# Patient Record
Sex: MALE | Race: WHITE | NOT HISPANIC OR LATINO
[De-identification: names, ages, dates, MRNs, and addresses within clinical notes are randomized per-mention and may not be internally consistent; named-entity substitution may affect disease eponyms.]

---

## 2020-11-06 ENCOUNTER — APPOINTMENT (OUTPATIENT)
Dept: PODIATRY | Facility: HOSPITAL | Age: 60
End: 2020-11-06

## 2020-12-11 PROBLEM — Z00.00 ENCOUNTER FOR PREVENTIVE HEALTH EXAMINATION: Status: ACTIVE | Noted: 2020-12-11

## 2020-12-14 ENCOUNTER — APPOINTMENT (OUTPATIENT)
Dept: WOUND CARE | Facility: HOSPITAL | Age: 60
End: 2020-12-14
Payer: COMMERCIAL

## 2020-12-14 ENCOUNTER — OUTPATIENT (OUTPATIENT)
Dept: OUTPATIENT SERVICES | Facility: HOSPITAL | Age: 60
LOS: 1 days | Discharge: HOME | End: 2020-12-14
Payer: COMMERCIAL

## 2020-12-14 VITALS
OXYGEN SATURATION: 99 % | HEART RATE: 106 BPM | WEIGHT: 185 LBS | BODY MASS INDEX: 28.04 KG/M2 | HEIGHT: 68 IN | SYSTOLIC BLOOD PRESSURE: 175 MMHG | DIASTOLIC BLOOD PRESSURE: 97 MMHG | RESPIRATION RATE: 20 BRPM | TEMPERATURE: 98 F

## 2020-12-14 VITALS — HEART RATE: 100 BPM | SYSTOLIC BLOOD PRESSURE: 155 MMHG | DIASTOLIC BLOOD PRESSURE: 85 MMHG

## 2020-12-14 VITALS
RESPIRATION RATE: 20 BRPM | OXYGEN SATURATION: 99 % | DIASTOLIC BLOOD PRESSURE: 97 MMHG | SYSTOLIC BLOOD PRESSURE: 175 MMHG | TEMPERATURE: 98 F | HEART RATE: 100 BPM

## 2020-12-14 DIAGNOSIS — Z80.3 FAMILY HISTORY OF MALIGNANT NEOPLASM OF BREAST: ICD-10-CM

## 2020-12-14 DIAGNOSIS — K22.4 DYSKINESIA OF ESOPHAGUS: ICD-10-CM

## 2020-12-14 DIAGNOSIS — H26.9 UNSPECIFIED CATARACT: ICD-10-CM

## 2020-12-14 DIAGNOSIS — Z86.79 PERSONAL HISTORY OF OTHER DISEASES OF THE CIRCULATORY SYSTEM: ICD-10-CM

## 2020-12-14 DIAGNOSIS — Z86.39 PERSONAL HISTORY OF OTHER ENDOCRINE, NUTRITIONAL AND METABOLIC DISEASE: ICD-10-CM

## 2020-12-14 DIAGNOSIS — L97.501 NON-PRESSURE CHRONIC ULCER OF OTHER PART OF UNSPECIFIED FOOT LIMITED TO BREAKDOWN OF SKIN: ICD-10-CM

## 2020-12-14 DIAGNOSIS — Z83.3 FAMILY HISTORY OF DIABETES MELLITUS: ICD-10-CM

## 2020-12-14 DIAGNOSIS — Z86.69 PERSONAL HISTORY OF OTHER DISEASES OF THE NERVOUS SYSTEM AND SENSE ORGANS: ICD-10-CM

## 2020-12-14 DIAGNOSIS — Z78.9 OTHER SPECIFIED HEALTH STATUS: ICD-10-CM

## 2020-12-14 PROCEDURE — 99203 OFFICE O/P NEW LOW 30 MIN: CPT

## 2020-12-14 PROCEDURE — G0463: CPT

## 2020-12-14 RX ORDER — METFORMIN HYDROCHLORIDE 1000 MG/1
1000 TABLET, COATED ORAL TWICE DAILY
Refills: 0 | Status: ACTIVE | COMMUNITY

## 2020-12-14 RX ORDER — ATORVASTATIN CALCIUM 40 MG/1
40 TABLET, FILM COATED ORAL DAILY
Refills: 0 | Status: ACTIVE | COMMUNITY

## 2020-12-14 RX ORDER — CLOPIDOGREL BISULFATE 75 MG/1
75 TABLET, FILM COATED ORAL DAILY
Refills: 0 | Status: ACTIVE | COMMUNITY

## 2020-12-14 RX ORDER — ACETAMINOPHEN 500 MG
500 TABLET ORAL 4 TIMES DAILY
Refills: 0 | Status: ACTIVE | COMMUNITY

## 2020-12-14 NOTE — REVIEW OF SYSTEMS
[Skin Wound] : skin wound [Fever] : no fever [Eye Pain] : no eye pain [Earache] : no earache [Shortness Of Breath] : no shortness of breath [Cough] : no cough [Abdominal Pain] : no abdominal pain [FreeTextEntry5] : htn, hld, pvd [de-identified] : right hallux diabetic ulcer down to skin, subcutaneous tissue, fat, and bone (necrotic) [de-identified] : diabetic neuropathy [de-identified] : type 2 diabetes

## 2020-12-14 NOTE — HISTORY OF PRESENT ILLNESS
[FreeTextEntry1] : The wound is located on patient's right great toe.  Patient reports that he had an infected ingrown toe nail and he had 3/4 removed by Dr. Silvino DPM in 10/2020.  Patient reports that he also had an angioplasty on 11/21/20 by Dr. Vance.  Patient is scheduled  to have an MRI on Weds to r/o osteomyelitis as ordered by Dr. Dubois.  Patient completed Augmentin and is currently taking Doxycycline.  The wound is being treated with Iodosorb to the wound.  Dr. Dubois referred patient to [facility].

## 2020-12-14 NOTE — ASSESSMENT
[FreeTextEntry2] : Promote optimal skin integrity, offloading, infection control, pain management [FreeTextEntry4] : Patient presented with asymptomatic hypertension.  Recommended that patient call his PCP.  Patient verbalized understanding.Circulation:\par \par Dorsalis Pedis:  left palpable right unable to palpate\par Posterior Tibialis:  bilateral unable to palpate\par Doppler Pulses:  bilateral  present \par Extremity Color:  bilateral pink\par Extremity Temperature:  bilateral warm\par Capillary Refill:  bilateral <3 sec  >3 sec\par FRANCISCA:   Patient is under the care of Dr. Vance who did angioplasty on 11/21/20\par \par Dr. Stark recommended that patient go to ER today and recommended amputation of right great toe.  Patient declined going to ER today reporting that he wants to discuss his options with his wife and Dr. Dubois, DPM.  Dr. Stark educated patient on the risks of not getting immediate tx as recommended.  Patient verbalized understanding and said he will decide by Thursday.  Educated patients on s/s of infection and to go to ER immediately if condition worsens.\par \par Patient is scheduled to have an MRI of right foot to r/o osteomyelitis as ordered by his DPM, Dr. Dubois.  Instructed patient to have MRI report faxed to [facility].  Provided patient with [facility] fax #.\par \par F/U 1 week\par

## 2020-12-14 NOTE — VITALS
[FreeTextEntry3] : Right great toe (5/10 - 6/10) [FreeTextEntry1] : Tylenol and Motrin [FreeTextEntry2] : sitting [FreeTextEntry4] : sitting

## 2020-12-14 NOTE — PHYSICAL EXAM
[0] : left 0 [Ankle Swelling (On Exam)] : not present [Varicose Veins Of Lower Extremities] : not present [] : not present [Skin Ulcer] : ulcer [Alert] : alert [Oriented to Person] : oriented to person [Oriented to Place] : oriented to place [Oriented to Time] : oriented to time [de-identified] : A&Ox3, NAD [de-identified] : 5/5 strength in all quadrants bilaterally [de-identified] : right hallux diabetic ulcer down to skin, subcutaneous tissue, fat, and bone (necrotic) [de-identified] : diminished light touch sensation bilaterally [FreeTextEntry1] : Right great toe [FreeTextEntry2] : 6.0 [FreeTextEntry3] : 5.0 [FreeTextEntry4] : 0.2 [de-identified] : small serosanguineous [de-identified] : erythema [de-identified] : mild [de-identified] : >75% [de-identified] : <5% [de-identified] : <25% [de-identified] : Iodosorb [de-identified] : NSC [de-identified] : Daily

## 2020-12-14 NOTE — PLAN
[FreeTextEntry1] : Patient examined and evaluated at this time.\par Continue local wound care and offloading.\par Discussed need for surgical amputation to avoid sepsis, loss of limb, and loss of life. Discussed the urgency and recommended patient go to the emergency room. Pt refusing at this time relating that he needs to discuss the plan with family.\par Patient is a high risk for proximal amputation.\par Pt advised to report to the nearest emergency room if any worsening or new symptoms are noted.\par Discussed the urgency of patient's condition with patient's family via phone.

## 2020-12-15 DIAGNOSIS — Z80.3 FAMILY HISTORY OF MALIGNANT NEOPLASM OF BREAST: ICD-10-CM

## 2020-12-15 DIAGNOSIS — Z79.84 LONG TERM (CURRENT) USE OF ORAL HYPOGLYCEMIC DRUGS: ICD-10-CM

## 2020-12-15 DIAGNOSIS — E11.51 TYPE 2 DIABETES MELLITUS WITH DIABETIC PERIPHERAL ANGIOPATHY WITHOUT GANGRENE: ICD-10-CM

## 2020-12-15 DIAGNOSIS — Z83.3 FAMILY HISTORY OF DIABETES MELLITUS: ICD-10-CM

## 2020-12-15 DIAGNOSIS — L97.514 NON-PRESSURE CHRONIC ULCER OF OTHER PART OF RIGHT FOOT WITH NECROSIS OF BONE: ICD-10-CM

## 2020-12-15 DIAGNOSIS — Z79.899 OTHER LONG TERM (CURRENT) DRUG THERAPY: ICD-10-CM

## 2020-12-15 DIAGNOSIS — Z79.82 LONG TERM (CURRENT) USE OF ASPIRIN: ICD-10-CM

## 2020-12-15 DIAGNOSIS — E11.40 TYPE 2 DIABETES MELLITUS WITH DIABETIC NEUROPATHY, UNSPECIFIED: ICD-10-CM

## 2020-12-15 DIAGNOSIS — E11.621 TYPE 2 DIABETES MELLITUS WITH FOOT ULCER: ICD-10-CM

## 2020-12-15 DIAGNOSIS — E78.00 PURE HYPERCHOLESTEROLEMIA, UNSPECIFIED: ICD-10-CM

## 2020-12-16 ENCOUNTER — INPATIENT (INPATIENT)
Facility: HOSPITAL | Age: 60
LOS: 4 days | Discharge: HOME | DRG: 617 | End: 2020-12-21
Attending: INTERNAL MEDICINE | Admitting: INTERNAL MEDICINE
Payer: COMMERCIAL

## 2020-12-16 VITALS
DIASTOLIC BLOOD PRESSURE: 85 MMHG | HEART RATE: 114 BPM | WEIGHT: 184.97 LBS | SYSTOLIC BLOOD PRESSURE: 158 MMHG | HEIGHT: 68 IN | TEMPERATURE: 98 F | OXYGEN SATURATION: 98 % | RESPIRATION RATE: 18 BRPM

## 2020-12-16 DIAGNOSIS — I96 GANGRENE, NOT ELSEWHERE CLASSIFIED: ICD-10-CM

## 2020-12-16 DIAGNOSIS — E11.9 TYPE 2 DIABETES MELLITUS WITHOUT COMPLICATIONS: ICD-10-CM

## 2020-12-16 DIAGNOSIS — M86.9 OSTEOMYELITIS, UNSPECIFIED: ICD-10-CM

## 2020-12-16 DIAGNOSIS — Z29.9 ENCOUNTER FOR PROPHYLACTIC MEASURES, UNSPECIFIED: ICD-10-CM

## 2020-12-16 DIAGNOSIS — I73.9 PERIPHERAL VASCULAR DISEASE, UNSPECIFIED: ICD-10-CM

## 2020-12-16 DIAGNOSIS — I10 ESSENTIAL (PRIMARY) HYPERTENSION: ICD-10-CM

## 2020-12-16 DIAGNOSIS — Z98.62 PERIPHERAL VASCULAR ANGIOPLASTY STATUS: Chronic | ICD-10-CM

## 2020-12-16 DIAGNOSIS — E78.5 HYPERLIPIDEMIA, UNSPECIFIED: ICD-10-CM

## 2020-12-16 LAB
A1C WITH ESTIMATED AVERAGE GLUCOSE RESULT: 8.7 % — HIGH (ref 4–5.6)
ALBUMIN SERPL ELPH-MCNC: 3.9 G/DL — SIGNIFICANT CHANGE UP (ref 3.3–5)
ALP SERPL-CCNC: 75 U/L — SIGNIFICANT CHANGE UP (ref 40–120)
ALT FLD-CCNC: 30 U/L — SIGNIFICANT CHANGE UP (ref 12–78)
ANION GAP SERPL CALC-SCNC: 7 MMOL/L — SIGNIFICANT CHANGE UP (ref 5–17)
AST SERPL-CCNC: 18 U/L — SIGNIFICANT CHANGE UP (ref 15–37)
BASOPHILS # BLD AUTO: 0.1 K/UL — SIGNIFICANT CHANGE UP (ref 0–0.2)
BASOPHILS NFR BLD AUTO: 1 % — SIGNIFICANT CHANGE UP (ref 0–2)
BILIRUB SERPL-MCNC: 0.5 MG/DL — SIGNIFICANT CHANGE UP (ref 0.2–1.2)
BUN SERPL-MCNC: 18 MG/DL — SIGNIFICANT CHANGE UP (ref 7–23)
CALCIUM SERPL-MCNC: 9.1 MG/DL — SIGNIFICANT CHANGE UP (ref 8.5–10.1)
CHLORIDE SERPL-SCNC: 105 MMOL/L — SIGNIFICANT CHANGE UP (ref 96–108)
CO2 SERPL-SCNC: 26 MMOL/L — SIGNIFICANT CHANGE UP (ref 22–31)
CREAT SERPL-MCNC: 0.91 MG/DL — SIGNIFICANT CHANGE UP (ref 0.5–1.3)
CRP SERPL-MCNC: 2.12 MG/DL — HIGH (ref 0–0.4)
EOSINOPHIL # BLD AUTO: 0.2 K/UL — SIGNIFICANT CHANGE UP (ref 0–0.5)
EOSINOPHIL NFR BLD AUTO: 1.9 % — SIGNIFICANT CHANGE UP (ref 0–6)
ERYTHROCYTE [SEDIMENTATION RATE] IN BLOOD: 16 MM/HR — SIGNIFICANT CHANGE UP (ref 0–20)
ESTIMATED AVERAGE GLUCOSE: 203 MG/DL — HIGH (ref 68–114)
GLUCOSE SERPL-MCNC: 154 MG/DL — HIGH (ref 70–99)
HCT VFR BLD CALC: 41.8 % — SIGNIFICANT CHANGE UP (ref 39–50)
HGB BLD-MCNC: 13.9 G/DL — SIGNIFICANT CHANGE UP (ref 13–17)
IMM GRANULOCYTES NFR BLD AUTO: 0.5 % — SIGNIFICANT CHANGE UP (ref 0–1.5)
LYMPHOCYTES # BLD AUTO: 1.58 K/UL — SIGNIFICANT CHANGE UP (ref 1–3.3)
LYMPHOCYTES # BLD AUTO: 15.2 % — SIGNIFICANT CHANGE UP (ref 13–44)
MCHC RBC-ENTMCNC: 29.9 PG — SIGNIFICANT CHANGE UP (ref 27–34)
MCHC RBC-ENTMCNC: 33.3 GM/DL — SIGNIFICANT CHANGE UP (ref 32–36)
MCV RBC AUTO: 89.9 FL — SIGNIFICANT CHANGE UP (ref 80–100)
MONOCYTES # BLD AUTO: 0.84 K/UL — SIGNIFICANT CHANGE UP (ref 0–0.9)
MONOCYTES NFR BLD AUTO: 8.1 % — SIGNIFICANT CHANGE UP (ref 2–14)
NEUTROPHILS # BLD AUTO: 7.64 K/UL — HIGH (ref 1.8–7.4)
NEUTROPHILS NFR BLD AUTO: 73.3 % — SIGNIFICANT CHANGE UP (ref 43–77)
NRBC # BLD: 0 /100 WBCS — SIGNIFICANT CHANGE UP (ref 0–0)
PLATELET # BLD AUTO: 334 K/UL — SIGNIFICANT CHANGE UP (ref 150–400)
POTASSIUM SERPL-MCNC: 4.9 MMOL/L — SIGNIFICANT CHANGE UP (ref 3.5–5.3)
POTASSIUM SERPL-SCNC: 4.9 MMOL/L — SIGNIFICANT CHANGE UP (ref 3.5–5.3)
PREALB SERPL-MCNC: 26 MG/DL — SIGNIFICANT CHANGE UP (ref 20–40)
PROT SERPL-MCNC: 8 G/DL — SIGNIFICANT CHANGE UP (ref 6–8.3)
RBC # BLD: 4.65 M/UL — SIGNIFICANT CHANGE UP (ref 4.2–5.8)
RBC # FLD: 12.9 % — SIGNIFICANT CHANGE UP (ref 10.3–14.5)
SARS-COV-2 RNA SPEC QL NAA+PROBE: SIGNIFICANT CHANGE UP
SODIUM SERPL-SCNC: 138 MMOL/L — SIGNIFICANT CHANGE UP (ref 135–145)
WBC # BLD: 10.41 K/UL — SIGNIFICANT CHANGE UP (ref 3.8–10.5)
WBC # FLD AUTO: 10.41 K/UL — SIGNIFICANT CHANGE UP (ref 3.8–10.5)

## 2020-12-16 PROCEDURE — 71046 X-RAY EXAM CHEST 2 VIEWS: CPT | Mod: 26

## 2020-12-16 PROCEDURE — 93925 LOWER EXTREMITY STUDY: CPT | Mod: 26

## 2020-12-16 PROCEDURE — 73718 MRI LOWER EXTREMITY W/O DYE: CPT | Mod: 26,RT

## 2020-12-16 PROCEDURE — 93010 ELECTROCARDIOGRAM REPORT: CPT

## 2020-12-16 PROCEDURE — 99222 1ST HOSP IP/OBS MODERATE 55: CPT

## 2020-12-16 PROCEDURE — 73630 X-RAY EXAM OF FOOT: CPT | Mod: 26,RT

## 2020-12-16 PROCEDURE — 93923 UPR/LXTR ART STDY 3+ LVLS: CPT | Mod: 26

## 2020-12-16 PROCEDURE — 99285 EMERGENCY DEPT VISIT HI MDM: CPT

## 2020-12-16 RX ORDER — METOPROLOL TARTRATE 50 MG
25 TABLET ORAL DAILY
Refills: 0 | Status: DISCONTINUED | OUTPATIENT
Start: 2020-12-16 | End: 2020-12-18

## 2020-12-16 RX ORDER — DEXTROSE 50 % IN WATER 50 %
25 SYRINGE (ML) INTRAVENOUS ONCE
Refills: 0 | Status: DISCONTINUED | OUTPATIENT
Start: 2020-12-16 | End: 2020-12-18

## 2020-12-16 RX ORDER — LURASIDONE HYDROCHLORIDE 40 MG/1
1 TABLET ORAL
Qty: 0 | Refills: 0 | DISCHARGE

## 2020-12-16 RX ORDER — ALBUTEROL 90 UG/1
2 AEROSOL, METERED ORAL
Qty: 0 | Refills: 0 | DISCHARGE

## 2020-12-16 RX ORDER — LISINOPRIL 2.5 MG/1
40 TABLET ORAL DAILY
Refills: 0 | Status: DISCONTINUED | OUTPATIENT
Start: 2020-12-16 | End: 2020-12-18

## 2020-12-16 RX ORDER — ASPIRIN/CALCIUM CARB/MAGNESIUM 324 MG
1 TABLET ORAL
Qty: 0 | Refills: 0 | DISCHARGE

## 2020-12-16 RX ORDER — SODIUM CHLORIDE 9 MG/ML
1000 INJECTION, SOLUTION INTRAVENOUS
Refills: 0 | Status: DISCONTINUED | OUTPATIENT
Start: 2020-12-16 | End: 2020-12-18

## 2020-12-16 RX ORDER — LITHIUM CARBONATE 300 MG/1
300 TABLET, EXTENDED RELEASE ORAL
Qty: 0 | Refills: 0 | DISCHARGE

## 2020-12-16 RX ORDER — GLUCAGON INJECTION, SOLUTION 0.5 MG/.1ML
1 INJECTION, SOLUTION SUBCUTANEOUS ONCE
Refills: 0 | Status: DISCONTINUED | OUTPATIENT
Start: 2020-12-16 | End: 2020-12-18

## 2020-12-16 RX ORDER — INSULIN LISPRO 100/ML
VIAL (ML) SUBCUTANEOUS AT BEDTIME
Refills: 0 | Status: DISCONTINUED | OUTPATIENT
Start: 2020-12-16 | End: 2020-12-17

## 2020-12-16 RX ORDER — INSULIN LISPRO 100/ML
VIAL (ML) SUBCUTANEOUS
Refills: 0 | Status: DISCONTINUED | OUTPATIENT
Start: 2020-12-16 | End: 2020-12-17

## 2020-12-16 RX ORDER — VENLAFAXINE HCL 75 MG
1 CAPSULE, EXT RELEASE 24 HR ORAL
Qty: 0 | Refills: 0 | DISCHARGE

## 2020-12-16 RX ORDER — DEXTROSE 50 % IN WATER 50 %
15 SYRINGE (ML) INTRAVENOUS ONCE
Refills: 0 | Status: DISCONTINUED | OUTPATIENT
Start: 2020-12-16 | End: 2020-12-18

## 2020-12-16 RX ORDER — CILOSTAZOL 100 MG/1
100 TABLET ORAL
Refills: 0 | Status: DISCONTINUED | OUTPATIENT
Start: 2020-12-16 | End: 2020-12-18

## 2020-12-16 RX ORDER — CLOPIDOGREL BISULFATE 75 MG/1
75 TABLET, FILM COATED ORAL DAILY
Refills: 0 | Status: DISCONTINUED | OUTPATIENT
Start: 2020-12-16 | End: 2020-12-18

## 2020-12-16 RX ORDER — DEXTROSE 50 % IN WATER 50 %
12.5 SYRINGE (ML) INTRAVENOUS ONCE
Refills: 0 | Status: DISCONTINUED | OUTPATIENT
Start: 2020-12-16 | End: 2020-12-18

## 2020-12-16 RX ORDER — HEPARIN SODIUM 5000 [USP'U]/ML
5000 INJECTION INTRAVENOUS; SUBCUTANEOUS EVERY 12 HOURS
Refills: 0 | Status: COMPLETED | OUTPATIENT
Start: 2020-12-16 | End: 2020-12-16

## 2020-12-16 RX ORDER — PIPERACILLIN AND TAZOBACTAM 4; .5 G/20ML; G/20ML
3.38 INJECTION, POWDER, LYOPHILIZED, FOR SOLUTION INTRAVENOUS ONCE
Refills: 0 | Status: COMPLETED | OUTPATIENT
Start: 2020-12-16 | End: 2020-12-16

## 2020-12-16 RX ORDER — ATORVASTATIN CALCIUM 80 MG/1
40 TABLET, FILM COATED ORAL AT BEDTIME
Refills: 0 | Status: DISCONTINUED | OUTPATIENT
Start: 2020-12-16 | End: 2020-12-18

## 2020-12-16 RX ORDER — CANAGLIFLOZIN 100 MG/1
1 TABLET, FILM COATED ORAL
Qty: 0 | Refills: 0 | DISCHARGE

## 2020-12-16 RX ADMIN — ATORVASTATIN CALCIUM 40 MILLIGRAM(S): 80 TABLET, FILM COATED ORAL at 21:02

## 2020-12-16 RX ADMIN — CLOPIDOGREL BISULFATE 75 MILLIGRAM(S): 75 TABLET, FILM COATED ORAL at 21:02

## 2020-12-16 RX ADMIN — Medication 0: at 21:02

## 2020-12-16 RX ADMIN — CILOSTAZOL 100 MILLIGRAM(S): 100 TABLET ORAL at 19:33

## 2020-12-16 RX ADMIN — PIPERACILLIN AND TAZOBACTAM 200 GRAM(S): 4; .5 INJECTION, POWDER, LYOPHILIZED, FOR SOLUTION INTRAVENOUS at 10:06

## 2020-12-16 NOTE — CONSULT NOTE ADULT - SUBJECTIVE AND OBJECTIVE BOX
Vascular Surgery PA Consult Note    HPI:  60 year old male with PMH of HTN, HLD, DM2, PVD s/p RLE angiogram/angioplasty (Nov 2020) sent in by podiatrist for admission for osteomyelitis of right hallux. Patient developed infection of right hallux after partial nail removal by podiatrist one month ago    PAST MEDICAL & SURGICAL HISTORY:  Peripheral vascular disease  Hyperlipidemia  Diabetes  HTN (hypertension)  H/O RLE angioplasty    MEDICATIONS  (STANDING):  atorvastatin 40 milliGRAM(s) Oral at bedtime  cilostazol 100 milliGRAM(s) Oral two times a day  clopidogrel Tablet 75 milliGRAM(s) Oral daily  dextrose 40% Gel 15 Gram(s) Oral once  dextrose 5%. 1000 milliLiter(s) (50 mL/Hr) IV Continuous <Continuous>  dextrose 5%. 1000 milliLiter(s) (100 mL/Hr) IV Continuous <Continuous>  dextrose 50% Injectable 25 Gram(s) IV Push once  dextrose 50% Injectable 12.5 Gram(s) IV Push once  dextrose 50% Injectable 25 Gram(s) IV Push once  glucagon  Injectable 1 milliGRAM(s) IntraMuscular once  heparin   Injectable 5000 Unit(s) SubCutaneous every 12 hours  insulin lispro (ADMELOG) corrective regimen sliding scale   SubCutaneous three times a day before meals  insulin lispro (ADMELOG) corrective regimen sliding scale   SubCutaneous at bedtime  lisinopril 40 milliGRAM(s) Oral daily  metoprolol succinate ER 25 milliGRAM(s) Oral daily    FAMILY HISTORY:  FH: type 2 diabetes  Grandfather    Allergies  No Known Allergies    SOCIAL HISTORY:   ETOH:  Tobacco:  Drugs:    REVIEW OF SYSTEMS: Pertinent positives and negatives as stated in HPI, otherwise negative    VITAL SIGNS  T(C): 36.9 (12-16-20 @ 08:48), Max: 36.9 (12-16-20 @ 08:48)  HR: 114 (12-16-20 @ 08:48)  BP: 158/85 (12-16-20 @ 08:48)  SpO2: 98% (12-16-20 @ 08:48)  Wt(kg): --    I/O's:     PHYSICAL EXAM  Gen: NAD  Pulm:   CV:   Abd:   :   Ext:     LABS:  12-16 @ 10:11    WBC 10.41 / Hct 41.8  / SCr 0.91     12-16    138  |  105  |  18  ----------------------------<  154<H>  4.9   |  26  |  0.91    Ca    9.1      16 Dec 2020 10:11    TPro  8.0  /  Alb  3.9  /  TBili  0.5  /  DBili  x   /  AST  18  /  ALT  30  /  AlkPhos  75  12-16    RADIOLOGY:  Venous arterial duplex and FRANCISCA pending   Vascular Surgery PA Consult Note    HPI:  60 year old male with PMH of HTN, HLD, DM2, PVD s/p RLE angiogram/angioplasty (Nov 2020) sent in by podiatrist for admission for osteomyelitis of right hallux. Patient developed infection of right hallux after partial nail removal for ingrown toenail by podiatrist one month ago    PAST MEDICAL & SURGICAL HISTORY:  Peripheral vascular disease  Hyperlipidemia  Diabetes  HTN (hypertension)  H/O RLE angioplasty    MEDICATIONS  (STANDING):  atorvastatin 40 milliGRAM(s) Oral at bedtime  cilostazol 100 milliGRAM(s) Oral two times a day  clopidogrel Tablet 75 milliGRAM(s) Oral daily  dextrose 40% Gel 15 Gram(s) Oral once  dextrose 5%. 1000 milliLiter(s) (50 mL/Hr) IV Continuous <Continuous>  dextrose 5%. 1000 milliLiter(s) (100 mL/Hr) IV Continuous <Continuous>  dextrose 50% Injectable 25 Gram(s) IV Push once  dextrose 50% Injectable 12.5 Gram(s) IV Push once  dextrose 50% Injectable 25 Gram(s) IV Push once  glucagon  Injectable 1 milliGRAM(s) IntraMuscular once  heparin   Injectable 5000 Unit(s) SubCutaneous every 12 hours  insulin lispro (ADMELOG) corrective regimen sliding scale   SubCutaneous three times a day before meals  insulin lispro (ADMELOG) corrective regimen sliding scale   SubCutaneous at bedtime  lisinopril 40 milliGRAM(s) Oral daily  metoprolol succinate ER 25 milliGRAM(s) Oral daily    FAMILY HISTORY:  FH: type 2 diabetes  Grandfather    Allergies  No Known Allergies    SOCIAL HISTORY:   ETOH:  Tobacco:  Drugs:    REVIEW OF SYSTEMS: Pertinent positives and negatives as stated in HPI, otherwise negative    VITAL SIGNS  T(C): 36.9 (12-16-20 @ 08:48), Max: 36.9 (12-16-20 @ 08:48)  HR: 114 (12-16-20 @ 08:48)  BP: 158/85 (12-16-20 @ 08:48)  SpO2: 98% (12-16-20 @ 08:48)  Wt(kg): --    PHYSICAL EXAM  Gen: NAD  Pulm:   CV:   Abd:   :   Ext:     LABS:  12-16 @ 10:11    WBC 10.41 / Hct 41.8  / SCr 0.91     12-16    138  |  105  |  18  ----------------------------<  154<H>  4.9   |  26  |  0.91    Ca    9.1      16 Dec 2020 10:11    TPro  8.0  /  Alb  3.9  /  TBili  0.5  /  DBili  x   /  AST  18  /  ALT  30  /  AlkPhos  75  12-16    RADIOLOGY:  arterial duplex and FRANCISCA pending   Vascular Surgery PA Consult Note    HPI:  60 year old male with PMH of HTN, HLD, DM2, PVD s/p RLE angiogram/angioplasty (Nov 2020) sent in by podiatrist for admission for osteomyelitis of right hallux. Patient developed infection of right hallux after partial nail removal for ingrown toenail by podiatrist one month ago.    PAST MEDICAL & SURGICAL HISTORY:  Peripheral vascular disease  Hyperlipidemia  Diabetes  HTN (hypertension)  H/O RLE angioplasty    MEDICATIONS  (STANDING):  atorvastatin 40 milliGRAM(s) Oral at bedtime  cilostazol 100 milliGRAM(s) Oral two times a day  clopidogrel Tablet 75 milliGRAM(s) Oral daily  dextrose 40% Gel 15 Gram(s) Oral once  dextrose 5%. 1000 milliLiter(s) (50 mL/Hr) IV Continuous <Continuous>  dextrose 5%. 1000 milliLiter(s) (100 mL/Hr) IV Continuous <Continuous>  dextrose 50% Injectable 25 Gram(s) IV Push once  dextrose 50% Injectable 12.5 Gram(s) IV Push once  dextrose 50% Injectable 25 Gram(s) IV Push once  glucagon  Injectable 1 milliGRAM(s) IntraMuscular once  heparin   Injectable 5000 Unit(s) SubCutaneous every 12 hours  insulin lispro (ADMELOG) corrective regimen sliding scale   SubCutaneous three times a day before meals  insulin lispro (ADMELOG) corrective regimen sliding scale   SubCutaneous at bedtime  lisinopril 40 milliGRAM(s) Oral daily  metoprolol succinate ER 25 milliGRAM(s) Oral daily    FAMILY HISTORY:  FH: type 2 diabetes  Grandfather    Allergies  No Known Allergies    SOCIAL HISTORY:   ETOH:  Tobacco:  Drugs:    REVIEW OF SYSTEMS: Pertinent positives and negatives as stated in HPI, otherwise negative    VITAL SIGNS  T(C): 36.9 (12-16-20 @ 08:48), Max: 36.9 (12-16-20 @ 08:48)  HR: 114 (12-16-20 @ 08:48)  BP: 158/85 (12-16-20 @ 08:48)  SpO2: 98% (12-16-20 @ 08:48)  Wt(kg): --    PHYSICAL EXAM  GENERAL: No acute distress, well-developed  HEAD:  Atraumatic, Normocephalic  LOWER EXTREM: PT palpable bilaterally, Capillary refill >2sec b/l.  No pedal hair b/l. Dopplers reveal DP and PT pulses b/l. hypertrophic nails on left foot  R foot: unable to visualize due to podiatry wrap. Pt refused dressing removal.  MSK:  5/5 strength at all muscle groups crossing ankle joint b/l.  NEURO: Grossly intact    LABS:  12-16 @ 10:11    WBC 10.41 / Hct 41.8  / SCr 0.91     12-16    138  |  105  |  18  ----------------------------<  154<H>  4.9   |  26  |  0.91    Ca    9.1      16 Dec 2020 10:11    TPro  8.0  /  Alb  3.9  /  TBili  0.5  /  DBili  x   /  AST  18  /  ALT  30  /  AlkPhos  75  12-16    RADIOLOGY:  ABIs >1    60 year old male with PMH of HTN, HLD, DM2, PVD s/p RLE angiogram/angioplasty (Nov 2020) requesting vascular clearance prior to R 1st toe amp.    Pt cleared for OR from a vascular perspective  No vascular intervention indicated  reconsult as needed  signing off

## 2020-12-16 NOTE — ED PROVIDER NOTE - ATTENDING CONTRIBUTION TO CARE
61 y/o M with hx of DM sent in by Dr. Stark for admission for right 1st toe necrosis/osteomyelitis.     PE: well appearing, no distress  heart, lungs, abd wnl  rigth toe eschar/necrotic    labs, xr, admit

## 2020-12-16 NOTE — ED PROVIDER NOTE - MUSCULOSKELETAL, MLM
Spine appears normal, range of motion is not limited, no muscle or joint tenderness. dp pulses diminished bilaterally.

## 2020-12-16 NOTE — CONSULT NOTE ADULT - ASSESSMENT
A/P: 60 year old male with PMH of HTN, HLD, DM2, PVD s/p RLE angiography (Nov 2020) a/w R hallux gangrene/osteomyelitis    - f/u venous arterial duplex & FRANCISCA A/P: 60 year old male with PMH of HTN, HLD, DM2, PVD s/p RLE angiography (Nov 2020) a/w R hallux gangrene/osteomyelitis    - f/u arterial duplex & FRANCISCA

## 2020-12-16 NOTE — ED PROVIDER NOTE - SKIN WOUND TYPE
+ necrotic appearing wound to right great toe with discharge and surrounding erythema extending to MCP. cap refill decreased.

## 2020-12-16 NOTE — CONSULT NOTE ADULT - SUBJECTIVE AND OBJECTIVE BOX
[facility]         Sami Golden, Ngoc, Vlad, Ruthy, Otoniel, Osvaldo        [phone] (office)    Reason for Consult: Pre surgical clearence    Interval HPI: Patient seen and examined at bedside. No acute events overnight.     HPI: The patient is a 59yo F with PMH of HTN, HLD, DM2 (not on insulin), CAD??? sent by Podiatry, Dr. Gomez for R hallux osteomyelitis. Patient states initial wound was in Oct 2020 after he had an ingrown toenail. Patient had vascular angiogram in November to improve blood flow however no improvement. Denies fever, chills, chest pain, sob, abd pain, N/V, UE/LE weakness or paresthesias.     In the ED:  VS- 98.4F, 114HR, 158/85, 18RR, O2 sat 98% on RA  Labs significant for glucose 154. CBC and CMP WNL.  ECG showed sinus tachycardia (107), no signs of acute ischemia  s/p Zosyn in the ED (16 Dec 2020 10:52)      PAST MEDICAL & SURGICAL HISTORY:  Hyperlipidemia    Diabetes    HTN (hypertension)        SOCIAL HISTORY: No active tobacco, alcohol or illicit drug use    FAMILY HISTORY:      Home Medications:  atorvastatin 40 mg oral tablet: 1 tab(s) orally once a day (16 Dec 2020 12:30)  canagliflozin 300 mg oral tablet: 1 tab(s) orally once a day (16 Dec 2020 12:30)  cilostazol 100 mg oral tablet: 1 tab(s) orally 2 times a day (16 Dec 2020 12:30)  clopidogrel 75 mg oral tablet: 1 tab(s) orally once a day (16 Dec 2020 12:30)  doxycycline hyclate 100 mg oral capsule: 1 cap(s) orally 2 times a day (16 Dec 2020 12:30)  Januvia 100 mg oral tablet: 1 tab(s) orally once a day (16 Dec 2020 12:30)  metFORMIN 1000 mg oral tablet: 1 tab(s) orally 2 times a day (16 Dec 2020 12:30)  Metoprolol Succinate ER 25 mg oral tablet, extended release: 1 tab(s) orally once a day (16 Dec 2020 12:30)  quinapril 40 mg oral tablet: 1 tab(s) orally once a day (16 Dec 2020 12:30)      MEDICATIONS  (STANDING):  dextrose 40% Gel 15 Gram(s) Oral once  dextrose 5%. 1000 milliLiter(s) (50 mL/Hr) IV Continuous <Continuous>  dextrose 5%. 1000 milliLiter(s) (100 mL/Hr) IV Continuous <Continuous>  dextrose 50% Injectable 25 Gram(s) IV Push once  dextrose 50% Injectable 12.5 Gram(s) IV Push once  dextrose 50% Injectable 25 Gram(s) IV Push once  glucagon  Injectable 1 milliGRAM(s) IntraMuscular once  heparin   Injectable 5000 Unit(s) SubCutaneous every 12 hours  insulin lispro (ADMELOG) corrective regimen sliding scale   SubCutaneous three times a day before meals  insulin lispro (ADMELOG) corrective regimen sliding scale   SubCutaneous at bedtime    MEDICATIONS  (PRN):      Allergies    No Known Allergies    Intolerances        REVIEW OF SYSTEMS: Negative except as per HPI.    VITAL SIGNS:   Vital Signs Last 24 Hrs  T(C): 36.9 (16 Dec 2020 08:48), Max: 36.9 (16 Dec 2020 08:48)  T(F): 98.4 (16 Dec 2020 08:48), Max: 98.4 (16 Dec 2020 08:48)  HR: 114 (16 Dec 2020 08:48) (114 - 114)  BP: 158/85 (16 Dec 2020 08:48) (158/85 - 158/85)  BP(mean): --  RR: 18 (16 Dec 2020 08:48) (18 - 18)  SpO2: 98% (16 Dec 2020 08:48) (98% - 98%)    I&O's Summary      PHYSICAL EXAM:  Constitutional: NAD, well-developed  HEENT NC/AT, moist mucous membranes  Pulmonary: Non-labored, breath sounds are clear bilaterally, no wheezing, rales or rhonchi  Cardiovascular: +S1, S2, RRR, no murmur  Gastrointestinal: Soft, nontender, nondistended, normoactive bowel sounds  Extremities: No peripheral edema   Neurological: Alert, strength and sensitivity are grossly intact  Skin: No obvious lesions/rashes  Psych: Mood & affect appropriate    LABS: All Labs Reviewed:                        13.9   10.41 )-----------( 334      ( 16 Dec 2020 10:11 )             41.8     16 Dec 2020 10:11    138    |  105    |  18     ----------------------------<  154    4.9     |  26     |  0.91     Ca    9.1        16 Dec 2020 10:11    TPro  8.0    /  Alb  3.9    /  TBili  0.5    /  DBili  x      /  AST  18     /  ALT  30     /  AlkPhos  75     16 Dec 2020 10:11          Blood Culture:         EKG:    RADIOLOGY:    CXR:   [facility]         Sami Golden, Ngoc, Vlad, Ruthy, Otoniel, Osvaldo        [phone] (office)    Reason for Consult: Pre surgical clearence    Interval HPI: Patient seen and examined at bedside. No acute events overnight. The patient is being admitted for a scheduled right first toe amputation tomorrow 12/17/2020. PMH of HTN, HLD, DM2, Peripheral vascular disease,  is also S/P Angioplasty 11/2020. Patient states that he is doing well.  Denies any chest pain, SOB or palpitations. No N/V/D/C.      HPI: 61yo F with PMH of HTN, HLD, DM2 (not on insulin), peripheral vascular insufficiency s/p angioplasty of RLE (Nov 2020) sent by Podiatry, Dr. Stark for R hallux osteomyelitis. Patient states initial wound was in Oct 2020 after he had an ingrown toenail. Patient had vascular angiogram in November to improve blood flow however no improvement. Denies fever, chills, chest pain, sob, abd pain, N/V, UE/LE weakness or paresthesias.       In the ED:  VS- 98.4F, 114HR, 158/85, 18RR, O2 sat 98% on RA  Labs significant for glucose 154. CBC and CMP WNL.  ECG showed sinus tachycardia (107), no signs of acute ischemia  s/p Zosyn in the ED (16 Dec 2020 10:52)      PAST MEDICAL & SURGICAL HISTORY:  Hyperlipidemia    Diabetes    HTN (hypertension)        SOCIAL HISTORY: No active tobacco, alcohol or illicit drug use    FAMILY HISTORY:      Home Medications:  atorvastatin 40 mg oral tablet: 1 tab(s) orally once a day (16 Dec 2020 12:30)  canagliflozin 300 mg oral tablet: 1 tab(s) orally once a day (16 Dec 2020 12:30)  cilostazol 100 mg oral tablet: 1 tab(s) orally 2 times a day (16 Dec 2020 12:30)  clopidogrel 75 mg oral tablet: 1 tab(s) orally once a day (16 Dec 2020 12:30)  doxycycline hyclate 100 mg oral capsule: 1 cap(s) orally 2 times a day (16 Dec 2020 12:30)  Januvia 100 mg oral tablet: 1 tab(s) orally once a day (16 Dec 2020 12:30)  metFORMIN 1000 mg oral tablet: 1 tab(s) orally 2 times a day (16 Dec 2020 12:30)  Metoprolol Succinate ER 25 mg oral tablet, extended release: 1 tab(s) orally once a day (16 Dec 2020 12:30)  quinapril 40 mg oral tablet: 1 tab(s) orally once a day (16 Dec 2020 12:30)      MEDICATIONS  (STANDING):  dextrose 40% Gel 15 Gram(s) Oral once  dextrose 5%. 1000 milliLiter(s) (50 mL/Hr) IV Continuous <Continuous>  dextrose 5%. 1000 milliLiter(s) (100 mL/Hr) IV Continuous <Continuous>  dextrose 50% Injectable 25 Gram(s) IV Push once  dextrose 50% Injectable 12.5 Gram(s) IV Push once  dextrose 50% Injectable 25 Gram(s) IV Push once  glucagon  Injectable 1 milliGRAM(s) IntraMuscular once  heparin   Injectable 5000 Unit(s) SubCutaneous every 12 hours  insulin lispro (ADMELOG) corrective regimen sliding scale   SubCutaneous three times a day before meals  insulin lispro (ADMELOG) corrective regimen sliding scale   SubCutaneous at bedtime    MEDICATIONS  (PRN):      Allergies    No Known Allergies    Intolerances        REVIEW OF SYSTEMS: Negative except as per HPI.    VITAL SIGNS:   Vital Signs Last 24 Hrs  T(C): 36.9 (16 Dec 2020 08:48), Max: 36.9 (16 Dec 2020 08:48)  T(F): 98.4 (16 Dec 2020 08:48), Max: 98.4 (16 Dec 2020 08:48)  HR: 114 (16 Dec 2020 08:48) (114 - 114)  BP: 158/85 (16 Dec 2020 08:48) (158/85 - 158/85)  BP(mean): --  RR: 18 (16 Dec 2020 08:48) (18 - 18)  SpO2: 98% (16 Dec 2020 08:48) (98% - 98%)    I&O's Summary      PHYSICAL EXAM:  Constitutional: NAD, well-developed  HEENT NC/AT, moist mucous membranes  Pulmonary: Non-labored, breath sounds are clear bilaterally, no wheezing, rales or rhonchi  Cardiovascular: +S1, S2, RRR, no murmur  Gastrointestinal: Soft, nontender, nondistended, normoactive bowel sounds  Extremities: No peripheral edema or cynosis. Unable to examine right foot because of the dressing.   Neurological: AAO X3, strength and sensitivity are grossly intact      LABS: All Labs Reviewed:                        13.9   10.41 )-----------( 334      ( 16 Dec 2020 10:11 )             41.8     16 Dec 2020 10:11    138    |  105    |  18     ----------------------------<  154    4.9     |  26     |  0.91     Ca    9.1        16 Dec 2020 10:11    TPro  8.0    /  Alb  3.9    /  TBili  0.5    /  DBili  x      /  AST  18     /  ALT  30     /  AlkPhos  75     16 Dec 2020 10:11          Blood Culture:         EKG:    RADIOLOGY:    EXAM:  MR FOOT RT                            PROCEDURE DATE:  12/16/2020          INTERPRETATION:  Clinical indication: Gangrene of the right hallux. Evaluate extent.    Multiplanar multisequence noncontrast MRI of the right foot was performed the level of the toes to the level of the navicular cuneiform articulation.    Correlation is made with prior radiographs from the same day.    Findings:    There is prominent soft tissue edema about the hallux, most prominent along the distal phalanx. Punctate foci of hypointense signal are seen within the plantar subcutaneous fat at the level of the distal phalanx of the first toe corresponding to subcutaneous air on prior radiographs. No additional tracking foci of air are seen distal to this region, however MR is somewhat insensitive for this finding. Findings are consistent with stated history of gangrene.    There is confluent osseous edema and hypointense T1 signal within the first distal phalanx consistent with acute osteomyelitis. Marrow signal within the remainder of the foot is otherwise preserved.    There is subarticular edema within the navicular and the middle cuneiform likely related to underlying arthrosis and/or osseous stress reaction. The remaining joint spaces are otherwise preserved.    Visualized tendinous structures are intact. There is edema and mild fatty infiltration within the plantar musculature likely related to subacute denervation related changes. Lisfranc ligament is intact.    Impression:    Acute osteomyelitis of the right first distal phalanx. Prominent soft tissue edema about the first toe with numerous punctate foci of hypointense signal corresponding to air on the prior radiographs. These foci of air appear confined to the plantar soft tissues of the first distal phalanx without definite extension proximally, however MRI is insensitive for this finding.      EXAM:  FOOT RIGHT (MINIMUM 3 VIEWS)                            PROCEDURE DATE:  12/16/2020          INTERPRETATION:  Right foot. 3 views. Clinically patient has osteomyelitis.    COMPARISON: None available.    Arterial calcifications are noted.    There is destruction of the bone of the distal phalanx of the great toe. There is air in the associated soft tissues.    IMPRESSION: Osteomyelitis of the great toe. Arterial calcification.          CXR:

## 2020-12-16 NOTE — ED PROVIDER NOTE - OBJECTIVE STATEMENT
59 yo male with h/o DM, HLD, HTN sent to ED by podiatrist Dr. Stark for admission for osteomyelitis right great toe. Patient states initial wound was in Oct 2020 after he had an ingrown toenail. Patient had vascular angiogram in November to improve blood flow however no improvement. Denies fever, chills, chest pain, sob, abd pain, N/V, UE/LE weakness or paresthesias.     pmd: Dr. Yadi Frederick

## 2020-12-16 NOTE — CONSULT NOTE ADULT - ASSESSMENT
The patient is a 61yo F with PMH of HTN, HLD, DM2 (not on insulin), CAD??? sent by Podiatry, Dr. Gomez for R hallux osteomyelitis.    # Right Hallex OM:  -           Doubt ACS, pain likely pleuritic vs. musculoskeletal.    - No clear evidence of acute ischemia, trops negative x2. Will f/u third set, patient asymptomatic.  - CKs are flat, which suggests against acute atherosclerotic plaque rupture.  - No evidence of volume overload.  - No acute changes on EKG compared to previous.  - Previous echo in 02/17 shows normal LV function with EF: __%, no significant valvular disease noted.  - BP well controlled, continue home BP meds, monitor routine hemodynamics.  - Monitor and replete lytes, keep K>4, Mg>2.  - Patient has no active ischemia, decompensated HF, unstable arrythmia, or severe stenotic valvular disease, and in the setting of low risk --- procedure, patient is optimized from cardiovascular standpoint to proceed with planned procedure with routine hemodynamic monitoring.   - Patient has no modifiable active cardiac risk factors (elevated trops x3, moderate-severe ASD), and in the setting of low risk Bronchoscopy, patient is optimized as best as possible from cardiovascular standpoint to proceed with planned procedure with routine hemodynamic monitoring.   - Other cardiovascular workup will depend on clinical course. All other workup per primary team.  - Will follow. The patient is a 61yo F with PMH of HTN, HLD, DM2 (not on insulin), CAD??? sent by Podiatry, Dr. Gomez for R hallux osteomyelitis.    # Right Hallex OM:  - Pt is scheduled for a right Hallex Amputation tomorrow. 12/17/2020  - No history of cardiac disease.  - Denies any hx of SOB while walking   - EKG shows no acute changes.  - No evidence of volume overload.   - Patient has no active ischemia, decompensated HF, unstable arrythmia, or severe stenotic valvular disease, and in the setting of low risk right hallex amputation procedure, patient is optimized from cardiovascular standpoint to proceed with planned procedure with routine hemodynamic monitoring.    # HTN:  - BP is 154/85  - Patient is on Lisinopril 40mg 1 po qd and Metoprolol succinate XL 25mg 1po qd  - continue BP meds  - continue statin  - Continue routine hemodynamic monitoring.  - will follow.      The patient is a 61yo F with PMH of HTN, HLD, DM2 (not on insulin), CAD??? sent by Podiatry, Dr. Gomez for R hallux osteomyelitis.    # Right Hallex OM:  - Pt is scheduled for a right Hallex Amputation tomorrow. 12/17/2020  - No history of cardiac disease.  - Denies any hx of SOB while walking   - EKG shows no acute changes.  - No evidence of volume overload.  - Patient has no active ischemia, decompensated HF, unstable arrythmia, or severe stenotic valvular disease, and in the setting of low risk right hallex amputation procedure, patient is optimized from cardiovascular standpoint to proceed with planned procedure with routine hemodynamic monitoring.    # HTN:  - BP is 154/85  - Patient is on Lisinopril 40mg 1 po qd and Metoprolol succinate XL 25mg 1po qd  - continue BP meds  - continue statin  - Continue routine hemodynamic monitoring.    - will follow with you     The patient is a 59yo F with PMH of HTN, HLD, DM2 (not on insulin), CAD??? sent by Podiatry, Dr. Gomez for R hallux osteomyelitis.    # Right Hallex OM:  - Pt is scheduled for a right Hallex Amputation tomorrow. 12/17/2020  - No history of cardiac disease.  - Denies any hx of SOB while walking   - EKG shows no acute changes.  - No evidence of volume overload.  - Patient has no active ischemia, decompensated HF, unstable arrythmia, or severe stenotic valvular disease, and in the setting of low risk right hallex amputation procedure, patient is optimized from cardiovascular standpoint to proceed with planned procedure with routine hemodynamic monitoring.    # HTN:  - BP is 154/85  - Patient is on Lisinopril 40mg 1 po qd and Metoprolol succinate XL 25mg 1po qd  - continue BP meds  - continue statin  - Continue routine hemodynamic monitoring.    PAD  - cont with plavix and lipitor  - cont pletal    - will follow with you

## 2020-12-16 NOTE — H&P ADULT - SKIN COMMENTS
R great toe erythematous, necrotic appearing wound to bone. Aquacel dressing in place R great toe erythematous &  necrotic wound to bone. Aquacel dressing in place,

## 2020-12-16 NOTE — CONSULT NOTE ADULT - ASSESSMENT
Patient is a 60 year old male with PMH of DM on oral meds, peripheral neuropathy, HTN, HLD who was sent by Podiatry, Dr. Gomez for right hallux gangrene and osteomyelitis, planning for amputation.

## 2020-12-16 NOTE — H&P ADULT - ATTENDING COMMENTS
pt seen, examined, case & care plan d/w pt, residents at detail.  AM labs   po diet,   As per Dr Stark - Surgery on Friday.   As  D/W Dr LORE Frdeerick -ID -HOLD off IV Abx for better OR cultures.

## 2020-12-16 NOTE — H&P ADULT - HISTORY OF PRESENT ILLNESS
61yo F with PMH of HTN, HLD, DM2 (not on insulin), CAD??? sent by Podiatry, Dr. Gomez for R hallux osteomyelitis. Patient states initial wound was in Oct 2020 after he had an ingrown toenail. Patient had vascular angiogram in November to improve blood flow however no improvement. Denies fever, chills, chest pain, sob, abd pain, N/V, UE/LE weakness or paresthesias.     In the ED:  VS- 98.4F, 114HR, 158/85, 18RR, O2 sat 98% on RA  Labs significant for  ECG showed sinus tachycardia (107), no signs of acute ischemia  XR showed _____  s/p Zosyn in the ED 61yo F with PMH of HTN, HLD, DM2 (not on insulin), CAD??? sent by Podiatry, Dr. Gomez for R hallux osteomyelitis. Patient states initial wound was in Oct 2020 after he had an ingrown toenail. Patient had vascular angiogram in November to improve blood flow however no improvement. Denies fever, chills, chest pain, sob, abd pain, N/V, UE/LE weakness or paresthesias.     In the ED:  VS- 98.4F, 114HR, 158/85, 18RR, O2 sat 98% on RA  Labs significant for glucose 154. CBC and CMP WNL.  ECG showed sinus tachycardia (107), no signs of acute ischemia  XR showed _____  MR foot showed ____  s/p Zosyn in the ED 61yo F with PMH of HTN, HLD, DM2 (not on insulin), CAD??? sent by Podiatry, Dr. Gomez for R hallux osteomyelitis. Patient states initial wound was in Oct 2020 after he had an ingrown toenail. Patient had vascular angiogram in November to improve blood flow however no improvement. Denies fever, chills, chest pain, sob, abd pain, N/V, UE/LE weakness or paresthesias.     In the ED:  VS- 98.4F, 114HR, 158/85, 18RR, O2 sat 98% on RA  Labs significant for glucose 154. CBC and CMP WNL.  ECG showed sinus tachycardia (107), no signs of acute ischemia  s/p Zosyn in the ED 61yo F with PMH of HTN, HLD, DM2 (not on insulin), peripheral vascular insufficiency s/p angioplasty of RLE (Nov 2020) sent by Podiatry, Dr. Stark for R hallux osteomyelitis. Patient states initial wound was in Oct 2020 after he had an ingrown toenail. Patient had vascular angiogram in November to improve blood flow however no improvement. Denies fever, chills, chest pain, sob, abd pain, N/V, UE/LE weakness or paresthesias.     In the ED:  VS- 98.4F, 114HR, 158/85, 18RR, O2 sat 98% on RA  Labs significant for glucose 154. CBC and CMP WNL.  ECG showed sinus tachycardia (107), no signs of acute ischemia  MRI showed Acute osteomyelitis of the right first distal phalanx. Prominent soft tissue edema about the first toe with numerous punctate foci of hypointense signal corresponding to air on the prior radiographs. These foci of air appear confined to the plantar soft tissues of the first distal phalanx without definite extension proximally, however MRI is insensitive for this finding.  s/p Zosyn in the ED 61yo F with PMH of HTN, HLD, DM2 (not on insulin), peripheral vascular insufficiency s/p angioplasty of RLE (Nov 2020) sent by Podiatry, Dr. Stark for R hallux osteomyelitis. Patient states initial wound was in Oct 2020 after he had an ingrown toenail. Patient had vascular angiogram in November to improve blood flow however no improvement. Denies fever, chills, chest pain, sob, abd pain, N/V, UE/LE weakness or paresthesias.     In the ED:  VS- 98.4F, 114HR, 158/85, 18RR, O2 sat 98% on RA  Labs significant for glucose 154. CBC and CMP WNL.  ECG showed sinus tachycardia (107), no signs of acute ischemia  MRI showed Acute osteomyelitis of the right first distal phalanx. Prominent soft tissue edema about the first toe with numerous punctate foci of hypointense signal corresponding to air on the prior radiographs. These foci of air appear confined to the plantar soft tissues of the first distal phalanx without definite extension proximally, however MRI is insensitive for this finding.  s/p Zosyn in the ED, pt was seen by podiatry & ID in ER.

## 2020-12-16 NOTE — H&P ADULT - PROBLEM SELECTOR PLAN 1
Sent by podiatry, Dr. Gomez for osteomyelitis of R hallux  -Sepsis not POA  -Plan for OR tomorrow for amputation  -f/u X ray and MRI foot  -f/u CRP, ESR  -Continue zosyn  -NPO after midnight  -ID consulted, f/u recs  -Podiatry, Dr. Gomez following  -Vascular, Dr. Boston consulted  -Wound care consulted  -[facility] group consulted for cardiac clearance    #Problem: Medical Optimization Sent by podiatry, Dr. Gomez for osteomyelitis of R hallux  -Sepsis not POA  -Plan for OR tomorrow for amputation  -Wound dressed with Aquacel and DSD  -f/u X ray and MRI foot  -f/u CRP, ESR, wound cx  -Continue zosyn  -NPO after midnight  -ID consulted, f/u recs  -Podiatry, Dr. Gomez following  -Vascular, Dr. Boston consulted  -Wound care consulted  -[facility] group consulted for cardiac clearance    #Problem: Medical Optimization Sent by podiatry, Dr. Gomez for osteomyelitis of R hallux  -Sepsis not POA  -MRI foot shows Acute osteomyelitis of the right first distal phalanx. Prominent soft tissue edema about the first toe with numerous punctate foci of hypointense signal corresponding to air on the prior radiographs. These foci of air appear confined to the plantar soft tissues of the first distal phalanx without definite extension proximally  -Plan for OR tomorrow for amputation  -Wound dressed with Aquacel and DSD  -f/u CRP, ESR, wound cx  -Continue zosyn  -NPO after midnight  -ID consulted, f/u recs  -Podiatry, Dr. Gomez following  -Vascular, Dr. Boston consulted  -Wound care consulted  -[facility] group consulted for cardiac clearance    #Problem: Medical Optimization  -RCI class I, METS >4  -Patient is low risk for a low/intermediate risk procedure and is medically optimized for R hallux amputation Sent by podiatry, Dr. Stark for osteomyelitis of R hallux  -Sepsis not POA  -MRI foot shows Acute osteomyelitis of the right first distal phalanx. Prominent soft tissue edema about the first toe with numerous punctate foci of hypointense signal corresponding to air on the prior radiographs. These foci of air appear confined to the plantar soft tissues of the first distal phalanx without definite extension proximally  -Plan for OR tomorrow for amputation  -Wound dressed with Aquacel and DSD  -f/u CRP, ESR, wound cx  -Continue zosyn  -NPO after midnight  -ID consulted, f/u recs  -Podiatry, Dr. Gomez following  -Vascular, Dr. Boston consulted  -Wound care consulted  -Cardio, Debra group consulted for cardiac clearance    #Problem: Medical Optimization  -RCI class I, METS >4  -Patient is low risk for a low/intermediate risk procedure and is medically optimized for R hallux amputation Sent by podiatry, Dr. Stark for osteomyelitis of R hallux  -Sepsis not POA  -MRI foot shows Acute osteomyelitis of the right first distal phalanx. Prominent soft tissue edema about the first toe with numerous punctate foci of hypointense signal corresponding to air on the prior radiographs. These foci of air appear confined to the plantar soft tissues of the first distal phalanx without definite extension proximally  -Plan for OR Friday for amputation  -Wound dressed with Aquacel and DSD  -f/u CRP, ESR, wound cx  -Continue zosyn  -NPO after midnight  -ID consulted, f/u recs  -Podiatry, Dr. Gomez following  -Vascular, Dr. Boston consulted  -Wound care consulted  -Cardio, Debra group consulted for cardiac clearance    #Problem: Medical Optimization  -RCI class I, METS >4  -Patient is low risk for a low/intermediate risk procedure and is medically optimized for R hallux amputation Sent by podiatry, Dr. Stark for osteomyelitis of R hallux  -MRI foot shows Acute osteomyelitis of the right first distal phalanx. Prominent soft tissue edema about the first toe with numerous punctate foci of hypointense signal corresponding to air on the prior radiographs. These foci of air appear confined to the plantar soft tissues of the first distal phalanx without definite extension proximally  -Plan for OR Friday for amputation  -Wound dressed with Aquacel and DSD  -f/u CRP, ESR, wound cx, S/P Zosyn in ER  -HOLD zosyn as per ID  -NPO after midnight  -ID consulted, f/u recs- Dr LORE becerril.  -Podiatry, Dr. Gomez following  -Vascular, Dr. Boston consulted  -Wound care consulted  -[facility] group consulted for cardiac clearance  #Problem: Medical Optimization  -RCI class I, METS >4  -Patient is low risk for a low/intermediate risk procedure and is medically optimized for R hallux amputation

## 2020-12-16 NOTE — ED ADULT NURSE NOTE - OBJECTIVE STATEMENT
Received pt in bed alert and oriented x4.  C/O wound to big toe right foot.  pt sent over from wound care to r/o osteo.  Pt reports no pain at this time.  HX of dm.  Ongoing nursing care and safety maintained.

## 2020-12-16 NOTE — H&P ADULT - PROBLEM SELECTOR PLAN 4
Resume home atorvastatin Chronic, not on insulin  -hold home januvia, canagliflozin, and metformin in hospital setting  -Low dose ISS, accuchecks qac/hs  -f/u A1C Chronic, not on insulin  -hold home Januvia, canagliflozin, and metformin in hospital setting  -Low dose ISS, Accu-Cheks q ac/hs  -f/u A1C, Nutrition consult

## 2020-12-16 NOTE — PROGRESS NOTE ADULT - PROBLEM SELECTOR PLAN 1
Pt evaluated and chart reviewed  Wound cleansed with NS  WCX obtained  Wound dressed with Aquacel and DSD  Xray R foot:  OM appreciated distal and proximal phalanx, right hallux  MRI R foot ordered  Request medical clearance    Planned Procedure:  amputation right hallux w/ Dr. Stark - case to be booked    Podiatry will follow pt while in house Given the patient's severity of symptoms, will plan for surgical intervention at this time. Discussed risks, benefits, and potential complications with patient and family members regarding surgical intervention including sepsis, loss of limb, and loss of life. All questions answered to satisfaction at this time.  Requesting cardiology risk stratification and optimization.  Requesting medical risk stratification and optimization.  Will plan for right hallux and metatarsal head amputation after vascular evaluation. Will tentatively plan for surgical amputation on 12/18/20. Given the patient's severity of symptoms, will plan for surgical intervention at this time. Discussed risks, benefits, and potential complications with patient and family members regarding surgical intervention including sepsis, loss of limb, and loss of life. All questions answered to satisfaction at this time.  Requesting cardiology risk stratification and optimization.  Requesting medical risk stratification and optimization.  Will plan for right hallux and metatarsal head amputation after vascular evaluation with Dr. Guadalupe. Will tentatively plan for surgical amputation on 12/18/20.

## 2020-12-16 NOTE — ED ADULT NURSE REASSESSMENT NOTE - NS ED NURSE REASSESS COMMENT FT1
Report received from REBECA Otero in [facility].  Patient is admitted and pending room assignment in hospital.

## 2020-12-16 NOTE — CONSULT NOTE ADULT - SUBJECTIVE AND OBJECTIVE BOX
[facility], Division of Infectious Diseases  OFELIA Harvey S. Shah, Y. Patel  [phone]  ([phone] - weekdays after 5pm and weekends)    LOIDA QUEZADA  60y, Male  [id number]    HPI:  Patient is a 60 year old male with PMH of DM on oral meds, peripheral neuropathy, HTN, HLD who was sent by Podiatry, Dr. Gomez for right hallux gangrene and osteomyelitis. Patient states in October 2020, he had an ingrown toenail and saw his podiatrist who did a partial nail removal. He states it was not healing well and he noticed a "little black dot." He saw his podiatrist due to concern for infection and states he was started on augmentin. He states he took 3 courses of augmentin without significant improvement. He was referred for an angiogram, states he had a RLE angioplasty on 11/21 and was told her had good blood flow to the hallux after the procedure. He states his toe continued to turn black and noted purulence and malodor as well. States he has been on doxycyline now as augmentin didn't help, did not take todays dose. He reports he started to go to [facility] about 5 days ago for the first time and an amputation was discussed at that time. Patient called podiatrist yesterday and stated he would like to go forward with the amputation and presented to the ER today. He states he does have neuropathy, has diabetes and takes metformin, Invokana and Januvia not on insulin. He states he otherwise has been feeling well. He did have some back pain after lifting a large flower pot a few days ago, now improved. He denies fever, chills, headache, chest pain, dyspnea, cough, abdominal pain, nausea, vomiting, diarrhea, dysuria. He denies any history of prior skin or wound infections.   In the ER, patient afebrile, no leukocytosis, Cr 0.91. MRI of right foot ordered. Wound culture from right foot was sent by podiatry, on exam has right hallux wound with wet gangrene, eschar, purulence with some malodor. He was given a dose of pip-tazo. ID consulted for further antibiotic management.   ROS: 14 point review of systems completed, pertinent positives and negatives as per HPI.    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 No murmur, rub or gallop.  Abdomen: Soft. Nondistended. Nontender. BS present.  Neuro: AAOx3, no obvious focal deficits   Back: No spinal tenderness. No costovertebral angle tenderness.  Extremities: R foot in clean gauze dressing. No edema.   Skin: Warm. Dry. Good turgor. No rash.   Psychiatric: Appropriate affect and mood for situation.   Lines: PIV    Laboratory & Imaging Data--  CBC:                       13.9   10.41 )-----------( 334      ( 16 Dec 2020 10:11 )             41.8     CMP: 12-16    138  |  105  |  18  ----------------------------<  154<H>  4.9   |  26  |  0.91    Ca    9.1      16 Dec 2020 10:11    TPro  8.0  /  Alb  3.9  /  TBili  0.5  /  DBili  x   /  AST  18  /  ALT  30  /  AlkPhos  75  12-16    LIVER FUNCTIONS - ( 16 Dec 2020 10:11 )  Alb: 3.9 g/dL / Pro: 8.0 g/dL / ALK PHOS: 75 U/L / ALT: 30 U/L / AST: 18 U/L / GGT: x           Microbiology--  12/16 - R hallux wound culture - in process    Radiology--  MR Foot No Cont, Right (12.16.20 @ 11:58) >Impression: Acute osteomyelitis of the right first distal phalanx. Prominent soft tissue edema about the first toe with numerous punctate foci of hypointense signal corresponding to air on the prior radiographs. These foci of air appear confined to the plantar soft tissues of the first distal phalanx without definite extension proximally, however MRI is insensitive for this finding.  Xray Foot AP + Lateral + Oblique, Right (12.16.20 @ 10:06) >IMPRESSION: Osteomyelitis of the great toe. Arterial calcification.  Xray Chest 2 Views PA/Lat (12.16.20 @ 10:05) >IMPRESSION: Negative chest.    Active Medications--  dextrose 40% Gel 15 Gram(s) Oral once  dextrose 5%. 1000 milliLiter(s) IV Continuous <Continuous>  dextrose 5%. 1000 milliLiter(s) IV Continuous <Continuous>  dextrose 50% Injectable 25 Gram(s) IV Push once  dextrose 50% Injectable 12.5 Gram(s) IV Push once  dextrose 50% Injectable 25 Gram(s) IV Push once  glucagon  Injectable 1 milliGRAM(s) IntraMuscular once  heparin   Injectable 5000 Unit(s) SubCutaneous every 12 hours  insulin lispro (ADMELOG) corrective regimen sliding scale   SubCutaneous three times a day before meals  insulin lispro (ADMELOG) corrective regimen sliding scale   SubCutaneous at bedtime    Antimicrobials:   s/p pip-tazo x1 in ER

## 2020-12-16 NOTE — H&P ADULT - PROBLEM SELECTOR PLAN 3
Chronic, not on insulin  -hold home januvia, canagliflozin, and metformin in hospital setting  -Low dose ISS, accuchecks qac/hs  -f/u A1C Resume home clopidogrel and cilostazol

## 2020-12-16 NOTE — H&P ADULT - NSICDXPASTMEDICALHX_GEN_ALL_CORE_FT
PAST MEDICAL HISTORY:  Diabetes     HTN (hypertension)      PAST MEDICAL HISTORY:  Diabetes     HTN (hypertension)     Hyperlipidemia      PAST MEDICAL HISTORY:  Diabetes     HTN (hypertension)     Hyperlipidemia     Peripheral vascular disease      PAST MEDICAL HISTORY:  Diabetes     HTN (hypertension)     Hyperlipidemia     Peripheral vascular disease S/p Angioplasty rt lower EXT Nov 2020

## 2020-12-16 NOTE — ED ADULT NURSE REASSESSMENT NOTE - NS ED NURSE REASSESS COMMENT FT1
Patient is eating grilled chicken sandwich that wife brought him.  Cup of ice given at his request.  Patient is pending night time meds and fingerstick, he is aware of this.

## 2020-12-16 NOTE — ED PROVIDER NOTE - CLINICAL SUMMARY MEDICAL DECISION MAKING FREE TEXT BOX
59 yo male with h/o DM, HLD, HTN sent to ED by podiatrist Dr. Stark for admission for osteomyelitis right great toe. Patient states initial wound was in Oct 2020 after he had an ingrown toenail. Patient had vascular angiogram in November to improve blood flow however no improvement. Denies fever, chills, chest pain, sob, abd pain, N/V, UE/LE weakness or paresthesias. PE: as above. A/P: labs, IV abx, admit to medicine for osteo of right great toe.

## 2020-12-16 NOTE — PROGRESS NOTE ADULT - SUBJECTIVE AND OBJECTIVE BOX
60y year old Male seen at [facility] ED for amputation of right hallux, gangrene and osteomyelitis.  Pt is an established pt at [facility].  Pt relates about one month ago he had a partial nail removal performed by a podiatrist at his private office.  Pt said a few days later he developed an infection in his right hallux where there was purulent discharge.  Pt said he was prescribed an unknown Abx on the initial avulsion and he saw the podiatrist again due to the infection not resolving.  Pt relates he was given Doxycycline and his right hallux started to turn black.  Pt said on approximately 11/21/20 he had an angiogram and angioplasty RLE and he was told he had good blood flow to the hallux after the procedure.  Pt said the vascular procedure was not performed at [facility].  Pt said his hallux continued to turn black with purulence and malodor.  Pt said about 5 days ago he went to [facility] for the first time and an amputation of hallux was discussed.  Pt said he came to the ED today because he wants to have an amputation of his right hallux.  Denies any fever, chills, nausea, vomiting, chest pain, shortness of breath, or calf pain at this time.    Allergies    No Known Allergies    Intolerances        MEDICATIONS  (STANDING):    MEDICATIONS  (PRN):      Vital Signs Last 24 Hrs  T(C): 36.9 (16 Dec 2020 08:48), Max: 36.9 (16 Dec 2020 08:48)  T(F): 98.4 (16 Dec 2020 08:48), Max: 98.4 (16 Dec 2020 08:48)  HR: 114 (16 Dec 2020 08:48) (114 - 114)  BP: 158/85 (16 Dec 2020 08:48) (158/85 - 158/85)  BP(mean): --  RR: 18 (16 Dec 2020 08:48) (18 - 18)  SpO2: 98% (16 Dec 2020 08:48) (98% - 98%)    PHYSICAL EXAM:  Vascular: DP and PT palpable.  CRT <3s excluding right hallux.  Erythema across dorsal right foot.  No ecchymosis b/l  Neurological: Light touch intact to foot b/l  Musculoskeletal: Mild POP right hallux  Dermatological: Wound  Right hallux G4 wound, wet gangrene.  Distal, medial, plantar hallux eschar.  Periwound intact.  1cc purulence.  Tunneling and undermining medial wound dorsal to plantar.  Moderate malodor.        CBC Full  -  ( 16 Dec 2020 10:11 )  WBC Count : 10.41 K/uL  RBC Count : 4.65 M/uL  Hemoglobin : 13.9 g/dL  Hematocrit : 41.8 %  Platelet Count - Automated : 334 K/uL  Mean Cell Volume : 89.9 fl  Mean Cell Hemoglobin : 29.9 pg  Mean Cell Hemoglobin Concentration : 33.3 gm/dL  Auto Neutrophil # : 7.64 K/uL  Auto Lymphocyte # : 1.58 K/uL  Auto Monocyte # : 0.84 K/uL  Auto Eosinophil # : 0.20 K/uL  Auto Basophil # : 0.10 K/uL  Auto Neutrophil % : 73.3 %  Auto Lymphocyte % : 15.2 %  Auto Monocyte % : 8.1 %  Auto Eosinophil % : 1.9 %  Auto Basophil % : 1.0 %      ----------CHEM PANEL----------            Imaging: ----------   60y year old Male seen at [facility] ED for amputation of right hallux, gangrene and osteomyelitis.  Pt is an established pt at [facility].  Pt relates about one month ago he had a partial nail removal performed by a podiatrist at his private office.  Pt said a few days later he developed an infection in his right hallux where there was purulent discharge.  Pt said he was prescribed an unknown Abx on the initial avulsion and he saw the podiatrist again due to the infection not resolving.  Pt relates he was given Doxycycline and his right hallux started to turn black.  Pt said on approximately 11/21/20 he had an angiogram and angioplasty RLE and he was told he had good blood flow to the hallux after the procedure.  Pt said the vascular procedure was not performed at [facility].  Pt said his hallux continued to turn black with purulence and malodor.  Pt said about 5 days ago he went to [facility] for the first time and an amputation of hallux was discussed.  Pt said he came to the ED today because he wants to have an amputation of his right hallux.  Denies any fever, chills, nausea, vomiting, chest pain, shortness of breath, or calf pain at this time.    Allergies    No Known Allergies    Intolerances        MEDICATIONS  (STANDING):    MEDICATIONS  (PRN):      Vital Signs Last 24 Hrs  T(C): 36.9 (16 Dec 2020 08:48), Max: 36.9 (16 Dec 2020 08:48)  T(F): 98.4 (16 Dec 2020 08:48), Max: 98.4 (16 Dec 2020 08:48)  HR: 114 (16 Dec 2020 08:48) (114 - 114)  BP: 158/85 (16 Dec 2020 08:48) (158/85 - 158/85)  BP(mean): --  RR: 18 (16 Dec 2020 08:48) (18 - 18)  SpO2: 98% (16 Dec 2020 08:48) (98% - 98%)    PHYSICAL EXAM:  Vascular: DP and PT palpable.  CRT <3s excluding right hallux.  Erythema across dorsal right foot.  No ecchymosis b/l  Neurological: Light touch intact to foot b/l  Musculoskeletal: Mild POP right hallux  Dermatological: Wound  Right hallux G4 wound, gangrene.  Distal, medial, plantar hallux eschar.  Periwound intact.  1cc purulence.  Tunneling and undermining medial wound dorsal to plantar.  Moderate malodor.                              13.9   10.41 )-----------( 334      ( 16 Dec 2020 10:11 )             41.8     138  |  105  |  18  ----------------------------<  154<H>  4.9   |  26  |  0.91    Ca    9.1      16 Dec 2020 10:11    TPro  8.0  /  Alb  3.9  /  TBili  0.5  /  DBili  x   /  AST  18  /  ALT  30  /  AlkPhos  75  12-16              Imaging: marrow edema of the right hallux on mri

## 2020-12-16 NOTE — ED ADULT NURSE REASSESSMENT NOTE - NS ED NURSE REASSESS COMMENT FT1
Repeat fingerstick performed at patient's request, patient is aware of repeat glucose result.  Still does not fit sliding scale criteria for insulin.  Patient is still pending bed assignment in hospital.

## 2020-12-16 NOTE — H&P ADULT - ASSESSMENT
61yo F with PMH of HTN, HLD, DM2 (not on insulin), CAD??? sent by Podiatry  admitted for R hallux osteomyelitis for IV abx and amputation

## 2020-12-16 NOTE — ED ADULT NURSE NOTE - PMH
Diabetes    HTN (hypertension)     Diabetes    HTN (hypertension)    Hyperlipidemia    Peripheral vascular disease

## 2020-12-16 NOTE — H&P ADULT - NSHPSOCIALHISTORY_GEN_ALL_CORE
Denies any tobacco or illicit drug use. 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

## 2020-12-16 NOTE — H&P ADULT - PROBLEM SELECTOR PLAN 2
-Chronic, /85 on admission  -resume home quinapril Chronic, /85 on admission  -home quinapril therapeutic interchange with lisinopril  -continue to monitor routine hemodynamics

## 2020-12-16 NOTE — CONSULT NOTE ADULT - ATTENDING COMMENTS
Seen/examined. agree with above.  No cardiac contraindication to proceeding to or
Efren Frederick M.D.  [facility], Division of Infectious Diseases  [phone]  After 5pm on weekdays and all day on weekends - please call [phone]

## 2020-12-16 NOTE — H&P ADULT - GASTROINTESTINAL DETAILS
normal/soft/nontender/no distention/bowel sounds normal normal/soft/nontender/no distention/bowel sounds normal/no bruit/no organomegaly

## 2020-12-17 ENCOUNTER — TRANSCRIPTION ENCOUNTER (OUTPATIENT)
Age: 60
End: 2020-12-17

## 2020-12-17 LAB
A1C WITH ESTIMATED AVERAGE GLUCOSE RESULT: 8.4 % — HIGH (ref 4–5.6)
ALBUMIN SERPL ELPH-MCNC: 3.6 G/DL — SIGNIFICANT CHANGE UP (ref 3.3–5)
ALP SERPL-CCNC: 71 U/L — SIGNIFICANT CHANGE UP (ref 40–120)
ALT FLD-CCNC: 26 U/L — SIGNIFICANT CHANGE UP (ref 12–78)
ANION GAP SERPL CALC-SCNC: 6 MMOL/L — SIGNIFICANT CHANGE UP (ref 5–17)
AST SERPL-CCNC: 12 U/L — LOW (ref 15–37)
BASOPHILS # BLD AUTO: 0.07 K/UL — SIGNIFICANT CHANGE UP (ref 0–0.2)
BASOPHILS NFR BLD AUTO: 0.8 % — SIGNIFICANT CHANGE UP (ref 0–2)
BILIRUB SERPL-MCNC: 0.4 MG/DL — SIGNIFICANT CHANGE UP (ref 0.2–1.2)
BUN SERPL-MCNC: 22 MG/DL — SIGNIFICANT CHANGE UP (ref 7–23)
CALCIUM SERPL-MCNC: 9.1 MG/DL — SIGNIFICANT CHANGE UP (ref 8.5–10.1)
CHLORIDE SERPL-SCNC: 105 MMOL/L — SIGNIFICANT CHANGE UP (ref 96–108)
CO2 SERPL-SCNC: 28 MMOL/L — SIGNIFICANT CHANGE UP (ref 22–31)
CREAT SERPL-MCNC: 0.94 MG/DL — SIGNIFICANT CHANGE UP (ref 0.5–1.3)
EOSINOPHIL # BLD AUTO: 0.31 K/UL — SIGNIFICANT CHANGE UP (ref 0–0.5)
EOSINOPHIL NFR BLD AUTO: 3.6 % — SIGNIFICANT CHANGE UP (ref 0–6)
ESTIMATED AVERAGE GLUCOSE: 194 MG/DL — HIGH (ref 68–114)
GLUCOSE SERPL-MCNC: 188 MG/DL — HIGH (ref 70–99)
HCT VFR BLD CALC: 40.8 % — SIGNIFICANT CHANGE UP (ref 39–50)
HCV AB S/CO SERPL IA: 0.05 S/CO — SIGNIFICANT CHANGE UP (ref 0–0.99)
HCV AB SERPL-IMP: SIGNIFICANT CHANGE UP
HGB BLD-MCNC: 13.5 G/DL — SIGNIFICANT CHANGE UP (ref 13–17)
IMM GRANULOCYTES NFR BLD AUTO: 0.5 % — SIGNIFICANT CHANGE UP (ref 0–1.5)
LYMPHOCYTES # BLD AUTO: 1.6 K/UL — SIGNIFICANT CHANGE UP (ref 1–3.3)
LYMPHOCYTES # BLD AUTO: 18.5 % — SIGNIFICANT CHANGE UP (ref 13–44)
MCHC RBC-ENTMCNC: 29.7 PG — SIGNIFICANT CHANGE UP (ref 27–34)
MCHC RBC-ENTMCNC: 33.1 GM/DL — SIGNIFICANT CHANGE UP (ref 32–36)
MCV RBC AUTO: 89.9 FL — SIGNIFICANT CHANGE UP (ref 80–100)
MONOCYTES # BLD AUTO: 0.77 K/UL — SIGNIFICANT CHANGE UP (ref 0–0.9)
MONOCYTES NFR BLD AUTO: 8.9 % — SIGNIFICANT CHANGE UP (ref 2–14)
NEUTROPHILS # BLD AUTO: 5.87 K/UL — SIGNIFICANT CHANGE UP (ref 1.8–7.4)
NEUTROPHILS NFR BLD AUTO: 67.7 % — SIGNIFICANT CHANGE UP (ref 43–77)
NRBC # BLD: 0 /100 WBCS — SIGNIFICANT CHANGE UP (ref 0–0)
PLATELET # BLD AUTO: 319 K/UL — SIGNIFICANT CHANGE UP (ref 150–400)
POTASSIUM SERPL-MCNC: 4.4 MMOL/L — SIGNIFICANT CHANGE UP (ref 3.5–5.3)
POTASSIUM SERPL-SCNC: 4.4 MMOL/L — SIGNIFICANT CHANGE UP (ref 3.5–5.3)
PROT SERPL-MCNC: 7.3 G/DL — SIGNIFICANT CHANGE UP (ref 6–8.3)
RBC # BLD: 4.54 M/UL — SIGNIFICANT CHANGE UP (ref 4.2–5.8)
RBC # FLD: 12.9 % — SIGNIFICANT CHANGE UP (ref 10.3–14.5)
SARS-COV-2 IGG SERPL QL IA: NEGATIVE — SIGNIFICANT CHANGE UP
SARS-COV-2 IGM SERPL IA-ACNC: 0.06 INDEX — SIGNIFICANT CHANGE UP
SODIUM SERPL-SCNC: 139 MMOL/L — SIGNIFICANT CHANGE UP (ref 135–145)
WBC # BLD: 8.66 K/UL — SIGNIFICANT CHANGE UP (ref 3.8–10.5)
WBC # FLD AUTO: 8.66 K/UL — SIGNIFICANT CHANGE UP (ref 3.8–10.5)

## 2020-12-17 PROCEDURE — 99232 SBSQ HOSP IP/OBS MODERATE 35: CPT

## 2020-12-17 RX ORDER — HEPARIN SODIUM 5000 [USP'U]/ML
5000 INJECTION INTRAVENOUS; SUBCUTANEOUS EVERY 12 HOURS
Refills: 0 | Status: COMPLETED | OUTPATIENT
Start: 2020-12-17 | End: 2020-12-17

## 2020-12-17 RX ORDER — INSULIN LISPRO 100/ML
VIAL (ML) SUBCUTANEOUS
Refills: 0 | Status: DISCONTINUED | OUTPATIENT
Start: 2020-12-17 | End: 2020-12-18

## 2020-12-17 RX ORDER — ACETAMINOPHEN 500 MG
650 TABLET ORAL ONCE
Refills: 0 | Status: COMPLETED | OUTPATIENT
Start: 2020-12-17 | End: 2020-12-17

## 2020-12-17 RX ORDER — INSULIN GLARGINE 100 [IU]/ML
10 INJECTION, SOLUTION SUBCUTANEOUS AT BEDTIME
Refills: 0 | Status: DISCONTINUED | OUTPATIENT
Start: 2020-12-17 | End: 2020-12-18

## 2020-12-17 RX ORDER — INSULIN LISPRO 100/ML
VIAL (ML) SUBCUTANEOUS AT BEDTIME
Refills: 0 | Status: DISCONTINUED | OUTPATIENT
Start: 2020-12-17 | End: 2020-12-18

## 2020-12-17 RX ADMIN — Medication 650 MILLIGRAM(S): at 22:13

## 2020-12-17 RX ADMIN — HEPARIN SODIUM 5000 UNIT(S): 5000 INJECTION INTRAVENOUS; SUBCUTANEOUS at 17:55

## 2020-12-17 RX ADMIN — Medication 1: at 07:42

## 2020-12-17 RX ADMIN — LISINOPRIL 40 MILLIGRAM(S): 2.5 TABLET ORAL at 06:00

## 2020-12-17 RX ADMIN — CILOSTAZOL 100 MILLIGRAM(S): 100 TABLET ORAL at 06:00

## 2020-12-17 RX ADMIN — Medication 2: at 17:55

## 2020-12-17 RX ADMIN — Medication 650 MILLIGRAM(S): at 03:58

## 2020-12-17 RX ADMIN — CILOSTAZOL 100 MILLIGRAM(S): 100 TABLET ORAL at 17:55

## 2020-12-17 RX ADMIN — Medication 3: at 12:17

## 2020-12-17 RX ADMIN — ATORVASTATIN CALCIUM 40 MILLIGRAM(S): 80 TABLET, FILM COATED ORAL at 21:18

## 2020-12-17 RX ADMIN — Medication 650 MILLIGRAM(S): at 02:58

## 2020-12-17 RX ADMIN — INSULIN GLARGINE 10 UNIT(S): 100 INJECTION, SOLUTION SUBCUTANEOUS at 22:11

## 2020-12-17 RX ADMIN — CLOPIDOGREL BISULFATE 75 MILLIGRAM(S): 75 TABLET, FILM COATED ORAL at 12:18

## 2020-12-17 RX ADMIN — Medication 650 MILLIGRAM(S): at 21:18

## 2020-12-17 RX ADMIN — Medication 25 MILLIGRAM(S): at 06:00

## 2020-12-17 NOTE — PROGRESS NOTE ADULT - PROBLEM SELECTOR PLAN 2
Chronic  -home quinapril therapeutic interchange with lisinopril  -continue to monitor routine hemodynamics

## 2020-12-17 NOTE — PROGRESS NOTE ADULT - ASSESSMENT
61yo F with PMH of HTN, HLD, DM2 (not on insulin), peripheral vascular insufficiency s/p angioplasty of RLE (Nov 2020) sent by Podiatry, Dr. Stark for R hallux osteomyelitis  admitted for R hallux osteomyelitis for IV abx and amputation

## 2020-12-17 NOTE — PROGRESS NOTE ADULT - PROBLEM SELECTOR PLAN 4
Chronic, not on insulin  -hold home Januvia, canagliflozin, and metformin in hospital setting  -Low dose ISS, Accu-Cheks q ac/hs  -f/u A1C, Nutrition consult

## 2020-12-17 NOTE — PROGRESS NOTE ADULT - ATTENDING COMMENTS
Pt seen, examined, case & care plan d/w pt, residents at detail.  NPO after Midnight  Pt is medically optimized for schedule Podiatry surgery tomorrow  -Pre Op IV Abx as per Podiatry.

## 2020-12-17 NOTE — PROGRESS NOTE ADULT - SUBJECTIVE AND OBJECTIVE BOX
[facility] - Sami Golden, Ngoc, Vlad, Ruthy, Osvaldo Frederick  Office Number:  [phone]    Patient resting comfortably in bed in NAD.  Laying flat with no respiratory distress.  No complaints of chest pain, dyspnea, palpitations, PND, or orthopnea. Upset that he is still in the er    ROS: negative unless otherwise mentioned.    Telemetry:  off    MEDICATIONS  (STANDING):  atorvastatin 40 milliGRAM(s) Oral at bedtime  cilostazol 100 milliGRAM(s) Oral two times a day  clopidogrel Tablet 75 milliGRAM(s) Oral daily  dextrose 40% Gel 15 Gram(s) Oral once  dextrose 5%. 1000 milliLiter(s) (50 mL/Hr) IV Continuous <Continuous>  dextrose 5%. 1000 milliLiter(s) (100 mL/Hr) IV Continuous <Continuous>  dextrose 50% Injectable 25 Gram(s) IV Push once  dextrose 50% Injectable 12.5 Gram(s) IV Push once  dextrose 50% Injectable 25 Gram(s) IV Push once  glucagon  Injectable 1 milliGRAM(s) IntraMuscular once  insulin lispro (ADMELOG) corrective regimen sliding scale   SubCutaneous three times a day before meals  insulin lispro (ADMELOG) corrective regimen sliding scale   SubCutaneous at bedtime  lisinopril 40 milliGRAM(s) Oral daily  metoprolol succinate ER 25 milliGRAM(s) Oral daily    MEDICATIONS  (PRN):      Allergies    No Known Allergies    Intolerances        Vital Signs Last 24 Hrs  T(C): 36.9 (17 Dec 2020 10:21), Max: 37.2 (16 Dec 2020 18:02)  T(F): 98.4 (17 Dec 2020 10:21), Max: 98.9 (16 Dec 2020 18:02)  HR: 100 (17 Dec 2020 10:21) (74 - 106)  BP: 111/64 (17 Dec 2020 10:21) (111/64 - 153/91)  BP(mean): --  RR: 18 (17 Dec 2020 10:21) (16 - 18)  SpO2: 96% (17 Dec 2020 10:21) (96% - 99%)    I&O's Summary      ON EXAM:    General: NAD, awake and alert, oriented x 3  HEENT: Mucous membranes are moist, anicteric  Lungs: Non-labored, breath sounds are clear bilaterally, No wheezing, rales or rhonchi  Cardiovascular: Regular, S1 and S2, no murmurs, rubs, or gallops  Gastrointestinal: Bowel Sounds present, soft, nontender.   Lymph: trace peripheral edema at ankles. No lymphadenopathy.  Skin: No rashes or ulcers  Psych:  Mood & affect appropriate    LABS: All Labs Reviewed:                        13.5   8.66  )-----------( 319      ( 17 Dec 2020 07:17 )             40.8                         13.9   10.41 )-----------( 334      ( 16 Dec 2020 10:11 )             41.8     17 Dec 2020 07:17    139    |  105    |  22     ----------------------------<  188    4.4     |  28     |  0.94   16 Dec 2020 10:11    138    |  105    |  18     ----------------------------<  154    4.9     |  26     |  0.91     Ca    9.1        17 Dec 2020 07:17  Ca    9.1        16 Dec 2020 10:11    TPro  7.3    /  Alb  3.6    /  TBili  0.4    /  DBili  x      /  AST  12     /  ALT  26     /  AlkPhos  71     17 Dec 2020 07:17  TPro  8.0    /  Alb  3.9    /  TBili  0.5    /  DBili  x      /  AST  18     /  ALT  30     /  AlkPhos  75     16 Dec 2020 10:11          Blood Culture:

## 2020-12-17 NOTE — PROGRESS NOTE ADULT - SUBJECTIVE AND OBJECTIVE BOX
[facility], Division of Infectious Diseases  OFELIA Harvey Y. Patel, S. Shah  [phone]  ([phone] - weekdays after 5pm and weekends)    Name: LOIDA QUEZADA  Age/Gender: 60y Male  MRN: [id number]    Interval History:  Patient seen this afternoon, reports no new complaints. Denies fever, chills or any pain.  Denies headache, chest pain, dyspnea, cough, abd pain, n/v/d.  ROS reviewed, pertinent positives and negatives as above.   Notes reviewed. Afebrile    Allergies: No Known Allergies      Objective:  Vitals:   T(F): 98.3 (12-17-20 @ 16:07), Max: 98.9 (12-16-20 @ 18:02)  HR: 100 (12-17-20 @ 16:07) (74 - 106)  BP: 119/63 (12-17-20 @ 16:07) (111/64 - 153/91)  RR: 18 (12-17-20 @ 16:07) (16 - 18)  SpO2: 97% (12-17-20 @ 16:07) (96% - 99%)    Physical Examination:  General: no acute distress, nontoxic appearing  HEENT: NC/AT, EOMI, anicteric, neck supple  Cardio: S1, S2 heard, RRR, no murmurs  Resp: CTA bilaterally, no rales/wheezes/rhonchi  Abd: soft, NT, ND, + BS  Neuro: AAOx3, no obvious focal deficits  Ext: RLE in dressing, no edema, moving extremities  Skin: warm, dry, no visible rash  Psych: appropriate affect and mood for situation  Lines: PIV    Laboratory Studies:  CBC:                       13.5   8.66  )-----------( 319      ( 17 Dec 2020 07:17 )             40.8     CMP: 12-17    139  |  105  |  22  ----------------------------<  188<H>  4.4   |  28  |  0.94    Ca    9.1      17 Dec 2020 07:17    TPro  7.3  /  Alb  3.6  /  TBili  0.4  /  DBili  x   /  AST  12<L>  /  ALT  26  /  AlkPhos  71  12-17    LIVER FUNCTIONS - ( 17 Dec 2020 07:17 )  Alb: 3.6 g/dL / Pro: 7.3 g/dL / ALK PHOS: 71 U/L / ALT: 26 U/L / AST: 12 U/L / GGT: x           Microbiology:  12/16 - R hallux wound culture - in process  12/16 - COVID-19 ab - negative  12/16 - COVID-19 PCR - negative    Radiology:  MR Foot No Cont, Right (12.16.20 @ 11:58) >Impression: Acute osteomyelitis of the right first distal phalanx. Prominent soft tissue edema about the first toe with numerous punctate foci of hypointense signal corresponding to air on the prior radiographs. These foci of air appear confined to the plantar soft tissues of the first distal phalanx without definite extension proximally, however MRI is insensitive for this finding.  Xray Foot AP + Lateral + Oblique, Right (12.16.20 @ 10:06) >IMPRESSION: Osteomyelitis of the great toe. Arterial calcification.  Xray Chest 2 Views PA/Lat (12.16.20 @ 10:05) >IMPRESSION: Negative chest.    Medications:  atorvastatin 40 milliGRAM(s) Oral at bedtime  cilostazol 100 milliGRAM(s) Oral two times a day  clopidogrel Tablet 75 milliGRAM(s) Oral daily  dextrose 40% Gel 15 Gram(s) Oral once  dextrose 5%. 1000 milliLiter(s) IV Continuous <Continuous>  dextrose 5%. 1000 milliLiter(s) IV Continuous <Continuous>  dextrose 50% Injectable 25 Gram(s) IV Push once  dextrose 50% Injectable 12.5 Gram(s) IV Push once  dextrose 50% Injectable 25 Gram(s) IV Push once  glucagon  Injectable 1 milliGRAM(s) IntraMuscular once  heparin   Injectable 5000 Unit(s) SubCutaneous every 12 hours  insulin glargine Injectable (LANTUS) 10 Unit(s) SubCutaneous at bedtime  insulin lispro (ADMELOG) corrective regimen sliding scale   SubCutaneous three times a day before meals  insulin lispro (ADMELOG) corrective regimen sliding scale   SubCutaneous at bedtime  lisinopril 40 milliGRAM(s) Oral daily  metoprolol succinate ER 25 milliGRAM(s) Oral daily    Antimicrobials:  s/p pip-tazo x1 in ER

## 2020-12-17 NOTE — PROGRESS NOTE ADULT - PROBLEM SELECTOR PLAN 1
Sent by podiatry, Dr. Stark for osteomyelitis of R hallux  -MRI foot shows Acute osteomyelitis of the right first distal phalanx. Prominent soft tissue edema about the first toe with numerous punctate foci of hypointense signal corresponding to air on the prior radiographs. These foci of air appear confined to the plantar soft tissues of the first distal phalanx without definite extension proximally  -Plan for OR tomorrow (12/18) for amputation  -Wound dressed with Aquacel and DSD  -f/u CRP, ESR, wound cx, S/P Zosyn in ER  -HOLD zosyn as per ID  -NPO after midnight  -ID consulted, f/u recs- Dr LORE becerril.  -Podiatry, Dr. Gomez following  -Vascular, Dr. Boston consulted  -Wound care consulted  -[facility] consulted for cardiac clearance  #Problem: Medical Optimization  -RCI class I, METS >4  -Patient is low risk for a low/intermediate risk procedure and is medically optimized for R hallux amputation Sent by podiatry, Dr. Stark for osteomyelitis of R hallux  -MRI foot shows Acute osteomyelitis of the right first distal phalanx. Prominent soft tissue edema about the first toe with numerous punctate foci of hypointense signal corresponding to air on the prior radiographs. These foci of air appear confined to the plantar soft tissues of the first distal phalanx without definite extension proximally  -Plan for OR tomorrow (12/18) for amputation  -Wound dressed with Aquacel and DSD  -f/u CRP, ESR, wound cx, S/P Zosyn in ER  -HOLD zosyn as per ID  -NPO after midnight  -ID, Dr. LORE Frederick following--patient clinically stable, continue to monitor off abx to allow for better OR culture yield  -Podiatry, Dr. Gomez following  -Vascular, Dr. Boston consulted--no vascular intervention needed  -Wound care consulted  -[facility] group consulted for cardiac clearance    #Problem: Medical Optimization  -RCI class I, METS >4  -Patient is low risk for a low/intermediate risk procedure and is medically optimized for R hallux amputation

## 2020-12-17 NOTE — PATIENT PROFILE ADULT - VISION (WITH CORRECTIVE LENSES IF THE PATIENT USUALLY WEARS THEM):
Reading/Partially impaired: cannot see medication labels or newsprint, but can see obstacles in path, and the surrounding layout; can count fingers at arm's length

## 2020-12-17 NOTE — CONSULT NOTE ADULT - PROBLEM SELECTOR RECOMMENDATION 9
Acute osteomyelitis of the R first distal phalanx  R hallux wound since 10/2020 with infection, s/p augmentin, had worsening of infection with gangrene, purulence and malodor. Has been on doxycycline at home. Seen in wound care clinic and recommended for amputation. MRI confirmed OM.  S/p pip-tazo once in the ER  Afebrile, nontoxic, no leukocytosis    Follow R hallux wound culture  Podiatry following, planning for OR for amputation  Vascular surgery and wound care consulted   Hold off on further antibiotic dosing at this time, patient stable, try to increase OR culture yield
oral anti-diabetes agents on hold during hospitalization  invokana d/gema  change mod dose admelog scale coverage qac/qhs  add lantus 10 units qhs  cont cons cho diet  goal bg 100-180 in hosp setting

## 2020-12-17 NOTE — PROGRESS NOTE ADULT - ASSESSMENT
The patient is a 61yo F with PMH of HTN, HLD, DM2 (not on insulin), sent by Podiatry, Dr. Gomez for R hallux osteomyelitis.    Right Hallux OM:  - Pt is scheduled for a right Hallex Amputation  - No history of cardiac disease.  - Denies any hx of SOB while walking and symptoms to suggest angina  - EKG shows no acute or worrisome changes.  - No evidence of volume overload.  - Patient has no active ischemia, decompensated HF, unstable arrythmia, or severe stenotic valvular disease, and in the setting of low risk right hallex amputation procedure, patient is optimized from cardiovascular standpoint to proceed with planned procedure with routine hemodynamic monitoring.    HTN  - BP better controlled  - Patient is on Lisinopril 40mg 1 po qd and Metoprolol succinate XL 25mg 1po qd  - continue BP meds  - continue statin  - Continue routine hemodynamic monitoring.    PAD, with ag atherosclerosis on dopplers  - cont with plavix and lipitor  - cont pletal    - needs better control of dm2  - will follow with you

## 2020-12-17 NOTE — PROGRESS NOTE ADULT - SUBJECTIVE AND OBJECTIVE BOX
Patient is a 60y old  Male who presents with a chief complaint of Osteomyelitis (17 Dec 2020 11:01)    HPI:  59yo F with PMH of HTN, HLD, DM2 (not on insulin), peripheral vascular insufficiency s/p angioplasty of RLE (Nov 2020) sent by Podiatry, Dr. Stark for R hallux osteomyelitis. Patient states initial wound was in Oct 2020 after he had an ingrown toenail. Patient had vascular angiogram in November to improve blood flow however no improvement. Denies fever, chills, chest pain, sob, abd pain, N/V, UE/LE weakness or paresthesias.     In the ED:  VS- 98.4F, 114HR, 158/85, 18RR, O2 sat 98% on RA  Labs significant for glucose 154. CBC and CMP WNL.  ECG showed sinus tachycardia (107), no signs of acute ischemia  MRI showed Acute osteomyelitis of the right first distal phalanx. Prominent soft tissue edema about the first toe with numerous punctate foci of hypointense signal corresponding to air on the prior radiographs. These foci of air appear confined to the plantar soft tissues of the first distal phalanx without definite extension proximally, however MRI is insensitive for this finding.  s/p Zosyn in the ED, pt was seen by podiatry & ID in ER. (16 Dec 2020 10:52)    INTERVAL HPI: 12/17: Patient seen and examined at bedside. No events overnight. Scheduled for R hallux amputation tomorrow. Patient feels well and denies any complaints this AM.      OVERNIGHT EVENTS:    Home Medications:  atorvastatin 40 mg oral tablet: 1 tab(s) orally once a day (16 Dec 2020 16:36)  cilostazol 100 mg oral tablet: 1 tab(s) orally 2 times a day (16 Dec 2020 16:36)  clopidogrel 75 mg oral tablet: 1 tab(s) orally once a day (16 Dec 2020 16:36)  doxycycline hyclate 100 mg oral capsule: 1 cap(s) orally every 12 hours (16 Dec 2020 16:36)  Invokana 300 mg oral tablet: 1 tab(s) orally once a day (16 Dec 2020 16:36)  Januvia 100 mg oral tablet: 1 tab(s) orally once a day (16 Dec 2020 16:36)  K-Phos No. 2 oral tablet: orally 2 times a day (16 Dec 2020 16:36)  melatonin 3 mg oral tablet: 1 tab(s) orally once a day (at bedtime) (16 Dec 2020 16:36)  metFORMIN 1000 mg oral tablet: 1 tab(s) orally 2 times a day (16 Dec 2020 16:36)  Metoprolol Succinate ER 25 mg oral tablet, extended release: 1 tab(s) orally once a day (16 Dec 2020 16:36)  MiraLax oral powder for reconstitution: 7 gram(s) orally once a day (16 Dec 2020 16:36)  Mycostatin 100,000 units/mL oral suspension: 4 milliliter(s) orally 2 times a day (16 Dec 2020 16:36)  Norvasc 10 mg oral tablet: 1 tab(s) orally once a day (16 Dec 2020 16:36)  quinapril 40 mg oral tablet: 1 tab(s) orally once a day (16 Dec 2020 16:36)      MEDICATIONS  (STANDING):  atorvastatin 40 milliGRAM(s) Oral at bedtime  cilostazol 100 milliGRAM(s) Oral two times a day  clopidogrel Tablet 75 milliGRAM(s) Oral daily  dextrose 40% Gel 15 Gram(s) Oral once  dextrose 5%. 1000 milliLiter(s) (50 mL/Hr) IV Continuous <Continuous>  dextrose 5%. 1000 milliLiter(s) (100 mL/Hr) IV Continuous <Continuous>  dextrose 50% Injectable 25 Gram(s) IV Push once  dextrose 50% Injectable 12.5 Gram(s) IV Push once  dextrose 50% Injectable 25 Gram(s) IV Push once  glucagon  Injectable 1 milliGRAM(s) IntraMuscular once  insulin lispro (ADMELOG) corrective regimen sliding scale   SubCutaneous three times a day before meals  insulin lispro (ADMELOG) corrective regimen sliding scale   SubCutaneous at bedtime  lisinopril 40 milliGRAM(s) Oral daily  metoprolol succinate ER 25 milliGRAM(s) Oral daily    MEDICATIONS  (PRN):      Allergies    No Known Allergies    Intolerances        Social History:  Denies any tobacco or illicit drug use. 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 No nausea, No vomiting; No diarrhea, No constipation. [  ] BM  GENITOURINARY: No dysuria, No frequency, No urgency, No hematuria, NO incontinence  NEUROLOGICAL: No headaches, No dizziness, No numbness, No tingling, No tremors, No weakness  EXT: No Swelling, No Pain, No Edema  SKIN:  [ x ] No itching, burning, rashes, or lesions   MUSCULOSKELETAL: No joint pain ,No Jt swelling; No muscle pain, No back pain, No extremity pain  PSYCHIATRIC: No depression,  No anxiety,  No mood swings ,No difficulty sleeping at night  PAIN SCALE: [ x ] None  [  ] Other-  REST OF REVIEW Of SYSTEM - [ x ] Normal     Vital Signs Last 24 Hrs  T(C): 36.9 (17 Dec 2020 10:21), Max: 37.2 (16 Dec 2020 18:02)  T(F): 98.4 (17 Dec 2020 10:21), Max: 98.9 (16 Dec 2020 18:02)  HR: 100 (17 Dec 2020 10:21) (74 - 106)  BP: 111/64 (17 Dec 2020 10:21) (111/64 - 153/91)  BP(mean): --  RR: 18 (17 Dec 2020 10:21) (16 - 18)  SpO2: 96% (17 Dec 2020 10:21) (96% - 99%)  Finger Stick          PHYSICAL EXAM:  GENERAL:  [ x ] NAD , [x  ] well appearing, [  ] Agitated, [  ] Drowsy,  [  ] Lethargy, [  ] confused   HEAD:  [ x ] Normal, [  ] Other  EYES:  [ x ] EOMI, [ x ] PERRLA, [ x ] conjunctiva and sclera clear normal, [  ] Other,  [  ] Pallor,[  ] Discharge  ENMT:  [x  ] Normal, [ x ] Moist mucous membranes, [  ] Good dentition, [  ] No Thrush  NECK:  [ x ] Supple, [  ] No JVD, [  ] Normal thyroid, [  ] Lymphadenopathy [  ] Other  CHEST/LUNG:  [ x ] Clear to auscultation bilaterally, [ x ] Breath Sounds equal B/L / Decrease, [  ] poor effort  [x  ] No rales, [x  ] No rhonchi  [ x ]  No wheezing,   HEART:  [ x ] Regular rate and rhythm, [  ] tachycardia, [  ] Bradycardia,  [  ] irregular  [ x ] No murmurs, No rubs, No gallops, [  ] PPM in place (Mfr:  )  ABDOMEN:  [x  ] Soft, [x  ] Nontender, [ x ] Nondistended, [  ] No mass, [ x ] Bowel sounds present, [  ] obese  NERVOUS SYSTEM:  [x  ] Alert & Oriented X3, [ x ] Nonfocal  [  ] Confusion  [  ] Encephalopathic [  ] Sedated [  ] Unable to assess, [  ] Dementia [  ] Other-  EXTREMITIES: [x  ] 2+ Peripheral Pulses, No clubbing, No cyanosis,  [  ] edema B/L lower EXT. [  ] PVD stasis skin changes B/L Lower EXT, [ x ] RLE in ACE dressing  LYMPH: No lymphadenopathy noted  SKIN:  [  ] No rashes or lesions, [  ] Pressure Ulcers, [  ] ecchymosis, [  ] Skin Tears, [ x ] Positive necrotic R hallux ulcer    DIET: Diet, Consistent Carbohydrate w/Evening Snack:   DASH/TLC Sodium & Cholesterol Restricted (12-16-20 @ 11:24)      LABS:                        13.5   8.66  )-----------( 319      ( 17 Dec 2020 07:17 )             40.8     17 Dec 2020 07:17    139    |  105    |  22     ----------------------------<  188    4.4     |  28     |  0.94     Ca    9.1        17 Dec 2020 07:17    TPro  7.3    /  Alb  3.6    /  TBili  0.4    /  DBili  x      /  AST  12     /  ALT  26     /  AlkPhos  71     17 Dec 2020 07:17                             Anemia Panel:      Thyroid Panel:                RADIOLOGY & ADDITIONAL TESTS:      HEALTH ISSUES - PROBLEM Dx:  Peripheral vascular disease  Peripheral vascular disease    Need for prophylactic measure  Need for prophylactic measure    Hyperlipidemia  Hyperlipidemia    Diabetes  Diabetes    HTN (hypertension)  HTN (hypertension)    Osteomyelitis of right foot, unspecified type  Osteomyelitis of right foot, unspecified type    Gangrene of toe of right foot  Gangrene of toe of right foot            Consultant(s) Notes Reviewed:  [ x ] YES     Care Discussed with [X] Consultants  [x  ] Patient  [  ] Family [  ] HCP [  ]   [  ] Social Service  [  ] RN, [  ] Physical Therapy,[  ] Palliative care team  DVT PPX: [  ] Lovenox, [x  ] S C Heparin, [  ] Coumadin, [  ] Xarelto, [  ] Eliquis, [  ] Pradaxa, [  ] IV Heparin drip, [  ] SCD [  ] Contraindication 2 to GI Bleed,[  ] Ambulation [  ] Contraindicated 2 to  bleed [  ] Contraindicated 2 to Brain Bleed  Advanced directive: [x  ] None, [  ] DNR/DNI Patient is a 60y old  Male who presents with a chief complaint of Osteomyelitis (17 Dec 2020 11:01)    This progress note was completed in part by resident acting as telephonic scribe during COVID-19 crisis. Physical exam was completed by attending physician, and findings below are those of the attending physician, and have been reviewed in detail.      HPI:  59yo F with PMH of HTN, HLD, DM2 (not on insulin), peripheral vascular insufficiency s/p angioplasty of RLE (Nov 2020) sent by Podiatry, Dr. Stark for R hallux osteomyelitis. Patient states initial wound was in Oct 2020 after he had an ingrown toenail. Patient had vascular angiogram in November to improve blood flow however no improvement. Denies fever, chills, chest pain, sob, abd pain, N/V, UE/LE weakness or paresthesias.     In the ED:  VS- 98.4F, 114HR, 158/85, 18RR, O2 sat 98% on RA  Labs significant for glucose 154. CBC and CMP WNL.  ECG showed sinus tachycardia (107), no signs of acute ischemia  MRI showed Acute osteomyelitis of the right first distal phalanx. Prominent soft tissue edema about the first toe with numerous punctate foci of hypointense signal corresponding to air on the prior radiographs. These foci of air appear confined to the plantar soft tissues of the first distal phalanx without definite extension proximally, however MRI is insensitive for this finding.  s/p Zosyn in the ED, pt was seen by podiatry & ID in ER. (16 Dec 2020 10:52)    INTERVAL HPI: 12/17: Patient seen and examined at bedside. No events overnight. Scheduled for R hallux amputation tomorrow. Patient feels well and denies any complaints this AM.      OVERNIGHT EVENTS:    Home Medications:  atorvastatin 40 mg oral tablet: 1 tab(s) orally once a day (16 Dec 2020 16:36)  cilostazol 100 mg oral tablet: 1 tab(s) orally 2 times a day (16 Dec 2020 16:36)  clopidogrel 75 mg oral tablet: 1 tab(s) orally once a day (16 Dec 2020 16:36)  doxycycline hyclate 100 mg oral capsule: 1 cap(s) orally every 12 hours (16 Dec 2020 16:36)  Invokana 300 mg oral tablet: 1 tab(s) orally once a day (16 Dec 2020 16:36)  Januvia 100 mg oral tablet: 1 tab(s) orally once a day (16 Dec 2020 16:36)  K-Phos No. 2 oral tablet: orally 2 times a day (16 Dec 2020 16:36)  melatonin 3 mg oral tablet: 1 tab(s) orally once a day (at bedtime) (16 Dec 2020 16:36)  metFORMIN 1000 mg oral tablet: 1 tab(s) orally 2 times a day (16 Dec 2020 16:36)  Metoprolol Succinate ER 25 mg oral tablet, extended release: 1 tab(s) orally once a day (16 Dec 2020 16:36)  MiraLax oral powder for reconstitution: 7 gram(s) orally once a day (16 Dec 2020 16:36)  Mycostatin 100,000 units/mL oral suspension: 4 milliliter(s) orally 2 times a day (16 Dec 2020 16:36)  Norvasc 10 mg oral tablet: 1 tab(s) orally once a day (16 Dec 2020 16:36)  quinapril 40 mg oral tablet: 1 tab(s) orally once a day (16 Dec 2020 16:36)      MEDICATIONS  (STANDING):  atorvastatin 40 milliGRAM(s) Oral at bedtime  cilostazol 100 milliGRAM(s) Oral two times a day  clopidogrel Tablet 75 milliGRAM(s) Oral daily  dextrose 40% Gel 15 Gram(s) Oral once  dextrose 5%. 1000 milliLiter(s) (50 mL/Hr) IV Continuous <Continuous>  dextrose 5%. 1000 milliLiter(s) (100 mL/Hr) IV Continuous <Continuous>  dextrose 50% Injectable 25 Gram(s) IV Push once  dextrose 50% Injectable 12.5 Gram(s) IV Push once  dextrose 50% Injectable 25 Gram(s) IV Push once  glucagon  Injectable 1 milliGRAM(s) IntraMuscular once  insulin lispro (ADMELOG) corrective regimen sliding scale   SubCutaneous three times a day before meals  insulin lispro (ADMELOG) corrective regimen sliding scale   SubCutaneous at bedtime  lisinopril 40 milliGRAM(s) Oral daily  metoprolol succinate ER 25 milliGRAM(s) Oral daily    MEDICATIONS  (PRN):      Allergies    No Known Allergies    Intolerances        Social History:  Denies any tobacco or illicit drug use. 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 No nausea, No vomiting; No diarrhea, No constipation. [  ] BM  GENITOURINARY: No dysuria, No frequency, No urgency, No hematuria, NO incontinence  NEUROLOGICAL: No headaches, No dizziness, No numbness, No tingling, No tremors, No weakness  EXT: No Swelling, No Pain, No Edema  SKIN:  [ x ] No itching, burning, rashes, or lesions   MUSCULOSKELETAL: No joint pain ,No Jt swelling; No muscle pain, No back pain, No extremity pain  PSYCHIATRIC: No depression,  No anxiety,  No mood swings ,No difficulty sleeping at night  PAIN SCALE: [ x ] None  [  ] Other-  REST OF REVIEW Of SYSTEM - [ x ] Normal     Vital Signs Last 24 Hrs  T(C): 36.9 (17 Dec 2020 10:21), Max: 37.2 (16 Dec 2020 18:02)  T(F): 98.4 (17 Dec 2020 10:21), Max: 98.9 (16 Dec 2020 18:02)  HR: 100 (17 Dec 2020 10:21) (74 - 106)  BP: 111/64 (17 Dec 2020 10:21) (111/64 - 153/91)  BP(mean): --  RR: 18 (17 Dec 2020 10:21) (16 - 18)  SpO2: 96% (17 Dec 2020 10:21) (96% - 99%)  Finger Stick          PHYSICAL EXAM:  GENERAL:  [ x ] NAD , [x  ] well appearing, [  ] Agitated, [  ] Drowsy,  [  ] Lethargy, [  ] confused   HEAD:  [ x ] Normal, [  ] Other  EYES:  [ x ] EOMI, [ x ] PERRLA, [ x ] conjunctiva and sclera clear normal, [  ] Other,  [  ] Pallor,[  ] Discharge  ENMT:  [x  ] Normal, [ x ] Moist mucous membranes, [  ] Good dentition, [  ] No Thrush  NECK:  [ x ] Supple, [  ] No JVD, [  ] Normal thyroid, [  ] Lymphadenopathy [  ] Other  CHEST/LUNG:  [ x ] Clear to auscultation bilaterally, [ x ] Breath Sounds equal B/L / Decrease, [  ] poor effort  [x  ] No rales, [x  ] No rhonchi  [ x ]  No wheezing,   HEART:  [ x ] Regular rate and rhythm, [  ] tachycardia, [  ] Bradycardia,  [  ] irregular  [ x ] No murmurs, No rubs, No gallops, [  ] PPM in place (Mfr:  )  ABDOMEN:  [x  ] Soft, [x  ] Nontender, [ x ] Nondistended, [  ] No mass, [ x ] Bowel sounds present, [  ] obese  NERVOUS SYSTEM:  [x  ] Alert & Oriented X3, [ x ] Nonfocal  [  ] Confusion  [  ] Encephalopathic [  ] Sedated [  ] Unable to assess, [  ] Dementia [  ] Other-  EXTREMITIES: [x  ] 2+ Peripheral Pulses, No clubbing, No cyanosis,  [  ] edema B/L lower EXT. [  ] PVD stasis skin changes B/L Lower EXT, [ x ] RLE in ACE dressing  LYMPH: No lymphadenopathy noted  SKIN:  [  ] No rashes or lesions, [  ] Pressure Ulcers, [  ] ecchymosis, [  ] Skin Tears, [ x ] Positive necrotic R hallux ulcer    DIET: Diet, Consistent Carbohydrate w/Evening Snack:   DASH/TLC Sodium & Cholesterol Restricted (12-16-20 @ 11:24)      LABS:                        13.5   8.66  )-----------( 319      ( 17 Dec 2020 07:17 )             40.8     17 Dec 2020 07:17    139    |  105    |  22     ----------------------------<  188    4.4     |  28     |  0.94     Ca    9.1        17 Dec 2020 07:17    TPro  7.3    /  Alb  3.6    /  TBili  0.4    /  DBili  x      /  AST  12     /  ALT  26     /  AlkPhos  71     17 Dec 2020 07:17                             Anemia Panel:      Thyroid Panel:                RADIOLOGY & ADDITIONAL TESTS:      HEALTH ISSUES - PROBLEM Dx:  Peripheral vascular disease  Peripheral vascular disease    Need for prophylactic measure  Need for prophylactic measure    Hyperlipidemia  Hyperlipidemia    Diabetes  Diabetes    HTN (hypertension)  HTN (hypertension)    Osteomyelitis of right foot, unspecified type  Osteomyelitis of right foot, unspecified type    Gangrene of toe of right foot  Gangrene of toe of right foot            Consultant(s) Notes Reviewed:  [ x ] YES     Care Discussed with [X] Consultants  [x  ] Patient  [  ] Family [  ] HCP [  ]   [  ] Social Service  [  ] RN, [  ] Physical Therapy,[  ] Palliative care team  DVT PPX: [  ] Lovenox, [x  ] S C Heparin, [  ] Coumadin, [  ] Xarelto, [  ] Eliquis, [  ] Pradaxa, [  ] IV Heparin drip, [  ] SCD [  ] Contraindication 2 to GI Bleed,[  ] Ambulation [  ] Contraindicated 2 to  bleed [  ] Contraindicated 2 to Brain Bleed  Advanced directive: [x  ] None, [  ] DNR/DNI Patient is a 60y old  Male who presents with a chief complaint of Osteomyelitis (17 Dec 2020 11:01)    This progress note was completed in part by resident acting as telephonic scribe during COVID-19 crisis. Physical exam was completed by attending physician, and findings below are those of the attending physician, and have been reviewed in detail.      HPI:  59yo F with PMH of HTN, HLD, DM2 (not on insulin), peripheral vascular insufficiency s/p angioplasty of RLE (Nov 2020) sent by Podiatry, Dr. Stark for R hallux osteomyelitis. Patient states initial wound was in Oct 2020 after he had an ingrown toenail. Patient had vascular angiogram in November to improve blood flow however no improvement. Denies fever, chills, chest pain, sob, abd pain, N/V, UE/LE weakness or paresthesias.     In the ED:  VS- 98.4F, 114HR, 158/85, 18RR, O2 sat 98% on RA  Labs significant for glucose 154. CBC and CMP WNL.  ECG showed sinus tachycardia (107), no signs of acute ischemia  MRI showed Acute osteomyelitis of the right first distal phalanx. Prominent soft tissue edema about the first toe with numerous punctate foci of hypointense signal corresponding to air on the prior radiographs. These foci of air appear confined to the plantar soft tissues of the first distal phalanx without definite extension proximally, however MRI is insensitive for this finding.  s/p Zosyn in the ED, pt was seen by podiatry & ID in ER. (16 Dec 2020 10:52)    INTERVAL HPI: 12/17: Patient seen and examined at bedside. No events overnight. Scheduled for R hallux amputation tomorrow. Patient feels well and denies any complaints this AM. NO Abx as per ID.      OVERNIGHT EVENTS: NONE    Home Medications:  atorvastatin 40 mg oral tablet: 1 tab(s) orally once a day (16 Dec 2020 16:36)  cilostazol 100 mg oral tablet: 1 tab(s) orally 2 times a day (16 Dec 2020 16:36)  clopidogrel 75 mg oral tablet: 1 tab(s) orally once a day (16 Dec 2020 16:36)  doxycycline hyclate 100 mg oral capsule: 1 cap(s) orally every 12 hours (16 Dec 2020 16:36)  Invokana 300 mg oral tablet: 1 tab(s) orally once a day (16 Dec 2020 16:36)  Januvia 100 mg oral tablet: 1 tab(s) orally once a day (16 Dec 2020 16:36)  K-Phos No. 2 oral tablet: orally 2 times a day (16 Dec 2020 16:36)  melatonin 3 mg oral tablet: 1 tab(s) orally once a day (at bedtime) (16 Dec 2020 16:36)  metFORMIN 1000 mg oral tablet: 1 tab(s) orally 2 times a day (16 Dec 2020 16:36)  Metoprolol Succinate ER 25 mg oral tablet, extended release: 1 tab(s) orally once a day (16 Dec 2020 16:36)  MiraLax oral powder for reconstitution: 7 gram(s) orally once a day (16 Dec 2020 16:36)  Mycostatin 100,000 units/mL oral suspension: 4 milliliter(s) orally 2 times a day (16 Dec 2020 16:36)  Norvasc 10 mg oral tablet: 1 tab(s) orally once a day (16 Dec 2020 16:36)  quinapril 40 mg oral tablet: 1 tab(s) orally once a day (16 Dec 2020 16:36)      MEDICATIONS  (STANDING):  atorvastatin 40 milliGRAM(s) Oral at bedtime  cilostazol 100 milliGRAM(s) Oral two times a day  clopidogrel Tablet 75 milliGRAM(s) Oral daily  dextrose 40% Gel 15 Gram(s) Oral once  dextrose 5%. 1000 milliLiter(s) (50 mL/Hr) IV Continuous <Continuous>  dextrose 5%. 1000 milliLiter(s) (100 mL/Hr) IV Continuous <Continuous>  dextrose 50% Injectable 25 Gram(s) IV Push once  dextrose 50% Injectable 12.5 Gram(s) IV Push once  dextrose 50% Injectable 25 Gram(s) IV Push once  glucagon  Injectable 1 milliGRAM(s) IntraMuscular once  insulin lispro (ADMELOG) corrective regimen sliding scale   SubCutaneous three times a day before meals  insulin lispro (ADMELOG) corrective regimen sliding scale   SubCutaneous at bedtime  lisinopril 40 milliGRAM(s) Oral daily  metoprolol succinate ER 25 milliGRAM(s) Oral daily    MEDICATIONS  (PRN):      Allergies    No Known Allergies    Intolerances        Social History:  Denies any tobacco or illicit drug use. 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 No nausea, No vomiting; No diarrhea, No constipation. [  ] BM  GENITOURINARY: No dysuria, No frequency, No urgency, No hematuria, NO incontinence  NEUROLOGICAL: No headaches, No dizziness, No numbness, No tingling, No tremors, No weakness  EXT: No Swelling, No Pain, No Edema  SKIN:  [ x ] No itching, burning, rashes, or lesions   MUSCULOSKELETAL: No joint pain ,No Jt swelling; No muscle pain, No back pain, No extremity pain  PSYCHIATRIC: No depression,  No anxiety,  No mood swings ,No difficulty sleeping at night  PAIN SCALE: [ x ] None  [  ] Other-  REST OF REVIEW Of SYSTEM - [ x ] Normal     Vital Signs Last 24 Hrs  T(C): 36.9 (17 Dec 2020 10:21), Max: 37.2 (16 Dec 2020 18:02)  T(F): 98.4 (17 Dec 2020 10:21), Max: 98.9 (16 Dec 2020 18:02)  HR: 100 (17 Dec 2020 10:21) (74 - 106)  BP: 111/64 (17 Dec 2020 10:21) (111/64 - 153/91)  BP(mean): --  RR: 18 (17 Dec 2020 10:21) (16 - 18)  SpO2: 96% (17 Dec 2020 10:21) (96% - 99%)  Finger Stick          PHYSICAL EXAM:  GENERAL:  [ x ] NAD , [x  ] well appearing, [  ] Agitated, [  ] Drowsy,  [  ] Lethargy, [  ] confused   HEAD:  [ x ] Normal, [  ] Other  EYES:  [ x ] EOMI, [ x ] PERRLA, [ x ] conjunctiva and sclera clear normal, [  ] Other,  [  ] Pallor,[  ] Discharge  ENMT:  [x  ] Normal, [ x ] Moist mucous membranes, [ x ] Good dentition, [ x ] No Thrush  NECK:  [ x ] Supple, [ x ] No JVD, [ x ] Normal thyroid, [  ] Lymphadenopathy [  ] Other  CHEST/LUNG:  [ x ] Clear to auscultation bilaterally, [ x ] Breath Sounds equal B/L / Decrease, [  ] poor effort  [x  ] No rales, [x  ] No rhonchi  [ x ]  No wheezing,   HEART:  [ x ] Regular rate and rhythm, [  ] tachycardia, [  ] Bradycardia,  [  ] irregular  [ x ] No murmurs, No rubs, No gallops, [  ] PPM in place (Mfr:  )  ABDOMEN:  [x  ] Soft, [x  ] Nontender, [ x ] Nondistended, [x  ] No mass, [ x ] Bowel sounds present, [  ] obese  NERVOUS SYSTEM:  [x  ] Alert & Oriented X3, [ x ] Nonfocal  [  ] Confusion  [  ] Encephalopathic [  ] Sedated [  ] Unable to assess, [  ] Dementia [  ] Other-  EXTREMITIES: [x  ] 2+ Peripheral Pulses, No clubbing, No cyanosis,  [  ] edema B/L lower EXT. [  ] PVD stasis skin changes B/L Lower EXT, [ x ] RLE in ACE dressing -Big toe necrotic   LYMPH: No lymphadenopathy noted  SKIN:  [ x ] No rashes or lesions, [  ] Pressure Ulcers, [  ] ecchymosis, [  ] Skin Tears, [ x ] Positive necrotic R hallux ulcer    DIET: Diet, Consistent Carbohydrate w/Evening Snack:   DASH/TLC Sodium & Cholesterol Restricted (12-16-20 @ 11:24)      LABS:                        13.5   8.66  )-----------( 319      ( 17 Dec 2020 07:17 )             40.8     17 Dec 2020 07:17    139    |  105    |  22     ----------------------------<  188    4.4     |  28     |  0.94     Ca    9.1        17 Dec 2020 07:17    TPro  7.3    /  Alb  3.6    /  TBili  0.4    /  DBili  x      /  AST  12     /  ALT  26     /  AlkPhos  71     17 Dec 2020 07:17    RADIOLOGY & ADDITIONAL TESTS: NONE      HEALTH ISSUES - PROBLEM Dx:  Peripheral vascular disease  Peripheral vascular disease    Need for prophylactic measure  Need for prophylactic measure    Hyperlipidemia  Hyperlipidemia    Diabetes  Diabetes    HTN (hypertension)  HTN (hypertension)    Osteomyelitis of right foot, unspecified type  Osteomyelitis of right foot, unspecified type    Gangrene of toe of right foot  Gangrene of toe of right foot        Consultant(s) Notes Reviewed:  [ x ] YES     Care Discussed with [X] Consultants  [x  ] Patient  [ x ] Family [  ] HCP [  ]   [  ] Social Service  [ x ] RN, [  ] Physical Therapy,[  ] Palliative care team  DVT PPX: [  ] Lovenox, [x  ] S C Heparin, [  ] Coumadin, [  ] Xarelto, [  ] Eliquis, [  ] Pradaxa, [  ] IV Heparin drip, [  ] SCD [  ] Contraindication 2 to GI Bleed,[  ] Ambulation [  ] Contraindicated 2 to  bleed [  ] Contraindicated 2 to Brain Bleed  Advanced directive: [x  ] None, [  ] DNR/DNI

## 2020-12-17 NOTE — CONSULT NOTE ADULT - SUBJECTIVE AND OBJECTIVE BOX
Patient is a 60y old  Male who presents with a chief complaint of Osteomyelitis (17 Dec 2020 11:08)      Reason For Consult: dm2 uncontrolled    HPI:  59yo F with PMH of HTN, HLD, DM2 (not on insulin), peripheral vascular insufficiency s/p angioplasty of RLE (Nov 2020) sent by Podiatry, Dr. Stark for R hallux osteomyelitis. Patient states initial wound was in Oct 2020 after he had an ingrown toenail. Patient had vascular angiogram in November to improve blood flow however no improvement. Denies fever, chills, chest pain, sob, abd pain, N/V, UE/LE weakness or paresthesias.     In the ED:  VS- 98.4F, 114HR, 158/85, 18RR, O2 sat 98% on RA  Labs significant for glucose 154. CBC and CMP WNL.  ECG showed sinus tachycardia (107), no signs of acute ischemia  MRI showed Acute osteomyelitis of the right first distal phalanx. Prominent soft tissue edema about the first toe with numerous punctate foci of hypointense signal corresponding to air on the prior radiographs. These foci of air appear confined to the plantar soft tissues of the first distal phalanx without definite extension proximally, however MRI is insensitive for this finding.  s/p Zosyn in the ED, pt was seen by podiatry & ID in ER. (16 Dec 2020 10:52)      PAST MEDICAL & SURGICAL HISTORY:  Peripheral vascular disease  S/p Angioplasty rt lower EXT Nov 2020    Hyperlipidemia    Diabetes    HTN (hypertension)    H/O angioplasty  RLE        FAMILY HISTORY:  FH: type 2 diabetes  Grandfather          Social History:    MEDICATIONS  (STANDING):  atorvastatin 40 milliGRAM(s) Oral at bedtime  cilostazol 100 milliGRAM(s) Oral two times a day  clopidogrel Tablet 75 milliGRAM(s) Oral daily  dextrose 40% Gel 15 Gram(s) Oral once  dextrose 5%. 1000 milliLiter(s) (50 mL/Hr) IV Continuous <Continuous>  dextrose 5%. 1000 milliLiter(s) (100 mL/Hr) IV Continuous <Continuous>  dextrose 50% Injectable 25 Gram(s) IV Push once  dextrose 50% Injectable 12.5 Gram(s) IV Push once  dextrose 50% Injectable 25 Gram(s) IV Push once  glucagon  Injectable 1 milliGRAM(s) IntraMuscular once  heparin   Injectable 5000 Unit(s) SubCutaneous every 12 hours  insulin lispro (ADMELOG) corrective regimen sliding scale   SubCutaneous three times a day before meals  insulin lispro (ADMELOG) corrective regimen sliding scale   SubCutaneous at bedtime  lisinopril 40 milliGRAM(s) Oral daily  metoprolol succinate ER 25 milliGRAM(s) Oral daily    MEDICATIONS  (PRN):        T(C): 36.9 (12-17-20 @ 10:21), Max: 37.2 (12-16-20 @ 18:02)  HR: 100 (12-17-20 @ 10:21) (74 - 106)  BP: 111/64 (12-17-20 @ 10:21) (111/64 - 153/91)  RR: 18 (12-17-20 @ 10:21) (16 - 18)  SpO2: 96% (12-17-20 @ 10:21) (96% - 99%)  Wt(kg): --    PHYSICAL EXAM:  NECK: Supple, No JVD, Normal thyroid  CHEST/LUNG: Clear to percussion bilaterally; No rales, rhonchi, wheezing, or rubs  HEART: Regular rate and rhythm; No murmurs, rubs, or gallops  ABDOMEN: Soft, Nontender, Nondistended; Bowel sounds present  EXTREMITIES:  le foot dsg intact    CAPILLARY BLOOD GLUCOSE      POCT Blood Glucose.: 283 mg/dL (17 Dec 2020 11:25)  POCT Blood Glucose.: 192 mg/dL (17 Dec 2020 07:23)  POCT Blood Glucose.: 170 mg/dL (16 Dec 2020 21:58)  POCT Blood Glucose.: 174 mg/dL (16 Dec 2020 20:59)  POCT Blood Glucose.: 115 mg/dL (16 Dec 2020 17:36)  POCT Blood Glucose.: 148 mg/dL (16 Dec 2020 13:54)                            13.5   8.66  )-----------( 319      ( 17 Dec 2020 07:17 )             40.8       CMP:  12-17 @ 07:17  SGPT 26  Albumin 3.6   Alk Phos 71   Anion Gap 6   SGOT 12   Total Bili 0.4   BUN 22   Calcium Total 9.1   CO2 28   Chloride 105   Creatinine 0.94   eGFR if    eGFR if non AA 87   Glucose 188   Potassium 4.4   Protein 7.3   Sodium 139      Thyroid Function Tests:      Diabetes Tests:       Radiology:

## 2020-12-17 NOTE — PROGRESS NOTE ADULT - ATTENDING COMMENTS
Efren Frederick M.D.  [facility], Division of Infectious Diseases  [phone]  After 5pm on weekdays and all day on weekends - please call [phone]

## 2020-12-18 ENCOUNTER — TRANSCRIPTION ENCOUNTER (OUTPATIENT)
Age: 60
End: 2020-12-18

## 2020-12-18 ENCOUNTER — RESULT REVIEW (OUTPATIENT)
Age: 60
End: 2020-12-18

## 2020-12-18 DIAGNOSIS — E11.65 TYPE 2 DIABETES MELLITUS WITH HYPERGLYCEMIA: ICD-10-CM

## 2020-12-18 PROBLEM — E11.9 TYPE 2 DIABETES MELLITUS WITHOUT COMPLICATIONS: Chronic | Status: ACTIVE | Noted: 2020-12-16

## 2020-12-18 PROBLEM — I10 ESSENTIAL (PRIMARY) HYPERTENSION: Chronic | Status: ACTIVE | Noted: 2020-12-16

## 2020-12-18 PROBLEM — E78.5 HYPERLIPIDEMIA, UNSPECIFIED: Chronic | Status: ACTIVE | Noted: 2020-12-16

## 2020-12-18 LAB
-  AMIKACIN: SIGNIFICANT CHANGE UP
-  AMIKACIN: SIGNIFICANT CHANGE UP
-  AMOXICILLIN/CLAVULANIC ACID: SIGNIFICANT CHANGE UP
-  AMOXICILLIN/CLAVULANIC ACID: SIGNIFICANT CHANGE UP
-  AMPICILLIN/SULBACTAM: SIGNIFICANT CHANGE UP
-  AMPICILLIN/SULBACTAM: SIGNIFICANT CHANGE UP
-  AMPICILLIN: SIGNIFICANT CHANGE UP
-  AMPICILLIN: SIGNIFICANT CHANGE UP
-  AZTREONAM: SIGNIFICANT CHANGE UP
-  AZTREONAM: SIGNIFICANT CHANGE UP
-  CEFAZOLIN: SIGNIFICANT CHANGE UP
-  CEFAZOLIN: SIGNIFICANT CHANGE UP
-  CEFEPIME: SIGNIFICANT CHANGE UP
-  CEFEPIME: SIGNIFICANT CHANGE UP
-  CEFOXITIN: SIGNIFICANT CHANGE UP
-  CEFOXITIN: SIGNIFICANT CHANGE UP
-  CEFTRIAXONE: SIGNIFICANT CHANGE UP
-  CEFTRIAXONE: SIGNIFICANT CHANGE UP
-  CIPROFLOXACIN: SIGNIFICANT CHANGE UP
-  CIPROFLOXACIN: SIGNIFICANT CHANGE UP
-  ERTAPENEM: SIGNIFICANT CHANGE UP
-  ERTAPENEM: SIGNIFICANT CHANGE UP
-  GENTAMICIN: SIGNIFICANT CHANGE UP
-  GENTAMICIN: SIGNIFICANT CHANGE UP
-  LEVOFLOXACIN: SIGNIFICANT CHANGE UP
-  LEVOFLOXACIN: SIGNIFICANT CHANGE UP
-  MEROPENEM: SIGNIFICANT CHANGE UP
-  MEROPENEM: SIGNIFICANT CHANGE UP
-  PIPERACILLIN/TAZOBACTAM: SIGNIFICANT CHANGE UP
-  PIPERACILLIN/TAZOBACTAM: SIGNIFICANT CHANGE UP
-  TOBRAMYCIN: SIGNIFICANT CHANGE UP
-  TOBRAMYCIN: SIGNIFICANT CHANGE UP
-  TRIMETHOPRIM/SULFAMETHOXAZOLE: SIGNIFICANT CHANGE UP
-  TRIMETHOPRIM/SULFAMETHOXAZOLE: SIGNIFICANT CHANGE UP
ALBUMIN SERPL ELPH-MCNC: 3.5 G/DL — SIGNIFICANT CHANGE UP (ref 3.3–5)
ALP SERPL-CCNC: 61 U/L — SIGNIFICANT CHANGE UP (ref 40–120)
ALT FLD-CCNC: 24 U/L — SIGNIFICANT CHANGE UP (ref 12–78)
ANION GAP SERPL CALC-SCNC: 5 MMOL/L — SIGNIFICANT CHANGE UP (ref 5–17)
ANION GAP SERPL CALC-SCNC: 6 MMOL/L — SIGNIFICANT CHANGE UP (ref 5–17)
APTT BLD: 30.3 SEC — SIGNIFICANT CHANGE UP (ref 27.5–35.5)
AST SERPL-CCNC: 10 U/L — LOW (ref 15–37)
BILIRUB SERPL-MCNC: 0.3 MG/DL — SIGNIFICANT CHANGE UP (ref 0.2–1.2)
BUN SERPL-MCNC: 19 MG/DL — SIGNIFICANT CHANGE UP (ref 7–23)
BUN SERPL-MCNC: 24 MG/DL — HIGH (ref 7–23)
CALCIUM SERPL-MCNC: 8.6 MG/DL — SIGNIFICANT CHANGE UP (ref 8.5–10.1)
CALCIUM SERPL-MCNC: 8.7 MG/DL — SIGNIFICANT CHANGE UP (ref 8.5–10.1)
CHLORIDE SERPL-SCNC: 105 MMOL/L — SIGNIFICANT CHANGE UP (ref 96–108)
CHLORIDE SERPL-SCNC: 107 MMOL/L — SIGNIFICANT CHANGE UP (ref 96–108)
CO2 SERPL-SCNC: 28 MMOL/L — SIGNIFICANT CHANGE UP (ref 22–31)
CO2 SERPL-SCNC: 30 MMOL/L — SIGNIFICANT CHANGE UP (ref 22–31)
CREAT SERPL-MCNC: 0.78 MG/DL — SIGNIFICANT CHANGE UP (ref 0.5–1.3)
CREAT SERPL-MCNC: 1.1 MG/DL — SIGNIFICANT CHANGE UP (ref 0.5–1.3)
CULTURE RESULTS: SIGNIFICANT CHANGE UP
GLUCOSE SERPL-MCNC: 165 MG/DL — HIGH (ref 70–99)
GLUCOSE SERPL-MCNC: 167 MG/DL — HIGH (ref 70–99)
GRAM STN FLD: SIGNIFICANT CHANGE UP
GRAM STN FLD: SIGNIFICANT CHANGE UP
HCT VFR BLD CALC: 38.7 % — LOW (ref 39–50)
HCT VFR BLD CALC: 40.6 % — SIGNIFICANT CHANGE UP (ref 39–50)
HGB BLD-MCNC: 12.7 G/DL — LOW (ref 13–17)
HGB BLD-MCNC: 13.5 G/DL — SIGNIFICANT CHANGE UP (ref 13–17)
INR BLD: 1.01 RATIO — SIGNIFICANT CHANGE UP (ref 0.88–1.16)
MCHC RBC-ENTMCNC: 29.5 PG — SIGNIFICANT CHANGE UP (ref 27–34)
MCHC RBC-ENTMCNC: 29.7 PG — SIGNIFICANT CHANGE UP (ref 27–34)
MCHC RBC-ENTMCNC: 32.8 GM/DL — SIGNIFICANT CHANGE UP (ref 32–36)
MCHC RBC-ENTMCNC: 33.3 GM/DL — SIGNIFICANT CHANGE UP (ref 32–36)
MCV RBC AUTO: 89.4 FL — SIGNIFICANT CHANGE UP (ref 80–100)
MCV RBC AUTO: 89.8 FL — SIGNIFICANT CHANGE UP (ref 80–100)
METHOD TYPE: SIGNIFICANT CHANGE UP
METHOD TYPE: SIGNIFICANT CHANGE UP
NRBC # BLD: 0 /100 WBCS — SIGNIFICANT CHANGE UP (ref 0–0)
NRBC # BLD: 0 /100 WBCS — SIGNIFICANT CHANGE UP (ref 0–0)
ORGANISM # SPEC MICROSCOPIC CNT: SIGNIFICANT CHANGE UP
PLATELET # BLD AUTO: 297 K/UL — SIGNIFICANT CHANGE UP (ref 150–400)
PLATELET # BLD AUTO: 324 K/UL — SIGNIFICANT CHANGE UP (ref 150–400)
POTASSIUM SERPL-MCNC: 3.8 MMOL/L — SIGNIFICANT CHANGE UP (ref 3.5–5.3)
POTASSIUM SERPL-MCNC: 4.1 MMOL/L — SIGNIFICANT CHANGE UP (ref 3.5–5.3)
POTASSIUM SERPL-SCNC: 3.8 MMOL/L — SIGNIFICANT CHANGE UP (ref 3.5–5.3)
POTASSIUM SERPL-SCNC: 4.1 MMOL/L — SIGNIFICANT CHANGE UP (ref 3.5–5.3)
PROT SERPL-MCNC: 6.8 G/DL — SIGNIFICANT CHANGE UP (ref 6–8.3)
PROTHROM AB SERPL-ACNC: 11.8 SEC — SIGNIFICANT CHANGE UP (ref 10.6–13.6)
RBC # BLD: 4.31 M/UL — SIGNIFICANT CHANGE UP (ref 4.2–5.8)
RBC # BLD: 4.54 M/UL — SIGNIFICANT CHANGE UP (ref 4.2–5.8)
RBC # FLD: 12.7 % — SIGNIFICANT CHANGE UP (ref 10.3–14.5)
RBC # FLD: 12.8 % — SIGNIFICANT CHANGE UP (ref 10.3–14.5)
SODIUM SERPL-SCNC: 140 MMOL/L — SIGNIFICANT CHANGE UP (ref 135–145)
SODIUM SERPL-SCNC: 141 MMOL/L — SIGNIFICANT CHANGE UP (ref 135–145)
SPECIMEN SOURCE: SIGNIFICANT CHANGE UP
WBC # BLD: 10 K/UL — SIGNIFICANT CHANGE UP (ref 3.8–10.5)
WBC # BLD: 8.82 K/UL — SIGNIFICANT CHANGE UP (ref 3.8–10.5)
WBC # FLD AUTO: 10 K/UL — SIGNIFICANT CHANGE UP (ref 3.8–10.5)
WBC # FLD AUTO: 8.82 K/UL — SIGNIFICANT CHANGE UP (ref 3.8–10.5)

## 2020-12-18 PROCEDURE — 88311 DECALCIFY TISSUE: CPT | Mod: 26

## 2020-12-18 PROCEDURE — 88305 TISSUE EXAM BY PATHOLOGIST: CPT | Mod: 26

## 2020-12-18 PROCEDURE — 99232 SBSQ HOSP IP/OBS MODERATE 35: CPT

## 2020-12-18 PROCEDURE — 73630 X-RAY EXAM OF FOOT: CPT | Mod: 26,RT

## 2020-12-18 PROCEDURE — 28810 AMPUTATION TOE & METATARSAL: CPT | Mod: T5

## 2020-12-18 PROCEDURE — 88304 TISSUE EXAM BY PATHOLOGIST: CPT | Mod: 26

## 2020-12-18 RX ORDER — ONDANSETRON 8 MG/1
4 TABLET, FILM COATED ORAL ONCE
Refills: 0 | Status: DISCONTINUED | OUTPATIENT
Start: 2020-12-18 | End: 2020-12-18

## 2020-12-18 RX ORDER — SODIUM CHLORIDE 9 MG/ML
1000 INJECTION, SOLUTION INTRAVENOUS
Refills: 0 | Status: DISCONTINUED | OUTPATIENT
Start: 2020-12-18 | End: 2020-12-18

## 2020-12-18 RX ORDER — INSULIN GLARGINE 100 [IU]/ML
10 INJECTION, SOLUTION SUBCUTANEOUS AT BEDTIME
Refills: 0 | Status: DISCONTINUED | OUTPATIENT
Start: 2020-12-18 | End: 2020-12-21

## 2020-12-18 RX ORDER — LISINOPRIL 2.5 MG/1
40 TABLET ORAL DAILY
Refills: 0 | Status: DISCONTINUED | OUTPATIENT
Start: 2020-12-18 | End: 2020-12-21

## 2020-12-18 RX ORDER — DEXTROSE 50 % IN WATER 50 %
12.5 SYRINGE (ML) INTRAVENOUS ONCE
Refills: 0 | Status: DISCONTINUED | OUTPATIENT
Start: 2020-12-18 | End: 2020-12-21

## 2020-12-18 RX ORDER — PIPERACILLIN AND TAZOBACTAM 4; .5 G/20ML; G/20ML
3.38 INJECTION, POWDER, LYOPHILIZED, FOR SOLUTION INTRAVENOUS EVERY 8 HOURS
Refills: 0 | Status: DISCONTINUED | OUTPATIENT
Start: 2020-12-18 | End: 2020-12-20

## 2020-12-18 RX ORDER — SODIUM CHLORIDE 9 MG/ML
1000 INJECTION, SOLUTION INTRAVENOUS
Refills: 0 | Status: DISCONTINUED | OUTPATIENT
Start: 2020-12-18 | End: 2020-12-21

## 2020-12-18 RX ORDER — DEXTROSE 50 % IN WATER 50 %
15 SYRINGE (ML) INTRAVENOUS ONCE
Refills: 0 | Status: DISCONTINUED | OUTPATIENT
Start: 2020-12-18 | End: 2020-12-18

## 2020-12-18 RX ORDER — DEXTROSE 50 % IN WATER 50 %
15 SYRINGE (ML) INTRAVENOUS ONCE
Refills: 0 | Status: DISCONTINUED | OUTPATIENT
Start: 2020-12-18 | End: 2020-12-21

## 2020-12-18 RX ORDER — OXYCODONE HYDROCHLORIDE 5 MG/1
5 TABLET ORAL ONCE
Refills: 0 | Status: DISCONTINUED | OUTPATIENT
Start: 2020-12-18 | End: 2020-12-18

## 2020-12-18 RX ORDER — GLUCAGON INJECTION, SOLUTION 0.5 MG/.1ML
1 INJECTION, SOLUTION SUBCUTANEOUS ONCE
Refills: 0 | Status: DISCONTINUED | OUTPATIENT
Start: 2020-12-18 | End: 2020-12-21

## 2020-12-18 RX ORDER — TRAMADOL HYDROCHLORIDE 50 MG/1
50 TABLET ORAL EVERY 6 HOURS
Refills: 0 | Status: DISCONTINUED | OUTPATIENT
Start: 2020-12-18 | End: 2020-12-19

## 2020-12-18 RX ORDER — METOPROLOL TARTRATE 50 MG
25 TABLET ORAL DAILY
Refills: 0 | Status: DISCONTINUED | OUTPATIENT
Start: 2020-12-18 | End: 2020-12-21

## 2020-12-18 RX ORDER — INSULIN GLARGINE 100 [IU]/ML
10 INJECTION, SOLUTION SUBCUTANEOUS AT BEDTIME
Refills: 0 | Status: DISCONTINUED | OUTPATIENT
Start: 2020-12-18 | End: 2020-12-18

## 2020-12-18 RX ORDER — DEXTROSE 50 % IN WATER 50 %
25 SYRINGE (ML) INTRAVENOUS ONCE
Refills: 0 | Status: DISCONTINUED | OUTPATIENT
Start: 2020-12-18 | End: 2020-12-21

## 2020-12-18 RX ORDER — DEXTROSE 50 % IN WATER 50 %
25 SYRINGE (ML) INTRAVENOUS ONCE
Refills: 0 | Status: DISCONTINUED | OUTPATIENT
Start: 2020-12-18 | End: 2020-12-18

## 2020-12-18 RX ORDER — ACETAMINOPHEN 500 MG
650 TABLET ORAL EVERY 4 HOURS
Refills: 0 | Status: DISCONTINUED | OUTPATIENT
Start: 2020-12-18 | End: 2020-12-19

## 2020-12-18 RX ORDER — INSULIN LISPRO 100/ML
VIAL (ML) SUBCUTANEOUS AT BEDTIME
Refills: 0 | Status: DISCONTINUED | OUTPATIENT
Start: 2020-12-18 | End: 2020-12-21

## 2020-12-18 RX ORDER — INSULIN LISPRO 100/ML
VIAL (ML) SUBCUTANEOUS EVERY 6 HOURS
Refills: 0 | Status: DISCONTINUED | OUTPATIENT
Start: 2020-12-18 | End: 2020-12-18

## 2020-12-18 RX ORDER — INSULIN LISPRO 100/ML
VIAL (ML) SUBCUTANEOUS
Refills: 0 | Status: DISCONTINUED | OUTPATIENT
Start: 2020-12-18 | End: 2020-12-21

## 2020-12-18 RX ORDER — HYDROMORPHONE HYDROCHLORIDE 2 MG/ML
0.5 INJECTION INTRAMUSCULAR; INTRAVENOUS; SUBCUTANEOUS
Refills: 0 | Status: DISCONTINUED | OUTPATIENT
Start: 2020-12-18 | End: 2020-12-18

## 2020-12-18 RX ORDER — ACETAMINOPHEN 500 MG
1000 TABLET ORAL EVERY 6 HOURS
Refills: 0 | Status: DISCONTINUED | OUTPATIENT
Start: 2020-12-18 | End: 2020-12-19

## 2020-12-18 RX ORDER — ATORVASTATIN CALCIUM 80 MG/1
40 TABLET, FILM COATED ORAL AT BEDTIME
Refills: 0 | Status: DISCONTINUED | OUTPATIENT
Start: 2020-12-18 | End: 2020-12-21

## 2020-12-18 RX ADMIN — Medication 1000 MILLIGRAM(S): at 20:55

## 2020-12-18 RX ADMIN — INSULIN GLARGINE 10 UNIT(S): 100 INJECTION, SOLUTION SUBCUTANEOUS at 22:58

## 2020-12-18 RX ADMIN — LISINOPRIL 40 MILLIGRAM(S): 2.5 TABLET ORAL at 05:37

## 2020-12-18 RX ADMIN — Medication 1000 MILLIGRAM(S): at 21:53

## 2020-12-18 RX ADMIN — ATORVASTATIN CALCIUM 40 MILLIGRAM(S): 80 TABLET, FILM COATED ORAL at 22:58

## 2020-12-18 RX ADMIN — PIPERACILLIN AND TAZOBACTAM 25 GRAM(S): 4; .5 INJECTION, POWDER, LYOPHILIZED, FOR SOLUTION INTRAVENOUS at 17:14

## 2020-12-18 RX ADMIN — Medication 25 MILLIGRAM(S): at 05:38

## 2020-12-18 RX ADMIN — SODIUM CHLORIDE 75 MILLILITER(S): 9 INJECTION, SOLUTION INTRAVENOUS at 11:25

## 2020-12-18 RX ADMIN — Medication 2: at 17:14

## 2020-12-18 NOTE — PROGRESS NOTE ADULT - ASSESSMENT
61yo F with PMH of HTN, HLD, DM2 (not on insulin), sent by Podiatry, Dr. Gomez for R hallux osteomyelitis, scheduled for a right Hallex Amputation    Right Hallux OM  - Pt is scheduled for a right Hallex Amputation  - No history of cardiac disease.  - Denies any hx of SOB while walking and symptoms to suggest angina  - EKG shows no acute or worrisome changes.  - No evidence of volume overload.  - Patient has no active ischemia, decompensated HF, unstable arrythmia, or severe stenotic valvular disease, and in the setting of low risk right hallex amputation procedure, patient is optimized from cardiovascular standpoint to proceed with planned procedure with routine hemodynamic monitoring.    HTN  - BP better controlled BP: 110/70 (12-18-20 @ 04:25) (110/70 - 133/77)  - Continue Lisinopril 40mg 1 po qd and Metoprolol succinate XL 25mg 1po qd  - Continue statin  - Continue routine hemodynamic monitoring.    PAD, with ag atherosclerosis on dopplers  - Continue Plavix and Lipitor  - Continue Pletal  - on hold now for OR     - Monitor and replete lytes, keep K>4, Mg>2.  - All other medical needs as per primary team.  - Other cardiovascular workup will depend on clinical course.  - Will continue to follow.    Oksana Humphrey, MS FNP, [facility]  Nurse Practitioner- Cardiology   Spectra #[phone]

## 2020-12-18 NOTE — PROGRESS NOTE ADULT - PROBLEM SELECTOR PLAN 1
Sent by podiatry, Dr. Stark for osteomyelitis of R hallux  -MRI foot shows Acute osteomyelitis of the right first distal phalanx. Prominent soft tissue edema about the first toe with numerous punctate foci of hypointense signal corresponding to air on the prior radiographs. These foci of air appear confined to the plantar soft tissues of the first distal phalanx without definite extension proximally  -OR today for R hallux amputation  -Wound dressed with Aquacel and DSD  -ESR WNL, CRP with mild elevation on admission  -Wound culture prelim w/ moderate Serratia marcescens  -NPO  -ID, Dr. LORE Frederick following--patient clinically stable, continue to monitor off abx to allow for better OR culture yield  -Podiatry, Dr. Gomez following  -Vascular, Dr. Boston consulted--no vascular intervention needed  -Wound care consulted  -[facility] group consulted for cardiac clearance    #Problem: Medical Optimization  -RCI class I, METS >4  -Patient is low risk for a low/intermediate risk procedure and is medically optimized for R hallux amputation Sent by podiatry, Dr. Stark for osteomyelitis of R hallux  -MRI foot shows Acute osteomyelitis of the right first distal phalanx. Prominent soft tissue edema about the first toe with numerous punctate foci of hypointense signal corresponding to air on the prior radiographs. These foci of air appear confined to the plantar soft tissues of the first distal phalanx without definite extension proximally  -OR today for R hallux amputation  -Wound dressed with Aquacel and DSD  -ESR WNL, CRP with mild elevation on admission  -Wound culture prelim w/ moderate Serratia marcescens  -NPO for OR  -ID, Dr. LORE Frederick following--patient clinically stable, continue to monitor off abx to allow for better OR culture yield  -Podiatry, Dr. Gomez following  -Vascular, Dr. Boston consulted--no vascular intervention needed  -Wound care consulted  -[facility] group consulted for cardiac clearance    #Problem: Medical Optimization  -RCI class I, METS >4  -Patient is low risk for a low/intermediate risk procedure and is medically optimized for R hallux amputation  -Post OP start IV Zosyn q 8 hrs as per ID

## 2020-12-18 NOTE — DISCHARGE NOTE PROVIDER - NSDCACTIVITY_GEN_ALL_CORE
No heavy lifting/straining Bathing allowed/Do not drive or operate machinery/No heavy lifting/straining

## 2020-12-18 NOTE — DISCHARGE NOTE PROVIDER - HOSPITAL COURSE
HPI:  61yo F with PMH of HTN, HLD, DM2 (not on insulin), peripheral vascular insufficiency s/p angioplasty of RLE (Nov 2020) sent by Podiatry, Dr. Stark for R hallux osteomyelitis. Patient states initial wound was in Oct 2020 after he had an ingrown toenail. Patient had vascular angiogram in November to improve blood flow however no improvement. Denies fever, chills, chest pain, sob, abd pain, N/V, UE/LE weakness or paresthesias.     In the ED:  VS- 98.4F, 114HR, 158/85, 18RR, O2 sat 98% on RA  Labs significant for glucose 154. CBC and CMP WNL.  ECG showed sinus tachycardia (107), no signs of acute ischemia  MRI showed Acute osteomyelitis of the right first distal phalanx. Prominent soft tissue edema about the first toe with numerous punctate foci of hypointense signal corresponding to air on the prior radiographs. These foci of air appear confined to the plantar soft tissues of the first distal phalanx without definite extension proximally, however MRI is insensitive for this finding.  s/p Zosyn in the ED, pt was seen by podiatry & ID in ER. (16 Dec 2020 10:52)      ---  HOSPITAL COURSE: Patient was admitted to [facility] for R hallux osteomyelitis. ID, Dr. Frederick was consulted--patient clinically stable, continue to monitor off abx to allow for better OR culture yield. Vascular surgery, Dr. Boston was consulted--no need for vascular intervention at this time. Cardio, Dr. Riley was consulted for cardiac clearance. Endocrine, Dr. C Perlman was consulted for diabetes. Podiatry, Dr. Stark was consulted. Patient underwent a R hallux amputation on 12/18. Patient seen and examined on day of discharge and is medically stable for discharge to _____ with outpatient follow up with podiatry and PCP.      ---  CONSULTANTS:   ID: Dr. LROE Frederick  Vascular: Dr. Boston  Cardio: Dr. Riley  Podiatry: Dr. Stark  Endo: Dr. Perlman    ---  TIME SPENT:  I, the attending physician, was physically present for the key portions of the evaluation and management (E/M) service provided. The total amount of time spent reviewing the hospital notes, laboratory values, imaging findings, assessing/counseling the patient, discussing with consultant physicians, social work, nursing staff was -- minutes    ---  Primary care provider was made aware of plan for discharge:      [  ] NO     [  ] YES   HPI:  59yo F with PMH of HTN, HLD, DM2 (not on insulin), peripheral vascular insufficiency s/p angioplasty of RLE (Nov 2020) sent by Podiatry, Dr. Stark for R hallux osteomyelitis. Patient states initial wound was in Oct 2020 after he had an ingrown toenail. Patient had vascular angiogram in November to improve blood flow however no improvement. Denies fever, chills, chest pain, sob, abd pain, N/V, UE/LE weakness or paresthesias.     In the ED:  VS- 98.4F, 114HR, 158/85, 18RR, O2 sat 98% on RA  Labs significant for glucose 154. CBC and CMP WNL.  ECG showed sinus tachycardia (107), no signs of acute ischemia  MRI showed Acute osteomyelitis of the right first distal phalanx. Prominent soft tissue edema about the first toe with numerous punctate foci of hypointense signal corresponding to air on the prior radiographs. These foci of air appear confined to the plantar soft tissues of the first distal phalanx without definite extension proximally, however MRI is insensitive for this finding.  s/p Zosyn in the ED, pt was seen by podiatry & ID in ER. (16 Dec 2020 10:52)      ---  HOSPITAL COURSE: Patient was admitted to [facility] for R hallux osteomyelitis. ID, Dr. Frederick was consulted--patient clinically stable, continue to monitor off abx to allow for better OR culture yield. Vascular surgery, Dr. Boston was consulted--no need for vascular intervention at this time. Cardio, Dr. Riley was consulted for cardiac clearance. Endocrine, Dr. C Perlman was consulted for diabetes. Podiatry, Dr. Stark was consulted. Patient underwent a R hallux amputation on 12/18. Zosyn was changed to IV Ivanz 1gm daily. Culture results showed ______. Pt will be discharged with [no antibiotics vs midline]. Patient seen and examined on day of discharge and is medically stable for discharge to _____ with outpatient follow up with podiatry and PCP.      T(C): 36.4 (12-21-20 @ 05:15), Max: 37.1 (12-21-20 @ 04:52)  HR: 64 (12-21-20 @ 05:15) (64 - 82)  BP: 102/58 (12-21-20 @ 05:15) (102/58 - 143/82)  RR: 20 (12-21-20 @ 05:15) (18 - 20)  SpO2: 91% (12-21-20 @ 05:15) (91% - 94%)      PHYSICAL EXAM:  GENERAL: NAD  HEENT:  anicteric, moist mucous membranes  CHEST/LUNG:  CTA b/l, no rales, wheezes, or rhonchi  HEART:  RRR, S1, S2, no murmur appreciated  ABDOMEN:  BS+, soft, nontender, nondistended  EXTREMITIES: no edema, cyanosis, or calf tenderness. Right foot wrapped and in boot, dressing c/d/i.  NERVOUS SYSTEM: answers questions and follows commands appropriately      ---  CONSULTANTS:   ID: Dr. LORE Frederick  Vascular: Dr. Boston  Cardio: Dr. Riley  Podiatry: Dr. Lincoln Person: Dr. Perlman    ---  TIME SPENT:  I, the attending physician, was physically present for the key portions of the evaluation and management (E/M) service provided. The total amount of time spent reviewing the hospital notes, laboratory values, imaging findings, assessing/counseling the patient, discussing with consultant physicians, social work, nursing staff was -- minutes    ---  Primary care provider was made aware of plan for discharge:      [  ] NO     [  ] YES   HPI:  61yo F with PMH of HTN, HLD, DM2 (not on insulin), peripheral vascular insufficiency s/p angioplasty of RLE (Nov 2020) sent by Podiatry, Dr. Stark for R hallux osteomyelitis. Patient states initial wound was in Oct 2020 after he had an ingrown toenail. Patient had vascular angiogram in November to improve blood flow however no improvement. Denies fever, chills, chest pain, sob, abd pain, N/V, UE/LE weakness or paresthesias.     In the ED:  VS- 98.4F, 114HR, 158/85, 18RR, O2 sat 98% on RA  Labs significant for glucose 154. CBC and CMP WNL.  ECG showed sinus tachycardia (107), no signs of acute ischemia  MRI showed Acute osteomyelitis of the right first distal phalanx. Prominent soft tissue edema about the first toe with numerous punctate foci of hypointense signal corresponding to air on the prior radiographs. These foci of air appear confined to the plantar soft tissues of the first distal phalanx without definite extension proximally, however MRI is insensitive for this finding.  s/p Zosyn in the ED, pt was seen by podiatry & ID in ER. (16 Dec 2020 10:52)      ---  HOSPITAL COURSE: Patient was admitted to [facility] for R hallux osteomyelitis. ID, Dr. Frederick was consulted--patient clinically stable, continue to monitor off abx to allow for better OR culture yield. Vascular surgery, Dr. Boston was consulted--no need for vascular intervention at this time. Cardio, Dr. Riley was consulted for cardiac clearance. Endocrine, Dr. C Perlman was consulted for diabetes. Podiatry, Dr. Stark was consulted. Patient underwent a R hallux amputation on 12/18. Zosyn was changed to IV Ivanz 1gm daily. Culture results showed serratia marcescens and alpha hemolytic strep, OR path report with residual OM. Pt will be discharged with PICC line for 6 weeks of IV Ivanz (12/18/2020 - 1/28/2021), will need weekly CBC/CMP and follow up with Dr. Frederick in 4-5 weeks. Patient seen and examined on day of discharge and is medically stable for discharge to _____ with outpatient follow up with podiatry and PCP.      T(C): 36.4 (12-21-20 @ 05:15), Max: 37.1 (12-21-20 @ 04:52)  HR: 64 (12-21-20 @ 05:15) (64 - 82)  BP: 102/58 (12-21-20 @ 05:15) (102/58 - 143/82)  RR: 20 (12-21-20 @ 05:15) (18 - 20)  SpO2: 91% (12-21-20 @ 05:15) (91% - 94%)      PHYSICAL EXAM:  GENERAL: NAD  HEENT:  anicteric, moist mucous membranes  CHEST/LUNG:  CTA b/l, no rales, wheezes, or rhonchi  HEART:  RRR, S1, S2, no murmur appreciated  ABDOMEN:  BS+, soft, nontender, nondistended  EXTREMITIES: no edema, cyanosis, or calf tenderness. Right foot wrapped and in boot, dressing c/d/i.  NERVOUS SYSTEM: answers questions and follows commands appropriately      ---  CONSULTANTS:   ID: Dr. LORE Frederick  Vascular: Dr. Boston  Cardio: Dr. Riley  Podiatry: Dr. Stark  Endo: Dr. Perlman    ---  TIME SPENT:  I, the attending physician, was physically present for the key portions of the evaluation and management (E/M) service provided. The total amount of time spent reviewing the hospital notes, laboratory values, imaging findings, assessing/counseling the patient, discussing with consultant physicians, social work, nursing staff was -- minutes    ---  Primary care provider was made aware of plan for discharge:      [  ] NO     [  ] YES   HPI:  59yo F with PMH of HTN, HLD, DM2 (not on insulin), peripheral vascular insufficiency s/p angioplasty of RLE (Nov 2020) sent by Podiatry, Dr. Stark for R hallux osteomyelitis. Patient states initial wound was in Oct 2020 after he had an ingrown toenail. Patient had vascular angiogram in November to improve blood flow however no improvement. Denies fever, chills, chest pain, sob, abd pain, N/V, UE/LE weakness or paresthesias.     In the ED:  VS- 98.4F, 114HR, 158/85, 18RR, O2 sat 98% on RA  Labs significant for glucose 154. CBC and CMP WNL.  ECG showed sinus tachycardia (107), no signs of acute ischemia  MRI showed Acute osteomyelitis of the right first distal phalanx. Prominent soft tissue edema about the first toe with numerous punctate foci of hypointense signal corresponding to air on the prior radiographs. These foci of air appear confined to the plantar soft tissues of the first distal phalanx without definite extension proximally, however MRI is insensitive for this finding.  s/p Zosyn in the ED, pt was seen by podiatry & ID in ER. (16 Dec 2020 10:52)      ---  HOSPITAL COURSE: Patient was admitted to [facility] for R hallux osteomyelitis. ID, Dr. Frederick was consulted--patient clinically stable, continue to monitor off abx to allow for better OR culture yield. Vascular surgery, Dr. Boston was consulted--no need for vascular intervention at this time. Cardio, Dr. Riley was consulted for cardiac clearance. Endocrine, Dr. C Perlman was consulted for diabetes. Podiatry, Dr. Stark was consulted. Patient underwent a R hallux amputation on 12/18. Zosyn was changed to IV Ivanz 1gm daily. Culture results showed serratia marcescens and alpha hemolytic strep, OR path report with residual OM. Pt will be discharged with PICC line for 6 weeks of IV Ivanz (12/18/2020 - 1/28/2021), will need weekly CBC/CMP and follow up with Dr. Frederick in 4-5 weeks. Patient seen and examined on day of discharge and is medically stable for discharge to home with outpatient follow up with podiatry and PCP.      T(C): 36.4 (12-21-20 @ 05:15), Max: 37.1 (12-21-20 @ 04:52)  HR: 64 (12-21-20 @ 05:15) (64 - 82)  BP: 102/58 (12-21-20 @ 05:15) (102/58 - 143/82)  RR: 20 (12-21-20 @ 05:15) (18 - 20)  SpO2: 91% (12-21-20 @ 05:15) (91% - 94%)      PHYSICAL EXAM:  GENERAL: NAD  HEENT:  anicteric, moist mucous membranes  CHEST/LUNG:  CTA b/l, no rales, wheezes, or rhonchi  HEART:  RRR, S1, S2, no murmur appreciated  ABDOMEN:  BS+, soft, nontender, nondistended  EXTREMITIES: no edema, cyanosis, or calf tenderness. Right foot wrapped and in boot, dressing c/d/i.  NERVOUS SYSTEM: answers questions and follows commands appropriately      ---  CONSULTANTS:   ID: Dr. LORE Frederick  Vascular: Dr. Boston  Cardio: Dr. Riley  Podiatry: Dr. Stark  Endo: Dr. Perlman    ---  TIME SPENT:  I, the attending physician, was physically present for the key portions of the evaluation and management (E/M) service provided. The total amount of time spent reviewing the hospital notes, laboratory values, imaging findings, assessing/counseling the patient, discussing with consultant physicians, social work, nursing staff was -- minutes    ---  Primary care provider was made aware of plan for discharge:      [  ] NO     [  ] YES   HPI:  59yo F with PMH of HTN, HLD, DM2 (not on insulin), peripheral vascular insufficiency s/p angioplasty of RLE (Nov 2020) sent by Podiatry, Dr. Stark for R hallux osteomyelitis. Patient states initial wound was in Oct 2020 after he had an ingrown toenail. Patient had vascular angiogram in November to improve blood flow however no improvement. Denies fever, chills, chest pain, sob, abd pain, N/V, UE/LE weakness or paresthesias.     In the ED:  VS- 98.4F, 114HR, 158/85, 18RR, O2 sat 98% on RA  Labs significant for glucose 154. CBC and CMP WNL.  ECG showed sinus tachycardia (107), no signs of acute ischemia  MRI showed Acute osteomyelitis of the right first distal phalanx. Prominent soft tissue edema about the first toe with numerous punctate foci of hypointense signal corresponding to air on the prior radiographs. These foci of air appear confined to the plantar soft tissues of the first distal phalanx without definite extension proximally, however MRI is insensitive for this finding.  s/p Zosyn in the ED, pt was seen by podiatry & ID in ER. (16 Dec 2020 10:52)      ---  HOSPITAL COURSE: Patient was admitted to [facility] for R hallux osteomyelitis. ID, Dr. Frederick was consulted--patient clinically stable, continue to monitor off abx to allow for better OR culture yield. Vascular surgery, Dr. Boston was consulted--no need for vascular intervention at this time. Cardio, Dr. Riley was consulted for cardiac clearance. Endocrine, Dr. C Perlman was consulted for diabetes. Podiatry, Dr. Stark was consulted. Patient underwent a R hallux amputation on 12/18. Zosyn was changed to IV Ivanz 1gm daily. Culture results showed serratia marcescens and alpha hemolytic strep, OR path report with residual OM. Pt will be discharged with PICC line for 6 weeks of IV Ivanz (12/18/2020 - 1/28/2021), will need weekly CBC/CMP and follow up with Dr. Frederick in 4-5 weeks. Patient seen and examined on day of discharge and is medically stable for discharge to home with outpatient follow up with podiatry and PCP. Home meds resumed, pt MUST follow up with Podiatry for wound Care, dressing care &  Dr C perlman as out pt for Management of DM & may need to start Lantus. PT eval  done,Pt to use post op surgical shoe.    CONSULTANTS:   ID: Dr. LORE Frederick  Vascular: Dr. Boston  Cardio: Dr. Riley  Podiatry: Dr. Stark  Endo: Dr. Perlman    ---  TIME SPENT:  I, the attending physician, was physically present for the key portions of the evaluation and management (E/M) service provided. The total amount of time spent reviewing the hospital notes, laboratory values, imaging findings, assessing/counseling the patient, discussing with consultant physicians, social work, nursing staff was 45 minutes    ---  Primary care provider was made aware of plan for discharge:      [  ] NO     [  x] YES

## 2020-12-18 NOTE — PROGRESS NOTE ADULT - PROBLEM SELECTOR PLAN 1
cont lantus 10 units qhs  cont admelog mod dose scale coverage q6hrs while npo  goal bg 100-180 in hosp setting

## 2020-12-18 NOTE — BRIEF OPERATIVE NOTE - NSICDXBRIEFPOSTOP_GEN_ALL_CORE_FT
POST-OP DIAGNOSIS:  Toe osteomyelitis 18-Dec-2020 11:24:41  Be Colvin  Gangrene of right foot 18-Dec-2020 11:24:04  Be Colvin

## 2020-12-18 NOTE — PROGRESS NOTE ADULT - SUBJECTIVE AND OBJECTIVE BOX
CAPILLARY BLOOD GLUCOSE      POCT Blood Glucose.: 150 mg/dL (18 Dec 2020 06:16)  POCT Blood Glucose.: 188 mg/dL (17 Dec 2020 21:23)  POCT Blood Glucose.: 163 mg/dL (17 Dec 2020 17:52)  POCT Blood Glucose.: 283 mg/dL (17 Dec 2020 11:25)      Vital Signs Last 24 Hrs  T(C): 36.8 (18 Dec 2020 04:25), Max: 36.9 (17 Dec 2020 10:21)  T(F): 98.2 (18 Dec 2020 04:25), Max: 98.4 (17 Dec 2020 10:21)  HR: 76 (18 Dec 2020 04:25) (76 - 104)  BP: 110/70 (18 Dec 2020 04:25) (110/70 - 133/77)  BP(mean): --  RR: 18 (18 Dec 2020 04:25) (18 - 19)  SpO2: 95% (18 Dec 2020 04:25) (95% - 97%)    General: WN/WD NAD  Respiratory: CTA B/L  CV: RRR, S1S2, no murmurs, rubs or gallops  Abdominal: Soft, NT, ND +BS, Last BM  Extremities: le foot dsg intact     12-18    140  |  105  |  24<H>  ----------------------------<  165<H>  3.8   |  30  |  1.10    Ca    8.7      18 Dec 2020 00:42    TPro  6.8  /  Alb  3.5  /  TBili  0.3  /  DBili  x   /  AST  10<L>  /  ALT  24  /  AlkPhos  61  12-18      atorvastatin 40 milliGRAM(s) Oral at bedtime  dextrose 40% Gel 15 Gram(s) Oral once  dextrose 50% Injectable 25 Gram(s) IV Push once  dextrose 50% Injectable 12.5 Gram(s) IV Push once  dextrose 50% Injectable 25 Gram(s) IV Push once  glucagon  Injectable 1 milliGRAM(s) IntraMuscular once  insulin glargine Injectable (LANTUS) 10 Unit(s) SubCutaneous at bedtime  insulin lispro (ADMELOG) corrective regimen sliding scale   SubCutaneous every 6 hours

## 2020-12-18 NOTE — PROGRESS NOTE ADULT - ATTENDING COMMENTS
Pt seen, examined, Case & care plan d/w pt, residents at detail.  Am Labs   D/W PMD - DR Dannie Person-Dr Perlman

## 2020-12-18 NOTE — DISCHARGE NOTE PROVIDER - NSDCMRMEDTOKEN_GEN_ALL_CORE_FT
atorvastatin 40 mg oral tablet: 1 tab(s) orally once a day  cilostazol 100 mg oral tablet: 1 tab(s) orally 2 times a day  clopidogrel 75 mg oral tablet: 1 tab(s) orally once a day  doxycycline hyclate 100 mg oral capsule: 1 cap(s) orally every 12 hours  Invokana 300 mg oral tablet: 1 tab(s) orally once a day  Januvia 100 mg oral tablet: 1 tab(s) orally once a day  K-Phos No. 2 oral tablet: orally 2 times a day  melatonin 3 mg oral tablet: 1 tab(s) orally once a day (at bedtime)  metFORMIN 1000 mg oral tablet: 1 tab(s) orally 2 times a day  Metoprolol Succinate ER 25 mg oral tablet, extended release: 1 tab(s) orally once a day  MiraLax oral powder for reconstitution: 7 gram(s) orally once a day  Mycostatin 100,000 units/mL oral suspension: 4 milliliter(s) orally 2 times a day  Norvasc 10 mg oral tablet: 1 tab(s) orally once a day  quinapril 40 mg oral tablet: 1 tab(s) orally once a day   atorvastatin 40 mg oral tablet: 1 tab(s) orally once a day  cilostazol 100 mg oral tablet: 1 tab(s) orally 2 times a day  clopidogrel 75 mg oral tablet: 1 tab(s) orally once a day  doxycycline hyclate 100 mg oral capsule: 1 cap(s) orally every 12 hours  ertapenem 1 g injection: 1 gram(s) intravenously every 24 hours     Ertapenem 1g IV Q24 - 6 week course from OR date 12/18 - end 1/28/2021  Weekly labs CBC/CMP while on IV antibiotics - fax  results to [phone]  Pull PICC line after last dose on 1/28/2021  Invokana 300 mg oral tablet: 1 tab(s) orally once a day  Januvia 100 mg oral tablet: 1 tab(s) orally once a day  K-Phos No. 2 oral tablet: orally 2 times a day  melatonin 3 mg oral tablet: 1 tab(s) orally once a day (at bedtime)  metFORMIN 1000 mg oral tablet: 1 tab(s) orally 2 times a day  Metoprolol Succinate ER 25 mg oral tablet, extended release: 1 tab(s) orally once a day  MiraLax oral powder for reconstitution: 7 gram(s) orally once a day  Mycostatin 100,000 units/mL oral suspension: 4 milliliter(s) orally 2 times a day  Norvasc 10 mg oral tablet: 1 tab(s) orally once a day  quinapril 40 mg oral tablet: 1 tab(s) orally once a day   atorvastatin 40 mg oral tablet: 1 tab(s) orally once a day  cilostazol 100 mg oral tablet: 1 tab(s) orally 2 times a day  clopidogrel 75 mg oral tablet: 1 tab(s) orally once a day  ertapenem 1 g injection: 1 gram(s) intravenously every 24 hours     Ertapenem 1g IV Q24 - 6 week course from OR date 12/18 - end 1/28/2021  Weekly labs CBC/CMP while on IV antibiotics - fax  results to [phone]  [facility] PICC line after last dose on 1/28/2021  Invokana 300 mg oral tablet: 1 tab(s) orally once a day  Januvia 100 mg oral tablet: 1 tab(s) orally once a day  K-Phos No. 2 oral tablet: orally 2 times a day  melatonin 3 mg oral tablet: 1 tab(s) orally once a day (at bedtime)  metFORMIN 1000 mg oral tablet: 1 tab(s) orally 2 times a day  Metoprolol Succinate ER 25 mg oral tablet, extended release: 1 tab(s) orally once a day  Norvasc 10 mg oral tablet: 1 tab(s) orally once a day  quinapril 40 mg oral tablet: 1 tab(s) orally once a day  traMADol 50 mg oral tablet: 1 tab(s) orally every 6 hours   atorvastatin 40 mg oral tablet: 1 tab(s) orally once a day  cilostazol 100 mg oral tablet: 1 tab(s) orally 2 times a day  clopidogrel 75 mg oral tablet: 1 tab(s) orally once a day  ertapenem 1 g injection: 1 gram(s) intravenously every 24 hours     Ertapenem 1g IV Q24 - 6 week course from OR date 12/18 - end 1/28/2021  Weekly labs CBC/CMP while on IV antibiotics - fax  results to [phone]  [facility] PICC line after last dose on 1/28/2021  Invokana 300 mg oral tablet: 1 tab(s) orally once a day  Januvia 100 mg oral tablet: 1 tab(s) orally once a day  melatonin 3 mg oral tablet: 1 tab(s) orally once a day (at bedtime)  metFORMIN 1000 mg oral tablet: 1 tab(s) orally 2 times a day  Metoprolol Succinate ER 25 mg oral tablet, extended release: 1 tab(s) orally once a day  Norvasc 10 mg oral tablet: 1 tab(s) orally once a day  quinapril 40 mg oral tablet: 1 tab(s) orally once a day  traMADol 50 mg oral tablet: 1 tab(s) orally every 6 hours, As Needed MDD:4    atorvastatin 40 mg oral tablet: 1 tab(s) orally once a day  cilostazol 100 mg oral tablet: 1 tab(s) orally 2 times a day  clopidogrel 75 mg oral tablet: 1 tab(s) orally once a day  ertapenem 1 g injection: 1 gram(s) intravenously every 24 hours     Ertapenem 1g IV Q24 - 6 week course from OR date 12/18 - end 1/28/2021  Weekly labs CBC/CMP while on IV antibiotics - fax  results to [phone]  [facility] PICC line after last dose on 1/28/2021  Invokana 300 mg oral tablet: 1 tab(s) orally once a day  Januvia 100 mg oral tablet: 1 tab(s) orally once a day  melatonin 3 mg oral tablet: 1 tab(s) orally once a day (at bedtime)  metFORMIN 1000 mg oral tablet: 1 tab(s) orally 2 times a day  Metoprolol Succinate ER 25 mg oral tablet, extended release: 1 tab(s) orally once a day  quinapril 40 mg oral tablet: 1 tab(s) orally once a day  traMADol 50 mg oral tablet: 1 tab(s) orally every 6 hours, As Needed MDD:4

## 2020-12-18 NOTE — DISCHARGE NOTE PROVIDER - NSDCFUADDAPPT_GEN_ALL_CORE_FT
Weekly labwork: CBC and CMP. Fax results to [phone] Please get weekly labwork at your primary care doctor's office: CBC and CMP. Fax results to [phone] Please get weekly lab work at your primary care doctor's office: CBC and CMP. Fax results to [phone]

## 2020-12-18 NOTE — PROGRESS NOTE ADULT - PROBLEM SELECTOR PLAN 1
Pt evaluated and chart reviewed  Pt seen s/p right partial 1st ray amputation  Dressing clean, dry and intact.  Dressing left in place    Podiatry will continue to follow while in house

## 2020-12-18 NOTE — PROGRESS NOTE ADULT - ATTENDING COMMENTS
Efren Frederick M.D.  [facility], Division of Infectious Diseases  [phone]  After 5pm on weekdays and all day on weekends - please call [phone]

## 2020-12-18 NOTE — DISCHARGE NOTE PROVIDER - CARE PROVIDERS DIRECT ADDRESSES
,DirectAddress_Unknown,[email or web address] ,DirectAddress_Unknown,[email or web address],DirectAddress_Unknown ,DirectAddress_Unknown,[email or web address],DirectAddress_Unknown,DirectAddress_Unknown ,DirectAddress_Unknown,DirectAddress_Unknown,DirectAddress_Unknown ,DirectAddress_Unknown,DirectAddress_Unknown,DirectAddress_Unknown,DirectAddress_Unknown

## 2020-12-18 NOTE — PROGRESS NOTE ADULT - SUBJECTIVE AND OBJECTIVE BOX
[facility] -- Sami Golden, Ngoc, Vlad, Otoniel Bliss, Irma Riley: Office # [phone]    Follow Up:   cardiac optimization pre/post procedure     Subjective/Observations: Patient seen and examined. Patient awake and alert, resting comfortably in bed. No complaints of chest pain, SOB, LE edema, cough. No signs of orthopnea or PND. Right foot DSD intact.     REVIEW OF SYSTEMS: All review of systems is negative for eye, ENT, GI, , allergic, dermatologic, musculoskeletal and neurologic except as described above    PAST MEDICAL & SURGICAL HISTORY:  Peripheral vascular disease  S/p Angioplasty rt lower EXT Nov 2020  Hyperlipidemia  Diabetes  HTN (hypertension)  H/O angioplasty RLE    MEDICATIONS  (STANDING):  atorvastatin 40 milliGRAM(s) Oral at bedtime  dextrose 40% Gel 15 Gram(s) Oral once  dextrose 5%. 1000 milliLiter(s) (50 mL/Hr) IV Continuous <Continuous>  dextrose 5%. 1000 milliLiter(s) (100 mL/Hr) IV Continuous <Continuous>  dextrose 50% Injectable 25 Gram(s) IV Push once  dextrose 50% Injectable 12.5 Gram(s) IV Push once  dextrose 50% Injectable 25 Gram(s) IV Push once  glucagon  Injectable 1 milliGRAM(s) IntraMuscular once  insulin glargine Injectable (LANTUS) 10 Unit(s) SubCutaneous at bedtime  insulin lispro (ADMELOG) corrective regimen sliding scale   SubCutaneous every 6 hours  lisinopril 40 milliGRAM(s) Oral daily  metoprolol succinate ER 25 milliGRAM(s) Oral daily    MEDICATIONS  (PRN):    Allergies  No Known Allergies    Vital Signs Last 24 Hrs  T(C): 36.8 (18 Dec 2020 04:25), Max: 36.9 (17 Dec 2020 10:21)  T(F): 98.2 (18 Dec 2020 04:25), Max: 98.4 (17 Dec 2020 10:21)  HR: 76 (18 Dec 2020 04:25) (76 - 104)  BP: 110/70 (18 Dec 2020 04:25) (110/70 - 133/77)  BP(mean): --  RR: 18 (18 Dec 2020 04:25) (18 - 19)  SpO2: 95% (18 Dec 2020 04:25) (95% - 97%)  I&O's Summary      TELE: Not on telemetry   PHYSICAL EXAM:  Appearance: NAD, no distress, alert, Well developed   HEENT: Moist Mucous Membranes, Anicteric  Cardiovascular: Regular rate and rhythm, Normal S1 S2, No JVD, No murmurs, No rubs, gallops or clicks  Respiratory: Non-labored, Clear to auscultation, No rales, No rhonchi, No wheezing.   Gastrointestinal:  Soft, Non-tender, + BS  Neurologic: Non-focal  Skin: Warm and dry, Right foot DSD intact. No ecchymosis, No cyanosis  Musculoskeletal: No clubbing, No cyanosis, No joint swelling/tenderness  Psychiatry: Mood & affect appropriate  Lymph: No peripheral edema.     LABS: All Labs Reviewed:                        13.5   10.00 )-----------( 324      ( 18 Dec 2020 07:54 )             40.6                         12.7   8.82  )-----------( 297      ( 18 Dec 2020 00:42 )             38.7                         13.5   8.66  )-----------( 319      ( 17 Dec 2020 07:17 )             40.8     18 Dec 2020 07:54    141    |  107    |  19     ----------------------------<  167    4.1     |  28     |  0.78   18 Dec 2020 00:42    140    |  105    |  24     ----------------------------<  165    3.8     |  30     |  1.10   17 Dec 2020 07:17    139    |  105    |  22     ----------------------------<  188    4.4     |  28     |  0.94     Ca    8.6        18 Dec 2020 07:54  Ca    8.7        18 Dec 2020 00:42  Ca    9.1        17 Dec 2020 07:17    TPro  6.8    /  Alb  3.5    /  TBili  0.3    /  DBili  x      /  AST  10     /  ALT  24     /  AlkPhos  61     18 Dec 2020 00:42  TPro  7.3    /  Alb  3.6    /  TBili  0.4    /  DBili  x      /  AST  12     /  ALT  26     /  AlkPhos  71     17 Dec 2020 07:17  TPro  8.0    /  Alb  3.9    /  TBili  0.5    /  DBili  x      /  AST  18     /  ALT  30     /  AlkPhos  75     16 Dec 2020 10:11  PT/INR - ( 18 Dec 2020 07:54 )   PT: 11.8 sec;   INR: 1.01 ratio      12 Lead ECG:   Ventricular Rate 107 BPM  Atrial Rate 107 BPM  P-R Interval 130 ms  QRS Duration 82 ms  Q-T Interval 348 ms  QTC Calculation(Bazett) 464 ms  P Axis -2 degrees  R Axis -19 degrees  T Axis 19 degrees  Diagnosis Line Sinus tachycardia  Confirmed by CATHERINE BLISS ([id number]) on 12/16/2020 12:46:27 PM (12-16-20 @ 09:37)

## 2020-12-18 NOTE — DISCHARGE NOTE PROVIDER - NSDCFUADDINST_GEN_ALL_CORE_FT
Follow up with your primary care doctor within 1 week. *Call Dr. Perlman tomorrow 12/22 or Wednesday 12/23 to discuss insulin  *Podiatry follow up should be within 5 days (not 1 week).  *Follow up with Dr. Frederick (ID) in 4-5 weeks    Follow up with your primary care doctor within 1 week. *Call Dr. Perlman tomorrow 12/22 or Wednesday 12/23 to discuss insulin  *Podiatry Dr Joe Stark  follow up should be within 2-3 days (not 1 week).  *Follow up with Dr. Dannie Frederick (ID) in 1-2weeks    Follow up with your primary care doctor within 1 week.

## 2020-12-18 NOTE — DISCHARGE NOTE PROVIDER - CARE PROVIDER_API CALL
Joe Stark (DPM)  [facility]  [address] [place] [address]  Phone: [phone]  Fax: [phone]  Follow Up Time: 1 week    Hawk Gomez  PODIATRIC MEDICINE AND SURGERY  [address] [place] [address]  Phone: [phone]  Fax: [phone]  Follow Up Time: 1 week   Joe Stark (DPM)  [facility]  [address] [place] [address]  Phone: [phone]  Fax: [phone]  Follow Up Time: 1 week    Hawk Gomez  PODIATRIC MEDICINE AND SURGERY  [address] [place] [address]  Phone: [phone]  Fax: [phone]  Follow Up Time: 1 week    Efren Frederick)  Internal Medicine  [address] [place] [address], [place] [address]  Phone: [phone]  Fax: [phone]  Follow Up Time: 1 month   Joe Stark (DPM)  [facility]  [address] [place] [address]  Phone: [phone]  Fax: [phone]  Follow Up Time:     Hawk Gomez  PODIATRIC MEDICINE AND SURGERY  [address] [place] [address]  Phone: [phone]  Fax: [phone]  Follow Up Time:     Efren Frederick)  Internal Medicine  [address] [place] [address], [address]  [place] [address]  Phone: [phone]  Fax: [phone]  Follow Up Time: 1 month    Perlman, Craig D  [address]  [place] [address]  Phone: [phone]  Fax: [phone]  Follow Up Time: 1-3 days   Joe Stark (DPM)  [facility]  [address] [place] [address]  Phone: [phone]  Fax: [phone]  Follow Up Time:     Efren Frederick)  Internal Medicine  [address] [place] [address], [address]  [place] [address]  Phone: [phone]  Fax: [phone]  Follow Up Time: 1 month    Perlman, Craig D  [address], [address]  [place] [address]  Phone: [phone]  Fax: [phone]  Follow Up Time: 1-3 days   Joe Stark (DPM)  [facility]  [address] [place] [address]  Phone: [phone]  Fax: [phone]  Follow Up Time:     Efren Frederick)  Internal Medicine  [address] [place] [address], [address]  [place] [address]  Phone: [phone]  Fax: [phone]  Follow Up Time: 1 month    Perlman, Craig D  [address], [address]  [place] [address]  Phone: [phone]  Fax: [phone]  Follow Up Time: 1-3 days    Dannie Frederick  [address] [address]  Phone: [phone]  Fax: [phone]  Follow Up Time:

## 2020-12-18 NOTE — PROGRESS NOTE ADULT - SUBJECTIVE AND OBJECTIVE BOX
[facility], Division of Infectious Diseases  OFELIA Harvey Y. Patel, S. Shah  [phone]  ([phone] - weekdays after 5pm and weekends)    Name: LOIDA QUEZADA  Age/Gender: 60y Male  MRN: [id number]    Interval History:  Patient went to the OR for amputation today, no new complaints.   Denies fever, chills, chest pain, dyspnea, cough, abd pain, n/v/d.  ROS reviewed, pertinent positives and negatives as above.   Notes reviewed. Afebrile    Objective:  Vitals:   T(F): 98.1 (12-18-20 @ 13:03), Max: 98.8 (12-18-20 @ 09:12)  HR: 88 (12-18-20 @ 13:03) (76 - 111)  BP: 103/68 (12-18-20 @ 13:03) (102/60 - 133/77)  RR: 14 (12-18-20 @ 13:03) (14 - 19)  SpO2: 97% (12-18-20 @ 13:03) (94% - 99%)    Physical Examination:  General: no acute distress  HEENT: NC/AT, EOMI, anicteric, neck supple  Cardio: S1, S2 heard, RRR, no murmurs  Resp: CTA bilaterally, no rales/wheezes/rhonchi  Abd: soft, NT, ND, + BS  Neuro: AAOx3, no obvious focal deficits  Ext: RLE in dressing, no edema, moving extremities  Skin: warm, dry, no visible rash  Psych: appropriate affect and mood for situation  Lines: PIV    Laboratory Studies:  CBC:                       13.5   10.00 )-----------( 324      ( 18 Dec 2020 07:54 )             40.6     CMP: 12-18    141  |  107  |  19  ----------------------------<  167<H>  4.1   |  28  |  0.78    Ca    8.6      18 Dec 2020 07:54    TPro  6.8  /  Alb  3.5  /  TBili  0.3  /  DBili  x   /  AST  10<L>  /  ALT  24  /  AlkPhos  61  12-18    LIVER FUNCTIONS - ( 18 Dec 2020 00:42 )  Alb: 3.5 g/dL / Pro: 6.8 g/dL / ALK PHOS: 61 U/L / ALT: 24 U/L / AST: 10 U/L / GGT: x               Microbiology:  12/16 - R hallux wound culture - moderate Serratia marcescens, normal skin qi  12/16 - COVID-19 ab - negative  12/16 - COVID-19 PCR - negative    Radiology:  Xray Foot AP + Lateral + Oblique, Right (12.18.20 @ 12:18) > Impression: Satisfactory postoperative appearance right foot  MR Foot No Cont, Right (12.16.20 @ 11:58) >Impression: Acute osteomyelitis of the right first distal phalanx. Prominent soft tissue edema about the first toe with numerous punctate foci of hypointense signal corresponding to air on the prior radiographs. These foci of air appear confined to the plantar soft tissues of the first distal phalanx without definite extension proximally, however MRI is insensitive for this finding.  Xray Foot AP + Lateral + Oblique, Right (12.16.20 @ 10:06) >IMPRESSION: Osteomyelitis of the great toe. Arterial calcification.  Xray Chest 2 Views PA/Lat (12.16.20 @ 10:05) >IMPRESSION: Negative chest.    Medications:  MEDICATIONS  (STANDING):  atorvastatin 40 milliGRAM(s) Oral at bedtime  dextrose 40% Gel 15 Gram(s) Oral once  dextrose 50% Injectable 25 Gram(s) IV Push once  insulin glargine Injectable (LANTUS) 10 Unit(s) SubCutaneous at bedtime  insulin lispro (ADMELOG) corrective regimen sliding scale   SubCutaneous every 6 hours  lisinopril 40 milliGRAM(s) Oral daily  metoprolol succinate ER 25 milliGRAM(s) Oral daily    Antimicrobials:  s/p pip-tazo x1 in ER

## 2020-12-18 NOTE — DISCHARGE NOTE PROVIDER - PROVIDER TOKENS
PROVIDER:[TOKEN:[[id number]],FOLLOWUP:[1 week]],PROVIDER:[TOKEN:[[id number]],FOLLOWUP:[1 week]] PROVIDER:[TOKEN:[[id number]],FOLLOWUP:[1 week]],PROVIDER:[TOKEN:[[id number]],FOLLOWUP:[1 week]],PROVIDER:[TOKEN:[[id number]],FOLLOWUP:[1 month]] PROVIDER:[TOKEN:[[id number]]],PROVIDER:[TOKEN:[[id number]]],PROVIDER:[TOKEN:[[id number]],FOLLOWUP:[1 month]],PROVIDER:[TOKEN:[[id number]],FOLLOWUP:[1-3 days]] PROVIDER:[TOKEN:[[id number]]],PROVIDER:[TOKEN:[[id number]],FOLLOWUP:[1 month]],PROVIDER:[TOKEN:[[id number]],FOLLOWUP:[1-3 days]] PROVIDER:[TOKEN:[[id number]]],PROVIDER:[TOKEN:[[id number]],FOLLOWUP:[1 month]],PROVIDER:[TOKEN:[[id number]],FOLLOWUP:[1-3 days]],PROVIDER:[TOKEN:[[id number]]]

## 2020-12-18 NOTE — PHYSICAL THERAPY INITIAL EVALUATION ADULT - RANGE OF MOTION EXAMINATION, REHAB EVAL
R foot wrapped with ace bandage and gauze; dry and intact/no ROM deficits were identified/deficits as listed below

## 2020-12-18 NOTE — PROGRESS NOTE ADULT - PROBLEM SELECTOR PLAN 4
Chronic, not on insulin  -A1C 8.4  -hold home Januvia, canagliflozin, and metformin in hospital setting  -Low dose ISS, Accu-Cheks q ac/hs  -Nutrition consult Chronic, not on insulin  -A1C 8.4  -hold home Januvia, canagliflozin, and metformin in hospital setting  -Low dose ISS, Accu-Cheks q ac/hs  -Nutrition consult  -DR C perlman -Lantus 10 u Q HS

## 2020-12-18 NOTE — PROGRESS NOTE ADULT - PROBLEM SELECTOR PLAN 2
Chronic  -home quinapril therapeutic interchange with lisinopril  -continue to monitor routine hemodynamics Chronic  -home quinapril therapeutic interchange with lisinopril, Toprol XL daily  -continue to monitor routine hemodynamics

## 2020-12-18 NOTE — PROGRESS NOTE ADULT - SUBJECTIVE AND OBJECTIVE BOX
Patient is a 60y old  Male who presents with a chief complaint of Osteomyelitis (18 Dec 2020 07:51)    HPI:  59yo F with PMH of HTN, HLD, DM2 (not on insulin), peripheral vascular insufficiency s/p angioplasty of RLE (Nov 2020) sent by Podiatry, Dr. Stark for R hallux osteomyelitis. Patient states initial wound was in Oct 2020 after he had an ingrown toenail. Patient had vascular angiogram in November to improve blood flow however no improvement. Denies fever, chills, chest pain, sob, abd pain, N/V, UE/LE weakness or paresthesias.     In the ED:  VS- 98.4F, 114HR, 158/85, 18RR, O2 sat 98% on RA  Labs significant for glucose 154. CBC and CMP WNL.  ECG showed sinus tachycardia (107), no signs of acute ischemia  MRI showed Acute osteomyelitis of the right first distal phalanx. Prominent soft tissue edema about the first toe with numerous punctate foci of hypointense signal corresponding to air on the prior radiographs. These foci of air appear confined to the plantar soft tissues of the first distal phalanx without definite extension proximally, however MRI is insensitive for this finding.  s/p Zosyn in the ED, pt was seen by podiatry & ID in ER. (16 Dec 2020 10:52)    INTERVAL HPI: 12/17: Patient seen and examined at bedside. No events overnight. Scheduled for R hallux amputation tomorrow. Patient feels well and denies any complaints this AM. NO Abx as per ID.    12/18: Patient seen and examined at bedside. Scheduled for R hallux amputation today. Patient notes feeling mildly anxious before surgery. Otherwise feels well and denies any complaints. No HA, dizziness, CP, SOB, n/v, or abdominal pain.       OVERNIGHT EVENTS: NONE    Home Medications:  atorvastatin 40 mg oral tablet: 1 tab(s) orally once a day (16 Dec 2020 16:36)  cilostazol 100 mg oral tablet: 1 tab(s) orally 2 times a day (16 Dec 2020 16:36)  clopidogrel 75 mg oral tablet: 1 tab(s) orally once a day (16 Dec 2020 16:36)  doxycycline hyclate 100 mg oral capsule: 1 cap(s) orally every 12 hours (16 Dec 2020 16:36)  Invokana 300 mg oral tablet: 1 tab(s) orally once a day (16 Dec 2020 16:36)  Januvia 100 mg oral tablet: 1 tab(s) orally once a day (16 Dec 2020 16:36)  K-Phos No. 2 oral tablet: orally 2 times a day (16 Dec 2020 16:36)  melatonin 3 mg oral tablet: 1 tab(s) orally once a day (at bedtime) (16 Dec 2020 16:36)  metFORMIN 1000 mg oral tablet: 1 tab(s) orally 2 times a day (16 Dec 2020 16:36)  Metoprolol Succinate ER 25 mg oral tablet, extended release: 1 tab(s) orally once a day (16 Dec 2020 16:36)  MiraLax oral powder for reconstitution: 7 gram(s) orally once a day (16 Dec 2020 16:36)  Mycostatin 100,000 units/mL oral suspension: 4 milliliter(s) orally 2 times a day (16 Dec 2020 16:36)  Norvasc 10 mg oral tablet: 1 tab(s) orally once a day (16 Dec 2020 16:36)  quinapril 40 mg oral tablet: 1 tab(s) orally once a day (16 Dec 2020 16:36)      MEDICATIONS  (STANDING):  atorvastatin 40 milliGRAM(s) Oral at bedtime  dextrose 40% Gel 15 Gram(s) Oral once  dextrose 5%. 1000 milliLiter(s) (50 mL/Hr) IV Continuous <Continuous>  dextrose 5%. 1000 milliLiter(s) (100 mL/Hr) IV Continuous <Continuous>  dextrose 50% Injectable 25 Gram(s) IV Push once  dextrose 50% Injectable 12.5 Gram(s) IV Push once  dextrose 50% Injectable 25 Gram(s) IV Push once  glucagon  Injectable 1 milliGRAM(s) IntraMuscular once  insulin glargine Injectable (LANTUS) 10 Unit(s) SubCutaneous at bedtime  insulin lispro (ADMELOG) corrective regimen sliding scale   SubCutaneous every 6 hours  lisinopril 40 milliGRAM(s) Oral daily  metoprolol succinate ER 25 milliGRAM(s) Oral daily    MEDICATIONS  (PRN):      Allergies    No Known Allergies    Intolerances        Social History:  Denies any tobacco or illicit drug use. 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 No nausea, No vomiting; No diarrhea, No constipation. [  ] BM  GENITOURINARY: No dysuria, No frequency, No urgency, No hematuria, NO incontinence  NEUROLOGICAL: No headaches, No dizziness, No numbness, No tingling, No tremors, No weakness  EXT: No Swelling, No Pain, No Edema  SKIN:  [ x ] No itching, burning, rashes, or lesions   MUSCULOSKELETAL: No joint pain ,No Jt swelling; No muscle pain, No back pain, No extremity pain  PSYCHIATRIC: No depression,  No anxiety,  No mood swings ,No difficulty sleeping at night  PAIN SCALE: [ x ] None  [  ] Other-  REST OF REVIEW Of SYSTEM - [ x ] Normal     Vital Signs Last 24 Hrs  T(C): 36.8 (18 Dec 2020 04:25), Max: 36.9 (17 Dec 2020 10:21)  T(F): 98.2 (18 Dec 2020 04:25), Max: 98.4 (17 Dec 2020 10:21)  HR: 76 (18 Dec 2020 04:25) (76 - 104)  BP: 110/70 (18 Dec 2020 04:25) (110/70 - 133/77)  BP(mean): --  RR: 18 (18 Dec 2020 04:25) (18 - 19)  SpO2: 95% (18 Dec 2020 04:25) (95% - 97%)  Finger Stick          PHYSICAL EXAM:  GENERAL:  [ x ] NAD , [x  ] well appearing, [  ] Agitated, [  ] Drowsy,  [  ] Lethargy, [  ] confused   HEAD:  [ x ] Normal, [  ] Other  EYES:  [ x ] EOMI, [ x ] PERRLA, [ x ] conjunctiva and sclera clear normal, [  ] Other,  [  ] Pallor,[  ] Discharge  ENMT:  [x  ] Normal, [ x ] Moist mucous membranes, [ x ] Good dentition, [ x ] No Thrush  NECK:  [ x ] Supple, [ x ] No JVD, [ x ] Normal thyroid, [  ] Lymphadenopathy [  ] Other  CHEST/LUNG:  [ x ] Clear to auscultation bilaterally, [ x ] Breath Sounds equal B/L / Decrease, [  ] poor effort  [x  ] No rales, [x  ] No rhonchi  [ x ]  No wheezing,   HEART:  [ x ] Regular rate and rhythm, [  ] tachycardia, [  ] Bradycardia,  [  ] irregular  [ x ] No murmurs, No rubs, No gallops, [  ] PPM in place (Mfr:  )  ABDOMEN:  [x  ] Soft, [x  ] Nontender, [ x ] Nondistended, [x  ] No mass, [ x ] Bowel sounds present, [  ] obese  NERVOUS SYSTEM:  [x  ] Alert & Oriented X3, [ x ] Nonfocal  [  ] Confusion  [  ] Encephalopathic [  ] Sedated [  ] Unable to assess, [  ] Dementia [  ] Other-  EXTREMITIES: [x  ] 2+ Peripheral Pulses, No clubbing, No cyanosis,  [  ] edema B/L lower EXT. [  ] PVD stasis skin changes B/L Lower EXT, [ x ] RLE in ACE dressing -Big toe necrotic   LYMPH: No lymphadenopathy noted  SKIN:  [ x ] No rashes or lesions, [  ] Pressure Ulcers, [  ] ecchymosis, [  ] Skin Tears, [ x ] Positive necrotic R hallux ulcer      DIET: Diet, NPO after Midnight:      NPO Start Date: 17-Dec-2020,   NPO Start Time: 23:59  Except Medications (12-17-20 @ 16:23)      LABS:                        13.5   10.00 )-----------( 324      ( 18 Dec 2020 07:54 )             40.6     18 Dec 2020 07:54    141    |  107    |  19     ----------------------------<  167    4.1     |  28     |  0.78     Ca    8.6        18 Dec 2020 07:54    TPro  6.8    /  Alb  3.5    /  TBili  0.3    /  DBili  x      /  AST  10     /  ALT  24     /  AlkPhos  61     18 Dec 2020 00:42          Culture Results:   Moderate Serratia marcescens  Normal skin qi isolated (12-16 @ 15:58)      culture blood  -- .Other BERYL fontana 12-16 @ 15:58    culture urine  --  12-16 @ 15:58              Culture - Other (collected 16 Dec 2020 15:58)  Source: .Emmett fontana  Preliminary Report (17 Dec 2020 18:01):    Moderate Serratia marcescens    Normal skin qi isolated         Anemia Panel:      Thyroid Panel:                RADIOLOGY & ADDITIONAL TESTS:      HEALTH ISSUES - PROBLEM Dx:  Diabetes mellitus type 2, uncontrolled  Diabetes mellitus type 2, uncontrolled    Peripheral vascular disease  Peripheral vascular disease    Need for prophylactic measure  Need for prophylactic measure    Hyperlipidemia  Hyperlipidemia    Diabetes  Diabetes    HTN (hypertension)  HTN (hypertension)    Osteomyelitis of right foot, unspecified type  Osteomyelitis of right foot, unspecified type    Gangrene of toe of right foot  Gangrene of toe of right foot            Consultant(s) Notes Reviewed:  [ x ] YES     Care Discussed with [X] Consultants  [x  ] Patient  [ x ] Family [  ] HCP [  ]   [  ] Social Service  [ x ] RN, [  ] Physical Therapy,[  ] Palliative care team  DVT PPX: [  ] Lovenox, [  ] S C Heparin, [  ] Coumadin, [  ] Xarelto, [  ] Eliquis, [  ] Pradaxa, [  ] IV Heparin drip, [ x ] SCD [  ] Contraindication 2 to GI Bleed,[  ] Ambulation [  ] Contraindicated 2 to  bleed [  ] Contraindicated 2 to Brain Bleed  Advanced directive: [x  ] None, [  ] DNR/DNI Patient is a 60y old  Male who presents with a chief complaint of Osteomyelitis (18 Dec 2020 07:51)    HPI:  61yo F with PMH of HTN, HLD, DM2 (not on insulin), peripheral vascular insufficiency s/p angioplasty of RLE (Nov 2020) sent by Podiatry, Dr. Stark for R hallux osteomyelitis. Patient states initial wound was in Oct 2020 after he had an ingrown toenail. Patient had vascular angiogram in November to improve blood flow however no improvement. Denies fever, chills, chest pain, sob, abd pain, N/V, UE/LE weakness or paresthesias.     In the ED:  VS- 98.4F, 114HR, 158/85, 18RR, O2 sat 98% on RA  Labs significant for glucose 154. CBC and CMP WNL.  ECG showed sinus tachycardia (107), no signs of acute ischemia  MRI showed Acute osteomyelitis of the right first distal phalanx. Prominent soft tissue edema about the first toe with numerous punctate foci of hypointense signal corresponding to air on the prior radiographs. These foci of air appear confined to the plantar soft tissues of the first distal phalanx without definite extension proximally, however MRI is insensitive for this finding.  s/p Zosyn in the ED, pt was seen by podiatry & ID in ER. (16 Dec 2020 10:52)    INTERVAL HPI: 12/17: Patient seen and examined at bedside. No events overnight. Scheduled for R hallux amputation tomorrow. Patient feels well and denies any complaints this AM. NO Abx as per ID.    12/18: Patient seen and examined at bedside. Scheduled for R hallux amputation today. Patient notes feeling mildly anxious before surgery. Otherwise feels well and denies any complaints. No HA, dizziness, CP, SOB, n/v, or abdominal pain.       OVERNIGHT EVENTS: NONE    Home Medications:  atorvastatin 40 mg oral tablet: 1 tab(s) orally once a day (16 Dec 2020 16:36)  cilostazol 100 mg oral tablet: 1 tab(s) orally 2 times a day (16 Dec 2020 16:36)  clopidogrel 75 mg oral tablet: 1 tab(s) orally once a day (16 Dec 2020 16:36)  doxycycline hyclate 100 mg oral capsule: 1 cap(s) orally every 12 hours (16 Dec 2020 16:36)  Invokana 300 mg oral tablet: 1 tab(s) orally once a day (16 Dec 2020 16:36)  Januvia 100 mg oral tablet: 1 tab(s) orally once a day (16 Dec 2020 16:36)  K-Phos No. 2 oral tablet: orally 2 times a day (16 Dec 2020 16:36)  melatonin 3 mg oral tablet: 1 tab(s) orally once a day (at bedtime) (16 Dec 2020 16:36)  metFORMIN 1000 mg oral tablet: 1 tab(s) orally 2 times a day (16 Dec 2020 16:36)  Metoprolol Succinate ER 25 mg oral tablet, extended release: 1 tab(s) orally once a day (16 Dec 2020 16:36)  MiraLax oral powder for reconstitution: 7 gram(s) orally once a day (16 Dec 2020 16:36)  Mycostatin 100,000 units/mL oral suspension: 4 milliliter(s) orally 2 times a day (16 Dec 2020 16:36)  Norvasc 10 mg oral tablet: 1 tab(s) orally once a day (16 Dec 2020 16:36)  quinapril 40 mg oral tablet: 1 tab(s) orally once a day (16 Dec 2020 16:36)      MEDICATIONS  (STANDING):  atorvastatin 40 milliGRAM(s) Oral at bedtime  dextrose 40% Gel 15 Gram(s) Oral once  dextrose 5%. 1000 milliLiter(s) (50 mL/Hr) IV Continuous <Continuous>  dextrose 5%. 1000 milliLiter(s) (100 mL/Hr) IV Continuous <Continuous>  dextrose 50% Injectable 25 Gram(s) IV Push once  dextrose 50% Injectable 12.5 Gram(s) IV Push once  dextrose 50% Injectable 25 Gram(s) IV Push once  glucagon  Injectable 1 milliGRAM(s) IntraMuscular once  insulin glargine Injectable (LANTUS) 10 Unit(s) SubCutaneous at bedtime  insulin lispro (ADMELOG) corrective regimen sliding scale   SubCutaneous every 6 hours  lisinopril 40 milliGRAM(s) Oral daily  metoprolol succinate ER 25 milliGRAM(s) Oral daily    MEDICATIONS  (PRN):      Allergies    No Known Allergies    Intolerances        Social History:  Denies any tobacco or illicit drug use. 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 No nausea, No vomiting; No diarrhea, No constipation. [  ] BM  GENITOURINARY: No dysuria, No frequency, No urgency, No hematuria, NO incontinence  NEUROLOGICAL: No headaches, No dizziness, No numbness, No tingling, No tremors, No weakness  EXT: No Swelling, No Pain, No Edema  SKIN:  [ x ] No itching, burning, rashes, or lesions   MUSCULOSKELETAL: No joint pain ,No Jt swelling; No muscle pain, No back pain, No extremity pain  PSYCHIATRIC: No depression,  No anxiety,  No mood swings ,No difficulty sleeping at night  PAIN SCALE: [ x ] None  [  ] Other-  REST OF REVIEW Of SYSTEM - [ x ] Normal     Vital Signs Last 24 Hrs  T(C): 36.8 (18 Dec 2020 04:25), Max: 36.9 (17 Dec 2020 10:21)  T(F): 98.2 (18 Dec 2020 04:25), Max: 98.4 (17 Dec 2020 10:21)  HR: 76 (18 Dec 2020 04:25) (76 - 104)  BP: 110/70 (18 Dec 2020 04:25) (110/70 - 133/77)  BP(mean): --  RR: 18 (18 Dec 2020 04:25) (18 - 19)  SpO2: 95% (18 Dec 2020 04:25) (95% - 97%)  Finger Stick          PHYSICAL EXAM:  GENERAL:  [ x ] NAD , [x  ] well appearing, [  ] Agitated, [  ] Drowsy,  [  ] Lethargy, [  ] confused   HEAD:  [ x ] Normal, [  ] Other  EYES:  [ x ] EOMI, [ x ] PERRLA, [ x ] conjunctiva and sclera clear normal, [  ] Other,  [  ] Pallor,[  ] Discharge  ENMT:  [x  ] Normal, [ x ] Moist mucous membranes, [ x ] Good dentition, [ x ] No Thrush  NECK:  [ x ] Supple, [ x ] No JVD, [ x ] Normal thyroid, [  ] Lymphadenopathy [  ] Other  CHEST/LUNG:  [ x ] Clear to auscultation bilaterally, [ x ] Breath Sounds equal B/L / Decrease, [  ] poor effort  [x  ] No rales, [x  ] No rhonchi  [ x ]  No wheezing,   HEART:  [ x ] Regular rate and rhythm, [  ] tachycardia, [  ] Bradycardia,  [  ] irregular  [ x ] No murmurs, No rubs, No gallops, [  ] PPM in place (Mfr:  )  ABDOMEN:  [x  ] Soft, [x  ] Nontender, [ x ] Nondistended, [x  ] No mass, [ x ] Bowel sounds present, [  ] obese  NERVOUS SYSTEM:  [x  ] Alert & Oriented X3, [ x ] Nonfocal  [  ] Confusion  [  ] Encephalopathic [  ] Sedated [  ] Unable to assess, [  ] Dementia [  ] Other-  EXTREMITIES: [x  ] 2+ Peripheral Pulses, No clubbing, No cyanosis,  [  ] edema B/L lower EXT. [  ] PVD stasis skin changes B/L Lower EXT, [ x ] RLE in ACE dressing -Big toe necrotic   LYMPH: No lymphadenopathy noted  SKIN:  [ x ] No rashes or lesions, [  ] Pressure Ulcers, [  ] ecchymosis, [  ] Skin Tears, [ x ] Positive necrotic R hallux ulcer      DIET: Diet, NPO after Midnight:      NPO Start Date: 17-Dec-2020,   NPO Start Time: 23:59  Except Medications (12-17-20 @ 16:23)      LABS:                        13.5   10.00 )-----------( 324      ( 18 Dec 2020 07:54 )             40.6     18 Dec 2020 07:54    141    |  107    |  19     ----------------------------<  167    4.1     |  28     |  0.78     Ca    8.6        18 Dec 2020 07:54    TPro  6.8    /  Alb  3.5    /  TBili  0.3    /  DBili  x      /  AST  10     /  ALT  24     /  AlkPhos  61     18 Dec 2020 00:42          Culture Results:   Moderate Serratia marcescens  Normal skin qi isolated (12-16 @ 15:58)      culture blood  -- .Other BERYL fontana 12-16 @ 15:58    culture urine  --  12-16 @ 15:58    Culture - Other (collected 16 Dec 2020 15:58)  Source: .Emmett fontana  Preliminary Report (17 Dec 2020 18:01):    Moderate Serratia marcescens    Normal skin qi isolated      RADIOLOGY & ADDITIONAL TESTS: NONE      HEALTH ISSUES - PROBLEM Dx:  Diabetes mellitus type 2, uncontrolled  Diabetes mellitus type 2, uncontrolled    Peripheral vascular disease  Peripheral vascular disease    Need for prophylactic measure  Need for prophylactic measure    Hyperlipidemia  Hyperlipidemia    Diabetes  Diabetes    HTN (hypertension)  HTN (hypertension)    Osteomyelitis of right foot, unspecified type  Osteomyelitis of right foot, unspecified type    Gangrene of toe of right foot  Gangrene of toe of right foot        Consultant(s) Notes Reviewed:  [ x ] YES     Care Discussed with [X] Consultants  [x  ] Patient  [ x ] Family [  ] HCP [  ]   [  ] Social Service  [ x ] RN, [  ] Physical Therapy,[  ] Palliative care team  DVT PPX: [  ] Lovenox, [  ] S C Heparin, [  ] Coumadin, [  ] Xarelto, [  ] Eliquis, [  ] Pradaxa, [  ] IV Heparin drip, [ x ] SCD [  ] Contraindication 2 to GI Bleed,[  ] Ambulation [  ] Contraindicated 2 to  bleed [  ] Contraindicated 2 to Brain Bleed  Advanced directive: [x  ] None, [  ] DNR/DNI

## 2020-12-18 NOTE — PROGRESS NOTE ADULT - PROBLEM SELECTOR PLAN 1
Acute osteomyelitis of the R first distal phalanx  R hallux wound since 10/2020 with infection, s/p augmentin, had worsening of infection with gangrene, purulence and malodor. Had been on doxycycline at home. Seen in wound care clinic and recommended for amputation.   MRI confirmed OM of the R first distal phalanx.  S/p pip-tazo once in the ER, held antibiotic until OR  R hallux wound culture with Serratia   S/p OR for R hallux amputation 12/18, noted with purulence  Afebrile, nontoxic, no leukocytosis    Follow R hallux wound culture for Serratia sensitivities  Follow for OR bone cultures and surg path  Start on Pip-tazo 3.375mg IV Q6h pending cultures

## 2020-12-18 NOTE — DISCHARGE NOTE PROVIDER - NSDCCPCAREPLAN_GEN_ALL_CORE_FT
PRINCIPAL DISCHARGE DIAGNOSIS  Diagnosis: Osteomyelitis of right foot, unspecified type  Assessment and Plan of Treatment: You were found to have osteomyelitis of your right food, and you had partial toe amputation on 12/18. You were treated with an antibiotic called Zosyn, which was switched to Ivanz. Cultures showed ______.  -Please continue _______  -Please follow up with Dr. Stark or [facility] within [facility] days  -Follow up with your primary care doctor within 1 week      SECONDARY DISCHARGE DIAGNOSES  Diagnosis: Diabetes mellitus type 2, uncontrolled  Assessment and Plan of Treatment: Your home medications were held while in the hospital and you were given insulin before meals and at night. Your A1c is 8.4.  -Continue Januvia, Canagliflozin, and Metformin as prescribed  -Follow up with your endocrinologist    Diagnosis: Peripheral vascular disease  Assessment and Plan of Treatment: Your home Plavix and Cilostazol were held for surgery. They were both restarted before you left the hospital.  -Continue Plavix and Cilostazol as prescribed  -Follow up with your primary care doctor    Diagnosis: HTN (hypertension)  Assessment and Plan of Treatment: Your home quinapril was switched to lisonopril, as we do not have quinapril here. Your home Toprol XL was continued.  -Continue quinapril and Toprol XL as prescribed.    Diagnosis: Hyperlipidemia  Assessment and Plan of Treatment: Continue atorvastatin as prescribed.     PRINCIPAL DISCHARGE DIAGNOSIS  Diagnosis: Osteomyelitis of right foot, unspecified type  Assessment and Plan of Treatment: You were found to have osteomyelitis of your right food, and you had partial toe amputation on 12/18. You were treated with an antibiotic called Zosyn, which was switched to Ivanz. Cultures showed bacteria called serratia marcescens and alpha hemolytic strep.   -You will need 6 weeks of IV antibiotics through your PICC line (end date 1/28/2021)  -You will also need weekly labwork (CBC and CMP)--please have these results faxed to [phone]  -Follow up with Dr. Frederick (Infectious Disease doctor) in 4-5 weeks  -Please follow up with Dr. Stark or Dr. [facility] within 5 days  -Follow up with your primary care doctor within 1 week      SECONDARY DISCHARGE DIAGNOSES  Diagnosis: Diabetes mellitus type 2, uncontrolled  Assessment and Plan of Treatment: Your home medications were held while in the hospital and you were given insulin before meals and at night. Your A1c is 8.4.  -Continue Januvia, Canagliflozin, and Metformin as prescribed  -Follow up with your endocrinologist    Diagnosis: Peripheral vascular disease  Assessment and Plan of Treatment: Your home Plavix and Cilostazol were held for surgery. They were both restarted before you left the hospital.  -Continue Plavix and Cilostazol as prescribed  -Follow up with your primary care doctor    Diagnosis: HTN (hypertension)  Assessment and Plan of Treatment: Your home quinapril was switched to lisonopril, as we do not have quinapril here. Your home Toprol XL was continued.  -Continue quinapril and Toprol XL as prescribed.    Diagnosis: Hyperlipidemia  Assessment and Plan of Treatment: Continue atorvastatin as prescribed.     PRINCIPAL DISCHARGE DIAGNOSIS  Diagnosis: Osteomyelitis of right foot, unspecified type  Assessment and Plan of Treatment: You were found to have osteomyelitis of your right foot, and you had partial toe amputation on 12/18. You were treated with an antibiotic called Zosyn, which was switched to Invanz. Cultures showed bacteria called serratia marcescens and alpha hemolytic strep.   -You will need 6 weeks of IV antibiotics once per day through your PICC line (end date 1/28/2021)  -You will also need weekly labwork (CBC and CMP) at your primary care doctor's office--please have these results faxed to [phone]  -Follow up with Dr. Frederick (Infectious Disease doctor) in 4-5 weeks  -Please follow up with Dr. Stark or Dr. Gomez (podiatry) [facility] within 5 days  -Follow up with your primary care doctor within 1 week      SECONDARY DISCHARGE DIAGNOSES  Diagnosis: Diabetes mellitus type 2, uncontrolled  Assessment and Plan of Treatment: Your home medications were held while in the hospital and you were given insulin before meals and at night. Your A1c is 8.4.  -Continue Januvia, Canagliflozin, and Metformin as prescribed when you leave the hospital  -Call Dr. Craig Perlman tomorrow (Tuesday 12/22) or Wednesday (12/23) to go to his office in order to start insulin    Diagnosis: Peripheral vascular disease  Assessment and Plan of Treatment: Your home Plavix and Cilostazol were held for surgery. They were both restarted before you left the hospital.  -Continue Plavix and Cilostazol as prescribed  -Follow up with your primary care doctor within 1 week    Diagnosis: HTN (hypertension)  Assessment and Plan of Treatment: Your home quinapril was switched to lisonopril, as we do not have quinapril here. Your home Toprol XL was continued.  -Continue quinapril and Toprol XL as prescribed.    Diagnosis: Hyperlipidemia  Assessment and Plan of Treatment: Continue atorvastatin as prescribed.     PRINCIPAL DISCHARGE DIAGNOSIS  Diagnosis: Osteomyelitis of right foot, unspecified type  Assessment and Plan of Treatment: You were found to have osteomyelitis of your right foot, and you had partial toe amputation on 12/18/2020.   -Pain meds Ultram 50 mg 1 tab q 6 hrs As needed for pain   -DO NOT Wet the dressing of Rt Foot   -Ambulate with Post op surgical shoe as recommended by Podiatry  -You were treated with an  IV antibiotic  Invanz. Cultures showed bacteria called serratia marcescens and alpha hemolytic strep.   -You will need 6 weeks of IV antibiotics once per day through your PICC line (end date 1/28/2021) Placed today   -You will also need weekly labwork (CBC and CMP) at your primary care doctor's office--please have these results faxed to [phone]  -Follow up with Dr. Frederick (Infectious Disease doctor) in 4-5 weeks  -Please follow up with Dr. Stark or Dr. Gomez (podiatry) [facility] within 5 days  -Follow up with your primary care doctor within 1 week      SECONDARY DISCHARGE DIAGNOSES  Diagnosis: Diabetes mellitus type 2, uncontrolled  Assessment and Plan of Treatment: Your home medications were held while in the hospital and you were given insulin before meals and at night. Your A1c is 8.4.  -Continue Januvia, Canagliflozin, and Metformin as prescribed when you leave the hospital  -Call Dr. Craig Perlman tomorrow (Tuesday 12/22) or Wednesday (12/23) to go to his office in order to start insulin    Diagnosis: Peripheral vascular disease  Assessment and Plan of Treatment: -Continue Plavix and Cilostazol as prescribed  -Follow up with your Vascular surgeon  doctor within 1 week    Diagnosis: HTN (hypertension)  Assessment and Plan of Treatment: Your home quinapril .& Toprol XL was continued.  -Continue quinapril , Norvasc and Toprol XL as prescribed.    Diagnosis: Hyperlipidemia  Assessment and Plan of Treatment: Continue atorvastatin as prescribed.     PRINCIPAL DISCHARGE DIAGNOSIS  Diagnosis: Osteomyelitis of right foot, unspecified type  Assessment and Plan of Treatment: You were found to have osteomyelitis of your right foot, and you had partial toe amputation on 12/18/2020.   -Pain meds Ultram 50 mg 1 tab q 6 hrs As needed for pain   -DO NOT Wet the dressing of Rt Foot, Keep your dressing Dry, Clean & in place,   -Ambulate with Post op surgical shoe on heel as recommended by Podiatry  -You were treated with an  IV antibiotic  Invanz. Cultures showed bacteria called serratia marcescens and alpha hemolytic strep.   -You will need 6 weeks of IV antibiotics once per day through your PICC line (end date 1/28/2021) Placed today   -You will also need weekly labwork (CBC and CMP) at your primary care doctor's office--please have these results faxed to [phone]  -Follow up with Dr. Frederick (Infectious Disease doctor) in 4-5 weeks  -Please follow up with Dr. Stark or Dr. Gomez (podiatry) [facility] within 5 days  -Follow up with your primary care doctor within 1 week      SECONDARY DISCHARGE DIAGNOSES  Diagnosis: Diabetes mellitus type 2, uncontrolled  Assessment and Plan of Treatment: Your home medications were held while in the hospital and you were given insulin before meals and at night. Your A1c is 8.4.  -Continue Januvia, Canagliflozin, and Metformin as prescribed when you leave the hospital  -Call Dr. Craig Perlman tomorrow (Tuesday 12/22) or Wednesday (12/23) to go to his office in order to start insulin    Diagnosis: Peripheral vascular disease  Assessment and Plan of Treatment: -Continue Plavix and Cilostazol as prescribed  -Follow up with your Vascular surgeon  doctor within 1 week    Diagnosis: HTN (hypertension)  Assessment and Plan of Treatment: Your home quinapril .& Toprol XL was continued.  -Continue quinapril , Norvasc and Toprol XL as prescribed.    Diagnosis: Hyperlipidemia  Assessment and Plan of Treatment: Continue atorvastatin as prescribed.     PRINCIPAL DISCHARGE DIAGNOSIS  Diagnosis: Osteomyelitis of right foot, unspecified type  Assessment and Plan of Treatment: You were found to have osteomyelitis of your right foot, and you had partial toe amputation on 12/18/2020.   -Pain meds Ultram 50 mg 1 tab q 6 hrs As needed for pain   -DO NOT Wet the dressing of Rt Foot, Keep your dressing Dry, Clean & in place,   -Ambulate with Post op surgical shoe on heel as recommended by Podiatry  -You were treated with an  IV antibiotic  Invanz. Cultures showed bacteria called serratia marcescens and alpha hemolytic strep.   -You will need 6 weeks of IV antibiotics once per day through your PICC line (end date 1/28/2021) Placed today   -You will also need weekly labwork (CBC and CMP) at your primary care doctor's office--please have these results faxed to [phone]  -Follow up with Dr. Frederick (Infectious Disease doctor) in 4-5 weeks  -Please follow up with Dr. Stark or Dr. Gomez (podiatry) [facility] within 5 days  -Follow up with your primary care doctor within 1 week      SECONDARY DISCHARGE DIAGNOSES  Diagnosis: Diabetes mellitus type 2, uncontrolled  Assessment and Plan of Treatment: Your home medications were held while in the hospital and you were given insulin before meals and at night. Your A1c is 8.4.  -Continue Januvia, Canagliflozin, and Metformin as prescribed when you leave the hospital  -Call Dr. Craig Perlman tomorrow (Tuesday 12/22) or Wednesday (12/23) to go to his office in order to start insulin    Diagnosis: Peripheral vascular disease  Assessment and Plan of Treatment: -Continue Plavix and Cilostazol as prescribed  -Follow up with your Vascular surgeon  doctor within 1 week    Diagnosis: HTN (hypertension)  Assessment and Plan of Treatment: Your home quinapril .& Toprol XL was continued.  -Continue quinapril and Toprol XL as prescribed.    Diagnosis: Hyperlipidemia  Assessment and Plan of Treatment: Continue atorvastatin as prescribed.

## 2020-12-18 NOTE — BRIEF OPERATIVE NOTE - NSICDXBRIEFPREOP_GEN_ALL_CORE_FT
PRE-OP DIAGNOSIS:  Toe osteomyelitis 18-Dec-2020 11:23:44  Be Colvin  Gangrene of right foot 18-Dec-2020 11:23:25  Be Colvin

## 2020-12-18 NOTE — DISCHARGE NOTE PROVIDER - NSDCFUSCHEDAPPT_GEN_ALL_CORE_FT
LOIDA QUEZADA ; 12/21/2020 ; [facility]  [place]  LOIDA QUEZADA ; 12/21/2020 ; [facility] LOIDA QUEZADA ; 12/21/2020 ; [facility] LOIDA QUEZADA ; 12/28/2020 ; [facility]  [place]

## 2020-12-18 NOTE — PHYSICAL THERAPY INITIAL EVALUATION ADULT - PERTINENT HX OF CURRENT PROBLEM, REHAB EVAL
59yo F with PMH of HTN, HLD, DM2 (not on insulin), peripheral vascular insufficiency s/p angioplasty of RLE (Nov 2020) sent by Podiatry, Dr. Stark for R hallux osteomyelitis. Patient states initial wound was in Oct 2020 after he had an ingrown toenail. Patient had vascular angiogram in November to improve blood flow however no improvement.

## 2020-12-18 NOTE — PROGRESS NOTE ADULT - SUBJECTIVE AND OBJECTIVE BOX
60y year old Male seen at \[facility]\"" 1EAS 104 W1 for gangrene right hallux.  S/P right partial 1st ray resection 12/18/20.  Dressing clean, dry and intact. Pt said he has mild pain right foot.  Denies any fever, chills, nausea, vomiting, chest pain, shortness of breath, or calf pain at this time.    HPI:   60y year old Male seen at \[facility]\"" ED for amputation of right hallux, gangrene and osteomyelitis.  Pt is an established pt at [facility].  Pt relates about one month ago he had a partial nail removal performed by a podiatrist at his private office.  Pt said a few days later he developed an infection in his right hallux where there was purulent discharge.  Pt said he was prescribed an unknown Abx on the initial avulsion and he saw the podiatrist again due to the infection not resolving.  Pt relates he was given Doxycycline and his right hallux started to turn black.  Pt said on approximately 11/21/20 he had an angiogram and angioplasty RLE and he was told he had good blood flow to the hallux after the procedure.  Pt said the vascular procedure was not performed at [facility].  Pt said his hallux continued to turn black with purulence and malodor.  Pt said about 5 days ago he went to [facility] for the first time and an amputation of hallux was discussed.  Pt said he came to the ED today because he wants to have an amputation of his right hallux.  Denies any fever, chills, nausea, vomiting, chest pain, shortness of breath, or calf pain at this time.      Allergies    No Known Allergies    Intolerances        MEDICATIONS  (STANDING):  atorvastatin 40 milliGRAM(s) Oral at bedtime  dextrose 40% Gel 15 Gram(s) Oral once  dextrose 5%. 1000 milliLiter(s) (50 mL/Hr) IV Continuous <Continuous>  dextrose 5%. 1000 milliLiter(s) (100 mL/Hr) IV Continuous <Continuous>  dextrose 50% Injectable 25 Gram(s) IV Push once  dextrose 50% Injectable 12.5 Gram(s) IV Push once  dextrose 50% Injectable 25 Gram(s) IV Push once  glucagon  Injectable 1 milliGRAM(s) IntraMuscular once  insulin glargine Injectable (LANTUS) 10 Unit(s) SubCutaneous at bedtime  insulin lispro (ADMELOG) corrective regimen sliding scale   SubCutaneous three times a day before meals  insulin lispro (ADMELOG) corrective regimen sliding scale   SubCutaneous at bedtime  lisinopril 40 milliGRAM(s) Oral daily  metoprolol succinate ER 25 milliGRAM(s) Oral daily  piperacillin/tazobactam IVPB.. 3.375 Gram(s) IV Intermittent every 8 hours    MEDICATIONS  (PRN):  acetaminophen   Tablet .. 650 milliGRAM(s) Oral every 4 hours PRN Mild Pain (1 - 3)  acetaminophen   Tablet .. 1000 milliGRAM(s) Oral every 6 hours PRN Moderate Pain (4 - 6)  traMADol 50 milliGRAM(s) Oral every 6 hours PRN Severe Pain (7 - 10)      Vital Signs Last 24 Hrs  T(C): 36.7 (18 Dec 2020 13:03), Max: 37.1 (18 Dec 2020 09:12)  T(F): 98.1 (18 Dec 2020 13:03), Max: 98.8 (18 Dec 2020 09:12)  HR: 88 (18 Dec 2020 13:03) (76 - 111)  BP: 103/68 (18 Dec 2020 13:03) (102/60 - 133/77)  BP(mean): --  RR: 14 (18 Dec 2020 13:03) (14 - 19)  SpO2: 97% (18 Dec 2020 13:03) (94% - 99%)    PHYSICAL EXAM:  Vascular: DP and PT palpable.  CRT <3s excluding right hallux.  Erythema across dorsal right foot.  No ecchymosis b/l  Neurological: Light touch intact to foot b/l  Musculoskeletal: Mild POP right hallux  Dermatological: Wound  Right hallux G4 wound, gangrene.  Distal, medial, plantar hallux eschar.  Periwound intact.  1cc purulence.  Tunneling and undermining medial wound dorsal to plantar.  Moderate malodor.        CBC Full  -  ( 18 Dec 2020 07:54 )  WBC Count : 10.00 K/uL  RBC Count : 4.54 M/uL  Hemoglobin : 13.5 g/dL  Hematocrit : 40.6 %  Platelet Count - Automated : 324 K/uL  Mean Cell Volume : 89.4 fl  Mean Cell Hemoglobin : 29.7 pg  Mean Cell Hemoglobin Concentration : 33.3 gm/dL  Auto Neutrophil # : x  Auto Lymphocyte # : x  Auto Monocyte # : x  Auto Eosinophil # : x  Auto Basophil # : x  Auto Neutrophil % : x  Auto Lymphocyte % : x  Auto Monocyte % : x  Auto Eosinophil % : x  Auto Basophil % : x      ----------CHEM PANEL----------    PT/INR - ( 18 Dec 2020 07:54 )   PT: 11.8 sec;   INR: 1.01 ratio         PTT - ( 18 Dec 2020 07:54 )  PTT:30.3 sec      Culture - Other (collected 16 Dec 2020 15:58)  Source: .Emmett fontana  Preliminary Report (17 Dec 2020 18:01):    Moderate Serratia marcescens    Normal skin qi isolated        Imaging: ----------

## 2020-12-18 NOTE — DISCHARGE NOTE PROVIDER - INSTRUCTIONS
Continue with a diet low in salt and cholesterol. Monitor your blood sugar levels. Continue with a diet low in salt and cholesterol. Diabetic diet Monitor your blood sugar levels.

## 2020-12-18 NOTE — PROGRESS NOTE ADULT - ASSESSMENT
59yo F with PMH of HTN, HLD, DM2 (not on insulin), peripheral vascular insufficiency s/p angioplasty of RLE (Nov 2020) sent by Podiatry, Dr. Stark for R hallux osteomyelitis  admitted for R hallux osteomyelitis for IV abx and amputation

## 2020-12-19 LAB
ANION GAP SERPL CALC-SCNC: 4 MMOL/L — LOW (ref 5–17)
BUN SERPL-MCNC: 23 MG/DL — SIGNIFICANT CHANGE UP (ref 7–23)
CALCIUM SERPL-MCNC: 8.4 MG/DL — LOW (ref 8.5–10.1)
CHLORIDE SERPL-SCNC: 107 MMOL/L — SIGNIFICANT CHANGE UP (ref 96–108)
CO2 SERPL-SCNC: 29 MMOL/L — SIGNIFICANT CHANGE UP (ref 22–31)
CREAT SERPL-MCNC: 0.91 MG/DL — SIGNIFICANT CHANGE UP (ref 0.5–1.3)
GLUCOSE SERPL-MCNC: 159 MG/DL — HIGH (ref 70–99)
HCT VFR BLD CALC: 38.3 % — LOW (ref 39–50)
HGB BLD-MCNC: 12.4 G/DL — LOW (ref 13–17)
MCHC RBC-ENTMCNC: 29.5 PG — SIGNIFICANT CHANGE UP (ref 27–34)
MCHC RBC-ENTMCNC: 32.4 GM/DL — SIGNIFICANT CHANGE UP (ref 32–36)
MCV RBC AUTO: 91.2 FL — SIGNIFICANT CHANGE UP (ref 80–100)
NRBC # BLD: 0 /100 WBCS — SIGNIFICANT CHANGE UP (ref 0–0)
PLATELET # BLD AUTO: 262 K/UL — SIGNIFICANT CHANGE UP (ref 150–400)
POTASSIUM SERPL-MCNC: 5 MMOL/L — SIGNIFICANT CHANGE UP (ref 3.5–5.3)
POTASSIUM SERPL-SCNC: 5 MMOL/L — SIGNIFICANT CHANGE UP (ref 3.5–5.3)
RBC # BLD: 4.2 M/UL — SIGNIFICANT CHANGE UP (ref 4.2–5.8)
RBC # FLD: 12.9 % — SIGNIFICANT CHANGE UP (ref 10.3–14.5)
SODIUM SERPL-SCNC: 140 MMOL/L — SIGNIFICANT CHANGE UP (ref 135–145)
WBC # BLD: 10.71 K/UL — HIGH (ref 3.8–10.5)
WBC # FLD AUTO: 10.71 K/UL — HIGH (ref 3.8–10.5)

## 2020-12-19 PROCEDURE — 99024 POSTOP FOLLOW-UP VISIT: CPT

## 2020-12-19 PROCEDURE — 99232 SBSQ HOSP IP/OBS MODERATE 35: CPT

## 2020-12-19 RX ORDER — TRAMADOL HYDROCHLORIDE 50 MG/1
50 TABLET ORAL EVERY 6 HOURS
Refills: 0 | Status: DISCONTINUED | OUTPATIENT
Start: 2020-12-19 | End: 2020-12-21

## 2020-12-19 RX ORDER — MORPHINE SULFATE 50 MG/1
2 CAPSULE, EXTENDED RELEASE ORAL EVERY 6 HOURS
Refills: 0 | Status: DISCONTINUED | OUTPATIENT
Start: 2020-12-19 | End: 2020-12-21

## 2020-12-19 RX ORDER — SENNA PLUS 8.6 MG/1
2 TABLET ORAL AT BEDTIME
Refills: 0 | Status: DISCONTINUED | OUTPATIENT
Start: 2020-12-19 | End: 2020-12-21

## 2020-12-19 RX ORDER — HYDROMORPHONE HYDROCHLORIDE 2 MG/ML
1 INJECTION INTRAMUSCULAR; INTRAVENOUS; SUBCUTANEOUS EVERY 4 HOURS
Refills: 0 | Status: DISCONTINUED | OUTPATIENT
Start: 2020-12-19 | End: 2020-12-21

## 2020-12-19 RX ORDER — POLYETHYLENE GLYCOL 3350 17 G/17G
17 POWDER, FOR SOLUTION ORAL DAILY
Refills: 0 | Status: DISCONTINUED | OUTPATIENT
Start: 2020-12-19 | End: 2020-12-21

## 2020-12-19 RX ORDER — INSULIN LISPRO 100/ML
3 VIAL (ML) SUBCUTANEOUS
Refills: 0 | Status: DISCONTINUED | OUTPATIENT
Start: 2020-12-19 | End: 2020-12-20

## 2020-12-19 RX ORDER — SENNA PLUS 8.6 MG/1
2 TABLET ORAL AT BEDTIME
Refills: 0 | Status: DISCONTINUED | OUTPATIENT
Start: 2020-12-19 | End: 2020-12-19

## 2020-12-19 RX ORDER — HYDROMORPHONE HYDROCHLORIDE 2 MG/ML
0.5 INJECTION INTRAMUSCULAR; INTRAVENOUS; SUBCUTANEOUS ONCE
Refills: 0 | Status: DISCONTINUED | OUTPATIENT
Start: 2020-12-19 | End: 2020-12-19

## 2020-12-19 RX ORDER — ENOXAPARIN SODIUM 100 MG/ML
40 INJECTION SUBCUTANEOUS DAILY
Refills: 0 | Status: DISCONTINUED | OUTPATIENT
Start: 2020-12-19 | End: 2020-12-21

## 2020-12-19 RX ADMIN — Medication 4: at 11:49

## 2020-12-19 RX ADMIN — TRAMADOL HYDROCHLORIDE 50 MILLIGRAM(S): 50 TABLET ORAL at 04:55

## 2020-12-19 RX ADMIN — ATORVASTATIN CALCIUM 40 MILLIGRAM(S): 80 TABLET, FILM COATED ORAL at 21:55

## 2020-12-19 RX ADMIN — HYDROMORPHONE HYDROCHLORIDE 1 MILLIGRAM(S): 2 INJECTION INTRAMUSCULAR; INTRAVENOUS; SUBCUTANEOUS at 13:34

## 2020-12-19 RX ADMIN — Medication 2: at 07:51

## 2020-12-19 RX ADMIN — Medication 25 MILLIGRAM(S): at 05:38

## 2020-12-19 RX ADMIN — ENOXAPARIN SODIUM 40 MILLIGRAM(S): 100 INJECTION SUBCUTANEOUS at 17:25

## 2020-12-19 RX ADMIN — PIPERACILLIN AND TAZOBACTAM 25 GRAM(S): 4; .5 INJECTION, POWDER, LYOPHILIZED, FOR SOLUTION INTRAVENOUS at 11:07

## 2020-12-19 RX ADMIN — INSULIN GLARGINE 10 UNIT(S): 100 INJECTION, SOLUTION SUBCUTANEOUS at 21:54

## 2020-12-19 RX ADMIN — PIPERACILLIN AND TAZOBACTAM 25 GRAM(S): 4; .5 INJECTION, POWDER, LYOPHILIZED, FOR SOLUTION INTRAVENOUS at 00:24

## 2020-12-19 RX ADMIN — TRAMADOL HYDROCHLORIDE 50 MILLIGRAM(S): 50 TABLET ORAL at 05:55

## 2020-12-19 RX ADMIN — HYDROMORPHONE HYDROCHLORIDE 1 MILLIGRAM(S): 2 INJECTION INTRAMUSCULAR; INTRAVENOUS; SUBCUTANEOUS at 13:49

## 2020-12-19 RX ADMIN — HYDROMORPHONE HYDROCHLORIDE 1 MILLIGRAM(S): 2 INJECTION INTRAMUSCULAR; INTRAVENOUS; SUBCUTANEOUS at 20:10

## 2020-12-19 RX ADMIN — HYDROMORPHONE HYDROCHLORIDE 0.5 MILLIGRAM(S): 2 INJECTION INTRAMUSCULAR; INTRAVENOUS; SUBCUTANEOUS at 09:34

## 2020-12-19 RX ADMIN — Medication 3 UNIT(S): at 11:49

## 2020-12-19 RX ADMIN — Medication 3 UNIT(S): at 16:39

## 2020-12-19 RX ADMIN — LISINOPRIL 40 MILLIGRAM(S): 2.5 TABLET ORAL at 05:38

## 2020-12-19 RX ADMIN — HYDROMORPHONE HYDROCHLORIDE 0.5 MILLIGRAM(S): 2 INJECTION INTRAMUSCULAR; INTRAVENOUS; SUBCUTANEOUS at 09:19

## 2020-12-19 RX ADMIN — SENNA PLUS 2 TABLET(S): 8.6 TABLET ORAL at 21:55

## 2020-12-19 RX ADMIN — HYDROMORPHONE HYDROCHLORIDE 1 MILLIGRAM(S): 2 INJECTION INTRAMUSCULAR; INTRAVENOUS; SUBCUTANEOUS at 19:19

## 2020-12-19 RX ADMIN — PIPERACILLIN AND TAZOBACTAM 25 GRAM(S): 4; .5 INJECTION, POWDER, LYOPHILIZED, FOR SOLUTION INTRAVENOUS at 19:58

## 2020-12-19 NOTE — PROGRESS NOTE ADULT - PROBLEM SELECTOR PLAN 2
Chronic  -home quinapril therapeutic interchange with lisinopril, Toprol XL daily  -continue to monitor routine hemodynamics

## 2020-12-19 NOTE — PROGRESS NOTE ADULT - ASSESSMENT
59yo F with PMH of HTN, HLD, DM2 (not on insulin), sent by Podiatry, Dr. Gomez for R hallux osteomyelitis, scheduled for a right Hallex Amputation    Right Hallux OM  - S/p partial ray amputation of foot 12/18/20  - Tolerated procedure well, cardiac status optimal post operatively  - BP and HR within normal limits  - Euvolemic on exam.  - Pain control    HTN  - BP better controlled BP: 124/53 (12-19-20 @ 05:31) (102/60 - 152/98)  - Continue Lisinopril 40mg 1 po qd and Metoprolol succinate XL 25mg 1po qd  - Continue statin  - Continue routine hemodynamic monitoring.    PAD, with ag atherosclerosis on dopplers  - Continue Lipitor  - Resume Pletal and Plavix per surgery     - Monitor and replete lytes, keep K>4, Mg>2.  - All other medical needs as per primary team.  - Other cardiovascular workup will depend on clinical course.  - Will continue to follow.    Oksana Humphrey, MS FNP, [facility]  Nurse Practitioner- Cardiology   Spectra #[phone]

## 2020-12-19 NOTE — PROGRESS NOTE ADULT - ASSESSMENT
Patient is a 60 year old male with PMH of DM on oral meds, peripheral neuropathy, HTN, HLD who was sent by Podiatry, Dr. Gomez for right hallux gangrene and osteomyelitis, s/p R partial ray amputation 12/18.

## 2020-12-19 NOTE — PROGRESS NOTE ADULT - SUBJECTIVE AND OBJECTIVE BOX
CAPILLARY BLOOD GLUCOSE      POCT Blood Glucose.: 162 mg/dL (19 Dec 2020 07:43)  POCT Blood Glucose.: 193 mg/dL (18 Dec 2020 22:39)  POCT Blood Glucose.: 196 mg/dL (18 Dec 2020 16:50)  POCT Blood Glucose.: 175 mg/dL (18 Dec 2020 12:49)  POCT Blood Glucose.: 180 mg/dL (18 Dec 2020 11:15)      Vital Signs Last 24 Hrs  T(C): 37.2 (19 Dec 2020 05:31), Max: 37.2 (18 Dec 2020 20:46)  T(F): 99 (19 Dec 2020 05:31), Max: 99 (19 Dec 2020 05:31)  HR: 81 (19 Dec 2020 05:31) (81 - 98)  BP: 124/53 (19 Dec 2020 05:31) (102/60 - 152/98)  BP(mean): --  RR: 18 (19 Dec 2020 05:31) (14 - 18)  SpO2: 97% (19 Dec 2020 05:31) (96% - 99%)    General: WN/WD NAD  Respiratory: CTA B/L  CV: RRR, S1S2, no murmurs, rubs or gallops  Abdominal: Soft, NT, ND +BS, Last BM  Extremities: le foot dsg intact     12-19    140  |  107  |  23  ----------------------------<  159<H>  5.0   |  29  |  0.91    Ca    8.4<L>      19 Dec 2020 08:19    TPro  6.8  /  Alb  3.5  /  TBili  0.3  /  DBili  x   /  AST  10<L>  /  ALT  24  /  AlkPhos  61  12-18      atorvastatin 40 milliGRAM(s) Oral at bedtime  dextrose 40% Gel 15 Gram(s) Oral once  dextrose 50% Injectable 25 Gram(s) IV Push once  dextrose 50% Injectable 12.5 Gram(s) IV Push once  dextrose 50% Injectable 25 Gram(s) IV Push once  glucagon  Injectable 1 milliGRAM(s) IntraMuscular once  insulin glargine Injectable (LANTUS) 10 Unit(s) SubCutaneous at bedtime  insulin lispro (ADMELOG) corrective regimen sliding scale   SubCutaneous three times a day before meals  insulin lispro (ADMELOG) corrective regimen sliding scale   SubCutaneous at bedtime

## 2020-12-19 NOTE — PROGRESS NOTE ADULT - SUBJECTIVE AND OBJECTIVE BOX
60y year old Male seen at [facility] 1EAS 104 W1 for  amputation of right hallux, gangrene and osteomyelitis.  Denies any fever, chills, nausea, vomiting, chest pain, shortness of breath, or calf pain at this time.    HPI:   60y year old Male seen at [facility] ED for amputation of right hallux, gangrene and osteomyelitis.  Pt is an established pt at [facility].  Pt relates about one month ago he had a partial nail removal performed by a podiatrist at his private office.  Pt said a few days later he developed an infection in his right hallux where there was purulent discharge.  Pt said he was prescribed an unknown Abx on the initial avulsion and he saw the podiatrist again due to the infection not resolving.  Pt relates he was given Doxycycline and his right hallux started to turn black.  Pt said on approximately 11/21/20 he had an angiogram and angioplasty RLE and he was told he had good blood flow to the hallux after the procedure.  Pt said the vascular procedure was not performed at [facility].  Pt said his hallux continued to turn black with purulence and malodor.  Pt said about 5 days ago he went to [facility] for the first time and an amputation of hallux was discussed.  Pt said he came to the ED today because he wants to have an amputation of his right hallux.  Denies any fever, chills, nausea, vomiting, chest pain, shortness of breath, or calf pain at this time.  Allergies    No Known Allergies    Intolerances        MEDICATIONS  (STANDING):  atorvastatin 40 milliGRAM(s) Oral at bedtime  dextrose 40% Gel 15 Gram(s) Oral once  dextrose 5%. 1000 milliLiter(s) (50 mL/Hr) IV Continuous <Continuous>  dextrose 5%. 1000 milliLiter(s) (100 mL/Hr) IV Continuous <Continuous>  dextrose 50% Injectable 25 Gram(s) IV Push once  dextrose 50% Injectable 12.5 Gram(s) IV Push once  dextrose 50% Injectable 25 Gram(s) IV Push once  glucagon  Injectable 1 milliGRAM(s) IntraMuscular once  insulin glargine Injectable (LANTUS) 10 Unit(s) SubCutaneous at bedtime  insulin lispro (ADMELOG) corrective regimen sliding scale   SubCutaneous three times a day before meals  insulin lispro (ADMELOG) corrective regimen sliding scale   SubCutaneous at bedtime  insulin lispro Injectable (ADMELOG) 3 Unit(s) SubCutaneous three times a day before meals  lisinopril 40 milliGRAM(s) Oral daily  metoprolol succinate ER 25 milliGRAM(s) Oral daily  piperacillin/tazobactam IVPB.. 3.375 Gram(s) IV Intermittent every 8 hours    MEDICATIONS  (PRN):  acetaminophen   Tablet .. 650 milliGRAM(s) Oral every 4 hours PRN Mild Pain (1 - 3)  acetaminophen   Tablet .. 1000 milliGRAM(s) Oral every 6 hours PRN Moderate Pain (4 - 6)  traMADol 50 milliGRAM(s) Oral every 6 hours PRN Severe Pain (7 - 10)      Vital Signs Last 24 Hrs  T(C): 37.2 (19 Dec 2020 05:31), Max: 37.2 (18 Dec 2020 20:46)  T(F): 99 (19 Dec 2020 05:31), Max: 99 (19 Dec 2020 05:31)  HR: 81 (19 Dec 2020 05:31) (81 - 98)  BP: 124/53 (19 Dec 2020 05:31) (102/60 - 152/98)  BP(mean): --  RR: 18 (19 Dec 2020 05:31) (14 - 18)  SpO2: 97% (19 Dec 2020 05:31) (96% - 99%)    PHYSICAL EXAM:  Vascular: DP and PT palpable.  CRT <3s excluding right hallux.  Erythema across dorsal right foot.  No ecchymosis b/l  Neurological: Light touch intact to foot b/l  Musculoskeletal: Mild POP right hallux  Dermatological: Wound  Right hallux G4 wound, gangrene.  Distal, medial, plantar hallux eschar.  Periwound intact.  1cc purulence.  Tunneling and undermining medial wound dorsal to plantar.  Moderate malodor.        CBC Full  -  ( 19 Dec 2020 08:19 )  WBC Count : 10.71 K/uL  RBC Count : 4.20 M/uL  Hemoglobin : 12.4 g/dL  Hematocrit : 38.3 %  Platelet Count - Automated : 262 K/uL  Mean Cell Volume : 91.2 fl  Mean Cell Hemoglobin : 29.5 pg  Mean Cell Hemoglobin Concentration : 32.4 gm/dL  Auto Neutrophil # : x  Auto Lymphocyte # : x  Auto Monocyte # : x  Auto Eosinophil # : x  Auto Basophil # : x  Auto Neutrophil % : x  Auto Lymphocyte % : x  Auto Monocyte % : x  Auto Eosinophil % : x  Auto Basophil % : x      ----------CHEM PANEL----------    PT/INR - ( 18 Dec 2020 07:54 )   PT: 11.8 sec;   INR: 1.01 ratio         PTT - ( 18 Dec 2020 07:54 )  PTT:30.3 sec      Culture - Tissue with Gram Stain (collected 18 Dec 2020 16:42)  Source: .Tissue right hallux bone culture  Gram Stain (18 Dec 2020 22:20):    No polymorphonuclear leukocytes per low power field    Few Gram positive cocci in pairs per oil power field    Culture - Tissue with Gram Stain (collected 18 Dec 2020 16:42)  Source: .Tissue right metatarsal head bone cul  Gram Stain (18 Dec 2020 22:31):    No polymorphonuclear leukocytes per low power field    No organisms seen per oil power field    Culture - Other (collected 16 Dec 2020 15:58)  Source: .Other R [place]und[place]  Final Report (18 Dec 2020 19:40):    Moderate Serratia marcescens Multiple Morphological Strains    Normal skin qi isolated  Organism: Serratia marcescens  Serratia marcescens (18 Dec 2020 19:40)  Organism: Serratia marcescens (18 Dec 2020 19:40)  Organism: Serratia marcescens (18 Dec 2020 19:40)        Imaging: ----------

## 2020-12-19 NOTE — PROGRESS NOTE ADULT - PROBLEM SELECTOR PLAN 4
Chronic, not on insulin  -A1C 8.4  -hold home Januvia, canagliflozin, and metformin in hospital setting  -Low dose ISS, Accu-Cheks q ac/hs  -Nutrition consult  -DR C perlman -Lantus 10 u Q HS

## 2020-12-19 NOTE — PROGRESS NOTE ADULT - PROBLEM SELECTOR PLAN 1
cont lantus 10 units qhs  cont mod dose admelog scale coverage  cont cons cho diet  add admelog 3 units 3x/day before meals  to resume metformin/januvia/jardiance (instead of invokana) upon d/c but may also require once daily long-acting lantus upon d/c as well

## 2020-12-19 NOTE — PROGRESS NOTE ADULT - SUBJECTIVE AND OBJECTIVE BOX
[facility] -- Sami Golden, Vlad Grossman, Otoniel Rebolledo, Irma Riley: Office # [phone]    Follow Up:  cardiac optimization pre/post procedure     Subjective/Observations: Patient seen and examined. Patient awake and alert, resting comfortably in bed. No complaints of chest pain, SOB, LE edema, cough. No signs of orthopnea or PND. S/p OR yesterday. Tolerated well. Right foot DSD intact.     REVIEW OF SYSTEMS: All review of systems is negative for eye, ENT, GI, , allergic, dermatologic, musculoskeletal and neurologic except as described above    PAST MEDICAL & SURGICAL HISTORY:  Peripheral vascular disease  S/p Angioplasty rt lower EXT Nov 2020  Hyperlipidemia  Diabetes  HTN (hypertension)  H/O angioplasty RLE    MEDICATIONS  (STANDING):  atorvastatin 40 milliGRAM(s) Oral at bedtime  dextrose 40% Gel 15 Gram(s) Oral once  dextrose 5%. 1000 milliLiter(s) (50 mL/Hr) IV Continuous <Continuous>  dextrose 5%. 1000 milliLiter(s) (100 mL/Hr) IV Continuous <Continuous>  dextrose 50% Injectable 25 Gram(s) IV Push once  dextrose 50% Injectable 12.5 Gram(s) IV Push once  dextrose 50% Injectable 25 Gram(s) IV Push once  glucagon  Injectable 1 milliGRAM(s) IntraMuscular once  insulin glargine Injectable (LANTUS) 10 Unit(s) SubCutaneous at bedtime  insulin lispro (ADMELOG) corrective regimen sliding scale   SubCutaneous three times a day before meals  insulin lispro (ADMELOG) corrective regimen sliding scale   SubCutaneous at bedtime  lisinopril 40 milliGRAM(s) Oral daily  metoprolol succinate ER 25 milliGRAM(s) Oral daily  piperacillin/tazobactam IVPB.. 3.375 Gram(s) IV Intermittent every 8 hours    MEDICATIONS  (PRN):  acetaminophen   Tablet .. 650 milliGRAM(s) Oral every 4 hours PRN Mild Pain (1 - 3)  acetaminophen   Tablet .. 1000 milliGRAM(s) Oral every 6 hours PRN Moderate Pain (4 - 6)  traMADol 50 milliGRAM(s) Oral every 6 hours PRN Severe Pain (7 - 10)    Allergies  No Known Allergies    Vital Signs Last 24 Hrs  T(C): 37.2 (19 Dec 2020 05:31), Max: 37.2 (18 Dec 2020 20:46)  T(F): 99 (19 Dec 2020 05:31), Max: 99 (19 Dec 2020 05:31)  HR: 81 (19 Dec 2020 05:31) (81 - 102)  BP: 124/53 (19 Dec 2020 05:31) (102/60 - 152/98)  BP(mean): --  RR: 18 (19 Dec 2020 05:31) (14 - 18)  SpO2: 97% (19 Dec 2020 05:31) (96% - 99%)  I&O's Summary    Weight (kg): 83.9 (12-18 @ 09:43)    TELE: Not on telemetry   PHYSICAL EXAM:  Appearance: NAD, no distress, alert, Well developed   HEENT: Moist Mucous Membranes, Anicteric  Cardiovascular: Regular rate and rhythm, Normal S1 S2, No JVD, No murmurs, No rubs, gallops or clicks  Respiratory: Non-labored, Clear to auscultation, No rales, No rhonchi, No wheezing.   Gastrointestinal:  Soft, Non-tender, + BS  Neurologic: Non-focal  Skin: Warm and dry, Right foot DSD intact.  No ecchymosis, No cyanosis  Musculoskeletal: No clubbing, No cyanosis, No joint swelling/tenderness  Psychiatry: Mood & affect appropriate  Lymph: No peripheral edema.     LABS: All Labs Reviewed:                        12.4   10.71 )-----------( 262      ( 19 Dec 2020 08:19 )             38.3                         13.5   10.00 )-----------( 324      ( 18 Dec 2020 07:54 )             40.6                         12.7   8.82  )-----------( 297      ( 18 Dec 2020 00:42 )             38.7     19 Dec 2020 08:19    140    |  107    |  23     ----------------------------<  159    5.0     |  29     |  0.91   18 Dec 2020 07:54    141    |  107    |  19     ----------------------------<  167    4.1     |  28     |  0.78   18 Dec 2020 00:42    140    |  105    |  24     ----------------------------<  165    3.8     |  30     |  1.10     Ca    8.4        19 Dec 2020 08:19  Ca    8.6        18 Dec 2020 07:54  Ca    8.7        18 Dec 2020 00:42    TPro  6.8    /  Alb  3.5    /  TBili  0.3    /  DBili  x      /  AST  10     /  ALT  24     /  AlkPhos  61     18 Dec 2020 00:42  TPro  7.3    /  Alb  3.6    /  TBili  0.4    /  DBili  x      /  AST  12     /  ALT  26     /  AlkPhos  71     17 Dec 2020 07:17  TPro  8.0    /  Alb  3.9    /  TBili  0.5    /  DBili  x      /  AST  18     /  ALT  30     /  AlkPhos  75     16 Dec 2020 10:11    PT/INR - ( 18 Dec 2020 07:54 )   PT: 11.8 sec;   INR: 1.01 ratio    PTT - ( 18 Dec 2020 07:54 )  PTT:30.3 sec    12 Lead ECG:   Ventricular Rate 107 BPM  Atrial Rate 107 BPM  P-R Interval 130 ms  QRS Duration 82 ms  Q-T Interval 348 ms  QTC Calculation(Bazett) 464 ms  P Axis -2 degrees  R Axis -19 degrees  T Axis 19 degrees  Diagnosis Line Sinus tachycardia  Confirmed by CATHERINE REBOLLEDO ([id number]) on 12/16/2020 12:46:27 PM (12-16-20 @ 09:37)

## 2020-12-19 NOTE — PROGRESS NOTE ADULT - SUBJECTIVE AND OBJECTIVE BOX
[facility], Division of Infectious Diseases  OFELIA Harvey Y. Patel, S. Shah  [phone]  ([phone] - weekdays after 5pm and weekends)    Name: LOIDA QUEZADA  Age/Gender: 60y Male  MRN: [id number]    Interval History:  Patient feels well this morning, had RLE pain now controlled with pain medication.   Denies fever, chills, chest pain, dyspnea, cough, abd pain, n/v/d.  ROS reviewed, pertinent positives and negatives as above.   Notes reviewed. Afebrile    Objective:  Vitals:   T(F): 98.7 (12-19-20 @ 13:05), Max: 99 (12-19-20 @ 05:31)  HR: 77 (12-19-20 @ 13:05) (77 - 84)  BP: 121/73 (12-19-20 @ 13:05) (121/73 - 152/98)  RR: 18 (12-19-20 @ 13:05) (16 - 18)  SpO2: 97% (12-19-20 @ 13:05) (96% - 97%)    Physical Examination:  General: no acute distress, nontoxic  HEENT: NC/AT, EOMI, anicteric, neck supple  Cardio: S1, S2 heard, RRR, no murmurs  Resp: CTA bilaterally, no rales/wheezes/rhonchi  Abd: soft, NT, ND, + BS  Neuro: AAOx3, no obvious focal deficits  Ext: RLE in dressing, no edema, moving extremities  Skin: warm, dry, no visible rash  Psych: appropriate affect and mood for situation  Lines: PIV    Laboratory Studies:  CBC:                       12.4   10.71 )-----------( 262      ( 19 Dec 2020 08:19 )             38.3     CMP: 12-19    140  |  107  |  23  ----------------------------<  159<H>  5.0   |  29  |  0.91    Ca    8.4<L>      19 Dec 2020 08:19    TPro  6.8  /  Alb  3.5  /  TBili  0.3  /  DBili  x   /  AST  10<L>  /  ALT  24  /  AlkPhos  61  12-18    LIVER FUNCTIONS - ( 18 Dec 2020 00:42 )  Alb: 3.5 g/dL / Pro: 6.8 g/dL / ALK PHOS: 61 U/L / ALT: 24 U/L / AST: 10 U/L / GGT: x           Microbiology:  12/18 - R metatarsal head bone culture - gram stain - no PMN, no organisms  12/18 - R hallux bone culture - gram stain - no PMN, few GPC in pairs  12/16 - R hallux wound culture - moderate Serratia marcescens - sensitive to all except augmentin. amp, A/S, cefazolin, cefoxitin, normal skin qi  12/16 - COVID-19 ab - negative  12/16 - COVID-19 PCR - negative    Radiology:  Xray Foot AP + Lateral + Oblique, Right (12.18.20 @ 12:18) > Impression: Satisfactory postoperative appearance right foot  MR Foot No Cont, Right (12.16.20 @ 11:58) >Impression: Acute osteomyelitis of the right first distal phalanx. Prominent soft tissue edema about the first toe with numerous punctate foci of hypointense signal corresponding to air on the prior radiographs. These foci of air appear confined to the plantar soft tissues of the first distal phalanx without definite extension proximally, however MRI is insensitive for this finding.  Xray Foot AP + Lateral + Oblique, Right (12.16.20 @ 10:06) >IMPRESSION: Osteomyelitis of the great toe. Arterial calcification.  Xray Chest 2 Views PA/Lat (12.16.20 @ 10:05) >IMPRESSION: Negative chest.    Medications:  MEDICATIONS  (STANDING):  atorvastatin 40 milliGRAM(s) Oral at bedtime  dextrose 40% Gel 15 Gram(s) Oral once  dextrose 5%. 1000 milliLiter(s) (50 mL/Hr) IV Continuous <Continuous>  dextrose 5%. 1000 milliLiter(s) (100 mL/Hr) IV Continuous <Continuous>  dextrose 50% Injectable 25 Gram(s) IV Push once  dextrose 50% Injectable 12.5 Gram(s) IV Push once  dextrose 50% Injectable 25 Gram(s) IV Push once  glucagon  Injectable 1 milliGRAM(s) IntraMuscular once  insulin glargine Injectable (LANTUS) 10 Unit(s) SubCutaneous at bedtime  insulin lispro (ADMELOG) corrective regimen sliding scale   SubCutaneous three times a day before meals  insulin lispro (ADMELOG) corrective regimen sliding scale   SubCutaneous at bedtime  insulin lispro Injectable (ADMELOG) 3 Unit(s) SubCutaneous three times a day before meals  lisinopril 40 milliGRAM(s) Oral daily  metoprolol succinate ER 25 milliGRAM(s) Oral daily  piperacillin/tazobactam IVPB.. 3.375 Gram(s) IV Intermittent every 8 hours      Antimicrobials:  piperacillin/tazobactam IVPB.. 3.375 Gram(s) IV Intermittent every 8 hours - started 12/18  s/p pip-tazo x1 in ER

## 2020-12-19 NOTE — PROGRESS NOTE ADULT - PROBLEM SELECTOR PLAN 1
Pt evaluated and chart reviewed  Dressing clean, dry and intact  Sx site cleansed with NS and dressed with adaptic, DSD    Podiatry will continue to follow pt while in house    Wound Care instructions  1.  Keep dressing clean, dry and intact  2.  Make appointment to be seen within 5/d of d/c at [facility] with Dr. Stark or Dr. Gomez  3.  If n/v/f/c/sob/cp present go to emergency department Pt evaluated and chart reviewed  Dressing clean, dry and intact  Sx site cleansed with NS and dressed with adaptic, DSD    Podiatry Stable  Podiatry will continue to follow pt while in house    Wound Care instructions  1.  Keep dressing clean, dry and intact  2.  Make appointment to be seen within 5/d of d/c at [facility] with Dr. Stark or Dr. Gomez  3.  If n/v/f/c/sob/cp present go to emergency department

## 2020-12-19 NOTE — PROGRESS NOTE ADULT - PROBLEM SELECTOR PLAN 1
Sent by podiatry, Dr. Stark for osteomyelitis of R hallux  -MRI foot shows Acute osteomyelitis of the right first distal phalanx. Prominent soft tissue edema about the first toe with numerous punctate foci of hypointense signal corresponding to air on the prior radiographs. These foci of air appear confined to the plantar soft tissues of the first distal phalanx without definite extension proximally  -POD 1 s/p R partial ray amputation   -Wound dressed with Aquacel and DSD  -Wound culture prelim w/ moderate Serratia marcescens  -ID, Dr. LORE Frederick following--continue zosyn   -fu or path and cultures   -Podiatry, Dr. Gomez following  -Vascular, Dr. Boston consulted--no vascular intervention needed  -Wound care consulted  -Cardio, [facility] consulted Sent by podiatry, Dr. Stark for osteomyelitis of R hallux  -MRI foot shows Acute osteomyelitis of the right first distal phalanx. Prominent soft tissue edema about the first toe with numerous punctate foci of hypointense signal corresponding to air on the prior radiographs. These foci of air appear confined to the plantar soft tissues of the first distal phalanx without definite extension proximally  -POD 1 s/p R partial ray amputation   -Wound dressed with Aquacel and DSD  -Wound culture prelim w/ moderate Serratia marcescens  -ID, Dr. LORE Frederick following--continue zosyn Q 8 hrs  -fu OR  path and cultures   -Podiatry, Dr. Gomez following- wound care & dressing.  -Vascular, Dr. Boston consulted--no vascular intervention needed

## 2020-12-19 NOTE — PROGRESS NOTE ADULT - SUBJECTIVE AND OBJECTIVE BOX
60y Male s/p right first metatarsal ray resection     T(C): 37.2 (12-19-20 @ 05:31), Max: 37.2 (12-18-20 @ 20:46)  HR: 81 (12-19-20 @ 05:31) (81 - 92)  BP: 124/53 (12-19-20 @ 05:31) (103/68 - 152/98)  RR: 18 (12-19-20 @ 05:31) (14 - 18)  SpO2: 97% (12-19-20 @ 05:31) (96% - 98%)  Wt(kg): --    Pt chart reviewed not seen in person d/t covid risk precautions; as per EMR doing well, no anesthesia complications or complaints noted or reported  No additional recommendations.

## 2020-12-19 NOTE — PROGRESS NOTE ADULT - ATTENDING COMMENTS
Radha Oswaldo will like a letter changing date to go back to work on September 5, 2019 instead of August 31, 2019. Please advise.   pt seen, examined, case & care plan d/w, pt, residents at detail.  Awaiting Pathology results.  Podiatry follow up.

## 2020-12-19 NOTE — PROGRESS NOTE ADULT - SUBJECTIVE AND OBJECTIVE BOX
Patient is a 60y old  Male who presents with a chief complaint of Osteomyelitis (18 Dec 2020 15:49)    HPI:  61yo F with PMH of HTN, HLD, DM2 (not on insulin), peripheral vascular insufficiency s/p angioplasty of RLE (Nov 2020) sent by Podiatry, Dr. Stark for R hallux osteomyelitis. Patient states initial wound was in Oct 2020 after he had an ingrown toenail. Patient had vascular angiogram in November to improve blood flow however no improvement. Denies fever, chills, chest pain, sob, abd pain, N/V, UE/LE weakness or paresthesias.     In the ED:  VS- 98.4F, 114HR, 158/85, 18RR, O2 sat 98% on RA  Labs significant for glucose 154. CBC and CMP WNL.  ECG showed sinus tachycardia (107), no signs of acute ischemia  MRI showed Acute osteomyelitis of the right first distal phalanx. Prominent soft tissue edema about the first toe with numerous punctate foci of hypointense signal corresponding to air on the prior radiographs. These foci of air appear confined to the plantar soft tissues of the first distal phalanx without definite extension proximally, however MRI is insensitive for this finding.  s/p Zosyn in the ED, pt was seen by podiatry & ID in ER. (16 Dec 2020 10:52)    INTERVAL HPI:  12/17: Patient seen and examined at bedside. No events overnight. Scheduled for R hallux amputation tomorrow. Patient feels well and denies any complaints this AM. NO Abx as per ID.    12/18: Patient seen and examined at bedside. Scheduled for R hallux amputation today. Patient notes feeling mildly anxious before surgery. Otherwise feels well and denies any complaints. No HA, dizziness, CP, SOB, n/v, or abdominal pain.    12/19: Patient seen and examined at bedside. POD 1 s/p partial ray amputation of R hallux. Patient with 8/10 pain in r foot despite pain medications. Otherwise feels well and denies any complaints. No HA, dizziness, CP, SOB, n/v, or abdominal pain.    OVERNIGHT EVENTS: none     Home Medications:  atorvastatin 40 mg oral tablet: 1 tab(s) orally once a day (16 Dec 2020 16:36)  cilostazol 100 mg oral tablet: 1 tab(s) orally 2 times a day (16 Dec 2020 16:36)  clopidogrel 75 mg oral tablet: 1 tab(s) orally once a day (16 Dec 2020 16:36)  doxycycline hyclate 100 mg oral capsule: 1 cap(s) orally every 12 hours (16 Dec 2020 16:36)  Invokana 300 mg oral tablet: 1 tab(s) orally once a day (16 Dec 2020 16:36)  Januvia 100 mg oral tablet: 1 tab(s) orally once a day (16 Dec 2020 16:36)  K-Phos No. 2 oral tablet: orally 2 times a day (16 Dec 2020 16:36)  melatonin 3 mg oral tablet: 1 tab(s) orally once a day (at bedtime) (16 Dec 2020 16:36)  metFORMIN 1000 mg oral tablet: 1 tab(s) orally 2 times a day (16 Dec 2020 16:36)  Metoprolol Succinate ER 25 mg oral tablet, extended release: 1 tab(s) orally once a day (16 Dec 2020 16:36)  MiraLax oral powder for reconstitution: 7 gram(s) orally once a day (16 Dec 2020 16:36)  Mycostatin 100,000 units/mL oral suspension: 4 milliliter(s) orally 2 times a day (16 Dec 2020 16:36)  Norvasc 10 mg oral tablet: 1 tab(s) orally once a day (16 Dec 2020 16:36)  quinapril 40 mg oral tablet: 1 tab(s) orally once a day (16 Dec 2020 16:36)      MEDICATIONS  (STANDING):  atorvastatin 40 milliGRAM(s) Oral at bedtime  dextrose 40% Gel 15 Gram(s) Oral once  dextrose 5%. 1000 milliLiter(s) (50 mL/Hr) IV Continuous <Continuous>  dextrose 5%. 1000 milliLiter(s) (100 mL/Hr) IV Continuous <Continuous>  dextrose 50% Injectable 25 Gram(s) IV Push once  dextrose 50% Injectable 12.5 Gram(s) IV Push once  dextrose 50% Injectable 25 Gram(s) IV Push once  glucagon  Injectable 1 milliGRAM(s) IntraMuscular once  insulin glargine Injectable (LANTUS) 10 Unit(s) SubCutaneous at bedtime  insulin lispro (ADMELOG) corrective regimen sliding scale   SubCutaneous three times a day before meals  insulin lispro (ADMELOG) corrective regimen sliding scale   SubCutaneous at bedtime  lisinopril 40 milliGRAM(s) Oral daily  metoprolol succinate ER 25 milliGRAM(s) Oral daily  piperacillin/tazobactam IVPB.. 3.375 Gram(s) IV Intermittent every 8 hours    MEDICATIONS  (PRN):  acetaminophen   Tablet .. 650 milliGRAM(s) Oral every 4 hours PRN Mild Pain (1 - 3)  acetaminophen   Tablet .. 1000 milliGRAM(s) Oral every 6 hours PRN Moderate Pain (4 - 6)  traMADol 50 milliGRAM(s) Oral every 6 hours PRN Severe Pain (7 - 10)      Allergies    No Known Allergies    Intolerances        Social History:  Denies any tobacco or illicit drug use. 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 No nausea, No vomiting; No diarrhea, No constipation. [  ] BM  GENITOURINARY: No dysuria, No frequency, No urgency, No hematuria, NO incontinence  NEUROLOGICAL: No headaches, No dizziness, No numbness, No tingling, No tremors, No weakness  EXT: r foot pain at surgical site. Dressing c/d/i   SKIN:  [  ] No itching, burning, rashes, or lesions   MUSCULOSKELETAL: No joint pain ,No Jt swelling; No muscle pain, No back pain, No extremity pain  PSYCHIATRIC: No depression,  No anxiety,  No mood swings ,No difficulty sleeping at night  PAIN SCALE: [  ] None  [x  ] Other-8/10  ROS Unable to obtain due to - [  ] Dementia  [  ] Lethargy [  ] Drowsy [  ] Sedated [  ] non verbal  REST OF REVIEW Of SYSTEM - [ x ] Normal     Vital Signs Last 24 Hrs  T(C): 37.2 (19 Dec 2020 05:31), Max: 37.2 (18 Dec 2020 20:46)  T(F): 99 (19 Dec 2020 05:31), Max: 99 (19 Dec 2020 05:31)  HR: 81 (19 Dec 2020 05:31) (81 - 102)  BP: 124/53 (19 Dec 2020 05:31) (102/60 - 152/98)  BP(mean): --  RR: 18 (19 Dec 2020 05:31) (14 - 18)  SpO2: 97% (19 Dec 2020 05:31) (96% - 99%)  Finger Stick          PHYSICAL EXAM:  GENERAL:  [x  ] NAD , [ x ] well appearing, [  ] Agitated, [  ] Drowsy,  [  ] Lethargy, [  ] confused   HEAD:  [  x] Normal, [  ] Other  EYES:  [x  ] EOMI, [  x] PERRLA, [x  ] conjunctiva and sclera clear normal, [  ] Other,  [  ] Pallor,[  ] Discharge  ENMT:  [  x] Normal, [  ] Moist mucous membranes, [  ] Good dentition, [  ] No Thrush  NECK:  [ x ] Supple, [  ] No JVD, [  ] Normal thyroid, [  ] Lymphadenopathy [  ] Other  CHEST/LUNG:  [x  ] Clear to auscultation bilaterally, [ x ] Breath Sounds equal B/L / Decrease, [  ] poor effort  [  ] No rales, [  ] No rhonchi  [  ]  No wheezing,   HEART:  [ x ] Regular rate and rhythm, [  ] tachycardia, [  ] Bradycardia,  [  ] irregular  [ x ] No murmurs, No rubs, No gallops, [  ] PPM in place (Mfr:  )  ABDOMEN:  [x  ] Soft, [ x ] Nontender, [  x] Nondistended, [  ] No mass, [  ] Bowel sounds present, [  ] obese  NERVOUS SYSTEM:  [x  ] Alert & Oriented X3, [  ] Nonfocal  [  ] Confusion  [  ] Encephalopathic [  ] Sedated [  ] Unable to assess, [  ] Dementia [  ] Other-  EXTREMITIES: [  ] 2+ Peripheral Pulses, No clubbing, No cyanosis,  [  ] edema B/L lower EXT. [  ] PVD stasis skin changes B/L Lower EXT, [  ] wound  LYMPH: No lymphadenopathy noted  SKIN:  [  ] No rashes or lesions, [  ] Pressure Ulcers, [  ] ecchymosis, [  ] Skin Tears, [ x ] Other R foot dressing c/d/i    DIET: Diet, Consistent Carbohydrate w/Evening Snack:   DASH/TLC Sodium & Cholesterol Restricted (12-18-20 @ 11:21)      LABS:                        12.4   10.71 )-----------( 262      ( 19 Dec 2020 08:19 )             38.3     19 Dec 2020 08:19    140    |  107    |  23     ----------------------------<  159    5.0     |  29     |  0.91     Ca    8.4        19 Dec 2020 08:19      PT/INR - ( 18 Dec 2020 07:54 )   PT: 11.8 sec;   INR: 1.01 ratio         PTT - ( 18 Dec 2020 07:54 )  PTT:30.3 sec      Culture Results:   Moderate Serratia marcescens Multiple Morphological Strains  Normal skin qi isolated (12-16 @ 15:58)      culture blood  -- .Tissue right metatarsal head bone cul 12-18 @ 16:42    culture urine  --  12-18 @ 16:42              Culture - Tissue with Gram Stain (collected 18 Dec 2020 16:42)  Source: .Tissue right hallux bone culture  Gram Stain (18 Dec 2020 22:20):    No polymorphonuclear leukocytes per low power field    Few Gram positive cocci in pairs per oil power field    Culture - Tissue with Gram Stain (collected 18 Dec 2020 16:42)  Source: .Tissue right metatarsal head bone cul  Gram Stain (18 Dec 2020 22:31):    No polymorphonuclear leukocytes per low power field    No organisms seen per oil power field    Culture - Other (collected 16 Dec 2020 15:58)  Source: .Other R hallux  Final Report (18 Dec 2020 19:40):    Moderate Serratia marcescens Multiple Morphological Strains    Normal skin qi isolated  Organism: Serratia marcescens  Serratia marcescens (18 Dec 2020 19:40)  Organism: Serratia marcescens (18 Dec 2020 19:40)  Organism: Serratia marcescens (18 Dec 2020 19:40)         Anemia Panel:      Thyroid Panel:                RADIOLOGY & ADDITIONAL TESTS:      HEALTH ISSUES - PROBLEM Dx:  Diabetes mellitus type 2, uncontrolled  Diabetes mellitus type 2, uncontrolled    Peripheral vascular disease  Peripheral vascular disease    Need for prophylactic measure  Need for prophylactic measure    Hyperlipidemia  Hyperlipidemia    Diabetes  Diabetes    HTN (hypertension)  HTN (hypertension)    Osteomyelitis of right foot, unspecified type  Osteomyelitis of right foot, unspecified type    Gangrene of toe of right foot  Gangrene of toe of right foot            Consultant(s) Notes Reviewed:  [  ] YES     Care Discussed with [X] Consultants  [  ] Patient  [  ] Family [  ] HCP [  ]   [  ] Social Service  [  ] RN, [  ] Physical Therapy,[  ] Palliative care team  DVT PPX: [  ] Lovenox, [  ] S C Heparin, [  ] Coumadin, [  ] Xarelto, [  ] Eliquis, [  ] Pradaxa, [  ] IV Heparin drip, [  ] SCD [  ] Contraindication 2 to GI Bleed,[  ] Ambulation [  ] Contraindicated 2 to  bleed [  ] Contraindicated 2 to Brain Bleed  Advanced directive: [  x] None, [  ] DNR/DNI Patient is a 60y old  Male who presents with a chief complaint of Osteomyelitis (18 Dec 2020 15:49)    HPI:  61yo F with PMH of HTN, HLD, DM2 (not on insulin), peripheral vascular insufficiency s/p angioplasty of RLE (Nov 2020) sent by Podiatry, Dr. Stark for R hallux osteomyelitis. Patient states initial wound was in Oct 2020 after he had an ingrown toenail. Patient had vascular angiogram in November to improve blood flow however no improvement. Denies fever, chills, chest pain, sob, abd pain, N/V, UE/LE weakness or paresthesias.     In the ED:  VS- 98.4F, 114HR, 158/85, 18RR, O2 sat 98% on RA  Labs significant for glucose 154. CBC and CMP WNL.  ECG showed sinus tachycardia (107), no signs of acute ischemia  MRI showed Acute osteomyelitis of the right first distal phalanx. Prominent soft tissue edema about the first toe with numerous punctate foci of hypointense signal corresponding to air on the prior radiographs. These foci of air appear confined to the plantar soft tissues of the first distal phalanx without definite extension proximally, however MRI is insensitive for this finding.  s/p Zosyn in the ED, pt was seen by podiatry & ID in ER. (16 Dec 2020 10:52)    INTERVAL HPI:  12/17: Patient seen and examined at bedside. No events overnight. Scheduled for R hallux amputation tomorrow. Patient feels well and denies any complaints this AM. NO Abx as per ID.    12/18: Patient seen and examined at bedside. Scheduled for R hallux amputation today. Patient notes feeling mildly anxious before surgery. Otherwise feels well and denies any complaints. No HA, dizziness, CP, SOB, n/v, or abdominal pain.    12/19: Patient seen and examined at bedside. POD 1 s/p partial ray amputation of R hallux. Patient with 8/10 pain in r foot despite pain medications. Otherwise feels well and denies any complaints. No HA, dizziness, CP, SOB, n/v, or abdominal pain.    OVERNIGHT EVENTS: none     Home Medications:  atorvastatin 40 mg oral tablet: 1 tab(s) orally once a day (16 Dec 2020 16:36)  cilostazol 100 mg oral tablet: 1 tab(s) orally 2 times a day (16 Dec 2020 16:36)  clopidogrel 75 mg oral tablet: 1 tab(s) orally once a day (16 Dec 2020 16:36)  doxycycline hyclate 100 mg oral capsule: 1 cap(s) orally every 12 hours (16 Dec 2020 16:36)  Invokana 300 mg oral tablet: 1 tab(s) orally once a day (16 Dec 2020 16:36)  Januvia 100 mg oral tablet: 1 tab(s) orally once a day (16 Dec 2020 16:36)  K-Phos No. 2 oral tablet: orally 2 times a day (16 Dec 2020 16:36)  melatonin 3 mg oral tablet: 1 tab(s) orally once a day (at bedtime) (16 Dec 2020 16:36)  metFORMIN 1000 mg oral tablet: 1 tab(s) orally 2 times a day (16 Dec 2020 16:36)  Metoprolol Succinate ER 25 mg oral tablet, extended release: 1 tab(s) orally once a day (16 Dec 2020 16:36)  MiraLax oral powder for reconstitution: 7 gram(s) orally once a day (16 Dec 2020 16:36)  Mycostatin 100,000 units/mL oral suspension: 4 milliliter(s) orally 2 times a day (16 Dec 2020 16:36)  Norvasc 10 mg oral tablet: 1 tab(s) orally once a day (16 Dec 2020 16:36)  quinapril 40 mg oral tablet: 1 tab(s) orally once a day (16 Dec 2020 16:36)      MEDICATIONS  (STANDING):  atorvastatin 40 milliGRAM(s) Oral at bedtime  dextrose 40% Gel 15 Gram(s) Oral once  dextrose 5%. 1000 milliLiter(s) (50 mL/Hr) IV Continuous <Continuous>  dextrose 5%. 1000 milliLiter(s) (100 mL/Hr) IV Continuous <Continuous>  dextrose 50% Injectable 25 Gram(s) IV Push once  dextrose 50% Injectable 12.5 Gram(s) IV Push once  dextrose 50% Injectable 25 Gram(s) IV Push once  glucagon  Injectable 1 milliGRAM(s) IntraMuscular once  insulin glargine Injectable (LANTUS) 10 Unit(s) SubCutaneous at bedtime  insulin lispro (ADMELOG) corrective regimen sliding scale   SubCutaneous three times a day before meals  insulin lispro (ADMELOG) corrective regimen sliding scale   SubCutaneous at bedtime  lisinopril 40 milliGRAM(s) Oral daily  metoprolol succinate ER 25 milliGRAM(s) Oral daily  piperacillin/tazobactam IVPB.. 3.375 Gram(s) IV Intermittent every 8 hours    MEDICATIONS  (PRN):  acetaminophen   Tablet .. 650 milliGRAM(s) Oral every 4 hours PRN Mild Pain (1 - 3)  acetaminophen   Tablet .. 1000 milliGRAM(s) Oral every 6 hours PRN Moderate Pain (4 - 6)  traMADol 50 milliGRAM(s) Oral every 6 hours PRN Severe Pain (7 - 10)      Allergies    No Known Allergies    Intolerances        Social History:  Denies any tobacco or illicit drug use. 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 No nausea, No vomiting; No diarrhea, No constipation. [  ] BM  GENITOURINARY: No dysuria, No frequency, No urgency, No hematuria, NO incontinence  NEUROLOGICAL: No headaches, No dizziness, No numbness, No tingling, No tremors, No weakness  EXT: r foot pain at surgical site. Dressing c/d/i   SKIN:  [ x ] No itching, burning, rashes, or lesions   MUSCULOSKELETAL: No joint pain ,No Jt swelling; No muscle pain, No back pain, No extremity pain  PSYCHIATRIC: No depression,  No anxiety,  No mood swings ,No difficulty sleeping at night  PAIN SCALE: [  ] None  [x  ] Other-8/10 at surgical site  ROS Unable to obtain due to - [  ] Dementia  [  ] Lethargy [  ] Drowsy [  ] Sedated [  ] non verbal  REST OF REVIEW Of SYSTEM - [ x ] Normal     Vital Signs Last 24 Hrs  T(C): 37.2 (19 Dec 2020 05:31), Max: 37.2 (18 Dec 2020 20:46)  T(F): 99 (19 Dec 2020 05:31), Max: 99 (19 Dec 2020 05:31)  HR: 81 (19 Dec 2020 05:31) (81 - 102)  BP: 124/53 (19 Dec 2020 05:31) (102/60 - 152/98)  BP(mean): --  RR: 18 (19 Dec 2020 05:31) (14 - 18)  SpO2: 97% (19 Dec 2020 05:31) (96% - 99%)  Finger Stick          PHYSICAL EXAM:  GENERAL:  [x  ] NAD , [ x ] well appearing, [  ] Agitated, [  ] Drowsy,  [  ] Lethargy, [  ] confused   HEAD:  [  x] Normal, [  ] Other  EYES:  [x  ] EOMI, [  x] PERRLA, [x  ] conjunctiva and sclera clear normal, [  ] Other,  [  ] Pallor,[  ] Discharge  ENMT:  [  x] Normal, [x  ] Moist mucous membranes, [x] Good dentition, [ x ] No Thrush  NECK:  [ x ] Supple, [x  ] No JVD, [ x ] Normal thyroid, [  ] Lymphadenopathy [  ] Other  CHEST/LUNG:  [x  ] Clear to auscultation bilaterally, [ x ] Breath Sounds equal B/L  [  ] poor effort  [ x ] No rales, [ x ] No rhonchi  [ x ]  No wheezing,   HEART:  [ x ] Regular rate and rhythm, [  ] tachycardia, [  ] Bradycardia,  [  ] irregular  [ x ] No murmurs, No rubs, No gallops, [  ] PPM in place (Mfr:  )  ABDOMEN:  [x  ] Soft, [ x ] Nontender, [  x] Nondistended, [x  ] No mass, [ x ] Bowel sounds present, [ x ] obese  NERVOUS SYSTEM:  [x  ] Alert & Oriented X3, [x  ] Nonfocal  [  ] Confusion  [  ] Encephalopathic [  ] Sedated [  ] Unable to assess, [  ] Dementia [  ] Other-  EXTREMITIES: [ x ] 2+ Peripheral Pulses, No clubbing, No cyanosis,  [  ] edema B/L lower EXT. [  ] PVD stasis skin changes B/L Lower EXT, [  ] wound  LYMPH: No lymphadenopathy noted  SKIN:  [ x ] No rashes or lesions, [  ] Pressure Ulcers, [  ] ecchymosis, [  ] Skin Tears, [ x ] Other R foot dressing c/d/i    DIET: Diet, Consistent Carbohydrate w/Evening Snack:   DASH/TLC Sodium & Cholesterol Restricted (12-18-20 @ 11:21)      LABS:                        12.4   10.71 )-----------( 262      ( 19 Dec 2020 08:19 )             38.3     19 Dec 2020 08:19    140    |  107    |  23     ----------------------------<  159    5.0     |  29     |  0.91     Ca    8.4        19 Dec 2020 08:19      PT/INR - ( 18 Dec 2020 07:54 )   PT: 11.8 sec;   INR: 1.01 ratio         PTT - ( 18 Dec 2020 07:54 )  PTT:30.3 sec      Culture Results:   Moderate Serratia marcescens Multiple Morphological Strains  Normal skin qi isolated (12-16 @ 15:58)      culture blood  -- .Tissue right metatarsal head bone cul 12-18 @ 16:42    culture urine  --  12-18 @ 16:42      Culture - Tissue with Gram Stain (collected 18 Dec 2020 16:42)  Source: .Tissue right hallux bone culture  Gram Stain (18 Dec 2020 22:20):    No polymorphonuclear leukocytes per low power field    Few Gram positive cocci in pairs per oil power field    Culture - Tissue with Gram Stain (collected 18 Dec 2020 16:42)  Source: .Tissue right metatarsal head bone cul  Gram Stain (18 Dec 2020 22:31):    No polymorphonuclear leukocytes per low power field    No organisms seen per oil power field    Culture - Other (collected 16 Dec 2020 15:58)  Source: .Other R hallux  Final Report (18 Dec 2020 19:40):    Moderate Serratia marcescens Multiple Morphological Strains    Normal skin qi isolated  Organism: Serratia marcescens  Serratia marcescens (18 Dec 2020 19:40)  Organism: Serratia marcescens (18 Dec 2020 19:40)  Organism: Serratia marcescens (18 Dec 2020 19:40)    RADIOLOGY & ADDITIONAL TESTS: NONE      HEALTH ISSUES - PROBLEM Dx:  Diabetes mellitus type 2, uncontrolled  Diabetes mellitus type 2, uncontrolled    Peripheral vascular disease  Peripheral vascular disease    Need for prophylactic measure  Need for prophylactic measure    Hyperlipidemia  Hyperlipidemia    Diabetes  Diabetes    HTN (hypertension)  HTN (hypertension)    Osteomyelitis of right foot, unspecified type  Osteomyelitis of right foot, unspecified type    Gangrene of toe of right foot  Gangrene of toe of right foot      Consultant(s) Notes Reviewed:  [ x ] YES     Care Discussed with [X] Consultants  [ x ] Patient  [  ] Family [  ] HCP [  ]   [  ] Social Service  [ x ] RN, [  ] Physical Therapy,[  ] Palliative care team  DVT PPX: [ x ] Lovenox, [  ] S C Heparin, [  ] Coumadin, [  ] Xarelto, [  ] Eliquis, [  ] Pradaxa, [  ] IV Heparin drip, [  ] SCD [  ] Contraindication 2 to GI Bleed,[  ] Ambulation [  ] Contraindicated 2 to  bleed [  ] Contraindicated 2 to Brain Bleed  Advanced directive: [  x] None, [  ] DNR/DNI

## 2020-12-19 NOTE — PROGRESS NOTE ADULT - ASSESSMENT
59yo F with PMH of HTN, HLD, DM2 (not on insulin), peripheral vascular insufficiency s/p angioplasty of RLE (Nov 2020) sent by Podiatry, Dr. Stark for R hallux osteomyelitis  admitted for R hallux osteomyelitis for IV abx and amputation. POD 1 s/p r partial ray amputation.

## 2020-12-19 NOTE — PROGRESS NOTE ADULT - PROBLEM SELECTOR PLAN 1
Acute osteomyelitis of the R first distal phalanx  R hallux wound since 10/2020 with infection, s/p augmentin, had worsening of infection with gangrene, purulence and malodor. Had been on doxycycline at home. Seen in wound care clinic and recommended for amputation.   MRI confirmed OM of the R first distal phalanx.  S/p pip-tazo once in the ER, held antibiotic until OR  R hallux wound culture with Serratia - sensitivities as above   S/p OR for R partial ray amputation 12/18, noted with purulence - OR culture gram stains with GPC in pairs  Afebrile, nontoxic, no leukocytosis    Follow for OR bone cultures and surg path  Continues on Pip-tazo 3.375mg IV Q6h pending cultures

## 2020-12-19 NOTE — PROGRESS NOTE ADULT - ATTENDING COMMENTS
Efren Frederick M.D.  [facility], Division of Infectious Diseases  [phone]  After 5pm on weekdays and all day on weekends - please call [phone]

## 2020-12-20 LAB
-  AMIKACIN: SIGNIFICANT CHANGE UP
-  AMOXICILLIN/CLAVULANIC ACID: SIGNIFICANT CHANGE UP
-  AMPICILLIN/SULBACTAM: SIGNIFICANT CHANGE UP
-  AMPICILLIN: SIGNIFICANT CHANGE UP
-  AZTREONAM: SIGNIFICANT CHANGE UP
-  CEFAZOLIN: SIGNIFICANT CHANGE UP
-  CEFEPIME: SIGNIFICANT CHANGE UP
-  CEFOXITIN: SIGNIFICANT CHANGE UP
-  CEFTRIAXONE: SIGNIFICANT CHANGE UP
-  CIPROFLOXACIN: SIGNIFICANT CHANGE UP
-  ERTAPENEM: SIGNIFICANT CHANGE UP
-  GENTAMICIN: SIGNIFICANT CHANGE UP
-  LEVOFLOXACIN: SIGNIFICANT CHANGE UP
-  MEROPENEM: SIGNIFICANT CHANGE UP
-  PIPERACILLIN/TAZOBACTAM: SIGNIFICANT CHANGE UP
-  TOBRAMYCIN: SIGNIFICANT CHANGE UP
-  TRIMETHOPRIM/SULFAMETHOXAZOLE: SIGNIFICANT CHANGE UP
ANION GAP SERPL CALC-SCNC: 3 MMOL/L — LOW (ref 5–17)
BASOPHILS # BLD AUTO: 0.05 K/UL — SIGNIFICANT CHANGE UP (ref 0–0.2)
BASOPHILS NFR BLD AUTO: 0.5 % — SIGNIFICANT CHANGE UP (ref 0–2)
BUN SERPL-MCNC: 19 MG/DL — SIGNIFICANT CHANGE UP (ref 7–23)
CALCIUM SERPL-MCNC: 8.4 MG/DL — LOW (ref 8.5–10.1)
CHLORIDE SERPL-SCNC: 104 MMOL/L — SIGNIFICANT CHANGE UP (ref 96–108)
CO2 SERPL-SCNC: 31 MMOL/L — SIGNIFICANT CHANGE UP (ref 22–31)
CREAT SERPL-MCNC: 0.87 MG/DL — SIGNIFICANT CHANGE UP (ref 0.5–1.3)
EOSINOPHIL # BLD AUTO: 0.26 K/UL — SIGNIFICANT CHANGE UP (ref 0–0.5)
EOSINOPHIL NFR BLD AUTO: 2.5 % — SIGNIFICANT CHANGE UP (ref 0–6)
GLUCOSE SERPL-MCNC: 163 MG/DL — HIGH (ref 70–99)
HCT VFR BLD CALC: 36.7 % — LOW (ref 39–50)
HGB BLD-MCNC: 11.9 G/DL — LOW (ref 13–17)
IMM GRANULOCYTES NFR BLD AUTO: 0.3 % — SIGNIFICANT CHANGE UP (ref 0–1.5)
LYMPHOCYTES # BLD AUTO: 1.95 K/UL — SIGNIFICANT CHANGE UP (ref 1–3.3)
LYMPHOCYTES # BLD AUTO: 18.9 % — SIGNIFICANT CHANGE UP (ref 13–44)
MCHC RBC-ENTMCNC: 29.5 PG — SIGNIFICANT CHANGE UP (ref 27–34)
MCHC RBC-ENTMCNC: 32.4 GM/DL — SIGNIFICANT CHANGE UP (ref 32–36)
MCV RBC AUTO: 91.1 FL — SIGNIFICANT CHANGE UP (ref 80–100)
METHOD TYPE: SIGNIFICANT CHANGE UP
MONOCYTES # BLD AUTO: 1.06 K/UL — HIGH (ref 0–0.9)
MONOCYTES NFR BLD AUTO: 10.3 % — SIGNIFICANT CHANGE UP (ref 2–14)
NEUTROPHILS # BLD AUTO: 6.96 K/UL — SIGNIFICANT CHANGE UP (ref 1.8–7.4)
NEUTROPHILS NFR BLD AUTO: 67.5 % — SIGNIFICANT CHANGE UP (ref 43–77)
NRBC # BLD: 0 /100 WBCS — SIGNIFICANT CHANGE UP (ref 0–0)
PLATELET # BLD AUTO: 233 K/UL — SIGNIFICANT CHANGE UP (ref 150–400)
POTASSIUM SERPL-MCNC: 4.8 MMOL/L — SIGNIFICANT CHANGE UP (ref 3.5–5.3)
POTASSIUM SERPL-SCNC: 4.8 MMOL/L — SIGNIFICANT CHANGE UP (ref 3.5–5.3)
RBC # BLD: 4.03 M/UL — LOW (ref 4.2–5.8)
RBC # FLD: 12.8 % — SIGNIFICANT CHANGE UP (ref 10.3–14.5)
SARS-COV-2 RNA SPEC QL NAA+PROBE: SIGNIFICANT CHANGE UP
SODIUM SERPL-SCNC: 138 MMOL/L — SIGNIFICANT CHANGE UP (ref 135–145)
WBC # BLD: 10.31 K/UL — SIGNIFICANT CHANGE UP (ref 3.8–10.5)
WBC # FLD AUTO: 10.31 K/UL — SIGNIFICANT CHANGE UP (ref 3.8–10.5)

## 2020-12-20 PROCEDURE — 99232 SBSQ HOSP IP/OBS MODERATE 35: CPT

## 2020-12-20 RX ORDER — INSULIN LISPRO 100/ML
5 VIAL (ML) SUBCUTANEOUS
Refills: 0 | Status: DISCONTINUED | OUTPATIENT
Start: 2020-12-20 | End: 2020-12-21

## 2020-12-20 RX ORDER — ERTAPENEM SODIUM 1 G/1
1000 INJECTION, POWDER, LYOPHILIZED, FOR SOLUTION INTRAMUSCULAR; INTRAVENOUS EVERY 24 HOURS
Refills: 0 | Status: DISCONTINUED | OUTPATIENT
Start: 2020-12-20 | End: 2020-12-21

## 2020-12-20 RX ADMIN — Medication 3 UNIT(S): at 12:19

## 2020-12-20 RX ADMIN — LISINOPRIL 40 MILLIGRAM(S): 2.5 TABLET ORAL at 06:27

## 2020-12-20 RX ADMIN — INSULIN GLARGINE 10 UNIT(S): 100 INJECTION, SOLUTION SUBCUTANEOUS at 21:39

## 2020-12-20 RX ADMIN — PIPERACILLIN AND TAZOBACTAM 25 GRAM(S): 4; .5 INJECTION, POWDER, LYOPHILIZED, FOR SOLUTION INTRAVENOUS at 08:10

## 2020-12-20 RX ADMIN — HYDROMORPHONE HYDROCHLORIDE 1 MILLIGRAM(S): 2 INJECTION INTRAMUSCULAR; INTRAVENOUS; SUBCUTANEOUS at 20:00

## 2020-12-20 RX ADMIN — Medication 4: at 12:18

## 2020-12-20 RX ADMIN — PIPERACILLIN AND TAZOBACTAM 25 GRAM(S): 4; .5 INJECTION, POWDER, LYOPHILIZED, FOR SOLUTION INTRAVENOUS at 01:09

## 2020-12-20 RX ADMIN — Medication 2: at 08:11

## 2020-12-20 RX ADMIN — Medication 3 UNIT(S): at 08:10

## 2020-12-20 RX ADMIN — ATORVASTATIN CALCIUM 40 MILLIGRAM(S): 80 TABLET, FILM COATED ORAL at 21:38

## 2020-12-20 RX ADMIN — SENNA PLUS 2 TABLET(S): 8.6 TABLET ORAL at 21:38

## 2020-12-20 RX ADMIN — ERTAPENEM SODIUM 120 MILLIGRAM(S): 1 INJECTION, POWDER, LYOPHILIZED, FOR SOLUTION INTRAMUSCULAR; INTRAVENOUS at 13:19

## 2020-12-20 RX ADMIN — Medication 25 MILLIGRAM(S): at 06:27

## 2020-12-20 RX ADMIN — Medication 5 UNIT(S): at 17:28

## 2020-12-20 RX ADMIN — HYDROMORPHONE HYDROCHLORIDE 1 MILLIGRAM(S): 2 INJECTION INTRAMUSCULAR; INTRAVENOUS; SUBCUTANEOUS at 19:02

## 2020-12-20 RX ADMIN — HYDROMORPHONE HYDROCHLORIDE 1 MILLIGRAM(S): 2 INJECTION INTRAMUSCULAR; INTRAVENOUS; SUBCUTANEOUS at 08:10

## 2020-12-20 RX ADMIN — ENOXAPARIN SODIUM 40 MILLIGRAM(S): 100 INJECTION SUBCUTANEOUS at 12:18

## 2020-12-20 NOTE — PROGRESS NOTE ADULT - ASSESSMENT
59yo F with PMH of HTN, HLD, DM2 (not on insulin), peripheral vascular insufficiency s/p angioplasty of RLE (Nov 2020) sent by Podiatry, Dr. Stark for R hallux osteomyelitis  admitted for R hallux osteomyelitis for IV abx and amputation. POD 2 s/p r partial ray amputation.

## 2020-12-20 NOTE — PROGRESS NOTE ADULT - ATTENDING COMMENTS
Efren Frederick M.D.  [facility], Division of Infectious Diseases  [phone]  After 5pm on weekdays and all day on weekends - please call [phone]

## 2020-12-20 NOTE — PROGRESS NOTE ADULT - PROBLEM SELECTOR PLAN 1
increase admelog 5 units 3x/day before meals  cont lantus 10 units qhs  cont mod dose admelog scale coverage qac/qhs  cont cons cho diet  goal bg 100-180 in hosp setting

## 2020-12-20 NOTE — PROGRESS NOTE ADULT - ATTENDING COMMENTS
pt seen, examined, case & care plan d/w pt, residents at detail.  AM labs   D/C plan. pt seen, examined, case & care plan d/w pt, residents at detail.  AM labs   D/C plan. Repeat COVID repeat Test today.

## 2020-12-20 NOTE — PROGRESS NOTE ADULT - PROBLEM SELECTOR PLAN 4
Chronic, not on insulin  -A1C 8.4  -hold home Januvia, canagliflozin, and metformin in hospital setting  -Low dose ISS, Accu-Cheks q ac/hs  -Nutrition consult  -DR C perlman -Lantus 10 u Q HS Chronic, not on insulin  -A1C 8.4  --DR C perlman -Lantus 10 u Q HS, Pre meals Humalog 5 u 3x day  -hold home Januvia, canagliflozin, and metformin in hospital setting  -Low dose ISS, Accu-Cheks q ac/hs  -Nutrition consult

## 2020-12-20 NOTE — PROGRESS NOTE ADULT - ASSESSMENT
59yo F with PMH of HTN, HLD, DM2 (not on insulin), sent by Podiatry, Dr. Gomez for R hallux osteomyelitis, scheduled for a right Hallex Amputation    Right Hallux OM  - S/p partial ray amputation of foot 12/18/20  - Euvolemic on exam.  - Pain control  -fu pathology     HTN  - 132/74  - Continue Lisinopril 40mg 1 po qd and Metoprolol succinate XL 25mg 1po qd      PAD, with ag atherosclerosis on dopplers  - Continue Lipitor  - Resume  Plavix per surgery     - Monitor and replete lytes, keep K>4, Mg>2.  - All other medical needs as per primary team.  - Other cardiovascular workup will depend on clinical course.  - Will continue to follow.    Gisela FELTONP-C  Cardiology NP  [id number] [phone]   59yo F with PMH of HTN, HLD, DM2 (not on insulin), sent by Podiatry, Dr. Gomez for R hallux osteomyelitis, scheduled for a right Hallex Amputation    Right Hallux OM  - S/p partial ray amputation of foot 12/18/20  - Euvolemic on exam.  - Pain control  - fu pathology     HTN  - 132/74  - Continue Lisinopril 40mg 1 po qd and Metoprolol succinate XL 25mg 1po qd    PAD, with ag atherosclerosis on dopplers  - Continue Lipitor  - Resume  Plavix per surgery     - Monitor and replete lytes, keep K>4, Mg>2.  - All other medical needs as per primary team.  - Other cardiovascular workup will depend on clinical course.  - Will continue to follow.    Gisela FELTONP-C  Cardiology NP  [id number] [phone]

## 2020-12-20 NOTE — PROGRESS NOTE ADULT - SUBJECTIVE AND OBJECTIVE BOX
CAPILLARY BLOOD GLUCOSE      POCT Blood Glucose.: 226 mg/dL (20 Dec 2020 11:49)  POCT Blood Glucose.: 160 mg/dL (20 Dec 2020 08:04)  POCT Blood Glucose.: 203 mg/dL (19 Dec 2020 21:53)  POCT Blood Glucose.: 136 mg/dL (19 Dec 2020 16:27)      Vital Signs Last 24 Hrs  T(C): 36.8 (20 Dec 2020 04:54), Max: 37.1 (19 Dec 2020 13:05)  T(F): 98.3 (20 Dec 2020 04:54), Max: 98.7 (19 Dec 2020 13:05)  HR: 77 (20 Dec 2020 04:54) (72 - 77)  BP: 132/74 (20 Dec 2020 04:54) (114/70 - 132/74)  BP(mean): 7 (19 Dec 2020 21:30) (7 - 7)  RR: 18 (20 Dec 2020 04:54) (18 - 18)  SpO2: 95% (20 Dec 2020 04:54) (95% - 98%)    General: WN/WD NAD  Respiratory: CTA B/L  CV: RRR, S1S2, no murmurs, rubs or gallops  Abdominal: Soft, NT, ND +BS, Last BM  Extremities: le foot dsg intact     12-20    138  |  104  |  19  ----------------------------<  163<H>  4.8   |  31  |  0.87    Ca    8.4<L>      20 Dec 2020 06:28        atorvastatin 40 milliGRAM(s) Oral at bedtime  dextrose 40% Gel 15 Gram(s) Oral once  dextrose 50% Injectable 25 Gram(s) IV Push once  dextrose 50% Injectable 25 Gram(s) IV Push once  dextrose 50% Injectable 12.5 Gram(s) IV Push once  glucagon  Injectable 1 milliGRAM(s) IntraMuscular once  insulin glargine Injectable (LANTUS) 10 Unit(s) SubCutaneous at bedtime  insulin lispro (ADMELOG) corrective regimen sliding scale   SubCutaneous at bedtime  insulin lispro (ADMELOG) corrective regimen sliding scale   SubCutaneous three times a day before meals  insulin lispro Injectable (ADMELOG) 3 Unit(s) SubCutaneous three times a day before meals

## 2020-12-20 NOTE — PROGRESS NOTE ADULT - SUBJECTIVE AND OBJECTIVE BOX
[facility], Division of Infectious Diseases  OFELIA Harvey Y. Patel, S. Shah  [phone]  ([phone] - weekdays after 5pm and weekends)    Name: LOIDA QUEZADA  Age/Gender: 60y Male  MRN: [id number]    Interval History:  Patient with no new complaints, foot pain controlled with pain med.  Denies fever, chills, chest pain, dyspnea, cough, abd pain, n/v/d.  ROS reviewed, pertinent positives and negatives as above.   Notes reviewed. Afebrile    Objective:  Vitals:   T(F): 98.3 (12-20-20 @ 04:54), Max: 98.7 (12-19-20 @ 13:05)  HR: 77 (12-20-20 @ 04:54) (72 - 77)  BP: 132/74 (12-20-20 @ 04:54) (114/70 - 132/74)  RR: 18 (12-20-20 @ 04:54) (18 - 18)  SpO2: 95% (12-20-20 @ 04:54) (95% - 98%)    Physical Examination:  General: no acute distress, nontoxic  HEENT: NC/AT, EOMI, anicteric, neck supple  Cardio: S1, S2 heard, RRR, no murmurs  Resp: CTA bilaterally, no rales/wheezes/rhonchi  Abd: soft, NT, ND, + BS  Neuro: AAOx3, no obvious focal deficits  Ext: RLE in dressing, no edema, moving extremities  Skin: warm, dry, no visible rash  Psych: appropriate affect and mood for situation  Lines: PIV    Laboratory Studies:  CBC:                       11.9   10.31 )-----------( 233      ( 20 Dec 2020 06:28 )             36.7     CMP: 12-20    138  |  104  |  19  ----------------------------<  163<H>  4.8   |  31  |  0.87    Ca    8.4<L>      20 Dec 2020 06:28    Microbiology:  12/18 - R metatarsal head bone culture - rare alpha hemolytic strep  12/18 - R hallux bone culture - moderate alpha hemolytic strep, few Serratia marcescens   12/16 - R hallux wound culture - moderate Serratia marcescens - sensitive to all except augmentin, amp, A/S, cefazolin, cefoxitin, normal skin qi  12/16 - COVID-19 ab - negative  12/16 - COVID-19 PCR - negative    Radiology:  Xray Foot AP + Lateral + Oblique, Right (12.18.20 @ 12:18) > Impression: Satisfactory postoperative appearance right foot  MR Foot No Cont, Right (12.16.20 @ 11:58) >Impression: Acute osteomyelitis of the right first distal phalanx. Prominent soft tissue edema about the first toe with numerous punctate foci of hypointense signal corresponding to air on the prior radiographs. These foci of air appear confined to the plantar soft tissues of the first distal phalanx without definite extension proximally, however MRI is insensitive for this finding.  Xray Foot AP + Lateral + Oblique, Right (12.16.20 @ 10:06) >IMPRESSION: Osteomyelitis of the great toe. Arterial calcification.  Xray Chest 2 Views PA/Lat (12.16.20 @ 10:05) >IMPRESSION: Negative chest.    Medications:  MEDICATIONS  (STANDING):  atorvastatin 40 milliGRAM(s) Oral at bedtime  dextrose 40% Gel 15 Gram(s) Oral once  dextrose 5%. 1000 milliLiter(s) (50 mL/Hr) IV Continuous <Continuous>  dextrose 5%. 1000 milliLiter(s) (100 mL/Hr) IV Continuous <Continuous>  dextrose 50% Injectable 25 Gram(s) IV Push once  dextrose 50% Injectable 25 Gram(s) IV Push once  dextrose 50% Injectable 12.5 Gram(s) IV Push once  enoxaparin Injectable 40 milliGRAM(s) SubCutaneous daily  glucagon  Injectable 1 milliGRAM(s) IntraMuscular once  insulin glargine Injectable (LANTUS) 10 Unit(s) SubCutaneous at bedtime  insulin lispro (ADMELOG) corrective regimen sliding scale   SubCutaneous at bedtime  insulin lispro (ADMELOG) corrective regimen sliding scale   SubCutaneous three times a day before meals  insulin lispro Injectable (ADMELOG) 3 Unit(s) SubCutaneous three times a day before meals  lisinopril 40 milliGRAM(s) Oral daily  metoprolol succinate ER 25 milliGRAM(s) Oral daily  piperacillin/tazobactam IVPB.. 3.375 Gram(s) IV Intermittent every 8 hours  senna 2 Tablet(s) Oral at bedtime    Antimicrobials:  piperacillin/tazobactam IVPB.. 3.375 Gram(s) IV Intermittent every 8 hours - started 12/18  s/p pip-tazo x1 in ER

## 2020-12-20 NOTE — PROGRESS NOTE ADULT - SUBJECTIVE AND OBJECTIVE BOX
Patient is a 60y old  Male who presents with a chief complaint of Osteomyelitis (19 Dec 2020 11:38)    HPI:  59yo F with PMH of HTN, HLD, DM2 (not on insulin), peripheral vascular insufficiency s/p angioplasty of RLE (Nov 2020) sent by Podiatry, Dr. Stark for R hallux osteomyelitis. Patient states initial wound was in Oct 2020 after he had an ingrown toenail. Patient had vascular angiogram in November to improve blood flow however no improvement. Denies fever, chills, chest pain, sob, abd pain, N/V, UE/LE weakness or paresthesias.     In the ED:  VS- 98.4F, 114HR, 158/85, 18RR, O2 sat 98% on RA  Labs significant for glucose 154. CBC and CMP WNL.  ECG showed sinus tachycardia (107), no signs of acute ischemia  MRI showed Acute osteomyelitis of the right first distal phalanx. Prominent soft tissue edema about the first toe with numerous punctate foci of hypointense signal corresponding to air on the prior radiographs. These foci of air appear confined to the plantar soft tissues of the first distal phalanx without definite extension proximally, however MRI is insensitive for this finding.  s/p Zosyn in the ED, pt was seen by podiatry & ID in ER. (16 Dec 2020 10:52)    INTERVAL HPI:   12/17: Patient seen and examined at bedside. No events overnight. Scheduled for R hallux amputation tomorrow. Patient feels well and denies any complaints this AM. NO Abx as per ID.    12/18: Patient seen and examined at bedside. Scheduled for R hallux amputation today. Patient notes feeling mildly anxious before surgery. Otherwise feels well and denies any complaints. No HA, dizziness, CP, SOB, n/v, or abdominal pain.    12/19: Patient seen and examined at bedside. POD 1 s/p partial ray amputation of R hallux. Patient with 8/10 pain in r foot despite pain medications. Otherwise feels well and denies any complaints. No HA, dizziness, CP, SOB, n/v, or abdominal pain.    12/20: Patient seen and examined at bedside. POD 2 s/p partial ray amputation. Patient states pain is currently well controlled. Feels well. No HA, dizziness, CP, SOB, n/v, or abdominal pain.      OVERNIGHT EVENTS: none     Home Medications:  atorvastatin 40 mg oral tablet: 1 tab(s) orally once a day (16 Dec 2020 16:36)  cilostazol 100 mg oral tablet: 1 tab(s) orally 2 times a day (16 Dec 2020 16:36)  clopidogrel 75 mg oral tablet: 1 tab(s) orally once a day (16 Dec 2020 16:36)  doxycycline hyclate 100 mg oral capsule: 1 cap(s) orally every 12 hours (16 Dec 2020 16:36)  Invokana 300 mg oral tablet: 1 tab(s) orally once a day (16 Dec 2020 16:36)  Januvia 100 mg oral tablet: 1 tab(s) orally once a day (16 Dec 2020 16:36)  K-Phos No. 2 oral tablet: orally 2 times a day (16 Dec 2020 16:36)  melatonin 3 mg oral tablet: 1 tab(s) orally once a day (at bedtime) (16 Dec 2020 16:36)  metFORMIN 1000 mg oral tablet: 1 tab(s) orally 2 times a day (16 Dec 2020 16:36)  Metoprolol Succinate ER 25 mg oral tablet, extended release: 1 tab(s) orally once a day (16 Dec 2020 16:36)  MiraLax oral powder for reconstitution: 7 gram(s) orally once a day (16 Dec 2020 16:36)  Mycostatin 100,000 units/mL oral suspension: 4 milliliter(s) orally 2 times a day (16 Dec 2020 16:36)  Norvasc 10 mg oral tablet: 1 tab(s) orally once a day (16 Dec 2020 16:36)  quinapril 40 mg oral tablet: 1 tab(s) orally once a day (16 Dec 2020 16:36)      MEDICATIONS  (STANDING):  atorvastatin 40 milliGRAM(s) Oral at bedtime  dextrose 40% Gel 15 Gram(s) Oral once  dextrose 5%. 1000 milliLiter(s) (50 mL/Hr) IV Continuous <Continuous>  dextrose 5%. 1000 milliLiter(s) (100 mL/Hr) IV Continuous <Continuous>  dextrose 50% Injectable 25 Gram(s) IV Push once  dextrose 50% Injectable 25 Gram(s) IV Push once  dextrose 50% Injectable 12.5 Gram(s) IV Push once  enoxaparin Injectable 40 milliGRAM(s) SubCutaneous daily  glucagon  Injectable 1 milliGRAM(s) IntraMuscular once  insulin glargine Injectable (LANTUS) 10 Unit(s) SubCutaneous at bedtime  insulin lispro (ADMELOG) corrective regimen sliding scale   SubCutaneous at bedtime  insulin lispro (ADMELOG) corrective regimen sliding scale   SubCutaneous three times a day before meals  insulin lispro Injectable (ADMELOG) 3 Unit(s) SubCutaneous three times a day before meals  lisinopril 40 milliGRAM(s) Oral daily  metoprolol succinate ER 25 milliGRAM(s) Oral daily  piperacillin/tazobactam IVPB.. 3.375 Gram(s) IV Intermittent every 8 hours  senna 2 Tablet(s) Oral at bedtime    MEDICATIONS  (PRN):  HYDROmorphone  Injectable 1 milliGRAM(s) IV Push every 4 hours PRN Severe Pain (7 - 10)  morphine  - Injectable 2 milliGRAM(s) IV Push every 6 hours PRN Moderate Pain (4 - 6)  polyethylene glycol 3350 17 Gram(s) Oral daily PRN Constipation  traMADol 50 milliGRAM(s) Oral every 6 hours PRN Mild Pain (1 - 3)      Allergies    No Known Allergies    Intolerances        Social History:  Denies any tobacco or illicit drug use. 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 No nausea, No vomiting; No diarrhea, No constipation. [  ] BM  GENITOURINARY: No dysuria, No frequency, No urgency, No hematuria, NO incontinence  NEUROLOGICAL: No headaches, No dizziness, No numbness, No tingling, No tremors, No weakness  EXT: r foot pain at surgical site. Dressing c/d/i   SKIN:  [ x ] No itching, burning, rashes, or lesions   MUSCULOSKELETAL: No joint pain ,No Jt swelling; No muscle pain, No back pain, No extremity pain  PSYCHIATRIC: No depression,  No anxiety,  No mood swings ,No difficulty sleeping at night  PAIN SCALE: [ x ] None    ROS Unable to obtain due to - [  ] Dementia  [  ] Lethargy [  ] Drowsy [  ] Sedated [  ] non verbal  REST OF REVIEW Of SYSTEM - [ x ] Normal     Vital Signs Last 24 Hrs  T(C): 36.8 (20 Dec 2020 04:54), Max: 37.1 (19 Dec 2020 13:05)  T(F): 98.3 (20 Dec 2020 04:54), Max: 98.7 (19 Dec 2020 13:05)  HR: 77 (20 Dec 2020 04:54) (72 - 77)  BP: 132/74 (20 Dec 2020 04:54) (114/70 - 132/74)  BP(mean): 7 (19 Dec 2020 21:30) (7 - 7)  RR: 18 (20 Dec 2020 04:54) (18 - 18)  SpO2: 95% (20 Dec 2020 04:54) (95% - 98%)  Finger Stick        12-19 @ 07:01  -  12-20 @ 07:00  --------------------------------------------------------  IN: 100 mL / OUT: 0 mL / NET: 100 mL        PHYSICAL EXAM:  GENERAL:  [x  ] NAD , [ x ] well appearing, [  ] Agitated, [  ] Drowsy,  [  ] Lethargy, [  ] confused   HEAD:  [  x] Normal, [  ] Other  EYES:  [x  ] EOMI, [  x] PERRLA, [x  ] conjunctiva and sclera clear normal, [  ] Other,  [  ] Pallor,[  ] Discharge  ENMT:  [  x] Normal, [x  ] Moist mucous membranes, [x] Good dentition, [ x ] No Thrush  NECK:  [ x ] Supple, [x  ] No JVD, [ x ] Normal thyroid, [  ] Lymphadenopathy [  ] Other  CHEST/LUNG:  [x  ] Clear to auscultation bilaterally, [ x ] Breath Sounds equal B/L  [  ] poor effort  [ x ] No rales, [ x ] No rhonchi  [ x ]  No wheezing,   HEART:  [ x ] Regular rate and rhythm, [  ] tachycardia, [  ] Bradycardia,  [  ] irregular  [ x ] No murmurs, No rubs, No gallops, [  ] PPM in place (Mfr:  )  ABDOMEN:  [x  ] Soft, [ x ] Nontender, [  x] Nondistended, [x  ] No mass, [ x ] Bowel sounds present, [ x ] obese  NERVOUS SYSTEM:  [x  ] Alert & Oriented X3, [x  ] Nonfocal  [  ] Confusion  [  ] Encephalopathic [  ] Sedated [  ] Unable to assess, [  ] Dementia [  ] Other-  EXTREMITIES: [ x ] 2+ Peripheral Pulses, No clubbing, No cyanosis,  [  ] edema B/L lower EXT. [  ] PVD stasis skin changes B/L Lower EXT, [  ] wound  LYMPH: No lymphadenopathy noted  SKIN:  [ x ] No rashes or lesions, [  ] Pressure Ulcers, [  ] ecchymosis, [  ] Skin Tears, [ x ] Other R foot dressing c/d/i    DIET: Diet, Consistent Carbohydrate w/Evening Snack:   DASH/TLC Sodium & Cholesterol Restricted (12-18-20 @ 11:21)      LABS:                        11.9   10.31 )-----------( 233      ( 20 Dec 2020 06:28 )             36.7     20 Dec 2020 06:28    138    |  104    |  19     ----------------------------<  163    4.8     |  31     |  0.87     Ca    8.4        20 Dec 2020 06:28            Culture Results:   Rare Alpha hemolytic strep "Susceptibilities not performed" (12-18 @ 16:42)  Culture Results:   Few Serratia marcescens  Moderate Alpha hemolytic strep "Susceptibilities not performed" (12-18 @ 16:42)  Culture Results:   Moderate Serratia marcescens Multiple Morphological Strains  Normal skin qi isolated (12-16 @ 15:58)      culture blood  -- .Tissue right metatarsal head bone cul 12-18 @ 16:42    culture urine  --  12-18 @ 16:42              Culture - Tissue with Gram Stain (collected 18 Dec 2020 16:42)  Source: .Tissue right hallux bone culture  Gram Stain (18 Dec 2020 22:20):    No polymorphonuclear leukocytes per low power field    Few Gram positive cocci in pairs per oil power field  Preliminary Report (19 Dec 2020 17:31):    Few Serratia marcescens    Moderate Alpha hemolytic strep "Susceptibilities not performed"    Culture - Tissue with Gram Stain (collected 18 Dec 2020 16:42)  Source: .Tissue right metatarsal head bone cul  Gram Stain (18 Dec 2020 22:31):    No polymorphonuclear leukocytes per low power field    No organisms seen per oil power field  Preliminary Report (19 Dec 2020 17:33):    Rare Alpha hemolytic strep "Susceptibilities not performed"    Culture - Other (collected 16 Dec 2020 15:58)  Source: .Other R marizol  Final Report (18 Dec 2020 19:40):    Moderate Serratia marcescens Multiple Morphological Strains    Normal skin qi isolated  Organism: Serratia marcescens  Serratia marcescens (18 Dec 2020 19:40)  Organism: Serratia marcescens (18 Dec 2020 19:40)  Organism: Serratia marcescens (18 Dec 2020 19:40)         Anemia Panel:      Thyroid Panel:                RADIOLOGY & ADDITIONAL TESTS:      HEALTH ISSUES - PROBLEM Dx:  Diabetes mellitus type 2, uncontrolled  Diabetes mellitus type 2, uncontrolled    Peripheral vascular disease  Peripheral vascular disease    Need for prophylactic measure  Need for prophylactic measure    Hyperlipidemia  Hyperlipidemia    Diabetes  Diabetes    HTN (hypertension)  HTN (hypertension)    Osteomyelitis of right foot, unspecified type  Osteomyelitis of right foot, unspecified type    Gangrene of toe of right foot  Gangrene of toe of right foot            Consultant(s) Notes Reviewed:  [  ] YES     Care Discussed with [X] Consultants  [  ] Patient  [  ] Family [  ] HCP [  ]   [  ] Social Service  [  ] RN, [  ] Physical Therapy,[  ] Palliative care team  DVT PPX: [ x ] Lovenox, [  ] S C Heparin, [  ] Coumadin, [  ] Xarelto, [  ] Eliquis, [  ] Pradaxa, [  ] IV Heparin drip, [  ] SCD [  ] Contraindication 2 to GI Bleed,[  ] Ambulation [  ] Contraindicated 2 to  bleed [  ] Contraindicated 2 to Brain Bleed  Advanced directive: [  ] None, [  ] DNR/DNI Patient is a 60y old  Male who presents with a chief complaint of Osteomyelitis (19 Dec 2020 11:38)    HPI:  59yo F with PMH of HTN, HLD, DM2 (not on insulin), peripheral vascular insufficiency s/p angioplasty of RLE (Nov 2020) sent by Podiatry, Dr. Stark for R hallux osteomyelitis. Patient states initial wound was in Oct 2020 after he had an ingrown toenail. Patient had vascular angiogram in November to improve blood flow however no improvement. Denies fever, chills, chest pain, sob, abd pain, N/V, UE/LE weakness or paresthesias.     In the ED:  VS- 98.4F, 114HR, 158/85, 18RR, O2 sat 98% on RA  Labs significant for glucose 154. CBC and CMP WNL.  ECG showed sinus tachycardia (107), no signs of acute ischemia  MRI showed Acute osteomyelitis of the right first distal phalanx. Prominent soft tissue edema about the first toe with numerous punctate foci of hypointense signal corresponding to air on the prior radiographs. These foci of air appear confined to the plantar soft tissues of the first distal phalanx without definite extension proximally, however MRI is insensitive for this finding.  s/p Zosyn in the ED, pt was seen by podiatry & ID in ER. (16 Dec 2020 10:52)    INTERVAL HPI:   12/17: Patient seen and examined at bedside. No events overnight. Scheduled for R hallux amputation tomorrow. Patient feels well and denies any complaints this AM. NO Abx as per ID.    12/18: Patient seen and examined at bedside. Scheduled for R hallux amputation today. Patient notes feeling mildly anxious before surgery. Otherwise feels well and denies any complaints. No HA, dizziness, CP, SOB, n/v, or abdominal pain.    12/19: Patient seen and examined at bedside. POD 1 s/p partial ray amputation of R hallux. Patient with 8/10 pain in r foot despite pain medications. Otherwise feels well and denies any complaints. No HA, dizziness, CP, SOB, n/v, or abdominal pain.    12/20: Patient seen and examined at bedside. POD 2 s/p partial ray amputation. Patient states pain is currently well controlled. Feels well. No HA, dizziness, CP, SOB, n/v, or abdominal pain. on IV Abx       OVERNIGHT EVENTS: none     Home Medications:  atorvastatin 40 mg oral tablet: 1 tab(s) orally once a day (16 Dec 2020 16:36)  cilostazol 100 mg oral tablet: 1 tab(s) orally 2 times a day (16 Dec 2020 16:36)  clopidogrel 75 mg oral tablet: 1 tab(s) orally once a day (16 Dec 2020 16:36)  doxycycline hyclate 100 mg oral capsule: 1 cap(s) orally every 12 hours (16 Dec 2020 16:36)  Invokana 300 mg oral tablet: 1 tab(s) orally once a day (16 Dec 2020 16:36)  Januvia 100 mg oral tablet: 1 tab(s) orally once a day (16 Dec 2020 16:36)  K-Phos No. 2 oral tablet: orally 2 times a day (16 Dec 2020 16:36)  melatonin 3 mg oral tablet: 1 tab(s) orally once a day (at bedtime) (16 Dec 2020 16:36)  metFORMIN 1000 mg oral tablet: 1 tab(s) orally 2 times a day (16 Dec 2020 16:36)  Metoprolol Succinate ER 25 mg oral tablet, extended release: 1 tab(s) orally once a day (16 Dec 2020 16:36)  MiraLax oral powder for reconstitution: 7 gram(s) orally once a day (16 Dec 2020 16:36)  Mycostatin 100,000 units/mL oral suspension: 4 milliliter(s) orally 2 times a day (16 Dec 2020 16:36)  Norvasc 10 mg oral tablet: 1 tab(s) orally once a day (16 Dec 2020 16:36)  quinapril 40 mg oral tablet: 1 tab(s) orally once a day (16 Dec 2020 16:36)      MEDICATIONS  (STANDING):  atorvastatin 40 milliGRAM(s) Oral at bedtime  dextrose 40% Gel 15 Gram(s) Oral once  dextrose 5%. 1000 milliLiter(s) (50 mL/Hr) IV Continuous <Continuous>  dextrose 5%. 1000 milliLiter(s) (100 mL/Hr) IV Continuous <Continuous>  dextrose 50% Injectable 25 Gram(s) IV Push once  dextrose 50% Injectable 25 Gram(s) IV Push once  dextrose 50% Injectable 12.5 Gram(s) IV Push once  enoxaparin Injectable 40 milliGRAM(s) SubCutaneous daily  glucagon  Injectable 1 milliGRAM(s) IntraMuscular once  insulin glargine Injectable (LANTUS) 10 Unit(s) SubCutaneous at bedtime  insulin lispro (ADMELOG) corrective regimen sliding scale   SubCutaneous at bedtime  insulin lispro (ADMELOG) corrective regimen sliding scale   SubCutaneous three times a day before meals  insulin lispro Injectable (ADMELOG) 3 Unit(s) SubCutaneous three times a day before meals  lisinopril 40 milliGRAM(s) Oral daily  metoprolol succinate ER 25 milliGRAM(s) Oral daily  piperacillin/tazobactam IVPB.. 3.375 Gram(s) IV Intermittent every 8 hours  senna 2 Tablet(s) Oral at bedtime    MEDICATIONS  (PRN):  HYDROmorphone  Injectable 1 milliGRAM(s) IV Push every 4 hours PRN Severe Pain (7 - 10)  morphine  - Injectable 2 milliGRAM(s) IV Push every 6 hours PRN Moderate Pain (4 - 6)  polyethylene glycol 3350 17 Gram(s) Oral daily PRN Constipation  traMADol 50 milliGRAM(s) Oral every 6 hours PRN Mild Pain (1 - 3)      Allergies    No Known Allergies    Intolerances        Social History:  Denies any tobacco or illicit drug use. 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 No nausea, No vomiting; No diarrhea, No constipation. [ x ] BM  GENITOURINARY: No dysuria, No frequency, No urgency, No hematuria, NO incontinence  NEUROLOGICAL: No headaches, No dizziness, No numbness, No tingling, No tremors, No weakness  EXT: r foot pain at surgical site. Dressing c/d/i   SKIN:  [ x ] No itching, burning, rashes, or lesions   MUSCULOSKELETAL: No joint pain ,No Jt swelling; No muscle pain, No back pain, No extremity pain  PSYCHIATRIC: No depression,  No anxiety,  No mood swings ,No difficulty sleeping at night  PAIN SCALE: [ x ] None    ROS Unable to obtain due to - [  ] Dementia  [  ] Lethargy [  ] Drowsy [  ] Sedated [  ] non verbal  REST OF REVIEW Of SYSTEM - [ x ] Normal     Vital Signs Last 24 Hrs  T(C): 36.8 (20 Dec 2020 04:54), Max: 37.1 (19 Dec 2020 13:05)  T(F): 98.3 (20 Dec 2020 04:54), Max: 98.7 (19 Dec 2020 13:05)  HR: 77 (20 Dec 2020 04:54) (72 - 77)  BP: 132/74 (20 Dec 2020 04:54) (114/70 - 132/74)  BP(mean): 7 (19 Dec 2020 21:30) (7 - 7)  RR: 18 (20 Dec 2020 04:54) (18 - 18)  SpO2: 95% (20 Dec 2020 04:54) (95% - 98%)  Finger Stick        12-19 @ 07:01  -  12-20 @ 07:00  --------------------------------------------------------  IN: 100 mL / OUT: 0 mL / NET: 100 mL        PHYSICAL EXAM:  GENERAL:  [x  ] NAD , [ x ] well appearing, [  ] Agitated, [  ] Drowsy,  [  ] Lethargy, [  ] confused   HEAD:  [  x] Normal, [  ] Other  EYES:  [x  ] EOMI, [  x] PERRLA, [x  ] conjunctiva and sclera clear normal, [  ] Other,  [  ] Pallor,[  ] Discharge  ENMT:  [  x] Normal, [x  ] Moist mucous membranes, [x] Good dentition, [ x ] No Thrush  NECK:  [ x ] Supple, [x  ] No JVD, [ x ] Normal thyroid, [  ] Lymphadenopathy [  ] Other  CHEST/LUNG:  [x  ] Clear to auscultation bilaterally, [ x ] Breath Sounds equal B/L  [  ] poor effort  [ x ] No rales, [ x ] No rhonchi  [ x ]  No wheezing,   HEART:  [ x ] Regular rate and rhythm, [  ] tachycardia, [  ] Bradycardia,  [  ] irregular  [ x ] No murmurs, No rubs, No gallops, [  ] PPM in place (Mfr:  )  ABDOMEN:  [x  ] Soft, [ x ] Nontender, [  x] Nondistended, [x  ] No mass, [ x ] Bowel sounds present, [ x ] obese  NERVOUS SYSTEM:  [x  ] Alert & Oriented X3, [x  ] Nonfocal  [  ] Confusion  [  ] Encephalopathic [  ] Sedated [  ] Unable to assess, [  ] Dementia [  ] Other-  EXTREMITIES: [ x ] 2+ Peripheral Pulses, No clubbing, No cyanosis,  [  ] edema B/L lower EXT. [  ] PVD stasis skin changes B/L Lower EXT, [  ] wound  LYMPH: No lymphadenopathy noted  SKIN:  [ x ] No rashes or lesions, [  ] Pressure Ulcers, [  ] ecchymosis, [  ] Skin Tears, [ x ] Other R foot dressing c/d/i    DIET: Diet, Consistent Carbohydrate w/Evening Snack:   DASH/TLC Sodium & Cholesterol Restricted (12-18-20 @ 11:21)      LABS:                        11.9   10.31 )-----------( 233      ( 20 Dec 2020 06:28 )             36.7     20 Dec 2020 06:28    138    |  104    |  19     ----------------------------<  163    4.8     |  31     |  0.87     Ca    8.4        20 Dec 2020 06:28            Culture Results:   Rare Alpha hemolytic strep "Susceptibilities not performed" (12-18 @ 16:42)  Culture Results:   Few Serratia marcescens  Moderate Alpha hemolytic strep "Susceptibilities not performed" (12-18 @ 16:42)  Culture Results:   Moderate Serratia marcescens Multiple Morphological Strains  Normal skin qi isolated (12-16 @ 15:58)      culture blood  -- .Tissue right metatarsal head bone cul 12-18 @ 16:42    culture urine  --  12-18 @ 16:42      Culture - Tissue with Gram Stain (collected 18 Dec 2020 16:42)  Source: .Tissue right hallux bone culture  Gram Stain (18 Dec 2020 22:20):    No polymorphonuclear leukocytes per low power field    Few Gram positive cocci in pairs per oil power field  Preliminary Report (19 Dec 2020 17:31):    Few Serratia marcescens    Moderate Alpha hemolytic strep "Susceptibilities not performed"    Culture - Tissue with Gram Stain (collected 18 Dec 2020 16:42)  Source: .Tissue right metatarsal head bone cul  Gram Stain (18 Dec 2020 22:31):    No polymorphonuclear leukocytes per low power field    No organisms seen per oil power field  Preliminary Report (19 Dec 2020 17:33):    Rare Alpha hemolytic strep "Susceptibilities not performed"    Culture - Other (collected 16 Dec 2020 15:58)  Source: .Other R marizol  Final Report (18 Dec 2020 19:40):    Moderate Serratia marcescens Multiple Morphological Strains    Normal skin qi isolated  Organism: Serratia marcescens  Serratia marcescens (18 Dec 2020 19:40)  Organism: Serratia marcescens (18 Dec 2020 19:40)  Organism: Serratia marcescens (18 Dec 2020 19:40)      RADIOLOGY & ADDITIONAL TESTS: none      HEALTH ISSUES - PROBLEM Dx:  Diabetes mellitus type 2, uncontrolled  Diabetes mellitus type 2, uncontrolled    Peripheral vascular disease  Peripheral vascular disease    Need for prophylactic measure  Need for prophylactic measure    Hyperlipidemia  Hyperlipidemia    Diabetes  Diabetes    HTN (hypertension)  HTN (hypertension)    Osteomyelitis of right foot, unspecified type  Osteomyelitis of right foot, unspecified type    Gangrene of toe of right foot  Gangrene of toe of right foot        Consultant(s) Notes Reviewed:  [x  ] YES     Care Discussed with [X] Consultants  [ x ] Patient  [  ] Family [  ] HCP [  ]   [  ] Social Service  [ x ] RN, [  ] Physical Therapy,[  ] Palliative care team  DVT PPX: [ x ] Lovenox, [  ] S C Heparin, [  ] Coumadin, [  ] Xarelto, [  ] Eliquis, [  ] Pradaxa, [  ] IV Heparin drip, [  ] SCD [  ] Contraindication 2 to GI Bleed,[  ] Ambulation [  ] Contraindicated 2 to  bleed [  ] Contraindicated 2 to Brain Bleed  Advanced directive: [ x ] None, [  ] DNR/DNI Patient is a 60y old  Male who presents with a chief complaint of Osteomyelitis (19 Dec 2020 11:38)    HPI:  59yo F with PMH of HTN, HLD, DM2 (not on insulin), peripheral vascular insufficiency s/p angioplasty of RLE (Nov 2020) sent by Podiatry, Dr. Stark for R hallux osteomyelitis. Patient states initial wound was in Oct 2020 after he had an ingrown toenail. Patient had vascular angiogram in November to improve blood flow however no improvement. Denies fever, chills, chest pain, sob, abd pain, N/V, UE/LE weakness or paresthesias.     In the ED:  VS- 98.4F, 114HR, 158/85, 18RR, O2 sat 98% on RA  Labs significant for glucose 154. CBC and CMP WNL.  ECG showed sinus tachycardia (107), no signs of acute ischemia  MRI showed Acute osteomyelitis of the right first distal phalanx. Prominent soft tissue edema about the first toe with numerous punctate foci of hypointense signal corresponding to air on the prior radiographs. These foci of air appear confined to the plantar soft tissues of the first distal phalanx without definite extension proximally, however MRI is insensitive for this finding.  s/p Zosyn in the ED, pt was seen by podiatry & ID in ER. (16 Dec 2020 10:52)    INTERVAL HPI:   12/17: Patient seen and examined at bedside. No events overnight. Scheduled for R hallux amputation tomorrow. Patient feels well and denies any complaints this AM. NO Abx as per ID.    12/18: Patient seen and examined at bedside. Scheduled for R hallux amputation today. Patient notes feeling mildly anxious before surgery. Otherwise feels well and denies any complaints. No HA, dizziness, CP, SOB, n/v, or abdominal pain.    12/19: Patient seen and examined at bedside. POD 1 s/p partial ray amputation of R hallux. Patient with 8/10 pain in r foot despite pain medications. Otherwise feels well and denies any complaints. No HA, dizziness, CP, SOB, n/v, or abdominal pain.    12/20: Patient seen and examined at bedside. POD 2 s/p partial ray amputation. Patient states pain is currently well controlled. Feels well. No HA, dizziness, CP, SOB, n/v, or abdominal pain. on IV Abx       OVERNIGHT EVENTS: none     Home Medications:  atorvastatin 40 mg oral tablet: 1 tab(s) orally once a day (16 Dec 2020 16:36)  cilostazol 100 mg oral tablet: 1 tab(s) orally 2 times a day (16 Dec 2020 16:36)  clopidogrel 75 mg oral tablet: 1 tab(s) orally once a day (16 Dec 2020 16:36)  doxycycline hyclate 100 mg oral capsule: 1 cap(s) orally every 12 hours (16 Dec 2020 16:36)  Invokana 300 mg oral tablet: 1 tab(s) orally once a day (16 Dec 2020 16:36)  Januvia 100 mg oral tablet: 1 tab(s) orally once a day (16 Dec 2020 16:36)  K-Phos No. 2 oral tablet: orally 2 times a day (16 Dec 2020 16:36)  melatonin 3 mg oral tablet: 1 tab(s) orally once a day (at bedtime) (16 Dec 2020 16:36)  metFORMIN 1000 mg oral tablet: 1 tab(s) orally 2 times a day (16 Dec 2020 16:36)  Metoprolol Succinate ER 25 mg oral tablet, extended release: 1 tab(s) orally once a day (16 Dec 2020 16:36)  MiraLax oral powder for reconstitution: 7 gram(s) orally once a day (16 Dec 2020 16:36)  Mycostatin 100,000 units/mL oral suspension: 4 milliliter(s) orally 2 times a day (16 Dec 2020 16:36)  Norvasc 10 mg oral tablet: 1 tab(s) orally once a day (16 Dec 2020 16:36)  quinapril 40 mg oral tablet: 1 tab(s) orally once a day (16 Dec 2020 16:36)      MEDICATIONS  (STANDING):  atorvastatin 40 milliGRAM(s) Oral at bedtime  dextrose 40% Gel 15 Gram(s) Oral once  dextrose 5%. 1000 milliLiter(s) (50 mL/Hr) IV Continuous <Continuous>  dextrose 5%. 1000 milliLiter(s) (100 mL/Hr) IV Continuous <Continuous>  dextrose 50% Injectable 25 Gram(s) IV Push once  dextrose 50% Injectable 25 Gram(s) IV Push once  dextrose 50% Injectable 12.5 Gram(s) IV Push once  enoxaparin Injectable 40 milliGRAM(s) SubCutaneous daily  glucagon  Injectable 1 milliGRAM(s) IntraMuscular once  insulin glargine Injectable (LANTUS) 10 Unit(s) SubCutaneous at bedtime  insulin lispro (ADMELOG) corrective regimen sliding scale   SubCutaneous at bedtime  insulin lispro (ADMELOG) corrective regimen sliding scale   SubCutaneous three times a day before meals  insulin lispro Injectable (ADMELOG) 3 Unit(s) SubCutaneous three times a day before meals  lisinopril 40 milliGRAM(s) Oral daily  metoprolol succinate ER 25 milliGRAM(s) Oral daily  piperacillin/tazobactam IVPB.. 3.375 Gram(s) IV Intermittent every 8 hours  senna 2 Tablet(s) Oral at bedtime    MEDICATIONS  (PRN):  HYDROmorphone  Injectable 1 milliGRAM(s) IV Push every 4 hours PRN Severe Pain (7 - 10)  morphine  - Injectable 2 milliGRAM(s) IV Push every 6 hours PRN Moderate Pain (4 - 6)  polyethylene glycol 3350 17 Gram(s) Oral daily PRN Constipation  traMADol 50 milliGRAM(s) Oral every 6 hours PRN Mild Pain (1 - 3)      Allergies    No Known Allergies    Intolerances        Social History:  Denies any tobacco or illicit drug use. 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 No nausea, No vomiting; No diarrhea, No constipation. [ x ] BM  GENITOURINARY: No dysuria, No frequency, No urgency, No hematuria, NO incontinence  NEUROLOGICAL: No headaches, No dizziness, No numbness, No tingling, No tremors, No weakness  EXT: r foot pain at surgical site. Dressing c/d/i   SKIN:  [ x ] No itching, burning, rashes, or lesions   MUSCULOSKELETAL: No joint pain ,No Jt swelling; No muscle pain, No back pain, No extremity pain  PSYCHIATRIC: No depression,  No anxiety,  No mood swings ,No difficulty sleeping at night  PAIN SCALE: [ x ] None    ROS Unable to obtain due to - [  ] Dementia  [  ] Lethargy [  ] Drowsy [  ] Sedated [  ] non verbal  REST OF REVIEW Of SYSTEM - [ x ] Normal     Vital Signs Last 24 Hrs  T(C): 36.8 (20 Dec 2020 04:54), Max: 37.1 (19 Dec 2020 13:05)  T(F): 98.3 (20 Dec 2020 04:54), Max: 98.7 (19 Dec 2020 13:05)  HR: 77 (20 Dec 2020 04:54) (72 - 77)  BP: 132/74 (20 Dec 2020 04:54) (114/70 - 132/74)  BP(mean): 7 (19 Dec 2020 21:30) (7 - 7)  RR: 18 (20 Dec 2020 04:54) (18 - 18)  SpO2: 95% (20 Dec 2020 04:54) (95% - 98%)  Finger Stick        12-19 @ 07:01  -  12-20 @ 07:00  --------------------------------------------------------  IN: 100 mL / OUT: 0 mL / NET: 100 mL    PHYSICAL EXAM:  GENERAL:  [x  ] NAD , [ x ] well appearing, [  ] Agitated, [  ] Drowsy,  [  ] Lethargy, [  ] confused   HEAD:  [  x] Normal, [  ] Other  EYES:  [x  ] EOMI, [  x] PERRLA, [x  ] conjunctiva and sclera clear normal, [  ] Other,  [  ] Pallor,[  ] Discharge  ENMT:  [  x] Normal, [x  ] Moist mucous membranes, [x] Good dentition, [ x ] No Thrush  NECK:  [ x ] Supple, [x  ] No JVD, [ x ] Normal thyroid, [  ] Lymphadenopathy [  ] Other  CHEST/LUNG:  [x  ] Clear to auscultation bilaterally, [ x ] Breath Sounds equal B/L  [  ] poor effort  [ x ] No rales, [ x ] No rhonchi  [ x ]  No wheezing,   HEART:  [ x ] Regular rate and rhythm, [  ] tachycardia, [  ] Bradycardia,  [  ] irregular  [ x ] No murmurs, No rubs, No gallops, [  ] PPM in place (Mfr:  )  ABDOMEN:  [x  ] Soft, [ x ] Nontender, [  x] Nondistended, [x  ] No mass, [ x ] Bowel sounds present, [ x ] obese  NERVOUS SYSTEM:  [x  ] Alert & Oriented X3, [x  ] Nonfocal  [  ] Confusion  [  ] Encephalopathic [  ] Sedated [  ] Unable to assess, [  ] Dementia [  ] Other-  EXTREMITIES: [ x ] 2+ Peripheral Pulses, No clubbing, No cyanosis,  [  ] edema B/L lower EXT. [  ] PVD stasis skin changes B/L Lower EXT, [  ] wound  LYMPH: No lymphadenopathy noted  SKIN:  [ x ] No rashes or lesions, [  ] Pressure Ulcers, [  ] ecchymosis, [  ] Skin Tears, [ x ] Other R foot dressing c/d/i    DIET: Diet, Consistent Carbohydrate w/Evening Snack:   DASH/TLC Sodium & Cholesterol Restricted (12-18-20 @ 11:21)      LABS:                        11.9   10.31 )-----------( 233      ( 20 Dec 2020 06:28 )             36.7     20 Dec 2020 06:28    138    |  104    |  19     ----------------------------<  163    4.8     |  31     |  0.87     Ca    8.4        20 Dec 2020 06:28            Culture Results:   Rare Alpha hemolytic strep "Susceptibilities not performed" (12-18 @ 16:42)  Culture Results:   Few Serratia marcescens  Moderate Alpha hemolytic strep "Susceptibilities not performed" (12-18 @ 16:42)  Culture Results:   Moderate Serratia marcescens Multiple Morphological Strains  Normal skin qi isolated (12-16 @ 15:58)      culture blood  -- .Tissue right metatarsal head bone cul 12-18 @ 16:42    culture urine  --  12-18 @ 16:42      Culture - Tissue with Gram Stain (collected 18 Dec 2020 16:42)  Source: .Tissue right hallux bone culture  Gram Stain (18 Dec 2020 22:20):    No polymorphonuclear leukocytes per low power field    Few Gram positive cocci in pairs per oil power field  Preliminary Report (19 Dec 2020 17:31):    Few Serratia marcescens    Moderate Alpha hemolytic strep "Susceptibilities not performed"    Culture - Tissue with Gram Stain (collected 18 Dec 2020 16:42)  Source: .Tissue right metatarsal head bone cul  Gram Stain (18 Dec 2020 22:31):    No polymorphonuclear leukocytes per low power field    No organisms seen per oil power field  Preliminary Report (19 Dec 2020 17:33):    Rare Alpha hemolytic strep "Susceptibilities not performed"    Culture - Other (collected 16 Dec 2020 15:58)  Source: .Other R marizol  Final Report (18 Dec 2020 19:40):    Moderate Serratia marcescens Multiple Morphological Strains    Normal skin qi isolated  Organism: Serratia marcescens  Serratia marcescens (18 Dec 2020 19:40)  Organism: Serratia marcescens (18 Dec 2020 19:40)  Organism: Serratia marcescens (18 Dec 2020 19:40)      RADIOLOGY & ADDITIONAL TESTS: none      HEALTH ISSUES - PROBLEM Dx:  Diabetes mellitus type 2, uncontrolled  Diabetes mellitus type 2, uncontrolled    Peripheral vascular disease  Peripheral vascular disease    Need for prophylactic measure  Need for prophylactic measure    Hyperlipidemia  Hyperlipidemia    Diabetes  Diabetes    HTN (hypertension)  HTN (hypertension)    Osteomyelitis of right foot, unspecified type  Osteomyelitis of right foot, unspecified type    Gangrene of toe of right foot  Gangrene of toe of right foot        Consultant(s) Notes Reviewed:  [x  ] YES     Care Discussed with [X] Consultants  [ x ] Patient  [  ] Family [  ] HCP [  ]   [  ] Social Service  [ x ] RN, [  ] Physical Therapy,[  ] Palliative care team  DVT PPX: [ x ] Lovenox, [  ] S C Heparin, [  ] Coumadin, [  ] Xarelto, [  ] Eliquis, [  ] Pradaxa, [  ] IV Heparin drip, [  ] SCD [  ] Contraindication 2 to GI Bleed,[  ] Ambulation [  ] Contraindicated 2 to  bleed [  ] Contraindicated 2 to Brain Bleed  Advanced directive: [ x ] None, [  ] DNR/DNI Patient is a 60y old  Male who presents with a chief complaint of Osteomyelitis (19 Dec 2020 11:38)    HPI:  61yo F with PMH of HTN, HLD, DM2 (not on insulin), peripheral vascular insufficiency s/p angioplasty of RLE (Nov 2020) sent by Podiatry, Dr. Stark for R hallux osteomyelitis. Patient states initial wound was in Oct 2020 after he had an ingrown toenail. Patient had vascular angiogram in November to improve blood flow however no improvement. Denies fever, chills, chest pain, sob, abd pain, N/V, UE/LE weakness or paresthesias.     In the ED:  VS- 98.4F, 114HR, 158/85, 18RR, O2 sat 98% on RA  Labs significant for glucose 154. CBC and CMP WNL.  ECG showed sinus tachycardia (107), no signs of acute ischemia  MRI showed Acute osteomyelitis of the right first distal phalanx. Prominent soft tissue edema about the first toe with numerous punctate foci of hypointense signal corresponding to air on the prior radiographs. These foci of air appear confined to the plantar soft tissues of the first distal phalanx without definite extension proximally, however MRI is insensitive for this finding.  s/p Zosyn in the ED, pt was seen by podiatry & ID in ER. (16 Dec 2020 10:52)    INTERVAL HPI:   12/17: Patient seen and examined at bedside. No events overnight. Scheduled for R hallux amputation tomorrow. Patient feels well and denies any complaints this AM. NO Abx as per ID.    12/18: Patient seen and examined at bedside. Scheduled for R hallux amputation today. Patient notes feeling mildly anxious before surgery. Otherwise feels well and denies any complaints. No HA, dizziness, CP, SOB, n/v, or abdominal pain.    12/19: Patient seen and examined at bedside. POD 1 s/p partial ray amputation of R hallux. Patient with 8/10 pain in r foot despite pain medications. Otherwise feels well and denies any complaints. No HA, dizziness, CP, SOB, n/v, or abdominal pain.    12/20: Patient seen and examined at bedside. POD 2 s/p partial ray amputation. Patient states pain is currently well controlled. Feels well. No HA, dizziness, CP, SOB, n/v, or abdominal pain. on IV Abx Invanz 1 gm Daily       OVERNIGHT EVENTS: none     Home Medications:  atorvastatin 40 mg oral tablet: 1 tab(s) orally once a day (16 Dec 2020 16:36)  cilostazol 100 mg oral tablet: 1 tab(s) orally 2 times a day (16 Dec 2020 16:36)  clopidogrel 75 mg oral tablet: 1 tab(s) orally once a day (16 Dec 2020 16:36)  doxycycline hyclate 100 mg oral capsule: 1 cap(s) orally every 12 hours (16 Dec 2020 16:36)  Invokana 300 mg oral tablet: 1 tab(s) orally once a day (16 Dec 2020 16:36)  Januvia 100 mg oral tablet: 1 tab(s) orally once a day (16 Dec 2020 16:36)  K-Phos No. 2 oral tablet: orally 2 times a day (16 Dec 2020 16:36)  melatonin 3 mg oral tablet: 1 tab(s) orally once a day (at bedtime) (16 Dec 2020 16:36)  metFORMIN 1000 mg oral tablet: 1 tab(s) orally 2 times a day (16 Dec 2020 16:36)  Metoprolol Succinate ER 25 mg oral tablet, extended release: 1 tab(s) orally once a day (16 Dec 2020 16:36)  MiraLax oral powder for reconstitution: 7 gram(s) orally once a day (16 Dec 2020 16:36)  Mycostatin 100,000 units/mL oral suspension: 4 milliliter(s) orally 2 times a day (16 Dec 2020 16:36)  Norvasc 10 mg oral tablet: 1 tab(s) orally once a day (16 Dec 2020 16:36)  quinapril 40 mg oral tablet: 1 tab(s) orally once a day (16 Dec 2020 16:36)      MEDICATIONS  (STANDING):  atorvastatin 40 milliGRAM(s) Oral at bedtime  dextrose 40% Gel 15 Gram(s) Oral once  dextrose 5%. 1000 milliLiter(s) (50 mL/Hr) IV Continuous <Continuous>  dextrose 5%. 1000 milliLiter(s) (100 mL/Hr) IV Continuous <Continuous>  dextrose 50% Injectable 25 Gram(s) IV Push once  dextrose 50% Injectable 25 Gram(s) IV Push once  dextrose 50% Injectable 12.5 Gram(s) IV Push once  enoxaparin Injectable 40 milliGRAM(s) SubCutaneous daily  glucagon  Injectable 1 milliGRAM(s) IntraMuscular once  insulin glargine Injectable (LANTUS) 10 Unit(s) SubCutaneous at bedtime  insulin lispro (ADMELOG) corrective regimen sliding scale   SubCutaneous at bedtime  insulin lispro (ADMELOG) corrective regimen sliding scale   SubCutaneous three times a day before meals  insulin lispro Injectable (ADMELOG) 3 Unit(s) SubCutaneous three times a day before meals  lisinopril 40 milliGRAM(s) Oral daily  metoprolol succinate ER 25 milliGRAM(s) Oral daily  piperacillin/tazobactam IVPB.. 3.375 Gram(s) IV Intermittent every 8 hours  senna 2 Tablet(s) Oral at bedtime    MEDICATIONS  (PRN):  HYDROmorphone  Injectable 1 milliGRAM(s) IV Push every 4 hours PRN Severe Pain (7 - 10)  morphine  - Injectable 2 milliGRAM(s) IV Push every 6 hours PRN Moderate Pain (4 - 6)  polyethylene glycol 3350 17 Gram(s) Oral daily PRN Constipation  traMADol 50 milliGRAM(s) Oral every 6 hours PRN Mild Pain (1 - 3)      Allergies    No Known Allergies    Intolerances        Social History:  Denies any tobacco or illicit drug use. 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 No nausea, No vomiting; No diarrhea, No constipation. [ x ] BM  GENITOURINARY: No dysuria, No frequency, No urgency, No hematuria, NO incontinence  NEUROLOGICAL: No headaches, No dizziness, No numbness, No tingling, No tremors, No weakness  EXT: r foot pain at surgical site. Dressing c/d/i   SKIN:  [ x ] No itching, burning, rashes, or lesions   MUSCULOSKELETAL: No joint pain ,No Jt swelling; No muscle pain, No back pain, No extremity pain  PSYCHIATRIC: No depression,  No anxiety,  No mood swings ,No difficulty sleeping at night  PAIN SCALE: [ x ] None    ROS Unable to obtain due to - [  ] Dementia  [  ] Lethargy [  ] Drowsy [  ] Sedated [  ] non verbal  REST OF REVIEW Of SYSTEM - [ x ] Normal     Vital Signs Last 24 Hrs  T(C): 36.8 (20 Dec 2020 04:54), Max: 37.1 (19 Dec 2020 13:05)  T(F): 98.3 (20 Dec 2020 04:54), Max: 98.7 (19 Dec 2020 13:05)  HR: 77 (20 Dec 2020 04:54) (72 - 77)  BP: 132/74 (20 Dec 2020 04:54) (114/70 - 132/74)  BP(mean): 7 (19 Dec 2020 21:30) (7 - 7)  RR: 18 (20 Dec 2020 04:54) (18 - 18)  SpO2: 95% (20 Dec 2020 04:54) (95% - 98%)  Finger Stick        12-19 @ 07:01  -  12-20 @ 07:00  --------------------------------------------------------  IN: 100 mL / OUT: 0 mL / NET: 100 mL    PHYSICAL EXAM:  GENERAL:  [x  ] NAD , [ x ] well appearing, [  ] Agitated, [  ] Drowsy,  [  ] Lethargy, [  ] confused   HEAD:  [  x] Normal, [  ] Other  EYES:  [x  ] EOMI, [  x] PERRLA, [x  ] conjunctiva and sclera clear normal, [  ] Other,  [  ] Pallor,[  ] Discharge  ENMT:  [  x] Normal, [x  ] Moist mucous membranes, [x] Good dentition, [ x ] No Thrush  NECK:  [ x ] Supple, [x  ] No JVD, [ x ] Normal thyroid, [  ] Lymphadenopathy [  ] Other  CHEST/LUNG:  [x  ] Clear to auscultation bilaterally, [ x ] Breath Sounds equal B/L  [  ] poor effort  [ x ] No rales, [ x ] No rhonchi  [ x ]  No wheezing,   HEART:  [ x ] Regular rate and rhythm, [  ] tachycardia, [  ] Bradycardia,  [  ] irregular  [ x ] No murmurs, No rubs, No gallops, [  ] PPM in place (Mfr:  )  ABDOMEN:  [x  ] Soft, [ x ] Nontender, [  x] Nondistended, [x  ] No mass, [ x ] Bowel sounds present, [ x ] obese  NERVOUS SYSTEM:  [x  ] Alert & Oriented X3, [x  ] Nonfocal  [  ] Confusion  [  ] Encephalopathic [  ] Sedated [  ] Unable to assess, [  ] Dementia [  ] Other-  EXTREMITIES: [ x ] 2+ Peripheral Pulses, No clubbing, No cyanosis,  [  ] edema B/L lower EXT. [  ] PVD stasis skin changes B/L Lower EXT, [  ] wound  LYMPH: No lymphadenopathy noted  SKIN:  [ x ] No rashes or lesions, [  ] Pressure Ulcers, [  ] ecchymosis, [  ] Skin Tears, [ x ] Other R foot dressing c/d/i    DIET: Diet, Consistent Carbohydrate w/Evening Snack:   DASH/TLC Sodium & Cholesterol Restricted (12-18-20 @ 11:21)      LABS:                        11.9   10.31 )-----------( 233      ( 20 Dec 2020 06:28 )             36.7     20 Dec 2020 06:28    138    |  104    |  19     ----------------------------<  163    4.8     |  31     |  0.87     Ca    8.4        20 Dec 2020 06:28            Culture Results:   Rare Alpha hemolytic strep "Susceptibilities not performed" (12-18 @ 16:42)  Culture Results:   Few Serratia marcescens  Moderate Alpha hemolytic strep "Susceptibilities not performed" (12-18 @ 16:42)  Culture Results:   Moderate Serratia marcescens Multiple Morphological Strains  Normal skin qi isolated (12-16 @ 15:58)      culture blood  -- .Tissue right metatarsal head bone cul 12-18 @ 16:42    culture urine  --  12-18 @ 16:42      Culture - Tissue with Gram Stain (collected 18 Dec 2020 16:42)  Source: .Tissue right hallux bone culture  Gram Stain (18 Dec 2020 22:20):    No polymorphonuclear leukocytes per low power field    Few Gram positive cocci in pairs per oil power field  Preliminary Report (19 Dec 2020 17:31):    Few Serratia marcescens    Moderate Alpha hemolytic strep "Susceptibilities not performed"    Culture - Tissue with Gram Stain (collected 18 Dec 2020 16:42)  Source: .Tissue right metatarsal head bone cul  Gram Stain (18 Dec 2020 22:31):    No polymorphonuclear leukocytes per low power field    No organisms seen per oil power field  Preliminary Report (19 Dec 2020 17:33):    Rare Alpha hemolytic strep "Susceptibilities not performed"    Culture - Other (collected 16 Dec 2020 15:58)  Source: .Other R [facility]  Final Report (18 Dec 2020 19:40):    Moderate Serratia marcescens Multiple Morphological Strains    Normal skin qi isolated  Organism: Serratia marcescens  Serratia marcescens (18 Dec 2020 19:40)  Organism: Serratia marcescens (18 Dec 2020 19:40)  Organism: Serratia marcescens (18 Dec 2020 19:40)      RADIOLOGY & ADDITIONAL TESTS: none      HEALTH ISSUES - PROBLEM Dx:  Diabetes mellitus type 2, uncontrolled  Diabetes mellitus type 2, uncontrolled    Peripheral vascular disease  Peripheral vascular disease    Need for prophylactic measure  Need for prophylactic measure    Hyperlipidemia  Hyperlipidemia    Diabetes  Diabetes    HTN (hypertension)  HTN (hypertension)    Osteomyelitis of right foot, unspecified type  Osteomyelitis of right foot, unspecified type    Gangrene of toe of right foot  Gangrene of toe of right foot    Consultant(s) Notes Reviewed:  [x  ] YES     Care Discussed with [X] Consultants  [ x ] Patient  [  ] Family [  ] HCP [  ]   [  ] Social Service  [ x ] RN, [  ] Physical Therapy,[  ] Palliative care team  DVT PPX: [ x ] Lovenox, [  ] S C Heparin, [  ] Coumadin, [  ] Xarelto, [  ] Eliquis, [  ] Pradaxa, [  ] IV Heparin drip, [  ] SCD [  ] Contraindication 2 to GI Bleed,[  ] Ambulation [  ] Contraindicated 2 to  bleed [  ] Contraindicated 2 to Brain Bleed  Advanced directive: [ x ] None, [  ] DNR/DNI

## 2020-12-20 NOTE — PROGRESS NOTE ADULT - PROBLEM SELECTOR PLAN 1
Acute osteomyelitis of the R first distal phalanx  R hallux wound since 10/2020 with infection, s/p augmentin, had worsening of infection with gangrene, purulence and malodor. Had been on doxycycline at home. Seen in wound care clinic and recommended for amputation.   MRI confirmed OM of the R first distal phalanx.  S/p pip-tazo once in the ER, held antibiotic until OR  R hallux wound culture with Serratia - sensitivities as above   S/p OR for R partial ray amputation 12/18, noted with purulence - OR cultures with Serratia and alpha hemolytic strep  Afebrile, nontoxic, no leukocytosis    Follow for surg path - if no residual OM then can stop antibiotics, if there is residual OM then patient will need 6 weeks of abx therapy  Discontinue pip-tazo  Start on Ertapenem 1g IV Q24 (covers above organisms and for ease of dosing)

## 2020-12-20 NOTE — PROGRESS NOTE ADULT - PROBLEM SELECTOR PLAN 1
Sent by podiatry, Dr. Stark for osteomyelitis of R hallux  -MRI foot shows Acute osteomyelitis of the right first distal phalanx. Prominent soft tissue edema about the first toe with numerous punctate foci of hypointense signal corresponding to air on the prior radiographs. These foci of air appear confined to the plantar soft tissues of the first distal phalanx without definite extension proximally  -POD 2 s/p R partial ray amputation   -Wound dressed with Aquacel and DSD  -Wound culture prelim w/ moderate Serratia marcescens  -ID, Dr. LORE Frederick following--continue zosyn Q 8 hrs  -fu OR  path and cultures   -Podiatry, Dr. Gomez following- wound care & dressing.  -Vascular, Dr. Boston consulted--no vascular intervention needed Sent by podiatry, Dr. Stark for osteomyelitis of R hallux  -MRI foot shows Acute osteomyelitis of the right first distal phalanx. Prominent soft tissue edema about the first toe with numerous punctate foci of hypointense signal corresponding to air on the prior radiographs. These foci of air appear confined to the plantar soft tissues of the first distal phalanx without definite extension proximally  -POD 2 s/p R partial ray amputation   -Wound dressed with Aquacel and DSD  -Wound culture prelim w/ moderate Serratia marcescens  -ID, Dr. LORE Frederick following--continue zosyn Q 8 hr, Change to IV Invanz 1 gm daily  -fu OR  path and cultures   -Podiatry, Dr. Gomez following- wound care & dressing.  -Vascular, Dr. Boston consulted--no vascular intervention needed

## 2020-12-21 ENCOUNTER — APPOINTMENT (OUTPATIENT)
Dept: WOUND CARE | Facility: HOSPITAL | Age: 60
End: 2020-12-21

## 2020-12-21 ENCOUNTER — TRANSCRIPTION ENCOUNTER (OUTPATIENT)
Age: 60
End: 2020-12-21

## 2020-12-21 VITALS
HEART RATE: 72 BPM | RESPIRATION RATE: 18 BRPM | OXYGEN SATURATION: 94 % | DIASTOLIC BLOOD PRESSURE: 74 MMHG | SYSTOLIC BLOOD PRESSURE: 123 MMHG | TEMPERATURE: 98 F

## 2020-12-21 PROCEDURE — 77001 FLUOROGUIDE FOR VEIN DEVICE: CPT

## 2020-12-21 PROCEDURE — 88311 DECALCIFY TISSUE: CPT

## 2020-12-21 PROCEDURE — C1751: CPT

## 2020-12-21 PROCEDURE — 83036 HEMOGLOBIN GLYCOSYLATED A1C: CPT

## 2020-12-21 PROCEDURE — 73630 X-RAY EXAM OF FOOT: CPT

## 2020-12-21 PROCEDURE — 36573 INSJ PICC RS&I 5 YR+: CPT

## 2020-12-21 PROCEDURE — 82962 GLUCOSE BLOOD TEST: CPT

## 2020-12-21 PROCEDURE — 87075 CULTR BACTERIA EXCEPT BLOOD: CPT

## 2020-12-21 PROCEDURE — 85025 COMPLETE CBC W/AUTO DIFF WBC: CPT

## 2020-12-21 PROCEDURE — 97163 PT EVAL HIGH COMPLEX 45 MIN: CPT

## 2020-12-21 PROCEDURE — 86803 HEPATITIS C AB TEST: CPT

## 2020-12-21 PROCEDURE — 80053 COMPREHEN METABOLIC PANEL: CPT

## 2020-12-21 PROCEDURE — 84134 ASSAY OF PREALBUMIN: CPT

## 2020-12-21 PROCEDURE — 76937 US GUIDE VASCULAR ACCESS: CPT

## 2020-12-21 PROCEDURE — 99024 POSTOP FOLLOW-UP VISIT: CPT

## 2020-12-21 PROCEDURE — 87186 SC STD MICRODIL/AGAR DIL: CPT

## 2020-12-21 PROCEDURE — 86900 BLOOD TYPING SEROLOGIC ABO: CPT

## 2020-12-21 PROCEDURE — 87070 CULTURE OTHR SPECIMN AEROBIC: CPT

## 2020-12-21 PROCEDURE — 93923 UPR/LXTR ART STDY 3+ LVLS: CPT

## 2020-12-21 PROCEDURE — 96374 THER/PROPH/DIAG INJ IV PUSH: CPT

## 2020-12-21 PROCEDURE — 73718 MRI LOWER EXTREMITY W/O DYE: CPT

## 2020-12-21 PROCEDURE — 86850 RBC ANTIBODY SCREEN: CPT

## 2020-12-21 PROCEDURE — 99285 EMERGENCY DEPT VISIT HI MDM: CPT

## 2020-12-21 PROCEDURE — 99232 SBSQ HOSP IP/OBS MODERATE 35: CPT

## 2020-12-21 PROCEDURE — 85730 THROMBOPLASTIN TIME PARTIAL: CPT

## 2020-12-21 PROCEDURE — 85652 RBC SED RATE AUTOMATED: CPT

## 2020-12-21 PROCEDURE — 86140 C-REACTIVE PROTEIN: CPT

## 2020-12-21 PROCEDURE — 93005 ELECTROCARDIOGRAM TRACING: CPT

## 2020-12-21 PROCEDURE — 85610 PROTHROMBIN TIME: CPT

## 2020-12-21 PROCEDURE — U0003: CPT

## 2020-12-21 PROCEDURE — 88305 TISSUE EXAM BY PATHOLOGIST: CPT

## 2020-12-21 PROCEDURE — 80048 BASIC METABOLIC PNL TOTAL CA: CPT

## 2020-12-21 PROCEDURE — 86901 BLOOD TYPING SEROLOGIC RH(D): CPT

## 2020-12-21 PROCEDURE — 97116 GAIT TRAINING THERAPY: CPT

## 2020-12-21 PROCEDURE — 93925 LOWER EXTREMITY STUDY: CPT

## 2020-12-21 PROCEDURE — 88304 TISSUE EXAM BY PATHOLOGIST: CPT

## 2020-12-21 PROCEDURE — 97530 THERAPEUTIC ACTIVITIES: CPT

## 2020-12-21 PROCEDURE — 71046 X-RAY EXAM CHEST 2 VIEWS: CPT

## 2020-12-21 PROCEDURE — 36415 COLL VENOUS BLD VENIPUNCTURE: CPT

## 2020-12-21 PROCEDURE — 86922 COMPATIBILITY TEST ANTIGLOB: CPT

## 2020-12-21 PROCEDURE — 86769 SARS-COV-2 COVID-19 ANTIBODY: CPT

## 2020-12-21 PROCEDURE — 85027 COMPLETE CBC AUTOMATED: CPT

## 2020-12-21 RX ORDER — SODIUM,POTASSIUM PHOSPHATES 278-250MG
1 POWDER IN PACKET (EA) ORAL
Refills: 0 | Status: DISCONTINUED | OUTPATIENT
Start: 2020-12-21 | End: 2020-12-21

## 2020-12-21 RX ORDER — TRAMADOL HYDROCHLORIDE 50 MG/1
1 TABLET ORAL
Qty: 28 | Refills: 0
Start: 2020-12-21 | End: 2020-12-27

## 2020-12-21 RX ORDER — CILOSTAZOL 100 MG/1
100 TABLET ORAL
Refills: 0 | Status: DISCONTINUED | OUTPATIENT
Start: 2020-12-21 | End: 2020-12-21

## 2020-12-21 RX ORDER — LANOLIN ALCOHOL/MO/W.PET/CERES
3 CREAM (GRAM) TOPICAL AT BEDTIME
Refills: 0 | Status: DISCONTINUED | OUTPATIENT
Start: 2020-12-21 | End: 2020-12-21

## 2020-12-21 RX ORDER — ERTAPENEM SODIUM 1 G/1
1 INJECTION, POWDER, LYOPHILIZED, FOR SOLUTION INTRAMUSCULAR; INTRAVENOUS
Qty: 40 | Refills: 0
Start: 2020-12-21 | End: 2021-01-29

## 2020-12-21 RX ORDER — TRAMADOL HYDROCHLORIDE 50 MG/1
1 TABLET ORAL
Qty: 0 | Refills: 0 | DISCHARGE
Start: 2020-12-21

## 2020-12-21 RX ORDER — POLYETHYLENE GLYCOL 3350 17 G/17G
7 POWDER, FOR SOLUTION ORAL
Qty: 0 | Refills: 0 | DISCHARGE

## 2020-12-21 RX ORDER — SODIUM CHLORIDE 9 MG/ML
10 INJECTION INTRAMUSCULAR; INTRAVENOUS; SUBCUTANEOUS
Refills: 0 | Status: DISCONTINUED | OUTPATIENT
Start: 2020-12-21 | End: 2020-12-21

## 2020-12-21 RX ORDER — SODIUM,POTASSIUM PHOSPHATES 278-250MG
0 POWDER IN PACKET (EA) ORAL
Qty: 0 | Refills: 0 | DISCHARGE

## 2020-12-21 RX ORDER — AMLODIPINE BESYLATE 2.5 MG/1
1 TABLET ORAL
Qty: 0 | Refills: 0 | DISCHARGE

## 2020-12-21 RX ORDER — TRAMADOL HYDROCHLORIDE 50 MG/1
50 TABLET ORAL EVERY 6 HOURS
Refills: 0 | Status: DISCONTINUED | OUTPATIENT
Start: 2020-12-21 | End: 2020-12-21

## 2020-12-21 RX ORDER — CLOPIDOGREL BISULFATE 75 MG/1
75 TABLET, FILM COATED ORAL DAILY
Refills: 0 | Status: DISCONTINUED | OUTPATIENT
Start: 2020-12-21 | End: 2020-12-21

## 2020-12-21 RX ORDER — POLYETHYLENE GLYCOL 3350 17 G/17G
17 POWDER, FOR SOLUTION ORAL DAILY
Refills: 0 | Status: DISCONTINUED | OUTPATIENT
Start: 2020-12-21 | End: 2020-12-21

## 2020-12-21 RX ORDER — NYSTATIN 500MM UNIT
4 POWDER (EA) MISCELLANEOUS
Qty: 0 | Refills: 0 | DISCHARGE

## 2020-12-21 RX ADMIN — CILOSTAZOL 100 MILLIGRAM(S): 100 TABLET ORAL at 17:43

## 2020-12-21 RX ADMIN — LISINOPRIL 40 MILLIGRAM(S): 2.5 TABLET ORAL at 05:00

## 2020-12-21 RX ADMIN — Medication 2: at 12:13

## 2020-12-21 RX ADMIN — Medication 2: at 17:32

## 2020-12-21 RX ADMIN — ENOXAPARIN SODIUM 40 MILLIGRAM(S): 100 INJECTION SUBCUTANEOUS at 11:49

## 2020-12-21 RX ADMIN — ERTAPENEM SODIUM 120 MILLIGRAM(S): 1 INJECTION, POWDER, LYOPHILIZED, FOR SOLUTION INTRAMUSCULAR; INTRAVENOUS at 14:59

## 2020-12-21 RX ADMIN — HYDROMORPHONE HYDROCHLORIDE 1 MILLIGRAM(S): 2 INJECTION INTRAMUSCULAR; INTRAVENOUS; SUBCUTANEOUS at 05:00

## 2020-12-21 RX ADMIN — Medication 5 UNIT(S): at 12:13

## 2020-12-21 RX ADMIN — CLOPIDOGREL BISULFATE 75 MILLIGRAM(S): 75 TABLET, FILM COATED ORAL at 11:49

## 2020-12-21 RX ADMIN — Medication 25 MILLIGRAM(S): at 05:00

## 2020-12-21 RX ADMIN — Medication 5 UNIT(S): at 17:32

## 2020-12-21 RX ADMIN — Medication 5 UNIT(S): at 07:57

## 2020-12-21 RX ADMIN — HYDROMORPHONE HYDROCHLORIDE 1 MILLIGRAM(S): 2 INJECTION INTRAMUSCULAR; INTRAVENOUS; SUBCUTANEOUS at 05:15

## 2020-12-21 RX ADMIN — TRAMADOL HYDROCHLORIDE 50 MILLIGRAM(S): 50 TABLET ORAL at 16:32

## 2020-12-21 NOTE — PROGRESS NOTE ADULT - PROBLEM SELECTOR PROBLEM 3
Diabetes
Peripheral vascular disease
Diabetes
Peripheral vascular disease
Diabetes
Peripheral vascular disease

## 2020-12-21 NOTE — PROGRESS NOTE ADULT - PROBLEM SELECTOR PLAN 2
Chronic  -home quinapril therapeutic interchange with lisinopril, Toprol XL daily  -continue to monitor routine hemodynamics Chronic  -home quinapril therapeutic interchange with lisinopril 40mg daily, Toprol XL daily  -continue to monitor routine hemodynamics

## 2020-12-21 NOTE — PROGRESS NOTE ADULT - ASSESSMENT
59yo M with PMH of HTN, HLD, DM2 (not on insulin), peripheral vascular insufficiency s/p angioplasty of RLE (Nov 2020) sent by Podiatry, Dr. Stark for R hallux osteomyelitis  admitted for R hallux osteomyelitis for IV abx and amputation. POD 3 s/p r partial ray amputation.

## 2020-12-21 NOTE — PROGRESS NOTE ADULT - SUBJECTIVE AND OBJECTIVE BOX
[facility], Division of Infectious Diseases  OFELIA Harvey Y. Patel, S. Shah  [phone]  ([phone] - weekdays after 5pm and weekends)    Name: LOIDA QUEZADA  Age/Gender: 60y Male  MRN: [id number]    Interval History:  Patient has intermittent right foot pain, taking pain meds less freqently.   Denies fever, chills, chest pain, dyspnea, cough, abd pain, n/v/d.  ROS reviewed, pertinent positives and negatives as above.   Notes reviewed. Afebrile    Objective:  Vitals:   T(F): 97.5 (12-21-20 @ 05:15), Max: 98.8 (12-21-20 @ 04:52)  HR: 64 (12-21-20 @ 05:15) (64 - 82)  BP: 102/58 (12-21-20 @ 05:15) (102/58 - 143/82)  RR: 20 (12-21-20 @ 05:15) (18 - 20)  SpO2: 91% (12-21-20 @ 05:15) (91% - 94%)    Physical Examination:  General: no acute distress, nontoxic  HEENT: NC/AT, EOMI, anicteric, neck supple  Cardio: S1, S2 heard, RRR, no murmurs  Resp: CTA bilaterally, no rales/wheezes/rhonchi  Abd: soft, NT, ND, + BS  Neuro: AAOx3, no obvious focal deficits  Ext: RLE in dressing, no edema, moving extremities  Skin: warm, dry, no visible rash  Psych: appropriate affect and mood for situation  Lines: PIV    Laboratory Studies:  CBC:                       11.9   10.31 )-----------( 233      ( 20 Dec 2020 06:28 )             36.7     CMP: 12-20    138  |  104  |  19  ----------------------------<  163<H>  4.8   |  31  |  0.87    Ca    8.4<L>      20 Dec 2020 06:28    Microbiology:  12/20 - COVID-19 PCR - negative  12/18 - R metatarsal head bone culture - rare alpha hemolytic strep  12/18 - R hallux bone culture - moderate alpha hemolytic strep, few Serratia marcescens  - sensitive to all except augmentin, amp, A/S, cefazolin, cefoxitin  12/16 - R hallux wound culture - moderate Serratia marcescens - sensitive to all except augmentin, amp, A/S, cefazolin, cefoxitin, normal skin qi  12/16 - COVID-19 ab - negative  12/16 - COVID-19 PCR - negative    Radiology:  Xray Foot AP + Lateral + Oblique, Right (12.18.20 @ 12:18) > Impression: Satisfactory postoperative appearance right foot  MR Foot No Cont, Right (12.16.20 @ 11:58) >Impression: Acute osteomyelitis of the right first distal phalanx. Prominent soft tissue edema about the first toe with numerous punctate foci of hypointense signal corresponding to air on the prior radiographs. These foci of air appear confined to the plantar soft tissues of the first distal phalanx without definite extension proximally, however MRI is insensitive for this finding.  Xray Foot AP + Lateral + Oblique, Right (12.16.20 @ 10:06) >IMPRESSION: Osteomyelitis of the great toe. Arterial calcification.  Xray Chest 2 Views PA/Lat (12.16.20 @ 10:05) >IMPRESSION: Negative chest.    Medications:  MEDICATIONS  (STANDING):  atorvastatin 40 milliGRAM(s) Oral at bedtime  dextrose 40% Gel 15 Gram(s) Oral once  dextrose 5%. 1000 milliLiter(s) (50 mL/Hr) IV Continuous <Continuous>  dextrose 5%. 1000 milliLiter(s) (100 mL/Hr) IV Continuous <Continuous>  dextrose 50% Injectable 25 Gram(s) IV Push once  dextrose 50% Injectable 12.5 Gram(s) IV Push once  dextrose 50% Injectable 25 Gram(s) IV Push once  enoxaparin Injectable 40 milliGRAM(s) SubCutaneous daily  ertapenem  IVPB 1000 milliGRAM(s) IV Intermittent every 24 hours  glucagon  Injectable 1 milliGRAM(s) IntraMuscular once  insulin glargine Injectable (LANTUS) 10 Unit(s) SubCutaneous at bedtime  insulin lispro (ADMELOG) corrective regimen sliding scale   SubCutaneous three times a day before meals  insulin lispro (ADMELOG) corrective regimen sliding scale   SubCutaneous at bedtime  insulin lispro Injectable (ADMELOG) 5 Unit(s) SubCutaneous three times a day before meals  lisinopril 40 milliGRAM(s) Oral daily  metoprolol succinate ER 25 milliGRAM(s) Oral daily  senna 2 Tablet(s) Oral at bedtime    Antimicrobials:  ertapenem  IVPB 1000 milliGRAM(s) IV Intermittent every 24 hours - started 12/20  piperacillin/tazobactam 12/18-12/20  s/p pip-tazo x1 in ER [facility], Division of Infectious Diseases  OFELIA Harvey Y. Patel, S. Shah  [phone]  ([phone] - weekdays after 5pm and weekends)    Name: LOIDA QUEZADA  Age/Gender: 60y Male  MRN: [id number]    Interval History:  Patient has intermittent right foot pain, taking pain meds less freqently.   Denies fever, chills, chest pain, dyspnea, cough, abd pain, n/v/d.  ROS reviewed, pertinent positives and negatives as above.   Notes reviewed. Afebrile    Objective:  Vitals:   T(F): 97.5 (12-21-20 @ 05:15), Max: 98.8 (12-21-20 @ 04:52)  HR: 64 (12-21-20 @ 05:15) (64 - 82)  BP: 102/58 (12-21-20 @ 05:15) (102/58 - 143/82)  RR: 20 (12-21-20 @ 05:15) (18 - 20)  SpO2: 91% (12-21-20 @ 05:15) (91% - 94%)    Physical Examination:  General: no acute distress, nontoxic  HEENT: NC/AT, EOMI, anicteric, neck supple  Cardio: S1, S2 heard, RRR, no murmurs  Resp: CTA bilaterally, no rales/wheezes/rhonchi  Abd: soft, NT, ND, + BS  Neuro: AAOx3, no obvious focal deficits  Ext: RLE in dressing, no edema, moving extremities  Skin: warm, dry, no visible rash  Psych: appropriate affect and mood for situation  Lines: PIV    Laboratory Studies:  CBC:                       11.9   10.31 )-----------( 233      ( 20 Dec 2020 06:28 )             36.7     CMP: 12-20    138  |  104  |  19  ----------------------------<  163<H>  4.8   |  31  |  0.87    Ca    8.4<L>      20 Dec 2020 06:28    Microbiology:  12/20 - COVID-19 PCR - negative  12/18 - R metatarsal head bone culture - rare alpha hemolytic strep  12/18 - R hallux bone culture - moderate alpha hemolytic strep, few Serratia marcescens  - sensitive to all except augmentin, amp, A/S, cefazolin, cefoxitin  12/16 - R hallux wound culture - moderate Serratia marcescens - sensitive to all except augmentin, amp, A/S, cefazolin, cefoxitin, normal skin qi  12/16 - COVID-19 ab - negative  12/16 - COVID-19 PCR - negative    Surgical Path Report Final Diagnosis 1. Right hallux, amputation: -Ulceration with acute and chronic inflammation, abscess and granulation tissue  formation and gangrenous and coagulative necrosis of skin  and subcutaneous  tissue.  -Intact skin showing secondary epidermal changes, including acanthosis,  hyperkeratosis and parakeratosis.  -Underlying bone with acute osteomyelitis.  -Viable skin and soft tissue resection margin, negative for significant inflammatory activity or reactive changes. -Specimen bone margin showing mild acute osteomyelitis.  2. Right first metatarsal head, excision: -Bone and adjacent soft tissue showing mild acute osteomyelitis, and reactive and degenerative changes.  3. Right first metatarsal, clean margin: -Bone and adjacent soft tissue showing mild acute osteomyelitis, and reactive and degenerative changes.     Radiology:  Xray Foot AP + Lateral + Oblique, Right (12.18.20 @ 12:18) > Impression: Satisfactory postoperative appearance right foot  MR Foot No Cont, Right (12.16.20 @ 11:58) >Impression: Acute osteomyelitis of the right first distal phalanx. Prominent soft tissue edema about the first toe with numerous punctate foci of hypointense signal corresponding to air on the prior radiographs. These foci of air appear confined to the plantar soft tissues of the first distal phalanx without definite extension proximally, however MRI is insensitive for this finding.  Xray Foot AP + Lateral + Oblique, Right (12.16.20 @ 10:06) >IMPRESSION: Osteomyelitis of the great toe. Arterial calcification.  Xray Chest 2 Views PA/Lat (12.16.20 @ 10:05) >IMPRESSION: Negative chest.    Medications:  MEDICATIONS  (STANDING):  atorvastatin 40 milliGRAM(s) Oral at bedtime  dextrose 40% Gel 15 Gram(s) Oral once  dextrose 5%. 1000 milliLiter(s) (50 mL/Hr) IV Continuous <Continuous>  dextrose 5%. 1000 milliLiter(s) (100 mL/Hr) IV Continuous <Continuous>  dextrose 50% Injectable 25 Gram(s) IV Push once  dextrose 50% Injectable 12.5 Gram(s) IV Push once  dextrose 50% Injectable 25 Gram(s) IV Push once  enoxaparin Injectable 40 milliGRAM(s) SubCutaneous daily  ertapenem  IVPB 1000 milliGRAM(s) IV Intermittent every 24 hours  glucagon  Injectable 1 milliGRAM(s) IntraMuscular once  insulin glargine Injectable (LANTUS) 10 Unit(s) SubCutaneous at bedtime  insulin lispro (ADMELOG) corrective regimen sliding scale   SubCutaneous three times a day before meals  insulin lispro (ADMELOG) corrective regimen sliding scale   SubCutaneous at bedtime  insulin lispro Injectable (ADMELOG) 5 Unit(s) SubCutaneous three times a day before meals  lisinopril 40 milliGRAM(s) Oral daily  metoprolol succinate ER 25 milliGRAM(s) Oral daily  senna 2 Tablet(s) Oral at bedtime    Antimicrobials:  ertapenem  IVPB 1000 milliGRAM(s) IV Intermittent every 24 hours - started 12/20  piperacillin/tazobactam 12/18-12/20  s/p pip-tazo x1 in ER

## 2020-12-21 NOTE — PROGRESS NOTE ADULT - PROBLEM SELECTOR PLAN 1
cont lantus 10 units qhs  cont admelog 5 units 3x/day before meals  cont cons cho diet  goal bg 100-180 in hosp setting

## 2020-12-21 NOTE — PROGRESS NOTE ADULT - SUBJECTIVE AND OBJECTIVE BOX
60y year old Male seen at [facility] [address] for  amputation of right hallux, gangrene and osteomyelitis.  Denies any fever, chills, nausea, vomiting, chest pain, shortness of breath, or calf pain at this time.      Allergies    No Known Allergies    Intolerances        MEDICATIONS  (STANDING):  atorvastatin 40 milliGRAM(s) Oral at bedtime  cilostazol 100 milliGRAM(s) Oral two times a day  clopidogrel Tablet 75 milliGRAM(s) Oral daily  dextrose 40% Gel 15 Gram(s) Oral once  dextrose 5%. 1000 milliLiter(s) (50 mL/Hr) IV Continuous <Continuous>  dextrose 5%. 1000 milliLiter(s) (100 mL/Hr) IV Continuous <Continuous>  dextrose 50% Injectable 25 Gram(s) IV Push once  dextrose 50% Injectable 12.5 Gram(s) IV Push once  dextrose 50% Injectable 25 Gram(s) IV Push once  enoxaparin Injectable 40 milliGRAM(s) SubCutaneous daily  ertapenem  IVPB 1000 milliGRAM(s) IV Intermittent every 24 hours  glucagon  Injectable 1 milliGRAM(s) IntraMuscular once  insulin glargine Injectable (LANTUS) 10 Unit(s) SubCutaneous at bedtime  insulin lispro (ADMELOG) corrective regimen sliding scale   SubCutaneous three times a day before meals  insulin lispro (ADMELOG) corrective regimen sliding scale   SubCutaneous at bedtime  insulin lispro Injectable (ADMELOG) 5 Unit(s) SubCutaneous three times a day before meals  lisinopril 40 milliGRAM(s) Oral daily  metoprolol succinate ER 25 milliGRAM(s) Oral daily    MEDICATIONS  (PRN):  HYDROmorphone  Injectable 1 milliGRAM(s) IV Push every 4 hours PRN Severe Pain (7 - 10)  morphine  - Injectable 2 milliGRAM(s) IV Push every 6 hours PRN Moderate Pain (4 - 6)  sodium chloride 0.9% lock flush 10 milliLiter(s) IV Push every 1 hour PRN Pre/post blood products, medications, blood draw, and to maintain line patency  traMADol 50 milliGRAM(s) Oral every 6 hours PRN Mild Pain (1 - 3)      Vital Signs Last 24 Hrs  T(C): 36.7 (21 Dec 2020 13:05), Max: 37.1 (21 Dec 2020 04:52)  T(F): 98.1 (21 Dec 2020 13:05), Max: 98.8 (21 Dec 2020 04:52)  HR: 72 (21 Dec 2020 13:05) (64 - 82)  BP: 123/74 (21 Dec 2020 13:05) (102/58 - 143/82)  BP(mean): --  RR: 18 (21 Dec 2020 13:05) (18 - 20)  SpO2: 94% (21 Dec 2020 13:05) (91% - 94%)    PHYSICAL EXAM:  Vascular: DP and PT palpable.  CRT <3s excluding right hallux.  Erythema across dorsal right foot.  No ecchymosis b/l  Neurological: Light touch intact to foot b/l  Musculoskeletal: Mild POP right hallux    CBC Full  -  ( 20 Dec 2020 06:28 )  WBC Count : 10.31 K/uL  RBC Count : 4.03 M/uL  Hemoglobin : 11.9 g/dL  Hematocrit : 36.7 %  Platelet Count - Automated : 233 K/uL  Mean Cell Volume : 91.1 fl  Mean Cell Hemoglobin : 29.5 pg  Mean Cell Hemoglobin Concentration : 32.4 gm/dL  Auto Neutrophil # : 6.96 K/uL  Auto Lymphocyte # : 1.95 K/uL  Auto Monocyte # : 1.06 K/uL  Auto Eosinophil # : 0.26 K/uL  Auto Basophil # : 0.05 K/uL  Auto Neutrophil % : 67.5 %  Auto Lymphocyte % : 18.9 %  Auto Monocyte % : 10.3 %  Auto Eosinophil % : 2.5 %  Auto Basophil % : 0.5 %      ----------CHEM PANEL----------          Culture - Tissue with Gram Stain (collected 18 Dec 2020 16:42)  Source: .Tissue right hallux bone culture  Gram Stain (18 Dec 2020 22:20):    No polymorphonuclear leukocytes per low power field    Few Gram positive cocci in pairs per oil power field  Preliminary Report (19 Dec 2020 17:31):    Few Serratia marcescens    Moderate Alpha hemolytic strep "Susceptibilities not performed"  Organism: Serratia marcescens (20 Dec 2020 16:30)  Organism: Serratia marcescens (20 Dec 2020 16:30)    Culture - Tissue with Gram Stain (collected 18 Dec 2020 16:42)  Source: .Tissue right metatarsal head bone cul  Gram Stain (18 Dec 2020 22:31):    No polymorphonuclear leukocytes per low power field    No organisms seen per oil power field  Preliminary Report (19 Dec 2020 17:33):    Rare Alpha hemolytic strep "Susceptibilities not performed"        Imaging: ----------

## 2020-12-21 NOTE — PROGRESS NOTE ADULT - SUBJECTIVE AND OBJECTIVE BOX
Patient is a 60y old  Male who presents with a chief complaint of Osteomyelitis (21 Dec 2020 07:45)    HPI:  59yo M with PMH of HTN, HLD, DM2 (not on insulin), peripheral vascular insufficiency s/p angioplasty of RLE (Nov 2020) sent by Podiatry, Dr. Stark for R hallux osteomyelitis. Patient states initial wound was in Oct 2020 after he had an ingrown toenail. Patient had vascular angiogram in November to improve blood flow however no improvement. Denies fever, chills, chest pain, sob, abd pain, N/V, UE/LE weakness or paresthesias.     In the ED:  VS- 98.4F, 114HR, 158/85, 18RR, O2 sat 98% on RA  Labs significant for glucose 154. CBC and CMP WNL.  ECG showed sinus tachycardia (107), no signs of acute ischemia  MRI showed Acute osteomyelitis of the right first distal phalanx. Prominent soft tissue edema about the first toe with numerous punctate foci of hypointense signal corresponding to air on the prior radiographs. These foci of air appear confined to the plantar soft tissues of the first distal phalanx without definite extension proximally, however MRI is insensitive for this finding.  s/p Zosyn in the ED, pt was seen by podiatry & ID in ER. (16 Dec 2020 10:52)    INTERVAL HPI:   12/17: Patient seen and examined at bedside. No events overnight. Scheduled for R hallux amputation tomorrow. Patient feels well and denies any complaints this AM. NO Abx as per ID.    12/18: Patient seen and examined at bedside. Scheduled for R hallux amputation today. Patient notes feeling mildly anxious before surgery. Otherwise feels well and denies any complaints. No HA, dizziness, CP, SOB, n/v, or abdominal pain.    12/19: Patient seen and examined at bedside. POD 1 s/p partial ray amputation of R hallux. Patient with 8/10 pain in r foot despite pain medications. Otherwise feels well and denies any complaints. No HA, dizziness, CP, SOB, n/v, or abdominal pain.    12/20: Patient seen and examined at bedside. POD 2 s/p partial ray amputation. Patient states pain is currently well controlled. Feels well. No HA, dizziness, CP, SOB, n/v, or abdominal pain. on IV Abx Invanz 1 gm Daily     12/21: Patient seen and examined at bedside. POD 3 s/p partial ray amputation. Patient endorses control of pain with dilaudid, feels got for about 10-11 hours before pain starts again. Feels well, denies CP, SOB, abdominal pain. Regular BM. Repeat PCR COVID negative.      OVERNIGHT EVENTS: none    Home Medications:  atorvastatin 40 mg oral tablet: 1 tab(s) orally once a day (16 Dec 2020 16:36)  cilostazol 100 mg oral tablet: 1 tab(s) orally 2 times a day (16 Dec 2020 16:36)  clopidogrel 75 mg oral tablet: 1 tab(s) orally once a day (16 Dec 2020 16:36)  doxycycline hyclate 100 mg oral capsule: 1 cap(s) orally every 12 hours (16 Dec 2020 16:36)  Invokana 300 mg oral tablet: 1 tab(s) orally once a day (16 Dec 2020 16:36)  Januvia 100 mg oral tablet: 1 tab(s) orally once a day (16 Dec 2020 16:36)  K-Phos No. 2 oral tablet: orally 2 times a day (16 Dec 2020 16:36)  melatonin 3 mg oral tablet: 1 tab(s) orally once a day (at bedtime) (16 Dec 2020 16:36)  metFORMIN 1000 mg oral tablet: 1 tab(s) orally 2 times a day (16 Dec 2020 16:36)  Metoprolol Succinate ER 25 mg oral tablet, extended release: 1 tab(s) orally once a day (16 Dec 2020 16:36)  MiraLax oral powder for reconstitution: 7 gram(s) orally once a day (16 Dec 2020 16:36)  Mycostatin 100,000 units/mL oral suspension: 4 milliliter(s) orally 2 times a day (16 Dec 2020 16:36)  Norvasc 10 mg oral tablet: 1 tab(s) orally once a day (16 Dec 2020 16:36)  quinapril 40 mg oral tablet: 1 tab(s) orally once a day (16 Dec 2020 16:36)      MEDICATIONS  (STANDING):  atorvastatin 40 milliGRAM(s) Oral at bedtime  dextrose 40% Gel 15 Gram(s) Oral once  dextrose 5%. 1000 milliLiter(s) (50 mL/Hr) IV Continuous <Continuous>  dextrose 5%. 1000 milliLiter(s) (100 mL/Hr) IV Continuous <Continuous>  dextrose 50% Injectable 25 Gram(s) IV Push once  dextrose 50% Injectable 12.5 Gram(s) IV Push once  dextrose 50% Injectable 25 Gram(s) IV Push once  enoxaparin Injectable 40 milliGRAM(s) SubCutaneous daily  ertapenem  IVPB 1000 milliGRAM(s) IV Intermittent every 24 hours  glucagon  Injectable 1 milliGRAM(s) IntraMuscular once  insulin glargine Injectable (LANTUS) 10 Unit(s) SubCutaneous at bedtime  insulin lispro (ADMELOG) corrective regimen sliding scale   SubCutaneous three times a day before meals  insulin lispro (ADMELOG) corrective regimen sliding scale   SubCutaneous at bedtime  insulin lispro Injectable (ADMELOG) 5 Unit(s) SubCutaneous three times a day before meals  lisinopril 40 milliGRAM(s) Oral daily  metoprolol succinate ER 25 milliGRAM(s) Oral daily  senna 2 Tablet(s) Oral at bedtime    MEDICATIONS  (PRN):  HYDROmorphone  Injectable 1 milliGRAM(s) IV Push every 4 hours PRN Severe Pain (7 - 10)  morphine  - Injectable 2 milliGRAM(s) IV Push every 6 hours PRN Moderate Pain (4 - 6)  polyethylene glycol 3350 17 Gram(s) Oral daily PRN Constipation  traMADol 50 milliGRAM(s) Oral every 6 hours PRN Mild Pain (1 - 3)      Allergies    No Known Allergies    Intolerances        REVIEW OF SYSTEMS:  CONSTITUTIONAL: No fever, No chills, No fatigue, No myalgia, No Body ache, No Weakness  EYES: No eye pain,  No visual disturbances, No discharge, NO Redness  ENMT:  No ear pain, No nose bleed, No vertigo; No sinus or throat pain, No Congestion  NECK: No pain, No stiffness  RESPIRATORY: No cough, wheezing, No  hemoptysis, No shortness of breath  CARDIOVASCULAR: No chest pain, palpitations  GASTROINTESTINAL: No abdominal or epigastric pain. No nausea, No vomiting; No diarrhea or constipation. [ x ] BM  GENITOURINARY: No dysuria, No frequency, No urgency, No hematuria, or incontinence  NEUROLOGICAL: No headaches, No dizziness, No numbness, No tingling, No tremors, No weakness  EXT: R foot pain at site of amputation; dressing c/d/i; No Swelling, No Edema  SKIN:  [ x ] No itching, burning, rashes, or lesions   MUSCULOSKELETAL: Back pain attributed to hospital bed, relieved by placing rolled towels behind back; No joint pain or swelling; No muscle pain  PSYCHIATRIC: No depression, anxiety, mood swings or difficulty sleeping at night  PAIN SCALE: [ x ] None   REST OF REVIEW Of SYSTEM - [ x ] Normal     Vital Signs Last 24 Hrs  T(C): 36.4 (21 Dec 2020 05:15), Max: 37.1 (21 Dec 2020 04:52)  T(F): 97.5 (21 Dec 2020 05:15), Max: 98.8 (21 Dec 2020 04:52)  HR: 64 (21 Dec 2020 05:15) (64 - 82)  BP: 102/58 (21 Dec 2020 05:15) (102/58 - 143/82)  BP(mean): --  RR: 20 (21 Dec 2020 05:15) (18 - 20)  SpO2: 91% (21 Dec 2020 05:15) (91% - 94%)  Finger Stick        12-21 @ 07:01  -  12-21 @ 09:27  --------------------------------------------------------  IN: 480 mL / OUT: 0 mL / NET: 480 mL        PHYSICAL EXAM:  GENERAL:  [ x ] NAD , [ x ] well appearing, [  ] Agitated, [  ] Drowsy,  [  ] Lethargy, [  ] confused   HEAD:  [ x ] Normal, [  ] Other  EYES:  [ x ] EOMI, [ x ] PERRLA, [ x ] conjunctiva and sclera clear normal, [  ] Other,  [  ] Pallor,[  ] Discharge  ENMT:  [ x ] Normal, [ x ] Moist mucous membranes, [ x ] Good dentition, [ x ] No Thrush  NECK:  [ x ] Supple, [ x ] No JVD, [ x ] Normal thyroid, [  ] Lymphadenopathy [  ] Other  CHEST/LUNG:  [x  ] Clear to auscultation bilaterally, [ x ] Breath Sounds equal,  [ x ] No rales, [ x ] No rhonchi  [ x]  No wheezing  HEART:  [ x ] Regular rate and rhythm, [  ] tachycardia, [  ] Bradycardia,  [  ] irregular  [ x ] No murmurs, No rubs, No gallops, [  ] PPM in place (Mfr:  )  ABDOMEN:  [ x ] Soft, [x  ] Nontender, [x  ] Nondistended, [x  ] No mass, [x  ] Bowel sounds present, [  ] obese  NERVOUS SYSTEM:  [ x ] Alert & Oriented X3, [  ] Nonfocal  [  ] Confusion  [  ] Encephalopathic [  ] Sedated [  ] Unable to assess, [  ] Other-  EXTREMITIES: [ x ] 2+ Peripheral Pulses, No clubbing, No cyanosis,  [  ] edema B/L lower EXT. [  ] PVD stasis skin changes B/L Lower EXT  LYMPH: No lymphadenopathy noted  SKIN:  [ x ] No rashes or lesions, [  ] Pressure Ulcers, [  ] ecchymosis, [  ] Skin Tears, [ x ] Other - R foot wrapped in dressing, c/d/i, in boot    DIET: Diet, Consistent Carbohydrate w/Evening Snack:   DASH/TLC Sodium & Cholesterol Restricted (12-18-20 @ 11:21)    LABS:            Culture Results:   Rare Alpha hemolytic strep "Susceptibilities not performed" (12-18 @ 16:42)  Culture Results:   Few Serratia marcescens  Moderate Alpha hemolytic strep "Susceptibilities not performed" (12-18 @ 16:42)  Culture Results:   Moderate Serratia marcescens Multiple Morphological Strains  Normal skin qi isolated (12-16 @ 15:58)      Culture - Tissue with Gram Stain (collected 18 Dec 2020 16:42)  Source: .Tissue right hallux bone culture  Gram Stain (18 Dec 2020 22:20):    No polymorphonuclear leukocytes per low power field    Few Gram positive cocci in pairs per oil power field  Preliminary Report (19 Dec 2020 17:31):    Few Serratia marcescens    Moderate Alpha hemolytic strep "Susceptibilities not performed"  Organism: Serratia marcescens (20 Dec 2020 16:30)  Organism: Serratia marcescens (20 Dec 2020 16:30)    Culture - Tissue with Gram Stain (collected 18 Dec 2020 16:42)  Source: .Tissue right metatarsal head bone cul  Gram Stain (18 Dec 2020 22:31):    No polymorphonuclear leukocytes per low power field    No organisms seen per oil power field  Preliminary Report (19 Dec 2020 17:33):    Rare Alpha hemolytic strep "Susceptibilities not performed"    Culture - Other (collected 16 Dec 2020 15:58)  Source: .Other R hallux  Final Report (18 Dec 2020 19:40):    Moderate Serratia marcescens Multiple Morphological Strains    Normal skin qi isolated  Organism: Serratia marcescens  Serratia marcescens (18 Dec 2020 19:40)  Organism: Serratia marcescens (18 Dec 2020 19:40)  Organism: Serratia marcescens (18 Dec 2020 19:40)         Anemia Panel:      Thyroid Panel:                RADIOLOGY & ADDITIONAL TESTS:      HEALTH ISSUES - PROBLEM Dx:  Diabetes mellitus type 2, uncontrolled  Diabetes mellitus type 2, uncontrolled    Peripheral vascular disease  Peripheral vascular disease    Need for prophylactic measure  Need for prophylactic measure    Hyperlipidemia  Hyperlipidemia    Diabetes  Diabetes    HTN (hypertension)  HTN (hypertension)    Osteomyelitis of right foot, unspecified type  Osteomyelitis of right foot, unspecified type    Gangrene of toe of right foot  Gangrene of toe of right foot            Consultant(s) Notes Reviewed:  [ x ] YES     Care Discussed with [X] Consultants  [ x ] Patient  [  ] Family  [  ]   [  ] Social Service  [  ] RN, [  ] Physical Therapy  DVT PPX: [ x ] Lovenox, [  ] S C Heparin, [  ] Coumadin, [  ] Xarelto, [  ] Eliquis, [  ] Pradaxa, [  ] IV Heparin drip, [  ] SCD [  ] Contraindication 2 to GI Bleed,[  ] Ambulation  Advanced directive: [ x ] None, [  ] DNR/DNI Patient is a 60y old  Male who presents with a chief complaint of Osteomyelitis (21 Dec 2020 07:45)    HPI:  59yo M with PMH of HTN, HLD, DM2 (not on insulin), peripheral vascular insufficiency s/p angioplasty of RLE (Nov 2020) sent by Podiatry, Dr. Stark for R hallux osteomyelitis. Patient states initial wound was in Oct 2020 after he had an ingrown toenail. Patient had vascular angiogram in November to improve blood flow however no improvement. Denies fever, chills, chest pain, sob, abd pain, N/V, UE/LE weakness or paresthesias.     In the ED:  VS- 98.4F, 114HR, 158/85, 18RR, O2 sat 98% on RA  Labs significant for glucose 154. CBC and CMP WNL.  ECG showed sinus tachycardia (107), no signs of acute ischemia  MRI showed Acute osteomyelitis of the right first distal phalanx. Prominent soft tissue edema about the first toe with numerous punctate foci of hypointense signal corresponding to air on the prior radiographs. These foci of air appear confined to the plantar soft tissues of the first distal phalanx without definite extension proximally, however MRI is insensitive for this finding.  s/p Zosyn in the ED, pt was seen by podiatry & ID in ER. (16 Dec 2020 10:52)    INTERVAL HPI:   12/17: Patient seen and examined at bedside. No events overnight. Scheduled for R hallux amputation tomorrow. Patient feels well and denies any complaints this AM. NO Abx as per ID.    12/18: Patient seen and examined at bedside. Scheduled for R hallux amputation today. Patient notes feeling mildly anxious before surgery. Otherwise feels well and denies any complaints. No HA, dizziness, CP, SOB, n/v, or abdominal pain.    12/19: Patient seen and examined at bedside. POD 1 s/p partial ray amputation of R hallux. Patient with 8/10 pain in r foot despite pain medications. Otherwise feels well and denies any complaints. No HA, dizziness, CP, SOB, n/v, or abdominal pain.    12/20: Patient seen and examined at bedside. POD 2 s/p partial ray amputation. Patient states pain is currently well controlled. Feels well. No HA, dizziness, CP, SOB, n/v, or abdominal pain. on IV Abx Invanz 1 gm Daily     12/21: Patient seen and examined at bedside. POD 3 s/p partial ray amputation. Patient endorses control of pain with dilaudid, feels got for about 10-11 hours before pain starts again. Feels well, denies CP, SOB, abdominal pain. Regular BM. Repeat PCR COVID negative. Continue Ivanz IV 1gm daily. Restart home plavix and cilostazol.      OVERNIGHT EVENTS: none    Home Medications:  atorvastatin 40 mg oral tablet: 1 tab(s) orally once a day (16 Dec 2020 16:36)  cilostazol 100 mg oral tablet: 1 tab(s) orally 2 times a day (16 Dec 2020 16:36)  clopidogrel 75 mg oral tablet: 1 tab(s) orally once a day (16 Dec 2020 16:36)  doxycycline hyclate 100 mg oral capsule: 1 cap(s) orally every 12 hours (16 Dec 2020 16:36)  Invokana 300 mg oral tablet: 1 tab(s) orally once a day (16 Dec 2020 16:36)  Januvia 100 mg oral tablet: 1 tab(s) orally once a day (16 Dec 2020 16:36)  K-Phos No. 2 oral tablet: orally 2 times a day (16 Dec 2020 16:36)  melatonin 3 mg oral tablet: 1 tab(s) orally once a day (at bedtime) (16 Dec 2020 16:36)  metFORMIN 1000 mg oral tablet: 1 tab(s) orally 2 times a day (16 Dec 2020 16:36)  Metoprolol Succinate ER 25 mg oral tablet, extended release: 1 tab(s) orally once a day (16 Dec 2020 16:36)  MiraLax oral powder for reconstitution: 7 gram(s) orally once a day (16 Dec 2020 16:36)  Mycostatin 100,000 units/mL oral suspension: 4 milliliter(s) orally 2 times a day (16 Dec 2020 16:36)  Norvasc 10 mg oral tablet: 1 tab(s) orally once a day (16 Dec 2020 16:36)  quinapril 40 mg oral tablet: 1 tab(s) orally once a day (16 Dec 2020 16:36)      MEDICATIONS  (STANDING):  atorvastatin 40 milliGRAM(s) Oral at bedtime  dextrose 40% Gel 15 Gram(s) Oral once  dextrose 5%. 1000 milliLiter(s) (50 mL/Hr) IV Continuous <Continuous>  dextrose 5%. 1000 milliLiter(s) (100 mL/Hr) IV Continuous <Continuous>  dextrose 50% Injectable 25 Gram(s) IV Push once  dextrose 50% Injectable 12.5 Gram(s) IV Push once  dextrose 50% Injectable 25 Gram(s) IV Push once  enoxaparin Injectable 40 milliGRAM(s) SubCutaneous daily  ertapenem  IVPB 1000 milliGRAM(s) IV Intermittent every 24 hours  glucagon  Injectable 1 milliGRAM(s) IntraMuscular once  insulin glargine Injectable (LANTUS) 10 Unit(s) SubCutaneous at bedtime  insulin lispro (ADMELOG) corrective regimen sliding scale   SubCutaneous three times a day before meals  insulin lispro (ADMELOG) corrective regimen sliding scale   SubCutaneous at bedtime  insulin lispro Injectable (ADMELOG) 5 Unit(s) SubCutaneous three times a day before meals  lisinopril 40 milliGRAM(s) Oral daily  metoprolol succinate ER 25 milliGRAM(s) Oral daily  senna 2 Tablet(s) Oral at bedtime    MEDICATIONS  (PRN):  HYDROmorphone  Injectable 1 milliGRAM(s) IV Push every 4 hours PRN Severe Pain (7 - 10)  morphine  - Injectable 2 milliGRAM(s) IV Push every 6 hours PRN Moderate Pain (4 - 6)  polyethylene glycol 3350 17 Gram(s) Oral daily PRN Constipation  traMADol 50 milliGRAM(s) Oral every 6 hours PRN Mild Pain (1 - 3)      Allergies    No Known Allergies    Intolerances        REVIEW OF SYSTEMS:  CONSTITUTIONAL: No fever, No chills, No fatigue, No myalgia, No Body ache, No Weakness  EYES: No eye pain,  No visual disturbances, No discharge, NO Redness  ENMT:  No ear pain, No nose bleed, No vertigo; No sinus or throat pain, No Congestion  NECK: No pain, No stiffness  RESPIRATORY: No cough, wheezing, No  hemoptysis, No shortness of breath  CARDIOVASCULAR: No chest pain, palpitations  GASTROINTESTINAL: No abdominal or epigastric pain. No nausea, No vomiting; No diarrhea or constipation. [ x ] BM  GENITOURINARY: No dysuria, No frequency, No urgency, No hematuria, or incontinence  NEUROLOGICAL: No headaches, No dizziness, No numbness, No tingling, No tremors, No weakness  EXT: R foot pain at site of amputation; dressing c/d/i; No Swelling, No Edema  SKIN:  [ x ] No itching, burning, rashes, or lesions   MUSCULOSKELETAL: Back pain attributed to hospital bed, relieved by placing rolled towels behind back; No joint pain or swelling; No muscle pain  PSYCHIATRIC: No depression, anxiety, mood swings or difficulty sleeping at night  PAIN SCALE: [ x ] None   REST OF REVIEW Of SYSTEM - [ x ] Normal     Vital Signs Last 24 Hrs  T(C): 36.4 (21 Dec 2020 05:15), Max: 37.1 (21 Dec 2020 04:52)  T(F): 97.5 (21 Dec 2020 05:15), Max: 98.8 (21 Dec 2020 04:52)  HR: 64 (21 Dec 2020 05:15) (64 - 82)  BP: 102/58 (21 Dec 2020 05:15) (102/58 - 143/82)  BP(mean): --  RR: 20 (21 Dec 2020 05:15) (18 - 20)  SpO2: 91% (21 Dec 2020 05:15) (91% - 94%)  Finger Stick        12-21 @ 07:01  -  12-21 @ 09:27  --------------------------------------------------------  IN: 480 mL / OUT: 0 mL / NET: 480 mL        PHYSICAL EXAM:  GENERAL:  [ x ] NAD , [ x ] well appearing, [  ] Agitated, [  ] Drowsy,  [  ] Lethargy, [  ] confused   HEAD:  [ x ] Normal, [  ] Other  EYES:  [ x ] EOMI, [ x ] PERRLA, [ x ] conjunctiva and sclera clear normal, [  ] Other,  [  ] Pallor,[  ] Discharge  ENMT:  [ x ] Normal, [ x ] Moist mucous membranes, [ x ] Good dentition, [ x ] No Thrush  NECK:  [ x ] Supple, [ x ] No JVD, [ x ] Normal thyroid, [  ] Lymphadenopathy [  ] Other  CHEST/LUNG:  [x  ] Clear to auscultation bilaterally, [ x ] Breath Sounds equal,  [ x ] No rales, [ x ] No rhonchi  [ x]  No wheezing  HEART:  [ x ] Regular rate and rhythm, [  ] tachycardia, [  ] Bradycardia,  [  ] irregular  [ x ] No murmurs, No rubs, No gallops, [  ] PPM in place (Mfr:  )  ABDOMEN:  [ x ] Soft, [x  ] Nontender, [x  ] Nondistended, [x  ] No mass, [x  ] Bowel sounds present, [  ] obese  NERVOUS SYSTEM:  [ x ] Alert & Oriented X3, [  ] Nonfocal  [  ] Confusion  [  ] Encephalopathic [  ] Sedated [  ] Unable to assess, [  ] Other-  EXTREMITIES: [ x ] 2+ Peripheral Pulses, No clubbing, No cyanosis,  [  ] edema B/L lower EXT. [  ] PVD stasis skin changes B/L Lower EXT  LYMPH: No lymphadenopathy noted  SKIN:  [ x ] No rashes or lesions, [  ] Pressure Ulcers, [  ] ecchymosis, [  ] Skin Tears, [ x ] Other - R foot wrapped in dressing, c/d/i, in boot    DIET: Diet, Consistent Carbohydrate w/Evening Snack:   DASH/TLC Sodium & Cholesterol Restricted (12-18-20 @ 11:21)    LABS:            Culture Results:   Rare Alpha hemolytic strep "Susceptibilities not performed" (12-18 @ 16:42)  Culture Results:   Few Serratia marcescens  Moderate Alpha hemolytic strep "Susceptibilities not performed" (12-18 @ 16:42)  Culture Results:   Moderate Serratia marcescens Multiple Morphological Strains  Normal skin qi isolated (12-16 @ 15:58)      Culture - Tissue with Gram Stain (collected 18 Dec 2020 16:42)  Source: .Tissue right hallux bone culture  Gram Stain (18 Dec 2020 22:20):    No polymorphonuclear leukocytes per low power field    Few Gram positive cocci in pairs per oil power field  Preliminary Report (19 Dec 2020 17:31):    Few Serratia marcescens    Moderate Alpha hemolytic strep "Susceptibilities not performed"  Organism: Serratia marcescens (20 Dec 2020 16:30)  Organism: Serratia marcescens (20 Dec 2020 16:30)    Culture - Tissue with Gram Stain (collected 18 Dec 2020 16:42)  Source: .Tissue right metatarsal head bone cul  Gram Stain (18 Dec 2020 22:31):    No polymorphonuclear leukocytes per low power field    No organisms seen per oil power field  Preliminary Report (19 Dec 2020 17:33):    Rare Alpha hemolytic strep "Susceptibilities not performed"    Culture - Other (collected 16 Dec 2020 15:58)  Source: .Other BERYL fontana  Final Report (18 Dec 2020 19:40):    Moderate Serratia marcescens Multiple Morphological Strains    Normal skin qi isolated  Organism: Serratia marcescens  Serratia marcescens (18 Dec 2020 19:40)  Organism: Serratia marcescens (18 Dec 2020 19:40)  Organism: Serratia marcescens (18 Dec 2020 19:40)         Anemia Panel:      Thyroid Panel:                RADIOLOGY & ADDITIONAL TESTS:      HEALTH ISSUES - PROBLEM Dx:  Diabetes mellitus type 2, uncontrolled  Diabetes mellitus type 2, uncontrolled    Peripheral vascular disease  Peripheral vascular disease    Need for prophylactic measure  Need for prophylactic measure    Hyperlipidemia  Hyperlipidemia    Diabetes  Diabetes    HTN (hypertension)  HTN (hypertension)    Osteomyelitis of right foot, unspecified type  Osteomyelitis of right foot, unspecified type    Gangrene of toe of right foot  Gangrene of toe of right foot            Consultant(s) Notes Reviewed:  [ x ] YES     Care Discussed with [X] Consultants  [ x ] Patient  [  ] Family  [  ]   [  ] Social Service  [  ] RN, [  ] Physical Therapy  DVT PPX: [ x ] Lovenox, [  ] S C Heparin, [  ] Coumadin, [  ] Xarelto, [  ] Eliquis, [  ] Pradaxa, [  ] IV Heparin drip, [  ] SCD [  ] Contraindication 2 to GI Bleed,[  ] Ambulation  Advanced directive: [ x ] None, [  ] DNR/DNI Patient is a 60y old  Male who presents with a chief complaint of Osteomyelitis (21 Dec 2020 07:45)    HPI:  61yo M with PMH of HTN, HLD, DM2 (not on insulin), peripheral vascular insufficiency s/p angioplasty of RLE (Nov 2020) sent by Podiatry, Dr. Stark for R hallux osteomyelitis. Patient states initial wound was in Oct 2020 after he had an ingrown toenail. Patient had vascular angiogram in November to improve blood flow however no improvement. Denies fever, chills, chest pain, sob, abd pain, N/V, UE/LE weakness or paresthesias.     In the ED:  VS- 98.4F, 114HR, 158/85, 18RR, O2 sat 98% on RA  Labs significant for glucose 154. CBC and CMP WNL.  ECG showed sinus tachycardia (107), no signs of acute ischemia  MRI showed Acute osteomyelitis of the right first distal phalanx. Prominent soft tissue edema about the first toe with numerous punctate foci of hypointense signal corresponding to air on the prior radiographs. These foci of air appear confined to the plantar soft tissues of the first distal phalanx without definite extension proximally, however MRI is insensitive for this finding.  s/p Zosyn in the ED, pt was seen by podiatry & ID in ER. (16 Dec 2020 10:52)    INTERVAL HPI:   12/17: Patient seen and examined at bedside. No events overnight. Scheduled for R hallux amputation tomorrow. Patient feels well and denies any complaints this AM. NO Abx as per ID.    12/18: Patient seen and examined at bedside. Scheduled for R hallux amputation today. Patient notes feeling mildly anxious before surgery. Otherwise feels well and denies any complaints. No HA, dizziness, CP, SOB, n/v, or abdominal pain.    12/19: Patient seen and examined at bedside. POD 1 s/p partial ray amputation of R hallux. Patient with 8/10 pain in r foot despite pain medications. Otherwise feels well and denies any complaints. No HA, dizziness, CP, SOB, n/v, or abdominal pain.    12/20: Patient seen and examined at bedside. POD 2 s/p partial ray amputation. Patient states pain is currently well controlled. Feels well. No HA, dizziness, CP, SOB, n/v, or abdominal pain. on IV Abx Invanz 1 gm Daily     12/21: Patient seen and examined at bedside. POD 3 s/p partial ray amputation. Patient endorses control of pain with dilaudid, feels got for about 10-11 hours before pain starts again. Feels well, denies CP, SOB, abdominal pain. Regular BM. Repeat PCR COVID negative. Continue Ivanz IV 1gm daily. Restart home plavix and cilostazol. PICC line to be placed today with Positive surgical Pathology results AC OM on clean margin section. Stable D/C home.      OVERNIGHT EVENTS: none    Home Medications:  atorvastatin 40 mg oral tablet: 1 tab(s) orally once a day (16 Dec 2020 16:36)  cilostazol 100 mg oral tablet: 1 tab(s) orally 2 times a day (16 Dec 2020 16:36)  clopidogrel 75 mg oral tablet: 1 tab(s) orally once a day (16 Dec 2020 16:36)  doxycycline hyclate 100 mg oral capsule: 1 cap(s) orally every 12 hours (16 Dec 2020 16:36)  Invokana 300 mg oral tablet: 1 tab(s) orally once a day (16 Dec 2020 16:36)  Januvia 100 mg oral tablet: 1 tab(s) orally once a day (16 Dec 2020 16:36)  K-Phos No. 2 oral tablet: orally 2 times a day (16 Dec 2020 16:36)  melatonin 3 mg oral tablet: 1 tab(s) orally once a day (at bedtime) (16 Dec 2020 16:36)  metFORMIN 1000 mg oral tablet: 1 tab(s) orally 2 times a day (16 Dec 2020 16:36)  Metoprolol Succinate ER 25 mg oral tablet, extended release: 1 tab(s) orally once a day (16 Dec 2020 16:36)  MiraLax oral powder for reconstitution: 7 gram(s) orally once a day (16 Dec 2020 16:36)  Mycostatin 100,000 units/mL oral suspension: 4 milliliter(s) orally 2 times a day (16 Dec 2020 16:36)  Norvasc 10 mg oral tablet: 1 tab(s) orally once a day (16 Dec 2020 16:36)  quinapril 40 mg oral tablet: 1 tab(s) orally once a day (16 Dec 2020 16:36)      MEDICATIONS  (STANDING):  atorvastatin 40 milliGRAM(s) Oral at bedtime  dextrose 40% Gel 15 Gram(s) Oral once  dextrose 5%. 1000 milliLiter(s) (50 mL/Hr) IV Continuous <Continuous>  dextrose 5%. 1000 milliLiter(s) (100 mL/Hr) IV Continuous <Continuous>  dextrose 50% Injectable 25 Gram(s) IV Push once  dextrose 50% Injectable 12.5 Gram(s) IV Push once  dextrose 50% Injectable 25 Gram(s) IV Push once  enoxaparin Injectable 40 milliGRAM(s) SubCutaneous daily  ertapenem  IVPB 1000 milliGRAM(s) IV Intermittent every 24 hours  glucagon  Injectable 1 milliGRAM(s) IntraMuscular once  insulin glargine Injectable (LANTUS) 10 Unit(s) SubCutaneous at bedtime  insulin lispro (ADMELOG) corrective regimen sliding scale   SubCutaneous three times a day before meals  insulin lispro (ADMELOG) corrective regimen sliding scale   SubCutaneous at bedtime  insulin lispro Injectable (ADMELOG) 5 Unit(s) SubCutaneous three times a day before meals  lisinopril 40 milliGRAM(s) Oral daily  metoprolol succinate ER 25 milliGRAM(s) Oral daily  senna 2 Tablet(s) Oral at bedtime    MEDICATIONS  (PRN):  HYDROmorphone  Injectable 1 milliGRAM(s) IV Push every 4 hours PRN Severe Pain (7 - 10)  morphine  - Injectable 2 milliGRAM(s) IV Push every 6 hours PRN Moderate Pain (4 - 6)  polyethylene glycol 3350 17 Gram(s) Oral daily PRN Constipation  traMADol 50 milliGRAM(s) Oral every 6 hours PRN Mild Pain (1 - 3)      Allergies    No Known Allergies    Intolerances      REVIEW OF SYSTEMS: C/O Soft stools  CONSTITUTIONAL: No fever, No chills, No fatigue, No myalgia, No Body ache, No Weakness  EYES: No eye pain,  No visual disturbances, No discharge, NO Redness  ENMT:  No ear pain, No nose bleed, No vertigo; No sinus or throat pain, No Congestion  NECK: No pain, No stiffness  RESPIRATORY: No cough, wheezing, No  hemoptysis, No shortness of breath  CARDIOVASCULAR: No chest pain, palpitations  GASTROINTESTINAL: No abdominal or epigastric pain. No nausea, No vomiting; No diarrhea or constipation. [ x ] BM  GENITOURINARY: No dysuria, No frequency, No urgency, No hematuria, or incontinence  NEUROLOGICAL: No headaches, No dizziness, No numbness, No tingling, No tremors, No weakness  EXT: R foot pain at site of amputation; dressing c/d/i; No Swelling, No Edema  SKIN:  [ x ] No itching, burning, rashes, or lesions   MUSCULOSKELETAL: Back pain attributed to hospital bed, relieved by placing rolled towels behind back; No joint pain or swelling; No muscle pain  PSYCHIATRIC: No depression, anxiety, mood swings or difficulty sleeping at night  PAIN SCALE: [ x ] None   REST OF REVIEW Of SYSTEM - [ x ] Normal     Vital Signs Last 24 Hrs  T(C): 36.4 (21 Dec 2020 05:15), Max: 37.1 (21 Dec 2020 04:52)  T(F): 97.5 (21 Dec 2020 05:15), Max: 98.8 (21 Dec 2020 04:52)  HR: 64 (21 Dec 2020 05:15) (64 - 82)  BP: 102/58 (21 Dec 2020 05:15) (102/58 - 143/82)  BP(mean): --  RR: 20 (21 Dec 2020 05:15) (18 - 20)  SpO2: 91% (21 Dec 2020 05:15) (91% - 94%)  Finger Stick        12-21 @ 07:01  -  12-21 @ 09:27  --------------------------------------------------------  IN: 480 mL / OUT: 0 mL / NET: 480 mL        PHYSICAL EXAM: PICC Line  GENERAL:  [ x ] NAD , [ x ] well appearing, [  ] Agitated, [  ] Drowsy,  [  ] Lethargy, [  ] confused   HEAD:  [ x ] Normal, [  ] Other  EYES:  [ x ] EOMI, [ x ] PERRLA, [ x ] conjunctiva and sclera clear normal, [  ] Other,  [  ] Pallor,[  ] Discharge  ENMT:  [ x ] Normal, [ x ] Moist mucous membranes, [ x ] Good dentition, [ x ] No Thrush  NECK:  [ x ] Supple, [ x ] No JVD, [ x ] Normal thyroid, [  ] Lymphadenopathy [  ] Other  CHEST/LUNG:  [x  ] Clear to auscultation bilaterally, [ x ] Breath Sounds equal,  [ x ] No rales, [ x ] No rhonchi  [ x]  No wheezing  HEART:  [ x ] Regular rate and rhythm, [  ] tachycardia, [  ] Bradycardia,  [  ] irregular  [ x ] No murmurs, No rubs, No gallops, [  ] PPM in place (Mfr:  )  ABDOMEN:  [ x ] Soft, [x  ] Nontender, [x  ] Nondistended, [x  ] No mass, [x  ] Bowel sounds present, [  ] obese  NERVOUS SYSTEM:  [ x ] Alert & Oriented X3, [ x ] Nonfocal  [  ] Confusion  [  ] Encephalopathic [  ] Sedated [  ] Unable to assess, [  ] Other-  EXTREMITIES: [ x ] 2+ Peripheral Pulses, No clubbing, No cyanosis,  [  ] edema B/L lower EXT. [  ] PVD stasis skin changes B/L Lower EXT  LYMPH: No lymphadenopathy noted  SKIN:  [ x ] No rashes or lesions, [  ] Pressure Ulcers, [  ] ecchymosis, [  ] Skin Tears, [ x ] Other - R foot wrapped in dressing, c/d/i, in boot    DIET: Diet, Consistent Carbohydrate w/Evening Snack:   DASH/TLC Sodium & Cholesterol Restricted (12-18-20 @ 11:21)    LABS:    Culture Results:   Rare Alpha hemolytic strep "Susceptibilities not performed" (12-18 @ 16:42)  Culture Results:   Few Serratia marcescens  Moderate Alpha hemolytic strep "Susceptibilities not performed" (12-18 @ 16:42)  Culture Results:   Moderate Serratia marcescens Multiple Morphological Strains  Normal skin qi isolated (12-16 @ 15:58)      Culture - Tissue with Gram Stain (collected 18 Dec 2020 16:42)  Source: .Tissue right hallux bone culture  Gram Stain (18 Dec 2020 22:20):    No polymorphonuclear leukocytes per low power field    Few Gram positive cocci in pairs per oil power field  Preliminary Report (19 Dec 2020 17:31):    Few Serratia marcescens    Moderate Alpha hemolytic strep "Susceptibilities not performed"  Organism: Serratia marcescens (20 Dec 2020 16:30)  Organism: Serratia marcescens (20 Dec 2020 16:30)    Culture - Tissue with Gram Stain (collected 18 Dec 2020 16:42)  Source: .Tissue right metatarsal head bone cul  Gram Stain (18 Dec 2020 22:31):    No polymorphonuclear leukocytes per low power field    No organisms seen per oil power field  Preliminary Report (19 Dec 2020 17:33):    Rare Alpha hemolytic strep "Susceptibilities not performed"    Culture - Other (collected 16 Dec 2020 15:58)  Source: .Other R hallux  Final Report (18 Dec 2020 19:40):    Moderate Serratia marcescens Multiple Morphological Strains    Normal skin qi isolated  Organism: Serratia marcescens  Serratia marcescens (18 Dec 2020 19:40)  Organism: Serratia marcescens (18 Dec 2020 19:40)  Organism: Serratia marcescens (18 Dec 2020 19:40)     thoFinal Diagnosis   1. Right hallux, amputation:   -Ulceration with acute and chronic inflammation, abscess   and granulation tissue   formation and gangrenous and coagulative necrosis of skin   and subcutaneous   tissue.   -Intact skin showing secondary epidermal changes, including   acanthosis,   hyperkeratosis and parakeratosis.   -Underlying bone with acute osteomyelitis.   -Viable skin and soft tissue resection margin, negative for   significant   inflammatory activity or reactive changes.   -Specimen bone margin showing mild acute osteomyelitis.   2. Right first metatarsal head, excision:   -Bone and adjacent soft tissue showing mild acute   osteomyelitis, and reactive and   degenerative changes.   3. Right first metatarsal, clean margin:   -Bone and adjacent soft tissue showing mild acute   osteomyelitis, and reactive         RADIOLOGY & ADDITIONAL TESTS: NONE      HEALTH ISSUES - PROBLEM Dx:  Diabetes mellitus type 2, uncontrolled  Diabetes mellitus type 2, uncontrolled    Peripheral vascular disease  Peripheral vascular disease    Need for prophylactic measure  Need for prophylactic measure    Hyperlipidemia  Hyperlipidemia    Diabetes  Diabetes    HTN (hypertension)  HTN (hypertension)    Osteomyelitis of right foot, unspecified type  Osteomyelitis of right foot, unspecified type    Gangrene of toe of right foot  Gangrene of toe of right foot      Consultant(s) Notes Reviewed:  [ x ] YES     Care Discussed with [X] Consultants  [ x ] Patient  [  ] Family  [ x ]   [  ] Social Service  [x  ] RN, [  ] Physical Therapy  DVT PPX: [ x ] Lovenox, [  ] S C Heparin, [  ] Coumadin, [  ] Xarelto, [  ] Eliquis, [  ] Pradaxa, [  ] IV Heparin drip, [  ] SCD [  ] Contraindication 2 to GI Bleed,[  ] Ambulation  Advanced directive: [ x ] None, [  ] DNR/DNI

## 2020-12-21 NOTE — DISCHARGE NOTE NURSING/CASE MANAGEMENT/SOCIAL WORK - NSDCFUADDAPPT_GEN_ALL_CORE_FT
Please get weekly lab work at your primary care doctor's office: CBC and CMP. Fax results to [phone]

## 2020-12-21 NOTE — PROGRESS NOTE ADULT - PROBLEM SELECTOR PROBLEM 2
HTN (hypertension)
Gangrene of toe of right foot
Gangrene of toe of right foot
HTN (hypertension)
Gangrene of toe of right foot
HTN (hypertension)

## 2020-12-21 NOTE — PROGRESS NOTE ADULT - REASON FOR ADMISSION
Osteomyelitis
Amputation Right hallux, gangrene and osteomyelitis
Osteomyelitis

## 2020-12-21 NOTE — ADVANCED PRACTICE NURSE CONSULT - REASON FOR CONSULT
Patient is a 60y old  Male who presents with a chief complaint of Osteomyelitis (21 Dec 2020 16:11)      Type:2  DX >5 year a1c 8.4% Home regimen: metformin, invokana, januvia. known complications: DFU Endocrine: plans to FU with Perlman    HPI:  61yo F with PMH of HTN, HLD, DM2 (not on insulin), peripheral vascular insufficiency s/p angioplasty of RLE (Nov 2020) sent by Podiatry, Dr. Stark for R hallux osteomyelitis. Patient states initial wound was in Oct 2020 after he had an ingrown toenail. Patient had vascular angiogram in November to improve blood flow however no improvement. Denies fever, chills, chest pain, sob, abd pain, N/V, UE/LE weakness or paresthesias.     In the ED:  VS- 98.4F, 114HR, 158/85, 18RR, O2 sat 98% on RA  Labs significant for glucose 154. CBC and CMP WNL.  ECG showed sinus tachycardia (107), no signs of acute ischemia  MRI showed Acute osteomyelitis of the right first distal phalanx. Prominent soft tissue edema about the first toe with numerous punctate foci of hypointense signal corresponding to air on the prior radiographs. These foci of air appear confined to the plantar soft tissues of the first distal phalanx without definite extension proximally, however MRI is insensitive for this finding.  s/p Zosyn in the ED, pt was seen by podiatry & ID in ER. (16 Dec 2020 10:52)      PAST MEDICAL & SURGICAL HISTORY:  Peripheral vascular disease  S/p Angioplasty rt lower EXT Nov 2020    Hyperlipidemia    Diabetes    HTN (hypertension)    H/O angioplasty  RLE        MEDICATIONS  (STANDING):  atorvastatin 40 milliGRAM(s) Oral at bedtime  cilostazol 100 milliGRAM(s) Oral two times a day  clopidogrel Tablet 75 milliGRAM(s) Oral daily  dextrose 40% Gel 15 Gram(s) Oral once  dextrose 5%. 1000 milliLiter(s) (50 mL/Hr) IV Continuous <Continuous>  dextrose 5%. 1000 milliLiter(s) (100 mL/Hr) IV Continuous <Continuous>  dextrose 50% Injectable 25 Gram(s) IV Push once  dextrose 50% Injectable 12.5 Gram(s) IV Push once  dextrose 50% Injectable 25 Gram(s) IV Push once  enoxaparin Injectable 40 milliGRAM(s) SubCutaneous daily  ertapenem  IVPB 1000 milliGRAM(s) IV Intermittent every 24 hours  glucagon  Injectable 1 milliGRAM(s) IntraMuscular once  insulin glargine Injectable (LANTUS) 10 Unit(s) SubCutaneous at bedtime  insulin lispro (ADMELOG) corrective regimen sliding scale   SubCutaneous three times a day before meals  insulin lispro (ADMELOG) corrective regimen sliding scale   SubCutaneous at bedtime  insulin lispro Injectable (ADMELOG) 5 Unit(s) SubCutaneous three times a day before meals  lisinopril 40 milliGRAM(s) Oral daily  metoprolol succinate ER 25 milliGRAM(s) Oral daily  traMADol 50 milliGRAM(s) Oral every 6 hours

## 2020-12-21 NOTE — PROGRESS NOTE ADULT - PROBLEM SELECTOR PLAN 6
VTE ppx: SCDs
VTE ppx: SCDs
VTE ppx: Heparin 5000 BID. Discontinue at midnight
VTE ppx: SCDs
VTE ppx: Lovenox 40mg subq daily

## 2020-12-21 NOTE — PROGRESS NOTE ADULT - PROVIDER SPECIALTY LIST ADULT
Cardiology
Podiatry
Anesthesia
Cardiology
Endocrinology
Cardiology
Endocrinology
Infectious Disease
Podiatry
Endocrinology
Endocrinology
Podiatry
Infectious Disease
Infectious Disease
Internal Medicine
Podiatry
Infectious Disease
Infectious Disease
Internal Medicine

## 2020-12-21 NOTE — PROGRESS NOTE ADULT - ASSESSMENT
59yo F with PMH of HTN, HLD, DM2 (not on insulin), sent by Podiatry, Dr. Gomez for R hallux osteomyelitis, scheduled for a right Hallex Amputation    Right Hallux OM  - S/p partial ray amputation of foot 12/18/20  - Euvolemic on exam.  - Pain control  - fu pathology     HTN  - Continue Lisinopril 40mg 1 po qd for now ,watch bp trends, may need to decreased to 20mg po daily    Metoprolol succinate XL 25mg 1po qd    PAD, with ag atherosclerosis on dopplers  - Continue Lipitor  - Resume  Plavix per surgery     - Monitor and replete lytes, keep K>4, Mg>2.  - All other medical needs as per primary team.  - Other cardiovascular workup will depend on clinical course.  - Will continue to follow.    Gisela FELTONP-C  Cardiology NP  [facility] [phone]   59yo F with PMH of HTN, HLD, DM2 (not on insulin), sent by Podiatry, Dr. Gomez for R hallux osteomyelitis, scheduled for a right Hallex Amputation    Right Hallux OM  - S/p partial ray amputation of foot 12/18/20  - Euvolemic on exam.  - Pain control  - fu pathology     HTN  -102/58  Continue Lisinopril 40mg 1 po qd for now ,watch bp trends, may need to decrease to 20mg po daily    Metoprolol succinate XL 25mg 1po qd    PAD, with ag atherosclerosis on dopplers  - Continue Lipitor  - Resume  Plavix per surgery     - Monitor and replete lytes, keep K>4, Mg>2.  - All other medical needs as per primary team.  - Other cardiovascular workup will depend on clinical course.  - Will continue to follow.    Gisela FELTONP-C  Cardiology NP  [facility] [phone]   59yo F with PMH of HTN, HLD, DM2 (not on insulin), sent by Podiatry, Dr. Gomez for R hallux osteomyelitis, scheduled for a right Hallex Amputation    Right Hallux OM  - S/p partial ray amputation of foot 12/18/20  - Euvolemic on exam.  - Pain control  - fu pathology     HTN  -102/58  Continue Lisinopril 40mg 1 po qd for now ,watch bp trend  - Metoprolol succinate XL 25mg 1po qd    PAD, with ag atherosclerosis on dopplers  - Continue Lipitor  - Resume  Plavix per surgery     - Monitor and replete lytes, keep K>4, Mg>2.  - All other medical needs as per primary team.  - Other cardiovascular workup will depend on clinical course.  - Will continue to follow.    Gisela Norman FNP-C  Cardiology NP  [facility] [phone]

## 2020-12-21 NOTE — PROGRESS NOTE ADULT - PROBLEM SELECTOR PLAN 5
continue home atorvastatin
Resume home atorvastatin
Resume home atorvastatin
continue home atorvastatin
continue home atorvastatin

## 2020-12-21 NOTE — PROGRESS NOTE ADULT - PROBLEM SELECTOR PLAN 4
Chronic, not on insulin  -A1C 8.4  -Endo, Dr. C Perlman--Lantus 10 u Q HS, Pre meals Humalog 5 u 3x day  -hold home Januvia, canagliflozin, and metformin in hospital setting  -Low dose ISS, Accu-Cheks q ac/hs  -Nutrition consult

## 2020-12-21 NOTE — ADVANCED PRACTICE NURSE CONSULT - ASSESSMENT
Vital Signs Last 24 Hrs  T(C): 36.7 (21 Dec 2020 13:05), Max: 37.1 (21 Dec 2020 04:52)  T(F): 98.1 (21 Dec 2020 13:05), Max: 98.8 (21 Dec 2020 04:52)  HR: 72 (21 Dec 2020 13:05) (64 - 82)  BP: 123/74 (21 Dec 2020 13:05) (102/58 - 143/82)  BP(mean): --  RR: 18 (21 Dec 2020 13:05) (18 - 20)  SpO2: 94% (21 Dec 2020 13:05) (91% - 94%)     eGFR if Non African American: 92 mL/min/1.73M2 (12-20-20 @ 06:28)  eGFR if : 107 mL/min/1.73M2 (12-20-20 @ 06:28)  eGFR if Non African American: 90 mL/min/1.73M2 (12-19-20 @ 08:19)  eGFR if : 104 mL/min/1.73M2 (12-19-20 @ 08:19)      CAPILLARY BLOOD GLUCOSE      POCT Blood Glucose.: 180 mg/dL (21 Dec 2020 17:08)  POCT Blood Glucose.: 190 mg/dL (21 Dec 2020 11:48)  POCT Blood Glucose.: 144 mg/dL (21 Dec 2020 07:27)  POCT Blood Glucose.: 195 mg/dL (20 Dec 2020 21:00)      DIET: CC  %

## 2020-12-21 NOTE — PROGRESS NOTE ADULT - ATTENDING COMMENTS
Pt seen, examined, case & care plan d/w pt, residents at detail.  D/W DR C Perlman -Restart Home DM meds on d/c & out pt follow up about starting Lantus as out pt.  D/W ID DR LORE Frederick- IV Abx 6 weeks total Invanz 1 gm daily .  D/W DR Lincoln TOMLIN- Podiatry at detail. Out pt follow up with wound care nwxt week. Pt seen, examined, case & care plan d/w pt, residents at detail.  D/W DR C Perlman -Restart Home DM meds on d/c & out pt follow up about starting Lantus as out pt.  D/W ID DR LORE Frederick- IV Abx 6 weeks total Invanz 1 gm daily .  D/W DR Lincoln TOMLIN- Podiatry at detail. Out pt follow up with wound care nwxt week.  D/C Home today with PICC line, Home IV Abx   Total D/C care time is 50 minutes.

## 2020-12-21 NOTE — PROGRESS NOTE ADULT - PROBLEM SELECTOR PROBLEM 1
Gangrene of toe of right foot
Diabetes mellitus type 2, uncontrolled
Gangrene of toe of right foot
Osteomyelitis of right foot, unspecified type
Diabetes mellitus type 2, uncontrolled
Gangrene of toe of right foot
Osteomyelitis of right foot, unspecified type
Diabetes mellitus type 2, uncontrolled
Diabetes mellitus type 2, uncontrolled
Osteomyelitis of right foot, unspecified type
Gangrene of toe of right foot
Osteomyelitis of right foot, unspecified type

## 2020-12-21 NOTE — PROGRESS NOTE ADULT - ATTENDING COMMENTS
Efren Fredercik M.D.  [facility], Division of Infectious Diseases  [phone]  After 5pm on weekdays and all day on weekends - please call [phone]

## 2020-12-21 NOTE — PROGRESS NOTE ADULT - PROBLEM SELECTOR PLAN 1
Pt evaluated and chart reviewed  s/p PICC  Dressing clean, dry and intact  Sx site cleansed with NS and dressed with Aquacel, DSD  f/u WCx      Podiatry Stable  Podiatry will continue to follow pt while in house    Wound Care instructions  1.  Keep dressing clean, dry and intact  2.  Make appointment to be seen within 5/d of d/c at [facility] with Dr. Stark or Dr. Gomez  3.  If n/v/f/c/sob/cp present go to emergency department

## 2020-12-21 NOTE — PROGRESS NOTE ADULT - SUBJECTIVE AND OBJECTIVE BOX
CAPILLARY BLOOD GLUCOSE      POCT Blood Glucose.: 144 mg/dL (21 Dec 2020 07:27)  POCT Blood Glucose.: 195 mg/dL (20 Dec 2020 21:00)  POCT Blood Glucose.: 148 mg/dL (20 Dec 2020 16:55)  POCT Blood Glucose.: 226 mg/dL (20 Dec 2020 11:49)  POCT Blood Glucose.: 160 mg/dL (20 Dec 2020 08:04)      Vital Signs Last 24 Hrs  T(C): 36.4 (21 Dec 2020 05:15), Max: 37.1 (21 Dec 2020 04:52)  T(F): 97.5 (21 Dec 2020 05:15), Max: 98.8 (21 Dec 2020 04:52)  HR: 64 (21 Dec 2020 05:15) (64 - 82)  BP: 102/58 (21 Dec 2020 05:15) (102/58 - 143/82)  BP(mean): --  RR: 20 (21 Dec 2020 05:15) (18 - 20)  SpO2: 91% (21 Dec 2020 05:15) (91% - 94%)      Respiratory: CTA B/L  CV: RRR, S1S2, no murmurs, rubs or gallops  Abdominal: Soft, NT, ND +BS, Last BM  Extremities: le foot dsg intact     12-20    138  |  104  |  19  ----------------------------<  163<H>  4.8   |  31  |  0.87    Ca    8.4<L>      20 Dec 2020 06:28        atorvastatin 40 milliGRAM(s) Oral at bedtime  dextrose 40% Gel 15 Gram(s) Oral once  dextrose 50% Injectable 25 Gram(s) IV Push once  dextrose 50% Injectable 12.5 Gram(s) IV Push once  dextrose 50% Injectable 25 Gram(s) IV Push once  glucagon  Injectable 1 milliGRAM(s) IntraMuscular once  insulin glargine Injectable (LANTUS) 10 Unit(s) SubCutaneous at bedtime  insulin lispro (ADMELOG) corrective regimen sliding scale   SubCutaneous three times a day before meals  insulin lispro (ADMELOG) corrective regimen sliding scale   SubCutaneous at bedtime  insulin lispro Injectable (ADMELOG) 5 Unit(s) SubCutaneous three times a day before meals

## 2020-12-21 NOTE — DISCHARGE NOTE NURSING/CASE MANAGEMENT/SOCIAL WORK - PATIENT PORTAL LINK FT
You can access the FollowMyHealth Patient Portal offered by [facility] by registering at the following website: [email or web address]. By joining [facility]’s FollowMyHealth portal, you will also be able to view your health information using other applications (apps) compatible with our system.

## 2020-12-21 NOTE — ADVANCED PRACTICE NURSE CONSULT - RECOMMEDATIONS
Type 2 A1c 8.4% s/p amputation  Recommend endocrine-Perlman  FU appt: patient wants to make own appt with Dr, Perlman  Diabetes support group info given  Cell # given as resource for diabetes information  Goal 100-180 mg/dL

## 2020-12-21 NOTE — PROGRESS NOTE ADULT - SUBJECTIVE AND OBJECTIVE BOX
[facility] -- Sami Golden, Vlad Grossman, Otoniel Bliss Savella  Office # [phone]      Follow Up:      Subjective/Observations:       REVIEW OF SYSTEMS: All other review of systems is negative unless indicated above    PAST MEDICAL & SURGICAL HISTORY:  Peripheral vascular disease  S/p Angioplasty rt lower EXT Nov 2020    Hyperlipidemia    Diabetes    HTN (hypertension)    H/O angioplasty  RLE        MEDICATIONS  (STANDING):  atorvastatin 40 milliGRAM(s) Oral at bedtime  cilostazol 100 milliGRAM(s) Oral two times a day  clopidogrel Tablet 75 milliGRAM(s) Oral daily  dextrose 40% Gel 15 Gram(s) Oral once  dextrose 5%. 1000 milliLiter(s) (50 mL/Hr) IV Continuous <Continuous>  dextrose 5%. 1000 milliLiter(s) (100 mL/Hr) IV Continuous <Continuous>  dextrose 50% Injectable 25 Gram(s) IV Push once  dextrose 50% Injectable 12.5 Gram(s) IV Push once  dextrose 50% Injectable 25 Gram(s) IV Push once  enoxaparin Injectable 40 milliGRAM(s) SubCutaneous daily  ertapenem  IVPB 1000 milliGRAM(s) IV Intermittent every 24 hours  glucagon  Injectable 1 milliGRAM(s) IntraMuscular once  insulin glargine Injectable (LANTUS) 10 Unit(s) SubCutaneous at bedtime  insulin lispro (ADMELOG) corrective regimen sliding scale   SubCutaneous three times a day before meals  insulin lispro (ADMELOG) corrective regimen sliding scale   SubCutaneous at bedtime  insulin lispro Injectable (ADMELOG) 5 Unit(s) SubCutaneous three times a day before meals  lisinopril 40 milliGRAM(s) Oral daily  metoprolol succinate ER 25 milliGRAM(s) Oral daily  senna 2 Tablet(s) Oral at bedtime    MEDICATIONS  (PRN):  HYDROmorphone  Injectable 1 milliGRAM(s) IV Push every 4 hours PRN Severe Pain (7 - 10)  morphine  - Injectable 2 milliGRAM(s) IV Push every 6 hours PRN Moderate Pain (4 - 6)  polyethylene glycol 3350 17 Gram(s) Oral daily PRN Constipation  traMADol 50 milliGRAM(s) Oral every 6 hours PRN Mild Pain (1 - 3)      Allergies    No Known Allergies    Intolerances        Vital Signs Last 24 Hrs  T(C): 36.4 (21 Dec 2020 05:15), Max: 37.1 (21 Dec 2020 04:52)  T(F): 97.5 (21 Dec 2020 05:15), Max: 98.8 (21 Dec 2020 04:52)  HR: 64 (21 Dec 2020 05:15) (64 - 82)  BP: 102/58 (21 Dec 2020 05:15) (102/58 - 143/82)  BP(mean): --  RR: 20 (21 Dec 2020 05:15) (18 - 20)  SpO2: 91% (21 Dec 2020 05:15) (91% - 94%)    I&O's Summary    21 Dec 2020 07:01  -  21 Dec 2020 10:50  --------------------------------------------------------  IN: 480 mL / OUT: 0 mL / NET: 480 mL          PHYSICAL EXAM:  TELE:   Constitutional: NAD, awake and alert, well-developed  HEENT: Moist Mucous Membranes, Anicteric  Pulmonary: Non-labored, breath sounds are clear bilaterally, No wheezing, crackles or rhonchi  Cardiovascular: Regular, S1 and S2 nl, No murmurs, rubs, gallops or clicks  Gastrointestinal: Bowel Sounds present, soft, nontender.   Lymph: No lymphadenopathy. No peripheral edema.  Skin: No visible rashes or ulcers.  Psych:  Mood & affect appropriate    LABS: All Labs Reviewed:                        11.9   10.31 )-----------( 233      ( 20 Dec 2020 06:28 )             36.7                         12.4   10.71 )-----------( 262      ( 19 Dec 2020 08:19 )             38.3     20 Dec 2020 06:28    138    |  104    |  19     ----------------------------<  163    4.8     |  31     |  0.87   19 Dec 2020 08:19    140    |  107    |  23     ----------------------------<  159    5.0     |  29     |  0.91     Ca    8.4        20 Dec 2020 06:28  Ca    8.4        19 Dec 2020 08:19               ECG:  < from: 12 Lead ECG (12.16.20 @ 09:37) >    Ventricular Rate 107 BPM    Atrial Rate 107 BPM    P-R Interval 130 ms    QRS Duration 82 ms    Q-T Interval 348 ms    QTC Calculation(Bazett) 464 ms    P Axis -2 degrees    R Axis -19 degrees    T Axis 19 degrees    Diagnosis Line Sinus tachycardia           [facility] -- Sami Golden, Ngoc, Vlad, Otoniel Bliss Savella  Office # [phone]      Follow Up:    htn hld  Subjective/Observations:     No events overnight resting comfortably in bed.  No complaints of chest pain, dyspnea, or palpitations reported. No signs of orthopnea or PND.    REVIEW OF SYSTEMS: All other review of systems is negative unless indicated above    PAST MEDICAL & SURGICAL HISTORY:  Peripheral vascular disease  S/p Angioplasty rt lower EXT Nov 2020    Hyperlipidemia    Diabetes    HTN (hypertension)    H/O angioplasty  RLE        MEDICATIONS  (STANDING):  atorvastatin 40 milliGRAM(s) Oral at bedtime  cilostazol 100 milliGRAM(s) Oral two times a day  clopidogrel Tablet 75 milliGRAM(s) Oral daily  dextrose 40% Gel 15 Gram(s) Oral once  dextrose 5%. 1000 milliLiter(s) (50 mL/Hr) IV Continuous <Continuous>  dextrose 5%. 1000 milliLiter(s) (100 mL/Hr) IV Continuous <Continuous>  dextrose 50% Injectable 25 Gram(s) IV Push once  dextrose 50% Injectable 12.5 Gram(s) IV Push once  dextrose 50% Injectable 25 Gram(s) IV Push once  enoxaparin Injectable 40 milliGRAM(s) SubCutaneous daily  ertapenem  IVPB 1000 milliGRAM(s) IV Intermittent every 24 hours  glucagon  Injectable 1 milliGRAM(s) IntraMuscular once  insulin glargine Injectable (LANTUS) 10 Unit(s) SubCutaneous at bedtime  insulin lispro (ADMELOG) corrective regimen sliding scale   SubCutaneous three times a day before meals  insulin lispro (ADMELOG) corrective regimen sliding scale   SubCutaneous at bedtime  insulin lispro Injectable (ADMELOG) 5 Unit(s) SubCutaneous three times a day before meals  lisinopril 40 milliGRAM(s) Oral daily  metoprolol succinate ER 25 milliGRAM(s) Oral daily  senna 2 Tablet(s) Oral at bedtime    MEDICATIONS  (PRN):  HYDROmorphone  Injectable 1 milliGRAM(s) IV Push every 4 hours PRN Severe Pain (7 - 10)  morphine  - Injectable 2 milliGRAM(s) IV Push every 6 hours PRN Moderate Pain (4 - 6)  polyethylene glycol 3350 17 Gram(s) Oral daily PRN Constipation  traMADol 50 milliGRAM(s) Oral every 6 hours PRN Mild Pain (1 - 3)      Allergies    No Known Allergies    Intolerances        Vital Signs Last 24 Hrs  T(C): 36.4 (21 Dec 2020 05:15), Max: 37.1 (21 Dec 2020 04:52)  T(F): 97.5 (21 Dec 2020 05:15), Max: 98.8 (21 Dec 2020 04:52)  HR: 64 (21 Dec 2020 05:15) (64 - 82)  BP: 102/58 (21 Dec 2020 05:15) (102/58 - 143/82)  BP(mean): --  RR: 20 (21 Dec 2020 05:15) (18 - 20)  SpO2: 91% (21 Dec 2020 05:15) (91% - 94%)    I&O's Summary    21 Dec 2020 07:01  -  21 Dec 2020 10:50  --------------------------------------------------------  IN: 480 mL / OUT: 0 mL / NET: 480 mL          PHYSICAL EXAM:  TELE: not on tele   Constitutional: NAD, awake and alert, well-developed  HEENT: Moist Mucous Membranes, Anicteric  Pulmonary: Non-labored, breath sounds are clear bilaterally, No wheezing, crackles or rhonchi  Cardiovascular: Regular, S1 and S2 nl, No murmurs, rubs, gallops or clicks  Gastrointestinal: Bowel Sounds present, soft, nontender.   Lymph: No lymphadenopathy. No peripheral edema.  Skin: right foot dressing   Psych:  Mood & affect appropriate    LABS: All Labs Reviewed:                        11.9   10.31 )-----------( 233      ( 20 Dec 2020 06:28 )             36.7                         12.4   10.71 )-----------( 262      ( 19 Dec 2020 08:19 )             38.3     20 Dec 2020 06:28    138    |  104    |  19     ----------------------------<  163    4.8     |  31     |  0.87   19 Dec 2020 08:19    140    |  107    |  23     ----------------------------<  159    5.0     |  29     |  0.91     Ca    8.4        20 Dec 2020 06:28  Ca    8.4        19 Dec 2020 08:19               ECG:  < from: 12 Lead ECG (12.16.20 @ 09:37) >    Ventricular Rate 107 BPM    Atrial Rate 107 BPM    P-R Interval 130 ms    QRS Duration 82 ms    Q-T Interval 348 ms    QTC Calculation(Bazett) 464 ms    P Axis -2 degrees    R Axis -19 degrees    T Axis 19 degrees    Diagnosis Line Sinus tachycardia

## 2020-12-21 NOTE — PROGRESS NOTE ADULT - PROBLEM SELECTOR PLAN 1
Acute osteomyelitis of the R first distal phalanx  R hallux wound since 10/2020 with infection, s/p augmentin, had worsening of infection with gangrene, purulence and malodor. Had been on doxycycline at home. Seen in wound care clinic and recommended for amputation.   MRI confirmed OM of the R first distal phalanx.  S/p pip-tazo once in the ER, held antibiotic until OR  R hallux wound culture with Serratia - sensitivities as above   S/p OR for R partial ray amputation 12/18, noted with purulence - OR cultures with Serratia and alpha hemolytic strep  Afebrile, nontoxic.   S/p pip-tazo    Follow for surg path - if no residual OM then can stop antibiotics, if there is residual OM then patient will need 6 weeks of abx therapy  Continue Ertapenem 1g IV Q24 (covers above organisms and for ease of dosing) Acute osteomyelitis of the R first distal phalanx  R hallux wound since 10/2020 with infection, s/p augmentin, had worsening of infection with gangrene, purulence and malodor. Had been on doxycycline at home. Seen in wound care clinic and recommended for amputation.   MRI confirmed OM of the R first distal phalanx.  S/p pip-tazo once in the ER, held antibiotic until OR  R hallux wound culture with Serratia - sensitivities as above   S/p OR for R partial ray amputation 12/18, noted with purulence - OR cultures with Serratia and alpha hemolytic strep  Afebrile, nontoxic.   S/p pip-tazo  Surg path shows residual OM - mild acute OM at margins - will need to treat with 6 weeks of IV abx.     Place PICC line  Continue Ertapenem 1g IV Q24 - plan for 6 week course from OR date 12/18 - end 1/28/2021  Will need weekly labs CBC/CMP while on IV antibiotics - fax  results to [phone]. Patient will follow up with me in ID office in 4-5 weeks

## 2020-12-21 NOTE — PROGRESS NOTE ADULT - PROBLEM SELECTOR PLAN 1
Sent by podiatry, Dr. Stark for osteomyelitis of R hallux  -MRI foot shows Acute osteomyelitis of the right first distal phalanx. Prominent soft tissue edema about the first toe with numerous punctate foci of hypointense signal corresponding to air on the prior radiographs. These foci of air appear confined to the plantar soft tissues of the first distal phalanx without definite extension proximally  -POD 3 s/p R partial ray amputation   -Wound dressed with Aquacel and DSD  -Wound culture prelim w/ moderate Serratia marcescens  -fu OR path and cultures - prelim with serratia marcescens and few/moderate alpha hemolytic strep  -ID, Dr. LORE Frederick following--Continue IV Invanz 1 gm daily  -Podiatry, Dr. Gomez following--wound care & dressing  -Vascular, Dr. Boston consulted--no vascular intervention needed Sent by podiatry, Dr. Stark for osteomyelitis of R hallux  -MRI foot shows Acute osteomyelitis of the right first distal phalanx. Prominent soft tissue edema about the first toe with numerous punctate foci of hypointense signal corresponding to air on the prior radiographs. These foci of air appear confined to the plantar soft tissues of the first distal phalanx without definite extension proximally  -POD 3 s/p R partial ray amputation   -Wound dressed with Aquacel and DSD  -Wound culture prelim w/ moderate Serratia marcescens  -fu OR path and cultures - prelim with serratia marcescens and few/moderate alpha hemolytic strep  -ID, Dr. LORE Frederick following--Continue IV Invanz 1 gm daily, if no residual OM on surg path can stop abx. Otherwise will need 6 weeks abx  -Podiatry, Dr. Gomez following--wound care & dressing  -Vascular, Dr. Boston consulted--no vascular intervention needed Sent by podiatry, Dr. Stark for osteomyelitis of R hallux  -MRI foot shows Acute osteomyelitis of the right first distal phalanx. Prominent soft tissue edema about the first toe with numerous punctate foci of hypointense signal corresponding to air on the prior radiographs. These foci of air appear confined to the plantar soft tissues of the first distal phalanx without definite extension proximally  -POD 3 s/p R partial ray amputation   -Wound dressed with Aquacel and DSD  -Wound culture prelim w/ moderate Serratia marcescens  -OR cultures - prelim with serratia marcescens and few/moderate alpha hemolytic strep  -OR path - residual OM, mild acute OM at margins -- will place PICC line to treat with 6 weeks IV Ivanz per ID recs (12/18 - 1/28)  -will need weekly CBC/CMP while on IV abx (fax results to [phone]) and f/u with Dr. Frederick in 4-5 weeks  -ID, Dr. LORE Frederick following--Continue IV Invanz 1 gm daily, if no residual OM on surg path can stop abx. Otherwise will need 6 weeks abx  -Podiatry, Dr. Gomez following--wound care & dressing  -Vascular, Dr. Boston consulted--no vascular intervention needed Sent by podiatry, Dr. Stark for osteomyelitis of R hallux  -MRI foot shows Acute osteomyelitis of the right first distal phalanx. Prominent soft tissue edema about the first toe with numerous punctate foci of hypointense signal corresponding to air on the prior radiographs. These foci of air appear confined to the plantar soft tissues of the first distal phalanx without definite extension proximally  -POD 3 s/p R partial ray amputation   -Wound dressed with Aquacel and DSD  -Wound culture prelim w/ moderate Serratia marcescens  -OR cultures - prelim with serratia marcescens and few/moderate alpha hemolytic strep  -OR path - residual OM, mild acute OM at margins -- will place PICC line to treat with 6 weeks IV Ivanz per ID recs (12/18 - 1/28)  -will need weekly CBC/CMP while on IV abx (fax results to [phone]) and f/u with Dr. LORE Frederick in 4-5 weeks  -ID, Dr. LORE Frederick following--Continue IV Invanz 1 gm daily, if no residual OM on surg path can stop abx. Otherwise will need 6 weeks abx  -Podiatry, Dr. Gomez following--wound care & dressing  -Vascular, Dr. Boston consulted--no vascular intervention needed

## 2020-12-23 LAB
CULTURE RESULTS: SIGNIFICANT CHANGE UP
CULTURE RESULTS: SIGNIFICANT CHANGE UP
ORGANISM # SPEC MICROSCOPIC CNT: SIGNIFICANT CHANGE UP
ORGANISM # SPEC MICROSCOPIC CNT: SIGNIFICANT CHANGE UP
SPECIMEN SOURCE: SIGNIFICANT CHANGE UP
SPECIMEN SOURCE: SIGNIFICANT CHANGE UP

## 2020-12-24 ENCOUNTER — OUTPATIENT (OUTPATIENT)
Dept: OUTPATIENT SERVICES | Facility: HOSPITAL | Age: 60
LOS: 1 days | Discharge: HOME | End: 2020-12-24
Payer: COMMERCIAL

## 2020-12-24 ENCOUNTER — APPOINTMENT (OUTPATIENT)
Dept: PODIATRY | Facility: HOSPITAL | Age: 60
End: 2020-12-24
Payer: COMMERCIAL

## 2020-12-24 VITALS
BODY MASS INDEX: 28.04 KG/M2 | DIASTOLIC BLOOD PRESSURE: 83 MMHG | SYSTOLIC BLOOD PRESSURE: 123 MMHG | WEIGHT: 185 LBS | HEIGHT: 68 IN | TEMPERATURE: 97.8 F | HEART RATE: 102 BPM | RESPIRATION RATE: 20 BRPM | OXYGEN SATURATION: 98 %

## 2020-12-24 DIAGNOSIS — Z98.62 PERIPHERAL VASCULAR ANGIOPLASTY STATUS: Chronic | ICD-10-CM

## 2020-12-24 DIAGNOSIS — L97.501 NON-PRESSURE CHRONIC ULCER OF OTHER PART OF UNSPECIFIED FOOT LIMITED TO BREAKDOWN OF SKIN: ICD-10-CM

## 2020-12-24 PROCEDURE — G0463: CPT

## 2020-12-24 PROCEDURE — 99024 POSTOP FOLLOW-UP VISIT: CPT

## 2020-12-24 RX ORDER — ERTAPENEM SODIUM 1 G/1
1 INJECTION, POWDER, LYOPHILIZED, FOR SOLUTION INTRAMUSCULAR; INTRAVENOUS
Refills: 0 | Status: ACTIVE | COMMUNITY

## 2020-12-24 RX ORDER — DOXYCYCLINE HYCLATE 100 MG/1
100 CAPSULE ORAL
Refills: 0 | Status: DISCONTINUED | COMMUNITY
End: 2020-12-24

## 2020-12-28 ENCOUNTER — APPOINTMENT (OUTPATIENT)
Dept: WOUND CARE | Facility: HOSPITAL | Age: 60
End: 2020-12-28
Payer: COMMERCIAL

## 2020-12-28 ENCOUNTER — OUTPATIENT (OUTPATIENT)
Dept: OUTPATIENT SERVICES | Facility: HOSPITAL | Age: 60
LOS: 1 days | Discharge: HOME | End: 2020-12-28
Payer: COMMERCIAL

## 2020-12-28 VITALS
BODY MASS INDEX: 28.04 KG/M2 | HEIGHT: 68 IN | OXYGEN SATURATION: 99 % | DIASTOLIC BLOOD PRESSURE: 65 MMHG | HEART RATE: 95 BPM | SYSTOLIC BLOOD PRESSURE: 99 MMHG | TEMPERATURE: 98.4 F | RESPIRATION RATE: 18 BRPM | WEIGHT: 185 LBS

## 2020-12-28 DIAGNOSIS — Z79.84 LONG TERM (CURRENT) USE OF ORAL HYPOGLYCEMIC DRUGS: ICD-10-CM

## 2020-12-28 DIAGNOSIS — Z80.3 FAMILY HISTORY OF MALIGNANT NEOPLASM OF BREAST: ICD-10-CM

## 2020-12-28 DIAGNOSIS — L97.501 NON-PRESSURE CHRONIC ULCER OF OTHER PART OF UNSPECIFIED FOOT LIMITED TO BREAKDOWN OF SKIN: ICD-10-CM

## 2020-12-28 DIAGNOSIS — E11.621 TYPE 2 DIABETES MELLITUS WITH FOOT ULCER: ICD-10-CM

## 2020-12-28 DIAGNOSIS — Z79.82 LONG TERM (CURRENT) USE OF ASPIRIN: ICD-10-CM

## 2020-12-28 DIAGNOSIS — Z79.899 OTHER LONG TERM (CURRENT) DRUG THERAPY: ICD-10-CM

## 2020-12-28 DIAGNOSIS — E11.51 TYPE 2 DIABETES MELLITUS WITH DIABETIC PERIPHERAL ANGIOPATHY WITHOUT GANGRENE: ICD-10-CM

## 2020-12-28 DIAGNOSIS — Z83.3 FAMILY HISTORY OF DIABETES MELLITUS: ICD-10-CM

## 2020-12-28 DIAGNOSIS — E78.00 PURE HYPERCHOLESTEROLEMIA, UNSPECIFIED: ICD-10-CM

## 2020-12-28 DIAGNOSIS — Z98.62 PERIPHERAL VASCULAR ANGIOPLASTY STATUS: Chronic | ICD-10-CM

## 2020-12-28 DIAGNOSIS — L97.514 NON-PRESSURE CHRONIC ULCER OF OTHER PART OF RIGHT FOOT WITH NECROSIS OF BONE: ICD-10-CM

## 2020-12-28 DIAGNOSIS — Z89.411 ACQUIRED ABSENCE OF RIGHT GREAT TOE: ICD-10-CM

## 2020-12-28 DIAGNOSIS — E11.40 TYPE 2 DIABETES MELLITUS WITH DIABETIC NEUROPATHY, UNSPECIFIED: ICD-10-CM

## 2020-12-28 PROCEDURE — G0463: CPT

## 2020-12-28 PROCEDURE — 99024 POSTOP FOLLOW-UP VISIT: CPT

## 2020-12-28 NOTE — REVIEW OF SYSTEMS
[Fever] : no fever [Eye Pain] : no eye pain [Earache] : no earache [Shortness Of Breath] : no shortness of breath [Cough] : no cough [Abdominal Pain] : no abdominal pain [Skin Wound] : skin wound [FreeTextEntry5] : htn, hld, pvd [FreeTextEntry9] : Right hallux and metatarsal head amputation for diabetic ulcer down to skin, subcutaneous tissue, fat, and bone (necrotic) [de-identified] : right foot incision site with sutures intact, no purulence, no malodor, no proximal streaking. [de-identified] : diabetic neuropathy [de-identified] : type 2 diabetes

## 2020-12-28 NOTE — PHYSICAL EXAM
[4 x 4] : 4 x 4  [Abdominal Pad] : Abdominal Pad [0] : left 0 [Ankle Swelling (On Exam)] : not present [Varicose Veins Of Lower Extremities] : not present [] : not present [Skin Ulcer] : ulcer [Alert] : alert [Oriented to Person] : oriented to person [Oriented to Place] : oriented to place [Oriented to Time] : oriented to time [de-identified] : A&Ox3, NAD [de-identified] : 5/5 strength in all quadrants bilaterally, s/p right hallux and metatarsal head amputation [de-identified] : right foot incision site with sutures intact, no purulence, no malodor, no proximal streaking. [de-identified] : diminished light touch sensation bilaterally [de-identified] : Circ neurovascular function WNL post ace wrap, pt expressed comfort.\par  [FreeTextEntry1] : Right Hallux Amp Site- Sutures CDI [FreeTextEntry2] : 5.5 [FreeTextEntry3] : 0.1 [FreeTextEntry4] : 0.1 [de-identified] : sanguinous drainage [de-identified] : intact [de-identified] : Silver Alginate [de-identified] : Cleansed with NS\par  [TWNoteComboBox4] : Moderate [TWNoteComboBox5] : No [TWNoteComboBox6] : Surgical [de-identified] : No [de-identified] : Normal [de-identified] : None [de-identified] : None [de-identified] : 100% [de-identified] : No [de-identified] : Ace wraps [de-identified] : 3x Weekly [de-identified] : Primary Dressing

## 2020-12-28 NOTE — PLAN
[FreeTextEntry1] : Patient examined and evaluated at this time.\par Continue local wound care and offloading.\par Patient is a high risk for proximal amputation.\par Patient is a candidate for HBO and will benefit. Advised patient to start as soon as possible.\par Patient relates he has to work and will consider after new years day.\par Pt to follow up in 1 week.

## 2020-12-28 NOTE — ASSESSMENT
[Verbal] : Verbal [Written] : Written [Demo] : Demo [Patient] : Patient [Good - alert, interested, motivated] : Good - alert, interested, motivated [Verbalizes knowledge/Understanding] : Verbalizes knowledge/understanding [Dressing changes] : dressing changes [Foot Care] : foot care [Skin Care] : skin care [Pressure relief] : pressure relief [Signs and symptoms of infection] : sign and symptoms of infection [Nutrition] : nutrition [How and When to Call] : how and when to call [Pain Management] : pain management [Hyperbaric Therapy] : hyperbaric therapy [Off-loading] : off-loading [Patient responsibility to plan of care] : patient responsibility to plan of care [Glycemic Control] : glycemic control [Stable] : stable [Home] : Home [Ambulatory] : Ambulatory [Not Applicable - Long Term Care/Home Health Agency] : Long Term Care/Home Health Agency: Not Applicable [Compression Therapy] : compression therapy [] : No [FreeTextEntry2] : Infection Prevention\par Promote Skin Integrity\par Offloading\par Elevation\par Maintain acceptable levels of pain\par Demonstrates use of both pharmacological and nonpharmacological pain management interventions\par Encourage Glycemic Control\par Compression Compliance\par Pressure Relief\par Low Na+ Diet [FreeTextEntry4] : F/U 12/31/20 for dressing change and 1 week for assessment\par Pt to schedule appointments for HBOT\par

## 2020-12-28 NOTE — HISTORY OF PRESENT ILLNESS
[FreeTextEntry1] : Patient seen s/p right hallux and metatarsal head amputation for osteomyelitis. Pt currently on PICC line for abx. Pt relates he has been walking on his right heel for work.

## 2020-12-29 DIAGNOSIS — E78.00 PURE HYPERCHOLESTEROLEMIA, UNSPECIFIED: ICD-10-CM

## 2020-12-29 DIAGNOSIS — Z83.3 FAMILY HISTORY OF DIABETES MELLITUS: ICD-10-CM

## 2020-12-29 DIAGNOSIS — Z79.84 LONG TERM (CURRENT) USE OF ORAL HYPOGLYCEMIC DRUGS: ICD-10-CM

## 2020-12-29 DIAGNOSIS — Z79.899 OTHER LONG TERM (CURRENT) DRUG THERAPY: ICD-10-CM

## 2020-12-29 DIAGNOSIS — E11.621 TYPE 2 DIABETES MELLITUS WITH FOOT ULCER: ICD-10-CM

## 2020-12-29 DIAGNOSIS — L97.514 NON-PRESSURE CHRONIC ULCER OF OTHER PART OF RIGHT FOOT WITH NECROSIS OF BONE: ICD-10-CM

## 2020-12-29 DIAGNOSIS — E11.40 TYPE 2 DIABETES MELLITUS WITH DIABETIC NEUROPATHY, UNSPECIFIED: ICD-10-CM

## 2020-12-29 DIAGNOSIS — Z79.82 LONG TERM (CURRENT) USE OF ASPIRIN: ICD-10-CM

## 2020-12-29 DIAGNOSIS — E11.51 TYPE 2 DIABETES MELLITUS WITH DIABETIC PERIPHERAL ANGIOPATHY WITHOUT GANGRENE: ICD-10-CM

## 2020-12-29 DIAGNOSIS — Z89.411 ACQUIRED ABSENCE OF RIGHT GREAT TOE: ICD-10-CM

## 2020-12-29 DIAGNOSIS — Z80.3 FAMILY HISTORY OF MALIGNANT NEOPLASM OF BREAST: ICD-10-CM

## 2020-12-29 NOTE — REVIEW OF SYSTEMS
[Fever] : no fever [Eye Pain] : no eye pain [Earache] : no earache [Shortness Of Breath] : no shortness of breath [Cough] : no cough [Abdominal Pain] : no abdominal pain [Joint Stiffness] : joint stiffness [Skin Wound] : skin wound [FreeTextEntry5] : htn, hld, pvd [de-identified] : right hallux s/p amp , healing well sutures intact  [de-identified] : diabetic neuropathy [de-identified] : type 2 diabetes

## 2020-12-29 NOTE — PHYSICAL EXAM
[4 x 4] : 4 x 4  [Abdominal Pad] : Abdominal Pad [0] : left 0 [Ankle Swelling (On Exam)] : not present [Varicose Veins Of Lower Extremities] : not present [] : not present [Skin Ulcer] : ulcer [Alert] : alert [Oriented to Person] : oriented to person [Oriented to Place] : oriented to place [Oriented to Time] : oriented to time [de-identified] : A&Ox3, NAD [de-identified] : 5/5 strength in all quadrants bilaterally [de-identified] : s/p right hallux amputation , healing well  [de-identified] : diminished light touch sensation bilaterally [FreeTextEntry1] : Right hallux (s/p amputation on 12/18/20) sutures in place [de-identified] : small serosanguineous [de-identified] : erythema [de-identified] : none [de-identified] : alginate Ag [de-identified] : NSC [de-identified] : 3x Weekly

## 2020-12-29 NOTE — VITALS
[] : No [de-identified] : Pain scale:  0/10 - Patient reports no c/o pains or discomforts at present. [FreeTextEntry5] : IV Invanz

## 2020-12-29 NOTE — ASSESSMENT
[Verbal] : Verbal [Patient] : Patient [Good - alert, interested, motivated] : Good - alert, interested, motivated [Verbalizes knowledge/Understanding] : Verbalizes knowledge/understanding [Dressing changes] : dressing changes [Foot Care] : foot care [Skin Care] : skin care [Signs and symptoms of infection] : sign and symptoms of infection [How and When to Call] : how and when to call [Labs and Tests] : labs and tests [Hyperbaric Therapy] : hyperbaric therapy [Off-loading] : off-loading [Patient responsibility to plan of care] : patient responsibility to plan of care [Glycemic Control] : glycemic control [] : Yes [Stable] : stable [Home] : Home [Ambulatory] : Ambulatory [FreeTextEntry2] : Promote optimal skin integrity, offloading, infection prevention, edema control, HBO tx\par  [FreeTextEntry4] : S/P hospitalization, 12/16/20 - 12/21/20 for right hallux osteomyelitis and amputation of right hallux.  Patient was discharged with PICC Line and IV antibiotic (invanz).\par \par HBO orientation today.  Patient viewed video but asked if he could watch it on Monday.  Patient signed HBO consent and HBO Pre-Procedure educational check list.\par \par Blood work and xray done last week at [facility].\par \par Submitted for HBO authorization for diabetic foot ulcer\par \par Patient will need TM assessment and COVID19 test prior to starting HBO.

## 2020-12-31 ENCOUNTER — APPOINTMENT (OUTPATIENT)
Dept: SURGERY | Facility: HOSPITAL | Age: 60
End: 2020-12-31
Payer: COMMERCIAL

## 2020-12-31 ENCOUNTER — OUTPATIENT (OUTPATIENT)
Dept: OUTPATIENT SERVICES | Facility: HOSPITAL | Age: 60
LOS: 1 days | Discharge: HOME | End: 2020-12-31
Payer: COMMERCIAL

## 2020-12-31 VITALS
HEIGHT: 68 IN | TEMPERATURE: 97.8 F | OXYGEN SATURATION: 98 % | HEART RATE: 98 BPM | SYSTOLIC BLOOD PRESSURE: 115 MMHG | BODY MASS INDEX: 28.04 KG/M2 | WEIGHT: 185 LBS | RESPIRATION RATE: 20 BRPM | DIASTOLIC BLOOD PRESSURE: 73 MMHG

## 2020-12-31 DIAGNOSIS — E11.621 TYPE 2 DIABETES MELLITUS WITH FOOT ULCER: ICD-10-CM

## 2020-12-31 DIAGNOSIS — Z89.411 ACQUIRED ABSENCE OF RIGHT GREAT TOE: ICD-10-CM

## 2020-12-31 DIAGNOSIS — L97.514 NON-PRESSURE CHRONIC ULCER OF OTHER PART OF RIGHT FOOT WITH NECROSIS OF BONE: ICD-10-CM

## 2020-12-31 DIAGNOSIS — L97.501 NON-PRESSURE CHRONIC ULCER OF OTHER PART OF UNSPECIFIED FOOT LIMITED TO BREAKDOWN OF SKIN: ICD-10-CM

## 2020-12-31 DIAGNOSIS — Z98.62 PERIPHERAL VASCULAR ANGIOPLASTY STATUS: Chronic | ICD-10-CM

## 2020-12-31 PROCEDURE — ZZZZZ: CPT

## 2020-12-31 PROCEDURE — G0463: CPT

## 2021-01-04 ENCOUNTER — APPOINTMENT (OUTPATIENT)
Dept: WOUND CARE | Facility: HOSPITAL | Age: 61
End: 2021-01-04
Payer: COMMERCIAL

## 2021-01-04 ENCOUNTER — OUTPATIENT (OUTPATIENT)
Dept: OUTPATIENT SERVICES | Facility: HOSPITAL | Age: 61
LOS: 1 days | Discharge: HOME | End: 2021-01-04
Payer: COMMERCIAL

## 2021-01-04 VITALS
DIASTOLIC BLOOD PRESSURE: 79 MMHG | BODY MASS INDEX: 28.04 KG/M2 | HEIGHT: 68 IN | RESPIRATION RATE: 20 BRPM | SYSTOLIC BLOOD PRESSURE: 128 MMHG | TEMPERATURE: 97.1 F | HEART RATE: 103 BPM | WEIGHT: 185 LBS | OXYGEN SATURATION: 98 %

## 2021-01-04 DIAGNOSIS — Z89.411 ACQUIRED ABSENCE OF RIGHT GREAT TOE: ICD-10-CM

## 2021-01-04 DIAGNOSIS — Z80.3 FAMILY HISTORY OF MALIGNANT NEOPLASM OF BREAST: ICD-10-CM

## 2021-01-04 DIAGNOSIS — E78.00 PURE HYPERCHOLESTEROLEMIA, UNSPECIFIED: ICD-10-CM

## 2021-01-04 DIAGNOSIS — Z48.02 ENCOUNTER FOR REMOVAL OF SUTURES: ICD-10-CM

## 2021-01-04 DIAGNOSIS — Z79.899 OTHER LONG TERM (CURRENT) DRUG THERAPY: ICD-10-CM

## 2021-01-04 DIAGNOSIS — Z83.3 FAMILY HISTORY OF DIABETES MELLITUS: ICD-10-CM

## 2021-01-04 DIAGNOSIS — L97.501 NON-PRESSURE CHRONIC ULCER OF OTHER PART OF UNSPECIFIED FOOT LIMITED TO BREAKDOWN OF SKIN: ICD-10-CM

## 2021-01-04 DIAGNOSIS — Z98.62 PERIPHERAL VASCULAR ANGIOPLASTY STATUS: Chronic | ICD-10-CM

## 2021-01-04 DIAGNOSIS — E11.621 TYPE 2 DIABETES MELLITUS WITH FOOT ULCER: ICD-10-CM

## 2021-01-04 DIAGNOSIS — E11.51 TYPE 2 DIABETES MELLITUS WITH DIABETIC PERIPHERAL ANGIOPATHY WITHOUT GANGRENE: ICD-10-CM

## 2021-01-04 DIAGNOSIS — L97.514 NON-PRESSURE CHRONIC ULCER OF OTHER PART OF RIGHT FOOT WITH NECROSIS OF BONE: ICD-10-CM

## 2021-01-04 DIAGNOSIS — Z79.84 LONG TERM (CURRENT) USE OF ORAL HYPOGLYCEMIC DRUGS: ICD-10-CM

## 2021-01-04 DIAGNOSIS — E11.40 TYPE 2 DIABETES MELLITUS WITH DIABETIC NEUROPATHY, UNSPECIFIED: ICD-10-CM

## 2021-01-04 DIAGNOSIS — Z79.82 LONG TERM (CURRENT) USE OF ASPIRIN: ICD-10-CM

## 2021-01-04 PROCEDURE — G0463: CPT

## 2021-01-04 PROCEDURE — 99024 POSTOP FOLLOW-UP VISIT: CPT

## 2021-01-04 NOTE — REVIEW OF SYSTEMS
[Fever] : no fever [Eye Pain] : no eye pain [Earache] : no earache [Shortness Of Breath] : no shortness of breath [Cough] : no cough [Abdominal Pain] : no abdominal pain [Skin Wound] : skin wound [FreeTextEntry5] : htn, hld, pvd [FreeTextEntry9] : Right hallux and metatarsal head amputation for diabetic ulcer down to skin, subcutaneous tissue, fat, and bone (necrotic) [de-identified] : right foot incision site with sutures intact, no purulence, no malodor, no proximal streaking. [de-identified] : diabetic neuropathy [de-identified] : type 2 diabetes

## 2021-01-04 NOTE — ASSESSMENT
[Verbal] : Verbal [Written] : Written [Demo] : Demo [Patient] : Patient [Good - alert, interested, motivated] : Good - alert, interested, motivated [Verbalizes knowledge/Understanding] : Verbalizes knowledge/understanding [Dressing changes] : dressing changes [Foot Care] : foot care [Skin Care] : skin care [Pressure relief] : pressure relief [Signs and symptoms of infection] : sign and symptoms of infection [Nutrition] : nutrition [How and When to Call] : how and when to call [Pain Management] : pain management [Hyperbaric Therapy] : hyperbaric therapy [Off-loading] : off-loading [Compression Therapy] : compression therapy [Patient responsibility to plan of care] : patient responsibility to plan of care [Glycemic Control] : glycemic control [Stable] : stable [Home] : Home [Ambulatory] : Ambulatory [Not Applicable - Long Term Care/Home Health Agency] : Long Term Care/Home Health Agency: Not Applicable [] : No [FreeTextEntry2] : Infection Prevention\par Promote Skin Integrity\par Offloading\par Elevation\par Maintain acceptable levels of pain\par Demonstrates use of both pharmacological and nonpharmacological pain management interventions\par Encourage Glycemic Control\par Compression Compliance\par Pressure Relief\par Low Na+ Diet [FreeTextEntry4] : Pt to F/U to [facility] in 1 Week & 2 x for Dressing Change \par Pt is currently trying to figure a time he would be able to start HBOT. Pt to let us know.\par

## 2021-01-04 NOTE — PLAN
[FreeTextEntry1] : Patient examined and evaluated at this time.\par Continue local wound care and offloading.\par Patient is a high risk for proximal amputation.\par Patient is a candidate for HBO and will benefit. Advised patient to start as soon as possible.\par Patient relates he will consider at this time.\par Pt to follow up in 1 week.\par Spent 20 minutes for patient care and medical decision making.\par

## 2021-01-04 NOTE — PHYSICAL EXAM
[4 x 4] : 4 x 4  [Abdominal Pad] : Abdominal Pad [0] : left 0 [Ankle Swelling (On Exam)] : not present [Varicose Veins Of Lower Extremities] : not present [] : not present [Skin Ulcer] : ulcer [Alert] : alert [Oriented to Person] : oriented to person [Oriented to Place] : oriented to place [Oriented to Time] : oriented to time [de-identified] : A&Ox3, NAD [de-identified] : 5/5 strength in all quadrants bilaterally, s/p right hallux and metatarsal head amputation [de-identified] : right foot incision site with sutures intact, no purulence, no malodor, no proximal streaking. [de-identified] : diminished light touch sensation bilaterally [de-identified] : \par  [FreeTextEntry1] : Right Hallux Amp Site- Sutures CDI [FreeTextEntry2] : 5.3 [FreeTextEntry3] : 0.1 [FreeTextEntry4] : 0.1 [de-identified] : sanguinous drainage [de-identified] : intact [de-identified] : Silver Alginate [de-identified] : Cleansed with Normal Saline. \par \par a few sutures were removed [TWNoteComboBox4] : Small [TWNoteComboBox5] : No [TWNoteComboBox6] : Surgical [de-identified] : No [de-identified] : Normal [de-identified] : None [de-identified] : None [de-identified] : 100% [de-identified] : No [de-identified] : Ace wraps [de-identified] : 3x Weekly [de-identified] : Primary Dressing

## 2021-01-05 ENCOUNTER — NON-APPOINTMENT (OUTPATIENT)
Age: 61
End: 2021-01-05

## 2021-01-07 ENCOUNTER — OUTPATIENT (OUTPATIENT)
Dept: OUTPATIENT SERVICES | Facility: HOSPITAL | Age: 61
LOS: 1 days | Discharge: HOME | End: 2021-01-07
Payer: COMMERCIAL

## 2021-01-07 ENCOUNTER — APPOINTMENT (OUTPATIENT)
Dept: SURGERY | Facility: HOSPITAL | Age: 61
End: 2021-01-07
Payer: COMMERCIAL

## 2021-01-07 DIAGNOSIS — Z98.62 PERIPHERAL VASCULAR ANGIOPLASTY STATUS: Chronic | ICD-10-CM

## 2021-01-07 DIAGNOSIS — Z89.411 ACQUIRED ABSENCE OF RIGHT GREAT TOE: ICD-10-CM

## 2021-01-07 DIAGNOSIS — E11.621 TYPE 2 DIABETES MELLITUS WITH FOOT ULCER: ICD-10-CM

## 2021-01-07 DIAGNOSIS — L97.514 NON-PRESSURE CHRONIC ULCER OF OTHER PART OF RIGHT FOOT WITH NECROSIS OF BONE: ICD-10-CM

## 2021-01-07 LAB — SARS-COV-2 RNA SPEC QL NAA+PROBE: SIGNIFICANT CHANGE UP

## 2021-01-07 PROCEDURE — U0005: CPT

## 2021-01-07 PROCEDURE — G0463: CPT

## 2021-01-07 PROCEDURE — ZZZZZ: CPT

## 2021-01-07 PROCEDURE — U0003: CPT

## 2021-01-09 ENCOUNTER — OUTPATIENT (OUTPATIENT)
Dept: OUTPATIENT SERVICES | Facility: HOSPITAL | Age: 61
LOS: 1 days | Discharge: HOME | End: 2021-01-09
Payer: COMMERCIAL

## 2021-01-09 ENCOUNTER — APPOINTMENT (OUTPATIENT)
Dept: HYPERBARIC MEDICINE | Facility: HOSPITAL | Age: 61
End: 2021-01-09
Payer: COMMERCIAL

## 2021-01-09 VITALS
HEART RATE: 88 BPM | SYSTOLIC BLOOD PRESSURE: 122 MMHG | OXYGEN SATURATION: 100 % | DIASTOLIC BLOOD PRESSURE: 72 MMHG | TEMPERATURE: 98 F | RESPIRATION RATE: 20 BRPM

## 2021-01-09 VITALS
DIASTOLIC BLOOD PRESSURE: 72 MMHG | HEART RATE: 95 BPM | SYSTOLIC BLOOD PRESSURE: 120 MMHG | RESPIRATION RATE: 20 BRPM | TEMPERATURE: 97.1 F | OXYGEN SATURATION: 100 %

## 2021-01-09 DIAGNOSIS — E11.621 TYPE 2 DIABETES MELLITUS WITH FOOT ULCER: ICD-10-CM

## 2021-01-09 DIAGNOSIS — E11.51 TYPE 2 DIABETES MELLITUS WITH DIABETIC PERIPHERAL ANGIOPATHY WITHOUT GANGRENE: ICD-10-CM

## 2021-01-09 DIAGNOSIS — T86.828 OTHER COMPLICATIONS OF SKIN GRAFT (ALLOGRAFT) (AUTOGRAFT): ICD-10-CM

## 2021-01-09 DIAGNOSIS — Z98.62 PERIPHERAL VASCULAR ANGIOPLASTY STATUS: Chronic | ICD-10-CM
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
## 2021-01-09 PROCEDURE — 99183 HYPERBARIC OXYGEN THERAPY: CPT

## 2021-01-09 PROCEDURE — G0277: CPT

## 2021-01-09 PROCEDURE — 82962 GLUCOSE BLOOD TEST: CPT

## 2021-01-11 ENCOUNTER — OUTPATIENT (OUTPATIENT)
Dept: OUTPATIENT SERVICES | Facility: HOSPITAL | Age: 61
LOS: 1 days | Discharge: HOME | End: 2021-01-11
Payer: COMMERCIAL

## 2021-01-11 ENCOUNTER — APPOINTMENT (OUTPATIENT)
Dept: HYPERBARIC MEDICINE | Facility: HOSPITAL | Age: 61
End: 2021-01-11
Payer: COMMERCIAL

## 2021-01-11 VITALS
OXYGEN SATURATION: 100 % | SYSTOLIC BLOOD PRESSURE: 134 MMHG | HEART RATE: 100 BPM | RESPIRATION RATE: 18 BRPM | DIASTOLIC BLOOD PRESSURE: 79 MMHG | TEMPERATURE: 98.6 F

## 2021-01-11 VITALS
TEMPERATURE: 97 F | SYSTOLIC BLOOD PRESSURE: 126 MMHG | OXYGEN SATURATION: 99 % | DIASTOLIC BLOOD PRESSURE: 74 MMHG | HEART RATE: 89 BPM | RESPIRATION RATE: 18 BRPM

## 2021-01-11 DIAGNOSIS — T86.828 OTHER COMPLICATIONS OF SKIN GRAFT (ALLOGRAFT) (AUTOGRAFT): ICD-10-CM

## 2021-01-11 DIAGNOSIS — E11.621 TYPE 2 DIABETES MELLITUS WITH FOOT ULCER: ICD-10-CM
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
## 2021-01-11 DIAGNOSIS — E11.51 TYPE 2 DIABETES MELLITUS WITH DIABETIC PERIPHERAL ANGIOPATHY WITHOUT GANGRENE: ICD-10-CM

## 2021-01-11 DIAGNOSIS — Z98.62 PERIPHERAL VASCULAR ANGIOPLASTY STATUS: Chronic | ICD-10-CM

## 2021-01-11 PROCEDURE — G0277: CPT

## 2021-01-11 PROCEDURE — 99183 HYPERBARIC OXYGEN THERAPY: CPT

## 2021-01-11 PROCEDURE — 82962 GLUCOSE BLOOD TEST: CPT

## 2021-01-11 NOTE — PROCEDURE
[Outpatient] : Outpatient [Ambulatory] : Patient is ambulatory. [THIS CHAMBER HAS BEEN CLEANED / DISINFECTED] : This chamber has been cleaned / disinfected according to local and hospital policy and procedure prior to this treatment. [Patient demonstrated and verbalized proper technique for using air break mask] : Patient demonstrated and verbalized proper technique for using air break mask [Patient educated on the risks of SMOKING prior to HBOT with understanding] : Patient educated on the risks of SMOKING prior to HBOT with understanding [Patient educated on the risks of CONSUMING ALCOHOL prior to HBOT with understanding] : Patient educated on the risks of CONSUMING ALCOHOL prior to HBOT with understanding [Empty all pockets] : empty all pockets [100% Cotton] : 100% cotton [No hair oils, wigs, hairpieces, pins] : no hair oils, wigs, hairpieces, pins  [Pre tx medications] : pre tx medications  [No make-up, creams] : no make-up, creams  [No jewelry] : no jewelry  [No matches, cigarettes, lighters] : no matches, cigarettes, lighters  [Hearing aid removed] : hearing aid removed [Dentures removed] : dentures removed [Ground bracelet on pt's wrist] : ground bracelet on pt's wrist  [Contacts removed] : contacts removed  [Remove nail polish] : remove nail polish  [No reading material] : no reading material  [Bra, undergarments removed] : bra, undergarments removed  [No contraindicated dressings] : no contraindicated dressings [Ground Wire - VISUAL Verification - Intact/Free of Obstruction] : Ground Wire - VISUAL Verification - Intact/Free of Obstruction  [Ground Continuity - Verified < 1 ohm w/ Wrist Strap Gloria] : Ground Continuity - Verified < 1 ohm w/ Wrist Strap Gloria [Clear all fields] : clear all fields [Number: ___] : Number: [unfilled] [Diagnosis: ___] : Diagnosis: [unfilled] [____] : Post-Dive: Time - [unfilled] [___] : Post-Dive: Value - [unfilled] mg/dL [] : No [FreeTextEntry4] : 100 [FreeTextEntry6] : 1100 [FreeTextEntry8] : 1118 [de-identified] : 4803 [de-identified] : 1193 [de-identified] : 8173 [de-identified] : 4553 [de-identified] : 5967 [de-identified] : 1127 [de-identified] : 120mins [de-identified] : RUSSELL

## 2021-01-11 NOTE — ADDENDUM
[FreeTextEntry1] : Pt descended to 2.4 DIMAS @ 2.2 PSI/MIN without incident in chamber #4.\par Pt resting comfortably @ depth, equal chest rise observed throughout tx.\par Pt tolerated both air breaks well.\par Pt ascended from 2.4 DIMAS @ 2.2 PSI/MIN without incident in chamber #4.\par Pt tolerated treatment well.\par \par

## 2021-01-11 NOTE — ASSESSMENT
[No change from previous assessment] : No change from previous assessment [Patient prepared for dive] : Patient prepared for dive [Patient undergoing HBO treatment for __________] : Patient undergoing HBO treatment for [unfilled] [Patient descended without problem for 9 minutes] : Patient descended without problem for 9 minutes [No dizziness or thirst] :  No dizziness or thirst [No ear problems] : No ear problems [Vital signs stable] : Vital signs stable [Tolerating dive well] : Tolerating dive well [No Chest Pain, shortness of breath] : No Chest Pain, shortness of breath [Respiratory Rate Stable] : Respiratory Rate Stable [No chest pain, shortness of breath, or ear pain] :  No chest pain, shortness of breath, or ear pain  [Tolerated Ascent well] : Tolerated Ascent well [Vital Signs stable] : Vital Signs stable [A physician was present throughout the entire HBOT] : A physician was present throughout the entire HBOT [Yes] : Yes [Clinically Stable] : Clinically stable [Continue Treatment Plan] : Continue treatment plan [FreeTextEntry1] : left clear, right obstructed by cerumin [FreeTextEntry2] : none

## 2021-01-12 ENCOUNTER — OUTPATIENT (OUTPATIENT)
Dept: OUTPATIENT SERVICES | Facility: HOSPITAL | Age: 61
LOS: 1 days | Discharge: HOME | End: 2021-01-12
Payer: COMMERCIAL

## 2021-01-12 ENCOUNTER — APPOINTMENT (OUTPATIENT)
Dept: HYPERBARIC MEDICINE | Facility: HOSPITAL | Age: 61
End: 2021-01-12
Payer: COMMERCIAL

## 2021-01-12 VITALS
DIASTOLIC BLOOD PRESSURE: 75 MMHG | OXYGEN SATURATION: 100 % | TEMPERATURE: 97.8 F | RESPIRATION RATE: 20 BRPM | HEART RATE: 89 BPM | SYSTOLIC BLOOD PRESSURE: 125 MMHG

## 2021-01-12 VITALS
SYSTOLIC BLOOD PRESSURE: 142 MMHG | RESPIRATION RATE: 20 BRPM | HEART RATE: 97 BPM | OXYGEN SATURATION: 100 % | DIASTOLIC BLOOD PRESSURE: 86 MMHG | TEMPERATURE: 97.9 F

## 2021-01-12 DIAGNOSIS — T86.828 OTHER COMPLICATIONS OF SKIN GRAFT (ALLOGRAFT) (AUTOGRAFT): ICD-10-CM
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
## 2021-01-12 DIAGNOSIS — E11.621 TYPE 2 DIABETES MELLITUS WITH FOOT ULCER: ICD-10-CM

## 2021-01-12 DIAGNOSIS — E11.51 TYPE 2 DIABETES MELLITUS WITH DIABETIC PERIPHERAL ANGIOPATHY WITHOUT GANGRENE: ICD-10-CM

## 2021-01-12 DIAGNOSIS — Z98.62 PERIPHERAL VASCULAR ANGIOPLASTY STATUS: Chronic | ICD-10-CM

## 2021-01-12 PROCEDURE — 82962 GLUCOSE BLOOD TEST: CPT

## 2021-01-12 PROCEDURE — G0277: CPT

## 2021-01-12 PROCEDURE — 99183 HYPERBARIC OXYGEN THERAPY: CPT

## 2021-01-12 NOTE — ADDENDUM
[FreeTextEntry1] : Pt descended to 2.4 DIMAS @ 2.2 PSI/MIN without incident in chamber #3.\par Pt resting comfortably @ depth, equal chest rise observed throughout tx.\par Pt tolerated both air breaks well.\par Pt ascended from 2.4 DIMAS @ 2.2 PSI/MIN without incident in chamber #3.\par Pt tolerated treatment well.\par

## 2021-01-12 NOTE — PROCEDURE
[Outpatient] : Outpatient [Ambulatory] : Patient is ambulatory. [THIS CHAMBER HAS BEEN CLEANED / DISINFECTED] : This chamber has been cleaned / disinfected according to local and hospital policy and procedure prior to this treatment. [Patient educated on the risks of SMOKING prior to HBOT with understanding] : Patient educated on the risks of SMOKING prior to HBOT with understanding [Patient demonstrated and verbalized proper technique for using air break mask] : Patient demonstrated and verbalized proper technique for using air break mask [Patient educated on the risks of CONSUMING ALCOHOL prior to HBOT with understanding] : Patient educated on the risks of CONSUMING ALCOHOL prior to HBOT with understanding [100% Cotton] : 100% cotton [Empty all pockets] : empty all pockets [Pre tx medications] : pre tx medications  [No hair oils, wigs, hairpieces, pins] : no hair oils, wigs, hairpieces, pins  [No make-up, creams] : no make-up, creams  [No jewelry] : no jewelry  [No matches, cigarettes, lighters] : no matches, cigarettes, lighters  [Hearing aid removed] : hearing aid removed [Dentures removed] : dentures removed [Ground bracelet on pt's wrist] : ground bracelet on pt's wrist  [Remove nail polish] : remove nail polish  [Contacts removed] : contacts removed  [No reading material] : no reading material  [Bra, undergarments removed] : bra, undergarments removed  [No contraindicated dressings] : no contraindicated dressings [Ground Wire - VISUAL Verification - Intact/Free of Obstruction] : Ground Wire - VISUAL Verification - Intact/Free of Obstruction  [Ground Continuity - Verified < 1 ohm w/ Wrist Strap Gloria] : Ground Continuity - Verified < 1 ohm w/ Wrist Strap Gloria [Clear all fields] : clear all fields [Number: ___] : Number: [unfilled] [Diagnosis: ___] : Diagnosis: [unfilled] [____] : Post-Dive: Time - [unfilled] [___] : Post-Dive: Value - [unfilled] mg/dL [] : No [FreeTextEntry4] : 100 [FreeTextEntry6] : 0122 [FreeTextEntry8] : 4696 [de-identified] : 4963 [de-identified] : 2119 [de-identified] : 0987 [de-identified] : 9082 [de-identified] : 1000 [de-identified] : 1013 [de-identified] : 120mins

## 2021-01-13 ENCOUNTER — OUTPATIENT (OUTPATIENT)
Dept: OUTPATIENT SERVICES | Facility: HOSPITAL | Age: 61
LOS: 1 days | Discharge: HOME | End: 2021-01-13
Payer: COMMERCIAL

## 2021-01-13 ENCOUNTER — APPOINTMENT (OUTPATIENT)
Dept: HYPERBARIC MEDICINE | Facility: HOSPITAL | Age: 61
End: 2021-01-13
Payer: COMMERCIAL

## 2021-01-13 VITALS
OXYGEN SATURATION: 100 % | HEART RATE: 100 BPM | SYSTOLIC BLOOD PRESSURE: 146 MMHG | TEMPERATURE: 98.2 F | RESPIRATION RATE: 18 BRPM | DIASTOLIC BLOOD PRESSURE: 76 MMHG

## 2021-01-13 VITALS
TEMPERATURE: 98.4 F | SYSTOLIC BLOOD PRESSURE: 131 MMHG | HEART RATE: 90 BPM | DIASTOLIC BLOOD PRESSURE: 80 MMHG | OXYGEN SATURATION: 99 % | RESPIRATION RATE: 18 BRPM

## 2021-01-13 DIAGNOSIS — Z98.62 PERIPHERAL VASCULAR ANGIOPLASTY STATUS: Chronic | ICD-10-CM

## 2021-01-13 DIAGNOSIS — E11.621 TYPE 2 DIABETES MELLITUS WITH FOOT ULCER: ICD-10-CM

## 2021-01-13 PROCEDURE — G0277: CPT

## 2021-01-13 PROCEDURE — 99183 HYPERBARIC OXYGEN THERAPY: CPT

## 2021-01-13 PROCEDURE — 82962 GLUCOSE BLOOD TEST: CPT

## 2021-01-13 NOTE — ADDENDUM
[FreeTextEntry1] : The pt presented ambulatory and A&Ox3 for scheduled HBOT.\par The pt's pre-dive screening was found to be within acceptable limits to begin HBOT.\par At pt's request, the pt was provided off-loading measure to sacral area prior to HBOT to comfort.\par The pt descended @ 2.2 PSI/min to Rx'd tx depth of 2.4 DIMAS in chamber # 1 without incident.\par The pt was observed with visible chest motion and without incident for the duration of HBOT.\par The pt was administered and received intermittent med. air over course of HBOT without incident.\par PT ASCENDED FROM 2.4 DIMAS @ 2.2 PSI/MIN WITHOUT INCIDENT. PT TOLERATED TX WELL.

## 2021-01-13 NOTE — PROCEDURE
[Outpatient] : Outpatient [Ambulatory] : Patient is ambulatory. [THIS CHAMBER HAS BEEN CLEANED / DISINFECTED] : This chamber has been cleaned / disinfected according to local and hospital policy and procedure prior to this treatment. [Patient demonstrated and verbalized proper technique for using air break mask] : Patient demonstrated and verbalized proper technique for using air break mask [Patient educated on the risks of SMOKING prior to HBOT with understanding] : Patient educated on the risks of SMOKING prior to HBOT with understanding [Patient educated on the risks of CONSUMING ALCOHOL prior to HBOT with understanding] : Patient educated on the risks of CONSUMING ALCOHOL prior to HBOT with understanding [100% Cotton] : 100% cotton [Empty all pockets] : empty all pockets [No hair oils, wigs, hairpieces, pins] : no hair oils, wigs, hairpieces, pins  [Pre tx medications] : pre tx medications  [No make-up, creams] : no make-up, creams  [No jewelry] : no jewelry  [No matches, cigarettes, lighters] : no matches, cigarettes, lighters  [Hearing aid removed] : hearing aid removed [Dentures removed] : dentures removed [Ground bracelet on pt's wrist] : ground bracelet on pt's wrist  [Contacts removed] : contacts removed  [Remove nail polish] : remove nail polish  [No reading material] : no reading material  [Bra, undergarments removed] : bra, undergarments removed  [No contraindicated dressings] : no contraindicated dressings [Ground Wire - VISUAL Verification - Intact/Free of Obstruction] : Ground Wire - VISUAL Verification - Intact/Free of Obstruction  [Ground Continuity - Verified < 1 ohm w/ Wrist Strap Gloria] : Ground Continuity - Verified < 1 ohm w/ Wrist Strap Gloria [Number: ___] : Number: [unfilled] [Diagnosis: ___] : Diagnosis: [unfilled] [____] : Post-Dive: Time - [unfilled] [___] : Post-Dive: Value - [unfilled] mg/dL [Clear all fields] : clear all fields [] : No [FreeTextEntry4] : 100 [FreeTextEntry6] : 08 : 36 [FreeTextEntry8] : 08 : 46 [de-identified] : 09 : 16 [de-identified] : 09 : 21 [de-identified] : 09 : 51 [de-identified] : 09 : 56 [de-identified] : 10 : 26 [de-identified] : 10 : 36 [de-identified] : 120 MIN

## 2021-01-14 ENCOUNTER — APPOINTMENT (OUTPATIENT)
Dept: HYPERBARIC MEDICINE | Facility: HOSPITAL | Age: 61
End: 2021-01-14
Payer: COMMERCIAL

## 2021-01-14 ENCOUNTER — OUTPATIENT (OUTPATIENT)
Dept: OUTPATIENT SERVICES | Facility: HOSPITAL | Age: 61
LOS: 1 days | Discharge: HOME | End: 2021-01-14
Payer: COMMERCIAL

## 2021-01-14 VITALS
TEMPERATURE: 98.7 F | HEART RATE: 102 BPM | SYSTOLIC BLOOD PRESSURE: 123 MMHG | OXYGEN SATURATION: 99 % | DIASTOLIC BLOOD PRESSURE: 75 MMHG | RESPIRATION RATE: 20 BRPM

## 2021-01-14 VITALS
RESPIRATION RATE: 20 BRPM | SYSTOLIC BLOOD PRESSURE: 143 MMHG | OXYGEN SATURATION: 100 % | HEART RATE: 88 BPM | DIASTOLIC BLOOD PRESSURE: 87 MMHG | TEMPERATURE: 97.5 F

## 2021-01-14 DIAGNOSIS — E11.51 TYPE 2 DIABETES MELLITUS WITH DIABETIC PERIPHERAL ANGIOPATHY WITHOUT GANGRENE: ICD-10-CM

## 2021-01-14 DIAGNOSIS — T86.828 OTHER COMPLICATIONS OF SKIN GRAFT (ALLOGRAFT) (AUTOGRAFT): ICD-10-CM

## 2021-01-14 DIAGNOSIS — Z98.62 PERIPHERAL VASCULAR ANGIOPLASTY STATUS: Chronic | ICD-10-CM

## 2021-01-14 DIAGNOSIS — E11.621 TYPE 2 DIABETES MELLITUS WITH FOOT ULCER: ICD-10-CM
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
## 2021-01-14 LAB — SARS-COV-2 RNA SPEC QL NAA+PROBE: SIGNIFICANT CHANGE UP

## 2021-01-14 PROCEDURE — U0005: CPT

## 2021-01-14 PROCEDURE — 82962 GLUCOSE BLOOD TEST: CPT

## 2021-01-14 PROCEDURE — G0277: CPT

## 2021-01-14 PROCEDURE — 99183 HYPERBARIC OXYGEN THERAPY: CPT

## 2021-01-14 PROCEDURE — U0003: CPT

## 2021-01-14 NOTE — DISCHARGE NOTE PROVIDER - NSDCQMPCI_CARD_ALL_CORE
MENTAL HEALTH PSYCHIATRIC MEDICATION MANAGEMENT     Medical records were reviewed for 5 minutes to aid in diagnostic and treatment plan.    Patient has a history of mental health treatment for:   MDD (major depressive disorder), recurrent episode, moderate (CMS/HCC)  (primary encounter diagnosis)  JOSSY (generalized anxiety disorder)  Panic disorder  Insomnia due to mental disorder    Additonally patient has the following medical problems:  Past Medical History:   Diagnosis Date   • Anxiety      Past Surgical History:   Procedure Laterality Date   • Vasectomy  01/05/2018       current medications:  Current Outpatient Medications   Medication Sig Dispense Refill   • Na Sulfate-K Sulfate-Mg Sulf (Suprep Bowel Prep Kit) 17.5-3.13-1.6 GM/177ML Solution Take as directed by colonoscopy prep instructions 12 oz 0   • sertraline (Zoloft) 100 MG tablet Take 2 tablets by mouth daily. 180 tablet 1   • busPIRone (BUSPAR) 30 MG tablet Take 1 tablet by mouth 2 times daily. 180 tablet 1   • zolpidem (AMBIEN) 10 MG tablet Take 1 tablet by mouth nightly. 30 tablet 5   • sildenafil (REVATIO) 20 MG tablet Take 1 tablet one hour prior to activity as directed. 30 tablet 5     No current facility-administered medications for this visit.        primary care doctor:  Verify Pcp    Allergies:  ALLERGIES:  No Known Allergies    48 year old male with a history of   MDD (major depressive disorder), recurrent episode, moderate (CMS/HCC)  (primary encounter diagnosis)  JOSSY (generalized anxiety disorder)  Panic disorder  Insomnia due to mental disorder   who presents for follow up appointment    Today patient reports over the last month since last appointment:  Stressors:    Energy: Improving  Focus: Improving  Appetite: good  Suicidal/Homicidal Ideation: denies  Auditory/Visual Hallucinations: denies  Med Compliant: yes  Pain: denies  Medication Side Effects: none  Mood: \" Improving \"  Anxiety: Stable  Sleep/nightmares: Improving    MENTAL STATUS  EXAM:  Appearance: appropriate dress, appears stated age  Speech: normal rate and tone, able to repeat phrases  Mood: \"Improving\"  Affect: mood congruent  Thought Process: Linear, logical, no flight of ideas, spontaneous thoughts  Associations: intact; no loose /tangential/ circumstantial associations  Thought content: no suicidal thoughts, homicidal thoughts or psychotic symptoms such as auditory/visual hallucinations, paranoia, delusions  Insight and judgement: good  Orientation: alert and oriented to person, place, time, and situation.  Behavior: calm, cooperative, Good eye contact  Motor: No psychomotor agitation or retardation.   Memory: fair, good fund of knowledge  Concentration: fair  Abstraction: able to abstract?  Neuro: no tremor or abnormal movements noted, no facial asymmetry, normal speech, gross motor intact without atrophy,    AIMS score = 0   Pt feels that current treatment is: \"okay\"    This visit was performed via live interactive two-way video.    Clinician Location: licensed in [place]  Patient Location:  [place]     Pt identity has been established and understands that we are limiting office visits due to the coronavirus pandemic and consents to a virtual visit with charges submitted to patient's insurance.     The video visit was being conducted for the purpose of providing treatment advice during a public health emergency. The treatment advice that was being provided was based on what was reported by the patient. Without the patient being evaluated in person, there would be some risk that the information and/or assessment provided by the [facility] provider might be incomplete or inaccurate.        Pt denies:  fever  weakness  m. stiffness or pain or spasms   tremors  fatigue  high Blood pressure  loss of consciousness  seizure disorder  memory loss  chest pain  tachycardia  shortness of breath  syncope  blurry vision  weight change  headache or migraines  nausea or vomiting  gastrointestinal  problems or discomfort  genitourinary problems or discomfort  pain    LABS:  No results found for: HGBA1C  No components found for: GLU0T  CHOLESTEROL (mg/dL)   Date Value   07/13/2017 196     HDL (mg/dL)   Date Value   07/13/2017 44     CHOL/HDL (no units)   Date Value   07/13/2017 4.5 (H)     TRIGLYCERIDE (mg/dL)   Date Value   07/13/2017 117     CALCULATED LDL (mg/dL)   Date Value   07/13/2017 129     No results found for: TSH  Results for orders placed or performed in visit on 12/29/16   ELECTROCARDIOGRAM 12-LEAD   Result Value Ref Range    Ventricular Rate EKG/Min (BPM) 62     Atrial Rate (BPM) 62     NE-Interval (MSEC) 192     QRS-Interval (MSEC) 90     QT-Interval (MSEC) 392     QTc 397     P Axis (Degrees) 60     R Axis (Degrees) 81     T Axis (Degrees) 55     REPORT TEXT       Normal sinus rhythm  Normal ECG  No previous ECGs available  Confirmed by GE DONOVAN MD ([id number]) on 1/3/2017 7:12:08 PM       No results found for: WBC  No results found for: RBC  No results found for: HCT  No results found for: HGB  No results found for: PLT  No results found for: SODIUM  No results found for: POTASSIUM  No results found for: CHLORIDE  Glucose (mg/dL)   Date Value   07/13/2017 92     No results found for: CALCIUM  No results found for: CO2  No results found for: BUN  No results found for: CREATININE  No results found for: AST  No results found for: GPT  No results found for: GGTP  No results found for: ALKPT  No results found for: BILIRUBIN  No results found for: ALBUMIN  No results found for: TP  No results found for: GFRNA  No results found for: GFRA     Comprehensive Past/Family/Social history which was performed during initial evaluation was reexamined and reviewed with patient. For further details, please refer to my initial evaluation note in this chart as well as below for updates.    VS:   There were no vitals filed for this visit.      There were no vitals filed for this visit.    Living with: 2  kids  Family Support: Girlfriend and siblings  Hobbies: Spending time with family and working on cars and watching movies    FAMILY HISTORY   drugs and alcohol:  dad alcohol       mental illness: Dad depression and anxiety     Suicide: Denies    Sudden cardiac death: denies    PSYCHIATRIC HISTORY  Inpatient: denies  Outpatient: Dr. Yoli Gomez  Previous diagnoses:  depression and anxiety  History of suicide attempts: denies  Weapons: denies  Past treatment for alcohol/drugs: Denies  Trauma/Abuse: Denies  Legal History: Denies    IMPRESSION:   48 year old, male with   MDD (major depressive disorder), recurrent episode, moderate (CMS/HCC)  (primary encounter diagnosis)  JOSSY (generalized anxiety disorder)  Panic disorder  Insomnia due to mental disorder   who presents for follow up.           Patient with improving depression and symptoms of Fatigue, Distractibility, Anhedonia, Guilt, and insomnia. Pt denies suicidal thoughts.    Patient with improving generalized anxiety disorder and symptoms of worries, muscle tension, irritability, fatigue, and insomnia.     Patient with improving panic disorder and symptoms of shortness of breath, fear of dying, tremors, palpitations, and sweats  .     This patient is not suicidal, nor is pt homicidal. Pt is not gravely disabled. Inpatient admission is not appropriate at this time.    Past psych meds:   Remeron  Celexa  Lexapro  Temazepam    Venlafaxine    Xanax    RECOMMENDATIONS     -Meds:   Zoloft 200 mg p.o. q.d.    Buspar 30 mg po bid     Ambien 10 mg p.o. q.h.s.    - Individual Therapy: Connor  Patient is   currently engaged in individual psychotherapy       Substance use:  Tobacco-  denies  Alcohol- social (denies history of withdrawal symptoms, blackouts, seizure)  Drugs- denies    Laboratory testing:   Reviewed   EKG, lipid  Next pending 4/20    Master treatment plan due 1/22    Patient was informed about possible symptoms of bol[place]ar such as distractibility, indiscretion,  fast speech, decreased sleep for days, increased energy, and flight of ideas. Patient was informed that this could be a possible side effect of antidepressant and agrees to go to the emergency room/call 911/call crisis hotline/call a family member if he has any of these symptoms. Patient was also informed about increased risk of suicidality, depression, severe skin reactions, seizures, low sodium, SIADH, hepatotoxicity, priapism, extrapyramidal symptoms, and withdrawal symptoms with abrupt discontinuation of antidepressants.    Discussed side effects of Ambien with patient such as dizziness, drowsiness, memory issues, palpitations, depression, suicidal thoughts, aggressive behaviors and patient agrees to call or go to ER with any questions or concerns.    p    Orders Placed This Encounter   • sertraline (Zoloft) 100 MG tablet   • busPIRone (BUSPAR) 30 MG tablet   • zolpidem (AMBIEN) 10 MG tablet       -[place] Reviewed 1/14/2021      Follow up: 24 weeks    advised to call this MD during clinic hours with any concerns prior to next appt.  Emergency Room/Urgent Care with Suicidal/Homicidal Ideation or worsening signs/symptoms.  Suicide hotline number provided.  Patient is in agreement with treatment plan.    Time spent with patient: 21 minutes   Additional time spent: Prepared to see patient by reviewing last progress note and medication changes, Reviewing for any patient calls in between appointments, reviewing Appropriate laboratory testing, and evaluating for any major events in between appointments. Total time includes obtaining and reviewing history, Performing exam and or evaluation, Counseling and educating patient and our family, ordering laboratory test if necessary, Communicating with other healthcare providers if necessary, electronically dictating and documenting Clinical information, and Interpreting and communicating test results if necessary. When necessary, treatment plan/  conset for treatment/ consent for meds is also updated.    Suicide risk remains low; Violence risk remains low; No changes from last risk assessment. Please refer to initial evaluation/progress notes.     Safety measures and plan ( warning signs, coping strategies, earlier doctor appointment, go to ER or local urgent care, call crisis hotline, reach out to support system for help, etc ) were discussed.    For any symptoms such as heart palpitations, headaches, increase in blood pressure, patient should contact primary care physician for evaluation and treatment and personally check blood pressure individually with home blood pressure machine or store blood pressure (example [facility]) as well since blood pressures are not checked at every visit during psychiatry appointments unless patient complains of specific symptoms such as headaches, palpitations, etc. Patient understands this and agrees.    Pt understands not to drive with any medications which have a sedative side effect as discussed in appointment.    Pt understands if 90 day supply of stimulant is given that if prescription is lost or stolen there would be no rx until next original start date     Patient has also been informed that if they are ever prescribed pain medications from opiate class of medications for any short-term pain management while being on benzodiazepines, it is very important to take the pain medications up to 2 hours apart from any benzodiazepines and to make new prescriber aware that they are on benzodiazepines. Patient in agreement.    Patient understands that if patient is on a medication that requires tapering off as there can be severe side effects due to withdrawals, and if patient misses scheduled appointment according to treatment plan, patient needs to self taper off of medications as we don't prescribe medications if patient misses appointments as there needs to be proper assessment appointment by appointment before further  prescriptions. If patient is suddenly out of medications they need to go to the emergency room to be appropriately tapered off especially regarding controlled substances such as benzodiazepines, etc.     Pt educated to call office or schedule an earlier appointment if any issues arise and pt in agreement    Patient understand if controlled substances (example benzodiazepines, etc) are not given after evaluation and for any concerns regarding withdrawal symptoms such tremors, sweats, headaches, seizures patient will have to go to the emergency room or contact primary care for appropriate acute treatment for detoxification. Patient's blood pressure and vitals will be monitored to properly assess if there is a need for detoxification at these facilities and patient understands to appropriately taper off of substances by cutting dose in half until these upcoming appointments.    If female: The pt  is not currently pregnant or planning a family. We discussed the risks to a fetus should pt become pregnant on current medications, as well and risks of mental illness on a fetus. Discussed employing safe sex practices. Discussed need to discuss medications prior to family planning as patient agrees to contact physician as well if pt is to become pregnant.  Pt expressed understanding.     Writer discussed with patient at length about patient's diagnosis, indications of treatment, risks and benefits of the treatment, medication side effects (low, moderate, and high risk). Patient aware of alternative treatments available,  higher levels of care, and options of treatment vs non treatment as discussed in initial evaluation. Patient also educated about the potential benefits of the medications--bearing the risk and benefit in mind patient has agreed to take the aforementioned medications. Patient educated about the risks of death and severe adverse side effects in case of use of any psychotropic medications along with any  potential amount of alcohol and patient verbalizes understanding of this risk. Patient counseled on need for medication compliance. Patient counseled on the signs and symptoms of serotonin syndrome such as hyperthermia, autonomic instability, rigidity, myoclonus, encephalopathy, and diaphoresis and to dial 911/ER if occur. Patient also verbalizes understanding of probable consequences of not receiving or not being compliant with the proposed treatment/medications (including but not limited to leading to a hospital visit, ER (Emergency Room) visit or death). Patient educated that the full benefit of the medication may not be seen if the patient is not taking it as prescribed. Patient informed that the duration of treatment is dependent on treatment response and that the desired outcome is complete remission from symptoms--patient also educated that in certain cases wherein symptom episodes recur for a given diagnosis and then remit, that life-long medication treatment is still recommended in this maintenance phase to prevent symptoms from recurring.   Pt understand Pt has the right to withdraw consent to take the prescribed medication at any time. Writer reinforced with the patient the need for compliance with care by reinforcing the importance of keeping regular appointments in order to accomplish therapy goals/safely monitor medications. Writer also emphasized the need for giving at least 24-hour cancellation notice explaining that another patient mat be able to benefit from the vacant appointment time slot. Writer also assisted the patient in verbalizing a plan to maximize committment to treatment/ scheduled appointments by assessing the best time/day of week for appointments, mode of transportation, arranging for  if applicable, and assessing motivation for treatment. Pt was also given an opportunity to ask questions. Risk of dementia with antidepressants and antipsychotics, psychotropics  dicussed.    Patient understands that if being prescribed control substances, random urine drug screens can take place and if the drug screen is positive for any substances other than those prescribed (even if it is an old prescription) AND/OR if patient is non-compliant with prescribed substances THEN controlled substances will be ceased. Also, if patient refuses random drug screen, provider may refuse to prescribe controlled substances if there is suspicion of use of other substances due to the risk of major side effects of mixing multiple substances and provider also may consider discharging patient for noncompliance with treatment plan.  If decision is made by provider to get back on controlled substances, this will not occur until negative urine drug screen is on file.. Patient also understands that if there is a history of substance dependence, and if a relapse occurs, patient is to complete an IOP/PHP dual diagnosis program for substance dependence/mental illness prior to returning back for a follow-up appointment and controlled substances would be ceased or tapered off in this scenario as well until IOP/PHP program is completed. This also holds true for any newly diagnosed substance use disorder.     Patient has capacity to make decisions and voiced understanding and consents to treatment. Medication reconciliation was completed within the realm of my speciality and pertaining to this encounter. I have obtained and reviewed medications and allergy information with the patient. Patient was educated on the importance of maintaining and sharing this information with all healthcare providers.     Orestes Yanes MD           No

## 2021-01-14 NOTE — PROCEDURE
[Outpatient] : Outpatient [Ambulatory] : Patient is ambulatory. [THIS CHAMBER HAS BEEN CLEANED / DISINFECTED] : This chamber has been cleaned / disinfected according to local and hospital policy and procedure prior to this treatment. [Patient demonstrated and verbalized proper technique for using air break mask] : Patient demonstrated and verbalized proper technique for using air break mask [Patient educated on the risks of SMOKING prior to HBOT with understanding] : Patient educated on the risks of SMOKING prior to HBOT with understanding [Patient educated on the risks of CONSUMING ALCOHOL prior to HBOT with understanding] : Patient educated on the risks of CONSUMING ALCOHOL prior to HBOT with understanding [100% Cotton] : 100% cotton [Empty all pockets] : empty all pockets [No hair oils, wigs, hairpieces, pins] : no hair oils, wigs, hairpieces, pins  [Pre tx medications] : pre tx medications  [No make-up, creams] : no make-up, creams  [No jewelry] : no jewelry  [No matches, cigarettes, lighters] : no matches, cigarettes, lighters  [Hearing aid removed] : hearing aid removed [Dentures removed] : dentures removed [Ground bracelet on pt's wrist] : ground bracelet on pt's wrist  [Contacts removed] : contacts removed  [Remove nail polish] : remove nail polish  [No reading material] : no reading material  [Bra, undergarments removed] : bra, undergarments removed  [No contraindicated dressings] : no contraindicated dressings [Ground Wire - VISUAL Verification - Intact/Free of Obstruction] : Ground Wire - VISUAL Verification - Intact/Free of Obstruction  [Ground Continuity - Verified < 1 ohm w/ Wrist Strap Gloria] : Ground Continuity - Verified < 1 ohm w/ Wrist Strap Gloria [Clear all fields] : clear all fields [Number: ___] : Number: [unfilled] [Diagnosis: ___] : Diagnosis: [unfilled] [____] : Post-Dive: Time - [unfilled] [___] : Post-Dive: Value - [unfilled] mg/dL [] : No [FreeTextEntry4] : 100 [FreeTextEntry6] : 9160 [FreeTextEntry8] : 6696 [de-identified] : 1671 [de-identified] : 3457 [de-identified] : 4907 [de-identified] : 5783 [de-identified] : 2482 [de-identified] : 100 [de-identified] : 120mins

## 2021-01-15 ENCOUNTER — APPOINTMENT (OUTPATIENT)
Dept: HYPERBARIC MEDICINE | Facility: HOSPITAL | Age: 61
End: 2021-01-15

## 2021-01-15 DIAGNOSIS — T86.828 OTHER COMPLICATIONS OF SKIN GRAFT (ALLOGRAFT) (AUTOGRAFT): ICD-10-CM
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
## 2021-01-15 DIAGNOSIS — E11.51 TYPE 2 DIABETES MELLITUS WITH DIABETIC PERIPHERAL ANGIOPATHY WITHOUT GANGRENE: ICD-10-CM

## 2021-01-15 DIAGNOSIS — Z20.822 CONTACT WITH AND (SUSPECTED) EXPOSURE TO COVID-19: ICD-10-CM

## 2021-01-15 NOTE — ADDENDUM
[FreeTextEntry1] : PT'S PICC LINE MEASURED PRIOR TO DESCENT. \par PT DESCENDED TO 2.4 DIMAS @ 2.2 PSI/MIN WITHOUT INCIDENT IN CHAMBER # 2. PT'S RESTING AT TX DEPTH WITH CHEST RISE AND FALL OBSERVED CHAMBERSIDE.\par PT TOLERATED AIR BREAKS WELL.\par PT ASCENDED FROM 2.4 DIMAS @ 2.2 PSI/MIN WITHOUT INCIDENT. PT TOLERATED TX WELL.\par PT'S PICC LINE MEASURED POST HBOT BY RN. PT SENT HOME.

## 2021-01-15 NOTE — ASSESSMENT
[Patient undergoing HBO treatment for __________] : Patient undergoing HBO treatment for [unfilled] [Patient descended without problem for 9 minutes] : Patient descended without problem for 9 minutes [No dizziness or thirst] :  No dizziness or thirst [No ear problems] : No ear problems [Vital signs stable] : Vital signs stable [Tolerating dive well] : Tolerating dive well [No Chest Pain, shortness of breath] : No Chest Pain, shortness of breath [Respiratory Rate Stable] : Respiratory Rate Stable [No chest pain, shortness of breath, or ear pain] :  No chest pain, shortness of breath, or ear pain  [Tolerated Ascent well] : Tolerated Ascent well [Vital Signs stable] : Vital Signs stable [A physician was present throughout the entire HBOT] : A physician was present throughout the entire HBOT [No] : No [Continue Treatment Plan] : Continue treatment plan [Clinically Stable] : Clinically stable [0] : 0 out of 10

## 2021-01-15 NOTE — PROCEDURE
[Outpatient] : Outpatient [Ambulatory] : Patient is ambulatory. [THIS CHAMBER HAS BEEN CLEANED / DISINFECTED] : This chamber has been cleaned / disinfected according to local and hospital policy and procedure prior to this treatment. [Patient demonstrated and verbalized proper technique for using air break mask] : Patient demonstrated and verbalized proper technique for using air break mask [Patient educated on the risks of SMOKING prior to HBOT with understanding] : Patient educated on the risks of SMOKING prior to HBOT with understanding [Patient educated on the risks of CONSUMING ALCOHOL prior to HBOT with understanding] : Patient educated on the risks of CONSUMING ALCOHOL prior to HBOT with understanding [100% Cotton] : 100% cotton [Empty all pockets] : empty all pockets [No hair oils, wigs, hairpieces, pins] : no hair oils, wigs, hairpieces, pins  [Pre tx medications] : pre tx medications  [No make-up, creams] : no make-up, creams  [No jewelry] : no jewelry  [No matches, cigarettes, lighters] : no matches, cigarettes, lighters  [Hearing aid removed] : hearing aid removed [Dentures removed] : dentures removed [Ground bracelet on pt's wrist] : ground bracelet on pt's wrist  [Contacts removed] : contacts removed  [Remove nail polish] : remove nail polish  [No reading material] : no reading material  [Bra, undergarments removed] : bra, undergarments removed  [No contraindicated dressings] : no contraindicated dressings [Ground Wire - VISUAL Verification - Intact/Free of Obstruction] : Ground Wire - VISUAL Verification - Intact/Free of Obstruction  [Ground Continuity - Verified < 1 ohm w/ Wrist Strap Gloria] : Ground Continuity - Verified < 1 ohm w/ Wrist Strap Gloria [Number: ___] : Number: [unfilled] [Diagnosis: ___] : Diagnosis: [unfilled] [____] : Post-Dive: Time - [unfilled] [___] : Post-Dive: Value - [unfilled] mg/dL [Clear all fields] : clear all fields [FreeTextEntry4] : 100 [] : No [FreeTextEntry6] : 1674 [FreeTextEntry8] : 9477 [de-identified] : 4949 [de-identified] : 9522 [de-identified] : 6875 [de-identified] : 6190 [de-identified] : 1003 [de-identified] : 3706 [de-identified] : 120 MIN

## 2021-01-16 ENCOUNTER — APPOINTMENT (OUTPATIENT)
Dept: HYPERBARIC MEDICINE | Facility: HOSPITAL | Age: 61
End: 2021-01-16
Payer: COMMERCIAL

## 2021-01-16 ENCOUNTER — OUTPATIENT (OUTPATIENT)
Dept: OUTPATIENT SERVICES | Facility: HOSPITAL | Age: 61
LOS: 1 days | Discharge: HOME | End: 2021-01-16
Payer: COMMERCIAL

## 2021-01-16 VITALS
RESPIRATION RATE: 18 BRPM | OXYGEN SATURATION: 99 % | HEART RATE: 70 BPM | DIASTOLIC BLOOD PRESSURE: 70 MMHG | SYSTOLIC BLOOD PRESSURE: 140 MMHG | TEMPERATURE: 97.9 F

## 2021-01-16 VITALS
HEART RATE: 90 BPM | DIASTOLIC BLOOD PRESSURE: 80 MMHG | SYSTOLIC BLOOD PRESSURE: 135 MMHG | TEMPERATURE: 98.8 F | RESPIRATION RATE: 18 BRPM | OXYGEN SATURATION: 99 %

## 2021-01-16 DIAGNOSIS — E11.621 TYPE 2 DIABETES MELLITUS WITH FOOT ULCER: ICD-10-CM

## 2021-01-16 DIAGNOSIS — T86.828 OTHER COMPLICATIONS OF SKIN GRAFT (ALLOGRAFT) (AUTOGRAFT): ICD-10-CM

## 2021-01-16 DIAGNOSIS — Z98.62 PERIPHERAL VASCULAR ANGIOPLASTY STATUS: Chronic | ICD-10-CM

## 2021-01-16 DIAGNOSIS — E11.51 TYPE 2 DIABETES MELLITUS WITH DIABETIC PERIPHERAL ANGIOPATHY WITHOUT GANGRENE: ICD-10-CM
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
## 2021-01-16 PROCEDURE — 82962 GLUCOSE BLOOD TEST: CPT

## 2021-01-16 PROCEDURE — G0277: CPT

## 2021-01-16 PROCEDURE — 99183 HYPERBARIC OXYGEN THERAPY: CPT

## 2021-01-16 NOTE — ASSESSMENT
[No change from previous assessment] : No change from previous assessment [Patient prepared for dive] : Patient prepared for dive [Patient undergoing HBO treatment for __________] : Patient undergoing HBO treatment for [unfilled] [No dizziness or thirst] :  No dizziness or thirst [Patient descended without problem for 9 minutes] : Patient descended without problem for 9 minutes [No ear problems] : No ear problems [Vital signs stable] : Vital signs stable [Tolerating dive well] : Tolerating dive well [No Chest Pain, shortness of breath] : No Chest Pain, shortness of breath [Respiratory Rate Stable] : Respiratory Rate Stable [Tolerated Ascent well] : Tolerated Ascent well [No chest pain, shortness of breath, or ear pain] :  No chest pain, shortness of breath, or ear pain  [Vital Signs stable] : Vital Signs stable [A physician was present throughout the entire HBOT] : A physician was present throughout the entire HBOT [No] : No [Clinically Stable] : Clinically stable [Continue Treatment Plan] : Continue treatment plan [0] : 0 out of 10 [FreeTextEntry2] : none

## 2021-01-16 NOTE — PROCEDURE
[Outpatient] : Outpatient [Ambulatory] : Patient is ambulatory. [THIS CHAMBER HAS BEEN CLEANED / DISINFECTED] : This chamber has been cleaned / disinfected according to local and hospital policy and procedure prior to this treatment. [Patient demonstrated and verbalized proper technique for using air break mask] : Patient demonstrated and verbalized proper technique for using air break mask [Patient educated on the risks of SMOKING prior to HBOT with understanding] : Patient educated on the risks of SMOKING prior to HBOT with understanding [Patient educated on the risks of CONSUMING ALCOHOL prior to HBOT with understanding] : Patient educated on the risks of CONSUMING ALCOHOL prior to HBOT with understanding [100% Cotton] : 100% cotton [Empty all pockets] : empty all pockets [No hair oils, wigs, hairpieces, pins] : no hair oils, wigs, hairpieces, pins  [Pre tx medications] : pre tx medications  [No make-up, creams] : no make-up, creams  [No jewelry] : no jewelry  [No matches, cigarettes, lighters] : no matches, cigarettes, lighters  [Hearing aid removed] : hearing aid removed [Dentures removed] : dentures removed [Ground bracelet on pt's wrist] : ground bracelet on pt's wrist  [Contacts removed] : contacts removed  [Remove nail polish] : remove nail polish  [Bra, undergarments removed] : bra, undergarments removed  [No reading material] : no reading material  [No contraindicated dressings] : no contraindicated dressings [Ground Wire - VISUAL Verification - Intact/Free of Obstruction] : Ground Wire - VISUAL Verification - Intact/Free of Obstruction  [Ground Continuity - Verified < 1 ohm w/ Wrist Strap Gloria] : Ground Continuity - Verified < 1 ohm w/ Wrist Strap Gloria [Diagnosis: ___] : Diagnosis: [unfilled] [____] : Post-Dive: Time - [unfilled] [___] : Post-Dive: Value - [unfilled] mg/dL [Number: ___] : Number: [unfilled] [Clear all fields] : clear all fields [] : No [FreeTextEntry4] : 100 [FreeTextEntry6] : 9897 [FreeTextEntry8] : 9121 [de-identified] : 8394 [de-identified] : 1775 [de-identified] : 6161 [de-identified] : 1120 [de-identified] : 3316 [de-identified] : 0332 [de-identified] : 120mins

## 2021-01-16 NOTE — ADDENDUM
[FreeTextEntry1] : Pt PICC was measured and wrap by LPN Prior to  tx. \par Pt descended to 2.4 DIMAS @ 2.2 PSI/MIN without incident in chamber #3.\par Pt resting comfortably @ depth, equal chest rise observed throughout tx.\par Pt tolerated both air breaks well.\par Pt ascended from 2.4 DIMAS @ 2.2 PSI/MIN without incident in chamber. \par Pt tolerated treatment well. pt received WC by LPN post tx. Pt received PICC by LPN post tx. \par

## 2021-01-18 ENCOUNTER — OUTPATIENT (OUTPATIENT)
Dept: OUTPATIENT SERVICES | Facility: HOSPITAL | Age: 61
LOS: 1 days | Discharge: HOME | End: 2021-01-18
Payer: COMMERCIAL

## 2021-01-18 ENCOUNTER — APPOINTMENT (OUTPATIENT)
Dept: HYPERBARIC MEDICINE | Facility: HOSPITAL | Age: 61
End: 2021-01-18
Payer: COMMERCIAL

## 2021-01-18 VITALS
OXYGEN SATURATION: 97 % | HEART RATE: 96 BPM | TEMPERATURE: 98.1 F | DIASTOLIC BLOOD PRESSURE: 88 MMHG | SYSTOLIC BLOOD PRESSURE: 154 MMHG | RESPIRATION RATE: 18 BRPM

## 2021-01-18 VITALS
RESPIRATION RATE: 18 BRPM | DIASTOLIC BLOOD PRESSURE: 90 MMHG | TEMPERATURE: 98.3 F | OXYGEN SATURATION: 98 % | HEART RATE: 91 BPM | SYSTOLIC BLOOD PRESSURE: 145 MMHG

## 2021-01-18 DIAGNOSIS — Z98.62 PERIPHERAL VASCULAR ANGIOPLASTY STATUS: Chronic | ICD-10-CM

## 2021-01-18 DIAGNOSIS — E11.621 TYPE 2 DIABETES MELLITUS WITH FOOT ULCER: ICD-10-CM

## 2021-01-18 PROCEDURE — 82962 GLUCOSE BLOOD TEST: CPT

## 2021-01-18 PROCEDURE — G0277: CPT

## 2021-01-18 PROCEDURE — 99183 HYPERBARIC OXYGEN THERAPY: CPT

## 2021-01-19 ENCOUNTER — APPOINTMENT (OUTPATIENT)
Dept: HYPERBARIC MEDICINE | Facility: HOSPITAL | Age: 61
End: 2021-01-19
Payer: COMMERCIAL

## 2021-01-19 ENCOUNTER — OUTPATIENT (OUTPATIENT)
Dept: OUTPATIENT SERVICES | Facility: HOSPITAL | Age: 61
LOS: 1 days | Discharge: HOME | End: 2021-01-19
Payer: COMMERCIAL

## 2021-01-19 VITALS
RESPIRATION RATE: 20 BRPM | OXYGEN SATURATION: 97 % | DIASTOLIC BLOOD PRESSURE: 81 MMHG | TEMPERATURE: 98.1 F | SYSTOLIC BLOOD PRESSURE: 131 MMHG | HEART RATE: 79 BPM

## 2021-01-19 VITALS
SYSTOLIC BLOOD PRESSURE: 126 MMHG | RESPIRATION RATE: 18 BRPM | TEMPERATURE: 98 F | DIASTOLIC BLOOD PRESSURE: 77 MMHG | OXYGEN SATURATION: 98 % | HEART RATE: 97 BPM

## 2021-01-19 DIAGNOSIS — T86.828 OTHER COMPLICATIONS OF SKIN GRAFT (ALLOGRAFT) (AUTOGRAFT): ICD-10-CM
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
## 2021-01-19 DIAGNOSIS — Z98.62 PERIPHERAL VASCULAR ANGIOPLASTY STATUS: Chronic | ICD-10-CM

## 2021-01-19 DIAGNOSIS — T86.818 OTHER COMPLICATIONS OF LUNG TRANSPLANT: ICD-10-CM

## 2021-01-19 DIAGNOSIS — E11.51 TYPE 2 DIABETES MELLITUS WITH DIABETIC PERIPHERAL ANGIOPATHY WITHOUT GANGRENE: ICD-10-CM

## 2021-01-19 DIAGNOSIS — E11.621 TYPE 2 DIABETES MELLITUS WITH FOOT ULCER: ICD-10-CM

## 2021-01-19 PROCEDURE — 82962 GLUCOSE BLOOD TEST: CPT

## 2021-01-19 PROCEDURE — 99183 HYPERBARIC OXYGEN THERAPY: CPT

## 2021-01-19 PROCEDURE — G0277: CPT

## 2021-01-19 NOTE — ASSESSMENT
[Verbal] : Verbal [Written] : Written [Demo] : Demo [Patient] : Patient [Good - alert, interested, motivated] : Good - alert, interested, motivated [Verbalizes knowledge/Understanding] : Verbalizes knowledge/understanding [Dressing changes] : dressing changes [Foot Care] : foot care [Skin Care] : skin care [Pressure relief] : pressure relief [Signs and symptoms of infection] : sign and symptoms of infection [Nutrition] : nutrition [Pain Management] : pain management [How and When to Call] : how and when to call [Hyperbaric Therapy] : hyperbaric therapy [Off-loading] : off-loading [Compression Therapy] : compression therapy [Patient responsibility to plan of care] : patient responsibility to plan of care [Glycemic Control] : glycemic control [] : Yes [Stable] : stable [Home] : Home [Ambulatory] : Ambulatory [Not Applicable - Long Term Care/Home Health Agency] : Long Term Care/Home Health Agency: Not Applicable [No change from previous assessment] : No change from previous assessment [Time MD/Provider assessed Patient:_______] : Time MD/Provider assessed Patient: [unfilled] [Patient prepared for dive] : Patient prepared for dive [Patient undergoing HBO treatment for __________] : Patient undergoing HBO treatment for [unfilled] [Patient descended without problem for 9 minutes] : Patient descended without problem for 9 minutes [No dizziness or thirst] :  No dizziness or thirst [No ear problems] : No ear problems [Vital signs stable] : Vital signs stable [Tolerating dive well] : Tolerating dive well [No Chest Pain, shortness of breath] : No Chest Pain, shortness of breath [Respiratory Rate Stable] : Respiratory Rate Stable [No chest pain, shortness of breath, or ear pain] :  No chest pain, shortness of breath, or ear pain  [Tolerated Ascent well] : Tolerated Ascent well [Vital Signs stable] : Vital Signs stable [A physician was present throughout the entire HBOT] : A physician was present throughout the entire HBOT [No] : No [Clinically Stable] : Clinically stable [Continue Treatment Plan] : Continue treatment plan [FreeTextEntry2] : Infection Prevention\par Restore Optimal Skin Integrity\par Offloading/Pressure Relief \par hbot \par Elevation\par Maintain acceptable levels of pain\par Demonstrates use of both pharmacological and nonpharmacological pain management interventions\par Encourage Glycemic Control\par Compression Compliance\par Pressure Relief\par Low Na+ Diet [FreeTextEntry4] : Pt completed 7/30 HBOT.\par Picc line in-tact @ 11.5cm pre & post HBO dive. Pt has no complaints. \par Pt to F/U to [facility] in 2 Weeks for Assessment & daily for HBOT.

## 2021-01-19 NOTE — PHYSICAL EXAM
[4 x 4] : 4 x 4  [Abdominal Pad] : Abdominal Pad [de-identified] : \par  [FreeTextEntry1] : Right Hallux Amp Site- Sutures CDI [FreeTextEntry2] : 5.0 [FreeTextEntry3] : 0.1 [FreeTextEntry4] : 0.1 [de-identified] : sanguinous drainage [de-identified] : intact [de-identified] : Silver Alginate [de-identified] : Cleansed with Normal Saline. \par \par Sutures were removed [TWNoteComboBox4] : Small [TWNoteComboBox5] : No [TWNoteComboBox6] : Surgical [de-identified] : No [de-identified] : Normal [de-identified] : None [de-identified] : None [de-identified] : 100% [de-identified] : No [de-identified] : Ace wraps [de-identified] : 3x Weekly [de-identified] : Primary Dressing

## 2021-01-19 NOTE — PHYSICAL EXAM
[4 x 4] : 4 x 4  [Abdominal Pad] : Abdominal Pad [de-identified] : \par  [FreeTextEntry1] : Right Hallux Amp Site- Sutures CDI [FreeTextEntry2] : 5.0 [FreeTextEntry3] : 0.1 [FreeTextEntry4] : 0.1 [de-identified] : sanguinous drainage [de-identified] : intact [de-identified] : Silver Alginate [de-identified] : Cleansed with Normal Saline. \par \par Sutures were removed [TWNoteComboBox4] : Small [TWNoteComboBox5] : No [TWNoteComboBox6] : Surgical [de-identified] : No [de-identified] : Normal [de-identified] : None [de-identified] : None [de-identified] : 100% [de-identified] : No [de-identified] : Ace wraps [de-identified] : 3x Weekly [de-identified] : Primary Dressing

## 2021-01-19 NOTE — ADDENDUM
[FreeTextEntry1] : Pt received PICC line measurement prior to start of scheduled HBOT.\par Pt descended to 2.4 DIMAS @ 2.2 PSI/MIN without incident in chamber #2.\par Pt resting comfortably @ depth, equal chest rise observed throughout tx.\par Pt tolerated both air breaks well.\par Pt ascended from 2.4 DIMAS @ 2.2 PSI/MIN without incident in chamber #2.\par Pt tolerated treatment well.\par

## 2021-01-19 NOTE — PROCEDURE
[Outpatient] : Outpatient [Ambulatory] : Patient is ambulatory. [THIS CHAMBER HAS BEEN CLEANED / DISINFECTED] : This chamber has been cleaned / disinfected according to local and hospital policy and procedure prior to this treatment. [Patient demonstrated and verbalized proper technique for using air break mask] : Patient demonstrated and verbalized proper technique for using air break mask [Patient educated on the risks of SMOKING prior to HBOT with understanding] : Patient educated on the risks of SMOKING prior to HBOT with understanding [Patient educated on the risks of CONSUMING ALCOHOL prior to HBOT with understanding] : Patient educated on the risks of CONSUMING ALCOHOL prior to HBOT with understanding [100% Cotton] : 100% cotton [Empty all pockets] : empty all pockets [No hair oils, wigs, hairpieces, pins] : no hair oils, wigs, hairpieces, pins  [Pre tx medications] : pre tx medications  [No make-up, creams] : no make-up, creams  [No jewelry] : no jewelry  [No matches, cigarettes, lighters] : no matches, cigarettes, lighters  [Hearing aid removed] : hearing aid removed [Dentures removed] : dentures removed [Ground bracelet on pt's wrist] : ground bracelet on pt's wrist  [Contacts removed] : contacts removed  [Remove nail polish] : remove nail polish  [No reading material] : no reading material  [Bra, undergarments removed] : bra, undergarments removed  [No contraindicated dressings] : no contraindicated dressings [Ground Wire - VISUAL Verification - Intact/Free of Obstruction] : Ground Wire - VISUAL Verification - Intact/Free of Obstruction  [Ground Continuity - Verified < 1 ohm w/ Wrist Strap Gloria] : Ground Continuity - Verified < 1 ohm w/ Wrist Strap Gloria [Number: ___] : Number: [unfilled] [Diagnosis: ___] : Diagnosis: [unfilled] [Clear all fields] : clear all fields [____] : Post-Dive: Time - [unfilled] [___] : Post-Dive: Value - [unfilled] mg/dL [] : No [FreeTextEntry4] : 100 mins [FreeTextEntry6] : 7709 [FreeTextEntry8] : 5460 [de-identified] : 9199 [de-identified] : 0940 [de-identified] : 0938 [de-identified] : 0952 [de-identified] : 1007 [de-identified] : 1012 [de-identified] : 120 mins

## 2021-01-19 NOTE — ASSESSMENT
[Verbal] : Verbal [Written] : Written [Demo] : Demo [Patient] : Patient [Good - alert, interested, motivated] : Good - alert, interested, motivated [Verbalizes knowledge/Understanding] : Verbalizes knowledge/understanding [Dressing changes] : dressing changes [Foot Care] : foot care [Skin Care] : skin care [Pressure relief] : pressure relief [Signs and symptoms of infection] : sign and symptoms of infection [Nutrition] : nutrition [Pain Management] : pain management [How and When to Call] : how and when to call [Hyperbaric Therapy] : hyperbaric therapy [Off-loading] : off-loading [Compression Therapy] : compression therapy [Patient responsibility to plan of care] : patient responsibility to plan of care [Glycemic Control] : glycemic control [] : Yes [Stable] : stable [Home] : Home [Ambulatory] : Ambulatory [Not Applicable - Long Term Care/Home Health Agency] : Long Term Care/Home Health Agency: Not Applicable [No change from previous assessment] : No change from previous assessment [Patient prepared for dive] : Patient prepared for dive [Time MD/Provider assessed Patient:_______] : Time MD/Provider assessed Patient: [unfilled] [Patient undergoing HBO treatment for __________] : Patient undergoing HBO treatment for [unfilled] [Patient descended without problem for 9 minutes] : Patient descended without problem for 9 minutes [No dizziness or thirst] :  No dizziness or thirst [No ear problems] : No ear problems [Vital signs stable] : Vital signs stable [Tolerating dive well] : Tolerating dive well [No Chest Pain, shortness of breath] : No Chest Pain, shortness of breath [Respiratory Rate Stable] : Respiratory Rate Stable [No chest pain, shortness of breath, or ear pain] :  No chest pain, shortness of breath, or ear pain  [Tolerated Ascent well] : Tolerated Ascent well [Vital Signs stable] : Vital Signs stable [A physician was present throughout the entire HBOT] : A physician was present throughout the entire HBOT [No] : No [Clinically Stable] : Clinically stable [Continue Treatment Plan] : Continue treatment plan [FreeTextEntry2] : Infection Prevention\par Restore Optimal Skin Integrity\par Offloading/Pressure Relief \par hbot \par Elevation\par Maintain acceptable levels of pain\par Demonstrates use of both pharmacological and nonpharmacological pain management interventions\par Encourage Glycemic Control\par Compression Compliance\par Pressure Relief\par Low Na+ Diet [FreeTextEntry4] : Pt completed 7/30 HBOT.\par Picc line in-tact @ 11.5cm pre & post HBO dive. Pt has no complaints. \par Pt to F/U to [facility] in 2 Weeks for Assessment & daily for HBOT.

## 2021-01-20 ENCOUNTER — OUTPATIENT (OUTPATIENT)
Dept: OUTPATIENT SERVICES | Facility: HOSPITAL | Age: 61
LOS: 1 days | Discharge: HOME | End: 2021-01-20
Payer: COMMERCIAL

## 2021-01-20 ENCOUNTER — APPOINTMENT (OUTPATIENT)
Dept: HYPERBARIC MEDICINE | Facility: HOSPITAL | Age: 61
End: 2021-01-20
Payer: COMMERCIAL

## 2021-01-20 VITALS
RESPIRATION RATE: 20 BRPM | HEART RATE: 76 BPM | DIASTOLIC BLOOD PRESSURE: 76 MMHG | SYSTOLIC BLOOD PRESSURE: 134 MMHG | OXYGEN SATURATION: 99 % | TEMPERATURE: 97.5 F

## 2021-01-20 VITALS
OXYGEN SATURATION: 100 % | DIASTOLIC BLOOD PRESSURE: 85 MMHG | HEART RATE: 90 BPM | TEMPERATURE: 96.9 F | RESPIRATION RATE: 18 BRPM | SYSTOLIC BLOOD PRESSURE: 140 MMHG

## 2021-01-20 DIAGNOSIS — T86.828 OTHER COMPLICATIONS OF SKIN GRAFT (ALLOGRAFT) (AUTOGRAFT): ICD-10-CM

## 2021-01-20 DIAGNOSIS — E11.51 TYPE 2 DIABETES MELLITUS WITH DIABETIC PERIPHERAL ANGIOPATHY WITHOUT GANGRENE: ICD-10-CM
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
## 2021-01-20 DIAGNOSIS — E11.621 TYPE 2 DIABETES MELLITUS WITH FOOT ULCER: ICD-10-CM

## 2021-01-20 DIAGNOSIS — Z98.62 PERIPHERAL VASCULAR ANGIOPLASTY STATUS: Chronic | ICD-10-CM

## 2021-01-20 PROCEDURE — 99183 HYPERBARIC OXYGEN THERAPY: CPT

## 2021-01-20 PROCEDURE — G0277: CPT

## 2021-01-20 PROCEDURE — 82962 GLUCOSE BLOOD TEST: CPT

## 2021-01-20 NOTE — PROCEDURE
[Outpatient] : Outpatient [Ambulatory] : Patient is ambulatory. [THIS CHAMBER HAS BEEN CLEANED / DISINFECTED] : This chamber has been cleaned / disinfected according to local and hospital policy and procedure prior to this treatment. [Patient demonstrated and verbalized proper technique for using air break mask] : Patient demonstrated and verbalized proper technique for using air break mask [Patient educated on the risks of SMOKING prior to HBOT with understanding] : Patient educated on the risks of SMOKING prior to HBOT with understanding [Patient educated on the risks of CONSUMING ALCOHOL prior to HBOT with understanding] : Patient educated on the risks of CONSUMING ALCOHOL prior to HBOT with understanding [100% Cotton] : 100% cotton [Empty all pockets] : empty all pockets [No hair oils, wigs, hairpieces, pins] : no hair oils, wigs, hairpieces, pins  [Pre tx medications] : pre tx medications  [No make-up, creams] : no make-up, creams  [No jewelry] : no jewelry  [No matches, cigarettes, lighters] : no matches, cigarettes, lighters  [Hearing aid removed] : hearing aid removed [Dentures removed] : dentures removed [Ground bracelet on pt's wrist] : ground bracelet on pt's wrist  [Contacts removed] : contacts removed  [Remove nail polish] : remove nail polish  [No reading material] : no reading material  [Bra, undergarments removed] : bra, undergarments removed  [No contraindicated dressings] : no contraindicated dressings [Ground Wire - VISUAL Verification - Intact/Free of Obstruction] : Ground Wire - VISUAL Verification - Intact/Free of Obstruction  [Ground Continuity - Verified < 1 ohm w/ Wrist Strap Gloria] : Ground Continuity - Verified < 1 ohm w/ Wrist Strap Gloria [Number: ___] : Number: [unfilled] [Diagnosis: ___] : Diagnosis: [unfilled] [____] : Post-Dive: Time - [unfilled] [___] : Post-Dive: Value - [unfilled] mg/dL [Clear all fields] : clear all fields [] : No [FreeTextEntry4] : 60 [FreeTextEntry6] : 8:37 [FreeTextEntry8] : 8:47 [de-identified] : 9:17 [de-identified] : 9:22 [de-identified] : --- [de-identified] : ----- [de-identified] : 9:52 [de-identified] : 10:02 [de-identified] : 85

## 2021-01-20 NOTE — ADDENDUM
[FreeTextEntry1] : PT'S PICC LINE MEASURED PRIOR TO DESCENT.\par PT DESCENDED TO 2.4 DIMAS @ 2.2 PSI/MIN WITHOUT INCIDENT IN CHAMBER # 3. PT'S RESTING AT TX DEPTH WITH CHEST RISE AND FALL OBSERVED CHAMBER SIDE.\par PT TOLERATED AIR BREAKS WELL.\par PT ASCENT WAS WITHOUT INCIDENT. 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 Dressings may stay on for the duration and advised that it will need to remain clean, dry, and intact until he returns to hyperbarics- Dr Stark

## 2021-01-20 NOTE — PROCEDURE
[Outpatient] : Outpatient [Ambulatory] : Patient is ambulatory. [THIS CHAMBER HAS BEEN CLEANED / DISINFECTED] : This chamber has been cleaned / disinfected according to local and hospital policy and procedure prior to this treatment. [Patient demonstrated and verbalized proper technique for using air break mask] : Patient demonstrated and verbalized proper technique for using air break mask [Patient educated on the risks of SMOKING prior to HBOT with understanding] : Patient educated on the risks of SMOKING prior to HBOT with understanding [Patient educated on the risks of CONSUMING ALCOHOL prior to HBOT with understanding] : Patient educated on the risks of CONSUMING ALCOHOL prior to HBOT with understanding [100% Cotton] : 100% cotton [Empty all pockets] : empty all pockets [No hair oils, wigs, hairpieces, pins] : no hair oils, wigs, hairpieces, pins  [Pre tx medications] : pre tx medications  [No make-up, creams] : no make-up, creams  [No jewelry] : no jewelry  [No matches, cigarettes, lighters] : no matches, cigarettes, lighters  [Hearing aid removed] : hearing aid removed [Dentures removed] : dentures removed [Ground bracelet on pt's wrist] : ground bracelet on pt's wrist  [Contacts removed] : contacts removed  [Remove nail polish] : remove nail polish  [No reading material] : no reading material  [Bra, undergarments removed] : bra, undergarments removed  [No contraindicated dressings] : no contraindicated dressings [Ground Wire - VISUAL Verification - Intact/Free of Obstruction] : Ground Wire - VISUAL Verification - Intact/Free of Obstruction  [Ground Continuity - Verified < 1 ohm w/ Wrist Strap Gloria] : Ground Continuity - Verified < 1 ohm w/ Wrist Strap Gloria [Number: ___] : Number: [unfilled] [Diagnosis: ___] : Diagnosis: [unfilled] [____] : Post-Dive: Time - [unfilled] [___] : Post-Dive: Value - [unfilled] mg/dL [Clear all fields] : clear all fields [] : No [FreeTextEntry4] : 60 [FreeTextEntry6] : 8:37 [FreeTextEntry8] : 8:47 [de-identified] : 9:17 [de-identified] : 9:22 [de-identified] : --- [de-identified] : ----- [de-identified] : 9:52 [de-identified] : 10:02 [de-identified] : 85

## 2021-01-20 NOTE — PROCEDURE
[Outpatient] : Outpatient [Ambulatory] : Patient is ambulatory. [THIS CHAMBER HAS BEEN CLEANED / DISINFECTED] : This chamber has been cleaned / disinfected according to local and hospital policy and procedure prior to this treatment. [Patient demonstrated and verbalized proper technique for using air break mask] : Patient demonstrated and verbalized proper technique for using air break mask [Patient educated on the risks of SMOKING prior to HBOT with understanding] : Patient educated on the risks of SMOKING prior to HBOT with understanding [Patient educated on the risks of CONSUMING ALCOHOL prior to HBOT with understanding] : Patient educated on the risks of CONSUMING ALCOHOL prior to HBOT with understanding [Empty all pockets] : empty all pockets [100% Cotton] : 100% cotton [No hair oils, wigs, hairpieces, pins] : no hair oils, wigs, hairpieces, pins  [Pre tx medications] : pre tx medications  [No make-up, creams] : no make-up, creams  [No jewelry] : no jewelry  [No matches, cigarettes, lighters] : no matches, cigarettes, lighters  [Hearing aid removed] : hearing aid removed [Dentures removed] : dentures removed [Ground bracelet on pt's wrist] : ground bracelet on pt's wrist  [Contacts removed] : contacts removed  [Remove nail polish] : remove nail polish  [No reading material] : no reading material  [No contraindicated dressings] : no contraindicated dressings [Bra, undergarments removed] : bra, undergarments removed  [Ground Wire - VISUAL Verification - Intact/Free of Obstruction] : Ground Wire - VISUAL Verification - Intact/Free of Obstruction  [Ground Continuity - Verified < 1 ohm w/ Wrist Strap Gloria] : Ground Continuity - Verified < 1 ohm w/ Wrist Strap Gloria [Number: ___] : Number: [unfilled] [Diagnosis: ___] : Diagnosis: [unfilled] [____] : Post-Dive: Time - [unfilled] [___] : Post-Dive: Value - [unfilled] mg/dL [Clear all fields] : clear all fields [] : No [FreeTextEntry4] : 100 [FreeTextEntry6] : 8841 [FreeTextEntry8] : 7346 [de-identified] : 5197 [de-identified] : 4937 [de-identified] : 5642 [de-identified] : 1959 [de-identified] : 8660 [de-identified] : 1002 [de-identified] : 120

## 2021-01-20 NOTE — ADDENDUM
[FreeTextEntry1] : Pt arrived ambulatory from residence a&ox3\par All vitals within parameters for hbot.\par Pt reports being advised that he could receive hbot and assessment same date.  consulted and pt was advised that he has been cleared to receive 1 hr tx today only and assessment to follow hbot 1x only. Md cleared pt to receive 1 hr tx 01/18/21 only per nurse.\par Pt verbalized understanding that assessments will not be received on hbo tx days.\par Picc line measured and wrapped prior to hbot.\par Assessment to follow hbot.\par Pt ascent from tx depth was without incident. Pt tolerated hbot well\par Picc line measured post tx.\par \par Cht JESSICA Cannon 01/18/21

## 2021-01-21 ENCOUNTER — OUTPATIENT (OUTPATIENT)
Dept: OUTPATIENT SERVICES | Facility: HOSPITAL | Age: 61
LOS: 1 days | Discharge: HOME | End: 2021-01-21
Payer: COMMERCIAL

## 2021-01-21 ENCOUNTER — APPOINTMENT (OUTPATIENT)
Dept: HYPERBARIC MEDICINE | Facility: HOSPITAL | Age: 61
End: 2021-01-21
Payer: COMMERCIAL

## 2021-01-21 VITALS
TEMPERATURE: 97.5 F | HEART RATE: 112 BPM | RESPIRATION RATE: 18 BRPM | DIASTOLIC BLOOD PRESSURE: 74 MMHG | OXYGEN SATURATION: 100 % | SYSTOLIC BLOOD PRESSURE: 112 MMHG

## 2021-01-21 VITALS
RESPIRATION RATE: 18 BRPM | DIASTOLIC BLOOD PRESSURE: 78 MMHG | HEART RATE: 94 BPM | TEMPERATURE: 97.7 F | OXYGEN SATURATION: 100 % | SYSTOLIC BLOOD PRESSURE: 127 MMHG

## 2021-01-21 DIAGNOSIS — E11.621 TYPE 2 DIABETES MELLITUS WITH FOOT ULCER: ICD-10-CM
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
## 2021-01-21 DIAGNOSIS — Z98.62 PERIPHERAL VASCULAR ANGIOPLASTY STATUS: Chronic | ICD-10-CM

## 2021-01-21 DIAGNOSIS — T86.828 OTHER COMPLICATIONS OF SKIN GRAFT (ALLOGRAFT) (AUTOGRAFT): ICD-10-CM

## 2021-01-21 DIAGNOSIS — E11.51 TYPE 2 DIABETES MELLITUS WITH DIABETIC PERIPHERAL ANGIOPATHY WITHOUT GANGRENE: ICD-10-CM

## 2021-01-21 PROCEDURE — 82962 GLUCOSE BLOOD TEST: CPT

## 2021-01-21 PROCEDURE — G0277: CPT

## 2021-01-21 PROCEDURE — 99183 HYPERBARIC OXYGEN THERAPY: CPT

## 2021-01-21 NOTE — PROCEDURE
[Outpatient] : Outpatient [Ambulatory] : Patient is ambulatory. [THIS CHAMBER HAS BEEN CLEANED / DISINFECTED] : This chamber has been cleaned / disinfected according to local and hospital policy and procedure prior to this treatment. [Patient demonstrated and verbalized proper technique for using air break mask] : Patient demonstrated and verbalized proper technique for using air break mask [Patient educated on the risks of SMOKING prior to HBOT with understanding] : Patient educated on the risks of SMOKING prior to HBOT with understanding [Patient educated on the risks of CONSUMING ALCOHOL prior to HBOT with understanding] : Patient educated on the risks of CONSUMING ALCOHOL prior to HBOT with understanding [100% Cotton] : 100% cotton [Empty all pockets] : empty all pockets [No hair oils, wigs, hairpieces, pins] : no hair oils, wigs, hairpieces, pins  [Pre tx medications] : pre tx medications  [No make-up, creams] : no make-up, creams  [No jewelry] : no jewelry  [No matches, cigarettes, lighters] : no matches, cigarettes, lighters  [Dentures removed] : dentures removed [Hearing aid removed] : hearing aid removed [Ground bracelet on pt's wrist] : ground bracelet on pt's wrist  [Contacts removed] : contacts removed  [Remove nail polish] : remove nail polish  [No reading material] : no reading material  [Bra, undergarments removed] : bra, undergarments removed  [No contraindicated dressings] : no contraindicated dressings [Ground Wire - VISUAL Verification - Intact/Free of Obstruction] : Ground Wire - VISUAL Verification - Intact/Free of Obstruction  [Ground Continuity - Verified < 1 ohm w/ Wrist Strap Gloria] : Ground Continuity - Verified < 1 ohm w/ Wrist Strap Gloria [Number: ___] : Number: [unfilled] [Diagnosis: ___] : Diagnosis: [unfilled] [____] : Post-Dive: Time - [unfilled] [___] : Post-Dive: Value - [unfilled] mg/dL [Clear all fields] : clear all fields [] : No [FreeTextEntry4] : 65 [FreeTextEntry6] : 7597 [FreeTextEntry8] : 1904 [de-identified] : 1105 [de-identified] : 6970 [de-identified] : -- [de-identified] : -- [de-identified] : 8630 [de-identified] : 7129 [de-identified] : 85 MIN

## 2021-01-21 NOTE — ADDENDUM
[FreeTextEntry1] : PRIOR TO DIVE, PT'S HEART RATE ABOVE DESIRED RANGE FOR HBOT. IMMEDIATE RE-TEST PERFORMED. PT'S HEART RATE REMAINED HIGH. PT TESTED AND RE-TESTED. AFTER 5 MINUTES, PT'S HEART RATE AT DESIRED RANGE FOR HBOT. MD NOTIFIED. PT PUT IN CHAMBER.\par PT REQUESTED (1) HOUR AT TX DEPTH DUE TO TIME CONSTRAINT. 2.4 DIMAS @ 1 HOUR AT DEPTH ORDER SIGNED BY MD AND PLACED IN BLACK BOX PRIOR TO DESCENT.\par PT'S PICC LINE MEASURED PRIOR TO DIVE BY RN.\par PT DESCENDED TO 2.4 DIMAS @ 2.2 PSI/MIN WITHOUT INCIDENT IN CHAMBER # 2. PT'S RESTING AT TX DEPTH WITH CHEST RISE AND FALL OBSERVED CHAMBER SIDE.\par PT TOLERATED AIR BREAK WELL.\par PT ASCENDED FROM 2.4 DIMAS @ 2.2 PSI/MIN WITHOUT INCIDENT. PT TOLERATED TX WELL.\par PT'S PICC LINE MEASURED POST HBOT.

## 2021-01-22 ENCOUNTER — OUTPATIENT (OUTPATIENT)
Dept: OUTPATIENT SERVICES | Facility: HOSPITAL | Age: 61
LOS: 1 days | Discharge: HOME | End: 2021-01-22
Payer: COMMERCIAL

## 2021-01-22 ENCOUNTER — APPOINTMENT (OUTPATIENT)
Dept: HYPERBARIC MEDICINE | Facility: HOSPITAL | Age: 61
End: 2021-01-22
Payer: COMMERCIAL

## 2021-01-22 VITALS
DIASTOLIC BLOOD PRESSURE: 85 MMHG | RESPIRATION RATE: 20 BRPM | TEMPERATURE: 98 F | HEART RATE: 93 BPM | SYSTOLIC BLOOD PRESSURE: 140 MMHG | OXYGEN SATURATION: 100 %

## 2021-01-22 VITALS
DIASTOLIC BLOOD PRESSURE: 92 MMHG | RESPIRATION RATE: 20 BRPM | HEART RATE: 92 BPM | TEMPERATURE: 97.5 F | SYSTOLIC BLOOD PRESSURE: 147 MMHG | OXYGEN SATURATION: 100 %

## 2021-01-22 DIAGNOSIS — T86.828 OTHER COMPLICATIONS OF SKIN GRAFT (ALLOGRAFT) (AUTOGRAFT): ICD-10-CM

## 2021-01-22 DIAGNOSIS — Z98.62 PERIPHERAL VASCULAR ANGIOPLASTY STATUS: Chronic | ICD-10-CM

## 2021-01-22 DIAGNOSIS — E11.51 TYPE 2 DIABETES MELLITUS WITH DIABETIC PERIPHERAL ANGIOPATHY WITHOUT GANGRENE: ICD-10-CM
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
## 2021-01-22 DIAGNOSIS — E11.621 TYPE 2 DIABETES MELLITUS WITH FOOT ULCER: ICD-10-CM

## 2021-01-22 PROCEDURE — 82962 GLUCOSE BLOOD TEST: CPT

## 2021-01-22 PROCEDURE — 99183 HYPERBARIC OXYGEN THERAPY: CPT

## 2021-01-22 PROCEDURE — G0277: CPT

## 2021-01-22 NOTE — PROCEDURE
[Outpatient] : Outpatient [Ambulatory] : Patient is ambulatory. [THIS CHAMBER HAS BEEN CLEANED / DISINFECTED] : This chamber has been cleaned / disinfected according to local and hospital policy and procedure prior to this treatment. [Patient demonstrated and verbalized proper technique for using air break mask] : Patient demonstrated and verbalized proper technique for using air break mask [Patient educated on the risks of SMOKING prior to HBOT with understanding] : Patient educated on the risks of SMOKING prior to HBOT with understanding [Patient educated on the risks of CONSUMING ALCOHOL prior to HBOT with understanding] : Patient educated on the risks of CONSUMING ALCOHOL prior to HBOT with understanding [100% Cotton] : 100% cotton [Empty all pockets] : empty all pockets [No hair oils, wigs, hairpieces, pins] : no hair oils, wigs, hairpieces, pins  [Pre tx medications] : pre tx medications  [No make-up, creams] : no make-up, creams  [No jewelry] : no jewelry  [No matches, cigarettes, lighters] : no matches, cigarettes, lighters  [Hearing aid removed] : hearing aid removed [Dentures removed] : dentures removed [Ground bracelet on pt's wrist] : ground bracelet on pt's wrist  [Contacts removed] : contacts removed  [Remove nail polish] : remove nail polish  [No reading material] : no reading material  [Bra, undergarments removed] : bra, undergarments removed  [No contraindicated dressings] : no contraindicated dressings [Ground Wire - VISUAL Verification - Intact/Free of Obstruction] : Ground Wire - VISUAL Verification - Intact/Free of Obstruction  [Ground Continuity - Verified < 1 ohm w/ Wrist Strap Gloria] : Ground Continuity - Verified < 1 ohm w/ Wrist Strap Gloria [Number: ___] : Number: [unfilled] [Diagnosis: ___] : Diagnosis: [unfilled] [____] : Post-Dive: Time - [unfilled] [___] : Post-Dive: Value - [unfilled] mg/dL [Clear all fields] : clear all fields [] : No [FreeTextEntry4] : 100 [FreeTextEntry6] : 8:06 [FreeTextEntry8] : 8:16 [de-identified] : 8:46 [de-identified] : 8:51 [de-identified] : 9:21 [de-identified] : 9:26 [de-identified] : 9:56 [de-identified] : 10:05 [de-identified] : 120

## 2021-01-22 NOTE — ADDENDUM
[FreeTextEntry1] : PT ARRIVED AMBULATORY FROM [facility].\par ALL VITALS WITHIN PARAMETERS FOR HBOT. PICC LINE MEASURED AND WRAPPED PRIOR TO DESCENT.\par NO DRAINAGE NOTED ON DRESSING PRIOR TO DESCENT.\par PT DESCENT TO RX TX DEPTH IN CHAMBER 2 WAS WITHOUT INCIDENT. PT RESTING AT DEPTH. CHEST RISE AND FALL OBSERVED.\par PT ASCENT FROM TX DEPTH WAS WITHOUT INCIDENT. PT TOLERATED HBOT WELL. PT CONFIRMED HBOT ON 01/23/21. COVID SWAB TO BE OBTAINED. PT WAS ADVISED THAT AFTER 01/23/21, COVID SWABS WILL BE OBTAINED ON MONDAY STARTING 02/01/21. PT VERBALIZED UNDERSTANDING.\par \par TASHA EDMONDS  01/22/21

## 2021-01-22 NOTE — ASSESSMENT
[No change from previous assessment] : No change from previous assessment [Patient undergoing HBO treatment for __________] : Patient undergoing HBO treatment for [unfilled] [Patient prepared for dive] : Patient prepared for dive [Patient descended without problem for 9 minutes] : Patient descended without problem for 9 minutes [No dizziness or thirst] :  No dizziness or thirst [No ear problems] : No ear problems [Vital signs stable] : Vital signs stable [Tolerating dive well] : Tolerating dive well [No Chest Pain, shortness of breath] : No Chest Pain, shortness of breath [Respiratory Rate Stable] : Respiratory Rate Stable [No chest pain, shortness of breath, or ear pain] :  No chest pain, shortness of breath, or ear pain  [Tolerated Ascent well] : Tolerated Ascent well [Vital Signs stable] : Vital Signs stable [A physician was present throughout the entire HBOT] : A physician was present throughout the entire HBOT [No] : No [Clinically Stable] : Clinically stable [Continue Treatment Plan] : Continue treatment plan [FreeTextEntry2] : none

## 2021-01-23 ENCOUNTER — APPOINTMENT (OUTPATIENT)
Dept: HYPERBARIC MEDICINE | Facility: HOSPITAL | Age: 61
End: 2021-01-23
Payer: COMMERCIAL

## 2021-01-23 ENCOUNTER — OUTPATIENT (OUTPATIENT)
Dept: OUTPATIENT SERVICES | Facility: HOSPITAL | Age: 61
LOS: 1 days | Discharge: HOME | End: 2021-01-23
Payer: COMMERCIAL

## 2021-01-23 VITALS
OXYGEN SATURATION: 100 % | SYSTOLIC BLOOD PRESSURE: 132 MMHG | RESPIRATION RATE: 20 BRPM | HEART RATE: 97 BPM | DIASTOLIC BLOOD PRESSURE: 79 MMHG | TEMPERATURE: 97.3 F

## 2021-01-23 VITALS
HEART RATE: 103 BPM | RESPIRATION RATE: 20 BRPM | DIASTOLIC BLOOD PRESSURE: 77 MMHG | OXYGEN SATURATION: 100 % | TEMPERATURE: 98.3 F | SYSTOLIC BLOOD PRESSURE: 128 MMHG

## 2021-01-23 DIAGNOSIS — T86.828 OTHER COMPLICATIONS OF SKIN GRAFT (ALLOGRAFT) (AUTOGRAFT): ICD-10-CM

## 2021-01-23 DIAGNOSIS — Z98.62 PERIPHERAL VASCULAR ANGIOPLASTY STATUS: Chronic | ICD-10-CM

## 2021-01-23 DIAGNOSIS — E11.621 TYPE 2 DIABETES MELLITUS WITH FOOT ULCER: ICD-10-CM
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
## 2021-01-23 DIAGNOSIS — E11.51 TYPE 2 DIABETES MELLITUS WITH DIABETIC PERIPHERAL ANGIOPATHY WITHOUT GANGRENE: ICD-10-CM

## 2021-01-23 LAB — SARS-COV-2 RNA SPEC QL NAA+PROBE: SIGNIFICANT CHANGE UP

## 2021-01-23 PROCEDURE — 99183 HYPERBARIC OXYGEN THERAPY: CPT

## 2021-01-23 PROCEDURE — 82962 GLUCOSE BLOOD TEST: CPT

## 2021-01-23 PROCEDURE — U0005: CPT

## 2021-01-23 PROCEDURE — G0277: CPT

## 2021-01-23 PROCEDURE — U0003: CPT

## 2021-01-23 NOTE — ASSESSMENT
[No change from previous assessment] : No change from previous assessment [Patient prepared for dive] : Patient prepared for dive [Patient undergoing HBO treatment for __________] : Patient undergoing HBO treatment for [unfilled] [Patient descended without problem for 9 minutes] : Patient descended without problem for 9 minutes [No dizziness or thirst] :  No dizziness or thirst [Vital signs stable] : Vital signs stable [No ear problems] : No ear problems [Tolerating dive well] : Tolerating dive well [No Chest Pain, shortness of breath] : No Chest Pain, shortness of breath [Respiratory Rate Stable] : Respiratory Rate Stable [No chest pain, shortness of breath, or ear pain] :  No chest pain, shortness of breath, or ear pain  [Tolerated Ascent well] : Tolerated Ascent well [Vital Signs stable] : Vital Signs stable [A physician was present throughout the entire HBOT] : A physician was present throughout the entire HBOT [No] : No [Clinically Stable] : Clinically stable [Continue Treatment Plan] : Continue treatment plan

## 2021-01-23 NOTE — ADDENDUM
[FreeTextEntry1] : Pt descended to 2.4 DIMAS @ 2.2 PSI/MIN without incident in chamber #4.\par Pt resting comfortably @ depth, equal chest rise observed throughout tx.\par Pt tolerated both air breaks well.\par Pt ascended from 2.4 DIMAS @ 2.2 PSI/MIN without incident in chamber #4.\par Pt tolerated treatment well.\par Pt dressing changed prior to tx.\par Pt Recieved covid swab post tx. \par \par

## 2021-01-23 NOTE — PROCEDURE
[Outpatient] : Outpatient [Ambulatory] : Patient is ambulatory. [THIS CHAMBER HAS BEEN CLEANED / DISINFECTED] : This chamber has been cleaned / disinfected according to local and hospital policy and procedure prior to this treatment. [____] : Post-Dive: Time - [unfilled] [___] : Post-Dive: Value - [unfilled] mg/dL [Patient demonstrated and verbalized proper technique for using air break mask] : Patient demonstrated and verbalized proper technique for using air break mask [Patient educated on the risks of SMOKING prior to HBOT with understanding] : Patient educated on the risks of SMOKING prior to HBOT with understanding [Patient educated on the risks of CONSUMING ALCOHOL prior to HBOT with understanding] : Patient educated on the risks of CONSUMING ALCOHOL prior to HBOT with understanding [100% Cotton] : 100% cotton [Empty all pockets] : empty all pockets [No hair oils, wigs, hairpieces, pins] : no hair oils, wigs, hairpieces, pins  [Pre tx medications] : pre tx medications  [No make-up, creams] : no make-up, creams  [No jewelry] : no jewelry  [No matches, cigarettes, lighters] : no matches, cigarettes, lighters  [Hearing aid removed] : hearing aid removed [Dentures removed] : dentures removed [Ground bracelet on pt's wrist] : ground bracelet on pt's wrist  [Remove nail polish] : remove nail polish  [Contacts removed] : contacts removed  [No reading material] : no reading material  [Bra, undergarments removed] : bra, undergarments removed  [No contraindicated dressings] : no contraindicated dressings [Ground Wire - VISUAL Verification - Intact/Free of Obstruction] : Ground Wire - VISUAL Verification - Intact/Free of Obstruction  [Ground Continuity - Verified < 1 ohm w/ Wrist Strap Gloria] : Ground Continuity - Verified < 1 ohm w/ Wrist Strap Gloria [Clear all fields] : clear all fields [Number: ___] : Number: [unfilled] [Diagnosis: ___] : Diagnosis: [unfilled] [FreeTextEntry4] : 100 [] : No [FreeTextEntry6] : 8874 [FreeTextEntry8] : 1026 [de-identified] : 6021 [de-identified] : 1489 [de-identified] : 0958 [de-identified] : 1011 [de-identified] : 9001 [de-identified] : 1027 [de-identified] : 120mins

## 2021-01-25 ENCOUNTER — APPOINTMENT (OUTPATIENT)
Dept: HYPERBARIC MEDICINE | Facility: HOSPITAL | Age: 61
End: 2021-01-25

## 2021-01-26 ENCOUNTER — APPOINTMENT (OUTPATIENT)
Dept: HYPERBARIC MEDICINE | Facility: HOSPITAL | Age: 61
End: 2021-01-26

## 2021-01-27 ENCOUNTER — APPOINTMENT (OUTPATIENT)
Dept: HYPERBARIC MEDICINE | Facility: HOSPITAL | Age: 61
End: 2021-01-27

## 2021-01-28 ENCOUNTER — APPOINTMENT (OUTPATIENT)
Dept: HYPERBARIC MEDICINE | Facility: HOSPITAL | Age: 61
End: 2021-01-28

## 2021-01-29 ENCOUNTER — OUTPATIENT (OUTPATIENT)
Dept: OUTPATIENT SERVICES | Facility: HOSPITAL | Age: 61
LOS: 1 days | End: 2021-01-29
Payer: COMMERCIAL

## 2021-01-29 ENCOUNTER — APPOINTMENT (OUTPATIENT)
Dept: HYPERBARIC MEDICINE | Facility: HOSPITAL | Age: 61
End: 2021-01-29
Payer: COMMERCIAL

## 2021-01-29 VITALS
TEMPERATURE: 98.5 F | DIASTOLIC BLOOD PRESSURE: 90 MMHG | OXYGEN SATURATION: 100 % | SYSTOLIC BLOOD PRESSURE: 136 MMHG | RESPIRATION RATE: 18 BRPM | HEART RATE: 105 BPM

## 2021-01-29 VITALS
DIASTOLIC BLOOD PRESSURE: 87 MMHG | HEART RATE: 95 BPM | SYSTOLIC BLOOD PRESSURE: 144 MMHG | TEMPERATURE: 97.3 F | OXYGEN SATURATION: 99 % | RESPIRATION RATE: 20 BRPM

## 2021-01-29 DIAGNOSIS — Z98.62 PERIPHERAL VASCULAR ANGIOPLASTY STATUS: Chronic | ICD-10-CM

## 2021-01-29 DIAGNOSIS — E11.621 TYPE 2 DIABETES MELLITUS WITH FOOT ULCER: ICD-10-CM

## 2021-01-29 LAB — SARS-COV-2 RNA SPEC QL NAA+PROBE: SIGNIFICANT CHANGE UP

## 2021-01-29 PROCEDURE — U0003: CPT

## 2021-01-29 PROCEDURE — U0005: CPT

## 2021-01-29 PROCEDURE — 82962 GLUCOSE BLOOD TEST: CPT

## 2021-01-29 PROCEDURE — 99183 HYPERBARIC OXYGEN THERAPY: CPT

## 2021-01-29 NOTE — PROCEDURE
[Outpatient] : Outpatient [Ambulatory] : Patient is ambulatory. [THIS CHAMBER HAS BEEN CLEANED / DISINFECTED] : This chamber has been cleaned / disinfected according to local and hospital policy and procedure prior to this treatment. [Patient demonstrated and verbalized proper technique for using air break mask] : Patient demonstrated and verbalized proper technique for using air break mask [Patient educated on the risks of SMOKING prior to HBOT with understanding] : Patient educated on the risks of SMOKING prior to HBOT with understanding [Patient educated on the risks of CONSUMING ALCOHOL prior to HBOT with understanding] : Patient educated on the risks of CONSUMING ALCOHOL prior to HBOT with understanding [100% Cotton] : 100% cotton [Empty all pockets] : empty all pockets [No hair oils, wigs, hairpieces, pins] : no hair oils, wigs, hairpieces, pins  [Pre tx medications] : pre tx medications  [No make-up, creams] : no make-up, creams  [No jewelry] : no jewelry  [No matches, cigarettes, lighters] : no matches, cigarettes, lighters  [Hearing aid removed] : hearing aid removed [Dentures removed] : dentures removed [Ground bracelet on pt's wrist] : ground bracelet on pt's wrist  [Contacts removed] : contacts removed  [Remove nail polish] : remove nail polish  [No reading material] : no reading material  [Bra, undergarments removed] : bra, undergarments removed  [Ground Wire - VISUAL Verification - Intact/Free of Obstruction] : Ground Wire - VISUAL Verification - Intact/Free of Obstruction  [No contraindicated dressings] : no contraindicated dressings [Ground Continuity - Verified < 1 ohm w/ Wrist Strap Gloria] : Ground Continuity - Verified < 1 ohm w/ Wrist Strap Gloria [Number: ___] : Number: [unfilled] [Diagnosis: ___] : Diagnosis: [unfilled] [____] : Post-Dive: Time - [unfilled] [___] : Post-Dive: Value - [unfilled] mg/dL [Clear all fields] : clear all fields [] : No [FreeTextEntry4] : 100 [FreeTextEntry6] : 2666 [FreeTextEntry8] : 8427 [de-identified] : 1554 [de-identified] : 8806 [de-identified] : 5184 [de-identified] : 9510 [de-identified] : 1001 [de-identified] : 120 [de-identified] : 1011

## 2021-01-29 NOTE — ADDENDUM
[FreeTextEntry1] : COVID-19 PCR SWAB PERFORMED PRIOR TO DESCENT. \par PT'S PICC LINE MEASURED PRIOR TO DESCENT.\par PT DESCENDED TO 2.4 DIMAS @ 2.2 PSI/MIN WITHOUT INCIDENT IN CHAMBER #2. PT'S RESTING AT TX DEPTH WITH CHEST RISE AND FALL OBSERVED CHAMBER SIDE.\par TRANSFER OF CARE DURING ASCENT.\par PT ASCENT WAS WITHOUT INCIDENT. PT TOLERATED HBOT WELL. PICC LINE MEASURED BY NURSE AND WOUND CARE PROVIDED.\par \par CHT JESSICA VÁZQUEZ 01/29/21

## 2021-01-30 ENCOUNTER — OUTPATIENT (OUTPATIENT)
Dept: OUTPATIENT SERVICES | Facility: HOSPITAL | Age: 61
LOS: 1 days | Discharge: HOME | End: 2021-01-30
Payer: COMMERCIAL

## 2021-01-30 ENCOUNTER — APPOINTMENT (OUTPATIENT)
Dept: HYPERBARIC MEDICINE | Facility: HOSPITAL | Age: 61
End: 2021-01-30
Payer: COMMERCIAL

## 2021-01-30 VITALS
SYSTOLIC BLOOD PRESSURE: 126 MMHG | HEART RATE: 97 BPM | RESPIRATION RATE: 18 BRPM | TEMPERATURE: 97.1 F | OXYGEN SATURATION: 100 % | DIASTOLIC BLOOD PRESSURE: 80 MMHG

## 2021-01-30 VITALS
OXYGEN SATURATION: 100 % | TEMPERATURE: 97.1 F | RESPIRATION RATE: 20 BRPM | SYSTOLIC BLOOD PRESSURE: 142 MMHG | HEART RATE: 82 BPM | DIASTOLIC BLOOD PRESSURE: 87 MMHG

## 2021-01-30 DIAGNOSIS — E11.621 TYPE 2 DIABETES MELLITUS WITH FOOT ULCER: ICD-10-CM

## 2021-01-30 DIAGNOSIS — Z98.62 PERIPHERAL VASCULAR ANGIOPLASTY STATUS: Chronic | ICD-10-CM

## 2021-01-30 PROCEDURE — 82962 GLUCOSE BLOOD TEST: CPT

## 2021-01-30 PROCEDURE — G0277: CPT

## 2021-01-30 PROCEDURE — 99183 HYPERBARIC OXYGEN THERAPY: CPT

## 2021-02-01 ENCOUNTER — APPOINTMENT (OUTPATIENT)
Dept: HYPERBARIC MEDICINE | Facility: HOSPITAL | Age: 61
End: 2021-02-01
Payer: COMMERCIAL

## 2021-02-01 ENCOUNTER — OUTPATIENT (OUTPATIENT)
Dept: OUTPATIENT SERVICES | Facility: HOSPITAL | Age: 61
LOS: 1 days | Discharge: HOME | End: 2021-02-01
Payer: COMMERCIAL

## 2021-02-01 VITALS
HEART RATE: 120 BPM | DIASTOLIC BLOOD PRESSURE: 76 MMHG | TEMPERATURE: 97.3 F | RESPIRATION RATE: 18 BRPM | SYSTOLIC BLOOD PRESSURE: 119 MMHG | OXYGEN SATURATION: 100 %

## 2021-02-01 VITALS
DIASTOLIC BLOOD PRESSURE: 82 MMHG | OXYGEN SATURATION: 100 % | SYSTOLIC BLOOD PRESSURE: 138 MMHG | HEART RATE: 110 BPM | TEMPERATURE: 97.7 F | RESPIRATION RATE: 18 BRPM

## 2021-02-01 DIAGNOSIS — T86.828 OTHER COMPLICATIONS OF SKIN GRAFT (ALLOGRAFT) (AUTOGRAFT): ICD-10-CM

## 2021-02-01 DIAGNOSIS — Z98.62 PERIPHERAL VASCULAR ANGIOPLASTY STATUS: Chronic | ICD-10-CM
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
## 2021-02-01 DIAGNOSIS — E11.69 TYPE 2 DIABETES MELLITUS WITH OTHER SPECIFIED COMPLICATION: ICD-10-CM

## 2021-02-01 DIAGNOSIS — E11.621 TYPE 2 DIABETES MELLITUS WITH FOOT ULCER: ICD-10-CM

## 2021-02-01 DIAGNOSIS — M86.671 OTHER CHRONIC OSTEOMYELITIS, RIGHT ANKLE AND FOOT: ICD-10-CM

## 2021-02-01 PROCEDURE — 82962 GLUCOSE BLOOD TEST: CPT

## 2021-02-01 PROCEDURE — 99183 HYPERBARIC OXYGEN THERAPY: CPT

## 2021-02-01 PROCEDURE — G0277: CPT

## 2021-02-01 NOTE — ADDENDUM
[FreeTextEntry1] : PT DESCENDED TO 2.4 DIMAS @ 2.2 PSI/MIN WITHOUT INCIDENT IN CHAMBER #3\par PT OBSERVED CHAMBERSIDE RESTING AT TX DEPTH WITH VISIBLE CHEST RISE AND FALL \par PT ASCENDED FROM TX DEPTH WITHOUT INCIDENT\par PT RECEIVED DRESSING CHANGE POST-HBOT BY RN \par PT TOLERATED TX WELL

## 2021-02-01 NOTE — PROCEDURE
[Ambulatory] : Patient is ambulatory. [Outpatient] : Outpatient [Patient demonstrated and verbalized proper technique for using air break mask] : Patient demonstrated and verbalized proper technique for using air break mask [Patient educated on the risks of SMOKING prior to HBOT with understanding] : Patient educated on the risks of SMOKING prior to HBOT with understanding [Patient educated on the risks of CONSUMING ALCOHOL prior to HBOT with understanding] : Patient educated on the risks of CONSUMING ALCOHOL prior to HBOT with understanding [100% Cotton] : 100% cotton [Empty all pockets] : empty all pockets [No hair oils, wigs, hairpieces, pins] : no hair oils, wigs, hairpieces, pins  [Pre tx medications] : pre tx medications  [No make-up, creams] : no make-up, creams  [No jewelry] : no jewelry  [No matches, cigarettes, lighters] : no matches, cigarettes, lighters  [Hearing aid removed] : hearing aid removed [Dentures removed] : dentures removed [Ground bracelet on pt's wrist] : ground bracelet on pt's wrist  [Contacts removed] : contacts removed  [Remove nail polish] : remove nail polish  [No reading material] : no reading material  [Bra, undergarments removed] : bra, undergarments removed  [No contraindicated dressings] : no contraindicated dressings [Ground Wire - VISUAL Verification - Intact/Free of Obstruction] : Ground Wire - VISUAL Verification - Intact/Free of Obstruction  [Ground Continuity - Verified < 1 ohm w/ Wrist Strap Gloria] : Ground Continuity - Verified < 1 ohm w/ Wrist Strap Gloria [Number: ___] : Number: [unfilled] [Diagnosis: ___] : Diagnosis: [unfilled] [____] : Post-Dive: Time - [unfilled] [___] : Post-Dive: Value - [unfilled] mg/dL [Clear all fields] : clear all fields [] : No [FreeTextEntry4] : 100 [FreeTextEntry6] : 042 [FreeTextEntry8] : 840 [de-identified] : 115 [de-identified] : 583 [de-identified] : 165 [de-identified] : 979 [de-identified] : 534 [de-identified] : 1005 [de-identified] : 120 minutes

## 2021-02-01 NOTE — ADDENDUM
[FreeTextEntry1] : PT'S HEART RATE ABOVE ACCEPTABLE PARAMETERS PRIOR TO DESCENT. IMMEDIATE RE-TEST PERFORMED. PT'S HR STILL HIGH. PT RESTED FOR 5 MIN AND RE-TESTED. PT'S HR STILL HIGH. PT RESTED FOR ADDITIONAL 5 MINUTES, PT'S HR NOW WITHIN ACCEPTABLE PARAMETERS FOR HBOT. PT PUT IN CHAMBER.\par PT DESCENDED TO 2.4 DIMAS @ 2.2 PSI/MIN WITHOUT INCIDENT IN CHAMBER # 1. PT'S RESTING AT TX DEPTH WITH CHEST RISE AND FALL OBSERVED CHAMBER SIDE.\par PT TOLERATED AIR BREAKS WELL.\par PT ASCENDED FROM 2.4 DIMAS @ 2.2 PSI/MIN WITHOUT INCIDENT. PT TOLERATED TX WELL.

## 2021-02-01 NOTE — PROCEDURE
[Outpatient] : Outpatient [Ambulatory] : Patient is ambulatory. [THIS CHAMBER HAS BEEN CLEANED / DISINFECTED] : This chamber has been cleaned / disinfected according to local and hospital policy and procedure prior to this treatment. [Patient demonstrated and verbalized proper technique for using air break mask] : Patient demonstrated and verbalized proper technique for using air break mask [Patient educated on the risks of SMOKING prior to HBOT with understanding] : Patient educated on the risks of SMOKING prior to HBOT with understanding [Patient educated on the risks of CONSUMING ALCOHOL prior to HBOT with understanding] : Patient educated on the risks of CONSUMING ALCOHOL prior to HBOT with understanding [100% Cotton] : 100% cotton [Empty all pockets] : empty all pockets [No hair oils, wigs, hairpieces, pins] : no hair oils, wigs, hairpieces, pins  [Pre tx medications] : pre tx medications  [No make-up, creams] : no make-up, creams  [No jewelry] : no jewelry  [No matches, cigarettes, lighters] : no matches, cigarettes, lighters  [Hearing aid removed] : hearing aid removed [Dentures removed] : dentures removed [Ground bracelet on pt's wrist] : ground bracelet on pt's wrist  [Contacts removed] : contacts removed  [Remove nail polish] : remove nail polish  [No reading material] : no reading material  [Bra, undergarments removed] : bra, undergarments removed  [No contraindicated dressings] : no contraindicated dressings [Ground Wire - VISUAL Verification - Intact/Free of Obstruction] : Ground Wire - VISUAL Verification - Intact/Free of Obstruction  [Ground Continuity - Verified < 1 ohm w/ Wrist Strap Gloria] : Ground Continuity - Verified < 1 ohm w/ Wrist Strap Gloria [Number: ___] : Number: [unfilled] [Diagnosis: ___] : Diagnosis: [unfilled] [____] : Post-Dive: Time - [unfilled] [___] : Post-Dive: Value - [unfilled] mg/dL [Clear all fields] : clear all fields [] : No [FreeTextEntry4] : 100 [FreeTextEntry6] : 3588 [FreeTextEntry8] : 1972 [de-identified] : 5114 [de-identified] : 6152 [de-identified] : 6168 [de-identified] : 2492 [de-identified] : 1000 [de-identified] : 1016 [de-identified] : 120 MIN

## 2021-02-02 ENCOUNTER — APPOINTMENT (OUTPATIENT)
Dept: HYPERBARIC MEDICINE | Facility: HOSPITAL | Age: 61
End: 2021-02-02

## 2021-02-02 ENCOUNTER — OUTPATIENT (OUTPATIENT)
Dept: OUTPATIENT SERVICES | Facility: HOSPITAL | Age: 61
LOS: 1 days | Discharge: HOME | End: 2021-02-02
Payer: COMMERCIAL

## 2021-02-02 ENCOUNTER — APPOINTMENT (OUTPATIENT)
Dept: HYPERBARIC MEDICINE | Facility: HOSPITAL | Age: 61
End: 2021-02-02
Payer: COMMERCIAL

## 2021-02-02 ENCOUNTER — NON-APPOINTMENT (OUTPATIENT)
Age: 61
End: 2021-02-02

## 2021-02-02 VITALS
HEIGHT: 68 IN | RESPIRATION RATE: 20 BRPM | TEMPERATURE: 97.3 F | OXYGEN SATURATION: 98 % | WEIGHT: 185 LBS | BODY MASS INDEX: 28.04 KG/M2 | SYSTOLIC BLOOD PRESSURE: 98 MMHG | HEART RATE: 127 BPM | DIASTOLIC BLOOD PRESSURE: 61 MMHG

## 2021-02-02 DIAGNOSIS — E11.621 TYPE 2 DIABETES MELLITUS WITH FOOT ULCER: ICD-10-CM

## 2021-02-02 DIAGNOSIS — Z98.62 PERIPHERAL VASCULAR ANGIOPLASTY STATUS: Chronic | ICD-10-CM

## 2021-02-02 PROCEDURE — G0463: CPT

## 2021-02-02 PROCEDURE — 99024 POSTOP FOLLOW-UP VISIT: CPT

## 2021-02-02 RX ORDER — CANAGLIFLOZIN 300 MG/1
300 TABLET, FILM COATED ORAL DAILY
Refills: 0 | Status: DISCONTINUED | COMMUNITY
End: 2021-02-02

## 2021-02-03 ENCOUNTER — APPOINTMENT (OUTPATIENT)
Dept: HYPERBARIC MEDICINE | Facility: HOSPITAL | Age: 61
End: 2021-02-03
Payer: COMMERCIAL

## 2021-02-03 ENCOUNTER — OUTPATIENT (OUTPATIENT)
Dept: OUTPATIENT SERVICES | Facility: HOSPITAL | Age: 61
LOS: 1 days | Discharge: HOME | End: 2021-02-03
Payer: COMMERCIAL

## 2021-02-03 VITALS
DIASTOLIC BLOOD PRESSURE: 77 MMHG | SYSTOLIC BLOOD PRESSURE: 113 MMHG | RESPIRATION RATE: 18 BRPM | OXYGEN SATURATION: 100 % | TEMPERATURE: 96.9 F | HEART RATE: 99 BPM

## 2021-02-03 VITALS
TEMPERATURE: 98.4 F | HEART RATE: 100 BPM | DIASTOLIC BLOOD PRESSURE: 79 MMHG | RESPIRATION RATE: 20 BRPM | SYSTOLIC BLOOD PRESSURE: 138 MMHG | OXYGEN SATURATION: 100 %

## 2021-02-03 DIAGNOSIS — T86.828 OTHER COMPLICATIONS OF SKIN GRAFT (ALLOGRAFT) (AUTOGRAFT): ICD-10-CM

## 2021-02-03 DIAGNOSIS — Z80.3 FAMILY HISTORY OF MALIGNANT NEOPLASM OF BREAST: ICD-10-CM

## 2021-02-03 DIAGNOSIS — E11.621 TYPE 2 DIABETES MELLITUS WITH FOOT ULCER: ICD-10-CM

## 2021-02-03 DIAGNOSIS — Z89.411 ACQUIRED ABSENCE OF RIGHT GREAT TOE: ICD-10-CM

## 2021-02-03 DIAGNOSIS — E78.00 PURE HYPERCHOLESTEROLEMIA, UNSPECIFIED: ICD-10-CM

## 2021-02-03 DIAGNOSIS — E11.51 TYPE 2 DIABETES MELLITUS WITH DIABETIC PERIPHERAL ANGIOPATHY WITHOUT GANGRENE: ICD-10-CM

## 2021-02-03 DIAGNOSIS — Z79.899 OTHER LONG TERM (CURRENT) DRUG THERAPY: ICD-10-CM

## 2021-02-03 DIAGNOSIS — Z79.84 LONG TERM (CURRENT) USE OF ORAL HYPOGLYCEMIC DRUGS: ICD-10-CM

## 2021-02-03 DIAGNOSIS — E11.69 TYPE 2 DIABETES MELLITUS WITH OTHER SPECIFIED COMPLICATION: ICD-10-CM

## 2021-02-03 DIAGNOSIS — Z79.82 LONG TERM (CURRENT) USE OF ASPIRIN: ICD-10-CM

## 2021-02-03 DIAGNOSIS — Z83.3 FAMILY HISTORY OF DIABETES MELLITUS: ICD-10-CM

## 2021-02-03 DIAGNOSIS — E11.40 TYPE 2 DIABETES MELLITUS WITH DIABETIC NEUROPATHY, UNSPECIFIED: ICD-10-CM

## 2021-02-03 DIAGNOSIS — M86.671 OTHER CHRONIC OSTEOMYELITIS, RIGHT ANKLE AND FOOT: ICD-10-CM
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
## 2021-02-03 DIAGNOSIS — Z98.62 PERIPHERAL VASCULAR ANGIOPLASTY STATUS: Chronic | ICD-10-CM

## 2021-02-03 PROCEDURE — 99183 HYPERBARIC OXYGEN THERAPY: CPT

## 2021-02-03 PROCEDURE — 82962 GLUCOSE BLOOD TEST: CPT

## 2021-02-03 PROCEDURE — G0277: CPT

## 2021-02-03 NOTE — ADDENDUM
[FreeTextEntry1] : PT ARRIVED AMBULATORY FROM [facility]\par ALL VITALS WITHIN PARAMETERS FOR HBOT. NO DRAINAGE NOTED ON DRESSING PRIOR TO TX\par PT DESCENT TO RX TX DEPTH IN CHAMBER 3 WAS WITHOUT INCIDENT. PT RESTING AT DEPTH. CHEST RISE AND FALL OBSERVED.\par PT TOLERATED AIR BREAKS WELL.\par PT ASCENDED FROM TX DEPTH WITHOUT INCIDENT. PT TOLERATED TX WELL.

## 2021-02-03 NOTE — REVIEW OF SYSTEMS
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 [de-identified] : diabetic neuropathy [de-identified] : type 2 diabetes

## 2021-02-03 NOTE — VITALS
[] : No [de-identified] : Pt rates pain 0/10.  Patient denies any pain or discomfort at the present  [FreeTextEntry5] : Pt began taking the Jardiance 25mg

## 2021-02-03 NOTE — PHYSICAL EXAM
[0] : left 0 [Skin Ulcer] : ulcer [Alert] : alert [Oriented to Person] : oriented to person [Oriented to Place] : oriented to place [Oriented to Time] : oriented to time [Ankle Swelling (On Exam)] : not present [Varicose Veins Of Lower Extremities] : not present [] : not present [de-identified] : A&Ox3, NAD [de-identified] : 5/5 strength in all quadrants bilaterally, s/p right hallux and metatarsal head amputation [de-identified] : right foot incision site intact, no purulence, no malodor, no proximal streaking. [de-identified] : diminished light touch sensation bilaterally [de-identified] : DPM removed Steri - Strips \par  [FreeTextEntry1] : Right Hallux Amp Site- Steri - Strips Intact - Three areas of fragile epithelium within measurement [FreeTextEntry2] : 4.5 [FreeTextEntry3] : 0.1 [FreeTextEntry4] : 0.1 [de-identified] : Serosanguineous  [de-identified] : Intact [de-identified] : Silver Alginate [de-identified] : Cleansed with Normal Saline. \par Kerlix  [TWNoteComboBox4] : Small [TWNoteComboBox5] : No [de-identified] : No [TWNoteComboBox6] : Surgical [de-identified] : Normal [de-identified] : None [de-identified] : None [de-identified] : 100% [de-identified] : No [de-identified] : Ace wraps [de-identified] : 3x Weekly [de-identified] : Primary Dressing

## 2021-02-03 NOTE — ASSESSMENT
[FreeTextEntry2] : Infection Prevention\par Localized Wound Care\par Offloading / Pressure Relief\par Promote Optimal Skin Integrity\par Maintain acceptable pain level with use of pharmacological and nonpharmacological interventions\par  [FreeTextEntry4] : F/U to [facility] in Two Weeks for Assessment\par HBOT 15/30 Completed - No additional HBOT ordered at this time \par Orthotic consult request sent to [facility] on 02/03/21.\par \par

## 2021-02-03 NOTE — PROCEDURE
[Outpatient] : Outpatient [Ambulatory] : Patient is ambulatory. [THIS CHAMBER HAS BEEN CLEANED / DISINFECTED] : This chamber has been cleaned / disinfected according to local and hospital policy and procedure prior to this treatment. [Patient demonstrated and verbalized proper technique for using air break mask] : Patient demonstrated and verbalized proper technique for using air break mask [Patient educated on the risks of SMOKING prior to HBOT with understanding] : Patient educated on the risks of SMOKING prior to HBOT with understanding [Patient educated on the risks of CONSUMING ALCOHOL prior to HBOT with understanding] : Patient educated on the risks of CONSUMING ALCOHOL prior to HBOT with understanding [100% Cotton] : 100% cotton [Empty all pockets] : empty all pockets [No hair oils, wigs, hairpieces, pins] : no hair oils, wigs, hairpieces, pins  [Pre tx medications] : pre tx medications  [No make-up, creams] : no make-up, creams  [No jewelry] : no jewelry  [No matches, cigarettes, lighters] : no matches, cigarettes, lighters  [Hearing aid removed] : hearing aid removed [Dentures removed] : dentures removed [Ground bracelet on pt's wrist] : ground bracelet on pt's wrist  [Contacts removed] : contacts removed  [Remove nail polish] : remove nail polish  [No reading material] : no reading material  [Bra, undergarments removed] : bra, undergarments removed  [No contraindicated dressings] : no contraindicated dressings [Ground Wire - VISUAL Verification - Intact/Free of Obstruction] : Ground Wire - VISUAL Verification - Intact/Free of Obstruction  [Ground Continuity - Verified < 1 ohm w/ Wrist Strap Gloria] : Ground Continuity - Verified < 1 ohm w/ Wrist Strap Gloria [Number: ___] : Number: [unfilled] [Diagnosis: ___] : Diagnosis: [unfilled] [____] : Post-Dive: Time - [unfilled] [___] : Post-Dive: Value - [unfilled] mg/dL [Clear all fields] : clear all fields [] : No [FreeTextEntry4] : 100 [FreeTextEntry6] : 3433 [FreeTextEntry8] : 1918 [de-identified] : 6494 [de-identified] : 6407 [de-identified] : 9517 [de-identified] : 4036 [de-identified] : 2642 [de-identified] : 1006 [de-identified] : 120 MIN

## 2021-02-03 NOTE — PLAN
[FreeTextEntry1] : Patient examined and evaluated at this time.\par Continue local wound care and offloading.\par Will continue HBOT at this time.\par Spent 20 minutes for patient care and medical decision making.\par

## 2021-02-04 ENCOUNTER — OUTPATIENT (OUTPATIENT)
Dept: OUTPATIENT SERVICES | Facility: HOSPITAL | Age: 61
LOS: 1 days | Discharge: HOME | End: 2021-02-04
Payer: COMMERCIAL

## 2021-02-04 ENCOUNTER — APPOINTMENT (OUTPATIENT)
Dept: HYPERBARIC MEDICINE | Facility: HOSPITAL | Age: 61
End: 2021-02-04
Payer: COMMERCIAL

## 2021-02-04 VITALS
TEMPERATURE: 97.7 F | DIASTOLIC BLOOD PRESSURE: 86 MMHG | OXYGEN SATURATION: 100 % | RESPIRATION RATE: 18 BRPM | SYSTOLIC BLOOD PRESSURE: 134 MMHG | HEART RATE: 99 BPM

## 2021-02-04 VITALS
DIASTOLIC BLOOD PRESSURE: 80 MMHG | RESPIRATION RATE: 18 BRPM | TEMPERATURE: 97.6 F | SYSTOLIC BLOOD PRESSURE: 120 MMHG | HEART RATE: 88 BPM | OXYGEN SATURATION: 100 %

## 2021-02-04 DIAGNOSIS — Z98.62 PERIPHERAL VASCULAR ANGIOPLASTY STATUS: Chronic | ICD-10-CM

## 2021-02-04 DIAGNOSIS — M86.671 OTHER CHRONIC OSTEOMYELITIS, RIGHT ANKLE AND FOOT: ICD-10-CM

## 2021-02-04 DIAGNOSIS — E11.621 TYPE 2 DIABETES MELLITUS WITH FOOT ULCER: ICD-10-CM
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
## 2021-02-04 DIAGNOSIS — T86.828 OTHER COMPLICATIONS OF SKIN GRAFT (ALLOGRAFT) (AUTOGRAFT): ICD-10-CM

## 2021-02-04 DIAGNOSIS — E11.69 TYPE 2 DIABETES MELLITUS WITH OTHER SPECIFIED COMPLICATION: ICD-10-CM

## 2021-02-04 PROCEDURE — 99183 HYPERBARIC OXYGEN THERAPY: CPT

## 2021-02-04 PROCEDURE — G0277: CPT

## 2021-02-04 PROCEDURE — 82962 GLUCOSE BLOOD TEST: CPT

## 2021-02-04 NOTE — PROCEDURE
[Outpatient] : Outpatient [Ambulatory] : Patient is ambulatory. [THIS CHAMBER HAS BEEN CLEANED / DISINFECTED] : This chamber has been cleaned / disinfected according to local and hospital policy and procedure prior to this treatment. [Patient demonstrated and verbalized proper technique for using air break mask] : Patient demonstrated and verbalized proper technique for using air break mask [Patient educated on the risks of SMOKING prior to HBOT with understanding] : Patient educated on the risks of SMOKING prior to HBOT with understanding [Patient educated on the risks of CONSUMING ALCOHOL prior to HBOT with understanding] : Patient educated on the risks of CONSUMING ALCOHOL prior to HBOT with understanding [100% Cotton] : 100% cotton [Empty all pockets] : empty all pockets [No hair oils, wigs, hairpieces, pins] : no hair oils, wigs, hairpieces, pins  [Pre tx medications] : pre tx medications  [No make-up, creams] : no make-up, creams  [No jewelry] : no jewelry  [No matches, cigarettes, lighters] : no matches, cigarettes, lighters  [Hearing aid removed] : hearing aid removed [Dentures removed] : dentures removed [Ground bracelet on pt's wrist] : ground bracelet on pt's wrist  [Contacts removed] : contacts removed  [Remove nail polish] : remove nail polish  [No reading material] : no reading material  [Bra, undergarments removed] : bra, undergarments removed  [No contraindicated dressings] : no contraindicated dressings [Ground Wire - VISUAL Verification - Intact/Free of Obstruction] : Ground Wire - VISUAL Verification - Intact/Free of Obstruction  [Ground Continuity - Verified < 1 ohm w/ Wrist Strap Gloria] : Ground Continuity - Verified < 1 ohm w/ Wrist Strap Gloria [Number: ___] : Number: [unfilled] [Diagnosis: ___] : Diagnosis: [unfilled] [____] : Post-Dive: Time - [unfilled] [___] : Post-Dive: Value - [unfilled] mg/dL [Clear all fields] : clear all fields [] : No [FreeTextEntry4] : 100 [FreeTextEntry6] : 5055 [FreeTextEntry8] : 7472 [de-identified] : 4077 [de-identified] : 8828 [de-identified] : 1191 [de-identified] : 7764 [de-identified] : 6082 [de-identified] : 1002 [de-identified] : 120 MIN

## 2021-02-04 NOTE — ADDENDUM
[FreeTextEntry1] : PT HAD IMS CONSULT PRIOR TO DIVE.\par PT DESCENDED TO 2.4 DIMAS @ 2.2 PSI/MIN WITHOUT INCIDENT IN CHAMBER # 2. PT'S RESTING AT TX DEPTH WITH CHEST RISE AND FALL OBSERVED CHAMBER SIDE. \par PT TOLERATED AIR BREAKS WELL.\par PT ASCENDED FROM TX DEPTH WITHOUT INCIDENT. PT TOLERATED TX WELL.

## 2021-02-05 ENCOUNTER — APPOINTMENT (OUTPATIENT)
Dept: HYPERBARIC MEDICINE | Facility: HOSPITAL | Age: 61
End: 2021-02-05
Payer: COMMERCIAL

## 2021-02-05 ENCOUNTER — OUTPATIENT (OUTPATIENT)
Dept: OUTPATIENT SERVICES | Facility: HOSPITAL | Age: 61
LOS: 1 days | Discharge: HOME | End: 2021-02-05
Payer: COMMERCIAL

## 2021-02-05 VITALS
TEMPERATURE: 97.9 F | HEART RATE: 100 BPM | OXYGEN SATURATION: 100 % | DIASTOLIC BLOOD PRESSURE: 81 MMHG | SYSTOLIC BLOOD PRESSURE: 122 MMHG | RESPIRATION RATE: 18 BRPM

## 2021-02-05 VITALS
RESPIRATION RATE: 20 BRPM | HEART RATE: 88 BPM | SYSTOLIC BLOOD PRESSURE: 119 MMHG | OXYGEN SATURATION: 100 % | DIASTOLIC BLOOD PRESSURE: 79 MMHG | TEMPERATURE: 97.2 F
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
## 2021-02-05 DIAGNOSIS — E11.69 TYPE 2 DIABETES MELLITUS WITH OTHER SPECIFIED COMPLICATION: ICD-10-CM

## 2021-02-05 DIAGNOSIS — T86.828 OTHER COMPLICATIONS OF SKIN GRAFT (ALLOGRAFT) (AUTOGRAFT): ICD-10-CM

## 2021-02-05 DIAGNOSIS — Z98.62 PERIPHERAL VASCULAR ANGIOPLASTY STATUS: Chronic | ICD-10-CM

## 2021-02-05 DIAGNOSIS — M86.671 OTHER CHRONIC OSTEOMYELITIS, RIGHT ANKLE AND FOOT: ICD-10-CM

## 2021-02-05 DIAGNOSIS — E11.621 TYPE 2 DIABETES MELLITUS WITH FOOT ULCER: ICD-10-CM

## 2021-02-05 PROCEDURE — G0277: CPT

## 2021-02-05 PROCEDURE — 99183 HYPERBARIC OXYGEN THERAPY: CPT

## 2021-02-05 PROCEDURE — 82962 GLUCOSE BLOOD TEST: CPT

## 2021-02-05 NOTE — ADDENDUM
[FreeTextEntry1] : PT DESCENDED TO 2.4 DIMAS @ 2.2 PSI/MIN WITHOUT INCIDENT IN CHAMBER # 2. PT'S RESTING AT TX DEPTH WITH CHEST RISE AND FALL OBSERVED CHAMBER SIDE.\par PT TOLERATED AIR BREAKS WELL.\par PT TO RECEIVE WOUND CARE POST HBOT.\par \par TRANSFER OF CARE TO [facility] DURING ASCENT\par PT ASCENT WAS WITHOUT INCIDENT. PT TOLERATED HBOT WELL.\par WOUND CARE PROVIDED AND COVID SWAB OBTAINED\par \par CHT JESSICA VÁZQUEZ 02/05/21

## 2021-02-06 ENCOUNTER — APPOINTMENT (OUTPATIENT)
Dept: HYPERBARIC MEDICINE | Facility: HOSPITAL | Age: 61
End: 2021-02-06
Payer: COMMERCIAL

## 2021-02-06 ENCOUNTER — OUTPATIENT (OUTPATIENT)
Dept: OUTPATIENT SERVICES | Facility: HOSPITAL | Age: 61
LOS: 1 days | Discharge: HOME | End: 2021-02-06
Payer: COMMERCIAL

## 2021-02-06 VITALS
HEART RATE: 101 BPM | DIASTOLIC BLOOD PRESSURE: 92 MMHG | SYSTOLIC BLOOD PRESSURE: 142 MMHG | RESPIRATION RATE: 18 BRPM | OXYGEN SATURATION: 100 % | TEMPERATURE: 96.9 F

## 2021-02-06 VITALS
DIASTOLIC BLOOD PRESSURE: 90 MMHG | RESPIRATION RATE: 18 BRPM | HEART RATE: 98 BPM | OXYGEN SATURATION: 100 % | SYSTOLIC BLOOD PRESSURE: 142 MMHG | TEMPERATURE: 97.9 F

## 2021-02-06 DIAGNOSIS — E11.621 TYPE 2 DIABETES MELLITUS WITH FOOT ULCER: ICD-10-CM

## 2021-02-06 DIAGNOSIS — Z98.62 PERIPHERAL VASCULAR ANGIOPLASTY STATUS: Chronic | ICD-10-CM

## 2021-02-06 LAB — SARS-COV-2 RNA SPEC QL NAA+PROBE: SIGNIFICANT CHANGE UP

## 2021-02-06 PROCEDURE — 82962 GLUCOSE BLOOD TEST: CPT

## 2021-02-06 PROCEDURE — 99183 HYPERBARIC OXYGEN THERAPY: CPT

## 2021-02-06 PROCEDURE — U0005: CPT

## 2021-02-06 PROCEDURE — U0003: CPT

## 2021-02-06 PROCEDURE — G0277: CPT

## 2021-02-06 NOTE — ADDENDUM
[FreeTextEntry1] : PT DESCENDED TO 2.4 DIMAS @ 2.2 PSI/MIN WITHOUT INCIDENT IN CHAMBER # 1. PT'S RESTING AT TX DEPTH WITH CHEST RISE AND FALL OBSERVED CHAMBER SIDE.\par PT TOLERATED AIR BREAKS WELL.\par PT TO RECEIVE COVID-19 PCR SWAB POST HBOT.\par PT ASCENDED FROM TX DEPTH WITHOUT INCIDENT. PT TOLERATED TX WELL.

## 2021-02-06 NOTE — PROCEDURE
[Outpatient] : Outpatient [Ambulatory] : Patient is ambulatory. [THIS CHAMBER HAS BEEN CLEANED / DISINFECTED] : This chamber has been cleaned / disinfected according to local and hospital policy and procedure prior to this treatment. [Patient demonstrated and verbalized proper technique for using air break mask] : Patient demonstrated and verbalized proper technique for using air break mask [Patient educated on the risks of SMOKING prior to HBOT with understanding] : Patient educated on the risks of SMOKING prior to HBOT with understanding [Patient educated on the risks of CONSUMING ALCOHOL prior to HBOT with understanding] : Patient educated on the risks of CONSUMING ALCOHOL prior to HBOT with understanding [100% Cotton] : 100% cotton [Empty all pockets] : empty all pockets [No hair oils, wigs, hairpieces, pins] : no hair oils, wigs, hairpieces, pins  [Pre tx medications] : pre tx medications  [No make-up, creams] : no make-up, creams  [No jewelry] : no jewelry  [No matches, cigarettes, lighters] : no matches, cigarettes, lighters  [Hearing aid removed] : hearing aid removed [Dentures removed] : dentures removed [Ground bracelet on pt's wrist] : ground bracelet on pt's wrist  [Contacts removed] : contacts removed  [Remove nail polish] : remove nail polish  [No reading material] : no reading material  [Bra, undergarments removed] : bra, undergarments removed  [No contraindicated dressings] : no contraindicated dressings [Ground Wire - VISUAL Verification - Intact/Free of Obstruction] : Ground Wire - VISUAL Verification - Intact/Free of Obstruction  [Ground Continuity - Verified < 1 ohm w/ Wrist Strap Gloria] : Ground Continuity - Verified < 1 ohm w/ Wrist Strap Gloria [Number: ___] : Number: [unfilled] [Diagnosis: ___] : Diagnosis: [unfilled] [____] : Post-Dive: Time - [unfilled] [___] : Post-Dive: Value - [unfilled] mg/dL [Clear all fields] : clear all fields [] : No [FreeTextEntry4] : 100 [FreeTextEntry6] : 1261 [FreeTextEntry8] : 4263 [de-identified] : 8615 [de-identified] : 2492 [de-identified] : 0956 [de-identified] : 9388 [de-identified] : 4696 [de-identified] : 0942 [de-identified] : 120 MIN
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
## 2021-02-07 DIAGNOSIS — M86.671 OTHER CHRONIC OSTEOMYELITIS, RIGHT ANKLE AND FOOT: ICD-10-CM

## 2021-02-07 DIAGNOSIS — E11.69 TYPE 2 DIABETES MELLITUS WITH OTHER SPECIFIED COMPLICATION: ICD-10-CM

## 2021-02-07 DIAGNOSIS — T86.828 OTHER COMPLICATIONS OF SKIN GRAFT (ALLOGRAFT) (AUTOGRAFT): ICD-10-CM

## 2021-02-08 ENCOUNTER — APPOINTMENT (OUTPATIENT)
Dept: HYPERBARIC MEDICINE | Facility: HOSPITAL | Age: 61
End: 2021-02-08

## 2021-02-08 ENCOUNTER — APPOINTMENT (OUTPATIENT)
Dept: HYPERBARIC MEDICINE | Facility: HOSPITAL | Age: 61
End: 2021-02-08
Payer: COMMERCIAL

## 2021-02-08 ENCOUNTER — OUTPATIENT (OUTPATIENT)
Dept: OUTPATIENT SERVICES | Facility: HOSPITAL | Age: 61
LOS: 1 days | Discharge: HOME | End: 2021-02-08
Payer: COMMERCIAL

## 2021-02-08 VITALS
TEMPERATURE: 97.7 F | SYSTOLIC BLOOD PRESSURE: 140 MMHG | HEART RATE: 100 BPM | OXYGEN SATURATION: 100 % | RESPIRATION RATE: 20 BRPM | DIASTOLIC BLOOD PRESSURE: 80 MMHG

## 2021-02-08 VITALS
OXYGEN SATURATION: 100 % | RESPIRATION RATE: 20 BRPM | TEMPERATURE: 98.1 F | DIASTOLIC BLOOD PRESSURE: 85 MMHG | HEART RATE: 99 BPM | SYSTOLIC BLOOD PRESSURE: 135 MMHG

## 2021-02-08 DIAGNOSIS — E11.621 TYPE 2 DIABETES MELLITUS WITH FOOT ULCER: ICD-10-CM

## 2021-02-08 DIAGNOSIS — T86.828 OTHER COMPLICATIONS OF SKIN GRAFT (ALLOGRAFT) (AUTOGRAFT): ICD-10-CM

## 2021-02-08 DIAGNOSIS — E11.69 TYPE 2 DIABETES MELLITUS WITH OTHER SPECIFIED COMPLICATION: ICD-10-CM
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
## 2021-02-08 DIAGNOSIS — M86.671 OTHER CHRONIC OSTEOMYELITIS, RIGHT ANKLE AND FOOT: ICD-10-CM

## 2021-02-08 DIAGNOSIS — Z98.62 PERIPHERAL VASCULAR ANGIOPLASTY STATUS: Chronic | ICD-10-CM

## 2021-02-08 PROCEDURE — G0277: CPT

## 2021-02-08 PROCEDURE — 82962 GLUCOSE BLOOD TEST: CPT

## 2021-02-08 PROCEDURE — 99183 HYPERBARIC OXYGEN THERAPY: CPT

## 2021-02-08 NOTE — PROCEDURE
[Outpatient] : Outpatient [Ambulatory] : Patient is ambulatory. [Patient demonstrated and verbalized proper technique for using air break mask] : Patient demonstrated and verbalized proper technique for using air break mask [Patient educated on the risks of SMOKING prior to HBOT with understanding] : Patient educated on the risks of SMOKING prior to HBOT with understanding [Patient educated on the risks of CONSUMING ALCOHOL prior to HBOT with understanding] : Patient educated on the risks of CONSUMING ALCOHOL prior to HBOT with understanding [100% Cotton] : 100% cotton [Empty all pockets] : empty all pockets [No hair oils, wigs, hairpieces, pins] : no hair oils, wigs, hairpieces, pins  [Pre tx medications] : pre tx medications  [No make-up, creams] : no make-up, creams  [No jewelry] : no jewelry  [No matches, cigarettes, lighters] : no matches, cigarettes, lighters  [Hearing aid removed] : hearing aid removed [Dentures removed] : dentures removed [Ground bracelet on pt's wrist] : ground bracelet on pt's wrist  [Remove nail polish] : remove nail polish  [Contacts removed] : contacts removed  [No reading material] : no reading material  [Bra, undergarments removed] : bra, undergarments removed  [No contraindicated dressings] : no contraindicated dressings [Ground Wire - VISUAL Verification - Intact/Free of Obstruction] : Ground Wire - VISUAL Verification - Intact/Free of Obstruction  [Ground Continuity - Verified < 1 ohm w/ Wrist Strap Gloria] : Ground Continuity - Verified < 1 ohm w/ Wrist Strap Gloria [Number: ___] : Number: [unfilled] [Diagnosis: ___] : Diagnosis: [unfilled] [____] : Post-Dive: Time - [unfilled] [___] : Post-Dive: Value - [unfilled] mg/dL [Clear all fields] : clear all fields [FreeTextEntry4] : 100 [FreeTextEntry6] : 8:09 [FreeTextEntry8] : 8:19 [de-identified] : 8:49 [de-identified] : 8:54 [de-identified] : 9:24 [de-identified] : 9:29 [de-identified] : 9:59 [de-identified] : 10:09 [de-identified] : 120

## 2021-02-08 NOTE — ADDENDUM
[FreeTextEntry1] : PT ARRIVED AMBULATORY A&OX3. \par ALL VITALS WITHIN PARAMETERS FOR HBOT NO DRAINAGE NOTED ON DRESSING PRIOR TO DESCENT.\par PT DESCENT TO RX TX DEPTH IN CHAMBER 2 WAS WITHOUT INCIDENT. PT RESTING AT DEPTH CHEST RISE AND FALL OBSERVED.\par PT TOLERATED AIR BREAKS WELL.\par PT ASCENT WAS WITHOUT INCIDENT. PT TOLERATED HBOT WELL\par PT RECEIVED WOUND CARE POST HBOT AND SPOKE WITH THE NURSE AND MD.\par \par TASHA VÁZQUEZ 02/08/21 11:22\par

## 2021-02-09 ENCOUNTER — OUTPATIENT (OUTPATIENT)
Dept: OUTPATIENT SERVICES | Facility: HOSPITAL | Age: 61
LOS: 1 days | Discharge: HOME | End: 2021-02-09
Payer: COMMERCIAL

## 2021-02-09 ENCOUNTER — APPOINTMENT (OUTPATIENT)
Dept: HYPERBARIC MEDICINE | Facility: HOSPITAL | Age: 61
End: 2021-02-09
Payer: COMMERCIAL

## 2021-02-09 ENCOUNTER — APPOINTMENT (OUTPATIENT)
Dept: HYPERBARIC MEDICINE | Facility: HOSPITAL | Age: 61
End: 2021-02-09

## 2021-02-09 VITALS
OXYGEN SATURATION: 100 % | SYSTOLIC BLOOD PRESSURE: 167 MMHG | RESPIRATION RATE: 20 BRPM | DIASTOLIC BLOOD PRESSURE: 94 MMHG | TEMPERATURE: 97.2 F | HEART RATE: 97 BPM

## 2021-02-09 VITALS
OXYGEN SATURATION: 99 % | TEMPERATURE: 97.2 F | RESPIRATION RATE: 18 BRPM | DIASTOLIC BLOOD PRESSURE: 88 MMHG | SYSTOLIC BLOOD PRESSURE: 143 MMHG | HEART RATE: 99 BPM
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
## 2021-02-09 DIAGNOSIS — T86.828 OTHER COMPLICATIONS OF SKIN GRAFT (ALLOGRAFT) (AUTOGRAFT): ICD-10-CM

## 2021-02-09 DIAGNOSIS — E11.621 TYPE 2 DIABETES MELLITUS WITH FOOT ULCER: ICD-10-CM

## 2021-02-09 DIAGNOSIS — M86.671 OTHER CHRONIC OSTEOMYELITIS, RIGHT ANKLE AND FOOT: ICD-10-CM

## 2021-02-09 DIAGNOSIS — E11.69 TYPE 2 DIABETES MELLITUS WITH OTHER SPECIFIED COMPLICATION: ICD-10-CM

## 2021-02-09 DIAGNOSIS — Z98.62 PERIPHERAL VASCULAR ANGIOPLASTY STATUS: Chronic | ICD-10-CM

## 2021-02-09 PROCEDURE — 82962 GLUCOSE BLOOD TEST: CPT

## 2021-02-09 PROCEDURE — G0277: CPT

## 2021-02-09 PROCEDURE — 99183 HYPERBARIC OXYGEN THERAPY: CPT

## 2021-02-09 NOTE — PROCEDURE
[Outpatient] : Outpatient [Ambulatory] : Patient is ambulatory. [THIS CHAMBER HAS BEEN CLEANED / DISINFECTED] : This chamber has been cleaned / disinfected according to local and hospital policy and procedure prior to this treatment. [Patient demonstrated and verbalized proper technique for using air break mask] : Patient demonstrated and verbalized proper technique for using air break mask [Patient educated on the risks of SMOKING prior to HBOT with understanding] : Patient educated on the risks of SMOKING prior to HBOT with understanding [Patient educated on the risks of CONSUMING ALCOHOL prior to HBOT with understanding] : Patient educated on the risks of CONSUMING ALCOHOL prior to HBOT with understanding [100% Cotton] : 100% cotton [Empty all pockets] : empty all pockets [No hair oils, wigs, hairpieces, pins] : no hair oils, wigs, hairpieces, pins  [Pre tx medications] : pre tx medications  [No make-up, creams] : no make-up, creams  [No jewelry] : no jewelry  [Hearing aid removed] : hearing aid removed [No matches, cigarettes, lighters] : no matches, cigarettes, lighters  [Dentures removed] : dentures removed [Ground bracelet on pt's wrist] : ground bracelet on pt's wrist  [Contacts removed] : contacts removed  [Remove nail polish] : remove nail polish  [No reading material] : no reading material  [No contraindicated dressings] : no contraindicated dressings [Bra, undergarments removed] : bra, undergarments removed  [Ground Wire - VISUAL Verification - Intact/Free of Obstruction] : Ground Wire - VISUAL Verification - Intact/Free of Obstruction  [Ground Continuity - Verified < 1 ohm w/ Wrist Strap Gloria] : Ground Continuity - Verified < 1 ohm w/ Wrist Strap Gloria [Number: ___] : Number: [unfilled] [Diagnosis: ___] : Diagnosis: [unfilled] [____] : Post-Dive: Time - [unfilled] [___] : Post-Dive: Value - [unfilled] mg/dL [Clear all fields] : clear all fields [] : No [FreeTextEntry4] : 100  [FreeTextEntry6] : 3616 [FreeTextEntry8] : 8031 [de-identified] : 4241 [de-identified] : 7737 [de-identified] : 0219 [de-identified] : 3466 [de-identified] : 1000 [de-identified] : 1909 [de-identified] : 120 mins

## 2021-02-10 ENCOUNTER — OUTPATIENT (OUTPATIENT)
Dept: OUTPATIENT SERVICES | Facility: HOSPITAL | Age: 61
LOS: 1 days | Discharge: HOME | End: 2021-02-10
Payer: COMMERCIAL

## 2021-02-10 ENCOUNTER — APPOINTMENT (OUTPATIENT)
Dept: HYPERBARIC MEDICINE | Facility: HOSPITAL | Age: 61
End: 2021-02-10
Payer: COMMERCIAL

## 2021-02-10 ENCOUNTER — APPOINTMENT (OUTPATIENT)
Dept: HYPERBARIC MEDICINE | Facility: HOSPITAL | Age: 61
End: 2021-02-10

## 2021-02-10 VITALS
TEMPERATURE: 98.1 F | SYSTOLIC BLOOD PRESSURE: 134 MMHG | DIASTOLIC BLOOD PRESSURE: 80 MMHG | HEART RATE: 94 BPM | RESPIRATION RATE: 20 BRPM | OXYGEN SATURATION: 100 %

## 2021-02-10 VITALS
HEART RATE: 94 BPM | OXYGEN SATURATION: 100 % | RESPIRATION RATE: 20 BRPM | SYSTOLIC BLOOD PRESSURE: 148 MMHG | TEMPERATURE: 98.5 F | DIASTOLIC BLOOD PRESSURE: 88 MMHG

## 2021-02-10 DIAGNOSIS — Z98.62 PERIPHERAL VASCULAR ANGIOPLASTY STATUS: Chronic | ICD-10-CM
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
## 2021-02-10 DIAGNOSIS — T86.828 OTHER COMPLICATIONS OF SKIN GRAFT (ALLOGRAFT) (AUTOGRAFT): ICD-10-CM

## 2021-02-10 DIAGNOSIS — M86.671 OTHER CHRONIC OSTEOMYELITIS, RIGHT ANKLE AND FOOT: ICD-10-CM

## 2021-02-10 DIAGNOSIS — E11.69 TYPE 2 DIABETES MELLITUS WITH OTHER SPECIFIED COMPLICATION: ICD-10-CM

## 2021-02-10 DIAGNOSIS — E11.621 TYPE 2 DIABETES MELLITUS WITH FOOT ULCER: ICD-10-CM

## 2021-02-10 PROCEDURE — 99183 HYPERBARIC OXYGEN THERAPY: CPT

## 2021-02-10 PROCEDURE — 82962 GLUCOSE BLOOD TEST: CPT

## 2021-02-10 PROCEDURE — G0277: CPT

## 2021-02-10 NOTE — ADDENDUM
[FreeTextEntry1] : PT ARRIVED AMBULATORY A&OX3\par ALL VITALS WITHIN PARAMETERS FOR HBOT.\par TRANSFER OF CARE TO  DURING DESCENT.\par \par Pt descended to 2.4 DIMAS @ 2.2 PSI/MIN without incident in chamber #1.\par Pt resting comfortably @ depth, equal chest rise observed throughout tx.\par Pt tolerated both air breaks well.\par Pt ascended from 2.4 DIMAS @ 2.2 PSI/MIN without incident in chamber #1.\par Pt tolerated treatment well.\par

## 2021-02-10 NOTE — PROCEDURE
[Outpatient] : Outpatient [Ambulatory] : Patient is ambulatory. [THIS CHAMBER HAS BEEN CLEANED / DISINFECTED] : This chamber has been cleaned / disinfected according to local and hospital policy and procedure prior to this treatment. [Patient demonstrated and verbalized proper technique for using air break mask] : Patient demonstrated and verbalized proper technique for using air break mask [Patient educated on the risks of SMOKING prior to HBOT with understanding] : Patient educated on the risks of SMOKING prior to HBOT with understanding [Patient educated on the risks of CONSUMING ALCOHOL prior to HBOT with understanding] : Patient educated on the risks of CONSUMING ALCOHOL prior to HBOT with understanding [100% Cotton] : 100% cotton [Empty all pockets] : empty all pockets [No hair oils, wigs, hairpieces, pins] : no hair oils, wigs, hairpieces, pins  [Pre tx medications] : pre tx medications  [No make-up, creams] : no make-up, creams  [No jewelry] : no jewelry  [No matches, cigarettes, lighters] : no matches, cigarettes, lighters  [Hearing aid removed] : hearing aid removed [Dentures removed] : dentures removed [Ground bracelet on pt's wrist] : ground bracelet on pt's wrist  [Contacts removed] : contacts removed  [Remove nail polish] : remove nail polish  [No reading material] : no reading material  [Bra, undergarments removed] : bra, undergarments removed  [No contraindicated dressings] : no contraindicated dressings [Ground Wire - VISUAL Verification - Intact/Free of Obstruction] : Ground Wire - VISUAL Verification - Intact/Free of Obstruction  [Ground Continuity - Verified < 1 ohm w/ Wrist Strap Golria] : Ground Continuity - Verified < 1 ohm w/ Wrist Strap Gloria [Number: ___] : Number: [unfilled] [Diagnosis: ___] : Diagnosis: [unfilled] [____] : Post-Dive: Time - [unfilled] [___] : Post-Dive: Value - [unfilled] mg/dL [Clear all fields] : clear all fields [] : No [FreeTextEntry4] : 100 [FreeTextEntry6] : 8:07 [FreeTextEntry8] : 8:17 [de-identified] : 8:47 [de-identified] : 8:52 [de-identified] : 9:22 [de-identified] : 9:27 [de-identified] : 10:57 [de-identified] : 10:07 [de-identified] : 120mins

## 2021-02-11 ENCOUNTER — OUTPATIENT (OUTPATIENT)
Dept: OUTPATIENT SERVICES | Facility: HOSPITAL | Age: 61
LOS: 1 days | Discharge: HOME | End: 2021-02-11
Payer: COMMERCIAL

## 2021-02-11 ENCOUNTER — APPOINTMENT (OUTPATIENT)
Dept: HYPERBARIC MEDICINE | Facility: HOSPITAL | Age: 61
End: 2021-02-11

## 2021-02-11 ENCOUNTER — APPOINTMENT (OUTPATIENT)
Dept: HYPERBARIC MEDICINE | Facility: HOSPITAL | Age: 61
End: 2021-02-11
Payer: COMMERCIAL

## 2021-02-11 VITALS
HEART RATE: 57 BPM | DIASTOLIC BLOOD PRESSURE: 84 MMHG | TEMPERATURE: 97.6 F | SYSTOLIC BLOOD PRESSURE: 132 MMHG | OXYGEN SATURATION: 99 % | RESPIRATION RATE: 20 BRPM

## 2021-02-11 VITALS
OXYGEN SATURATION: 100 % | DIASTOLIC BLOOD PRESSURE: 85 MMHG | TEMPERATURE: 97.6 F | SYSTOLIC BLOOD PRESSURE: 130 MMHG | HEART RATE: 99 BPM | RESPIRATION RATE: 20 BRPM

## 2021-02-11 DIAGNOSIS — M86.671 OTHER CHRONIC OSTEOMYELITIS, RIGHT ANKLE AND FOOT: ICD-10-CM

## 2021-02-11 DIAGNOSIS — T86.828 OTHER COMPLICATIONS OF SKIN GRAFT (ALLOGRAFT) (AUTOGRAFT): ICD-10-CM
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
## 2021-02-11 DIAGNOSIS — E11.621 TYPE 2 DIABETES MELLITUS WITH FOOT ULCER: ICD-10-CM

## 2021-02-11 DIAGNOSIS — Z98.62 PERIPHERAL VASCULAR ANGIOPLASTY STATUS: Chronic | ICD-10-CM

## 2021-02-11 DIAGNOSIS — E11.69 TYPE 2 DIABETES MELLITUS WITH OTHER SPECIFIED COMPLICATION: ICD-10-CM

## 2021-02-11 PROCEDURE — G0277: CPT

## 2021-02-11 PROCEDURE — 99183 HYPERBARIC OXYGEN THERAPY: CPT

## 2021-02-11 PROCEDURE — 82962 GLUCOSE BLOOD TEST: CPT

## 2021-02-11 NOTE — PROCEDURE
[Outpatient] : Outpatient [Ambulatory] : Patient is ambulatory. [THIS CHAMBER HAS BEEN CLEANED / DISINFECTED] : This chamber has been cleaned / disinfected according to local and hospital policy and procedure prior to this treatment. [Patient demonstrated and verbalized proper technique for using air break mask] : Patient demonstrated and verbalized proper technique for using air break mask [Patient educated on the risks of SMOKING prior to HBOT with understanding] : Patient educated on the risks of SMOKING prior to HBOT with understanding [Patient educated on the risks of CONSUMING ALCOHOL prior to HBOT with understanding] : Patient educated on the risks of CONSUMING ALCOHOL prior to HBOT with understanding [100% Cotton] : 100% cotton [Empty all pockets] : empty all pockets [No hair oils, wigs, hairpieces, pins] : no hair oils, wigs, hairpieces, pins  [Pre tx medications] : pre tx medications  [No make-up, creams] : no make-up, creams  [No jewelry] : no jewelry  [No matches, cigarettes, lighters] : no matches, cigarettes, lighters  [Hearing aid removed] : hearing aid removed [Dentures removed] : dentures removed [Ground bracelet on pt's wrist] : ground bracelet on pt's wrist  [Contacts removed] : contacts removed  [Remove nail polish] : remove nail polish  [No reading material] : no reading material  [Bra, undergarments removed] : bra, undergarments removed  [Ground Wire - VISUAL Verification - Intact/Free of Obstruction] : Ground Wire - VISUAL Verification - Intact/Free of Obstruction  [No contraindicated dressings] : no contraindicated dressings [Ground Continuity - Verified < 1 ohm w/ Wrist Strap Gloria] : Ground Continuity - Verified < 1 ohm w/ Wrist Strap Gloria [Clear all fields] : clear all fields [Number: ___] : Number: [unfilled] [Diagnosis: ___] : Diagnosis: [unfilled] [____] : Post-Dive: Time - [unfilled] [___] : Post-Dive: Value - [unfilled] mg/dL [] : No [FreeTextEntry4] : 100 [FreeTextEntry6] : 6662 [FreeTextEntry8] : 0008 [de-identified] : 0616 [de-identified] : 0478 [de-identified] : 0986 [de-identified] : 0933 [de-identified] : 0926 [de-identified] : 0980 [de-identified] : 120mins

## 2021-02-12 ENCOUNTER — APPOINTMENT (OUTPATIENT)
Dept: HYPERBARIC MEDICINE | Facility: HOSPITAL | Age: 61
End: 2021-02-12

## 2021-02-12 ENCOUNTER — OUTPATIENT (OUTPATIENT)
Dept: OUTPATIENT SERVICES | Facility: HOSPITAL | Age: 61
LOS: 1 days | Discharge: HOME | End: 2021-02-12
Payer: COMMERCIAL

## 2021-02-12 ENCOUNTER — APPOINTMENT (OUTPATIENT)
Dept: HYPERBARIC MEDICINE | Facility: HOSPITAL | Age: 61
End: 2021-02-12
Payer: COMMERCIAL

## 2021-02-12 VITALS
RESPIRATION RATE: 20 BRPM | SYSTOLIC BLOOD PRESSURE: 121 MMHG | TEMPERATURE: 96.9 F | DIASTOLIC BLOOD PRESSURE: 75 MMHG | OXYGEN SATURATION: 100 % | HEART RATE: 106 BPM

## 2021-02-12 VITALS
DIASTOLIC BLOOD PRESSURE: 91 MMHG | TEMPERATURE: 98.6 F | RESPIRATION RATE: 20 BRPM | SYSTOLIC BLOOD PRESSURE: 148 MMHG | HEART RATE: 104 BPM | OXYGEN SATURATION: 100 %

## 2021-02-12 DIAGNOSIS — T86.828 OTHER COMPLICATIONS OF SKIN GRAFT (ALLOGRAFT) (AUTOGRAFT): ICD-10-CM
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
## 2021-02-12 DIAGNOSIS — M86.671 OTHER CHRONIC OSTEOMYELITIS, RIGHT ANKLE AND FOOT: ICD-10-CM

## 2021-02-12 DIAGNOSIS — E11.621 TYPE 2 DIABETES MELLITUS WITH FOOT ULCER: ICD-10-CM

## 2021-02-12 DIAGNOSIS — E11.69 TYPE 2 DIABETES MELLITUS WITH OTHER SPECIFIED COMPLICATION: ICD-10-CM

## 2021-02-12 DIAGNOSIS — Z98.62 PERIPHERAL VASCULAR ANGIOPLASTY STATUS: Chronic | ICD-10-CM

## 2021-02-12 PROCEDURE — 82962 GLUCOSE BLOOD TEST: CPT

## 2021-02-12 PROCEDURE — G0277: CPT

## 2021-02-12 PROCEDURE — 99183 HYPERBARIC OXYGEN THERAPY: CPT

## 2021-02-12 NOTE — ADDENDUM
[FreeTextEntry1] : PT ARRIVED AMBULATORY FROM [facility] A&OX[facility]\par ALL VITALS WITHIN PARAMETERS FOR HBOT.\par NO DRAINAGE NOTED ON DRESSING PRIOR TO DESCENT. WOUND CARE TO FOLLOW HBOT\par PT DESCENT TO RX TX DEPTH IN CHAMBER 2 WAS WITHOUT INCIDENT. \par PT RESTING AT TX DEPTH. \par TRANSFER OF CARE TO [facility]\par TRANSFER RETURNED DURING ASCENT\par PT ASCENT WAS WITHOUT INCIDENT. PT TOLERATED HBOT WELL.\par PT DECLINED COVID SWAB STATING HE WOULD PREFER TO REMAIN ON A SATURDAY COVID SCHEDULE.\par \par [facility] JESSICA VÁZQUEZ 02/12/21 10:22

## 2021-02-12 NOTE — PROCEDURE
[Outpatient] : Outpatient [Ambulatory] : Patient is ambulatory. [THIS CHAMBER HAS BEEN CLEANED / DISINFECTED] : This chamber has been cleaned / disinfected according to local and hospital policy and procedure prior to this treatment. [Patient demonstrated and verbalized proper technique for using air break mask] : Patient demonstrated and verbalized proper technique for using air break mask [Patient educated on the risks of SMOKING prior to HBOT with understanding] : Patient educated on the risks of SMOKING prior to HBOT with understanding [Patient educated on the risks of CONSUMING ALCOHOL prior to HBOT with understanding] : Patient educated on the risks of CONSUMING ALCOHOL prior to HBOT with understanding [100% Cotton] : 100% cotton [Empty all pockets] : empty all pockets [No hair oils, wigs, hairpieces, pins] : no hair oils, wigs, hairpieces, pins  [Pre tx medications] : pre tx medications  [No make-up, creams] : no make-up, creams  [No jewelry] : no jewelry  [No matches, cigarettes, lighters] : no matches, cigarettes, lighters  [Hearing aid removed] : hearing aid removed [Dentures removed] : dentures removed [Ground bracelet on pt's wrist] : ground bracelet on pt's wrist  [Contacts removed] : contacts removed  [Remove nail polish] : remove nail polish  [No reading material] : no reading material  [Bra, undergarments removed] : bra, undergarments removed  [No contraindicated dressings] : no contraindicated dressings [Ground Wire - VISUAL Verification - Intact/Free of Obstruction] : Ground Wire - VISUAL Verification - Intact/Free of Obstruction  [Ground Continuity - Verified < 1 ohm w/ Wrist Strap Gloria] : Ground Continuity - Verified < 1 ohm w/ Wrist Strap Gloria [Number: ___] : Number: [unfilled] [Diagnosis: ___] : Diagnosis: [unfilled] [____] : Post-Dive: Time - [unfilled] [___] : Post-Dive: Value - [unfilled] mg/dL [Clear all fields] : clear all fields [] : No [FreeTextEntry4] : 100 [FreeTextEntry8] : 8:14 [FreeTextEntry6] : 8:04 [de-identified] : 8:44 [de-identified] : 8:49 [de-identified] : 9:19 [de-identified] : 9:24 [de-identified] : 9:54 [de-identified] : 10:04 [de-identified] : 120

## 2021-02-13 ENCOUNTER — APPOINTMENT (OUTPATIENT)
Dept: HYPERBARIC MEDICINE | Facility: HOSPITAL | Age: 61
End: 2021-02-13
Payer: COMMERCIAL

## 2021-02-13 ENCOUNTER — OUTPATIENT (OUTPATIENT)
Dept: OUTPATIENT SERVICES | Facility: HOSPITAL | Age: 61
LOS: 1 days | Discharge: HOME | End: 2021-02-13
Payer: COMMERCIAL

## 2021-02-13 VITALS
HEART RATE: 105 BPM | DIASTOLIC BLOOD PRESSURE: 81 MMHG | TEMPERATURE: 98.3 F | OXYGEN SATURATION: 99 % | SYSTOLIC BLOOD PRESSURE: 133 MMHG | RESPIRATION RATE: 18 BRPM

## 2021-02-13 VITALS
RESPIRATION RATE: 18 BRPM | TEMPERATURE: 97.21 F | DIASTOLIC BLOOD PRESSURE: 85 MMHG | SYSTOLIC BLOOD PRESSURE: 123 MMHG | OXYGEN SATURATION: 99 % | HEART RATE: 83 BPM

## 2021-02-13 DIAGNOSIS — E11.621 TYPE 2 DIABETES MELLITUS WITH FOOT ULCER: ICD-10-CM

## 2021-02-13 DIAGNOSIS — Z98.62 PERIPHERAL VASCULAR ANGIOPLASTY STATUS: Chronic | ICD-10-CM

## 2021-02-13 LAB — SARS-COV-2 RNA SPEC QL NAA+PROBE: SIGNIFICANT CHANGE UP

## 2021-02-13 PROCEDURE — U0005: CPT

## 2021-02-13 PROCEDURE — 99183 HYPERBARIC OXYGEN THERAPY: CPT

## 2021-02-13 PROCEDURE — 82962 GLUCOSE BLOOD TEST: CPT

## 2021-02-13 PROCEDURE — G0277: CPT

## 2021-02-13 PROCEDURE — U0003: CPT

## 2021-02-13 NOTE — PROCEDURE
[Outpatient] : Outpatient [Ambulatory] : Patient is ambulatory. [THIS CHAMBER HAS BEEN CLEANED / DISINFECTED] : This chamber has been cleaned / disinfected according to local and hospital policy and procedure prior to this treatment. [Patient demonstrated and verbalized proper technique for using air break mask] : Patient demonstrated and verbalized proper technique for using air break mask [Patient educated on the risks of SMOKING prior to HBOT with understanding] : Patient educated on the risks of SMOKING prior to HBOT with understanding [Patient educated on the risks of CONSUMING ALCOHOL prior to HBOT with understanding] : Patient educated on the risks of CONSUMING ALCOHOL prior to HBOT with understanding [100% Cotton] : 100% cotton [Empty all pockets] : empty all pockets [Pre tx medications] : pre tx medications  [No hair oils, wigs, hairpieces, pins] : no hair oils, wigs, hairpieces, pins  [No make-up, creams] : no make-up, creams  [No jewelry] : no jewelry  [No matches, cigarettes, lighters] : no matches, cigarettes, lighters  [Hearing aid removed] : hearing aid removed [Dentures removed] : dentures removed [Ground bracelet on pt's wrist] : ground bracelet on pt's wrist  [Contacts removed] : contacts removed  [Remove nail polish] : remove nail polish  [No reading material] : no reading material  [Bra, undergarments removed] : bra, undergarments removed  [No contraindicated dressings] : no contraindicated dressings [Ground Wire - VISUAL Verification - Intact/Free of Obstruction] : Ground Wire - VISUAL Verification - Intact/Free of Obstruction  [Ground Continuity - Verified < 1 ohm w/ Wrist Strap Gloria] : Ground Continuity - Verified < 1 ohm w/ Wrist Strap Gloria [Number: ___] : Number: [unfilled] [Diagnosis: ___] : Diagnosis: [unfilled] [____] : Post-Dive: Time - [unfilled] [___] : Post-Dive: Value - [unfilled] mg/dL [Clear all fields] : clear all fields [] : No [FreeTextEntry4] : 100 [FreeTextEntry6] : 9720 [FreeTextEntry8] : 1578 [de-identified] : 4363 [de-identified] : 0986 [de-identified] : 7574 [de-identified] : 8968 [de-identified] : 8763 [de-identified] : 0981 [de-identified] : 120 MIN

## 2021-02-13 NOTE — ADDENDUM
[FreeTextEntry1] : PT DESCENDED TO 2.4 DIMAS @ 2.2 PSI/MIN WITHOUT  INCIDENT IN CHAMBER # 3. PT'S RESTING AT TX DEPTH WITH CHEST RISE AND FALL OBSERVED CHAMBER SIDE .\par PT TOLERATED AIR BREAKS WELL.\par COVID-19 PCR SWAB TO BE PERFORMED POST HBOT.\par PT ASCENDED FROM TX DEPTH WITHOUT INCIDENT. PT TOLERATED TX WELL.

## 2021-02-13 NOTE — ASSESSMENT
[No change from previous assessment] : No change from previous assessment [Patient prepared for dive] : Patient prepared for dive [Patient descended without problem for 9 minutes] : Patient descended without problem for 9 minutes [Patient undergoing HBO treatment for __________] : Patient undergoing HBO treatment for [unfilled] [No dizziness or thirst] :  No dizziness or thirst [No ear problems] : No ear problems [Vital signs stable] : Vital signs stable [Tolerating dive well] : Tolerating dive well [No Chest Pain, shortness of breath] : No Chest Pain, shortness of breath [Respiratory Rate Stable] : Respiratory Rate Stable [No chest pain, shortness of breath, or ear pain] :  No chest pain, shortness of breath, or ear pain  [Tolerated Ascent well] : Tolerated Ascent well [Vital Signs stable] : Vital Signs stable [A physician was present throughout the entire HBOT] : A physician was present throughout the entire HBOT [No] : No [Clinically Stable] : Clinically stable [Continue Treatment Plan] : Continue treatment plan [FreeTextEntry2] : none

## 2021-02-15 ENCOUNTER — APPOINTMENT (OUTPATIENT)
Dept: HYPERBARIC MEDICINE | Facility: HOSPITAL | Age: 61
End: 2021-02-15
Payer: COMMERCIAL

## 2021-02-15 ENCOUNTER — OUTPATIENT (OUTPATIENT)
Dept: OUTPATIENT SERVICES | Facility: HOSPITAL | Age: 61
LOS: 1 days | Discharge: HOME | End: 2021-02-15
Payer: COMMERCIAL

## 2021-02-15 VITALS
HEART RATE: 98 BPM | RESPIRATION RATE: 20 BRPM | OXYGEN SATURATION: 100 % | TEMPERATURE: 98.4 F | DIASTOLIC BLOOD PRESSURE: 94 MMHG | SYSTOLIC BLOOD PRESSURE: 164 MMHG

## 2021-02-15 VITALS
TEMPERATURE: 97.7 F | RESPIRATION RATE: 20 BRPM | SYSTOLIC BLOOD PRESSURE: 136 MMHG | DIASTOLIC BLOOD PRESSURE: 86 MMHG | OXYGEN SATURATION: 100 % | HEART RATE: 99 BPM

## 2021-02-15 DIAGNOSIS — M86.671 OTHER CHRONIC OSTEOMYELITIS, RIGHT ANKLE AND FOOT: ICD-10-CM
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
## 2021-02-15 DIAGNOSIS — T86.828 OTHER COMPLICATIONS OF SKIN GRAFT (ALLOGRAFT) (AUTOGRAFT): ICD-10-CM

## 2021-02-15 DIAGNOSIS — Z98.62 PERIPHERAL VASCULAR ANGIOPLASTY STATUS: Chronic | ICD-10-CM

## 2021-02-15 DIAGNOSIS — E11.621 TYPE 2 DIABETES MELLITUS WITH FOOT ULCER: ICD-10-CM

## 2021-02-15 DIAGNOSIS — Z20.822 CONTACT WITH AND (SUSPECTED) EXPOSURE TO COVID-19: ICD-10-CM

## 2021-02-15 DIAGNOSIS — E11.69 TYPE 2 DIABETES MELLITUS WITH OTHER SPECIFIED COMPLICATION: ICD-10-CM

## 2021-02-15 PROCEDURE — 99183 HYPERBARIC OXYGEN THERAPY: CPT

## 2021-02-15 PROCEDURE — 82962 GLUCOSE BLOOD TEST: CPT

## 2021-02-15 PROCEDURE — G0277: CPT

## 2021-02-15 NOTE — ADDENDUM
[FreeTextEntry1] : Pt descended to 2.4 DIMAS @ 2.2 PSI/MIN without incident in chamber #2.\par Pt resting comfortably @ depth, equal chest rise observed throughout tx.\par Pt tolerated both air breaks well.\par Pt ascended from 2.4 DIMAS @ 2.2 PSI/MIN without incident in chamber #2.\par Pt tolerated treatment well.\par

## 2021-02-15 NOTE — PROCEDURE
[Outpatient] : Outpatient [Ambulatory] : Patient is ambulatory. [THIS CHAMBER HAS BEEN CLEANED / DISINFECTED] : This chamber has been cleaned / disinfected according to local and hospital policy and procedure prior to this treatment. [Patient demonstrated and verbalized proper technique for using air break mask] : Patient demonstrated and verbalized proper technique for using air break mask [Patient educated on the risks of SMOKING prior to HBOT with understanding] : Patient educated on the risks of SMOKING prior to HBOT with understanding [Patient educated on the risks of CONSUMING ALCOHOL prior to HBOT with understanding] : Patient educated on the risks of CONSUMING ALCOHOL prior to HBOT with understanding [100% Cotton] : 100% cotton [Empty all pockets] : empty all pockets [No hair oils, wigs, hairpieces, pins] : no hair oils, wigs, hairpieces, pins  [Pre tx medications] : pre tx medications  [No make-up, creams] : no make-up, creams  [No jewelry] : no jewelry  [No matches, cigarettes, lighters] : no matches, cigarettes, lighters  [Hearing aid removed] : hearing aid removed [Dentures removed] : dentures removed [Ground bracelet on pt's wrist] : ground bracelet on pt's wrist  [Contacts removed] : contacts removed  [Remove nail polish] : remove nail polish  [No reading material] : no reading material  [Bra, undergarments removed] : bra, undergarments removed  [No contraindicated dressings] : no contraindicated dressings [Ground Wire - VISUAL Verification - Intact/Free of Obstruction] : Ground Wire - VISUAL Verification - Intact/Free of Obstruction  [Ground Continuity - Verified < 1 ohm w/ Wrist Strap Gloria] : Ground Continuity - Verified < 1 ohm w/ Wrist Strap Gloria [Clear all fields] : clear all fields [Number: ___] : Number: [unfilled] [Diagnosis: ___] : Diagnosis: [unfilled] [____] : Post-Dive: Time - [unfilled] [___] : Post-Dive: Value - [unfilled] mg/dL [FreeTextEntry4] : 100 [] : No [FreeTextEntry6] : 7603 [de-identified] : 9150 [FreeTextEntry8] : 7448 [de-identified] : 3768 [de-identified] : 0987 [de-identified] : 6489 [de-identified] : 9707 [de-identified] : 1009 [de-identified] : 120mins

## 2021-02-15 NOTE — ASSESSMENT
[Patient undergoing HBO treatment for __________] : Patient undergoing HBO treatment for [unfilled] [Patient descended without problem for 9 minutes] : Patient descended without problem for 9 minutes [No dizziness or thirst] :  No dizziness or thirst [No ear problems] : No ear problems [Vital signs stable] : Vital signs stable [No Chest Pain, shortness of breath] : No Chest Pain, shortness of breath [Tolerating dive well] : Tolerating dive well [Respiratory Rate Stable] : Respiratory Rate Stable [No chest pain, shortness of breath, or ear pain] :  No chest pain, shortness of breath, or ear pain  [Tolerated Ascent well] : Tolerated Ascent well [Vital Signs stable] : Vital Signs stable [A physician was present throughout the entire HBOT] : A physician was present throughout the entire HBOT [No] : No [Clinically Stable] : Clinically stable [Continue Treatment Plan] : Continue treatment plan [0] : 0 out of 10

## 2021-02-16 ENCOUNTER — OUTPATIENT (OUTPATIENT)
Dept: OUTPATIENT SERVICES | Facility: HOSPITAL | Age: 61
LOS: 1 days | Discharge: HOME | End: 2021-02-16
Payer: COMMERCIAL

## 2021-02-16 ENCOUNTER — APPOINTMENT (OUTPATIENT)
Dept: HYPERBARIC MEDICINE | Facility: HOSPITAL | Age: 61
End: 2021-02-16

## 2021-02-16 ENCOUNTER — APPOINTMENT (OUTPATIENT)
Dept: HYPERBARIC MEDICINE | Facility: HOSPITAL | Age: 61
End: 2021-02-16
Payer: COMMERCIAL

## 2021-02-16 VITALS
RESPIRATION RATE: 20 BRPM | TEMPERATURE: 98.5 F | SYSTOLIC BLOOD PRESSURE: 151 MMHG | DIASTOLIC BLOOD PRESSURE: 83 MMHG | HEART RATE: 98 BPM | OXYGEN SATURATION: 100 %

## 2021-02-16 VITALS
SYSTOLIC BLOOD PRESSURE: 153 MMHG | HEART RATE: 92 BPM | DIASTOLIC BLOOD PRESSURE: 90 MMHG | TEMPERATURE: 97.1 F | RESPIRATION RATE: 20 BRPM | OXYGEN SATURATION: 99 %

## 2021-02-16 DIAGNOSIS — E11.621 TYPE 2 DIABETES MELLITUS WITH FOOT ULCER: ICD-10-CM

## 2021-02-16 DIAGNOSIS — Z98.62 PERIPHERAL VASCULAR ANGIOPLASTY STATUS: Chronic | ICD-10-CM

## 2021-02-16 PROCEDURE — 99024 POSTOP FOLLOW-UP VISIT: CPT

## 2021-02-16 PROCEDURE — 82962 GLUCOSE BLOOD TEST: CPT

## 2021-02-16 PROCEDURE — G0277: CPT

## 2021-02-17 ENCOUNTER — APPOINTMENT (OUTPATIENT)
Dept: HYPERBARIC MEDICINE | Facility: HOSPITAL | Age: 61
End: 2021-02-17
Payer: COMMERCIAL

## 2021-02-17 ENCOUNTER — OUTPATIENT (OUTPATIENT)
Dept: OUTPATIENT SERVICES | Facility: HOSPITAL | Age: 61
LOS: 1 days | Discharge: HOME | End: 2021-02-17
Payer: COMMERCIAL

## 2021-02-17 VITALS
HEART RATE: 103 BPM | SYSTOLIC BLOOD PRESSURE: 133 MMHG | DIASTOLIC BLOOD PRESSURE: 87 MMHG | OXYGEN SATURATION: 98 % | RESPIRATION RATE: 20 BRPM | TEMPERATURE: 98 F

## 2021-02-17 VITALS
DIASTOLIC BLOOD PRESSURE: 85 MMHG | RESPIRATION RATE: 20 BRPM | TEMPERATURE: 97.3 F | HEART RATE: 96 BPM | OXYGEN SATURATION: 100 % | SYSTOLIC BLOOD PRESSURE: 129 MMHG

## 2021-02-17 DIAGNOSIS — T86.828 OTHER COMPLICATIONS OF SKIN GRAFT (ALLOGRAFT) (AUTOGRAFT): ICD-10-CM

## 2021-02-17 DIAGNOSIS — Z98.62 PERIPHERAL VASCULAR ANGIOPLASTY STATUS: Chronic | ICD-10-CM

## 2021-02-17 DIAGNOSIS — E11.69 TYPE 2 DIABETES MELLITUS WITH OTHER SPECIFIED COMPLICATION: ICD-10-CM

## 2021-02-17 DIAGNOSIS — E11.621 TYPE 2 DIABETES MELLITUS WITH FOOT ULCER: ICD-10-CM
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
## 2021-02-17 DIAGNOSIS — M86.671 OTHER CHRONIC OSTEOMYELITIS, RIGHT ANKLE AND FOOT: ICD-10-CM

## 2021-02-17 PROCEDURE — G0277: CPT

## 2021-02-17 PROCEDURE — 99183 HYPERBARIC OXYGEN THERAPY: CPT

## 2021-02-17 PROCEDURE — 82962 GLUCOSE BLOOD TEST: CPT

## 2021-02-17 NOTE — ADDENDUM
[FreeTextEntry1] : PT ARRIVED AMBULATORY A&OX3.\par ALL VITALS WITHIN PARAMETERS FOR HBOT. \par NO DRAINAGE NOTED ON DRESSING PRIOR TO DESCENT. WOUND CARE PROVIDED ON 02/15/21 AND 02/16/21. DRESSING CHANGE FREQUENCY 3X WEEKLY\par PT DESCENT TO RX TX DEPTH IN CHAMBER 2 WAS WITHOUT INCIDENT. PT RESTING AT DEPTH. CHEST RISE AND FALL OBSERVED.\par PT TOLERATED AIR BREAKS WELL\par PT ASCENT WAS WITHOUT INCIDENT. PT TOLERATED HBOT WELL\par \par TASHA EDMONDS 02/17/21 10:55

## 2021-02-17 NOTE — ASSESSMENT
[No change from previous assessment] : No change from previous assessment [Time MD/Provider assessed Patient:_______] : Time MD/Provider assessed Patient: [unfilled] [Patient prepared for dive] : Patient prepared for dive [Patient undergoing HBO treatment for __________] : Patient undergoing HBO treatment for [unfilled] [Patient descended without problem for 9 minutes] : Patient descended without problem for 9 minutes [No dizziness or thirst] :  No dizziness or thirst [No ear problems] : No ear problems [Vital signs stable] : Vital signs stable [Tolerating dive well] : Tolerating dive well [No Chest Pain, shortness of breath] : No Chest Pain, shortness of breath [Respiratory Rate Stable] : Respiratory Rate Stable [No chest pain, shortness of breath, or ear pain] :  No chest pain, shortness of breath, or ear pain  [Tolerated Ascent well] : Tolerated Ascent well [Vital Signs stable] : Vital Signs stable [No] : No [Clinically Stable] : Clinically stable [Continue Treatment Plan] : Continue treatment plan

## 2021-02-17 NOTE — PROCEDURE
[Outpatient] : Outpatient [Ambulatory] : Patient is ambulatory. [THIS CHAMBER HAS BEEN CLEANED / DISINFECTED] : This chamber has been cleaned / disinfected according to local and hospital policy and procedure prior to this treatment. [Number: ___] : Number: [unfilled] [Diagnosis: ___] : Diagnosis: [unfilled] [Patient demonstrated and verbalized proper technique for using air break mask] : Patient demonstrated and verbalized proper technique for using air break mask [Patient educated on the risks of SMOKING prior to HBOT with understanding] : Patient educated on the risks of SMOKING prior to HBOT with understanding [Patient educated on the risks of CONSUMING ALCOHOL prior to HBOT with understanding] : Patient educated on the risks of CONSUMING ALCOHOL prior to HBOT with understanding [100% Cotton] : 100% cotton [Empty all pockets] : empty all pockets [No hair oils, wigs, hairpieces, pins] : no hair oils, wigs, hairpieces, pins  [Pre tx medications] : pre tx medications  [No make-up, creams] : no make-up, creams  [No jewelry] : no jewelry  [No matches, cigarettes, lighters] : no matches, cigarettes, lighters  [Hearing aid removed] : hearing aid removed [Dentures removed] : dentures removed [Ground bracelet on pt's wrist] : ground bracelet on pt's wrist  [Contacts removed] : contacts removed  [Remove nail polish] : remove nail polish  [No reading material] : no reading material  [Bra, undergarments removed] : bra, undergarments removed  [No contraindicated dressings] : no contraindicated dressings [Ground Wire - VISUAL Verification - Intact/Free of Obstruction] : Ground Wire - VISUAL Verification - Intact/Free of Obstruction  [Ground Continuity - Verified < 1 ohm w/ Wrist Strap Gloria] : Ground Continuity - Verified < 1 ohm w/ Wrist Strap Gloria [____] : Post-Dive: Time - [unfilled] [___] : Post-Dive: Value - [unfilled] mg/dL [Clear all fields] : clear all fields [] : No [FreeTextEntry4] : 100 [FreeTextEntry6] : 8:04 [FreeTextEntry8] : 8:14 [de-identified] : 8:44 [de-identified] : 8:49 [de-identified] : 9:19 [de-identified] : 9:24 [de-identified] : 9:54 [de-identified] : 10:04 [de-identified] : 120

## 2021-02-18 ENCOUNTER — OUTPATIENT (OUTPATIENT)
Dept: OUTPATIENT SERVICES | Facility: HOSPITAL | Age: 61
LOS: 1 days | Discharge: HOME | End: 2021-02-18
Payer: COMMERCIAL

## 2021-02-18 ENCOUNTER — APPOINTMENT (OUTPATIENT)
Dept: HYPERBARIC MEDICINE | Facility: HOSPITAL | Age: 61
End: 2021-02-18
Payer: COMMERCIAL

## 2021-02-18 VITALS
SYSTOLIC BLOOD PRESSURE: 120 MMHG | DIASTOLIC BLOOD PRESSURE: 86 MMHG | OXYGEN SATURATION: 100 % | RESPIRATION RATE: 18 BRPM | HEART RATE: 90 BPM | TEMPERATURE: 97.1 F

## 2021-02-18 VITALS
HEART RATE: 93 BPM | OXYGEN SATURATION: 100 % | RESPIRATION RATE: 18 BRPM | SYSTOLIC BLOOD PRESSURE: 152 MMHG | DIASTOLIC BLOOD PRESSURE: 86 MMHG | TEMPERATURE: 97.6 F

## 2021-02-18 DIAGNOSIS — T86.828 OTHER COMPLICATIONS OF SKIN GRAFT (ALLOGRAFT) (AUTOGRAFT): ICD-10-CM

## 2021-02-18 DIAGNOSIS — M86.671 OTHER CHRONIC OSTEOMYELITIS, RIGHT ANKLE AND FOOT: ICD-10-CM

## 2021-02-18 DIAGNOSIS — E11.69 TYPE 2 DIABETES MELLITUS WITH OTHER SPECIFIED COMPLICATION: ICD-10-CM

## 2021-02-18 DIAGNOSIS — Z98.62 PERIPHERAL VASCULAR ANGIOPLASTY STATUS: Chronic | ICD-10-CM

## 2021-02-18 DIAGNOSIS — E11.621 TYPE 2 DIABETES MELLITUS WITH FOOT ULCER: ICD-10-CM
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
## 2021-02-18 PROCEDURE — G0277: CPT

## 2021-02-18 PROCEDURE — 82962 GLUCOSE BLOOD TEST: CPT

## 2021-02-18 PROCEDURE — 99183 HYPERBARIC OXYGEN THERAPY: CPT

## 2021-02-18 NOTE — ADDENDUM
[FreeTextEntry1] : PT DESCENDED TO 2.4 DIMAS @ 2.2 PSI/MIN WITHOUT INCIDENT IN CHAMBER # 2. PT'S RESTING AT TX DEPTH WITH CHEST RISE AND FALL OBSERVED CHAMBER SIDE.\par PT TOLERATED AIR BREAKS WELL.\par PT ASCENDED FROM TX DEPTH WITHOUT INCIDENT. PT TOLERATED TX WELL.\par DRESSING CHANGED POST HBOT. PT SENT HOME WITH CHAMBER SAFE SUPPLIES. \par PT TO RESUME WED (2/24/21) FOR FINAL TX.

## 2021-02-18 NOTE — PROCEDURE
[Outpatient] : Outpatient [Ambulatory] : Patient is ambulatory. [THIS CHAMBER HAS BEEN CLEANED / DISINFECTED] : This chamber has been cleaned / disinfected according to local and hospital policy and procedure prior to this treatment. [Patient demonstrated and verbalized proper technique for using air break mask] : Patient demonstrated and verbalized proper technique for using air break mask [Patient educated on the risks of SMOKING prior to HBOT with understanding] : Patient educated on the risks of SMOKING prior to HBOT with understanding [Patient educated on the risks of CONSUMING ALCOHOL prior to HBOT with understanding] : Patient educated on the risks of CONSUMING ALCOHOL prior to HBOT with understanding [100% Cotton] : 100% cotton [Empty all pockets] : empty all pockets [No hair oils, wigs, hairpieces, pins] : no hair oils, wigs, hairpieces, pins  [Pre tx medications] : pre tx medications  [No make-up, creams] : no make-up, creams  [No jewelry] : no jewelry  [No matches, cigarettes, lighters] : no matches, cigarettes, lighters  [Hearing aid removed] : hearing aid removed [Dentures removed] : dentures removed [Ground bracelet on pt's wrist] : ground bracelet on pt's wrist  [Contacts removed] : contacts removed  [Remove nail polish] : remove nail polish  [No reading material] : no reading material  [Bra, undergarments removed] : bra, undergarments removed  [No contraindicated dressings] : no contraindicated dressings [Ground Wire - VISUAL Verification - Intact/Free of Obstruction] : Ground Wire - VISUAL Verification - Intact/Free of Obstruction  [Ground Continuity - Verified < 1 ohm w/ Wrist Strap Gloria] : Ground Continuity - Verified < 1 ohm w/ Wrist Strap Gloria [Number: ___] : Number: [unfilled] [Diagnosis: ___] : Diagnosis: [unfilled] [____] : Post-Dive: Time - [unfilled] [___] : Post-Dive: Value - [unfilled] mg/dL [Clear all fields] : clear all fields [] : No [FreeTextEntry4] : 100 [FreeTextEntry6] : 0877 [FreeTextEntry8] : 9843 [de-identified] : 7064 [de-identified] : 5471 [de-identified] : 7349 [de-identified] : 6755 [de-identified] : 3843 [de-identified] : 1002 [de-identified] : 120 MIN

## 2021-02-19 ENCOUNTER — APPOINTMENT (OUTPATIENT)
Dept: HYPERBARIC MEDICINE | Facility: HOSPITAL | Age: 61
End: 2021-02-19

## 2021-02-20 NOTE — VITALS
[de-identified] : Pt rates pain 0/10.  Patient denies any pain or discomfort at the present  [] : No

## 2021-02-20 NOTE — HISTORY OF PRESENT ILLNESS
[FreeTextEntry1] : Patient seen s/p right hallux and metatarsal head amputation for osteomyelitis. Pt relates he has been walking on his right heel for work.

## 2021-02-20 NOTE — REVIEW OF SYSTEMS
[Eye Pain] : no eye pain [Fever] : no fever [Earache] : no earache [Shortness Of Breath] : no shortness of breath [Cough] : no cough [Abdominal Pain] : no abdominal pain [Skin Wound] : skin wound [FreeTextEntry5] : htn, hld, pvd [FreeTextEntry9] : Right hallux and metatarsal head amputation for diabetic ulcer down to skin, subcutaneous tissue, fat, and bone (necrotic) [de-identified] : right foot incision site intact, no purulence, no malodor, no proximal streaking. [de-identified] : diabetic neuropathy [de-identified] : type 2 diabetes

## 2021-02-20 NOTE — ASSESSMENT
[FreeTextEntry4] : Pt has completed 27/30 HBOT \par No additional treatments ordered at this time\par F/u to [facility] for assessment in Two Weeks  [FreeTextEntry2] : Infection Prevention\par Localized Wound Care\par Offloading / Pressure Relief\par Promote Optimal Skin Integrity\par Maintain acceptable pain level with use of pharmacological and nonpharmacological interventions

## 2021-02-20 NOTE — PHYSICAL EXAM
[0] : left 0 [Ankle Swelling (On Exam)] : not present [] : not present [Varicose Veins Of Lower Extremities] : not present [Skin Ulcer] : ulcer [Alert] : alert [Oriented to Person] : oriented to person [Oriented to Place] : oriented to place [Oriented to Time] : oriented to time [de-identified] : A&Ox3, NAD [de-identified] : 5/5 strength in all quadrants bilaterally, s/p right hallux and metatarsal head amputation [de-identified] : right foot incision site intact, no purulence, no malodor, no proximal streaking. [de-identified] : diminished light touch sensation bilaterally [de-identified] : Bandage applied by physician \par  [FreeTextEntry1] : Right Hallux Amp Site - Two areas within measurement   [FreeTextEntry2] : 2.6 [FreeTextEntry4] : 0.1 [FreeTextEntry3] : 0.3 [de-identified] : Serosanguineous  [de-identified] : Intact [de-identified] : Silver Alginate [de-identified] : Cleansed with Normal Saline. \par Kerlix  [TWNoteComboBox4] : Small [TWNoteComboBox5] : No [TWNoteComboBox6] : Surgical [de-identified] : No [de-identified] : Normal [de-identified] : None [de-identified] : None [de-identified] : 100% [de-identified] : No [de-identified] : 3x Weekly [de-identified] : Primary Dressing

## 2021-02-22 ENCOUNTER — NON-APPOINTMENT (OUTPATIENT)
Age: 61
End: 2021-02-22

## 2021-02-22 ENCOUNTER — APPOINTMENT (OUTPATIENT)
Dept: HYPERBARIC MEDICINE | Facility: HOSPITAL | Age: 61
End: 2021-02-22

## 2021-02-23 ENCOUNTER — APPOINTMENT (OUTPATIENT)
Dept: HYPERBARIC MEDICINE | Facility: HOSPITAL | Age: 61
End: 2021-02-23

## 2021-02-24 ENCOUNTER — APPOINTMENT (OUTPATIENT)
Dept: HYPERBARIC MEDICINE | Facility: HOSPITAL | Age: 61
End: 2021-02-24
Payer: COMMERCIAL

## 2021-02-24 ENCOUNTER — OUTPATIENT (OUTPATIENT)
Dept: OUTPATIENT SERVICES | Facility: HOSPITAL | Age: 61
LOS: 1 days | Discharge: HOME | End: 2021-02-24
Payer: COMMERCIAL

## 2021-02-24 VITALS
OXYGEN SATURATION: 99 % | DIASTOLIC BLOOD PRESSURE: 87 MMHG | TEMPERATURE: 97.8 F | RESPIRATION RATE: 20 BRPM | SYSTOLIC BLOOD PRESSURE: 146 MMHG | HEART RATE: 100 BPM

## 2021-02-24 VITALS
RESPIRATION RATE: 20 BRPM | DIASTOLIC BLOOD PRESSURE: 85 MMHG | SYSTOLIC BLOOD PRESSURE: 141 MMHG | HEART RATE: 95 BPM | TEMPERATURE: 97.8 F | OXYGEN SATURATION: 100 %

## 2021-02-24 DIAGNOSIS — M86.671 OTHER CHRONIC OSTEOMYELITIS, RIGHT ANKLE AND FOOT: ICD-10-CM

## 2021-02-24 DIAGNOSIS — E11.621 TYPE 2 DIABETES MELLITUS WITH FOOT ULCER: ICD-10-CM

## 2021-02-24 DIAGNOSIS — E11.69 TYPE 2 DIABETES MELLITUS WITH OTHER SPECIFIED COMPLICATION: ICD-10-CM

## 2021-02-24 DIAGNOSIS — T86.828 OTHER COMPLICATIONS OF SKIN GRAFT (ALLOGRAFT) (AUTOGRAFT): ICD-10-CM

## 2021-02-24 DIAGNOSIS — Z98.62 PERIPHERAL VASCULAR ANGIOPLASTY STATUS: Chronic | ICD-10-CM
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
## 2021-02-24 PROCEDURE — 99183 HYPERBARIC OXYGEN THERAPY: CPT

## 2021-02-24 PROCEDURE — G0277: CPT

## 2021-02-24 PROCEDURE — 82962 GLUCOSE BLOOD TEST: CPT

## 2021-02-25 ENCOUNTER — APPOINTMENT (OUTPATIENT)
Dept: HYPERBARIC MEDICINE | Facility: HOSPITAL | Age: 61
End: 2021-02-25

## 2021-02-26 ENCOUNTER — APPOINTMENT (OUTPATIENT)
Dept: HYPERBARIC MEDICINE | Facility: HOSPITAL | Age: 61
End: 2021-02-26

## 2021-02-26 NOTE — PROCEDURE
[Outpatient] : Outpatient [Ambulatory] : Patient is ambulatory. [THIS CHAMBER HAS BEEN CLEANED / DISINFECTED] : This chamber has been cleaned / disinfected according to local and hospital policy and procedure prior to this treatment. [Patient demonstrated and verbalized proper technique for using air break mask] : Patient demonstrated and verbalized proper technique for using air break mask [Patient educated on the risks of SMOKING prior to HBOT with understanding] : Patient educated on the risks of SMOKING prior to HBOT with understanding [Patient educated on the risks of CONSUMING ALCOHOL prior to HBOT with understanding] : Patient educated on the risks of CONSUMING ALCOHOL prior to HBOT with understanding [100% Cotton] : 100% cotton [Empty all pockets] : empty all pockets [No hair oils, wigs, hairpieces, pins] : no hair oils, wigs, hairpieces, pins  [Pre tx medications] : pre tx medications  [No make-up, creams] : no make-up, creams  [No jewelry] : no jewelry  [No matches, cigarettes, lighters] : no matches, cigarettes, lighters  [Hearing aid removed] : hearing aid removed [Dentures removed] : dentures removed [Ground bracelet on pt's wrist] : ground bracelet on pt's wrist  [Contacts removed] : contacts removed  [Remove nail polish] : remove nail polish  [No reading material] : no reading material  [Bra, undergarments removed] : bra, undergarments removed  [No contraindicated dressings] : no contraindicated dressings [Ground Wire - VISUAL Verification - Intact/Free of Obstruction] : Ground Wire - VISUAL Verification - Intact/Free of Obstruction  [Ground Continuity - Verified < 1 ohm w/ Wrist Strap Gloria] : Ground Continuity - Verified < 1 ohm w/ Wrist Strap Gloria [Number: ___] : Number: [unfilled] [Diagnosis: ___] : Diagnosis: [unfilled] [Clear all fields] : clear all fields [____] : Post-Dive: Time - [unfilled] [___] : Post-Dive: Value - [unfilled] mg/dL [] : No [FreeTextEntry4] : 100 [FreeTextEntry6] : 8:09 [FreeTextEntry8] : 8:19 [de-identified] : 8:49 [de-identified] : 8:54 [de-identified] : 9:24 [de-identified] : 9:29 [de-identified] : 9:59 [de-identified] : 10:09 [de-identified] : 120

## 2021-02-26 NOTE — ADDENDUM
[FreeTextEntry1] : PT ARRIVED AMBULATORY FROM [facility].\par ALL VITALS WITHIN PARAMETERS FOR HBOT. NO DRAINAGE NOTED ON DRESSING PRIOR TO DESCENT. WOUND CARE TO FOLLOW HBOT.\par PT DESCENT TO RX TX DEPTH IN CHAMBER 2 WAS WITHOUT INCIDENT. PT RESTING AT TX DEPTH CHEST RISE AND FALL OBSERVED.\par PT TOLERATED AIR BREAKS WELL.\par PT ASCENT WAS WITHOUT INCIDENT. PT TOLERATED HBOT WELL\par \par CHT JESSICA VÁZQUEZ 02/24/21

## 2021-03-01 NOTE — PROCEDURE
[Outpatient] : Outpatient [Ambulatory] : Patient is ambulatory. [THIS CHAMBER HAS BEEN CLEANED / DISINFECTED] : This chamber has been cleaned / disinfected according to local and hospital policy and procedure prior to this treatment. [Patient demonstrated and verbalized proper technique for using air break mask] : Patient demonstrated and verbalized proper technique for using air break mask [Patient educated on the risks of SMOKING prior to HBOT with understanding] : Patient educated on the risks of SMOKING prior to HBOT with understanding [Patient educated on the risks of CONSUMING ALCOHOL prior to HBOT with understanding] : Patient educated on the risks of CONSUMING ALCOHOL prior to HBOT with understanding [100% Cotton] : 100% cotton [Empty all pockets] : empty all pockets [No hair oils, wigs, hairpieces, pins] : no hair oils, wigs, hairpieces, pins  [Pre tx medications] : pre tx medications  [No make-up, creams] : no make-up, creams  [No jewelry] : no jewelry  [No matches, cigarettes, lighters] : no matches, cigarettes, lighters  [Hearing aid removed] : hearing aid removed [Dentures removed] : dentures removed [Ground bracelet on pt's wrist] : ground bracelet on pt's wrist  [Contacts removed] : contacts removed  [Remove nail polish] : remove nail polish  [No reading material] : no reading material  [Bra, undergarments removed] : bra, undergarments removed  [Ground Wire - VISUAL Verification - Intact/Free of Obstruction] : Ground Wire - VISUAL Verification - Intact/Free of Obstruction  [No contraindicated dressings] : no contraindicated dressings [Ground Continuity - Verified < 1 ohm w/ Wrist Strap Gloria] : Ground Continuity - Verified < 1 ohm w/ Wrist Strap Gloria [Clear all fields] : clear all fields [Number: ___] : Number: [unfilled] [Diagnosis: ___] : Diagnosis: [unfilled] [____] : Post-Dive: Time - [unfilled] [___] : Post-Dive: Value - [unfilled] mg/dL [] : No [FreeTextEntry4] : 100 [FreeTextEntry6] : 7721 [FreeTextEntry8] : 3456 [de-identified] : 1279 [de-identified] : 1604 [de-identified] : 1104 [de-identified] : 0555 [de-identified] : 9226 [de-identified] : 1007 [de-identified] : 120mins

## 2021-03-01 NOTE — PROCEDURE
[Outpatient] : Outpatient [Ambulatory] : Patient is ambulatory. [THIS CHAMBER HAS BEEN CLEANED / DISINFECTED] : This chamber has been cleaned / disinfected according to local and hospital policy and procedure prior to this treatment. [Patient demonstrated and verbalized proper technique for using air break mask] : Patient demonstrated and verbalized proper technique for using air break mask [Patient educated on the risks of SMOKING prior to HBOT with understanding] : Patient educated on the risks of SMOKING prior to HBOT with understanding [Patient educated on the risks of CONSUMING ALCOHOL prior to HBOT with understanding] : Patient educated on the risks of CONSUMING ALCOHOL prior to HBOT with understanding [100% Cotton] : 100% cotton [Empty all pockets] : empty all pockets [No hair oils, wigs, hairpieces, pins] : no hair oils, wigs, hairpieces, pins  [Pre tx medications] : pre tx medications  [No make-up, creams] : no make-up, creams  [No jewelry] : no jewelry  [No matches, cigarettes, lighters] : no matches, cigarettes, lighters  [Hearing aid removed] : hearing aid removed [Dentures removed] : dentures removed [Ground bracelet on pt's wrist] : ground bracelet on pt's wrist  [Contacts removed] : contacts removed  [Remove nail polish] : remove nail polish  [No reading material] : no reading material  [Bra, undergarments removed] : bra, undergarments removed  [Ground Wire - VISUAL Verification - Intact/Free of Obstruction] : Ground Wire - VISUAL Verification - Intact/Free of Obstruction  [No contraindicated dressings] : no contraindicated dressings [Ground Continuity - Verified < 1 ohm w/ Wrist Strap Gloria] : Ground Continuity - Verified < 1 ohm w/ Wrist Strap Gloria [Clear all fields] : clear all fields [Number: ___] : Number: [unfilled] [Diagnosis: ___] : Diagnosis: [unfilled] [____] : Post-Dive: Time - [unfilled] [___] : Post-Dive: Value - [unfilled] mg/dL [] : No [FreeTextEntry4] : 100 [FreeTextEntry6] : 0723 [FreeTextEntry8] : 2852 [de-identified] : 4233 [de-identified] : 2548 [de-identified] : 7279 [de-identified] : 7108 [de-identified] : 2305 [de-identified] : 1009 [de-identified] : 120mins

## 2021-03-01 NOTE — ASSESSMENT
[Verbal] : Verbal [Demo] : Demo [Good - alert, interested, motivated] : Good - alert, interested, motivated [Patient] : Patient [Verbalizes knowledge/Understanding] : Verbalizes knowledge/understanding [Dressing changes] : dressing changes [Foot Care] : foot care [Skin Care] : skin care [Pressure relief] : pressure relief [Signs and symptoms of infection] : sign and symptoms of infection [Nutrition] : nutrition [How and When to Call] : how and when to call [Hyperbaric Therapy] : hyperbaric therapy [Off-loading] : off-loading [Patient responsibility to plan of care] : patient responsibility to plan of care [] : Yes [Stable] : stable [Home] : Home [Ambulatory] : Ambulatory [Not Applicable - Long Term Care/Home Health Agency] : Long Term Care/Home Health Agency: Not Applicable [Patient undergoing HBO treatment for __________] : Patient undergoing HBO treatment for [unfilled] [Patient descended without problem for 9 minutes] : Patient descended without problem for 9 minutes [No dizziness or thirst] :  No dizziness or thirst [No ear problems] : No ear problems [Vital signs stable] : Vital signs stable [Tolerating dive well] : Tolerating dive well [No Chest Pain, shortness of breath] : No Chest Pain, shortness of breath [Respiratory Rate Stable] : Respiratory Rate Stable [No chest pain, shortness of breath, or ear pain] :  No chest pain, shortness of breath, or ear pain  [Tolerated Ascent well] : Tolerated Ascent well [Vital Signs stable] : Vital Signs stable [A physician was present throughout the entire HBOT] : A physician was present throughout the entire HBOT [No] : No [Clinically Stable] : Clinically stable [Continue Treatment Plan] : Continue treatment plan [0] : 0 out of 10

## 2021-03-03 ENCOUNTER — OUTPATIENT (OUTPATIENT)
Dept: OUTPATIENT SERVICES | Facility: HOSPITAL | Age: 61
LOS: 1 days | Discharge: HOME | End: 2021-03-03
Payer: COMMERCIAL

## 2021-03-03 ENCOUNTER — APPOINTMENT (OUTPATIENT)
Dept: WOUND CARE | Facility: HOSPITAL | Age: 61
End: 2021-03-03
Payer: COMMERCIAL

## 2021-03-03 VITALS
OXYGEN SATURATION: 100 % | BODY MASS INDEX: 28.04 KG/M2 | WEIGHT: 185 LBS | HEART RATE: 110 BPM | RESPIRATION RATE: 20 BRPM | DIASTOLIC BLOOD PRESSURE: 84 MMHG | SYSTOLIC BLOOD PRESSURE: 136 MMHG | TEMPERATURE: 98.3 F | HEIGHT: 68 IN

## 2021-03-03 DIAGNOSIS — T86.828 OTHER COMPLICATIONS OF SKIN GRAFT (ALLOGRAFT) (AUTOGRAFT): ICD-10-CM

## 2021-03-03 DIAGNOSIS — Z98.62 PERIPHERAL VASCULAR ANGIOPLASTY STATUS: Chronic | ICD-10-CM

## 2021-03-03 DIAGNOSIS — E11.621 TYPE 2 DIABETES MELLITUS WITH FOOT ULCER: ICD-10-CM

## 2021-03-03 DIAGNOSIS — E11.51 TYPE 2 DIABETES MELLITUS WITH DIABETIC PERIPHERAL ANGIOPATHY WITHOUT GANGRENE: ICD-10-CM

## 2021-03-03 DIAGNOSIS — Z79.82 LONG TERM (CURRENT) USE OF ASPIRIN: ICD-10-CM

## 2021-03-03 DIAGNOSIS — Z79.899 OTHER LONG TERM (CURRENT) DRUG THERAPY: ICD-10-CM

## 2021-03-03 DIAGNOSIS — E11.40 TYPE 2 DIABETES MELLITUS WITH DIABETIC NEUROPATHY, UNSPECIFIED: ICD-10-CM

## 2021-03-03 DIAGNOSIS — Z83.3 FAMILY HISTORY OF DIABETES MELLITUS: ICD-10-CM

## 2021-03-03 DIAGNOSIS — E11.69 TYPE 2 DIABETES MELLITUS WITH OTHER SPECIFIED COMPLICATION: ICD-10-CM

## 2021-03-03 DIAGNOSIS — Z80.3 FAMILY HISTORY OF MALIGNANT NEOPLASM OF BREAST: ICD-10-CM

## 2021-03-03 DIAGNOSIS — Z79.84 LONG TERM (CURRENT) USE OF ORAL HYPOGLYCEMIC DRUGS: ICD-10-CM
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
## 2021-03-03 DIAGNOSIS — Z89.411 ACQUIRED ABSENCE OF RIGHT GREAT TOE: ICD-10-CM

## 2021-03-03 DIAGNOSIS — M86.671 OTHER CHRONIC OSTEOMYELITIS, RIGHT ANKLE AND FOOT: ICD-10-CM

## 2021-03-03 DIAGNOSIS — E78.00 PURE HYPERCHOLESTEROLEMIA, UNSPECIFIED: ICD-10-CM

## 2021-03-03 PROCEDURE — 99024 POSTOP FOLLOW-UP VISIT: CPT

## 2021-03-03 PROCEDURE — G0463: CPT

## 2021-03-03 NOTE — REVIEW OF SYSTEMS
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 [de-identified] : diabetic neuropathy [de-identified] : type 2 diabetes

## 2021-03-03 NOTE — HISTORY OF PRESENT ILLNESS
[FreeTextEntry1] : Patient seen s/p right hallux and metatarsal head amputation for osteomyelitis, healed. denies any other complaints at this time.

## 2021-03-03 NOTE — PLAN
[FreeTextEntry1] : Patient examined and evaluated at this time.\par Pt happy with outcome of treatment.\par Continue local wound care and offloading.\par Spent 20 minutes for patient care and medical decision making.\par Pt to follow up in 4 weeks.\par

## 2021-03-03 NOTE — ASSESSMENT
[Verbal] : Verbal [Demo] : Demo [Patient] : Patient [Good - alert, interested, motivated] : Good - alert, interested, motivated [Verbalizes knowledge/Understanding] : Verbalizes knowledge/understanding [Dressing changes] : dressing changes [Foot Care] : foot care [Skin Care] : skin care [Pressure relief] : pressure relief [Signs and symptoms of infection] : sign and symptoms of infection [Nutrition] : nutrition [How and When to Call] : how and when to call [Hyperbaric Therapy] : hyperbaric therapy [Off-loading] : off-loading [Patient responsibility to plan of care] : patient responsibility to plan of care [] : Yes [Stable] : stable [Home] : Home [Ambulatory] : Ambulatory [Not Applicable - Long Term Care/Home Health Agency] : Long Term Care/Home Health Agency: Not Applicable [FreeTextEntry2] : Infection Prevention\par Localized Wound Care\par Offloading / Pressure Relief\par Promote Optimal Skin Integrity\par Maintain acceptable pain level with use of pharmacological and nonpharmacological interventions \par F/U 1 month [FreeTextEntry4] : Pt has completed 30/30 HBOT \par F/U 1 month

## 2021-03-03 NOTE — PHYSICAL EXAM
[0] : left 0 [Skin Ulcer] : ulcer [Alert] : alert [Oriented to Person] : oriented to person [Oriented to Place] : oriented to place [Oriented to Time] : oriented to time [Ankle Swelling (On Exam)] : not present [Varicose Veins Of Lower Extremities] : not present [] : not present [de-identified] : A&Ox3, NAD [de-identified] : 5/5 strength in all quadrants bilaterally, s/p right hallux and metatarsal head amputation [de-identified] : right foot incision site intact, no purulence, no malodor, no proximal streaking. [de-identified] : diminished light touch sensation bilaterally [de-identified] : Bandage applied by physician \par  [FreeTextEntry1] :  Hallux Amp Site CLOSED [de-identified] : Intact [de-identified] : No treatment required  [de-identified] : Cleansed with Normal Saline. \par  [TWNoteComboBox1] : Right [TWNoteComboBox4] : None [TWNoteComboBox5] : No [TWNoteComboBox6] : Surgical [de-identified] : No [de-identified] : Normal [de-identified] : None [de-identified] : None [de-identified] : 100% [de-identified] : No [de-identified] : 3x Weekly [de-identified] : Primary Dressing

## 2021-03-03 NOTE — VITALS
[Pain related to present condition?] : The patient's  pain is not related to present condition. [] : No [de-identified] : 0/10

## 2021-03-09 NOTE — PROGRESS NOTE ADULT - PROBLEM SELECTOR PLAN 2
Brother     Hearing Loss Mother     High Blood Pressure Mother        SOCIAL HISTORY    Social History     Tobacco Use    Smoking status: Never Smoker    Smokeless tobacco: Never Used    Tobacco comment: 3/2/21   Substance Use Topics    Alcohol use: No     Alcohol/week: 0.0 standard drinks    Drug use: No       ALLERGIES    Allergies   Allergen Reactions    Alendronate Sodium Anaphylaxis    Influenza Vaccines     Pneumococcal Vaccines Other (See Comments)    Rofecoxib Swelling    Vioxx     Sulfa Antibiotics Rash       MEDICATIONS    Current Outpatient Medications on File Prior to Encounter   Medication Sig Dispense Refill    magnesium oxide (MAG-OX) 400 MG tablet Take 400 mg by mouth daily      [START ON 3/26/2021] clindamycin (CLEOCIN) 300 MG capsule Take 300 mg by mouth 3 times daily      ferrous sulfate (IRON 325) 325 (65 Fe) MG tablet Take 325 mg by mouth daily (with breakfast)      rosuvastatin (CRESTOR) 40 MG tablet Take 40 mg by mouth every evening      oxyCODONE-acetaminophen (PERCOCET) 7.5-325 MG per tablet Take 1 tablet by mouth every 12 hours as needed for Pain.  pregabalin (LYRICA) 100 MG capsule Take 100 mg by mouth 2 times daily.  baclofen (LIORESAL) 10 MG tablet Take 10 mg by mouth 2 times daily as needed      gentamicin (GARAMYCIN) 0.1 % cream Apply topically daily wtih dressing changes.  1 Tube 3    clopidogrel (PLAVIX) 75 MG tablet TAKE 1 TABLET EVERY DAY 90 tablet 0    metoprolol succinate (TOPROL XL) 25 MG extended release tablet Take 1 tablet by mouth daily (Patient taking differently: Take 50 mg by mouth 2 times daily ) 90 tablet 1    furosemide (LASIX) 40 MG tablet Take 1 tablet by mouth daily 90 tablet 2    metFORMIN (GLUCOPHAGE) 1000 MG tablet Take 1 tablet by mouth 2 times daily (with meals) 180 tablet 1    traZODone (DESYREL) 50 MG tablet 1 tab at bedtime  For sleep 90 tablet 1    aspirin 81 MG tablet Take 1 tablet by mouth daily 30 tablet 3    Lancets Bilateral carpal tunnel syndrome    Lumbar radiculopathy    Diabetic peripheral neuropathy (HCC)    CAD (coronary artery disease)    Pain medication agreement signed    OA (osteoarthritis) of knee    Chondrocalcinosis    Other specified diabetes mellitus with diabetic neuropathy    Chest pain on breathing    Abnormal ECG    HTN (hypertension)    Chest pain    Abnormal stress test    Single vessel coronary artery disease    S/P primary angioplasty with coronary stent    Palpitations    Mixed hyperlipidemia    Hip pain, bilateral    Hyperkalemia    Anemia       Procedure Note    Performed by: Sara Mcdermott DPM    Consent obtained: Yes    Time out taken:  Yes    Pain Control: Anesthetic  Anesthetic: 4% Lidocaine Cream     Debridement:Excisional Debridement    Using curette, scissors and forceps the wound was sharply debrided    down through and including the removal of epidermis, dermis and subcutaneous tissue.         Devitalized Tissue Debrided:  fibrin, biofilm, slough, necrotic/eschar, exudate and callus    Pre Debridement Measurements:  Are located in the Wound Documentation Flow Sheet    Wound #: 1 and 2     Wound Care Documentation:  Wound 03/02/21 #1, Left Medial Heel, Diabetic Foot Ulcer, Carbajal 1, onset 2/16/21 (Active)   Wound Image   03/02/21 0958   Wound Etiology Diabetic Carbajal 1 03/09/21 0821   Dressing Status New dressing applied 03/02/21 1010   Wound Cleansed Soap and water;Irrigated with saline 03/09/21 0821   Dressing/Treatment Other (comment) 03/02/21 1010   Offloading for Diabetic Foot Ulcers Other (comment) 03/02/21 1010   Wound Length (cm) 0.2 cm 03/09/21 0821   Wound Width (cm) 0.2 cm 03/09/21 0821   Wound Depth (cm) 0.1 cm 03/09/21 0821   Wound Surface Area (cm^2) 0.04 cm^2 03/09/21 0821   Change in Wound Size % (l*w) 99.75 03/09/21 0821   Wound Volume (cm^3) 0 cm^3 03/09/21 0821   Wound Healing % 100 03/09/21 0821   Post-Procedure Length (cm) 0 cm 03/09/21 0846 Right great toe infection with wet gangrene and OM s/p amputation - as above. the right heel    Percentage of wound debrided 100%    Bleeding:  Minimal    Hemostasis Achieved:  by pressure    Procedural Pain:  0  / 10     Post Procedural Pain:  0 / 10     Response to treatment:  Well tolerated by patient. Plan:   Patient examined and evaluated  Wound right without incident  All questions answered to patient satisfaction  Wound encourage the patient and her friend to eliminate pressure on the area. There was some more pressure damage to the right heel the left heel has healed. Most likely this is due to pressure or a burn  Daily dressing changes with gentamicin cream  Glucose checked in the wound care center and it was 511, discussed that this is unacceptable and we reinforced a diabetic diet and taking medications appropriately she is to see her primary care later this week. Keep foot offloaded  Follow-up in 1 week  The nature of the patient's condition was explained in depth.  The patient was informed that their compliance to the treatment plan is paramount to successful healing and prevention of further ulceration and/or infection     Discharge Treatment  Daily dressing changes with compression keep legs elevated at all times and not active    Written Patient Discharge Instructions Given            Electronically signed by Jhoana Frankel DPM on 3/9/2021 at 8:41 AM

## 2021-04-12 ENCOUNTER — OUTPATIENT (OUTPATIENT)
Dept: OUTPATIENT SERVICES | Facility: HOSPITAL | Age: 61
LOS: 1 days | Discharge: HOME | End: 2021-04-12
Payer: COMMERCIAL

## 2021-04-12 ENCOUNTER — APPOINTMENT (OUTPATIENT)
Dept: WOUND CARE | Facility: HOSPITAL | Age: 61
End: 2021-04-12
Payer: COMMERCIAL

## 2021-04-12 VITALS
WEIGHT: 185 LBS | BODY MASS INDEX: 28.04 KG/M2 | HEIGHT: 68 IN | RESPIRATION RATE: 20 BRPM | OXYGEN SATURATION: 100 % | HEART RATE: 81 BPM | DIASTOLIC BLOOD PRESSURE: 88 MMHG | SYSTOLIC BLOOD PRESSURE: 160 MMHG | TEMPERATURE: 97.3 F

## 2021-04-12 DIAGNOSIS — T86.828 OTHER COMPLICATIONS OF SKIN GRAFT (ALLOGRAFT) (AUTOGRAFT): ICD-10-CM

## 2021-04-12 DIAGNOSIS — E11.51 TYPE 2 DIABETES MELLITUS WITH DIABETIC PERIPHERAL ANGIOPATHY W/OUT GANGRENE: ICD-10-CM

## 2021-04-12 DIAGNOSIS — I96 OTHER COMPLICATIONS OF SKIN GRAFT (ALLOGRAFT) (AUTOGRAFT): ICD-10-CM

## 2021-04-12 DIAGNOSIS — Z98.62 PERIPHERAL VASCULAR ANGIOPLASTY STATUS: Chronic | ICD-10-CM

## 2021-04-12 PROCEDURE — G0463: CPT

## 2021-04-12 PROCEDURE — 99213 OFFICE O/P EST LOW 20 MIN: CPT

## 2021-04-12 NOTE — PLAN
[FreeTextEntry1] : Patient happy with outcome of treatment.\par Patient to be discharged at this time.\par All questions answered to satisfaction and patient verbalized understanding.\par Patient to return to outside podiatrist at this time.\par Spent 20 minutes for patient care and medical decision making.\par \par

## 2021-04-12 NOTE — ASSESSMENT
[Verbal] : Verbal [Demo] : Demo [Patient] : Patient [Good - alert, interested, motivated] : Good - alert, interested, motivated [Verbalizes knowledge/Understanding] : Verbalizes knowledge/understanding [Dressing changes] : dressing changes [Foot Care] : foot care [Skin Care] : skin care [Pressure relief] : pressure relief [Signs and symptoms of infection] : sign and symptoms of infection [Nutrition] : nutrition [How and When to Call] : how and when to call [Hyperbaric Therapy] : hyperbaric therapy [Off-loading] : off-loading [Patient responsibility to plan of care] : patient responsibility to plan of care [] : Yes [Stable] : stable [Home] : Home [Ambulatory] : Ambulatory [Not Applicable - Long Term Care/Home Health Agency] : Long Term Care/Home Health Agency: Not Applicable [FreeTextEntry2] : Infection Prevention\par Localized Wound Care\par Offloading / Pressure Relief\par Promote Optimal Skin Integrity\par Maintain acceptable pain level with use of pharmacological and nonpharmacological interventions \par Discharge [FreeTextEntry4] : Discharge

## 2021-04-12 NOTE — REVIEW OF SYSTEMS
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 [de-identified] : diabetic neuropathy [de-identified] : type 2 diabetes

## 2021-04-12 NOTE — VITALS
[Pain related to present condition?] : The patient's  pain is not related to present condition. [] : No [de-identified] : 0

## 2021-04-12 NOTE — PHYSICAL EXAM
[0] : left 0 [Ankle Swelling (On Exam)] : not present [] : not present [Varicose Veins Of Lower Extremities] : not present [Skin Ulcer] : ulcer [Alert] : alert [Oriented to Person] : oriented to person [Oriented to Place] : oriented to place [Oriented to Time] : oriented to time [de-identified] : A&Ox3, NAD [de-identified] : 5/5 strength in all quadrants bilaterally, s/p right hallux and metatarsal head amputation [de-identified] : right foot incision site intact, no purulence, no malodor, no proximal streaking. [de-identified] : diminished light touch sensation bilaterally [de-identified] : Bandage applied by physician \par  [FreeTextEntry1] :  Hallux Amp Site CLOSED [de-identified] : No treatment required  [de-identified] : Intact [de-identified] : Cleansed with Normal Saline. \par  [TWNoteComboBox1] : Right [TWNoteComboBox5] : No [TWNoteComboBox4] : None [TWNoteComboBox6] : Surgical [de-identified] : No [de-identified] : Normal [de-identified] : None [de-identified] : None [de-identified] : No [de-identified] : 100%

## 2021-04-13 DIAGNOSIS — E11.51 TYPE 2 DIABETES MELLITUS WITH DIABETIC PERIPHERAL ANGIOPATHY WITHOUT GANGRENE: ICD-10-CM
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
## 2021-04-13 DIAGNOSIS — Z83.3 FAMILY HISTORY OF DIABETES MELLITUS: ICD-10-CM

## 2021-04-13 DIAGNOSIS — Z79.899 OTHER LONG TERM (CURRENT) DRUG THERAPY: ICD-10-CM

## 2021-04-13 DIAGNOSIS — Z79.82 LONG TERM (CURRENT) USE OF ASPIRIN: ICD-10-CM

## 2021-04-13 DIAGNOSIS — E11.69 TYPE 2 DIABETES MELLITUS WITH OTHER SPECIFIED COMPLICATION: ICD-10-CM

## 2021-04-13 DIAGNOSIS — E11.40 TYPE 2 DIABETES MELLITUS WITH DIABETIC NEUROPATHY, UNSPECIFIED: ICD-10-CM

## 2021-04-13 DIAGNOSIS — Z80.3 FAMILY HISTORY OF MALIGNANT NEOPLASM OF BREAST: ICD-10-CM

## 2021-04-13 DIAGNOSIS — Z79.84 LONG TERM (CURRENT) USE OF ORAL HYPOGLYCEMIC DRUGS: ICD-10-CM

## 2021-04-13 DIAGNOSIS — E78.00 PURE HYPERCHOLESTEROLEMIA, UNSPECIFIED: ICD-10-CM

## 2021-04-13 DIAGNOSIS — T86.828 OTHER COMPLICATIONS OF SKIN GRAFT (ALLOGRAFT) (AUTOGRAFT): ICD-10-CM

## 2021-04-13 DIAGNOSIS — M86.671 OTHER CHRONIC OSTEOMYELITIS, RIGHT ANKLE AND FOOT: ICD-10-CM

## 2021-04-13 DIAGNOSIS — Z89.411 ACQUIRED ABSENCE OF RIGHT GREAT TOE: ICD-10-CM

## 2022-11-23 NOTE — ED ADULT NURSE NOTE - NSFALLRSKASSESSTYPE_ED_ALL_ED
· [ILIANA VILLALOBOS] ......... Discharge Date: [11/22/2022] ......... [facility] Facility: [[facility]] ......... Disposition: [Home-Health Care Services] ......... LACE Score: [13] ......... Primary Diagnosis: [] ......... Admission Diagnosis: [Acute on chronic combined systolic and diastolic CHF (congestive heart failure) (CMS/HCC)]    TCM Appt scheduled for 11/28          Initial (On Arrival)

## 2023-05-13 ENCOUNTER — EMERGENCY (EMERGENCY)
Facility: HOSPITAL | Age: 63
LOS: 1 days | Discharge: HOME | End: 2023-05-13
Attending: EMERGENCY MEDICINE | Admitting: EMERGENCY MEDICINE
Payer: COMMERCIAL

## 2023-05-13 VITALS
DIASTOLIC BLOOD PRESSURE: 112 MMHG | HEIGHT: 69 IN | TEMPERATURE: 98 F | RESPIRATION RATE: 20 BRPM | OXYGEN SATURATION: 100 % | HEART RATE: 83 BPM | SYSTOLIC BLOOD PRESSURE: 175 MMHG | WEIGHT: 184.97 LBS

## 2023-05-13 VITALS
RESPIRATION RATE: 18 BRPM | TEMPERATURE: 98 F | OXYGEN SATURATION: 99 % | HEART RATE: 78 BPM | DIASTOLIC BLOOD PRESSURE: 98 MMHG | SYSTOLIC BLOOD PRESSURE: 164 MMHG

## 2023-05-13 DIAGNOSIS — Z98.62 PERIPHERAL VASCULAR ANGIOPLASTY STATUS: Chronic | ICD-10-CM

## 2023-05-13 LAB
ALBUMIN SERPL ELPH-MCNC: 4.5 G/DL — SIGNIFICANT CHANGE UP (ref 3.3–5)
ALP SERPL-CCNC: 70 U/L — SIGNIFICANT CHANGE UP (ref 40–120)
ALT FLD-CCNC: 29 U/L — SIGNIFICANT CHANGE UP (ref 12–78)
ANION GAP SERPL CALC-SCNC: 1 MMOL/L — LOW (ref 5–17)
AST SERPL-CCNC: 17 U/L — SIGNIFICANT CHANGE UP (ref 15–37)
BASOPHILS # BLD AUTO: 0.08 K/UL — SIGNIFICANT CHANGE UP (ref 0–0.2)
BASOPHILS NFR BLD AUTO: 0.9 % — SIGNIFICANT CHANGE UP (ref 0–2)
BILIRUB SERPL-MCNC: 0.5 MG/DL — SIGNIFICANT CHANGE UP (ref 0.2–1.2)
BUN SERPL-MCNC: 16 MG/DL — SIGNIFICANT CHANGE UP (ref 7–23)
CALCIUM SERPL-MCNC: 9.6 MG/DL — SIGNIFICANT CHANGE UP (ref 8.5–10.1)
CHLORIDE SERPL-SCNC: 109 MMOL/L — HIGH (ref 96–108)
CO2 SERPL-SCNC: 28 MMOL/L — SIGNIFICANT CHANGE UP (ref 22–31)
CREAT SERPL-MCNC: 1 MG/DL — SIGNIFICANT CHANGE UP (ref 0.5–1.3)
CRP SERPL-MCNC: 4 MG/L — SIGNIFICANT CHANGE UP
EGFR: 84 ML/MIN/1.73M2 — SIGNIFICANT CHANGE UP
EOSINOPHIL # BLD AUTO: 0.31 K/UL — SIGNIFICANT CHANGE UP (ref 0–0.5)
EOSINOPHIL NFR BLD AUTO: 3.7 % — SIGNIFICANT CHANGE UP (ref 0–6)
ERYTHROCYTE [SEDIMENTATION RATE] IN BLOOD: 6 MM/HR — SIGNIFICANT CHANGE UP (ref 0–20)
GLUCOSE SERPL-MCNC: 184 MG/DL — HIGH (ref 70–99)
HCT VFR BLD CALC: 45.2 % — SIGNIFICANT CHANGE UP (ref 39–50)
HGB BLD-MCNC: 14.6 G/DL — SIGNIFICANT CHANGE UP (ref 13–17)
IMM GRANULOCYTES NFR BLD AUTO: 0.5 % — SIGNIFICANT CHANGE UP (ref 0–0.9)
LACTATE SERPL-SCNC: 1.6 MMOL/L — SIGNIFICANT CHANGE UP (ref 0.7–2)
LYMPHOCYTES # BLD AUTO: 2.04 K/UL — SIGNIFICANT CHANGE UP (ref 1–3.3)
LYMPHOCYTES # BLD AUTO: 24.2 % — SIGNIFICANT CHANGE UP (ref 13–44)
MCHC RBC-ENTMCNC: 29.6 PG — SIGNIFICANT CHANGE UP (ref 27–34)
MCHC RBC-ENTMCNC: 32.3 GM/DL — SIGNIFICANT CHANGE UP (ref 32–36)
MCV RBC AUTO: 91.7 FL — SIGNIFICANT CHANGE UP (ref 80–100)
MONOCYTES # BLD AUTO: 0.67 K/UL — SIGNIFICANT CHANGE UP (ref 0–0.9)
MONOCYTES NFR BLD AUTO: 7.9 % — SIGNIFICANT CHANGE UP (ref 2–14)
NEUTROPHILS # BLD AUTO: 5.3 K/UL — SIGNIFICANT CHANGE UP (ref 1.8–7.4)
NEUTROPHILS NFR BLD AUTO: 62.8 % — SIGNIFICANT CHANGE UP (ref 43–77)
NRBC # BLD: 0 /100 WBCS — SIGNIFICANT CHANGE UP (ref 0–0)
PLATELET # BLD AUTO: 284 K/UL — SIGNIFICANT CHANGE UP (ref 150–400)
POTASSIUM SERPL-MCNC: 5 MMOL/L — SIGNIFICANT CHANGE UP (ref 3.5–5.3)
POTASSIUM SERPL-SCNC: 5 MMOL/L — SIGNIFICANT CHANGE UP (ref 3.5–5.3)
PROT SERPL-MCNC: 8.2 G/DL — SIGNIFICANT CHANGE UP (ref 6–8.3)
RBC # BLD: 4.93 M/UL — SIGNIFICANT CHANGE UP (ref 4.2–5.8)
RBC # FLD: 13.3 % — SIGNIFICANT CHANGE UP (ref 10.3–14.5)
SODIUM SERPL-SCNC: 138 MMOL/L — SIGNIFICANT CHANGE UP (ref 135–145)
URATE SERPL-MCNC: 3.8 MG/DL — SIGNIFICANT CHANGE UP (ref 3.4–8.8)
WBC # BLD: 8.44 K/UL — SIGNIFICANT CHANGE UP (ref 3.8–10.5)
WBC # FLD AUTO: 8.44 K/UL — SIGNIFICANT CHANGE UP (ref 3.8–10.5)

## 2023-05-13 PROCEDURE — 85652 RBC SED RATE AUTOMATED: CPT

## 2023-05-13 PROCEDURE — 99284 EMERGENCY DEPT VISIT MOD MDM: CPT | Mod: 25

## 2023-05-13 PROCEDURE — 73630 X-RAY EXAM OF FOOT: CPT | Mod: 26,RT

## 2023-05-13 PROCEDURE — 96365 THER/PROPH/DIAG IV INF INIT: CPT

## 2023-05-13 PROCEDURE — 85025 COMPLETE CBC W/AUTO DIFF WBC: CPT

## 2023-05-13 PROCEDURE — 86140 C-REACTIVE PROTEIN: CPT

## 2023-05-13 PROCEDURE — 87040 BLOOD CULTURE FOR BACTERIA: CPT

## 2023-05-13 PROCEDURE — 84550 ASSAY OF BLOOD/URIC ACID: CPT

## 2023-05-13 PROCEDURE — 73630 X-RAY EXAM OF FOOT: CPT

## 2023-05-13 PROCEDURE — 99285 EMERGENCY DEPT VISIT HI MDM: CPT

## 2023-05-13 PROCEDURE — 83605 ASSAY OF LACTIC ACID: CPT

## 2023-05-13 PROCEDURE — 80053 COMPREHEN METABOLIC PANEL: CPT

## 2023-05-13 PROCEDURE — 36415 COLL VENOUS BLD VENIPUNCTURE: CPT

## 2023-05-13 RX ORDER — PIPERACILLIN AND TAZOBACTAM 4; .5 G/20ML; G/20ML
3.38 INJECTION, POWDER, LYOPHILIZED, FOR SOLUTION INTRAVENOUS ONCE
Refills: 0 | Status: COMPLETED | OUTPATIENT
Start: 2023-05-13 | End: 2023-05-13

## 2023-05-13 RX ADMIN — PIPERACILLIN AND TAZOBACTAM 200 GRAM(S): 4; .5 INJECTION, POWDER, LYOPHILIZED, FOR SOLUTION INTRAVENOUS at 10:50

## 2023-05-13 RX ADMIN — PIPERACILLIN AND TAZOBACTAM 3.38 GRAM(S): 4; .5 INJECTION, POWDER, LYOPHILIZED, FOR SOLUTION INTRAVENOUS at 11:22

## 2023-05-13 NOTE — ED PROVIDER NOTE - OBJECTIVE STATEMENT
Patient is a 62-year-old male who presents to the emergency room with a chief complaint of cellulitis to the right foot.  Past medical history of diabetes history of neuropathy hypertension hyperlipidemia, history of peripheral vascular insufficiency status post angioplasty of the right lower extremity in November 2020.  Patient was last admitted to the hospital from December 16 of December 21, 2020 with right hallux osteomyelitis.  MRI head revealed acute osteomyelitis of the right first distal phalanx.  Patient underwent a right hallux amputation on 1218.  Antibiotics was switched from Zosyn to Invanz.  Cultures revealed serratia marcescens and alpha hemolytic strep.  However path report did show residual OM.  Patient was discharged with a PICC line for 6 weeks of IV Invanz.  Patient reports that he has had increased walking for his job over the last several weeks.  Approximately 2 weeks ago he noted a blister on his right foot fourth toe lateral aspect.  He followed up with podiatry at that time where he had a callus on the lateral aspect of his right foot shaved down.  He was started on antibiotics Keflex 500 mg twice daily at that time.  Followed up approximately 1 week ago and things were healing well when he was seen today there is concern for increased warmth and possible outpatient failure.  Patient was sent to the emergency room for further work-up and management.

## 2023-05-13 NOTE — ED ADULT NURSE NOTE - NSFALLUNIVINTERV_ED_ALL_ED
Bed/Stretcher in lowest position, wheels locked, appropriate side rails in place/Call bell, personal items and telephone in reach/Instruct patient to call for assistance before getting out of bed/chair/stretcher/Non-slip footwear applied when patient is off stretcher/[place] to call system/Physically safe environment - no spills, clutter or unnecessary equipment/Purposeful proactive rounding/Room/bathroom lighting operational, light cord in reach

## 2023-05-13 NOTE — ED ADULT NURSE REASSESSMENT NOTE - NS ED NURSE REASSESS COMMENT FT1
pt aox4, evaluated by wound care, disch to see PMD feeling better, neuro vascular assessment rt foot area wnl, ambulatory

## 2023-05-13 NOTE — ED ADULT NURSE NOTE - CAS EDN DISCHARGE ASSESSMENT
No Alert and oriented to person, place and time/Symptoms improved/No adverse reaction to first time med in ED

## 2023-05-13 NOTE — ED ADULT TRIAGE NOTE - CHIEF COMPLAINT QUOTE
discomfort right foot for approximately 1 week (big toe amputated 2020/ december).  I went to my podiatrist who diagnosed me with cellulitis and told me I need iv abx and hospitalization.  I do have dm and I have neuropathy.  ( pressure very high in triage, states he did take his medications this am, but only about 20 minutes ago)

## 2023-05-13 NOTE — ED PROVIDER NOTE - NSICDXPASTMEDICALHX_GEN_ALL_CORE_FT
PAST MEDICAL HISTORY:  Diabetes     HTN (hypertension)     Hyperlipidemia     Peripheral vascular disease S/p Angioplasty rt lower EXT Nov 2020

## 2023-05-13 NOTE — ED PROVIDER NOTE - PHYSICAL EXAMINATION
Right foot: s/p amputation of distal 1st hallux, healed blister noted to the 4th toe lateral aspect no discharge or drainage, shaved callus noted to the lateral aspect of the 5th metatarsal. There is erythema noted to the distal MTPs grossly with mild increased warmth, no streaking lymphangitis. +pedal pulse cap refill less then 2 seconds.

## 2023-05-13 NOTE — ED ADULT NURSE NOTE - OBJECTIVE STATEMENT
pt aox4, " sent by PMD, rt foot area redness, swollen , discomfort x 1 week" FROM, good cap refill pt aox4, " Hx Rt great toe amputation,  sent by PMD, rt foot area redness, swollen , discomfort x 1 week" FROM, good cap refill

## 2023-05-13 NOTE — ED PROVIDER NOTE - NSFOLLOWUPINSTRUCTIONS_ED_ALL_ED_FT
Return to the ED for any new or worsening symptoms  Take your medication as prescribed  Continue the current antibiotic prescribed  Bactrim 1 tab 2 times a day  Follow up with podiatry in 1-2 days for a recheck   Advance activity as tolerated      Cellulitis, Adult  A person's legs and feet. One leg is normal and the other leg is affected by cellulitis.  Cellulitis is a skin infection. The infected area is usually warm, red, swollen, and tender. This condition occurs most often in the arms and lower legs. The infection can travel to the muscles, blood, and underlying tissue and become serious. It is very important to get treated for this condition.    What are the causes?  Cellulitis is caused by bacteria. The bacteria enter through a break in the skin, such as a cut, burn, insect bite, open sore, or crack.    What increases the risk?  This condition is more likely to occur in people who:  Have a weak body defense system (immune system).  Have open wounds on the skin, such as cuts, burns, bites, and scrapes. Bacteria can enter the body through these open wounds.  Are older than 60 years of age.  Have diabetes.  Have a type of long-lasting (chronic) liver disease (cirrhosis) or kidney disease.  Are obese.  Have a skin condition such as:  Itchy rash (eczema).  Slow movement of blood in the veins (venous stasis).  Fluid buildup below the skin (edema).  Have had radiation therapy.  Use IV drugs.  What are the signs or symptoms?  Symptoms of this condition include:  Redness, streaking, or spotting on the skin.  Swollen area of the skin.  Tenderness or pain when an area of the skin is touched.  Warm skin.  A fever.  Chills.  Blisters.  How is this diagnosed?  This condition is diagnosed based on a medical history and physical exam. You may also have tests, including:  Blood tests.  Imaging tests.  How is this treated?  Treatment for this condition may include:  Medicines, such as antibiotic medicines or medicines to treat allergies (antihistamines).  Supportive care, such as rest and application of cold or warm cloths (compresses) to the skin.  Hospital care, if the condition is severe.  The infection usually starts to get better within 1–2 days of treatment.    Follow these instructions at home:  A comparison of three sample cups showing dark yellow, yellow, and pale yellow urine.  Medicines    Take over-the-counter and prescription medicines only as told by your health care provider.  If you were prescribed an antibiotic medicine, take it as told by your health care provider. Do not stop taking the antibiotic even if you start to feel better.  General instructions    Drink enough fluid to keep your urine pale yellow.  Do not touch or rub the infected area.  Raise (elevate) the infected area above the level of your heart while you are sitting or lying down.  Apply warm or cold compresses to the affected area as told by your health care provider.  Keep all follow-up visits as told by your health care provider. This is important. These visits let your health care provider make sure a more serious infection is not developing.  Contact a health care provider if:  You have a fever.  Your symptoms do not begin to improve within 1–2 days of starting treatment.  Your bone or joint underneath the infected area becomes painful after the skin has healed.  Your infection returns in the same area or another area.  You notice a swollen bump in the infected area.  You develop new symptoms.  You have a general ill feeling (malaise) with muscle aches and pains.  Get help right away if:  Your symptoms get worse.  You feel very sleepy.  You develop vomiting or diarrhea that persists.  You notice red streaks coming from the infected area.  Your red area gets larger or turns dark in color.  These symptoms may represent a serious problem that is an emergency. Do not wait to see if the symptoms will go away. Get medical help right away. Call your local emergency services (911 in the [place] Do not drive yourself to the hospital.    Summary  Cellulitis is a skin infection. This condition occurs most often in the arms and lower legs.  Treatment for this condition may include medicines, such as antibiotic medicines or antihistamines.  Take over-the-counter and prescription medicines only as told by your health care provider. If you were prescribed an antibiotic medicine, do not stop taking the antibiotic even if you start to feel better.  Contact a health care provider if your symptoms do not begin to improve within 1–2 days of starting treatment or your symptoms get worse.  Keep all follow-up visits as told by your health care provider. This is important. These visits let your health care provider make sure that a more serious infection is not developing.  This information is not intended to replace advice given to you by your health care provider. Make sure you discuss any questions you have with your health care provider.

## 2023-05-13 NOTE — ED PROVIDER NOTE - DIFFERENTIAL DIAGNOSIS
Patient presenting to the emergency room sent in by podiatrist for possible outpatient failure of antibiotics for cellulitis.  Will obtain screening labs check ESR CRP uric acid.  Will obtain x-ray of the foot begin IV antibiotics and monitor.  We will reach out to podiatry Differential Diagnosis

## 2023-05-13 NOTE — ED PROVIDER NOTE - PATIENT PORTAL LINK FT
You can access the FollowMyHealth Patient Portal offered by [facility] by registering at the following website: [email or web address]. By joining [facility]’s FollowMyHealth portal, you will also be able to view your health information using other applications (apps) compatible with our system.

## 2023-05-13 NOTE — ED PROVIDER NOTE - CLINICAL SUMMARY MEDICAL DECISION MAKING FREE TEXT BOX
Patient is a 62-year-old male who presents to the emergency room with a chief complaint of cellulitis to the right foot.  Past medical history of diabetes history of neuropathy hypertension hyperlipidemia, history of peripheral vascular insufficiency status post angioplasty of the right lower extremity in November 2020.  Patient was last admitted to the hospital from December 16 of December 21, 2020 with right hallux osteomyelitis.  MRI head revealed acute osteomyelitis of the right first distal phalanx.  Patient underwent a right hallux amputation on 1218.  Antibiotics was switched from Zosyn to Invanz.  Cultures revealed serratia marcescens and alpha hemolytic strep.  However path report did show residual OM.  Patient was discharged with a PICC line for 6 weeks of IV Invanz.  Patient reports that he has had increased walking for his job over the last several weeks.  Approximately 2 weeks ago he noted a blister on his right foot fourth toe lateral aspect.  He followed up with podiatry at that time where he had a callus on the lateral aspect of his right foot shaved down.  He was started on antibiotics Keflex 500 mg twice daily at that time.  Followed up approximately 1 week ago and things were healing well when he was seen today there is concern for increased warmth and possible outpatient failure.  Patient was sent to the emergency room for further work-up and management. Patient presenting to the emergency room sent in by podiatrist for possible outpatient failure of antibiotics for cellulitis.  Will obtain screening labs check ESR CRP uric acid.  Will obtain x-ray of the foot begin IV antibiotics and monitor.  We will reach out to podiatry Patient is a 62-year-old male who presents to the emergency room with a chief complaint of cellulitis to the right foot.  Past medical history of diabetes history of neuropathy hypertension hyperlipidemia, history of peripheral vascular insufficiency status post angioplasty of the right lower extremity in November 2020.  Patient was last admitted to the hospital from December 16 of December 21, 2020 with right hallux osteomyelitis.  MRI head revealed acute osteomyelitis of the right first distal phalanx.  Patient underwent a right hallux amputation on 1218.  Antibiotics was switched from Zosyn to Invanz.  Cultures revealed serratia marcescens and alpha hemolytic strep.  However path report did show residual OM.  Patient was discharged with a PICC line for 6 weeks of IV Invanz.  Patient reports that he has had increased walking for his job over the last several weeks.  Approximately 2 weeks ago he noted a blister on his right foot fourth toe lateral aspect.  He followed up with podiatry at that time where he had a callus on the lateral aspect of his right foot shaved down.  He was started on antibiotics Keflex 500 mg twice daily at that time.  Followed up approximately 1 week ago and things were healing well when he was seen today there is concern for increased warmth and possible outpatient failure.  Patient was sent to the emergency room for further work-up and management. Patient presenting to the emergency room sent in by podiatrist for possible outpatient failure of antibiotics for cellulitis.  Will obtain screening labs check ESR CRP uric acid.  Will obtain x-ray of the foot begin IV antibiotics and monitor.  We will reach out to podiatry. Results of labs reviewed patient with a normal white count no significant electrolyte abnormality x-ray noted there is evidence of osteomyelitis.  Patient seen by podiatry (Dr. Stark) in the emergency room stable for discharge home we will add Bactrim to cover for MRSA.  Attempts to reach patient's own podiatrist were unsuccessful message was left. (Dr. Tubbs )

## 2023-05-18 LAB
CULTURE RESULTS: SIGNIFICANT CHANGE UP
CULTURE RESULTS: SIGNIFICANT CHANGE UP
SPECIMEN SOURCE: SIGNIFICANT CHANGE UP
SPECIMEN SOURCE: SIGNIFICANT CHANGE UP

## 2023-09-20 ENCOUNTER — OUTPATIENT (OUTPATIENT)
Dept: OUTPATIENT SERVICES | Facility: HOSPITAL | Age: 63
LOS: 1 days | Discharge: HOME | End: 2023-09-20
Payer: COMMERCIAL

## 2023-09-20 ENCOUNTER — APPOINTMENT (OUTPATIENT)
Dept: WOUND CARE | Facility: HOSPITAL | Age: 63
End: 2023-09-20
Payer: COMMERCIAL

## 2023-09-20 VITALS
HEART RATE: 99 BPM | OXYGEN SATURATION: 98 % | SYSTOLIC BLOOD PRESSURE: 171 MMHG | DIASTOLIC BLOOD PRESSURE: 92 MMHG | RESPIRATION RATE: 18 BRPM | TEMPERATURE: 98.3 F

## 2023-09-20 DIAGNOSIS — E11.40 TYPE 2 DIABETES MELLITUS WITH DIABETIC NEUROPATHY, UNSPECIFIED: ICD-10-CM

## 2023-09-20 DIAGNOSIS — Z98.62 PERIPHERAL VASCULAR ANGIOPLASTY STATUS: Chronic | ICD-10-CM

## 2023-09-20 DIAGNOSIS — S91.309A UNSPECIFIED OPEN WOUND, UNSPECIFIED FOOT, INITIAL ENCOUNTER: ICD-10-CM

## 2023-09-20 PROCEDURE — 99214 OFFICE O/P EST MOD 30 MIN: CPT

## 2023-09-20 PROCEDURE — G0463: CPT

## 2023-09-21 DIAGNOSIS — Z79.899 OTHER LONG TERM (CURRENT) DRUG THERAPY: ICD-10-CM

## 2023-09-21 DIAGNOSIS — E11.621 TYPE 2 DIABETES MELLITUS WITH FOOT ULCER: ICD-10-CM

## 2023-09-21 DIAGNOSIS — Z80.3 FAMILY HISTORY OF MALIGNANT NEOPLASM OF BREAST: ICD-10-CM

## 2023-09-21 DIAGNOSIS — E78.00 PURE HYPERCHOLESTEROLEMIA, UNSPECIFIED: ICD-10-CM

## 2023-09-21 DIAGNOSIS — L97.412 NON-PRESSURE CHRONIC ULCER OF RIGHT HEEL AND MIDFOOT WITH FAT LAYER EXPOSED: ICD-10-CM

## 2023-09-21 DIAGNOSIS — Z79.82 LONG TERM (CURRENT) USE OF ASPIRIN: ICD-10-CM

## 2023-09-21 DIAGNOSIS — Z86.39 PERSONAL HISTORY OF OTHER ENDOCRINE, NUTRITIONAL AND METABOLIC DISEASE: ICD-10-CM

## 2023-09-21 DIAGNOSIS — Z79.84 LONG TERM (CURRENT) USE OF ORAL HYPOGLYCEMIC DRUGS: ICD-10-CM

## 2023-09-21 DIAGNOSIS — E11.51 TYPE 2 DIABETES MELLITUS WITH DIABETIC PERIPHERAL ANGIOPATHY WITHOUT GANGRENE: ICD-10-CM

## 2023-09-21 DIAGNOSIS — I10 ESSENTIAL (PRIMARY) HYPERTENSION: ICD-10-CM

## 2023-09-21 DIAGNOSIS — Z83.3 FAMILY HISTORY OF DIABETES MELLITUS: ICD-10-CM

## 2023-09-21 DIAGNOSIS — Z89.411 ACQUIRED ABSENCE OF RIGHT GREAT TOE: ICD-10-CM

## 2023-09-21 DIAGNOSIS — E11.40 TYPE 2 DIABETES MELLITUS WITH DIABETIC NEUROPATHY, UNSPECIFIED: ICD-10-CM

## 2023-09-23 ENCOUNTER — OUTPATIENT (OUTPATIENT)
Dept: OUTPATIENT SERVICES | Facility: HOSPITAL | Age: 63
LOS: 1 days | End: 2023-09-23
Payer: COMMERCIAL

## 2023-09-23 ENCOUNTER — RESULT REVIEW (OUTPATIENT)
Age: 63
End: 2023-09-23

## 2023-09-23 DIAGNOSIS — Z98.62 PERIPHERAL VASCULAR ANGIOPLASTY STATUS: Chronic | ICD-10-CM

## 2023-09-23 DIAGNOSIS — E11.621 TYPE 2 DIABETES MELLITUS WITH FOOT ULCER: ICD-10-CM

## 2023-09-23 PROCEDURE — 73630 X-RAY EXAM OF FOOT: CPT | Mod: 26,RT

## 2023-09-23 PROCEDURE — 73630 X-RAY EXAM OF FOOT: CPT

## 2023-09-29 ENCOUNTER — OUTPATIENT (OUTPATIENT)
Dept: OUTPATIENT SERVICES | Facility: HOSPITAL | Age: 63
LOS: 1 days | Discharge: HOME | End: 2023-09-29
Payer: COMMERCIAL

## 2023-09-29 ENCOUNTER — APPOINTMENT (OUTPATIENT)
Dept: WOUND CARE | Facility: HOSPITAL | Age: 63
End: 2023-09-29
Payer: COMMERCIAL

## 2023-09-29 VITALS
HEART RATE: 88 BPM | BODY MASS INDEX: 28.04 KG/M2 | TEMPERATURE: 98.3 F | RESPIRATION RATE: 18 BRPM | HEIGHT: 68 IN | WEIGHT: 185 LBS | OXYGEN SATURATION: 99 % | DIASTOLIC BLOOD PRESSURE: 80 MMHG | SYSTOLIC BLOOD PRESSURE: 156 MMHG

## 2023-09-29 DIAGNOSIS — Z79.899 OTHER LONG TERM (CURRENT) DRUG THERAPY: ICD-10-CM

## 2023-09-29 DIAGNOSIS — E11.621 TYPE 2 DIABETES MELLITUS WITH FOOT ULCER: ICD-10-CM

## 2023-09-29 DIAGNOSIS — E11.40 TYPE 2 DIABETES MELLITUS WITH DIABETIC NEUROPATHY, UNSPECIFIED: ICD-10-CM

## 2023-09-29 DIAGNOSIS — Z80.3 FAMILY HISTORY OF MALIGNANT NEOPLASM OF BREAST: ICD-10-CM

## 2023-09-29 DIAGNOSIS — E78.00 PURE HYPERCHOLESTEROLEMIA, UNSPECIFIED: ICD-10-CM

## 2023-09-29 DIAGNOSIS — Z98.890 OTHER SPECIFIED POSTPROCEDURAL STATES: ICD-10-CM

## 2023-09-29 DIAGNOSIS — Z89.411 ACQUIRED ABSENCE OF RIGHT GREAT TOE: ICD-10-CM

## 2023-09-29 DIAGNOSIS — Z83.3 FAMILY HISTORY OF DIABETES MELLITUS: ICD-10-CM

## 2023-09-29 DIAGNOSIS — L97.412 NON-PRESSURE CHRONIC ULCER OF RIGHT HEEL AND MIDFOOT WITH FAT LAYER EXPOSED: ICD-10-CM

## 2023-09-29 DIAGNOSIS — S91.309D UNSPECIFIED OPEN WOUND, UNSPECIFIED FOOT, SUBSEQUENT ENCOUNTER: ICD-10-CM

## 2023-09-29 DIAGNOSIS — Z98.62 PERIPHERAL VASCULAR ANGIOPLASTY STATUS: Chronic | ICD-10-CM

## 2023-09-29 DIAGNOSIS — Z79.84 LONG TERM (CURRENT) USE OF ORAL HYPOGLYCEMIC DRUGS: ICD-10-CM

## 2023-09-29 DIAGNOSIS — Z86.31 PERSONAL HISTORY OF DIABETIC FOOT ULCER: ICD-10-CM

## 2023-09-29 DIAGNOSIS — I10 ESSENTIAL (PRIMARY) HYPERTENSION: ICD-10-CM

## 2023-09-29 DIAGNOSIS — E11.51 TYPE 2 DIABETES MELLITUS WITH DIABETIC PERIPHERAL ANGIOPATHY WITHOUT GANGRENE: ICD-10-CM

## 2023-09-29 DIAGNOSIS — Z79.82 LONG TERM (CURRENT) USE OF ASPIRIN: ICD-10-CM

## 2023-09-29 PROCEDURE — G0463: CPT

## 2023-09-29 PROCEDURE — 99213 OFFICE O/P EST LOW 20 MIN: CPT

## 2023-09-29 RX ORDER — CILOSTAZOL 100 MG/1
100 TABLET ORAL TWICE DAILY
Refills: 0 | Status: DISCONTINUED | COMMUNITY
End: 2023-09-29

## 2023-10-27 ENCOUNTER — APPOINTMENT (OUTPATIENT)
Dept: WOUND CARE | Facility: HOSPITAL | Age: 63
End: 2023-10-27
Payer: COMMERCIAL

## 2023-10-27 ENCOUNTER — OUTPATIENT (OUTPATIENT)
Dept: OUTPATIENT SERVICES | Facility: HOSPITAL | Age: 63
LOS: 1 days | Discharge: HOME | End: 2023-10-27
Payer: COMMERCIAL

## 2023-10-27 VITALS
RESPIRATION RATE: 18 BRPM | WEIGHT: 185 LBS | DIASTOLIC BLOOD PRESSURE: 79 MMHG | OXYGEN SATURATION: 99 % | TEMPERATURE: 98.2 F | BODY MASS INDEX: 28.04 KG/M2 | SYSTOLIC BLOOD PRESSURE: 145 MMHG | HEART RATE: 83 BPM | HEIGHT: 68 IN

## 2023-10-27 DIAGNOSIS — L97.412 NON-PRESSURE CHRONIC ULCER OF RIGHT HEEL AND MIDFOOT WITH FAT LAYER EXPOSED: ICD-10-CM

## 2023-10-27 DIAGNOSIS — Z98.62 PERIPHERAL VASCULAR ANGIOPLASTY STATUS: Chronic | ICD-10-CM

## 2023-10-27 DIAGNOSIS — S91.309A UNSPECIFIED OPEN WOUND, UNSPECIFIED FOOT, INITIAL ENCOUNTER: ICD-10-CM

## 2023-10-27 PROCEDURE — G0463: CPT

## 2023-10-27 PROCEDURE — 99213 OFFICE O/P EST LOW 20 MIN: CPT

## 2023-10-29 DIAGNOSIS — Z80.3 FAMILY HISTORY OF MALIGNANT NEOPLASM OF BREAST: ICD-10-CM

## 2023-10-29 DIAGNOSIS — E11.621 TYPE 2 DIABETES MELLITUS WITH FOOT ULCER: ICD-10-CM

## 2023-10-29 DIAGNOSIS — Z79.82 LONG TERM (CURRENT) USE OF ASPIRIN: ICD-10-CM

## 2023-10-29 DIAGNOSIS — E11.51 TYPE 2 DIABETES MELLITUS WITH DIABETIC PERIPHERAL ANGIOPATHY WITHOUT GANGRENE: ICD-10-CM

## 2023-10-29 DIAGNOSIS — E78.00 PURE HYPERCHOLESTEROLEMIA, UNSPECIFIED: ICD-10-CM

## 2023-10-29 DIAGNOSIS — Z83.3 FAMILY HISTORY OF DIABETES MELLITUS: ICD-10-CM

## 2023-10-29 DIAGNOSIS — Z79.84 LONG TERM (CURRENT) USE OF ORAL HYPOGLYCEMIC DRUGS: ICD-10-CM

## 2023-10-29 DIAGNOSIS — Z89.411 ACQUIRED ABSENCE OF RIGHT GREAT TOE: ICD-10-CM

## 2023-10-29 DIAGNOSIS — E11.40 TYPE 2 DIABETES MELLITUS WITH DIABETIC NEUROPATHY, UNSPECIFIED: ICD-10-CM

## 2023-10-29 DIAGNOSIS — I10 ESSENTIAL (PRIMARY) HYPERTENSION: ICD-10-CM

## 2023-10-29 DIAGNOSIS — L97.412 NON-PRESSURE CHRONIC ULCER OF RIGHT HEEL AND MIDFOOT WITH FAT LAYER EXPOSED: ICD-10-CM

## 2023-10-29 DIAGNOSIS — Z86.39 PERSONAL HISTORY OF OTHER ENDOCRINE, NUTRITIONAL AND METABOLIC DISEASE: ICD-10-CM

## 2023-10-29 DIAGNOSIS — Z98.890 OTHER SPECIFIED POSTPROCEDURAL STATES: ICD-10-CM

## 2023-10-29 DIAGNOSIS — Z79.899 OTHER LONG TERM (CURRENT) DRUG THERAPY: ICD-10-CM

## 2023-10-30 ENCOUNTER — NON-APPOINTMENT (OUTPATIENT)
Age: 63
End: 2023-10-30

## 2023-11-12 ENCOUNTER — NON-APPOINTMENT (OUTPATIENT)
Age: 63
End: 2023-11-12

## 2023-11-24 ENCOUNTER — INPATIENT (INPATIENT)
Facility: HOSPITAL | Age: 63
LOS: 7 days | Discharge: HOME | DRG: 617 | End: 2023-12-02
Attending: INTERNAL MEDICINE | Admitting: INTERNAL MEDICINE
Payer: COMMERCIAL

## 2023-11-24 ENCOUNTER — OUTPATIENT (OUTPATIENT)
Dept: OUTPATIENT SERVICES | Facility: HOSPITAL | Age: 63
LOS: 1 days | Discharge: ANOTHER HOSPITAL | End: 2023-11-24

## 2023-11-24 ENCOUNTER — APPOINTMENT (OUTPATIENT)
Dept: WOUND CARE | Facility: HOSPITAL | Age: 63
End: 2023-11-24

## 2023-11-24 ENCOUNTER — TRANSCRIPTION ENCOUNTER (OUTPATIENT)
Age: 63
End: 2023-11-24

## 2023-11-24 VITALS
BODY MASS INDEX: 28.04 KG/M2 | RESPIRATION RATE: 18 BRPM | HEART RATE: 92 BPM | HEIGHT: 68 IN | TEMPERATURE: 100.4 F | OXYGEN SATURATION: 97 % | DIASTOLIC BLOOD PRESSURE: 70 MMHG | SYSTOLIC BLOOD PRESSURE: 125 MMHG | WEIGHT: 185 LBS

## 2023-11-24 VITALS — TEMPERATURE: 100.2 F

## 2023-11-24 VITALS
SYSTOLIC BLOOD PRESSURE: 127 MMHG | OXYGEN SATURATION: 96 % | HEART RATE: 93 BPM | WEIGHT: 184.97 LBS | RESPIRATION RATE: 19 BRPM | HEIGHT: 78 IN | TEMPERATURE: 99 F | DIASTOLIC BLOOD PRESSURE: 65 MMHG

## 2023-11-24 DIAGNOSIS — I25.10 ATHEROSCLEROTIC HEART DISEASE OF NATIVE CORONARY ARTERY WITHOUT ANGINA PECTORIS: ICD-10-CM

## 2023-11-24 DIAGNOSIS — L97.414 NON-PRESSURE CHRONIC ULCER OF RIGHT HEEL AND MIDFOOT WITH NECROSIS OF BONE: ICD-10-CM

## 2023-11-24 DIAGNOSIS — E78.5 HYPERLIPIDEMIA, UNSPECIFIED: ICD-10-CM

## 2023-11-24 DIAGNOSIS — L03.115 CELLULITIS OF RIGHT LOWER LIMB: ICD-10-CM

## 2023-11-24 DIAGNOSIS — L97.509 TYPE 2 DIABETES MELLITUS WITH FOOT ULCER: ICD-10-CM

## 2023-11-24 DIAGNOSIS — E11.621 TYPE 2 DIABETES MELLITUS WITH FOOT ULCER: ICD-10-CM

## 2023-11-24 DIAGNOSIS — Z29.9 ENCOUNTER FOR PROPHYLACTIC MEASURES, UNSPECIFIED: ICD-10-CM

## 2023-11-24 DIAGNOSIS — Z98.62 PERIPHERAL VASCULAR ANGIOPLASTY STATUS: Chronic | ICD-10-CM

## 2023-11-24 DIAGNOSIS — I10 ESSENTIAL (PRIMARY) HYPERTENSION: ICD-10-CM

## 2023-11-24 DIAGNOSIS — L08.9 LOCAL INFECTION OF THE SKIN AND SUBCUTANEOUS TISSUE, UNSPECIFIED: ICD-10-CM

## 2023-11-24 DIAGNOSIS — I73.9 PERIPHERAL VASCULAR DISEASE, UNSPECIFIED: ICD-10-CM

## 2023-11-24 DIAGNOSIS — S91.309A UNSPECIFIED OPEN WOUND, UNSPECIFIED FOOT, INITIAL ENCOUNTER: ICD-10-CM

## 2023-11-24 LAB
A1C WITH ESTIMATED AVERAGE GLUCOSE RESULT: 8.3 % — HIGH (ref 4–5.6)
A1C WITH ESTIMATED AVERAGE GLUCOSE RESULT: 8.3 % — HIGH (ref 4–5.6)
ALBUMIN SERPL ELPH-MCNC: 3.2 G/DL — LOW (ref 3.3–5)
ALBUMIN SERPL ELPH-MCNC: 3.2 G/DL — LOW (ref 3.3–5)
ALP SERPL-CCNC: 73 U/L — SIGNIFICANT CHANGE UP (ref 40–120)
ALP SERPL-CCNC: 73 U/L — SIGNIFICANT CHANGE UP (ref 40–120)
ALT FLD-CCNC: 19 U/L — SIGNIFICANT CHANGE UP (ref 12–78)
ALT FLD-CCNC: 19 U/L — SIGNIFICANT CHANGE UP (ref 12–78)
ANION GAP SERPL CALC-SCNC: 10 MMOL/L — SIGNIFICANT CHANGE UP (ref 5–17)
ANION GAP SERPL CALC-SCNC: 10 MMOL/L — SIGNIFICANT CHANGE UP (ref 5–17)
APPEARANCE UR: CLEAR — SIGNIFICANT CHANGE UP
APPEARANCE UR: CLEAR — SIGNIFICANT CHANGE UP
APTT BLD: 30.3 SEC — SIGNIFICANT CHANGE UP (ref 24.5–35.6)
APTT BLD: 30.3 SEC — SIGNIFICANT CHANGE UP (ref 24.5–35.6)
AST SERPL-CCNC: 20 U/L — SIGNIFICANT CHANGE UP (ref 15–37)
AST SERPL-CCNC: 20 U/L — SIGNIFICANT CHANGE UP (ref 15–37)
BASOPHILS # BLD AUTO: 0.07 K/UL — SIGNIFICANT CHANGE UP (ref 0–0.2)
BASOPHILS # BLD AUTO: 0.07 K/UL — SIGNIFICANT CHANGE UP (ref 0–0.2)
BASOPHILS NFR BLD AUTO: 0.4 % — SIGNIFICANT CHANGE UP (ref 0–2)
BASOPHILS NFR BLD AUTO: 0.4 % — SIGNIFICANT CHANGE UP (ref 0–2)
BILIRUB SERPL-MCNC: 0.7 MG/DL — SIGNIFICANT CHANGE UP (ref 0.2–1.2)
BILIRUB SERPL-MCNC: 0.7 MG/DL — SIGNIFICANT CHANGE UP (ref 0.2–1.2)
BILIRUB UR-MCNC: NEGATIVE — SIGNIFICANT CHANGE UP
BILIRUB UR-MCNC: NEGATIVE — SIGNIFICANT CHANGE UP
BUN SERPL-MCNC: 27 MG/DL — HIGH (ref 7–23)
BUN SERPL-MCNC: 27 MG/DL — HIGH (ref 7–23)
CALCIUM SERPL-MCNC: 9.1 MG/DL — SIGNIFICANT CHANGE UP (ref 8.5–10.1)
CALCIUM SERPL-MCNC: 9.1 MG/DL — SIGNIFICANT CHANGE UP (ref 8.5–10.1)
CHLORIDE SERPL-SCNC: 101 MMOL/L — SIGNIFICANT CHANGE UP (ref 96–108)
CHLORIDE SERPL-SCNC: 101 MMOL/L — SIGNIFICANT CHANGE UP (ref 96–108)
CO2 SERPL-SCNC: 26 MMOL/L — SIGNIFICANT CHANGE UP (ref 22–31)
CO2 SERPL-SCNC: 26 MMOL/L — SIGNIFICANT CHANGE UP (ref 22–31)
COLOR SPEC: YELLOW — SIGNIFICANT CHANGE UP
COLOR SPEC: YELLOW — SIGNIFICANT CHANGE UP
CREAT SERPL-MCNC: 1.2 MG/DL — SIGNIFICANT CHANGE UP (ref 0.5–1.3)
CREAT SERPL-MCNC: 1.2 MG/DL — SIGNIFICANT CHANGE UP (ref 0.5–1.3)
CRP SERPL-MCNC: 238 MG/L — HIGH
CRP SERPL-MCNC: 238 MG/L — HIGH
DIFF PNL FLD: NEGATIVE — SIGNIFICANT CHANGE UP
DIFF PNL FLD: NEGATIVE — SIGNIFICANT CHANGE UP
EGFR: 68 ML/MIN/1.73M2 — SIGNIFICANT CHANGE UP
EGFR: 68 ML/MIN/1.73M2 — SIGNIFICANT CHANGE UP
EOSINOPHIL # BLD AUTO: 0.09 K/UL — SIGNIFICANT CHANGE UP (ref 0–0.5)
EOSINOPHIL # BLD AUTO: 0.09 K/UL — SIGNIFICANT CHANGE UP (ref 0–0.5)
EOSINOPHIL NFR BLD AUTO: 0.5 % — SIGNIFICANT CHANGE UP (ref 0–6)
EOSINOPHIL NFR BLD AUTO: 0.5 % — SIGNIFICANT CHANGE UP (ref 0–6)
ERYTHROCYTE [SEDIMENTATION RATE] IN BLOOD: 77 MM/HR — HIGH (ref 0–20)
ERYTHROCYTE [SEDIMENTATION RATE] IN BLOOD: 77 MM/HR — HIGH (ref 0–20)
ESTIMATED AVERAGE GLUCOSE: 192 MG/DL — HIGH (ref 68–114)
ESTIMATED AVERAGE GLUCOSE: 192 MG/DL — HIGH (ref 68–114)
GLUCOSE BLDC GLUCOMTR-MCNC: 150 MG/DL — HIGH (ref 70–99)
GLUCOSE BLDC GLUCOMTR-MCNC: 150 MG/DL — HIGH (ref 70–99)
GLUCOSE SERPL-MCNC: 149 MG/DL — HIGH (ref 70–99)
GLUCOSE SERPL-MCNC: 149 MG/DL — HIGH (ref 70–99)
GLUCOSE UR QL: >=1000 MG/DL
GLUCOSE UR QL: >=1000 MG/DL
HCT VFR BLD CALC: 38.4 % — LOW (ref 39–50)
HCT VFR BLD CALC: 38.4 % — LOW (ref 39–50)
HGB BLD-MCNC: 12.6 G/DL — LOW (ref 13–17)
HGB BLD-MCNC: 12.6 G/DL — LOW (ref 13–17)
IMM GRANULOCYTES NFR BLD AUTO: 0.6 % — SIGNIFICANT CHANGE UP (ref 0–0.9)
IMM GRANULOCYTES NFR BLD AUTO: 0.6 % — SIGNIFICANT CHANGE UP (ref 0–0.9)
INR BLD: 1.06 RATIO — SIGNIFICANT CHANGE UP (ref 0.85–1.18)
INR BLD: 1.06 RATIO — SIGNIFICANT CHANGE UP (ref 0.85–1.18)
KETONES UR-MCNC: 15 MG/DL
KETONES UR-MCNC: 15 MG/DL
LACTATE SERPL-SCNC: 1.4 MMOL/L — SIGNIFICANT CHANGE UP (ref 0.7–2)
LACTATE SERPL-SCNC: 1.4 MMOL/L — SIGNIFICANT CHANGE UP (ref 0.7–2)
LEUKOCYTE ESTERASE UR-ACNC: NEGATIVE — SIGNIFICANT CHANGE UP
LEUKOCYTE ESTERASE UR-ACNC: NEGATIVE — SIGNIFICANT CHANGE UP
LYMPHOCYTES # BLD AUTO: 1.15 K/UL — SIGNIFICANT CHANGE UP (ref 1–3.3)
LYMPHOCYTES # BLD AUTO: 1.15 K/UL — SIGNIFICANT CHANGE UP (ref 1–3.3)
LYMPHOCYTES # BLD AUTO: 6.6 % — LOW (ref 13–44)
LYMPHOCYTES # BLD AUTO: 6.6 % — LOW (ref 13–44)
MCHC RBC-ENTMCNC: 30 PG — SIGNIFICANT CHANGE UP (ref 27–34)
MCHC RBC-ENTMCNC: 30 PG — SIGNIFICANT CHANGE UP (ref 27–34)
MCHC RBC-ENTMCNC: 32.8 GM/DL — SIGNIFICANT CHANGE UP (ref 32–36)
MCHC RBC-ENTMCNC: 32.8 GM/DL — SIGNIFICANT CHANGE UP (ref 32–36)
MCV RBC AUTO: 91.4 FL — SIGNIFICANT CHANGE UP (ref 80–100)
MCV RBC AUTO: 91.4 FL — SIGNIFICANT CHANGE UP (ref 80–100)
MONOCYTES # BLD AUTO: 1.06 K/UL — HIGH (ref 0–0.9)
MONOCYTES # BLD AUTO: 1.06 K/UL — HIGH (ref 0–0.9)
MONOCYTES NFR BLD AUTO: 6.1 % — SIGNIFICANT CHANGE UP (ref 2–14)
MONOCYTES NFR BLD AUTO: 6.1 % — SIGNIFICANT CHANGE UP (ref 2–14)
NEUTROPHILS # BLD AUTO: 15.04 K/UL — HIGH (ref 1.8–7.4)
NEUTROPHILS # BLD AUTO: 15.04 K/UL — HIGH (ref 1.8–7.4)
NEUTROPHILS NFR BLD AUTO: 85.8 % — HIGH (ref 43–77)
NEUTROPHILS NFR BLD AUTO: 85.8 % — HIGH (ref 43–77)
NITRITE UR-MCNC: NEGATIVE — SIGNIFICANT CHANGE UP
NITRITE UR-MCNC: NEGATIVE — SIGNIFICANT CHANGE UP
NRBC # BLD: 0 /100 WBCS — SIGNIFICANT CHANGE UP (ref 0–0)
NRBC # BLD: 0 /100 WBCS — SIGNIFICANT CHANGE UP (ref 0–0)
PH UR: 5 — SIGNIFICANT CHANGE UP (ref 5–8)
PH UR: 5 — SIGNIFICANT CHANGE UP (ref 5–8)
PLATELET # BLD AUTO: 307 K/UL — SIGNIFICANT CHANGE UP (ref 150–400)
PLATELET # BLD AUTO: 307 K/UL — SIGNIFICANT CHANGE UP (ref 150–400)
POTASSIUM SERPL-MCNC: 4.8 MMOL/L — SIGNIFICANT CHANGE UP (ref 3.5–5.3)
POTASSIUM SERPL-MCNC: 4.8 MMOL/L — SIGNIFICANT CHANGE UP (ref 3.5–5.3)
POTASSIUM SERPL-SCNC: 4.8 MMOL/L — SIGNIFICANT CHANGE UP (ref 3.5–5.3)
POTASSIUM SERPL-SCNC: 4.8 MMOL/L — SIGNIFICANT CHANGE UP (ref 3.5–5.3)
PREALB SERPL-MCNC: 12 MG/DL — LOW (ref 20–40)
PREALB SERPL-MCNC: 12 MG/DL — LOW (ref 20–40)
PROT SERPL-MCNC: 7.4 G/DL — SIGNIFICANT CHANGE UP (ref 6–8.3)
PROT SERPL-MCNC: 7.4 G/DL — SIGNIFICANT CHANGE UP (ref 6–8.3)
PROT UR-MCNC: NEGATIVE MG/DL — SIGNIFICANT CHANGE UP
PROT UR-MCNC: NEGATIVE MG/DL — SIGNIFICANT CHANGE UP
PROTHROM AB SERPL-ACNC: 12.4 SEC — SIGNIFICANT CHANGE UP (ref 9.5–13)
PROTHROM AB SERPL-ACNC: 12.4 SEC — SIGNIFICANT CHANGE UP (ref 9.5–13)
RBC # BLD: 4.2 M/UL — SIGNIFICANT CHANGE UP (ref 4.2–5.8)
RBC # BLD: 4.2 M/UL — SIGNIFICANT CHANGE UP (ref 4.2–5.8)
RBC # FLD: 12.6 % — SIGNIFICANT CHANGE UP (ref 10.3–14.5)
RBC # FLD: 12.6 % — SIGNIFICANT CHANGE UP (ref 10.3–14.5)
SODIUM SERPL-SCNC: 137 MMOL/L — SIGNIFICANT CHANGE UP (ref 135–145)
SODIUM SERPL-SCNC: 137 MMOL/L — SIGNIFICANT CHANGE UP (ref 135–145)
SP GR SPEC: 1.03 — SIGNIFICANT CHANGE UP (ref 1–1.03)
SP GR SPEC: 1.03 — SIGNIFICANT CHANGE UP (ref 1–1.03)
UROBILINOGEN FLD QL: 1 MG/DL — SIGNIFICANT CHANGE UP (ref 0.2–1)
UROBILINOGEN FLD QL: 1 MG/DL — SIGNIFICANT CHANGE UP (ref 0.2–1)
WBC # BLD: 17.51 K/UL — HIGH (ref 3.8–10.5)
WBC # BLD: 17.51 K/UL — HIGH (ref 3.8–10.5)
WBC # FLD AUTO: 17.51 K/UL — HIGH (ref 3.8–10.5)
WBC # FLD AUTO: 17.51 K/UL — HIGH (ref 3.8–10.5)

## 2023-11-24 PROCEDURE — 71045 X-RAY EXAM CHEST 1 VIEW: CPT | Mod: 26

## 2023-11-24 PROCEDURE — 93010 ELECTROCARDIOGRAM REPORT: CPT | Mod: 77

## 2023-11-24 PROCEDURE — 99285 EMERGENCY DEPT VISIT HI MDM: CPT

## 2023-11-24 PROCEDURE — 93010 ELECTROCARDIOGRAM REPORT: CPT

## 2023-11-24 PROCEDURE — 73630 X-RAY EXAM OF FOOT: CPT | Mod: 26,RT

## 2023-11-24 RX ORDER — METOPROLOL TARTRATE 50 MG
1 TABLET ORAL
Qty: 0 | Refills: 0 | DISCHARGE

## 2023-11-24 RX ORDER — CLOPIDOGREL BISULFATE 75 MG/1
1 TABLET, FILM COATED ORAL
Qty: 0 | Refills: 0 | DISCHARGE

## 2023-11-24 RX ORDER — LANOLIN ALCOHOL/MO/W.PET/CERES
1 CREAM (GRAM) TOPICAL
Qty: 0 | Refills: 0 | DISCHARGE

## 2023-11-24 RX ORDER — ACETAMINOPHEN 500 MG
650 TABLET ORAL EVERY 6 HOURS
Refills: 0 | Status: DISCONTINUED | OUTPATIENT
Start: 2023-11-24 | End: 2023-11-27

## 2023-11-24 RX ORDER — PIPERACILLIN AND TAZOBACTAM 4; .5 G/20ML; G/20ML
3.38 INJECTION, POWDER, LYOPHILIZED, FOR SOLUTION INTRAVENOUS ONCE
Refills: 0 | Status: COMPLETED | OUTPATIENT
Start: 2023-11-24 | End: 2023-11-24

## 2023-11-24 RX ORDER — SODIUM CHLORIDE 9 MG/ML
1000 INJECTION INTRAMUSCULAR; INTRAVENOUS; SUBCUTANEOUS ONCE
Refills: 0 | Status: COMPLETED | OUTPATIENT
Start: 2023-11-24 | End: 2023-11-24

## 2023-11-24 RX ORDER — LANOLIN ALCOHOL/MO/W.PET/CERES
3 CREAM (GRAM) TOPICAL AT BEDTIME
Refills: 0 | Status: DISCONTINUED | OUTPATIENT
Start: 2023-11-24 | End: 2023-11-26

## 2023-11-24 RX ORDER — METFORMIN HYDROCHLORIDE 850 MG/1
1 TABLET ORAL
Qty: 0 | Refills: 0 | DISCHARGE

## 2023-11-24 RX ORDER — DEXTROSE 50 % IN WATER 50 %
12.5 SYRINGE (ML) INTRAVENOUS ONCE
Refills: 0 | Status: DISCONTINUED | OUTPATIENT
Start: 2023-11-24 | End: 2023-11-27

## 2023-11-24 RX ORDER — LISINOPRIL 2.5 MG/1
40 TABLET ORAL DAILY
Refills: 0 | Status: DISCONTINUED | OUTPATIENT
Start: 2023-11-24 | End: 2023-11-27

## 2023-11-24 RX ORDER — CANAGLIFLOZIN 100 MG/1
1 TABLET, FILM COATED ORAL
Qty: 0 | Refills: 0 | DISCHARGE

## 2023-11-24 RX ORDER — CLOPIDOGREL BISULFATE 75 MG/1
75 TABLET, FILM COATED ORAL DAILY
Refills: 0 | Status: DISCONTINUED | OUTPATIENT
Start: 2023-11-24 | End: 2023-11-27

## 2023-11-24 RX ORDER — PIPERACILLIN AND TAZOBACTAM 4; .5 G/20ML; G/20ML
3.38 INJECTION, POWDER, LYOPHILIZED, FOR SOLUTION INTRAVENOUS EVERY 8 HOURS
Refills: 0 | Status: DISCONTINUED | OUTPATIENT
Start: 2023-11-24 | End: 2023-11-27

## 2023-11-24 RX ORDER — EMPAGLIFLOZIN 10 MG/1
1 TABLET, FILM COATED ORAL
Refills: 0 | DISCHARGE

## 2023-11-24 RX ORDER — DULAGLUTIDE 4.5 MG/.5ML
1.5 INJECTION, SOLUTION SUBCUTANEOUS
Refills: 0 | DISCHARGE

## 2023-11-24 RX ORDER — SODIUM CHLORIDE 9 MG/ML
1000 INJECTION, SOLUTION INTRAVENOUS
Refills: 0 | Status: DISCONTINUED | OUTPATIENT
Start: 2023-11-24 | End: 2023-11-27

## 2023-11-24 RX ORDER — ENOXAPARIN SODIUM 100 MG/ML
40 INJECTION SUBCUTANEOUS EVERY 24 HOURS
Refills: 0 | Status: DISCONTINUED | OUTPATIENT
Start: 2023-11-24 | End: 2023-11-27

## 2023-11-24 RX ORDER — VANCOMYCIN HCL 1 G
1000 VIAL (EA) INTRAVENOUS ONCE
Refills: 0 | Status: COMPLETED | OUTPATIENT
Start: 2023-11-24 | End: 2023-11-24

## 2023-11-24 RX ORDER — GLUCAGON INJECTION, SOLUTION 0.5 MG/.1ML
1 INJECTION, SOLUTION SUBCUTANEOUS ONCE
Refills: 0 | Status: DISCONTINUED | OUTPATIENT
Start: 2023-11-24 | End: 2023-11-27

## 2023-11-24 RX ORDER — INSULIN LISPRO 100/ML
VIAL (ML) SUBCUTANEOUS AT BEDTIME
Refills: 0 | Status: DISCONTINUED | OUTPATIENT
Start: 2023-11-24 | End: 2023-11-27

## 2023-11-24 RX ORDER — LISINOPRIL 2.5 MG/1
1 TABLET ORAL
Refills: 0 | DISCHARGE

## 2023-11-24 RX ORDER — METOPROLOL TARTRATE 50 MG
50 TABLET ORAL DAILY
Refills: 0 | Status: DISCONTINUED | OUTPATIENT
Start: 2023-11-24 | End: 2023-11-27

## 2023-11-24 RX ORDER — CILOSTAZOL 100 MG/1
1 TABLET ORAL
Qty: 0 | Refills: 0 | DISCHARGE

## 2023-11-24 RX ORDER — SITAGLIPTIN 50 MG/1
1 TABLET, FILM COATED ORAL
Qty: 0 | Refills: 0 | DISCHARGE

## 2023-11-24 RX ORDER — DEXTROSE 50 % IN WATER 50 %
25 SYRINGE (ML) INTRAVENOUS ONCE
Refills: 0 | Status: DISCONTINUED | OUTPATIENT
Start: 2023-11-24 | End: 2023-11-27

## 2023-11-24 RX ORDER — GABAPENTIN 400 MG/1
300 CAPSULE ORAL
Refills: 0 | Status: DISCONTINUED | OUTPATIENT
Start: 2023-11-24 | End: 2023-11-27

## 2023-11-24 RX ORDER — INSULIN LISPRO 100/ML
VIAL (ML) SUBCUTANEOUS
Refills: 0 | Status: DISCONTINUED | OUTPATIENT
Start: 2023-11-24 | End: 2023-11-27

## 2023-11-24 RX ORDER — ATORVASTATIN CALCIUM 80 MG/1
1 TABLET, FILM COATED ORAL
Qty: 0 | Refills: 0 | DISCHARGE

## 2023-11-24 RX ORDER — QUINAPRIL HYDROCHLORIDE 40 MG/1
1 TABLET, FILM COATED ORAL
Qty: 0 | Refills: 0 | DISCHARGE

## 2023-11-24 RX ORDER — DEXTROSE 50 % IN WATER 50 %
15 SYRINGE (ML) INTRAVENOUS ONCE
Refills: 0 | Status: DISCONTINUED | OUTPATIENT
Start: 2023-11-24 | End: 2023-11-27

## 2023-11-24 RX ORDER — ATORVASTATIN CALCIUM 80 MG/1
40 TABLET, FILM COATED ORAL AT BEDTIME
Refills: 0 | Status: DISCONTINUED | OUTPATIENT
Start: 2023-11-24 | End: 2023-11-27

## 2023-11-24 RX ADMIN — GABAPENTIN 300 MILLIGRAM(S): 400 CAPSULE ORAL at 19:00

## 2023-11-24 RX ADMIN — Medication 250 MILLIGRAM(S): at 10:16

## 2023-11-24 RX ADMIN — ATORVASTATIN CALCIUM 40 MILLIGRAM(S): 80 TABLET, FILM COATED ORAL at 22:00

## 2023-11-24 RX ADMIN — ENOXAPARIN SODIUM 40 MILLIGRAM(S): 100 INJECTION SUBCUTANEOUS at 19:00

## 2023-11-24 RX ADMIN — PIPERACILLIN AND TAZOBACTAM 25 GRAM(S): 4; .5 INJECTION, POWDER, LYOPHILIZED, FOR SOLUTION INTRAVENOUS at 22:01

## 2023-11-24 RX ADMIN — Medication 3 MILLIGRAM(S): at 22:01

## 2023-11-24 RX ADMIN — PIPERACILLIN AND TAZOBACTAM 25 GRAM(S): 4; .5 INJECTION, POWDER, LYOPHILIZED, FOR SOLUTION INTRAVENOUS at 18:25

## 2023-11-24 RX ADMIN — SODIUM CHLORIDE 1000 MILLILITER(S): 9 INJECTION INTRAMUSCULAR; INTRAVENOUS; SUBCUTANEOUS at 10:15

## 2023-11-24 RX ADMIN — PIPERACILLIN AND TAZOBACTAM 200 GRAM(S): 4; .5 INJECTION, POWDER, LYOPHILIZED, FOR SOLUTION INTRAVENOUS at 10:15

## 2023-11-24 NOTE — H&P ADULT - HISTORY OF PRESENT ILLNESS
61 yo M with PMH HTN, HLD, Type II DM, s/p R hallux toe amputation 3 weeks ago presents to the ED with R foot wound. Patient went to a private podiatrist 2.5 months ago to scrape off callus. The callus removal site did not heal well and started to develop into a wound. Podiatrist recommended patient see Dr. Stark for wound management. Per patient, would has been intermittently draining clear liquid and having foul odor. Last sunday, patient had episode of chills that resolved when he went to sleep. Last night, he also had chills and had difficulty balancing     Denies fever, chills, chest pain, palpitations, SOB, cough, abdominal pain, nausea, vomiting, diarrhea, constipation, urinary frequency, urgency, or dysuria, headaches, changes in vision, dizziness, numbness, tingling.  Denies recent travel, recent antibiotic use, or sick contacts.    ED Course:   Vitals: BP: , HR: , Temp: , RR: , SpO2: % on   Labs:    ABG: pH: , PO2: , PCO2: , HCO3: , SpO2: %  UA:   CXR: as per personal read, official read pending   CT:  EKG:   Received in the ED    63 yo M with PMH HTN, HLD, Type II DM, s/p R hallux toe amputation 3 weeks ago presents to the ED with R foot wound. Patient went to a private podiatrist 2.5 months ago to scrape off callus. The callus removal site did not heal well and started to develop into a wound. Podiatrist recommended patient see Dr. Stark for wound management. Per patient, would has been intermittently draining clear liquid and having foul odor. Last sunday, patient had episode of chills that resolved when he went to sleep. Last night, he also had chills and had difficulty balancing     Denies fever, chills, chest pain, palpitations, SOB, cough, abdominal pain, nausea, vomiting, diarrhea, constipation, urinary frequency, urgency, or dysuria, headaches, changes in vision, dizziness, numbness, tingling.  Denies recent travel, recent antibiotic use, or sick contacts.    ED Course:   Vitals: BP: , HR: , Temp: , RR: , SpO2: % on   Labs:    ABG: pH: , PO2: , PCO2: , HCO3: , SpO2: %  UA:   CXR: as per personal read, official read pending   CT:  EKG:   Received in the ED   ldss cardio consult tomorrow   63 yo M with PMH HTN, HLD, Type II DM, s/p R hallux toe amputation 3 weeks ago presents to the ED with R foot wound. Patient went to a private podiatrist 2.5 months ago to scrape off callus. The callus removal site did not heal well and started to develop into a wound. Podiatrist recommended patient see Dr. Stark for wound management. Per patient, would has been intermittently draining clear liquid and having foul odor. Last sunday, patient had episode of chills that resolved when he went to sleep. Last night, he also had chills and had difficulty balancing. Today he went to his regular scheduled appt with Dr. Stark who recommended he be admitted for IV ABx and surgery on Tuesday,  Denies fever chest pain, palpitations, SOB, cough, abdominal pain, nausea, vomiting, diarrhea, constipation, urinary frequency, urgency, or dysuria, headaches, changes in vision, dizziness, numbness, tingling.  Denies recent travel, recent antibiotic use, or sick contacts.    ED Course:   Vitals: BP: 127/65, HR: 93, Temp: 99.2 , RR: 19, SpO2: 96 % on RA   Labs:  ESR: 77, WBC: 17.51, H/H: 12.6/38.4  CXR: No evidence of acute infiltrate  XRAY foot: IMPRESSION: Suspect pathologic fractures of the third and fourth metatarsal heads. 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 If osteomyelitis is clinically considered  despite conservative therapy,   and soft tissue / bone infection requires further assessment, follow-up   MRI recommended.  EKG:  NSR, 81 BPM  Received in the ED      61 yo M with PMH HTN, HLD, Type II DM, s/p R hallux toe amputation 3 weeks ago presents to the ED with R foot wound. Patient went to a private podiatrist 2.5 months ago to scrape off callus. The callus removal site did not heal well and started to develop into a wound. Podiatrist recommended patient see Dr. Stark for wound management. Per patient, would has been intermittently draining clear liquid and having foul odor. Last sunday, patient had episode of chills that resolved when he went to sleep. Last night, he also had chills and had difficulty balancing. Today he went to his regular scheduled appt with Dr. Stark who recommended he be admitted for IV ABx and surgery on Tuesday,  Denies fever chest pain, palpitations, SOB, cough, abdominal pain, nausea, vomiting, diarrhea, constipation, urinary frequency, urgency, or dysuria, headaches, changes in vision, dizziness, numbness, tingling. Denies recent travel, recent antibiotic use, or sick contacts.    ED Course:   Vitals: BP: 127/65, HR: 93, Temp: 99.2 , RR: 19, SpO2: 96 % on RA   Labs:  ESR: 77, WBC: 17.51, H/H: 12.6/38.4  CXR: No evidence of acute infiltrate  XRAY foot: IMPRESSION: Suspect pathologic fractures of the third and fourth metatarsal heads. 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 If osteomyelitis is clinically considered  despite conservative therapy,   and soft tissue / bone infection requires further assessment, follow-up   MRI recommended.  EKG:  NSR, 81 BPM  Received in the ED  Vanc x  1, Zosyn x  1, NS bolus  x 1     63 yo M with PMH HTN, HLD, Type II DM, s/p R hallux toe amputation 3 yrs  ago presents to the ED with R foot wound. Patient went to a private podiatrist 2.5 months ago to scrape off callus. The callus removal site did not heal well and started to develop into a wound. Podiatrist recommended patient see Dr. Stark for wound management. Per patient, would has been intermittently draining clear liquid and having foul odor. Last sunday, patient had episode of chills that resolved when he went to sleep. Last night, he also had chills and had difficulty balancing. Today he went to his regular scheduled appt with Dr. Stark who recommended he be admitted for IV ABx and surgery on Tuesday,  Denies fever chest pain, palpitations, SOB, cough, abdominal pain, nausea, vomiting, diarrhea, constipation, urinary frequency, urgency, or dysuria, headaches, changes in vision, dizziness, numbness, tingling. Denies recent travel, recent antibiotic use, or sick contacts.    ED Course:   Vitals: BP: 127/65, HR: 93, Temp: 99.2 , RR: 19, SpO2: 96 % on RA   Labs:  ESR: 77, WBC: 17.51, H/H: 12.6/38.4  CXR: No evidence of acute infiltrate  XRAY foot: IMPRESSION: Suspect pathologic fractures of the third and fourth metatarsal heads. 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 If osteomyelitis is clinically considered  despite conservative therapy,   and soft tissue / bone infection requires further assessment, follow-up   MRI recommended.  EKG:  NSR, 81 BPM  Received in the ED  Vanc x  1, Zosyn x  1, NS bolus  x 1

## 2023-11-24 NOTE — PATIENT PROFILE ADULT - HAS THE PATIENT RECEIVED THE INFLUENZA VACCINE THIS SEASON?
[Time Spent: ___ minutes] : I have spent [unfilled] minutes of time on the encounter. unable to assess immunization status...

## 2023-11-24 NOTE — CONSULT NOTE ADULT - ASSESSMENT
62 year old male with PMH HTN, HLD, Type II DM, s/p Right 1st toe and 1st metatarsal head resection 3 weeks ago, presents to the ED with R 2nd metatarsal wound/infection.    - Follow up repeat ABIs  - Local wound care per podiatry  - Continue antibiotics  - To be discussed with Dr. Lewis 62 year old male with PMH HTN, HLD, Type II DM, s/p Right 1st toe and 1st metatarsal head resection 3 weeks ago, presents to the ED with R 2nd metatarsal wound/infection. Awaiting xray/US/MRI.    - Follow up repeat ABIs  - Local wound care per podiatry  - Continue antibiotics  - To be discussed with Dr. Lewis

## 2023-11-24 NOTE — CONSULT NOTE ADULT - SUBJECTIVE AND OBJECTIVE BOX
HPI:  61 yo M with PMH HTN, HLD, Type II DM, s/p R hallux toe amputation 3 weeks ago presents to the ED with R foot wound. Patient went to a private podiatrist 2.5 months ago to scrape off callus. The callus removal site did not heal well and started to develop into a wound. Podiatrist recommended patient see Dr. Stark for wound management. Per patient, would has been intermittently draining clear liquid and having foul odor. Last sunday, patient had episode of chills that resolved when he went to sleep. Last night, he also had chills and had difficulty balancing. Today he went to his regular scheduled appt with Dr. Stark who recommended he be admitted for IV ABx and surgery on Tuesday,  Denies fever chest pain, palpitations, SOB, cough, abdominal pain, nausea, vomiting, diarrhea, constipation, urinary frequency, urgency, or dysuria, headaches, changes in vision, dizziness, numbness, tingling. Denies recent travel, recent antibiotic use, or sick contacts.    ED Course:   Vitals: BP: 127/65, HR: 93, Temp: 99.2 , RR: 19, SpO2: 96 % on RA   Labs:  ESR: 77, WBC: 17.51, H/H: 12.6/38.4  CXR: No evidence of acute infiltrate  XRAY foot: IMPRESSION: Suspect pathologic fractures of the third and fourth metatarsal heads. 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 If osteomyelitis is clinically considered  despite conservative therapy,   and soft tissue / bone infection requires further assessment, follow-up   MRI recommended.  EKG:  NSR, 81 BPM  Received in the ED  Vanc x  1, Zosyn x  1, NS bolus  x 1    (24 Nov 2023 15:27)    INTERVAL HPI:  Vascular surgery consulted for clearance for at least a Right 2nd metatarsal head resection scheduled early next week. Awaiting xray, US, and MRI. Patient normally sees Dr. Vance at , underwent RLE angioplasty 3 year ago and was scheduled for a "procedure for plaque removal in Left shoulder" 12/14. Patient had ABIs and arterial dopplers done in 12/2020 which were normal.    PAST MEDICAL & SURGICAL HISTORY:  HTN (hypertension)  Diabetes  Hyperlipidemia  H/O angioplasty, RLE    REVIEW OF SYSTEMS:  CONSTITUTIONAL: No weakness, fevers or chills  EYES/ENT: No visual changes;  No vertigo or throat pain   NECK: No pain or stiffness  RESPIRATORY: No cough, wheezing, hemoptysis; No shortness of breath  CARDIOVASCULAR: No chest pain or palpitations  GASTROINTESTINAL: No abdominal or epigastric pain. No nausea, vomiting, or hematemesis; No diarrhea or constipation. No melena or hematochezia.  GENITOURINARY: No dysuria, frequency or hematuria  NEUROLOGICAL: +chronic peripheral neuropathy  SKIN: RLE wound  All other review of systems is negative unless indicated above.    MEDICATIONS:  MEDICATIONS  (STANDING):  atorvastatin 40 milliGRAM(s) Oral at bedtime  clopidogrel Tablet 75 milliGRAM(s) Oral daily  dextrose 5%. 1000 milliLiter(s) (50 mL/Hr) IV Continuous <Continuous>  dextrose 5%. 1000 milliLiter(s) (100 mL/Hr) IV Continuous <Continuous>  dextrose 50% Injectable 12.5 Gram(s) IV Push once  dextrose 50% Injectable 25 Gram(s) IV Push once  dextrose 50% Injectable 25 Gram(s) IV Push once  enoxaparin Injectable 40 milliGRAM(s) SubCutaneous every 24 hours  gabapentin 300 milliGRAM(s) Oral two times a day  glucagon  Injectable 1 milliGRAM(s) IntraMuscular once  insulin lispro (ADMELOG) corrective regimen sliding scale   SubCutaneous three times a day before meals  insulin lispro (ADMELOG) corrective regimen sliding scale   SubCutaneous at bedtime  lisinopril 40 milliGRAM(s) Oral daily  metoprolol succinate ER 50 milliGRAM(s) Oral daily  piperacillin/tazobactam IVPB.. 3.375 Gram(s) IV Intermittent every 8 hours  piperacillin/tazobactam IVPB.. 3.375 Gram(s) IV Intermittent once    MEDICATIONS  (PRN):  acetaminophen     Tablet .. 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  HEENT:  NC/AT. Sclera white. Mucous membranes moist.  CARDIO:  Regular rate and rhythm.  RESPIRATORY:  Nonlabored breathing, no accessory muscle use.   EXTREMITIES: Bilateral lower extremities warm, strength/sensation intact, decreased sensation. Right foot dressed in kerlix, clean/dry. Right DP pulse palpable. Right PT and Left PT/DP pulses identifiable via doppler.  SKIN:  No jaundice, pallor, or cyanosis  NEURO:  A&O x 3    LABS:                        12.6   17.51 )-----------( 307      ( 24 Nov 2023 09:50 )             38.4     11-24    137  |  101  |  27<H>  ----------------------------<  149<H>  4.8   |  26  |  1.20    Ca    9.1      24 Nov 2023 09:50    TPro  7.4  /  Alb  3.2<L>  /  TBili  0.7  /  DBili  x   /  AST  20  /  ALT  19  /  AlkPhos  73  11-24    LIVER FUNCTIONS - ( 24 Nov 2023 09:50 )  Alb: 3.2 g/dL / Pro: 7.4 g/dL / ALK PHOS: 73 U/L / ALT: 19 U/L / AST: 20 U/L / GGT: x           PT/INR - ( 24 Nov 2023 09:50 )   PT: 12.4 sec;   INR: 1.06 ratio  PTT - ( 24 Nov 2023 09:50 )  PTT:30.3 sec

## 2023-11-24 NOTE — CONSULT NOTE ADULT - SUBJECTIVE AND OBJECTIVE BOX
Brief ID Consult Note  Chart reviewed  Sent from [facility] for admission, IV antibiotics and possible surgical intervention

## 2023-11-24 NOTE — DISCHARGE NOTE NURSING/CASE MANAGEMENT/SOCIAL WORK - NSDCPEFALRISK_GEN_ALL_CORE
For information on Fall & Injury Prevention, visit: [email or web address] OR  [email or web address] OR  https://www.cdc.gov/steadi/patient.html

## 2023-11-24 NOTE — H&P ADULT - CARDIOVASCULAR
normal/regular rate and rhythm/S1 S2 present/no gallops/no rub/no murmur negative normal/regular rate and rhythm/S1 S2 present/no gallops/no rub/no murmur/no JVD/no pedal edema/vascular details…

## 2023-11-24 NOTE — CONSULT NOTE ADULT - SUBJECTIVE AND OBJECTIVE BOX
HPI:  62y year old Male seen at [facility] ED for right foot infection. Of note is that patient is well known to the [facility]. Patient has had the right foot submet 2 ulcer and was in the process of surgical planning but had been delaying scheduling due working and to having to take care of other aspects in his life. Patient is also ss/p right 1st toe and 1st metatarsal head resection which has healed. Patient and wife relate that starting on Sunday, he felt chills and pain to the right foot but did not seek attention due to having an appointment today. Patient relates that yesterday, he was unable to come home from work via walking to [facility] and taking the train so he had to take a cab home. Patient seen in wound care today and sent to the hospital for admission.       PAST MEDICAL & SURGICAL HISTORY:  HTN (hypertension)      Diabetes      Hyperlipidemia      Peripheral vascular disease  S/p Angioplasty rt lower EXT Nov 2020      H/O angioplasty  RLE          Allergies    No Known Allergies    Intolerances        MEDICATIONS  (STANDING):    MEDICATIONS  (PRN):      Social History:      FAMILY HISTORY:  FH: type 2 diabetes  Grandfather        Vital Signs Last 24 Hrs  T(C): 37.3 (24 Nov 2023 09:00), Max: 37.3 (24 Nov 2023 09:00)  T(F): 99.2 (24 Nov 2023 09:00), Max: 99.2 (24 Nov 2023 09:00)  HR: 93 (24 Nov 2023 09:00) (93 - 93)  BP: 127/65 (24 Nov 2023 09:00) (127/65 - 127/65)  BP(mean): --  RR: 19 (24 Nov 2023 09:00) (19 - 19)  SpO2: 96% (24 Nov 2023 09:00) (96% - 96%)    Parameters below as of 24 Nov 2023 09:00  Patient On (Oxygen Delivery Method): room air        PHYSICAL EXAM:  Vascular: DP & PT palpable bilaterally, Capillary refill 3 seconds  Neurological: Light touch sensation not intact bilaterally  Musculoskeletal: 5/5 strength in all quadrants bilaterally, AJ & STJ ROM intact, s/p right hallux and 1st metatarsal head amputation site healed  Dermatological: Right submet 2 wound down to skin, subcuatneous tissue, fat, and bone with cellulitic changes to the right forefoot, foul odor noted, but no proximal streaking                          12.6   17.51 )-----------( 307      ( 24 Nov 2023 09:50 )             38.4       11-24    137  |  101  |  27<H>  ----------------------------<  149<H>  4.8   |  26  |  1.20    Ca    9.1      24 Nov 2023 09:50    TPro  7.4  /  Alb  3.2<L>  /  TBili  0.7  /  DBili  x   /  AST  20  /  ALT  19  /  AlkPhos  73  11-24      PT/INR - ( 24 Nov 2023 09:50 )   PT: 12.4 sec;   INR: 1.06 ratio         PTT - ( 24 Nov 2023 09:50 )  PTT:30.3 sec        Imaging: pending       HPI:  62y year old Male seen at [facility] ED for right foot infection. Of note is that patient is well known to the [facility]. Patient has had the right foot submet 2 ulcer and was in the process of surgical planning but had been delaying scheduling due working and to having to take care of other aspects in his life. Patient is also ss/p right 1st toe and 1st metatarsal head resection which has healed. Patient and wife relate that starting on Sunday, he felt chills and pain to the right foot but did not seek attention due to having an appointment today. Patient relates that yesterday, he was unable to come home from work via walking to [facility] and taking the train so he had to take a cab home. Patient seen in wound care today and sent to the hospital for admission.       PAST MEDICAL & SURGICAL HISTORY:  HTN (hypertension)      Diabetes      Hyperlipidemia      Peripheral vascular disease  S/p Angioplasty rt lower EXT Nov 2020      H/O angioplasty  RLE          Allergies    No Known Allergies    Intolerances        MEDICATIONS  (STANDING):    MEDICATIONS  (PRN):      Social History:      FAMILY HISTORY:  FH: type 2 diabetes  Grandfather        Vital Signs Last 24 Hrs  T(C): 37.3 (24 Nov 2023 09:00), Max: 37.3 (24 Nov 2023 09:00)  T(F): 99.2 (24 Nov 2023 09:00), Max: 99.2 (24 Nov 2023 09:00)  HR: 93 (24 Nov 2023 09:00) (93 - 93)  BP: 127/65 (24 Nov 2023 09:00) (127/65 - 127/65)  BP(mean): --  RR: 19 (24 Nov 2023 09:00) (19 - 19)  SpO2: 96% (24 Nov 2023 09:00) (96% - 96%)    Parameters below as of 24 Nov 2023 09:00  Patient On (Oxygen Delivery Method): room air        PHYSICAL EXAM:  Vascular: DP & PT palpable bilaterally, Capillary refill 3 seconds  Neurological: Light touch sensation not intact bilaterally  Musculoskeletal: 5/5 strength in all quadrants bilaterally, AJ & STJ ROM intact, s/p right hallux and 1st metatarsal head amputation site healed  Dermatological: Right submet 2 wound down to skin, subcuatneous tissue, fat, and bone with cellulitic changes to the right forefoot, foul odor noted, but no proximal streaking                          12.6   17.51 )-----------( 307      ( 24 Nov 2023 09:50 )             38.4       11-24    137  |  101  |  27<H>  ----------------------------<  149<H>  4.8   |  26  |  1.20    Ca    9.1      24 Nov 2023 09:50    TPro  7.4  /  Alb  3.2<L>  /  TBili  0.7  /  DBili  x   /  AST  20  /  ALT  19  /  AlkPhos  73  11-24      PT/INR - ( 24 Nov 2023 09:50 )   PT: 12.4 sec;   INR: 1.06 ratio         PTT - ( 24 Nov 2023 09:50 )  PTT:30.3 sec        Imaging: radiographs shows right displaced 2nd metatarsal neck fracture, MRI pending       HPI:  62y year old Male seen at [facility] ED for right foot infection. Of note is that patient is well known to the [facility]. Patient has had the right foot submet 2 ulcer and was in the process of surgical planning but had been delaying scheduling due working and to having to take care of other aspects in his life. Patient is also ss/p right 1st toe and 1st metatarsal head resection which has healed. Patient and wife relate that starting on Sunday, he felt chills and pain to the right foot but did not seek attention due to having an appointment today. Patient relates that yesterday, he was unable to come home from work via walking to [facility] and taking the train so he had to take a cab home. Patient seen in wound care today and sent to the hospital for admission.       PAST MEDICAL & SURGICAL HISTORY:  HTN (hypertension)      Diabetes      Hyperlipidemia      Peripheral vascular disease  S/p Angioplasty rt lower EXT Nov 2020      H/O angioplasty  RLE          Allergies    No Known Allergies    Intolerances        MEDICATIONS  (STANDING):    MEDICATIONS  (PRN):      Social History:      FAMILY HISTORY:  FH: type 2 diabetes  Grandfather        Vital Signs Last 24 Hrs  T(C): 37.3 (24 Nov 2023 09:00), Max: 37.3 (24 Nov 2023 09:00)  T(F): 99.2 (24 Nov 2023 09:00), Max: 99.2 (24 Nov 2023 09:00)  HR: 93 (24 Nov 2023 09:00) (93 - 93)  BP: 127/65 (24 Nov 2023 09:00) (127/65 - 127/65)  BP(mean): --  RR: 19 (24 Nov 2023 09:00) (19 - 19)  SpO2: 96% (24 Nov 2023 09:00) (96% - 96%)    Parameters below as of 24 Nov 2023 09:00  Patient On (Oxygen Delivery Method): room air        PHYSICAL EXAM:  Vascular: DP & PT palpable bilaterally, Capillary refill 3 seconds  Neurological: Light touch sensation not intact bilaterally  Musculoskeletal: 5/5 strength in all quadrants bilaterally, AJ & STJ ROM intact, s/p right hallux and 1st metatarsal head amputation site healed  Dermatological: Right submet 2 wound down to skin, subcuatneous tissue, fat, and bone with cellulitic changes to the right forefoot, foul odor noted, but no proximal streaking                          12.6   17.51 )-----------( 307      ( 24 Nov 2023 09:50 )             38.4       11-24    137  |  101  |  27<H>  ----------------------------<  149<H>  4.8   |  26  |  1.20    Ca    9.1      24 Nov 2023 09:50    TPro  7.4  /  Alb  3.2<L>  /  TBili  0.7  /  DBili  x   /  AST  20  /  ALT  19  /  AlkPhos  73  11-24      PT/INR - ( 24 Nov 2023 09:50 )   PT: 12.4 sec;   INR: 1.06 ratio         PTT - ( 24 Nov 2023 09:50 )  PTT:30.3 sec        Imaging: radiographs shows right displaced 2nd metatarsal neck fracture, 3rd and 4th metatarsal head fractures, MRI pending

## 2023-11-24 NOTE — H&P ADULT - GASTROINTESTINAL
negative normal/soft/nontender/nondistended/normal active bowel sounds details… normal/soft/nontender/nondistended/normal active bowel sounds/no guarding/no rigidity/no organomegaly/no palpable saira/no masses palpable

## 2023-11-24 NOTE — DISCHARGE NOTE NURSING/CASE MANAGEMENT/SOCIAL WORK - PATIENT PORTAL LINK FT
You can access the FollowMyHealth Patient Portal offered by [facility] by registering at the following website: [email or web address]. By joining [employer]’s FollowMyHealth portal, you will also be able to view your health information using other applications (apps) compatible with our system.

## 2023-11-24 NOTE — H&P ADULT - NSHPSOCIALHISTORY_GEN_ALL_CORE
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

## 2023-11-24 NOTE — H&P ADULT - NSICDXPASTMEDICALHX_GEN_ALL_CORE_FT
PAST MEDICAL HISTORY:  Diabetes     HTN (hypertension)     Hyperlipidemia     Peripheral vascular disease S/p Angioplasty rt lower EXT Nov 2020     PAST MEDICAL HISTORY:  Diabetes     HTN (hypertension)     Hyperlipidemia     PVD (peripheral vascular disease)

## 2023-11-24 NOTE — H&P ADULT - GENITOURINARY MALE
no palpable testicular mass/no scrotal mass normal external genitalia/no palpable testicular mass/no scrotal mass

## 2023-11-24 NOTE — CONSULT NOTE ADULT - ASSESSMENT
Pt is a 62M w/ PMhx of HTN, HLD, DM2 admitted from wound center for R foot infection.   Hx of R foot ulcer; pt also s/p R 1st toe and 1st metatarsal head resection.   Felt chills and pain to R foot this past Sunday.     R Foot Wound Infection  - s/p vanc/zosyn in the ED  - afebrile, leukocytosis to 17  - prior WCx from 2020 showing Serratia Marascens, GAS; susceptibilities noted  Recommendations:   C/w zosyn  F/u pending WCx  F/u pending BCx  MRI  Appreciate podiatry recs re: intervention    D/w Dr. Calhoun  Full consult to follow    Over the weekend Dr. Aguilar will be covering for our group.   If you have any questions, concerns or new micro data, please reach out to them at [phone].  Melany Calhoun M.D.  [facility] Division of Infectious Diseases [phone]  For after 5 P.M. and weekends, please call [phone]

## 2023-11-24 NOTE — H&P ADULT - NSICDXPASTSURGICALHX_GEN_ALL_CORE_FT
PAST SURGICAL HISTORY:  H/O angioplasty RLE     PAST SURGICAL HISTORY:  H/O angioplasty RLE    Status post amputation of toe

## 2023-11-24 NOTE — H&P ADULT - PROBLEM SELECTOR PLAN 4
- chronic, c/w home meds  - lipid panel ordered for AM, follow results - unclear history (pt denies CAD) however had positive stress test recently and Cardio outpatient is scheduled for PCI (pt says no stent needed) on December 14  - currently on aspirin and plavix  - DAY TEAM TOMORROW TO ORDER CARDIO CONSULT for presurgical clearance

## 2023-11-24 NOTE — H&P ADULT - ATTENDING COMMENTS
61 yo M with PMH HTN, HLD, Type II DM, s/p R hallux toe amputation 3 weeks ago presents to the ED with R foot wound here for IV Abx and R foot wound R/O OM with Elevated WBC ,    pt seen, Examined, case & care plan d/w pt, residents at [facility].  AM labs   D/W Podiatry-DR Stark  ID Dr XENIA Calhoun  Dietary eval  DVT PPx  PO diet

## 2023-11-24 NOTE — H&P ADULT - MUSCULOSKELETAL
normal/ROM intact/normal gait/strength 5/5 bilateral upper extremities/strength 5/5 bilateral lower extremities details… No R foot with dressing per podiatry. PEr pt, foot with serous drainage and foul odor/normal/ROM intact/normal gait/strength 5/5 bilateral upper extremities/strength 5/5 bilateral lower extremities

## 2023-11-24 NOTE — H&P ADULT - PROBLEM SELECTOR PLAN 1
- Pt with elevated WBC, ESR, CRP, normal lactate,  - VSS, does not meet sepsis criteria  -  XRAY foot: Suspect pathologic fractures of the third and fourth metatarsal heads. 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 If osteomyelitis is clinically considered  despite conservative therapy,   and soft tissue / bone infection requires further assessment, follow-up   MRI recommended.  - MRI ordered  - Dr. Stark consulted  - Vascular consulted - Pt with elevated WBC, ESR, CRP, normal lactate,  - s/p Vanc x  1, Zosyn x  1, NS bolus  x 1  - VSS, does not meet sepsis criteria  -  XRAY foot: Suspect pathologic fractures of the third and fourth metatarsal heads. 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 If osteomyelitis is clinically considered  despite conservative therapy,   and soft tissue / bone infection requires further assessment, follow-up   MRI recommended.  - continue zosyn  - Gabapentin for neuropathy, pain meds as needed (IV tylenol)  - MRI ordered, f/u results  - blood cx 2 ordered, f/u results  - R LE u/s ordered. f/u results  - activity: partial WB on R foot, walk with walker  - Dr. Stark consulted, f/u reccs  - Vascular consulted, f/u reccs  - ID Dr. Lyn consulted, f/u reccs - Pt with elevated WBC, ESR, CRP, normal lactate,  - s/p Vanc x  1, Zosyn x  1, NS bolus  x 1  - VSS, does not meet sepsis criteria  -  XRAY foot: Suspect pathologic fractures of the third and fourth metatarsal heads. 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 If osteomyelitis is clinically considered  despite conservative therapy,   and soft tissue / bone infection requires further assessment, follow-up   MRI rt foot as per Dr rosa  - continue Zosyn IVPB q 8 hrs as per ID Dr Unique MICHAELS  Local wound care   - Gabapentin for neuropathy, pain meds as needed (IV tylenol)  - MRI ordered, f/u results  - blood cx 2 ordered, f/u results  - R LE u/s ordered. f/u results  - activity: partial WB on R foot, walk with walker  - Dr. Rosa consulted, f/u reccs  - Vascular consulted, f/u reccs  - ID Dr. Lyn consulted, f/u reccs

## 2023-11-24 NOTE — CONSULT NOTE ADULT - PROBLEM SELECTOR RECOMMENDATION 9
Patient examined and evaluated at this time. Patient will need antibiotics as per infectious disease recommendations. Patient will likely require surgical intervention, at least a right 2nd metatarsal head resection with possible further resection pending MRI findings. Discussed the risks, benefits, and potential complications with patient to his satisfaction and patient and wife verbalized understanding. Will await imaging studies and tentatively plan for surgical intervention on 11/28/23 pending clearance. Recommend vascular evaluation. Patient examined and evaluated at this time. Patient will need antibiotics as per infectious disease recommendations. Patient will likely require surgical intervention, at least a right 2nd metatarsal head resection with possible further resection pending MRI findings. Discussed the risks, benefits, and potential complications with patient to his satisfaction and patient and wife verbalized understanding. Will await imaging studies and tentatively plan for surgical intervention on Monday 11/27/23 if MRI is done prior, pending clearance. Recommend vascular evaluation.

## 2023-11-24 NOTE — H&P ADULT - PROBLEM SELECTOR PLAN 2
- unclear history (pt denies CAD) however had positive stress test recently and Cardio outpatient is scheduled for PCI (pt says no stent needed) on December 14  - currently on aspirin and plavix  - DAY TEAM TOMORROW TO ORDER CARDIO CONSULT for presurgical clearance FS Q AC HS with HISS low dose   HOLD all oral meds  Hypoglycemia matt lucero  Nutritional eval

## 2023-11-24 NOTE — ED PROVIDER NOTE - WR ORDER ID 1
How Severe Is This Condition?: moderate Additional History: Rash has gotten worse over time, he wears a watch with rubber and metal and then the rash started to progress. [id number]

## 2023-11-24 NOTE — H&P ADULT - ASSESSMENT
61 yo M with PMH HTN, HLD, Type II DM, s/p R hallux toe amputation 3 weeks ago presents to the ED with R foot wound here for IV Abx and R foot wound,      . Patient went to a private podiatrist 2.5 months ago to scrape off callus. The callus removal site did not heal well and started to develop into a wound. Podiatrist recommended patient see Dr. Stark for wound management. Per patient, would has been intermittently draining clear liquid and having foul odor. Last sunday, patient had episode of chills that resolved when he went to sleep. Last night, he also had chills and had difficulty balancing. Today he went to his regular scheduled appt with Dr. Stark who recommended he be admitted for IV ABx and surgery on Tuesday,  Denies fever chest pain, palpitations, SOB, cough, abdominal pain, nausea, vomiting, diarrhea, constipation, urinary frequency, urgency, or dysuria, headaches, changes in vision, dizziness, numbness, tingling. Denies recent travel, recent antibiotic use, or sick contacts.      ED Course:   Vitals: BP: 127/65, HR: 93, Temp: 99.2 , RR: 19, SpO2: 96 % on RA   Labs:  ESR: 77, WBC: 17.51, H/H: 12.6/38.4  CXR: No evidence of acute infiltrate  XRAY foot: IMPRESSION: Suspect pathologic fractures of the third and fourth metatarsal heads. 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 If osteomyelitis is clinically considered  despite conservative therapy,   and soft tissue / bone infection requires further assessment, follow-up   MRI recommended.  EKG:  NSR, 81 BPM  Received in the ED  Vanc x  1, Zosyn x  1, NS bolus  x 1     63 yo M with PMH HTN, HLD, Type II DM, s/p R hallux toe amputation 3 weeks ago presents to the ED with R foot wound here for IV Abx and R foot wound,         63 yo M with PMH HTN, HLD, Type II DM, s/p R hallux toe amputation 3 weeks ago presents to the ED with R foot wound here for IV Abx and R foot wound R/O OM with Elevated WBC ,

## 2023-11-24 NOTE — H&P ADULT - NEUROLOGICAL
neuropathy b/l legs/normal/cranial nerves II-XII intact/sensation intact details… neuropathy b/l legs/normal/cranial nerves II-XII intact/sensation intact/cranial nerves intact/no spontaneous movement

## 2023-11-24 NOTE — H&P ADULT - NSHPLABSRESULTS_GEN_ALL_CORE
.  LABS:                         12.6   17.51 )-----------( 307      ( 24 Nov 2023 09:50 )             38.4     11-24    137  |  101  |  27<H>  ----------------------------<  149<H>  4.8   |  26  |  1.20    Ca    9.1      24 Nov 2023 09:50    TPro  7.4  /  Alb  3.2<L>  /  TBili  0.7  /  DBili  x   /  AST  20  /  ALT  19  /  AlkPhos  73  11-24    PT/INR - ( 24 Nov 2023 09:50 )   PT: 12.4 sec;   INR: 1.06 ratio         PTT - ( 24 Nov 2023 09:50 )  PTT:30.3 sec  Urinalysis Basic - ( 24 Nov 2023 09:50 )    Color: x / Appearance: x / SG: x / pH: x  Gluc: 149 mg/dL / Ketone: x  / Bili: x / Urobili: x   Blood: x / Protein: x / Nitrite: x   Leuk Esterase: x / RBC: x / WBC x   Sq Epi: x / Non Sq Epi: x / Bacteria: x            Lactate, Blood: 1.4 mmol/L (11-24 @ 09:50)      RADIOLOGY, EKG & ADDITIONAL TESTS: Reviewed.

## 2023-11-24 NOTE — H&P ADULT - RESPIRATORY
normal/clear to auscultation bilaterally/no wheezes/no rales/no rhonchi normal/clear to auscultation bilaterally/no wheezes/no rales/no rhonchi/no respiratory distress/airway patent/breath sounds equal/good air movement/respirations non-labored

## 2023-11-24 NOTE — PATIENT PROFILE ADULT - FALL HARM RISK - HARM RISK INTERVENTIONS
Assistance with ambulation/Assistance OOB with selected safe patient handling equipment/Communicate Risk of Fall with Harm to all staff/Discuss with provider need for PT consult/Monitor gait and stability/Reinforce activity limits and safety measures with patient and family/Tailored Fall Risk Interventions/Use of alarms - bed, chair and/or voice tab/Visual Cue: Yellow wristband and red socks/Bed in lowest position, wheels locked, appropriate side rails in place/Call bell, personal items and telephone in reach/Instruct patient to call for assistance before getting out of bed or chair/Non-slip footwear when patient is out of bed/[place] to call system/Physically safe environment - no spills, clutter or unnecessary equipment/Purposeful Proactive Rounding/Room/bathroom lighting operational, light cord in reach

## 2023-11-24 NOTE — PATIENT PROFILE ADULT - FUNCTIONAL ASSESSMENT - BASIC MOBILITY 6.
3-calculated by average/Not able to assess (calculate score using [facility] averaging method)

## 2023-11-24 NOTE — ED PROVIDER NOTE - OBJECTIVE STATEMENT
62-year-old male with a history of hypertension, high cholesterol, type 2 diabetes with prior right great toe amputation presents with increased redness and swelling over past week to the right distal foot.  No fever.  Some on and off chills.  Patient was seen by wound care today, and was sent to the ER for IV antibiotics and admission.  No chest pain or shortness of breath.  No weakness or dizziness.  No neck or back pain.  No cough/URI.  No aggravating or alleviating factors otherwise noted.  No other acute complaints.  Patient previously vaccinated for COVID.

## 2023-11-25 DIAGNOSIS — E11.9 TYPE 2 DIABETES MELLITUS WITHOUT COMPLICATIONS: ICD-10-CM

## 2023-11-25 DIAGNOSIS — Z89.429 ACQUIRED ABSENCE OF OTHER TOE(S), UNSPECIFIED SIDE: Chronic | ICD-10-CM

## 2023-11-25 LAB
A1C WITH ESTIMATED AVERAGE GLUCOSE RESULT: 8.2 % — HIGH (ref 4–5.6)
A1C WITH ESTIMATED AVERAGE GLUCOSE RESULT: 8.2 % — HIGH (ref 4–5.6)
ALBUMIN SERPL ELPH-MCNC: 2.8 G/DL — LOW (ref 3.3–5)
ALBUMIN SERPL ELPH-MCNC: 2.8 G/DL — LOW (ref 3.3–5)
ALP SERPL-CCNC: 67 U/L — SIGNIFICANT CHANGE UP (ref 40–120)
ALP SERPL-CCNC: 67 U/L — SIGNIFICANT CHANGE UP (ref 40–120)
ALT FLD-CCNC: 19 U/L — SIGNIFICANT CHANGE UP (ref 12–78)
ALT FLD-CCNC: 19 U/L — SIGNIFICANT CHANGE UP (ref 12–78)
ANION GAP SERPL CALC-SCNC: 8 MMOL/L — SIGNIFICANT CHANGE UP (ref 5–17)
ANION GAP SERPL CALC-SCNC: 8 MMOL/L — SIGNIFICANT CHANGE UP (ref 5–17)
AST SERPL-CCNC: 15 U/L — SIGNIFICANT CHANGE UP (ref 15–37)
AST SERPL-CCNC: 15 U/L — SIGNIFICANT CHANGE UP (ref 15–37)
BASOPHILS # BLD AUTO: 0.05 K/UL — SIGNIFICANT CHANGE UP (ref 0–0.2)
BASOPHILS # BLD AUTO: 0.05 K/UL — SIGNIFICANT CHANGE UP (ref 0–0.2)
BASOPHILS NFR BLD AUTO: 0.5 % — SIGNIFICANT CHANGE UP (ref 0–2)
BASOPHILS NFR BLD AUTO: 0.5 % — SIGNIFICANT CHANGE UP (ref 0–2)
BILIRUB SERPL-MCNC: 0.5 MG/DL — SIGNIFICANT CHANGE UP (ref 0.2–1.2)
BILIRUB SERPL-MCNC: 0.5 MG/DL — SIGNIFICANT CHANGE UP (ref 0.2–1.2)
BUN SERPL-MCNC: 22 MG/DL — SIGNIFICANT CHANGE UP (ref 7–23)
BUN SERPL-MCNC: 22 MG/DL — SIGNIFICANT CHANGE UP (ref 7–23)
CALCIUM SERPL-MCNC: 8.5 MG/DL — SIGNIFICANT CHANGE UP (ref 8.5–10.1)
CALCIUM SERPL-MCNC: 8.5 MG/DL — SIGNIFICANT CHANGE UP (ref 8.5–10.1)
CHLORIDE SERPL-SCNC: 104 MMOL/L — SIGNIFICANT CHANGE UP (ref 96–108)
CHLORIDE SERPL-SCNC: 104 MMOL/L — SIGNIFICANT CHANGE UP (ref 96–108)
CHOLEST SERPL-MCNC: 125 MG/DL — SIGNIFICANT CHANGE UP
CHOLEST SERPL-MCNC: 125 MG/DL — SIGNIFICANT CHANGE UP
CO2 SERPL-SCNC: 27 MMOL/L — SIGNIFICANT CHANGE UP (ref 22–31)
CO2 SERPL-SCNC: 27 MMOL/L — SIGNIFICANT CHANGE UP (ref 22–31)
CREAT SERPL-MCNC: 0.94 MG/DL — SIGNIFICANT CHANGE UP (ref 0.5–1.3)
CREAT SERPL-MCNC: 0.94 MG/DL — SIGNIFICANT CHANGE UP (ref 0.5–1.3)
EGFR: 91 ML/MIN/1.73M2 — SIGNIFICANT CHANGE UP
EGFR: 91 ML/MIN/1.73M2 — SIGNIFICANT CHANGE UP
EOSINOPHIL # BLD AUTO: 0.26 K/UL — SIGNIFICANT CHANGE UP (ref 0–0.5)
EOSINOPHIL # BLD AUTO: 0.26 K/UL — SIGNIFICANT CHANGE UP (ref 0–0.5)
EOSINOPHIL NFR BLD AUTO: 2.5 % — SIGNIFICANT CHANGE UP (ref 0–6)
EOSINOPHIL NFR BLD AUTO: 2.5 % — SIGNIFICANT CHANGE UP (ref 0–6)
ESTIMATED AVERAGE GLUCOSE: 189 MG/DL — HIGH (ref 68–114)
ESTIMATED AVERAGE GLUCOSE: 189 MG/DL — HIGH (ref 68–114)
GLUCOSE BLDC GLUCOMTR-MCNC: 127 MG/DL — HIGH (ref 70–99)
GLUCOSE BLDC GLUCOMTR-MCNC: 127 MG/DL — HIGH (ref 70–99)
GLUCOSE BLDC GLUCOMTR-MCNC: 169 MG/DL — HIGH (ref 70–99)
GLUCOSE BLDC GLUCOMTR-MCNC: 169 MG/DL — HIGH (ref 70–99)
GLUCOSE BLDC GLUCOMTR-MCNC: 170 MG/DL — HIGH (ref 70–99)
GLUCOSE BLDC GLUCOMTR-MCNC: 170 MG/DL — HIGH (ref 70–99)
GLUCOSE BLDC GLUCOMTR-MCNC: 228 MG/DL — HIGH (ref 70–99)
GLUCOSE BLDC GLUCOMTR-MCNC: 228 MG/DL — HIGH (ref 70–99)
GLUCOSE SERPL-MCNC: 142 MG/DL — HIGH (ref 70–99)
GLUCOSE SERPL-MCNC: 142 MG/DL — HIGH (ref 70–99)
HCT VFR BLD CALC: 35.9 % — LOW (ref 39–50)
HCT VFR BLD CALC: 35.9 % — LOW (ref 39–50)
HDLC SERPL-MCNC: 30 MG/DL — LOW
HDLC SERPL-MCNC: 30 MG/DL — LOW
HGB BLD-MCNC: 11.6 G/DL — LOW (ref 13–17)
HGB BLD-MCNC: 11.6 G/DL — LOW (ref 13–17)
IMM GRANULOCYTES NFR BLD AUTO: 0.3 % — SIGNIFICANT CHANGE UP (ref 0–0.9)
IMM GRANULOCYTES NFR BLD AUTO: 0.3 % — SIGNIFICANT CHANGE UP (ref 0–0.9)
LIPID PNL WITH DIRECT LDL SERPL: 69 MG/DL — SIGNIFICANT CHANGE UP
LIPID PNL WITH DIRECT LDL SERPL: 69 MG/DL — SIGNIFICANT CHANGE UP
LYMPHOCYTES # BLD AUTO: 1.23 K/UL — SIGNIFICANT CHANGE UP (ref 1–3.3)
LYMPHOCYTES # BLD AUTO: 1.23 K/UL — SIGNIFICANT CHANGE UP (ref 1–3.3)
LYMPHOCYTES # BLD AUTO: 11.9 % — LOW (ref 13–44)
LYMPHOCYTES # BLD AUTO: 11.9 % — LOW (ref 13–44)
MCHC RBC-ENTMCNC: 29.5 PG — SIGNIFICANT CHANGE UP (ref 27–34)
MCHC RBC-ENTMCNC: 29.5 PG — SIGNIFICANT CHANGE UP (ref 27–34)
MCHC RBC-ENTMCNC: 32.3 GM/DL — SIGNIFICANT CHANGE UP (ref 32–36)
MCHC RBC-ENTMCNC: 32.3 GM/DL — SIGNIFICANT CHANGE UP (ref 32–36)
MCV RBC AUTO: 91.3 FL — SIGNIFICANT CHANGE UP (ref 80–100)
MCV RBC AUTO: 91.3 FL — SIGNIFICANT CHANGE UP (ref 80–100)
MONOCYTES # BLD AUTO: 1.06 K/UL — HIGH (ref 0–0.9)
MONOCYTES # BLD AUTO: 1.06 K/UL — HIGH (ref 0–0.9)
MONOCYTES NFR BLD AUTO: 10.2 % — SIGNIFICANT CHANGE UP (ref 2–14)
MONOCYTES NFR BLD AUTO: 10.2 % — SIGNIFICANT CHANGE UP (ref 2–14)
NEUTROPHILS # BLD AUTO: 7.72 K/UL — HIGH (ref 1.8–7.4)
NEUTROPHILS # BLD AUTO: 7.72 K/UL — HIGH (ref 1.8–7.4)
NEUTROPHILS NFR BLD AUTO: 74.6 % — SIGNIFICANT CHANGE UP (ref 43–77)
NEUTROPHILS NFR BLD AUTO: 74.6 % — SIGNIFICANT CHANGE UP (ref 43–77)
NON HDL CHOLESTEROL: 95 MG/DL — SIGNIFICANT CHANGE UP
NON HDL CHOLESTEROL: 95 MG/DL — SIGNIFICANT CHANGE UP
NRBC # BLD: 0 /100 WBCS — SIGNIFICANT CHANGE UP (ref 0–0)
NRBC # BLD: 0 /100 WBCS — SIGNIFICANT CHANGE UP (ref 0–0)
PLATELET # BLD AUTO: 299 K/UL — SIGNIFICANT CHANGE UP (ref 150–400)
PLATELET # BLD AUTO: 299 K/UL — SIGNIFICANT CHANGE UP (ref 150–400)
POTASSIUM SERPL-MCNC: 4.7 MMOL/L — SIGNIFICANT CHANGE UP (ref 3.5–5.3)
POTASSIUM SERPL-MCNC: 4.7 MMOL/L — SIGNIFICANT CHANGE UP (ref 3.5–5.3)
POTASSIUM SERPL-SCNC: 4.7 MMOL/L — SIGNIFICANT CHANGE UP (ref 3.5–5.3)
POTASSIUM SERPL-SCNC: 4.7 MMOL/L — SIGNIFICANT CHANGE UP (ref 3.5–5.3)
PROT SERPL-MCNC: 6.8 G/DL — SIGNIFICANT CHANGE UP (ref 6–8.3)
PROT SERPL-MCNC: 6.8 G/DL — SIGNIFICANT CHANGE UP (ref 6–8.3)
RBC # BLD: 3.93 M/UL — LOW (ref 4.2–5.8)
RBC # BLD: 3.93 M/UL — LOW (ref 4.2–5.8)
RBC # FLD: 12.4 % — SIGNIFICANT CHANGE UP (ref 10.3–14.5)
RBC # FLD: 12.4 % — SIGNIFICANT CHANGE UP (ref 10.3–14.5)
SODIUM SERPL-SCNC: 139 MMOL/L — SIGNIFICANT CHANGE UP (ref 135–145)
SODIUM SERPL-SCNC: 139 MMOL/L — SIGNIFICANT CHANGE UP (ref 135–145)
TRIGL SERPL-MCNC: 151 MG/DL — HIGH
TRIGL SERPL-MCNC: 151 MG/DL — HIGH
TSH SERPL-MCNC: 1.2 UIU/ML — SIGNIFICANT CHANGE UP (ref 0.36–3.74)
TSH SERPL-MCNC: 1.2 UIU/ML — SIGNIFICANT CHANGE UP (ref 0.36–3.74)
WBC # BLD: 10.35 K/UL — SIGNIFICANT CHANGE UP (ref 3.8–10.5)
WBC # BLD: 10.35 K/UL — SIGNIFICANT CHANGE UP (ref 3.8–10.5)
WBC # FLD AUTO: 10.35 K/UL — SIGNIFICANT CHANGE UP (ref 3.8–10.5)
WBC # FLD AUTO: 10.35 K/UL — SIGNIFICANT CHANGE UP (ref 3.8–10.5)

## 2023-11-25 PROCEDURE — 99222 1ST HOSP IP/OBS MODERATE 55: CPT

## 2023-11-25 PROCEDURE — 93971 EXTREMITY STUDY: CPT | Mod: 26,RT

## 2023-11-25 RX ADMIN — ATORVASTATIN CALCIUM 40 MILLIGRAM(S): 80 TABLET, FILM COATED ORAL at 22:05

## 2023-11-25 RX ADMIN — PIPERACILLIN AND TAZOBACTAM 25 GRAM(S): 4; .5 INJECTION, POWDER, LYOPHILIZED, FOR SOLUTION INTRAVENOUS at 22:06

## 2023-11-25 RX ADMIN — Medication 1: at 12:44

## 2023-11-25 RX ADMIN — CLOPIDOGREL BISULFATE 75 MILLIGRAM(S): 75 TABLET, FILM COATED ORAL at 12:42

## 2023-11-25 RX ADMIN — Medication 3 MILLIGRAM(S): at 22:05

## 2023-11-25 RX ADMIN — Medication 1: at 17:50

## 2023-11-25 RX ADMIN — PIPERACILLIN AND TAZOBACTAM 25 GRAM(S): 4; .5 INJECTION, POWDER, LYOPHILIZED, FOR SOLUTION INTRAVENOUS at 14:11

## 2023-11-25 RX ADMIN — ENOXAPARIN SODIUM 40 MILLIGRAM(S): 100 INJECTION SUBCUTANEOUS at 18:14

## 2023-11-25 RX ADMIN — GABAPENTIN 300 MILLIGRAM(S): 400 CAPSULE ORAL at 05:22

## 2023-11-25 RX ADMIN — PIPERACILLIN AND TAZOBACTAM 25 GRAM(S): 4; .5 INJECTION, POWDER, LYOPHILIZED, FOR SOLUTION INTRAVENOUS at 05:20

## 2023-11-25 RX ADMIN — GABAPENTIN 300 MILLIGRAM(S): 400 CAPSULE ORAL at 17:51

## 2023-11-25 RX ADMIN — LISINOPRIL 40 MILLIGRAM(S): 2.5 TABLET ORAL at 05:23

## 2023-11-25 RX ADMIN — Medication 50 MILLIGRAM(S): at 05:22

## 2023-11-25 NOTE — CONSULT NOTE ADULT - SUBJECTIVE AND OBJECTIVE BOX
DOCUMENTATION IN PROGRESS    CHIEF COMPLAINT: Patient is a 62y old  Male who presents with a chief complaint of R foot wound (25 Nov 2023 12:03)      HPI:  63 yo M with PMH HTN, HLD, Type II DM, s/p R hallux toe amputation 3 yrs  ago presents to the ED with R foot wound. Patient went to a private podiatrist 2.5 months ago to scrape off callus. The callus removal site did not heal well and started to develop into a wound. Podiatrist recommended patient see Dr. Stark for wound management. Per patient, would has been intermittently draining clear liquid and having foul odor. Last sunday, patient had episode of chills that resolved when he went to sleep. Last night, he also had chills and had difficulty balancing. Today he went to his regular scheduled appt with Dr. Stark who recommended he be admitted for IV ABx and surgery on Tuesday,  Denies fever chest pain, palpitations, SOB, cough, abdominal pain, nausea, vomiting, diarrhea, constipation, urinary frequency, urgency, or dysuria, headaches, changes in vision, dizziness, numbness, tingling. Denies recent travel, recent antibiotic use, or sick contacts.    ED Course:   Vitals: BP: 127/65, HR: 93, Temp: 99.2 , RR: 19, SpO2: 96 % on RA   Labs:  ESR: 77, WBC: 17.51, H/H: 12.6/38.4  CXR: No evidence of acute infiltrate  XRAY foot: IMPRESSION: Suspect pathologic fractures of the third and fourth metatarsal heads. 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 If osteomyelitis is clinically considered  despite conservative therapy,   and soft tissue / bone infection requires further assessment, follow-up   MRI recommended.  EKG:  NSR, 81 BPM  Received in the ED  Vanc x  1, Zosyn x  1, NS bolus  x 1     (24 Nov 2023 15:27)      EKG: SR     REVIEW OF SYSTEMS:   All other review of systems are negative unless indicated above    PAST MEDICAL & SURGICAL HISTORY:  HTN (hypertension)      Diabetes      Hyperlipidemia      PVD (peripheral vascular disease)      H/O angioplasty  RLE      Status post amputation of toe          SOCIAL HISTORY:  No tobacco, ethanol, or drug abuse.    FAMILY HISTORY:  FH: type 2 diabetes  Grandfather      No family history of acute MI or sudden cardiac death.    MEDICATIONS  (STANDING):  atorvastatin 40 milliGRAM(s) Oral at bedtime  clopidogrel Tablet 75 milliGRAM(s) Oral daily  dextrose 5%. 1000 milliLiter(s) (50 mL/Hr) IV Continuous <Continuous>  dextrose 5%. 1000 milliLiter(s) (100 mL/Hr) IV Continuous <Continuous>  dextrose 50% Injectable 12.5 Gram(s) IV Push once  dextrose 50% Injectable 25 Gram(s) IV Push once  dextrose 50% Injectable 25 Gram(s) IV Push once  enoxaparin Injectable 40 milliGRAM(s) SubCutaneous every 24 hours  gabapentin 300 milliGRAM(s) Oral two times a day  glucagon  Injectable 1 milliGRAM(s) IntraMuscular once  insulin lispro (ADMELOG) corrective regimen sliding scale   SubCutaneous three times a day before meals  insulin lispro (ADMELOG) corrective regimen sliding scale   SubCutaneous at bedtime  lisinopril 40 milliGRAM(s) Oral daily  metoprolol succinate ER 50 milliGRAM(s) Oral daily  piperacillin/tazobactam IVPB.. 3.375 Gram(s) IV Intermittent every 8 hours    MEDICATIONS  (PRN):  acetaminophen     Tablet .. 650 milliGRAM(s) Oral every 6 hours PRN Temp greater or equal to 38C (100.4F), Mild Pain (1 - 3)  dextrose Oral Gel 15 Gram(s) Oral once PRN Blood Glucose LESS THAN 70 milliGRAM(s)/deciliter  melatonin 3 milliGRAM(s) Oral at bedtime PRN Insomnia      Allergies    No Known Allergies    Intolerances        Home meds:  Home Medications:  atorvastatin 40 mg oral tablet: 1 tab(s) orally once a day (24 Nov 2023 15:22)  clopidogrel 75 mg oral tablet: 1 tab(s) orally once a day (24 Nov 2023 15:22)  gabapentin 300 mg oral capsule: 1 cap(s) orally 2 times a day (24 Nov 2023 15:20)  Jardiance 25 mg oral tablet: 1 tab(s) orally once a day (24 Nov 2023 15:20)  lisinopril 40 mg oral tablet: 1 tab(s) orally once a day (24 Nov 2023 15:19)  metFORMIN 1000 mg oral tablet: 1 tab(s) orally 2 times a day (24 Nov 2023 15:22)  Metoprolol Succinate ER 50 mg oral tablet, extended release: 1 tab(s) orally once a day (24 Nov 2023 15:18)  Trulicity Pen 1.5 mg/0.5 mL subcutaneous solution: 1.5 milligram(s) subcutaneously once a week (24 Nov 2023 15:23)        VITAL SIGNS:   Vital Signs Last 24 Hrs  T(C): 36.8 (25 Nov 2023 12:39), Max: 37.2 (25 Nov 2023 04:37)  T(F): 98.3 (25 Nov 2023 12:39), Max: 99 (25 Nov 2023 04:37)  HR: 81 (25 Nov 2023 12:39) (81 - 100)  BP: 108/69 (25 Nov 2023 12:39) (108/69 - 129/78)  BP(mean): --  RR: 17 (25 Nov 2023 12:39) (16 - 17)  SpO2: 98% (25 Nov 2023 12:39) (94% - 98%)    Parameters below as of 25 Nov 2023 12:39  Patient On (Oxygen Delivery Method): room air        I&O's Summary      On Exam:  TELE: Not on telemetry    Constitutional: NAD, awake and alert  HEENT: Moist Mucous Membranes, Anicteric  Pulmonary: Non-labored, breath sounds are clear bilaterally, No wheezing, rales or rhonchi  Cardiovascular: Regular, S1 and S2, No murmurs, rubs, gallops or clicks  Gastrointestinal: Bowel Sounds present, soft, nontender.   Lymph: No peripheral edema. No lymphadenopathy.  Skin: No visible rashes or ulcers.  Psych:  Mood & affect appropriate for situation    LABS: All Labs Reviewed:                        11.6   10.35 )-----------( 299      ( 25 Nov 2023 08:10 )             35.9                         12.6   17.51 )-----------( 307      ( 24 Nov 2023 09:50 )             38.4     25 Nov 2023 08:10    139    |  104    |  22     ----------------------------<  142    4.7     |  27     |  0.94   24 Nov 2023 09:50    137    |  101    |  27     ----------------------------<  149    4.8     |  26     |  1.20     Ca    8.5        25 Nov 2023 08:10  Ca    9.1        24 Nov 2023 09:50    TPro  6.8    /  Alb  2.8    /  TBili  0.5    /  DBili  x      /  AST  15     /  ALT  19     /  AlkPhos  67     25 Nov 2023 08:10  TPro  7.4    /  Alb  3.2    /  TBili  0.7    /  DBili  x      /  AST  20     /  ALT  19     /  AlkPhos  73     24 Nov 2023 09:50    PT/INR - ( 24 Nov 2023 09:50 )   PT: 12.4 sec;   INR: 1.06 ratio         PTT - ( 24 Nov 2023 09:50 )  PTT:30.3 sec      Blood Culture:     11-25 @ 08:10  TSH: 1.20      RADIOLOGY:              CHIEF COMPLAINT: Patient is a 62y old  Male who presents with a chief complaint of R foot wound (25 Nov 2023 12:03)      HPI:  61 yo M with PMH HTN, HLD, Type II DM, s/p R hallux toe amputation 3 yrs  ago presents to the ED with R foot wound. Patient went to a private podiatrist 2.5 months ago to scrape off callus. The callus removal site did not heal well and started to develop into a wound. Podiatrist recommended patient see Dr. Stark for wound management. Per patient, would has been intermittently draining clear liquid and having foul odor. Last sunday, patient had episode of chills that resolved when he went to sleep. Last night, he also had chills and had difficulty balancing. Today he went to his regular scheduled appt with Dr. Stark who recommended he be admitted for IV ABx and surgery on Tuesday,  Denies fever chest pain, palpitations, SOB, cough, abdominal pain, nausea, vomiting, diarrhea, constipation, urinary frequency, urgency, or dysuria, headaches, changes in vision, dizziness, numbness, tingling. Denies recent travel, recent antibiotic use, or sick contacts.    ED Course:   Vitals: BP: 127/65, HR: 93, Temp: 99.2 , RR: 19, SpO2: 96 % on RA   Labs:  ESR: 77, WBC: 17.51, H/H: 12.6/38.4  CXR: No evidence of acute infiltrate  XRAY foot: IMPRESSION: Suspect pathologic fractures of the third and fourth metatarsal heads. 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 If osteomyelitis is clinically considered  despite conservative therapy,   and soft tissue / bone infection requires further assessment, follow-up   MRI recommended.  EKG:  NSR, 81 BPM  Received in the ED  Vanc x  1, Zosyn x  1, NS bolus  x 1     (24 Nov 2023 15:27)      EKG: SR     REVIEW OF SYSTEMS:   All other review of systems are negative unless indicated above    PAST MEDICAL & SURGICAL HISTORY:  HTN (hypertension)      Diabetes      Hyperlipidemia      PVD (peripheral vascular disease)      H/O angioplasty  RLE      Status post amputation of toe          SOCIAL HISTORY:  No tobacco, ethanol, or drug abuse.    FAMILY HISTORY:  FH: type 2 diabetes  Grandfather      No family history of acute MI or sudden cardiac death.    MEDICATIONS  (STANDING):  atorvastatin 40 milliGRAM(s) Oral at bedtime  clopidogrel Tablet 75 milliGRAM(s) Oral daily  dextrose 5%. 1000 milliLiter(s) (50 mL/Hr) IV Continuous <Continuous>  dextrose 5%. 1000 milliLiter(s) (100 mL/Hr) IV Continuous <Continuous>  dextrose 50% Injectable 12.5 Gram(s) IV Push once  dextrose 50% Injectable 25 Gram(s) IV Push once  dextrose 50% Injectable 25 Gram(s) IV Push once  enoxaparin Injectable 40 milliGRAM(s) SubCutaneous every 24 hours  gabapentin 300 milliGRAM(s) Oral two times a day  glucagon  Injectable 1 milliGRAM(s) IntraMuscular once  insulin lispro (ADMELOG) corrective regimen sliding scale   SubCutaneous three times a day before meals  insulin lispro (ADMELOG) corrective regimen sliding scale   SubCutaneous at bedtime  lisinopril 40 milliGRAM(s) Oral daily  metoprolol succinate ER 50 milliGRAM(s) Oral daily  piperacillin/tazobactam IVPB.. 3.375 Gram(s) IV Intermittent every 8 hours    MEDICATIONS  (PRN):  acetaminophen     Tablet .. 650 milliGRAM(s) Oral every 6 hours PRN Temp greater or equal to 38C (100.4F), Mild Pain (1 - 3)  dextrose Oral Gel 15 Gram(s) Oral once PRN Blood Glucose LESS THAN 70 milliGRAM(s)/deciliter  melatonin 3 milliGRAM(s) Oral at bedtime PRN Insomnia      Allergies    No Known Allergies    Intolerances        Home meds:  Home Medications:  atorvastatin 40 mg oral tablet: 1 tab(s) orally once a day (24 Nov 2023 15:22)  clopidogrel 75 mg oral tablet: 1 tab(s) orally once a day (24 Nov 2023 15:22)  gabapentin 300 mg oral capsule: 1 cap(s) orally 2 times a day (24 Nov 2023 15:20)  Jardiance 25 mg oral tablet: 1 tab(s) orally once a day (24 Nov 2023 15:20)  lisinopril 40 mg oral tablet: 1 tab(s) orally once a day (24 Nov 2023 15:19)  metFORMIN 1000 mg oral tablet: 1 tab(s) orally 2 times a day (24 Nov 2023 15:22)  Metoprolol Succinate ER 50 mg oral tablet, extended release: 1 tab(s) orally once a day (24 Nov 2023 15:18)  Trulicity Pen 1.5 mg/0.5 mL subcutaneous solution: 1.5 milligram(s) subcutaneously once a week (24 Nov 2023 15:23)        VITAL SIGNS:   Vital Signs Last 24 Hrs  T(C): 36.8 (25 Nov 2023 12:39), Max: 37.2 (25 Nov 2023 04:37)  T(F): 98.3 (25 Nov 2023 12:39), Max: 99 (25 Nov 2023 04:37)  HR: 81 (25 Nov 2023 12:39) (81 - 100)  BP: 108/69 (25 Nov 2023 12:39) (108/69 - 129/78)  BP(mean): --  RR: 17 (25 Nov 2023 12:39) (16 - 17)  SpO2: 98% (25 Nov 2023 12:39) (94% - 98%)    Parameters below as of 25 Nov 2023 12:39  Patient On (Oxygen Delivery Method): room air        I&O's Summary      On Exam:  TELE: Not on telemetry    Constitutional: NAD, awake and alert  HEENT: Moist Mucous Membranes, Anicteric  Pulmonary: Non-labored, breath sounds are clear bilaterally, No wheezing, rales or rhonchi  Cardiovascular: Regular, S1 and S2, No murmurs, rubs, gallops or clicks  Gastrointestinal: Bowel Sounds present, soft, nontender.   Lymph: No peripheral edema. No lymphadenopathy.  Skin: No visible rashes or ulcers.  Psych:  Mood & affect appropriate for situation    LABS: All Labs Reviewed:                        11.6   10.35 )-----------( 299      ( 25 Nov 2023 08:10 )             35.9                         12.6   17.51 )-----------( 307      ( 24 Nov 2023 09:50 )             38.4     25 Nov 2023 08:10    139    |  104    |  22     ----------------------------<  142    4.7     |  27     |  0.94   24 Nov 2023 09:50    137    |  101    |  27     ----------------------------<  149    4.8     |  26     |  1.20     Ca    8.5        25 Nov 2023 08:10  Ca    9.1        24 Nov 2023 09:50    TPro  6.8    /  Alb  2.8    /  TBili  0.5    /  DBili  x      /  AST  15     /  ALT  19     /  AlkPhos  67     25 Nov 2023 08:10  TPro  7.4    /  Alb  3.2    /  TBili  0.7    /  DBili  x      /  AST  20     /  ALT  19     /  AlkPhos  73     24 Nov 2023 09:50    PT/INR - ( 24 Nov 2023 09:50 )   PT: 12.4 sec;   INR: 1.06 ratio         PTT - ( 24 Nov 2023 09:50 )  PTT:30.3 sec      Blood Culture:     11-25 @ 08:10  TSH: 1.20      RADIOLOGY:

## 2023-11-25 NOTE — CONSULT NOTE ADULT - SUBJECTIVE AND OBJECTIVE BOX
[facility], Division of Infectious Diseases  OFELIA Harvey S. Shah, Y. Patel, [facility]. [facility]  [phone]  ([phone] - weekdays after 5pm and weekends)    LOIDA QUEZADA  62y, Male  [phone]    HPI--  HPI:  61 yo M with PMH HTN, HLD, Type II DM, s/p R hallux toe amputation 3 yrs ago presents to the ED with R foot wound. Patient went to a private podiatrist 2.5 months ago to scrape off callus. The callus removal site did not heal well and started to develop into a wound. Podiatrist recommended patient see Dr. Stark for wound management. Per patient, would has been intermittently draining clear liquid and having foul odor. Last sunday, patient had episode of chills that resolved when he went to sleep. Last night, he also had chills and had difficulty balancing. Today he went to his regular scheduled appt with Dr. Stark who recommended he be admitted for IV ABx and surgery on Tuesday,  Denies fever chest pain, palpitations, SOB, cough, abdominal pain, nausea, vomiting, diarrhea, constipation, urinary frequency, urgency, or dysuria, headaches, changes in vision, dizziness, numbness, tingling. Denies recent travel, recent antibiotic use, or sick contacts.   (24 Nov 2023 15:27)    ROS: 10 point review of systems completed, pertinent positives and negatives as per HPI.    Allergies: No Known Allergies    PMH -- HTN (hypertension)    Diabetes    Hyperlipidemia    Peripheral vascular disease    PVD (peripheral vascular disease)      PSH -- H/O angioplasty    Status post amputation of toe      FH -- FH: type 2 diabetes      Social History -- denies tobacco, alcohol or illicit drug use    Physical Exam--  Vital Signs Last 24 Hrs  T(F): 98.3 (25 Nov 2023 12:39), Max: 99 (25 Nov 2023 04:37)  HR: 81 (25 Nov 2023 12:39) (81 - 100)  BP: 108/69 (25 Nov 2023 12:39) (108/69 - 129/78)  RR: 17 (25 Nov 2023 12:39) (16 - 17)  SpO2: 98% (25 Nov 2023 12:39) (94% - 98%)  General: nontoxic-appearing, no acute distress  HEENT:  anicteric  Lungs: Clear bilaterally without rales  Heart: S1, S2, normal rate.   Abdomen: Soft. Nondistended. Nontender.   Neuro: AAOx3, no obvious focal deficits   Extremities: R foot wrapped w/ dressing  Skin: Warm. Dry.   Psychiatric: Appropriate affect and mood for situation.     Laboratory & Imaging Data--  CBC:                       11.6   10.35 )-----------( 299      ( 25 Nov 2023 08:10 )             35.9     WBC Count: 10.35 K/uL (11-25-23 @ 08:10)  WBC Count: 17.51 K/uL (11-24-23 @ 09:50)    CMP: 11-25    139  |  104  |  22  ----------------------------<  142<H>  4.7   |  27  |  0.94    Ca    8.5      25 Nov 2023 08:10    TPro  6.8  /  Alb  2.8<L>  /  TBili  0.5  /  DBili  x   /  AST  15  /  ALT  19  /  AlkPhos  67  11-25    LIVER FUNCTIONS - ( 25 Nov 2023 08:10 )  Alb: 2.8 g/dL / Pro: 6.8 g/dL / ALK PHOS: 67 U/L / ALT: 19 U/L / AST: 15 U/L / GGT: x           Urinalysis Basic - ( 25 Nov 2023 08:10 )    Color: x / Appearance: x / SG: x / pH: x  Gluc: 142 mg/dL / Ketone: x  / Bili: x / Urobili: x   Blood: x / Protein: x / Nitrite: x   Leuk Esterase: x / RBC: x / WBC x   Sq Epi: x / Non Sq Epi: x / Bacteria: x      Microbiology:     Culture - Blood (collected 11-24-23 @ 09:50)  Source: .Blood Blood-Peripheral  Preliminary Report (11-25-23 @ 17:02):    No growth at 24 hours    Culture - Blood (collected 11-24-23 @ 09:35)  Source: .Blood Blood-Peripheral  Preliminary Report (11-25-23 @ 17:02):    No growth at 24 hours    Culture - Other (collected 11-24-23 @ 08:35)  Source: .Other Right foot  Preliminary Report (11-25-23 @ 15:26):    Moderate Enterococcus faecalis        Radiology--  ***  Active Medications--  acetaminophen     Tablet .. 650 milliGRAM(s) Oral every 6 hours PRN  atorvastatin 40 milliGRAM(s) Oral at bedtime  clopidogrel Tablet 75 milliGRAM(s) Oral daily  dextrose 5%. 1000 milliLiter(s) IV Continuous <Continuous>  dextrose 5%. 1000 milliLiter(s) IV Continuous <Continuous>  dextrose 50% Injectable 12.5 Gram(s) IV Push once  dextrose 50% Injectable 25 Gram(s) IV Push once  dextrose 50% Injectable 25 Gram(s) IV Push once  dextrose Oral Gel 15 Gram(s) Oral once PRN  enoxaparin Injectable 40 milliGRAM(s) SubCutaneous every 24 hours  gabapentin 300 milliGRAM(s) Oral two times a day  glucagon  Injectable 1 milliGRAM(s) IntraMuscular once  insulin lispro (ADMELOG) corrective regimen sliding scale   SubCutaneous three times a day before meals  insulin lispro (ADMELOG) corrective regimen sliding scale   SubCutaneous at bedtime  lisinopril 40 milliGRAM(s) Oral daily  melatonin 3 milliGRAM(s) Oral at bedtime PRN  metoprolol succinate ER 50 milliGRAM(s) Oral daily  piperacillin/tazobactam IVPB.. 3.375 Gram(s) IV Intermittent every 8 hours    Antimicrobials:   piperacillin/tazobactam IVPB.. 3.375 Gram(s) IV Intermittent every 8 hours    Immunologic:

## 2023-11-25 NOTE — PROGRESS NOTE ADULT - PROBLEM SELECTOR PLAN 4
- unclear history (pt denies CAD) however had positive stress test recently and Cardio outpatient is scheduled for PCI (pt says no stent needed) on December 14  - currently on aspirin and plavix  - Cardio consult for medical clearance needed? - unclear history (pt denies CAD) however had positive stress test recently and Cardio outpatient is scheduled for PCI (pt says no stent needed) on December 14  - currently on aspirin and plavix  - Cardio consulted for cardiac clearance

## 2023-11-25 NOTE — PROGRESS NOTE ADULT - PROBLEM SELECTOR PLAN 1
- Pt with elevated WBC, ESR, CRP, normal lactate,  - s/p Vanc x  1, Zosyn x  1, NS bolus  x 1  - VSS, does not meet sepsis criteria  -  XRAY foot: Suspect pathologic fractures of the third and fourth metatarsal heads. 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 If osteomyelitis is clinically considered  despite conservative therapy,   and soft tissue / bone infection requires further assessment, follow-up   MRI rt foot as per Dr rosa  - continue Zosyn IVPB q 8 hrs as per ID Dr Unique MICAHELS  Local wound care   - Gabapentin for neuropathy, pain meds as needed (IV tylenol)  - MRI ordered, f/u results  - blood cx 2 ordered, f/u results  - R LE u/s negative   - activity: partial WB on R foot, walk with walker  - Dr. Rosa Podiatry following   - Vascular following   - ID following - Pt with elevated WBC, ESR, CRP, normal lactate,  - s/p Vanc x  1, Zosyn x  1, NS bolus  x 1  - VSS, does not meet sepsis criteria  -  XRAY foot: Suspect pathologic fractures of the third and fourth metatarsal heads. 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 If osteomyelitis is clinically considered  despite conservative therapy,   and soft tissue / bone infection requires further assessment, follow-up   MRI rt foot as per Dr rosa  - continue Zosyn IVPB q 8 hrs as per ID Dr Calhoun S group follow up  Local wound care   - Gabapentin for neuropathy, pain meds as needed (IV tylenol)  - MRI  rt foot  - pending   - blood cx 2 f/u results  - R LE u/s negative   - activity: partial WB on R foot, walk with walker  - Dr. Rosa Podiatry following   - Vascular following - Arterial Duplex follow up  - ID following

## 2023-11-25 NOTE — PROGRESS NOTE ADULT - PROBLEM SELECTOR PLAN 3
- chronic, c/w home medications - chronic, c/w home medications  Toprol XL 50 mg daily  Lisinopril 40 mg daily

## 2023-11-25 NOTE — PROGRESS NOTE ADULT - SUBJECTIVE AND OBJECTIVE BOX
Patient is a 62y old  Male who presents with a chief complaint of R foot wound (24 Nov 2023 18:03)    HPI:  61 yo M with PMH HTN, HLD, Type II DM, s/p R hallux toe amputation 3 yrs  ago presents to the ED with R foot wound. Patient went to a private podiatrist 2.5 months ago to scrape off callus. The callus removal site did not heal well and started to develop into a wound. Podiatrist recommended patient see Dr. Stark for wound management. Per patient, would has been intermittently draining clear liquid and having foul odor. Last sunday, patient had episode of chills that resolved when he went to sleep. Last night, he also had chills and had difficulty balancing. Today he went to his regular scheduled appt with Dr. Stark who recommended he be admitted for IV ABx and surgery on Tuesday,  Denies fever chest pain, palpitations, SOB, cough, abdominal pain, nausea, vomiting, diarrhea, constipation, urinary frequency, urgency, or dysuria, headaches, changes in vision, dizziness, numbness, tingling. Denies recent travel, recent antibiotic use, or sick contacts.    ED Course:   Vitals: BP: 127/65, HR: 93, Temp: 99.2 , RR: 19, SpO2: 96 % on RA   Labs:  ESR: 77, WBC: 17.51, H/H: 12.6/38.4  CXR: No evidence of acute infiltrate  XRAY foot: IMPRESSION: Suspect pathologic fractures of the third and fourth metatarsal heads. 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 If osteomyelitis is clinically considered  despite conservative therapy,   and soft tissue / bone infection requires further assessment, follow-up   MRI recommended.  EKG:  NSR, 81 BPM  Received in the ED  Vanc x  1, Zosyn x  1, NS bolus  x 1     (24 Nov 2023 15:27)    INTERVAL HPI:  11/25/23: Patient seen and examined at bedside. Patient laying in bed comfortably and offers no complaints. Dressing on right foot appears clean, dry and intact. No significant overnight events.     Home Medications:  atorvastatin 40 mg oral tablet: 1 tab(s) orally once a day (24 Nov 2023 15:22)  clopidogrel 75 mg oral tablet: 1 tab(s) orally once a day (24 Nov 2023 15:22)  gabapentin 300 mg oral capsule: 1 cap(s) orally 2 times a day (24 Nov 2023 15:20)  Jardiance 25 mg oral tablet: 1 tab(s) orally once a day (24 Nov 2023 15:20)  lisinopril 40 mg oral tablet: 1 tab(s) orally once a day (24 Nov 2023 15:19)  metFORMIN 1000 mg oral tablet: 1 tab(s) orally 2 times a day (24 Nov 2023 15:22)  Metoprolol Succinate ER 50 mg oral tablet, extended release: 1 tab(s) orally once a day (24 Nov 2023 15:18)  Trulicity Pen 1.5 mg/0.5 mL subcutaneous solution: 1.5 milligram(s) subcutaneously once a week (24 Nov 2023 15:23)      MEDICATIONS  (STANDING):  atorvastatin 40 milliGRAM(s) Oral at bedtime  clopidogrel Tablet 75 milliGRAM(s) Oral daily  dextrose 5%. 1000 milliLiter(s) (50 mL/Hr) IV Continuous <Continuous>  dextrose 5%. 1000 milliLiter(s) (100 mL/Hr) IV Continuous <Continuous>  dextrose 50% Injectable 25 Gram(s) IV Push once  dextrose 50% Injectable 12.5 Gram(s) IV Push once  dextrose 50% Injectable 25 Gram(s) IV Push once  enoxaparin Injectable 40 milliGRAM(s) SubCutaneous every 24 hours  gabapentin 300 milliGRAM(s) Oral two times a day  glucagon  Injectable 1 milliGRAM(s) IntraMuscular once  insulin lispro (ADMELOG) corrective regimen sliding scale   SubCutaneous three times a day before meals  insulin lispro (ADMELOG) corrective regimen sliding scale   SubCutaneous at bedtime  lisinopril 40 milliGRAM(s) Oral daily  metoprolol succinate ER 50 milliGRAM(s) Oral daily  piperacillin/tazobactam IVPB.. 3.375 Gram(s) IV Intermittent every 8 hours    MEDICATIONS  (PRN):  acetaminophen     Tablet .. 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 No nausea, No vomiting; No diarrhea, No constipation. [  ] BM  GENITOURINARY: No dysuria, No frequency, No urgency, No hematuria, NO incontinence  NEUROLOGICAL: No headaches, No dizziness, No numbness, No tingling, No tremors, No weakness  EXT: No Swelling, No Pain, No Edema  SKIN:  [ x ] No itching, burning, rashes, or lesions + wound on right toe   MUSCULOSKELETAL: No joint pain ,No Jt swelling; No muscle pain, No back pain, No extremity pain  PSYCHIATRIC: No depression,  No anxiety,  No mood swings ,No difficulty sleeping at night  PAIN SCALE: [ x ] None  [  ] Other-  ROS Unable to obtain due to - [  ] Dementia  [  ] Lethargy [  ] Drowsy [  ] Sedated [  ] non verbal  REST OF REVIEW Of SYSTEM - [ x ] Normal     Vital Signs Last 24 Hrs  T(C): 37.2 (25 Nov 2023 04:37), Max: 37.2 (25 Nov 2023 04:37)  T(F): 99 (25 Nov 2023 04:37), Max: 99 (25 Nov 2023 04:37)  HR: 92 (25 Nov 2023 04:37) (84 - 100)  BP: 111/72 (25 Nov 2023 04:37) (111/72 - 129/78)  BP(mean): --  RR: 16 (25 Nov 2023 04:37) (16 - 18)  SpO2: 94% (25 Nov 2023 04:37) (94% - 98%)    Parameters below as of 25 Nov 2023 04:37  Patient On (Oxygen Delivery Method): room air      Finger Stick          PHYSICAL EXAM:  GENERAL:  [ x ] NAD , [ x ] well appearing, [  ] Agitated, [  ] Drowsy,  [  ] Lethargy, [  ] confused   HEAD:  [ x ] Normal, [  ] Other  EYES:  [ x ] EOMI, [x  ] PERRLA, [ x ] conjunctiva and sclera clear normal, [  ] Other,  [  ] Pallor,[  ] Discharge  ENMT:  [ x ] Normal, [ x ] Moist mucous membranes, [  ] Good dentition, [  ] No Thrush  NECK:  [x  ] Supple, [ x ] No JVD, [  ] Normal thyroid, [  ] Lymphadenopathy [  ] Other  CHEST/LUNG:  [ x ] Clear to auscultation bilaterally, [  x] Breath Sounds equal B/L / Decrease, [  ] poor effort  [  ] No rales, [  ] No rhonchi  [  ]  No wheezing,   HEART:  [x  ] Regular rate and rhythm, [  ] tachycardia, [  ] Bradycardia,  [  ] irregular  [x  ] No murmurs, No rubs, No gallops, [  ] PPM in place (Mfr:  )  ABDOMEN:  [ x ] Soft, [ x ] Nontender, [ x ] Nondistended, [  ] No mass, [  ] Bowel sounds present, [  ] obese  NERVOUS SYSTEM:  [ x ] Alert & Oriented X3, [  ] Nonfocal  [  ] Confusion  [  ] Encephalopathic [  ] Sedated [  ] Unable to assess, [  ] Dementia [  ] Other-  EXTREMITIES: [x  ] 2+ Peripheral Pulses, No clubbing, No cyanosis,  [  ] edema B/L lower EXT. [  ] PVD stasis skin changes B/L Lower EXT, [ x ] wound  LYMPH: No lymphadenopathy noted  SKIN:  [  x] No rashes or lesions, [  ] Pressure Ulcers, [  ] ecchymosis, [  ] Skin Tears, [  ] Other    DIET:     LABS:                        11.6   10.35 )-----------( 299      ( 25 Nov 2023 08:10 )             35.9     25 Nov 2023 08:10    139    |  104    |  22     ----------------------------<  142    4.7     |  27     |  0.94     Ca    8.5        25 Nov 2023 08:10    TPro  6.8    /  Alb  2.8    /  TBili  0.5    /  DBili  x      /  AST  15     /  ALT  19     /  AlkPhos  67     25 Nov 2023 08:10    PT/INR - ( 24 Nov 2023 09:50 )   PT: 12.4 sec;   INR: 1.06 ratio         PTT - ( 24 Nov 2023 09:50 )  PTT:30.3 sec  Urinalysis Basic - ( 25 Nov 2023 08:10 )    Color: x / Appearance: x / SG: x / pH: x  Gluc: 142 mg/dL / Ketone: x  / Bili: x / Urobili: x   Blood: x / Protein: x / Nitrite: x   Leuk Esterase: x / RBC: x / WBC x   Sq Epi: x / Non Sq Epi: x / Bacteria: x                           Anemia Panel:      Thyroid Panel:  Thyroid Stimulating Hormone, Serum: 1.20 uIU/mL (11-25-23 @ 08:10)                RADIOLOGY & ADDITIONAL TESTS:  < from: US Duplex Venous Lower Ext Ltd, Right (11.25.23 @ 11:01) >  ACC: [id number] EXAM:  US DPLX LWR EXT VEINS LTD RT   ORDERED BY: PAOLO VANCE     PROCEDURE DATE:  11/25/2023          INTERPRETATION:  CLINICAL INFORMATION: Right foot infection.    COMPARISON: None available.    TECHNIQUE: Duplex sonography ofthe RIGHT LOWER extremity veins with   color and spectral Doppler, with and without compression.    FINDINGS:    There is normal compressibility of the right common femoral, femoral and   popliteal veins.  The contralateral common femoral vein is patent.  Doppler examination shows normal spontaneous and phasic flow.    No calf vein thrombosis is detected.    IMPRESSION:  No evidence of right lower extremity deep venous thrombosis.            --- End of Report ---            PIOTR CASTILLO MD; Attending Radiologist  This document has been electronically signed. Nov 25 2023 12:02PM    < end of copied text >  < from: Xray Foot AP + Lateral + Oblique, Right (11.24.23 @ 11:05) >  ACC: [id number] EXAM:  XR FOOT COMP MIN 3 VIEWS RT   ORDERED BY: DARRELL KEATING     PROCEDURE DATE:  11/24/2023          INTERPRETATION:  History:   Right foot infection    Technique: Right x-rays 3 films    Comparison: 9/23/2023    Findings/  Impression:    The patient has had transmetatarsal amputation of the first digit.   Deformity is seen in the third and fourth metatarsal heads, with evidence   of fracture healing.. There is dislocation of the second   metatarsophalangeal joint, with overlapping of the bones. There is ostial   lysis of the second distal metatarsal bone with gas, compatible with   infection in this region. There are vascular calcifications as well.   There is calcaneal enthesopathy with plantar spurring.    --- End of Report ---            DYLON FRAGOSO MD; Attending Interventional Radiologist  This document has been electronically signed. Nov 25 2023  7:12AM    < end of copied text >  < from: Xray Chest 1 View- PORTABLE-Urgent (11.24.23 @ 11:05) >    ACC: [id number] EXAM:  XR CHEST PORTABLE URGENT 1V   ORDERED BY: DARRELL KEATING     PROCEDURE DATE:  11/24/2023          INTERPRETATION:  CLINICAL HISTORY: Foot infection..    CHEST    Single frontal radiograph of the chest demonstrate the lungs manuel   well-aerated and free of infiltrates. The costophrenic angles are clear.   The heart appears normal in size and configuration. No hilar or   mediastinal mass lesion can be appreciated. There is no evidence of   destructive bony lesion. As compared to a previous study dated   12/16/2020, there has been no significant change in the lung fields. The    IMPRESSION:    No evidence of acute infiltrate.    --- End of Report ---            MANNY CODY MD; Attending Radiologist  This document has been electronically signed. Nov 24 2023  2:47PM    < end of copied text >        HEALTH ISSUES - PROBLEM Dx:  Diabetic ulcer of right foot    HTN (hypertension)    Hyperlipidemia    CAD (coronary artery disease)    Need for prophylactic measure    Diabetes            Consultant(s) Notes Reviewed:  [  ] YES     Care Discussed with [X] Consultants  [  ] Patient  [  ] Family [  ] HCP [  ]   [  ] Social Service  [  ] RN, [  ] Physical Therapy,[  ] Palliative care team  DVT PPX: [  ] Lovenox, [  ] S C Heparin, [  ] Coumadin, [  ] Xarelto, [  ] Eliquis, [  ] Pradaxa, [  ] IV Heparin drip, [  ] SCD [  ] Contraindication 2 to GI Bleed,[  ] Ambulation [  ] Contraindicated 2 to  bleed [  ] Contraindicated 2 to Brain Bleed  Advanced directive: [  ] None, [  ] DNR/DNI Patient is a 62y old  Male who presents with a chief complaint of R foot wound (24 Nov 2023 18:03)    HPI:  61 yo M with PMH HTN, HLD, Type II DM, s/p R hallux toe amputation 3 yrs  ago presents to the ED with R foot wound. Patient went to a private podiatrist 2.5 months ago to scrape off callus. The callus removal site did not heal well and started to develop into a wound. Podiatrist recommended patient see Dr. Stark for wound management. Per patient, would has been intermittently draining clear liquid and having foul odor. Last sunday, patient had episode of chills that resolved when he went to sleep. Last night, he also had chills and had difficulty balancing. Today he went to his regular scheduled appt with Dr. Stark who recommended he be admitted for IV ABx and surgery on Tuesday,  Denies fever chest pain, palpitations, SOB, cough, abdominal pain, nausea, vomiting, diarrhea, constipation, urinary frequency, urgency, or dysuria, headaches, changes in vision, dizziness, numbness, tingling. Denies recent travel, recent antibiotic use, or sick contacts.    ED Course:   Vitals: BP: 127/65, HR: 93, Temp: 99.2 , RR: 19, SpO2: 96 % on RA   Labs:  ESR: 77, WBC: 17.51, H/H: 12.6/38.4  CXR: No evidence of acute infiltrate  XRAY foot: IMPRESSION: Suspect pathologic fractures of the third and fourth metatarsal heads. 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 If osteomyelitis is clinically considered  despite conservative therapy,   and soft tissue / bone infection requires further assessment, follow-up   MRI recommended.  EKG:  NSR, 81 BPM  Received in the ED  Vanc x  1, Zosyn x  1, NS bolus  x 1     (24 Nov 2023 15:27)    INTERVAL HPI:  11/25/23: Patient seen and examined at bedside. Patient laying in bed comfortably and offers no complaints. Dressing on right foot appears clean, dry and intact. No significant overnight events. IV Zosyn, WBC Resolved     Home Medications:  atorvastatin 40 mg oral tablet: 1 tab(s) orally once a day (24 Nov 2023 15:22)  clopidogrel 75 mg oral tablet: 1 tab(s) orally once a day (24 Nov 2023 15:22)  gabapentin 300 mg oral capsule: 1 cap(s) orally 2 times a day (24 Nov 2023 15:20)  Jardiance 25 mg oral tablet: 1 tab(s) orally once a day (24 Nov 2023 15:20)  lisinopril 40 mg oral tablet: 1 tab(s) orally once a day (24 Nov 2023 15:19)  metFORMIN 1000 mg oral tablet: 1 tab(s) orally 2 times a day (24 Nov 2023 15:22)  Metoprolol Succinate ER 50 mg oral tablet, extended release: 1 tab(s) orally once a day (24 Nov 2023 15:18)  Trulicity Pen 1.5 mg/0.5 mL subcutaneous solution: 1.5 milligram(s) subcutaneously once a week (24 Nov 2023 15:23)      MEDICATIONS  (STANDING):  atorvastatin 40 milliGRAM(s) Oral at bedtime  clopidogrel Tablet 75 milliGRAM(s) Oral daily  dextrose 5%. 1000 milliLiter(s) (50 mL/Hr) IV Continuous <Continuous>  dextrose 5%. 1000 milliLiter(s) (100 mL/Hr) IV Continuous <Continuous>  dextrose 50% Injectable 25 Gram(s) IV Push once  dextrose 50% Injectable 12.5 Gram(s) IV Push once  dextrose 50% Injectable 25 Gram(s) IV Push once  enoxaparin Injectable 40 milliGRAM(s) SubCutaneous every 24 hours  gabapentin 300 milliGRAM(s) Oral two times a day  glucagon  Injectable 1 milliGRAM(s) IntraMuscular once  insulin lispro (ADMELOG) corrective regimen sliding scale   SubCutaneous three times a day before meals  insulin lispro (ADMELOG) corrective regimen sliding scale   SubCutaneous at bedtime  lisinopril 40 milliGRAM(s) Oral daily  metoprolol succinate ER 50 milliGRAM(s) Oral daily  piperacillin/tazobactam IVPB.. 3.375 Gram(s) IV Intermittent every 8 hours    MEDICATIONS  (PRN):  acetaminophen     Tablet .. 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 No nausea, No vomiting; No diarrhea, No constipation. [  ] BM  GENITOURINARY: No dysuria, No frequency, No urgency, No hematuria, NO incontinence  NEUROLOGICAL: No headaches, No dizziness, No numbness, No tingling, No tremors, No weakness  EXT: No Swelling, No Pain, No Edema  SKIN:  [ x ] No itching, burning, rashes, or lesions + wound on right toe   MUSCULOSKELETAL: No joint pain ,No Jt swelling; No muscle pain, No back pain, No extremity pain  PSYCHIATRIC: No depression,  No anxiety,  No mood swings ,No difficulty sleeping at night  PAIN SCALE: [ x ] None  [  ] Other-  ROS Unable to obtain due to - [  ] Dementia  [  ] Lethargy [  ] Drowsy [  ] Sedated [  ] non verbal  REST OF REVIEW Of SYSTEM - [ x ] Normal     Vital Signs Last 24 Hrs  T(C): 37.2 (25 Nov 2023 04:37), Max: 37.2 (25 Nov 2023 04:37)  T(F): 99 (25 Nov 2023 04:37), Max: 99 (25 Nov 2023 04:37)  HR: 92 (25 Nov 2023 04:37) (84 - 100)  BP: 111/72 (25 Nov 2023 04:37) (111/72 - 129/78)  BP(mean): --  RR: 16 (25 Nov 2023 04:37) (16 - 18)  SpO2: 94% (25 Nov 2023 04:37) (94% - 98%)    Parameters below as of 25 Nov 2023 04:37  Patient On (Oxygen Delivery Method): room air      Finger Stick          PHYSICAL EXAM:  GENERAL:  [ x ] NAD , [ x ] well appearing, [  ] Agitated, [  ] Drowsy,  [  ] Lethargy, [  ] confused   HEAD:  [ x ] Normal, [  ] Other  EYES:  [ x ] EOMI, [x  ] PERRLA, [ x ] conjunctiva and sclera clear normal, [  ] Other,  [  ] Pallor,[  ] Discharge  ENMT:  [ x ] Normal, [ x ] Moist mucous membranes, [ x ] Good dentition, [ x ] No Thrush  NECK:  [x  ] Supple, [ x ] No JVD, [  ] Normal thyroid, [  ] Lymphadenopathy [  ] Other  CHEST/LUNG:  [ x ] Clear to auscultation bilaterally, [  x] Breath Sounds equal B/L / Decrease, [ x ] poor effort  [x ] No rales, [ x ] No rhonchi  [x  ]  No wheezing,   HEART:  [x  ] Regular rate and rhythm, [  ] tachycardia, [  ] Bradycardia,  [  ] irregular  [x  ] No murmurs, No rubs, No gallops, [  ] PPM in place (Mfr:  )  ABDOMEN:  [ x ] Soft, [ x ] Nontender, [ x ] Nondistended, [ x ] No mass, [  ] Bowel sounds present, [  ] obese  NERVOUS SYSTEM:  [ x ] Alert & Oriented X3, [ x ] Nonfocal  [  ] Confusion  [  ] Encephalopathic [  ] Sedated [  ] Unable to assess, [  ] Dementia [  ] Other-  EXTREMITIES: [x  ] 2+ Peripheral Pulses, No clubbing, No cyanosis,  [  ] edema B/L lower EXT. [  ] PVD stasis skin changes B/L Lower EXT, [ x ] wound Rt foot dressing   LYMPH: No lymphadenopathy noted  SKIN:  [  x] No rashes or lesions, [  ] Pressure Ulcers, [  ] ecchymosis, [  ] Skin Tears, [  ] Other    DIET:  Diabetic     LABS:                        11.6   10.35 )-----------( 299      ( 25 Nov 2023 08:10 )             35.9     25 Nov 2023 08:10    139    |  104    |  22     ----------------------------<  142    4.7     |  27     |  0.94     Ca    8.5        25 Nov 2023 08:10    TPro  6.8    /  Alb  2.8    /  TBili  0.5    /  DBili  x      /  AST  15     /  ALT  19     /  AlkPhos  67     25 Nov 2023 08:10    PT/INR - ( 24 Nov 2023 09:50 )   PT: 12.4 sec;   INR: 1.06 ratio         PTT - ( 24 Nov 2023 09:50 )  PTT:30.3 sec  Urinalysis Basic - ( 25 Nov 2023 08:10 )    Color: x / Appearance: x / SG: x / pH: x  Gluc: 142 mg/dL / Ketone: x  / Bili: x / Urobili: x   Blood: x / Protein: x / Nitrite: x   Leuk Esterase: x / RBC: x / WBC x   Sq Epi: x / Non Sq Epi: x / Bacteria: x        Thyroid Panel:  Thyroid Stimulating Hormone, Serum: 1.20 uIU/mL (11-25-23 @ 08:10)      RADIOLOGY & ADDITIONAL TESTS:  < from: US Duplex Venous Lower Ext Ltd, Right (11.25.23 @ 11:01) >  ACC: [id number] EXAM:  US DPLX LWR EXT VEINS LTD RT   ORDERED BY: PAOLO VANCE     PROCEDURE DATE:  11/25/2023          INTERPRETATION:  CLINICAL INFORMATION: Right foot infection.    COMPARISON: None available.    TECHNIQUE: Duplex sonography ofthe RIGHT LOWER extremity veins with   color and spectral Doppler, with and without compression.    FINDINGS:    There is normal compressibility of the right common femoral, femoral and   popliteal veins.  The contralateral common femoral vein is patent.  Doppler examination shows normal spontaneous and phasic flow.    No calf vein thrombosis is detected.    IMPRESSION:  No evidence of right lower extremity deep venous thrombosis.            --- End of Report ---            PIOTR CASTILLO MD; Attending Radiologist  This document has been electronically signed. Nov 25 2023 12:02PM    < end of copied text >  < from: Xray Foot AP + Lateral + Oblique, Right (11.24.23 @ 11:05) >  ACC: [id number] EXAM:  XR FOOT COMP MIN 3 VIEWS RT   ORDERED BY: DARRELL KEATING     PROCEDURE DATE:  11/24/2023          INTERPRETATION:  History:   Right foot infection    Technique: Right x-rays 3 films    Comparison: 9/23/2023    Findings/  Impression:    The patient has had transmetatarsal amputation of the first digit.   Deformity is seen in the third and fourth metatarsal heads, with evidence   of fracture healing.. There is dislocation of the second   metatarsophalangeal joint, with overlapping of the bones. There is ostial   lysis of the second distal metatarsal bone with gas, compatible with   infection in this region. There are vascular calcifications as well.   There is calcaneal enthesopathy with plantar spurring.    --- End of Report ---            DYLON FRAGOSO MD; Attending Interventional Radiologist  This document has been electronically signed. Nov 25 2023  7:12AM    < end of copied text >  < from: Xray Chest 1 View- PORTABLE-Urgent (11.24.23 @ 11:05) >    ACC: [id number] EXAM:  XR CHEST PORTABLE URGENT 1V   ORDERED BY: DARRELL KEATING     PROCEDURE DATE:  11/24/2023          INTERPRETATION:  CLINICAL HISTORY: Foot infection..    CHEST    Single frontal radiograph of the chest demonstrate the lungs manuel   well-aerated and free of infiltrates. The costophrenic angles are clear.   The heart appears normal in size and configuration. No hilar or   mediastinal mass lesion can be appreciated. There is no evidence of   destructive bony lesion. As compared to a previous study dated   12/16/2020, there has been no significant change in the lung fields. The    IMPRESSION:    No evidence of acute infiltrate.    --- End of Report ---            MANNY CODY MD; Attending Radiologist  This document has been electronically signed. Nov 24 2023  2:47PM    < end of copied text >        HEALTH ISSUES - PROBLEM Dx:  Diabetic ulcer of right foot    HTN (hypertension)    Hyperlipidemia    CAD (coronary artery disease)    Need for prophylactic measure    Diabetes      Consultant(s) Notes Reviewed:  [x ] YES     Care Discussed with [X] Consultants  [ x ] Patient  [  ] Family [  ] HCP [  ]   [  ] Social Service  [ x ] RN, [  ] Physical Therapy,[  ] Palliative care team  DVT PPX: [ x ] Lovenox, [  ] S C Heparin, [  ] Coumadin, [  ] Xarelto, [  ] Eliquis, [  ] Pradaxa, [  ] IV Heparin drip, [  ] SCD [  ] Contraindication 2 to GI Bleed,[  ] Ambulation [  ] Contraindicated 2 to  bleed [  ] Contraindicated 2 to Brain Bleed  Advanced directive: [ x ] None, [  ] DNR/DNI

## 2023-11-25 NOTE — CONSULT NOTE ADULT - NS ATTEND AMEND GEN_ALL_CORE FT
63 yo M with PMH HTN, HLD, Type II DM, s/p R toe amp (3 yrs ago), s/p callus scrape off 2.5 weeks ago, now presents with R foot wound here for IV Abx and R foot wound R/O OM with Elevated WBC    concern for osteo, planning for procedure next week  had a recent exercise stress test where he walked ~10 min, but had some abnormal result for which some procedure is planned  no s/s acute ischemia, arrhythmia or vol ol  prior to full assessment of cv risk of surgery would need to have records of what is apparently an abnormal stress result, to be obtained monday  will follow No

## 2023-11-25 NOTE — CONSULT NOTE ADULT - ASSESSMENT
62M w/ PMhx of HTN, HLD, DM2 admitted from wound center for R foot infection.   Hx of R foot ulcer; pt also s/p R 1st toe and 1st metatarsal head resection.   Felt chills and pain to R foot this past Sunday.     R Foot Wound Infection  - s/p vanc/zosyn in the ED  - afebrile, leukocytosis to 17 now improving  foot x-ray reviewed  s/p podiatry eval- tentative plan for surgical intervention this upcoming week  wound culture currently growing E faecalis    Recommendations  C/w zosyn for now  f/u wound culture  F/u blood cultures  MRI pending    Darryl Aguilar M.D.  [facility], Division of Infectious Diseases  [phone]  After 5pm on weekdays and all day on weekends - please call [phone]

## 2023-11-25 NOTE — PROGRESS NOTE ADULT - PROBLEM SELECTOR PLAN 2
FS Q AC HS with HISS low dose   HOLD all oral meds  Hypoglycemia matt lucero  Nutritional eval FS Q AC HS with HISS low dose   HOLD all oral meds  Hypoglycemia protocol  Nutritional eval

## 2023-11-25 NOTE — PROGRESS NOTE ADULT - ASSESSMENT
61 yo M with PMH HTN, HLD, Type II DM, s/p R hallux toe amputation 3 weeks ago presents to the ED with R foot wound here for IV Abx and R foot wound R/O OM with Elevated WBC ,

## 2023-11-25 NOTE — CONSULT NOTE ADULT - ASSESSMENT
61 yo M with PMH HTN, HLD, Type II DM, s/p R hallux toe amputation 3 weeks ago presents to the ED with R foot wound here for IV Abx and R foot wound R/O OM with Elevated WBC    Pre optimization, HTN  - s/p R hallux toe amp now p/w  R foot wound concern for possible OM requiring surgical intervention, podiatry following     - EKG demonstrates NSR. No acute ischemic changes noted.   - Chest X-ray negative for acute process    - Patient euvolemic on examination with no overt signs of heart failure or cardiac ischemia.   - No severe valvular abnormalities noted on examination    - BP, HR stable and well controlled   - Continue Lisinopril 40mg 1 po qd and Metoprolol succinate XL 25mg 1po qd  - Continue statin  - Continue routine hemodynamic monitoring.  - Continue Lipitor (hx of PAD, with ag atherosclerosis on dopplers)    - Pt has no active ischemia, decompensated heart failure, unstable arrythmia, or severe stenotic valvular disease. Patient is optimized from cardiovascular standpoint to proceed with planned procedure with routine hemodynamic monitoring.     - Monitor and replete Lytes. Keep K > 4 and Mg > 2  - Will continue to follow.    HILARIO Holder  Nurse Practitioner - Cardiology   call TEAMS           61 yo M with PMH HTN, HLD, Type II DM, s/p R toe amp (3 yrs ago), s/p callus scrape off 2.5 weeks ago, now presents with R foot wound here for IV Abx and R foot wound R/O OM with Elevated WBC    Pre optimization, HTN  - s/p callus scrape off 2.5 weeks ago, now presents with R foot wound  concern for possible OM requiring surgical intervention, podiatry following     - follows with Dr. Andre (outpatient cardiologist)   - had a treadmill exercise stress test (11/5/2023) he was told he has "plaque" and was referred for angioplasty sched on 12/14/2023   - will obtain office records ([phone]) prior to clearance   - EKG demonstrates NSR. No acute ischemic changes noted.   - continue home ASA, plavix (unsure why pt is on it)   - continue statin   - Chest X-ray negative for acute process    - Patient euvolemic on examination with no overt signs of heart failure or cardiac ischemia.   - No severe valvular abnormalities noted on examination    - BP, HR stable and well controlled   - Continue home meds on Lisinopril, metoprolol   - continue routine hemodynamics   - Monitor and replete Lytes. Keep K > 4 and Mg > 2    - Will continue to follow.    HILARIO Holder  Nurse Practitioner - Cardiology   call TEAMS

## 2023-11-26 ENCOUNTER — TRANSCRIPTION ENCOUNTER (OUTPATIENT)
Age: 63
End: 2023-11-26

## 2023-11-26 LAB
-  AMPICILLIN: SIGNIFICANT CHANGE UP
-  AMPICILLIN: SIGNIFICANT CHANGE UP
-  TETRACYCLINE: SIGNIFICANT CHANGE UP
-  TETRACYCLINE: SIGNIFICANT CHANGE UP
-  VANCOMYCIN: SIGNIFICANT CHANGE UP
-  VANCOMYCIN: SIGNIFICANT CHANGE UP
ALBUMIN SERPL ELPH-MCNC: 2.9 G/DL — LOW (ref 3.3–5)
ALBUMIN SERPL ELPH-MCNC: 2.9 G/DL — LOW (ref 3.3–5)
ALP SERPL-CCNC: 66 U/L — SIGNIFICANT CHANGE UP (ref 40–120)
ALP SERPL-CCNC: 66 U/L — SIGNIFICANT CHANGE UP (ref 40–120)
ALT FLD-CCNC: 22 U/L — SIGNIFICANT CHANGE UP (ref 12–78)
ALT FLD-CCNC: 22 U/L — SIGNIFICANT CHANGE UP (ref 12–78)
ANION GAP SERPL CALC-SCNC: 6 MMOL/L — SIGNIFICANT CHANGE UP (ref 5–17)
ANION GAP SERPL CALC-SCNC: 6 MMOL/L — SIGNIFICANT CHANGE UP (ref 5–17)
AST SERPL-CCNC: 13 U/L — LOW (ref 15–37)
AST SERPL-CCNC: 13 U/L — LOW (ref 15–37)
BASOPHILS # BLD AUTO: 0.07 K/UL — SIGNIFICANT CHANGE UP (ref 0–0.2)
BASOPHILS # BLD AUTO: 0.07 K/UL — SIGNIFICANT CHANGE UP (ref 0–0.2)
BASOPHILS NFR BLD AUTO: 0.7 % — SIGNIFICANT CHANGE UP (ref 0–2)
BASOPHILS NFR BLD AUTO: 0.7 % — SIGNIFICANT CHANGE UP (ref 0–2)
BILIRUB SERPL-MCNC: 0.2 MG/DL — SIGNIFICANT CHANGE UP (ref 0.2–1.2)
BILIRUB SERPL-MCNC: 0.2 MG/DL — SIGNIFICANT CHANGE UP (ref 0.2–1.2)
BUN SERPL-MCNC: 18 MG/DL — SIGNIFICANT CHANGE UP (ref 7–23)
BUN SERPL-MCNC: 18 MG/DL — SIGNIFICANT CHANGE UP (ref 7–23)
CALCIUM SERPL-MCNC: 8.8 MG/DL — SIGNIFICANT CHANGE UP (ref 8.5–10.1)
CALCIUM SERPL-MCNC: 8.8 MG/DL — SIGNIFICANT CHANGE UP (ref 8.5–10.1)
CHLORIDE SERPL-SCNC: 105 MMOL/L — SIGNIFICANT CHANGE UP (ref 96–108)
CHLORIDE SERPL-SCNC: 105 MMOL/L — SIGNIFICANT CHANGE UP (ref 96–108)
CO2 SERPL-SCNC: 28 MMOL/L — SIGNIFICANT CHANGE UP (ref 22–31)
CO2 SERPL-SCNC: 28 MMOL/L — SIGNIFICANT CHANGE UP (ref 22–31)
CREAT SERPL-MCNC: 0.92 MG/DL — SIGNIFICANT CHANGE UP (ref 0.5–1.3)
CREAT SERPL-MCNC: 0.92 MG/DL — SIGNIFICANT CHANGE UP (ref 0.5–1.3)
CULTURE RESULTS: ABNORMAL
CULTURE RESULTS: ABNORMAL
EGFR: 93 ML/MIN/1.73M2 — SIGNIFICANT CHANGE UP
EGFR: 93 ML/MIN/1.73M2 — SIGNIFICANT CHANGE UP
EOSINOPHIL # BLD AUTO: 0.24 K/UL — SIGNIFICANT CHANGE UP (ref 0–0.5)
EOSINOPHIL # BLD AUTO: 0.24 K/UL — SIGNIFICANT CHANGE UP (ref 0–0.5)
EOSINOPHIL NFR BLD AUTO: 2.4 % — SIGNIFICANT CHANGE UP (ref 0–6)
EOSINOPHIL NFR BLD AUTO: 2.4 % — SIGNIFICANT CHANGE UP (ref 0–6)
GLUCOSE BLDC GLUCOMTR-MCNC: 169 MG/DL — HIGH (ref 70–99)
GLUCOSE BLDC GLUCOMTR-MCNC: 169 MG/DL — HIGH (ref 70–99)
GLUCOSE BLDC GLUCOMTR-MCNC: 172 MG/DL — HIGH (ref 70–99)
GLUCOSE BLDC GLUCOMTR-MCNC: 172 MG/DL — HIGH (ref 70–99)
GLUCOSE BLDC GLUCOMTR-MCNC: 186 MG/DL — HIGH (ref 70–99)
GLUCOSE BLDC GLUCOMTR-MCNC: 186 MG/DL — HIGH (ref 70–99)
GLUCOSE BLDC GLUCOMTR-MCNC: 248 MG/DL — HIGH (ref 70–99)
GLUCOSE BLDC GLUCOMTR-MCNC: 248 MG/DL — HIGH (ref 70–99)
GLUCOSE BLDC GLUCOMTR-MCNC: 273 MG/DL — HIGH (ref 70–99)
GLUCOSE BLDC GLUCOMTR-MCNC: 273 MG/DL — HIGH (ref 70–99)
GLUCOSE SERPL-MCNC: 182 MG/DL — HIGH (ref 70–99)
GLUCOSE SERPL-MCNC: 182 MG/DL — HIGH (ref 70–99)
HCT VFR BLD CALC: 37.6 % — LOW (ref 39–50)
HCT VFR BLD CALC: 37.6 % — LOW (ref 39–50)
HGB BLD-MCNC: 12.4 G/DL — LOW (ref 13–17)
HGB BLD-MCNC: 12.4 G/DL — LOW (ref 13–17)
IMM GRANULOCYTES NFR BLD AUTO: 0.5 % — SIGNIFICANT CHANGE UP (ref 0–0.9)
IMM GRANULOCYTES NFR BLD AUTO: 0.5 % — SIGNIFICANT CHANGE UP (ref 0–0.9)
LYMPHOCYTES # BLD AUTO: 1.25 K/UL — SIGNIFICANT CHANGE UP (ref 1–3.3)
LYMPHOCYTES # BLD AUTO: 1.25 K/UL — SIGNIFICANT CHANGE UP (ref 1–3.3)
LYMPHOCYTES # BLD AUTO: 12.7 % — LOW (ref 13–44)
LYMPHOCYTES # BLD AUTO: 12.7 % — LOW (ref 13–44)
MAGNESIUM SERPL-MCNC: 2.3 MG/DL — SIGNIFICANT CHANGE UP (ref 1.6–2.6)
MAGNESIUM SERPL-MCNC: 2.3 MG/DL — SIGNIFICANT CHANGE UP (ref 1.6–2.6)
MCHC RBC-ENTMCNC: 29.8 PG — SIGNIFICANT CHANGE UP (ref 27–34)
MCHC RBC-ENTMCNC: 29.8 PG — SIGNIFICANT CHANGE UP (ref 27–34)
MCHC RBC-ENTMCNC: 33 GM/DL — SIGNIFICANT CHANGE UP (ref 32–36)
MCHC RBC-ENTMCNC: 33 GM/DL — SIGNIFICANT CHANGE UP (ref 32–36)
MCV RBC AUTO: 90.4 FL — SIGNIFICANT CHANGE UP (ref 80–100)
MCV RBC AUTO: 90.4 FL — SIGNIFICANT CHANGE UP (ref 80–100)
METHOD TYPE: SIGNIFICANT CHANGE UP
METHOD TYPE: SIGNIFICANT CHANGE UP
MONOCYTES # BLD AUTO: 0.85 K/UL — SIGNIFICANT CHANGE UP (ref 0–0.9)
MONOCYTES # BLD AUTO: 0.85 K/UL — SIGNIFICANT CHANGE UP (ref 0–0.9)
MONOCYTES NFR BLD AUTO: 8.6 % — SIGNIFICANT CHANGE UP (ref 2–14)
MONOCYTES NFR BLD AUTO: 8.6 % — SIGNIFICANT CHANGE UP (ref 2–14)
NEUTROPHILS # BLD AUTO: 7.39 K/UL — SIGNIFICANT CHANGE UP (ref 1.8–7.4)
NEUTROPHILS # BLD AUTO: 7.39 K/UL — SIGNIFICANT CHANGE UP (ref 1.8–7.4)
NEUTROPHILS NFR BLD AUTO: 75.1 % — SIGNIFICANT CHANGE UP (ref 43–77)
NEUTROPHILS NFR BLD AUTO: 75.1 % — SIGNIFICANT CHANGE UP (ref 43–77)
NRBC # BLD: 0 /100 WBCS — SIGNIFICANT CHANGE UP (ref 0–0)
NRBC # BLD: 0 /100 WBCS — SIGNIFICANT CHANGE UP (ref 0–0)
ORGANISM # SPEC MICROSCOPIC CNT: ABNORMAL
ORGANISM # SPEC MICROSCOPIC CNT: ABNORMAL
ORGANISM # SPEC MICROSCOPIC CNT: SIGNIFICANT CHANGE UP
ORGANISM # SPEC MICROSCOPIC CNT: SIGNIFICANT CHANGE UP
PHOSPHATE SERPL-MCNC: 3.4 MG/DL — SIGNIFICANT CHANGE UP (ref 2.5–4.5)
PHOSPHATE SERPL-MCNC: 3.4 MG/DL — SIGNIFICANT CHANGE UP (ref 2.5–4.5)
PLATELET # BLD AUTO: 308 K/UL — SIGNIFICANT CHANGE UP (ref 150–400)
PLATELET # BLD AUTO: 308 K/UL — SIGNIFICANT CHANGE UP (ref 150–400)
POTASSIUM SERPL-MCNC: 4.8 MMOL/L — SIGNIFICANT CHANGE UP (ref 3.5–5.3)
POTASSIUM SERPL-MCNC: 4.8 MMOL/L — SIGNIFICANT CHANGE UP (ref 3.5–5.3)
POTASSIUM SERPL-SCNC: 4.8 MMOL/L — SIGNIFICANT CHANGE UP (ref 3.5–5.3)
POTASSIUM SERPL-SCNC: 4.8 MMOL/L — SIGNIFICANT CHANGE UP (ref 3.5–5.3)
PROT SERPL-MCNC: 7.2 G/DL — SIGNIFICANT CHANGE UP (ref 6–8.3)
PROT SERPL-MCNC: 7.2 G/DL — SIGNIFICANT CHANGE UP (ref 6–8.3)
RBC # BLD: 4.16 M/UL — LOW (ref 4.2–5.8)
RBC # BLD: 4.16 M/UL — LOW (ref 4.2–5.8)
RBC # FLD: 12.4 % — SIGNIFICANT CHANGE UP (ref 10.3–14.5)
RBC # FLD: 12.4 % — SIGNIFICANT CHANGE UP (ref 10.3–14.5)
SODIUM SERPL-SCNC: 139 MMOL/L — SIGNIFICANT CHANGE UP (ref 135–145)
SODIUM SERPL-SCNC: 139 MMOL/L — SIGNIFICANT CHANGE UP (ref 135–145)
SPECIMEN SOURCE: SIGNIFICANT CHANGE UP
SPECIMEN SOURCE: SIGNIFICANT CHANGE UP
WBC # BLD: 9.85 K/UL — SIGNIFICANT CHANGE UP (ref 3.8–10.5)
WBC # BLD: 9.85 K/UL — SIGNIFICANT CHANGE UP (ref 3.8–10.5)
WBC # FLD AUTO: 9.85 K/UL — SIGNIFICANT CHANGE UP (ref 3.8–10.5)
WBC # FLD AUTO: 9.85 K/UL — SIGNIFICANT CHANGE UP (ref 3.8–10.5)

## 2023-11-26 PROCEDURE — 93923 UPR/LXTR ART STDY 3+ LVLS: CPT | Mod: 26

## 2023-11-26 PROCEDURE — 99232 SBSQ HOSP IP/OBS MODERATE 35: CPT

## 2023-11-26 RX ORDER — LACTOBACILLUS ACIDOPHILUS 100MM CELL
1 CAPSULE ORAL
Refills: 0 | Status: DISCONTINUED | OUTPATIENT
Start: 2023-11-26 | End: 2023-11-27

## 2023-11-26 RX ORDER — LANOLIN ALCOHOL/MO/W.PET/CERES
3 CREAM (GRAM) TOPICAL AT BEDTIME
Refills: 0 | Status: DISCONTINUED | OUTPATIENT
Start: 2023-11-26 | End: 2023-11-27

## 2023-11-26 RX ORDER — ASPIRIN/CALCIUM CARB/MAGNESIUM 324 MG
81 TABLET ORAL DAILY
Refills: 0 | Status: DISCONTINUED | OUTPATIENT
Start: 2023-11-26 | End: 2023-11-27

## 2023-11-26 RX ADMIN — LISINOPRIL 40 MILLIGRAM(S): 2.5 TABLET ORAL at 06:22

## 2023-11-26 RX ADMIN — Medication 1 TABLET(S): at 17:45

## 2023-11-26 RX ADMIN — PIPERACILLIN AND TAZOBACTAM 25 GRAM(S): 4; .5 INJECTION, POWDER, LYOPHILIZED, FOR SOLUTION INTRAVENOUS at 21:26

## 2023-11-26 RX ADMIN — Medication 1: at 12:29

## 2023-11-26 RX ADMIN — PIPERACILLIN AND TAZOBACTAM 25 GRAM(S): 4; .5 INJECTION, POWDER, LYOPHILIZED, FOR SOLUTION INTRAVENOUS at 06:21

## 2023-11-26 RX ADMIN — Medication 1 TABLET(S): at 11:49

## 2023-11-26 RX ADMIN — GABAPENTIN 300 MILLIGRAM(S): 400 CAPSULE ORAL at 06:21

## 2023-11-26 RX ADMIN — CLOPIDOGREL BISULFATE 75 MILLIGRAM(S): 75 TABLET, FILM COATED ORAL at 11:49

## 2023-11-26 RX ADMIN — Medication 1: at 08:46

## 2023-11-26 RX ADMIN — PIPERACILLIN AND TAZOBACTAM 25 GRAM(S): 4; .5 INJECTION, POWDER, LYOPHILIZED, FOR SOLUTION INTRAVENOUS at 13:22

## 2023-11-26 RX ADMIN — Medication 1 TABLET(S): at 08:46

## 2023-11-26 RX ADMIN — GABAPENTIN 300 MILLIGRAM(S): 400 CAPSULE ORAL at 17:45

## 2023-11-26 RX ADMIN — ATORVASTATIN CALCIUM 40 MILLIGRAM(S): 80 TABLET, FILM COATED ORAL at 21:26

## 2023-11-26 RX ADMIN — Medication 50 MILLIGRAM(S): at 06:22

## 2023-11-26 RX ADMIN — Medication 1: at 17:44

## 2023-11-26 RX ADMIN — Medication 3 MILLIGRAM(S): at 21:26

## 2023-11-26 RX ADMIN — Medication 1: at 21:52

## 2023-11-26 RX ADMIN — ENOXAPARIN SODIUM 40 MILLIGRAM(S): 100 INJECTION SUBCUTANEOUS at 17:46

## 2023-11-26 NOTE — DIETITIAN INITIAL EVALUATION ADULT - NSICDXPASTMEDICALHX_GEN_ALL_CORE_FT
PAST MEDICAL HISTORY:  Diabetes     HTN (hypertension)     Hyperlipidemia     PVD (peripheral vascular disease)

## 2023-11-26 NOTE — PROGRESS NOTE ADULT - PROBLEM SELECTOR PLAN 4
- unclear history (pt denies CAD) however had positive stress test recently and Cardio outpatient is scheduled for PCI (pt says no stent needed) on December 14  - currently on aspirin and plavix  - Aspirin held prior to surgery?   - Cardio consulted for cardiac clearance- awaiting outpatient records from patients facilities - unclear history (pt denies CAD) however had positive stress test recently and Cardio outpatient is scheduled for PCI (pt says no stent needed) on December 14  - currently on aspirin and Plavix  - Aspirin held prior to surgery?   - Cardio consulted for cardiac clearance- awaiting outpatient records from patients facilities

## 2023-11-26 NOTE — DIETITIAN INITIAL EVALUATION ADULT - PERSON TAUGHT/METHOD
Pt declined written and verbal consistent carbohydrate, heart healthy diet education stating he is aware.

## 2023-11-26 NOTE — DIETITIAN INITIAL EVALUATION ADULT - NUTRITIONGOAL OUTCOME1
Pt to comply with rx therapeutic diet for optimal glycemia control & A1c reduction; BS goal 100-180.

## 2023-11-26 NOTE — PROGRESS NOTE ADULT - PROBLEM SELECTOR PLAN 2
- HgbA1c 8.2  - Hold home medications  - Low dose insulin corrective scale  - Hypoglycemia protocol, Accuchecks AC&HS  - Diet regular food with DASH/TLC  - Nutritional eval - HgbA1c 8.2  - Hold home medications  - Low dose insulin corrective scale  - Hypoglycemia protocol, Accu-Chek   AC&HS  - Diet regular food with DASH/TLC  - Nutritional eval  Diabetic NP coryal

## 2023-11-26 NOTE — PROGRESS NOTE ADULT - SUBJECTIVE AND OBJECTIVE BOX
Patient is a 62y old  Male who presents with a chief complaint of R foot wound (25 Nov 2023 17:59)    HPI:  61 yo M with PMH HTN, HLD, Type II DM, s/p R hallux toe amputation 3 yrs  ago presents to the ED with R foot wound. Patient went to a private podiatrist 2.5 months ago to scrape off callus. The callus removal site did not heal well and started to develop into a wound. Podiatrist recommended patient see Dr. Stark for wound management. Per patient, would has been intermittently draining clear liquid and having foul odor. Last sunday, patient had episode of chills that resolved when he went to sleep. Last night, he also had chills and had difficulty balancing. Today he went to his regular scheduled appt with Dr. Stark who recommended he be admitted for IV ABx and surgery on Tuesday,  Denies fever chest pain, palpitations, SOB, cough, abdominal pain, nausea, vomiting, diarrhea, constipation, urinary frequency, urgency, or dysuria, headaches, changes in vision, dizziness, numbness, tingling. Denies recent travel, recent antibiotic use, or sick contacts.    ED Course:   Vitals: BP: 127/65, HR: 93, Temp: 99.2 , RR: 19, SpO2: 96 % on RA   Labs:  ESR: 77, WBC: 17.51, H/H: 12.6/38.4  CXR: No evidence of acute infiltrate  XRAY foot: IMPRESSION: Suspect pathologic fractures of the third and fourth metatarsal heads. 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 If osteomyelitis is clinically considered  despite conservative therapy,   and soft tissue / bone infection requires further assessment, follow-up   MRI recommended.  EKG:  NSR, 81 BPM  Received in the ED  Vanc x  1, Zosyn x  1, NS bolus  x 1     (24 Nov 2023 15:27)    INTERVAL HPI:    11/25/23: Patient seen and examined at bedside. Patient laying in bed comfortably and offers no complaints. Dressing on right foot appears clean, dry and intact. No significant overnight events. IV Zosyn, WBC Resolved   11/26/23: Pt seen and examined at bedside. Pt sitting up in bed comfortably. Has no complaints. Otherwise is anxious to have surgery as soon as possible. Eating, ambulating well. No infectious sx. On IV zosyn,     OVERNIGHT EVENTS: None    Home Medications:  atorvastatin 40 mg oral tablet: 1 tab(s) orally once a day (24 Nov 2023 15:22)  clopidogrel 75 mg oral tablet: 1 tab(s) orally once a day (24 Nov 2023 15:22)  gabapentin 300 mg oral capsule: 1 cap(s) orally 2 times a day (24 Nov 2023 15:20)  Jardiance 25 mg oral tablet: 1 tab(s) orally once a day (24 Nov 2023 15:20)  lisinopril 40 mg oral tablet: 1 tab(s) orally once a day (24 Nov 2023 15:19)  metFORMIN 1000 mg oral tablet: 1 tab(s) orally 2 times a day (24 Nov 2023 15:22)  Metoprolol Succinate ER 50 mg oral tablet, extended release: 1 tab(s) orally once a day (24 Nov 2023 15:18)  Trulicity Pen 1.5 mg/0.5 mL subcutaneous solution: 1.5 milligram(s) subcutaneously once a week (24 Nov 2023 15:23)      MEDICATIONS  (STANDING):  atorvastatin 40 milliGRAM(s) Oral at bedtime  clopidogrel Tablet 75 milliGRAM(s) Oral daily  dextrose 5%. 1000 milliLiter(s) (50 mL/Hr) IV Continuous <Continuous>  dextrose 5%. 1000 milliLiter(s) (100 mL/Hr) IV Continuous <Continuous>  dextrose 50% Injectable 12.5 Gram(s) IV Push once  dextrose 50% Injectable 25 Gram(s) IV Push once  dextrose 50% Injectable 25 Gram(s) IV Push once  enoxaparin Injectable 40 milliGRAM(s) SubCutaneous every 24 hours  gabapentin 300 milliGRAM(s) Oral two times a day  glucagon  Injectable 1 milliGRAM(s) IntraMuscular once  insulin lispro (ADMELOG) corrective regimen sliding scale   SubCutaneous three times a day before meals  insulin lispro (ADMELOG) corrective regimen sliding scale   SubCutaneous at bedtime  lactobacillus acidophilus 1 Tablet(s) Oral three times a day with meals  lisinopril 40 milliGRAM(s) Oral daily  metoprolol succinate ER 50 milliGRAM(s) Oral daily  piperacillin/tazobactam IVPB.. 3.375 Gram(s) IV Intermittent every 8 hours    MEDICATIONS  (PRN):  acetaminophen     Tablet .. 650 milliGRAM(s) Oral every 6 hours PRN Temp greater or equal to 38C (100.4F), Mild Pain (1 - 3)  dextrose Oral Gel 15 Gram(s) Oral once PRN Blood Glucose LESS THAN 70 milliGRAM(s)/deciliter  melatonin 3 milliGRAM(s) Oral at bedtime PRN Insomnia      Allergies    No Known Allergies    Intolerances        Social History:  Lives: at home with wife and son  ADLs: independent  Diet: diabetic diet  Vaccination: covid vaccinated  Occupation: [employer]   Alcohol Use: social  Tobacco Use: none  Recreational Drug Use: none (24 Nov 2023 15:27)      REVIEW OF SYSTEMS:   CONSTITUTIONAL: No fever, No chills, No fatigue, No myalgia, No Body ache, No Weakness  EYES: No eye pain,  No visual disturbances, No discharge, NO Redness  ENMT:  No ear pain, No nose bleed, No vertigo; No sinus pain, NO throat pain, No Congestion  NECK: No pain, No stiffness  RESPIRATORY: No cough, NO wheezing, No  hemoptysis, NO  shortness of breath  CARDIOVASCULAR: No chest pain, palpitations  GASTROINTESTINAL: No abdominal pain, NO epigastric pain. No nausea, No vomiting; No diarrhea, No constipation. [ x ] BM  GENITOURINARY: No dysuria, No frequency, No urgency, No hematuria, NO incontinence  NEUROLOGICAL: No headaches, No dizziness, No numbness, No tingling, No tremors, No weakness  EXT: No Swelling, No Pain, No Edema  SKIN:  [ x ] No itching, burning, rashes, or lesions + wound on right toe, + neuropathy b/l LE  MUSCULOSKELETAL: No joint pain ,No Jt swelling; No muscle pain, No back pain, No extremity pain  PSYCHIATRIC: No depression,  No anxiety,  No mood swings ,No difficulty sleeping at night  PAIN SCALE: [ x ] None  [  ] Other-  ROS Unable to obtain due to - [  ] Dementia  [  ] Lethargy [  ] Drowsy [  ] Sedated [  ] non verbal  REST OF REVIEW Of SYSTEM - [ x ] Normal      Vital Signs Last 24 Hrs  T(C): 37.1 (26 Nov 2023 05:24), Max: 37.1 (26 Nov 2023 05:24)  T(F): 98.7 (26 Nov 2023 05:24), Max: 98.7 (26 Nov 2023 05:24)  HR: 80 (26 Nov 2023 05:24) (75 - 81)  BP: 110/80 (26 Nov 2023 05:24) (102/59 - 110/80)  BP(mean): --  RR: 17 (26 Nov 2023 05:24) (17 - 17)  SpO2: 98% (26 Nov 2023 05:24) (95% - 98%)    Parameters below as of 26 Nov 2023 05:24  Patient On (Oxygen Delivery Method): room air      Finger Stick        11-25 @ 07:01  -  11-26 @ 07:00  --------------------------------------------------------  IN: 100 mL / OUT: 0 mL / NET: 100 mL        PHYSICAL EXAM:  GENERAL:  [ x ] NAD , [ x ] well appearing, [  ] Agitated, [  ] Drowsy,  [  ] Lethargy, [  ] confused   HEAD:  [ x ] Normal, [  ] Other  EYES:  [ x ] EOMI, [x  ] PERRLA, [ x ] conjunctiva and sclera clear normal, [  ] Other,  [  ] Pallor,[  ] Discharge  ENMT:  [ x ] Normal, [ x ] Moist mucous membranes, [ x ] Good dentition, [ x ] No Thrush  NECK:  [x  ] Supple, [ x ] No JVD, [  ] Normal thyroid, [  ] Lymphadenopathy [  ] Other  CHEST/LUNG:  [ x ] Clear to auscultation bilaterally, [  x] Breath Sounds equal B/L / Decrease, [ x ] poor effort  [x ] No rales, [ x ] No rhonchi  [x  ]  No wheezing,   HEART:  [x  ] Regular rate and rhythm, [  ] tachycardia, [  ] Bradycardia,  [  ] irregular  [x  ] No murmurs, No rubs, No gallops, [  ] PPM in place (Mfr:  )  ABDOMEN:  [ x ] Soft, [ x ] Nontender, [ x ] Nondistended, [ x ] No mass, [  ] Bowel sounds present, [  ] obese  NERVOUS SYSTEM:  [ x ] Alert & Oriented X3, [ x ] Nonfocal  [  ] Confusion  [  ] Encephalopathic [  ] Sedated [  ] Unable to assess, [  ] Dementia [  ] Other-  EXTREMITIES: [x  ] 2+ Peripheral Pulses, No clubbing, No cyanosis,  [  ] edema B/L lower EXT. [  ] PVD stasis skin changes B/L Lower EXT, [ x ] wound Rt foot dressing   LYMPH: No lymphadenopathy noted  SKIN:  [  x] No rashes or lesions, [  ] Pressure Ulcers, [  ] ecchymosis, [  ] Skin Tears, [  ] Other  DIET:     LABS:                        12.4   9.85  )-----------( 308      ( 26 Nov 2023 08:50 )             37.6     26 Nov 2023 08:50    139    |  105    |  18     ----------------------------<  182    4.8     |  28     |  0.92     Ca    8.8        26 Nov 2023 08:50  Phos  3.4       26 Nov 2023 08:50  Mg     2.3       26 Nov 2023 08:50    TPro  7.2    /  Alb  2.9    /  TBili  0.2    /  DBili  x      /  AST  13     /  ALT  22     /  AlkPhos  66     26 Nov 2023 08:50      Urinalysis Basic - ( 26 Nov 2023 08:50 )    Color: x / Appearance: x / SG: x / pH: x  Gluc: 182 mg/dL / Ketone: x  / Bili: x / Urobili: x   Blood: x / Protein: x / Nitrite: x   Leuk Esterase: x / RBC: x / WBC x   Sq Epi: x / Non Sq Epi: x / Bacteria: x        Culture Results:   No growth at 24 hours (11-24 @ 09:50)  Culture Results:   No growth at 24 hours (11-24 @ 09:35)  Culture Results:   Moderate Enterococcus faecalis (11-24 @ 08:35)                  Culture - Blood (collected 24 Nov 2023 09:50)  Source: .Blood Blood-Peripheral  Preliminary Report (25 Nov 2023 17:02):    No growth at 24 hours    Culture - Blood (collected 24 Nov 2023 09:35)  Source: .Blood Blood-Peripheral  Preliminary Report (25 Nov 2023 17:02):    No growth at 24 hours    Culture - Other (collected 24 Nov 2023 08:35)  Source: .Other Right foot  Preliminary Report (25 Nov 2023 15:26):    Moderate Enterococcus faecalis         Anemia Panel:      Thyroid Panel:  Thyroid Stimulating Hormone, Serum: 1.20 uIU/mL (11-25-23 @ 08:10)                RADIOLOGY & ADDITIONAL TESTS:      HEALTH ISSUES - PROBLEM Dx:  Diabetic ulcer of right foot    CAD (coronary artery disease)    HTN (hypertension)    Hyperlipidemia    Need for prophylactic measure    Diabetes            Consultant(s) Notes Reviewed:  [  ] YES     Care Discussed with [X] Consultants  [  ] Patient  [  ] Family [  ] HCP [  ]   [  ] Social Service  [  ] RN, [  ] Physical Therapy,[  ] Palliative care team  DVT PPX: [  ] Lovenox, [  ] S C Heparin, [  ] Coumadin, [  ] Xarelto, [  ] Eliquis, [  ] Pradaxa, [  ] IV Heparin drip, [  ] SCD [  ] Contraindication 2 to GI Bleed,[  ] Ambulation [  ] Contraindicated 2 to  bleed [  ] Contraindicated 2 to Brain Bleed  Advanced directive: [  ] None, [  ] DNR/DNI Patient is a 62y old  Male who presents with a chief complaint of R foot wound (25 Nov 2023 17:59)    HPI:  63 yo M with PMH HTN, HLD, Type II DM, s/p R hallux toe amputation 3 yrs  ago presents to the ED with R foot wound. Patient went to a private podiatrist 2.5 months ago to scrape off callus. The callus removal site did not heal well and started to develop into a wound. Podiatrist recommended patient see Dr. Stark for wound management. Per patient, would has been intermittently draining clear liquid and having foul odor. Last sunday, patient had episode of chills that resolved when he went to sleep. Last night, he also had chills and had difficulty balancing. Today he went to his regular scheduled appt with Dr. Stark who recommended he be admitted for IV ABx and surgery on Tuesday,  Denies fever chest pain, palpitations, SOB, cough, abdominal pain, nausea, vomiting, diarrhea, constipation, urinary frequency, urgency, or dysuria, headaches, changes in vision, dizziness, numbness, tingling. Denies recent travel, recent antibiotic use, or sick contacts.    ED Course:   Vitals: BP: 127/65, HR: 93, Temp: 99.2 , RR: 19, SpO2: 96 % on RA   Labs:  ESR: 77, WBC: 17.51, H/H: 12.6/38.4  CXR: No evidence of acute infiltrate  XRAY foot: IMPRESSION: Suspect pathologic fractures of the third and fourth metatarsal heads. 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 If osteomyelitis is clinically considered  despite conservative therapy,   and soft tissue / bone infection requires further assessment, follow-up   MRI recommended.  EKG:  NSR, 81 BPM  Received in the ED  Vanc x  1, Zosyn x  1, NS bolus  x 1     (24 Nov 2023 15:27)    INTERVAL HPI:    11/25/23: Patient seen and examined at bedside. Patient laying in bed comfortably and offers no complaints. Dressing on right foot appears clean, dry and intact. No significant overnight events. IV Zosyn, WBC Resolved   11/26/23: Pt seen and examined at bedside. Pt sitting up in bed comfortably. Has no complaints. Otherwise is anxious to have surgery as soon as possible. Eating, ambulating well. No infectious sx. On IV zosyn,     OVERNIGHT EVENTS: None    Home Medications:  atorvastatin 40 mg oral tablet: 1 tab(s) orally once a day (24 Nov 2023 15:22)  clopidogrel 75 mg oral tablet: 1 tab(s) orally once a day (24 Nov 2023 15:22)  gabapentin 300 mg oral capsule: 1 cap(s) orally 2 times a day (24 Nov 2023 15:20)  Jardiance 25 mg oral tablet: 1 tab(s) orally once a day (24 Nov 2023 15:20)  lisinopril 40 mg oral tablet: 1 tab(s) orally once a day (24 Nov 2023 15:19)  metFORMIN 1000 mg oral tablet: 1 tab(s) orally 2 times a day (24 Nov 2023 15:22)  Metoprolol Succinate ER 50 mg oral tablet, extended release: 1 tab(s) orally once a day (24 Nov 2023 15:18)  Trulicity Pen 1.5 mg/0.5 mL subcutaneous solution: 1.5 milligram(s) subcutaneously once a week (24 Nov 2023 15:23)      MEDICATIONS  (STANDING):  atorvastatin 40 milliGRAM(s) Oral at bedtime  clopidogrel Tablet 75 milliGRAM(s) Oral daily  dextrose 5%. 1000 milliLiter(s) (50 mL/Hr) IV Continuous <Continuous>  dextrose 5%. 1000 milliLiter(s) (100 mL/Hr) IV Continuous <Continuous>  dextrose 50% Injectable 12.5 Gram(s) IV Push once  dextrose 50% Injectable 25 Gram(s) IV Push once  dextrose 50% Injectable 25 Gram(s) IV Push once  enoxaparin Injectable 40 milliGRAM(s) SubCutaneous every 24 hours  gabapentin 300 milliGRAM(s) Oral two times a day  glucagon  Injectable 1 milliGRAM(s) IntraMuscular once  insulin lispro (ADMELOG) corrective regimen sliding scale   SubCutaneous three times a day before meals  insulin lispro (ADMELOG) corrective regimen sliding scale   SubCutaneous at bedtime  lactobacillus acidophilus 1 Tablet(s) Oral three times a day with meals  lisinopril 40 milliGRAM(s) Oral daily  metoprolol succinate ER 50 milliGRAM(s) Oral daily  piperacillin/tazobactam IVPB.. 3.375 Gram(s) IV Intermittent every 8 hours    MEDICATIONS  (PRN):  acetaminophen     Tablet .. 650 milliGRAM(s) Oral every 6 hours PRN Temp greater or equal to 38C (100.4F), Mild Pain (1 - 3)  dextrose Oral Gel 15 Gram(s) Oral once PRN Blood Glucose LESS THAN 70 milliGRAM(s)/deciliter  melatonin 3 milliGRAM(s) Oral at bedtime PRN Insomnia      Allergies    No Known Allergies    Intolerances        Social History:  Lives: at home with wife and son  ADLs: independent  Diet: diabetic diet  Vaccination: covid vaccinated  Occupation: [employer]   Alcohol Use: social  Tobacco Use: none  Recreational Drug Use: none (24 Nov 2023 15:27)      REVIEW OF SYSTEMS: i am OK  CONSTITUTIONAL: No fever, No chills, No fatigue, No myalgia, No Body ache, No Weakness  EYES: No eye pain,  No visual disturbances, No discharge, NO Redness  ENMT:  No ear pain, No nose bleed, No vertigo; No sinus pain, NO throat pain, No Congestion  NECK: No pain, No stiffness  RESPIRATORY: No cough, NO wheezing, No  hemoptysis, NO  shortness of breath  CARDIOVASCULAR: No chest pain, palpitations  GASTROINTESTINAL: No abdominal pain, NO epigastric pain. No nausea, No vomiting; No diarrhea, No constipation. [ x ] BM  GENITOURINARY: No dysuria, No frequency, No urgency, No hematuria, NO incontinence  NEUROLOGICAL: No headaches, No dizziness, No numbness, No tingling, No tremors, No weakness  EXT: No Swelling, No Pain, No Edema  SKIN:  [ x ] No itching, burning, rashes, or lesions + wound on right toe, + neuropathy b/l LE  MUSCULOSKELETAL: No joint pain ,No Jt swelling; No muscle pain, No back pain, No extremity pain  PSYCHIATRIC: No depression,  No anxiety,  No mood swings ,No difficulty sleeping at night  PAIN SCALE: [ x ] None  [  ] Other-  ROS Unable to obtain due to - [  ] Dementia  [  ] Lethargy [  ] Drowsy [  ] Sedated [  ] non verbal  REST OF REVIEW Of SYSTEM - [ x ] Normal      Vital Signs Last 24 Hrs  T(C): 37.1 (26 Nov 2023 05:24), Max: 37.1 (26 Nov 2023 05:24)  T(F): 98.7 (26 Nov 2023 05:24), Max: 98.7 (26 Nov 2023 05:24)  HR: 80 (26 Nov 2023 05:24) (75 - 81)  BP: 110/80 (26 Nov 2023 05:24) (102/59 - 110/80)  BP(mean): --  RR: 17 (26 Nov 2023 05:24) (17 - 17)  SpO2: 98% (26 Nov 2023 05:24) (95% - 98%)    Parameters below as of 26 Nov 2023 05:24  Patient On (Oxygen Delivery Method): room air      Finger Stick        11-25 @ 07:01  -  11-26 @ 07:00  --------------------------------------------------------  IN: 100 mL / OUT: 0 mL / NET: 100 mL        PHYSICAL EXAM:  GENERAL:  [ x ] NAD , [ x ] well appearing, [  ] Agitated, [  ] Drowsy,  [  ] Lethargy, [  ] confused   HEAD:  [ x ] Normal, [  ] Other  EYES:  [ x ] EOMI, [x  ] PERRLA, [ x ] conjunctiva and sclera clear normal, [  ] Other,  [  ] Pallor,[  ] Discharge  ENMT:  [ x ] Normal, [ x ] Moist mucous membranes, [ x ] Good dentition, [ x ] No Thrush  NECK:  [x  ] Supple, [ x ] No JVD, [  ] Normal thyroid, [  ] Lymphadenopathy [  ] Other  CHEST/LUNG:  [ x ] Clear to auscultation bilaterally, [  x] Breath Sounds equal B/L / Decrease, [ x ] poor effort  [x ] No rales, [ x ] No rhonchi  [x  ]  No wheezing,   HEART:  [x  ] Regular rate and rhythm, [  ] tachycardia, [  ] Bradycardia,  [  ] irregular  [x  ] No murmurs, No rubs, No gallops, [  ] PPM in place (Mfr:  )  ABDOMEN:  [ x ] Soft, [ x ] Nontender, [ x ] Nondistended, [ x ] No mass, [  ] Bowel sounds present, [  ] obese  NERVOUS SYSTEM:  [ x ] Alert & Oriented X3, [ x ] Nonfocal  [  ] Confusion  [  ] Encephalopathic [  ] Sedated [  ] Unable to assess, [  ] Dementia [  ] Other-  EXTREMITIES: [x  ] 2+ Peripheral Pulses, No clubbing, No cyanosis,  [  ] edema B/L lower EXT. [  ] PVD stasis skin changes B/L Lower EXT, [ x ] wound Rt foot dressing   LYMPH: No lymphadenopathy noted  SKIN:  [  x] No rashes or lesions, [  ] Pressure Ulcers, [  ] ecchymosis, [  ] Skin Tears, [  ] Other  DIET:     LABS:                        12.4   9.85  )-----------( 308      ( 26 Nov 2023 08:50 )             37.6     26 Nov 2023 08:50    139    |  105    |  18     ----------------------------<  182    4.8     |  28     |  0.92     Ca    8.8        26 Nov 2023 08:50  Phos  3.4       26 Nov 2023 08:50  Mg     2.3       26 Nov 2023 08:50    TPro  7.2    /  Alb  2.9    /  TBili  0.2    /  DBili  x      /  AST  13     /  ALT  22     /  AlkPhos  66     26 Nov 2023 08:50      Urinalysis Basic - ( 26 Nov 2023 08:50 )    Color: x / Appearance: x / SG: x / pH: x  Gluc: 182 mg/dL / Ketone: x  / Bili: x / Urobili: x   Blood: x / Protein: x / Nitrite: x   Leuk Esterase: x / RBC: x / WBC x   Sq Epi: x / Non Sq Epi: x / Bacteria: x        Culture Results:   No growth at 24 hours (11-24 @ 09:50)  Culture Results:   No growth at 24 hours (11-24 @ 09:35)  Culture Results:   Moderate Enterococcus faecalis (11-24 @ 08:35)      Culture - Blood (collected 24 Nov 2023 09:50)  Source: .Blood Blood-Peripheral  Preliminary Report (25 Nov 2023 17:02):    No growth at 24 hours    Culture - Blood (collected 24 Nov 2023 09:35)  Source: .Blood Blood-Peripheral  Preliminary Report (25 Nov 2023 17:02):    No growth at 24 hours    Culture - Other (collected 24 Nov 2023 08:35)  Source: .Other Right foot  Preliminary Report (25 Nov 2023 15:26):    Moderate Enterococcus faecalis       Thyroid Panel:  Thyroid Stimulating Hormone, Serum: 1.20 uIU/mL (11-25-23 @ 08:10)    RADIOLOGY & ADDITIONAL TESTS: NONE    HEALTH ISSUES - PROBLEM Dx:  Diabetic ulcer of right foot    CAD (coronary artery disease)    HTN (hypertension)    Hyperlipidemia    Need for prophylactic measure    Diabetes            Consultant(s) Notes Reviewed:  [ x ] YES     Care Discussed with [X] Consultants  [ x ] Patient  [  x] Family [  ] HCP [  ]   [  ] Social Service  [ x ] RN, [  ] Physical Therapy,[  ] Palliative care team  DVT PPX: [ x ] Lovenox, [  ] S C Heparin, [  ] Coumadin, [  ] Xarelto, [  ] Eliquis, [  ] Pradaxa, [  ] IV Heparin drip, [  ] SCD [  ] Contraindication 2 to GI Bleed,[  ] Ambulation [  ] Contraindicated 2 to  bleed [  ] Contraindicated 2 to Brain Bleed  Advanced directive: [ x ] None, [  ] DNR/DNI

## 2023-11-26 NOTE — DIETITIAN INITIAL EVALUATION ADULT - ADD RECOMMEND
Encourage compliance to rx therapeutic diet for optimal glycemic control. Will provide double protein/vegetable. Recommend MVI with minerals daily.

## 2023-11-26 NOTE — DIETITIAN INITIAL EVALUATION ADULT - OTHER INFO
61 yo M with PMH HTN, HLD, Type II DM, s/p R hallux toe amputation 3 yrs ago presents to the ED with R foot wound.    Pt A+Ox4 during visit. Tolerating consistent carbohydrate diet well with good appetite/po intake. No report N/V. Loose BM 11/26. Lactobacillus rx. States height 5'9", weight 185lbs. Stable. Hx uncontrolled DM, Hgba1c 8.2%. Jardiance, Trulicity & metformin pta. Accuchecks(11/25)127, 169, 170,228 (11/26)169. Currently on sliding scale admelog covg. Recommend basal insulin per MD order.  Per pt states trys to watch added sugar in diet however admits to noncompliance at times. Uses splena and sugar alternatives but enjoys sweets. Declined written/verbal diet education stating he knows we he has to do he just has to do it.

## 2023-11-26 NOTE — PROGRESS NOTE ADULT - PROBLEM SELECTOR PLAN 1
- Pt with elevated WBC, ESR, CRP, normal lactate,  - s/p Vanc x  1, Zosyn x  1, NS bolus  x 1, VSS  -  XRAY foot: Suspect pathologic fractures of the third and fourth metatarsal heads. 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 If osteomyelitis is clinically considered  despite conservative therapy,   and soft tissue / bone infection requires further assessment, follow-up   - awaiting MRI rt foot as per Dr rosa  - continue Zosyn IVPB q 8 hrs as per ID Dr Calhoun S group follow up  - daily dressing changes  - Gabapentin for neuropathy, pain meds as needed (IV tylenol)  - blood cx 2- negative  - R foot wound culture- prelim showing moderate e. faecalis, f/u final results  - R LE u/s negative   - activity: partial WB on R foot, walk with walker  - Awaiting FRANCISCA follow up per vascular (called PA today to ensure FRANCISCA is ordered correctly)  - Dr. Rosa Podiatry following   - Vascular following  - ID following - Pt with elevated WBC, ESR, CRP, normal lactate,  - s/p Vanc x  1, Zosyn x  1, NS bolus  x 1, VSS  -  XRAY foot: Suspect pathologic fractures of the third and fourth metatarsal heads. 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 If osteomyelitis is clinically considered  despite conservative therapy,   and soft tissue / bone infection requires further assessment, follow-up   - awaiting MRI rt foot as per Dr rosa  - continue Zosyn IVPB q 8 hrs as per ID Dr Calhoun S group follow up  - daily dressing changes  - Gabapentin for neuropathy, pain meds as needed (IV Tylenol)  - blood cx 2- negative  - R foot wound culture- prelim showing moderate e. faecalis, f/u final results  - R LE u/s negative for DVT   - activity: partial WB on R foot, walk with walker  - Awaiting FRANCISCA follow up per vascular (called PA today to ensure FRANCISCA is ordered correctly)-Done   - Dr. Rosa Podiatry following   - Vascular following  - ID following- Dr Lyn group

## 2023-11-26 NOTE — PROGRESS NOTE ADULT - SUBJECTIVE AND OBJECTIVE BOX
[facility] -- Vlad Gallardo Pannella, Patel, Savella, Goodger, Cohen  Office # [phone]    Follow Up:  pre op    Subjective/Observations: Patient seen and examined, awake, alert, resting comfortably in bed, denies chest pain, dyspnea, palpitations or dizziness, orthopnea and PND. Tolerating room air. no events overnight, c/o unable to sleep d/t uncomfortable hospital bed     REVIEW OF SYSTEMS: All other review of systems is negative unless indicated above  PAST MEDICAL & SURGICAL HISTORY:  HTN (hypertension)      Diabetes      Hyperlipidemia      PVD (peripheral vascular disease)      H/O angioplasty  RLE      Status post amputation of toe        MEDICATIONS  (STANDING):  atorvastatin 40 milliGRAM(s) Oral at bedtime  clopidogrel Tablet 75 milliGRAM(s) Oral daily  dextrose 5%. 1000 milliLiter(s) (50 mL/Hr) IV Continuous <Continuous>  dextrose 5%. 1000 milliLiter(s) (100 mL/Hr) IV Continuous <Continuous>  dextrose 50% Injectable 12.5 Gram(s) IV Push once  dextrose 50% Injectable 25 Gram(s) IV Push once  dextrose 50% Injectable 25 Gram(s) IV Push once  enoxaparin Injectable 40 milliGRAM(s) SubCutaneous every 24 hours  gabapentin 300 milliGRAM(s) Oral two times a day  glucagon  Injectable 1 milliGRAM(s) IntraMuscular once  insulin lispro (ADMELOG) corrective regimen sliding scale   SubCutaneous three times a day before meals  insulin lispro (ADMELOG) corrective regimen sliding scale   SubCutaneous at bedtime  lactobacillus acidophilus 1 Tablet(s) Oral three times a day with meals  lisinopril 40 milliGRAM(s) Oral daily  metoprolol succinate ER 50 milliGRAM(s) Oral daily  piperacillin/tazobactam IVPB.. 3.375 Gram(s) IV Intermittent every 8 hours    MEDICATIONS  (PRN):  acetaminophen     Tablet .. 650 milliGRAM(s) Oral every 6 hours PRN Temp greater or equal to 38C (100.4F), Mild Pain (1 - 3)  dextrose Oral Gel 15 Gram(s) Oral once PRN Blood Glucose LESS THAN 70 milliGRAM(s)/deciliter  melatonin 3 milliGRAM(s) Oral at bedtime PRN Insomnia    Allergies    No Known Allergies    Intolerances      Vital Signs Last 24 Hrs  T(C): 36.9 (26 Nov 2023 11:58), Max: 37.1 (26 Nov 2023 05:24)  T(F): 98.4 (26 Nov 2023 11:58), Max: 98.7 (26 Nov 2023 05:24)  HR: 86 (26 Nov 2023 11:58) (75 - 86)  BP: 153/58 (26 Nov 2023 11:58) (102/59 - 153/58)  BP(mean): --  RR: 17 (26 Nov 2023 11:58) (17 - 17)  SpO2: 98% (26 Nov 2023 11:58) (95% - 98%)    Parameters below as of 26 Nov 2023 11:58  Patient On (Oxygen Delivery Method): room air      I&O's Summary    25 Nov 2023 07:01  -  26 Nov 2023 07:00  --------------------------------------------------------  IN: 100 mL / OUT: 0 mL / NET: 100 mL          TELE: Not on telemetry   PHYSICAL EXAM:  Constitutional: NAD, awake and alert  HEENT: Moist Mucous Membranes, Anicteric  Pulmonary: Non-labored, breath sounds are clear bilaterally, No wheezing, rales or rhonchi  Cardiovascular: IRRRegular, S1 and S2, No murmurs, rubs, gallops or clicks  Gastrointestinal: Bowel Sounds present, soft, nontender.   Lymph: No peripheral edema. No lymphadenopathy.  Skin: No visible rashes or ulcers. right foot wound with dressing CDI   Psych:  Mood & affect appropriate  LABS: All Labs Reviewed:                        12.4   9.85  )-----------( 308      ( 26 Nov 2023 08:50 )             37.6                         11.6   10.35 )-----------( 299      ( 25 Nov 2023 08:10 )             35.9                         12.6   17.51 )-----------( 307      ( 24 Nov 2023 09:50 )             38.4     26 Nov 2023 08:50    139    |  105    |  18     ----------------------------<  182    4.8     |  28     |  0.92   25 Nov 2023 08:10    139    |  104    |  22     ----------------------------<  142    4.7     |  27     |  0.94   24 Nov 2023 09:50    137    |  101    |  27     ----------------------------<  149    4.8     |  26     |  1.20     Ca    8.8        26 Nov 2023 08:50  Ca    8.5        25 Nov 2023 08:10  Ca    9.1        24 Nov 2023 09:50  Phos  3.4       26 Nov 2023 08:50  Mg     2.3       26 Nov 2023 08:50    TPro  7.2    /  Alb  2.9    /  TBili  0.2    /  DBili  x      /  AST  13     /  ALT  22     /  AlkPhos  66     26 Nov 2023 08:50  TPro  6.8    /  Alb  2.8    /  TBili  0.5    /  DBili  x      /  AST  15     /  ALT  19     /  AlkPhos  67     25 Nov 2023 08:10  TPro  7.4    /  Alb  3.2    /  TBili  0.7    /  DBili  x      /  AST  20     /  ALT  19     /  AlkPhos  73     24 Nov 2023 09:50          12 Lead ECG:   Ventricular Rate 85 BPM    Atrial Rate 85 BPM    P-R Interval 142 ms    QRS Duration 88 ms    Q-T Interval 380 ms    QTC Calculation(Bazett) 452 ms    P Axis 67 degrees    R Axis 54 degrees    T Axis 38 degrees    Diagnosis Line Normal sinus rhythm  Normal ECG  When compared with ECG of 16-DEC-2020 09:37,  No significant change was found  Confirmed by RAMÓN SHAFFER ([id number]) on 11/24/2023 9:34:36 PM (11-24-23 @ 10:03)

## 2023-11-26 NOTE — DIETITIAN INITIAL EVALUATION ADULT - PERTINENT LABORATORY DATA
11-26    139  |  105  |  18  ----------------------------<  182<H>  4.8   |  28  |  0.92    Ca    8.8      26 Nov 2023 08:50  Phos  3.4     11-26  Mg     2.3     11-26    TPro  7.2  /  Alb  2.9<L>  /  TBili  0.2  /  DBili  x   /  AST  13<L>  /  ALT  22  /  AlkPhos  66  11-26  POCT Blood Glucose.: 186 mg/dL (11-26-23 @ 12:02)  A1C with Estimated Average Glucose Result: 8.2 % (11-25-23 @ 08:10)  A1C with Estimated Average Glucose Result: 8.3 % (11-24-23 @ 09:50)

## 2023-11-26 NOTE — DIETITIAN INITIAL EVALUATION ADULT - PERTINENT MEDS FT
MEDICATIONS  (STANDING):  atorvastatin 40 milliGRAM(s) Oral at bedtime  clopidogrel Tablet 75 milliGRAM(s) Oral daily  dextrose 5%. 1000 milliLiter(s) (50 mL/Hr) IV Continuous <Continuous>  dextrose 5%. 1000 milliLiter(s) (100 mL/Hr) IV Continuous <Continuous>  dextrose 50% Injectable 25 Gram(s) IV Push once  dextrose 50% Injectable 25 Gram(s) IV Push once  dextrose 50% Injectable 12.5 Gram(s) IV Push once  enoxaparin Injectable 40 milliGRAM(s) SubCutaneous every 24 hours  gabapentin 300 milliGRAM(s) Oral two times a day  glucagon  Injectable 1 milliGRAM(s) IntraMuscular once  insulin lispro (ADMELOG) corrective regimen sliding scale   SubCutaneous three times a day before meals  insulin lispro (ADMELOG) corrective regimen sliding scale   SubCutaneous at bedtime  lactobacillus acidophilus 1 Tablet(s) Oral three times a day with meals  lisinopril 40 milliGRAM(s) Oral daily  metoprolol succinate ER 50 milliGRAM(s) Oral daily  piperacillin/tazobactam IVPB.. 3.375 Gram(s) IV Intermittent every 8 hours    MEDICATIONS  (PRN):  acetaminophen     Tablet .. 650 milliGRAM(s) Oral every 6 hours PRN Temp greater or equal to 38C (100.4F), Mild Pain (1 - 3)  dextrose Oral Gel 15 Gram(s) Oral once PRN Blood Glucose LESS THAN 70 milliGRAM(s)/deciliter  melatonin 3 milliGRAM(s) Oral at bedtime PRN Insomnia

## 2023-11-26 NOTE — PROGRESS NOTE ADULT - ASSESSMENT
63 yo M with PMH HTN, HLD, Type II DM, s/p R hallux toe amputation 3 weeks ago presents to the ED with R foot wound here for IV Abx and R foot wound R/O OM with Elevated WBC ,

## 2023-11-26 NOTE — DIETITIAN INITIAL EVALUATION ADULT - PROBLEM SELECTOR PLAN 1
- Pt with elevated WBC, ESR, CRP, normal lactate,  - s/p Vanc x  1, Zosyn x  1, NS bolus  x 1  - VSS, does not meet sepsis criteria  -  XRAY foot: Suspect pathologic fractures of the third and fourth metatarsal heads. 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 If osteomyelitis is clinically considered  despite conservative therapy,   and soft tissue / bone infection requires further assessment, follow-up   MRI rt foot as per Dr rosa  - continue Zosyn IVPB q 8 hrs as per ID Dr Unique MICHAELS  Local wound care   - Gabapentin for neuropathy, pain meds as needed (IV tylenol)  - MRI ordered, f/u results  - blood cx 2 ordered, f/u results  - R LE u/s ordered. f/u results  - activity: partial WB on R foot, walk with walker  - Dr. Rosa consulted, f/u reccs  - Vascular consulted, f/u reccs  - ID Dr. Lyn consulted, f/u reccs

## 2023-11-26 NOTE — DIETITIAN INITIAL EVALUATION ADULT - PROBLEM SELECTOR PLAN 4
- unclear history (pt denies CAD) however had positive stress test recently and Cardio outpatient is scheduled for PCI (pt says no stent needed) on December 14  - currently on aspirin and plavix  - DAY TEAM TOMORROW TO ORDER CARDIO CONSULT for presurgical clearance

## 2023-11-26 NOTE — PROGRESS NOTE ADULT - ASSESSMENT
63 yo M with PMH HTN, HLD, Type II DM, s/p R toe amp (3 yrs ago), s/p callus scrape off 2.5 weeks ago, now presents with R foot wound here for IV Abx and R foot wound R/O OM with Elevated WBC    Pre optimization, HTN  - s/p callus scrape off 2.5 weeks ago, now presents with R foot wound  concern for possible OM requiring surgical intervention, podiatry following     - follows with Dr. Andre (outpatient cardiologist)   - had a treadmill exercise stress test (11/5/2023) he was told he has "plaque" and was referred for angioplasty sched on 12/14/2023   - will obtain office records ([phone]) prior to clearance, will call office in AM     - EKG demonstrates NSR. No acute ischemic changes noted.   - continue home ASA, Plavix (unsure why pt is on it)   - continue statin   - Chest X-ray negative for acute process    - Patient euvolemic on examination with no overt signs of heart failure or cardiac ischemia.   - No severe valvular abnormalities noted on examination    - BP, HR stable and well controlled   - Continue home meds on Lisinopril, metoprolol   - continue routine hemodynamics   - Monitor and replete Lytes. Keep K > 4 and Mg > 2    - Will continue to follow.    HILARIO Holder  Nurse Practitioner - Cardiology   call TEAMS

## 2023-11-27 ENCOUNTER — TRANSCRIPTION ENCOUNTER (OUTPATIENT)
Age: 63
End: 2023-11-27

## 2023-11-27 DIAGNOSIS — E11.9 TYPE 2 DIABETES MELLITUS WITHOUT COMPLICATIONS: ICD-10-CM

## 2023-11-27 LAB
ANION GAP SERPL CALC-SCNC: 7 MMOL/L — SIGNIFICANT CHANGE UP (ref 5–17)
ANION GAP SERPL CALC-SCNC: 7 MMOL/L — SIGNIFICANT CHANGE UP (ref 5–17)
APTT BLD: 30.3 SEC — SIGNIFICANT CHANGE UP (ref 24.5–35.6)
APTT BLD: 30.3 SEC — SIGNIFICANT CHANGE UP (ref 24.5–35.6)
BASOPHILS # BLD AUTO: 0.06 K/UL — SIGNIFICANT CHANGE UP (ref 0–0.2)
BASOPHILS # BLD AUTO: 0.06 K/UL — SIGNIFICANT CHANGE UP (ref 0–0.2)
BASOPHILS NFR BLD AUTO: 0.5 % — SIGNIFICANT CHANGE UP (ref 0–2)
BASOPHILS NFR BLD AUTO: 0.5 % — SIGNIFICANT CHANGE UP (ref 0–2)
BUN SERPL-MCNC: 14 MG/DL — SIGNIFICANT CHANGE UP (ref 7–23)
BUN SERPL-MCNC: 14 MG/DL — SIGNIFICANT CHANGE UP (ref 7–23)
CALCIUM SERPL-MCNC: 8.9 MG/DL — SIGNIFICANT CHANGE UP (ref 8.5–10.1)
CALCIUM SERPL-MCNC: 8.9 MG/DL — SIGNIFICANT CHANGE UP (ref 8.5–10.1)
CHLORIDE SERPL-SCNC: 104 MMOL/L — SIGNIFICANT CHANGE UP (ref 96–108)
CHLORIDE SERPL-SCNC: 104 MMOL/L — SIGNIFICANT CHANGE UP (ref 96–108)
CO2 SERPL-SCNC: 28 MMOL/L — SIGNIFICANT CHANGE UP (ref 22–31)
CO2 SERPL-SCNC: 28 MMOL/L — SIGNIFICANT CHANGE UP (ref 22–31)
CREAT SERPL-MCNC: 0.85 MG/DL — SIGNIFICANT CHANGE UP (ref 0.5–1.3)
CREAT SERPL-MCNC: 0.85 MG/DL — SIGNIFICANT CHANGE UP (ref 0.5–1.3)
EGFR: 97 ML/MIN/1.73M2 — SIGNIFICANT CHANGE UP
EGFR: 97 ML/MIN/1.73M2 — SIGNIFICANT CHANGE UP
EOSINOPHIL # BLD AUTO: 0.39 K/UL — SIGNIFICANT CHANGE UP (ref 0–0.5)
EOSINOPHIL # BLD AUTO: 0.39 K/UL — SIGNIFICANT CHANGE UP (ref 0–0.5)
EOSINOPHIL NFR BLD AUTO: 3.4 % — SIGNIFICANT CHANGE UP (ref 0–6)
EOSINOPHIL NFR BLD AUTO: 3.4 % — SIGNIFICANT CHANGE UP (ref 0–6)
GLUCOSE BLDC GLUCOMTR-MCNC: 131 MG/DL — HIGH (ref 70–99)
GLUCOSE BLDC GLUCOMTR-MCNC: 131 MG/DL — HIGH (ref 70–99)
GLUCOSE BLDC GLUCOMTR-MCNC: 136 MG/DL — HIGH (ref 70–99)
GLUCOSE BLDC GLUCOMTR-MCNC: 136 MG/DL — HIGH (ref 70–99)
GLUCOSE BLDC GLUCOMTR-MCNC: 177 MG/DL — HIGH (ref 70–99)
GLUCOSE BLDC GLUCOMTR-MCNC: 177 MG/DL — HIGH (ref 70–99)
GLUCOSE BLDC GLUCOMTR-MCNC: 198 MG/DL — HIGH (ref 70–99)
GLUCOSE BLDC GLUCOMTR-MCNC: 198 MG/DL — HIGH (ref 70–99)
GLUCOSE SERPL-MCNC: 184 MG/DL — HIGH (ref 70–99)
GLUCOSE SERPL-MCNC: 184 MG/DL — HIGH (ref 70–99)
HCT VFR BLD CALC: 37.8 % — LOW (ref 39–50)
HCT VFR BLD CALC: 37.8 % — LOW (ref 39–50)
HGB BLD-MCNC: 12.2 G/DL — LOW (ref 13–17)
HGB BLD-MCNC: 12.2 G/DL — LOW (ref 13–17)
IMM GRANULOCYTES NFR BLD AUTO: 0.7 % — SIGNIFICANT CHANGE UP (ref 0–0.9)
IMM GRANULOCYTES NFR BLD AUTO: 0.7 % — SIGNIFICANT CHANGE UP (ref 0–0.9)
INR BLD: 0.96 RATIO — SIGNIFICANT CHANGE UP (ref 0.85–1.18)
INR BLD: 0.96 RATIO — SIGNIFICANT CHANGE UP (ref 0.85–1.18)
LYMPHOCYTES # BLD AUTO: 1.78 K/UL — SIGNIFICANT CHANGE UP (ref 1–3.3)
LYMPHOCYTES # BLD AUTO: 1.78 K/UL — SIGNIFICANT CHANGE UP (ref 1–3.3)
LYMPHOCYTES # BLD AUTO: 15.4 % — SIGNIFICANT CHANGE UP (ref 13–44)
LYMPHOCYTES # BLD AUTO: 15.4 % — SIGNIFICANT CHANGE UP (ref 13–44)
MAGNESIUM SERPL-MCNC: 2.3 MG/DL — SIGNIFICANT CHANGE UP (ref 1.6–2.6)
MAGNESIUM SERPL-MCNC: 2.3 MG/DL — SIGNIFICANT CHANGE UP (ref 1.6–2.6)
MCHC RBC-ENTMCNC: 29.6 PG — SIGNIFICANT CHANGE UP (ref 27–34)
MCHC RBC-ENTMCNC: 29.6 PG — SIGNIFICANT CHANGE UP (ref 27–34)
MCHC RBC-ENTMCNC: 32.3 GM/DL — SIGNIFICANT CHANGE UP (ref 32–36)
MCHC RBC-ENTMCNC: 32.3 GM/DL — SIGNIFICANT CHANGE UP (ref 32–36)
MCV RBC AUTO: 91.7 FL — SIGNIFICANT CHANGE UP (ref 80–100)
MCV RBC AUTO: 91.7 FL — SIGNIFICANT CHANGE UP (ref 80–100)
MONOCYTES # BLD AUTO: 1.02 K/UL — HIGH (ref 0–0.9)
MONOCYTES # BLD AUTO: 1.02 K/UL — HIGH (ref 0–0.9)
MONOCYTES NFR BLD AUTO: 8.8 % — SIGNIFICANT CHANGE UP (ref 2–14)
MONOCYTES NFR BLD AUTO: 8.8 % — SIGNIFICANT CHANGE UP (ref 2–14)
NEUTROPHILS # BLD AUTO: 8.21 K/UL — HIGH (ref 1.8–7.4)
NEUTROPHILS # BLD AUTO: 8.21 K/UL — HIGH (ref 1.8–7.4)
NEUTROPHILS NFR BLD AUTO: 71.2 % — SIGNIFICANT CHANGE UP (ref 43–77)
NEUTROPHILS NFR BLD AUTO: 71.2 % — SIGNIFICANT CHANGE UP (ref 43–77)
NRBC # BLD: 0 /100 WBCS — SIGNIFICANT CHANGE UP (ref 0–0)
NRBC # BLD: 0 /100 WBCS — SIGNIFICANT CHANGE UP (ref 0–0)
PHOSPHATE SERPL-MCNC: 3.2 MG/DL — SIGNIFICANT CHANGE UP (ref 2.5–4.5)
PHOSPHATE SERPL-MCNC: 3.2 MG/DL — SIGNIFICANT CHANGE UP (ref 2.5–4.5)
PLATELET # BLD AUTO: 358 K/UL — SIGNIFICANT CHANGE UP (ref 150–400)
PLATELET # BLD AUTO: 358 K/UL — SIGNIFICANT CHANGE UP (ref 150–400)
POTASSIUM SERPL-MCNC: 4.2 MMOL/L — SIGNIFICANT CHANGE UP (ref 3.5–5.3)
POTASSIUM SERPL-MCNC: 4.2 MMOL/L — SIGNIFICANT CHANGE UP (ref 3.5–5.3)
POTASSIUM SERPL-SCNC: 4.2 MMOL/L — SIGNIFICANT CHANGE UP (ref 3.5–5.3)
POTASSIUM SERPL-SCNC: 4.2 MMOL/L — SIGNIFICANT CHANGE UP (ref 3.5–5.3)
PROTHROM AB SERPL-ACNC: 11.3 SEC — SIGNIFICANT CHANGE UP (ref 9.5–13)
PROTHROM AB SERPL-ACNC: 11.3 SEC — SIGNIFICANT CHANGE UP (ref 9.5–13)
RBC # BLD: 4.12 M/UL — LOW (ref 4.2–5.8)
RBC # BLD: 4.12 M/UL — LOW (ref 4.2–5.8)
RBC # FLD: 12.5 % — SIGNIFICANT CHANGE UP (ref 10.3–14.5)
RBC # FLD: 12.5 % — SIGNIFICANT CHANGE UP (ref 10.3–14.5)
SODIUM SERPL-SCNC: 139 MMOL/L — SIGNIFICANT CHANGE UP (ref 135–145)
SODIUM SERPL-SCNC: 139 MMOL/L — SIGNIFICANT CHANGE UP (ref 135–145)
WBC # BLD: 11.54 K/UL — HIGH (ref 3.8–10.5)
WBC # BLD: 11.54 K/UL — HIGH (ref 3.8–10.5)
WBC # FLD AUTO: 11.54 K/UL — HIGH (ref 3.8–10.5)
WBC # FLD AUTO: 11.54 K/UL — HIGH (ref 3.8–10.5)

## 2023-11-27 PROCEDURE — 73718 MRI LOWER EXTREMITY W/O DYE: CPT | Mod: 26,RT

## 2023-11-27 PROCEDURE — 88305 TISSUE EXAM BY PATHOLOGIST: CPT | Mod: 26

## 2023-11-27 PROCEDURE — 99221 1ST HOSP IP/OBS SF/LOW 40: CPT

## 2023-11-27 PROCEDURE — 73630 X-RAY EXAM OF FOOT: CPT | Mod: 26,RT

## 2023-11-27 PROCEDURE — 27685 REVISION OF LOWER LEG TENDON: CPT | Mod: RT

## 2023-11-27 PROCEDURE — 88304 TISSUE EXAM BY PATHOLOGIST: CPT | Mod: 26

## 2023-11-27 PROCEDURE — 99233 SBSQ HOSP IP/OBS HIGH 50: CPT

## 2023-11-27 PROCEDURE — 88311 DECALCIFY TISSUE: CPT | Mod: 26

## 2023-11-27 PROCEDURE — 28805 AMPUTATION THRU METATARSAL: CPT

## 2023-11-27 RX ORDER — GLUCAGON INJECTION, SOLUTION 0.5 MG/.1ML
1 INJECTION, SOLUTION SUBCUTANEOUS ONCE
Refills: 0 | Status: DISCONTINUED | OUTPATIENT
Start: 2023-11-27 | End: 2023-12-02

## 2023-11-27 RX ORDER — GLUCAGON INJECTION, SOLUTION 0.5 MG/.1ML
1 INJECTION, SOLUTION SUBCUTANEOUS ONCE
Refills: 0 | Status: DISCONTINUED | OUTPATIENT
Start: 2023-11-27 | End: 2023-11-27

## 2023-11-27 RX ORDER — DEXTROSE 50 % IN WATER 50 %
25 SYRINGE (ML) INTRAVENOUS ONCE
Refills: 0 | Status: DISCONTINUED | OUTPATIENT
Start: 2023-11-27 | End: 2023-11-28

## 2023-11-27 RX ORDER — HYDROMORPHONE HYDROCHLORIDE 2 MG/ML
0.5 INJECTION INTRAMUSCULAR; INTRAVENOUS; SUBCUTANEOUS
Refills: 0 | Status: DISCONTINUED | OUTPATIENT
Start: 2023-11-27 | End: 2023-11-27

## 2023-11-27 RX ORDER — INSULIN LISPRO 100/ML
VIAL (ML) SUBCUTANEOUS
Refills: 0 | Status: DISCONTINUED | OUTPATIENT
Start: 2023-11-27 | End: 2023-11-27

## 2023-11-27 RX ORDER — DEXTROSE 50 % IN WATER 50 %
12.5 SYRINGE (ML) INTRAVENOUS ONCE
Refills: 0 | Status: DISCONTINUED | OUTPATIENT
Start: 2023-11-27 | End: 2023-11-28

## 2023-11-27 RX ORDER — SODIUM CHLORIDE 9 MG/ML
1000 INJECTION, SOLUTION INTRAVENOUS
Refills: 0 | Status: DISCONTINUED | OUTPATIENT
Start: 2023-11-27 | End: 2023-12-02

## 2023-11-27 RX ORDER — CLOPIDOGREL BISULFATE 75 MG/1
75 TABLET, FILM COATED ORAL DAILY
Refills: 0 | Status: DISCONTINUED | OUTPATIENT
Start: 2023-11-28 | End: 2023-12-02

## 2023-11-27 RX ORDER — LACTOBACILLUS ACIDOPHILUS 100MM CELL
1 CAPSULE ORAL
Refills: 0 | Status: DISCONTINUED | OUTPATIENT
Start: 2023-11-27 | End: 2023-12-02

## 2023-11-27 RX ORDER — GABAPENTIN 400 MG/1
300 CAPSULE ORAL
Refills: 0 | Status: DISCONTINUED | OUTPATIENT
Start: 2023-11-27 | End: 2023-12-01

## 2023-11-27 RX ORDER — DEXTROSE 50 % IN WATER 50 %
25 SYRINGE (ML) INTRAVENOUS ONCE
Refills: 0 | Status: DISCONTINUED | OUTPATIENT
Start: 2023-11-27 | End: 2023-11-27

## 2023-11-27 RX ORDER — ATORVASTATIN CALCIUM 80 MG/1
40 TABLET, FILM COATED ORAL AT BEDTIME
Refills: 0 | Status: DISCONTINUED | OUTPATIENT
Start: 2023-11-27 | End: 2023-12-02

## 2023-11-27 RX ORDER — INSULIN LISPRO 100/ML
VIAL (ML) SUBCUTANEOUS EVERY 6 HOURS
Refills: 0 | Status: DISCONTINUED | OUTPATIENT
Start: 2023-11-27 | End: 2023-11-27

## 2023-11-27 RX ORDER — METOPROLOL TARTRATE 50 MG
50 TABLET ORAL DAILY
Refills: 0 | Status: DISCONTINUED | OUTPATIENT
Start: 2023-11-27 | End: 2023-12-02

## 2023-11-27 RX ORDER — SODIUM CHLORIDE 9 MG/ML
1000 INJECTION, SOLUTION INTRAVENOUS
Refills: 0 | Status: DISCONTINUED | OUTPATIENT
Start: 2023-11-27 | End: 2023-11-27

## 2023-11-27 RX ORDER — ONDANSETRON 8 MG/1
4 TABLET, FILM COATED ORAL ONCE
Refills: 0 | Status: DISCONTINUED | OUTPATIENT
Start: 2023-11-27 | End: 2023-11-27

## 2023-11-27 RX ORDER — ACETAMINOPHEN 500 MG
650 TABLET ORAL EVERY 6 HOURS
Refills: 0 | Status: DISCONTINUED | OUTPATIENT
Start: 2023-11-27 | End: 2023-12-02

## 2023-11-27 RX ORDER — ENOXAPARIN SODIUM 100 MG/ML
40 INJECTION SUBCUTANEOUS ONCE
Refills: 0 | Status: COMPLETED | OUTPATIENT
Start: 2023-11-28 | End: 2023-11-28

## 2023-11-27 RX ORDER — HYDROMORPHONE HYDROCHLORIDE 2 MG/ML
1 INJECTION INTRAMUSCULAR; INTRAVENOUS; SUBCUTANEOUS
Refills: 0 | Status: DISCONTINUED | OUTPATIENT
Start: 2023-11-27 | End: 2023-11-27

## 2023-11-27 RX ORDER — DEXTROSE 50 % IN WATER 50 %
15 SYRINGE (ML) INTRAVENOUS ONCE
Refills: 0 | Status: DISCONTINUED | OUTPATIENT
Start: 2023-11-27 | End: 2023-11-28

## 2023-11-27 RX ORDER — ENOXAPARIN SODIUM 100 MG/ML
40 INJECTION SUBCUTANEOUS ONCE
Refills: 0 | Status: DISCONTINUED | OUTPATIENT
Start: 2023-11-27 | End: 2023-11-27

## 2023-11-27 RX ORDER — PIPERACILLIN AND TAZOBACTAM 4; .5 G/20ML; G/20ML
3.38 INJECTION, POWDER, LYOPHILIZED, FOR SOLUTION INTRAVENOUS EVERY 8 HOURS
Refills: 0 | Status: DISCONTINUED | OUTPATIENT
Start: 2023-11-27 | End: 2023-12-01

## 2023-11-27 RX ORDER — DEXTROSE 50 % IN WATER 50 %
15 SYRINGE (ML) INTRAVENOUS ONCE
Refills: 0 | Status: DISCONTINUED | OUTPATIENT
Start: 2023-11-27 | End: 2023-11-27

## 2023-11-27 RX ORDER — INSULIN LISPRO 100/ML
VIAL (ML) SUBCUTANEOUS EVERY 6 HOURS
Refills: 0 | Status: DISCONTINUED | OUTPATIENT
Start: 2023-11-27 | End: 2023-11-28

## 2023-11-27 RX ORDER — LISINOPRIL 2.5 MG/1
40 TABLET ORAL DAILY
Refills: 0 | Status: DISCONTINUED | OUTPATIENT
Start: 2023-11-27 | End: 2023-12-02

## 2023-11-27 RX ORDER — ASPIRIN/CALCIUM CARB/MAGNESIUM 324 MG
81 TABLET ORAL DAILY
Refills: 0 | Status: DISCONTINUED | OUTPATIENT
Start: 2023-11-28 | End: 2023-12-01

## 2023-11-27 RX ORDER — LANOLIN ALCOHOL/MO/W.PET/CERES
3 CREAM (GRAM) TOPICAL AT BEDTIME
Refills: 0 | Status: DISCONTINUED | OUTPATIENT
Start: 2023-11-27 | End: 2023-12-02

## 2023-11-27 RX ORDER — DEXTROSE 50 % IN WATER 50 %
12.5 SYRINGE (ML) INTRAVENOUS ONCE
Refills: 0 | Status: DISCONTINUED | OUTPATIENT
Start: 2023-11-27 | End: 2023-11-27

## 2023-11-27 RX ADMIN — Medication 650 MILLIGRAM(S): at 23:26

## 2023-11-27 RX ADMIN — PIPERACILLIN AND TAZOBACTAM 25 GRAM(S): 4; .5 INJECTION, POWDER, LYOPHILIZED, FOR SOLUTION INTRAVENOUS at 06:48

## 2023-11-27 RX ADMIN — ATORVASTATIN CALCIUM 40 MILLIGRAM(S): 80 TABLET, FILM COATED ORAL at 21:16

## 2023-11-27 RX ADMIN — Medication 2: at 09:01

## 2023-11-27 RX ADMIN — PIPERACILLIN AND TAZOBACTAM 25 GRAM(S): 4; .5 INJECTION, POWDER, LYOPHILIZED, FOR SOLUTION INTRAVENOUS at 21:23

## 2023-11-27 RX ADMIN — Medication 50 MILLIGRAM(S): at 06:48

## 2023-11-27 RX ADMIN — Medication 1 TABLET(S): at 09:25

## 2023-11-27 RX ADMIN — Medication 3 MILLIGRAM(S): at 21:23

## 2023-11-27 RX ADMIN — LISINOPRIL 40 MILLIGRAM(S): 2.5 TABLET ORAL at 06:48

## 2023-11-27 RX ADMIN — GABAPENTIN 300 MILLIGRAM(S): 400 CAPSULE ORAL at 06:48

## 2023-11-27 RX ADMIN — GABAPENTIN 300 MILLIGRAM(S): 400 CAPSULE ORAL at 20:51

## 2023-11-27 RX ADMIN — PIPERACILLIN AND TAZOBACTAM 25 GRAM(S): 4; .5 INJECTION, POWDER, LYOPHILIZED, FOR SOLUTION INTRAVENOUS at 15:27

## 2023-11-27 NOTE — DISCHARGE NOTE PROVIDER - HOSPITAL COURSE
FROM ADMISSION H+P:   HPI:  63 yo M with PMH HTN, HLD, Type II DM, s/p R hallux toe amputation 3 yrs  ago presents to the ED with R foot wound. Patient went to a private podiatrist 2.5 months ago to scrape off callus. The callus removal site did not heal well and started to develop into a wound. Podiatrist recommended patient see Dr. Stark for wound management. Per patient, would has been intermittently draining clear liquid and having foul odor. Last sunday, patient had episode of chills that resolved when he went to sleep. Last night, he also had chills and had difficulty balancing. Today he went to his regular scheduled appt with Dr. Stark who recommended he be admitted for IV ABx and surgery on Tuesday,  Denies fever chest pain, palpitations, SOB, cough, abdominal pain, nausea, vomiting, diarrhea, constipation, urinary frequency, urgency, or dysuria, headaches, changes in vision, dizziness, numbness, tingling. Denies recent travel, recent antibiotic use, or sick contacts.    ED Course:   Vitals: BP: 127/65, HR: 93, Temp: 99.2 , RR: 19, SpO2: 96 % on RA   Labs:  ESR: 77, WBC: 17.51, H/H: 12.6/38.4  CXR: No evidence of acute infiltrate  XRAY foot: IMPRESSION: Suspect pathologic fractures of the third and fourth metatarsal heads. 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 If osteomyelitis is clinically considered  despite conservative therapy,   and soft tissue / bone infection requires further assessment, follow-up   MRI recommended.  EKG:  NSR, 81 BPM  Received in the ED  Vanc x  1, Zosyn x  1, NS bolus  x 1     (24 Nov 2023 15:27)      ---  HOSPITAL COURSE/PERTINENT LABS/PROCEDURES PERFORMED/PENDING TESTS:    ---  PATIENT CONDITION:  - stable    ---  PHYSICAL EXAM ON DAY OF DISCHARGE:  See progress note for day of discharge   ---  CONSULTANTS:   Vascular Dr. Bosotn  Podiatry Dr. Stark  ID Dr. Aguilar      ---  ADVANCED CARE PLANNING:  - Code status:      - MOLST completed:      [  ] NO     [  ] YES    ---  TIME SPENT:  I, the attending physician, was physically present for the key portions of the evaluation and management (E/M) service provided. The total amount of time spent reviewing the hospital notes, laboratory values, imaging findings, assessing/counseling the patient, discussing with consultant physicians, social work, nursing staff was -- minutes FROM ADMISSION H+P:   HPI:  61 yo M with PMH HTN, HLD, Type II DM, s/p R hallux toe amputation 3 yrs  ago presents to the ED with R foot wound. Patient went to a private podiatrist 2.5 months ago to scrape off callus. The callus removal site did not heal well and started to develop into a wound. Podiatrist recommended patient see Dr. Stark for wound management. Per patient, would has been intermittently draining clear liquid and having foul odor. Last sunday, patient had episode of chills that resolved when he went to sleep. Last night, he also had chills and had difficulty balancing. Today he went to his regular scheduled appt with Dr. Stark who recommended he be admitted for IV ABx and surgery on Tuesday,  Denies fever chest pain, palpitations, SOB, cough, abdominal pain, nausea, vomiting, diarrhea, constipation, urinary frequency, urgency, or dysuria, headaches, changes in vision, dizziness, numbness, tingling. Denies recent travel, recent antibiotic use, or sick contacts.    ED Course:   Vitals: BP: 127/65, HR: 93, Temp: 99.2 , RR: 19, SpO2: 96 % on RA   Labs:  ESR: 77, WBC: 17.51, H/H: 12.6/38.4  CXR: No evidence of acute infiltrate  XRAY foot: IMPRESSION: Suspect pathologic fractures of the third and fourth metatarsal heads. 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 If osteomyelitis is clinically considered  despite conservative therapy,   and soft tissue / bone infection requires further assessment, follow-up   MRI recommended.  EKG:  NSR, 81 BPM  Received in the ED  Vanc x  1, Zosyn x  1, NS bolus  x 1     (24 Nov 2023 15:27)      ---  HOSPITAL COURSE/PERTINENT LABS/PROCEDURES PERFORMED/PENDING TESTS:  Patient arrived hemodynamically stable to the floor. He was continued on IV Zosyn (per ID Dr. Aguilar, wound culture showed E. Faecalis sensitive to Zosyn) for possible osteomyelitis. MRI results of R foot showed XXX. Patient was cleared by Cardiology for procedure. He was also seen by Vascular Dr. Boston who reviewed FRANCISCA results and cleared patient for podiatry surgery.  Pt underwent R metatarsal amputation on X, course was uncomplicated. Patient recovered well. He was also seen by nutritionist while inpatient to discuss diabetic dietary modifications. Patient was d/cd on X.      ---  PATIENT CONDITION:  - stable    ---  PHYSICAL EXAM ON DAY OF DISCHARGE:  See progress note for day of discharge   ---  CONSULTANTS:   Vascular Dr. Boston  Podiatry Dr. Lincoln Aguilar      ---  ADVANCED CARE PLANNING:  - Code status:      - MOLST completed:      [  ] NO     [  ] YES    ---  TIME SPENT:  I, the attending physician, was physically present for the key portions of the evaluation and management (E/M) service provided. The total amount of time spent reviewing the hospital notes, laboratory values, imaging findings, assessing/counseling the patient, discussing with consultant physicians, social work, nursing staff was -- minutes FROM ADMISSION H+P:   HPI:  63 yo M with PMH HTN, HLD, Type II DM, s/p R hallux toe amputation 3 yrs  ago presents to the ED with R foot wound. Patient went to a private podiatrist 2.5 months ago to scrape off callus. The callus removal site did not heal well and started to develop into a wound. Podiatrist recommended patient see Dr. Stark for wound management. Per patient, would has been intermittently draining clear liquid and having foul odor. Last sunday, patient had episode of chills that resolved when he went to sleep. Last night, he also had chills and had difficulty balancing. Today he went to his regular scheduled appt with Dr. Stark who recommended he be admitted for IV ABx and surgery on Tuesday,  Denies fever chest pain, palpitations, SOB, cough, abdominal pain, nausea, vomiting, diarrhea, constipation, urinary frequency, urgency, or dysuria, headaches, changes in vision, dizziness, numbness, tingling. Denies recent travel, recent antibiotic use, or sick contacts.    ED Course:   Vitals: BP: 127/65, HR: 93, Temp: 99.2 , RR: 19, SpO2: 96 % on RA   Labs:  ESR: 77, WBC: 17.51, H/H: 12.6/38.4  CXR: No evidence of acute infiltrate  XRAY foot: IMPRESSION: Suspect pathologic fractures of the third and fourth metatarsal heads. 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 If osteomyelitis is clinically considered  despite conservative therapy,   and soft tissue / bone infection requires further assessment, follow-up   MRI recommended.  EKG:  NSR, 81 BPM  Received in the ED  Vanc x  1, Zosyn x  1, NS bolus  x 1     (24 Nov 2023 15:27)      ---  HOSPITAL COURSE/PERTINENT LABS/PROCEDURES PERFORMED/PENDING TESTS:  Patient arrived hemodynamically stable to the floor. He was continued on IV Zosyn (per ID Dr. Aguilar, wound culture showed E. Faecalis sensitive to Zosyn) for possible osteomyelitis. MRI results of R foot showed XXX. Patient was cleared by Cardiology for procedure. He was also seen by Vascular Dr. Boston who reviewed FRANCISCA results and cleared patient for podiatry surgery.  Pt underwent R metatarsal amputation on 11/27, course was uncomplicated. Patient recovered well. R foot was noted to be slightly edematous 11/30. R u/s on 12/1 showed New nonocclusive DVT of the right soleal vein, below the knee. Chronic appearing superficial thrombosis of right small saphenous vein,   associated with calcifications. Per vascular Dr. Boston, below the knee DVT does not require full AC and pt was continued on DVT ppx with Lovenox. Requires f/u u/s in 3-5 day. He was also seen by nutritionist while inpatient to discuss diabetic dietary modifications. Patient was d/cd on X.      ---  PATIENT CONDITION:  - stable    ---  PHYSICAL EXAM ON DAY OF DISCHARGE:  See progress note for day of discharge   ---  CONSULTANTS:   Vascular Dr. Boston  Podiatry Dr. Lincoln Aguilar      ---  ADVANCED CARE PLANNING:  - Code status:      - MOLST completed:      [  ] NO     [  ] YES    ---  TIME SPENT:  I, the attending physician, was physically present for the key portions of the evaluation and management (E/M) service provided. The total amount of time spent reviewing the hospital notes, laboratory values, imaging findings, assessing/counseling the patient, discussing with consultant physicians, social work, nursing staff was -- minutes FROM ADMISSION H+P:   HPI:  61 yo M with PMH HTN, HLD, Type II DM, s/p R hallux toe amputation 3 yrs  ago presents to the ED with R foot wound. Patient went to a private podiatrist 2.5 months ago to scrape off callus. The callus removal site did not heal well and started to develop into a wound. Podiatrist recommended patient see Dr. Stark for wound management. Per patient, would has been intermittently draining clear liquid and having foul odor. Last sunday, patient had episode of chills that resolved when he went to sleep. Last night, he also had chills and had difficulty balancing. Today he went to his regular scheduled appt with Dr. Stark who recommended he be admitted for IV ABx and surgery on Tuesday,  Denies fever chest pain, palpitations, SOB, cough, abdominal pain, nausea, vomiting, diarrhea, constipation, urinary frequency, urgency, or dysuria, headaches, changes in vision, dizziness, numbness, tingling. Denies recent travel, recent antibiotic use, or sick contacts.    ED Course:   Vitals: BP: 127/65, HR: 93, Temp: 99.2 , RR: 19, SpO2: 96 % on RA   Labs:  ESR: 77, WBC: 17.51, H/H: 12.6/38.4  CXR: No evidence of acute infiltrate  XRAY foot: IMPRESSION: Suspect pathologic fractures of the third and fourth metatarsal heads. 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 If osteomyelitis is clinically considered  despite conservative therapy,   and soft tissue / bone infection requires further assessment, follow-up   MRI recommended.  EKG:  NSR, 81 BPM  Received in the ED  Vanc x  1, Zosyn x  1, NS bolus  x 1     (24 Nov 2023 15:27)      ---  HOSPITAL COURSE/PERTINENT LABS/PROCEDURES PERFORMED/PENDING TESTS:  Patient arrived hemodynamically stable to the floor. He was continued on IV Zosyn (per ID Dr. Aguilar, wound culture showed E. Faecalis sensitive to Zosyn) for possible osteomyelitis. MRI results of R foot showed osteomyelitis involving the second metatarsal and the second toe proximal phalanx with an accompanying abscess which extends along the second metatarsal and proximal phalanx as well as along the plantar surface to the area of a soft tissue wound. Osteomyelitis involving the third and fourth metatarsal heads. Bone marrow edema with subtle low T1 signal within the partially imaged navicular as well as within the intermediate and lateral cuneiforms may reflect osseous stress reaction changes or periostitis versus less likely findings of early osteomyelitis.. Patient was cleared by Cardiology for procedure. He was also seen by Vascular Dr. Boston who reviewed FRANCISCA results and cleared patient for podiatry surgery.  Pt underwent R metatarsal amputation on 11/27, course was uncomplicated. Patient recovered well. R foot was noted to be slightly edematous 11/30. R u/s on 12/1 showed new nonocclusive DVT of the right soleal vein, below the knee. Chronic appearing superficial thrombosis of right small saphenous vein, associated with calcifications. Per hem/onc Dr. Austin, pt should be treated on Eliquis or Xarelto for 3 months and must follow up with Dr. Austin for repeat u/s. On 12/1, patient was d/cd off Zosyn and started on IV Ampicillin. PICC line was placed on 12/1 with no complications. He was also seen by nutritionist while inpatient to discuss diabetic dietary modifications. Patient was d/cd on X.      ---  PATIENT CONDITION:  - stable    ---  PHYSICAL EXAM ON DAY OF DISCHARGE:  See progress note for day of discharge   ---  CONSULTANTS:   Vascular Dr. Boston  Podiatry Dr. Lincoln Aguilar  Hem/onc Dr. Austin    ---  ADVANCED CARE PLANNING:  - Code status:      - [facility]:      [  ] NO     [  ] YES    ---  TIME SPENT:  I, the attending physician, was physically present for the key portions of the evaluation and management (E/M) service provided. The total amount of time spent reviewing the hospital notes, laboratory values, imaging findings, assessing/counseling the patient, discussing with consultant physicians, social work, nursing staff was -- minutes FROM ADMISSION H+P:   HPI:  61 yo M with PMH HTN, HLD, Type II DM, s/p R hallux toe amputation 3 yrs  ago presents to the ED with R foot wound. Patient went to a private podiatrist 2.5 months ago to scrape off callus. The callus removal site did not heal well and started to develop into a wound. Podiatrist recommended patient see Dr. Stark for wound management. Per patient, would has been intermittently draining clear liquid and having foul odor. Last sunday, patient had episode of chills that resolved when he went to sleep. Last night, he also had chills and had difficulty balancing. Today he went to his regular scheduled appt with Dr. Stark who recommended he be admitted for IV ABx and surgery on Tuesday,  Denies fever chest pain, palpitations, SOB, cough, abdominal pain, nausea, vomiting, diarrhea, constipation, urinary frequency, urgency, or dysuria, headaches, changes in vision, dizziness, numbness, tingling. Denies recent travel, recent antibiotic use, or sick contacts.    ED Course:   Vitals: BP: 127/65, HR: 93, Temp: 99.2 , RR: 19, SpO2: 96 % on RA   Labs:  ESR: 77, WBC: 17.51, H/H: 12.6/38.4  CXR: No evidence of acute infiltrate  XRAY foot: IMPRESSION: Suspect pathologic fractures of the third and fourth metatarsal heads. 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 If osteomyelitis is clinically considered  despite conservative therapy,   and soft tissue / bone infection requires further assessment, follow-up   MRI recommended.  EKG:  NSR, 81 BPM  Received in the ED  Vanc x  1, Zosyn x  1, NS bolus  x 1     (24 Nov 2023 15:27)      ---  HOSPITAL COURSE/PERTINENT LABS/PROCEDURES PERFORMED/PENDING TESTS:  Patient arrived hemodynamically stable to the floor. He was continued on IV Zosyn (per ID Dr. Aguilar, wound culture showed E. Faecalis sensitive to Zosyn) for possible osteomyelitis. MRI results of R foot showed osteomyelitis involving the second metatarsal and the second toe proximal phalanx with an accompanying abscess which extends along the second metatarsal and proximal phalanx as well as along the plantar surface to the area of a soft tissue wound. Osteomyelitis involving the third and fourth metatarsal heads. Bone marrow edema with subtle low T1 signal within the partially imaged navicular as well as within the intermediate and lateral cuneiforms may reflect osseous stress reaction changes or periostitis versus less likely findings of early osteomyelitis.. Patient was  seen & cleared by Cardiology for procedure. as pt has Positive Stress test  & is schedule for Out pt Cardiac Cath Dec 14 th , He was also seen by Vascular Dr. Boston who reviewed FRANCISCA results and cleared patient for podiatry surgery.  Pt underwent Rt foor TMA-  trans metatarsal amputation on 11/27, course was uncomplicated. Patient recovered well. R foot was noted to be slightly edematous 11/30. R u/s on 12/1 showed new nonocclusive DVT of the right soleal vein, below the knee. Chronic appearing superficial thrombosis of right small saphenous vein, associated with calcifications. Per hem/onc Dr. Austin, pt should be treated on Eliquis or Xarelto for 3 months and must follow up with Dr. Austin for repeat u/s. On 12/1, patient was d/cd off Zosyn and started on IV Ampicillin. PICC line was placed on 12/1 with no complications. He was also seen by nutritionist while inpatient to discuss diabetic dietary modifications. Patient was d/cd on Eliquis full dose for 3  Months for DVT treatment. Pt has PICC line placed for Home IV Abx for Positive surgical pathology for AC on chronic OM -with IV Ampicillin 2 gm q 4 hrs - TOTAl Rx of IV Abx 6 weeks .pt MUST follwo up with Podiatry, Cardiology, Hematology & PMD as out pt & pt will renew Eliquis script as out pt -Total 3 months treatments for DVT .Home care arranged for Home IV Abx Via PICC line. Pt got Meds to beds [facility] for 1 month supply for VIVO.    Doppler Rt Lower EXT  IMPRESSION:  New nonocclusive DVT of the right soleal vein, below the knee.  Chronic appearing superficial thrombosis of right small saphenous vein,   associated with calcifications.    Pathology -    Surgical Pathology Report - Auth (Verified)    Specimen(s) Submitted  1  Right forefoot  2  Right forefoot, proximal margin    Final Diagnosis  1. Right forefoot  Acute and chronic osteomyelitis.  Skin and soft tissue with acute and chronic inflammation.      2. Right forefoot, proximal margin  Mild acute and chronic osteomyelitis.    ---  PATIENT CONDITION:  - stable    ---  PHYSICAL EXAM ON DAY OF DISCHARGE:  See progress note for day of discharge   ---  CONSULTANTS:   Vascular Dr. Boston  Podiatry Dr. Lincoln Aguilar  Hem/onc Dr. Austin    ---  ADVANCED CARE PLANNING:  - Code status:      - MOLST completed:      [  x] NO     [  ] YES    ---  TIME SPENT:  I, the attending physician, was physically present for the key portions of the evaluation and management (E/M) service provided. The total amount of time spent reviewing the hospital notes, laboratory values, imaging findings, assessing/counseling the patient, discussing with consultant physicians, social work, nursing staff was 60 minutes

## 2023-11-27 NOTE — DISCHARGE NOTE PROVIDER - PROVIDER TOKENS
PROVIDER:[TOKEN:[[id number]],FOLLOWUP:[1 week],ESTABLISHEDPATIENT:[T]] PROVIDER:[TOKEN:[[id number]],FOLLOWUP:[1 week],ESTABLISHEDPATIENT:[T]],PROVIDER:[TOKEN:[[id number]]],PROVIDER:[TOKEN:[[id number]],FOLLOWUP:[2 weeks]],PROVIDER:[TOKEN:[[id number]],FOLLOWUP:[2 weeks]],PROVIDER:[TOKEN:[[id number]]]

## 2023-11-27 NOTE — PROGRESS NOTE ADULT - NSPROGADDITIONALINFOA_GEN_ALL_CORE
- follows with Dr. Andre (outpatient cardiologist)   - had a treadmill exercise stress test (11/5/2023) he was told he has "plaque" and was referred for angioplasty sched on 12/14/2023 - patient states he is refusing to proceed with angiogram   -prior to full assessment of cv risk of surgery would need to have records of what is apparently an abnormal stress result,  - obtain office records ([phone])     Cardiology Note -Stress test reviewed.  Patient with 10 mets exercise capacity.  Had anterior and anterolateral akinesis and infoerlateral hypokinesis with exercise suggestive of obstructive CAD.  Angiography was suggested and scheduled, but patient has not yet done this.

## 2023-11-27 NOTE — CONSULT NOTE ADULT - SUBJECTIVE AND OBJECTIVE BOX
Patient is a 62y old  Male who presents with a chief complaint of R foot wound (27 Nov 2023 08:18)    Type 2 DM DX 23 years, known complications neuropathy and R DFU. managed by PCP Dr Dannie Frederick, seen 2 months ago. current A1c 8.2%. Rx home metformin 1000mg BID, Jardiance 25mg daily, Trulicity 1.5mg weekly injection sunday. pt states he does f/s daily, reports readings ~120-170, occasionally higher, denies episodes of hypoglycemia. discussed affect of hyperglycemia on would healing, need to improve for glycemic control increase GLP1-RA vs adding basal insulin. discussed f/u w/ endo, provided list of [facility] end[facility]. reviewed CC diet, carb portion control, balanced plate method w/ prioritizing protein and nonstarchy vegetables. pt reports no needs, verbal education and handouts provided/  diabetes education provided- A1c measure and BG targets  fasting, <180 2 hours postmeal. medication MOA and considerations/side effects, inhospital BGM frequency and insulin administration, s/s of hyperglycemia/hypoglycemia and management, glycemic control and preventing complications, consistent carb diet, balanced plate method, consistent meal planning. sick day management, provider f/u  Hx HLD, R foot wound    HPI:  63 yo M with PMH HTN, HLD, Type II DM, s/p R hallux toe amputation 3 yrs  ago presents to the ED with R foot wound. Patient went to a private podiatrist 2.5 months ago to scrape off callus. The callus removal site did not heal well and started to develop into a wound. Podiatrist recommended patient see Dr. Stark for wound management. Per patient, would has been intermittently draining clear liquid and having foul odor. Last sunday, patient had episode of chills that resolved when he went to sleep. Last night, he also had chills and had difficulty balancing. Today he went to his regular scheduled appt with Dr. Stark who recommended he be admitted for IV ABx and surgery on Tuesday,  Denies fever chest pain, palpitations, SOB, cough, abdominal pain, nausea, vomiting, diarrhea, constipation, urinary frequency, urgency, or dysuria, headaches, changes in vision, dizziness, numbness, tingling. Denies recent travel, recent antibiotic use, or sick contacts.    ED Course:   Vitals: BP: 127/65, HR: 93, Temp: 99.2 , RR: 19, SpO2: 96 % on RA   Labs:  ESR: 77, WBC: 17.51, H/H: 12.6/38.4  CXR: No evidence of acute infiltrate  XRAY foot: IMPRESSION: Suspect pathologic fractures of the third and fourth metatarsal heads. 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 If osteomyelitis is clinically considered  despite conservative therapy,   and soft tissue / bone infection requires further assessment, follow-up   MRI recommended.  EKG:  NSR, 81 BPM  Received in the ED  Vanc x  1, Zosyn x  1, NS bolus  x 1     (24 Nov 2023 15:27)      PAST MEDICAL & SURGICAL HISTORY:  HTN (hypertension)      Diabetes      Hyperlipidemia      PVD (peripheral vascular disease)      H/O angioplasty  RLE      Status post amputation of toe    Allergies    No Known Allergies    Intolerances        MEDICATIONS  (STANDING):  aspirin enteric coated 81 milliGRAM(s) Oral daily  atorvastatin 40 milliGRAM(s) Oral at bedtime  clopidogrel Tablet 75 milliGRAM(s) Oral daily  dextrose 5%. 1000 milliLiter(s) (50 mL/Hr) IV Continuous <Continuous>  dextrose 5%. 1000 milliLiter(s) (100 mL/Hr) IV Continuous <Continuous>  dextrose 50% Injectable 25 Gram(s) IV Push once  dextrose 50% Injectable 12.5 Gram(s) IV Push once  dextrose 50% Injectable 25 Gram(s) IV Push once  gabapentin 300 milliGRAM(s) Oral two times a day  glucagon  Injectable 1 milliGRAM(s) IntraMuscular once  insulin lispro (ADMELOG) corrective regimen sliding scale   SubCutaneous three times a day before meals  insulin lispro (ADMELOG) corrective regimen sliding scale   SubCutaneous at bedtime  lactobacillus acidophilus 1 Tablet(s) Oral three times a day with meals  lisinopril 40 milliGRAM(s) Oral daily  melatonin 3 milliGRAM(s) Oral at bedtime  metoprolol succinate ER 50 milliGRAM(s) Oral daily  piperacillin/tazobactam IVPB.. 3.375 Gram(s) IV Intermittent every 8 hours

## 2023-11-27 NOTE — PROGRESS NOTE ADULT - SUBJECTIVE AND OBJECTIVE BOX
Patient is a 62y old  Male who presents with a chief complaint of Local infection of skin and subcutaneous tissue     (26 Nov 2023 13:58)    HPI:  63 yo M with PMH HTN, HLD, Type II DM, s/p R hallux toe amputation 3 yrs  ago presents to the ED with R foot wound. Patient went to a private podiatrist 2.5 months ago to scrape off callus. The callus removal site did not heal well and started to develop into a wound. Podiatrist recommended patient see Dr. Stark for wound management. Per patient, would has been intermittently draining clear liquid and having foul odor. Last sunday, patient had episode of chills that resolved when he went to sleep. Last night, he also had chills and had difficulty balancing. Today he went to his regular scheduled appt with Dr. Stark who recommended he be admitted for IV ABx and surgery on Tuesday,  Denies fever chest pain, palpitations, SOB, cough, abdominal pain, nausea, vomiting, diarrhea, constipation, urinary frequency, urgency, or dysuria, headaches, changes in vision, dizziness, numbness, tingling. Denies recent travel, recent antibiotic use, or sick contacts.    ED Course:   Vitals: BP: 127/65, HR: 93, Temp: 99.2 , RR: 19, SpO2: 96 % on RA   Labs:  ESR: 77, WBC: 17.51, H/H: 12.6/38.4  CXR: No evidence of acute infiltrate  XRAY foot: IMPRESSION: Suspect pathologic fractures of the third and fourth metatarsal heads. 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 If osteomyelitis is clinically considered  despite conservative therapy,   and soft tissue / bone infection requires further assessment, follow-up   MRI recommended.  EKG:  NSR, 81 BPM  Received in the ED  Vanc x  1, Zosyn x  1, NS bolus  x 1     (24 Nov 2023 15:27)    INTERVAL HPI:  11/25/23: Patient seen and examined at bedside. Patient laying in bed comfortably and offers no complaints. Dressing on right foot appears clean, dry and intact. No significant overnight events. IV Zosyn, WBC Resolved   11/26/23: Pt seen and examined at bedside. Pt sitting up in bed comfortably. Has no complaints. Otherwise is anxious to have surgery as soon as possible. Eating, ambulating well. No infectious sx. On IV zosyn,   11/27/23: Pt seen and examined at bedside. Pt sitting up in bed comfortably. Has no complaints. Otherwise is anxious to have surgery as soon as possible. Awaiting MRI. FRANCISCA done. Saw cardio NP this AM and emphasized that she should call his cardiologist as early as possible. Eating, ambulating well. No infectious sx. On IV zosyn,     OVERNIGHT EVENTS: None      Home Medications:  atorvastatin 40 mg oral tablet: 1 tab(s) orally once a day (24 Nov 2023 15:22)  clopidogrel 75 mg oral tablet: 1 tab(s) orally once a day (24 Nov 2023 15:22)  gabapentin 300 mg oral capsule: 1 cap(s) orally 2 times a day (24 Nov 2023 15:20)  Jardiance 25 mg oral tablet: 1 tab(s) orally once a day (24 Nov 2023 15:20)  lisinopril 40 mg oral tablet: 1 tab(s) orally once a day (24 Nov 2023 15:19)  metFORMIN 1000 mg oral tablet: 1 tab(s) orally 2 times a day (24 Nov 2023 15:22)  Metoprolol Succinate ER 50 mg oral tablet, extended release: 1 tab(s) orally once a day (24 Nov 2023 15:18)  Trulicity Pen 1.5 mg/0.5 mL subcutaneous solution: 1.5 milligram(s) subcutaneously once a week (24 Nov 2023 15:23)      MEDICATIONS  (STANDING):  aspirin enteric coated 81 milliGRAM(s) Oral daily  atorvastatin 40 milliGRAM(s) Oral at bedtime  clopidogrel Tablet 75 milliGRAM(s) Oral daily  dextrose 5%. 1000 milliLiter(s) (50 mL/Hr) IV Continuous <Continuous>  dextrose 5%. 1000 milliLiter(s) (100 mL/Hr) IV Continuous <Continuous>  dextrose 50% Injectable 12.5 Gram(s) IV Push once  dextrose 50% Injectable 25 Gram(s) IV Push once  dextrose 50% Injectable 25 Gram(s) IV Push once  enoxaparin Injectable 40 milliGRAM(s) SubCutaneous every 24 hours  gabapentin 300 milliGRAM(s) Oral two times a day  glucagon  Injectable 1 milliGRAM(s) IntraMuscular once  insulin lispro (ADMELOG) corrective regimen sliding scale   SubCutaneous at bedtime  insulin lispro (ADMELOG) corrective regimen sliding scale   SubCutaneous three times a day before meals  lactobacillus acidophilus 1 Tablet(s) Oral three times a day with meals  lisinopril 40 milliGRAM(s) Oral daily  melatonin 3 milliGRAM(s) Oral at bedtime  metoprolol succinate ER 50 milliGRAM(s) Oral daily  piperacillin/tazobactam IVPB.. 3.375 Gram(s) IV Intermittent every 8 hours    MEDICATIONS  (PRN):  acetaminophen     Tablet .. 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 No nausea, No vomiting; No diarrhea, No constipation. [ x ] BM  GENITOURINARY: No dysuria, No frequency, No urgency, No hematuria, NO incontinence  NEUROLOGICAL: No headaches, No dizziness, No numbness, No tingling, No tremors, No weakness  EXT: No Swelling, No Pain, No Edema  SKIN:  [ x ] No itching, burning, rashes, or lesions + wound on right toe, + neuropathy b/l LE  MUSCULOSKELETAL: No joint pain ,No Jt swelling; No muscle pain, No back pain, No extremity pain  PSYCHIATRIC: No depression,  No anxiety,  No mood swings ,No difficulty sleeping at night  PAIN SCALE: [ x ] None  [  ] Other-  ROS Unable to obtain due to - [  ] Dementia  [  ] Lethargy [  ] Drowsy [  ] Sedated [  ] non verbal  REST OF REVIEW Of SYSTEM - [ x ] Normal      Vital Signs Last 24 Hrs  T(C): 37 (27 Nov 2023 05:03), Max: 37 (27 Nov 2023 05:03)  T(F): 98.6 (27 Nov 2023 05:03), Max: 98.6 (27 Nov 2023 05:03)  HR: 76 (27 Nov 2023 05:03) (76 - 86)  BP: 128/78 (27 Nov 2023 05:03) (128/78 - 153/58)  BP(mean): --  RR: 18 (27 Nov 2023 05:03) (17 - 18)  SpO2: 98% (27 Nov 2023 05:03) (98% - 98%)    Parameters below as of 27 Nov 2023 05:03  Patient On (Oxygen Delivery Method): room air      Finger Stick        11-26 @ 07:01  -  11-27 @ 07:00  --------------------------------------------------------  IN: 200 mL / OUT: 0 mL / NET: 200 mL    PHYSICAL EXAM:  GENERAL:  [ x ] NAD , [ x ] well appearing, [  ] Agitated, [  ] Drowsy,  [  ] Lethargy, [  ] confused   HEAD:  [ x ] Normal, [  ] Other  EYES:  [ x ] EOMI, [x  ] PERRLA, [ x ] conjunctiva and sclera clear normal, [  ] Other,  [  ] Pallor,[  ] Discharge  ENMT:  [ x ] Normal, [ x ] Moist mucous membranes, [ x ] Good dentition, [ x ] No Thrush  NECK:  [x  ] Supple, [ x ] No JVD, [  ] Normal thyroid, [  ] Lymphadenopathy [  ] Other  CHEST/LUNG:  [ x ] Clear to auscultation bilaterally, [  x] Breath Sounds equal B/L / Decrease, [ x ] poor effort  [x ] No rales, [ x ] No rhonchi  [x  ]  No wheezing,   HEART:  [x  ] Regular rate and rhythm, [  ] tachycardia, [  ] Bradycardia,  [  ] irregular  [x  ] No murmurs, No rubs, No gallops, [  ] PPM in place (Mfr:  )  ABDOMEN:  [ x ] Soft, [ x ] Nontender, [ x ] Nondistended, [ x ] No mass, [  ] Bowel sounds present, [  ] obese  NERVOUS SYSTEM:  [ x ] Alert & Oriented X3, [ x ] Nonfocal  [  ] Confusion  [  ] Encephalopathic [  ] Sedated [  ] Unable to assess, [  ] Dementia [  ] Other-  EXTREMITIES: [x  ] 2+ Peripheral Pulses, No clubbing, No cyanosis,  [  ] edema B/L lower EXT. [  ] PVD stasis skin changes B/L Lower EXT, [ x ] wound Rt foot dressing   LYMPH: No lymphadenopathy noted  SKIN:  [  x] No rashes or lesions, [  ] Pressure Ulcers, [  ] ecchymosis, [  ] Skin Tears, [  ] Other    DIET:     LABS:                        12.4   9.85  )-----------( 308      ( 26 Nov 2023 08:50 )             37.6     26 Nov 2023 08:50    139    |  105    |  18     ----------------------------<  182    4.8     |  28     |  0.92     Ca    8.8        26 Nov 2023 08:50  Phos  3.4       26 Nov 2023 08:50  Mg     2.3       26 Nov 2023 08:50    TPro  7.2    /  Alb  2.9    /  TBili  0.2    /  DBili  x      /  AST  13     /  ALT  22     /  AlkPhos  66     26 Nov 2023 08:50      Urinalysis Basic - ( 26 Nov 2023 08:50 )    Color: x / Appearance: x / SG: x / pH: x  Gluc: 182 mg/dL / Ketone: x  / Bili: x / Urobili: x   Blood: x / Protein: x / Nitrite: x   Leuk Esterase: x / RBC: x / WBC x   Sq Epi: x / Non Sq Epi: x / Bacteria: x        Culture Results:   No growth at 48 Hours (11-24 @ 09:50)  Culture Results:   No growth at 48 Hours (11-24 @ 09:35)  Culture Results:   Moderate Enterococcus faecalis  Normal qi isolated (11-24 @ 08:35)                  Culture - Blood (collected 24 Nov 2023 09:50)  Source: .Blood Blood-Peripheral  Preliminary Report (26 Nov 2023 17:01):    No growth at 48 Hours    Culture - Blood (collected 24 Nov 2023 09:35)  Source: .Blood Blood-Peripheral  Preliminary Report (26 Nov 2023 17:01):    No growth at 48 Hours    Culture - Other (collected 24 Nov 2023 08:35)  Source: .Other Right foot  Final Report (26 Nov 2023 15:34):    Moderate Enterococcus faecalis    Normal qi isolated  Organism: Enterococcus faecalis (26 Nov 2023 15:34)  Organism: Enterococcus faecalis (26 Nov 2023 15:34)         Anemia Panel:      Thyroid Panel:  Thyroid Stimulating Hormone, Serum: 1.20 uIU/mL (11-25-23 @ 08:10)                RADIOLOGY & ADDITIONAL TESTS:      HEALTH ISSUES - PROBLEM Dx:  Diabetic ulcer of right foot    CAD (coronary artery disease)    HTN (hypertension)    Hyperlipidemia    Need for prophylactic measure    Diabetes            Consultant(s) Notes Reviewed:  [  ] YES     Care Discussed with [X] Consultants  [  ] Patient  [  ] Family [  ] HCP [  ]   [  ] Social Service  [  ] RN, [  ] Physical Therapy,[  ] Palliative care team  DVT PPX: [  ] Lovenox, [  ] S C Heparin, [  ] Coumadin, [  ] Xarelto, [  ] Eliquis, [  ] Pradaxa, [  ] IV Heparin drip, [  ] SCD [  ] Contraindication 2 to GI Bleed,[  ] Ambulation [  ] Contraindicated 2 to  bleed [  ] Contraindicated 2 to Brain Bleed  Advanced directive: [  ] None, [  ] DNR/DNI Patient is a 62y old  Male who presents with a chief complaint of Local infection of skin and subcutaneous tissue     (26 Nov 2023 13:58)    HPI:  61 yo M with PMH HTN, HLD, Type II DM, s/p R hallux toe amputation 3 yrs  ago presents to the ED with R foot wound. Patient went to a private podiatrist 2.5 months ago to scrape off callus. The callus removal site did not heal well and started to develop into a wound. Podiatrist recommended patient see Dr. Stark for wound management. Per patient, would has been intermittently draining clear liquid and having foul odor. Last sunday, patient had episode of chills that resolved when he went to sleep. Last night, he also had chills and had difficulty balancing. Today he went to his regular scheduled appt with Dr. Stark who recommended he be admitted for IV ABx and surgery on Tuesday,  Denies fever chest pain, palpitations, SOB, cough, abdominal pain, nausea, vomiting, diarrhea, constipation, urinary frequency, urgency, or dysuria, headaches, changes in vision, dizziness, numbness, tingling. Denies recent travel, recent antibiotic use, or sick contacts.    ED Course:   Vitals: BP: 127/65, HR: 93, Temp: 99.2 , RR: 19, SpO2: 96 % on RA   Labs:  ESR: 77, WBC: 17.51, H/H: 12.6/38.4  CXR: No evidence of acute infiltrate  XRAY foot: IMPRESSION: Suspect pathologic fractures of the third and fourth metatarsal heads. 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 If osteomyelitis is clinically considered  despite conservative therapy,   and soft tissue / bone infection requires further assessment, follow-up   MRI recommended.  EKG:  NSR, 81 BPM  Received in the ED  Vanc x  1, Zosyn x  1, NS bolus  x 1     (24 Nov 2023 15:27)    INTERVAL HPI:  11/25/23: Patient seen and examined at bedside. Patient laying in bed comfortably and offers no complaints. Dressing on right foot appears clean, dry and intact. No significant overnight events. IV Zosyn, WBC Resolved   11/26/23: Pt seen and examined at bedside. Pt sitting up in bed comfortably. Has no complaints. Otherwise is anxious to have surgery as soon as possible. Eating, ambulating well. No infectious sx. On IV zosyn,   11/27/23: Pt seen and examined at bedside. Pt sitting up in bed comfortably. Has no complaints. Otherwise is anxious to have surgery as soon as possible. Awaiting MRI. FRANCISCA done. Saw cardio NP this AM and emphasized that she should call his cardiologist as early as possible. Eating, ambulating well. No infectious sx. On IV zosyn,  NPO for possible OR -Podiatry ,On IV Zosyn    OVERNIGHT EVENTS: None      Home Medications:  atorvastatin 40 mg oral tablet: 1 tab(s) orally once a day (24 Nov 2023 15:22)  clopidogrel 75 mg oral tablet: 1 tab(s) orally once a day (24 Nov 2023 15:22)  gabapentin 300 mg oral capsule: 1 cap(s) orally 2 times a day (24 Nov 2023 15:20)  Jardiance 25 mg oral tablet: 1 tab(s) orally once a day (24 Nov 2023 15:20)  lisinopril 40 mg oral tablet: 1 tab(s) orally once a day (24 Nov 2023 15:19)  metFORMIN 1000 mg oral tablet: 1 tab(s) orally 2 times a day (24 Nov 2023 15:22)  Metoprolol Succinate ER 50 mg oral tablet, extended release: 1 tab(s) orally once a day (24 Nov 2023 15:18)  Trulicity Pen 1.5 mg/0.5 mL subcutaneous solution: 1.5 milligram(s) subcutaneously once a week (24 Nov 2023 15:23)      MEDICATIONS  (STANDING):  aspirin enteric coated 81 milliGRAM(s) Oral daily  atorvastatin 40 milliGRAM(s) Oral at bedtime  clopidogrel Tablet 75 milliGRAM(s) Oral daily  dextrose 5%. 1000 milliLiter(s) (50 mL/Hr) IV Continuous <Continuous>  dextrose 5%. 1000 milliLiter(s) (100 mL/Hr) IV Continuous <Continuous>  dextrose 50% Injectable 12.5 Gram(s) IV Push once  dextrose 50% Injectable 25 Gram(s) IV Push once  dextrose 50% Injectable 25 Gram(s) IV Push once  enoxaparin Injectable 40 milliGRAM(s) SubCutaneous every 24 hours  gabapentin 300 milliGRAM(s) Oral two times a day  glucagon  Injectable 1 milliGRAM(s) IntraMuscular once  insulin lispro (ADMELOG) corrective regimen sliding scale   SubCutaneous at bedtime  insulin lispro (ADMELOG) corrective regimen sliding scale   SubCutaneous three times a day before meals  lactobacillus acidophilus 1 Tablet(s) Oral three times a day with meals  lisinopril 40 milliGRAM(s) Oral daily  melatonin 3 milliGRAM(s) Oral at bedtime  metoprolol succinate ER 50 milliGRAM(s) Oral daily  piperacillin/tazobactam IVPB.. 3.375 Gram(s) IV Intermittent every 8 hours    MEDICATIONS  (PRN):  acetaminophen     Tablet .. 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   Alcohol Use: social  Tobacco Use: none  Recreational Drug Use: none (24 Nov 2023 15:27)      REVIEW OF SYSTEMS: i am ready to go for surgery  CONSTITUTIONAL: No fever, No chills, No fatigue, No myalgia, No Body ache, No Weakness  EYES: No eye pain,  No visual disturbances, No discharge, NO Redness  ENMT:  No ear pain, No nose bleed, No vertigo; No sinus pain, NO throat pain, No Congestion  NECK: No pain, No stiffness  RESPIRATORY: No cough, NO wheezing, No  hemoptysis, NO  shortness of breath  CARDIOVASCULAR: No chest pain, palpitations  GASTROINTESTINAL: No abdominal pain, NO epigastric pain. No nausea, No vomiting; No diarrhea, No constipation. [ x ] BM  GENITOURINARY: No dysuria, No frequency, No urgency, No hematuria, NO incontinence  NEUROLOGICAL: No headaches, No dizziness, No numbness, No tingling, No tremors, No weakness  EXT: No Swelling, No Pain, No Edema  SKIN:  [ x ] No itching, burning, rashes, or lesions + wound on right toe, + neuropathy b/l LE  MUSCULOSKELETAL: No joint pain ,No Jt swelling; No muscle pain, No back pain, No extremity pain  PSYCHIATRIC: No depression,  No anxiety,  No mood swings ,No difficulty sleeping at night  PAIN SCALE: [ x ] None  [  ] Other-  ROS Unable to obtain due to - [  ] Dementia  [  ] Lethargy [  ] Drowsy [  ] Sedated [  ] non verbal  REST OF REVIEW Of SYSTEM - [ x ] Normal      Vital Signs Last 24 Hrs  T(C): 37 (27 Nov 2023 05:03), Max: 37 (27 Nov 2023 05:03)  T(F): 98.6 (27 Nov 2023 05:03), Max: 98.6 (27 Nov 2023 05:03)  HR: 76 (27 Nov 2023 05:03) (76 - 86)  BP: 128/78 (27 Nov 2023 05:03) (128/78 - 153/58)  BP(mean): --  RR: 18 (27 Nov 2023 05:03) (17 - 18)  SpO2: 98% (27 Nov 2023 05:03) (98% - 98%)    Parameters below as of 27 Nov 2023 05:03  Patient On (Oxygen Delivery Method): room air      Finger Stick        11-26 @ 07:01  -  11-27 @ 07:00  --------------------------------------------------------  IN: 200 mL / OUT: 0 mL / NET: 200 mL    PHYSICAL EXAM:  GENERAL:  [ x ] NAD , [ x ] well appearing, [  ] Agitated, [  ] Drowsy,  [  ] Lethargy, [  ] confused   HEAD:  [ x ] Normal, [  ] Other  EYES:  [ x ] EOMI, [x  ] PERRLA, [ x ] conjunctiva and sclera clear normal, [  ] Other,  [  ] Pallor,[  ] Discharge  ENMT:  [ x ] Normal, [ x ] Moist mucous membranes, [ x ] Good dentition, [ x ] No Thrush  NECK:  [x  ] Supple, [ x ] No JVD, [  ] Normal thyroid, [  ] Lymphadenopathy [  ] Other  CHEST/LUNG:  [ x ] Clear to auscultation bilaterally, [  x] Breath Sounds equal B/L / Decrease, [ x ] poor effort  [x ] No rales, [ x ] No rhonchi  [x  ]  No wheezing,   HEART:  [x  ] Regular rate and rhythm, [  ] tachycardia, [  ] Bradycardia,  [  ] irregular  [x  ] No murmurs, No rubs, No gallops, [  ] PPM in place (Mfr:  )  ABDOMEN:  [ x ] Soft, [ x ] Nontender, [ x ] Nondistended, [ x ] No mass, [  ] Bowel sounds present, [  ] obese  NERVOUS SYSTEM:  [ x ] Alert & Oriented X3, [ x ] Nonfocal  [  ] Confusion  [  ] Encephalopathic [  ] Sedated [  ] Unable to assess, [  ] Dementia [  ] Other-  EXTREMITIES: [x  ] 2+ Peripheral Pulses, No clubbing, No cyanosis,  [  ] edema B/L lower EXT. [  ] PVD stasis skin changes B/L Lower EXT, [ x ] wound Rt foot dressing   LYMPH: No lymphadenopathy noted  SKIN:  [  x] No rashes or lesions, [  ] Pressure Ulcers, [  ] ecchymosis, [  ] Skin Tears, [  ] Other    DIET: NPO    LABS:                        12.4   9.85  )-----------( 308      ( 26 Nov 2023 08:50 )             37.6     26 Nov 2023 08:50    139    |  105    |  18     ----------------------------<  182    4.8     |  28     |  0.92     Ca    8.8        26 Nov 2023 08:50  Phos  3.4       26 Nov 2023 08:50  Mg     2.3       26 Nov 2023 08:50    TPro  7.2    /  Alb  2.9    /  TBili  0.2    /  DBili  x      /  AST  13     /  ALT  22     /  AlkPhos  66     26 Nov 2023 08:50      Urinalysis Basic - ( 26 Nov 2023 08:50 )    Color: x / Appearance: x / SG: x / pH: x  Gluc: 182 mg/dL / Ketone: x  / Bili: x / Urobili: x   Blood: x / Protein: x / Nitrite: x   Leuk Esterase: x / RBC: x / WBC x   Sq Epi: x / Non Sq Epi: x / Bacteria: x        Culture Results:   No growth at 48 Hours (11-24 @ 09:50)  Culture Results:   No growth at 48 Hours (11-24 @ 09:35)  Culture Results:   Moderate Enterococcus faecalis  Normal qi isolated (11-24 @ 08:35)                  Culture - Blood (collected 24 Nov 2023 09:50)  Source: .Blood Blood-Peripheral  Preliminary Report (26 Nov 2023 17:01):    No growth at 48 Hours    Culture - Blood (collected 24 Nov 2023 09:35)  Source: .Blood Blood-Peripheral  Preliminary Report (26 Nov 2023 17:01):    No growth at 48 Hours    Culture - Other (collected 24 Nov 2023 08:35)  Source: .Other Right foot  Final Report (26 Nov 2023 15:34):    Moderate Enterococcus faecalis    Normal qi isolated  Organism: Enterococcus faecalis (26 Nov 2023 15:34)  Organism: Enterococcus faecalis (26 Nov 2023 15:34)         Anemia Panel:      Thyroid Panel:  Thyroid Stimulating Hormone, Serum: 1.20 uIU/mL (11-25-23 @ 08:10)        RADIOLOGY & ADDITIONAL TESTS:  < from: MR Foot No Cont, Right (11.27.23 @ 09:57) >    IMPRESSION:  1.  Osteomyelitis involving the second metatarsal and the second toe   proximal phalanx with an accompanying abscess which extends along the   second metatarsal and proximal phalanx as well as along the plantar   surface to the area of a soft tissue wound.  2.  Osteomyelitis involving the third and fourth metatarsal heads.  3.  Bone marrow edema with subtle lowT1 signal within the partially   imaged navicular as well as within the intermediate and lateral   cuneiforms may reflect osseous stress reaction changes or periostitis   versus less likely findings of early osteomyelitis.      < end of copied text >  < from: [facility] Physiol Extremity Lower 3+ Level, BI (11.26.23 @ 11:35) >    Findings/  Impression:  Right lower extremity: The ankle brachial index is 1.33, previously 1.14.   The pulse waveforms are normal to near normal.. No significant pressure   gradient.    Left lower extremity: The ankle brachial index is 1.05, previously 1.13.   TBI is 0.40 with digital pressures of 44 mmHg. The pulse waveforms are   normal to near normal.. Stable pressure gradient across the calf   suggestive of infrapopliteal stenosis.    --- End of Report ---    < end of copied text >      HEALTH ISSUES - PROBLEM Dx:  Diabetic ulcer of right foot    CAD (coronary artery disease)    HTN (hypertension)    Hyperlipidemia    Need for prophylactic measure    Diabetes            Consultant(s) Notes Reviewed:  [ x ] YES     Care Discussed with [X] Consultants  [ x ] Patient  [  ] Family [  ] HCP [  ]   [  ] Social Service  [ x ] RN, [  ] Physical Therapy,[  ] Palliative care team  DVT PPX: [x  ] Lovenox, [  ] S C Heparin, [  ] Coumadin, [  ] Xarelto, [  ] Eliquis, [  ] Pradaxa, [  ] IV Heparin drip, [  ] SCD [  ] Contraindication 2 to GI Bleed,[  ] Ambulation [  ] Contraindicated 2 to  bleed [  ] Contraindicated 2 to Brain Bleed  Advanced directive: [ x ] None, [  ] DNR/DNI

## 2023-11-27 NOTE — PROGRESS NOTE ADULT - ASSESSMENT
62M w/ PMhx of HTN, HLD, DM2 admitted from wound center for R foot infection.   Hx of R foot ulcer; pt also s/p R 1st toe and 1st metatarsal head resection.   Felt chills and pain to R foot this past Sunday.     R Foot Wound Infection  MRI noted--  1.  Osteomyelitis involving the second metatarsal and the second toe proximal phalanx with an accompanying abscess which extends along the   second metatarsal and proximal phalanx as well as along the plantar surface to the area of a soft tissue wound.  2.  Osteomyelitis involving the third and fourth metatarsal heads.  3.  Bone marrow edema with subtle lowT1 signal within the partially imaged navicular as well as within the intermediate and lateral   cuneiforms may reflect osseous stress reaction changes or periostitis versus less likely findings of early osteomyelitis.    WCx E. faecalis  BCx NGTD    Recommendations  C/w zosyn for now  Appreciate podiatry recs--OR this week  Trend temps/WBC  Additional care per primary team    Infectious Diseases will continue to follow. Please call with any questions.   Melany aClhoun M.D.  [facility] Division of Infectious Diseases [phone]  For after 5 P.M. and weekends, please call [phone]

## 2023-11-27 NOTE — CARE COORDINATION ASSESSMENT. - NSDCPLANSERVICES_GEN_ALL_CORE
This CM met at the bedside with the pt. Introduced myself as the CM explained my role and left contact information. The pt verbalized understanding. The pt lives in a private home with his wife and is independent with ADL's and ambulates without any devices. The pt works [employer] and has no HHA or services in the home. The PCP is Dr. Dannie Frederick and the cardio is Dr. Polk and the pharmacy is [facility] in [place]. Dr. Stark following the pt in the hospital for the right foot wound and also follows the pt in the community. The pt states that upon discharge, the wife will transport him home. Pt states he needs to get back to work as soon as possible and is declining the need for assistance in the home. CM team to remain available for post acute needs./Anticipated Needs Unclear at Present

## 2023-11-27 NOTE — PROGRESS NOTE ADULT - PROBLEM SELECTOR PLAN 2
- HgbA1c 8.2  - Hold home medications  - Low dose insulin corrective scale  - Hypoglycemia protocol, Accu-Chek   AC&HS  - Diet regular food with DASH/TLC  - Nutritional eval- done yesterday  Diabetic NP eval

## 2023-11-27 NOTE — DISCHARGE NOTE PROVIDER - CARE PROVIDER_API CALL
Joe Stark-Titus  Foot Surgery  [address] [place] [address]  Phone: [phone]  Fax: [phone]  Established Patient  Follow Up Time: 1 week   Joe Stark-Titus  Foot Surgery  [address] [place] [address]  Phone: [phone]  Fax: [phone]  Established Patient  Follow Up Time: 1 week    Dannie Frederick  Internal Medicine  [address] [place] [address]  Phone: [phone]  Fax: [phone]  Follow Up Time:     Laz Chu  Cardiovascular Disease  [address] [place] [address]  Phone: [phone]  Fax: [phone]  Follow Up Time: 2 weeks    Hon Kush Austin  Medical Oncology  [address] [facility], [address] [address]  Phone: [phone]  Fax: [phone]  Follow Up Time: 2 weeks    Pili Dc  Vascular Surgery  [address] [place] [address]  Phone: [phone]  Fax: [phone]  Follow Up Time:

## 2023-11-27 NOTE — CARE COORDINATION ASSESSMENT. - NSPASTMEDSURGHISTORY_GEN_ALL_CORE_FT
PAST MEDICAL & SURGICAL HISTORY:  Hyperlipidemia      Diabetes      HTN (hypertension)      H/O angioplasty  RLE      PVD (peripheral vascular disease)      Status post amputation of toe

## 2023-11-27 NOTE — PROGRESS NOTE ADULT - ASSESSMENT
61 yo M with PMH HTN, HLD, Type II DM, s/p R toe amp (3 yrs ago), s/p callus scrape off 2.5 weeks ago, now presents with R foot wound here for IV Abx and R foot wound R/O OM with Elevated WBC    Pre optimization, HTN  - s/p callus scrape off 2.5 weeks ago, now presents with R foot wound  concern for possible OM requiring surgical intervention, podiatry following     - follows with Dr. Andre (outpatient cardiologist)   - had a treadmill exercise stress test (11/5/2023) he was told he has "plaque" and was referred for angioplasty sched on 12/14/2023 - patient states he is refusing to proceed with angiogram   -prior to full assessment of cv risk of surgery would need to have records of what is apparently an abnormal stress result,  - obtain office records ([phone])     - EKG demonstrates NSR. No acute ischemic changes noted.   - continue home ASA, Plavix (unsure why pt is on it)   - continue statin   - Chest X-ray negative for acute process    - Patient euvolemic on examination with no overt signs of heart failure or cardiac ischemia.   - No severe valvular abnormalities noted on examination    - BP, HR stable and well controlled   - Continue home meds on Lisinopril, metoprolol   - continue routine hemodynamics   - Monitor and replete Lytes. Keep K > 4 and Mg > 2

## 2023-11-27 NOTE — DISCHARGE NOTE PROVIDER - NSDCFUADDINST_GEN_ALL_CORE_FT
Follow up with DR Frederick in 1 week  Follow up with DR Stark nwxt week-Keep your Dressing Dry/Clean & Intact   Follow up with DR Chu in 3-4weeks  Follow up with DR Austin in 3-4 weeks

## 2023-11-27 NOTE — PROGRESS NOTE ADULT - PROBLEM SELECTOR PLAN 1
- Pt with elevated WBC, ESR, CRP, normal lactate,  - s/p Vanc x  1, Zosyn x  1, NS bolus  x 1, VSS  -  XRAY foot: Suspect pathologic fractures of the third and fourth metatarsal heads. 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 If osteomyelitis is clinically considered  despite conservative therapy,   and soft tissue / bone infection requires further assessment, follow-up   - awaiting MRI rt foot as per Dr rosa- hopefully to be done today  - continue Zosyn IVPB q 8 hrs as per ID Dr Unique MICHAELS group follow up  - daily dressing changes  - Gabapentin for neuropathy, pain meds as needed (IV Tylenol)  - blood cx 2- negative  - R foot wound culture-final results: e. faecalis, sensitive to zosyn  - R LE u/s negative for DVT   - FRANCISCA:  RLE: FRANCISCA 1.33, previously 1.14. The pulse waveforms are normal to near normal.. No significant pressure gradient.   LL: FRANCISCA 1.05, previously 1.13. TBI is 0.40 with digital pressures of 44 mmHg. The pulse waveforms are normal to near normal.. Stable pressure gradient across the calf suggestive of infrapopliteal stenosis.  - activity: partial WB on R foot, walk with walker  - Dr. Rosa Podiatry following   - Vascular following  - ID following- Dr Lyn group - Pt with elevated WBC, ESR, CRP, normal lactate,  - s/p Vanc x  1, Zosyn x  1, NS bolus  x 1, VSS  -  XRAY foot: Suspect pathologic fractures of the third and fourth metatarsal heads. 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 If osteomyelitis is clinically considered  despite conservative therapy,   and soft tissue / bone infection requires further assessment, follow-up   - awaiting MRI rt foot as per Dr rosa- hopefully to be done today  - continue Zosyn IVPB q 8 hrs as per ID Dr Unique MICHAELS group follow up  - daily dressing changes  - Gabapentin for neuropathy, pain meds as needed (IV Tylenol)  - blood cx 2- negative  - R foot wound culture-final results: e. faecalis, sensitive to zosyn  - R LE u/s negative for DVT   - FRANCISCA:  RLE: FRANCISCA 1.33, previously 1.14. The pulse waveforms are normal to near normal.. No significant pressure gradient.   LL: FRANCISCA 1.05, previously 1.13. TBI is 0.40 with digital pressures of 44 mmHg. The pulse waveforms are normal to near normal.. Stable pressure gradient across the calf suggestive of infrapopliteal stenosis.  - activity: partial WB on R foot, walk with walker  - Dr. Rosa Podiatry following   - Vascular following- signed off per FRANCISCA results   - ID following- Dr Lyn group - Pt with elevated WBC, ESR, CRP, normal lactate,  - s/p Vanc x  1, Zosyn   -  XRAY foot: Suspect pathologic fractures of the third and fourth metatarsal heads. 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 If osteomyelitis is clinically considered  despite conservative therapy,   and soft tissue / bone infection requires further assessment, follow-up   - awaiting MRI rt foot as per Dr rosa- hopefully to be done today  - continue Zosyn IVPB q 8 hrs as per ID Dr Unique MICHAELS group follow up  - daily dressing changes  - Gabapentin for neuropathy, pain meds as needed (IV Tylenol)  - blood cx 2- negative  - R foot wound culture-final results: e. faecalis, sensitive to zosyn  - R LE u/s negative for DVT   - FRANCISCA:  RLE: FRANCISCA 1.33, previously 1.14. The pulse waveforms are normal to near normal.. No significant pressure gradient.   LL: FRANCISCA 1.05, previously 1.13. TBI is 0.40 with digital pressures of 44 mmHg. The pulse waveforms are normal to near normal.. Stable pressure gradient across the calf suggestive of infrapopliteal stenosis.  - activity: partial WB on R foot, walk with walker  - Dr. Rosa Podiatry following  d/w at detail about MRI Rt Foot results & procedure plan -will d/w Anesthesia about Cardio Recs   - Vascular following- signed off per FRANCISCA results   - ID following- Dr Lyn group-IV Zosyn

## 2023-11-27 NOTE — CONSULT NOTE ADULT - PROBLEM SELECTOR RECOMMENDATION 9
Type 2 A1c 8.2% adm foot wound  increase to mod ISS  CC diet and accucheck ACHS  Recommend endocrine-Perlman onconsult  FU appt: TBA  DSC recommendations: return to home regimen and glucose monitoring, lifestyle and diet modifications  diabetes education provided as documented above  Diabetes support info and cell # [phone] given   Goal 100-180 mg/dL; 140-180 mg/dL in critical care areas

## 2023-11-27 NOTE — PROGRESS NOTE ADULT - SUBJECTIVE AND OBJECTIVE BOX
Opt, Division of Infectious Diseases  OFELIA Harvey Y. Patel, S. Shah, [facility]  [phone]    Name: LOIDA QUEZADA  Age: 62y  Gender: Male  MRN: [id number]    Interval History:  Patient seen and examined at bedside this morning  No acute overnight events.   Notes reviewed    Antibiotics:  piperacillin/tazobactam IVPB.. 3.375 Gram(s) IV Intermittent every 8 hours      Medications:  acetaminophen     Tablet .. 650 milliGRAM(s) Oral every 6 hours PRN  aspirin enteric coated 81 milliGRAM(s) Oral daily  atorvastatin 40 milliGRAM(s) Oral at bedtime  clopidogrel Tablet 75 milliGRAM(s) Oral daily  dextrose 5%. 1000 milliLiter(s) IV Continuous <Continuous>  dextrose 5%. 1000 milliLiter(s) IV Continuous <Continuous>  dextrose 5%. 1000 milliLiter(s) IV Continuous <Continuous>  dextrose 5%. 1000 milliLiter(s) IV Continuous <Continuous>  dextrose 50% Injectable 12.5 Gram(s) IV Push once  dextrose 50% Injectable 25 Gram(s) IV Push once  dextrose 50% Injectable 12.5 Gram(s) IV Push once  dextrose 50% Injectable 25 Gram(s) IV Push once  dextrose Oral Gel 15 Gram(s) Oral once PRN  enoxaparin Injectable 40 milliGRAM(s) SubCutaneous once  gabapentin 300 milliGRAM(s) Oral two times a day  glucagon  Injectable 1 milliGRAM(s) IntraMuscular once  glucagon  Injectable 1 milliGRAM(s) IntraMuscular once  insulin lispro (ADMELOG) corrective regimen sliding scale   SubCutaneous every 6 hours  lactobacillus acidophilus 1 Tablet(s) Oral three times a day with meals  lisinopril 40 milliGRAM(s) Oral daily  melatonin 3 milliGRAM(s) Oral at bedtime  metoprolol succinate ER 50 milliGRAM(s) Oral daily  piperacillin/tazobactam IVPB.. 3.375 Gram(s) IV Intermittent every 8 hours      Review of Systems:  A 10-point review of systems was obtained.     Pertinent positives and negatives--  Constitutional: No fevers. No Chills. No Rigors.   Cardiovascular: No chest pain. No palpitations.  Respiratory: No shortness of breath. No cough.  Gastrointestinal: No nausea or vomiting. No diarrhea or constipation.   Psychiatric: Pleasant. Appropriate affect.    Review of systems otherwise negative except as previously noted.    Allergies: No Known Allergies    For details regarding the patient's past medical history, social history, family history, and other miscellaneous elements, please refer the initial infectious diseases consultation and/or the admitting history and physical examination for this admission.    Objective:  Vitals:   T(C): 36.7 (11-27-23 @ 11:38), Max: 37 (11-27-23 @ 05:03)  HR: 77 (11-27-23 @ 11:38) (76 - 79)  BP: 125/77 (11-27-23 @ 11:38) (125/77 - 144/72)  RR: 16 (11-27-23 @ 11:38) (16 - 18)  SpO2: 98% (11-27-23 @ 11:38) (98% - 98%)    Physical Examination:  General: no acute distress  HEENT: NC/AT, EOMI,   Cardio: S1, S2 heard, RRR, no murmurs  Resp: symmetric chest rise  Abd: soft, NT, ND  Ext: foot in dressing  Skin: warm, dry, no visible rash      Laboratory Studies:  CBC:                       12.2   11.54 )-----------( 358      ( 27 Nov 2023 07:45 )             37.8     CMP: 11-27    139  |  104  |  14  ----------------------------<  184<H>  4.2   |  28  |  0.85    Ca    8.9      27 Nov 2023 07:45  Phos  3.2     11-27  Mg     2.3     11-27    TPro  7.2  /  Alb  2.9<L>  /  TBili  0.2  /  DBili  x   /  AST  13<L>  /  ALT  22  /  AlkPhos  66  11-26    LIVER FUNCTIONS - ( 26 Nov 2023 08:50 )  Alb: 2.9 g/dL / Pro: 7.2 g/dL / ALK PHOS: 66 U/L / ALT: 22 U/L / AST: 13 U/L / GGT: x           Urinalysis Basic - ( 27 Nov 2023 07:45 )    Color: x / Appearance: x / SG: x / pH: x  Gluc: 184 mg/dL / Ketone: x  / Bili: x / Urobili: x   Blood: x / Protein: x / Nitrite: x   Leuk Esterase: x / RBC: x / WBC x   Sq Epi: x / Non Sq Epi: x / Bacteria: x        Microbiology: reviewed    Culture - Blood (collected 11-24-23 @ 09:50)  Source: .Blood Blood-Peripheral  Preliminary Report (11-26-23 @ 17:01):    No growth at 48 Hours    Culture - Blood (collected 11-24-23 @ 09:35)  Source: .Blood Blood-Peripheral  Preliminary Report (11-26-23 @ 17:01):    No growth at 48 Hours    Culture - Other (collected 11-24-23 @ 08:35)  Source: .Other Right foot  Final Report (11-26-23 @ 15:34):    Moderate Enterococcus faecalis    Normal qi isolated  Organism: Enterococcus faecalis (11-26-23 @ 15:34)  Organism: Enterococcus faecalis (11-26-23 @ 15:34)      Method Type: GOLDEN      -  Ampicillin: S <=2 Predicts results to ampicillin/sulbactam, amoxacillin-clavulanate and  piperacillin-tazobactam.      -  Tetracycline: R >8      -  Vancomycin: S 2          Radiology: reviewed    < from: MR Foot No Cont, Right (11.27.23 @ 09:57) >    ACC: [id number] EXAM:  MR FOOT RT   ORDERED BY:  MARIA D SUN     PROCEDURE DATE:  11/27/2023          INTERPRETATION:  Exam Type: MR FOOT RIGHT  Exam Date and Time: 11/27/2023 9:57 AM  Indication: Right foot pain. Osteomyelitis.  Comparison: Right foot x-rays 11/24/2023.    TECHNIQUE:  MRI of the right foot was performed on a 1.5 Kamila system in three planes   (axial, sagittal, and coronal) using a standard non-contrast protocol.    FINDINGS:  There is extensive osseous destruction involving the second metatarsal   head with surrounding phlegmon change and abscess formation. Abnormal   signal is present throughout the second metatarsal with extension into   the proximal phalanx of the second toe. The abscess extensive level of   the second metatarsal shaft and extends more distally to involve the soft   tissues about the second toe. There is also extension of the abscess   along the plantar surface to the level of a soft tissue defect. Extensive   subcutaneous foci of air is present in the location of the abscess about   the second digit. Abnormal marrow signal is also identified at the third   and fourth metatarsal heads. These are reflective of additional sites of   osteomyelitis. Bone marrow edema with subtle low T1 signal withinthe   partially imaged navicular as well as within the intermediate and lateral   cuneiforms may reflect osseous stress reaction changes or periostitis   versus less likely findings of early osteomyelitis. Patient is status   post amputation of the first metatarsal to the level of the midshaft. The   surgical margin is normal in appearance. No fracture or dislocation.      IMPRESSION:  1.  Osteomyelitis involving the second metatarsal and the second toe   proximal phalanx with an accompanying abscess which extends along the   second metatarsal and proximal phalanx as well as along the plantar   surface to the area of a soft tissue wound.  2.  Osteomyelitis involving the third and fourth metatarsal heads.  3.  Bone marrow edema with subtle lowT1 signal within the partially   imaged navicular as well as within the intermediate and lateral   cuneiforms may reflect osseous stress reaction changes or periostitis   versus less likely findings of early osteomyelitis.    --- End of Report ---            GRADY SHEEHAN MD; Attending Radiologist  This document has been electronically signed. Nov 27 2023 11:19AM    < end of copied text >

## 2023-11-27 NOTE — DISCHARGE NOTE PROVIDER - CARE PROVIDERS DIRECT ADDRESSES
,DirectAddress_Unknown ,DirectAddress_Unknown,DirectAddress_Unknown,[email or web address],DirectAddress_Unknown,DirectAddress_Unknown

## 2023-11-27 NOTE — DISCHARGE NOTE PROVIDER - NSDCMRMEDTOKEN_GEN_ALL_CORE_FT
atorvastatin 40 mg oral tablet: 1 tab(s) orally once a day  clopidogrel 75 mg oral tablet: 1 tab(s) orally once a day  gabapentin 300 mg oral capsule: 1 cap(s) orally 2 times a day  Jardiance 25 mg oral tablet: 1 tab(s) orally once a day  lisinopril 40 mg oral tablet: 1 tab(s) orally once a day  metFORMIN 1000 mg oral tablet: 1 tab(s) orally 2 times a day  Metoprolol Succinate ER 50 mg oral tablet, extended release: 1 tab(s) orally once a day  Trulicity Pen 1.5 mg/0.5 mL subcutaneous solution: 1.5 milligram(s) subcutaneously once a week   atorvastatin 40 mg oral tablet: 1 tab(s) orally once a day  clopidogrel 75 mg oral tablet: 1 tab(s) orally once a day  Eliquis Starter Pack for Treatment of DVT and PE 5 mg oral tablet: 1 tab(s) orally 2 times a day TAKE AS DIRECTED  gabapentin 300 mg oral capsule: 1 cap(s) orally 2 times a day  Jardiance 25 mg oral tablet: 1 tab(s) orally once a day  lisinopril 40 mg oral tablet: 1 tab(s) orally once a day  metFORMIN 1000 mg oral tablet: 1 tab(s) orally 2 times a day  Metoprolol Succinate ER 50 mg oral tablet, extended release: 1 tab(s) orally once a day  Trulicity Pen 1.5 mg/0.5 mL subcutaneous solution: 1.5 milligram(s) subcutaneously once a week  Xarelto Starter Pack 15 mg-20 mg oral kit: 1 applicator orally 2 times a day TAKE AS DIRECTED   acetaminophen 325 mg oral tablet: 2 tab(s) orally every 6 hours as needed for , Mild Pain (1 - 3)  ampicillin: intravenous every 4 hours Via PICC  apixaban 5 mg oral tablet: 2 tab(s) orally every 12 hours 10 mg 1 tab 2x day for TOTAL 7 days followed by   5mg 1 tab 2x day -Continue   Total 3 months Treatments  atorvastatin 40 mg oral tablet: 1 tab(s) orally once a day  clopidogrel 75 mg oral tablet: 1 tab(s) orally once a day  gabapentin 300 mg oral capsule: 1 cap(s) orally 3 times a day  Jardiance 25 mg oral tablet: 1 tab(s) orally once a day  lactobacillus acidophilus oral capsule: 1 tablet with sensor orally 2 times a day  lisinopril 40 mg oral tablet: 1 tab(s) orally once a day  metFORMIN 1000 mg oral tablet: 1 tab(s) orally 2 times a day  metoprolol succinate 50 mg oral tablet, extended release: 1 tab(s) orally once a day  polyethylene glycol 3350 oral powder for reconstitution: 17 gram(s) orally 2 times a day  traMADol 50 mg oral tablet: 1 tab(s) orally every 6 hours as needed for  severe pain MDD: 4 tab/24 hrs  Trulicity Pen 1.5 mg/0.5 mL subcutaneous solution: 1.5 milligram(s) subcutaneously once a week   acetaminophen 325 mg oral tablet: 2 tab(s) orally every 6 hours as needed for , Mild Pain (1 - 3)  ampicillin: intravenous every 4 hours Via PICC  apixaban 5 mg oral tablet: 2 tab(s) orally every 12 hours 10 mg 1 tab 2x day for TOTAL 7 days followed by   5mg 1 tab 2x day -Continue   Total 3 months Treatments  atorvastatin 40 mg oral tablet: 1 tab(s) orally once a day  clopidogrel 75 mg oral tablet: 1 tab(s) orally once a day  gabapentin 300 mg oral capsule: 1 cap(s) orally 3 times a day  Jardiance 25 mg oral tablet: 1 tab(s) orally once a day  lactobacillus acidophilus oral capsule: 1 tablet with sensor orally 2 times a day  lisinopril 40 mg oral tablet: 1 tab(s) orally once a day  metFORMIN 1000 mg oral tablet: 1 tab(s) orally 2 times a day  metoprolol succinate 50 mg oral tablet, extended release: 1 tab(s) orally once a day  polyethylene glycol 3350 oral powder for reconstitution: 17 gram(s) orally 2 times a day  Trulicity Pen 1.5 mg/0.5 mL subcutaneous solution: 1.5 milligram(s) subcutaneously once a week

## 2023-11-27 NOTE — DISCHARGE NOTE PROVIDER - NSDCACTIVITY_GEN_ALL_CORE
Follow Instructions Provided by your Surgical Team Bathing allowed/No heavy lifting/straining/Follow Instructions Provided by your Surgical Team

## 2023-11-27 NOTE — CONSULT NOTE ADULT - ASSESSMENT
Physical Exam:   Vital Signs Last 24 Hrs  T(C): 37 (27 Nov 2023 05:03), Max: 37 (27 Nov 2023 05:03)  T(F): 98.6 (27 Nov 2023 05:03), Max: 98.6 (27 Nov 2023 05:03)  HR: 76 (27 Nov 2023 05:03) (76 - 86)  BP: 128/78 (27 Nov 2023 05:03) (128/78 - 153/58)  BP(mean): --  RR: 18 (27 Nov 2023 05:03) (17 - 18)  SpO2: 98% (27 Nov 2023 05:03) (98% - 98%)    Parameters below as of 27 Nov 2023 05:03  Patient On (Oxygen Delivery Method): room air     CAPILLARY BLOOD GLUCOSE      POCT Blood Glucose.: 273 mg/dL (26 Nov 2023 21:50)  POCT Blood Glucose.: 248 mg/dL (26 Nov 2023 21:11)  POCT Blood Glucose.: 172 mg/dL (26 Nov 2023 17:28)  POCT Blood Glucose.: 186 mg/dL (26 Nov 2023 12:02)      Cholesterol, Serum: 113 mg/dL (05.19.21 @ 08:36)     HDL Cholesterol, Serum: 22 mg/dL (05.19.21 @ 08:36)     LDL Cholesterol Calculated: 66 mg/dL (05.19.21 @ 08:36)     DIET: CC  >50%

## 2023-11-27 NOTE — PROGRESS NOTE ADULT - PROBLEM SELECTOR PLAN 4
- unclear history (pt denies CAD) however had positive stress test recently and Cardio outpatient is scheduled for PCI (pt says no stent needed) on December 14  - currently on aspirin and Plavix  - Cardio consulted for cardiac clearance- awaiting outpatient records from patients facilities- CARDIO TO CALL THIS AM Cyclophosphamide Counseling:  I discussed with the patient the risks of cyclophosphamide including but not limited to hair loss, hormonal abnormalities, decreased fertility, abdominal pain, diarrhea, nausea and vomiting, bone marrow suppression and infection. The patient understands that monitoring is required while taking this medication.

## 2023-11-27 NOTE — BRIEF OPERATIVE NOTE - NSICDXBRIEFPROCEDURE_GEN_ALL_CORE_FT
PROCEDURES:  Transmetatarsal amputation 27-Nov-2023 18:38:56  Joe Stark  Lengthening, Achilles tendon 27-Nov-2023 18:39:03  Joe Stark

## 2023-11-27 NOTE — CARE COORDINATION ASSESSMENT. - NSCAREPROVIDERS_GEN_ALL_CORE_FT
CARE PROVIDERS:  Accepting Physician: Hira Calhoun  Administration: Gamal Page  Admitting: Hira Calhoun  Attending: Hira Calhoun  Case Management: Rosemary Charles  Case Management: Huan Matamoros  Consultant: Rosemary Diane  Consultant: Peter Myers  Consultant: Laz Chu  Consultant: Dede Syed  Consultant: Aron Bliss  Consultant: Jennifer Sheldon  Consultant: Gisela Norman  Consultant: Joe Stark  Consultant: Darryl Aguilar  Consultant: Melany Calhoun  Consultant: Zayda Zavala  Consultant: Jennifer Campbell  Consultant: Efe Lewis  Consultant: James Paredes  ED Attending: Korey Saldana  ED Nurse: Bang Kaplan  Nurse: Lea Cho  Nurse: Vane Noriega  Ordered: ADM, User  Ordered: Physician, Ordering  Override: Bebo Case  Override: Cristal Sánchez  PCA/Nursing Assistant: Melyssa Ignacio  PCA/Nursing Assistant: Shadia Martinez  Physical Therapy: Katlyn Altman  Primary Team: Marielle Freeman  Primary Team: Louann Palafox  Registered Dietitian: Rama Gong  Research: Jaime Arora  : Analilia Prescott  Student: Katty Walters

## 2023-11-27 NOTE — PROGRESS NOTE ADULT - SUBJECTIVE AND OBJECTIVE BOX
[facility] -- Vlad Gallardo Pannella, Patel, Savella Goodger, Cohen  Office # [phone]      Follow Up:  cardiac optimization     Subjective/Observations:   No events overnight resting comfortably in bed.  No complaints of chest pain, dyspnea, or palpitations reported. No signs of orthopnea or PND.  Remains on room air     REVIEW OF SYSTEMS: All other review of systems is negative unless indicated above    PAST MEDICAL & SURGICAL HISTORY:  HTN (hypertension)      Diabetes      Hyperlipidemia      PVD (peripheral vascular disease)      H/O angioplasty  RLE      Status post amputation of toe          MEDICATIONS  (STANDING):  aspirin enteric coated 81 milliGRAM(s) Oral daily  atorvastatin 40 milliGRAM(s) Oral at bedtime  clopidogrel Tablet 75 milliGRAM(s) Oral daily  dextrose 5%. 1000 milliLiter(s) (50 mL/Hr) IV Continuous <Continuous>  dextrose 5%. 1000 milliLiter(s) (100 mL/Hr) IV Continuous <Continuous>  dextrose 50% Injectable 25 Gram(s) IV Push once  dextrose 50% Injectable 25 Gram(s) IV Push once  dextrose 50% Injectable 12.5 Gram(s) IV Push once  enoxaparin Injectable 40 milliGRAM(s) SubCutaneous once  gabapentin 300 milliGRAM(s) Oral two times a day  glucagon  Injectable 1 milliGRAM(s) IntraMuscular once  insulin lispro (ADMELOG) corrective regimen sliding scale   SubCutaneous three times a day before meals  insulin lispro (ADMELOG) corrective regimen sliding scale   SubCutaneous at bedtime  lactobacillus acidophilus 1 Tablet(s) Oral three times a day with meals  lisinopril 40 milliGRAM(s) Oral daily  melatonin 3 milliGRAM(s) Oral at bedtime  metoprolol succinate ER 50 milliGRAM(s) Oral daily  piperacillin/tazobactam IVPB.. 3.375 Gram(s) IV Intermittent every 8 hours    MEDICATIONS  (PRN):  acetaminophen     Tablet .. 650 milliGRAM(s) Oral every 6 hours PRN Temp greater or equal to 38C (100.4F), Mild Pain (1 - 3)  dextrose Oral Gel 15 Gram(s) Oral once PRN Blood Glucose LESS THAN 70 milliGRAM(s)/deciliter      Allergies    No Known Allergies    Intolerances        Vital Signs Last 24 Hrs  T(C): 37 (2023 05:03), Max: 37 (2023 05:03)  T(F): 98.6 (2023 05:03), Max: 98.6 (2023 05:03)  HR: 76 (2023 05:03) (76 - 86)  BP: 128/78 (2023 05:03) (128/78 - 153/58)  BP(mean): --  RR: 18 (:03) (17 - 18)  SpO2: 98% (:03) (98% - 98%)    Parameters below as of 2023 05:03  Patient On (Oxygen Delivery Method): room air        I&O's Summary    2023 07:01  -  2023 07:00  --------------------------------------------------------  IN: 200 mL / OUT: 0 mL / NET: 200 mL      Weight (kg): 84.1 (11-26 @ 14:23)    PHYSICAL EXAM:  TELE: not on tele   Constitutional: NAD, awake and alert, well-developed  HEENT: Moist Mucous Membranes, Anicteric  Pulmonary: Non-labored, breath sounds are clear bilaterally, No wheezing, crackles or rhonchi  Cardiovascular: Regular, S1 and S2 nl, No murmurs, rubs, gallops or clicks  Gastrointestinal: Bowel Sounds present, soft, nontender.   Lymph: No lymphadenopathy. No peripheral edema.  Skin: No visible rashes or ulcers. foot dressing   Psych:  Mood & affect appropriate    LABS: All Labs Reviewed:                        12.4   9.85  )-----------( 308      ( 2023 08:50 )             37.6                         11.6   10.35 )-----------( 299      ( 2023 08:10 )             35.9                         12.6   17.51 )-----------( 307      ( 2023 09:50 )             38.4     2023 08:50    139    |  105    |  18     ----------------------------<  182    4.8     |  28     |  0.92   2023 08:10    139    |  104    |  22     ----------------------------<  142    4.7     |  27     |  0.94   2023 09:50    137    |  101    |  27     ----------------------------<  149    4.8     |  26     |  1.20     Ca    8.8        2023 08:50  Ca    8.5        2023 08:10  Ca    9.1        2023 09:50  Phos  3.4       2023 08:50  Mg     2.3       2023 08:50    TPro  7.2    /  Alb  2.9    /  TBili  0.2    /  DBili  x      /  AST  13     /  ALT  22     /  AlkPhos  66     2023 08:50  TPro  6.8    /  Alb  2.8    /  TBili  0.5    /  DBili  x      /  AST  15     /  ALT  19     /  AlkPhos  67     2023 08:10  TPro  7.4    /  Alb  3.2    /  TBili  0.7    /  DBili  x      /  AST  20     /  ALT  19     /  AlkPhos  73     2023 09:50             EC Lead ECG:   Ventricular Rate 85 BPM    Atrial Rate 85 BPM    P-R Interval 142 ms    QRS Duration 88 ms    Q-T Interval 380 ms    QTC Calculation(Bazett) 452 ms    P Axis 67 degrees    R Axis 54 degrees    T Axis 38 degrees    Diagnosis Line Normal sinus rhythm  Normal ECG  When compared with ECG of 16-DEC-2020 09:37,  No significant change was found  Confirmed by RAMÓN SHAFFER ([id number]) on 2023 9:34:36 PM (23 @ 10:03)        Radiology:

## 2023-11-27 NOTE — DISCHARGE NOTE PROVIDER - NSDCCPCAREPLAN_GEN_ALL_CORE_FT
PRINCIPAL DISCHARGE DIAGNOSIS  Diagnosis: Diabetic ulcer of right foot  Assessment and Plan of Treatment: You came in for an ulcer on your right foot which was suspected to have infection up to the level of the bone. We did an MRI of the right foot and we saw X. You underwent surgery of R metatarsal bone to stop the infection. Please follow up with Dr. Stark 1 week after surgery.      SECONDARY DISCHARGE DIAGNOSES  Diagnosis: Diabetes  Assessment and Plan of Treatment: This is a chronic condition that contributed to the development of your your R foot ulcer. Your A1c is 8.2 which is elevated. We stopped your oral medications while inpatient and treated you with a low dose sliding scale of insulin as needed. A nutritionist came to discuss the importance of a diabetic diet. Please continue to take your jardiance, metformin and truclity pen as directed and follow up with your primary doctor.    Diagnosis: CAD (coronary artery disease)  Assessment and Plan of Treatment: This is a chronic condition, please continue to take plavix and aspirin as directed.    Diagnosis: Hyperlipidemia  Assessment and Plan of Treatment: This is a chronic condition, please continue to take atorvastatin as directed.    Diagnosis: HTN (hypertension)  Assessment and Plan of Treatment: This is a chronic condition, please continue to take lisinopril and metoprolol as directed.     PRINCIPAL DISCHARGE DIAGNOSIS  Diagnosis: Diabetic ulcer of right foot  Assessment and Plan of Treatment: You came in for an ulcer on your right foot which was suspected to have infection up to the level of the bone. We did an MRI of the right foot and we saw ostemyelitis in multiple metatarsals. You underwent surgery of R metatarsal bone to stop the infection. We gave you IV abx in the hospital. Please follow up with Dr. Stark 1 week after surgery.      SECONDARY DISCHARGE DIAGNOSES  Diagnosis: Deep venous thrombosis  Assessment and Plan of Treatment: You were found to have a deep venous thrombosis, or blood clot, that was nonocclusive in your R lower leg. Vascular Dr. Watkins recommended continuing Lovenox and following up with repeat ultrasound between Dec 4-6 to confirm that the clot has not gotten bigger or migrated. PLEASE FOLLOW UP WITH DR. WATKINS FOR REPEAT U/S.    Diagnosis: CAD (coronary artery disease)  Assessment and Plan of Treatment: This is a chronic condition, please continue to take plavix and aspirin as directed.    Diagnosis: HTN (hypertension)  Assessment and Plan of Treatment: This is a chronic condition, please continue to take lisinopril and metoprolol as directed.    Diagnosis: Hyperlipidemia  Assessment and Plan of Treatment: This is a chronic condition, please continue to take atorvastatin as directed.    Diagnosis: Diabetes  Assessment and Plan of Treatment: This is a chronic condition that contributed to the development of your your R foot ulcer. Your A1c is 8.2 which is elevated. We stopped your oral medications while inpatient and treated you with a low dose sliding scale of insulin as needed. A nutritionist came to discuss the importance of a diabetic diet. Please continue to take your jardiance, metformin and truclity pen as directed and follow up with your primary doctor.     PRINCIPAL DISCHARGE DIAGNOSIS  Diagnosis: Diabetic ulcer of right foot  Assessment and Plan of Treatment: You came in for an ulcer on your right foot which was suspected to have infection up to the level of the bone. We did an MRI of the right foot and we saw ostemyelitis in multiple metatarsals. You underwent surgery of R metatarsal bone to stop the infection. We gave you IV abx in the hospital. You had PICC line placement on 12/1. Please continue to take AMPICILLIN THROUGH PICC LINE FOR NEXT X. Please follow up with Dr. Stark 1 week after surgery.      SECONDARY DISCHARGE DIAGNOSES  Diagnosis: Deep venous thrombosis  Assessment and Plan of Treatment: You were found to have a deep venous thrombosis, or blood clot, that was nonocclusive in your R lower leg. Hematologist Dr. Austin reocmmended XARELTO OR ELIQUIS? for 3 months and FOLLOW UP WITH HIM FOR MANGEMENT OF DVT.    Diagnosis: CAD (coronary artery disease)  Assessment and Plan of Treatment: This is a chronic condition, please continue to take plavix and aspirin as directed.    Diagnosis: HTN (hypertension)  Assessment and Plan of Treatment: This is a chronic condition, please continue to take lisinopril and metoprolol as directed.    Diagnosis: Hyperlipidemia  Assessment and Plan of Treatment: This is a chronic condition, please continue to take atorvastatin as directed.    Diagnosis: Diabetes  Assessment and Plan of Treatment: This is a chronic condition that contributed to the development of your your R foot ulcer. Your A1c is 8.2 which is elevated. We stopped your oral medications while inpatient and treated you with a low dose sliding scale of insulin as needed. A nutritionist came to discuss the importance of a diabetic diet. Please continue to take your jardiance, metformin and truclity pen as directed and follow up with your primary doctor.     PRINCIPAL DISCHARGE DIAGNOSIS  Diagnosis: OM (osteomyelitis)  Assessment and Plan of Treatment: You came in for an ulcer on your right foot  with infection up to the level of the bone- OM   . We did an MRI of the right foot and we saw ostemyelitis in multiple metatarsals bone.. You underwent surgery of Rt foot TMA- Trans metatarsal amputation    - We gave you IV abx in the hospital.  - You had PICC line placement on 12/1.  - Please continue to take AMPICILLIN THROUGH PICC LINE   -2 gm IVPB q 4 hrs   -Ampicillin 12gm/24hrs continuous infusion x6 wk course until 1/8/24 w/ weekly CMP, CBC, ESR, CRP  -orders called in to [facility]  Please follow up with Dr. Stark next  week after surgery.  Keep dressing Rt foor Clean/Dry/Intact   -Ambulate rt foot with surgical shoe-Partial weight bearing on Rt Heel , with walker help      SECONDARY DISCHARGE DIAGNOSES  Diagnosis: Deep venous thrombosis  Assessment and Plan of Treatment: You were found to have a deep venous thrombosis, or blood clot, that was nonocclusive in your R lower leg.   Hematologist Dr. Chavarria reocmmended   - ELIQUIS 10 mg 1 tab 2x day for 7 days  followed by Eliquis 5 mg 1 tab 2x day  for  TOTAL 3 months  Treatment for DVT  -Renew your Eliquis Meds with PMD/DR Chavarria on Time   - FOLLOW UP WITH DR Chavarria  FOR MANGEMENT OF DVT.    Diagnosis: Diabetes  Assessment and Plan of Treatment: This is a chronic condition that contributed to the development of your your R foot ulcer. Your A1c is 8.2 which is elevated.   -. A nutritionist came to discuss the importance of a diabetic diet. Please continue to take your jardiance, metformin and truclity pen as directed and follow up with your primary doctor.  Neuropathy-Gabapentin 300 mg 3x day   Healthy diet habits   Check your FSBS 4x day    Diagnosis: CAD (coronary artery disease)  Assessment and Plan of Treatment: This is a chronic conditio Positive out pt stress test   -, please continue to take plavix   --ontinue , Metoprolol, statin  as  you are on Eliquis   -STOP ASA   You MUST follwo up with Cardiologist for further care    Diagnosis: HTN (hypertension)  Assessment and Plan of Treatment: This is a chronic condition, please continue to take lisinopril and metoprolol as directed.    Diagnosis: Hyperlipidemia  Assessment and Plan of Treatment: This is a chronic condition, please continue to take atorvastatin as directed.

## 2023-11-27 NOTE — CHART NOTE - NSCHARTNOTEFT_GEN_A_CORE
FRANCISCA/PVR's reviewed with Dr Boston  No evidence of hemodynamically significant PAD on RLE    < from: [facility] Physiol Extremity Lower 3+ Level, BI (11.26.23 @ 11:35) >    ACC: [id number] EXAM:  PHYSIOL EXTREM LOW 3+ LEV BI   ORDERED BY: JAZMIN BLOUNT     PROCEDURE DATE:  11/26/2023          INTERPRETATION:  Clinical Information: Peripheral vascular disease. Right   leg wound    Technique: Bilateral lower extremity ABIs/PVR    Comparison: FRANCISCA/PVR 12/16/2020    Findings/  Impression:  Right lower extremity: The ankle brachial index is 1.33, previously 1.14.   The pulse waveforms are normal to near normal.. No significant pressure   gradient.    Left lower extremity: The ankle brachial index is 1.05, previously 1.13.   TBI is 0.40 with digital pressures of 44 mmHg. The pulse waveforms are   normal to near normal.. Stable pressure gradient across the calf   suggestive of infrapopliteal stenosis.    < end of copied text >      A/P:   No vascular surgical intervention indicated at this time  Podiatry may proceed with intervention as indicated with close monitoring for post op healing  Will sign off

## 2023-11-28 LAB
ANION GAP SERPL CALC-SCNC: 7 MMOL/L — SIGNIFICANT CHANGE UP (ref 5–17)
ANION GAP SERPL CALC-SCNC: 7 MMOL/L — SIGNIFICANT CHANGE UP (ref 5–17)
BASOPHILS # BLD AUTO: 0.07 K/UL — SIGNIFICANT CHANGE UP (ref 0–0.2)
BASOPHILS # BLD AUTO: 0.07 K/UL — SIGNIFICANT CHANGE UP (ref 0–0.2)
BASOPHILS NFR BLD AUTO: 0.5 % — SIGNIFICANT CHANGE UP (ref 0–2)
BASOPHILS NFR BLD AUTO: 0.5 % — SIGNIFICANT CHANGE UP (ref 0–2)
BUN SERPL-MCNC: 17 MG/DL — SIGNIFICANT CHANGE UP (ref 7–23)
BUN SERPL-MCNC: 17 MG/DL — SIGNIFICANT CHANGE UP (ref 7–23)
CALCIUM SERPL-MCNC: 8.2 MG/DL — LOW (ref 8.5–10.1)
CALCIUM SERPL-MCNC: 8.2 MG/DL — LOW (ref 8.5–10.1)
CHLORIDE SERPL-SCNC: 102 MMOL/L — SIGNIFICANT CHANGE UP (ref 96–108)
CHLORIDE SERPL-SCNC: 102 MMOL/L — SIGNIFICANT CHANGE UP (ref 96–108)
CO2 SERPL-SCNC: 28 MMOL/L — SIGNIFICANT CHANGE UP (ref 22–31)
CO2 SERPL-SCNC: 28 MMOL/L — SIGNIFICANT CHANGE UP (ref 22–31)
CREAT SERPL-MCNC: 0.92 MG/DL — SIGNIFICANT CHANGE UP (ref 0.5–1.3)
CREAT SERPL-MCNC: 0.92 MG/DL — SIGNIFICANT CHANGE UP (ref 0.5–1.3)
EGFR: 93 ML/MIN/1.73M2 — SIGNIFICANT CHANGE UP
EGFR: 93 ML/MIN/1.73M2 — SIGNIFICANT CHANGE UP
EOSINOPHIL # BLD AUTO: 0.29 K/UL — SIGNIFICANT CHANGE UP (ref 0–0.5)
EOSINOPHIL # BLD AUTO: 0.29 K/UL — SIGNIFICANT CHANGE UP (ref 0–0.5)
EOSINOPHIL NFR BLD AUTO: 2.2 % — SIGNIFICANT CHANGE UP (ref 0–6)
EOSINOPHIL NFR BLD AUTO: 2.2 % — SIGNIFICANT CHANGE UP (ref 0–6)
GLUCOSE BLDC GLUCOMTR-MCNC: 191 MG/DL — HIGH (ref 70–99)
GLUCOSE BLDC GLUCOMTR-MCNC: 191 MG/DL — HIGH (ref 70–99)
GLUCOSE BLDC GLUCOMTR-MCNC: 203 MG/DL — HIGH (ref 70–99)
GLUCOSE BLDC GLUCOMTR-MCNC: 203 MG/DL — HIGH (ref 70–99)
GLUCOSE BLDC GLUCOMTR-MCNC: 205 MG/DL — HIGH (ref 70–99)
GLUCOSE BLDC GLUCOMTR-MCNC: 205 MG/DL — HIGH (ref 70–99)
GLUCOSE BLDC GLUCOMTR-MCNC: 209 MG/DL — HIGH (ref 70–99)
GLUCOSE BLDC GLUCOMTR-MCNC: 209 MG/DL — HIGH (ref 70–99)
GLUCOSE BLDC GLUCOMTR-MCNC: 214 MG/DL — HIGH (ref 70–99)
GLUCOSE BLDC GLUCOMTR-MCNC: 214 MG/DL — HIGH (ref 70–99)
GLUCOSE BLDC GLUCOMTR-MCNC: 217 MG/DL — HIGH (ref 70–99)
GLUCOSE BLDC GLUCOMTR-MCNC: 217 MG/DL — HIGH (ref 70–99)
GLUCOSE SERPL-MCNC: 176 MG/DL — HIGH (ref 70–99)
GLUCOSE SERPL-MCNC: 176 MG/DL — HIGH (ref 70–99)
GRAM STN FLD: ABNORMAL
GRAM STN FLD: ABNORMAL
HCT VFR BLD CALC: 36.4 % — LOW (ref 39–50)
HCT VFR BLD CALC: 36.4 % — LOW (ref 39–50)
HGB BLD-MCNC: 11.7 G/DL — LOW (ref 13–17)
HGB BLD-MCNC: 11.7 G/DL — LOW (ref 13–17)
IMM GRANULOCYTES NFR BLD AUTO: 0.9 % — SIGNIFICANT CHANGE UP (ref 0–0.9)
IMM GRANULOCYTES NFR BLD AUTO: 0.9 % — SIGNIFICANT CHANGE UP (ref 0–0.9)
LYMPHOCYTES # BLD AUTO: 1.58 K/UL — SIGNIFICANT CHANGE UP (ref 1–3.3)
LYMPHOCYTES # BLD AUTO: 1.58 K/UL — SIGNIFICANT CHANGE UP (ref 1–3.3)
LYMPHOCYTES # BLD AUTO: 11.8 % — LOW (ref 13–44)
LYMPHOCYTES # BLD AUTO: 11.8 % — LOW (ref 13–44)
MAGNESIUM SERPL-MCNC: 2.1 MG/DL — SIGNIFICANT CHANGE UP (ref 1.6–2.6)
MAGNESIUM SERPL-MCNC: 2.1 MG/DL — SIGNIFICANT CHANGE UP (ref 1.6–2.6)
MCHC RBC-ENTMCNC: 29.5 PG — SIGNIFICANT CHANGE UP (ref 27–34)
MCHC RBC-ENTMCNC: 29.5 PG — SIGNIFICANT CHANGE UP (ref 27–34)
MCHC RBC-ENTMCNC: 32.1 GM/DL — SIGNIFICANT CHANGE UP (ref 32–36)
MCHC RBC-ENTMCNC: 32.1 GM/DL — SIGNIFICANT CHANGE UP (ref 32–36)
MCV RBC AUTO: 91.9 FL — SIGNIFICANT CHANGE UP (ref 80–100)
MCV RBC AUTO: 91.9 FL — SIGNIFICANT CHANGE UP (ref 80–100)
MONOCYTES # BLD AUTO: 1.38 K/UL — HIGH (ref 0–0.9)
MONOCYTES # BLD AUTO: 1.38 K/UL — HIGH (ref 0–0.9)
MONOCYTES NFR BLD AUTO: 10.3 % — SIGNIFICANT CHANGE UP (ref 2–14)
MONOCYTES NFR BLD AUTO: 10.3 % — SIGNIFICANT CHANGE UP (ref 2–14)
NEUTROPHILS # BLD AUTO: 9.91 K/UL — HIGH (ref 1.8–7.4)
NEUTROPHILS # BLD AUTO: 9.91 K/UL — HIGH (ref 1.8–7.4)
NEUTROPHILS NFR BLD AUTO: 74.3 % — SIGNIFICANT CHANGE UP (ref 43–77)
NEUTROPHILS NFR BLD AUTO: 74.3 % — SIGNIFICANT CHANGE UP (ref 43–77)
NRBC # BLD: 0 /100 WBCS — SIGNIFICANT CHANGE UP (ref 0–0)
NRBC # BLD: 0 /100 WBCS — SIGNIFICANT CHANGE UP (ref 0–0)
PHOSPHATE SERPL-MCNC: 2.4 MG/DL — LOW (ref 2.5–4.5)
PHOSPHATE SERPL-MCNC: 2.4 MG/DL — LOW (ref 2.5–4.5)
PLATELET # BLD AUTO: 367 K/UL — SIGNIFICANT CHANGE UP (ref 150–400)
PLATELET # BLD AUTO: 367 K/UL — SIGNIFICANT CHANGE UP (ref 150–400)
POTASSIUM SERPL-MCNC: 4.3 MMOL/L — SIGNIFICANT CHANGE UP (ref 3.5–5.3)
POTASSIUM SERPL-MCNC: 4.3 MMOL/L — SIGNIFICANT CHANGE UP (ref 3.5–5.3)
POTASSIUM SERPL-SCNC: 4.3 MMOL/L — SIGNIFICANT CHANGE UP (ref 3.5–5.3)
POTASSIUM SERPL-SCNC: 4.3 MMOL/L — SIGNIFICANT CHANGE UP (ref 3.5–5.3)
RBC # BLD: 3.96 M/UL — LOW (ref 4.2–5.8)
RBC # BLD: 3.96 M/UL — LOW (ref 4.2–5.8)
RBC # FLD: 12.4 % — SIGNIFICANT CHANGE UP (ref 10.3–14.5)
RBC # FLD: 12.4 % — SIGNIFICANT CHANGE UP (ref 10.3–14.5)
SODIUM SERPL-SCNC: 137 MMOL/L — SIGNIFICANT CHANGE UP (ref 135–145)
SODIUM SERPL-SCNC: 137 MMOL/L — SIGNIFICANT CHANGE UP (ref 135–145)
SPECIMEN SOURCE: SIGNIFICANT CHANGE UP
SPECIMEN SOURCE: SIGNIFICANT CHANGE UP
WBC # BLD: 13.35 K/UL — HIGH (ref 3.8–10.5)
WBC # BLD: 13.35 K/UL — HIGH (ref 3.8–10.5)
WBC # FLD AUTO: 13.35 K/UL — HIGH (ref 3.8–10.5)
WBC # FLD AUTO: 13.35 K/UL — HIGH (ref 3.8–10.5)

## 2023-11-28 PROCEDURE — 99232 SBSQ HOSP IP/OBS MODERATE 35: CPT

## 2023-11-28 RX ORDER — HYDROMORPHONE HYDROCHLORIDE 2 MG/ML
0.5 INJECTION INTRAMUSCULAR; INTRAVENOUS; SUBCUTANEOUS ONCE
Refills: 0 | Status: DISCONTINUED | OUTPATIENT
Start: 2023-11-28 | End: 2023-11-28

## 2023-11-28 RX ORDER — TRAMADOL HYDROCHLORIDE 50 MG/1
25 TABLET ORAL EVERY 6 HOURS
Refills: 0 | Status: DISCONTINUED | OUTPATIENT
Start: 2023-11-28 | End: 2023-12-02

## 2023-11-28 RX ORDER — DEXTROSE 50 % IN WATER 50 %
25 SYRINGE (ML) INTRAVENOUS ONCE
Refills: 0 | Status: DISCONTINUED | OUTPATIENT
Start: 2023-11-28 | End: 2023-12-02

## 2023-11-28 RX ORDER — OXYCODONE HYDROCHLORIDE 5 MG/1
5 TABLET ORAL ONCE
Refills: 0 | Status: DISCONTINUED | OUTPATIENT
Start: 2023-11-28 | End: 2023-11-28

## 2023-11-28 RX ORDER — INSULIN LISPRO 100/ML
VIAL (ML) SUBCUTANEOUS
Refills: 0 | Status: DISCONTINUED | OUTPATIENT
Start: 2023-11-28 | End: 2023-12-02

## 2023-11-28 RX ORDER — DEXTROSE 50 % IN WATER 50 %
12.5 SYRINGE (ML) INTRAVENOUS ONCE
Refills: 0 | Status: DISCONTINUED | OUTPATIENT
Start: 2023-11-28 | End: 2023-12-02

## 2023-11-28 RX ORDER — TRAMADOL HYDROCHLORIDE 50 MG/1
50 TABLET ORAL ONCE
Refills: 0 | Status: DISCONTINUED | OUTPATIENT
Start: 2023-11-28 | End: 2023-11-28

## 2023-11-28 RX ORDER — TRAMADOL HYDROCHLORIDE 50 MG/1
50 TABLET ORAL EVERY 6 HOURS
Refills: 0 | Status: DISCONTINUED | OUTPATIENT
Start: 2023-11-28 | End: 2023-12-02

## 2023-11-28 RX ORDER — DEXTROSE 50 % IN WATER 50 %
15 SYRINGE (ML) INTRAVENOUS ONCE
Refills: 0 | Status: DISCONTINUED | OUTPATIENT
Start: 2023-11-28 | End: 2023-12-02

## 2023-11-28 RX ADMIN — Medication 4: at 12:33

## 2023-11-28 RX ADMIN — Medication 4: at 17:24

## 2023-11-28 RX ADMIN — ENOXAPARIN SODIUM 40 MILLIGRAM(S): 100 INJECTION SUBCUTANEOUS at 08:52

## 2023-11-28 RX ADMIN — TRAMADOL HYDROCHLORIDE 50 MILLIGRAM(S): 50 TABLET ORAL at 02:48

## 2023-11-28 RX ADMIN — Medication 650 MILLIGRAM(S): at 07:00

## 2023-11-28 RX ADMIN — HYDROMORPHONE HYDROCHLORIDE 0.5 MILLIGRAM(S): 2 INJECTION INTRAMUSCULAR; INTRAVENOUS; SUBCUTANEOUS at 08:52

## 2023-11-28 RX ADMIN — GABAPENTIN 300 MILLIGRAM(S): 400 CAPSULE ORAL at 06:11

## 2023-11-28 RX ADMIN — PIPERACILLIN AND TAZOBACTAM 25 GRAM(S): 4; .5 INJECTION, POWDER, LYOPHILIZED, FOR SOLUTION INTRAVENOUS at 20:50

## 2023-11-28 RX ADMIN — ATORVASTATIN CALCIUM 40 MILLIGRAM(S): 80 TABLET, FILM COATED ORAL at 20:48

## 2023-11-28 RX ADMIN — Medication 650 MILLIGRAM(S): at 06:10

## 2023-11-28 RX ADMIN — Medication 650 MILLIGRAM(S): at 00:47

## 2023-11-28 RX ADMIN — TRAMADOL HYDROCHLORIDE 50 MILLIGRAM(S): 50 TABLET ORAL at 20:48

## 2023-11-28 RX ADMIN — TRAMADOL HYDROCHLORIDE 50 MILLIGRAM(S): 50 TABLET ORAL at 01:27

## 2023-11-28 RX ADMIN — PIPERACILLIN AND TAZOBACTAM 25 GRAM(S): 4; .5 INJECTION, POWDER, LYOPHILIZED, FOR SOLUTION INTRAVENOUS at 06:13

## 2023-11-28 RX ADMIN — Medication 1: at 08:53

## 2023-11-28 RX ADMIN — PIPERACILLIN AND TAZOBACTAM 25 GRAM(S): 4; .5 INJECTION, POWDER, LYOPHILIZED, FOR SOLUTION INTRAVENOUS at 13:41

## 2023-11-28 RX ADMIN — Medication 81 MILLIGRAM(S): at 12:34

## 2023-11-28 RX ADMIN — Medication 1 TABLET(S): at 18:05

## 2023-11-28 RX ADMIN — Medication 1 TABLET(S): at 12:34

## 2023-11-28 RX ADMIN — Medication 50 MILLIGRAM(S): at 06:10

## 2023-11-28 RX ADMIN — TRAMADOL HYDROCHLORIDE 50 MILLIGRAM(S): 50 TABLET ORAL at 21:48

## 2023-11-28 RX ADMIN — GABAPENTIN 300 MILLIGRAM(S): 400 CAPSULE ORAL at 18:05

## 2023-11-28 RX ADMIN — Medication 3 MILLIGRAM(S): at 20:49

## 2023-11-28 RX ADMIN — Medication 1 TABLET(S): at 08:52

## 2023-11-28 RX ADMIN — CLOPIDOGREL BISULFATE 75 MILLIGRAM(S): 75 TABLET, FILM COATED ORAL at 12:34

## 2023-11-28 RX ADMIN — LISINOPRIL 40 MILLIGRAM(S): 2.5 TABLET ORAL at 06:11

## 2023-11-28 NOTE — PROGRESS NOTE ADULT - ASSESSMENT
61 yo M with PMH HTN, HLD, Type II DM, s/p R toe amp (3 yrs ago), s/p callus scrape off 2.5 weeks ago, now presents with R foot wound here for IV Abx and R foot wound  found to have OM s/p R  Transmetatarsal amputation 11/27     Pre optimization, Hypertension, now  R  Transmetatarsal amputation 11/27   - s/p callus scrape off 2.5 weeks ago,        - had a treadmill exercise stress test (11/5/2023) he was told he has "plaque" and was referred for angioplasty sched on 12/14/2023 - patient states he is refusing to proceed with angiogram   --follows with Dr. Andre (outpatient cardiologist)       - EKG demonstrates NSR. No acute ischemic changes noted.   - continue home ASA, Plavix (unsure why pt is on it)   - continue statin   - Chest X-ray negative for acute process    - Patient euvolemic on examination with no overt signs of heart failure or cardiac ischemia.   - No severe valvular abnormalities noted on examination    - BP, HR stable and well controlled   - Continue home meds on Lisinopril, metoprolol     - Monitor and replete lytes, keep K>4, Mg>2.  - Will continue to follow.    Steve Gaines, MS ANP, [facility]  Nurse Practitioner- Cardiology   Spectra #[id number] /[phone]        - Monitor and replete Lytes. Keep K > 4 and Mg > 2

## 2023-11-28 NOTE — CHART NOTE - NSCHARTNOTEFT_GEN_A_CORE
Called by RN for pt c/o severe pain in RLE s/p R transmetatarsal resection this evening.  On chart review, only Tylenol PO PRN is ordered  Oxycodone 5mg IR PO x1 ordered  Day team to assess continued pain regimen/pain management needs Called by RN for pt c/o severe pain in RLE s/p R transmetatarsal resection this evening.  On chart review, only Tylenol PO PRN is ordered  Oxycodone 5mg IR PO x1 ordered  Day team to assess continued pain regimen/pain management needs    ADDENDUM  - Pt refused Oxycodone, saying it makes him sick. Requesting Tramadol or Dilaudid instead per RN.  - Tramadol 50 mg PO x1 ordered

## 2023-11-28 NOTE — PROGRESS NOTE ADULT - SUBJECTIVE AND OBJECTIVE BOX
Patient is a 62y old  Male who presents with a chief complaint of R foot wound (27 Nov 2023 13:26)    HPI:  61 yo M with PMH HTN, HLD, Type II DM, s/p R hallux toe amputation 3 yrs  ago presents to the ED with R foot wound. Patient went to a private podiatrist 2.5 months ago to scrape off callus. The callus removal site did not heal well and started to develop into a wound. Podiatrist recommended patient see Dr. Stark for wound management. Per patient, would has been intermittently draining clear liquid and having foul odor. Last sunday, patient had episode of chills that resolved when he went to sleep. Last night, he also had chills and had difficulty balancing. Today he went to his regular scheduled appt with Dr. Stark who recommended he be admitted for IV ABx and surgery on Tuesday,  Denies fever chest pain, palpitations, SOB, cough, abdominal pain, nausea, vomiting, diarrhea, constipation, urinary frequency, urgency, or dysuria, headaches, changes in vision, dizziness, numbness, tingling. Denies recent travel, recent antibiotic use, or sick contacts.    ED Course:   Vitals: BP: 127/65, HR: 93, Temp: 99.2 , RR: 19, SpO2: 96 % on RA   Labs:  ESR: 77, WBC: 17.51, H/H: 12.6/38.4  CXR: No evidence of acute infiltrate  XRAY foot: IMPRESSION: Suspect pathologic fractures of the third and fourth metatarsal heads. 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 If osteomyelitis is clinically considered  despite conservative therapy,   and soft tissue / bone infection requires further assessment, follow-up   MRI recommended.  EKG:  NSR, 81 BPM  Received in the ED  Vanc x  1, Zosyn x  1, NS bolus  x 1     (24 Nov 2023 15:27)      INTERVAL HPI:  11/25/23: Patient seen and examined at bedside. Patient laying in bed comfortably and offers no complaints. Dressing on right foot appears clean, dry and intact. No significant overnight events. IV Zosyn, WBC Resolved   11/26/23: Pt seen and examined at bedside. Pt sitting up in bed comfortably. Has no complaints. Otherwise is anxious to have surgery as soon as possible. Eating, ambulating well. No infectious sx. On IV zosyn,   11/27/23: Pt seen and examined at bedside. Pt sitting up in bed comfortably. Has no complaints. Otherwise is anxious to have surgery as soon as possible. Awaiting MRI. FRANCISCA done. Saw cardio NP this AM and emphasized that she should call his cardiologist as early as possible. Eating, ambulating well. No infectious sx. On IV zosyn,  NPO for possible OR -Podiatry ,On IV Zosyn  11/28/23: Pt seen and examined at bedside. Pt sitting up comfortably in bed. s/p R transmetatarsal amputation with no complications. Pain 7/10 currently, was 11 last night. Pain regimen ordered. Walking to and from bathroom on heel. On IV zosyn. Eating diabetic diet without issues.     OVERNIGHT EVENTS: Was in severe foot pain, received tramadol 25mg x 1.     Home Medications:  atorvastatin 40 mg oral tablet: 1 tab(s) orally once a day (24 Nov 2023 15:22)  clopidogrel 75 mg oral tablet: 1 tab(s) orally once a day (24 Nov 2023 15:22)  gabapentin 300 mg oral capsule: 1 cap(s) orally 2 times a day (24 Nov 2023 15:20)  Jardiance 25 mg oral tablet: 1 tab(s) orally once a day (24 Nov 2023 15:20)  lisinopril 40 mg oral tablet: 1 tab(s) orally once a day (24 Nov 2023 15:19)  metFORMIN 1000 mg oral tablet: 1 tab(s) orally 2 times a day (24 Nov 2023 15:22)  Metoprolol Succinate ER 50 mg oral tablet, extended release: 1 tab(s) orally once a day (24 Nov 2023 15:18)  Trulicity Pen 1.5 mg/0.5 mL subcutaneous solution: 1.5 milligram(s) subcutaneously once a week (24 Nov 2023 15:23)      MEDICATIONS  (STANDING):  aspirin enteric coated 81 milliGRAM(s) Oral daily  atorvastatin 40 milliGRAM(s) Oral at bedtime  clopidogrel Tablet 75 milliGRAM(s) Oral daily  dextrose 5%. 1000 milliLiter(s) (100 mL/Hr) IV Continuous <Continuous>  dextrose 5%. 1000 milliLiter(s) (50 mL/Hr) IV Continuous <Continuous>  dextrose 5%. 1000 milliLiter(s) (50 mL/Hr) IV Continuous <Continuous>  dextrose 5%. 1000 milliLiter(s) (100 mL/Hr) IV Continuous <Continuous>  dextrose 50% Injectable 12.5 Gram(s) IV Push once  dextrose 50% Injectable 25 Gram(s) IV Push once  dextrose 50% Injectable 12.5 Gram(s) IV Push once  dextrose 50% Injectable 25 Gram(s) IV Push once  enoxaparin Injectable 40 milliGRAM(s) SubCutaneous once  gabapentin 300 milliGRAM(s) Oral two times a day  glucagon  Injectable 1 milliGRAM(s) IntraMuscular once  glucagon  Injectable 1 milliGRAM(s) IntraMuscular once  HYDROmorphone  Injectable 0.5 milliGRAM(s) IV Push once  insulin lispro (ADMELOG) corrective regimen sliding scale   SubCutaneous every 6 hours  lactobacillus acidophilus 1 Tablet(s) Oral three times a day with meals  lisinopril 40 milliGRAM(s) Oral daily  melatonin 3 milliGRAM(s) Oral at bedtime  metoprolol succinate ER 50 milliGRAM(s) Oral daily  piperacillin/tazobactam IVPB.. 3.375 Gram(s) IV Intermittent every 8 hours    MEDICATIONS  (PRN):  acetaminophen     Tablet .. 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   Alcohol Use: social  Tobacco Use: none  Recreational Drug Use: none (24 Nov 2023 15:27)      REVIEW OF SYSTEMS: i am ready to go for surgery  CONSTITUTIONAL: No fever, No chills, No fatigue, No myalgia, No Body ache, No Weakness  EYES: No eye pain,  No visual disturbances, No discharge, NO Redness  ENMT:  No ear pain, No nose bleed, No vertigo; No sinus pain, NO throat pain, No Congestion  NECK: No pain, No stiffness  RESPIRATORY: No cough, NO wheezing, No  hemoptysis, NO  shortness of breath  CARDIOVASCULAR: No chest pain, palpitations  GASTROINTESTINAL: No abdominal pain, NO epigastric pain. No nausea, No vomiting; No diarrhea, No constipation. [ x ] BM  GENITOURINARY: No dysuria, No frequency, No urgency, No hematuria, NO incontinence  NEUROLOGICAL: No headaches, No dizziness, No numbness, No tingling, No tremors, No weakness  EXT: No Swelling, No Pain, No Edema  SKIN:  [ x ] No itching, burning, rashes, or lesions + wound on right toe, + neuropathy b/l LE  MUSCULOSKELETAL: No joint pain ,No Jt swelling; No muscle pain, No back pain, No extremity pain  PSYCHIATRIC: No depression,  No anxiety,  No mood swings ,No difficulty sleeping at night  PAIN SCALE: [ x ] None  [  ] Other-  ROS Unable to obtain due to - [  ] Dementia  [  ] Lethargy [  ] Drowsy [  ] Sedated [  ] non verbal  REST OF REVIEW Of SYSTEM - [ x ] Normal      Vital Signs Last 24 Hrs  T(C): 37.1 (28 Nov 2023 05:19), Max: 37.1 (28 Nov 2023 05:19)  T(F): 98.7 (28 Nov 2023 05:19), Max: 98.7 (28 Nov 2023 05:19)  HR: 87 (28 Nov 2023 05:19) (67 - 87)  BP: 115/60 (28 Nov 2023 05:19) (99/59 - 149/73)  BP(mean): --  RR: 18 (28 Nov 2023 05:19) (12 - 18)  SpO2: 96% (28 Nov 2023 05:19) (93% - 100%)    Parameters below as of 28 Nov 2023 05:19  Patient On (Oxygen Delivery Method): room air      Finger Stick          PHYSICAL EXAM:  GENERAL:  [  ] NAD , [  ] well appearing, [  ] Agitated, [  ] Drowsy,  [  ] Lethargy, [  ] confused   HEAD:  [  ] Normal, [  ] Other  EYES:  [  ] EOMI, [  ] PERRLA, [  ] conjunctiva and sclera clear normal, [  ] Other,  [  ] Pallor,[  ] Discharge  ENMT:  [  ] Normal, [  ] Moist mucous membranes, [  ] Good dentition, [  ] No Thrush  NECK:  [  ] Supple, [  ] No JVD, [  ] Normal thyroid, [  ] Lymphadenopathy [  ] Other  CHEST/LUNG:  [  ] Clear to auscultation bilaterally, [  ] Breath Sounds equal B/L / Decrease, [  ] poor effort  [  ] No rales, [  ] No rhonchi  [  ]  No wheezing,   HEART:  [  ] Regular rate and rhythm, [  ] tachycardia, [  ] Bradycardia,  [  ] irregular  [  ] No murmurs, No rubs, No gallops, [  ] PPM in place (Mfr:  )  ABDOMEN:  [  ] Soft, [  ] Nontender, [  ] Nondistended, [  ] No mass, [  ] Bowel sounds present, [  ] obese  NERVOUS SYSTEM:  [  ] Alert & Oriented X3, [  ] Nonfocal  [  ] Confusion  [  ] Encephalopathic [  ] Sedated [  ] Unable to assess, [  ] Dementia [  ] Other-  EXTREMITIES: [  ] 2+ Peripheral Pulses, No clubbing, No cyanosis,  [  ] edema B/L lower EXT. [  ] PVD stasis skin changes B/L Lower EXT, [  ] wound  LYMPH: No lymphadenopathy noted  SKIN:  [  ] No rashes or lesions, [  ] Pressure Ulcers, [  ] ecchymosis, [  ] Skin Tears, [  ] Other    DIET: Diet, Consistent Carbohydrate w/Evening Snack:   DASH/TLC Sodium & Cholesterol Restricted (11-27-23 @ 18:49)      LABS:      Ca    8.9        27 Nov 2023 07:45      PT/INR - ( 27 Nov 2023 08:30 )   PT: 11.3 sec;   INR: 0.96 ratio         PTT - ( 27 Nov 2023 08:30 )  PTT:30.3 sec  Urinalysis Basic - ( 27 Nov 2023 07:45 )    Color: x / Appearance: x / SG: x / pH: x  Gluc: 184 mg/dL / Ketone: x  / Bili: x / Urobili: x   Blood: x / Protein: x / Nitrite: x   Leuk Esterase: x / RBC: x / WBC x   Sq Epi: x / Non Sq Epi: x / Bacteria: x        Culture Results:   No growth at 72 Hours (11-24 @ 09:50)  Culture Results:   No growth at 72 Hours (11-24 @ 09:35)  Culture Results:   Moderate Enterococcus faecalis  Normal qi isolated (11-24 @ 08:35)      culture blood  -- .Tissue Other, RIGHT FOREFOOT BONE CULTURE 11-27 @ 18:05    culture urine  --  11-27 @ 18:05              Culture - Tissue with Gram Stain (collected 27 Nov 2023 18:05)  Source: .Tissue Other, RIGHT FOREFOOT BONE CULTURE  Gram Stain (28 Nov 2023 03:54):    Rare polymorphonuclear leukocytes seen per low power field    Few Gram positive cocci in pairs seen per oil power field    Culture - Blood (collected 24 Nov 2023 09:50)  Source: .Blood Blood-Peripheral  Preliminary Report (27 Nov 2023 17:02):    No growth at 72 Hours    Culture - Blood (collected 24 Nov 2023 09:35)  Source: .Blood Blood-Peripheral  Preliminary Report (27 Nov 2023 17:02):    No growth at 72 Hours    Culture - Other (collected 24 Nov 2023 08:35)  Source: .Other Right foot  Final Report (26 Nov 2023 15:34):    Moderate Enterococcus faecalis    Normal qi isolated  Organism: Enterococcus faecalis (26 Nov 2023 15:34)  Organism: Enterococcus faecalis (26 Nov 2023 15:34)         Anemia Panel:      Thyroid Panel:  Thyroid Stimulating Hormone, Serum: 1.20 uIU/mL (11-25-23 @ 08:10)                RADIOLOGY & ADDITIONAL TESTS:      HEALTH ISSUES - PROBLEM Dx:  Need for prophylactic measure    Type 2 diabetes mellitus    Diabetic ulcer of right foot  You came in for an ulcer on your right foot which was suspected to have infection up to the level of the bone. We did an MRI of the right foot and we saw X. You underwent surgery of R metatarsal bone to stop the infection. Please follow up with Dr. Stark 1 week after surgery.    CAD (coronary artery disease)  This is a chronic condition, please continue to take plavix and aspirin.    HTN (hypertension)  This is a chronic condition, please continue to take plavix and aspirin.    Hyperlipidemia    Diabetes            Consultant(s) Notes Reviewed:  [  ] YES     Care Discussed with [X] Consultants  [  ] Patient  [  ] Family [  ] HCP [  ]   [  ] Social Service  [  ] RN, [  ] Physical Therapy,[  ] Palliative care team  DVT PPX: [  ] Lovenox, [  ] S C Heparin, [  ] Coumadin, [  ] Xarelto, [  ] Eliquis, [  ] Pradaxa, [  ] IV Heparin drip, [  ] SCD [  ] Contraindication 2 to GI Bleed,[  ] Ambulation [  ] Contraindicated 2 to  bleed [  ] Contraindicated 2 to Brain Bleed  Advanced directive: [  ] None, [  ] DNR/DNI Patient is a 62y old  Male who presents with a chief complaint of R foot wound (27 Nov 2023 13:26)    HPI:  63 yo M with PMH HTN, HLD, Type II DM, s/p R hallux toe amputation 3 yrs  ago presents to the ED with R foot wound. Patient went to a private podiatrist 2.5 months ago to scrape off callus. The callus removal site did not heal well and started to develop into a wound. Podiatrist recommended patient see Dr. Stark for wound management. Per patient, would has been intermittently draining clear liquid and having foul odor. Last sunday, patient had episode of chills that resolved when he went to sleep. Last night, he also had chills and had difficulty balancing. Today he went to his regular scheduled appt with Dr. Stark who recommended he be admitted for IV ABx and surgery on Tuesday,  Denies fever chest pain, palpitations, SOB, cough, abdominal pain, nausea, vomiting, diarrhea, constipation, urinary frequency, urgency, or dysuria, headaches, changes in vision, dizziness, numbness, tingling. Denies recent travel, recent antibiotic use, or sick contacts.    ED Course:   Vitals: BP: 127/65, HR: 93, Temp: 99.2 , RR: 19, SpO2: 96 % on RA   Labs:  ESR: 77, WBC: 17.51, H/H: 12.6/38.4  CXR: No evidence of acute infiltrate  XRAY foot: IMPRESSION: Suspect pathologic fractures of the third and fourth metatarsal heads. 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 If osteomyelitis is clinically considered  despite conservative therapy,   and soft tissue / bone infection requires further assessment, follow-up   MRI recommended.  EKG:  NSR, 81 BPM  Received in the ED  Vanc x  1, Zosyn x  1, NS bolus  x 1     (24 Nov 2023 15:27)      INTERVAL HPI:  11/25/23: Patient seen and examined at bedside. Patient laying in bed comfortably and offers no complaints. Dressing on right foot appears clean, dry and intact. No significant overnight events. IV Zosyn, WBC Resolved   11/26/23: Pt seen and examined at bedside. Pt sitting up in bed comfortably. Has no complaints. Otherwise is anxious to have surgery as soon as possible. Eating, ambulating well. No infectious sx. On IV zosyn,   11/27/23: Pt seen and examined at bedside. Pt sitting up in bed comfortably. Has no complaints. Otherwise is anxious to have surgery as soon as possible. Awaiting MRI. FRANCISCA done. Saw cardio NP this AM and emphasized that she should call his cardiologist as early as possible. Eating, ambulating well. No infectious sx. On IV zosyn,  NPO for possible OR -Podiatry ,On IV Zosyn  11/28/23: Pt seen and examined at bedside. Pt sitting up comfortably in bed. s/p R transmetatarsal amputation with no complications. Pain 7/10 currently, was 11 last night. Pain regimen ordered. Walking to and from bathroom on heel. On IV zosyn. Eating diabetic diet without issues.     OVERNIGHT EVENTS: Was in severe foot pain, received tramadol 25mg x 1.     Home Medications:  atorvastatin 40 mg oral tablet: 1 tab(s) orally once a day (24 Nov 2023 15:22)  clopidogrel 75 mg oral tablet: 1 tab(s) orally once a day (24 Nov 2023 15:22)  gabapentin 300 mg oral capsule: 1 cap(s) orally 2 times a day (24 Nov 2023 15:20)  Jardiance 25 mg oral tablet: 1 tab(s) orally once a day (24 Nov 2023 15:20)  lisinopril 40 mg oral tablet: 1 tab(s) orally once a day (24 Nov 2023 15:19)  metFORMIN 1000 mg oral tablet: 1 tab(s) orally 2 times a day (24 Nov 2023 15:22)  Metoprolol Succinate ER 50 mg oral tablet, extended release: 1 tab(s) orally once a day (24 Nov 2023 15:18)  Trulicity Pen 1.5 mg/0.5 mL subcutaneous solution: 1.5 milligram(s) subcutaneously once a week (24 Nov 2023 15:23)      MEDICATIONS  (STANDING):  aspirin enteric coated 81 milliGRAM(s) Oral daily  atorvastatin 40 milliGRAM(s) Oral at bedtime  clopidogrel Tablet 75 milliGRAM(s) Oral daily  dextrose 5%. 1000 milliLiter(s) (100 mL/Hr) IV Continuous <Continuous>  dextrose 5%. 1000 milliLiter(s) (50 mL/Hr) IV Continuous <Continuous>  dextrose 5%. 1000 milliLiter(s) (50 mL/Hr) IV Continuous <Continuous>  dextrose 5%. 1000 milliLiter(s) (100 mL/Hr) IV Continuous <Continuous>  dextrose 50% Injectable 12.5 Gram(s) IV Push once  dextrose 50% Injectable 25 Gram(s) IV Push once  dextrose 50% Injectable 12.5 Gram(s) IV Push once  dextrose 50% Injectable 25 Gram(s) IV Push once  enoxaparin Injectable 40 milliGRAM(s) SubCutaneous once  gabapentin 300 milliGRAM(s) Oral two times a day  glucagon  Injectable 1 milliGRAM(s) IntraMuscular once  glucagon  Injectable 1 milliGRAM(s) IntraMuscular once  HYDROmorphone  Injectable 0.5 milliGRAM(s) IV Push once  insulin lispro (ADMELOG) corrective regimen sliding scale   SubCutaneous every 6 hours  lactobacillus acidophilus 1 Tablet(s) Oral three times a day with meals  lisinopril 40 milliGRAM(s) Oral daily  melatonin 3 milliGRAM(s) Oral at bedtime  metoprolol succinate ER 50 milliGRAM(s) Oral daily  piperacillin/tazobactam IVPB.. 3.375 Gram(s) IV Intermittent every 8 hours    MEDICATIONS  (PRN):  acetaminophen     Tablet .. 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 No nausea, No vomiting; No diarrhea, No constipation. [ x ] BM  GENITOURINARY: No dysuria, No frequency, No urgency, No hematuria, NO incontinence  NEUROLOGICAL: No headaches, No dizziness, No numbness, No tingling, No tremors, No weakness  EXT: [x] R foot pain  SKIN:  [ x ] No itching, burning, rashes, or lesions + + neuropathy b/l LE  MUSCULOSKELETAL: [x] R foot pain  PSYCHIATRIC: No depression,  No anxiety,  No mood swings ,No difficulty sleeping at night  PAIN SCALE: [ x ] None  [  ] Other-  ROS Unable to obtain due to - [  ] Dementia  [  ] Lethargy [  ] Drowsy [  ] Sedated [  ] non verbal  REST OF REVIEW Of SYSTEM - [ x ] Normal      Vital Signs Last 24 Hrs  T(C): 37.1 (28 Nov 2023 05:19), Max: 37.1 (28 Nov 2023 05:19)  T(F): 98.7 (28 Nov 2023 05:19), Max: 98.7 (28 Nov 2023 05:19)  HR: 87 (28 Nov 2023 05:19) (67 - 87)  BP: 115/60 (28 Nov 2023 05:19) (99/59 - 149/73)  BP(mean): --  RR: 18 (28 Nov 2023 05:19) (12 - 18)  SpO2: 96% (28 Nov 2023 05:19) (93% - 100%)    Parameters below as of 28 Nov 2023 05:19  Patient On (Oxygen Delivery Method): room air      Finger Stick      PHYSICAL EXAM:  GENERAL:  [ x ] NAD , [ x ] well appearing, [  ] Agitated, [  ] Drowsy,  [  ] Lethargy, [  ] confused   HEAD:  [ x ] Normal, [  ] Other  EYES:  [ x ] EOMI, [x  ] PERRLA, [ x ] conjunctiva and sclera clear normal, [  ] Other,  [  ] Pallor,[  ] Discharge  ENMT:  [ x ] Normal, [ x ] Moist mucous membranes, [ x ] Good dentition, [ x ] No Thrush  NECK:  [x  ] Supple, [ x ] No JVD, [  ] Normal thyroid, [  ] Lymphadenopathy [  ] Other  CHEST/LUNG:  [ x ] Clear to auscultation bilaterally, [  x] Breath Sounds equal B/L, [] poor effort  [x ] No rales, [ x ] No rhonchi  [x  ]  No wheezing,   HEART:  [x  ] Regular rate and rhythm, [  ] tachycardia, [  ] Bradycardia,  [  ] irregular  [x  ] No murmurs, No rubs, No gallops, [  ] PPM in place (Mfr:  )  ABDOMEN:  [ x ] Soft, [ x ] Nontender, [ x ] Nondistended, [ x ] No mass, [  ] Bowel sounds present, [  ] obese  NERVOUS SYSTEM:  [ x ] Alert & Oriented X3, [  ] Nonfocal  [  ] Confusion  [  ] Encephalopathic [  ] Sedated [  ] Unable to assess, [  ] Dementia [  ] Other-  EXTREMITIES: [ ] 2+ Peripheral Pulses, No clubbing, No cyanosis,  [  ] edema B/L lower EXT. [  ] PVD stasis skin changes B/L Lower EXT, [ x ] s/p R transmetatarsal amputation, R foot with clean dressing.   SKIN:  [  x] Skin warm and dry, [  ] Pressure Ulcers, [  ] ecchymosis, [  ] Skin Tears, [  ] Other    DIET: Diet, Consistent Carbohydrate w/Evening Snack:   DASH/TLC Sodium & Cholesterol Restricted (11-27-23 @ 18:49)      LABS:      Ca    8.9        27 Nov 2023 07:45      PT/INR - ( 27 Nov 2023 08:30 )   PT: 11.3 sec;   INR: 0.96 ratio         PTT - ( 27 Nov 2023 08:30 )  PTT:30.3 sec  Urinalysis Basic - ( 27 Nov 2023 07:45 )    Color: x / Appearance: x / SG: x / pH: x  Gluc: 184 mg/dL / Ketone: x  / Bili: x / Urobili: x   Blood: x / Protein: x / Nitrite: x   Leuk Esterase: x / RBC: x / WBC x   Sq Epi: x / Non Sq Epi: x / Bacteria: x        Culture Results:   No growth at 72 Hours (11-24 @ 09:50)  Culture Results:   No growth at 72 Hours (11-24 @ 09:35)  Culture Results:   Moderate Enterococcus faecalis  Normal qi isolated (11-24 @ 08:35)      culture blood  -- .Tissue Other, RIGHT FOREFOOT BONE CULTURE 11-27 @ 18:05    culture urine  --  11-27 @ 18:05              Culture - Tissue with Gram Stain (collected 27 Nov 2023 18:05)  Source: .Tissue Other, RIGHT FOREFOOT BONE CULTURE  Gram Stain (28 Nov 2023 03:54):    Rare polymorphonuclear leukocytes seen per low power field    Few Gram positive cocci in pairs seen per oil power field    Culture - Blood (collected 24 Nov 2023 09:50)  Source: .Blood Blood-Peripheral  Preliminary Report (27 Nov 2023 17:02):    No growth at 72 Hours    Culture - Blood (collected 24 Nov 2023 09:35)  Source: .Blood Blood-Peripheral  Preliminary Report (27 Nov 2023 17:02):    No growth at 72 Hours    Culture - Other (collected 24 Nov 2023 08:35)  Source: .Other Right foot  Final Report (26 Nov 2023 15:34):    Moderate Enterococcus faecalis    Normal qi isolated  Organism: Enterococcus faecalis (26 Nov 2023 15:34)  Organism: Enterococcus faecalis (26 Nov 2023 15:34)         Anemia Panel:      Thyroid Panel:  Thyroid Stimulating Hormone, Serum: 1.20 uIU/mL (11-25-23 @ 08:10)                RADIOLOGY & ADDITIONAL TESTS:      HEALTH ISSUES - PROBLEM Dx:  Need for prophylactic measure    Type 2 diabetes mellitus    Diabetic ulcer of right foot  You came in for an ulcer on your right foot which was suspected to have infection up to the level of the bone. We did an MRI of the right foot and we saw X. You underwent surgery of R metatarsal bone to stop the infection. Please follow up with Dr. Stark 1 week after surgery.    CAD (coronary artery disease)  This is a chronic condition, please continue to take plavix and aspirin.    HTN (hypertension)  This is a chronic condition, please continue to take plavix and aspirin.    Hyperlipidemia    Diabetes            Consultant(s) Notes Reviewed:  [  ] YES     Care Discussed with [X] Consultants  [  ] Patient  [  ] Family [  ] HCP [  ]   [  ] Social Service  [  ] RN, [  ] Physical Therapy,[  ] Palliative care team  DVT PPX: [  ] Lovenox, [  ] S C Heparin, [  ] Coumadin, [  ] Xarelto, [  ] Eliquis, [  ] Pradaxa, [  ] IV Heparin drip, [  ] SCD [  ] Contraindication 2 to GI Bleed,[  ] Ambulation [  ] Contraindicated 2 to  bleed [  ] Contraindicated 2 to Brain Bleed  Advanced directive: [  ] None, [  ] DNR/DNI Patient is a 62y old  Male who presents with a chief complaint of R foot wound (27 Nov 2023 13:26)    HPI:  63 yo M with PMH HTN, HLD, Type II DM, s/p R hallux toe amputation 3 yrs  ago presents to the ED with R foot wound. Patient went to a private podiatrist 2.5 months ago to scrape off callus. The callus removal site did not heal well and started to develop into a wound. Podiatrist recommended patient see Dr. Stark for wound management. Per patient, would has been intermittently draining clear liquid and having foul odor. Last sunday, patient had episode of chills that resolved when he went to sleep. Last night, he also had chills and had difficulty balancing. Today he went to his regular scheduled appt with Dr. Stark who recommended he be admitted for IV ABx and surgery on Tuesday,  Denies fever chest pain, palpitations, SOB, cough, abdominal pain, nausea, vomiting, diarrhea, constipation, urinary frequency, urgency, or dysuria, headaches, changes in vision, dizziness, numbness, tingling. Denies recent travel, recent antibiotic use, or sick contacts.    ED Course:   Vitals: BP: 127/65, HR: 93, Temp: 99.2 , RR: 19, SpO2: 96 % on RA   Labs:  ESR: 77, WBC: 17.51, H/H: 12.6/38.4  CXR: No evidence of acute infiltrate  XRAY foot: IMPRESSION: Suspect pathologic fractures of the third and fourth metatarsal heads. 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 If osteomyelitis is clinically considered  despite conservative therapy,   and soft tissue / bone infection requires further assessment, follow-up   MRI recommended.  EKG:  NSR, 81 BPM  Received in the ED  Vanc x  1, Zosyn x  1, NS bolus  x 1     (24 Nov 2023 15:27)      INTERVAL HPI:  11/25/23: Patient seen and examined at bedside. Patient laying in bed comfortably and offers no complaints. Dressing on right foot appears clean, dry and intact. No significant overnight events. IV Zosyn, WBC Resolved   11/26/23: Pt seen and examined at bedside. Pt sitting up in bed comfortably. Has no complaints. Otherwise is anxious to have surgery as soon as possible. Eating, ambulating well. No infectious sx. On IV zosyn,   11/27/23: Pt seen and examined at bedside. Pt sitting up in bed comfortably. Has no complaints. Otherwise is anxious to have surgery as soon as possible. Awaiting MRI. FRANCISCA done. Saw cardio NP this AM and emphasized that she should call his cardiologist as early as possible. Eating, ambulating well. No infectious sx. On IV zosyn,  NPO for possible OR -Podiatry ,On IV Zosyn  11/28/23:  POD # 1 Pt seen and examined at bedside. Pt sitting up comfortably in bed. s/p R transmetatarsal amputation with no complications. Pain 7/10 currently, was 11 last night. Pain regimen ordered. Walking to and from bathroom on heel. On IV zosyn. Eating diabetic diet without issues.     OVERNIGHT EVENTS: Was in severe foot pain, received tramadol 25mg x 1.     Home Medications:  atorvastatin 40 mg oral tablet: 1 tab(s) orally once a day (24 Nov 2023 15:22)  clopidogrel 75 mg oral tablet: 1 tab(s) orally once a day (24 Nov 2023 15:22)  gabapentin 300 mg oral capsule: 1 cap(s) orally 2 times a day (24 Nov 2023 15:20)  Jardiance 25 mg oral tablet: 1 tab(s) orally once a day (24 Nov 2023 15:20)  lisinopril 40 mg oral tablet: 1 tab(s) orally once a day (24 Nov 2023 15:19)  metFORMIN 1000 mg oral tablet: 1 tab(s) orally 2 times a day (24 Nov 2023 15:22)  Metoprolol Succinate ER 50 mg oral tablet, extended release: 1 tab(s) orally once a day (24 Nov 2023 15:18)  Trulicity Pen 1.5 mg/0.5 mL subcutaneous solution: 1.5 milligram(s) subcutaneously once a week (24 Nov 2023 15:23)      MEDICATIONS  (STANDING):  aspirin enteric coated 81 milliGRAM(s) Oral daily  atorvastatin 40 milliGRAM(s) Oral at bedtime  clopidogrel Tablet 75 milliGRAM(s) Oral daily  dextrose 5%. 1000 milliLiter(s) (100 mL/Hr) IV Continuous <Continuous>  dextrose 5%. 1000 milliLiter(s) (50 mL/Hr) IV Continuous <Continuous>  dextrose 5%. 1000 milliLiter(s) (50 mL/Hr) IV Continuous <Continuous>  dextrose 5%. 1000 milliLiter(s) (100 mL/Hr) IV Continuous <Continuous>  dextrose 50% Injectable 12.5 Gram(s) IV Push once  dextrose 50% Injectable 25 Gram(s) IV Push once  dextrose 50% Injectable 12.5 Gram(s) IV Push once  dextrose 50% Injectable 25 Gram(s) IV Push once  enoxaparin Injectable 40 milliGRAM(s) SubCutaneous once  gabapentin 300 milliGRAM(s) Oral two times a day  glucagon  Injectable 1 milliGRAM(s) IntraMuscular once  glucagon  Injectable 1 milliGRAM(s) IntraMuscular once  HYDROmorphone  Injectable 0.5 milliGRAM(s) IV Push once  insulin lispro (ADMELOG) corrective regimen sliding scale   SubCutaneous every 6 hours  lactobacillus acidophilus 1 Tablet(s) Oral three times a day with meals  lisinopril 40 milliGRAM(s) Oral daily  melatonin 3 milliGRAM(s) Oral at bedtime  metoprolol succinate ER 50 milliGRAM(s) Oral daily  piperacillin/tazobactam IVPB.. 3.375 Gram(s) IV Intermittent every 8 hours    MEDICATIONS  (PRN):  acetaminophen     Tablet .. 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 No nausea, No vomiting; No diarrhea, No constipation. [ x ] BM  GENITOURINARY: No dysuria, No frequency, No urgency, No hematuria, NO incontinence  NEUROLOGICAL: No headaches, No dizziness, No numbness, No tingling, No tremors, No weakness  EXT: [x] R foot pain  SKIN:  [ x ] No itching, burning, rashes, or lesions + + neuropathy b/l LE  MUSCULOSKELETAL: [x] R foot pain  PSYCHIATRIC: No depression,  No anxiety,  No mood swings ,No difficulty sleeping at night  PAIN SCALE: [ x ] None  [  ] Other-  ROS Unable to obtain due to - [  ] Dementia  [  ] Lethargy [  ] Drowsy [  ] Sedated [  ] non verbal  REST OF REVIEW Of SYSTEM - [ x ] Normal      Vital Signs Last 24 Hrs  T(C): 37.1 (28 Nov 2023 05:19), Max: 37.1 (28 Nov 2023 05:19)  T(F): 98.7 (28 Nov 2023 05:19), Max: 98.7 (28 Nov 2023 05:19)  HR: 87 (28 Nov 2023 05:19) (67 - 87)  BP: 115/60 (28 Nov 2023 05:19) (99/59 - 149/73)  BP(mean): --  RR: 18 (28 Nov 2023 05:19) (12 - 18)  SpO2: 96% (28 Nov 2023 05:19) (93% - 100%)    Parameters below as of 28 Nov 2023 05:19  Patient On (Oxygen Delivery Method): room air      Finger Stick      PHYSICAL EXAM:  GENERAL:  [ x ] NAD , [ x ] well appearing, [  ] Agitated, [  ] Drowsy,  [  ] Lethargy, [  ] confused   HEAD:  [ x ] Normal, [  ] Other  EYES:  [ x ] EOMI, [x  ] PERRLA, [ x ] conjunctiva and sclera clear normal, [  ] Other,  [  ] Pallor,[  ] Discharge  ENMT:  [ x ] Normal, [ x ] Moist mucous membranes, [ x ] Good dentition, [ x ] No Thrush  NECK:  [x  ] Supple, [ x ] No JVD, [x  ] Normal thyroid, [  ] Lymphadenopathy [  ] Other  CHEST/LUNG:  [ x ] Clear to auscultation bilaterally, [  x] Breath Sounds equal B/L, [] poor effort  [x ] No rales, [ x ] No rhonchi  [x  ]  No wheezing,   HEART:  [x  ] Regular rate and rhythm, [  ] tachycardia, [  ] Bradycardia,  [  ] irregular  [x  ] No murmurs, No rubs, No gallops, [  ] PPM in place (Mfr:  )  ABDOMEN:  [ x ] Soft, [ x ] Nontender, [ x ] Nondistended, [ x ] No mass, [  ] Bowel sounds present, [  ] obese  NERVOUS SYSTEM:  [ x ] Alert & Oriented X3, [  ] Nonfocal  [  ] Confusion  [  ] Encephalopathic [  ] Sedated [  ] Unable to assess, [  ] Dementia [  ] Other-  EXTREMITIES: [ ] 2+ Peripheral Pulses, No clubbing, No cyanosis,  [  ] edema B/L lower EXT. [  ] PVD stasis skin changes B/L Lower EXT, [ x ] s/p R transmetatarsal amputation, R foot with clean dressing.   SKIN:  [  x] Skin warm and dry, [  ] Pressure Ulcers, [  ] ecchymosis, [  ] Skin Tears, [  ] Other      DIET: Diet, Consistent Carbohydrate w/Evening Snack:   DASH/TLC Sodium & Cholesterol Restricted (11-27-23 @ 18:49)      LABS:                          11.7   13.35 )-----------( 367      ( 28 Nov 2023 09:15 )             36.4     137    |  102    |  17     ----------------------------<  176    4.3     |  28     |  0.92     Ca    8.2        28 Nov 2023 09:15  Phos  2.4       28 Nov 2023 09:15  Mg     2.1       28 Nov 2023 09:15    Ca    8.9        27 Nov 2023 07:45      PT/INR - ( 27 Nov 2023 08:30 )   PT: 11.3 sec;   INR: 0.96 ratio         PTT - ( 27 Nov 2023 08:30 )  PTT:30.3 sec  Urinalysis Basic - ( 27 Nov 2023 07:45 )    Color: x / Appearance: x / SG: x / pH: x  Gluc: 184 mg/dL / Ketone: x  / Bili: x / Urobili: x   Blood: x / Protein: x / Nitrite: x   Leuk Esterase: x / RBC: x / WBC x   Sq Epi: x / Non Sq Epi: x / Bacteria: x        Culture Results:   No growth at 72 Hours (11-24 @ 09:50)  Culture Results:   No growth at 72 Hours (11-24 @ 09:35)  Culture Results:   Moderate Enterococcus faecalis  Normal qi isolated (11-24 @ 08:35)      culture blood  -- .Tissue Other, RIGHT FOREFOOT BONE CULTURE 11-27 @ 18:05    culture urine  --  11-27 @ 18:05      Culture - Tissue with Gram Stain (collected 27 Nov 2023 18:05)  Source: .Tissue Other, RIGHT FOREFOOT BONE CULTURE  Gram Stain (28 Nov 2023 03:54):    Rare polymorphonuclear leukocytes seen per low power field    Few Gram positive cocci in pairs seen per oil power field    Culture - Blood (collected 24 Nov 2023 09:50)  Source: .Blood Blood-Peripheral  Preliminary Report (27 Nov 2023 17:02):    No growth at 72 Hours    Culture - Blood (collected 24 Nov 2023 09:35)  Source: .Blood Blood-Peripheral  Preliminary Report (27 Nov 2023 17:02):    No growth at 72 Hours    Culture - Other (collected 24 Nov 2023 08:35)  Source: .Other Right foot  Final Report (26 Nov 2023 15:34):    Moderate Enterococcus faecalis    Normal qi isolated  Organism: Enterococcus faecalis (26 Nov 2023 15:34)  Organism: Enterococcus faecalis (26 Nov 2023 15:34)         Anemia Panel:      Thyroid Panel:  Thyroid Stimulating Hormone, Serum: 1.20 uIU/mL (11-25-23 @ 08:10)      RADIOLOGY & ADDITIONAL TESTS:  < from: Xray Foot AP + Lateral + Oblique, Right (11.27.23 @ 18:58) >    INTERPRETATION:  Clinical history: 62-year-old male, transmetatarsal   amputation.    Three views of the right foot are compared to 11/24/2023.    FINDINGS: Transmetatarsal amputation, unremarkable postoperative views.    IMPRESSION:    Transmetatarsal amputation, unremarkable postoperative views    < end of copied text >      HEALTH ISSUES - PROBLEM Dx:  Need for prophylactic measure    Type 2 diabetes mellitus    Diabetic ulcer of right foot  You came in for an ulcer on your right foot which was suspected to have infection up to the level of the bone. We did an MRI of the right foot and we saw X. You underwent surgery of R metatarsal bone to stop the infection. Please follow up with Dr. Stark 1 week after surgery.    CAD (coronary artery disease)  This is a chronic condition, please continue to take plavix and aspirin.    HTN (hypertension)  This is a chronic condition, please continue to take plavix and aspirin.    Hyperlipidemia    Diabetes            Consultant(s) Notes Reviewed:  [ x ] YES     Care Discussed with [X] Consultants  [ x ] Patient  [x] Family [  ] HCP [  ]   [  ] Social Service  [x  ] RN, [  ] Physical Therapy,[  ] Palliative care team  DVT PPX: [ x ] Lovenox, [  ] S C Heparin, [  ] Coumadin, [  ] Xarelto, [  ] Eliquis, [  ] Pradaxa, [  ] IV Heparin drip, [  ] SCD [  ] Contraindication 2 to GI Bleed,[  ] Ambulation [  ] Contraindicated 2 to  bleed [  ] Contraindicated 2 to Brain Bleed  Advanced directive: [ x ] None, [  ] DNR/DNI Patient is a 62y old  Male who presents with a chief complaint of R foot wound (27 Nov 2023 13:26)    HPI:  61 yo M with PMH HTN, HLD, Type II DM, s/p R hallux toe amputation 3 yrs  ago presents to the ED with R foot wound. Patient went to a private podiatrist 2.5 months ago to scrape off callus. The callus removal site did not heal well and started to develop into a wound. Podiatrist recommended patient see Dr. Stark for wound management. Per patient, would has been intermittently draining clear liquid and having foul odor. Last sunday, patient had episode of chills that resolved when he went to sleep. Last night, he also had chills and had difficulty balancing. Today he went to his regular scheduled appt with Dr. Stark who recommended he be admitted for IV ABx and surgery on Tuesday,  Denies fever chest pain, palpitations, SOB, cough, abdominal pain, nausea, vomiting, diarrhea, constipation, urinary frequency, urgency, or dysuria, headaches, changes in vision, dizziness, numbness, tingling. Denies recent travel, recent antibiotic use, or sick contacts.    ED Course:   Vitals: BP: 127/65, HR: 93, Temp: 99.2 , RR: 19, SpO2: 96 % on RA   Labs:  ESR: 77, WBC: 17.51, H/H: 12.6/38.4  CXR: No evidence of acute infiltrate  XRAY foot: IMPRESSION: Suspect pathologic fractures of the third and fourth metatarsal heads. 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 If osteomyelitis is clinically considered  despite conservative therapy,   and soft tissue / bone infection requires further assessment, follow-up   MRI recommended.  EKG:  NSR, 81 BPM  Received in the ED  Vanc x  1, Zosyn x  1, NS bolus  x 1     (24 Nov 2023 15:27)      INTERVAL HPI:  11/25/23: Patient seen and examined at bedside. Patient laying in bed comfortably and offers no complaints. Dressing on right foot appears clean, dry and intact. No significant overnight events. IV Zosyn, WBC Resolved   11/26/23: Pt seen and examined at bedside. Pt sitting up in bed comfortably. Has no complaints. Otherwise is anxious to have surgery as soon as possible. Eating, ambulating well. No infectious sx. On IV zosyn,   11/27/23: Pt seen and examined at bedside. Pt sitting up in bed comfortably. Has no complaints. Otherwise is anxious to have surgery as soon as possible. Awaiting MRI. FRANCISCA done. Saw cardio NP this AM and emphasized that she should call his cardiologist as early as possible. Eating, ambulating well. No infectious sx. On IV zosyn,  NPO for possible OR -Podiatry ,On IV Zosyn  11/28/23:  POD # 1 Pt seen and examined at bedside. Pt sitting up comfortably in bed. s/p R transmetatarsal amputation with no complications. Pain 7/10 currently, was 11 last night. Pain regimen ordered. Walking to and from bathroom on heel. On IV zosyn. Eating diabetic diet without issues.     OVERNIGHT EVENTS: Was in severe foot pain, received tramadol 25mg x 1.     Home Medications:  atorvastatin 40 mg oral tablet: 1 tab(s) orally once a day (24 Nov 2023 15:22)  clopidogrel 75 mg oral tablet: 1 tab(s) orally once a day (24 Nov 2023 15:22)  gabapentin 300 mg oral capsule: 1 cap(s) orally 2 times a day (24 Nov 2023 15:20)  Jardiance 25 mg oral tablet: 1 tab(s) orally once a day (24 Nov 2023 15:20)  lisinopril 40 mg oral tablet: 1 tab(s) orally once a day (24 Nov 2023 15:19)  metFORMIN 1000 mg oral tablet: 1 tab(s) orally 2 times a day (24 Nov 2023 15:22)  Metoprolol Succinate ER 50 mg oral tablet, extended release: 1 tab(s) orally once a day (24 Nov 2023 15:18)  Trulicity Pen 1.5 mg/0.5 mL subcutaneous solution: 1.5 milligram(s) subcutaneously once a week (24 Nov 2023 15:23)      MEDICATIONS  (STANDING):  aspirin enteric coated 81 milliGRAM(s) Oral daily  atorvastatin 40 milliGRAM(s) Oral at bedtime  clopidogrel Tablet 75 milliGRAM(s) Oral daily  dextrose 5%. 1000 milliLiter(s) (100 mL/Hr) IV Continuous <Continuous>  dextrose 5%. 1000 milliLiter(s) (50 mL/Hr) IV Continuous <Continuous>  dextrose 5%. 1000 milliLiter(s) (50 mL/Hr) IV Continuous <Continuous>  dextrose 5%. 1000 milliLiter(s) (100 mL/Hr) IV Continuous <Continuous>  dextrose 50% Injectable 12.5 Gram(s) IV Push once  dextrose 50% Injectable 25 Gram(s) IV Push once  dextrose 50% Injectable 12.5 Gram(s) IV Push once  dextrose 50% Injectable 25 Gram(s) IV Push once  enoxaparin Injectable 40 milliGRAM(s) SubCutaneous once  gabapentin 300 milliGRAM(s) Oral two times a day  glucagon  Injectable 1 milliGRAM(s) IntraMuscular once  glucagon  Injectable 1 milliGRAM(s) IntraMuscular once  HYDROmorphone  Injectable 0.5 milliGRAM(s) IV Push once  insulin lispro (ADMELOG) corrective regimen sliding scale   SubCutaneous every 6 hours  lactobacillus acidophilus 1 Tablet(s) Oral three times a day with meals  lisinopril 40 milliGRAM(s) Oral daily  melatonin 3 milliGRAM(s) Oral at bedtime  metoprolol succinate ER 50 milliGRAM(s) Oral daily  piperacillin/tazobactam IVPB.. 3.375 Gram(s) IV Intermittent every 8 hours    MEDICATIONS  (PRN):  acetaminophen     Tablet .. 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 No nausea, No vomiting; No diarrhea, No constipation. [ x ] BM  GENITOURINARY: No dysuria, No frequency, No urgency, No hematuria, NO incontinence  NEUROLOGICAL: No headaches, No dizziness, No numbness, No tingling, No tremors, No weakness  EXT: [x] R foot pain  SKIN:  [ x ] No itching, burning, rashes, or lesions + + neuropathy b/l LE  MUSCULOSKELETAL: [x] R foot pain  PSYCHIATRIC: No depression,  No anxiety,  No mood swings ,No difficulty sleeping at night  PAIN SCALE: [ x ] None  [  ] Other-  ROS Unable to obtain due to - [  ] Dementia  [  ] Lethargy [  ] Drowsy [  ] Sedated [  ] non verbal  REST OF REVIEW Of SYSTEM - [ x ] Normal      Vital Signs Last 24 Hrs  T(C): 37.1 (28 Nov 2023 05:19), Max: 37.1 (28 Nov 2023 05:19)  T(F): 98.7 (28 Nov 2023 05:19), Max: 98.7 (28 Nov 2023 05:19)  HR: 87 (28 Nov 2023 05:19) (67 - 87)  BP: 115/60 (28 Nov 2023 05:19) (99/59 - 149/73)  BP(mean): --  RR: 18 (28 Nov 2023 05:19) (12 - 18)  SpO2: 96% (28 Nov 2023 05:19) (93% - 100%)    Parameters below as of 28 Nov 2023 05:19  Patient On (Oxygen Delivery Method): room air      Finger Stick      PHYSICAL EXAM:  GENERAL:  [ x ] NAD , [ x ] well appearing, [  ] Agitated, [  ] Drowsy,  [  ] Lethargy, [  ] confused   HEAD:  [ x ] Normal, [  ] Other  EYES:  [ x ] EOMI, [x  ] PERRLA, [ x ] conjunctiva and sclera clear normal, [  ] Other,  [  ] Pallor,[  ] Discharge  ENMT:  [ x ] Normal, [ x ] Moist mucous membranes, [ x ] Good dentition, [ x ] No Thrush  NECK:  [x  ] Supple, [ x ] No JVD, [x  ] Normal thyroid, [  ] Lymphadenopathy [  ] Other  CHEST/LUNG:  [ x ] Clear to auscultation bilaterally, [  x] Breath Sounds equal B/L, [] poor effort  [x ] No rales, [ x ] No rhonchi  [x  ]  No wheezing,   HEART:  [x  ] Regular rate and rhythm, [  ] tachycardia, [  ] Bradycardia,  [  ] irregular  [x  ] No murmurs, No rubs, No gallops, [  ] PPM in place (Mfr:  )  ABDOMEN:  [ x ] Soft, [ x ] Nontender, [ x ] Nondistended, [ x ] No mass, [  ] Bowel sounds present, [  ] obese  NERVOUS SYSTEM:  [ x ] Alert & Oriented X3, [  ] Nonfocal  [  ] Confusion  [  ] Encephalopathic [  ] Sedated [  ] Unable to assess, [  ] Dementia [  ] Other-  EXTREMITIES: [ ] 2+ Peripheral Pulses, No clubbing, No cyanosis,  [  ] edema B/L lower EXT. [  ] PVD stasis skin changes B/L Lower EXT, [ x ] s/p R transmetatarsal amputation, R foot with clean dressing.   SKIN:  [  x] Skin warm and dry, [  ] Pressure Ulcers, [  ] ecchymosis, [  ] Skin Tears, [  ] Other      DIET: Diet, Consistent Carbohydrate w/Evening Snack:   DASH/TLC Sodium & Cholesterol Restricted (11-27-23 @ 18:49)      LABS:                          11.7   13.35 )-----------( 367      ( 28 Nov 2023 09:15 )             36.4     137    |  102    |  17     ----------------------------<  176    4.3     |  28     |  0.92     Ca    8.2        28 Nov 2023 09:15  Phos  2.4       28 Nov 2023 09:15  Mg     2.1       28 Nov 2023 09:15    Ca    8.9        27 Nov 2023 07:45      PT/INR - ( 27 Nov 2023 08:30 )   PT: 11.3 sec;   INR: 0.96 ratio         PTT - ( 27 Nov 2023 08:30 )  PTT:30.3 sec  Urinalysis Basic - ( 27 Nov 2023 07:45 )    Color: x / Appearance: x / SG: x / pH: x  Gluc: 184 mg/dL / Ketone: x  / Bili: x / Urobili: x   Blood: x / Protein: x / Nitrite: x   Leuk Esterase: x / RBC: x / WBC x   Sq Epi: x / Non Sq Epi: x / Bacteria: x        Culture Results:   No growth at 72 Hours (11-24 @ 09:50)  Culture Results:   No growth at 72 Hours (11-24 @ 09:35)  Culture Results:   Moderate Enterococcus faecalis  Normal qi isolated (11-24 @ 08:35)      culture blood  -- .Tissue Other, RIGHT FOREFOOT BONE CULTURE 11-27 @ 18:05    culture urine  --  11-27 @ 18:05      Culture - Tissue with Gram Stain (collected 27 Nov 2023 18:05)  Source: .Tissue Other, RIGHT FOREFOOT BONE CULTURE  Gram Stain (28 Nov 2023 03:54):    Rare polymorphonuclear leukocytes seen per low power field    Few Gram positive cocci in pairs seen per oil power field    Culture - Blood (collected 24 Nov 2023 09:50)  Source: .Blood Blood-Peripheral  Preliminary Report (27 Nov 2023 17:02):    No growth at 72 Hours    Culture - Blood (collected 24 Nov 2023 09:35)  Source: .Blood Blood-Peripheral  Preliminary Report (27 Nov 2023 17:02):    No growth at 72 Hours    Culture - Other (collected 24 Nov 2023 08:35)  Source: .Other Right foot  Final Report (26 Nov 2023 15:34):    Moderate Enterococcus faecalis    Normal qi isolated  Organism: Enterococcus faecalis (26 Nov 2023 15:34)  Organism: Enterococcus faecalis (26 Nov 2023 15:34)         Anemia Panel:      Thyroid Panel:  Thyroid Stimulating Hormone, Serum: 1.20 uIU/mL (11-25-23 @ 08:10)      RADIOLOGY & ADDITIONAL TESTS:  < from: Xray Foot AP + Lateral + Oblique, Right (11.27.23 @ 18:58) >    INTERPRETATION:  Clinical history: 62-year-old male, transmetatarsal   amputation.    Three views of the right foot are compared to 11/24/2023.    FINDINGS: Transmetatarsal amputation, unremarkable postoperative views.    IMPRESSION:    Transmetatarsal amputation, unremarkable postoperative views    < end of copied text >      HEALTH ISSUES - PROBLEM Dx:  Need for prophylactic measure    Type 2 diabetes mellitus    Diabetic ulcer of right foot  You came in for an ulcer on your right foot which was suspected to have infection up to the level of the bone. We did an MRI of the right foot and we saw X. You underwent surgery of R metatarsal bone to stop the infection. Please follow up with Dr. Stark 1 week after surgery.    CAD (coronary artery disease)  This is a chronic condition, please continue to take plavix and aspirin.    HTN (hypertension)  This is a chronic condition, please continue to take plavix and aspirin.    Hyperlipidemia    Diabetes      Consultant(s) Notes Reviewed:  [ x ] YES     Care Discussed with [X] Consultants  [ x ] Patient  [x] Family [  ] HCP [  ]   [  ] Social Service  [x  ] RN, [  ] Physical Therapy,[  ] Palliative care team  DVT PPX: [ x ] Lovenox, [  ] S C Heparin, [  ] Coumadin, [  ] Xarelto, [  ] Eliquis, [  ] Pradaxa, [  ] IV Heparin drip, [  ] SCD [  ] Contraindication 2 to GI Bleed,[  ] Ambulation [  ] Contraindicated 2 to  bleed [  ] Contraindicated 2 to Brain Bleed  Advanced directive: [ x ] None, [  ] DNR/DNI

## 2023-11-28 NOTE — PROGRESS NOTE ADULT - ASSESSMENT
62M w/ PMhx of HTN, HLD, DM2 admitted from wound center for R foot infection.   Hx of R foot ulcer; pt also s/p R 1st toe and 1st metatarsal head resection.   Felt chills and pain to R foot this past Sunday.     R Foot Wound Infection  MRI noted--  1.  Osteomyelitis involving the second metatarsal and the second toe proximal phalanx with an accompanying abscess which extends along the   second metatarsal and proximal phalanx as well as along the plantar surface to the area of a soft tissue wound.  2.  Osteomyelitis involving the third and fourth metatarsal heads.  3.  Bone marrow edema with subtle lowT1 signal within the partially imaged navicular as well as within the intermediate and lateral   cuneiforms may reflect osseous stress reaction changes or periostitis versus less likely findings of early osteomyelitis.    WCx E. faecalis  BCx NGTD    S/p Transmetatarsal amputation 27-Nov-2023 18:38:56  Joe Stark  Lengthening, Achilles tendon 27-Nov-2023 18:39:03  Joe Stark.    Recommendations  C/w zosyn for now  F/u OR cx  Trend temps/WBC  Additional care per primary team    Infectious Diseases will continue to follow. Please call with any questions.   Melany Calhoun M.D.  [facility] Division of Infectious Diseases [phone]  For after 5 P.M. and weekends, please call [phone]

## 2023-11-28 NOTE — PHYSICAL THERAPY INITIAL EVALUATION ADULT - NSPTDMEREC_GEN_A_CORE
The patient has a mobility limitation that significantly impairs their ability to participatein one or more mobility-related activities of daily living in the home, and the patient is able to safely use a walker, the functional mobility deficit can be sufficiently resolved by use of a walker./rolling walker

## 2023-11-28 NOTE — PROGRESS NOTE ADULT - PROBLEM SELECTOR PLAN 1
Patient examined and evaluated.  Surgical site dressed with adaptic and dry, sterile dressing.  Patient is to be weight bearing to the right foot as tolerated in a surgical shoe.  Surgical pathology and cultures pending at this time.  Antibiotics as per ID recommendations.

## 2023-11-28 NOTE — PROGRESS NOTE ADULT - ASSESSMENT
Right foot diabetic ulcer down to bone  s/p right foot transmetatarsal amputation and achilles tendon lengthening (DOS: 11/27/23)  Right foot cellulitis

## 2023-11-28 NOTE — PROGRESS NOTE ADULT - SUBJECTIVE AND OBJECTIVE BOX
[facility] -- Vlad Gallardo Pannella, Patel, Savella, Goodger: Office # [phone]    Follow Up:  cardiac optimization     Subjective/Observations: Patient seen and examined. Patient alert awake, Denies chest pain palpitation dizziness, denies difficulty breathing , no orthopnea or PND noted. Tolerating room air     REVIEW OF SYSTEMS: All other review of systems are negative unless indicated above    PAST MEDICAL & SURGICAL HISTORY:  HTN (hypertension)      Diabetes      Hyperlipidemia      PVD (peripheral vascular disease)      H/O angioplasty  RLE      Status post amputation of toe          MEDICATIONS  (STANDING):  aspirin enteric coated 81 milliGRAM(s) Oral daily  atorvastatin 40 milliGRAM(s) Oral at bedtime  clopidogrel Tablet 75 milliGRAM(s) Oral daily  dextrose 5%. 1000 milliLiter(s) (100 mL/Hr) IV Continuous <Continuous>  dextrose 5%. 1000 milliLiter(s) (50 mL/Hr) IV Continuous <Continuous>  dextrose 5%. 1000 milliLiter(s) (100 mL/Hr) IV Continuous <Continuous>  dextrose 5%. 1000 milliLiter(s) (50 mL/Hr) IV Continuous <Continuous>  dextrose 50% Injectable 25 Gram(s) IV Push once  dextrose 50% Injectable 25 Gram(s) IV Push once  dextrose 50% Injectable 12.5 Gram(s) IV Push once  gabapentin 300 milliGRAM(s) Oral two times a day  glucagon  Injectable 1 milliGRAM(s) IntraMuscular once  glucagon  Injectable 1 milliGRAM(s) IntraMuscular once  insulin lispro (ADMELOG) corrective regimen sliding scale   SubCutaneous Before meals and at bedtime  lactobacillus acidophilus 1 Tablet(s) Oral three times a day with meals  lisinopril 40 milliGRAM(s) Oral daily  melatonin 3 milliGRAM(s) Oral at bedtime  metoprolol succinate ER 50 milliGRAM(s) Oral daily  piperacillin/tazobactam IVPB.. 3.375 Gram(s) IV Intermittent every 8 hours    MEDICATIONS  (PRN):  acetaminophen     Tablet .. 650 milliGRAM(s) Oral every 6 hours PRN Temp greater or equal to 38C (100.4F), Mild Pain (1 - 3)  dextrose Oral Gel 15 Gram(s) Oral once PRN Blood Glucose LESS THAN 70 milliGRAM(s)/deciliter  traMADol 50 milliGRAM(s) Oral every 6 hours PRN Severe Pain (7 - 10)  traMADol 25 milliGRAM(s) Oral every 6 hours PRN Moderate Pain (4 - 6)    Allergies    No Known Allergies    Intolerances      Vital Signs Last 24 Hrs  T(C): 36.8 (28 Nov 2023 11:41), Max: 37.1 (28 Nov 2023 05:19)  T(F): 98.2 (28 Nov 2023 11:41), Max: 98.7 (28 Nov 2023 05:19)  HR: 70 (28 Nov 2023 11:41) (67 - 87)  BP: 101/62 (28 Nov 2023 11:41) (99/59 - 149/73)  BP(mean): --  RR: 18 (28 Nov 2023 11:41) (12 - 18)  SpO2: 94% (28 Nov 2023 11:41) (93% - 100%)    Parameters below as of 28 Nov 2023 11:41  Patient On (Oxygen Delivery Method): room air      I&O's Summary    Weight (kg): 84.1 (11-27 @ 15:36)    TELE:   PHYSICAL EXAM:  Constitutional: NAD, awake and alert,   HEENT: Moist Mucous Membranes, Anicteric  Pulmonary: Non-labored, breath sounds are clear bilaterally, No wheezing, rales or rhonchi  Cardiovascular: Regular, S1 and S2, No murmurs, No rubs, gallops or clicks  Gastrointestinal:  soft, nontender, nondistended   Lymph: No peripheral edema. No lymphadenopathy.   Skin: No visible rashes or ulcers.  Psych:  Mood & affect appropriate      LABS: All Labs Reviewed:                        11.7   13.35 )-----------( 367      ( 28 Nov 2023 09:15 )             36.4                         12.2   11.54 )-----------( 358      ( 27 Nov 2023 07:45 )             37.8                         12.4   9.85  )-----------( 308      ( 26 Nov 2023 08:50 )             37.6     28 Nov 2023 09:15    137    |  102    |  17     ----------------------------<  176    4.3     |  28     |  0.92   27 Nov 2023 07:45    139    |  104    |  14     ----------------------------<  184    4.2     |  28     |  0.85   26 Nov 2023 08:50    139    |  105    |  18     ----------------------------<  182    4.8     |  28     |  0.92     Ca    8.2        28 Nov 2023 09:15  Ca    8.9        27 Nov 2023 07:45  Ca    8.8        26 Nov 2023 08:50  Phos  2.4       28 Nov 2023 09:15  Phos  3.2       27 Nov 2023 07:45  Phos  3.4       26 Nov 2023 08:50  Mg     2.1       28 Nov 2023 09:15  Mg     2.3       27 Nov 2023 07:45  Mg     2.3       26 Nov 2023 08:50    TPro  7.2    /  Alb  2.9    /  TBili  0.2    /  DBili  x      /  AST  13     /  ALT  22     /  AlkPhos  66     26 Nov 2023 08:50     PT/INR - ( 27 Nov 2023 08:30 )   PT: 11.3 sec;   INR: 0.96 ratio         PTT - ( 27 Nov 2023 08:30 )  PTT:30.3 secCholesterol: 125 mg/dL (11-25-23 @ 08:10)  HDL Cholesterol: 30 mg/dL (11-25-23 @ 08:10)  Triglycerides, Serum: 151 mg/dL (11-25-23 @ 08:10)    12 Lead ECG:   Ventricular Rate 85 BPM    Atrial Rate 85 BPM    P-R Interval 142 ms    QRS Duration 88 ms    Q-T Interval 380 ms    QTC Calculation(Bazett) 452 ms    P Axis 67 degrees    R Axis 54 degrees    T Axis 38 degrees    Diagnosis Line Normal sinus rhythm  Normal ECG  When compared with ECG of 16-DEC-2020 09:37,  No significant change was found  Confirmed by RAMÓN SHAFFER ([id number]) on 11/24/2023 9:34:36 PM (11-24-23 @ 10:03)

## 2023-11-28 NOTE — PROGRESS NOTE ADULT - SUBJECTIVE AND OBJECTIVE BOX
SUBJECTIVE:  62y year old Male seen at [facility] [id number] D1 s/p right foot transmetatarsal amputation with achilles tendon lengthening (DOS: 11/27/23). Patient relates no overnight events and states that they are doing well at this time. Patient relates that his pain is controlled at this time.Denies any fever, chills, nausea, vomiting, chest pain, shortness of breath, or calf pain at this time.    Allergies    No Known Allergies    Intolerances        MEDICATIONS  (STANDING):  aspirin enteric coated 81 milliGRAM(s) Oral daily  atorvastatin 40 milliGRAM(s) Oral at bedtime  clopidogrel Tablet 75 milliGRAM(s) Oral daily  dextrose 5%. 1000 milliLiter(s) (50 mL/Hr) IV Continuous <Continuous>  dextrose 5%. 1000 milliLiter(s) (100 mL/Hr) IV Continuous <Continuous>  dextrose 5%. 1000 milliLiter(s) (50 mL/Hr) IV Continuous <Continuous>  dextrose 5%. 1000 milliLiter(s) (100 mL/Hr) IV Continuous <Continuous>  dextrose 50% Injectable 25 Gram(s) IV Push once  dextrose 50% Injectable 25 Gram(s) IV Push once  dextrose 50% Injectable 12.5 Gram(s) IV Push once  gabapentin 300 milliGRAM(s) Oral two times a day  glucagon  Injectable 1 milliGRAM(s) IntraMuscular once  glucagon  Injectable 1 milliGRAM(s) IntraMuscular once  insulin lispro (ADMELOG) corrective regimen sliding scale   SubCutaneous Before meals and at bedtime  lactobacillus acidophilus 1 Tablet(s) Oral three times a day with meals  lisinopril 40 milliGRAM(s) Oral daily  melatonin 3 milliGRAM(s) Oral at bedtime  metoprolol succinate ER 50 milliGRAM(s) Oral daily  piperacillin/tazobactam IVPB.. 3.375 Gram(s) IV Intermittent every 8 hours    MEDICATIONS  (PRN):  acetaminophen     Tablet .. 650 milliGRAM(s) Oral every 6 hours PRN Temp greater or equal to 38C (100.4F), Mild Pain (1 - 3)  dextrose Oral Gel 15 Gram(s) Oral once PRN Blood Glucose LESS THAN 70 milliGRAM(s)/deciliter  traMADol 50 milliGRAM(s) Oral every 6 hours PRN Severe Pain (7 - 10)  traMADol 25 milliGRAM(s) Oral every 6 hours PRN Moderate Pain (4 - 6)      Vital Signs Last 24 Hrs  T(C): 36.8 (28 Nov 2023 20:16), Max: 37.1 (28 Nov 2023 05:19)  T(F): 98.3 (28 Nov 2023 20:16), Max: 98.7 (28 Nov 2023 05:19)  HR: 88 (28 Nov 2023 20:16) (70 - 88)  BP: 120/80 (28 Nov 2023 20:16) (101/62 - 120/80)  BP(mean): --  RR: 18 (28 Nov 2023 20:16) (18 - 18)  SpO2: 94% (28 Nov 2023 20:16) (94% - 96%)    Parameters below as of 28 Nov 2023 20:16  Patient On (Oxygen Delivery Method): room air        PHYSICAL EXAM:  Vascular: DP & PT palpable bilaterally, Capillary refill 3 seconds  Neurological: Light touch sensation not intact bilaterally  Musculoskeletal: 5/5 strength in all quadrants bilaterally, AJ & STJ ROM intact, s/p right transmetatarsal amputation with achilles tendon lengthening  Dermatological: Incision site of the right foot and posterior ankle noted with intact sutures, no dehiscence, mild efrain-incision erythema, no proximal streaking, no fluctuance, no malodor, no signs of infection at this time.                          11.7   13.35 )-----------( 367      ( 28 Nov 2023 09:15 )             36.4       11-28    137  |  102  |  17  ----------------------------<  176<H>  4.3   |  28  |  0.92    Ca    8.2<L>      28 Nov 2023 09:15  Phos  2.4     11-28  Mg     2.1     11-28        PT/INR - ( 27 Nov 2023 08:30 )   PT: 11.3 sec;   INR: 0.96 ratio         PTT - ( 27 Nov 2023 08:30 )  PTT:30.3 sec      Culture - Tissue with Gram Stain (collected 27 Nov 2023 18:05)  Source: .Tissue Other, RIGHT FOREFOOT BONE CULTURE  Gram Stain (28 Nov 2023 03:54):    Rare polymorphonuclear leukocytes seen per low power field    Few Gram positive cocci in pairs seen per oil power field        Imaging: s/p right foot transmetatarsal amputation

## 2023-11-28 NOTE — PROGRESS NOTE ADULT - PROBLEM SELECTOR PLAN 3
- chronic, c/w home medications  Toprol XL 50 mg daily with hold parameters  Lisinopril 40 mg daily with hold parameters

## 2023-11-28 NOTE — PROGRESS NOTE ADULT - PROBLEM SELECTOR PLAN 1
- Pt arrived elevated WBC, ESR, CRP, normal lactate,  -  XRAY foot: Suspect pathologic fractures of the third and fourth metatarsal heads. 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 If osteomyelitis is clinically considered  despite conservative therapy,   and soft tissue / bone infection requires further assessment, follow-up   - MRI rt foot showing OM in multiple metatarsals   - s/p R transmetatarsal amputation yesterday, cleared by cardio, no complications  - continue Zosyn IVPB q 8 hrs as per ID Dr Unique MICHAELS group   - post op care per podiatry  - Gabapentin for neuropathy, pain meds: Tylenol for mild pain, 25mg tramadol for moderate pain, 50mg tramadol for severe pain  - blood cx 2- negative  - R LE u/s negative for DVT   - R foot wound culture-final results: e. faecalis, sensitive to zosyn  - R foot tissue culture from OR showing: Rare PMNs , few gram positive cocci in pairs.   - FRANCISCA:  RLE: FRANCISCA 1.33, previously 1.14. The pulse waveforms are normal to near normal.. No significant pressure gradient.   LL: FRANCISCA 1.05, previously 1.13. TBI is 0.40 with digital pressures of 44 mmHg. The pulse waveforms are normal to near normal.. Stable pressure gradient across the calf suggestive of infrapopliteal stenosis.  - activity: partial WB on R foot, walk with walker  - Vascular following- signed off per FRANCISCA results   - ID following- Dr Lyn group-IV Zosyn  - Podiatry Dr. Stark managing, f/u reccs - Pt arrived elevated WBC, ESR, CRP, normal lactate,  -  XRAY foot: Suspect pathologic fractures of the third and fourth metatarsal heads. 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 faecalis, sensitive to zosyn  - R foot tissue culture from OR showing: Rare PMNs , few gram positive cocci in pairs.   - FRANCISCA:  RLE: FRANCISCA 1.33, previously 1.14. The pulse waveforms are normal to near normal.. No significant pressure gradient.   LL: FRANCISCA 1.05, previously 1.13. TBI is 0.40 with digital pressures of 44 mmHg. The pulse waveforms are normal to near normal.. Stable pressure gradient across the calf suggestive of infrapopliteal stenosis.  - activity: partial WB on R foot, walk with walker  - Vascular following- signed off per FRANCISCA results   - ID following- Dr Calhoun- group-IV Zosyn  - Podiatry Dr. Stark managing, f/u reccs OR

## 2023-11-28 NOTE — PROGRESS NOTE ADULT - PROBLEM SELECTOR PLAN 4
- unclear history (pt denies CAD) however had positive stress test recently and Cardio outpatient is scheduled for PCI (pt says no stent needed) on December 14  - currently on aspirin and Plavix  - Cardio cleared for surgery  - outpatient followup for + stress test per outpatient records

## 2023-11-28 NOTE — PROGRESS NOTE ADULT - SUBJECTIVE AND OBJECTIVE BOX
[facility], Division of Infectious Diseases  OFELIA Harvey Y. Patel, S. Shah, [facility]  [phone]    Name: LOIDA QUEZADA  Age: 62y  Gender: Male  MRN: [id number]    Interval History:  Patient seen and examined at bedside this morning  OR yesterday  S/p Transmetatarsal amputation 27-Nov-2023 18:38:56  Joe Stark  Lengthening, Achilles tendon 27-Nov-2023 18:39:03  Joe Stark.  Notes reviewed    Antibiotics:  piperacillin/tazobactam IVPB.. 3.375 Gram(s) IV Intermittent every 8 hours      Medications:  acetaminophen     Tablet .. 650 milliGRAM(s) Oral every 6 hours PRN  aspirin enteric coated 81 milliGRAM(s) Oral daily  atorvastatin 40 milliGRAM(s) Oral at bedtime  clopidogrel Tablet 75 milliGRAM(s) Oral daily  dextrose 5%. 1000 milliLiter(s) IV Continuous <Continuous>  dextrose 5%. 1000 milliLiter(s) IV Continuous <Continuous>  dextrose 5%. 1000 milliLiter(s) IV Continuous <Continuous>  dextrose 5%. 1000 milliLiter(s) IV Continuous <Continuous>  dextrose 50% Injectable 25 Gram(s) IV Push once  dextrose 50% Injectable 25 Gram(s) IV Push once  dextrose 50% Injectable 12.5 Gram(s) IV Push once  dextrose Oral Gel 15 Gram(s) Oral once PRN  gabapentin 300 milliGRAM(s) Oral two times a day  glucagon  Injectable 1 milliGRAM(s) IntraMuscular once  glucagon  Injectable 1 milliGRAM(s) IntraMuscular once  insulin lispro (ADMELOG) corrective regimen sliding scale   SubCutaneous Before meals and at bedtime  lactobacillus acidophilus 1 Tablet(s) Oral three times a day with meals  lisinopril 40 milliGRAM(s) Oral daily  melatonin 3 milliGRAM(s) Oral at bedtime  metoprolol succinate ER 50 milliGRAM(s) Oral daily  piperacillin/tazobactam IVPB.. 3.375 Gram(s) IV Intermittent every 8 hours  traMADol 50 milliGRAM(s) Oral every 6 hours PRN  traMADol 25 milliGRAM(s) Oral every 6 hours PRN      Review of Systems: unable to obtain    Allergies: No Known Allergies    For details regarding the patient's past medical history, social history, family history, and other miscellaneous elements, please refer the initial infectious diseases consultation and/or the admitting history and physical examination for this admission.    Objective:  Vitals:   T(C): 36.8 (11-28-23 @ 11:41), Max: 37.1 (11-28-23 @ 05:19)  HR: 70 (11-28-23 @ 11:41) (67 - 87)  BP: 101/62 (11-28-23 @ 11:41) (99/59 - 149/73)  RR: 18 (11-28-23 @ 11:41) (12 - 18)  SpO2: 94% (11-28-23 @ 11:41) (93% - 100%)    Physical Examination:  General: no acute distress  HEENT: NC/AT, EOMI,  Cardio: S1, S2 heard, RRR, no murmurs  Resp: decreased breath sounds  Abd: soft, NT, ND  Ext: no edema or cyanosis  Skin: warm, dry, no visible rash      Laboratory Studies:  CBC:                       11.7   13.35 )-----------( 367      ( 28 Nov 2023 09:15 )             36.4     CMP: 11-28    137  |  102  |  17  ----------------------------<  176<H>  4.3   |  28  |  0.92    Ca    8.2<L>      28 Nov 2023 09:15  Phos  2.4     11-28  Mg     2.1     11-28        Urinalysis Basic - ( 28 Nov 2023 09:15 )    Color: x / Appearance: x / SG: x / pH: x  Gluc: 176 mg/dL / Ketone: x  / Bili: x / Urobili: x   Blood: x / Protein: x / Nitrite: x   Leuk Esterase: x / RBC: x / WBC x   Sq Epi: x / Non Sq Epi: x / Bacteria: x        Microbiology: reviewed    Culture - Tissue with Gram Stain (collected 11-27-23 @ 18:05)  Source: .Tissue Other, RIGHT FOREFOOT BONE CULTURE  Gram Stain (11-28-23 @ 03:54):    Rare polymorphonuclear leukocytes seen per low power field    Few Gram positive cocci in pairs seen per oil power field    Culture - Blood (collected 11-24-23 @ 09:50)  Source: .Blood Blood-Peripheral  Preliminary Report (11-27-23 @ 17:02):    No growth at 72 Hours    Culture - Blood (collected 11-24-23 @ 09:35)  Source: .Blood Blood-Peripheral  Preliminary Report (11-27-23 @ 17:02):    No growth at 72 Hours    Culture - Other (collected 11-24-23 @ 08:35)  Source: .Other Right foot  Final Report (11-26-23 @ 15:34):    Moderate Enterococcus faecalis    Normal qi isolated  Organism: Enterococcus faecalis (11-26-23 @ 15:34)  Organism: Enterococcus faecalis (11-26-23 @ 15:34)      Method Type: GOLDEN      -  Ampicillin: S <=2 Predicts results to ampicillin/sulbactam, amoxacillin-clavulanate and  piperacillin-tazobactam.      -  Tetracycline: R >8      -  Vancomycin: S 2          Radiology: reviewed

## 2023-11-29 LAB
-  AMPICILLIN: SIGNIFICANT CHANGE UP
-  AMPICILLIN: SIGNIFICANT CHANGE UP
-  TETRACYCLINE: SIGNIFICANT CHANGE UP
-  TETRACYCLINE: SIGNIFICANT CHANGE UP
-  VANCOMYCIN: SIGNIFICANT CHANGE UP
-  VANCOMYCIN: SIGNIFICANT CHANGE UP
ANION GAP SERPL CALC-SCNC: 6 MMOL/L — SIGNIFICANT CHANGE UP (ref 5–17)
ANION GAP SERPL CALC-SCNC: 6 MMOL/L — SIGNIFICANT CHANGE UP (ref 5–17)
BASOPHILS # BLD AUTO: 0.05 K/UL — SIGNIFICANT CHANGE UP (ref 0–0.2)
BASOPHILS # BLD AUTO: 0.05 K/UL — SIGNIFICANT CHANGE UP (ref 0–0.2)
BASOPHILS NFR BLD AUTO: 0.4 % — SIGNIFICANT CHANGE UP (ref 0–2)
BASOPHILS NFR BLD AUTO: 0.4 % — SIGNIFICANT CHANGE UP (ref 0–2)
BUN SERPL-MCNC: 21 MG/DL — SIGNIFICANT CHANGE UP (ref 7–23)
BUN SERPL-MCNC: 21 MG/DL — SIGNIFICANT CHANGE UP (ref 7–23)
CALCIUM SERPL-MCNC: 8.3 MG/DL — LOW (ref 8.5–10.1)
CALCIUM SERPL-MCNC: 8.3 MG/DL — LOW (ref 8.5–10.1)
CHLORIDE SERPL-SCNC: 101 MMOL/L — SIGNIFICANT CHANGE UP (ref 96–108)
CHLORIDE SERPL-SCNC: 101 MMOL/L — SIGNIFICANT CHANGE UP (ref 96–108)
CO2 SERPL-SCNC: 28 MMOL/L — SIGNIFICANT CHANGE UP (ref 22–31)
CO2 SERPL-SCNC: 28 MMOL/L — SIGNIFICANT CHANGE UP (ref 22–31)
CREAT SERPL-MCNC: 1.1 MG/DL — SIGNIFICANT CHANGE UP (ref 0.5–1.3)
CREAT SERPL-MCNC: 1.1 MG/DL — SIGNIFICANT CHANGE UP (ref 0.5–1.3)
CULTURE RESULTS: NO GROWTH — SIGNIFICANT CHANGE UP
CULTURE RESULTS: NO GROWTH — SIGNIFICANT CHANGE UP
CULTURE RESULTS: SIGNIFICANT CHANGE UP
EGFR: 75 ML/MIN/1.73M2 — SIGNIFICANT CHANGE UP
EGFR: 75 ML/MIN/1.73M2 — SIGNIFICANT CHANGE UP
EOSINOPHIL # BLD AUTO: 0.25 K/UL — SIGNIFICANT CHANGE UP (ref 0–0.5)
EOSINOPHIL # BLD AUTO: 0.25 K/UL — SIGNIFICANT CHANGE UP (ref 0–0.5)
EOSINOPHIL NFR BLD AUTO: 2.2 % — SIGNIFICANT CHANGE UP (ref 0–6)
EOSINOPHIL NFR BLD AUTO: 2.2 % — SIGNIFICANT CHANGE UP (ref 0–6)
GLUCOSE BLDC GLUCOMTR-MCNC: 199 MG/DL — HIGH (ref 70–99)
GLUCOSE BLDC GLUCOMTR-MCNC: 199 MG/DL — HIGH (ref 70–99)
GLUCOSE BLDC GLUCOMTR-MCNC: 243 MG/DL — HIGH (ref 70–99)
GLUCOSE BLDC GLUCOMTR-MCNC: 243 MG/DL — HIGH (ref 70–99)
GLUCOSE BLDC GLUCOMTR-MCNC: 260 MG/DL — HIGH (ref 70–99)
GLUCOSE BLDC GLUCOMTR-MCNC: 260 MG/DL — HIGH (ref 70–99)
GLUCOSE BLDC GLUCOMTR-MCNC: 284 MG/DL — HIGH (ref 70–99)
GLUCOSE BLDC GLUCOMTR-MCNC: 284 MG/DL — HIGH (ref 70–99)
GLUCOSE SERPL-MCNC: 280 MG/DL — HIGH (ref 70–99)
GLUCOSE SERPL-MCNC: 280 MG/DL — HIGH (ref 70–99)
HCT VFR BLD CALC: 31.9 % — LOW (ref 39–50)
HCT VFR BLD CALC: 31.9 % — LOW (ref 39–50)
HGB BLD-MCNC: 10.4 G/DL — LOW (ref 13–17)
HGB BLD-MCNC: 10.4 G/DL — LOW (ref 13–17)
IMM GRANULOCYTES NFR BLD AUTO: 1.3 % — HIGH (ref 0–0.9)
IMM GRANULOCYTES NFR BLD AUTO: 1.3 % — HIGH (ref 0–0.9)
LYMPHOCYTES # BLD AUTO: 1.55 K/UL — SIGNIFICANT CHANGE UP (ref 1–3.3)
LYMPHOCYTES # BLD AUTO: 1.55 K/UL — SIGNIFICANT CHANGE UP (ref 1–3.3)
LYMPHOCYTES # BLD AUTO: 13.9 % — SIGNIFICANT CHANGE UP (ref 13–44)
LYMPHOCYTES # BLD AUTO: 13.9 % — SIGNIFICANT CHANGE UP (ref 13–44)
MAGNESIUM SERPL-MCNC: 2.2 MG/DL — SIGNIFICANT CHANGE UP (ref 1.6–2.6)
MAGNESIUM SERPL-MCNC: 2.2 MG/DL — SIGNIFICANT CHANGE UP (ref 1.6–2.6)
MCHC RBC-ENTMCNC: 30.1 PG — SIGNIFICANT CHANGE UP (ref 27–34)
MCHC RBC-ENTMCNC: 30.1 PG — SIGNIFICANT CHANGE UP (ref 27–34)
MCHC RBC-ENTMCNC: 32.6 GM/DL — SIGNIFICANT CHANGE UP (ref 32–36)
MCHC RBC-ENTMCNC: 32.6 GM/DL — SIGNIFICANT CHANGE UP (ref 32–36)
MCV RBC AUTO: 92.2 FL — SIGNIFICANT CHANGE UP (ref 80–100)
MCV RBC AUTO: 92.2 FL — SIGNIFICANT CHANGE UP (ref 80–100)
METHOD TYPE: SIGNIFICANT CHANGE UP
METHOD TYPE: SIGNIFICANT CHANGE UP
MONOCYTES # BLD AUTO: 1.07 K/UL — HIGH (ref 0–0.9)
MONOCYTES # BLD AUTO: 1.07 K/UL — HIGH (ref 0–0.9)
MONOCYTES NFR BLD AUTO: 9.6 % — SIGNIFICANT CHANGE UP (ref 2–14)
MONOCYTES NFR BLD AUTO: 9.6 % — SIGNIFICANT CHANGE UP (ref 2–14)
NEUTROPHILS # BLD AUTO: 8.12 K/UL — HIGH (ref 1.8–7.4)
NEUTROPHILS # BLD AUTO: 8.12 K/UL — HIGH (ref 1.8–7.4)
NEUTROPHILS NFR BLD AUTO: 72.6 % — SIGNIFICANT CHANGE UP (ref 43–77)
NEUTROPHILS NFR BLD AUTO: 72.6 % — SIGNIFICANT CHANGE UP (ref 43–77)
NRBC # BLD: 0 /100 WBCS — SIGNIFICANT CHANGE UP (ref 0–0)
NRBC # BLD: 0 /100 WBCS — SIGNIFICANT CHANGE UP (ref 0–0)
PHOSPHATE SERPL-MCNC: 2.5 MG/DL — SIGNIFICANT CHANGE UP (ref 2.5–4.5)
PHOSPHATE SERPL-MCNC: 2.5 MG/DL — SIGNIFICANT CHANGE UP (ref 2.5–4.5)
PLATELET # BLD AUTO: 326 K/UL — SIGNIFICANT CHANGE UP (ref 150–400)
PLATELET # BLD AUTO: 326 K/UL — SIGNIFICANT CHANGE UP (ref 150–400)
POTASSIUM SERPL-MCNC: 4.9 MMOL/L — SIGNIFICANT CHANGE UP (ref 3.5–5.3)
POTASSIUM SERPL-MCNC: 4.9 MMOL/L — SIGNIFICANT CHANGE UP (ref 3.5–5.3)
POTASSIUM SERPL-SCNC: 4.9 MMOL/L — SIGNIFICANT CHANGE UP (ref 3.5–5.3)
POTASSIUM SERPL-SCNC: 4.9 MMOL/L — SIGNIFICANT CHANGE UP (ref 3.5–5.3)
RBC # BLD: 3.46 M/UL — LOW (ref 4.2–5.8)
RBC # BLD: 3.46 M/UL — LOW (ref 4.2–5.8)
RBC # FLD: 12.7 % — SIGNIFICANT CHANGE UP (ref 10.3–14.5)
RBC # FLD: 12.7 % — SIGNIFICANT CHANGE UP (ref 10.3–14.5)
SODIUM SERPL-SCNC: 135 MMOL/L — SIGNIFICANT CHANGE UP (ref 135–145)
SODIUM SERPL-SCNC: 135 MMOL/L — SIGNIFICANT CHANGE UP (ref 135–145)
SPECIMEN SOURCE: SIGNIFICANT CHANGE UP
WBC # BLD: 11.18 K/UL — HIGH (ref 3.8–10.5)
WBC # BLD: 11.18 K/UL — HIGH (ref 3.8–10.5)
WBC # FLD AUTO: 11.18 K/UL — HIGH (ref 3.8–10.5)
WBC # FLD AUTO: 11.18 K/UL — HIGH (ref 3.8–10.5)

## 2023-11-29 PROCEDURE — 99232 SBSQ HOSP IP/OBS MODERATE 35: CPT

## 2023-11-29 RX ORDER — HYDROMORPHONE HYDROCHLORIDE 2 MG/ML
0.5 INJECTION INTRAMUSCULAR; INTRAVENOUS; SUBCUTANEOUS EVERY 6 HOURS
Refills: 0 | Status: DISCONTINUED | OUTPATIENT
Start: 2023-11-29 | End: 2023-12-02

## 2023-11-29 RX ORDER — POLYETHYLENE GLYCOL 3350 17 G/17G
17 POWDER, FOR SOLUTION ORAL
Refills: 0 | Status: DISCONTINUED | OUTPATIENT
Start: 2023-11-29 | End: 2023-12-02

## 2023-11-29 RX ORDER — ENOXAPARIN SODIUM 100 MG/ML
40 INJECTION SUBCUTANEOUS EVERY 24 HOURS
Refills: 0 | Status: DISCONTINUED | OUTPATIENT
Start: 2023-11-30 | End: 2023-12-01

## 2023-11-29 RX ADMIN — Medication 4: at 17:46

## 2023-11-29 RX ADMIN — Medication 1 TABLET(S): at 17:45

## 2023-11-29 RX ADMIN — GABAPENTIN 300 MILLIGRAM(S): 400 CAPSULE ORAL at 17:45

## 2023-11-29 RX ADMIN — PIPERACILLIN AND TAZOBACTAM 25 GRAM(S): 4; .5 INJECTION, POWDER, LYOPHILIZED, FOR SOLUTION INTRAVENOUS at 13:46

## 2023-11-29 RX ADMIN — Medication 6: at 21:36

## 2023-11-29 RX ADMIN — Medication 1 TABLET(S): at 08:51

## 2023-11-29 RX ADMIN — Medication 50 MILLIGRAM(S): at 05:36

## 2023-11-29 RX ADMIN — PIPERACILLIN AND TAZOBACTAM 25 GRAM(S): 4; .5 INJECTION, POWDER, LYOPHILIZED, FOR SOLUTION INTRAVENOUS at 21:36

## 2023-11-29 RX ADMIN — CLOPIDOGREL BISULFATE 75 MILLIGRAM(S): 75 TABLET, FILM COATED ORAL at 12:59

## 2023-11-29 RX ADMIN — TRAMADOL HYDROCHLORIDE 50 MILLIGRAM(S): 50 TABLET ORAL at 05:35

## 2023-11-29 RX ADMIN — LISINOPRIL 40 MILLIGRAM(S): 2.5 TABLET ORAL at 05:35

## 2023-11-29 RX ADMIN — GABAPENTIN 300 MILLIGRAM(S): 400 CAPSULE ORAL at 05:36

## 2023-11-29 RX ADMIN — Medication 3 MILLIGRAM(S): at 21:35

## 2023-11-29 RX ADMIN — Medication 81 MILLIGRAM(S): at 12:59

## 2023-11-29 RX ADMIN — Medication 6: at 12:58

## 2023-11-29 RX ADMIN — PIPERACILLIN AND TAZOBACTAM 25 GRAM(S): 4; .5 INJECTION, POWDER, LYOPHILIZED, FOR SOLUTION INTRAVENOUS at 05:39

## 2023-11-29 RX ADMIN — ATORVASTATIN CALCIUM 40 MILLIGRAM(S): 80 TABLET, FILM COATED ORAL at 21:36

## 2023-11-29 RX ADMIN — Medication 2: at 08:49

## 2023-11-29 RX ADMIN — TRAMADOL HYDROCHLORIDE 50 MILLIGRAM(S): 50 TABLET ORAL at 07:00

## 2023-11-29 RX ADMIN — Medication 1 TABLET(S): at 13:00

## 2023-11-29 NOTE — PROGRESS NOTE ADULT - ASSESSMENT
62M w/ PMhx of HTN, HLD, DM2 admitted from wound center for R foot infection.   Hx of R foot ulcer; pt also s/p R 1st toe and 1st metatarsal head resection.   Felt chills and pain to R foot this past Sunday.     R Foot Wound Infection  MRI noted--  1.  Osteomyelitis involving the second metatarsal and the second toe proximal phalanx with an accompanying abscess which extends along the   second metatarsal and proximal phalanx as well as along the plantar surface to the area of a soft tissue wound.  2.  Osteomyelitis involving the third and fourth metatarsal heads.  3.  Bone marrow edema with subtle lowT1 signal within the partially imaged navicular as well as within the intermediate and lateral   cuneiforms may reflect osseous stress reaction changes or periostitis versus less likely findings of early osteomyelitis.    WCx E. faecalis  BCx NGTD    S/p Transmetatarsal amputation 27-Nov-2023 18:38:56  Joe Stark  Lengthening, Achilles tendon 27-Nov-2023 18:39:03  Joe Stark.    Recommendations  C/w zosyn for now  F/u OR cx  F/u path  Trend temps/WBC  Additional care per primary team  Further recs to follow    Infectious Diseases will continue to follow. Please call with any questions.   Melany Calhoun M.D.  [facility] Division of Infectious Diseases [phone]  For after 5 P.M. and weekends, please call [phone]

## 2023-11-29 NOTE — PROGRESS NOTE ADULT - SUBJECTIVE AND OBJECTIVE BOX
[facility] -- Vlad Gallardo Pannella, Patel, Savella Goodger, Cohen  Office # [phone]      Follow Up:  cardiac optimization     Subjective/Observations:   No events overnight resting comfortably in bed.  No complaints of chest pain, dyspnea, or palpitations reported. No signs of orthopnea or PND.      REVIEW OF SYSTEMS: All other review of systems is negative unless indicated above    PAST MEDICAL & SURGICAL HISTORY:  HTN (hypertension)      Diabetes      Hyperlipidemia      PVD (peripheral vascular disease)      H/O angioplasty  RLE      Status post amputation of toe          MEDICATIONS  (STANDING):  aspirin enteric coated 81 milliGRAM(s) Oral daily  atorvastatin 40 milliGRAM(s) Oral at bedtime  clopidogrel Tablet 75 milliGRAM(s) Oral daily  dextrose 5%. 1000 milliLiter(s) (50 mL/Hr) IV Continuous <Continuous>  dextrose 5%. 1000 milliLiter(s) (100 mL/Hr) IV Continuous <Continuous>  dextrose 5%. 1000 milliLiter(s) (100 mL/Hr) IV Continuous <Continuous>  dextrose 5%. 1000 milliLiter(s) (50 mL/Hr) IV Continuous <Continuous>  dextrose 50% Injectable 25 Gram(s) IV Push once  dextrose 50% Injectable 25 Gram(s) IV Push once  dextrose 50% Injectable 12.5 Gram(s) IV Push once  gabapentin 300 milliGRAM(s) Oral two times a day  glucagon  Injectable 1 milliGRAM(s) IntraMuscular once  glucagon  Injectable 1 milliGRAM(s) IntraMuscular once  insulin lispro (ADMELOG) corrective regimen sliding scale   SubCutaneous Before meals and at bedtime  lactobacillus acidophilus 1 Tablet(s) Oral three times a day with meals  lisinopril 40 milliGRAM(s) Oral daily  melatonin 3 milliGRAM(s) Oral at bedtime  metoprolol succinate ER 50 milliGRAM(s) Oral daily  piperacillin/tazobactam IVPB.. 3.375 Gram(s) IV Intermittent every 8 hours    MEDICATIONS  (PRN):  acetaminophen     Tablet .. 650 milliGRAM(s) Oral every 6 hours PRN Temp greater or equal to 38C (100.4F), Mild Pain (1 - 3)  dextrose Oral Gel 15 Gram(s) Oral once PRN Blood Glucose LESS THAN 70 milliGRAM(s)/deciliter  traMADol 25 milliGRAM(s) Oral every 6 hours PRN Moderate Pain (4 - 6)  traMADol 50 milliGRAM(s) Oral every 6 hours PRN Severe Pain (7 - 10)      Allergies    No Known Allergies    Intolerances        Vital Signs Last 24 Hrs  T(C): 36.8 (2023 04:45), Max: 36.8 (2023 11:41)  T(F): 98.2 (2023 04:45), Max: 98.3 (2023 20:16)  HR: 73 (2023 04:45) (70 - 88)  BP: 126/67 (2023 04:45) (101/62 - 126/67)  BP(mean): --  RR: 18 (2023 04:45) (18 - 18)  SpO2: 97% (:45) (94% - 97%)    Parameters below as of 2023 04:45  Patient On (Oxygen Delivery Method): room air        I&O's Summary        PHYSICAL EXAM:  TELE:   Constitutional: NAD, awake and alert, well-developed  HEENT: Moist Mucous Membranes, Anicteric  Pulmonary: Non-labored, breath sounds are clear bilaterally, No wheezing, crackles or rhonchi  Cardiovascular: Regular, S1 and S2 nl, No murmurs, rubs, gallops or clicks  Gastrointestinal: Bowel Sounds present, soft, nontender.   Lymph: No lymphadenopathy. No peripheral edema.  Skin: No visible rashes or ulcers. foot dressing   Psych:  Mood & affect appropriate    LABS: All Labs Reviewed:                        11.7   13.35 )-----------( 367      ( 2023 09:15 )             36.4                         12.2   11.54 )-----------( 358      ( 2023 07:45 )             37.8                         12.4   9.85  )-----------( 308      ( 2023 08:50 )             37.6     2023 09:15    137    |  102    |  17     ----------------------------<  176    4.3     |  28     |  0.92   :45    139    |  104    |  14     ----------------------------<  184    4.2     |  28     |  0.85   2023 08:50    139    |  105    |  18     ----------------------------<  182    4.8     |  28     |  0.92     Ca    8.2        2023 09:15  Ca    8.9        2023 07:45  Ca    8.8        2023 08:50  Phos  2.4       2023 09:15  Phos  3.2       2023 07:45  Phos  3.4       2023 08:50  Mg     2.1       2023 09:15  Mg     2.3       2023 07:45  Mg     2.3       2023 08:50    TPro  7.2    /  Alb  2.9    /  TBili  0.2    /  DBili  x      /  AST  13     /  ALT  22     /  AlkPhos  66     2023 08:50    PT/INR - ( 2023 08:30 )   PT: 11.3 sec;   INR: 0.96 ratio         PTT - ( 2023 08:30 )  PTT:30.3 sec         EC Lead ECG:   Ventricular Rate 85 BPM    Atrial Rate 85 BPM    P-R Interval 142 ms    QRS Duration 88 ms    Q-T Interval 380 ms    QTC Calculation(Bazett) 452 ms    P Axis 67 degrees    R Axis 54 degrees    T Axis 38 degrees    Diagnosis Line Normal sinus rhythm  Normal ECG  When compared with ECG of 16-DEC-2020 09:37,  No significant change was found  Confirmed by RAMÓN SHAFFER ([id number]) on 2023 9:34:36 PM (23 @ 10:03)        Radiology:         [facility] -- Vlad Gallardo Pannella, Patel, Savella Goodger, Cohen  Office # [phone]      Follow Up:  cardiac optimization     Subjective/Observations:     No events overnight resting comfortably in bed.  No complaints of chest pain, dyspnea, or palpitations reported. No signs of orthopnea or PND.      REVIEW OF SYSTEMS: All other review of systems is negative unless indicated above    PAST MEDICAL & SURGICAL HISTORY:  HTN (hypertension)      Diabetes      Hyperlipidemia      PVD (peripheral vascular disease)      H/O angioplasty  RLE      Status post amputation of toe          MEDICATIONS  (STANDING):  aspirin enteric coated 81 milliGRAM(s) Oral daily  atorvastatin 40 milliGRAM(s) Oral at bedtime  clopidogrel Tablet 75 milliGRAM(s) Oral daily  dextrose 5%. 1000 milliLiter(s) (50 mL/Hr) IV Continuous <Continuous>  dextrose 5%. 1000 milliLiter(s) (100 mL/Hr) IV Continuous <Continuous>  dextrose 5%. 1000 milliLiter(s) (100 mL/Hr) IV Continuous <Continuous>  dextrose 5%. 1000 milliLiter(s) (50 mL/Hr) IV Continuous <Continuous>  dextrose 50% Injectable 25 Gram(s) IV Push once  dextrose 50% Injectable 25 Gram(s) IV Push once  dextrose 50% Injectable 12.5 Gram(s) IV Push once  gabapentin 300 milliGRAM(s) Oral two times a day  glucagon  Injectable 1 milliGRAM(s) IntraMuscular once  glucagon  Injectable 1 milliGRAM(s) IntraMuscular once  insulin lispro (ADMELOG) corrective regimen sliding scale   SubCutaneous Before meals and at bedtime  lactobacillus acidophilus 1 Tablet(s) Oral three times a day with meals  lisinopril 40 milliGRAM(s) Oral daily  melatonin 3 milliGRAM(s) Oral at bedtime  metoprolol succinate ER 50 milliGRAM(s) Oral daily  piperacillin/tazobactam IVPB.. 3.375 Gram(s) IV Intermittent every 8 hours    MEDICATIONS  (PRN):  acetaminophen     Tablet .. 650 milliGRAM(s) Oral every 6 hours PRN Temp greater or equal to 38C (100.4F), Mild Pain (1 - 3)  dextrose Oral Gel 15 Gram(s) Oral once PRN Blood Glucose LESS THAN 70 milliGRAM(s)/deciliter  traMADol 25 milliGRAM(s) Oral every 6 hours PRN Moderate Pain (4 - 6)  traMADol 50 milliGRAM(s) Oral every 6 hours PRN Severe Pain (7 - 10)      Allergies    No Known Allergies    Intolerances        Vital Signs Last 24 Hrs  T(C): 36.8 (2023 04:45), Max: 36.8 (2023 11:41)  T(F): 98.2 (2023 04:45), Max: 98.3 (2023 20:16)  HR: 73 (2023 04:45) (70 - 88)  BP: 126/67 (2023 04:45) (101/62 - 126/67)  BP(mean): --  RR: 18 (2023 04:45) (18 - 18)  SpO2: 97% (:45) (94% - 97%)    Parameters below as of 2023 04:45  Patient On (Oxygen Delivery Method): room air        I&O's Summary        PHYSICAL EXAM:  TELE: not on tele   Constitutional: NAD, awake and alert, well-developed  HEENT: Moist Mucous Membranes, Anicteric  Pulmonary: Non-labored, breath sounds are clear bilaterally, No wheezing, crackles or rhonchi  Cardiovascular: Regular, S1 and S2 nl, No murmurs, rubs, gallops or clicks  Gastrointestinal: Bowel Sounds present, soft, nontender.   Lymph: No lymphadenopathy. No peripheral edema.  Skin: No visible rashes or ulcers. foot dressing   Psych:  Mood & affect appropriate    LABS: All Labs Reviewed:                        11.7   13.35 )-----------( 367      ( 2023 09:15 )             36.4                         12.2   11.54 )-----------( 358      ( 2023 07:45 )             37.8                         12.4   9.85  )-----------( 308      ( 2023 08:50 )             37.6     2023 09:15    137    |  102    |  17     ----------------------------<  176    4.3     |  28     |  0.92   :45    139    |  104    |  14     ----------------------------<  184    4.2     |  28     |  0.85   2023 08:50    139    |  105    |  18     ----------------------------<  182    4.8     |  28     |  0.92     Ca    8.2        2023 09:15  Ca    8.9        2023 07:45  Ca    8.8        2023 08:50  Phos  2.4       2023 09:15  Phos  3.2       2023 07:45  Phos  3.4       2023 08:50  Mg     2.1       2023 09:15  Mg     2.3       2023 07:45  Mg     2.3       2023 08:50    TPro  7.2    /  Alb  2.9    /  TBili  0.2    /  DBili  x      /  AST  13     /  ALT  22     /  AlkPhos  66     2023 08:50    PT/INR - ( 2023 08:30 )   PT: 11.3 sec;   INR: 0.96 ratio         PTT - ( 2023 08:30 )  PTT:30.3 sec         EC Lead ECG:   Ventricular Rate 85 BPM    Atrial Rate 85 BPM    P-R Interval 142 ms    QRS Duration 88 ms    Q-T Interval 380 ms    QTC Calculation(Bazett) 452 ms    P Axis 67 degrees    R Axis 54 degrees    T Axis 38 degrees    Diagnosis Line Normal sinus rhythm  Normal ECG  When compared with ECG of 16-DEC-2020 09:37,  No significant change was found  Confirmed by RAMÓN SHAFFER ([id number]) on 2023 9:34:36 PM (23 @ 10:03)        Radiology:         [facility] -- Vlad Gallardo Pannella, Patel, Savella Goodger, Cohen  Office # [phone]      Follow Up:  cardiac optimization, HTN     Subjective/Observations:     No events overnight resting comfortably in bed.  No complaints of chest pain, dyspnea, or palpitations reported. No signs of orthopnea or PND.      REVIEW OF SYSTEMS: All other review of systems is negative unless indicated above    PAST MEDICAL & SURGICAL HISTORY:  HTN (hypertension)      Diabetes      Hyperlipidemia      PVD (peripheral vascular disease)      H/O angioplasty  RLE      Status post amputation of toe          MEDICATIONS  (STANDING):  aspirin enteric coated 81 milliGRAM(s) Oral daily  atorvastatin 40 milliGRAM(s) Oral at bedtime  clopidogrel Tablet 75 milliGRAM(s) Oral daily  dextrose 5%. 1000 milliLiter(s) (50 mL/Hr) IV Continuous <Continuous>  dextrose 5%. 1000 milliLiter(s) (100 mL/Hr) IV Continuous <Continuous>  dextrose 5%. 1000 milliLiter(s) (100 mL/Hr) IV Continuous <Continuous>  dextrose 5%. 1000 milliLiter(s) (50 mL/Hr) IV Continuous <Continuous>  dextrose 50% Injectable 25 Gram(s) IV Push once  dextrose 50% Injectable 25 Gram(s) IV Push once  dextrose 50% Injectable 12.5 Gram(s) IV Push once  gabapentin 300 milliGRAM(s) Oral two times a day  glucagon  Injectable 1 milliGRAM(s) IntraMuscular once  glucagon  Injectable 1 milliGRAM(s) IntraMuscular once  insulin lispro (ADMELOG) corrective regimen sliding scale   SubCutaneous Before meals and at bedtime  lactobacillus acidophilus 1 Tablet(s) Oral three times a day with meals  lisinopril 40 milliGRAM(s) Oral daily  melatonin 3 milliGRAM(s) Oral at bedtime  metoprolol succinate ER 50 milliGRAM(s) Oral daily  piperacillin/tazobactam IVPB.. 3.375 Gram(s) IV Intermittent every 8 hours    MEDICATIONS  (PRN):  acetaminophen     Tablet .. 650 milliGRAM(s) Oral every 6 hours PRN Temp greater or equal to 38C (100.4F), Mild Pain (1 - 3)  dextrose Oral Gel 15 Gram(s) Oral once PRN Blood Glucose LESS THAN 70 milliGRAM(s)/deciliter  traMADol 25 milliGRAM(s) Oral every 6 hours PRN Moderate Pain (4 - 6)  traMADol 50 milliGRAM(s) Oral every 6 hours PRN Severe Pain (7 - 10)      Allergies    No Known Allergies    Intolerances        Vital Signs Last 24 Hrs  T(C): 36.8 (2023 04:45), Max: 36.8 (2023 11:41)  T(F): 98.2 (2023 04:45), Max: 98.3 (2023 20:16)  HR: 73 (2023 04:45) (70 - 88)  BP: 126/67 (2023 04:45) (101/62 - 126/67)  BP(mean): --  RR: 18 (2023 04:45) (18 - 18)  SpO2: 97% (2023 04:45) (94% - 97%)    Parameters below as of 2023 04:45  Patient On (Oxygen Delivery Method): room air        I&O's Summary        PHYSICAL EXAM:  TELE: not on tele   Constitutional: NAD, awake and alert, well-developed  HEENT: Moist Mucous Membranes, Anicteric  Pulmonary: Non-labored, breath sounds are clear bilaterally, No wheezing, crackles or rhonchi  Cardiovascular: Regular, S1 and S2 nl, No murmurs, rubs, gallops or clicks  Gastrointestinal: Bowel Sounds present, soft, nontender.   Lymph: No lymphadenopathy. No peripheral edema.  Skin: No visible rashes or ulcers. foot dressing   Psych:  Mood & affect appropriate    LABS: All Labs Reviewed:                        11.7   13.35 )-----------( 367      ( 2023 09:15 )             36.4                         12.2   11.54 )-----------( 358      ( 2023 07:45 )             37.8                         12.4   9.85  )-----------( 308      ( 2023 08:50 )             37.6     2023 09:15    137    |  102    |  17     ----------------------------<  176    4.3     |  28     |  0.92   2023 07:45    139    |  104    |  14     ----------------------------<  184    4.2     |  28     |  0.85   2023 08:50    139    |  105    |  18     ----------------------------<  182    4.8     |  28     |  0.92     Ca    8.2        2023 09:15  Ca    8.9        2023 07:45  Ca    8.8        2023 08:50  Phos  2.4       2023 09:15  Phos  3.2       2023 07:45  Phos  3.4       2023 08:50  Mg     2.1       2023 09:15  Mg     2.3       2023 07:45  Mg     2.3       2023 08:50    TPro  7.2    /  Alb  2.9    /  TBili  0.2    /  DBili  x      /  AST  13     /  ALT  22     /  AlkPhos  66     2023 08:50    PT/INR - ( 2023 08:30 )   PT: 11.3 sec;   INR: 0.96 ratio         PTT - ( 2023 08:30 )  PTT:30.3 sec         EC Lead ECG:   Ventricular Rate 85 BPM    Atrial Rate 85 BPM    P-R Interval 142 ms    QRS Duration 88 ms    Q-T Interval 380 ms    QTC Calculation(Bazett) 452 ms    P Axis 67 degrees    R Axis 54 degrees    T Axis 38 degrees    Diagnosis Line Normal sinus rhythm  Normal ECG  When compared with ECG of 16-DEC-2020 09:37,  No significant change was found  Confirmed by RAMÓN SHAFFER ([id number]) on 2023 9:34:36 PM (23 @ 10:03)        Radiology:

## 2023-11-29 NOTE — PROGRESS NOTE ADULT - PROBLEM SELECTOR PLAN 2
- HgbA1c 8.2  - Hold home medications  - Low dose insulin corrective scale  - Hypoglycemia protocol, Accu-Chek   AC&HS  - Diet regular food with DASH/TLC  - Nutritional eval-complete  Diabetic NP eval

## 2023-11-29 NOTE — PROGRESS NOTE ADULT - SUBJECTIVE AND OBJECTIVE BOX
[facility], Division of Infectious Diseases  OFELIA Harvey Y. Patel, S. Shah, G. [facility]  [phone]    Name: LOIDA QUEZADA  Age: 62y  Gender: Male  MRN: [id number]    Interval History:  Patient seen and examined at bedside this morning  No acute overnight events. Afebrile  No complaints  Notes reviewed    Antibiotics:  piperacillin/tazobactam IVPB.. 3.375 Gram(s) IV Intermittent every 8 hours      Medications:  acetaminophen     Tablet .. 650 milliGRAM(s) Oral every 6 hours PRN  aspirin enteric coated 81 milliGRAM(s) Oral daily  atorvastatin 40 milliGRAM(s) Oral at bedtime  clopidogrel Tablet 75 milliGRAM(s) Oral daily  dextrose 5%. 1000 milliLiter(s) IV Continuous <Continuous>  dextrose 5%. 1000 milliLiter(s) IV Continuous <Continuous>  dextrose 5%. 1000 milliLiter(s) IV Continuous <Continuous>  dextrose 5%. 1000 milliLiter(s) IV Continuous <Continuous>  dextrose 50% Injectable 25 Gram(s) IV Push once  dextrose 50% Injectable 25 Gram(s) IV Push once  dextrose 50% Injectable 12.5 Gram(s) IV Push once  dextrose Oral Gel 15 Gram(s) Oral once PRN  gabapentin 300 milliGRAM(s) Oral two times a day  glucagon  Injectable 1 milliGRAM(s) IntraMuscular once  glucagon  Injectable 1 milliGRAM(s) IntraMuscular once  insulin lispro (ADMELOG) corrective regimen sliding scale   SubCutaneous Before meals and at bedtime  lactobacillus acidophilus 1 Tablet(s) Oral three times a day with meals  lisinopril 40 milliGRAM(s) Oral daily  melatonin 3 milliGRAM(s) Oral at bedtime  metoprolol succinate ER 50 milliGRAM(s) Oral daily  piperacillin/tazobactam IVPB.. 3.375 Gram(s) IV Intermittent every 8 hours  traMADol 50 milliGRAM(s) Oral every 6 hours PRN  traMADol 25 milliGRAM(s) Oral every 6 hours PRN      Review of Systems:  Review of systems otherwise negative except as previously noted.    Allergies: No Known Allergies    For details regarding the patient's past medical history, social history, family history, and other miscellaneous elements, please refer the initial infectious diseases consultation and/or the admitting history and physical examination for this admission.    Objective:  Vitals:   T(C): 36.8 (11-29-23 @ 04:45), Max: 36.8 (11-28-23 @ 20:16)  HR: 73 (11-29-23 @ 04:45) (73 - 88)  BP: 126/67 (11-29-23 @ 04:45) (120/80 - 126/67)  RR: 18 (11-29-23 @ 04:45) (18 - 18)  SpO2: 97% (11-29-23 @ 04:45) (94% - 97%)    Physical Examination:  General: no acute distress  HEENT: NC/AT, EOMI,  Cardio: RRR  Resp: symmetric chest rise  Abd: soft  Ext: no edema or cyanosis  Skin: warm, dry, no visible rash      Laboratory Studies:  CBC:                       10.4   11.18 )-----------( 326      ( 29 Nov 2023 10:46 )             31.9     CMP: 11-29    135  |  101  |  21  ----------------------------<  280<H>  4.9   |  28  |  1.10    Ca    8.3<L>      29 Nov 2023 10:46  Phos  2.5     11-29  Mg     2.2     11-29        Urinalysis Basic - ( 29 Nov 2023 10:46 )    Color: x / Appearance: x / SG: x / pH: x  Gluc: 280 mg/dL / Ketone: x  / Bili: x / Urobili: x   Blood: x / Protein: x / Nitrite: x   Leuk Esterase: x / RBC: x / WBC x   Sq Epi: x / Non Sq Epi: x / Bacteria: x        Microbiology: reviewed    Culture - Urine (collected 11-28-23 @ 01:31)  Source: Clean Catch Clean Catch (Midstream)  Final Report (11-29-23 @ 07:36):    No growth    Culture - Tissue with Gram Stain (collected 11-27-23 @ 18:05)  Source: .Tissue Other, RIGHT FOREFOOT BONE CULTURE  Gram Stain (11-28-23 @ 03:54):    Rare polymorphonuclear leukocytes seen per low power field    Few Gram positive cocci in pairs seen per oil power field    Culture - Blood (collected 11-24-23 @ 09:50)  Source: .Blood Blood-Peripheral  Preliminary Report (11-28-23 @ 17:01):    No growth at 4 days    Culture - Blood (collected 11-24-23 @ 09:35)  Source: .Blood Blood-Peripheral  Preliminary Report (11-28-23 @ 17:01):    No growth at 4 days    Culture - Other (collected 11-24-23 @ 08:35)  Source: .Other Right foot  Final Report (11-26-23 @ 15:34):    Moderate Enterococcus faecalis    Normal qi isolated  Organism: Enterococcus faecalis (11-26-23 @ 15:34)  Organism: Enterococcus faecalis (11-26-23 @ 15:34)      -  Vancomycin: S 2      -  Ampicillin: S <=2 Predicts results to ampicillin/sulbactam, amoxacillin-clavulanate and  piperacillin-tazobactam.      -  Tetracycline: R >8      Method Type: GOLDEN          Radiology: reviewed

## 2023-11-29 NOTE — PROGRESS NOTE ADULT - PROBLEM SELECTOR PLAN 1
- Pt arrived elevated WBC, ESR, CRP, normal lactate,  -  XRAY foot: Suspect pathologic fractures of the third and fourth metatarsal heads. 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 faecalis, sensitive to zosyn  - R foot tissue culture from OR showing: Rare PMNs , few gram positive cocci in pairs. Awaiting surgical pathology and culture results for outpt abx management,   - FRANCISCA:  RLE: FRANCISCA 1.33, previously 1.14. The pulse waveforms are normal to near normal.. No significant pressure gradient.   LL: FRANCISCA 1.05, previously 1.13. TBI is 0.40 with digital pressures of 44 mmHg. The pulse waveforms are normal to near normal.. Stable pressure gradient across the calf suggestive of infrapopliteal stenosis.  - activity: WBAT R foot with surgical shoe right foot as tolerated in a surgical shoe.  - Vascular following- signed off per FRANCISCA results   - ID following- Dr Calhoun- group-IV Zosyn  - Podiatry Dr. Stark managing, f/u reccs

## 2023-11-29 NOTE — PROGRESS NOTE ADULT - SUBJECTIVE AND OBJECTIVE BOX
Patient is a 62y old  Male who presents with a chief complaint of R foot wound (29 Nov 2023 07:31)    HPI:  63 yo M with PMH HTN, HLD, Type II DM, s/p R hallux toe amputation 3 yrs  ago presents to the ED with R foot wound. Patient went to a private podiatrist 2.5 months ago to scrape off callus. The callus removal site did not heal well and started to develop into a wound. Podiatrist recommended patient see Dr. Stark for wound management. Per patient, would has been intermittently draining clear liquid and having foul odor. Last sunday, patient had episode of chills that resolved when he went to sleep. Last night, he also had chills and had difficulty balancing. Today he went to his regular scheduled appt with Dr. Stark who recommended he be admitted for IV ABx and surgery on Tuesday,  Denies fever chest pain, palpitations, SOB, cough, abdominal pain, nausea, vomiting, diarrhea, constipation, urinary frequency, urgency, or dysuria, headaches, changes in vision, dizziness, numbness, tingling. Denies recent travel, recent antibiotic use, or sick contacts.    ED Course:   Vitals: BP: 127/65, HR: 93, Temp: 99.2 , RR: 19, SpO2: 96 % on RA   Labs:  ESR: 77, WBC: 17.51, H/H: 12.6/38.4  CXR: No evidence of acute infiltrate  XRAY foot: IMPRESSION: Suspect pathologic fractures of the third and fourth metatarsal heads. 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 If osteomyelitis is clinically considered  despite conservative therapy,   and soft tissue / bone infection requires further assessment, follow-up   MRI recommended.  EKG:  NSR, 81 BPM  Received in the ED  Vanc x  1, Zosyn x  1, NS bolus  x 1     (24 Nov 2023 15:27)    INTERVAL HPI:  11/25/23: Patient seen and examined at bedside. Patient laying in bed comfortably and offers no complaints. Dressing on right foot appears clean, dry and intact. No significant overnight events. IV Zosyn, WBC Resolved   11/26/23: Pt seen and examined at bedside. Pt sitting up in bed comfortably. Has no complaints. Otherwise is anxious to have surgery as soon as possible. Eating, ambulating well. No infectious sx. On IV zosyn,   11/27/23: Pt seen and examined at bedside. Pt sitting up in bed comfortably. Has no complaints. Otherwise is anxious to have surgery as soon as possible. Awaiting MRI. FRANCISCA done. Saw cardio NP this AM and emphasized that she should call his cardiologist as early as possible. Eating, ambulating well. No infectious sx. On IV zosyn,  NPO for possible OR -Podiatry ,On IV Zosyn  11/28/23:  POD # 1. Pt seen and examined at bedside. Pt sitting up comfortably in bed. s/p R transmetatarsal amputation with no complications. Pain 7/10 currently, was 11 last night. Pain regimen ordered. Walking to and from bathroom on heel. On IV zosyn. Eating diabetic diet without issues.   11/29/23: POD #2  s/p R transmetatarsal amputation with no complications. Pt sitting up comfortably at bedside. Pain is well controlled on tramadol. Currently 5/10 pain. Walking to and from bathroom on heel. On IV zosyn. No chest pain, sob, leg edema or leg pain. Follow podiatry reccs. Awaitng biopsy results for outpatient abx management.     OVERNIGHT EVENTS: None    Home Medications:  atorvastatin 40 mg oral tablet: 1 tab(s) orally once a day (24 Nov 2023 15:22)  clopidogrel 75 mg oral tablet: 1 tab(s) orally once a day (24 Nov 2023 15:22)  gabapentin 300 mg oral capsule: 1 cap(s) orally 2 times a day (24 Nov 2023 15:20)  Jardiance 25 mg oral tablet: 1 tab(s) orally once a day (24 Nov 2023 15:20)  lisinopril 40 mg oral tablet: 1 tab(s) orally once a day (24 Nov 2023 15:19)  metFORMIN 1000 mg oral tablet: 1 tab(s) orally 2 times a day (24 Nov 2023 15:22)  Metoprolol Succinate ER 50 mg oral tablet, extended release: 1 tab(s) orally once a day (24 Nov 2023 15:18)  Trulicity Pen 1.5 mg/0.5 mL subcutaneous solution: 1.5 milligram(s) subcutaneously once a week (24 Nov 2023 15:23)      MEDICATIONS  (STANDING):  aspirin enteric coated 81 milliGRAM(s) Oral daily  atorvastatin 40 milliGRAM(s) Oral at bedtime  clopidogrel Tablet 75 milliGRAM(s) Oral daily  dextrose 5%. 1000 milliLiter(s) (100 mL/Hr) IV Continuous <Continuous>  dextrose 5%. 1000 milliLiter(s) (50 mL/Hr) IV Continuous <Continuous>  dextrose 5%. 1000 milliLiter(s) (100 mL/Hr) IV Continuous <Continuous>  dextrose 5%. 1000 milliLiter(s) (50 mL/Hr) IV Continuous <Continuous>  dextrose 50% Injectable 25 Gram(s) IV Push once  dextrose 50% Injectable 25 Gram(s) IV Push once  dextrose 50% Injectable 12.5 Gram(s) IV Push once  gabapentin 300 milliGRAM(s) Oral two times a day  glucagon  Injectable 1 milliGRAM(s) IntraMuscular once  glucagon  Injectable 1 milliGRAM(s) IntraMuscular once  insulin lispro (ADMELOG) corrective regimen sliding scale   SubCutaneous Before meals and at bedtime  lactobacillus acidophilus 1 Tablet(s) Oral three times a day with meals  lisinopril 40 milliGRAM(s) Oral daily  melatonin 3 milliGRAM(s) Oral at bedtime  metoprolol succinate ER 50 milliGRAM(s) Oral daily  piperacillin/tazobactam IVPB.. 3.375 Gram(s) IV Intermittent every 8 hours    MEDICATIONS  (PRN):  acetaminophen     Tablet .. 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 No nausea, No vomiting; No diarrhea, No constipation. [ x ] BM  GENITOURINARY: No dysuria, No frequency, No urgency, No hematuria, NO incontinence  NEUROLOGICAL: No headaches, No dizziness, No numbness, No tingling, No tremors, No weakness  EXT: [x] R foot pain  SKIN:  [ x ] No itching, burning, rashes, or lesions + + neuropathy b/l LE  MUSCULOSKELETAL: [x] R foot pain  PSYCHIATRIC: No depression,  No anxiety,  No mood swings ,No difficulty sleeping at night  PAIN SCALE: [ x ] None  [  ] Other-  ROS Unable to obtain due to - [  ] Dementia  [  ] Lethargy [  ] Drowsy [  ] Sedated [  ] non verbal  REST OF REVIEW Of SYSTEM - [ x ] Normal          Vital Signs Last 24 Hrs  T(C): 36.8 (29 Nov 2023 04:45), Max: 36.8 (28 Nov 2023 11:41)  T(F): 98.2 (29 Nov 2023 04:45), Max: 98.3 (28 Nov 2023 20:16)  HR: 73 (29 Nov 2023 04:45) (70 - 88)  BP: 126/67 (29 Nov 2023 04:45) (101/62 - 126/67)  BP(mean): --  RR: 18 (29 Nov 2023 04:45) (18 - 18)  SpO2: 97% (29 Nov 2023 04:45) (94% - 97%)    Parameters below as of 29 Nov 2023 04:45  Patient On (Oxygen Delivery Method): room air      Finger Stick    PHYSICAL EXAM:  GENERAL:  [ x ] NAD , [ x ] well appearing, [  ] Agitated, [  ] Drowsy,  [  ] Lethargy, [  ] confused   HEAD:  [ x ] Normal, [  ] Other  EYES:  [ x ] EOMI, [x  ] PERRLA, [ x ] conjunctiva and sclera clear normal, [  ] Other,  [  ] Pallor,[  ] Discharge  ENMT:  [ x ] Normal, [ x ] Moist mucous membranes, [ x ] Good dentition, [ x ] No Thrush  NECK:  [x  ] Supple, [ x ] No JVD, [x  ] Normal thyroid, [  ] Lymphadenopathy [  ] Other  CHEST/LUNG:  [ x ] Clear to auscultation bilaterally, [  x] Breath Sounds equal B/L, [] poor effort  [x ] No rales, [ x ] No rhonchi  [x  ]  No wheezing,   HEART:  [x  ] Regular rate and rhythm, [  ] tachycardia, [  ] Bradycardia,  [  ] irregular  [x  ] No murmurs, No rubs, No gallops, [  ] PPM in place (Mfr:  )  ABDOMEN:  [ x ] Soft, [ x ] Nontender, [ x ] Nondistended, [ x ] No mass, [  ] Bowel sounds present, [  ] obese  NERVOUS SYSTEM:  [ x ] Alert & Oriented X3, [  ] Nonfocal  [  ] Confusion  [  ] Encephalopathic [  ] Sedated [  ] Unable to assess, [  ] Dementia [  ] Other-  EXTREMITIES: [ ] 2+ Peripheral Pulses, No clubbing, No cyanosis,  [  ] edema B/L lower EXT. [  ] PVD stasis skin changes B/L Lower EXT, [ x ] s/p R transmetatarsal amputation, R foot with clean dressing.   SKIN:  [  x] Skin warm and dry, [  ] Pressure Ulcers, [  ] ecchymosis, [  ] Skin Tears, [  ] Other    DIET: Diet, Consistent Carbohydrate w/Evening Snack:   DASH/TLC Sodium & Cholesterol Restricted (11-27-23 @ 18:49)      LABS:                        11.7   13.35 )-----------( 367      ( 28 Nov 2023 09:15 )             36.4     28 Nov 2023 09:15    137    |  102    |  17     ----------------------------<  176    4.3     |  28     |  0.92     Ca    8.2        28 Nov 2023 09:15  Phos  2.4       28 Nov 2023 09:15  Mg     2.1       28 Nov 2023 09:15        Urinalysis Basic - ( 28 Nov 2023 09:15 )    Color: x / Appearance: x / SG: x / pH: x  Gluc: 176 mg/dL / Ketone: x  / Bili: x / Urobili: x   Blood: x / Protein: x / Nitrite: x   Leuk Esterase: x / RBC: x / WBC x   Sq Epi: x / Non Sq Epi: x / Bacteria: x        Culture Results:   No growth (11-28 @ 01:31)  Culture Results:   No growth at 4 days (11-24 @ 09:50)  Culture Results:   No growth at 4 days (11-24 @ 09:35)  Culture Results:   Moderate Enterococcus faecalis  Normal qi isolated (11-24 @ 08:35)      culture blood  -- Clean Catch Clean Catch (Midstream) 11-28 @ 01:31    culture urine  --  11-28 @ 01:31  culture blood  -- .Tissue Other, RIGHT FOREFOOT BONE CULTURE 11-27 @ 18:05    culture urine  --  11-27 @ 18:05              Culture - Urine (collected 28 Nov 2023 01:31)  Source: Clean Catch Clean Catch (Midstream)  Final Report (29 Nov 2023 07:36):    No growth    Culture - Tissue with Gram Stain (collected 27 Nov 2023 18:05)  Source: .Tissue Other, RIGHT FOREFOOT BONE CULTURE  Gram Stain (28 Nov 2023 03:54):    Rare polymorphonuclear leukocytes seen per low power field    Few Gram positive cocci in pairs seen per oil power field    Culture - Blood (collected 24 Nov 2023 09:50)  Source: .Blood Blood-Peripheral  Preliminary Report (28 Nov 2023 17:01):    No growth at 4 days    Culture - Blood (collected 24 Nov 2023 09:35)  Source: .Blood Blood-Peripheral  Preliminary Report (28 Nov 2023 17:01):    No growth at 4 days    Culture - Other (collected 24 Nov 2023 08:35)  Source: .Other Right foot  Final Report (26 Nov 2023 15:34):    Moderate Enterococcus faecalis    Normal qi isolated  Organism: Enterococcus faecalis (26 Nov 2023 15:34)  Organism: Enterococcus faecalis (26 Nov 2023 15:34)         Anemia Panel:      Thyroid Panel:  Thyroid Stimulating Hormone, Serum: 1.20 uIU/mL (11-25-23 @ 08:10)                RADIOLOGY & ADDITIONAL TESTS:      HEALTH ISSUES - PROBLEM Dx:  Need for prophylactic measure    Type 2 diabetes mellitus    Diabetic ulcer of right foot  You came in for an ulcer on your right foot which was suspected to have infection up to the level of the bone. We did an MRI of the right foot and we saw X. You underwent surgery of R metatarsal bone to stop the infection. Please follow up with Dr. Stark 1 week after surgery.    CAD (coronary artery disease)  This is a chronic condition, please continue to take plavix and aspirin.    HTN (hypertension)  This is a chronic condition, please continue to take plavix and aspirin.    Hyperlipidemia    Diabetes            Consultant(s) Notes Reviewed:  [  ] YES     Care Discussed with [X] Consultants  [  ] Patient  [  ] Family [  ] HCP [  ]   [  ] Social Service  [  ] RN, [  ] Physical Therapy,[  ] Palliative care team  DVT PPX: [  ] Lovenox, [  ] S C Heparin, [  ] Coumadin, [  ] Xarelto, [  ] Eliquis, [  ] Pradaxa, [  ] IV Heparin drip, [  ] SCD [  ] Contraindication 2 to GI Bleed,[  ] Ambulation [  ] Contraindicated 2 to  bleed [  ] Contraindicated 2 to Brain Bleed  Advanced directive: [  ] None, [  ] DNR/DNI Patient is a 62y old  Male who presents with a chief complaint of R foot wound (29 Nov 2023 07:31)    HPI:  63 yo M with PMH HTN, HLD, Type II DM, s/p R hallux toe amputation 3 yrs  ago presents to the ED with R foot wound. Patient went to a private podiatrist 2.5 months ago to scrape off callus. The callus removal site did not heal well and started to develop into a wound. Podiatrist recommended patient see Dr. Stark for wound management. Per patient, would has been intermittently draining clear liquid and having foul odor. Last sunday, patient had episode of chills that resolved when he went to sleep. Last night, he also had chills and had difficulty balancing. Today he went to his regular scheduled appt with Dr. Stark who recommended he be admitted for IV ABx and surgery on Tuesday,  Denies fever chest pain, palpitations, SOB, cough, abdominal pain, nausea, vomiting, diarrhea, constipation, urinary frequency, urgency, or dysuria, headaches, changes in vision, dizziness, numbness, tingling. Denies recent travel, recent antibiotic use, or sick contacts.    ED Course:   Vitals: BP: 127/65, HR: 93, Temp: 99.2 , RR: 19, SpO2: 96 % on RA   Labs:  ESR: 77, WBC: 17.51, H/H: 12.6/38.4  CXR: No evidence of acute infiltrate  XRAY foot: IMPRESSION: Suspect pathologic fractures of the third and fourth metatarsal heads. 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 If osteomyelitis is clinically considered  despite conservative therapy,   and soft tissue / bone infection requires further assessment, follow-up   MRI recommended.  EKG:  NSR, 81 BPM  Received in the ED  Vanc x  1, Zosyn x  1, NS bolus  x 1     (24 Nov 2023 15:27)    INTERVAL HPI:  11/25/23: Patient seen and examined at bedside. Patient laying in bed comfortably and offers no complaints. Dressing on right foot appears clean, dry and intact. No significant overnight events. IV Zosyn, WBC Resolved   11/26/23: Pt seen and examined at bedside. Pt sitting up in bed comfortably. Has no complaints. Otherwise is anxious to have surgery as soon as possible. Eating, ambulating well. No infectious sx. On IV zosyn,   11/27/23: Pt seen and examined at bedside. Pt sitting up in bed comfortably. Has no complaints. Otherwise is anxious to have surgery as soon as possible. Awaiting MRI. FRANICSCA done. Saw cardio NP this AM and emphasized that she should call his cardiologist as early as possible. Eating, ambulating well. No infectious sx. On IV zosyn,  NPO for possible OR -Podiatry ,On IV Zosyn  11/28/23:  POD # 1. Pt seen and examined at bedside. Pt sitting up comfortably in bed. s/p R transmetatarsal amputation with no complications. Pain 7/10 currently, was 11 last night. Pain regimen ordered. Walking to and from bathroom on heel. On IV zosyn. Eating diabetic diet without issues.   11/29/23: POD #2  s/p R transmetatarsal amputation with no complications. Pt sitting up comfortably at bedside. Pain is well controlled on tramadol. Currently 5/10 pain. Walking to and from bathroom on heel. On IV zosyn. No chest pain, sob, leg edema or leg pain. Follow podiatry reccs. Awaiting biopsy results for outpatient abx management.     OVERNIGHT EVENTS: None    Home Medications:  atorvastatin 40 mg oral tablet: 1 tab(s) orally once a day (24 Nov 2023 15:22)  clopidogrel 75 mg oral tablet: 1 tab(s) orally once a day (24 Nov 2023 15:22)  gabapentin 300 mg oral capsule: 1 cap(s) orally 2 times a day (24 Nov 2023 15:20)  Jardiance 25 mg oral tablet: 1 tab(s) orally once a day (24 Nov 2023 15:20)  lisinopril 40 mg oral tablet: 1 tab(s) orally once a day (24 Nov 2023 15:19)  metFORMIN 1000 mg oral tablet: 1 tab(s) orally 2 times a day (24 Nov 2023 15:22)  Metoprolol Succinate ER 50 mg oral tablet, extended release: 1 tab(s) orally once a day (24 Nov 2023 15:18)  Trulicity Pen 1.5 mg/0.5 mL subcutaneous solution: 1.5 milligram(s) subcutaneously once a week (24 Nov 2023 15:23)      MEDICATIONS  (STANDING):  aspirin enteric coated 81 milliGRAM(s) Oral daily  atorvastatin 40 milliGRAM(s) Oral at bedtime  clopidogrel Tablet 75 milliGRAM(s) Oral daily  dextrose 5%. 1000 milliLiter(s) (100 mL/Hr) IV Continuous <Continuous>  dextrose 5%. 1000 milliLiter(s) (50 mL/Hr) IV Continuous <Continuous>  dextrose 5%. 1000 milliLiter(s) (100 mL/Hr) IV Continuous <Continuous>  dextrose 5%. 1000 milliLiter(s) (50 mL/Hr) IV Continuous <Continuous>  dextrose 50% Injectable 25 Gram(s) IV Push once  dextrose 50% Injectable 25 Gram(s) IV Push once  dextrose 50% Injectable 12.5 Gram(s) IV Push once  gabapentin 300 milliGRAM(s) Oral two times a day  glucagon  Injectable 1 milliGRAM(s) IntraMuscular once  glucagon  Injectable 1 milliGRAM(s) IntraMuscular once  insulin lispro (ADMELOG) corrective regimen sliding scale   SubCutaneous Before meals and at bedtime  lactobacillus acidophilus 1 Tablet(s) Oral three times a day with meals  lisinopril 40 milliGRAM(s) Oral daily  melatonin 3 milliGRAM(s) Oral at bedtime  metoprolol succinate ER 50 milliGRAM(s) Oral daily  piperacillin/tazobactam IVPB.. 3.375 Gram(s) IV Intermittent every 8 hours    MEDICATIONS  (PRN):  acetaminophen     Tablet .. 650 milliGRAM(s) Oral every 6 hours PRN Temp greater or equal to 38C (100.4F), Mild Pain (1 - 3)  dextrose Oral Gel 15 Gram(s) Oral once PRN Blood Glucose LESS THAN 70 milliGRAM(s)/deciliter  traMADol 25 milliGRAM(s) Oral every 6 hours PRN Moderate Pain (4 - 6)  traMADol 50 milliGRAM(s) Oral every 6 hours PRN Severe Pain (7 - 10)      Allergies    No Known Allergies    Intolerances        Social History:  Lives: at home with wife and son  ADLs: independent  Diet: diabetic diet  Vaccination: covid vaccinated  Occupation: tv   Alcohol Use: social  Tobacco Use: none  Recreational Drug Use: none (24 Nov 2023 15:27)    REVIEW OF SYSTEMS: i am ok  CONSTITUTIONAL: No fever, No chills, No fatigue, No myalgia, No Body ache, No Weakness  EYES: No eye pain,  No visual disturbances, No discharge, NO Redness  ENMT:  No ear pain, No nose bleed, No vertigo; No sinus pain, NO throat pain, No Congestion  NECK: No pain, No stiffness  RESPIRATORY: No cough, NO wheezing, No  hemoptysis, NO  shortness of breath  CARDIOVASCULAR: No chest pain, palpitations  GASTROINTESTINAL: No abdominal pain, NO epigastric pain. No nausea, No vomiting; No diarrhea, No constipation. [ x ] BM  GENITOURINARY: No dysuria, No frequency, No urgency, No hematuria, NO incontinence  NEUROLOGICAL: No headaches, No dizziness, No numbness, No tingling, No tremors, No weakness  EXT: [x] R foot pain  SKIN:  [ x ] No itching, burning, rashes, or lesions + + neuropathy b/l LE  MUSCULOSKELETAL: [x] R foot pain  PSYCHIATRIC: No depression,  No anxiety,  No mood swings ,No difficulty sleeping at night  PAIN SCALE: [ x ] None  [  ] Other-  ROS Unable to obtain due to - [  ] Dementia  [  ] Lethargy [  ] Drowsy [  ] Sedated [  ] non verbal  REST OF REVIEW Of SYSTEM - [ x ] Normal        Vital Signs Last 24 Hrs  T(C): 36.8 (29 Nov 2023 04:45), Max: 36.8 (28 Nov 2023 11:41)  T(F): 98.2 (29 Nov 2023 04:45), Max: 98.3 (28 Nov 2023 20:16)  HR: 73 (29 Nov 2023 04:45) (70 - 88)  BP: 126/67 (29 Nov 2023 04:45) (101/62 - 126/67)  BP(mean): --  RR: 18 (29 Nov 2023 04:45) (18 - 18)  SpO2: 97% (29 Nov 2023 04:45) (94% - 97%)    Parameters below as of 29 Nov 2023 04:45  Patient On (Oxygen Delivery Method): room air      Finger Stick    PHYSICAL EXAM:  GENERAL:  [ x ] NAD , [ x ] well appearing, [  ] Agitated, [  ] Drowsy,  [  ] Lethargy, [  ] confused   HEAD:  [ x ] Normal, [  ] Other  EYES:  [ x ] EOMI, [x  ] PERRLA, [ x ] conjunctiva and sclera clear normal, [  ] Other,  [  ] Pallor,[  ] Discharge  ENMT:  [ x ] Normal, [ x ] Moist mucous membranes, [ x ] Good dentition, [ x ] No Thrush  NECK:  [x  ] Supple, [ x ] No JVD, [x  ] Normal thyroid, [  ] Lymphadenopathy [  ] Other  CHEST/LUNG:  [ x ] Clear to auscultation bilaterally, [  x] Breath Sounds equal B/L, [  ] poor effort  [x ] No rales, [ x ] No rhonchi  [x  ]  No wheezing,   HEART:  [x  ] Regular rate and rhythm, [  ] tachycardia, [  ] Bradycardia,  [  ] irregular  [x  ] No murmurs, No rubs, No gallops, [  ] PPM in place (Mfr:  )  ABDOMEN:  [ x ] Soft, [ x ] Nontender, [ x ] Nondistended, [ x ] No mass, [ x ] Bowel sounds present, [  ] obese  NERVOUS SYSTEM:  [ x ] Alert & Oriented X3, [  ] Nonfocal  [  ] Confusion  [  ] Encephalopathic [  ] Sedated [  ] Unable to assess, [  ] Dementia [  ] Other-  EXTREMITIES: [ ] 2+ Peripheral Pulses, No clubbing, No cyanosis,  [  ] edema B/L lower EXT. [  ] PVD stasis skin changes B/L Lower EXT, [ x ] s/p R transmetatarsal amputation, R foot with clean dressing.   SKIN:  [  x] Skin warm and dry, [  ] Pressure Ulcers, [  ] ecchymosis, [  ] Skin Tears, [  ] Other    DIET: Diet, Consistent Carbohydrate w/Evening Snack:   DASH/TLC Sodium & Cholesterol Restricted (11-27-23 @ 18:49)      LABS:                        11.7   13.35 )-----------( 367      ( 28 Nov 2023 09:15 )             36.4     28 Nov 2023 09:15    137    |  102    |  17     ----------------------------<  176    4.3     |  28     |  0.92     Ca    8.2        28 Nov 2023 09:15  Phos  2.4       28 Nov 2023 09:15  Mg     2.1       28 Nov 2023 09:15        Urinalysis Basic - ( 28 Nov 2023 09:15 )    Color: x / Appearance: x / SG: x / pH: x  Gluc: 176 mg/dL / Ketone: x  / Bili: x / Urobili: x   Blood: x / Protein: x / Nitrite: x   Leuk Esterase: x / RBC: x / WBC x   Sq Epi: x / Non Sq Epi: x / Bacteria: x        Culture Results:   No growth (11-28 @ 01:31)  Culture Results:   No growth at 4 days (11-24 @ 09:50)  Culture Results:   No growth at 4 days (11-24 @ 09:35)  Culture Results:   Moderate Enterococcus faecalis  Normal qi isolated (11-24 @ 08:35)      culture blood  -- Clean Catch Clean Catch (Midstream) 11-28 @ 01:31    culture urine  --  11-28 @ 01:31  culture blood  -- .Tissue Other, RIGHT FOREFOOT BONE CULTURE 11-27 @ 18:05    culture urine  --  11-27 @ 18:05      Culture - Urine (collected 28 Nov 2023 01:31)  Source: Clean Catch Clean Catch (Midstream)  Final Report (29 Nov 2023 07:36):    No growth    Culture - Tissue with Gram Stain (collected 27 Nov 2023 18:05)  Source: .Tissue Other, RIGHT FOREFOOT BONE CULTURE  Gram Stain (28 Nov 2023 03:54):    Rare polymorphonuclear leukocytes seen per low power field    Few Gram positive cocci in pairs seen per oil power field    Culture - Blood (collected 24 Nov 2023 09:50)  Source: .Blood Blood-Peripheral  Preliminary Report (28 Nov 2023 17:01):    No growth at 4 days    Culture - Blood (collected 24 Nov 2023 09:35)  Source: .Blood Blood-Peripheral  Preliminary Report (28 Nov 2023 17:01):    No growth at 4 days    Culture - Other (collected 24 Nov 2023 08:35)  Source: .Other Right foot  Final Report (26 Nov 2023 15:34):    Moderate Enterococcus faecalis    Normal qi isolated  Organism: Enterococcus faecalis (26 Nov 2023 15:34)  Organism: Enterococcus faecalis (26 Nov 2023 15:34)         Anemia Panel:      Thyroid Panel:  Thyroid Stimulating Hormone, Serum: 1.20 uIU/mL (11-25-23 @ 08:10)      RADIOLOGY & ADDITIONAL TESTS: none      HEALTH ISSUES - PROBLEM Dx:  Need for prophylactic measure    Type 2 diabetes mellitus    Diabetic ulcer of right foot  You came in for an ulcer on your right foot which was suspected to have infection up to the level of the bone. We did an MRI of the right foot and we saw X. You underwent surgery of R metatarsal bone to stop the infection. Please follow up with Dr. Stark 1 week after surgery.    CAD (coronary artery disease)  This is a chronic condition, please continue to take plavix and aspirin.    HTN (hypertension)  This is a chronic condition, please continue to take plavix and aspirin.    Hyperlipidemia    Diabetes      Consultant(s) Notes Reviewed:  [ x ] YES     Care Discussed with [X] Consultants  [ x ] Patient  [  ] Family [  ] HCP [  ]   [  ] Social Service  [ x ] RN, [  ] Physical Therapy,[  ] Palliative care team  DVT PPX: [ x ] Lovenox, [  ] S C Heparin, [  ] Coumadin, [  ] Xarelto, [  ] Eliquis, [  ] Pradaxa, [  ] IV Heparin drip, [  ] SCD [  ] Contraindication 2 to GI Bleed,[  ] Ambulation [  ] Contraindicated 2 to  bleed [  ] Contraindicated 2 to Brain Bleed  Advanced directive: [ x ] None, [  ] DNR/DNI

## 2023-11-30 LAB
ANION GAP SERPL CALC-SCNC: 5 MMOL/L — SIGNIFICANT CHANGE UP (ref 5–17)
ANION GAP SERPL CALC-SCNC: 5 MMOL/L — SIGNIFICANT CHANGE UP (ref 5–17)
BASOPHILS # BLD AUTO: 0 K/UL — SIGNIFICANT CHANGE UP (ref 0–0.2)
BASOPHILS # BLD AUTO: 0 K/UL — SIGNIFICANT CHANGE UP (ref 0–0.2)
BASOPHILS NFR BLD AUTO: 0 % — SIGNIFICANT CHANGE UP (ref 0–2)
BASOPHILS NFR BLD AUTO: 0 % — SIGNIFICANT CHANGE UP (ref 0–2)
BUN SERPL-MCNC: 20 MG/DL — SIGNIFICANT CHANGE UP (ref 7–23)
BUN SERPL-MCNC: 20 MG/DL — SIGNIFICANT CHANGE UP (ref 7–23)
CALCIUM SERPL-MCNC: 8.5 MG/DL — SIGNIFICANT CHANGE UP (ref 8.5–10.1)
CALCIUM SERPL-MCNC: 8.5 MG/DL — SIGNIFICANT CHANGE UP (ref 8.5–10.1)
CHLORIDE SERPL-SCNC: 107 MMOL/L — SIGNIFICANT CHANGE UP (ref 96–108)
CHLORIDE SERPL-SCNC: 107 MMOL/L — SIGNIFICANT CHANGE UP (ref 96–108)
CO2 SERPL-SCNC: 28 MMOL/L — SIGNIFICANT CHANGE UP (ref 22–31)
CO2 SERPL-SCNC: 28 MMOL/L — SIGNIFICANT CHANGE UP (ref 22–31)
CREAT SERPL-MCNC: 0.89 MG/DL — SIGNIFICANT CHANGE UP (ref 0.5–1.3)
CREAT SERPL-MCNC: 0.89 MG/DL — SIGNIFICANT CHANGE UP (ref 0.5–1.3)
EGFR: 96 ML/MIN/1.73M2 — SIGNIFICANT CHANGE UP
EGFR: 96 ML/MIN/1.73M2 — SIGNIFICANT CHANGE UP
EOSINOPHIL # BLD AUTO: 0.47 K/UL — SIGNIFICANT CHANGE UP (ref 0–0.5)
EOSINOPHIL # BLD AUTO: 0.47 K/UL — SIGNIFICANT CHANGE UP (ref 0–0.5)
EOSINOPHIL NFR BLD AUTO: 4 % — SIGNIFICANT CHANGE UP (ref 0–6)
EOSINOPHIL NFR BLD AUTO: 4 % — SIGNIFICANT CHANGE UP (ref 0–6)
GLUCOSE BLDC GLUCOMTR-MCNC: 227 MG/DL — HIGH (ref 70–99)
GLUCOSE BLDC GLUCOMTR-MCNC: 227 MG/DL — HIGH (ref 70–99)
GLUCOSE BLDC GLUCOMTR-MCNC: 266 MG/DL — HIGH (ref 70–99)
GLUCOSE BLDC GLUCOMTR-MCNC: 266 MG/DL — HIGH (ref 70–99)
GLUCOSE BLDC GLUCOMTR-MCNC: 274 MG/DL — HIGH (ref 70–99)
GLUCOSE BLDC GLUCOMTR-MCNC: 274 MG/DL — HIGH (ref 70–99)
GLUCOSE BLDC GLUCOMTR-MCNC: 277 MG/DL — HIGH (ref 70–99)
GLUCOSE BLDC GLUCOMTR-MCNC: 277 MG/DL — HIGH (ref 70–99)
GLUCOSE SERPL-MCNC: 245 MG/DL — HIGH (ref 70–99)
GLUCOSE SERPL-MCNC: 245 MG/DL — HIGH (ref 70–99)
HCT VFR BLD CALC: 31.4 % — LOW (ref 39–50)
HCT VFR BLD CALC: 31.4 % — LOW (ref 39–50)
HGB BLD-MCNC: 10.2 G/DL — LOW (ref 13–17)
HGB BLD-MCNC: 10.2 G/DL — LOW (ref 13–17)
LYMPHOCYTES # BLD AUTO: 1.65 K/UL — SIGNIFICANT CHANGE UP (ref 1–3.3)
LYMPHOCYTES # BLD AUTO: 1.65 K/UL — SIGNIFICANT CHANGE UP (ref 1–3.3)
LYMPHOCYTES # BLD AUTO: 14 % — SIGNIFICANT CHANGE UP (ref 13–44)
LYMPHOCYTES # BLD AUTO: 14 % — SIGNIFICANT CHANGE UP (ref 13–44)
MAGNESIUM SERPL-MCNC: 2.4 MG/DL — SIGNIFICANT CHANGE UP (ref 1.6–2.6)
MAGNESIUM SERPL-MCNC: 2.4 MG/DL — SIGNIFICANT CHANGE UP (ref 1.6–2.6)
MCHC RBC-ENTMCNC: 29.8 PG — SIGNIFICANT CHANGE UP (ref 27–34)
MCHC RBC-ENTMCNC: 29.8 PG — SIGNIFICANT CHANGE UP (ref 27–34)
MCHC RBC-ENTMCNC: 32.5 GM/DL — SIGNIFICANT CHANGE UP (ref 32–36)
MCHC RBC-ENTMCNC: 32.5 GM/DL — SIGNIFICANT CHANGE UP (ref 32–36)
MCV RBC AUTO: 91.8 FL — SIGNIFICANT CHANGE UP (ref 80–100)
MCV RBC AUTO: 91.8 FL — SIGNIFICANT CHANGE UP (ref 80–100)
MONOCYTES # BLD AUTO: 1.3 K/UL — HIGH (ref 0–0.9)
MONOCYTES # BLD AUTO: 1.3 K/UL — HIGH (ref 0–0.9)
MONOCYTES NFR BLD AUTO: 11 % — SIGNIFICANT CHANGE UP (ref 2–14)
MONOCYTES NFR BLD AUTO: 11 % — SIGNIFICANT CHANGE UP (ref 2–14)
NEUTROPHILS # BLD AUTO: 8.16 K/UL — HIGH (ref 1.8–7.4)
NEUTROPHILS # BLD AUTO: 8.16 K/UL — HIGH (ref 1.8–7.4)
NEUTROPHILS NFR BLD AUTO: 67 % — SIGNIFICANT CHANGE UP (ref 43–77)
NEUTROPHILS NFR BLD AUTO: 67 % — SIGNIFICANT CHANGE UP (ref 43–77)
NRBC # BLD: SIGNIFICANT CHANGE UP /100 WBCS (ref 0–0)
NRBC # BLD: SIGNIFICANT CHANGE UP /100 WBCS (ref 0–0)
PHOSPHATE SERPL-MCNC: 2.5 MG/DL — SIGNIFICANT CHANGE UP (ref 2.5–4.5)
PHOSPHATE SERPL-MCNC: 2.5 MG/DL — SIGNIFICANT CHANGE UP (ref 2.5–4.5)
PLATELET # BLD AUTO: 350 K/UL — SIGNIFICANT CHANGE UP (ref 150–400)
PLATELET # BLD AUTO: 350 K/UL — SIGNIFICANT CHANGE UP (ref 150–400)
POTASSIUM SERPL-MCNC: 4.8 MMOL/L — SIGNIFICANT CHANGE UP (ref 3.5–5.3)
POTASSIUM SERPL-MCNC: 4.8 MMOL/L — SIGNIFICANT CHANGE UP (ref 3.5–5.3)
POTASSIUM SERPL-SCNC: 4.8 MMOL/L — SIGNIFICANT CHANGE UP (ref 3.5–5.3)
POTASSIUM SERPL-SCNC: 4.8 MMOL/L — SIGNIFICANT CHANGE UP (ref 3.5–5.3)
RBC # BLD: 3.42 M/UL — LOW (ref 4.2–5.8)
RBC # BLD: 3.42 M/UL — LOW (ref 4.2–5.8)
RBC # FLD: 12.7 % — SIGNIFICANT CHANGE UP (ref 10.3–14.5)
RBC # FLD: 12.7 % — SIGNIFICANT CHANGE UP (ref 10.3–14.5)
SODIUM SERPL-SCNC: 140 MMOL/L — SIGNIFICANT CHANGE UP (ref 135–145)
SODIUM SERPL-SCNC: 140 MMOL/L — SIGNIFICANT CHANGE UP (ref 135–145)
WBC # BLD: 11.82 K/UL — HIGH (ref 3.8–10.5)
WBC # BLD: 11.82 K/UL — HIGH (ref 3.8–10.5)
WBC # FLD AUTO: 11.82 K/UL — HIGH (ref 3.8–10.5)
WBC # FLD AUTO: 11.82 K/UL — HIGH (ref 3.8–10.5)

## 2023-11-30 PROCEDURE — 99232 SBSQ HOSP IP/OBS MODERATE 35: CPT

## 2023-11-30 RX ADMIN — Medication 1 TABLET(S): at 12:39

## 2023-11-30 RX ADMIN — Medication 6: at 22:07

## 2023-11-30 RX ADMIN — ENOXAPARIN SODIUM 40 MILLIGRAM(S): 100 INJECTION SUBCUTANEOUS at 08:12

## 2023-11-30 RX ADMIN — TRAMADOL HYDROCHLORIDE 50 MILLIGRAM(S): 50 TABLET ORAL at 20:09

## 2023-11-30 RX ADMIN — ATORVASTATIN CALCIUM 40 MILLIGRAM(S): 80 TABLET, FILM COATED ORAL at 22:07

## 2023-11-30 RX ADMIN — TRAMADOL HYDROCHLORIDE 50 MILLIGRAM(S): 50 TABLET ORAL at 19:39

## 2023-11-30 RX ADMIN — Medication 1 TABLET(S): at 08:14

## 2023-11-30 RX ADMIN — CLOPIDOGREL BISULFATE 75 MILLIGRAM(S): 75 TABLET, FILM COATED ORAL at 12:36

## 2023-11-30 RX ADMIN — Medication 1 TABLET(S): at 17:36

## 2023-11-30 RX ADMIN — Medication 50 MILLIGRAM(S): at 05:24

## 2023-11-30 RX ADMIN — PIPERACILLIN AND TAZOBACTAM 25 GRAM(S): 4; .5 INJECTION, POWDER, LYOPHILIZED, FOR SOLUTION INTRAVENOUS at 22:07

## 2023-11-30 RX ADMIN — Medication 3 MILLIGRAM(S): at 22:07

## 2023-11-30 RX ADMIN — Medication 81 MILLIGRAM(S): at 12:36

## 2023-11-30 RX ADMIN — PIPERACILLIN AND TAZOBACTAM 25 GRAM(S): 4; .5 INJECTION, POWDER, LYOPHILIZED, FOR SOLUTION INTRAVENOUS at 12:40

## 2023-11-30 RX ADMIN — GABAPENTIN 300 MILLIGRAM(S): 400 CAPSULE ORAL at 05:24

## 2023-11-30 RX ADMIN — Medication 6: at 12:36

## 2023-11-30 RX ADMIN — GABAPENTIN 300 MILLIGRAM(S): 400 CAPSULE ORAL at 17:36

## 2023-11-30 RX ADMIN — PIPERACILLIN AND TAZOBACTAM 25 GRAM(S): 4; .5 INJECTION, POWDER, LYOPHILIZED, FOR SOLUTION INTRAVENOUS at 05:23

## 2023-11-30 RX ADMIN — Medication 6: at 17:34

## 2023-11-30 RX ADMIN — Medication 4: at 08:13

## 2023-11-30 RX ADMIN — TRAMADOL HYDROCHLORIDE 50 MILLIGRAM(S): 50 TABLET ORAL at 02:17

## 2023-11-30 RX ADMIN — LISINOPRIL 40 MILLIGRAM(S): 2.5 TABLET ORAL at 05:25

## 2023-11-30 NOTE — PROGRESS NOTE ADULT - NS ATTEND AMEND GEN_ALL_CORE FT
3 yo M with PMH HTN, HLD, Type II DM, s/p R toe amp (3 yrs ago), s/p callus scrape off 2.5 weeks ago, now presents with R foot wound here for IV Abx and R foot wound R/O OM with Elevated WBC    -there is no evidence of acute ischemia.  -there is no evidence of significant arrhythmia.  -there is no evidence for meaningful  volume overload.  -no cardiovascular complications postop  -nuc stress revealed suggestion of multivessel cad, for which cath would be appropriate. he remains with a lack of enthusiasm for this approach but this has been recommended by several people over time. can be elective once his pod issues have improved  -DVT prophylaxis  -monitor electrolytes, keep k>4, Mg>2   -will follow
61 yo M with PMH HTN, HLD, Type II DM, s/p R toe amp (3 yrs ago), s/p callus scrape off 2.5 weeks ago, now presents with R foot wound here for IV Abx and R foot wound R/O OM with Elevated WBC    Stress test reviewed.  Patient with 10 mets exercise capacity.  Had anterior and anterolateral akinesis and infoerlateral hypokinesis with exercise suggestive of obstructive CAD.  Angiography was suggested and scheduled, but patient has not yet done this.    Given that he had 10 mets of exercise capacity, I think patient is at acceptable risk to undergo toe amputation under local anesthesia  He will then have his angiogram done per routine.      To follow while admitted.  Continue management as above.  Discussed with podiatry attending.
63 yo M with PMH HTN, HLD, Type II DM, s/p R toe amp (3 yrs ago), s/p callus scrape off 2.5 weeks ago, now presents with R foot wound here for IV Abx and R foot wound R/O OM with Elevated WBC    - there is no evidence of acute ischemia.  - there is no evidence of significant arrhythmia.  - there is no evidence for meaningful  volume overload.  - no cardiovascular complications postop  - cont antiplatelets and statin  - nuc stress revealed suggestion of multivessel cad, for which cath would be appropriate. he remains with a lack of enthusiasm for this approach but this has been recommended by several people over time. can be elective once his pod issues have improved  - DVT prophylaxis  - monitor electrolytes, keep k>4, Mg>2   - will follow.
61 yo M with PMH HTN, HLD, Type II DM, s/p R toe amp (3 yrs ago), s/p callus scrape off 2.5 weeks ago, now presents with R foot wound here for IV Abx and R foot wound R/O OM with Elevated WBC    concern for osteo, planning for procedure   had a recent exercise stress test where he walked ~10 min, but had some abnormal result for which some procedure is planned  no s/s acute ischemia, arrhythmia or vol ol  prior to full assessment of cv risk of surgery would need to have records of what is apparently an abnormal stress result, to be obtained monday  will follow.
1 yo M with PMH HTN, HLD, Type II DM, s/p R toe amp (3 yrs ago), s/p callus scrape off 2.5 weeks ago, now presents with R foot wound here for IV Abx and R foot wound R/O OM with Elevated WBC    -there is no evidence of acute ischemia.  -there is no evidence of significant arrhythmia.  -there is no evidence for meaningful  volume overload.  -no cardiovascular complications postop  -nuc stress revealed suggestion of multivessel cad, for which cath would be appropriate. he remains with a lack of enthusiasm for this approach but this has been recommended by several people over time. can be elective once his pod issues have improved  -DVT prophylaxis  -monitor electrolytes, keep k>4, Mg>2   -will follow

## 2023-11-30 NOTE — PROGRESS NOTE ADULT - PROBLEM SELECTOR PLAN 1
- Pt arrived elevated WBC, ESR, CRP, normal lactate,  -  XRAY foot: Suspect pathologic fractures of the third and fourth metatarsal heads. 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 Status post prior trans metatarsal amputation of the first metatarsal bone and distal ray. If osteomyelitis is clinically considered  despite conservative therapy, and soft tissue / bone infection requires further assessment, follow-up   - MRI rt foot showing OM in multiple metatarsals   - s/p R transmetatarsal amputation on Monday, cleared by cardio, no complications  - continue Zosyn IVPB q 8 hrs as per ID Dr Calhoun S group   - post op care per podiatry  - afebrile. WBC decreasing (11.82  from 11.18)  - Gabapentin for neuropathy, pain meds: Tylenol for mild pain, 25mg tramadol for moderate pain, 50mg tramadol for severe pain  - Dilaudid 1mg IVP for breakthrough pain  - blood cx 2- negative  - R LE u/s negative for DVT   - R foot wound culture-final results: e. faecalis, sensitive to zosyn  - R foot tissue culture from OR showing: Rare PMNs , few gram positive cocci in pairs. Awaiting surgical pathology and culture results for outpt abx management,   - FRANCISCA:  RLE: FRANCISCA 1.33, previously 1.14. The pulse waveforms are normal to near normal.. No significant pressure gradient.   LL: FRANCISCA 1.05, previously 1.13. TBI is 0.40 with digital pressures of 44 mmHg. The pulse waveforms are normal to near normal.. Stable pressure gradient across the calf suggestive of infrapopliteal stenosis.  - activity: WBAT R foot with surgical shoe right foot as tolerated in a surgical shoe.  - Vascular following- signed off per FRANCISCA results   - ID following- Dr Calhoun- group-IV Zosyn  - Podiatry Dr. Stark managing, f/u reccs - Pt arrived elevated WBC, ESR, CRP, normal lactate,  -  XRAY foot: Suspect pathologic fractures of the third and fourth metatarsal heads. 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 Status post prior trans metatarsal amputation of the first metatarsal bone and distal ray. If osteomyelitis is clinically considered  despite conservative therapy, and soft tissue / bone infection requires further assessment, follow-up   - MRI rt foot showing OM in multiple metatarsals   - s/p R transmetatarsal amputation on Monday, cleared by cardio, no complications  - continue Zosyn IVPB q 8 hrs as per ID Dr Calhoun S group   - post op care per podiatry  - afebrile. WBC decreasing (11.82  from 11.18)  - Gabapentin for neuropathy, pain meds: Tylenol for mild pain, 25mg tramadol for moderate pain, 50mg tramadol for severe pain  - Dilaudid 1mg IVP for breakthrough pain  - R LE u/s negative for DVT , REPEAT U/S TODAY, F/U RESULTS  - R foot wound culture-final results: e. faecalis, sensitive to zosyn  - R foot tissue culture from OR showing: Rare PMNs , few gram positive cocci in pairs. Awaiting surgical pathology and culture results for outpt abx management,   - FRANCISCA:  RLE: FRANCISCA 1.33, previously 1.14. The pulse waveforms are normal to near normal.. No significant pressure gradient.   LL: FRANCISCA 1.05, previously 1.13. TBI is 0.40 with digital pressures of 44 mmHg. The pulse waveforms are normal to near normal.. Stable pressure gradient across the calf suggestive of infrapopliteal stenosis.  - activity: WBAT R foot with surgical shoe right foot as tolerated in a surgical shoe.  - Vascular following- signed off per FRANCISCA results   - SURGICAL PATH AM CALL  - ID following- Dr Calhoun- group-IV Zosyn  - Podiatry Dr. Stark managing, f/u reccs

## 2023-11-30 NOTE — PROGRESS NOTE ADULT - SUBJECTIVE AND OBJECTIVE BOX
[facility], Division of Infectious Diseases  OFELIA Harvey Y. Patel, S. Shah, G. [facility]  [phone]    Name: LOIDA QUEZADA  Age: 62y  Gender: Male  MRN: [id number]    Interval History:  Patient seen and examined at bedside this morning  No acute overnight events. Afebrile  No complaints  Notes reviewed    Antibiotics:  piperacillin/tazobactam IVPB.. 3.375 Gram(s) IV Intermittent every 8 hours      Medications:  acetaminophen     Tablet .. 650 milliGRAM(s) Oral every 6 hours PRN  aspirin enteric coated 81 milliGRAM(s) Oral daily  atorvastatin 40 milliGRAM(s) Oral at bedtime  clopidogrel Tablet 75 milliGRAM(s) Oral daily  dextrose 5%. 1000 milliLiter(s) IV Continuous <Continuous>  dextrose 5%. 1000 milliLiter(s) IV Continuous <Continuous>  dextrose 5%. 1000 milliLiter(s) IV Continuous <Continuous>  dextrose 5%. 1000 milliLiter(s) IV Continuous <Continuous>  dextrose 50% Injectable 12.5 Gram(s) IV Push once  dextrose 50% Injectable 25 Gram(s) IV Push once  dextrose 50% Injectable 25 Gram(s) IV Push once  dextrose Oral Gel 15 Gram(s) Oral once PRN  enoxaparin Injectable 40 milliGRAM(s) SubCutaneous every 24 hours  gabapentin 300 milliGRAM(s) Oral two times a day  glucagon  Injectable 1 milliGRAM(s) IntraMuscular once  glucagon  Injectable 1 milliGRAM(s) IntraMuscular once  HYDROmorphone  Injectable 0.5 milliGRAM(s) IV Push every 6 hours PRN  insulin lispro (ADMELOG) corrective regimen sliding scale   SubCutaneous Before meals and at bedtime  lactobacillus acidophilus 1 Tablet(s) Oral three times a day with meals  lisinopril 40 milliGRAM(s) Oral daily  melatonin 3 milliGRAM(s) Oral at bedtime  metoprolol succinate ER 50 milliGRAM(s) Oral daily  piperacillin/tazobactam IVPB.. 3.375 Gram(s) IV Intermittent every 8 hours  polyethylene glycol 3350 17 Gram(s) Oral two times a day  traMADol 25 milliGRAM(s) Oral every 6 hours PRN  traMADol 50 milliGRAM(s) Oral every 6 hours PRN      Review of Systems:  Review of systems otherwise negative except as previously noted.    Allergies: No Known Allergies    For details regarding the patient's past medical history, social history, family history, and other miscellaneous elements, please refer the initial infectious diseases consultation and/or the admitting history and physical examination for this admission.    Objective:  Vitals:   T(C): 37 (11-30-23 @ 04:58), Max: 37.1 (11-29-23 @ 20:02)  HR: 69 (11-30-23 @ 04:58) (69 - 84)  BP: 136/82 (11-30-23 @ 04:58) (111/62 - 136/82)  RR: 18 (11-30-23 @ 04:58) (18 - 18)  SpO2: 95% (11-30-23 @ 04:58) (94% - 97%)    Physical Examination:  General: no acute distress  HEENT: NC/AT, EOMI, a  Cardio: S1, S2 heard, RRR, no murmurs  Resp: breath sounds heard bilaterally, no rales, wheezes or rhonchi  Abd: soft, NT, ND  Ext: foot in dressing  Skin: warm, dry, no visible rash      Laboratory Studies:  CBC:                       10.2   11.82 )-----------( 350      ( 30 Nov 2023 07:18 )             31.4     CMP: 11-30    140  |  107  |  20  ----------------------------<  245<H>  4.8   |  28  |  0.89    Ca    8.5      30 Nov 2023 07:18  Phos  2.5     11-30  Mg     2.4     11-30        Urinalysis Basic - ( 30 Nov 2023 07:18 )    Color: x / Appearance: x / SG: x / pH: x  Gluc: 245 mg/dL / Ketone: x  / Bili: x / Urobili: x   Blood: x / Protein: x / Nitrite: x   Leuk Esterase: x / RBC: x / WBC x   Sq Epi: x / Non Sq Epi: x / Bacteria: x        Microbiology: reviewed    Culture - Urine (collected 11-28-23 @ 01:31)  Source: Clean Catch Clean Catch (Midstream)  Final Report (11-29-23 @ 07:36):    No growth    Culture - Tissue with Gram Stain (collected 11-27-23 @ 18:05)  Source: .Tissue Other, RIGHT FOREFOOT BONE CULTURE  Gram Stain (11-28-23 @ 03:54):    Rare polymorphonuclear leukocytes seen per low power field    Few Gram positive cocci in pairs seen per oil power field  Organism: Enterococcus faecalis (11-29-23 @ 19:04)  Organism: Enterococcus faecalis (11-29-23 @ 19:04)      Method Type: GOLDEN      -  Ampicillin: S <=2 Predicts results to ampicillin/sulbactam, amoxacillin-clavulanate and  piperacillin-tazobactam.      -  Tetracycline: R >8      -  Vancomycin: S 1    Culture - Blood (collected 11-24-23 @ 09:50)  Source: .Blood Blood-Peripheral  Final Report (11-29-23 @ 17:00):    No growth at 5 days    Culture - Blood (collected 11-24-23 @ 09:35)  Source: .Blood Blood-Peripheral  Final Report (11-29-23 @ 17:00):    No growth at 5 days    Culture - Other (collected 11-24-23 @ 08:35)  Source: .Other Right foot  Final Report (11-26-23 @ 15:34):    Moderate Enterococcus faecalis    Normal qi isolated  Organism: Enterococcus faecalis (11-26-23 @ 15:34)  Organism: Enterococcus faecalis (11-26-23 @ 15:34)      Method Type: GOLDEN      -  Ampicillin: S <=2 Predicts results to ampicillin/sulbactam, amoxacillin-clavulanate and  piperacillin-tazobactam.      -  Tetracycline: R >8      -  Vancomycin: S 2          Radiology: reviewed

## 2023-11-30 NOTE — PROGRESS NOTE ADULT - ASSESSMENT
61 yo M with PMH HTN, HLD, Type II DM, s/p R toe amp (3 yrs ago), s/p callus scrape off 2.5 weeks ago, now presents with R foot wound here for IV Abx and R foot wound  found to have OM s/p R  Transmetatarsal amputation 11/27     -sp Transmetatarsal amputation 11/27   followed by podiatry     --follows with Dr. Andre (outpatient cardiologist)   -Recent Stress test reviewed.  Patient with 10 mets exercise capacity.  Had anterior and anterolateral akinesis and inferolateral hypokinesis with exercise suggestive of obstructive CAD.  Angiography was suggested and scheduled 12/14 ,   - EKG demonstrates NSR. No acute ischemic changes noted.   - continue home ASA, Plavix    - continue statin     - Chest X-ray negative for acute process  - euvolemic on examination with no overt signs of heart failure or cardiac ischemia.   - No severe valvular abnormalities noted on examination    - BP, HR stable and well controlled   - Continue home meds on Lisinopril, metoprolol     - Monitor and replete lytes, keep K>4, Mg>2.  - Will continue to follow.    Maggie Miller, NP-C, AGACNP-BC  Cardiology, NP  Call or Text Teams

## 2023-11-30 NOTE — PROGRESS NOTE ADULT - SUBJECTIVE AND OBJECTIVE BOX
[facility] -- Vlad Gallardo Pannella, Patel, Savella, Goodger, Cohen  Office # [phone]      Follow Up:  cardiac optimization, HTN    Subjective/Observations: Seen and examined.  Resting comfortably sitting up in bed with no reports of cp, sob or palpitations.  Pain controlled.  No signs of orthopnea or PND.  NAD.       REVIEW OF SYSTEMS: All other review of systems is negative unless indicated above    PAST MEDICAL & SURGICAL HISTORY:  HTN (hypertension)      Diabetes      Hyperlipidemia      PVD (peripheral vascular disease)      H/O angioplasty  RLE      Status post amputation of toe          MEDICATIONS  (STANDING):  aspirin enteric coated 81 milliGRAM(s) Oral daily  atorvastatin 40 milliGRAM(s) Oral at bedtime  clopidogrel Tablet 75 milliGRAM(s) Oral daily  dextrose 5%. 1000 milliLiter(s) (100 mL/Hr) IV Continuous <Continuous>  dextrose 5%. 1000 milliLiter(s) (100 mL/Hr) IV Continuous <Continuous>  dextrose 5%. 1000 milliLiter(s) (50 mL/Hr) IV Continuous <Continuous>  dextrose 5%. 1000 milliLiter(s) (50 mL/Hr) IV Continuous <Continuous>  dextrose 50% Injectable 25 Gram(s) IV Push once  dextrose 50% Injectable 25 Gram(s) IV Push once  dextrose 50% Injectable 12.5 Gram(s) IV Push once  enoxaparin Injectable 40 milliGRAM(s) SubCutaneous every 24 hours  gabapentin 300 milliGRAM(s) Oral two times a day  glucagon  Injectable 1 milliGRAM(s) IntraMuscular once  glucagon  Injectable 1 milliGRAM(s) IntraMuscular once  insulin lispro (ADMELOG) corrective regimen sliding scale   SubCutaneous Before meals and at bedtime  lactobacillus acidophilus 1 Tablet(s) Oral three times a day with meals  lisinopril 40 milliGRAM(s) Oral daily  melatonin 3 milliGRAM(s) Oral at bedtime  metoprolol succinate ER 50 milliGRAM(s) Oral daily  piperacillin/tazobactam IVPB.. 3.375 Gram(s) IV Intermittent every 8 hours  polyethylene glycol 3350 17 Gram(s) Oral two times a day    MEDICATIONS  (PRN):  acetaminophen     Tablet .. 650 milliGRAM(s) Oral every 6 hours PRN Temp greater or equal to 38C (100.4F), Mild Pain (1 - 3)  dextrose Oral Gel 15 Gram(s) Oral once PRN Blood Glucose LESS THAN 70 milliGRAM(s)/deciliter  HYDROmorphone  Injectable 0.5 milliGRAM(s) IV Push every 6 hours PRN Severe Pain (7 - 10)  for breakthrough pain  traMADol 50 milliGRAM(s) Oral every 6 hours PRN Severe Pain (7 - 10)  traMADol 25 milliGRAM(s) Oral every 6 hours PRN Moderate Pain (4 - 6)      Allergies    No Known Allergies    Intolerances            Vital Signs Last 24 Hrs  T(C): 37 (30 Nov 2023 04:58), Max: 37.1 (29 Nov 2023 20:02)  T(F): 98.6 (30 Nov 2023 04:58), Max: 98.7 (29 Nov 2023 20:02)  HR: 69 (30 Nov 2023 04:58) (69 - 84)  BP: 136/82 (30 Nov 2023 04:58) (111/62 - 136/82)  BP(mean): --  RR: 18 (30 Nov 2023 04:58) (18 - 18)  SpO2: 95% (30 Nov 2023 04:58) (94% - 97%)    Parameters below as of 30 Nov 2023 04:58  Patient On (Oxygen Delivery Method): room air        I&O's Summary        PHYSICAL EXAM:  TELE: Not on tele  Constitutional: NAD, awake and alert, well-developed  HEENT: Moist Mucous Membranes, Anicteric  Pulmonary: Non-labored, breath sounds are clear bilaterally, No wheezing, rales or rhonchi  Cardiovascular: Regular, S1 and S2, No murmurs, rubs, gallops or clicks  Gastrointestinal: Bowel Sounds present, soft, nontender.   Lymph: RLE with some swelling. No lymphadenopathy.  Skin: No visible rashes or ulcers.  Dressing on Right foot c/d/i  Psych:  Mood & affect appropriate    LABS: All Labs Reviewed:                        10.2   11.82 )-----------( 350      ( 30 Nov 2023 07:18 )             31.4                         10.4   11.18 )-----------( 326      ( 29 Nov 2023 10:46 )             31.9                         11.7   13.35 )-----------( 367      ( 28 Nov 2023 09:15 )             36.4     30 Nov 2023 07:18    140    |  107    |  20     ----------------------------<  245    4.8     |  28     |  0.89   29 Nov 2023 10:46    135    |  101    |  21     ----------------------------<  280    4.9     |  28     |  1.10   28 Nov 2023 09:15    137    |  102    |  17     ----------------------------<  176    4.3     |  28     |  0.92     Ca    8.5        30 Nov 2023 07:18  Ca    8.3        29 Nov 2023 10:46  Ca    8.2        28 Nov 2023 09:15  Phos  2.5       30 Nov 2023 07:18  Phos  2.5       29 Nov 2023 10:46  Phos  2.4       28 Nov 2023 09:15  Mg     2.4       30 Nov 2023 07:18  Mg     2.2       29 Nov 2023 10:46  Mg     2.1       28 Nov 2023 09:15    CORINNE     PROCEDURE DATE:  11/27/2023          INTERPRETATION:  Clinical history: 62-year-old male, transmetatarsal   amputation.    Three views of the right foot are compared to 11/24/2023.    FINDINGS: Transmetatarsal amputation, unremarkable postoperative views.    IMPRESSION:    Transmetatarsal amputation, unremarkable postoperative views    --- End of Report ---            TOMAS JONES DO; Attending Radiologist  This document has been electronically signed. Nov 28 2023  2:54PM

## 2023-11-30 NOTE — CASE MANAGEMENT PROGRESS NOTE - NSCMPROGRESSNOTE_GEN_ALL_CORE
Discussed on rounds this am.  Met with PT, no skilled needs anticipated, recommending RW.  Referral for RW started, Medical resident aware of need and rx placed on front of chart for completion.  Primary RN aware, CM will remain available through hospitalization.

## 2023-11-30 NOTE — PROGRESS NOTE ADULT - ASSESSMENT
62M w/ PMhx of HTN, HLD, DM2 admitted from wound center for R foot infection.   Hx of R foot ulcer; pt also s/p R 1st toe and 1st metatarsal head resection.   Felt chills and pain to R foot this past Sunday.     R Foot Wound Infection  MRI noted--  1.  Osteomyelitis involving the second metatarsal and the second toe proximal phalanx with an accompanying abscess which extends along the   second metatarsal and proximal phalanx as well as along the plantar surface to the area of a soft tissue wound.  2.  Osteomyelitis involving the third and fourth metatarsal heads.  3.  Bone marrow edema with subtle lowT1 signal within the partially imaged navicular as well as within the intermediate and lateral   cuneiforms may reflect osseous stress reaction changes or periostitis versus less likely findings of early osteomyelitis.    WCx and OR Cx E. faecalis, ampicillin sensitive  BCx NGTD    S/p Transmetatarsal amputation 27-Nov-2023 18:38:56  Joe Stark  Lengthening, Achilles tendon 27-Nov-2023 18:39:03  Joe Stark.    Recommendations  F/u path  Trend temps/WBC  Additional care per primary team  Further recs to follow    Infectious Diseases will continue to follow. Please call with any questions.   Melany Calhoun M.D.  [facility] Division of Infectious Diseases [phone]  For after 5 P.M. and weekends, please call [phone]

## 2023-11-30 NOTE — PROGRESS NOTE ADULT - SUBJECTIVE AND OBJECTIVE BOX
Patient is a 62y old  Male who presents with a chief complaint of R foot wound (29 Nov 2023 11:57)    HPI:  63 yo M with PMH HTN, HLD, Type II DM, s/p R hallux toe amputation 3 yrs  ago presents to the ED with R foot wound. Patient went to a private podiatrist 2.5 months ago to scrape off callus. The callus removal site did not heal well and started to develop into a wound. Podiatrist recommended patient see Dr. Stark for wound management. Per patient, would has been intermittently draining clear liquid and having foul odor. Last sunday, patient had episode of chills that resolved when he went to sleep. Last night, he also had chills and had difficulty balancing. Today he went to his regular scheduled appt with Dr. Stark who recommended he be admitted for IV ABx and surgery on Tuesday,  Denies fever chest pain, palpitations, SOB, cough, abdominal pain, nausea, vomiting, diarrhea, constipation, urinary frequency, urgency, or dysuria, headaches, changes in vision, dizziness, numbness, tingling. Denies recent travel, recent antibiotic use, or sick contacts.    ED Course:   Vitals: BP: 127/65, HR: 93, Temp: 99.2 , RR: 19, SpO2: 96 % on RA   Labs:  ESR: 77, WBC: 17.51, H/H: 12.6/38.4  CXR: No evidence of acute infiltrate  XRAY foot: IMPRESSION: Suspect pathologic fractures of the third and fourth metatarsal heads. 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 If osteomyelitis is clinically considered  despite conservative therapy,   and soft tissue / bone infection requires further assessment, follow-up   MRI recommended.  EKG:  NSR, 81 BPM  Received in the ED  Vanc x  1, Zosyn x  1, NS bolus  x 1     (24 Nov 2023 15:27)      INTERVAL HPI:  11/25/23: Patient seen and examined at bedside. Patient laying in bed comfortably and offers no complaints. Dressing on right foot appears clean, dry and intact. No significant overnight events. IV Zosyn, WBC Resolved   11/26/23: Pt seen and examined at bedside. Pt sitting up in bed comfortably. Has no complaints. Otherwise is anxious to have surgery as soon as possible. Eating, ambulating well. No infectious sx. On IV zosyn,   11/27/23: Pt seen and examined at bedside. Pt sitting up in bed comfortably. Has no complaints. Otherwise is anxious to have surgery as soon as possible. Awaiting MRI. FRANCISCA done. Saw cardio NP this AM and emphasized that she should call his cardiologist as early as possible. Eating, ambulating well. No infectious sx. On IV zosyn,  NPO for possible OR -Podiatry ,On IV Zosyn  11/28/23:  POD # 1. Pt seen and examined at bedside. Pt sitting up comfortably in bed. s/p R transmetatarsal amputation with no complications. Pain 7/10 currently, was 11 last night. Pain regimen ordered. Walking to and from bathroom on heel. On IV zosyn. Eating diabetic diet without issues.   11/29/23: POD #2  s/p R transmetatarsal amputation with no complications. Pt sitting up comfortably at bedside. Pain is well controlled on tramadol. Currently 5/10 pain. Walking to and from bathroom on heel. On IV zosyn. No chest pain, sob, leg edema or leg pain. Follow podiatry reccs. Awaiting biopsy results for outpatient abx management.   11/30: POD #3 s/p R transmetatarsal amputation with no complications. Pt sitting up comfortably at bedside. Pain is well controlled on tramadol and dilaudid for breakthrough pain. Walking to and from bathroom on heel. HAd BM last night. Family to bring metamucil from home. On IV zosyn. No chest pain, sob, leg edema or leg pain. Follow podiatry reccs. Awaiting biopsy results for outpatient abx management.     OVERNIGHT EVENTS: None    Home Medications:  atorvastatin 40 mg oral tablet: 1 tab(s) orally once a day (24 Nov 2023 15:22)  clopidogrel 75 mg oral tablet: 1 tab(s) orally once a day (24 Nov 2023 15:22)  gabapentin 300 mg oral capsule: 1 cap(s) orally 2 times a day (24 Nov 2023 15:20)  Jardiance 25 mg oral tablet: 1 tab(s) orally once a day (24 Nov 2023 15:20)  lisinopril 40 mg oral tablet: 1 tab(s) orally once a day (24 Nov 2023 15:19)  metFORMIN 1000 mg oral tablet: 1 tab(s) orally 2 times a day (24 Nov 2023 15:22)  Metoprolol Succinate ER 50 mg oral tablet, extended release: 1 tab(s) orally once a day (24 Nov 2023 15:18)  Trulicity Pen 1.5 mg/0.5 mL subcutaneous solution: 1.5 milligram(s) subcutaneously once a week (24 Nov 2023 15:23)      MEDICATIONS  (STANDING):  aspirin enteric coated 81 milliGRAM(s) Oral daily  atorvastatin 40 milliGRAM(s) Oral at bedtime  clopidogrel Tablet 75 milliGRAM(s) Oral daily  dextrose 5%. 1000 milliLiter(s) (50 mL/Hr) IV Continuous <Continuous>  dextrose 5%. 1000 milliLiter(s) (50 mL/Hr) IV Continuous <Continuous>  dextrose 5%. 1000 milliLiter(s) (100 mL/Hr) IV Continuous <Continuous>  dextrose 5%. 1000 milliLiter(s) (100 mL/Hr) IV Continuous <Continuous>  dextrose 50% Injectable 12.5 Gram(s) IV Push once  dextrose 50% Injectable 25 Gram(s) IV Push once  dextrose 50% Injectable 25 Gram(s) IV Push once  enoxaparin Injectable 40 milliGRAM(s) SubCutaneous every 24 hours  gabapentin 300 milliGRAM(s) Oral two times a day  glucagon  Injectable 1 milliGRAM(s) IntraMuscular once  glucagon  Injectable 1 milliGRAM(s) IntraMuscular once  insulin lispro (ADMELOG) corrective regimen sliding scale   SubCutaneous Before meals and at bedtime  lactobacillus acidophilus 1 Tablet(s) Oral three times a day with meals  lisinopril 40 milliGRAM(s) Oral daily  melatonin 3 milliGRAM(s) Oral at bedtime  metoprolol succinate ER 50 milliGRAM(s) Oral daily  piperacillin/tazobactam IVPB.. 3.375 Gram(s) IV Intermittent every 8 hours  polyethylene glycol 3350 17 Gram(s) Oral two times a day    MEDICATIONS  (PRN):  acetaminophen     Tablet .. 650 milliGRAM(s) Oral every 6 hours PRN Temp greater or equal to 38C (100.4F), Mild Pain (1 - 3)  dextrose Oral Gel 15 Gram(s) Oral once PRN Blood Glucose LESS THAN 70 milliGRAM(s)/deciliter  HYDROmorphone  Injectable 0.5 milliGRAM(s) IV Push every 6 hours PRN Severe Pain (7 - 10)  for breakthrough pain  traMADol 25 milliGRAM(s) Oral every 6 hours PRN Moderate Pain (4 - 6)  traMADol 50 milliGRAM(s) Oral every 6 hours PRN Severe Pain (7 - 10)      Allergies    No Known Allergies    Intolerances        Social History:  Lives: at home with wife and son  ADLs: independent  Diet: diabetic diet  Vaccination: covid vaccinated  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 No nausea, No vomiting; No diarrhea, No constipation. [ x ] BM  GENITOURINARY: No dysuria, No frequency, No urgency, No hematuria, NO incontinence  NEUROLOGICAL: No headaches, No dizziness, No numbness, No tingling, No tremors, No weakness  EXT: [x] R foot pain, RLE slightly more edematous than L. No pain on palpation, rash or erythema.   SKIN:  [ x ] No itching, burning, rashes, or lesions + + neuropathy b/l LE  MUSCULOSKELETAL: [x] R foot pain  PSYCHIATRIC: No depression,  No anxiety,  No mood swings ,No difficulty sleeping at night  PAIN SCALE: [ x ] None  [  ] Other-  ROS Unable to obtain due to - [  ] Dementia  [  ] Lethargy [  ] Drowsy [  ] Sedated [  ] non verbal  REST OF REVIEW Of SYSTEM - [ x ] Normal      Vital Signs Last 24 Hrs  T(C): 37 (30 Nov 2023 04:58), Max: 37.1 (29 Nov 2023 20:02)  T(F): 98.6 (30 Nov 2023 04:58), Max: 98.7 (29 Nov 2023 20:02)  HR: 69 (30 Nov 2023 04:58) (69 - 84)  BP: 136/82 (30 Nov 2023 04:58) (111/62 - 136/82)  BP(mean): --  RR: 18 (30 Nov 2023 04:58) (18 - 18)  SpO2: 95% (30 Nov 2023 04:58) (94% - 97%)    Parameters below as of 30 Nov 2023 04:58  Patient On (Oxygen Delivery Method): room air      Finger Stick        PHYSICAL EXAM:  GENERAL:  [ x ] NAD , [ x ] well appearing, [  ] Agitated, [  ] Drowsy,  [  ] Lethargy, [  ] confused   HEAD:  [ x ] Normal, [  ] Other  EYES:  [ x ] EOMI, [x  ] PERRLA, [ x ] conjunctiva and sclera clear normal, [  ] Other,  [  ] Pallor,[  ] Discharge  ENMT:  [ x ] Normal, [ x ] Moist mucous membranes, [ x ] Good dentition, [ x ] No Thrush  NECK:  [x  ] Supple, [ x ] No JVD, [x  ] Normal thyroid, [  ] Lymphadenopathy [  ] Other  CHEST/LUNG:  [ x ] Clear to auscultation bilaterally, [  x] Breath Sounds equal B/L, [  ] poor effort  [x ] No rales, [ x ] No rhonchi  [x  ]  No wheezing,   HEART:  [x  ] Regular rate and rhythm, [  ] tachycardia, [  ] Bradycardia,  [  ] irregular  [x  ] No murmurs, No rubs, No gallops, [  ] PPM in place (Mfr:  )  ABDOMEN:  [ x ] Soft, [ x ] Nontender, [ x ] Nondistended, [ x ] No mass, [ x ] Bowel sounds present, [  ] obese  NERVOUS SYSTEM:  [ x ] Alert & Oriented X3, [  ] Nonfocal  [  ] Confusion  [  ] Encephalopathic [  ] Sedated [  ] Unable to assess, [  ] Dementia [  ] Other-  EXTREMITIES: [ ] 2+ Peripheral Pulses, No clubbing, No cyanosis,  [  ] edema B/L lower EXT. [  ] PVD stasis skin changes B/L Lower EXT, [ x ] s/p R transmetatarsal amputation, R foot with clean dressing.   SKIN:  [  x] Skin warm and dry, [  ] Pressure Ulcers, [  ] ecchymosis, [  ] Skin Tears, [  ] Other    DIET: Diet, Consistent Carbohydrate w/Evening Snack (11-29-23 @ 16:41)      LABS:                        10.2   11.82 )-----------( 350      ( 30 Nov 2023 07:18 )             31.4     29 Nov 2023 10:46    135    |  101    |  21     ----------------------------<  280    4.9     |  28     |  1.10     Ca    8.3        29 Nov 2023 10:46  Phos  2.5       29 Nov 2023 10:46  Mg     2.2       29 Nov 2023 10:46        Urinalysis Basic - ( 29 Nov 2023 10:46 )    Color: x / Appearance: x / SG: x / pH: x  Gluc: 280 mg/dL / Ketone: x  / Bili: x / Urobili: x   Blood: x / Protein: x / Nitrite: x   Leuk Esterase: x / RBC: x / WBC x   Sq Epi: x / Non Sq Epi: x / Bacteria: x        Culture Results:   No growth (11-28 @ 01:31)  Culture Results:   No growth at 5 days (11-24 @ 09:50)  Culture Results:   No growth at 5 days (11-24 @ 09:35)  Culture Results:   Moderate Enterococcus faecalis  Normal qi isolated (11-24 @ 08:35)                  Culture - Urine (collected 28 Nov 2023 01:31)  Source: Clean Catch Clean Catch (Midstream)  Final Report (29 Nov 2023 07:36):    No growth    Culture - Tissue with Gram Stain (collected 27 Nov 2023 18:05)  Source: .Tissue Other, RIGHT FOREFOOT BONE CULTURE  Gram Stain (28 Nov 2023 03:54):    Rare polymorphonuclear leukocytes seen per low power field    Few Gram positive cocci in pairs seen per oil power field  Organism: Enterococcus faecalis (29 Nov 2023 19:04)  Organism: Enterococcus faecalis (29 Nov 2023 19:04)    Culture - Blood (collected 24 Nov 2023 09:50)  Source: .Blood Blood-Peripheral  Final Report (29 Nov 2023 17:00):    No growth at 5 days    Culture - Blood (collected 24 Nov 2023 09:35)  Source: .Blood Blood-Peripheral  Final Report (29 Nov 2023 17:00):    No growth at 5 days    Culture - Other (collected 24 Nov 2023 08:35)  Source: .Other Right foot  Final Report (26 Nov 2023 15:34):    Moderate Enterococcus faecalis    Normal qi isolated  Organism: Enterococcus faecalis (26 Nov 2023 15:34)  Organism: Enterococcus faecalis (26 Nov 2023 15:34)         Anemia Panel:      Thyroid Panel:  Thyroid Stimulating Hormone, Serum: 1.20 uIU/mL (11-25-23 @ 08:10)                RADIOLOGY & ADDITIONAL TESTS:      HEALTH ISSUES - PROBLEM Dx:  Need for prophylactic measure    Type 2 diabetes mellitus    Diabetic ulcer of right foot  You came in for an ulcer on your right foot which was suspected to have infection up to the level of the bone. We did an MRI of the right foot and we saw X. You underwent surgery of R metatarsal bone to stop the infection. Please follow up with Dr. Stark 1 week after surgery.    CAD (coronary artery disease)  This is a chronic condition, please continue to take plavix and aspirin.    HTN (hypertension)  This is a chronic condition, please continue to take plavix and aspirin.    Hyperlipidemia    Diabetes            Consultant(s) Notes Reviewed:  [  ] YES     Care Discussed with [X] Consultants  [  ] Patient  [  ] Family [  ] HCP [  ]   [  ] Social Service  [  ] RN, [  ] Physical Therapy,[  ] Palliative care team  DVT PPX: [  ] Lovenox, [  ] S C Heparin, [  ] Coumadin, [  ] Xarelto, [  ] Eliquis, [  ] Pradaxa, [  ] IV Heparin drip, [  ] SCD [  ] Contraindication 2 to GI Bleed,[  ] Ambulation [  ] Contraindicated 2 to  bleed [  ] Contraindicated 2 to Brain Bleed  Advanced directive: [  ] None, [  ] DNR/DNI Patient is a 62y old  Male who presents with a chief complaint of R foot wound (29 Nov 2023 11:57)    HPI:  61 yo M with PMH HTN, HLD, Type II DM, s/p R hallux toe amputation 3 yrs  ago presents to the ED with R foot wound. Patient went to a private podiatrist 2.5 months ago to scrape off callus. The callus removal site did not heal well and started to develop into a wound. Podiatrist recommended patient see Dr. Stark for wound management. Per patient, would has been intermittently draining clear liquid and having foul odor. Last sunday, patient had episode of chills that resolved when he went to sleep. Last night, he also had chills and had difficulty balancing. Today he went to his regular scheduled appt with Dr. Stark who recommended he be admitted for IV ABx and surgery on Tuesday,  Denies fever chest pain, palpitations, SOB, cough, abdominal pain, nausea, vomiting, diarrhea, constipation, urinary frequency, urgency, or dysuria, headaches, changes in vision, dizziness, numbness, tingling. Denies recent travel, recent antibiotic use, or sick contacts.    ED Course:   Vitals: BP: 127/65, HR: 93, Temp: 99.2 , RR: 19, SpO2: 96 % on RA   Labs:  ESR: 77, WBC: 17.51, H/H: 12.6/38.4  CXR: No evidence of acute infiltrate  XRAY foot: IMPRESSION: Suspect pathologic fractures of the third and fourth metatarsal heads. 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 If osteomyelitis is clinically considered  despite conservative therapy,   and soft tissue / bone infection requires further assessment, follow-up   MRI recommended.  EKG:  NSR, 81 BPM  Received in the ED  Vanc x  1, Zosyn x  1, NS bolus  x 1     (24 Nov 2023 15:27)      INTERVAL HPI:  11/25/23: Patient seen and examined at bedside. Patient laying in bed comfortably and offers no complaints. Dressing on right foot appears clean, dry and intact. No significant overnight events. IV Zosyn, WBC Resolved   11/26/23: Pt seen and examined at bedside. Pt sitting up in bed comfortably. Has no complaints. Otherwise is anxious to have surgery as soon as possible. Eating, ambulating well. No infectious sx. On IV zosyn,   11/27/23: Pt seen and examined at bedside. Pt sitting up in bed comfortably. Has no complaints. Otherwise is anxious to have surgery as soon as possible. Awaiting MRI. FRANCISCA done. Saw cardio NP this AM and emphasized that she should call his cardiologist as early as possible. Eating, ambulating well. No infectious sx. On IV zosyn,  NPO for possible OR -Podiatry ,On IV Zosyn  11/28/23:  POD # 1. Pt seen and examined at bedside. Pt sitting up comfortably in bed. s/p R transmetatarsal amputation with no complications. Pain 7/10 currently, was 11 last night. Pain regimen ordered. Walking to and from bathroom on heel. On IV zosyn. Eating diabetic diet without issues.   11/29/23: POD #2  s/p R transmetatarsal amputation with no complications. Pt sitting up comfortably at bedside. Pain is well controlled on tramadol. Currently 5/10 pain. Walking to and from bathroom on heel. On IV zosyn. No chest pain, sob, leg edema or leg pain. Follow podiatry reccs. Awaiting biopsy results for outpatient abx management.   11/30: POD #3 s/p R transmetatarsal amputation with no complications. Pt sitting up comfortably at bedside. Pain is well controlled on tramadol and dilaudid for breakthrough pain. Walking to and from bathroom on heel. HAd BM last night. Family to bring metamucil from home. On IV zosyn. No chest pain, sob, leg edema or leg pain. Follow podiatry reccs. Awaiting biopsy results for outpatient abx management. On IV Zosyn     OVERNIGHT EVENTS: None    Home Medications:  atorvastatin 40 mg oral tablet: 1 tab(s) orally once a day (24 Nov 2023 15:22)  clopidogrel 75 mg oral tablet: 1 tab(s) orally once a day (24 Nov 2023 15:22)  gabapentin 300 mg oral capsule: 1 cap(s) orally 2 times a day (24 Nov 2023 15:20)  Jardiance 25 mg oral tablet: 1 tab(s) orally once a day (24 Nov 2023 15:20)  lisinopril 40 mg oral tablet: 1 tab(s) orally once a day (24 Nov 2023 15:19)  metFORMIN 1000 mg oral tablet: 1 tab(s) orally 2 times a day (24 Nov 2023 15:22)  Metoprolol Succinate ER 50 mg oral tablet, extended release: 1 tab(s) orally once a day (24 Nov 2023 15:18)  Trulicity Pen 1.5 mg/0.5 mL subcutaneous solution: 1.5 milligram(s) subcutaneously once a week (24 Nov 2023 15:23)      MEDICATIONS  (STANDING):  aspirin enteric coated 81 milliGRAM(s) Oral daily  atorvastatin 40 milliGRAM(s) Oral at bedtime  clopidogrel Tablet 75 milliGRAM(s) Oral daily  dextrose 5%. 1000 milliLiter(s) (50 mL/Hr) IV Continuous <Continuous>  dextrose 5%. 1000 milliLiter(s) (50 mL/Hr) IV Continuous <Continuous>  dextrose 5%. 1000 milliLiter(s) (100 mL/Hr) IV Continuous <Continuous>  dextrose 5%. 1000 milliLiter(s) (100 mL/Hr) IV Continuous <Continuous>  dextrose 50% Injectable 12.5 Gram(s) IV Push once  dextrose 50% Injectable 25 Gram(s) IV Push once  dextrose 50% Injectable 25 Gram(s) IV Push once  enoxaparin Injectable 40 milliGRAM(s) SubCutaneous every 24 hours  gabapentin 300 milliGRAM(s) Oral two times a day  glucagon  Injectable 1 milliGRAM(s) IntraMuscular once  glucagon  Injectable 1 milliGRAM(s) IntraMuscular once  insulin lispro (ADMELOG) corrective regimen sliding scale   SubCutaneous Before meals and at bedtime  lactobacillus acidophilus 1 Tablet(s) Oral three times a day with meals  lisinopril 40 milliGRAM(s) Oral daily  melatonin 3 milliGRAM(s) Oral at bedtime  metoprolol succinate ER 50 milliGRAM(s) Oral daily  piperacillin/tazobactam IVPB.. 3.375 Gram(s) IV Intermittent every 8 hours  polyethylene glycol 3350 17 Gram(s) Oral two times a day    MEDICATIONS  (PRN):  acetaminophen     Tablet .. 650 milliGRAM(s) Oral every 6 hours PRN Temp greater or equal to 38C (100.4F), Mild Pain (1 - 3)  dextrose Oral Gel 15 Gram(s) Oral once PRN Blood Glucose LESS THAN 70 milliGRAM(s)/deciliter  HYDROmorphone  Injectable 0.5 milliGRAM(s) IV Push every 6 hours PRN Severe Pain (7 - 10)  for breakthrough pain  traMADol 25 milliGRAM(s) Oral every 6 hours PRN Moderate Pain (4 - 6)  traMADol 50 milliGRAM(s) Oral every 6 hours PRN Severe Pain (7 - 10)      Allergies    No Known Allergies    Intolerances        Social History:  Lives: at home with wife and son  ADLs: independent  Diet: diabetic diet  Vaccination: covid vaccinated  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 No nausea, No vomiting; No diarrhea, No constipation. [ x ] BM  GENITOURINARY: No dysuria, No frequency, No urgency, No hematuria, NO incontinence  NEUROLOGICAL: No headaches, No dizziness, No numbness, No tingling, No tremors, No weakness  EXT: [x] R foot pain, RLE slightly more edematous than L. No pain on palpation, rash or erythema.   SKIN:  [ x ] No itching, burning, rashes, or lesions + + neuropathy b/l LE  MUSCULOSKELETAL: [x] R foot pain  PSYCHIATRIC: No depression,  No anxiety,  No mood swings ,No difficulty sleeping at night  PAIN SCALE: [ x ] None  [  ] Other-  ROS Unable to obtain due to - [  ] Dementia  [  ] Lethargy [  ] Drowsy [  ] Sedated [  ] non verbal  REST OF REVIEW Of SYSTEM - [ x ] Normal      Vital Signs Last 24 Hrs  T(C): 37 (30 Nov 2023 04:58), Max: 37.1 (29 Nov 2023 20:02)  T(F): 98.6 (30 Nov 2023 04:58), Max: 98.7 (29 Nov 2023 20:02)  HR: 69 (30 Nov 2023 04:58) (69 - 84)  BP: 136/82 (30 Nov 2023 04:58) (111/62 - 136/82)  BP(mean): --  RR: 18 (30 Nov 2023 04:58) (18 - 18)  SpO2: 95% (30 Nov 2023 04:58) (94% - 97%)    Parameters below as of 30 Nov 2023 04:58  Patient On (Oxygen Delivery Method): room air      Finger Stick        PHYSICAL EXAM: i am OK   GENERAL:  [ x ] NAD , [ x ] well appearing, [  ] Agitated, [  ] Drowsy,  [  ] Lethargy, [  ] confused   HEAD:  [ x ] Normal, [  ] Other  EYES:  [ x ] EOMI, [x  ] PERRLA, [ x ] conjunctiva and sclera clear normal, [  ] Other,  [  ] Pallor,[  ] Discharge  ENMT:  [ x ] Normal, [ x ] Moist mucous membranes, [ x ] Good dentition, [ x ] No Thrush  NECK:  [x  ] Supple, [ x ] No JVD, [x  ] Normal thyroid, [  ] Lymphadenopathy [  ] Other  CHEST/LUNG:  [ x ] Clear to auscultation bilaterally, [  x] Breath Sounds equal B/L, [  ] poor effort  [x ] No rales, [ x ] No rhonchi  [x  ]  No wheezing,   HEART:  [x  ] Regular rate and rhythm, [  ] tachycardia, [  ] Bradycardia,  [  ] irregular  [x  ] No murmurs, No rubs, No gallops, [  ] PPM in place (Mfr:  )  ABDOMEN:  [ x ] Soft, [ x ] Nontender, [ x ] Nondistended, [ x ] No mass, [ x ] Bowel sounds present, [  ] obese  NERVOUS SYSTEM:  [ x ] Alert & Oriented X3, [  ] Nonfocal  [  ] Confusion  [  ] Encephalopathic [  ] Sedated [  ] Unable to assess, [  ] Dementia [  ] Other-  EXTREMITIES: [ ] 2+ Peripheral Pulses, No clubbing, No cyanosis,  [  ] edema B/L lower EXT. [  ] PVD stasis skin changes B/L Lower EXT, [ x ] s/p R transmetatarsal amputation, R foot with clean dressing.   SKIN:  [  x] Skin warm and dry, [  ] Pressure Ulcers, [  ] ecchymosis, [  ] Skin Tears, [  ] Other    DIET: Diet, Consistent Carbohydrate w/Evening Snack (11-29-23 @ 16:41)      LABS:                        10.2   11.82 )-----------( 350      ( 30 Nov 2023 07:18 )             31.4     29 Nov 2023 10:46    135    |  101    |  21     ----------------------------<  280    4.9     |  28     |  1.10     Ca    8.3        29 Nov 2023 10:46  Phos  2.5       29 Nov 2023 10:46  Mg     2.2       29 Nov 2023 10:46        Urinalysis Basic - ( 29 Nov 2023 10:46 )    Color: x / Appearance: x / SG: x / pH: x  Gluc: 280 mg/dL / Ketone: x  / Bili: x / Urobili: x   Blood: x / Protein: x / Nitrite: x   Leuk Esterase: x / RBC: x / WBC x   Sq Epi: x / Non Sq Epi: x / Bacteria: x        Culture Results:   No growth (11-28 @ 01:31)  Culture Results:   No growth at 5 days (11-24 @ 09:50)  Culture Results:   No growth at 5 days (11-24 @ 09:35)  Culture Results:   Moderate Enterococcus faecalis  Normal qi isolated (11-24 @ 08:35)    Culture - Urine (collected 28 Nov 2023 01:31)  Source: Clean Catch Clean Catch (Midstream)  Final Report (29 Nov 2023 07:36):    No growth    Culture - Tissue with Gram Stain (collected 27 Nov 2023 18:05)  Source: .Tissue Other, RIGHT FOREFOOT BONE CULTURE  Gram Stain (28 Nov 2023 03:54):    Rare polymorphonuclear leukocytes seen per low power field    Few Gram positive cocci in pairs seen per oil power field  Organism: Enterococcus faecalis (29 Nov 2023 19:04)  Organism: Enterococcus faecalis (29 Nov 2023 19:04)    Culture - Blood (collected 24 Nov 2023 09:50)  Source: .Blood Blood-Peripheral  Final Report (29 Nov 2023 17:00):    No growth at 5 days    Culture - Blood (collected 24 Nov 2023 09:35)  Source: .Blood Blood-Peripheral  Final Report (29 Nov 2023 17:00):    No growth at 5 days    Culture - Other (collected 24 Nov 2023 08:35)  Source: .Other Right foot  Final Report (26 Nov 2023 15:34):    Moderate Enterococcus faecalis    Normal qi isolated  Organism: Enterococcus faecalis (26 Nov 2023 15:34)  Organism: Enterococcus faecalis (26 Nov 2023 15:34)         Anemia Panel:      Thyroid Panel:  Thyroid Stimulating Hormone, Serum: 1.20 uIU/mL (11-25-23 @ 08:10)      RADIOLOGY & ADDITIONAL TESTS: NONE      HEALTH ISSUES - PROBLEM Dx:  Need for prophylactic measure    Type 2 diabetes mellitus    Diabetic ulcer of right foot  You came in for an ulcer on your right foot which was suspected to have infection up to the level of the bone. We did an MRI of the right foot and we saw X. You underwent surgery of R metatarsal bone to stop the infection. Please follow up with Dr. Stark 1 week after surgery.    CAD (coronary artery disease)  This is a chronic condition, please continue to take plavix and aspirin.    HTN (hypertension)  This is a chronic condition, please continue to take plavix and aspirin.    Hyperlipidemia    Diabetes      Consultant(s) Notes Reviewed:  [ x ] YES     Care Discussed with [X] Consultants  [ x ] Patient  [  ] Family [  ] HCP [  ]   [  ] Social Service  [ x ] RN, [  ] Physical Therapy,[  ] Palliative care team  DVT PPX: [ x ] Lovenox, [  ] S C Heparin, [  ] Coumadin, [  ] Xarelto, [  ] Eliquis, [  ] Pradaxa, [  ] IV Heparin drip, [  ] SCD [  ] Contraindication 2 to GI Bleed,[  ] Ambulation [  ] Contraindicated 2 to  bleed [  ] Contraindicated 2 to Brain Bleed  Advanced directive: [x ] None, [  ] DNR/DNI

## 2023-12-01 DIAGNOSIS — I82.409 ACUTE EMBOLISM AND THROMBOSIS OF UNSPECIFIED DEEP VEINS OF UNSPECIFIED LOWER EXTREMITY: ICD-10-CM

## 2023-12-01 LAB
ANION GAP SERPL CALC-SCNC: 3 MMOL/L — LOW (ref 5–17)
ANION GAP SERPL CALC-SCNC: 3 MMOL/L — LOW (ref 5–17)
BASOPHILS # BLD AUTO: 0.07 K/UL — SIGNIFICANT CHANGE UP (ref 0–0.2)
BASOPHILS # BLD AUTO: 0.07 K/UL — SIGNIFICANT CHANGE UP (ref 0–0.2)
BASOPHILS NFR BLD AUTO: 0.7 % — SIGNIFICANT CHANGE UP (ref 0–2)
BASOPHILS NFR BLD AUTO: 0.7 % — SIGNIFICANT CHANGE UP (ref 0–2)
BUN SERPL-MCNC: 16 MG/DL — SIGNIFICANT CHANGE UP (ref 7–23)
CALCIUM SERPL-MCNC: 8.6 MG/DL — SIGNIFICANT CHANGE UP (ref 8.5–10.1)
CALCIUM SERPL-MCNC: 8.6 MG/DL — SIGNIFICANT CHANGE UP (ref 8.5–10.1)
CHLORIDE SERPL-SCNC: 104 MMOL/L — SIGNIFICANT CHANGE UP (ref 96–108)
CHLORIDE SERPL-SCNC: 104 MMOL/L — SIGNIFICANT CHANGE UP (ref 96–108)
CO2 SERPL-SCNC: 31 MMOL/L — SIGNIFICANT CHANGE UP (ref 22–31)
CO2 SERPL-SCNC: 31 MMOL/L — SIGNIFICANT CHANGE UP (ref 22–31)
CREAT SERPL-MCNC: 0.88 MG/DL — SIGNIFICANT CHANGE UP (ref 0.5–1.3)
CREAT SERPL-MCNC: 0.88 MG/DL — SIGNIFICANT CHANGE UP (ref 0.5–1.3)
EGFR: 95 ML/MIN/1.73M2 — SIGNIFICANT CHANGE UP
EGFR: 95 ML/MIN/1.73M2 — SIGNIFICANT CHANGE UP
EOSINOPHIL # BLD AUTO: 0.41 K/UL — SIGNIFICANT CHANGE UP (ref 0–0.5)
EOSINOPHIL # BLD AUTO: 0.41 K/UL — SIGNIFICANT CHANGE UP (ref 0–0.5)
EOSINOPHIL NFR BLD AUTO: 4 % — SIGNIFICANT CHANGE UP (ref 0–6)
EOSINOPHIL NFR BLD AUTO: 4 % — SIGNIFICANT CHANGE UP (ref 0–6)
GLUCOSE BLDC GLUCOMTR-MCNC: 228 MG/DL — HIGH (ref 70–99)
GLUCOSE BLDC GLUCOMTR-MCNC: 228 MG/DL — HIGH (ref 70–99)
GLUCOSE BLDC GLUCOMTR-MCNC: 257 MG/DL — HIGH (ref 70–99)
GLUCOSE BLDC GLUCOMTR-MCNC: 257 MG/DL — HIGH (ref 70–99)
GLUCOSE BLDC GLUCOMTR-MCNC: 276 MG/DL — HIGH (ref 70–99)
GLUCOSE BLDC GLUCOMTR-MCNC: 276 MG/DL — HIGH (ref 70–99)
GLUCOSE BLDC GLUCOMTR-MCNC: 293 MG/DL — HIGH (ref 70–99)
GLUCOSE BLDC GLUCOMTR-MCNC: 293 MG/DL — HIGH (ref 70–99)
GLUCOSE SERPL-MCNC: 261 MG/DL — HIGH (ref 70–99)
GLUCOSE SERPL-MCNC: 261 MG/DL — HIGH (ref 70–99)
HCT VFR BLD CALC: 31.8 % — LOW (ref 39–50)
HCT VFR BLD CALC: 31.8 % — LOW (ref 39–50)
HGB BLD-MCNC: 10.4 G/DL — LOW (ref 13–17)
HGB BLD-MCNC: 10.4 G/DL — LOW (ref 13–17)
IMM GRANULOCYTES NFR BLD AUTO: 2.7 % — HIGH (ref 0–0.9)
IMM GRANULOCYTES NFR BLD AUTO: 2.7 % — HIGH (ref 0–0.9)
LYMPHOCYTES # BLD AUTO: 1.55 K/UL — SIGNIFICANT CHANGE UP (ref 1–3.3)
LYMPHOCYTES # BLD AUTO: 1.55 K/UL — SIGNIFICANT CHANGE UP (ref 1–3.3)
LYMPHOCYTES # BLD AUTO: 15.2 % — SIGNIFICANT CHANGE UP (ref 13–44)
LYMPHOCYTES # BLD AUTO: 15.2 % — SIGNIFICANT CHANGE UP (ref 13–44)
MAGNESIUM SERPL-MCNC: 2.2 MG/DL — SIGNIFICANT CHANGE UP (ref 1.6–2.6)
MAGNESIUM SERPL-MCNC: 2.2 MG/DL — SIGNIFICANT CHANGE UP (ref 1.6–2.6)
MCHC RBC-ENTMCNC: 29.7 PG — SIGNIFICANT CHANGE UP (ref 27–34)
MCHC RBC-ENTMCNC: 29.7 PG — SIGNIFICANT CHANGE UP (ref 27–34)
MCHC RBC-ENTMCNC: 32.7 GM/DL — SIGNIFICANT CHANGE UP (ref 32–36)
MCHC RBC-ENTMCNC: 32.7 GM/DL — SIGNIFICANT CHANGE UP (ref 32–36)
MCV RBC AUTO: 90.9 FL — SIGNIFICANT CHANGE UP (ref 80–100)
MCV RBC AUTO: 90.9 FL — SIGNIFICANT CHANGE UP (ref 80–100)
MONOCYTES # BLD AUTO: 0.92 K/UL — HIGH (ref 0–0.9)
MONOCYTES # BLD AUTO: 0.92 K/UL — HIGH (ref 0–0.9)
MONOCYTES NFR BLD AUTO: 9 % — SIGNIFICANT CHANGE UP (ref 2–14)
MONOCYTES NFR BLD AUTO: 9 % — SIGNIFICANT CHANGE UP (ref 2–14)
NEUTROPHILS # BLD AUTO: 6.96 K/UL — SIGNIFICANT CHANGE UP (ref 1.8–7.4)
NEUTROPHILS # BLD AUTO: 6.96 K/UL — SIGNIFICANT CHANGE UP (ref 1.8–7.4)
NEUTROPHILS NFR BLD AUTO: 68.4 % — SIGNIFICANT CHANGE UP (ref 43–77)
NEUTROPHILS NFR BLD AUTO: 68.4 % — SIGNIFICANT CHANGE UP (ref 43–77)
NRBC # BLD: 0 /100 WBCS — SIGNIFICANT CHANGE UP (ref 0–0)
NRBC # BLD: 0 /100 WBCS — SIGNIFICANT CHANGE UP (ref 0–0)
PHOSPHATE SERPL-MCNC: 2.8 MG/DL — SIGNIFICANT CHANGE UP (ref 2.5–4.5)
PHOSPHATE SERPL-MCNC: 2.8 MG/DL — SIGNIFICANT CHANGE UP (ref 2.5–4.5)
PLATELET # BLD AUTO: 394 K/UL — SIGNIFICANT CHANGE UP (ref 150–400)
PLATELET # BLD AUTO: 394 K/UL — SIGNIFICANT CHANGE UP (ref 150–400)
POTASSIUM SERPL-MCNC: 4.9 MMOL/L — SIGNIFICANT CHANGE UP (ref 3.5–5.3)
POTASSIUM SERPL-MCNC: 4.9 MMOL/L — SIGNIFICANT CHANGE UP (ref 3.5–5.3)
POTASSIUM SERPL-SCNC: 4.9 MMOL/L — SIGNIFICANT CHANGE UP (ref 3.5–5.3)
POTASSIUM SERPL-SCNC: 4.9 MMOL/L — SIGNIFICANT CHANGE UP (ref 3.5–5.3)
RBC # BLD: 3.5 M/UL — LOW (ref 4.2–5.8)
RBC # BLD: 3.5 M/UL — LOW (ref 4.2–5.8)
RBC # FLD: 12.5 % — SIGNIFICANT CHANGE UP (ref 10.3–14.5)
RBC # FLD: 12.5 % — SIGNIFICANT CHANGE UP (ref 10.3–14.5)
SODIUM SERPL-SCNC: 138 MMOL/L — SIGNIFICANT CHANGE UP (ref 135–145)
SODIUM SERPL-SCNC: 138 MMOL/L — SIGNIFICANT CHANGE UP (ref 135–145)
WBC # BLD: 10.18 K/UL — SIGNIFICANT CHANGE UP (ref 3.8–10.5)
WBC # BLD: 10.18 K/UL — SIGNIFICANT CHANGE UP (ref 3.8–10.5)
WBC # FLD AUTO: 10.18 K/UL — SIGNIFICANT CHANGE UP (ref 3.8–10.5)
WBC # FLD AUTO: 10.18 K/UL — SIGNIFICANT CHANGE UP (ref 3.8–10.5)

## 2023-12-01 PROCEDURE — 36573 INSJ PICC RS&I 5 YR+: CPT

## 2023-12-01 PROCEDURE — 93971 EXTREMITY STUDY: CPT | Mod: 26,RT

## 2023-12-01 PROCEDURE — 99233 SBSQ HOSP IP/OBS HIGH 50: CPT

## 2023-12-01 RX ORDER — APIXABAN 2.5 MG/1
2 TABLET, FILM COATED ORAL
Qty: 0 | Refills: 0 | DISCHARGE
Start: 2023-12-01

## 2023-12-01 RX ORDER — LACTOBACILLUS ACIDOPHILUS 100MM CELL
1 CAPSULE ORAL
Qty: 0 | Refills: 0 | DISCHARGE
Start: 2023-12-01

## 2023-12-01 RX ORDER — RIVAROXABAN 15 MG-20MG
1 KIT ORAL
Qty: 1 | Refills: 0
Start: 2023-12-01

## 2023-12-01 RX ORDER — AMPICILLIN TRIHYDRATE 250 MG
CAPSULE ORAL
Refills: 0 | Status: DISCONTINUED | OUTPATIENT
Start: 2023-12-01 | End: 2023-12-02

## 2023-12-01 RX ORDER — ACETAMINOPHEN 500 MG
2 TABLET ORAL
Qty: 0 | Refills: 0 | DISCHARGE
Start: 2023-12-01

## 2023-12-01 RX ORDER — GABAPENTIN 400 MG/1
300 CAPSULE ORAL THREE TIMES A DAY
Refills: 0 | Status: DISCONTINUED | OUTPATIENT
Start: 2023-12-01 | End: 2023-12-02

## 2023-12-01 RX ORDER — GABAPENTIN 400 MG/1
1 CAPSULE ORAL
Refills: 0 | DISCHARGE

## 2023-12-01 RX ORDER — AMPICILLIN TRIHYDRATE 250 MG
2 CAPSULE ORAL ONCE
Refills: 0 | Status: COMPLETED | OUTPATIENT
Start: 2023-12-01 | End: 2023-12-01

## 2023-12-01 RX ORDER — APIXABAN 2.5 MG/1
1 TABLET, FILM COATED ORAL
Qty: 1 | Refills: 0
Start: 2023-12-01

## 2023-12-01 RX ORDER — METOPROLOL TARTRATE 50 MG
1 TABLET ORAL
Refills: 0 | DISCHARGE

## 2023-12-01 RX ORDER — METOPROLOL TARTRATE 50 MG
1 TABLET ORAL
Qty: 0 | Refills: 0 | DISCHARGE
Start: 2023-12-01

## 2023-12-01 RX ORDER — GABAPENTIN 300 MG/1
1 CAPSULE ORAL
Qty: 90 | Refills: 0 | DISCHARGE
Start: 2023-12-01 | End: 2023-12-30

## 2023-12-01 RX ORDER — APIXABAN 2.5 MG/1
10 TABLET, FILM COATED ORAL EVERY 12 HOURS
Refills: 0 | Status: DISCONTINUED | OUTPATIENT
Start: 2023-12-01 | End: 2023-12-02

## 2023-12-01 RX ORDER — POLYETHYLENE GLYCOL 3350 17 G/17G
17 POWDER, FOR SOLUTION ORAL
Qty: 0 | Refills: 0 | DISCHARGE
Start: 2023-12-01

## 2023-12-01 RX ORDER — TRAMADOL HYDROCHLORIDE 50 MG/1
1 TABLET ORAL
Qty: 20 | Refills: 0
Start: 2023-12-01 | End: 2023-12-05

## 2023-12-01 RX ORDER — GABAPENTIN 400 MG/1
1 CAPSULE ORAL
Qty: 90 | Refills: 0
Start: 2023-12-01 | End: 2023-12-30

## 2023-12-01 RX ORDER — AMPICILLIN TRIHYDRATE 250 MG
2 CAPSULE ORAL EVERY 4 HOURS
Refills: 0 | Status: DISCONTINUED | OUTPATIENT
Start: 2023-12-01 | End: 2023-12-02

## 2023-12-01 RX ADMIN — ENOXAPARIN SODIUM 40 MILLIGRAM(S): 100 INJECTION SUBCUTANEOUS at 08:35

## 2023-12-01 RX ADMIN — Medication 200 GRAM(S): at 13:13

## 2023-12-01 RX ADMIN — TRAMADOL HYDROCHLORIDE 50 MILLIGRAM(S): 50 TABLET ORAL at 03:02

## 2023-12-01 RX ADMIN — POLYETHYLENE GLYCOL 3350 17 GRAM(S): 17 POWDER, FOR SOLUTION ORAL at 17:37

## 2023-12-01 RX ADMIN — CLOPIDOGREL BISULFATE 75 MILLIGRAM(S): 75 TABLET, FILM COATED ORAL at 13:15

## 2023-12-01 RX ADMIN — Medication 1 TABLET(S): at 17:37

## 2023-12-01 RX ADMIN — APIXABAN 10 MILLIGRAM(S): 2.5 TABLET, FILM COATED ORAL at 17:39

## 2023-12-01 RX ADMIN — TRAMADOL HYDROCHLORIDE 50 MILLIGRAM(S): 50 TABLET ORAL at 21:36

## 2023-12-01 RX ADMIN — Medication 3 MILLIGRAM(S): at 20:44

## 2023-12-01 RX ADMIN — Medication 6: at 08:36

## 2023-12-01 RX ADMIN — TRAMADOL HYDROCHLORIDE 50 MILLIGRAM(S): 50 TABLET ORAL at 20:44

## 2023-12-01 RX ADMIN — Medication 1 TABLET(S): at 08:36

## 2023-12-01 RX ADMIN — Medication 81 MILLIGRAM(S): at 13:15

## 2023-12-01 RX ADMIN — LISINOPRIL 40 MILLIGRAM(S): 2.5 TABLET ORAL at 05:26

## 2023-12-01 RX ADMIN — Medication 200 GRAM(S): at 08:34

## 2023-12-01 RX ADMIN — PIPERACILLIN AND TAZOBACTAM 25 GRAM(S): 4; .5 INJECTION, POWDER, LYOPHILIZED, FOR SOLUTION INTRAVENOUS at 05:26

## 2023-12-01 RX ADMIN — Medication 6: at 22:14

## 2023-12-01 RX ADMIN — GABAPENTIN 300 MILLIGRAM(S): 400 CAPSULE ORAL at 20:44

## 2023-12-01 RX ADMIN — ATORVASTATIN CALCIUM 40 MILLIGRAM(S): 80 TABLET, FILM COATED ORAL at 20:44

## 2023-12-01 RX ADMIN — Medication 200 GRAM(S): at 20:45

## 2023-12-01 RX ADMIN — TRAMADOL HYDROCHLORIDE 50 MILLIGRAM(S): 50 TABLET ORAL at 02:32

## 2023-12-01 RX ADMIN — Medication 50 MILLIGRAM(S): at 05:26

## 2023-12-01 RX ADMIN — GABAPENTIN 300 MILLIGRAM(S): 400 CAPSULE ORAL at 05:27

## 2023-12-01 RX ADMIN — Medication 200 GRAM(S): at 17:37

## 2023-12-01 RX ADMIN — Medication 1 TABLET(S): at 13:15

## 2023-12-01 RX ADMIN — Medication 4: at 13:11

## 2023-12-01 RX ADMIN — Medication 6: at 17:38

## 2023-12-01 NOTE — PROGRESS NOTE ADULT - PROBLEM SELECTOR PLAN 6
- chronic, c/w atorvastatin 40mg  - lipid panel:  (elevated), HDL 30 (decreased), rest of cholesterol labs WNL

## 2023-12-01 NOTE — PROGRESS NOTE ADULT - PROBLEM SELECTOR PLAN 1
- Pt arrived elevated WBC, ESR, CRP, normal lactate,  -  XRAY foot: Suspect pathologic fractures of the third and fourth metatarsal heads. 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 Status post prior trans metatarsal amputation of the first metatarsal bone and distal ray. If osteomyelitis is clinically considered  despite conservative therapy, and soft tissue / bone infection requires further assessment, follow-up   - MRI rt foot showing OM in multiple metatarsals   - s/p R transmetatarsal amputation on Monday, cleared by cardio, no complications  - continue Zosyn IVPB q 8 hrs as per ID Dr Calhoun S group   - post op care per podiatry  - afebrile. WBC decreasing (10.18 from 11.82 )  - Gabapentin for neuropathy, pain meds: Tylenol for mild pain, 25mg tramadol for moderate pain, 50mg tramadol for severe pain  - Dilaudid 1mg IVP for breakthrough pain  - R LE u/s negative for DVT , REPEAT U/S TODAY showing new nonocclusive clot  - R foot wound culture-final results: e. faecalis, sensitive to zosyn  - R foot tissue culture from OR showing: Rare PMNs , few gram positive cocci in pairs. Awaiting surgical pathology and culture results for outpt abx management- CALL BACK THIS AFTERNOON  -  Will need IR for PICC/Midline likely  - activity: WBAT R foot with surgical shoe right foot as tolerated in a surgical shoe.  - Vascular following- signed off per FRANCISCA results   - ID following- Dr Calhoun- group-IV Zosyn  - Podiatry Dr. Stark managing, f/u reccs - Pt arrived elevated WBC, ESR, CRP, normal lactate,  -  XRAY foot: Suspect pathologic fractures of the third and fourth metatarsal heads. 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 Status post prior trans metatarsal amputation of the first metatarsal bone and distal ray. If osteomyelitis is clinically considered  despite conservative therapy, and soft tissue / bone infection requires further assessment, follow-up   - MRI rt foot showing OM in multiple metatarsals   - s/p R transmetatarsal amputation on Monday, cleared by cardio, no complications  - per ID--- switch from zosyn to AMPicillin  - PICC placed with no complications  - post op care per podiatry  - afebrile. WBC decreasing (10.18 from 11.82 )  - Gabapentin for neuropathy, pain meds: Tylenol for mild pain, 25mg tramadol for moderate pain, 50mg tramadol for severe pain  - Dilaudid 1mg IVP for breakthrough pain  - R LE u/s negative for DVT , REPEAT U/S TODAY showing new nonocclusive clot  - R foot wound culture-final results: e. faecalis, sensitive to zosyn  - R foot tissue culture from OR showing: Rare PMNs , few gram positive cocci in pairs. Awaiting surgical pathology and culture results for outpt abx management- CALL BACK THIS AFTERNOON  -  Will need IR for PICC/Midline likely  - activity: WBAT R foot with surgical shoe right foot as tolerated in a surgical shoe.  - Vascular following- signed off per FRANCISCA results   - ID following- Dr Calhoun- group-IV Zosyn  - Podiatry Dr. Stark managing, f/u reccs - Pt arrived elevated WBC, ESR, CRP, normal lactate,  -OM Rt Foot   -  XRAY foot: Suspect pathologic fractures of the third and fourth metatarsal heads. 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 Status post prior trans metatarsal amputation of the first metatarsal bone and distal ray. If osteomyelitis is clinically considered  despite conservative therapy, and soft tissue / bone infection requires further assessment, follow-up   - MRI rt foot showing OM in multiple metatarsals   - s/p R transmetatarsal amputation on Monday, cleared by cardio, no complications  - per ID--- switch from zosyn to AMPicillin 2 gm IVPB q 4 hrs as d/w ID DR XENIA Calhoun--ampicillin 12gm/24hrs continuous infusion x6 wk course until 1/8/24 w/ weekly CMP, CBC, ESR, CRP    - PICC placed with no complications  - post op care per podiatry  - afebrile. WBC decreasing (10.18 from 11.82 )  - Gabapentin for neuropathy, pain meds: Tylenol for mild pain, 25mg tramadol for moderate pain, 50mg tramadol for severe pain  - Dilaudid 1mg IVP for breakthrough pain  - R LE u/s negative for DVT , REPEAT U/S TODAY showing new nonocclusive clot  - R foot wound culture-final results: e. faecalis, sensitive to zosyn  - R foot tissue culture from OR showing: Rare PMNs , few gram positive cocci in pairs. Awaiting surgical pathology and culture results for outpt abx management- CALL BACK THIS AFTERNOON  -  Will need IR for PICC/Midline likely  - activity: WBAT R foot with surgical shoe right foot as tolerated in a surgical shoe.  - Vascular following- signed off per FRANCISCA results   - ID following- Dr Calhoun- group-IV Zosyn  - Podiatry Dr. Stark managing, f/u reccs

## 2023-12-01 NOTE — CASE MANAGEMENT PROGRESS NOTE - NSCMPROGRESSNOTE_GEN_ALL_CORE
Met with patient at bedside.  He is aware of need for home infusion services.  He is aware CM will send to vendor to run insurance to cover 24 hour infusion of ampicillin.  Pt for PICC line today.   A CM will remain available through hospitalization.

## 2023-12-01 NOTE — PATIENT CHOICE NOTE. - NSPTCHOICESTATE_GEN_ALL_CORE
I have met with the patient and/or caregiver to discuss discharge goals and treatment plan. Patient and/or caregiver also provided with instructions on accessing the CMS Compare websites for additional information related to Post Acute Provider quality and resource use measures to assist them in evaluation of the providers and in selecting their post-acute provider of choice. Patient and caregiver were informed of the facilities that are owned and/or operated by [facility]. I have discussed with the patient the availability of in-network facilities and providers. Patient and caregiver provided with a list of post-acute providers whose services are appropriate to the discharge plans and patient needs.     For patient requiring durable medical equipment, patient and/or caregiver were informed that they have the right to request who provides the required equipment.
I have met with the patient and/or caregiver to discuss discharge goals and treatment plan. Patient and/or caregiver also provided with instructions on accessing the CMS Compare websites for additional information related to Post Acute Provider quality and resource use measures to assist them in evaluation of the providers and in selecting their post-acute provider of choice. Patient and caregiver were informed of the facilities that are owned and/or operated by [facility]. I have discussed with the patient the availability of in-network facilities and providers. Patient and caregiver provided with a list of post-acute providers whose services are appropriate to the discharge plans and patient needs.     For patient requiring durable medical equipment, patient and/or caregiver were informed that they have the right to request who provides the required equipment.

## 2023-12-01 NOTE — CONSULT NOTE ADULT - SUBJECTIVE AND OBJECTIVE BOX
Patient is a 63y old  Male who presents with a chief complaint of R foot wound (01 Dec 2023 12:06)    HPI:  63 yo M with PMH HTN, HLD, Type II DM, s/p R hallux toe amputation 3 yrs  ago presents to the ED with R foot wound. Patient went to a private podiatrist 2.5 months ago to scrape off callus. The callus removal site did not heal well and started to develop into a wound. Podiatrist recommended patient see Dr. Sun for wound management. Per patient, would has been intermittently draining clear liquid and having foul odor. Last , patient had episode of chills that resolved when he went to sleep. Last night, he also had chills and had difficulty balancing. Today he went to his regular scheduled appt with Dr. Sun who recommended he be admitted for IV ABx and surgery on Tuesday,  Denies fever chest pain, palpitations, SOB, cough, abdominal pain, nausea, vomiting, diarrhea, constipation, urinary frequency, urgency, or dysuria, headaches, changes in vision, dizziness, numbness, tingling. Denies recent travel, recent antibiotic use, or sick contacts.    ED Course:   Vitals: BP: 127/65, HR: 93, Temp: 99.2 , RR: 19, SpO2: 96 % on RA   Labs:  ESR: 77, WBC: 17.51, H/H: 12.6/38.4  CXR: No evidence of acute infiltrate  XRAY foot: IMPRESSION: Suspect pathologic fractures of the third and fourth metatarsal heads. 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 If osteomyelitis is clinically considered  despite conservative therapy,   and soft tissue / bone infection requires further assessment, follow-up   MRI recommended.  EKG:  NSR, 81 BPM  Received in the ED  Vanc x  1, Zosyn x  1, NS bolus  x 1  (2023 15:27)      PAST MEDICAL & SURGICAL HISTORY:  HTN (hypertension)  Diabetes  Hyperlipidemia  PVD (peripheral vascular disease)  H/O angioplasty RLE  Status post amputation of toe       HEALTH ISSUES - PROBLEM Dx:  Diabetic ulcer of right foot       You came in for an ulcer on your right foot which was suspected to have infection up to the level of the bone. We did an MRI of the right foot and we saw X. You underwent surgery of R metatarsal bone to stop the infection. Please follow up with Dr. Sun 1 week after surgery.  HTN (hypertension)     This is a chronic condition, please continue to take plavix and aspirin.  Hyperlipidemia  CAD (coronary artery disease)     This is a chronic condition, please continue to take plavix and aspirin.  Diabetes  Type 2 diabetes mellitus  DVT, lower extremity    Local infection of skin and subcutaneous tissue [L08.9]  HTN (hypertension) [I10]  Diabetes [E11.9]  Hyperlipidemia [E78.5]  Peripheral vascular disease [I73.9]  PVD (peripheral vascular disease) [I73.9]  H/O angioplasty [Z98.62]  Status post amputation of toe [Z89.429]  CAD (coronary artery disease) [I25.10]  HTN (hypertension) [I10]  Hyperlipidemia [E78.5]  Diabetes [E11.9]  Deep venous thrombosis [I82.409]      FAMILY HISTORY:  FH: type 2 diabetes  Grandfather    mother  82yo, dementia  Father  83yo, CAD  1of 2 children.   Older sister with Breast cancer, s/p mastectomy    1 dtr. Healthy.       [SOCIAL HISTORY: ]     smoking:  none currently     EtOH:  none currently, social     illicit drugs:  none currently     occupation:  [employer]     marital status:  , 1 dtr     Other:       [ALLERGIES/INTOLERANCES:]  Allergies     No Known Allergies  Intolerances      [MEDICATIONS]  MEDICATIONS  (STANDING):  ampicillin  IVPB      ampicillin  IVPB 2 Gram(s) IV Intermittent every 4 hours  aspirin enteric coated 81 milliGRAM(s) Oral daily  atorvastatin 40 milliGRAM(s) Oral at bedtime  clopidogrel Tablet 75 milliGRAM(s) Oral daily  dextrose 5%. 1000 milliLiter(s) (100 mL/Hr) IV Continuous <Continuous>  dextrose 5%. 1000 milliLiter(s) (50 mL/Hr) IV Continuous <Continuous>  dextrose 5%. 1000 milliLiter(s) (100 mL/Hr) IV Continuous <Continuous>  dextrose 5%. 1000 milliLiter(s) (50 mL/Hr) IV Continuous <Continuous>  dextrose 50% Injectable 25 Gram(s) IV Push once  dextrose 50% Injectable 25 Gram(s) IV Push once  dextrose 50% Injectable 12.5 Gram(s) IV Push once  enoxaparin Injectable 40 milliGRAM(s) SubCutaneous every 24 hours  gabapentin 300 milliGRAM(s) Oral two times a day  glucagon  Injectable 1 milliGRAM(s) IntraMuscular once  glucagon  Injectable 1 milliGRAM(s) IntraMuscular once  insulin lispro (ADMELOG) corrective regimen sliding scale   SubCutaneous Before meals and at bedtime  lactobacillus acidophilus 1 Tablet(s) Oral three times a day with meals  lisinopril 40 milliGRAM(s) Oral daily  melatonin 3 milliGRAM(s) Oral at bedtime  metoprolol succinate ER 50 milliGRAM(s) Oral daily  polyethylene glycol 3350 17 Gram(s) Oral two times a day      MEDICATIONS  (PRN):  acetaminophen     Tablet .. 650 milliGRAM(s) Oral every 6 hours PRN Temp greater or equal to 38C (100.4F), Mild Pain (1 - 3)  dextrose Oral Gel 15 Gram(s) Oral once PRN Blood Glucose LESS THAN 70 milliGRAM(s)/deciliter  HYDROmorphone  Injectable 0.5 milliGRAM(s) IV Push every 6 hours PRN Severe Pain (7 - 10)  for breakthrough pain  traMADol 50 milliGRAM(s) Oral every 6 hours PRN Severe Pain (7 - 10)  traMADol 25 milliGRAM(s) Oral every 6 hours PRN Moderate Pain (4 - 6)      [REVIEW OF SYSTEMS: ]  CONSTITUTIONAL: normal, no fever, no shakes, no chills   EYES: No eye pain, no visual disturbances, no discharge  ENMT:  no discharge  NECK: No pain, no stiffness  BREASTS: No pain, no masses, no nipple discharge  RESPIRATORY: No cough, no wheezing, no chills, no hemoptysis; No shortness of breath  CARDIOVASCULAR: No chest pain, no palpitations, no dizziness, no leg swelling  GASTROINTESTINAL: No abdominal, no epigastric pain. No nausea, no vomiting, no hematemesis; No diarrhea , no constipation. No melena, no hematochezia.  GENITOURINARY: No dysuria, no frequency, no hematuria, no incontinence  NEUROLOGICAL: No headaches, no memory loss, no loss of strength, no numbness, no tremors  SKIN: No itching, no burning, no rashes, no lesions   LYMPH NODES: No enlarged glands  ENDOCRINE: No heat or cold intolerance; No hair loss  MUSCULOSKELETAL: No joint pain or swelling; No muscle, no back, no extremity pain  PSYCHIATRIC: No depression, no anxiety, no mood swings, no difficulty sleeping  HEME/LYMPH: No easy bruising, no bleeding gums      [VITALS SIGNS 24hrs]  Vital Signs Last 24 Hrs  T(C): 36.7 (01 Dec 2023 13:03), Max: 37.1 (2023 20:54)  T(F): 98 (01 Dec 2023 13:03), Max: 98.7 (2023 20:54)  HR: 82 (01 Dec 2023 13:03) (70 - 82)  BP: 132/70 (01 Dec 2023 13:03) (132/70 - 145/77)  BP(mean): --  RR: 18 (01 Dec 2023 13:03) (18 - 18)  SpO2: 98% (01 Dec 2023 13:03) (95% - 98%)    Parameters below as of 01 Dec 2023 13:03  Patient On (Oxygen Delivery Method): room air    Daily   I&O's Summary      [PHYSICAL EXAM]  GEN: WN NAD  HEENT: normocephalic and atraumatic. EOMI. .    NECK: Supple.  No lymphadenopathy   LUNGS: Clear to auscultation.  HEART: S1S2 Regular rate and rhythm, no MRG  ABDOMEN: Soft, nontender, and nondistended.  Positive bowel sounds.    : No CVA tenderness  EXTREMITIES: + RLE > LLE edema. RLE metatarsal amputation  NEUROLOGIC: grossly intact.  PSYCHIATRIC: Appropriate affect .  SKIN: No rash       [LABS: ]                        10.4   10.18 )-----------( 394      ( 01 Dec 2023 07:37 )             31.8     CBC Full  -  ( 01 Dec 2023 07:37 )  WBC Count : 10.18 K/uL  RBC Count : 3.50 M/uL  Hemoglobin : 10.4 g/dL  Hematocrit : 31.8 %  Platelet Count - Automated : 394 K/uL  Mean Cell Volume : 90.9 fl  Mean Cell Hemoglobin : 29.7 pg  Mean Cell Hemoglobin Concentration : 32.7 gm/dL  Auto Neutrophil # : 6.96 K/uL  Auto Lymphocyte # : 1.55 K/uL  Auto Monocyte # : 0.92 K/uL  Auto Eosinophil # : 0.41 K/uL  Auto Basophil # : 0.07 K/uL  Auto Neutrophil % : 68.4 %  Auto Lymphocyte % : 15.2 %  Auto Monocyte % : 9.0 %  Auto Eosinophil % : 4.0 %  Auto Basophil % : 0.7 %      138  |  104  |  16  ----------------------------<  261<H>  4.9   |  31  |  0.88    Ca    8.6      01 Dec 2023 07:37  Phos  2.8       Mg     2.2         Urinalysis Basic - ( 01 Dec 2023 07:37 )  Color: x / Appearance: x / SG: x / pH: x  Gluc: 261 mg/dL / Ketone: x  / Bili: x / Urobili: x   Blood: x / Protein: x / Nitrite: x   Leuk Esterase: x / RBC: x / WBC x   Sq Epi: x / Non Sq Epi: x / Bacteria: x    CBC TREND (5 Days)  WBC Count: 10.18 K/uL ( @ 07:37)  WBC Count: 11.82 K/uL ( @ 07:18)  WBC Count: 11.18 K/uL ( @ 10:46)    Hemoglobin: 10.4 g/dL ( @ 07:37)  Hemoglobin: 10.2 g/dL ( @ 07:18)  Hemoglobin: 10.4 g/dL ( @ 10:46)    Hematocrit: 31.8 % ( @ 07:37)  Hematocrit: 31.4 % ( @ 07:18)  Hematocrit: 31.9 % ( @ 10:46)    Platelet Count - Automated: 394 K/uL ( @ 07:37)  Platelet Count - Automated: 350 K/uL ( @ 07:18)  Platelet Count - Automated: 326 K/uL ( @ 10:46)    Sedimentation Rate, Erythrocyte: 77 mm/hr ( @ 09:50)  Sedimentation Rate, Erythrocyte: 6 mm/hr ( @ 09:50)       [MICROBIOLOGY /  VIROLOGY:]      [PATHOLOGY]       [RADIOLOGY & ADDITIONAL STUDIES:]       ACC: [id number] EXAM:  US DPLX LWR EXT VEINS LTD RT   ORDERED BY: JAIME ARORA   PROCEDURE DATE:  2023    INTERPRETATION:  CLINICAL INFORMATION: Postoperative DVT. Recent amputation forefoot osteomyelitis.  COMPARISON: Right lowerextremity venous duplex ultrasound 2023.  TECHNIQUE: Duplex sonography of the RIGHT LOWER extremity veins with color and spectral Doppler, with and without compression.  FINDINGS:  ·	Normal compressibility and patency of the right common femoral vein, saphenofemoral junction, deep femoral vein, superficial femoral vein, popliteal vein, posterior tibial vein, peroneal vein, and gastrocnemius vein. Findings are confirmed on Doppler.  ·	Partially compressible right soleal vein (below the knee).   ·	Chronic appearing superficial thrombosis of right small saphenous vein, associated with calcifications.  ·	Edema of the superficial soft tissues of the right lower extremity. Correlate clinically.   ·	Small right groin lymph node measures 1.9 x 0.7 x 1.3 cm, with benign appearing fatty hilum.  IMPRESSION:  ·	New nonocclusive DVT of the right soleal vein, below the knee.  ·	Chronic appearing superficial thrombosis of right small saphenous vein, associated with calcifications.  ·	Dr. Melendez discussed these above findings with Dr. Jaime Arora on 2023 at 8:40AM, with read back.  ·	Edema of the superficial soft tissues of the right lower extremity. Correlate clinically.  --- End of Report ---  STACIE MELENDEZ M.D., ATTENDING RADIOLOGIST  This document has been electronically signed. Dec  1 2023  8:46AM        ACC: [id number] EXAM:  US DPLX LWR EXT VEINS LTD RT   ORDERED BY: PAOLO VANCE   PROCEDURE DATE:  2023    INTERPRETATION:  CLINICAL INFORMATION: Right foot infection.  COMPARISON: None available.  TECHNIQUE: Duplex sonography ofthe RIGHT LOWER extremity veins with color and spectral Doppler, with and without compression.  FINDINGS:  ·	There is normal compressibility of the right common femoral, femoral and popliteal veins.  ·	The contralateral common femoral vein is patent.  ·	Doppler examination shows normal spontaneous and phasic flow.  ·	No calf vein thrombosis is detected.  IMPRESSION:  No evidence of right lower extremity deep venous thrombosis.  --- End of Report ---  PIOTR CASTILLO MD; Attending Radiologist  This document has been electronically signed. 2023 12:02PM        ACC: [id number] EXAM:  MR FOOT RT   ORDERED BY:  MARIA D SUN   PROCEDURE DATE:  2023    INTERPRETATION:  Exam Type: MR FOOT RIGHT  Exam Date and Time: 2023 9:57 AM  Indication: Right foot pain. Osteomyelitis.  Comparison: Right foot x-rays 2023.  TECHNIQUE:  MRI of the right foot was performed on a 1.5 Kamila system in three planes   (axial, sagittal, and coronal) using a standard non-contrast protocol.    FINDINGS:  There is extensive osseous destruction involving the second metatarsal   head with surrounding phlegmon change and abscess formation. Abnormal   signal is present throughout the second metatarsal with extension into   the proximal phalanx of the second toe. The abscess extensive level of   the second metatarsal shaft and extends more distally to involve the soft   tissues about the second toe. There is also extension of the abscess   along the plantar surface to the level of a soft tissue defect. Extensive   subcutaneous foci of air is present in the location of the abscess about   the second digit. Abnormal marrow signal is also identified at the third   and fourth metatarsal heads. These are reflective of additional sites of   osteomyelitis. Bone marrow edema with subtle low T1 signal withinthe   partially imaged navicular as well as within the intermediate and lateral   cuneiforms may reflect osseous stress reaction changes or periostitis   versus less likely findings of early osteomyelitis. Patient is status   post amputation of the first metatarsal to the level of the midshaft. The   surgical margin is normal in appearance. No fracture or dislocation.      IMPRESSION:  1.  Osteomyelitis involving the second metatarsal and the second toe   proximal phalanx with an accompanying abscess which extends along the   second metatarsal and proximal phalanx as well as along the plantar   surface to the area of a soft tissue wound.  2.  Osteomyelitis involving the third and fourth metatarsal heads.  3.  Bone marrow edema with subtle lowT1 signal within the partially   imaged navicular as well as within the intermediate and lateral   cuneiforms may reflect osseous stress reaction changes or periostitis   versus less likely findings of early osteomyelitis.  --- End of Report ---  GRADY SHEEHAN MD; Attending Radiologist  This document has been electronically signed. 2023 11:19AM

## 2023-12-01 NOTE — PROGRESS NOTE ADULT - SUBJECTIVE AND OBJECTIVE BOX
[facility], Division of Infectious Diseases  OFELIA Harvey Y. Patel, S. Shah, G. [facility]  [phone]    Name: LOIDA QUEZADA  Age: 63y  Gender: Male  MRN: [id number]    Interval History:  Patient seen and examined at bedside  No acute overnight events. Afebrile  No complaints  Notes reviewed    Antibiotics:  ampicillin  IVPB      ampicillin  IVPB 2 Gram(s) IV Intermittent every 4 hours      Medications:  acetaminophen     Tablet .. 650 milliGRAM(s) Oral every 6 hours PRN  ampicillin  IVPB 2 Gram(s) IV Intermittent every 4 hours  ampicillin  IVPB      aspirin enteric coated 81 milliGRAM(s) Oral daily  atorvastatin 40 milliGRAM(s) Oral at bedtime  clopidogrel Tablet 75 milliGRAM(s) Oral daily  dextrose 5%. 1000 milliLiter(s) IV Continuous <Continuous>  dextrose 5%. 1000 milliLiter(s) IV Continuous <Continuous>  dextrose 5%. 1000 milliLiter(s) IV Continuous <Continuous>  dextrose 5%. 1000 milliLiter(s) IV Continuous <Continuous>  dextrose 50% Injectable 25 Gram(s) IV Push once  dextrose 50% Injectable 25 Gram(s) IV Push once  dextrose 50% Injectable 12.5 Gram(s) IV Push once  dextrose Oral Gel 15 Gram(s) Oral once PRN  enoxaparin Injectable 40 milliGRAM(s) SubCutaneous every 24 hours  gabapentin 300 milliGRAM(s) Oral two times a day  glucagon  Injectable 1 milliGRAM(s) IntraMuscular once  glucagon  Injectable 1 milliGRAM(s) IntraMuscular once  HYDROmorphone  Injectable 0.5 milliGRAM(s) IV Push every 6 hours PRN  insulin lispro (ADMELOG) corrective regimen sliding scale   SubCutaneous Before meals and at bedtime  lactobacillus acidophilus 1 Tablet(s) Oral three times a day with meals  lisinopril 40 milliGRAM(s) Oral daily  melatonin 3 milliGRAM(s) Oral at bedtime  metoprolol succinate ER 50 milliGRAM(s) Oral daily  polyethylene glycol 3350 17 Gram(s) Oral two times a day  traMADol 50 milliGRAM(s) Oral every 6 hours PRN  traMADol 25 milliGRAM(s) Oral every 6 hours PRN      Review of Systems:  Review of systems otherwise negative except as previously noted.    Allergies: No Known Allergies    For details regarding the patient's past medical history, social history, family history, and other miscellaneous elements, please refer the initial infectious diseases consultation and/or the admitting history and physical examination for this admission.    Objective:  Vitals:   T(C): 36.7 (12-01-23 @ 05:12), Max: 37.1 (11-30-23 @ 20:54)  HR: 70 (12-01-23 @ 05:12) (70 - 79)  BP: 145/77 (12-01-23 @ 05:12) (129/69 - 145/77)  RR: 18 (12-01-23 @ 05:12) (18 - 18)  SpO2: 95% (12-01-23 @ 05:12) (95% - 96%)    Physical Examination:  General: no acute distress  HEENT: NC/AT, EOMI,   Cardio: S1, S2 heard, RRR, no murmurs  Resp: breath sounds heard bilaterally, no rales, wheezes or rhonchi  Abd: soft, NT, ND  Ext: no edema or cyanosis, dressing in place  Skin: warm, dry, no visible rash      Laboratory Studies:  CBC:                       10.4   10.18 )-----------( 394      ( 01 Dec 2023 07:37 )             31.8     CMP: 12-01    138  |  104  |  16  ----------------------------<  261<H>  4.9   |  31  |  0.88    Ca    8.6      01 Dec 2023 07:37  Phos  2.8     12-01  Mg     2.2     12-01        Urinalysis Basic - ( 01 Dec 2023 07:37 )    Color: x / Appearance: x / SG: x / pH: x  Gluc: 261 mg/dL / Ketone: x  / Bili: x / Urobili: x   Blood: x / Protein: x / Nitrite: x   Leuk Esterase: x / RBC: x / WBC x   Sq Epi: x / Non Sq Epi: x / Bacteria: x        Microbiology: reviewed    Culture - Urine (collected 11-28-23 @ 01:31)  Source: Clean Catch Clean Catch (Midstream)  Final Report (11-29-23 @ 07:36):    No growth    Culture - Tissue with Gram Stain (collected 11-27-23 @ 18:05)  Source: .Tissue Other, RIGHT FOREFOOT BONE CULTURE  Gram Stain (11-28-23 @ 03:54):    Rare polymorphonuclear leukocytes seen per low power field    Few Gram positive cocci in pairs seen per oil power field  Organism: Enterococcus faecalis (11-29-23 @ 19:04)  Organism: Enterococcus faecalis (11-29-23 @ 19:04)      Method Type: GOLDEN      -  Ampicillin: S <=2 Predicts results to ampicillin/sulbactam, amoxacillin-clavulanate and  piperacillin-tazobactam.      -  Tetracycline: R >8      -  Vancomycin: S 1    Culture - Blood (collected 11-24-23 @ 09:50)  Source: .Blood Blood-Peripheral  Final Report (11-29-23 @ 17:00):    No growth at 5 days    Culture - Blood (collected 11-24-23 @ 09:35)  Source: .Blood Blood-Peripheral  Final Report (11-29-23 @ 17:00):    No growth at 5 days    Culture - Other (collected 11-24-23 @ 08:35)  Source: .Other Right foot  Final Report (11-26-23 @ 15:34):    Moderate Enterococcus faecalis    Normal qi isolated  Organism: Enterococcus faecalis (11-26-23 @ 15:34)  Organism: Enterococcus faecalis (11-26-23 @ 15:34)      Method Type: GOLDEN      -  Ampicillin: S <=2 Predicts results to ampicillin/sulbactam, amoxacillin-clavulanate and  piperacillin-tazobactam.      -  Tetracycline: R >8      -  Vancomycin: S 2          Radiology: reviewed

## 2023-12-01 NOTE — PROGRESS NOTE ADULT - ASSESSMENT
62M w/ PMhx of HTN, HLD, DM2 admitted from wound center for R foot infection.   Hx of R foot ulcer; pt also s/p R 1st toe and 1st metatarsal head resection.   Felt chills and pain to R foot this past Sunday.     R Foot Wound Infection  MRI noted--  1.  Osteomyelitis involving the second metatarsal and the second toe proximal phalanx with an accompanying abscess which extends along the   second metatarsal and proximal phalanx as well as along the plantar surface to the area of a soft tissue wound.  2.  Osteomyelitis involving the third and fourth metatarsal heads.  3.  Bone marrow edema with subtle lowT1 signal within the partially imaged navicular as well as within the intermediate and lateral   cuneiforms may reflect osseous stress reaction changes or periostitis versus less likely findings of early osteomyelitis.    WCx and OR Cx E. faecalis, ampicillin sensitive  BCx NGTD    S/p Transmetatarsal amputation 27-Nov-2023 18:38:56  Joe Stark  Lengthening, Achilles tendon 27-Nov-2023 18:39:03  Joe Stark.    Recommendations  Switch to IV ampicillin 2gm 4h for treatment of OM  F/u path, will likely show OM given extensive disease on MRI  Plan for PICC line placement and LT IV Abx  -PICC ordered, going today  -ampicillin 12gm/24hrs continuous infusion x6 wk course until 1/8/24    Trend temps/WBC  Additional care per primary team    D/w patient, agreed to plan  D/w Dr. Calhoun  D/w CM    Infectious Diseases will continue to follow. Please call with any questions.   Melany Calhoun M.D.  [facility] Division of Infectious Diseases [phone]  For after 5 P.M. and weekends, please call [phone]   62M w/ PMhx of HTN, HLD, DM2 admitted from wound center for R foot infection.   Hx of R foot ulcer; pt also s/p R 1st toe and 1st metatarsal head resection.   Felt chills and pain to R foot this past Sunday.     R Foot Wound Infection  MRI noted--  1.  Osteomyelitis involving the second metatarsal and the second toe proximal phalanx with an accompanying abscess which extends along the   second metatarsal and proximal phalanx as well as along the plantar surface to the area of a soft tissue wound.  2.  Osteomyelitis involving the third and fourth metatarsal heads.  3.  Bone marrow edema with subtle lowT1 signal within the partially imaged navicular as well as within the intermediate and lateral   cuneiforms may reflect osseous stress reaction changes or periostitis versus less likely findings of early osteomyelitis.    WCx and OR Cx E. faecalis, ampicillin sensitive  BCx NGTD    S/p Transmetatarsal amputation 27-Nov-2023 18:38:56  Joe Stark  Lengthening, Achilles tendon 27-Nov-2023 18:39:03  Joe Stark.    Recommendations  Switch to IV ampicillin 2gm 4h for treatment of OM  F/u path, will likely show OM given extensive disease on MRI  Plan for PICC line placement and LT IV Abx  -PICC ordered, going today  -ampicillin 12gm/24hrs continuous infusion x6 wk course until 1/8/24 w/ weekly CMP, CBC, ESR, CRP  -orders called in to [facility]   Trend temps/WBC  Additional care per primary team    D/w patient, agreed to plan  D/w Dr. Calhoun  D/w CM    Pt can call [phone] to make appt     ID will sign off  Please call with any questions.   Melany Calhoun M.D.  [facility] Division of Infectious Diseases [phone]  For after 5 P.M. and weekends, please call [phone]

## 2023-12-01 NOTE — CONSULT NOTE ADULT - CONSULT REQUESTED DATE/TIME
24-Nov-2023 18:03
25-Nov-2023 17:59
24-Nov-2023 14:30
01-Dec-2023 14:44
24-Nov-2023 10:40
25-Nov-2023 14:28
27-Nov-2023 08:35

## 2023-12-01 NOTE — PROGRESS NOTE ADULT - PROBLEM SELECTOR PLAN 2
- Pt with edema of R lower leg noted   - STAT u/s this AM showed: New nonocclusive DVT of the right soleal vein, below the knee. Chronic appearing superficial thrombosis of right small saphenous vein, associated with calcifications.  - Full dose AC required  - pt with no SOB, tachycardia, or desaturation, monitor closely for sx - Pt with edema of R lower leg noted   - STAT u/s this AM showed: New nonocclusive DVT of the right soleal vein, below the knee. Chronic appearing superficial thrombosis of right small saphenous vein, associated with calcifications.  - pt with no SOB, tachycardia, or desaturation, monitor closely for sx  - Vascular Dr. Boston consulted, f/u reccs regarding AC  - Hem/Onc Dr. Austin consulted, f/u reccs regarding AC - Pt with edema of R lower leg noted   - STAT u/s this AM showed: New nonocclusive DVT of the right soleal vein, below the knee. Chronic appearing superficial thrombosis of right small saphenous vein, associated with calcifications.  - pt with no SOB, tachycardia, or desaturation, monitor closely for sx  - Vascular Dr. Boston consulted, f/u reccs regarding AC- per vascular for BTK DVT, no full does AC needed, CONTINUE DVT PPX and repeat duplex in 3-5 days  - Hem/Onc Dr. Austin consulted, f/u reccs regarding AC - Pt with edema of R lower leg noted   - STAT u/s this AM showed: New nonocclusive DVT of the right soleal vein, below the knee. Chronic appearing superficial thrombosis of right small saphenous vein, associated with calcifications.  - pt with no SOB, tachycardia, or desaturation, monitor closely for sx  - Vascular Dr. Boston consulted, f/u reccs regarding AC- per vascular for BTK DVT, no full does AC needed, CONTINUE DVT PPX and repeat duplex in 3-5 days  - Hem/Onc Dr. Austin consulted, f/u reccs regarding AC-As d/w Dr Austin -Pt need to be on full dose AC for Post op New nonocclusive DVT of the right soleal vein,   -D/W Pt at detail, Start Eliquis 10 mg 2x day x 7 days -loading dose   Followed by 5 mg 2x day   -Total 3 months treatment with AC as d/w DR Austin   Pt agrees for 3 months Treatment & out pt follow up with DR Austin -Hematology  D/W Pt & PMD DR Frederick as Very detail.

## 2023-12-01 NOTE — CONSULT NOTE ADULT - ASSESSMENT
[ASSESSMENT and  PLAN]  I82.461    Acute DVT R soleal vein  I82.81      chronic SVT R saphenous vein  I82.401     Osteomyelitis, R Foot  D63.8       Anemia chronic disease  I73.9        PAD   Z89. 421   R metatarsal amputation    63yo  M with hx CAD, PAD, HTN, DM, HLD, admitted with RLE OM developed distal RLE DVT soleal vein despite LMWH prophylaxis, in addition with chronic RLE SV SVT  PAD on DAPT with ASA 81mg and Plavix x3yrs since angioplasty of RLE  hx of + cardiac stress test  No personal hx bleeding. No bleeding sx. No other blood thinners. on ASA 81mg and   No family hx VTE.     Duplex LE:   IMPRESSION:  New nonocclusive DVT of the right soleal vein, below the knee.  Chronic appearing superficial thrombosis of right small saphenous vein, associated with calcifications.     Partially compressible right soleal vein (below the knee).      Chronic appearing superficial thrombosis of right small saphenous vein, associated with calcifications.     Small right groin lymph node measures 1.9 x 0.7 x 1.3 cm, with benign appearing fatty hilum.    Pt likely at more risk for recurrent VTE than bleeding.   Pt developed DVT despite DAPT and LMWH prophylaxis.   Has prior SVT as well.   Never screened for malignancy  No localizing sx.     RECOMMENDATIONS    OM  IV abx per infectious diseases.     Anemia     Transfuse PRBC as clinically indicated.      Transfuse PRBC if Hgb <7.0 or if symptomatic.      Follow CBC  Check Anemia studies outpt     ESR elevated 77    DVT and SVT  Recommend Xarelto 20mg daily or Eliquis 5mg q12h  Duration of AC 3mo  Pt likely to remain at increased risk for thrombosis short term,.   DC Lovenox    Onc  Recommend age appropriate cancer screening.   Pt has never had colonoscopy.   Nocturia 2-3 per night.   Consider check CEA and PSA    PAD/CAD  Cardiology following  APT or DAPT per cardiology.   If on DAPT, omit loading dose for DOAC.     Discussed plan of care with patient in detail.   Pt/Family expressed understanding of the treatment plan.   Risks, benefits and alternatives discussed in detail.   Opportunity given for questions and discussion.   Questions or concerns all addressed and answered to their satisfaction, and in lay terms.     Follow in office in 2mo post DC  Follow with PMD in 1mo post DC    Discussed with Dr JONAS Calhoun  Thank you for consulting us.     > 71 minutes spent in direct patient care, examining and counseling patient,  reviewing  the notes, lab data/ imaging , discussion with multidisciplinary team.

## 2023-12-01 NOTE — PROCEDURE NOTE - ADDITIONAL PROCEDURE DETAILS
40 cm bard single lumen power picc inserted into patent right basilic vein under sterile conditions and image guidance.  Tip is in the SVC.  PICC can be accessed.

## 2023-12-01 NOTE — PROGRESS NOTE ADULT - PROBLEM SELECTOR PLAN 3
- HgbA1c 8.2  - Hold home medications  - Low dose insulin corrective scale  - Hypoglycemia protocol, Accu-Chek   AC&HS  - Diet regular food with DASH/TLC  - Nutritional eval-complete  Diabetic NP eval - HgbA1c 8.2  - Hold home medications  - Low dose insulin corrective scale  - Hypoglycemia protocol, Accu-Chek   AC&HS  - Diet regular food with DASH/TLC  - Nutritional eval-complete  Diabetic NP eval  Neuropathy- pt requesting Gabapentin 3x day

## 2023-12-01 NOTE — PRE PROCEDURE NOTE - PRE PROCEDURE EVALUATION
Interventional Radiology    HPI: 63y Male with PMhx of HTN, HLD, DM2 admitted from [facility] center for R foot infection. Hx of R foot ulcer; pt also s/p R 1st toe and 1st metatarsal head resection. MRI shows Osteomyelitis involving the second metatarsal and the second toe proximal phalanx with an accompanying abscess which extends along the second metatarsal and proximal phalanx as well as along the plantar surface to the area of a soft tissue wound.  Also with Osteomyelitis involving the third and fourth metatarsal heads.  Per ID will require PICC for long term IV Abx      Allergies: No Known Allergies    Medications (Abx/Cardiac/Anticoagulation/Blood Products)    ampicillin  IVPB: 200 mL/Hr IV Intermittent (12-01 @ 08:34)  aspirin enteric coated: 81 milliGRAM(s) Oral (11-30 @ 12:36)  clopidogrel Tablet: 75 milliGRAM(s) Oral (11-30 @ 12:36)  enoxaparin Injectable: 40 milliGRAM(s) SubCutaneous (12-01 @ 08:35)  lisinopril: 40 milliGRAM(s) Oral (12-01 @ 05:26)  metoprolol succinate ER: 50 milliGRAM(s) Oral (12-01 @ 05:26)  piperacillin/tazobactam IVPB..: 25 mL/Hr IV Intermittent (12-01 @ 05:26)    Data:    T(C): 36.7  HR: 70  BP: 145/77  RR: 18  SpO2: 95%    Exam  General: No acute distress  Chest: Non labored breathing  Abdomen: Non-distended  Extremities: No swelling, warm    -WBC 10.18 / HgB 10.4 / Hct 31.8 / Plt 394  -Na 138 / Cl 104 / BUN 16 / Glucose 261  -K 4.9 / CO2 31 / Cr 0.88  -ALT -- / Alk Phos -- / T.Bili --  -INR0.96    Imaging: reviewed    Plan: 63y Male presents for PICC  -Risks/Benefits/alternatives explained with the patient and/or healthcare proxy and witnessed informed consent obtained.

## 2023-12-01 NOTE — PROGRESS NOTE ADULT - SUBJECTIVE AND OBJECTIVE BOX
Patient is a 63y old  Male who presents with a chief complaint of R foot wound (30 Nov 2023 10:12)    HPI:  63 yo M with PMH HTN, HLD, Type II DM, s/p R hallux toe amputation 3 yrs  ago presents to the ED with R foot wound. Patient went to a private podiatrist 2.5 months ago to scrape off callus. The callus removal site did not heal well and started to develop into a wound. Podiatrist recommended patient see Dr. Stark for wound management. Per patient, would has been intermittently draining clear liquid and having foul odor. Last sunday, patient had episode of chills that resolved when he went to sleep. Last night, he also had chills and had difficulty balancing. Today he went to his regular scheduled appt with Dr. Stark who recommended he be admitted for IV ABx and surgery on Tuesday,  Denies fever chest pain, palpitations, SOB, cough, abdominal pain, nausea, vomiting, diarrhea, constipation, urinary frequency, urgency, or dysuria, headaches, changes in vision, dizziness, numbness, tingling. Denies recent travel, recent antibiotic use, or sick contacts.    ED Course:   Vitals: BP: 127/65, HR: 93, Temp: 99.2 , RR: 19, SpO2: 96 % on RA   Labs:  ESR: 77, WBC: 17.51, H/H: 12.6/38.4  CXR: No evidence of acute infiltrate  XRAY foot: IMPRESSION: Suspect pathologic fractures of the third and fourth metatarsal heads. 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 If osteomyelitis is clinically considered  despite conservative therapy,   and soft tissue / bone infection requires further assessment, follow-up   MRI recommended.  EKG:  NSR, 81 BPM  Received in the ED  Vanc x  1, Zosyn x  1, NS bolus  x 1     (24 Nov 2023 15:27)    INTERVAL HPI:  11/25/23: Patient seen and examined at bedside. Patient laying in bed comfortably and offers no complaints. Dressing on right foot appears clean, dry and intact. No significant overnight events. IV Zosyn, WBC Resolved   11/26/23: Pt seen and examined at bedside. Pt sitting up in bed comfortably. Has no complaints. Otherwise is anxious to have surgery as soon as possible. Eating, ambulating well. No infectious sx. On IV zosyn,   11/27/23: Pt seen and examined at bedside. Pt sitting up in bed comfortably. Has no complaints. Otherwise is anxious to have surgery as soon as possible. Awaiting MRI. FRANCISCA done. Saw cardio NP this AM and emphasized that she should call his cardiologist as early as possible. Eating, ambulating well. No infectious sx. On IV zosyn,  NPO for possible OR -Podiatry ,On IV Zosyn  11/28/23:  POD # 1. Pt seen and examined at bedside. Pt sitting up comfortably in bed. s/p R transmetatarsal amputation with no complications. Pain 7/10 currently, was 11 last night. Pain regimen ordered. Walking to and from bathroom on heel. On IV zosyn. Eating diabetic diet without issues.   11/29/23: POD #2  s/p R transmetatarsal amputation with no complications. Pt sitting up comfortably at bedside. Pain is well controlled on tramadol. Currently 5/10 pain. Walking to and from bathroom on heel. On IV zosyn. No chest pain, sob, leg edema or leg pain. Follow podiatry reccs. Awaiting biopsy results for outpatient abx management.   11/30: POD #3 s/p R transmetatarsal amputation with no complications. Pt sitting up comfortably at bedside. Pain is well controlled on tramadol and dilaudid for breakthrough pain. Walking to and from bathroom on heel. HAd BM last night. Family to bring metamucil from home. On IV zosyn. No chest pain, sob or leg pain. Follow podiatry reccs. Awaiting biopsy results for outpatient abx management.   12/1/23: POD#4. Patient with no acute symptoms.  Pain is well controlled on tramadol and dilaudid for breakthrough pain. Walking to and from bathroom on heel. No SOB or calf pain. Awaiting biopsy results for outpatient abx management. R lower leg u/s done this AM.     OVERNIGHT EVENTS: None    Home Medications:  atorvastatin 40 mg oral tablet: 1 tab(s) orally once a day (24 Nov 2023 15:22)  clopidogrel 75 mg oral tablet: 1 tab(s) orally once a day (24 Nov 2023 15:22)  gabapentin 300 mg oral capsule: 1 cap(s) orally 2 times a day (24 Nov 2023 15:20)  Jardiance 25 mg oral tablet: 1 tab(s) orally once a day (24 Nov 2023 15:20)  lisinopril 40 mg oral tablet: 1 tab(s) orally once a day (24 Nov 2023 15:19)  metFORMIN 1000 mg oral tablet: 1 tab(s) orally 2 times a day (24 Nov 2023 15:22)  Metoprolol Succinate ER 50 mg oral tablet, extended release: 1 tab(s) orally once a day (24 Nov 2023 15:18)  Trulicity Pen 1.5 mg/0.5 mL subcutaneous solution: 1.5 milligram(s) subcutaneously once a week (24 Nov 2023 15:23)      MEDICATIONS  (STANDING):  ampicillin  IVPB      ampicillin  IVPB 2 Gram(s) IV Intermittent every 4 hours  aspirin enteric coated 81 milliGRAM(s) Oral daily  atorvastatin 40 milliGRAM(s) Oral at bedtime  clopidogrel Tablet 75 milliGRAM(s) Oral daily  dextrose 5%. 1000 milliLiter(s) (100 mL/Hr) IV Continuous <Continuous>  dextrose 5%. 1000 milliLiter(s) (50 mL/Hr) IV Continuous <Continuous>  dextrose 5%. 1000 milliLiter(s) (50 mL/Hr) IV Continuous <Continuous>  dextrose 5%. 1000 milliLiter(s) (100 mL/Hr) IV Continuous <Continuous>  dextrose 50% Injectable 12.5 Gram(s) IV Push once  dextrose 50% Injectable 25 Gram(s) IV Push once  dextrose 50% Injectable 25 Gram(s) IV Push once  enoxaparin Injectable 40 milliGRAM(s) SubCutaneous every 24 hours  gabapentin 300 milliGRAM(s) Oral two times a day  glucagon  Injectable 1 milliGRAM(s) IntraMuscular once  glucagon  Injectable 1 milliGRAM(s) IntraMuscular once  insulin lispro (ADMELOG) corrective regimen sliding scale   SubCutaneous Before meals and at bedtime  lactobacillus acidophilus 1 Tablet(s) Oral three times a day with meals  lisinopril 40 milliGRAM(s) Oral daily  melatonin 3 milliGRAM(s) Oral at bedtime  metoprolol succinate ER 50 milliGRAM(s) Oral daily  polyethylene glycol 3350 17 Gram(s) Oral two times a day    MEDICATIONS  (PRN):  acetaminophen     Tablet .. 650 milliGRAM(s) Oral every 6 hours PRN Temp greater or equal to 38C (100.4F), Mild Pain (1 - 3)  dextrose Oral Gel 15 Gram(s) Oral once PRN Blood Glucose LESS THAN 70 milliGRAM(s)/deciliter  HYDROmorphone  Injectable 0.5 milliGRAM(s) IV Push every 6 hours PRN Severe Pain (7 - 10)  for breakthrough pain  traMADol 25 milliGRAM(s) Oral every 6 hours PRN Moderate Pain (4 - 6)  traMADol 50 milliGRAM(s) Oral every 6 hours PRN Severe Pain (7 - 10)      Allergies    No Known Allergies    Intolerances        Social History:  Lives: at home with wife and son  ADLs: independent  Diet: diabetic diet  Vaccination: covid vaccinated  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 No nausea, No vomiting; No diarrhea, No constipation. [ x ] BM  GENITOURINARY: No dysuria, No frequency, No urgency, No hematuria, NO incontinence  NEUROLOGICAL: No headaches, No dizziness, No numbness, No tingling, No tremors, No weakness  EXT: [x] R foot pain, RLE slightly more edematous than L. No pain on palpation, rash or erythema.   SKIN:  [ x ] No itching, burning, rashes, or lesions + + neuropathy b/l LE  MUSCULOSKELETAL: [x] R foot pain  PSYCHIATRIC: No depression,  No anxiety,  No mood swings ,No difficulty sleeping at night  PAIN SCALE: [ x ] None  [  ] Other-  ROS Unable to obtain due to - [  ] Dementia  [  ] Lethargy [  ] Drowsy [  ] Sedated [  ] non verbal  REST OF REVIEW Of SYSTEM - [ x ] Normal    Vital Signs Last 24 Hrs  T(C): 36.7 (01 Dec 2023 05:12), Max: 37.1 (30 Nov 2023 20:54)  T(F): 98 (01 Dec 2023 05:12), Max: 98.7 (30 Nov 2023 20:54)  HR: 70 (01 Dec 2023 05:12) (70 - 79)  BP: 145/77 (01 Dec 2023 05:12) (129/69 - 145/77)  BP(mean): --  RR: 18 (01 Dec 2023 05:12) (18 - 18)  SpO2: 95% (01 Dec 2023 05:12) (95% - 96%)    Parameters below as of 01 Dec 2023 05:12  Patient On (Oxygen Delivery Method): room air      Finger Stick    PHYSICAL EXAM: i am OK   GENERAL:  [ x ] NAD , [ x ] well appearing, [  ] Agitated, [  ] Drowsy,  [  ] Lethargy, [  ] confused   HEAD:  [ x ] Normal, [  ] Other  EYES:  [ x ] EOMI, [x  ] PERRLA, [ x ] conjunctiva and sclera clear normal, [  ] Other,  [  ] Pallor,[  ] Discharge  ENMT:  [ x ] Normal, [ x ] Moist mucous membranes, [ x ] Good dentition, [ x ] No Thrush  NECK:  [x  ] Supple, [ x ] No JVD, [x  ] Normal thyroid, [  ] Lymphadenopathy [  ] Other  CHEST/LUNG:  [ x ] Clear to auscultation bilaterally, [  x] Breath Sounds equal B/L, [  ] poor effort  [x ] No rales, [ x ] No rhonchi  [x  ]  No wheezing,   HEART:  [x  ] Regular rate and rhythm, [  ] tachycardia, [  ] Bradycardia,  [  ] irregular  [x  ] No murmurs, No rubs, No gallops, [  ] PPM in place (Mfr:  )  ABDOMEN:  [ x ] Soft, [ x ] Nontender, [ x ] Nondistended, [ x ] No mass, [ x ] Bowel sounds present, [  ] obese  NERVOUS SYSTEM:  [ x ] Alert & Oriented X3, [  ] Nonfocal  [  ] Confusion  [  ] Encephalopathic [  ] Sedated [  ] Unable to assess, [  ] Dementia [  ] Other-  EXTREMITIES: [ ] 2+ Peripheral Pulses, No clubbing, No cyanosis,  [ + ] edema R LE>LL EXT. [  ] PVD stasis skin changes B/L Lower EXT, [ x ] s/p R transmetatarsal amputation, R foot with clean dressing.   SKIN:  [  x] Skin warm and dry, [  ] Pressure Ulcers, [  ] ecchymosis, [  ] Skin Tears, [  ] Other  DIET: Diet, Consistent Carbohydrate w/Evening Snack (11-29-23 @ 16:41)      LABS:                        10.4   10.18 )-----------( 394      ( 01 Dec 2023 07:37 )             31.8     01 Dec 2023 07:37    138    |  104    |  16     ----------------------------<  261    4.9     |  31     |  0.88     Ca    8.6        01 Dec 2023 07:37  Phos  2.8       01 Dec 2023 07:37  Mg     2.2       01 Dec 2023 07:37        Urinalysis Basic - ( 01 Dec 2023 07:37 )    Color: x / Appearance: x / SG: x / pH: x  Gluc: 261 mg/dL / Ketone: x  / Bili: x / Urobili: x   Blood: x / Protein: x / Nitrite: x   Leuk Esterase: x / RBC: x / WBC x   Sq Epi: x / Non Sq Epi: x / Bacteria: x        Culture Results:   No growth (11-28 @ 01:31)  Culture Results:   No growth at 5 days (11-24 @ 09:50)  Culture Results:   No growth at 5 days (11-24 @ 09:35)                  Culture - Urine (collected 28 Nov 2023 01:31)  Source: Clean Catch Clean Catch (Midstream)  Final Report (29 Nov 2023 07:36):    No growth    Culture - Tissue with Gram Stain (collected 27 Nov 2023 18:05)  Source: .Tissue Other, RIGHT FOREFOOT BONE CULTURE  Gram Stain (28 Nov 2023 03:54):    Rare polymorphonuclear leukocytes seen per low power field    Few Gram positive cocci in pairs seen per oil power field  Organism: Enterococcus faecalis (29 Nov 2023 19:04)  Organism: Enterococcus faecalis (29 Nov 2023 19:04)    Culture - Blood (collected 24 Nov 2023 09:50)  Source: .Blood Blood-Peripheral  Final Report (29 Nov 2023 17:00):    No growth at 5 days    Culture - Blood (collected 24 Nov 2023 09:35)  Source: .Blood Blood-Peripheral  Final Report (29 Nov 2023 17:00):    No growth at 5 days         Anemia Panel:      Thyroid Panel:  Thyroid Stimulating Hormone, Serum: 1.20 uIU/mL (11-25-23 @ 08:10)                RADIOLOGY & ADDITIONAL TESTS:      HEALTH ISSUES - PROBLEM Dx:  Need for prophylactic measure    Type 2 diabetes mellitus    Diabetic ulcer of right foot  You came in for an ulcer on your right foot which was suspected to have infection up to the level of the bone. We did an MRI of the right foot and we saw X. You underwent surgery of R metatarsal bone to stop the infection. Please follow up with Dr. Stark 1 week after surgery.    CAD (coronary artery disease)  This is a chronic condition, please continue to take plavix and aspirin.    HTN (hypertension)  This is a chronic condition, please continue to take plavix and aspirin.    Hyperlipidemia    Diabetes            Consultant(s) Notes Reviewed:  [  ] YES     Care Discussed with [X] Consultants  [  ] Patient  [  ] Family [  ] HCP [  ]   [  ] Social Service  [  ] RN, [  ] Physical Therapy,[  ] Palliative care team  DVT PPX: [  ] Lovenox, [  ] S C Heparin, [  ] Coumadin, [  ] Xarelto, [  ] Eliquis, [  ] Pradaxa, [  ] IV Heparin drip, [  ] SCD [  ] Contraindication 2 to GI Bleed,[  ] Ambulation [  ] Contraindicated 2 to  bleed [  ] Contraindicated 2 to Brain Bleed  Advanced directive: [  ] None, [  ] DNR/DNI Patient is a 63y old  Male who presents with a chief complaint of R foot wound (30 Nov 2023 10:12)    HPI:  61 yo M with PMH HTN, HLD, Type II DM, s/p R hallux toe amputation 3 yrs  ago presents to the ED with R foot wound. Patient went to a private podiatrist 2.5 months ago to scrape off callus. The callus removal site did not heal well and started to develop into a wound. Podiatrist recommended patient see Dr. Stark for wound management. Per patient, would has been intermittently draining clear liquid and having foul odor. Last sunday, patient had episode of chills that resolved when he went to sleep. Last night, he also had chills and had difficulty balancing. Today he went to his regular scheduled appt with Dr. Stark who recommended he be admitted for IV ABx and surgery on Tuesday,  Denies fever chest pain, palpitations, SOB, cough, abdominal pain, nausea, vomiting, diarrhea, constipation, urinary frequency, urgency, or dysuria, headaches, changes in vision, dizziness, numbness, tingling. Denies recent travel, recent antibiotic use, or sick contacts.    ED Course:   Vitals: BP: 127/65, HR: 93, Temp: 99.2 , RR: 19, SpO2: 96 % on RA   Labs:  ESR: 77, WBC: 17.51, H/H: 12.6/38.4  CXR: No evidence of acute infiltrate  XRAY foot: IMPRESSION: Suspect pathologic fractures of the third and fourth metatarsal heads. 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 If osteomyelitis is clinically considered  despite conservative therapy,   and soft tissue / bone infection requires further assessment, follow-up   MRI recommended.  EKG:  NSR, 81 BPM  Received in the ED  Vanc x  1, Zosyn x  1, NS bolus  x 1     (24 Nov 2023 15:27)    INTERVAL HPI:  11/25/23: Patient seen and examined at bedside. Patient laying in bed comfortably and offers no complaints. Dressing on right foot appears clean, dry and intact. No significant overnight events. IV Zosyn, WBC Resolved   11/26/23: Pt seen and examined at bedside. Pt sitting up in bed comfortably. Has no complaints. Otherwise is anxious to have surgery as soon as possible. Eating, ambulating well. No infectious sx. On IV zosyn,   11/27/23: Pt seen and examined at bedside. Pt sitting up in bed comfortably. Has no complaints. Otherwise is anxious to have surgery as soon as possible. Awaiting MRI. FRANCISCA done. Saw cardio NP this AM and emphasized that she should call his cardiologist as early as possible. Eating, ambulating well. No infectious sx. On IV zosyn,  NPO for possible OR -Podiatry ,On IV Zosyn  11/28/23:  POD # 1. Pt seen and examined at bedside. Pt sitting up comfortably in bed. s/p R transmetatarsal amputation with no complications. Pain 7/10 currently, was 11 last night. Pain regimen ordered. Walking to and from bathroom on heel. On IV zosyn. Eating diabetic diet without issues.   11/29/23: POD #2  s/p R transmetatarsal amputation with no complications. Pt sitting up comfortably at bedside. Pain is well controlled on tramadol. Currently 5/10 pain. Walking to and from bathroom on heel. On IV zosyn. No chest pain, sob, leg edema or leg pain. Follow podiatry reccs. Awaiting biopsy results for outpatient abx management.   11/30: POD #3 s/p R transmetatarsal amputation with no complications. Pt sitting up comfortably at bedside. Pain is well controlled on tramadol and dilaudid for breakthrough pain. Walking to and from bathroom on heel. HAd BM last night. Family to bring metamucil from home. On IV zosyn. No chest pain, sob or leg pain. Follow podiatry reccs. Awaiting biopsy results for outpatient abx management.   12/1/23: POD#4. Patient with no acute symptoms.  Pain is well controlled on tramadol and dilaudid for breakthrough pain. Walking to and from bathroom on heel. No SOB or calf pain. Awaiting biopsy results for outpatient abx management. R lower leg u/s done this AM. PICC line placed, for Home IV Abx , Doppler Rt Lower EXT done, Start on Eliquis as per Hematology. D/C Planning in AM     OVERNIGHT EVENTS: None    Home Medications:  atorvastatin 40 mg oral tablet: 1 tab(s) orally once a day (24 Nov 2023 15:22)  clopidogrel 75 mg oral tablet: 1 tab(s) orally once a day (24 Nov 2023 15:22)  gabapentin 300 mg oral capsule: 1 cap(s) orally 2 times a day (24 Nov 2023 15:20)  Jardiance 25 mg oral tablet: 1 tab(s) orally once a day (24 Nov 2023 15:20)  lisinopril 40 mg oral tablet: 1 tab(s) orally once a day (24 Nov 2023 15:19)  metFORMIN 1000 mg oral tablet: 1 tab(s) orally 2 times a day (24 Nov 2023 15:22)  Metoprolol Succinate ER 50 mg oral tablet, extended release: 1 tab(s) orally once a day (24 Nov 2023 15:18)  Trulicity Pen 1.5 mg/0.5 mL subcutaneous solution: 1.5 milligram(s) subcutaneously once a week (24 Nov 2023 15:23)      MEDICATIONS  (STANDING):  ampicillin  IVPB      ampicillin  IVPB 2 Gram(s) IV Intermittent every 4 hours  aspirin enteric coated 81 milliGRAM(s) Oral daily  atorvastatin 40 milliGRAM(s) Oral at bedtime  clopidogrel Tablet 75 milliGRAM(s) Oral daily  dextrose 5%. 1000 milliLiter(s) (100 mL/Hr) IV Continuous <Continuous>  dextrose 5%. 1000 milliLiter(s) (50 mL/Hr) IV Continuous <Continuous>  dextrose 5%. 1000 milliLiter(s) (50 mL/Hr) IV Continuous <Continuous>  dextrose 5%. 1000 milliLiter(s) (100 mL/Hr) IV Continuous <Continuous>  dextrose 50% Injectable 12.5 Gram(s) IV Push once  dextrose 50% Injectable 25 Gram(s) IV Push once  dextrose 50% Injectable 25 Gram(s) IV Push once  enoxaparin Injectable 40 milliGRAM(s) SubCutaneous every 24 hours  gabapentin 300 milliGRAM(s) Oral two times a day  glucagon  Injectable 1 milliGRAM(s) IntraMuscular once  glucagon  Injectable 1 milliGRAM(s) IntraMuscular once  insulin lispro (ADMELOG) corrective regimen sliding scale   SubCutaneous Before meals and at bedtime  lactobacillus acidophilus 1 Tablet(s) Oral three times a day with meals  lisinopril 40 milliGRAM(s) Oral daily  melatonin 3 milliGRAM(s) Oral at bedtime  metoprolol succinate ER 50 milliGRAM(s) Oral daily  polyethylene glycol 3350 17 Gram(s) Oral two times a day    MEDICATIONS  (PRN):  acetaminophen     Tablet .. 650 milliGRAM(s) Oral every 6 hours PRN Temp greater or equal to 38C (100.4F), Mild Pain (1 - 3)  dextrose Oral Gel 15 Gram(s) Oral once PRN Blood Glucose LESS THAN 70 milliGRAM(s)/deciliter  HYDROmorphone  Injectable 0.5 milliGRAM(s) IV Push every 6 hours PRN Severe Pain (7 - 10)  for breakthrough pain  traMADol 25 milliGRAM(s) Oral every 6 hours PRN Moderate Pain (4 - 6)  traMADol 50 milliGRAM(s) Oral every 6 hours PRN Severe Pain (7 - 10)      Allergies    No Known Allergies    Intolerances        Social History:  Lives: at home with wife and son  ADLs: independent  Diet: diabetic diet  Vaccination: covid vaccinated  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 No nausea, No vomiting; No diarrhea, No constipation. [ x ] BM  GENITOURINARY: No dysuria, No frequency, No urgency, No hematuria, NO incontinence  NEUROLOGICAL: No headaches, No dizziness, No numbness, No tingling, No tremors, No weakness  EXT: [x] R foot pain, RLE slightly more edematous than L. No pain on palpation, rash or erythema.   SKIN:  [ x ] No itching, burning, rashes, or lesions + + neuropathy b/l LE  MUSCULOSKELETAL: [x] R foot pain  PSYCHIATRIC: No depression,  No anxiety,  No mood swings ,No difficulty sleeping at night  PAIN SCALE: [ x ] None  [  ] Other-  ROS Unable to obtain due to - [  ] Dementia  [  ] Lethargy [  ] Drowsy [  ] Sedated [  ] non verbal  REST OF REVIEW Of SYSTEM - [ x ] Normal    Vital Signs Last 24 Hrs  T(C): 36.7 (01 Dec 2023 05:12), Max: 37.1 (30 Nov 2023 20:54)  T(F): 98 (01 Dec 2023 05:12), Max: 98.7 (30 Nov 2023 20:54)  HR: 70 (01 Dec 2023 05:12) (70 - 79)  BP: 145/77 (01 Dec 2023 05:12) (129/69 - 145/77)  BP(mean): --  RR: 18 (01 Dec 2023 05:12) (18 - 18)  SpO2: 95% (01 Dec 2023 05:12) (95% - 96%)    Parameters below as of 01 Dec 2023 05:12  Patient On (Oxygen Delivery Method): room air      Finger Stick    PHYSICAL EXAM: i am OK   GENERAL:  [ x ] NAD , [ x ] well appearing, [  ] Agitated, [  ] Drowsy,  [  ] Lethargy, [  ] confused   HEAD:  [ x ] Normal, [  ] Other  EYES:  [ x ] EOMI, [x  ] PERRLA, [ x ] conjunctiva and sclera clear normal, [  ] Other,  [  ] Pallor,[  ] Discharge  ENMT:  [ x ] Normal, [ x ] Moist mucous membranes, [ x ] Good dentition, [ x ] No Thrush  NECK:  [x  ] Supple, [ x ] No JVD, [x  ] Normal thyroid, [  ] Lymphadenopathy [  ] Other  CHEST/LUNG:  [ x ] Clear to auscultation bilaterally, [  x] Breath Sounds equal B/L, [  ] poor effort  [x ] No rales, [ x ] No rhonchi  [x  ]  No wheezing,   HEART:  [x  ] Regular rate and rhythm, [  ] tachycardia, [  ] Bradycardia,  [  ] irregular  [x  ] No murmurs, No rubs, No gallops, [  ] PPM in place (Mfr:  )  ABDOMEN:  [ x ] Soft, [ x ] Nontender, [ x ] Nondistended, [ x ] No mass, [ x ] Bowel sounds present, [  ] obese  NERVOUS SYSTEM:  [ x ] Alert & Oriented X3, [ x ] Nonfocal  [  ] Confusion  [  ] Encephalopathic [  ] Sedated [  ] Unable to assess, [  ] Dementia [  ] Other-  EXTREMITIES: [ ] 2+ Peripheral Pulses, No clubbing, No cyanosis,  [ + ] edema R LE, Nl LL EXT. [  ] PVD stasis skin changes B/L Lower EXT, [ x ] s/p R transmetatarsal amputation, R foot with clean dressing.   SKIN:  [  x] Skin warm and dry, [  ] Pressure Ulcers, [  ] ecchymosis, [  ] Skin Tears, [  ] Other  DIET: Diet, Consistent Carbohydrate w/Evening Snack (11-29-23 @ 16:41)      LABS:                        10.4   10.18 )-----------( 394      ( 01 Dec 2023 07:37 )             31.8     01 Dec 2023 07:37    138    |  104    |  16     ----------------------------<  261    4.9     |  31     |  0.88     Ca    8.6        01 Dec 2023 07:37  Phos  2.8       01 Dec 2023 07:37  Mg     2.2       01 Dec 2023 07:37     PATHOLOGY-    Specimen(s) Submitted  1  Right forefoot  2  Right forefoot, proximal margin    Final Diagnosis  1. Right forefoot  Acute and chronic osteomyelitis.  Skin and soft tissue with acute and chronic inflammation.      2. Right forefoot, proximal margin  Mild acute and chronic osteomyelitis.      Urinalysis Basic - ( 01 Dec 2023 07:37 )    Color: x / Appearance: x / SG: x / pH: x  Gluc: 261 mg/dL / Ketone: x  / Bili: x / Urobili: x   Blood: x / Protein: x / Nitrite: x   Leuk Esterase: x / RBC: x / WBC x   Sq Epi: x / Non Sq Epi: x / Bacteria: x        Culture Results:   No growth (11-28 @ 01:31)  Culture Results:   No growth at 5 days (11-24 @ 09:50)  Culture Results:   No growth at 5 days (11-24 @ 09:35)        Culture - Urine (collected 28 Nov 2023 01:31)  Source: Clean Catch Clean Catch (Midstream)  Final Report (29 Nov 2023 07:36):    No growth    Culture - Tissue with Gram Stain (collected 27 Nov 2023 18:05)  Source: .Tissue Other, RIGHT FOREFOOT BONE CULTURE  Gram Stain (28 Nov 2023 03:54):    Rare polymorphonuclear leukocytes seen per low power field    Few Gram positive cocci in pairs seen per oil power field  Organism: Enterococcus faecalis (29 Nov 2023 19:04)  Organism: Enterococcus faecalis (29 Nov 2023 19:04)    Culture - Blood (collected 24 Nov 2023 09:50)  Source: .Blood Blood-Peripheral  Final Report (29 Nov 2023 17:00):    No growth at 5 days    Culture - Blood (collected 24 Nov 2023 09:35)  Source: .Blood Blood-Peripheral  Final Report (29 Nov 2023 17:00):    No growth at 5 days         Anemia Panel:      Thyroid Panel:  Thyroid Stimulating Hormone, Serum: 1.20 uIU/mL (11-25-23 @ 08:10)      RADIOLOGY & ADDITIONAL TESTS:  < from: US Duplex Venous Lower Ext Ltd, Right (12.01.23 @ 08:28) >    FINDINGS:    Normal compressibility and patency of the right common femoral vein,   saphenofemoral junction, deep femoral vein, superficial femoral vein,   popliteal vein, posterior tibial vein, peroneal vein, and gastrocnemius   vein. Findings are confirmed on Doppler.    Partially compressible right soleal vein (below the knee). Chronic   appearing superficial thrombosis of right small saphenous vein,   associated with calcifications.    Edema of the superficial soft tissues of the right lower extremity.   Correlate clinically. Small right groin lymph node measures 1.9 x 0.7 x   1.3 cm, with benign appearing fatty hilum.    IMPRESSION:    New nonocclusive DVT of the right soleal vein, below the knee.    Chronic appearing superficial thrombosis of right small saphenous vein,   associated with calcifications.    Dr. Melendez discussed these above findings with Dr. Jaime Arora on   12/1/2023 at 8:40AM, with read back.    Edema of the superficial soft tissues of the right lower extremity.   Correlate clinically.      < end of copied text >      HEALTH ISSUES - PROBLEM Dx:  Need for prophylactic measure    Type 2 diabetes mellitus    Diabetic ulcer of right foot  You came in for an ulcer on your right foot which was suspected to have infection up to the level of the bone. We did an MRI of the right foot and we saw X. You underwent surgery of R metatarsal bone to stop the infection. Please follow up with Dr. Stark 1 week after surgery.    CAD (coronary artery disease)  This is a chronic condition, please continue to take plavix and aspirin.    HTN (hypertension)  This is a chronic condition, please continue to take plavix and aspirin.    Hyperlipidemia    Diabetes      Consultant(s) Notes Reviewed:  [x  ] YES     Care Discussed with [X] Consultants  [x  ] Patient  [  ] Family [  ] HCP [ x ]   [  ] Social Service  [ x ] RN, [  ] Physical Therapy,[  ] Palliative care team  DVT PPX: [ x ] Lovenox, [  ] S C Heparin, [  ] Coumadin, [  ] Xarelto, [  ] Eliquis, [  ] Pradaxa, [  ] IV Heparin drip, [  ] SCD [  ] Contraindication 2 to GI Bleed,[  ] Ambulation [  ] Contraindicated 2 to  bleed [  ] Contraindicated 2 to Brain Bleed  Advanced directive: [x  ] None, [  ] DNR/DNI

## 2023-12-01 NOTE — PROGRESS NOTE ADULT - ASSESSMENT
61 yo M with PMH HTN, HLD, Type II DM, s/p R toe amp (3 yrs ago), s/p callus scrape off 2.5 weeks ago, now presents with R foot wound here for IV Abx and R foot wound  found to have OM s/p R  Transmetatarsal amputation 11/27     -sp Transmetatarsal amputation 11/27   followed by podiatry     --follows with Dr. Andre (outpatient cardiologist)   -Recent Stress test reviewed.  Patient with 10 mets exercise capacity.  Had anterior and anterolateral akinesis and inferolateral hypokinesis with exercise suggestive of obstructive CAD.  Angiography was suggested and scheduled 12/14 (patient now refusing angiography, plans to reschedule)  - EKG demonstrates NSR. No acute ischemic changes noted.   - continue statin     - DVT in right soleal vein  - Start Eliquis 5mg BID   - Continue Plavix daily  - D/c ASA  - D/c Lovenox     - Chest X-ray negative for acute process  - euvolemic on examination with no overt signs of heart failure or cardiac ischemia.   - No severe valvular abnormalities noted on examination    - BP, HR stable and well controlled   - Continue home meds on Lisinopril, metoprolol     - Monitor and replete lytes, keep K>4, Mg>2.  - Will continue to follow.

## 2023-12-01 NOTE — PROGRESS NOTE ADULT - ATTENDING COMMENTS
63 yo M with PMH HTN, HLD, Type II DM, s/p R toe amp (3 yrs ago), s/p callus scrape off 2.5 weeks ago, now presents with R foot wound here for IV Abx and R foot wound R/O OM with Elevated WBC    - there is no evidence of acute ischemia.  - there is no evidence of significant arrhythmia.  - there is no evidence for meaningful  volume overload.  - no cardiovascular complications postop  - nuc stress revealed suggestion of multivessel CAD, for which cath would be appropriate. he remains with a lack of enthusiasm for this approach but this has been recommended by several people over time. can be elective once his pod issues have improved  - Now found to have acute LE DVT, to be initiated on Eliquis 5mg BID.   - Would avoid triple therapy and stop his ASA at this time and continue Plavix + AC alone. Again, he does not want to proceed with a LHC anytime soon, so would rather him be on a P2Y12 inhibitor as opposed to ASA alone for medical mgmt of his presumed CAD.   - monitor electrolytes, keep k>4, Mg>2   - will follow.
63 yo M with PMH HTN, HLD, Type II DM, s/p R hallux toe amputation 3 weeks ago presents to the ED with R foot wound here for IV Abx and R foot wound R/O OM with Elevated WBC ,    pt seen, Examined, case & care plan d/w pt, residents at detail. D/W DR Stark at [facility].  D/W Podiatry-DR Stark- S/P Rt TMA -follow OR Clean margin  ID Dr XENIA Calhoun d/w -IV Zosyn continue awaiting pathology results .  Cardiology-Dr becerril . follow up- stable post op   Dietary eval  DVT PPx  PO diet .   AM labs   Total care time is 60 minutes.
61 yo M with PMH HTN, HLD, Type II DM, s/p R hallux toe amputation 3 weeks ago presents to the ED with R foot wound here for IV Abx and R foot wound R/O OM with Elevated WBC ,    pt seen, Examined, case & care plan d/w pt, residents at detail.  AM labs   D/W Podiatry-DR Stark- MRI Rt Foot   ID Dr Piña d/w -IV Zosyn continue   Cardiology-Dr Chu _Pre Op eval   Dietary eval  DVT PPx  PO diet .   Total care time is 60 minutes.
63 yo M with PMH HTN, HLD, Type II DM, s/p R hallux toe amputation 3 weeks ago presents to the ED with R foot wound here for IV Abx and R foot wound R/O OM with Elevated WBC ,    pt seen, Examined, case & care plan d/w pt, residents at detail.  d/w case management for possible d/c plan   D/W Podiatry-DR Stark- S/P Rt TMA -follow OR Clean margin  ID Dr XENIA Calhoun d/w -IV Zosyn continue awaiting pathology results .  Cardiology-Dr becerril group . follow up- stable post op   Dietary eval  DVT PPx  PO diet .   AM labs   Total care time is 60 minutes.
63 yo M with PMH HTN, HLD, Type II DM, s/p R hallux toe amputation 3 weeks ago presents to the ED with R foot wound here for IV Abx and R foot wound R/O OM with Elevated WBC ,    pt seen, Examined, case & care plan d/w pt, residents at detail. D/W Wife at bedside.  D/W Podiatry-DR Stark- MRI Rt Foot on Monday.  ID Dr Piña d/w -IV Zosyn continue .  Cardiology-Dr Chu- Pre Op eval   Dietary eval  DVT PPx  PO diet .   Total care time is 60 minutes.
63 yo M with PMH HTN, HLD, Type II DM, s/p R hallux toe amputation 3 weeks ago presents to the ED with R foot wound here for IV Abx and R foot wound R/O OM with Elevated WBC ,    pt seen, Examined, case & care plan d/w pt, residents at detail. D/W DR Stark at [facility].  D/W Podiatry-DR Stark- S/P Rt TMA -follow OR Clean margin  ID Dr XENIA Calhoun d/w -IV Zosyn continue .  Cardiology-Dr Chu. follow up  Dietary eval  DVT PPx  PO diet .   Total care time is 60 minutes.
63 yo M with PMH HTN, HLD, Type II DM, s/p R hallux toe amputation 3 weeks ago presents to the ED with R foot wound here for IV Abx and R foot wound R/O OM with Elevated WBC ,    pt seen, Examined, case & care plan d/w pt, residents at detail. D/W DR Stark at [facility].  D/W Podiatry-DR Stark- MRI Rt Foot on Monday.  ID Dr XENIA Calhoun d/w -IV Zosyn continue .  Cardiology-Dr Bliss follow up for  Pre Op eval   NPO today after breakfast -Pt is medically optimized for schedule Podiatry Procedure, Follow up Cardiology -Pre op recommendation ,    As per Cardiology -Given that he had 10 mets of exercise capacity, patient is at acceptable risk to undergo toe amputation under local anesthesia   -Pre op IV Abx as per Podiatry surgeon   Dietary eval  DVT PPx  PO diet .   Total care time is 60 minutes.
61 yo M with PMH HTN, HLD, Type II DM, s/p R hallux toe amputation 3 weeks ago presents to the ED with R foot wound here for IV Abx and R foot wound R/O OM with Elevated WBC ,    pt seen, Examined, case & care plan d/w pt, residents at detail.  d/w case management for possible d/c plan   D/W Podiatry-DR Stark- S/P Rt TMA -follow OR Clean margin  ID Dr XENIA Calhoun d/w --ampicillin 12gm/24hrs continuous infusion x6 wk course until 1/8/24 w/ weekly CMP, CBC, ESR, CRP  Cardiology-Dr Hernandez d/w at [facility] -STOP ASA as we are starting on DOAC-Eliquis  for 3 months for DVT   -Vascular-Dr Wilson- done  - Hematology-DR Austin D/W - Start DOAC, Eliquis 10 mg 2x day x 7 days followed by 5 mg 2x day for 3 months TOTAL   Dietary eval done  DVT PPx  PO diet .   AM labs   D/W Case management -Home IV Abx arranged, D/C in AM after dose of IV Ampicillin  -D/W Pharmacy- for Meds to beds for Eliquis Starter Pack D/W Pt AT [facility] -MUST Renew scrip for Eliquis q Monthly with PMD/DR Austin   Total care time is 60 minutes.

## 2023-12-01 NOTE — PROGRESS NOTE ADULT - SUBJECTIVE AND OBJECTIVE BOX
[facility] -- Vlad Gallardo Pannella, Patel, Savella Goodger, Cohen  Office # [phone]      Follow Up:  Medication management, HTN     Subjective/Observations:  Seen and examined.  Resting comfortably sitting up in bed with no reports of cp, sob or palpitations.  Pain controlled.  No signs of orthopnea or PND.  NAD.       REVIEW OF SYSTEMS: All other review of systems is negative unless indicated above    PAST MEDICAL & SURGICAL HISTORY:  HTN (hypertension)      Diabetes      Hyperlipidemia      PVD (peripheral vascular disease)      H/O angioplasty  RLE      Status post amputation of toe          MEDICATIONS  (STANDING):  ampicillin  IVPB      ampicillin  IVPB 2 Gram(s) IV Intermittent every 4 hours  aspirin enteric coated 81 milliGRAM(s) Oral daily  atorvastatin 40 milliGRAM(s) Oral at bedtime  clopidogrel Tablet 75 milliGRAM(s) Oral daily  dextrose 5%. 1000 milliLiter(s) (100 mL/Hr) IV Continuous <Continuous>  dextrose 5%. 1000 milliLiter(s) (50 mL/Hr) IV Continuous <Continuous>  dextrose 5%. 1000 milliLiter(s) (50 mL/Hr) IV Continuous <Continuous>  dextrose 5%. 1000 milliLiter(s) (100 mL/Hr) IV Continuous <Continuous>  dextrose 50% Injectable 12.5 Gram(s) IV Push once  dextrose 50% Injectable 25 Gram(s) IV Push once  dextrose 50% Injectable 25 Gram(s) IV Push once  enoxaparin Injectable 40 milliGRAM(s) SubCutaneous every 24 hours  gabapentin 300 milliGRAM(s) Oral two times a day  glucagon  Injectable 1 milliGRAM(s) IntraMuscular once  glucagon  Injectable 1 milliGRAM(s) IntraMuscular once  insulin lispro (ADMELOG) corrective regimen sliding scale   SubCutaneous Before meals and at bedtime  lactobacillus acidophilus 1 Tablet(s) Oral three times a day with meals  lisinopril 40 milliGRAM(s) Oral daily  melatonin 3 milliGRAM(s) Oral at bedtime  metoprolol succinate ER 50 milliGRAM(s) Oral daily  polyethylene glycol 3350 17 Gram(s) Oral two times a day    MEDICATIONS  (PRN):  acetaminophen     Tablet .. 650 milliGRAM(s) Oral every 6 hours PRN Temp greater or equal to 38C (100.4F), Mild Pain (1 - 3)  dextrose Oral Gel 15 Gram(s) Oral once PRN Blood Glucose LESS THAN 70 milliGRAM(s)/deciliter  HYDROmorphone  Injectable 0.5 milliGRAM(s) IV Push every 6 hours PRN Severe Pain (7 - 10)  for breakthrough pain  traMADol 50 milliGRAM(s) Oral every 6 hours PRN Severe Pain (7 - 10)  traMADol 25 milliGRAM(s) Oral every 6 hours PRN Moderate Pain (4 - 6)      Allergies    No Known Allergies    Intolerances        Vital Signs Last 24 Hrs  T(C): 36.7 (01 Dec 2023 13:03), Max: 37.1 (2023 20:54)  T(F): 98 (01 Dec 2023 13:03), Max: 98.7 (2023 20:54)  HR: 82 (01 Dec 2023 13:03) (70 - 82)  BP: 132/70 (01 Dec 2023 13:03) (132/70 - 145/77)  BP(mean): --  RR: 18 (01 Dec 2023 13:03) (18 - 18)  SpO2: 98% (01 Dec 2023 13:03) (95% - 98%)    Parameters below as of 01 Dec 2023 13:03  Patient On (Oxygen Delivery Method): room air        I&O's Summary        PHYSICAL EXAM:  TELE:   Constitutional: NAD, awake and alert, well-developed  HEENT: Moist Mucous Membranes, Anicteric  Pulmonary: Non-labored, breath sounds are clear bilaterally, No wheezing, crackles or rhonchi  Cardiovascular: Regular, S1 and S2 nl, No murmurs, rubs, gallops or clicks  Gastrointestinal: Bowel Sounds present, soft, nontender.   Lymph: No lymphadenopathy. No peripheral edema.  Skin: No visible rashes or ulcers.  Psych:  Mood & affect appropriate    LABS: All Labs Reviewed:                        10.4   10.18 )-----------( 394      ( 01 Dec 2023 07:37 )             31.8                         10.2   11.82 )-----------( 350      ( 2023 07:18 )             31.4                         10.4   11.18 )-----------( 326      ( 2023 10:46 )             31.9     01 Dec 2023 14:00    x      |  x      |  16     ----------------------------<  x      x       |  x      |  x      01 Dec 2023 07:37    138    |  104    |  16     ----------------------------<  261    4.9     |  31     |  0.88   2023 07:18    140    |  107    |  20     ----------------------------<  245    4.8     |  28     |  0.89     Ca    8.6        01 Dec 2023 07:37  Ca    8.5        2023 07:18  Ca    8.3        2023 10:46  Phos  2.8       01 Dec 2023 07:37  Phos  2.5       2023 07:18  Phos  2.5       2023 10:46  Mg     2.2       01 Dec 2023 07:37  Mg     2.4       2023 07:18  Mg     2.2       2023 10:46               EC Lead ECG:   Ventricular Rate 85 BPM    Atrial Rate 85 BPM    P-R Interval 142 ms    QRS Duration 88 ms    Q-T Interval 380 ms    QTC Calculation(Bazett) 452 ms    P Axis 67 degrees    R Axis 54 degrees    T Axis 38 degrees    Diagnosis Line Normal sinus rhythm  Normal ECG  When compared with ECG of 16-DEC-2020 09:37,  No significant change was found  Confirmed by RAMÓN SHAFFER ([id number]) on 2023 9:34:36 PM (23 @ 10:03)        Radiology:  ACC: [id number] EXAM:  US DPLX LWR EXT VEINS LTD RT   ORDERED BY: JAIME ARORA     PROCEDURE DATE:  2023          INTERPRETATION:  CLINICAL INFORMATION: Postoperative DVT. Recent   amputation forefoot osteomyelitis.    COMPARISON: Right lower extremity venous duplex ultrasound 2023.    TECHNIQUE: Duplex sonography of the RIGHT LOWER extremity veins with   color and spectral Doppler, with and without compression.    FINDINGS:    Normal compressibility and patency of the right common femoral vein,   saphenofemoral junction, deep femoral vein, superficial femoral vein,   popliteal vein, posterior tibial vein, peroneal vein, and gastrocnemius   vein. Findings are confirmed on Doppler.    Partially compressible right soleal vein (below the knee). Chronic   appearing superficial thrombosis of right small saphenous vein,   associated with calcifications.    Edema of the superficial soft tissues of the right lower extremity.   Correlate clinically. Small right groin lymph node measures 1.9 x 0.7 x   1.3 cm, with benign appearing fatty hilum.    IMPRESSION:    New nonocclusive DVT of the right soleal vein, below the knee.    Chronic appearing superficial thrombosis of right small saphenous vein,   associated with calcifications.    Dr. Melendez discussed these above findings with Dr. Jaime Arora on   2023 at 8:40AM, with read back.    Edema of the superficial soft tissues of the right lower extremity.   Correlate clinically.    --- End of Report ---         [facility] -- Vlad Gallrado Pannella, Patel, Savella Goodger, Cohen  Office # [phone]      Follow Up:  Medication management, HTN     Subjective/Observations:  Seen and examined.  Resting comfortably sitting up in bed with no reports of cp, sob or palpitations.  Pain controlled.  No signs of orthopnea or PND.  NAD.       REVIEW OF SYSTEMS: All other review of systems is negative unless indicated above    PAST MEDICAL & SURGICAL HISTORY:  HTN (hypertension)      Diabetes      Hyperlipidemia      PVD (peripheral vascular disease)      H/O angioplasty  RLE      Status post amputation of toe          MEDICATIONS  (STANDING):  ampicillin  IVPB      ampicillin  IVPB 2 Gram(s) IV Intermittent every 4 hours  aspirin enteric coated 81 milliGRAM(s) Oral daily  atorvastatin 40 milliGRAM(s) Oral at bedtime  clopidogrel Tablet 75 milliGRAM(s) Oral daily  dextrose 5%. 1000 milliLiter(s) (100 mL/Hr) IV Continuous <Continuous>  dextrose 5%. 1000 milliLiter(s) (50 mL/Hr) IV Continuous <Continuous>  dextrose 5%. 1000 milliLiter(s) (50 mL/Hr) IV Continuous <Continuous>  dextrose 5%. 1000 milliLiter(s) (100 mL/Hr) IV Continuous <Continuous>  dextrose 50% Injectable 12.5 Gram(s) IV Push once  dextrose 50% Injectable 25 Gram(s) IV Push once  dextrose 50% Injectable 25 Gram(s) IV Push once  enoxaparin Injectable 40 milliGRAM(s) SubCutaneous every 24 hours  gabapentin 300 milliGRAM(s) Oral two times a day  glucagon  Injectable 1 milliGRAM(s) IntraMuscular once  glucagon  Injectable 1 milliGRAM(s) IntraMuscular once  insulin lispro (ADMELOG) corrective regimen sliding scale   SubCutaneous Before meals and at bedtime  lactobacillus acidophilus 1 Tablet(s) Oral three times a day with meals  lisinopril 40 milliGRAM(s) Oral daily  melatonin 3 milliGRAM(s) Oral at bedtime  metoprolol succinate ER 50 milliGRAM(s) Oral daily  polyethylene glycol 3350 17 Gram(s) Oral two times a day    MEDICATIONS  (PRN):  acetaminophen     Tablet .. 650 milliGRAM(s) Oral every 6 hours PRN Temp greater or equal to 38C (100.4F), Mild Pain (1 - 3)  dextrose Oral Gel 15 Gram(s) Oral once PRN Blood Glucose LESS THAN 70 milliGRAM(s)/deciliter  HYDROmorphone  Injectable 0.5 milliGRAM(s) IV Push every 6 hours PRN Severe Pain (7 - 10)  for breakthrough pain  traMADol 50 milliGRAM(s) Oral every 6 hours PRN Severe Pain (7 - 10)  traMADol 25 milliGRAM(s) Oral every 6 hours PRN Moderate Pain (4 - 6)      Allergies    No Known Allergies    Intolerances        Vital Signs Last 24 Hrs  T(C): 36.7 (01 Dec 2023 13:03), Max: 37.1 (2023 20:54)  T(F): 98 (01 Dec 2023 13:03), Max: 98.7 (2023 20:54)  HR: 82 (01 Dec 2023 13:03) (70 - 82)  BP: 132/70 (01 Dec 2023 13:03) (132/70 - 145/77)  BP(mean): --  RR: 18 (01 Dec 2023 13:03) (18 - 18)  SpO2: 98% (01 Dec 2023 13:03) (95% - 98%)    Parameters below as of 01 Dec 2023 13:03  Patient On (Oxygen Delivery Method): room air        I&O's Summary        PHYSICAL EXAM:  TELE:   Constitutional: NAD, awake and alert, well-developed  HEENT: Moist Mucous Membranes, Anicteric  Pulmonary: Non-labored, breath sounds are clear bilaterally, No wheezing, crackles or rhonchi  Cardiovascular: Regular, S1 and S2 nl, No murmurs, rubs, gallops or clicks  Gastrointestinal: Bowel Sounds present, soft, nontender.   Lymph: No lymphadenopathy. No peripheral edema.  Skin: No visible rashes or ulcers.  Psych:  Mood & affect appropriate    LABS: All Labs Reviewed:                        10.4   10.18 )-----------( 394      ( 01 Dec 2023 07:37 )             31.8                         10.2   11.82 )-----------( 350      ( 2023 07:18 )             31.4                         10.4   11.18 )-----------( 326      ( 2023 10:46 )             31.9     01 Dec 2023 14:00    x      |  x      |  16     ----------------------------<  x      x       |  x      |  x      01 Dec 2023 07:37    138    |  104    |  16     ----------------------------<  261    4.9     |  31     |  0.88   2023 07:18    140    |  107    |  20     ----------------------------<  245    4.8     |  28     |  0.89     Ca    8.6        01 Dec 2023 07:37  Ca    8.5        2023 07:18  Ca    8.3        2023 10:46  Phos  2.8       01 Dec 2023 07:37  Phos  2.5       2023 07:18  Phos  2.5       2023 10:46  Mg     2.2       01 Dec 2023 07:37  Mg     2.4       2023 07:18  Mg     2.2       2023 10:46               EC Lead ECG:   Ventricular Rate 85 BPM    Atrial Rate 85 BPM    P-R Interval 142 ms    QRS Duration 88 ms    Q-T Interval 380 ms    QTC Calculation(Bazett) 452 ms    P Axis 67 degrees    R Axis 54 degrees    T Axis 38 degrees    Diagnosis Line Normal sinus rhythm  Normal ECG  When compared with ECG of 16-DEC-2020 09:37,  No significant change was found  Confirmed by RAMÓN SHAFFER ([id number]) on 2023 9:34:36 PM (23 @ 10:03)        Radiology:  ACC: [id number] EXAM:  US DPLX LWR EXT VEINS LTD RT   ORDERED BY: JAIME ARORA     PROCEDURE DATE:  2023          INTERPRETATION:  CLINICAL INFORMATION: Postoperative DVT. Recent   amputation forefoot osteomyelitis.    COMPARISON: Right lower extremity venous duplex ultrasound 2023.    TECHNIQUE: Duplex sonography of the RIGHT LOWER extremity veins with   color and spectral Doppler, with and without compression.    FINDINGS:    Normal compressibility and patency of the right common femoral vein,   saphenofemoral junction, deep femoral vein, superficial femoral vein,   popliteal vein, posterior tibial vein, peroneal vein, and gastrocnemius   vein. Findings are confirmed on Doppler.    Partially compressible right soleal vein (below the knee). Chronic   appearing superficial thrombosis of right small saphenous vein,   associated with calcifications.    Edema of the superficial soft tissues of the right lower extremity.   Correlate clinically. Small right groin lymph node measures 1.9 x 0.7 x   1.3 cm, with benign appearing fatty hilum.    IMPRESSION:    New nonocclusive DVT of the right soleal vein, below the knee.    Chronic appearing superficial thrombosis of right small saphenous vein,   associated with calcifications.    Dr. Melendez discussed these above findings with Dr. Jaime Arora on   2023 at 8:40AM, with read back.    Edema of the superficial soft tissues of the right lower extremity.   Correlate clinically.    --- End of Report ---

## 2023-12-01 NOTE — CONSULT NOTE ADULT - CONSULT REASON
Right foot infected wound
Foot Infection
R foot infection
cardiac clearance
I82.461    Acute DVT R soleal vein  I82.81      chronic SVT R saphenous vein  I82.401     Osteomyelitis, R Foot  D63.8       Anemia chronic disease  I73.9        PAD   Z89. 421   R metatarsal amputation
RLE wounds
62y A1C with Estimated Average Glucose Result: 8.2 % (11-25-23 @ 08:10)  A1C with Estimated Average Glucose Result: 8.3 % (11-24-23 @ 09:50)   diabetes mellitus uncontrolled type 2

## 2023-12-02 VITALS
SYSTOLIC BLOOD PRESSURE: 118 MMHG | TEMPERATURE: 98 F | HEART RATE: 72 BPM | DIASTOLIC BLOOD PRESSURE: 62 MMHG | OXYGEN SATURATION: 94 % | RESPIRATION RATE: 18 BRPM

## 2023-12-02 LAB
ANION GAP SERPL CALC-SCNC: 5 MMOL/L — SIGNIFICANT CHANGE UP (ref 5–17)
ANION GAP SERPL CALC-SCNC: 5 MMOL/L — SIGNIFICANT CHANGE UP (ref 5–17)
BUN SERPL-MCNC: 18 MG/DL — SIGNIFICANT CHANGE UP (ref 7–23)
BUN SERPL-MCNC: 18 MG/DL — SIGNIFICANT CHANGE UP (ref 7–23)
CALCIUM SERPL-MCNC: 8.5 MG/DL — SIGNIFICANT CHANGE UP (ref 8.5–10.1)
CALCIUM SERPL-MCNC: 8.5 MG/DL — SIGNIFICANT CHANGE UP (ref 8.5–10.1)
CANCER AG19-9 SERPL-ACNC: 13 U/ML — SIGNIFICANT CHANGE UP
CANCER AG19-9 SERPL-ACNC: 13 U/ML — SIGNIFICANT CHANGE UP
CEA SERPL-MCNC: 1.1 NG/ML — SIGNIFICANT CHANGE UP (ref 0–3.8)
CEA SERPL-MCNC: 1.1 NG/ML — SIGNIFICANT CHANGE UP (ref 0–3.8)
CHLORIDE SERPL-SCNC: 105 MMOL/L — SIGNIFICANT CHANGE UP (ref 96–108)
CHLORIDE SERPL-SCNC: 105 MMOL/L — SIGNIFICANT CHANGE UP (ref 96–108)
CO2 SERPL-SCNC: 30 MMOL/L — SIGNIFICANT CHANGE UP (ref 22–31)
CO2 SERPL-SCNC: 30 MMOL/L — SIGNIFICANT CHANGE UP (ref 22–31)
CREAT SERPL-MCNC: 0.82 MG/DL — SIGNIFICANT CHANGE UP (ref 0.5–1.3)
CREAT SERPL-MCNC: 0.82 MG/DL — SIGNIFICANT CHANGE UP (ref 0.5–1.3)
CULTURE RESULTS: ABNORMAL
CULTURE RESULTS: ABNORMAL
EGFR: 97 ML/MIN/1.73M2 — SIGNIFICANT CHANGE UP
EGFR: 97 ML/MIN/1.73M2 — SIGNIFICANT CHANGE UP
FOLATE SERPL-MCNC: 5.8 NG/ML — SIGNIFICANT CHANGE UP
FOLATE SERPL-MCNC: 5.8 NG/ML — SIGNIFICANT CHANGE UP
GLUCOSE BLDC GLUCOMTR-MCNC: 252 MG/DL — HIGH (ref 70–99)
GLUCOSE BLDC GLUCOMTR-MCNC: 252 MG/DL — HIGH (ref 70–99)
GLUCOSE SERPL-MCNC: 273 MG/DL — HIGH (ref 70–99)
GLUCOSE SERPL-MCNC: 273 MG/DL — HIGH (ref 70–99)
HCT VFR BLD CALC: 31.6 % — LOW (ref 39–50)
HCT VFR BLD CALC: 31.6 % — LOW (ref 39–50)
HGB BLD-MCNC: 10.2 G/DL — LOW (ref 13–17)
HGB BLD-MCNC: 10.2 G/DL — LOW (ref 13–17)
MAGNESIUM SERPL-MCNC: 2.3 MG/DL — SIGNIFICANT CHANGE UP (ref 1.6–2.6)
MAGNESIUM SERPL-MCNC: 2.3 MG/DL — SIGNIFICANT CHANGE UP (ref 1.6–2.6)
MCHC RBC-ENTMCNC: 29.4 PG — SIGNIFICANT CHANGE UP (ref 27–34)
MCHC RBC-ENTMCNC: 29.4 PG — SIGNIFICANT CHANGE UP (ref 27–34)
MCHC RBC-ENTMCNC: 32.3 GM/DL — SIGNIFICANT CHANGE UP (ref 32–36)
MCHC RBC-ENTMCNC: 32.3 GM/DL — SIGNIFICANT CHANGE UP (ref 32–36)
MCV RBC AUTO: 91.1 FL — SIGNIFICANT CHANGE UP (ref 80–100)
MCV RBC AUTO: 91.1 FL — SIGNIFICANT CHANGE UP (ref 80–100)
NRBC # BLD: 0 /100 WBCS — SIGNIFICANT CHANGE UP (ref 0–0)
NRBC # BLD: 0 /100 WBCS — SIGNIFICANT CHANGE UP (ref 0–0)
ORGANISM # SPEC MICROSCOPIC CNT: ABNORMAL
ORGANISM # SPEC MICROSCOPIC CNT: ABNORMAL
ORGANISM # SPEC MICROSCOPIC CNT: SIGNIFICANT CHANGE UP
ORGANISM # SPEC MICROSCOPIC CNT: SIGNIFICANT CHANGE UP
PHOSPHATE SERPL-MCNC: 2.8 MG/DL — SIGNIFICANT CHANGE UP (ref 2.5–4.5)
PHOSPHATE SERPL-MCNC: 2.8 MG/DL — SIGNIFICANT CHANGE UP (ref 2.5–4.5)
PLATELET # BLD AUTO: 376 K/UL — SIGNIFICANT CHANGE UP (ref 150–400)
PLATELET # BLD AUTO: 376 K/UL — SIGNIFICANT CHANGE UP (ref 150–400)
POTASSIUM SERPL-MCNC: 5.2 MMOL/L — SIGNIFICANT CHANGE UP (ref 3.5–5.3)
POTASSIUM SERPL-MCNC: 5.2 MMOL/L — SIGNIFICANT CHANGE UP (ref 3.5–5.3)
POTASSIUM SERPL-SCNC: 5.2 MMOL/L — SIGNIFICANT CHANGE UP (ref 3.5–5.3)
POTASSIUM SERPL-SCNC: 5.2 MMOL/L — SIGNIFICANT CHANGE UP (ref 3.5–5.3)
PSA FLD-MCNC: 2.05 NG/ML — SIGNIFICANT CHANGE UP (ref 0–4)
PSA FLD-MCNC: 2.05 NG/ML — SIGNIFICANT CHANGE UP (ref 0–4)
RBC # BLD: 3.47 M/UL — LOW (ref 4.2–5.8)
RBC # BLD: 3.47 M/UL — LOW (ref 4.2–5.8)
RBC # FLD: 12.5 % — SIGNIFICANT CHANGE UP (ref 10.3–14.5)
RBC # FLD: 12.5 % — SIGNIFICANT CHANGE UP (ref 10.3–14.5)
SODIUM SERPL-SCNC: 140 MMOL/L — SIGNIFICANT CHANGE UP (ref 135–145)
SODIUM SERPL-SCNC: 140 MMOL/L — SIGNIFICANT CHANGE UP (ref 135–145)
VIT B12 SERPL-MCNC: 528 PG/ML — SIGNIFICANT CHANGE UP (ref 232–1245)
VIT B12 SERPL-MCNC: 528 PG/ML — SIGNIFICANT CHANGE UP (ref 232–1245)
WBC # BLD: 9.26 K/UL — SIGNIFICANT CHANGE UP (ref 3.8–10.5)
WBC # BLD: 9.26 K/UL — SIGNIFICANT CHANGE UP (ref 3.8–10.5)
WBC # FLD AUTO: 9.26 K/UL — SIGNIFICANT CHANGE UP (ref 3.8–10.5)
WBC # FLD AUTO: 9.26 K/UL — SIGNIFICANT CHANGE UP (ref 3.8–10.5)

## 2023-12-02 PROCEDURE — G0103: CPT

## 2023-12-02 PROCEDURE — 87040 BLOOD CULTURE FOR BACTERIA: CPT

## 2023-12-02 PROCEDURE — 80048 BASIC METABOLIC PNL TOTAL CA: CPT

## 2023-12-02 PROCEDURE — 76937 US GUIDE VASCULAR ACCESS: CPT

## 2023-12-02 PROCEDURE — 80061 LIPID PANEL: CPT

## 2023-12-02 PROCEDURE — 73718 MRI LOWER EXTREMITY W/O DYE: CPT

## 2023-12-02 PROCEDURE — 80053 COMPREHEN METABOLIC PANEL: CPT

## 2023-12-02 PROCEDURE — 85027 COMPLETE CBC AUTOMATED: CPT

## 2023-12-02 PROCEDURE — 86140 C-REACTIVE PROTEIN: CPT

## 2023-12-02 PROCEDURE — 87075 CULTR BACTERIA EXCEPT BLOOD: CPT

## 2023-12-02 PROCEDURE — 83735 ASSAY OF MAGNESIUM: CPT

## 2023-12-02 PROCEDURE — 84520 ASSAY OF UREA NITROGEN: CPT

## 2023-12-02 PROCEDURE — 71045 X-RAY EXAM CHEST 1 VIEW: CPT

## 2023-12-02 PROCEDURE — 87186 SC STD MICRODIL/AGAR DIL: CPT

## 2023-12-02 PROCEDURE — 82962 GLUCOSE BLOOD TEST: CPT

## 2023-12-02 PROCEDURE — 88305 TISSUE EXAM BY PATHOLOGIST: CPT

## 2023-12-02 PROCEDURE — 87077 CULTURE AEROBIC IDENTIFY: CPT

## 2023-12-02 PROCEDURE — 87086 URINE CULTURE/COLONY COUNT: CPT

## 2023-12-02 PROCEDURE — 88311 DECALCIFY TISSUE: CPT

## 2023-12-02 PROCEDURE — 82607 VITAMIN B-12: CPT

## 2023-12-02 PROCEDURE — 93923 UPR/LXTR ART STDY 3+ LVLS: CPT

## 2023-12-02 PROCEDURE — 82746 ASSAY OF FOLIC ACID SERUM: CPT

## 2023-12-02 PROCEDURE — 85652 RBC SED RATE AUTOMATED: CPT

## 2023-12-02 PROCEDURE — 96374 THER/PROPH/DIAG INJ IV PUSH: CPT

## 2023-12-02 PROCEDURE — 84443 ASSAY THYROID STIM HORMONE: CPT

## 2023-12-02 PROCEDURE — 85610 PROTHROMBIN TIME: CPT

## 2023-12-02 PROCEDURE — 86301 IMMUNOASSAY TUMOR CA 19-9: CPT

## 2023-12-02 PROCEDURE — 87070 CULTURE OTHR SPECIMN AEROBIC: CPT

## 2023-12-02 PROCEDURE — 97162 PT EVAL MOD COMPLEX 30 MIN: CPT

## 2023-12-02 PROCEDURE — 83605 ASSAY OF LACTIC ACID: CPT

## 2023-12-02 PROCEDURE — 83036 HEMOGLOBIN GLYCOSYLATED A1C: CPT

## 2023-12-02 PROCEDURE — 36415 COLL VENOUS BLD VENIPUNCTURE: CPT

## 2023-12-02 PROCEDURE — 85025 COMPLETE CBC W/AUTO DIFF WBC: CPT

## 2023-12-02 PROCEDURE — 85730 THROMBOPLASTIN TIME PARTIAL: CPT

## 2023-12-02 PROCEDURE — 97112 NEUROMUSCULAR REEDUCATION: CPT

## 2023-12-02 PROCEDURE — 36573 INSJ PICC RS&I 5 YR+: CPT

## 2023-12-02 PROCEDURE — 99285 EMERGENCY DEPT VISIT HI MDM: CPT | Mod: 25

## 2023-12-02 PROCEDURE — 93971 EXTREMITY STUDY: CPT

## 2023-12-02 PROCEDURE — 96375 TX/PRO/DX INJ NEW DRUG ADDON: CPT

## 2023-12-02 PROCEDURE — 97116 GAIT TRAINING THERAPY: CPT

## 2023-12-02 PROCEDURE — C1751: CPT

## 2023-12-02 PROCEDURE — 84100 ASSAY OF PHOSPHORUS: CPT

## 2023-12-02 PROCEDURE — 84134 ASSAY OF PREALBUMIN: CPT

## 2023-12-02 PROCEDURE — 81003 URINALYSIS AUTO W/O SCOPE: CPT

## 2023-12-02 PROCEDURE — 97110 THERAPEUTIC EXERCISES: CPT

## 2023-12-02 PROCEDURE — G0463: CPT

## 2023-12-02 PROCEDURE — 93005 ELECTROCARDIOGRAM TRACING: CPT

## 2023-12-02 PROCEDURE — 82378 CARCINOEMBRYONIC ANTIGEN: CPT

## 2023-12-02 PROCEDURE — 88304 TISSUE EXAM BY PATHOLOGIST: CPT

## 2023-12-02 PROCEDURE — 73630 X-RAY EXAM OF FOOT: CPT

## 2023-12-02 RX ORDER — TRAMADOL HYDROCHLORIDE 50 MG/1
1 TABLET ORAL
Qty: 20 | Refills: 0
Start: 2023-12-02 | End: 2023-12-06

## 2023-12-02 RX ORDER — AMPICILLIN TRIHYDRATE 250 MG
2 CAPSULE ORAL
Qty: 0 | Refills: 0 | DISCHARGE
Start: 2023-12-02

## 2023-12-02 RX ADMIN — LISINOPRIL 40 MILLIGRAM(S): 2.5 TABLET ORAL at 05:15

## 2023-12-02 RX ADMIN — Medication 200 GRAM(S): at 01:11

## 2023-12-02 RX ADMIN — Medication 50 MILLIGRAM(S): at 05:16

## 2023-12-02 RX ADMIN — GABAPENTIN 300 MILLIGRAM(S): 400 CAPSULE ORAL at 05:16

## 2023-12-02 RX ADMIN — TRAMADOL HYDROCHLORIDE 50 MILLIGRAM(S): 50 TABLET ORAL at 11:21

## 2023-12-02 RX ADMIN — Medication 200 GRAM(S): at 08:20

## 2023-12-02 RX ADMIN — Medication 200 GRAM(S): at 05:15

## 2023-12-02 RX ADMIN — APIXABAN 10 MILLIGRAM(S): 2.5 TABLET, FILM COATED ORAL at 05:16

## 2023-12-02 RX ADMIN — Medication 1 TABLET(S): at 08:20

## 2023-12-02 RX ADMIN — Medication 6: at 08:22

## 2023-12-02 NOTE — PROGRESS NOTE ADULT - PROBLEM SELECTOR PROBLEM 5
CAD (coronary artery disease)
Hyperlipidemia
CAD (coronary artery disease)
Hyperlipidemia
Hyperlipidemia

## 2023-12-02 NOTE — PROGRESS NOTE ADULT - PROBLEM SELECTOR PROBLEM 1
Diabetic ulcer of right foot

## 2023-12-02 NOTE — CASE MANAGEMENT PROGRESS NOTE - NSCMPROGRESSNOTE_GEN_ALL_CORE
Patient is anticipated for DC home today after 9am dose of IV Abx. CM met with patient at bedside, introduced self and explained role of CM and transitional care planning. He verbalized understanding. He is discharging home today with home IV infusion, Ra HADDAD accepted patient for SOC today 12/2/23. Patient confirmed RW was delivered to bedside, and he has a family member who will be transporting him home. He is in agreement to DC plans and verbalized understanding. CM remains available.

## 2023-12-02 NOTE — PROGRESS NOTE ADULT - PROBLEM SELECTOR PLAN 3
- HgbA1c 8.2  - Hold home medications  - Low dose insulin corrective scale  - Hypoglycemia protocol, Accu-Chek   AC&HS  - Diet regular food with DASH/TLC  - Nutritional eval-complete  Diabetic NP eval  Neuropathy- pt requesting Gabapentin 3x day

## 2023-12-02 NOTE — PROGRESS NOTE ADULT - NUTRITIONAL ASSESSMENT
Pathology    Specimen(s) Submitted  1  Right forefoot  2  Right forefoot, proximal margin    Final Diagnosis  1. Right forefoot  Acute and chronic osteomyelitis.  Skin and soft tissue with acute and chronic inflammation.      2. Right forefoot, proximal margin  Mild acute and chronic osteomyelitis.
Pathology    Specimen(s) Submitted  1  Right forefoot  2  Right forefoot, proximal margin    Final Diagnosis  1. Right forefoot  Acute and chronic osteomyelitis.  Skin and soft tissue with acute and chronic inflammation.      2. Right forefoot, proximal margin  Mild acute and chronic osteomyelitis.

## 2023-12-02 NOTE — PROGRESS NOTE ADULT - PROBLEM SELECTOR PROBLEM 3
HTN (hypertension)
Diabetes
Diabetes
HTN (hypertension)

## 2023-12-02 NOTE — PROGRESS NOTE ADULT - REASON FOR ADMISSION
R foot wound

## 2023-12-02 NOTE — PROGRESS NOTE ADULT - PROBLEM SELECTOR PLAN 1
- Pt arrived elevated WBC, ESR, CRP, normal lactate,  -OM Rt Foot   -  XRAY foot: Suspect pathologic fractures of the third and fourth metatarsal heads. 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 Status post prior trans metatarsal amputation of the first metatarsal bone and distal ray. If osteomyelitis is clinically considered  despite conservative therapy, and soft tissue / bone infection requires further assessment, follow-up   - MRI rt foot showing OM in multiple metatarsals   - s/p R transmetatarsal amputation on Monday, cleared by cardio, no complications  - per ID--- switch from zosyn to AMPicillin 2 gm IVPB q 4 hrs as d/w ID DR XENIA Calhoun--ampicillin 12gm/24hrs continuous infusion x6 wk course until 1/8/24 w/ weekly CMP, CBC, ESR, CRP    - PICC placed with no complications  - post op care per podiatry  - afebrile. WBC decreasing (10.18 from 11.82 )  - Gabapentin for neuropathy, pain meds: Tylenol for mild pain, 25mg tramadol for moderate pain, 50mg tramadol for severe pain  - Dilaudid 1mg IVP for breakthrough pain  - R LE u/s negative for DVT , REPEAT U/S TODAY showing new nonocclusive clot  - R foot wound culture-final results: e. faecalis, sensitive to zosyn  - R foot tissue culture from OR showing: Rare PMNs , few gram positive cocci in pairs. Awaiting surgical pathology and culture results for outpt abx management- CALL BACK THIS AFTERNOON  -  Will need IR for PICC/Midline likely  - activity: WBAT R foot with surgical shoe right foot as tolerated in a surgical shoe.  - Vascular following- signed off per FRANCISCA results   - ID following- Dr Calhoun- group-IV Zosyn  - Podiatry Dr. Stark managing, f/u reccs

## 2023-12-02 NOTE — PROGRESS NOTE ADULT - PROBLEM SELECTOR PROBLEM 4
HTN (hypertension)
CAD (coronary artery disease)
HTN (hypertension)
CAD (coronary artery disease)

## 2023-12-02 NOTE — PHARMACOTHERAPY INTERVENTION NOTE - COMMENTS
Prescriptions filled at [facility].  Prescriptions: Eliquis starter pack  Brought to bedside and counseled.  [facility] name: Geoffrey Hutchinson D  Person(s) counseled (translation used?,family member called? if relevant): Patient, at bedside  Counseling materials provided/counseling aids used: demonstrated pack  Time spent Counselin minutes  Counseling provided according to ASHP guidelines including side effects

## 2023-12-02 NOTE — PROGRESS NOTE ADULT - PROBLEM SELECTOR PLAN 5
- chronic, c/w home meds  - lipid panel ordered for AM, follow results.
- chronic, c/w atorvastatin 40mg  - lipid panel:  (elevated), HDL 30 (decreased), rest of cholesterol labs WNL
- unclear history (pt denies CAD) however had positive stress test recently and Cardio outpatient is scheduled for PCI (pt says no stent needed) on December 14  - currently on aspirin and Plavix  - Cardio cleared for surgery  - outpatient followup for + stress test per outpatient records
- unclear history (pt denies CAD) however had positive stress test recently and Cardio outpatient is scheduled for PCI (pt says no stent needed) on December 14  - currently on aspirin and Plavix  - Cardio cleared for surgery  - outpatient followup for + stress test per outpatient records
- chronic, c/w atorvastatin 40mg  - lipid panel:  (elevated), HDL 30 (decreased), rest of cholesterol labs WNL

## 2023-12-02 NOTE — PROGRESS NOTE ADULT - SUBJECTIVE AND OBJECTIVE BOX
Patient is a 63y old  Male who presents with a chief complaint of R foot wound (01 Dec 2023 16:02)    HPI:  61 yo M with PMH HTN, HLD, Type II DM, s/p R hallux toe amputation 3 yrs  ago presents to the ED with R foot wound. Patient went to a private podiatrist 2.5 months ago to scrape off callus. The callus removal site did not heal well and started to develop into a wound. Podiatrist recommended patient see Dr. Stark for wound management. Per patient, would has been intermittently draining clear liquid and having foul odor. Last sunday, patient had episode of chills that resolved when he went to sleep. Last night, he also had chills and had difficulty balancing. Today he went to his regular scheduled appt with Dr. Stark who recommended he be admitted for IV ABx and surgery on Tuesday,  Denies fever chest pain, palpitations, SOB, cough, abdominal pain, nausea, vomiting, diarrhea, constipation, urinary frequency, urgency, or dysuria, headaches, changes in vision, dizziness, numbness, tingling. Denies recent travel, recent antibiotic use, or sick contacts.    ED Course:   Vitals: BP: 127/65, HR: 93, Temp: 99.2 , RR: 19, SpO2: 96 % on RA   Labs:  ESR: 77, WBC: 17.51, H/H: 12.6/38.4  CXR: No evidence of acute infiltrate  XRAY foot: IMPRESSION: Suspect pathologic fractures of the third and fourth metatarsal heads. 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 If osteomyelitis is clinically considered  despite conservative therapy,   and soft tissue / bone infection requires further assessment, follow-up   MRI recommended.  EKG:  NSR, 81 BPM  Received in the ED  Vanc x  1, Zosyn x  1, NS bolus  x 1     (24 Nov 2023 15:27)    INTERVAL HPI:  11/25/23: Patient seen and examined at bedside. Patient laying in bed comfortably and offers no complaints. Dressing on right foot appears clean, dry and intact. No significant overnight events. IV Zosyn, WBC Resolved   11/26/23: Pt seen and examined at bedside. Pt sitting up in bed comfortably. Has no complaints. Otherwise is anxious to have surgery as soon as possible. Eating, ambulating well. No infectious sx. On IV zosyn,   11/27/23: Pt seen and examined at bedside. Pt sitting up in bed comfortably. Has no complaints. Otherwise is anxious to have surgery as soon as possible. Awaiting MRI. FRANCISCA done. Saw cardio NP this AM and emphasized that she should call his cardiologist as early as possible. Eating, ambulating well. No infectious sx. On IV zosyn,  NPO for possible OR -Podiatry ,On IV Zosyn  11/28/23:  POD # 1. Pt seen and examined at bedside. Pt sitting up comfortably in bed. s/p R transmetatarsal amputation with no complications. Pain 7/10 currently, was 11 last night. Pain regimen ordered. Walking to and from bathroom on heel. On IV zosyn. Eating diabetic diet without issues.   11/29/23: POD #2  s/p R transmetatarsal amputation with no complications. Pt sitting up comfortably at bedside. Pain is well controlled on tramadol. Currently 5/10 pain. Walking to and from bathroom on heel. On IV zosyn. No chest pain, sob, leg edema or leg pain. Follow podiatry reccs. Awaiting biopsy results for outpatient abx management.   11/30: POD #3 s/p R transmetatarsal amputation with no complications. Pt sitting up comfortably at bedside. Pain is well controlled on tramadol and dilaudid for breakthrough pain. Walking to and from bathroom on heel. HAd BM last night. Family to bring metamucil from home. On IV zosyn. No chest pain, sob or leg pain. Follow podiatry reccs. Awaiting biopsy results for outpatient abx management.   12/1/23: POD#4. Patient with no acute symptoms.  Pain is well controlled on tramadol and dilaudid for breakthrough pain. Walking to and from bathroom on heel. No SOB or calf pain. Awaiting biopsy results for outpatient abx management. R lower leg u/s done this AM. PICC line placed, for Home IV Abx , Doppler Rt Lower EXT done, Start on Eliquis as per Hematology. D/C Planning in AM   12/2/23: Patient seen and examined at bedside. Patient laying in bed and appears to be in NAD. He offers no complaints or concerns this morning. US of LE showed that there was a right DVT and now started in Eliquis. Denies any chest pain or SOB.     Home Medications:  acetaminophen 325 mg oral tablet: 2 tab(s) orally every 6 hours as needed for , Mild Pain (1 - 3) (01 Dec 2023 18:50)  ampicillin: intravenous every 4 hours Via PICC (01 Dec 2023 18:50)  apixaban 5 mg oral tablet: 2 tab(s) orally every 12 hours 10 mg 1 tab 2x day for TOTAL 7 days followed by   5mg 1 tab 2x day -Continue   Total 3 months Treatments (01 Dec 2023 18:55)  atorvastatin 40 mg oral tablet: 1 tab(s) orally once a day (24 Nov 2023 15:22)  clopidogrel 75 mg oral tablet: 1 tab(s) orally once a day (24 Nov 2023 15:22)  Jardiance 25 mg oral tablet: 1 tab(s) orally once a day (24 Nov 2023 15:20)  lactobacillus acidophilus oral capsule: 1 tablet with sensor orally 2 times a day (01 Dec 2023 18:50)  lisinopril 40 mg oral tablet: 1 tab(s) orally once a day (24 Nov 2023 15:19)  metFORMIN 1000 mg oral tablet: 1 tab(s) orally 2 times a day (24 Nov 2023 15:22)  metoprolol succinate 50 mg oral tablet, extended release: 1 tab(s) orally once a day (01 Dec 2023 18:55)  polyethylene glycol 3350 oral powder for reconstitution: 17 gram(s) orally 2 times a day (01 Dec 2023 18:50)  Trulicity Pen 1.5 mg/0.5 mL subcutaneous solution: 1.5 milligram(s) subcutaneously once a week (24 Nov 2023 15:23)      MEDICATIONS  (STANDING):  ampicillin  IVPB 2 Gram(s) IV Intermittent every 4 hours  ampicillin  IVPB      apixaban 10 milliGRAM(s) Oral every 12 hours  atorvastatin 40 milliGRAM(s) Oral at bedtime  clopidogrel Tablet 75 milliGRAM(s) Oral daily  dextrose 5%. 1000 milliLiter(s) (100 mL/Hr) IV Continuous <Continuous>  dextrose 5%. 1000 milliLiter(s) (100 mL/Hr) IV Continuous <Continuous>  dextrose 5%. 1000 milliLiter(s) (50 mL/Hr) IV Continuous <Continuous>  dextrose 5%. 1000 milliLiter(s) (50 mL/Hr) IV Continuous <Continuous>  dextrose 50% Injectable 25 Gram(s) IV Push once  dextrose 50% Injectable 25 Gram(s) IV Push once  dextrose 50% Injectable 12.5 Gram(s) IV Push once  gabapentin 300 milliGRAM(s) Oral three times a day  glucagon  Injectable 1 milliGRAM(s) IntraMuscular once  glucagon  Injectable 1 milliGRAM(s) IntraMuscular once  insulin lispro (ADMELOG) corrective regimen sliding scale   SubCutaneous Before meals and at bedtime  lactobacillus acidophilus 1 Tablet(s) Oral three times a day with meals  lisinopril 40 milliGRAM(s) Oral daily  melatonin 3 milliGRAM(s) Oral at bedtime  metoprolol succinate ER 50 milliGRAM(s) Oral daily  polyethylene glycol 3350 17 Gram(s) Oral two times a day    MEDICATIONS  (PRN):  acetaminophen     Tablet .. 650 milliGRAM(s) Oral every 6 hours PRN Temp greater or equal to 38C (100.4F), Mild Pain (1 - 3)  dextrose Oral Gel 15 Gram(s) Oral once PRN Blood Glucose LESS THAN 70 milliGRAM(s)/deciliter  HYDROmorphone  Injectable 0.5 milliGRAM(s) IV Push every 6 hours PRN Severe Pain (7 - 10)  for breakthrough pain  traMADol 50 milliGRAM(s) Oral every 6 hours PRN Severe Pain (7 - 10)  traMADol 25 milliGRAM(s) Oral every 6 hours PRN Moderate Pain (4 - 6)      Allergies    No Known Allergies    Intolerances        Social History:  Lives: at home with wife and son  ADLs: independent  Diet: diabetic diet  Vaccination: covid vaccinated  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 No nausea, No vomiting; No diarrhea, No constipation. [ x ] BM  GENITOURINARY: No dysuria, No frequency, No urgency, No hematuria, NO incontinence  NEUROLOGICAL: No headaches, No dizziness, No numbness, No tingling, No tremors, No weakness  EXT: [x] R foot pain, RLE slightly more edematous than L. No pain on palpation, rash or erythema.   SKIN:  [ x ] No itching, burning, rashes, or lesions + + neuropathy b/l LE  MUSCULOSKELETAL: [x] R foot pain  PSYCHIATRIC: No depression,  No anxiety,  No mood swings ,No difficulty sleeping at night  PAIN SCALE: [ x ] None  [  ] Other-  ROS Unable to obtain due to - [  ] Dementia  [  ] Lethargy [  ] Drowsy [  ] Sedated [  ] non verbal  REST OF REVIEW Of SYSTEM - [ x ] Normal     Vital Signs Last 24 Hrs  T(C): 36.7 (02 Dec 2023 04:32), Max: 36.9 (01 Dec 2023 20:47)  T(F): 98 (02 Dec 2023 04:32), Max: 98.5 (01 Dec 2023 20:47)  HR: 72 (02 Dec 2023 04:32) (72 - 86)  BP: 118/62 (02 Dec 2023 04:32) (118/62 - 132/70)  BP(mean): --  RR: 18 (02 Dec 2023 04:32) (18 - 18)  SpO2: 94% (02 Dec 2023 04:32) (92% - 98%)    Parameters below as of 02 Dec 2023 04:32  Patient On (Oxygen Delivery Method): room air      Finger Stick          PHYSICAL EXAM:  GENERAL:  [ x ] NAD , [ x ] well appearing, [  ] Agitated, [  ] Drowsy,  [  ] Lethargy, [  ] confused   HEAD:  [ x ] Normal, [  ] Other  EYES:  [ x ] EOMI, [x  ] PERRLA, [ x ] conjunctiva and sclera clear normal, [  ] Other,  [  ] Pallor,[  ] Discharge  ENMT:  [ x ] Normal, [ x ] Moist mucous membranes, [ x ] Good dentition, [ x ] No Thrush  NECK:  [x  ] Supple, [ x ] No JVD, [x  ] Normal thyroid, [  ] Lymphadenopathy [  ] Other  CHEST/LUNG:  [ x ] Clear to auscultation bilaterally, [  x] Breath Sounds equal B/L, [  ] poor effort  [x ] No rales, [ x ] No rhonchi  [x  ]  No wheezing,   HEART:  [x  ] Regular rate and rhythm, [  ] tachycardia, [  ] Bradycardia,  [  ] irregular  [x  ] No murmurs, No rubs, No gallops, [  ] PPM in place (Mfr:  )  ABDOMEN:  [ x ] Soft, [ x ] Nontender, [ x ] Nondistended, [ x ] No mass, [ x ] Bowel sounds present, [  ] obese  NERVOUS SYSTEM:  [ x ] Alert & Oriented X3, [ x ] Nonfocal  [  ] Confusion  [  ] Encephalopathic [  ] Sedated [  ] Unable to assess, [  ] Dementia [  ] Other-  EXTREMITIES: [ ] 2+ Peripheral Pulses, No clubbing, No cyanosis,  [ + ] edema R LE, Nl LL EXT. [  ] PVD stasis skin changes B/L Lower EXT, [ x ] s/p R transmetatarsal amputation, R foot with clean dressing.   SKIN:  [  x] Skin warm and dry, [  ] Pressure Ulcers, [  ] ecchymosis, [  ] Skin Tears, [  ] Other    DIET: Diet, Consistent Carbohydrate w/Evening Snack (11-29-23 @ 16:41)      LABS:                        10.2   9.26  )-----------( 376      ( 02 Dec 2023 07:30 )             31.6     02 Dec 2023 07:30    140    |  105    |  18     ----------------------------<  273    5.2     |  30     |  0.82     Ca    8.5        02 Dec 2023 07:30  Phos  2.8       02 Dec 2023 07:30  Mg     2.3       02 Dec 2023 07:30        Urinalysis Basic - ( 02 Dec 2023 07:30 )    Color: x / Appearance: x / SG: x / pH: x  Gluc: 273 mg/dL / Ketone: x  / Bili: x / Urobili: x   Blood: x / Protein: x / Nitrite: x   Leuk Esterase: x / RBC: x / WBC x   Sq Epi: x / Non Sq Epi: x / Bacteria: x        Culture Results:   No growth (11-28 @ 01:31)                  Culture - Urine (collected 28 Nov 2023 01:31)  Source: Clean Catch Clean Catch (Midstream)  Final Report (29 Nov 2023 07:36):    No growth    Culture - Tissue with Gram Stain (collected 27 Nov 2023 18:05)  Source: .Tissue Other, RIGHT FOREFOOT BONE CULTURE  Gram Stain (28 Nov 2023 03:54):    Rare polymorphonuclear leukocytes seen per low power field    Few Gram positive cocci in pairs seen per oil power field  Organism: Enterococcus faecalis (29 Nov 2023 19:04)  Organism: Enterococcus faecalis (29 Nov 2023 19:04)         Anemia Panel:  Vitamin B12, Serum: 528 pg/mL (12-01-23 @ 17:03)  Folate, Serum: 5.8 ng/mL (12-01-23 @ 17:03)      Thyroid Panel:                RADIOLOGY & ADDITIONAL TESTS:  < from: US Guided Vascular Access (12.01.23 @ 12:53) >    ACC: [id number] EXAM:  US GUIDANCE VASCULAR ACCESS   ORDERED BY: EVERETT AGUILAR     ACC: [id number] EXAM:  SP INSERT PICC EQL GRT 5Y SISC   ORDERED BY: EVERETT AGUILAR     ACC: [id number] EXAM:  IR PROCEDURE PICC   ORDERED BY: EVERETT AGUILAR     PROCEDURE DATE:  12/01/2023          INTERPRETATION:  History:  63-year-old male here for PICC insertion for   disease treatment of cellulitis..    Procedure: 4 F Single-lumen Power PICC insertion.    : MERRY Toscano  Assistant: MERRY Onofre.    Attending: None.    Contrast: None.    Complication: None.    Procedure:  The procedure, risks, and  benefits were discussed with the patient and   informed consent was placed in the chart. The patient was placed in a   supine position on the Angio  suite bed. The patient's right upper   extremity was prepped and draped in the normal sterile fashion. Lidocaine   1% 5 cc injected at insertion site. Under ultrasound guidance, a   micropuncture needle was advanced into the basilic vein. A 0.018  wire   was advanced. The needle was exchanged for the inner dilator and sheath.   The inner dilator and wire were removed and the 4 French single-lumen   power PICC catheter was advanced centrally under fluoroscopic guidance,   with the tip in the superior vena cava region. The peel-away sheath and    the wire were removed. The catheter was flushed with normal saline and   secured in place. A sterile dressing was applied. The patient tolerated   the procedure without complication and left in stable condition.    Findings: The catheter seen coursing to the level the tip of the superior   vena cava region.    Impression:  Successful placement of a 4 French Single-lumen Power PICC in the right   upper extremity via the basilic vein. Ultrasound showspatency of the   right basilic vein.      Catheter length: 40 cm.    Fluoroscopy showed PICC tip in good position in distal SVC.    --- End of Report ---            II MARGIE SOUSA, INTERVENTIONAL RADIOLOGY  This document has been electronically signed. Dec  1 2023  1:09PM    < end of copied text >      HEALTH ISSUES - PROBLEM Dx:  Diabetic ulcer of right foot  You came in for an ulcer on your right foot which was suspected to have infection up to the level of the bone. We did an MRI of the right foot and we saw X. You underwent surgery of R metatarsal bone to stop the infection. Please follow up with Dr. Stark 1 week after surgery.    HTN (hypertension)  This is a chronic condition, please continue to take plavix and aspirin.    Hyperlipidemia    CAD (coronary artery disease)  This is a chronic condition, please continue to take plavix and aspirin.    Need for prophylactic measure    Diabetes    Type 2 diabetes mellitus    DVT, lower extremity            Consultant(s) Notes Reviewed:  [  ] YES     Care Discussed with [X] Consultants  [  ] Patient  [  ] Family [  ] HCP [  ]   [  ] Social Service  [  ] RN, [  ] Physical Therapy,[  ] Palliative care team  DVT PPX: [  ] Lovenox, [  ] S C Heparin, [  ] Coumadin, [  ] Xarelto, [  ] Eliquis, [  ] Pradaxa, [  ] IV Heparin drip, [  ] SCD [  ] Contraindication 2 to GI Bleed,[  ] Ambulation [  ] Contraindicated 2 to  bleed [  ] Contraindicated 2 to Brain Bleed  Advanced directive: [  ] None, [  ] DNR/DNI

## 2023-12-02 NOTE — PROGRESS NOTE ADULT - PROVIDER SPECIALTY LIST ADULT
Cardiology
Cardiology
Infectious Disease
Internal Medicine
Cardiology
Cardiology
Infectious Disease
Internal Medicine
Podiatry
Cardiology
Cardiology
Infectious Disease
Internal Medicine

## 2023-12-02 NOTE — PROGRESS NOTE ADULT - PROBLEM SELECTOR PROBLEM 6
Need for prophylactic measure
Hyperlipidemia
Hyperlipidemia

## 2023-12-02 NOTE — PROGRESS NOTE ADULT - PROBLEM SELECTOR PLAN 2
- Pt with edema of R lower leg noted   - STAT u/s this AM showed: New nonocclusive DVT of the right soleal vein, below the knee. Chronic appearing superficial thrombosis of right small saphenous vein, associated with calcifications.  - pt with no SOB, tachycardia, or desaturation, monitor closely for sx  - Vascular Dr. Boston consulted, f/u reccs regarding AC- per vascular for BTK DVT, no full does AC needed, CONTINUE DVT PPX and repeat duplex in 3-5 days  - Hem/Onc Dr. Austin consulted, f/u reccs regarding AC-As d/w Dr Austin -Pt need to be on full dose AC for Post op New nonocclusive DVT of the right soleal vein,   -D/W Pt at detail, Start Eliquis 10 mg 2x day x 7 days -loading dose   Followed by 5 mg 2x day   -Total 3 months treatment with AC as d/w DR Austin   Pt agrees for 3 months Treatment & out pt follow up with DR Austin -Hematology  D/W Pt & PMD DR Frederick as Very detail.

## 2023-12-04 DIAGNOSIS — E11.621 TYPE 2 DIABETES MELLITUS WITH FOOT ULCER: ICD-10-CM

## 2023-12-04 DIAGNOSIS — Z86.39 PERSONAL HISTORY OF OTHER ENDOCRINE, NUTRITIONAL AND METABOLIC DISEASE: ICD-10-CM

## 2023-12-04 DIAGNOSIS — Z79.84 LONG TERM (CURRENT) USE OF ORAL HYPOGLYCEMIC DRUGS: ICD-10-CM

## 2023-12-04 DIAGNOSIS — Z80.3 FAMILY HISTORY OF MALIGNANT NEOPLASM OF BREAST: ICD-10-CM

## 2023-12-04 DIAGNOSIS — E11.51 TYPE 2 DIABETES MELLITUS WITH DIABETIC PERIPHERAL ANGIOPATHY WITHOUT GANGRENE: ICD-10-CM

## 2023-12-04 DIAGNOSIS — L03.115 CELLULITIS OF RIGHT LOWER LIMB: ICD-10-CM

## 2023-12-04 DIAGNOSIS — Z79.82 LONG TERM (CURRENT) USE OF ASPIRIN: ICD-10-CM

## 2023-12-04 DIAGNOSIS — Z89.411 ACQUIRED ABSENCE OF RIGHT GREAT TOE: ICD-10-CM

## 2023-12-04 DIAGNOSIS — E11.40 TYPE 2 DIABETES MELLITUS WITH DIABETIC NEUROPATHY, UNSPECIFIED: ICD-10-CM

## 2023-12-04 DIAGNOSIS — Z83.3 FAMILY HISTORY OF DIABETES MELLITUS: ICD-10-CM

## 2023-12-04 DIAGNOSIS — I10 ESSENTIAL (PRIMARY) HYPERTENSION: ICD-10-CM

## 2023-12-04 DIAGNOSIS — Z79.899 OTHER LONG TERM (CURRENT) DRUG THERAPY: ICD-10-CM

## 2023-12-04 DIAGNOSIS — E78.00 PURE HYPERCHOLESTEROLEMIA, UNSPECIFIED: ICD-10-CM

## 2023-12-04 DIAGNOSIS — L97.414 NON-PRESSURE CHRONIC ULCER OF RIGHT HEEL AND MIDFOOT WITH NECROSIS OF BONE: ICD-10-CM

## 2023-12-04 DIAGNOSIS — Z98.890 OTHER SPECIFIED POSTPROCEDURAL STATES: ICD-10-CM

## 2023-12-04 PROBLEM — I73.9 PERIPHERAL VASCULAR DISEASE, UNSPECIFIED: Chronic | Status: ACTIVE | Noted: 2023-11-25

## 2023-12-06 ENCOUNTER — APPOINTMENT (OUTPATIENT)
Dept: WOUND CARE | Facility: HOSPITAL | Age: 63
End: 2023-12-06
Payer: COMMERCIAL

## 2023-12-06 ENCOUNTER — OUTPATIENT (OUTPATIENT)
Dept: OUTPATIENT SERVICES | Facility: HOSPITAL | Age: 63
LOS: 1 days | Discharge: HOME | End: 2023-12-06
Payer: COMMERCIAL

## 2023-12-06 VITALS
BODY MASS INDEX: 28.04 KG/M2 | SYSTOLIC BLOOD PRESSURE: 134 MMHG | OXYGEN SATURATION: 96 % | HEIGHT: 68 IN | WEIGHT: 185 LBS | DIASTOLIC BLOOD PRESSURE: 68 MMHG | TEMPERATURE: 99.2 F | RESPIRATION RATE: 18 BRPM | HEART RATE: 85 BPM

## 2023-12-06 DIAGNOSIS — M86.10 OTHER ACUTE OSTEOMYELITIS, UNSPECIFIED SITE: ICD-10-CM

## 2023-12-06 DIAGNOSIS — Z09 ENCOUNTER FOR FOLLOW-UP EXAMINATION AFTER COMPLETED TREATMENT FOR CONDITIONS OTHER THAN MALIGNANT NEOPLASM: ICD-10-CM

## 2023-12-06 DIAGNOSIS — Z86.718 PERSONAL HISTORY OF OTHER VENOUS THROMBOSIS AND EMBOLISM: ICD-10-CM

## 2023-12-06 DIAGNOSIS — Z98.62 PERIPHERAL VASCULAR ANGIOPLASTY STATUS: Chronic | ICD-10-CM

## 2023-12-06 DIAGNOSIS — Z89.429 ACQUIRED ABSENCE OF OTHER TOE(S), UNSPECIFIED SIDE: Chronic | ICD-10-CM

## 2023-12-06 PROCEDURE — G0463: CPT

## 2023-12-06 PROCEDURE — 99024 POSTOP FOLLOW-UP VISIT: CPT

## 2023-12-09 ENCOUNTER — APPOINTMENT (OUTPATIENT)
Dept: WOUND CARE | Facility: HOSPITAL | Age: 63
End: 2023-12-09
Payer: COMMERCIAL

## 2023-12-09 ENCOUNTER — OUTPATIENT (OUTPATIENT)
Dept: OUTPATIENT SERVICES | Facility: HOSPITAL | Age: 63
LOS: 1 days | Discharge: HOME | End: 2023-12-09
Payer: COMMERCIAL

## 2023-12-09 VITALS
BODY MASS INDEX: 28.04 KG/M2 | SYSTOLIC BLOOD PRESSURE: 155 MMHG | OXYGEN SATURATION: 98 % | HEIGHT: 68 IN | HEART RATE: 88 BPM | DIASTOLIC BLOOD PRESSURE: 88 MMHG | WEIGHT: 185 LBS | RESPIRATION RATE: 18 BRPM | TEMPERATURE: 98.9 F

## 2023-12-09 DIAGNOSIS — Z09 ENCOUNTER FOR FOLLOW-UP EXAMINATION AFTER COMPLETED TREATMENT FOR CONDITIONS OTHER THAN MALIGNANT NEOPLASM: ICD-10-CM

## 2023-12-09 DIAGNOSIS — E11.621 TYPE 2 DIABETES MELLITUS WITH FOOT ULCER: ICD-10-CM

## 2023-12-09 DIAGNOSIS — L97.414 NON-PRESSURE CHRONIC ULCER OF RIGHT HEEL AND MIDFOOT WITH NECROSIS OF BONE: ICD-10-CM

## 2023-12-09 DIAGNOSIS — Z98.62 PERIPHERAL VASCULAR ANGIOPLASTY STATUS: Chronic | ICD-10-CM

## 2023-12-09 DIAGNOSIS — S91.309D UNSPECIFIED OPEN WOUND, UNSPECIFIED FOOT, SUBSEQUENT ENCOUNTER: ICD-10-CM

## 2023-12-09 DIAGNOSIS — Z89.429 ACQUIRED ABSENCE OF OTHER TOE(S), UNSPECIFIED SIDE: Chronic | ICD-10-CM

## 2023-12-09 DIAGNOSIS — Z89.431 ACQUIRED ABSENCE OF RIGHT FOOT: ICD-10-CM

## 2023-12-09 PROCEDURE — G0463: CPT

## 2023-12-09 PROCEDURE — ZZZZZ: CPT

## 2023-12-12 ENCOUNTER — OUTPATIENT (OUTPATIENT)
Dept: OUTPATIENT SERVICES | Facility: HOSPITAL | Age: 63
LOS: 1 days | Discharge: HOME | End: 2023-12-12
Payer: COMMERCIAL

## 2023-12-12 ENCOUNTER — APPOINTMENT (OUTPATIENT)
Dept: WOUND CARE | Facility: HOSPITAL | Age: 63
End: 2023-12-12
Payer: COMMERCIAL

## 2023-12-12 VITALS
OXYGEN SATURATION: 98 % | TEMPERATURE: 97.8 F | HEART RATE: 71 BPM | DIASTOLIC BLOOD PRESSURE: 72 MMHG | RESPIRATION RATE: 18 BRPM | BODY MASS INDEX: 28.04 KG/M2 | WEIGHT: 185 LBS | SYSTOLIC BLOOD PRESSURE: 146 MMHG | HEIGHT: 68 IN

## 2023-12-12 DIAGNOSIS — Z98.62 PERIPHERAL VASCULAR ANGIOPLASTY STATUS: Chronic | ICD-10-CM

## 2023-12-12 DIAGNOSIS — Z89.429 ACQUIRED ABSENCE OF OTHER TOE(S), UNSPECIFIED SIDE: Chronic | ICD-10-CM

## 2023-12-12 DIAGNOSIS — Z09 ENCOUNTER FOR FOLLOW-UP EXAMINATION AFTER COMPLETED TREATMENT FOR CONDITIONS OTHER THAN MALIGNANT NEOPLASM: ICD-10-CM

## 2023-12-12 PROCEDURE — 99024 POSTOP FOLLOW-UP VISIT: CPT

## 2023-12-12 PROCEDURE — G0463: CPT

## 2023-12-13 ENCOUNTER — NON-APPOINTMENT (OUTPATIENT)
Age: 63
End: 2023-12-13

## 2023-12-14 DIAGNOSIS — Z83.3 FAMILY HISTORY OF DIABETES MELLITUS: ICD-10-CM

## 2023-12-14 DIAGNOSIS — E78.00 PURE HYPERCHOLESTEROLEMIA, UNSPECIFIED: ICD-10-CM

## 2023-12-14 DIAGNOSIS — I10 ESSENTIAL (PRIMARY) HYPERTENSION: ICD-10-CM

## 2023-12-14 DIAGNOSIS — M86.671 OTHER CHRONIC OSTEOMYELITIS, RIGHT ANKLE AND FOOT: ICD-10-CM

## 2023-12-14 DIAGNOSIS — Z79.899 OTHER LONG TERM (CURRENT) DRUG THERAPY: ICD-10-CM

## 2023-12-14 DIAGNOSIS — Z98.890 OTHER SPECIFIED POSTPROCEDURAL STATES: ICD-10-CM

## 2023-12-14 DIAGNOSIS — Z89.431 ACQUIRED ABSENCE OF RIGHT FOOT: ICD-10-CM

## 2023-12-14 DIAGNOSIS — E11.51 TYPE 2 DIABETES MELLITUS WITH DIABETIC PERIPHERAL ANGIOPATHY WITHOUT GANGRENE: ICD-10-CM

## 2023-12-14 DIAGNOSIS — M86.171 OTHER ACUTE OSTEOMYELITIS, RIGHT ANKLE AND FOOT: ICD-10-CM

## 2023-12-14 DIAGNOSIS — Z86.39 PERSONAL HISTORY OF OTHER ENDOCRINE, NUTRITIONAL AND METABOLIC DISEASE: ICD-10-CM

## 2023-12-14 DIAGNOSIS — E11.69 TYPE 2 DIABETES MELLITUS WITH OTHER SPECIFIED COMPLICATION: ICD-10-CM

## 2023-12-14 DIAGNOSIS — Z80.3 FAMILY HISTORY OF MALIGNANT NEOPLASM OF BREAST: ICD-10-CM

## 2023-12-14 DIAGNOSIS — Z89.411 ACQUIRED ABSENCE OF RIGHT GREAT TOE: ICD-10-CM

## 2023-12-14 DIAGNOSIS — Z79.84 LONG TERM (CURRENT) USE OF ORAL HYPOGLYCEMIC DRUGS: ICD-10-CM

## 2023-12-14 DIAGNOSIS — Z79.82 LONG TERM (CURRENT) USE OF ASPIRIN: ICD-10-CM

## 2023-12-14 DIAGNOSIS — E11.40 TYPE 2 DIABETES MELLITUS WITH DIABETIC NEUROPATHY, UNSPECIFIED: ICD-10-CM

## 2023-12-14 NOTE — REVIEW OF SYSTEMS
[Negative] : Heme/Lymph [FreeTextEntry9] : Status post right hallux and first metatarsal amputation, healed [de-identified] : Right submet 2 ulceration down to skin, subcutaneous tissue, fat [de-identified] : Diabetic neuropathy

## 2023-12-14 NOTE — ASSESSMENT
[] : No [FreeTextEntry2] : Infection prevention Localized wound care  Goal remaining pain free regarding wounds Glycemic controle  [FreeTextEntry4] : Patient had blood work completed today associated with IV antibiotic treatment. Pt waiting for results by ID.  dressing changes twice a week at [facility].  Orthotist paper work pending for custom shoes Assessment in 1 week

## 2023-12-14 NOTE — REVIEW OF SYSTEMS
[Negative] : Heme/Lymph [FreeTextEntry9] : Status post right hallux and first metatarsal amputation, healed [de-identified] : Right submet 2 ulceration down to skin, subcutaneous tissue, fat [de-identified] : Diabetic neuropathy

## 2023-12-14 NOTE — HISTORY OF PRESENT ILLNESS
[FreeTextEntry1] : Regular DPM OFELIA Tubbs shaved callous about 2 months ago and site turned into an open wound. DPM prescribed Cephalexin that he is currently taking and recommended him to come to [facility].  HPI: Patient seen for right foot submet had to ulcer down to skin, subcutaneous tissue, fat.  Patient has been seen by Dr. Tubbs as an outpatient who noticed that she had a callus in the area approximately 2 months prior.  Patient relates that he has finished a course of oral cephalexin and was recommended to come to the wound care center for further care.  Of note is that patient is status post right hallux and first metatarsal amputation which has healed at this time.  Denies any other complaints at this time.  12/6/23: Patient seen for follow-up status post right transmetatarsal amputation with Achilles tendon lengthening (DOS: 11/27/2023).  Patient relates that he has been doing well and denies any other complaints at this time.  Patient is currently on long-term IV antibiotics for residual osteomyelitis.  Patient remains a very high risk for infection, sepsis, limb loss, death.

## 2023-12-14 NOTE — PLAN
[FreeTextEntry1] : Patient examined and evaluated at this time. Continue local wound care and offloading. Every other suture removed at this time. Patient remains at high risk for infection, sepsis, limb loss, and death. Patient will benefit from diabetic shoes and multidensity inserts with a right partial foot prosthesis. Spent 20 minutes for patient care and medical decision making. Patient to follow up in 1 week.

## 2023-12-14 NOTE — PLAN
[FreeTextEntry1] : Patient examined and evaluated at this time. Continue local wound care and offloading. Patient remains at high risk for infection, sepsis, limb loss, and death. Patient will likely benefit from diabetic shoes and multidensity inserts with a partial foot prosthesis of the right foot. Spent 20 minutes for patient care and medical decision making. Patient to follow up in 1 week.

## 2023-12-14 NOTE — ASSESSMENT
[] : No [FreeTextEntry2] : Infection Prevention Foot and nail care Nutrition and wound healing NW [FreeTextEntry3] : S/P Hospitalization 11/24/23 - 12/2/23 R foot wound  Hospital Course  [FreeTextEntry4] : Patient tolerating I.V. ABt therapy well.  Request submitted for [facility].  Patient does not drive, Patient does not work (S/P Right TMA). Patient unable to perform dressing changes on his own. Request for [facility] Skilled nursing for Rx dressing changes, need for nurses to come to Patient's home S/P Right foot surgery. F/U 1 Week [FreeTextEntry1] : NW Initiation

## 2023-12-14 NOTE — PHYSICAL EXAM
[2+] : left 2+ [Ankle Swelling (On Exam)] : not present [Varicose Veins Of Lower Extremities] : not present [] : not present [de-identified] : A&Ox3, NAD [de-identified] : Status post right transmetatarsal amputation with Achilles tendon lengthening [de-identified] : Right foot incisions intact with sutures.  No dehiscence, no proximal streaking, no fluctuance, no purulence. [de-identified] : Loss of light and sensation bilaterally [FreeTextEntry1] : Rt foot TMA-  trans metatarsal  amputation on 11/27, [FreeTextEntry2] : 5.0 [FreeTextEntry3] : 13.0 [FreeTextEntry4] : 0.3 [de-identified] : Moderate sanguineous [de-identified] : none [de-identified] : surgical [de-identified] : none [de-identified] : intact [de-identified] : none [de-identified] : none [de-identified] : unable to visualize base [de-identified] : Adaptic Touch, Alginate AG [de-identified] : Mechanically cleansed with sterile gauze and normal saline 0.9% Dry Dressing [de-identified] : Sutures CDI [FreeTextEntry7] : Right Achilles, S/P Achilles Lengthening [de-identified] : none [de-identified] : surgical [de-identified] : none [de-identified] : none [de-identified] : Adaptic Touch [de-identified] : Mechanically cleansed with sterile gauze and normal saline 0.9% Dry Dressing [de-identified] : None [de-identified] : 3x Weekly [de-identified] : Primary Dressing [de-identified] : 3x Weekly [de-identified] : Primary Dressing

## 2023-12-14 NOTE — VITALS
[] : No [de-identified] : 0/10 [FreeTextEntry5] : IV Ampicillin 2 gm q 4 hrs -  TOTAl Rx of IV Abx 6 weeks.  ELIQUIS 10 mg 1 tab 2x day for 7 days  followed by Eliquis 5 mg 1 tab 2x day  for  TOTAL 3 months

## 2023-12-14 NOTE — HISTORY OF PRESENT ILLNESS
[FreeTextEntry1] : Regular DPM OFELIA Tubbs shaved callous about 2 months ago and site turned into an open wound. DPM prescribed Cephalexin that he is currently taking and recommended him to come to [facility].  HPI: Patient seen for right foot submet had to ulcer down to skin, subcutaneous tissue, fat.  Patient has been seen by Dr. Tubbs as an outpatient who noticed that she had a callus in the area approximately 2 months prior.  Patient relates that he has finished a course of oral cephalexin and was recommended to come to the wound care center for further care.  Of note is that patient is status post right hallux and first metatarsal amputation which has healed at this time.  Denies any other complaints at this time.  12/12/23: Patient seen for follow-up status post right transmetatarsal amputation with Achilles tendon lengthening (DOS: 11/27/2023).  Patient relates that he has been doing well and denies any other complaints at this time.  Patient is currently on long-term IV antibiotics for residual osteomyelitis.  Patient remains a very high risk for infection, sepsis, limb loss, death.

## 2023-12-14 NOTE — PHYSICAL EXAM
[2+] : left 2+ [Ankle Swelling (On Exam)] : not present [Varicose Veins Of Lower Extremities] : not present [de-identified] : A&Ox3, NAD [] : not present [de-identified] : Status post right transmetatarsal amputation with Achilles tendon lengthening [de-identified] : Right foot incisions intact with sutures.  No dehiscence, no proximal streaking, no fluctuance, no purulence. [de-identified] : Loss of light and sensation bilaterally [FreeTextEntry1] : Right foot TMA - Trans metatarsal - Sutures in place  [FreeTextEntry2] : 0.8 [FreeTextEntry3] : 1.5 [FreeTextEntry4] : 1.0 [de-identified] : Serous/sanguinous [de-identified] : Adaptic touch, Silver alginate [de-identified] : Mechanically cleansed with sterile gauze and normal saline. Kerlix   * Some sutures removed by DPM  [FreeTextEntry7] : Right achilles S/P achilles lengthening  [de-identified] : Dry dressing  [de-identified] : Mechanically cleansed with sterile gauze and normal saline. Kerlix  [TWNoteComboBox4] : Moderate [de-identified] : Normal [de-identified] : None [de-identified] : None [de-identified] : >75% [de-identified] : No [de-identified] : 2x Weekly [de-identified] : Primary Dressing [de-identified] : None [de-identified] : Normal [de-identified] : None [de-identified] : None [de-identified] : No [de-identified] : 2x Weekly [de-identified] : Secondary Dressing

## 2023-12-16 ENCOUNTER — APPOINTMENT (OUTPATIENT)
Dept: WOUND CARE | Facility: HOSPITAL | Age: 63
End: 2023-12-16
Payer: COMMERCIAL

## 2023-12-16 ENCOUNTER — OUTPATIENT (OUTPATIENT)
Dept: OUTPATIENT SERVICES | Facility: HOSPITAL | Age: 63
LOS: 1 days | Discharge: HOME | End: 2023-12-16
Payer: COMMERCIAL

## 2023-12-16 VITALS
HEART RATE: 86 BPM | SYSTOLIC BLOOD PRESSURE: 133 MMHG | BODY MASS INDEX: 28.04 KG/M2 | DIASTOLIC BLOOD PRESSURE: 74 MMHG | HEIGHT: 68 IN | RESPIRATION RATE: 18 BRPM | OXYGEN SATURATION: 98 % | WEIGHT: 185 LBS | TEMPERATURE: 97.9 F

## 2023-12-16 DIAGNOSIS — Z89.431 ACQUIRED ABSENCE OF RIGHT FOOT: ICD-10-CM

## 2023-12-16 DIAGNOSIS — M86.171 OTHER ACUTE OSTEOMYELITIS, RIGHT ANKLE AND FOOT: ICD-10-CM

## 2023-12-16 DIAGNOSIS — M86.671 OTHER CHRONIC OSTEOMYELITIS, RIGHT ANKLE AND FOOT: ICD-10-CM

## 2023-12-16 DIAGNOSIS — Z89.429 ACQUIRED ABSENCE OF OTHER TOE(S), UNSPECIFIED SIDE: Chronic | ICD-10-CM

## 2023-12-16 DIAGNOSIS — E11.69 TYPE 2 DIABETES MELLITUS WITH OTHER SPECIFIED COMPLICATION: ICD-10-CM

## 2023-12-16 DIAGNOSIS — Z98.62 PERIPHERAL VASCULAR ANGIOPLASTY STATUS: Chronic | ICD-10-CM

## 2023-12-16 DIAGNOSIS — Z09 ENCOUNTER FOR FOLLOW-UP EXAMINATION AFTER COMPLETED TREATMENT FOR CONDITIONS OTHER THAN MALIGNANT NEOPLASM: ICD-10-CM

## 2023-12-16 PROCEDURE — ZZZZZ: CPT

## 2023-12-16 PROCEDURE — G0463: CPT

## 2023-12-19 ENCOUNTER — APPOINTMENT (OUTPATIENT)
Dept: WOUND CARE | Facility: HOSPITAL | Age: 63
End: 2023-12-19
Payer: COMMERCIAL

## 2023-12-19 ENCOUNTER — APPOINTMENT (OUTPATIENT)
Dept: CARDIOLOGY | Facility: CLINIC | Age: 63
End: 2023-12-19
Payer: COMMERCIAL

## 2023-12-19 ENCOUNTER — OUTPATIENT (OUTPATIENT)
Dept: OUTPATIENT SERVICES | Facility: HOSPITAL | Age: 63
LOS: 1 days | Discharge: HOME | End: 2023-12-19
Payer: COMMERCIAL

## 2023-12-19 VITALS
OXYGEN SATURATION: 99 % | RESPIRATION RATE: 18 BRPM | HEIGHT: 68 IN | SYSTOLIC BLOOD PRESSURE: 145 MMHG | HEART RATE: 83 BPM | BODY MASS INDEX: 26.07 KG/M2 | TEMPERATURE: 98.5 F | DIASTOLIC BLOOD PRESSURE: 82 MMHG | WEIGHT: 172 LBS

## 2023-12-19 VITALS
OXYGEN SATURATION: 100 % | WEIGHT: 172 LBS | DIASTOLIC BLOOD PRESSURE: 82 MMHG | BODY MASS INDEX: 26.07 KG/M2 | SYSTOLIC BLOOD PRESSURE: 148 MMHG | HEIGHT: 68 IN | HEART RATE: 86 BPM

## 2023-12-19 VITALS — DIASTOLIC BLOOD PRESSURE: 70 MMHG | SYSTOLIC BLOOD PRESSURE: 120 MMHG

## 2023-12-19 DIAGNOSIS — Z89.429 ACQUIRED ABSENCE OF OTHER TOE(S), UNSPECIFIED SIDE: Chronic | ICD-10-CM

## 2023-12-19 DIAGNOSIS — Z98.62 PERIPHERAL VASCULAR ANGIOPLASTY STATUS: Chronic | ICD-10-CM

## 2023-12-19 DIAGNOSIS — Z09 ENCOUNTER FOR FOLLOW-UP EXAMINATION AFTER COMPLETED TREATMENT FOR CONDITIONS OTHER THAN MALIGNANT NEOPLASM: ICD-10-CM

## 2023-12-19 PROCEDURE — 93000 ELECTROCARDIOGRAM COMPLETE: CPT

## 2023-12-19 PROCEDURE — 99214 OFFICE O/P EST MOD 30 MIN: CPT | Mod: 25

## 2023-12-19 PROCEDURE — G0463: CPT

## 2023-12-19 PROCEDURE — 99024 POSTOP FOLLOW-UP VISIT: CPT

## 2023-12-19 RX ORDER — QUINAPRIL HYDROCHLORIDE 40 MG/1
40 TABLET, FILM COATED ORAL DAILY
Refills: 0 | Status: COMPLETED | COMMUNITY
End: 2023-12-19

## 2023-12-19 RX ORDER — EMPAGLIFLOZIN 25 MG/1
25 TABLET, FILM COATED ORAL DAILY
Qty: 3030 | Refills: 0 | Status: ACTIVE | COMMUNITY
Start: 2023-12-19

## 2023-12-19 RX ORDER — ASPIRIN 81 MG
81 TABLET, DELAYED RELEASE (ENTERIC COATED) ORAL DAILY
Refills: 0 | Status: COMPLETED | COMMUNITY
End: 2023-12-19

## 2023-12-19 NOTE — HISTORY OF PRESENT ILLNESS
[FreeTextEntry1] : Audi is a 63-year-old male with a past medical history of hypertension, hyperlipidemia, type 2 diabetes, status post right hallux toe amputation 3 years ago who presented to the [facility] emergency room with a right foot wound.  The patient apparently went to a private podiatrist approximately 2-1/2 months ago to scrape off some callus. The callus removal site did not heal well and started to develop into a wound. The podiatrist recommended that the patient see wound management.  As per the patient, the wound was draining clear liquid and having a foul odor.  Prior to admission the patient had an episode of chills that resolved when he went to sleep.  The night before he went to the hospital, he had chills and had difficulty balancing himself while walking.  He went to his regularly scheduled appointment with  who recommended that he be admitted for IV antibiotic therapy and have surgery on the right foot. He is S/P a transmetatarsal amputation with no complications. He was sent home with a PICC line for IV antibiotic infusions which will be removed this Saturday as per the patient. While in the hospital, he was seen by cardiology.  While the patient was in the hospital he had an ultrasound of the left lower extremity which revealed that there was a right DVT and he was started on Eliquis in the hospital. He denies chest pain, shortness of breath, palpitations or dizziness. His EKG reveals sinus rhythm with poor R wave progression and nonspecific repolarization changes.

## 2023-12-19 NOTE — DISCUSSION/SUMMARY
[FreeTextEntry1] : Audi is a 63-year-old male with a past medical history of hypertension, hyperlipidemia, type 2 diabetes, status post right hallux toe amputation 3 years ago who presented to the [place] emergency room with a right foot wound.  The patient apparently went to a private podiatrist approximately 2-1/2 months ago to scrape off some callus. The callus removal site did not heal well and started to develop into a wound. He is now status post TMA.   He has a known history of hypertension, hyperlipidemia, as well as type 2 diabetes. I am referring the patient for an echocardiogram to assess cardiac structural integrity, left ventricular function, valvular function and the pulmonary artery systolic pressure.  In addition, I have ordered a pharmacological nuclear stress study to assess for the presence of obstructive coronary artery disease. He will follow up with Dr Chu in February or March 2024.  He will continue Lipitor 40mg QD, Plavix 75mg QD, Jardiance 25mg QD, Metformin 1000mg BID, metoprolol 25mg QD, Eliquis 5mg BID, as well as lisinopril 40mg QD. His PICC line will be removed this Saturday.  Further recommendations will depend on his clinical course.  [EKG obtained to assist in diagnosis and management of assessed problem(s)] : EKG obtained to assist in diagnosis and management of assessed problem(s)

## 2023-12-19 NOTE — REVIEW OF SYSTEMS
[Negative] : Heme/Lymph [FreeTextEntry9] : TMA right foot [de-identified] : Wound dressing over right foot with walking boot

## 2023-12-19 NOTE — PHYSICAL EXAM
[Normal] : alert and oriented, normal memory [de-identified] : walks with a cane due to TMA [de-identified] : right foot boot with foot dressing

## 2023-12-21 DIAGNOSIS — Z79.899 OTHER LONG TERM (CURRENT) DRUG THERAPY: ICD-10-CM

## 2023-12-21 DIAGNOSIS — E78.00 PURE HYPERCHOLESTEROLEMIA, UNSPECIFIED: ICD-10-CM

## 2023-12-21 DIAGNOSIS — Z98.890 OTHER SPECIFIED POSTPROCEDURAL STATES: ICD-10-CM

## 2023-12-21 DIAGNOSIS — M86.171 OTHER ACUTE OSTEOMYELITIS, RIGHT ANKLE AND FOOT: ICD-10-CM

## 2023-12-21 DIAGNOSIS — Z86.39 PERSONAL HISTORY OF OTHER ENDOCRINE, NUTRITIONAL AND METABOLIC DISEASE: ICD-10-CM

## 2023-12-21 DIAGNOSIS — Z89.431 ACQUIRED ABSENCE OF RIGHT FOOT: ICD-10-CM

## 2023-12-21 DIAGNOSIS — M86.671 OTHER CHRONIC OSTEOMYELITIS, RIGHT ANKLE AND FOOT: ICD-10-CM

## 2023-12-21 DIAGNOSIS — E11.69 TYPE 2 DIABETES MELLITUS WITH OTHER SPECIFIED COMPLICATION: ICD-10-CM

## 2023-12-21 DIAGNOSIS — Z83.3 FAMILY HISTORY OF DIABETES MELLITUS: ICD-10-CM

## 2023-12-21 DIAGNOSIS — Z89.411 ACQUIRED ABSENCE OF RIGHT GREAT TOE: ICD-10-CM

## 2023-12-21 DIAGNOSIS — Z79.84 LONG TERM (CURRENT) USE OF ORAL HYPOGLYCEMIC DRUGS: ICD-10-CM

## 2023-12-21 DIAGNOSIS — I10 ESSENTIAL (PRIMARY) HYPERTENSION: ICD-10-CM

## 2023-12-21 DIAGNOSIS — Z80.3 FAMILY HISTORY OF MALIGNANT NEOPLASM OF BREAST: ICD-10-CM

## 2023-12-21 DIAGNOSIS — E11.51 TYPE 2 DIABETES MELLITUS WITH DIABETIC PERIPHERAL ANGIOPATHY WITHOUT GANGRENE: ICD-10-CM

## 2023-12-21 DIAGNOSIS — Z79.82 LONG TERM (CURRENT) USE OF ASPIRIN: ICD-10-CM

## 2023-12-21 NOTE — ASSESSMENT
[Verbal] : Verbal [Written] : Written [Demo] : Demo [Patient] : Patient [Good - alert, interested, motivated] : Good - alert, interested, motivated [Verbalizes knowledge/Understanding] : Verbalizes knowledge/understanding [Dressing changes] : dressing changes [Foot Care] : foot care [Skin Care] : skin care [Signs and symptoms of infection] : sign and symptoms of infection [Surgery] : surgery [Nutrition] : nutrition [How and When to Call] : how and when to call [Pain Management] : pain management [Patient responsibility to plan of care] : patient responsibility to plan of care [Glycemic Control] : glycemic control [] : Yes [Stable] : stable [Home] : Home [Walker] : Walker [Not Applicable - Long Term Care/Home Health Agency] : Long Term Care/Home Health Agency: Not Applicable [FreeTextEntry2] : Infection prevention Localized wound care  Goal remaining pain free regarding wounds Promote optimal nutrition  Offloading  [FreeTextEntry4] : Patients IV antibiotics and picc line being DC'd 12/23/23.  Pt is to contact Orthotist to make a casting for shoe insert.  Follow up in 1 week

## 2023-12-21 NOTE — PHYSICAL EXAM
[4 x 4] : 4 x 4  [2+] : left 2+ [Ankle Swelling (On Exam)] : not present [Varicose Veins Of Lower Extremities] : not present [] : not present [de-identified] : A&Ox3, NAD [de-identified] : Status post right transmetatarsal amputation with Achilles tendon lengthening [de-identified] : Right foot incisions intact with sutures.  No dehiscence, no proximal streaking, no fluctuance, no purulence. [de-identified] : Loss of light and sensation bilaterally [FreeTextEntry1] : Right foot TMA - Trans Metatarsal surgical site - Sutures in place  [FreeTextEntry2] : 0.5 [FreeTextEntry3] : 3.5 [FreeTextEntry4] : 0.1 [de-identified] : Serous/sanguinous [de-identified] : 5% [de-identified] : Adaptic touch, Silver alginate [de-identified] : Mechanically cleansed with sterile gauze and normal saline. Kerlix  * Sutures removed by DPM [TWNoteComboBox4] : Small [de-identified] : Normal [de-identified] : None [de-identified] : None [de-identified] : >75% [de-identified] : Yes [de-identified] : Weekly [de-identified] : Primary Dressing

## 2023-12-21 NOTE — HISTORY OF PRESENT ILLNESS
[FreeTextEntry1] : Regular DPM OFELIA Tubbs shaved callous about 2 months ago and site turned into an open wound. DPM prescribed Cephalexin that he is currently taking and recommended him to come to [facility].  HPI: Patient seen for right foot submet had to ulcer down to skin, subcutaneous tissue, fat.  Patient has been seen by Dr. Tubbs as an outpatient who noticed that she had a callus in the area approximately 2 months prior.  Patient relates that he has finished a course of oral cephalexin and was recommended to come to the wound care center for further care.  Of note is that patient is status post right hallux and first metatarsal amputation which has healed at this time.  Denies any other complaints at this time.  12/19/23: Patient seen for follow-up status post right transmetatarsal amputation with Achilles tendon lengthening (DOS: 11/27/2023).  Patient relates that he has been doing well and denies any other complaints at this time.  Patient is currently on long-term IV antibiotics for residual osteomyelitis.  Patient remains a very high risk for infection, sepsis, limb loss, death.

## 2023-12-21 NOTE — PLAN
[FreeTextEntry1] : Patient examined and evaluated at this time. Continue local wound care and offloading. All remaining sutures removed at this time. Patient remains at high risk for infection, sepsis, limb loss, and death. Patient will benefit from diabetic shoes and multidensity inserts with a right partial foot prosthesis. Spent 20 minutes for patient care and medical decision making. Patient to follow up in 1 week.

## 2023-12-21 NOTE — REVIEW OF SYSTEMS
[Negative] : Heme/Lymph [FreeTextEntry9] : Status post right hallux and first metatarsal amputation, healed [de-identified] : Right submet 2 ulceration down to skin, subcutaneous tissue, fat [de-identified] : Diabetic neuropathy

## 2023-12-22 ENCOUNTER — APPOINTMENT (OUTPATIENT)
Dept: WOUND CARE | Facility: HOSPITAL | Age: 63
End: 2023-12-22
Payer: COMMERCIAL

## 2023-12-22 ENCOUNTER — NON-APPOINTMENT (OUTPATIENT)
Age: 63
End: 2023-12-22

## 2023-12-22 ENCOUNTER — OUTPATIENT (OUTPATIENT)
Dept: OUTPATIENT SERVICES | Facility: HOSPITAL | Age: 63
LOS: 1 days | Discharge: HOME | End: 2023-12-22
Payer: COMMERCIAL

## 2023-12-22 VITALS
DIASTOLIC BLOOD PRESSURE: 76 MMHG | BODY MASS INDEX: 26.07 KG/M2 | RESPIRATION RATE: 18 BRPM | TEMPERATURE: 98.3 F | HEART RATE: 80 BPM | HEIGHT: 68 IN | WEIGHT: 172 LBS | OXYGEN SATURATION: 98 % | SYSTOLIC BLOOD PRESSURE: 135 MMHG

## 2023-12-22 DIAGNOSIS — Z98.62 PERIPHERAL VASCULAR ANGIOPLASTY STATUS: Chronic | ICD-10-CM

## 2023-12-22 DIAGNOSIS — Z89.429 ACQUIRED ABSENCE OF OTHER TOE(S), UNSPECIFIED SIDE: Chronic | ICD-10-CM

## 2023-12-22 DIAGNOSIS — E11.69 TYPE 2 DIABETES MELLITUS WITH OTHER SPECIFIED COMPLICATION: ICD-10-CM

## 2023-12-22 PROCEDURE — G0463: CPT

## 2023-12-22 PROCEDURE — ZZZZZ: CPT

## 2023-12-23 ENCOUNTER — APPOINTMENT (OUTPATIENT)
Dept: WOUND CARE | Facility: HOSPITAL | Age: 63
End: 2023-12-23

## 2023-12-23 DIAGNOSIS — M86.171 OTHER ACUTE OSTEOMYELITIS, RIGHT ANKLE AND FOOT: ICD-10-CM

## 2023-12-23 DIAGNOSIS — Z89.431 ACQUIRED ABSENCE OF RIGHT FOOT: ICD-10-CM

## 2023-12-23 DIAGNOSIS — M86.671 OTHER CHRONIC OSTEOMYELITIS, RIGHT ANKLE AND FOOT: ICD-10-CM

## 2023-12-23 DIAGNOSIS — E11.69 TYPE 2 DIABETES MELLITUS WITH OTHER SPECIFIED COMPLICATION: ICD-10-CM

## 2023-12-26 ENCOUNTER — APPOINTMENT (OUTPATIENT)
Dept: WOUND CARE | Facility: HOSPITAL | Age: 63
End: 2023-12-26
Payer: COMMERCIAL

## 2023-12-26 ENCOUNTER — OUTPATIENT (OUTPATIENT)
Dept: OUTPATIENT SERVICES | Facility: HOSPITAL | Age: 63
LOS: 1 days | Discharge: HOME | End: 2023-12-26
Payer: COMMERCIAL

## 2023-12-26 VITALS
SYSTOLIC BLOOD PRESSURE: 135 MMHG | TEMPERATURE: 98.4 F | HEART RATE: 82 BPM | DIASTOLIC BLOOD PRESSURE: 84 MMHG | WEIGHT: 172 LBS | OXYGEN SATURATION: 99 % | HEIGHT: 68 IN | BODY MASS INDEX: 26.07 KG/M2 | RESPIRATION RATE: 18 BRPM

## 2023-12-26 DIAGNOSIS — Z89.429 ACQUIRED ABSENCE OF OTHER TOE(S), UNSPECIFIED SIDE: Chronic | ICD-10-CM

## 2023-12-26 DIAGNOSIS — Z09 ENCOUNTER FOR FOLLOW-UP EXAMINATION AFTER COMPLETED TREATMENT FOR CONDITIONS OTHER THAN MALIGNANT NEOPLASM: ICD-10-CM

## 2023-12-26 DIAGNOSIS — Z98.62 PERIPHERAL VASCULAR ANGIOPLASTY STATUS: Chronic | ICD-10-CM

## 2023-12-26 PROCEDURE — G0463: CPT

## 2023-12-26 PROCEDURE — 99024 POSTOP FOLLOW-UP VISIT: CPT

## 2023-12-26 NOTE — PLAN
[FreeTextEntry1] : Patient examined and evaluated at this time. Continue local wound care and offloading. Patient remains at high risk for infection, sepsis, limb loss, and death. Patient will benefit from diabetic shoes and multidensity inserts with a right partial foot prosthesis. Spent 20 minutes for patient care and medical decision making. Patient to follow up in 1 week.

## 2023-12-26 NOTE — ASSESSMENT
[Verbal] : Verbal [Written] : Written [Demo] : Demo [Patient] : Patient [Good - alert, interested, motivated] : Good - alert, interested, motivated [Verbalizes knowledge/Understanding] : Verbalizes knowledge/understanding [Dressing changes] : dressing changes [Foot Care] : foot care [Skin Care] : skin care [Signs and symptoms of infection] : sign and symptoms of infection [Surgery] : surgery [Nutrition] : nutrition [How and When to Call] : how and when to call [Pain Management] : pain management [Patient responsibility to plan of care] : patient responsibility to plan of care [Glycemic Control] : glycemic control [Stable] : stable [Home] : Home [Walker] : Walker [Not Applicable - Long Term Care/Home Health Agency] : Long Term Care/Home Health Agency: Not Applicable [] : No [FreeTextEntry2] : Infection prevention Localized wound care  Goal remaining pain free regarding wounds Promote optimal nutrition  Offloading  [FreeTextEntry4] : Pt will be away until 12/31/2023 will be leaving dressing place until then, DPM aware. Follow up on 1/2/24 for dressing change and 1/5/24 for assessment

## 2023-12-26 NOTE — REVIEW OF SYSTEMS
[Negative] : Heme/Lymph [FreeTextEntry9] : Status post right hallux and first metatarsal amputation, healed [de-identified] : Right submet 2 ulceration down to skin, subcutaneous tissue, fat [de-identified] : Diabetic neuropathy

## 2023-12-26 NOTE — HISTORY OF PRESENT ILLNESS
[FreeTextEntry1] : Regular DPM OFELIA Tubbs shaved callous about 2 months ago and site turned into an open wound. DPM prescribed Cephalexin that he is currently taking and recommended him to come to [facility].  HPI: Patient seen for right foot submet had to ulcer down to skin, subcutaneous tissue, fat.  Patient has been seen by Dr. Tubbs as an outpatient who noticed that she had a callus in the area approximately 2 months prior.  Patient relates that he has finished a course of oral cephalexin and was recommended to come to the wound care center for further care.  Of note is that patient is status post right hallux and first metatarsal amputation which has healed at this time.  Denies any other complaints at this time.  12/26/23: Patient seen for follow-up status post right transmetatarsal amputation with Achilles tendon lengthening (DOS: 11/27/2023).  Patient relates that he has been doing well and denies any other complaints at this time.  Patient is currently on long-term IV antibiotics for residual osteomyelitis.  Patient remains a very high risk for infection, sepsis, limb loss, death.

## 2023-12-26 NOTE — PHYSICAL EXAM
[4 x 4] : 4 x 4  [2+] : left 2+ [Ankle Swelling (On Exam)] : not present [Varicose Veins Of Lower Extremities] : not present [] : not present [de-identified] : A&Ox3, NAD [de-identified] : Status post right transmetatarsal amputation with Achilles tendon lengthening [de-identified] : Right foot incisions intact, no dehiscence, no proximal streaking, no fluctuance, no purulence. [de-identified] : Loss of light and sensation bilaterally [FreeTextEntry1] : Right foot TMA surgical site  [FreeTextEntry2] : 0.8 [FreeTextEntry3] : 0.1 [FreeTextEntry4] : 0.1 [de-identified] : Serous/sanguinous [de-identified] : 5% [de-identified] : Adaptic touch, Silver alginate [de-identified] : Mechanically cleansed with sterile gauze and normal saline. Kerlix   [TWNoteComboBox4] : Small [TWNoteComboBox5] : No [TWNoteComboBox6] : Surgical [de-identified] : No [de-identified] : Normal [de-identified] : None [de-identified] : None [de-identified] : >75% [de-identified] : Yes [de-identified] : Weekly [de-identified] : Primary Dressing

## 2023-12-27 DIAGNOSIS — Z89.431 ACQUIRED ABSENCE OF RIGHT FOOT: ICD-10-CM

## 2023-12-27 DIAGNOSIS — E78.00 PURE HYPERCHOLESTEROLEMIA, UNSPECIFIED: ICD-10-CM

## 2023-12-27 DIAGNOSIS — Z89.411 ACQUIRED ABSENCE OF RIGHT GREAT TOE: ICD-10-CM

## 2023-12-27 DIAGNOSIS — Z80.3 FAMILY HISTORY OF MALIGNANT NEOPLASM OF BREAST: ICD-10-CM

## 2023-12-27 DIAGNOSIS — Z83.3 FAMILY HISTORY OF DIABETES MELLITUS: ICD-10-CM

## 2023-12-27 DIAGNOSIS — M86.671 OTHER CHRONIC OSTEOMYELITIS, RIGHT ANKLE AND FOOT: ICD-10-CM

## 2023-12-27 DIAGNOSIS — E11.51 TYPE 2 DIABETES MELLITUS WITH DIABETIC PERIPHERAL ANGIOPATHY WITHOUT GANGRENE: ICD-10-CM

## 2023-12-27 DIAGNOSIS — E11.69 TYPE 2 DIABETES MELLITUS WITH OTHER SPECIFIED COMPLICATION: ICD-10-CM

## 2023-12-27 DIAGNOSIS — Z79.84 LONG TERM (CURRENT) USE OF ORAL HYPOGLYCEMIC DRUGS: ICD-10-CM

## 2023-12-27 DIAGNOSIS — M86.171 OTHER ACUTE OSTEOMYELITIS, RIGHT ANKLE AND FOOT: ICD-10-CM

## 2023-12-27 DIAGNOSIS — Z86.39 PERSONAL HISTORY OF OTHER ENDOCRINE, NUTRITIONAL AND METABOLIC DISEASE: ICD-10-CM

## 2023-12-27 DIAGNOSIS — Z79.82 LONG TERM (CURRENT) USE OF ASPIRIN: ICD-10-CM

## 2023-12-27 DIAGNOSIS — E11.40 TYPE 2 DIABETES MELLITUS WITH DIABETIC NEUROPATHY, UNSPECIFIED: ICD-10-CM

## 2023-12-27 DIAGNOSIS — Z98.890 OTHER SPECIFIED POSTPROCEDURAL STATES: ICD-10-CM

## 2023-12-27 DIAGNOSIS — I10 ESSENTIAL (PRIMARY) HYPERTENSION: ICD-10-CM

## 2023-12-27 DIAGNOSIS — Z79.899 OTHER LONG TERM (CURRENT) DRUG THERAPY: ICD-10-CM

## 2024-01-02 ENCOUNTER — OUTPATIENT (OUTPATIENT)
Dept: OUTPATIENT SERVICES | Facility: HOSPITAL | Age: 64
LOS: 1 days | Discharge: HOME | End: 2024-01-02
Payer: COMMERCIAL

## 2024-01-02 ENCOUNTER — APPOINTMENT (OUTPATIENT)
Dept: CARDIOLOGY | Facility: CLINIC | Age: 64
End: 2024-01-02
Payer: COMMERCIAL

## 2024-01-02 ENCOUNTER — APPOINTMENT (OUTPATIENT)
Dept: WOUND CARE | Facility: HOSPITAL | Age: 64
End: 2024-01-02
Payer: COMMERCIAL

## 2024-01-02 VITALS
DIASTOLIC BLOOD PRESSURE: 73 MMHG | HEART RATE: 88 BPM | OXYGEN SATURATION: 99 % | TEMPERATURE: 99.1 F | RESPIRATION RATE: 20 BRPM | HEIGHT: 68 IN | SYSTOLIC BLOOD PRESSURE: 121 MMHG | BODY MASS INDEX: 26.07 KG/M2 | WEIGHT: 172 LBS

## 2024-01-02 DIAGNOSIS — Z98.62 PERIPHERAL VASCULAR ANGIOPLASTY STATUS: Chronic | ICD-10-CM

## 2024-01-02 DIAGNOSIS — E11.69 TYPE 2 DIABETES MELLITUS WITH OTHER SPECIFIED COMPLICATION: ICD-10-CM

## 2024-01-02 DIAGNOSIS — M86.671 OTHER CHRONIC OSTEOMYELITIS, RIGHT ANKLE AND FOOT: ICD-10-CM

## 2024-01-02 DIAGNOSIS — M86.171 OTHER ACUTE OSTEOMYELITIS, RIGHT ANKLE AND FOOT: ICD-10-CM

## 2024-01-02 DIAGNOSIS — Z89.429 ACQUIRED ABSENCE OF OTHER TOE(S), UNSPECIFIED SIDE: Chronic | ICD-10-CM

## 2024-01-02 DIAGNOSIS — Z89.431 ACQUIRED ABSENCE OF RIGHT FOOT: ICD-10-CM

## 2024-01-02 DIAGNOSIS — Z09 ENCOUNTER FOR FOLLOW-UP EXAMINATION AFTER COMPLETED TREATMENT FOR CONDITIONS OTHER THAN MALIGNANT NEOPLASM: ICD-10-CM

## 2024-01-02 PROCEDURE — ZZZZZ: CPT

## 2024-01-02 PROCEDURE — G0463: CPT

## 2024-01-02 PROCEDURE — 93306 TTE W/DOPPLER COMPLETE: CPT

## 2024-01-03 RX ORDER — METOPROLOL SUCCINATE 50 MG/1
50 TABLET, EXTENDED RELEASE ORAL
Qty: 90 | Refills: 3 | Status: ACTIVE | COMMUNITY

## 2024-01-05 ENCOUNTER — APPOINTMENT (OUTPATIENT)
Dept: WOUND CARE | Facility: HOSPITAL | Age: 64
End: 2024-01-05
Payer: COMMERCIAL

## 2024-01-05 ENCOUNTER — OUTPATIENT (OUTPATIENT)
Dept: OUTPATIENT SERVICES | Facility: HOSPITAL | Age: 64
LOS: 1 days | Discharge: HOME | End: 2024-01-05
Payer: COMMERCIAL

## 2024-01-05 VITALS
BODY MASS INDEX: 26.07 KG/M2 | RESPIRATION RATE: 14 BRPM | HEIGHT: 68 IN | WEIGHT: 172 LBS | TEMPERATURE: 98.3 F | DIASTOLIC BLOOD PRESSURE: 81 MMHG | SYSTOLIC BLOOD PRESSURE: 123 MMHG | OXYGEN SATURATION: 99 % | HEART RATE: 88 BPM

## 2024-01-05 DIAGNOSIS — Z89.429 ACQUIRED ABSENCE OF OTHER TOE(S), UNSPECIFIED SIDE: Chronic | ICD-10-CM

## 2024-01-05 DIAGNOSIS — Z98.62 PERIPHERAL VASCULAR ANGIOPLASTY STATUS: Chronic | ICD-10-CM

## 2024-01-05 DIAGNOSIS — Z09 ENCOUNTER FOR FOLLOW-UP EXAMINATION AFTER COMPLETED TREATMENT FOR CONDITIONS OTHER THAN MALIGNANT NEOPLASM: ICD-10-CM

## 2024-01-05 PROCEDURE — G0463: CPT

## 2024-01-05 PROCEDURE — 99024 POSTOP FOLLOW-UP VISIT: CPT

## 2024-01-10 DIAGNOSIS — E11.69 TYPE 2 DIABETES MELLITUS WITH OTHER SPECIFIED COMPLICATION: ICD-10-CM

## 2024-01-10 DIAGNOSIS — Z89.431 ACQUIRED ABSENCE OF RIGHT FOOT: ICD-10-CM

## 2024-01-10 DIAGNOSIS — Z79.82 LONG TERM (CURRENT) USE OF ASPIRIN: ICD-10-CM

## 2024-01-10 DIAGNOSIS — Z98.890 OTHER SPECIFIED POSTPROCEDURAL STATES: ICD-10-CM

## 2024-01-10 DIAGNOSIS — I10 ESSENTIAL (PRIMARY) HYPERTENSION: ICD-10-CM

## 2024-01-10 DIAGNOSIS — Z79.899 OTHER LONG TERM (CURRENT) DRUG THERAPY: ICD-10-CM

## 2024-01-10 DIAGNOSIS — M86.171 OTHER ACUTE OSTEOMYELITIS, RIGHT ANKLE AND FOOT: ICD-10-CM

## 2024-01-10 DIAGNOSIS — Z89.411 ACQUIRED ABSENCE OF RIGHT GREAT TOE: ICD-10-CM

## 2024-01-10 DIAGNOSIS — Z80.3 FAMILY HISTORY OF MALIGNANT NEOPLASM OF BREAST: ICD-10-CM

## 2024-01-10 DIAGNOSIS — Z83.3 FAMILY HISTORY OF DIABETES MELLITUS: ICD-10-CM

## 2024-01-10 DIAGNOSIS — E11.40 TYPE 2 DIABETES MELLITUS WITH DIABETIC NEUROPATHY, UNSPECIFIED: ICD-10-CM

## 2024-01-10 DIAGNOSIS — Z79.84 LONG TERM (CURRENT) USE OF ORAL HYPOGLYCEMIC DRUGS: ICD-10-CM

## 2024-01-10 DIAGNOSIS — E78.00 PURE HYPERCHOLESTEROLEMIA, UNSPECIFIED: ICD-10-CM

## 2024-01-10 DIAGNOSIS — E11.51 TYPE 2 DIABETES MELLITUS WITH DIABETIC PERIPHERAL ANGIOPATHY WITHOUT GANGRENE: ICD-10-CM

## 2024-01-10 DIAGNOSIS — Z86.39 PERSONAL HISTORY OF OTHER ENDOCRINE, NUTRITIONAL AND METABOLIC DISEASE: ICD-10-CM

## 2024-01-10 DIAGNOSIS — M86.671 OTHER CHRONIC OSTEOMYELITIS, RIGHT ANKLE AND FOOT: ICD-10-CM

## 2024-01-10 NOTE — PLAN
[FreeTextEntry1] : Patient examined and evaluated at this time. Continue local wound care and offloading. Patient remains at high risk for infection, sepsis, limb loss, and death. Patient will benefit from diabetic shoes and multidensity inserts with a right partial foot prosthesis. Spent 20 minutes for patient care and medical decision making.

## 2024-01-10 NOTE — PHYSICAL EXAM
[4 x 4] : 4 x 4  [Abdominal Pad] : Abdominal Pad [2+] : left 2+ [Ankle Swelling (On Exam)] : not present [Varicose Veins Of Lower Extremities] : not present [] : not present [de-identified] : A&Ox3, NAD [de-identified] : Status post right transmetatarsal amputation with Achilles tendon lengthening [de-identified] : Right foot incisions intact, no dehiscence, no proximal streaking, no fluctuance, no purulence. [de-identified] : Loss of light and sensation bilaterally [FreeTextEntry1] : Right foot TMA surgical site - scab with no openings  [de-identified] : NONE [de-identified] : Cleansed with 0.9% Normal Saline  Kerlix   [TWNoteComboBox4] : None [TWNoteComboBox5] : No [TWNoteComboBox6] : Surgical [de-identified] : No [de-identified] : Normal [de-identified] : None [de-identified] : None [de-identified] : None [de-identified] : No [de-identified] : 3x Weekly [de-identified] : Primary Dressing

## 2024-01-10 NOTE — ASSESSMENT
[Verbal] : Verbal [Demo] : Demo [Patient] : Patient [Good - alert, interested, motivated] : Good - alert, interested, motivated [Verbalizes knowledge/Understanding] : Verbalizes knowledge/understanding [Dressing changes] : dressing changes [Skin Care] : skin care [Pressure relief] : pressure relief [Signs and symptoms of infection] : sign and symptoms of infection [Nutrition] : nutrition [How and When to Call] : how and when to call [Off-loading] : off-loading [Patient responsibility to plan of care] : patient responsibility to plan of care [Stable] : stable [Home] : Home [Foot Care] : foot care [Glycemic Control] : glycemic control [] : Yes [Cane] : Cane [Not Applicable - Long Term Care/Home Health Agency] : Long Term Care/Home Health Agency: Not Applicable [FreeTextEntry2] : Infection prevention Localized wound care Promote optimal skin integrity  Maintain acceptable level of pain with use of pharmacological and nonpharmacological interventions Offloading / Pressure relief  Daily foot care / monitoring Nutrtion to promote wound care [FreeTextEntry4] : Follow up for an assessment in two weeks Patient was provided with letter to return to work, starting 1/8/24 Patient was advised to perform daily moisturizing of the foot

## 2024-01-10 NOTE — REVIEW OF SYSTEMS
[Negative] : Heme/Lymph [FreeTextEntry9] : Status post right hallux and first metatarsal amputation, healed [de-identified] : Right submet 2 ulceration down to skin, subcutaneous tissue, fat [de-identified] : Diabetic neuropathy

## 2024-01-22 ENCOUNTER — NON-APPOINTMENT (OUTPATIENT)
Age: 64
End: 2024-01-22

## 2024-01-24 ENCOUNTER — APPOINTMENT (OUTPATIENT)
Dept: CARDIOLOGY | Facility: CLINIC | Age: 64
End: 2024-01-24
Payer: COMMERCIAL

## 2024-01-24 PROCEDURE — 93015 CV STRESS TEST SUPVJ I&R: CPT

## 2024-01-24 PROCEDURE — A9500: CPT

## 2024-01-24 PROCEDURE — 78452 HT MUSCLE IMAGE SPECT MULT: CPT

## 2024-01-27 ENCOUNTER — OUTPATIENT (OUTPATIENT)
Dept: OUTPATIENT SERVICES | Facility: HOSPITAL | Age: 64
LOS: 1 days | Discharge: HOME | End: 2024-01-27
Payer: COMMERCIAL

## 2024-01-27 ENCOUNTER — APPOINTMENT (OUTPATIENT)
Dept: WOUND CARE | Facility: HOSPITAL | Age: 64
End: 2024-01-27
Payer: COMMERCIAL

## 2024-01-27 VITALS
OXYGEN SATURATION: 98 % | HEART RATE: 86 BPM | SYSTOLIC BLOOD PRESSURE: 141 MMHG | HEIGHT: 68 IN | WEIGHT: 172 LBS | TEMPERATURE: 97.8 F | BODY MASS INDEX: 26.07 KG/M2 | DIASTOLIC BLOOD PRESSURE: 74 MMHG | RESPIRATION RATE: 18 BRPM

## 2024-01-27 DIAGNOSIS — Z83.3 FAMILY HISTORY OF DIABETES MELLITUS: ICD-10-CM

## 2024-01-27 DIAGNOSIS — M86.171 OTHER ACUTE OSTEOMYELITIS, RIGHT ANKLE AND FOOT: ICD-10-CM

## 2024-01-27 DIAGNOSIS — I10 ESSENTIAL (PRIMARY) HYPERTENSION: ICD-10-CM

## 2024-01-27 DIAGNOSIS — Z86.39 PERSONAL HISTORY OF OTHER ENDOCRINE, NUTRITIONAL AND METABOLIC DISEASE: ICD-10-CM

## 2024-01-27 DIAGNOSIS — Z09 ENCOUNTER FOR FOLLOW-UP EXAMINATION AFTER COMPLETED TREATMENT FOR CONDITIONS OTHER THAN MALIGNANT NEOPLASM: ICD-10-CM

## 2024-01-27 DIAGNOSIS — Z79.899 OTHER LONG TERM (CURRENT) DRUG THERAPY: ICD-10-CM

## 2024-01-27 DIAGNOSIS — E11.40 TYPE 2 DIABETES MELLITUS WITH DIABETIC NEUROPATHY, UNSPECIFIED: ICD-10-CM

## 2024-01-27 DIAGNOSIS — M86.671 OTHER CHRONIC OSTEOMYELITIS, RIGHT ANKLE AND FOOT: ICD-10-CM

## 2024-01-27 DIAGNOSIS — Z98.890 OTHER SPECIFIED POSTPROCEDURAL STATES: ICD-10-CM

## 2024-01-27 DIAGNOSIS — E11.69 TYPE 2 DIABETES MELLITUS WITH OTHER SPECIFIED COMPLICATION: ICD-10-CM

## 2024-01-27 DIAGNOSIS — E11.51 TYPE 2 DIABETES MELLITUS WITH DIABETIC PERIPHERAL ANGIOPATHY WITHOUT GANGRENE: ICD-10-CM

## 2024-01-27 DIAGNOSIS — Z89.431 ACQUIRED ABSENCE OF RIGHT FOOT: ICD-10-CM

## 2024-01-27 DIAGNOSIS — Z89.411 ACQUIRED ABSENCE OF RIGHT GREAT TOE: ICD-10-CM

## 2024-01-27 DIAGNOSIS — Z80.3 FAMILY HISTORY OF MALIGNANT NEOPLASM OF BREAST: ICD-10-CM

## 2024-01-27 DIAGNOSIS — Z89.429 ACQUIRED ABSENCE OF OTHER TOE(S), UNSPECIFIED SIDE: Chronic | ICD-10-CM

## 2024-01-27 DIAGNOSIS — Z79.84 LONG TERM (CURRENT) USE OF ORAL HYPOGLYCEMIC DRUGS: ICD-10-CM

## 2024-01-27 DIAGNOSIS — E78.00 PURE HYPERCHOLESTEROLEMIA, UNSPECIFIED: ICD-10-CM

## 2024-01-27 DIAGNOSIS — Z98.62 PERIPHERAL VASCULAR ANGIOPLASTY STATUS: Chronic | ICD-10-CM

## 2024-01-27 DIAGNOSIS — Z79.82 LONG TERM (CURRENT) USE OF ASPIRIN: ICD-10-CM

## 2024-01-27 PROCEDURE — 99024 POSTOP FOLLOW-UP VISIT: CPT

## 2024-01-27 PROCEDURE — G0463: CPT

## 2024-02-24 ENCOUNTER — OUTPATIENT (OUTPATIENT)
Dept: OUTPATIENT SERVICES | Facility: HOSPITAL | Age: 64
LOS: 1 days | Discharge: HOME | End: 2024-02-24
Payer: COMMERCIAL

## 2024-02-24 ENCOUNTER — APPOINTMENT (OUTPATIENT)
Dept: WOUND CARE | Facility: HOSPITAL | Age: 64
End: 2024-02-24
Payer: COMMERCIAL

## 2024-02-24 VITALS
SYSTOLIC BLOOD PRESSURE: 161 MMHG | BODY MASS INDEX: 26.07 KG/M2 | RESPIRATION RATE: 18 BRPM | TEMPERATURE: 98.2 F | DIASTOLIC BLOOD PRESSURE: 75 MMHG | OXYGEN SATURATION: 98 % | WEIGHT: 172 LBS | HEART RATE: 83 BPM | HEIGHT: 68 IN

## 2024-02-24 DIAGNOSIS — E11.40 TYPE 2 DIABETES MELLITUS WITH DIABETIC NEUROPATHY, UNSPECIFIED: ICD-10-CM

## 2024-02-24 DIAGNOSIS — Z79.82 LONG TERM (CURRENT) USE OF ASPIRIN: ICD-10-CM

## 2024-02-24 DIAGNOSIS — I10 ESSENTIAL (PRIMARY) HYPERTENSION: ICD-10-CM

## 2024-02-24 DIAGNOSIS — E11.51 TYPE 2 DIABETES MELLITUS WITH DIABETIC PERIPHERAL ANGIOPATHY WITHOUT GANGRENE: ICD-10-CM

## 2024-02-24 DIAGNOSIS — M86.171 OTHER ACUTE OSTEOMYELITIS, RIGHT ANKLE AND FOOT: ICD-10-CM

## 2024-02-24 DIAGNOSIS — M86.671 OTHER CHRONIC OSTEOMYELITIS, RIGHT ANKLE AND FOOT: ICD-10-CM

## 2024-02-24 DIAGNOSIS — Z83.3 FAMILY HISTORY OF DIABETES MELLITUS: ICD-10-CM

## 2024-02-24 DIAGNOSIS — Z98.890 OTHER SPECIFIED POSTPROCEDURAL STATES: ICD-10-CM

## 2024-02-24 DIAGNOSIS — E11.69 TYPE 2 DIABETES MELLITUS WITH OTHER SPECIFIED COMPLICATION: ICD-10-CM

## 2024-02-24 DIAGNOSIS — Z89.411 ACQUIRED ABSENCE OF RIGHT GREAT TOE: ICD-10-CM

## 2024-02-24 DIAGNOSIS — S91.309D UNSPECIFIED OPEN WOUND, UNSPECIFIED FOOT, SUBSEQUENT ENCOUNTER: ICD-10-CM

## 2024-02-24 DIAGNOSIS — Z89.431 ACQUIRED ABSENCE OF RIGHT FOOT: ICD-10-CM

## 2024-02-24 DIAGNOSIS — Z80.3 FAMILY HISTORY OF MALIGNANT NEOPLASM OF BREAST: ICD-10-CM

## 2024-02-24 DIAGNOSIS — Z86.39 PERSONAL HISTORY OF OTHER ENDOCRINE, NUTRITIONAL AND METABOLIC DISEASE: ICD-10-CM

## 2024-02-24 DIAGNOSIS — E78.00 PURE HYPERCHOLESTEROLEMIA, UNSPECIFIED: ICD-10-CM

## 2024-02-24 DIAGNOSIS — Z79.84 LONG TERM (CURRENT) USE OF ORAL HYPOGLYCEMIC DRUGS: ICD-10-CM

## 2024-02-24 DIAGNOSIS — Z79.899 OTHER LONG TERM (CURRENT) DRUG THERAPY: ICD-10-CM

## 2024-02-24 DIAGNOSIS — Z89.429 ACQUIRED ABSENCE OF OTHER TOE(S), UNSPECIFIED SIDE: Chronic | ICD-10-CM

## 2024-02-24 DIAGNOSIS — Z98.62 PERIPHERAL VASCULAR ANGIOPLASTY STATUS: Chronic | ICD-10-CM

## 2024-02-24 PROCEDURE — 99024 POSTOP FOLLOW-UP VISIT: CPT

## 2024-02-24 PROCEDURE — G0463: CPT

## 2024-02-24 NOTE — PHYSICAL EXAM
[2+] : left 2+ [Ankle Swelling (On Exam)] : not present [Varicose Veins Of Lower Extremities] : not present [] : not present [de-identified] : A&Ox3, NAD [de-identified] : Status post right transmetatarsal amputation with Achilles tendon lengthening [de-identified] : Right foot incisions intact, no dehiscence, no proximal streaking, no fluctuance, no purulence. [de-identified] : Loss of light and sensation bilaterally [FreeTextEntry1] : Right TMA (closed)  [FreeTextEntry2] : 0 [FreeTextEntry3] : 0 [FreeTextEntry4] : 0 [de-identified] : leave open to air

## 2024-02-24 NOTE — REVIEW OF SYSTEMS
[Negative] : Heme/Lymph [FreeTextEntry9] : Status post right hallux and first metatarsal amputation, healed [de-identified] : Right submet 2 ulceration down to skin, subcutaneous tissue, fat [de-identified] : Diabetic neuropathy

## 2024-02-24 NOTE — ASSESSMENT
[Verbal] : Verbal [Patient] : Patient [Good - alert, interested, motivated] : Good - alert, interested, motivated [Verbalizes knowledge/Understanding] : Verbalizes knowledge/understanding [Stable] : stable [Home] : Home [Cane] : Cane [] : No [FreeTextEntry2] : Infection prevention Promote optimal skin integrity Offloading / Pressure relief Daily foot care / monitoring Nutrtion to promote wound care.  [FreeTextEntry4] : Pt assessed by Dr. Stark. Wound to right TMA remains closed. Pt instructed to continue to leave scabbed area open to air and can continue to shower regularly. Return visit as needed.

## 2024-02-24 NOTE — HISTORY OF PRESENT ILLNESS
[FreeTextEntry1] : Regular DPM OFELIA Tubbs shaved callous about 2 months ago and site turned into an open wound. DPM prescribed Cephalexin that he is currently taking and recommended him to come to [facility].  HPI: Patient seen for right foot submet had to ulcer down to skin, subcutaneous tissue, fat.  Patient has been seen by Dr. Tubbs as an outpatient who noticed that she had a callus in the area approximately 2 months prior.  Patient relates that he has finished a course of oral cephalexin and was recommended to come to the wound care center for further care.  Of note is that patient is status post right hallux and first metatarsal amputation which has healed at this time.  Denies any other complaints at this time.  2/24/2024: Patient seen for follow-up status post right transmetatarsal amputation with Achilles tendon lengthening (DOS: 11/27/2023), healed.  Patient awaiting fabrication of multidensity inserts with partial foot prosthesis for orthotist at this time.  Patient relates that he has been doing well and denies any other complaints at this time.  Patient remains a very high risk for infection, sepsis, limb loss, death.

## 2024-03-01 ENCOUNTER — NON-APPOINTMENT (OUTPATIENT)
Age: 64
End: 2024-03-01

## 2024-03-01 ENCOUNTER — APPOINTMENT (OUTPATIENT)
Dept: CARDIOLOGY | Facility: CLINIC | Age: 64
End: 2024-03-01
Payer: COMMERCIAL

## 2024-03-01 VITALS
BODY MASS INDEX: 27.13 KG/M2 | SYSTOLIC BLOOD PRESSURE: 172 MMHG | OXYGEN SATURATION: 100 % | DIASTOLIC BLOOD PRESSURE: 82 MMHG | HEIGHT: 68 IN | WEIGHT: 179 LBS | HEART RATE: 84 BPM

## 2024-03-01 DIAGNOSIS — I25.10 ATHEROSCLEROTIC HEART DISEASE OF NATIVE CORONARY ARTERY W/OUT ANGINA PECTORIS: ICD-10-CM

## 2024-03-01 DIAGNOSIS — I10 ESSENTIAL (PRIMARY) HYPERTENSION: ICD-10-CM

## 2024-03-01 PROCEDURE — 99214 OFFICE O/P EST MOD 30 MIN: CPT

## 2024-03-01 PROCEDURE — G2211 COMPLEX E/M VISIT ADD ON: CPT

## 2024-03-01 PROCEDURE — 93000 ELECTROCARDIOGRAM COMPLETE: CPT

## 2024-03-01 NOTE — REVIEW OF SYSTEMS
[Negative] : Heme/Lymph [FreeTextEntry9] : TMA right foot [de-identified] : Wound dressing over right foot with walking boot

## 2024-03-01 NOTE — HISTORY OF PRESENT ILLNESS
[FreeTextEntry1] : Audi presented to the office today for a follow-up evaluation.  He was last seen in the office by our nurse practitioner in December, 2023, after discharge from the hospital.  He is now 63 years old, with a history of hypertension, hypercholesterolemia and type 2 diabetes.  He was admitted to [facility] with a nonhealing ulcer.  He required transmetatarsal amputation and several weeks of antibiotic infusions.  His hospital course was notable for a DVT, for which he was started on anticoagulant therapy.  At the time of his visit here in the office in December 2023, he was feeling well.  Given his known risk factors for atherosclerosis and hospital course, he was referred for several examinations.  Echocardiography was performed January 2, 2024.  This revealed a normal ejection fraction with minimal mitral regurgitation.  Pharmacologic nuclear stress testing was performed January 24, 2024.  This revealed no evidence of ischemia.  He presents to the office today having been feeling well.  He does not report symptoms of angina, congestive heart failure or arrhythmia.   His blood pressure at home this morning was 149/79. He is off Eliquis. He has been off some diabetes medication because he cannot get it. His sugar has been climbing.  He is very excited because his son will be starting at the [employer] in the fall, playing baseball.

## 2024-03-01 NOTE — DISCUSSION/SUMMARY
[FreeTextEntry1] : Audi is doing well from a cardiovascular perspective.  I reviewed his most recent examinations.  Echocardiography was performed January 2, 2024.  This revealed a normal ejection fraction with minimal mitral regurgitation.  Pharmacologic nuclear stress testing was performed January 24, 2024.  This revealed no evidence of ischemia.  He will continue Lipitor 40mg QD, Plavix 75mg QD, Jardiance 25mg QD, Metformin 1000mg BID, metoprolol 25mg QD, Eliquis 5mg BID.   His blood pressure is elevated.  He will start checking it twice daily at home.  If his blood pressure remains elevated, I would consider the use of an ACE inhibitor or an ARB.  Pending his clinical course, he will see me in 6 months. [EKG obtained to assist in diagnosis and management of assessed problem(s)] : EKG obtained to assist in diagnosis and management of assessed problem(s)

## 2024-03-01 NOTE — PHYSICAL EXAM
[Normal] : alert and oriented, normal memory [de-identified] : walks with a cane due to TMA [de-identified] : right foot boot with foot dressing

## 2024-10-28 ENCOUNTER — OUTPATIENT (OUTPATIENT)
Dept: OUTPATIENT SERVICES | Facility: HOSPITAL | Age: 64
LOS: 1 days | Discharge: HOME | End: 2024-10-28
Payer: COMMERCIAL

## 2024-10-28 ENCOUNTER — APPOINTMENT (OUTPATIENT)
Dept: WOUND CARE | Facility: HOSPITAL | Age: 64
End: 2024-10-28
Payer: COMMERCIAL

## 2024-10-28 VITALS
OXYGEN SATURATION: 95 % | SYSTOLIC BLOOD PRESSURE: 149 MMHG | HEART RATE: 94 BPM | BODY MASS INDEX: 27.13 KG/M2 | HEIGHT: 68 IN | RESPIRATION RATE: 18 BRPM | WEIGHT: 179 LBS | TEMPERATURE: 99.3 F | DIASTOLIC BLOOD PRESSURE: 89 MMHG

## 2024-10-28 DIAGNOSIS — L97.509 TYPE 2 DIABETES MELLITUS WITH FOOT ULCER: ICD-10-CM

## 2024-10-28 DIAGNOSIS — L97.522 NON-PRESSURE CHRONIC ULCER OF OTHER PART OF LEFT FOOT WITH FAT LAYER EXPOSED: ICD-10-CM

## 2024-10-28 DIAGNOSIS — M79.673 PAIN IN UNSPECIFIED FOOT: ICD-10-CM

## 2024-10-28 DIAGNOSIS — Z89.429 ACQUIRED ABSENCE OF OTHER TOE(S), UNSPECIFIED SIDE: Chronic | ICD-10-CM

## 2024-10-28 DIAGNOSIS — E11.621 TYPE 2 DIABETES MELLITUS WITH FOOT ULCER: ICD-10-CM

## 2024-10-28 DIAGNOSIS — E11.51 TYPE 2 DIABETES MELLITUS WITH DIABETIC PERIPHERAL ANGIOPATHY W/OUT GANGRENE: ICD-10-CM

## 2024-10-28 DIAGNOSIS — Z98.62 PERIPHERAL VASCULAR ANGIOPLASTY STATUS: Chronic | ICD-10-CM

## 2024-10-28 PROCEDURE — G0463: CPT

## 2024-10-28 PROCEDURE — 99214 OFFICE O/P EST MOD 30 MIN: CPT

## 2024-10-28 RX ORDER — LISINOPRIL 30 MG/1
TABLET ORAL
Refills: 0 | Status: ACTIVE | COMMUNITY

## 2024-10-28 RX ORDER — DULAGLUTIDE 1.5 MG/.5ML
1.5 INJECTION, SOLUTION SUBCUTANEOUS
Refills: 0 | Status: ACTIVE | COMMUNITY

## 2024-10-28 RX ORDER — GABAPENTIN 300 MG/1
300 CAPSULE ORAL
Refills: 0 | Status: ACTIVE | COMMUNITY

## 2024-10-30 DIAGNOSIS — I10 ESSENTIAL (PRIMARY) HYPERTENSION: ICD-10-CM

## 2024-10-30 DIAGNOSIS — Z80.3 FAMILY HISTORY OF MALIGNANT NEOPLASM OF BREAST: ICD-10-CM

## 2024-10-30 DIAGNOSIS — Z83.3 FAMILY HISTORY OF DIABETES MELLITUS: ICD-10-CM

## 2024-10-30 DIAGNOSIS — E11.621 TYPE 2 DIABETES MELLITUS WITH FOOT ULCER: ICD-10-CM

## 2024-10-30 DIAGNOSIS — E11.51 TYPE 2 DIABETES MELLITUS WITH DIABETIC PERIPHERAL ANGIOPATHY WITHOUT GANGRENE: ICD-10-CM

## 2024-10-30 DIAGNOSIS — Z89.431 ACQUIRED ABSENCE OF RIGHT FOOT: ICD-10-CM

## 2024-10-30 DIAGNOSIS — Z98.890 OTHER SPECIFIED POSTPROCEDURAL STATES: ICD-10-CM

## 2024-10-30 DIAGNOSIS — Z86.39 PERSONAL HISTORY OF OTHER ENDOCRINE, NUTRITIONAL AND METABOLIC DISEASE: ICD-10-CM

## 2024-10-30 DIAGNOSIS — Z79.899 OTHER LONG TERM (CURRENT) DRUG THERAPY: ICD-10-CM

## 2024-10-30 DIAGNOSIS — E78.00 PURE HYPERCHOLESTEROLEMIA, UNSPECIFIED: ICD-10-CM

## 2024-10-30 DIAGNOSIS — Z79.82 LONG TERM (CURRENT) USE OF ASPIRIN: ICD-10-CM

## 2024-10-30 DIAGNOSIS — Z79.84 LONG TERM (CURRENT) USE OF ORAL HYPOGLYCEMIC DRUGS: ICD-10-CM

## 2024-10-30 DIAGNOSIS — E11.40 TYPE 2 DIABETES MELLITUS WITH DIABETIC NEUROPATHY, UNSPECIFIED: ICD-10-CM

## 2024-10-30 DIAGNOSIS — L97.522 NON-PRESSURE CHRONIC ULCER OF OTHER PART OF LEFT FOOT WITH FAT LAYER EXPOSED: ICD-10-CM

## 2024-11-18 ENCOUNTER — OUTPATIENT (OUTPATIENT)
Dept: OUTPATIENT SERVICES | Facility: HOSPITAL | Age: 64
LOS: 1 days | Discharge: HOME | End: 2024-11-18
Payer: COMMERCIAL

## 2024-11-18 ENCOUNTER — OUTPATIENT (OUTPATIENT)
Dept: OUTPATIENT SERVICES | Facility: HOSPITAL | Age: 64
LOS: 1 days | End: 2024-11-18
Payer: COMMERCIAL

## 2024-11-18 ENCOUNTER — APPOINTMENT (OUTPATIENT)
Dept: WOUND CARE | Facility: HOSPITAL | Age: 64
End: 2024-11-18
Payer: COMMERCIAL

## 2024-11-18 VITALS
HEART RATE: 87 BPM | WEIGHT: 179 LBS | BODY MASS INDEX: 27.13 KG/M2 | HEIGHT: 68 IN | TEMPERATURE: 98.5 F | SYSTOLIC BLOOD PRESSURE: 146 MMHG | DIASTOLIC BLOOD PRESSURE: 87 MMHG | RESPIRATION RATE: 18 BRPM | OXYGEN SATURATION: 100 %

## 2024-11-18 DIAGNOSIS — Z89.429 ACQUIRED ABSENCE OF OTHER TOE(S), UNSPECIFIED SIDE: Chronic | ICD-10-CM

## 2024-11-18 DIAGNOSIS — Z98.62 PERIPHERAL VASCULAR ANGIOPLASTY STATUS: Chronic | ICD-10-CM

## 2024-11-18 DIAGNOSIS — M79.673 PAIN IN UNSPECIFIED FOOT: ICD-10-CM

## 2024-11-18 DIAGNOSIS — E11.621 TYPE 2 DIABETES MELLITUS WITH FOOT ULCER: ICD-10-CM

## 2024-11-18 PROCEDURE — G0463: CPT

## 2024-11-18 PROCEDURE — 93923 UPR/LXTR ART STDY 3+ LVLS: CPT

## 2024-11-18 PROCEDURE — 93923 UPR/LXTR ART STDY 3+ LVLS: CPT | Mod: 26

## 2024-11-18 PROCEDURE — 99214 OFFICE O/P EST MOD 30 MIN: CPT

## 2024-11-18 RX ORDER — AMOXICILLIN AND CLAVULANATE POTASSIUM 875; 125 MG/1; MG/1
875-125 TABLET, COATED ORAL
Qty: 14 | Refills: 0 | Status: COMPLETED | COMMUNITY
Start: 2024-11-18 | End: 2024-11-25

## 2024-11-20 ENCOUNTER — APPOINTMENT (OUTPATIENT)
Dept: VASCULAR SURGERY | Facility: HOSPITAL | Age: 64
End: 2024-11-20
Payer: COMMERCIAL

## 2024-11-20 ENCOUNTER — OUTPATIENT (OUTPATIENT)
Dept: OUTPATIENT SERVICES | Facility: HOSPITAL | Age: 64
LOS: 1 days | Discharge: HOME | End: 2024-11-20
Payer: COMMERCIAL

## 2024-11-20 VITALS
WEIGHT: 179 LBS | DIASTOLIC BLOOD PRESSURE: 78 MMHG | OXYGEN SATURATION: 99 % | TEMPERATURE: 99 F | BODY MASS INDEX: 27.13 KG/M2 | RESPIRATION RATE: 17 BRPM | HEART RATE: 94 BPM | SYSTOLIC BLOOD PRESSURE: 165 MMHG | HEIGHT: 68 IN

## 2024-11-20 DIAGNOSIS — E11.51 TYPE 2 DIABETES MELLITUS WITH DIABETIC PERIPHERAL ANGIOPATHY WITHOUT GANGRENE: ICD-10-CM

## 2024-11-20 DIAGNOSIS — Z89.431 ACQUIRED ABSENCE OF RIGHT FOOT: ICD-10-CM

## 2024-11-20 DIAGNOSIS — L97.522 NON-PRESSURE CHRONIC ULCER OF OTHER PART OF LEFT FOOT WITH FAT LAYER EXPOSED: ICD-10-CM

## 2024-11-20 DIAGNOSIS — Z79.82 LONG TERM (CURRENT) USE OF ASPIRIN: ICD-10-CM

## 2024-11-20 DIAGNOSIS — Z79.84 LONG TERM (CURRENT) USE OF ORAL HYPOGLYCEMIC DRUGS: ICD-10-CM

## 2024-11-20 DIAGNOSIS — Z79.899 OTHER LONG TERM (CURRENT) DRUG THERAPY: ICD-10-CM

## 2024-11-20 DIAGNOSIS — E11.40 TYPE 2 DIABETES MELLITUS WITH DIABETIC NEUROPATHY, UNSPECIFIED: ICD-10-CM

## 2024-11-20 DIAGNOSIS — Z86.39 PERSONAL HISTORY OF OTHER ENDOCRINE, NUTRITIONAL AND METABOLIC DISEASE: ICD-10-CM

## 2024-11-20 DIAGNOSIS — E11.621 TYPE 2 DIABETES MELLITUS WITH FOOT ULCER: ICD-10-CM

## 2024-11-20 DIAGNOSIS — Z98.890 OTHER SPECIFIED POSTPROCEDURAL STATES: ICD-10-CM

## 2024-11-20 DIAGNOSIS — E78.00 PURE HYPERCHOLESTEROLEMIA, UNSPECIFIED: ICD-10-CM

## 2024-11-20 DIAGNOSIS — I70.245 ATHEROSCLEROSIS OF NATIVE ARTERIES OF LEFT LEG WITH ULCERATION OF OTHER PART OF FOOT: ICD-10-CM

## 2024-11-20 DIAGNOSIS — I10 ESSENTIAL (PRIMARY) HYPERTENSION: ICD-10-CM

## 2024-11-20 DIAGNOSIS — Z83.3 FAMILY HISTORY OF DIABETES MELLITUS: ICD-10-CM

## 2024-11-20 DIAGNOSIS — I25.10 ATHEROSCLEROTIC HEART DISEASE OF NATIVE CORONARY ARTERY WITHOUT ANGINA PECTORIS: ICD-10-CM

## 2024-11-20 DIAGNOSIS — M79.673 PAIN IN UNSPECIFIED FOOT: ICD-10-CM

## 2024-11-20 DIAGNOSIS — Z80.3 FAMILY HISTORY OF MALIGNANT NEOPLASM OF BREAST: ICD-10-CM

## 2024-11-20 DIAGNOSIS — Z98.62 PERIPHERAL VASCULAR ANGIOPLASTY STATUS: Chronic | ICD-10-CM

## 2024-11-20 DIAGNOSIS — L97.529 NON-PRESSURE CHRONIC ULCER OF OTHER PART OF LEFT FOOT WITH UNSPECIFIED SEVERITY: ICD-10-CM

## 2024-11-20 DIAGNOSIS — L97.422 NON-PRESSURE CHRONIC ULCER OF LEFT HEEL AND MIDFOOT WITH FAT LAYER EXPOSED: ICD-10-CM

## 2024-11-20 DIAGNOSIS — I70.25 ATHEROSCLEROSIS OF NATIVE ARTERIES OF OTHER EXTREMITIES WITH ULCERATION: ICD-10-CM

## 2024-11-20 DIAGNOSIS — Z89.429 ACQUIRED ABSENCE OF OTHER TOE(S), UNSPECIFIED SIDE: Chronic | ICD-10-CM

## 2024-11-20 PROCEDURE — 99204 OFFICE O/P NEW MOD 45 MIN: CPT

## 2024-11-20 PROCEDURE — G0463: CPT

## 2024-11-23 ENCOUNTER — APPOINTMENT (OUTPATIENT)
Dept: MRI IMAGING | Facility: CLINIC | Age: 64
End: 2024-11-23
Payer: COMMERCIAL

## 2024-11-23 PROCEDURE — 73718 MRI LOWER EXTREMITY W/O DYE: CPT | Mod: LT

## 2024-11-25 ENCOUNTER — APPOINTMENT (OUTPATIENT)
Dept: WOUND CARE | Facility: HOSPITAL | Age: 64
End: 2024-11-25
Payer: COMMERCIAL

## 2024-11-25 ENCOUNTER — OUTPATIENT (OUTPATIENT)
Dept: OUTPATIENT SERVICES | Facility: HOSPITAL | Age: 64
LOS: 1 days | Discharge: HOME | End: 2024-11-25
Payer: COMMERCIAL

## 2024-11-25 VITALS
BODY MASS INDEX: 27.13 KG/M2 | RESPIRATION RATE: 18 BRPM | SYSTOLIC BLOOD PRESSURE: 156 MMHG | HEART RATE: 95 BPM | WEIGHT: 179 LBS | HEIGHT: 68 IN | TEMPERATURE: 98.6 F | OXYGEN SATURATION: 99 % | DIASTOLIC BLOOD PRESSURE: 82 MMHG

## 2024-11-25 DIAGNOSIS — L97.522 NON-PRESSURE CHRONIC ULCER OF OTHER PART OF LEFT FOOT WITH FAT LAYER EXPOSED: ICD-10-CM

## 2024-11-25 DIAGNOSIS — L97.422 NON-PRESSURE CHRONIC ULCER OF LEFT HEEL AND MIDFOOT WITH FAT LAYER EXPOSED: ICD-10-CM

## 2024-11-25 DIAGNOSIS — M79.673 PAIN IN UNSPECIFIED FOOT: ICD-10-CM

## 2024-11-25 DIAGNOSIS — Z98.62 PERIPHERAL VASCULAR ANGIOPLASTY STATUS: Chronic | ICD-10-CM

## 2024-11-25 DIAGNOSIS — L97.509 TYPE 2 DIABETES MELLITUS WITH FOOT ULCER: ICD-10-CM

## 2024-11-25 DIAGNOSIS — E11.621 TYPE 2 DIABETES MELLITUS WITH FOOT ULCER: ICD-10-CM

## 2024-11-25 DIAGNOSIS — Z89.429 ACQUIRED ABSENCE OF OTHER TOE(S), UNSPECIFIED SIDE: Chronic | ICD-10-CM

## 2024-11-25 PROCEDURE — G0463: CPT

## 2024-11-25 PROCEDURE — 87186 SC STD MICRODIL/AGAR DIL: CPT

## 2024-11-25 PROCEDURE — 99213 OFFICE O/P EST LOW 20 MIN: CPT

## 2024-11-25 PROCEDURE — 87077 CULTURE AEROBIC IDENTIFY: CPT

## 2024-11-25 PROCEDURE — 87070 CULTURE OTHR SPECIMN AEROBIC: CPT

## 2024-11-25 RX ORDER — AMOXICILLIN AND CLAVULANATE POTASSIUM 875; 125 MG/1; MG/1
875-125 TABLET, COATED ORAL
Qty: 14 | Refills: 0 | Status: COMPLETED | COMMUNITY
Start: 2024-11-25 | End: 2024-12-02

## 2024-11-27 LAB
-  AMOXICILLIN/CLAVULANIC ACID: SIGNIFICANT CHANGE UP
-  AMPICILLIN/SULBACTAM: SIGNIFICANT CHANGE UP
-  AMPICILLIN: SIGNIFICANT CHANGE UP
-  AZTREONAM: SIGNIFICANT CHANGE UP
-  CEFAZOLIN: SIGNIFICANT CHANGE UP
-  CEFEPIME: SIGNIFICANT CHANGE UP
-  CEFOXITIN: SIGNIFICANT CHANGE UP
-  CEFTRIAXONE: SIGNIFICANT CHANGE UP
-  CIPROFLOXACIN: SIGNIFICANT CHANGE UP
-  ERTAPENEM: SIGNIFICANT CHANGE UP
-  GENTAMICIN: SIGNIFICANT CHANGE UP
-  LEVOFLOXACIN: SIGNIFICANT CHANGE UP
-  MEROPENEM: SIGNIFICANT CHANGE UP
-  PIPERACILLIN/TAZOBACTAM: SIGNIFICANT CHANGE UP
-  TOBRAMYCIN: SIGNIFICANT CHANGE UP
-  TRIMETHOPRIM/SULFAMETHOXAZOLE: SIGNIFICANT CHANGE UP
CULTURE RESULTS: ABNORMAL
METHOD TYPE: SIGNIFICANT CHANGE UP
ORGANISM # SPEC MICROSCOPIC CNT: ABNORMAL
ORGANISM # SPEC MICROSCOPIC CNT: SIGNIFICANT CHANGE UP
SPECIMEN SOURCE: SIGNIFICANT CHANGE UP

## 2024-11-30 ENCOUNTER — INPATIENT (INPATIENT)
Facility: HOSPITAL | Age: 64
LOS: 4 days | Discharge: HOME | DRG: 603 | End: 2024-12-05
Attending: INTERNAL MEDICINE | Admitting: INTERNAL MEDICINE
Payer: COMMERCIAL

## 2024-11-30 VITALS
HEART RATE: 94 BPM | RESPIRATION RATE: 18 BRPM | SYSTOLIC BLOOD PRESSURE: 134 MMHG | OXYGEN SATURATION: 99 % | DIASTOLIC BLOOD PRESSURE: 69 MMHG | TEMPERATURE: 98 F | WEIGHT: 179.9 LBS | HEIGHT: 68 IN

## 2024-11-30 DIAGNOSIS — E11.9 TYPE 2 DIABETES MELLITUS WITHOUT COMPLICATIONS: ICD-10-CM

## 2024-11-30 DIAGNOSIS — Z89.429 ACQUIRED ABSENCE OF OTHER TOE(S), UNSPECIFIED SIDE: Chronic | ICD-10-CM

## 2024-11-30 DIAGNOSIS — I73.9 PERIPHERAL VASCULAR DISEASE, UNSPECIFIED: ICD-10-CM

## 2024-11-30 DIAGNOSIS — I10 ESSENTIAL (PRIMARY) HYPERTENSION: ICD-10-CM

## 2024-11-30 DIAGNOSIS — Z29.9 ENCOUNTER FOR PROPHYLACTIC MEASURES, UNSPECIFIED: ICD-10-CM

## 2024-11-30 DIAGNOSIS — S91.302A UNSPECIFIED OPEN WOUND, LEFT FOOT, INITIAL ENCOUNTER: ICD-10-CM

## 2024-11-30 DIAGNOSIS — L08.9 LOCAL INFECTION OF THE SKIN AND SUBCUTANEOUS TISSUE, UNSPECIFIED: ICD-10-CM

## 2024-11-30 DIAGNOSIS — Z98.62 PERIPHERAL VASCULAR ANGIOPLASTY STATUS: Chronic | ICD-10-CM

## 2024-11-30 LAB
ALBUMIN SERPL ELPH-MCNC: 3.9 G/DL — SIGNIFICANT CHANGE UP (ref 3.3–5)
ALP SERPL-CCNC: 81 U/L — SIGNIFICANT CHANGE UP (ref 40–120)
ALT FLD-CCNC: 35 U/L — SIGNIFICANT CHANGE UP (ref 12–78)
ANION GAP SERPL CALC-SCNC: 8 MMOL/L — SIGNIFICANT CHANGE UP (ref 5–17)
APTT BLD: 30.3 SEC — SIGNIFICANT CHANGE UP (ref 24.5–35.6)
AST SERPL-CCNC: 16 U/L — SIGNIFICANT CHANGE UP (ref 15–37)
BASOPHILS # BLD AUTO: 0.07 K/UL — SIGNIFICANT CHANGE UP (ref 0–0.2)
BASOPHILS NFR BLD AUTO: 0.6 % — SIGNIFICANT CHANGE UP (ref 0–2)
BILIRUB SERPL-MCNC: 0.2 MG/DL — SIGNIFICANT CHANGE UP (ref 0.2–1.2)
BUN SERPL-MCNC: 29 MG/DL — HIGH (ref 7–23)
CALCIUM SERPL-MCNC: 9 MG/DL — SIGNIFICANT CHANGE UP (ref 8.5–10.1)
CHLORIDE SERPL-SCNC: 104 MMOL/L — SIGNIFICANT CHANGE UP (ref 96–108)
CO2 SERPL-SCNC: 27 MMOL/L — SIGNIFICANT CHANGE UP (ref 22–31)
CREAT SERPL-MCNC: 1.1 MG/DL — SIGNIFICANT CHANGE UP (ref 0.5–1.3)
EGFR: 74 ML/MIN/1.73M2 — SIGNIFICANT CHANGE UP
EOSINOPHIL # BLD AUTO: 0.27 K/UL — SIGNIFICANT CHANGE UP (ref 0–0.5)
EOSINOPHIL NFR BLD AUTO: 2.3 % — SIGNIFICANT CHANGE UP (ref 0–6)
GLUCOSE BLDC GLUCOMTR-MCNC: 186 MG/DL — HIGH (ref 70–99)
GLUCOSE BLDC GLUCOMTR-MCNC: 189 MG/DL — HIGH (ref 70–99)
GLUCOSE SERPL-MCNC: 215 MG/DL — HIGH (ref 70–99)
HCT VFR BLD CALC: 42.1 % — SIGNIFICANT CHANGE UP (ref 39–50)
HGB BLD-MCNC: 13.9 G/DL — SIGNIFICANT CHANGE UP (ref 13–17)
IMM GRANULOCYTES NFR BLD AUTO: 0.4 % — SIGNIFICANT CHANGE UP (ref 0–0.9)
INR BLD: 0.9 RATIO — SIGNIFICANT CHANGE UP (ref 0.85–1.16)
LACTATE SERPL-SCNC: 1 MMOL/L — SIGNIFICANT CHANGE UP (ref 0.7–2)
LACTATE SERPL-SCNC: 2.1 MMOL/L — HIGH (ref 0.7–2)
LYMPHOCYTES # BLD AUTO: 1.88 K/UL — SIGNIFICANT CHANGE UP (ref 1–3.3)
LYMPHOCYTES # BLD AUTO: 16.1 % — SIGNIFICANT CHANGE UP (ref 13–44)
MCHC RBC-ENTMCNC: 30.5 PG — SIGNIFICANT CHANGE UP (ref 27–34)
MCHC RBC-ENTMCNC: 33 G/DL — SIGNIFICANT CHANGE UP (ref 32–36)
MCV RBC AUTO: 92.5 FL — SIGNIFICANT CHANGE UP (ref 80–100)
MONOCYTES # BLD AUTO: 0.78 K/UL — SIGNIFICANT CHANGE UP (ref 0–0.9)
MONOCYTES NFR BLD AUTO: 6.7 % — SIGNIFICANT CHANGE UP (ref 2–14)
NEUTROPHILS # BLD AUTO: 8.6 K/UL — HIGH (ref 1.8–7.4)
NEUTROPHILS NFR BLD AUTO: 73.9 % — SIGNIFICANT CHANGE UP (ref 43–77)
NRBC # BLD: 0 /100 WBCS — SIGNIFICANT CHANGE UP (ref 0–0)
PLATELET # BLD AUTO: 272 K/UL — SIGNIFICANT CHANGE UP (ref 150–400)
POTASSIUM SERPL-MCNC: 4.7 MMOL/L — SIGNIFICANT CHANGE UP (ref 3.5–5.3)
POTASSIUM SERPL-SCNC: 4.7 MMOL/L — SIGNIFICANT CHANGE UP (ref 3.5–5.3)
PROT SERPL-MCNC: 7.4 G/DL — SIGNIFICANT CHANGE UP (ref 6–8.3)
PROTHROM AB SERPL-ACNC: 10.6 SEC — SIGNIFICANT CHANGE UP (ref 9.9–13.4)
RBC # BLD: 4.55 M/UL — SIGNIFICANT CHANGE UP (ref 4.2–5.8)
RBC # FLD: 13 % — SIGNIFICANT CHANGE UP (ref 10.3–14.5)
SODIUM SERPL-SCNC: 139 MMOL/L — SIGNIFICANT CHANGE UP (ref 135–145)
WBC # BLD: 11.65 K/UL — HIGH (ref 3.8–10.5)
WBC # FLD AUTO: 11.65 K/UL — HIGH (ref 3.8–10.5)

## 2024-11-30 PROCEDURE — 99285 EMERGENCY DEPT VISIT HI MDM: CPT

## 2024-11-30 PROCEDURE — 71045 X-RAY EXAM CHEST 1 VIEW: CPT | Mod: 26

## 2024-11-30 RX ORDER — PIPERACILLIN SODIUM AND TAZOBACTAM SODIUM 4; .5 G/20ML; G/20ML
3.38 INJECTION, POWDER, LYOPHILIZED, FOR SOLUTION INTRAVENOUS ONCE
Refills: 0 | Status: COMPLETED | OUTPATIENT
Start: 2024-11-30 | End: 2024-11-30

## 2024-11-30 RX ORDER — CEFEPIME 2 G/1
INJECTION, POWDER, FOR SOLUTION INTRAVENOUS
Refills: 0 | Status: DISCONTINUED | OUTPATIENT
Start: 2024-11-30 | End: 2024-11-30

## 2024-11-30 RX ORDER — ACETAMINOPHEN, DIPHENHYDRAMINE HCL, PHENYLEPHRINE HCL 325; 25; 5 MG/1; MG/1; MG/1
3 TABLET ORAL AT BEDTIME
Refills: 0 | Status: DISCONTINUED | OUTPATIENT
Start: 2024-11-30 | End: 2024-12-02

## 2024-11-30 RX ORDER — SODIUM CHLORIDE 9 MG/ML
1000 INJECTION, SOLUTION INTRAMUSCULAR; INTRAVENOUS; SUBCUTANEOUS ONCE
Refills: 0 | Status: COMPLETED | OUTPATIENT
Start: 2024-11-30 | End: 2024-11-30

## 2024-11-30 RX ORDER — GABAPENTIN 300 MG/1
300 CAPSULE ORAL
Refills: 0 | Status: DISCONTINUED | OUTPATIENT
Start: 2024-11-30 | End: 2024-12-01

## 2024-11-30 RX ORDER — CEFEPIME 2 G/1
INJECTION, POWDER, FOR SOLUTION INTRAVENOUS
Refills: 0 | Status: DISCONTINUED | OUTPATIENT
Start: 2024-11-30 | End: 2024-12-02

## 2024-11-30 RX ORDER — GLUCAGON INJECTION, SOLUTION 0.5 MG/.1ML
1 INJECTION, SOLUTION SUBCUTANEOUS ONCE
Refills: 0 | Status: DISCONTINUED | OUTPATIENT
Start: 2024-11-30 | End: 2024-12-02

## 2024-11-30 RX ORDER — VANCOMYCIN HCL 900 MCG/MG
1000 POWDER (GRAM) MISCELLANEOUS ONCE
Refills: 0 | Status: COMPLETED | OUTPATIENT
Start: 2024-11-30 | End: 2024-11-30

## 2024-11-30 RX ORDER — METOPROLOL TARTRATE 100 MG/1
50 TABLET, FILM COATED ORAL DAILY
Refills: 0 | Status: DISCONTINUED | OUTPATIENT
Start: 2024-11-30 | End: 2024-12-02

## 2024-11-30 RX ORDER — 0.9 % SODIUM CHLORIDE 0.9 %
1000 INTRAVENOUS SOLUTION INTRAVENOUS
Refills: 0 | Status: DISCONTINUED | OUTPATIENT
Start: 2024-11-30 | End: 2024-12-02

## 2024-11-30 RX ORDER — CEFEPIME 2 G/1
2000 INJECTION, POWDER, FOR SOLUTION INTRAVENOUS EVERY 8 HOURS
Refills: 0 | Status: DISCONTINUED | OUTPATIENT
Start: 2024-12-01 | End: 2024-12-02

## 2024-11-30 RX ORDER — LISINOPRIL 20 MG/1
40 TABLET ORAL DAILY
Refills: 0 | Status: DISCONTINUED | OUTPATIENT
Start: 2024-11-30 | End: 2024-12-02

## 2024-11-30 RX ORDER — CLOPIDOGREL 75 MG/1
75 TABLET, FILM COATED ORAL DAILY
Refills: 0 | Status: DISCONTINUED | OUTPATIENT
Start: 2024-11-30 | End: 2024-12-02

## 2024-11-30 RX ORDER — CEFEPIME 2 G/1
2000 INJECTION, POWDER, FOR SOLUTION INTRAVENOUS ONCE
Refills: 0 | Status: COMPLETED | OUTPATIENT
Start: 2024-11-30 | End: 2024-11-30

## 2024-11-30 RX ORDER — MAGNESIUM, ALUMINUM HYDROXIDE 200-225/5
30 SUSPENSION, ORAL (FINAL DOSE FORM) ORAL EVERY 4 HOURS
Refills: 0 | Status: DISCONTINUED | OUTPATIENT
Start: 2024-11-30 | End: 2024-12-02

## 2024-11-30 RX ORDER — ENOXAPARIN SODIUM 30 MG/.3ML
40 INJECTION SUBCUTANEOUS EVERY 24 HOURS
Refills: 0 | Status: DISCONTINUED | OUTPATIENT
Start: 2024-11-30 | End: 2024-12-01

## 2024-11-30 RX ORDER — ACETAMINOPHEN 500MG 500 MG/1
650 TABLET, COATED ORAL EVERY 6 HOURS
Refills: 0 | Status: DISCONTINUED | OUTPATIENT
Start: 2024-11-30 | End: 2024-12-02

## 2024-11-30 RX ADMIN — CEFEPIME 100 MILLIGRAM(S): 2 INJECTION, POWDER, FOR SOLUTION INTRAVENOUS at 18:29

## 2024-11-30 RX ADMIN — GABAPENTIN 300 MILLIGRAM(S): 300 CAPSULE ORAL at 18:10

## 2024-11-30 RX ADMIN — PIPERACILLIN SODIUM AND TAZOBACTAM SODIUM 200 GRAM(S): 4; .5 INJECTION, POWDER, LYOPHILIZED, FOR SOLUTION INTRAVENOUS at 12:39

## 2024-11-30 RX ADMIN — ENOXAPARIN SODIUM 40 MILLIGRAM(S): 30 INJECTION SUBCUTANEOUS at 21:53

## 2024-11-30 RX ADMIN — ACETAMINOPHEN, DIPHENHYDRAMINE HCL, PHENYLEPHRINE HCL 3 MILLIGRAM(S): 325; 25; 5 TABLET ORAL at 21:57

## 2024-11-30 RX ADMIN — Medication 40 MILLIGRAM(S): at 21:51

## 2024-11-30 RX ADMIN — SODIUM CHLORIDE 1000 MILLILITER(S): 9 INJECTION, SOLUTION INTRAMUSCULAR; INTRAVENOUS; SUBCUTANEOUS at 14:22

## 2024-11-30 RX ADMIN — Medication 250 MILLIGRAM(S): at 13:18

## 2024-11-30 RX ADMIN — Medication 1: at 18:11

## 2024-11-30 RX ADMIN — SODIUM CHLORIDE 1000 MILLILITER(S): 9 INJECTION, SOLUTION INTRAMUSCULAR; INTRAVENOUS; SUBCUTANEOUS at 12:36

## 2024-11-30 RX ADMIN — PIPERACILLIN SODIUM AND TAZOBACTAM SODIUM 3.38 GRAM(S): 4; .5 INJECTION, POWDER, LYOPHILIZED, FOR SOLUTION INTRAVENOUS at 13:15

## 2024-11-30 RX ADMIN — Medication 1000 MILLIGRAM(S): at 14:22

## 2024-11-30 NOTE — H&P ADULT - SKIN COMMENTS
L foot wound approx quarter-sized without drainage or surrounding erythema L foot  lateral border wound approx quarter-sized without drainage or surrounding erythema, dry wound on left Big toe

## 2024-11-30 NOTE — PATIENT PROFILE ADULT - FALL HARM RISK - HARM RISK INTERVENTIONS
Chief complaint:   Chief Complaint   Patient presents with   • Cough     Est rm 05       Vitals:  Visit Vitals  Pulse 133   Temp 99.4 °F (37.4 °C) (Oral)   Resp 32   Ht 3' 6.52\" (1.08 m)   Wt 18.1 kg (40 lb)   SpO2 98%   BMI 15.56 kg/m²       HISTORY OF PRESENT ILLNESS     Cough  This is a new problem. Episode onset: 5 days. The problem occurs constantly. The problem has been gradually worsening. Associated symptoms include congestion, coughing and a fever (low grade). Pertinent negatives include no sore throat. Nothing aggravates the symptoms. He has tried acetaminophen for the symptoms. The treatment provided no relief.       Other significant problems:  There are no problems to display for this patient.      PAST MEDICAL, FAMILY AND SOCIAL HISTORY     Medications:  Current Outpatient Medications   Medication Sig Dispense Refill   • Acetaminophen (TYLENOL CHILDRENS CHEWABLES PO)        No current facility-administered medications for this visit.       Allergies:  ALLERGIES:  No Known Allergies    Past Medical  History/Surgeries:  No past medical history on file.    Past Surgical History:   Procedure Laterality Date   • Circumcision, primary         Family History:  Family History   Problem Relation Age of Onset   • Patient is unaware of any medical problems Mother    • Patient is unaware of any medical problems Father        Social History:  Social History     Tobacco Use   • Smoking status: Never Smoker   • Smokeless tobacco: Never Used   Substance Use Topics   • Alcohol use: Not on file       REVIEW OF SYSTEMS     Review of Systems   Constitutional: Positive for fever (low grade).   HENT: Positive for congestion. Negative for sore throat.    Respiratory: Positive for cough.        PHYSICAL EXAM     Physical Exam  Constitutional:       General: He is active. He is not in acute distress.  HENT:      Head: Normocephalic and atraumatic.      Right Ear: Tympanic membrane, ear canal and external ear normal. Tympanic  membrane is not erythematous or bulging.      Left Ear: Tympanic membrane, ear canal and external ear normal. Tympanic membrane is not erythematous or bulging.      Nose: Congestion and rhinorrhea present.      Mouth/Throat:      Mouth: Mucous membranes are moist.      Pharynx: No oropharyngeal exudate or posterior oropharyngeal erythema.      Neck: Normal range of motion and neck supple.   Eyes:      Extraocular Movements: Extraocular movements intact.      Conjunctiva/sclera: Conjunctivae normal.      Pupils: Pupils are equal, round, and reactive to light.   Cardiovascular:      Rate and Rhythm: Normal rate and regular rhythm.      Pulses: Normal pulses.      Heart sounds: Normal heart sounds.   Pulmonary:      Effort: Pulmonary effort is normal. No respiratory distress.      Breath sounds: Normal breath sounds. No stridor. No wheezing or rhonchi.      Comments: Barky cough  Lymphadenopathy:      Cervical: Cervical adenopathy present.   Neurological:      Mental Status: He is alert.         ASSESSMENT/PLAN     1. Croup  Discussed viral nature, mom declined COVID test  Decadron 10 mg PO once  Humidifier  Supportive cares  Fluids    - dexamethasone (DECADRON) injection 10 mg      Preventative measures, supportive cares, and return precautions for the above diagnoses were discussed with the patient as appropriate. Patient was referred to the AVS for further instructions.    If symptoms persist, worsen, new symptoms emerge, patient does not improve, or patient has any other concerns they were advised to please follow up in urgent care, emergency room or with PCP. Discussed etiologies and differential with the associated diagnosis/symptoms and common complications. Discussed treatment plan with mother, who understands and agrees with plan. The risks, benefits, possible side effects, and drug interactions of medications ordered were reviewed with the patient. Medication instructions were discussed with patient and the  consequences of not taking the medications.    Juanito Lawton, DO  10/4/2022    PPE worn during encounter - Daily re-used N95 mask and Gloves     Assistance with ambulation/Assistance OOB with selected safe patient handling equipment/Communicate Risk of Fall with Harm to all staff/Discuss with provider need for PT consult/Monitor gait and stability/Reinforce activity limits and safety measures with patient and family/Tailored Fall Risk Interventions/Use of alarms - bed, chair and/or voice tab/Visual Cue: Yellow wristband and red socks/Bed in lowest position, wheels locked, appropriate side rails in place/Call bell, personal items and telephone in reach/Instruct patient to call for assistance before getting out of bed or chair/Non-slip footwear when patient is out of bed/[place] to call system/Physically safe environment - no spills, clutter or unnecessary equipment/Purposeful Proactive Rounding/Room/bathroom lighting operational, light cord in reach

## 2024-11-30 NOTE — CONSULT NOTE ADULT - SUBJECTIVE AND OBJECTIVE BOX
SURGERY PA CONSULT NOTE:    CHIEF COMPLAINT:  Patient is a 64y old  Male who presents with a chief complaint of L foot wound (30 Nov 2024 17:09)    HPI FROM ED:  Pt is a 62 y/o M with PMHx DM2, hx osteomyelitis s/p surgical amputation of R great toe 2023, CAD, PAD on plavix (s/p RLE angioplasty 2020), HTN who presents to the hospital at instruction of wound care for a L foot wound. Pt reports he has been following with wound care with Dr. Stark and Dr. French, has been on Augmentin for approx 2 weeks. No systemic symptoms. Pt states he was told to come in this weekend for IV antibiotics and further evaluation. Pt reports feeling well and denies any fevers, chills, chest pain, shortness of breath, nausea, vomiting, diarrhea, constipation. Pt has been dressing the wound daily himself.     INTERVAL:  History verified as above. Hx significant w'/ PAD necessitating RLE angioplasty in 2020, pt placed on DAPT at that time but currently only takes Plavix and states he is compliant. As above, pt seen in wound clinic 2 weeks ago at which point he was also seen by vascular surgery (Dr Jones)     PAST MEDICAL HISTORY:  PAST MEDICAL & SURGICAL HISTORY:  HTN (hypertension)      Diabetes      Hyperlipidemia      PVD (peripheral vascular disease)      H/O angioplasty  RLE      Status post amputation of toe          PAST SURGICAL HISTORY:    REVIEW OF SYSTEMS:  General/Constitutional: No acute distress, no headache, weakness, fevers, or chills   HEENT: Denies auditory or visual changes/disturbances, no vertigo, no throat pain, no dysphagia    Neck: Denies neck pain/stiffness, denies swelling/lumps/hoarseness   Lymphatic: Denies lumps or swelling in the axillae, groin, or neck bilaterally   Respiratory: Denies cough/hemoptysis, denies wheezing/SOB/dyspnea  Cardiac: Denies chest pain, palpitations  Abdomen: Denies abdominal bloating/fullness, nausea or vomiting, denies abdominal pain  Extremities: Denies sores, swelling, discoloration bilat UE/LE  Genitourinary: Denies urinary issues or complaints, denies dysuria/hematuria  Neuro: Denies weakness, paraesthesias, paralysis, syncope, loss of vision  Skin: Denies pruritus, pain, rashes  Psych: Denies hallucinations, visual disturbances, or depression    MEDICATIONS:  Home Medications:  atorvastatin 40 mg oral tablet: 1 tab(s) orally once a day (30 Nov 2024 17:29)  clopidogrel 75 mg oral tablet: 1 tab(s) orally once a day (30 Nov 2024 17:29)  gabapentin 300 mg oral capsule: 1 cap(s) orally 2 times a day (30 Nov 2024 17:28)  Jardiance 25 mg oral tablet: 1 tab(s) orally once a day (30 Nov 2024 17:29)  lisinopril 40 mg oral tablet: 1 tab(s) orally once a day (30 Nov 2024 17:29)  metFORMIN 1000 mg oral tablet: 1 tab(s) orally 2 times a day (30 Nov 2024 17:29)  metoprolol succinate 50 mg oral tablet, extended release: 1 tab(s) orally once a day (30 Nov 2024 17:29)  Trulicity Pen 1.5 mg/0.5 mL subcutaneous solution: 1.5 milligram(s) subcutaneously once a week (30 Nov 2024 17:29)    MEDICATIONS  (STANDING):  atorvastatin 40 milliGRAM(s) Oral at bedtime  cefepime   IVPB      cefepime   IVPB 2000 milliGRAM(s) IV Intermittent every 8 hours  clopidogrel Tablet 75 milliGRAM(s) Oral daily  dextrose 5%. 1000 milliLiter(s) (50 mL/Hr) IV Continuous <Continuous>  dextrose 5%. 1000 milliLiter(s) (100 mL/Hr) IV Continuous <Continuous>  dextrose 50% Injectable 25 Gram(s) IV Push once  dextrose 50% Injectable 12.5 Gram(s) IV Push once  dextrose 50% Injectable 25 Gram(s) IV Push once  enoxaparin Injectable 40 milliGRAM(s) SubCutaneous every 24 hours  gabapentin 300 milliGRAM(s) Oral two times a day  glucagon  Injectable 1 milliGRAM(s) IntraMuscular once  insulin lispro (ADMELOG) corrective regimen sliding scale   SubCutaneous three times a day before meals  insulin lispro (ADMELOG) corrective regimen sliding scale   SubCutaneous at bedtime  lisinopril 40 milliGRAM(s) Oral daily  metoprolol succinate ER 50 milliGRAM(s) Oral daily    MEDICATIONS  (PRN):  acetaminophen     Tablet .. 650 milliGRAM(s) Oral every 6 hours PRN Mild Pain (1 - 3)  aluminum hydroxide/magnesium hydroxide/simethicone Suspension 30 milliLiter(s) Oral every 4 hours PRN Dyspepsia  dextrose Oral Gel 15 Gram(s) Oral once PRN Blood Glucose LESS THAN 70 milliGRAM(s)/deciliter  melatonin 3 milliGRAM(s) Oral at bedtime PRN Insomnia      ALLERGIES:  Allergies    No Known Allergies    Intolerances        SOCIAL HISTORY:  Social History:  Lives: with wife and son whos in college  ADLs: independent  Diet:  Alcohol Use: rare occasional  Tobacco Use: denies  Recreational Drug Use: denies (30 Nov 2024 17:09)    Smoking: Yes [ ]  No [ ]   ______pk yrs  ETOH  Yes [ ]  No [ ]  Social [ ]  DRUGS:  Yes [ ]  No [ ]  if so what______________    FAMILY HISTORY:  FAMILY HISTORY:  FH: type 2 diabetes  Grandfather        VITAL SIGNS:  Vital Signs Last 24 Hrs  T(C): 36.6 (30 Nov 2024 19:39), Max: 36.7 (30 Nov 2024 11:33)  T(F): 97.9 (30 Nov 2024 19:39), Max: 98 (30 Nov 2024 11:33)  HR: 76 (30 Nov 2024 19:39) (76 - 94)  BP: 114/70 (30 Nov 2024 19:39) (114/70 - 134/69)  BP(mean): --  RR: 18 (30 Nov 2024 19:39) (18 - 18)  SpO2: 96% (30 Nov 2024 19:39) (96% - 99%)    Parameters below as of 30 Nov 2024 19:39  Patient On (Oxygen Delivery Method): room air        PHYSICAL EXAM:  General: No acute distress, appears comfortable, well nourished, well-groomed, appears stated age  Head, Eyes, Ears, Nose, Throat: Normal cephalic/atraumatic, anicteric, conjunctiva-non injected and moist, vision grossly intact, hearing grossly intact, no nasal discharge, ears and nose symmetrical and atraumatic.  Nasal, oral, and oropharyngeal mucosa pink moist with no evidence of ulceration  Neck: Supple, carotids have good upstroke, trachea in the midline, without JVD or thyromegaly  Lymphatic: No evidence of masses or lymphadenopathy in the head, neck, trunk, axillary, inguinal, or supraclavicular regions  Chest: Lungs are clear to P&A, no wheezing, no rales, no ronchi, with good inspiratory effort  Heart: Heart rhythm regular, no murmurs  Abdomen: Soft, non tender, good bowel sounds present in all four quadrants.  No guarding, rebound, and no peritoneal signs.  No evidence of hepatosplenomegaly.  No evidence of abdominal wall hernias.  Inguinal regions are unremarkable with no evidence of hernias.   Extremity: No swelling, or open sores, no gross deformities,  good range of motion, 2+ peripheral pulses bilat UE/LE, no edema,  negative Thais's sign, no lymphadenopathy  Neuro: Alert and oriented x3, motor and sensory intact  Psychiatric: Awake , alert, oriented x3 with an appropriate affect.   Skin: Good color, turgor, texture with no gross lesions, no eruptions, no rashes, no subcutaneous nodules and normal temperature.     LABS:                        13.9   11.65 )-----------( 272      ( 30 Nov 2024 12:30 )             42.1     11-30    139  |  104  |  29[H]  ----------------------------<  215[H]  4.7   |  27  |  1.10    Ca    9.0      30 Nov 2024 12:30    TPro  7.4  /  Alb  3.9  /  TBili  0.2  /  DBili  x   /  AST  16  /  ALT  35  /  AlkPhos  81  11-30    PT/INR - ( 30 Nov 2024 12:30 )   PT: 10.6 sec;   INR: 0.90 ratio         PTT - ( 30 Nov 2024 12:30 )  PTT:30.3 sec  Urinalysis Basic - ( 30 Nov 2024 12:30 )    Color: x / Appearance: x / SG: x / pH: x  Gluc: 215 mg/dL / Ketone: x  / Bili: x / Urobili: x   Blood: x / Protein: x / Nitrite: x   Leuk Esterase: x / RBC: x / WBC x   Sq Epi: x / Non Sq Epi: x / Bacteria: x      LIVER FUNCTIONS - ( 30 Nov 2024 12:30 )  Alb: 3.9 g/dL / Pro: 7.4 g/dL / ALK PHOS: 81 U/L / ALT: 35 U/L / AST: 16 U/L / GGT: x               RADIOLOGY & ADDITIONAL STUDIES:    ASSESSMENT:    PLAN: SURGERY PA CONSULT NOTE:    CHIEF COMPLAINT:  Patient is a 64y old  Male who presents with a chief complaint of L foot wound (30 Nov 2024 17:09)    HPI FROM ED:  Pt is a 64 y/o M with PMHx DM2, hx osteomyelitis s/p surgical amputation of R great toe 2023, CAD, PAD on plavix (s/p RLE angioplasty 2020), HTN who presents to the hospital at instruction of wound care for a L foot wound. Pt reports he has been following with wound care with Dr. Sun and Dr. French, has been on Augmentin for approx 2 weeks. No systemic symptoms. Pt states he was told to come in this weekend for IV antibiotics and further evaluation. Pt reports feeling well and denies any fevers, chills, chest pain, shortness of breath, nausea, vomiting, diarrhea, constipation. Pt has been dressing the wound daily himself.     INTERVAL:  History verified as above. Hx significant w/ PAD necessitating RLE angioplasty in 2020, pt placed on DAPT at that time and currently only takes Plavix. As above, pt seen in wound clinic 2 weeks ago for L superficial hallux wound and deep fifth metatarsal wound. Pt also seen by by vascular surgery at this time (Dr Jones) and ABIs were ordered, LLE findings as follows: "The ankle brachial index is 1.11. TBI is 0.13 with digital pressures of 19 mmHg. The pulse waveforms are diminished in the foot and further diminished in the toe." Pt found to have doppler + signals but nonpalpable L DP/PT pulses. Pt was told that he would benefit from LLE angiogram but presented to [facility] ED before being able to get an appointment.  Denies subjective fever/chills, change in urinary/bowel habits, N/V, BRBPR, melena, or LOC.     PAST MEDICAL & SURGICAL HISTORY:  HTN (hypertension)      Diabetes      Hyperlipidemia      PVD (peripheral vascular disease)      H/O angioplasty  RLE      Status post amputation of toe    REVIEW OF SYSTEMS:  General/Constitutional: No acute distress, no headache, weakness, fevers, or chills   Respiratory: Denies cough/hemoptysis, denies wheezing/SOB/dyspnea  Cardiac: Denies chest pain, palpitations  Abdomen: Denies abdominal bloating/fullness, nausea or vomiting, denies abdominal pain  Extremities: SEE HPI  Genitourinary: Denies urinary issues or complaints, denies dysuria/hematuria  Neuro: Denies weakness, paraesthesias, paralysis, syncope, loss of vision  Skin: Denies pruritus, pain, rashes  Psych: Denies hallucinations, visual disturbances, or depression    MEDICATIONS:  Home Medications:  atorvastatin 40 mg oral tablet: 1 tab(s) orally once a day (30 Nov 2024 17:29)  clopidogrel 75 mg oral tablet: 1 tab(s) orally once a day (30 Nov 2024 17:29)  gabapentin 300 mg oral capsule: 1 cap(s) orally 2 times a day (30 Nov 2024 17:28)  Jardiance 25 mg oral tablet: 1 tab(s) orally once a day (30 Nov 2024 17:29)  lisinopril 40 mg oral tablet: 1 tab(s) orally once a day (30 Nov 2024 17:29)  metFORMIN 1000 mg oral tablet: 1 tab(s) orally 2 times a day (30 Nov 2024 17:29)  metoprolol succinate 50 mg oral tablet, extended release: 1 tab(s) orally once a day (30 Nov 2024 17:29)  Trulicity Pen 1.5 mg/0.5 mL subcutaneous solution: 1.5 milligram(s) subcutaneously once a week (30 Nov 2024 17:29)    MEDICATIONS  (STANDING):  atorvastatin 40 milliGRAM(s) Oral at bedtime  cefepime   IVPB      cefepime   IVPB 2000 milliGRAM(s) IV Intermittent every 8 hours  clopidogrel Tablet 75 milliGRAM(s) Oral daily  dextrose 5%. 1000 milliLiter(s) (50 mL/Hr) IV Continuous <Continuous>  dextrose 5%. 1000 milliLiter(s) (100 mL/Hr) IV Continuous <Continuous>  dextrose 50% Injectable 25 Gram(s) IV Push once  dextrose 50% Injectable 12.5 Gram(s) IV Push once  dextrose 50% Injectable 25 Gram(s) IV Push once  enoxaparin Injectable 40 milliGRAM(s) SubCutaneous every 24 hours  gabapentin 300 milliGRAM(s) Oral two times a day  glucagon  Injectable 1 milliGRAM(s) IntraMuscular once  insulin lispro (ADMELOG) corrective regimen sliding scale   SubCutaneous three times a day before meals  insulin lispro (ADMELOG) corrective regimen sliding scale   SubCutaneous at bedtime  lisinopril 40 milliGRAM(s) Oral daily  metoprolol succinate ER 50 milliGRAM(s) Oral daily    MEDICATIONS  (PRN):  acetaminophen     Tablet .. 650 milliGRAM(s) Oral every 6 hours PRN Mild Pain (1 - 3)  aluminum hydroxide/magnesium hydroxide/simethicone Suspension 30 milliLiter(s) Oral every 4 hours PRN Dyspepsia  dextrose Oral Gel 15 Gram(s) Oral once PRN Blood Glucose LESS THAN 70 milliGRAM(s)/deciliter  melatonin 3 milliGRAM(s) Oral at bedtime PRN Insomnia    ALLERGIES:  No Known Allergies    SOCIAL HISTORY:  Lives: with wife and son whos in college  ADLs: independent  Diet:  Alcohol Use: rare occasional  Tobacco Use: denies  Recreational Drug Use: denies (30 Nov 2024 17:09)    FAMILY HISTORY:  FH: type 2 diabetes  Grandfather    VITAL SIGNS:  Vital Signs Last 24 Hrs  T(C): 36.6 (30 Nov 2024 19:39), Max: 36.7 (30 Nov 2024 11:33)  T(F): 97.9 (30 Nov 2024 19:39), Max: 98 (30 Nov 2024 11:33)  HR: 76 (30 Nov 2024 19:39) (76 - 94)  BP: 114/70 (30 Nov 2024 19:39) (114/70 - 134/69)  BP(mean): --  RR: 18 (30 Nov 2024 19:39) (18 - 18)  SpO2: 96% (30 Nov 2024 19:39) (96% - 99%)    Parameters below as of 30 Nov 2024 19:39  Patient On (Oxygen Delivery Method): room air    PHYSICAL EXAM:  General: No acute distress, appears comfortable, well nourished, well-groomed, appears stated age  Head, Eyes, Ears, Nose, Throat: Normal cephalic/atraumatic, anicteric, conjunctiva-non injected and moist, vision grossly intact, hearing grossly intact  Extremity: Well-healed R TMA. L hallux wrapped w/ kerlix gauze, C/D/I. L lateral foot dressing C/D/I, without excess drainage/malodor. Pt apprehensive about removing dressings as multiple providers have done so today.  Pulses:   Right:                                                                             Left:  Fem [ 2 ]2+ [ ]1+ [ ]doppler                                             Fem [ 2 ]2+ [ ]1+ [ ]doppler  Pop [ ]2+ [ ]1+ [ ]doppler                                              Pop[ ]2+ [ ]1+ [ ]doppler  DP [ x ]2+ [ ]1+ [ ]doppler                                                DP [ ]2+ [ ]1+ [ x ]doppler  PT[ ]2+ [ ]1+ [ x ]doppler                                                  PT [ ]2+ [ ]1+ [ x ]doppler   Neuro: Alert and oriented x3, motor and sensory intact  Skin: Good color, turgor, texture with no gross lesions, no eruptions, no rashes, no subcutaneous nodules and normal temperature.     LABS:                      13.9   11.65 )-----------( 272      ( 30 Nov 2024 12:30 )             42.1     11-30    139  |  104  |  29[H]  ----------------------------<  215[H]  4.7   |  27  |  1.10    Ca    9.0      30 Nov 2024 12:30    TPro  7.4  /  Alb  3.9  /  TBili  0.2  /  DBili  x   /  AST  16  /  ALT  35  /  AlkPhos  81  11-30    PT/INR - ( 30 Nov 2024 12:30 )   PT: 10.6 sec;   INR: 0.90 ratio    PTT - ( 30 Nov 2024 12:30 )  PTT:30.3 sec  Urinalysis Basic - ( 30 Nov 2024 12:30 )    Color: x / Appearance: x / SG: x / pH: x  Gluc: 215 mg/dL / Ketone: x  / Bili: x / Urobili: x   Blood: x / Protein: x / Nitrite: x   Leuk Esterase: x / RBC: x / WBC x   Sq Epi: x / Non Sq Epi: x / Bacteria: x    LIVER FUNCTIONS - ( 30 Nov 2024 12:30 )  Alb: 3.9 g/dL / Pro: 7.4 g/dL / ALK PHOS: 81 U/L / ALT: 35 U/L / AST: 16 U/L / GGT: x           RADIOLOGY & ADDITIONAL STUDIES:  ACC: [id number] EXAM:  PHYSIOL EXTREM LOW 3+ LEV BI   ORDERED BY:  MARIA D SUN   PROCEDURE DATE:  11/18/2024    Comparison: 11/26/2023    Findings/  Impression:  Right lower extremity: The ankle brachial index is 1.07. The pulse   waveforms are diminished in the foot.    Left lower extremity: The ankle brachial index is 1.11. TBI is 0.13 with   digital pressures of 19 mmHg. The pulse waveforms are diminished in the   foot and further diminished in the toe.    Diffusely calcified noncompressible vessels limiting evaluation.  Microvascular disease suggested in the left foot.    --- End of Report ---  ISHAAN HINKLE MD; Attending Radiologist    ASSESSMENT:  64 y/o M with PMHx DM2, hx osteomyelitis s/p R TMA (2023), CAD, PAD on plavix (s/p RLE angioplasty 2020), HTN sent in by wound care for L hallux and L lateral foot wounds. Outpatient FRANCISCA results as above, suggestive of microvascular disease in L foot. Pt with nonpalpable DP/PT, but +doppler signals. Pt seen in office by Dr. Cho, tentatively planning for LLE angio at [facility].  AFVSS. Leukocytosis noted. Initial lactic acidosis cleared after IVF resuscitation.    PLAN:    - Repeat FRANCISCA studies would not yield extra utility at this time  - Will plan for angiogram during this admission  - Continue Plavix  - Continue Statin  - Trend Temp curve, WBC  - Continue IV ABX; f/u blood cultures  - Will follow

## 2024-11-30 NOTE — H&P ADULT - NEUROLOGICAL
cranial nerves II-XII intact details… cranial nerves II-XII intact/deep reflexes intact/no spontaneous movement

## 2024-11-30 NOTE — H&P ADULT - ATTENDING COMMENTS
Pt is a 62 y/o M with PMHx DM2, hx osteomyelitis s/p surgical amputation of R great toe 2023, CAD, PAD on plavix, HTN who presents to the hospital at instruction of wound care for a L foot wound, adm for L foot wound.  Pt seen, examined, Case & care plan d/w pt ,residents at detail.  Consult-  ID-Dr XENIA Calhoun  Podiatry-Dr Stark  Vascular on call  PO diet  AM Labs   DVT ppx Pt is a 62 y/o M with PMHx DM2, hx osteomyelitis s/p surgical amputation of R great toe 2023, CAD, PAD on plavix, HTN who presents to the hospital at instruction of wound care for a L foot wound, adm for L foot wound. with likely OM  Pt seen, examined, Case & care plan d/w pt ,residents at detail.  Consult-  ID-Dr XENIA Calhoun  Podiatry-Dr Stark  Vascular on call  PO diet  AM Labs   DVT ppx

## 2024-11-30 NOTE — H&P ADULT - PROBLEM SELECTOR PLAN 1
Pt presenting with L foot wound, s/p outpatient course of augmentin x14 days  - VS stable on admission  - WBC mildly elevated 11.65  - s/p vanco and zosyn in ED  - culture results from outpatient wound care 11/25 with susceptibility to cefepime  - will start cefepime pending ID recs  - wound care RN  - tylenol PRN for mild pain  - ID consulted Dr. Calhoun, will appreciate recs  - podiatry f/u Pt presenting with L foot wound, s/p outpatient course of augmentin x14 days  - VS stable on admission  - WBC mildly elevated 11.65  - s/p vanco and zosyn in ED  - culture results from outpatient wound care 11/25 with susceptibility to cefepime  - will start cefepime pending ID recs  - wound care RN  - tylenol PRN for mild pain  - ID consulted Dr. Calhoun, will appreciate recs-Cefepime 2 gm IVPB q 8 hrs  - podiatry f/u Pt presenting with L foot wound, s/p outpatient course of augmentin x14 days  - VS stable on admission  - WBC mildly elevated 11.65  - s/p vanco and zosyn in ED  - culture results from outpatient wound care 11/25 with susceptibility to cefepime  - will start cefepime 2g q12h pending ID recs  - wound care RN  - tylenol PRN for mild pain  - ID consulted Dr. Calhoun, will appreciate recs-Cefepime 2 gm IVPB q 8 hrs  - podiatry following Dr. French/Dr. Stark  - Vascular consulted Pt presenting with L lateral  foot  wound, s/p outpatient course of augmenting x14 days- OM   - VS stable on admission  - WBC mildly elevated 11.65, out pt wound c/s + Serratia   - s/p vanco and zosyn in ED  - culture results from outpatient wound care 11/25 with susceptibility to cefepime  - will start cefepime 2g q12h pending ID recs  - wound care RN  - tylenol PRN for mild pain  - ID consulted Dr. Calhoun, will appreciate recs-Cefepime 2 gm IVPB q 8 hrs  - podiatry following Dr. French/Dr. Stark  - Vascular consulted- in house

## 2024-11-30 NOTE — H&P ADULT - ASSESSMENT
Pt is a 62 y/o M with PMHx DM2, hx osteomyelitis s/p surgical amputation of R great toe 2023, CAD, PAD on plavix, HTN who presents to the hospital at instruction of wound care for a L foot wound, adm for L foot wound. Pt is a 62 y/o M with PMHx DM2,  hx osteomyelitis rt big toe s/p surgical amputation of R great toe 2023, CAD, PAD on plavix, HTN who presents to the hospital at instruction of wound care for a L foot lateral border  wound, adm for L foot wound.-cellulitis , possible OM

## 2024-11-30 NOTE — H&P ADULT - NSHPSOCIALHISTORY_GEN_ALL_CORE
Lives: with wife and son whos in college  ADLs: independent  Diet:  Alcohol Use: rare occasional  Tobacco Use: denies  Recreational Drug Use: denies Lives: with wife and son who is in college  ADLs: independent  Diet:  Alcohol Use: rare occasional  Tobacco Use: denies  Recreational Drug Use: denies

## 2024-11-30 NOTE — ED PROVIDER NOTE - OBJECTIVE STATEMENT
63-year-old male with history of osteomyelitis s/p surgical amputation of right great toe, CAD, diabetes, HTN, on Plavix referred to ED for admission for left foot infection.  Has been following with Dr. Stark at wound care due to a distal left hallux wound which has now opened and is down to the skin and subcutaneous tissue and fat with surrounding erythema.  Patient reports prior history to right foot 1 year ago.  States he is currently on Augmentin.  Patient also saw vascular Dr. Jones for this wound.  States there is plan for him to have angiogram.

## 2024-11-30 NOTE — ED ADULT NURSE NOTE - HOW PATIENT ADDRESSED, PROFILE
[2+] : left foot dorsalis pedis 2+ [] : normal strength/tone [Vibration Dec.] : diminished vibratory sensation at the level of the toes [Diminished Throughout Right Foot] : diminished sensation with monofilament testing throughout right foot [Diminished Throughout Left Foot] : diminished sensation with monofilament testing throughout left foot [de-identified] : Hammertoes B/L feet 2-4 Adductovarus B?L 5th toes Mild HAV B/L feet   [FreeTextEntry1] : Calluses x5 , painful on palpation   Audi

## 2024-11-30 NOTE — H&P ADULT - PROBLEM SELECTOR PLAN 2
Chronic, with wounds  - glucose elevated on arrival  - hold home trulicity, metformin jardiance  - start low ISS with FS QAC/QHS  - hypoglycemia protocol  - f/u AM A1c Chronic, with foot  wounds  - glucose elevated on arrival  - hold home Trulicity, metformin Jardiance  - start low ISS with FS QAC/QHS  - hypoglycemia protocol  - f/u AM A1c  HOLD home meds

## 2024-11-30 NOTE — ED ADULT NURSE NOTE - CHPI ED NUR SYMPTOMS NEG
no bleeding at site/no blood in mucus/no chills/no drainage/no fever/no pain/no purulent drainage/no rectal pain

## 2024-11-30 NOTE — H&P ADULT - HISTORY OF PRESENT ILLNESS
Pt is a 64 y/o M with PMHx DM2, hx osteomyelitis s/p surgical amputation of R great toe 2023, CAD, PAD on plavix, HTN who presents to the hospital at instruction of wound care for a L foot wound. Pt reports he has been following with wound care with Dr. Stark and Dr. French, has been on Augmentin for approx 2 weeks. No systemic symptoms. Pt states he was told to come in this weekend for IV antibiotics and further evaluation. Pt reports feeling well and denies any fevers, chills, chest pain, shortness of breath, nausea, vomiting, diarrhea, constipation. Pt has been dressing the wound daily himself.     ED Course:   Vitals: BP: 134/69, HR: 94, Temp: 134/69, RR: 18, SpO2: 99% on RA   Labs: WBC 11.65, lactate 2.1 -> 1, BUN 29, Glucose 215   CXR: No active chest disease  EKG: NSR @ 90bpm  Received in the ED: Zosyn, Vancomycin, 1L NS bolus   Pt is a 64 y/o M with PMHx DM2,PAD,  hx osteomyelitis s/p surgical amputation of R great toe 2023, CAD, PAD on plavix, HTN who presents to the hospital at instruction of wound care for a L foot wound. Pt reports he has been following with wound care with Dr. Stark and Dr. French, has been on Augmentin for approx 2 weeks. No systemic symptoms. Pt states he was told to come in this weekend for IV antibiotics and further evaluation of Left foot wound, . Pt reports feeling well and denies any fevers, chills, chest pain, shortness of breath, nausea, vomiting, diarrhea, constipation. Pt has been dressing the wound daily himself.  pt has had MRI left foot 1 week ago     ED Course:   Vitals: BP: 134/69, HR: 94, Temp: 134/69, RR: 18, SpO2: 99% on RA   Labs: WBC 11.65, lactate 2.1 -> 1, BUN 29, Glucose 215   CXR: No active chest disease  EKG: NSR @ 90bpm  Received in the ED: Zosyn, Vancomycin, 1L NS bolus

## 2024-11-30 NOTE — H&P ADULT - GASTROINTESTINAL
details… soft/nontender/nondistended soft/nontender/nondistended/no guarding/no rigidity/no organomegaly/no palpable saira/no masses palpable

## 2024-11-30 NOTE — ED ADULT NURSE NOTE - OBJECTIVE STATEMENT
patient presented to ED with a left great toe and left lateral foot wounds.  Patient has been going to wound care and was sent over for lack of healing.  wound was dressed this AM by himself.

## 2024-11-30 NOTE — ED PROVIDER NOTE - CLINICAL SUMMARY MEDICAL DECISION MAKING FREE TEXT BOX
Patient is a 64y old  Male who presents with a chief complaint of diabetic Left foot wound. patient reports following with wound care and now the wound has gone deeper, sent in for IV antibiotics and may require surgical intervention. he has seen vascular as well. he has minimal pain due to neuropathy    PE: Patient is well appearing, no acute distress, no signs of head trauma, lungs clear bilaterally, abdomen is soft and nontender to palpation without guarding or rebound, left foot has lateral foot wound as well as wound on distal left great toe    labs, imaging, IV abx, consults as needed. will require admission for IV abx as well as possible surgical intervention per podiatry team

## 2024-11-30 NOTE — H&P ADULT - CARDIOVASCULAR
details… regular rate and rhythm/S1 S2 present/no gallops/no rub/no murmur regular rate and rhythm/S1 S2 present/no gallops/no rub/no murmur/no JVD/no pedal edema/vascular

## 2024-11-30 NOTE — ED ADULT TRIAGE NOTE - CHIEF COMPLAINT QUOTE
63y M sent from wound care for "infected diabetic ulcer- left foot with possible osteomyelitis' -- requesting admit, IV abx, Vascular- Dr Zepeda

## 2024-11-30 NOTE — H&P ADULT - NSICDXPASTMEDICALHX_GEN_ALL_CORE_FT
PAST MEDICAL HISTORY:  Diabetes     HTN (hypertension)     Hyperlipidemia     PVD (peripheral vascular disease)      PAST MEDICAL HISTORY:  Diabetes     HTN (hypertension)     Hyperlipidemia     PVD (peripheral vascular disease)     Toe osteomyelitis, right

## 2024-11-30 NOTE — H&P ADULT - PROBLEM SELECTOR PLAN 3
Chronic  - patient on Plavix daily, continue  - pt follows with vascular outpatient, needs angioplasty but hasn't heard back from his vascular team

## 2024-11-30 NOTE — PATIENT PROFILE ADULT - NSPROPTRIGHTBILLOFRIGHTS_GEN_A_NUR
Message   PLEASE CALL   REVIEWED BW   CHOL  BAD CHOL  - A BIT HIGH   WE SHOULD PROBABLY HAVE AN OV TO DISCUSS   THANKS     Verified Results  Hemoccult Screening - POC [id number] 10:24AM Stu Starks     Test Name Result Flag Reference   Hemoccult Negative       Urine Dip Automated- POC [id number] 10:00AM Stu Starks     Test Name Result Flag Reference   Color Yellow     Clarity Transparent     Leukocytes Negative     Nitrite Negative     Blood Negative     Bilirubin Negative     Urobilinogen Normal     Protein Negative     Ph 5 0     Specific Gravity 1 025     Ketone Negative     Glucose Normal       (1) COMPREHENSIVE METABOLIC PANEL [id number] 70:13MQ Stu Starks     Test Name Result Flag Reference   Glucose, Serum 102 mg/dL H 65-99   BUN 24 mg/dL  6-24   Creatinine, Serum 1 14 mg/dL  0 76-1 27   eGFR If NonAfricn Am 75 mL/min/1 73  >59   eGFR If Africn Am 86 mL/min/1 73  >59   BUN/Creatinine Ratio 21 H 9-20   Sodium, Serum 139 mmol/L  134-144   Potassium, Serum 4 9 mmol/L  3 5-5 2   Chloride, Serum 101 mmol/L     Carbon Dioxide, Total 23 mmol/L  18-29   Calcium, Serum 9 6 mg/dL  8 7-10 2   Protein, Total, Serum 7 6 g/dL  6 0-8 5   Albumin, Serum 4 5 g/dL  3 5-5 5   Globulin, Total 3 1 g/dL  1 5-4 5   A/G Ratio 1 5  1 1-2 5   Bilirubin, Total 0 4 mg/dL  0 0-1 2   Alkaline Phosphatase, S 71 IU/L     AST (SGOT) 17 IU/L  0-40   ALT (SGPT) 16 IU/L  0-44     (1) LIPID PANEL, FASTING [id number] 12:00AM Stu Starks     Test Name Result Flag Reference   Cholesterol, Total 226 mg/dL H 100-199   Triglycerides 239 mg/dL H 0-149   HDL Cholesterol 38 mg/dL L >39   According to ATP-III Guidelines, HDL-C >59 mg/dL is considered a  negative risk factor for CHD  VLDL Cholesterol Volodymyr 48 mg/dL H 5-40   LDL Cholesterol Calc 140 mg/dL H 0-99   T  Chol/HDL Ratio 5 9 ratio units H 0 0-5 0   T   Chol/HDL Ratio                                                             Men  Women 1/2 Avg  Risk  3 4    3 3                                                   Avg Risk  5 0    4 4                                                2X Avg  Risk  9 6    7 1                                                3X Avg  Risk 23 4   11 0     (1) PSA (SCREEN) (Dx V76 44 Screen for Prostate Cancer) [id number] 12:00AM [facility]     Test Name Result Flag Reference   Prostate Specific Ag, Serum 2 7 ng/mL  0 0-4 0   Roche ECLIA methodology  According to the American Urological Association, Serum PSA should  decrease and remain at undetectable levels after radical  prostatectomy  The AUA defines biochemical recurrence as an initial  PSA value 0 2 ng/mL or greater followed by a subsequent confirmatory  PSA value 0 2 ng/mL or greater  Values obtained with different assay methods or kits cannot be used  interchangeably  Results cannot be interpreted as absolute evidence  of the presence or absence of malignant disease  (1) CBC/ PLT (NO DIFF) [id number] 12:00AM [facility]     Test Name Result Flag Reference   WBC 4 1 x10E3/uL  3 4-10 8   RBC 4 93 x10E6/uL  4 14-5 80   Hemoglobin 15 1 g/dL  12 6-17 7   Hematocrit 45 5 %  37 5-51 0   MCV 92 fL  79-97   MCH 30 6 pg  26 6-33 0   MCHC 33 2 g/dL  31 5-35 7   RDW 13 4 %  12 3-15 4   Platelets 955 V43F1/KP  150-379     (1) VITAMIN D 25-HYDROXY [id number] 12:00AM [facility]     Test Name Result Flag Reference   Vitamin D, 25-Hydroxy 22 4 ng/mL L 30 0-100 0   Vitamin D deficiency has been defined by the [address] practice guideline as a  level of serum 25-OH vitamin D less than 20 ng/mL (1,2)  The Endocrine Society went on to further define vitamin D  insufficiency as a level between 21 and 29 ng/mL (2)  1  IOM ([place] of Medicine)  2010  Dietary reference     intakes for calcium and D  [facility]: The     [employer]    2  Maida MF, Gabrielle FRANKS, Fransico MEDRANO, et al      Evaluation, treatment, and prevention of vitamin D     deficiency: an Endocrine Society clinical practice     guideline  JCEM  2011 Jul; 96(6):9435-74  patient

## 2024-12-01 DIAGNOSIS — Z89.431 ACQUIRED ABSENCE OF RIGHT FOOT: ICD-10-CM

## 2024-12-01 DIAGNOSIS — Z79.82 LONG TERM (CURRENT) USE OF ASPIRIN: ICD-10-CM

## 2024-12-01 DIAGNOSIS — Z86.39 PERSONAL HISTORY OF OTHER ENDOCRINE, NUTRITIONAL AND METABOLIC DISEASE: ICD-10-CM

## 2024-12-01 DIAGNOSIS — E78.00 PURE HYPERCHOLESTEROLEMIA, UNSPECIFIED: ICD-10-CM

## 2024-12-01 DIAGNOSIS — Z98.890 OTHER SPECIFIED POSTPROCEDURAL STATES: ICD-10-CM

## 2024-12-01 DIAGNOSIS — I25.10 ATHEROSCLEROTIC HEART DISEASE OF NATIVE CORONARY ARTERY WITHOUT ANGINA PECTORIS: ICD-10-CM

## 2024-12-01 DIAGNOSIS — E11.621 TYPE 2 DIABETES MELLITUS WITH FOOT ULCER: ICD-10-CM

## 2024-12-01 DIAGNOSIS — L97.422 NON-PRESSURE CHRONIC ULCER OF LEFT HEEL AND MIDFOOT WITH FAT LAYER EXPOSED: ICD-10-CM

## 2024-12-01 DIAGNOSIS — Z79.84 LONG TERM (CURRENT) USE OF ORAL HYPOGLYCEMIC DRUGS: ICD-10-CM

## 2024-12-01 DIAGNOSIS — Z83.3 FAMILY HISTORY OF DIABETES MELLITUS: ICD-10-CM

## 2024-12-01 DIAGNOSIS — Z79.899 OTHER LONG TERM (CURRENT) DRUG THERAPY: ICD-10-CM

## 2024-12-01 DIAGNOSIS — E11.40 TYPE 2 DIABETES MELLITUS WITH DIABETIC NEUROPATHY, UNSPECIFIED: ICD-10-CM

## 2024-12-01 DIAGNOSIS — E11.51 TYPE 2 DIABETES MELLITUS WITH DIABETIC PERIPHERAL ANGIOPATHY WITHOUT GANGRENE: ICD-10-CM

## 2024-12-01 DIAGNOSIS — I10 ESSENTIAL (PRIMARY) HYPERTENSION: ICD-10-CM

## 2024-12-01 DIAGNOSIS — L97.522 NON-PRESSURE CHRONIC ULCER OF OTHER PART OF LEFT FOOT WITH FAT LAYER EXPOSED: ICD-10-CM

## 2024-12-01 DIAGNOSIS — Z80.3 FAMILY HISTORY OF MALIGNANT NEOPLASM OF BREAST: ICD-10-CM

## 2024-12-01 LAB
A1C WITH ESTIMATED AVERAGE GLUCOSE RESULT: 8.3 % — HIGH (ref 4–5.6)
ANION GAP SERPL CALC-SCNC: 2 MMOL/L — LOW (ref 5–17)
BASOPHILS # BLD AUTO: 0.06 K/UL — SIGNIFICANT CHANGE UP (ref 0–0.2)
BASOPHILS NFR BLD AUTO: 0.7 % — SIGNIFICANT CHANGE UP (ref 0–2)
BUN SERPL-MCNC: 22 MG/DL — SIGNIFICANT CHANGE UP (ref 7–23)
CALCIUM SERPL-MCNC: 9.4 MG/DL — SIGNIFICANT CHANGE UP (ref 8.5–10.1)
CHLORIDE SERPL-SCNC: 108 MMOL/L — SIGNIFICANT CHANGE UP (ref 96–108)
CHOLEST SERPL-MCNC: 149 MG/DL — SIGNIFICANT CHANGE UP
CO2 SERPL-SCNC: 30 MMOL/L — SIGNIFICANT CHANGE UP (ref 22–31)
CREAT SERPL-MCNC: 0.76 MG/DL — SIGNIFICANT CHANGE UP (ref 0.5–1.3)
EGFR: 99 ML/MIN/1.73M2 — SIGNIFICANT CHANGE UP
EOSINOPHIL # BLD AUTO: 0.29 K/UL — SIGNIFICANT CHANGE UP (ref 0–0.5)
EOSINOPHIL NFR BLD AUTO: 3.2 % — SIGNIFICANT CHANGE UP (ref 0–6)
ESTIMATED AVERAGE GLUCOSE: 192 MG/DL — HIGH (ref 68–114)
GLUCOSE BLDC GLUCOMTR-MCNC: 139 MG/DL — HIGH (ref 70–99)
GLUCOSE BLDC GLUCOMTR-MCNC: 145 MG/DL — HIGH (ref 70–99)
GLUCOSE BLDC GLUCOMTR-MCNC: 180 MG/DL — HIGH (ref 70–99)
GLUCOSE BLDC GLUCOMTR-MCNC: 191 MG/DL — HIGH (ref 70–99)
GLUCOSE BLDC GLUCOMTR-MCNC: 245 MG/DL — HIGH (ref 70–99)
GLUCOSE SERPL-MCNC: 164 MG/DL — HIGH (ref 70–99)
HCT VFR BLD CALC: 43.1 % — SIGNIFICANT CHANGE UP (ref 39–50)
HDLC SERPL-MCNC: 35 MG/DL — LOW
HGB BLD-MCNC: 14.4 G/DL — SIGNIFICANT CHANGE UP (ref 13–17)
IMM GRANULOCYTES NFR BLD AUTO: 0.7 % — SIGNIFICANT CHANGE UP (ref 0–0.9)
LIPID PNL WITH DIRECT LDL SERPL: 81 MG/DL — SIGNIFICANT CHANGE UP
LYMPHOCYTES # BLD AUTO: 1.87 K/UL — SIGNIFICANT CHANGE UP (ref 1–3.3)
LYMPHOCYTES # BLD AUTO: 20.9 % — SIGNIFICANT CHANGE UP (ref 13–44)
MCHC RBC-ENTMCNC: 31.1 PG — SIGNIFICANT CHANGE UP (ref 27–34)
MCHC RBC-ENTMCNC: 33.4 G/DL — SIGNIFICANT CHANGE UP (ref 32–36)
MCV RBC AUTO: 93.1 FL — SIGNIFICANT CHANGE UP (ref 80–100)
MONOCYTES # BLD AUTO: 0.6 K/UL — SIGNIFICANT CHANGE UP (ref 0–0.9)
MONOCYTES NFR BLD AUTO: 6.7 % — SIGNIFICANT CHANGE UP (ref 2–14)
NEUTROPHILS # BLD AUTO: 6.05 K/UL — SIGNIFICANT CHANGE UP (ref 1.8–7.4)
NEUTROPHILS NFR BLD AUTO: 67.8 % — SIGNIFICANT CHANGE UP (ref 43–77)
NON HDL CHOLESTEROL: 115 MG/DL — SIGNIFICANT CHANGE UP
NRBC # BLD: 0 /100 WBCS — SIGNIFICANT CHANGE UP (ref 0–0)
PLATELET # BLD AUTO: 272 K/UL — SIGNIFICANT CHANGE UP (ref 150–400)
POTASSIUM SERPL-MCNC: 5 MMOL/L — SIGNIFICANT CHANGE UP (ref 3.5–5.3)
POTASSIUM SERPL-SCNC: 5 MMOL/L — SIGNIFICANT CHANGE UP (ref 3.5–5.3)
RBC # BLD: 4.63 M/UL — SIGNIFICANT CHANGE UP (ref 4.2–5.8)
RBC # FLD: 13.1 % — SIGNIFICANT CHANGE UP (ref 10.3–14.5)
SODIUM SERPL-SCNC: 140 MMOL/L — SIGNIFICANT CHANGE UP (ref 135–145)
TRIGL SERPL-MCNC: 195 MG/DL — HIGH
WBC # BLD: 8.93 K/UL — SIGNIFICANT CHANGE UP (ref 3.8–10.5)
WBC # FLD AUTO: 8.93 K/UL — SIGNIFICANT CHANGE UP (ref 3.8–10.5)

## 2024-12-01 PROCEDURE — 99223 1ST HOSP IP/OBS HIGH 75: CPT

## 2024-12-01 PROCEDURE — 73718 MRI LOWER EXTREMITY W/O DYE: CPT | Mod: 26,LT

## 2024-12-01 RX ORDER — GABAPENTIN 300 MG/1
300 CAPSULE ORAL THREE TIMES A DAY
Refills: 0 | Status: DISCONTINUED | OUTPATIENT
Start: 2024-12-01 | End: 2024-12-02

## 2024-12-01 RX ORDER — POVIDONE-IODINE 7.5 %
1 SPONGE TOPICAL DAILY
Refills: 0 | Status: DISCONTINUED | OUTPATIENT
Start: 2024-12-01 | End: 2024-12-02

## 2024-12-01 RX ADMIN — Medication 1 APPLICATION(S): at 16:50

## 2024-12-01 RX ADMIN — CEFEPIME 100 MILLIGRAM(S): 2 INJECTION, POWDER, FOR SOLUTION INTRAVENOUS at 05:26

## 2024-12-01 RX ADMIN — CLOPIDOGREL 75 MILLIGRAM(S): 75 TABLET, FILM COATED ORAL at 13:24

## 2024-12-01 RX ADMIN — Medication 40 MILLIGRAM(S): at 21:39

## 2024-12-01 RX ADMIN — GABAPENTIN 300 MILLIGRAM(S): 300 CAPSULE ORAL at 05:27

## 2024-12-01 RX ADMIN — CEFEPIME 100 MILLIGRAM(S): 2 INJECTION, POWDER, FOR SOLUTION INTRAVENOUS at 21:40

## 2024-12-01 RX ADMIN — LISINOPRIL 40 MILLIGRAM(S): 20 TABLET ORAL at 05:25

## 2024-12-01 RX ADMIN — METOPROLOL TARTRATE 50 MILLIGRAM(S): 100 TABLET, FILM COATED ORAL at 05:25

## 2024-12-01 RX ADMIN — GABAPENTIN 300 MILLIGRAM(S): 300 CAPSULE ORAL at 16:50

## 2024-12-01 RX ADMIN — GABAPENTIN 300 MILLIGRAM(S): 300 CAPSULE ORAL at 21:52

## 2024-12-01 RX ADMIN — CEFEPIME 100 MILLIGRAM(S): 2 INJECTION, POWDER, FOR SOLUTION INTRAVENOUS at 14:00

## 2024-12-01 RX ADMIN — ACETAMINOPHEN, DIPHENHYDRAMINE HCL, PHENYLEPHRINE HCL 3 MILLIGRAM(S): 325; 25; 5 TABLET ORAL at 21:39

## 2024-12-01 RX ADMIN — Medication 1: at 13:58

## 2024-12-01 NOTE — PROGRESS NOTE ADULT - PROBLEM SELECTOR PLAN 1
Pt presenting with L lateral  foot  wound, s/p outpatient course of augmenting x14 days- OM on out pt MRI 11/23   -Diabetic foot wound   - WBC mildly elevated 11.65, out pt wound c/s + Serratia   - s/p vanco and zosyn in ED  - culture results from outpatient wound care 11/25 with Serratia  susceptibility to cefepime  - tylenol PRN for mild pain  - ID Dr. Calohun, will appreciate recs-Cefepime 2 gm IVPB q 8 hrs  - podiatry following Dr. French/Dr. Stark  - Vascular consulted- in house-plan for LL Ext Angio on 12/2 home/no skilled PT needs

## 2024-12-01 NOTE — PROGRESS NOTE ADULT - ATTENDING COMMENTS
Pt is a 62 y/o M with PMHx DM2, hx osteomyelitis s/p surgical amputation of R great toe 2023, CAD, PAD on plavix, HTN who presents to the hospital at instruction of wound care for a L foot wound, adm for L foot wound. with likely OM  Pt seen, examined, Case & care plan d/w pt ,residents at detail.  Consult-  ID-Dr XENIA Calhoun follow up  Podiatry-Dr Stark follow up  Vascular -NPO for Angiogram LL Ext on 12/2   PO diet  AM Labs   DVT ppx  Total care time is 60 minutes

## 2024-12-01 NOTE — PROGRESS NOTE ADULT - SUBJECTIVE AND OBJECTIVE BOX
Patient is a 64y old  Male who presents with a chief complaint of L foot wound (01 Dec 2024 12:14)    HPI:  Pt is a 62 y/o M with PMHx DM2,PAD,  hx osteomyelitis s/p surgical amputation of R great toe 2023, CAD, PAD on plavix, HTN who presents to the hospital at instruction of wound care for a L foot wound. Pt reports he has been following with wound care with Dr. Stark and Dr. French, has been on Augmentin for approx 2 weeks. No systemic symptoms. Pt states he was told to come in this weekend for IV antibiotics and further evaluation of Left foot wound, . Pt reports feeling well and denies any fevers, chills, chest pain, shortness of breath, nausea, vomiting, diarrhea, constipation. Pt has been dressing the wound daily himself.  pt has had MRI left foot 1 week ago     ED Course:   Vitals: BP: 134/69, HR: 94, Temp: 134/69, RR: 18, SpO2: 99% on RA   Labs: WBC 11.65, lactate 2.1 -> 1, BUN 29, Glucose 215   CXR: No active chest disease  EKG: NSR @ 90bpm  Received in the ED: Zosyn, Vancomycin, 1L NS bolus   (30 Nov 2024 17:09)    INTERVAL HPI:  12/1:pt seen, Examined, No Complaints , feels OK, IV Abx ,Nl WBC    OVERNIGHT EVENTS: NONE    Home Medications:  atorvastatin 40 mg oral tablet: 1 tab(s) orally once a day (30 Nov 2024 17:29)  clopidogrel 75 mg oral tablet: 1 tab(s) orally once a day (30 Nov 2024 17:29)  gabapentin 300 mg oral capsule: 1 cap(s) orally 2 times a day (30 Nov 2024 17:28)  Jardiance 25 mg oral tablet: 1 tab(s) orally once a day (30 Nov 2024 17:29)  lisinopril 40 mg oral tablet: 1 tab(s) orally once a day (30 Nov 2024 17:29)  metFORMIN 1000 mg oral tablet: 1 tab(s) orally 2 times a day (30 Nov 2024 17:29)  metoprolol succinate 50 mg oral tablet, extended release: 1 tab(s) orally once a day (30 Nov 2024 17:29)  Trulicity Pen 1.5 mg/0.5 mL subcutaneous solution: 1.5 milligram(s) subcutaneously once a week (30 Nov 2024 17:29)      MEDICATIONS  (STANDING):  atorvastatin 40 milliGRAM(s) Oral at bedtime  cefepime   IVPB      cefepime   IVPB 2000 milliGRAM(s) IV Intermittent every 8 hours  clopidogrel Tablet 75 milliGRAM(s) Oral daily  dextrose 5%. 1000 milliLiter(s) (50 mL/Hr) IV Continuous <Continuous>  dextrose 5%. 1000 milliLiter(s) (100 mL/Hr) IV Continuous <Continuous>  dextrose 50% Injectable 25 Gram(s) IV Push once  dextrose 50% Injectable 12.5 Gram(s) IV Push once  dextrose 50% Injectable 25 Gram(s) IV Push once  gabapentin 300 milliGRAM(s) Oral three times a day  glucagon  Injectable 1 milliGRAM(s) IntraMuscular once  insulin lispro (ADMELOG) corrective regimen sliding scale   SubCutaneous three times a day before meals  insulin lispro (ADMELOG) corrective regimen sliding scale   SubCutaneous at bedtime  lisinopril 40 milliGRAM(s) Oral daily  metoprolol succinate ER 50 milliGRAM(s) Oral daily  povidone iodine 10% Solution 1 Application(s) Topical daily    MEDICATIONS  (PRN):  acetaminophen     Tablet .. 650 milliGRAM(s) Oral every 6 hours PRN Mild Pain (1 - 3)  aluminum hydroxide/magnesium hydroxide/simethicone Suspension 30 milliLiter(s) Oral every 4 hours PRN Dyspepsia  dextrose Oral Gel 15 Gram(s) Oral once PRN Blood Glucose LESS THAN 70 milliGRAM(s)/deciliter  melatonin 3 milliGRAM(s) Oral at bedtime PRN Insomnia      Allergies    No Known Allergies    Intolerances        Social History:  Lives: with wife and son who is in college  ADLs: independent  Diet:  Alcohol Use: rare occasional  Tobacco Use: denies  Recreational Drug Use: denies (30 Nov 2024 17:09)      REVIEW OF SYSTEMS: NONE  CONSTITUTIONAL: No fever, No chills, No fatigue, No myalgia, No Body ache, No Weakness  EYES: No eye pain,  No visual disturbances, No discharge, NO Redness  ENMT:  No ear pain, No nose bleed, No vertigo; No sinus pain, NO throat pain, No Congestion  NECK: No pain, No stiffness  RESPIRATORY: No cough, NO wheezing, No  hemoptysis, NO  shortness of breath  CARDIOVASCULAR: No chest pain, palpitations  GASTROINTESTINAL: No abdominal pain, NO epigastric pain. No nausea, No vomiting; No diarrhea, No constipation. [  ] BM  GENITOURINARY: No dysuria, No frequency, No urgency, No hematuria, NO incontinence  NEUROLOGICAL: No headaches, No dizziness, No numbness, No tingling, No tremors, No weakness  EXT: No Swelling, No Pain, No Edema  SKIN:  [x  ] No itching, burning, rashes, or lesions   MUSCULOSKELETAL: No joint pain ,No Jt swelling; No muscle pain, No back pain, No extremity pain  PSYCHIATRIC: No depression,  No anxiety,  No mood swings ,No difficulty sleeping at night  PAIN SCALE: [x  ] None  [  ] Other-  ROS Unable to obtain due to - [  ] Dementia  [  ] Lethargy [  ] Drowsy [  ] Sedated [  ] non verbal  REST OF REVIEW Of SYSTEM - [x  ] Normal     Vital Signs Last 24 Hrs  T(C): 36.7 (01 Dec 2024 12:56), Max: 36.7 (01 Dec 2024 12:56)  T(F): 98 (01 Dec 2024 12:56), Max: 98 (01 Dec 2024 12:56)  HR: 86 (01 Dec 2024 12:56) (73 - 86)  BP: 110/63 (01 Dec 2024 12:56) (110/63 - 125/76)  BP(mean): --  RR: 20 (01 Dec 2024 12:56) (18 - 20)  SpO2: 96% (01 Dec 2024 12:56) (95% - 96%)    Parameters below as of 01 Dec 2024 12:56  Patient On (Oxygen Delivery Method): room air      Finger Stick          PHYSICAL EXAM:  GENERAL:  [ x ] NAD , [x  ] well appearing, [  ] Agitated, [  ] Drowsy,  [  ] Lethargy, [  ] confused   HEAD:  [ x ] Normal, [  ] Other  EYES:  [ x ] EOMI, [x  ] PERRLA, [  ] conjunctiva and sclera clear normal, [  ] Other,  [  ] Pallor,[  ] Discharge  ENMT:  [ x ] Normal, [ x ] Moist mucous membranes, [ x ] Good dentition, [ x ] No Thrush  NECK:  [ x ] Supple, [ x ] No JVD, [ x ] Normal thyroid, [  ] Lymphadenopathy [  ] Other  CHEST/LUNG:  [ x ] Clear to auscultation bilaterally, [ x ] Breath Sounds equal B/L / Decrease, [ x] poor effort  [ x ] No rales, [x  ] No rhonchi  [x  ]  No wheezing,   HEART:  [ x ] Regular rate and rhythm, [  ] tachycardia, [  ] Bradycardia,  [  ] irregular  [x ] No murmurs, No rubs, No gallops, [  ] PPM in place (Mfr:  )  ABDOMEN:  [x  ] Soft, [x  ] Nontender, [  ] Nondistended, [ x ] No mass, [ x ] Bowel sounds present, [  ] obese  NERVOUS SYSTEM:  [ x ] Alert & Oriented X3, [ x ] Nonfocal  [  ] Confusion  [  ] Encephalopathic [  ] Sedated [  ] Unable to assess, [  ] Dementia [  ] Other-  EXTREMITIES: [x  ] 2+ Peripheral Pulses, No clubbing, No cyanosis,  [  ] edema B/L lower EXT. [  ] PVD stasis skin changes B/L Lower EXT, [  ] wound  LYMPH: No lymphadenopathy noted  SKIN:  [  ] No rashes or lesions, [  ] Pressure Ulcers, [  ] ecchymosis, [  ] Skin Tears, [ x ] Other-Left foot lateral border wound + Dressing. No necrotic tissue, No odor , No Drainage . Bg toe dry wound    DIET: Diet, NPO after Midnight:      NPO Start Date: 01-Dec-2024,   NPO Start Time: 23:59  Except Medications (12-01-24 @ 16:04)  Diet, Regular:   Consistent Carbohydrate Evening Snack  DASH/TLC Sodium & Cholesterol Restricted (11-30-24 @ 17:34)      LABS:                        14.4   8.93  )-----------( 272      ( 01 Dec 2024 07:31 )             43.1     01 Dec 2024 07:31    140    |  108    |  22     ----------------------------<  164    5.0     |  30     |  0.76     Ca    9.4        01 Dec 2024 07:31      PT/INR - ( 30 Nov 2024 12:30 )   PT: 10.6 sec;   INR: 0.90 ratio         PTT - ( 30 Nov 2024 12:30 )  PTT:30.3 sec  Urinalysis Basic - ( 01 Dec 2024 07:31 )    Color: x / Appearance: x / SG: x / pH: x  Gluc: 164 mg/dL / Ketone: x  / Bili: x / Urobili: x   Blood: x / Protein: x / Nitrite: x   Leuk Esterase: x / RBC: x / WBC x   Sq Epi: x / Non Sq Epi: x / Bacteria: x        Culture Results:   Numerous Serratia marcescens (11-25 @ 16:50)        Culture - Wound Aerobic (collected 25 Nov 2024 16:50)  Source: Skin/Wound  Final Report (27 Nov 2024 16:29):    Numerous Serratia marcescens  Organism: Serratia marcescens (27 Nov 2024 16:29)  Organism: Serratia marcescens (27 Nov 2024 16:29)    RADIOLOGY & ADDITIONAL TESTS:  < from: MR Foot No Cont, Left (12.01.24 @ 12:46) >    IMPRESSION:    1.  Skin wound at the great toe with deep soft tissue swelling. No   drainable collection.  2.  Abnormal marrow signal within the first distal phalangeal head.   Correlate for an overlying skin wound which would increase the   probability of osteomyelitis in this region.  3.  Abnormal marrow signal within the fifth middle and distal phalanx as   on prior study raising concern for osteomyelitis.  4.  Patchy marrow edema at the base of the fifth metatarsal. Given the   overlying skin wound, findings concerning for osteomyelitis as on prior   exam.  5.  Patchy marrow edema again noted in the second and third middle   phalanges. Correlate for overlying skin wound in these regions which   would increase the probability of osteomyelitis.  6.  Degenerative changes at the midfoot.    < end of copied text >      HEALTH ISSUES - PROBLEM Dx:  Wound of left foot    DM (diabetes mellitus)    PAD (peripheral artery disease)    HTN (hypertension)    Need for prophylactic measure      Consultant(s) Notes Reviewed:  [x  ] YES     Care Discussed with [X] Consultants  [ x ] Patient  [x  ] Family [  ] HCP [  ]   [  ] Social Service  [ x ] RN, [  ] Physical Therapy,[  ] Palliative care team  DVT PPX: x[  ] Lovenox, [  ] S C Heparin, [  ] Coumadin, [  ] Xarelto, [  ] Eliquis, [  ] Pradaxa, [  ] IV Heparin drip, [  ] SCD [  ] Contraindication 2 to GI Bleed,[  ] Ambulation [  ] Contraindicated 2 to  bleed [  ] Contraindicated 2 to Brain Bleed  Advanced directive: [x  ] None, [  ] DNR/DNI

## 2024-12-01 NOTE — PROGRESS NOTE ADULT - SUBJECTIVE AND OBJECTIVE BOX
SUBJECTIVE:  Patient seen and examined at bedside.  No overnight events.  Patient reports no new complaints at this time.      VITALS  Vital Signs Last 24 Hrs  T(C): 36.6 (01 Dec 2024 04:49), Max: 36.6 (30 Nov 2024 19:39)  T(F): 97.9 (01 Dec 2024 04:49), Max: 97.9 (30 Nov 2024 19:39)  HR: 73 (01 Dec 2024 04:49) (73 - 76)  BP: 125/76 (01 Dec 2024 04:49) (114/70 - 125/76)    RR: 19 (01 Dec 2024 04:49) (18 - 19)  SpO2: 95% (01 Dec 2024 04:49) (95% - 96%)    Parameters below as of 01 Dec 2024 04:49  Patient On (Oxygen Delivery Method): room air    PHYSICAL EXAM  GENERAL:  Male lying comfortably in bed in NAD.  HEENT:  NC/AT. Sclera white. Mucous membranes moist.  CARDIO:  Regular rate and rhythm.   RESPIRATORY:  Nonlabored breathing, no accessory muscle use.   ABDOMEN:  Soft, nondistended, nontender.  EXTREMITIES: No calf tenderness bilaterally. Well-healed R TMA. L hallux wrapped w/ kerlix gauze, C/D/I. L lateral foot dressing C/D/I, without excess drainage/malodor. Nonpalpable DP/PT Doppler signals present  SKIN:  No jaundice, pallor, or cyanosis  NEURO:  A&O x 3    MEDICATIONS  MEDICATIONS  (STANDING):  atorvastatin 40 milliGRAM(s) Oral at bedtime  cefepime   IVPB      cefepime   IVPB 2000 milliGRAM(s) IV Intermittent every 8 hours  clopidogrel Tablet 75 milliGRAM(s) Oral daily  dextrose 5%. 1000 milliLiter(s) (50 mL/Hr) IV Continuous <Continuous>  dextrose 5%. 1000 milliLiter(s) (100 mL/Hr) IV Continuous <Continuous>  dextrose 50% Injectable 25 Gram(s) IV Push once  dextrose 50% Injectable 12.5 Gram(s) IV Push once  dextrose 50% Injectable 25 Gram(s) IV Push once  enoxaparin Injectable 40 milliGRAM(s) SubCutaneous every 24 hours  gabapentin 300 milliGRAM(s) Oral two times a day  glucagon  Injectable 1 milliGRAM(s) IntraMuscular once  insulin lispro (ADMELOG) corrective regimen sliding scale   SubCutaneous three times a day before meals  insulin lispro (ADMELOG) corrective regimen sliding scale   SubCutaneous at bedtime  lisinopril 40 milliGRAM(s) Oral daily  metoprolol succinate ER 50 milliGRAM(s) Oral daily    MEDICATIONS  (PRN):  acetaminophen     Tablet .. 650 milliGRAM(s) Oral every 6 hours PRN Mild Pain (1 - 3)  aluminum hydroxide/magnesium hydroxide/simethicone Suspension 30 milliLiter(s) Oral every 4 hours PRN Dyspepsia  dextrose Oral Gel 15 Gram(s) Oral once PRN Blood Glucose LESS THAN 70 milliGRAM(s)/deciliter  melatonin 3 milliGRAM(s) Oral at bedtime PRN Insomnia      LABS:                        14.4   8.93  )-----------( 272      ( 01 Dec 2024 07:31 )             43.1     140  |  108  |  22  ----------------------------<  164[H]  5.0   |  30  |  0.76    Ca    9.4      01 Dec 2024 07:31    TPro  7.4  /  Alb  3.9  /  TBili  0.2  /  DBili  x   /  AST  16  /  ALT  35  /  AlkPhos  81  11-30    PT/INR - ( 30 Nov 2024 12:30 )   PT: 10.6 sec;   INR: 0.90 ratio       PTT - ( 30 Nov 2024 12:30 )  PTT:30.3 sec      ASSESSMENT & PLAN  64 y/o M with PMHx DM2, hx osteomyelitis s/p R TMA (2023), CAD, PAD on plavix (s/p RLE angioplasty 2020), HTN sent in by wound care for L hallux and L lateral foot wounds. Outpatient FRANCISCA results as above, suggestive of microvascular disease in L foot. Pt with nonpalpable DP/PT, but +doppler signals.    - Planning for LLE angiogram Monday around 4pm  - Pre op needed  - Case discussed with Dr. Lewis and patient. SUBJECTIVE:  Patient seen and examined at bedside.  No overnight events.  Patient reports no new complaints at this time.      VITALS  Vital Signs Last 24 Hrs  T(C): 36.6 (01 Dec 2024 04:49), Max: 36.6 (30 Nov 2024 19:39)  T(F): 97.9 (01 Dec 2024 04:49), Max: 97.9 (30 Nov 2024 19:39)  HR: 73 (01 Dec 2024 04:49) (73 - 76)  BP: 125/76 (01 Dec 2024 04:49) (114/70 - 125/76)    RR: 19 (01 Dec 2024 04:49) (18 - 19)  SpO2: 95% (01 Dec 2024 04:49) (95% - 96%)    Parameters below as of 01 Dec 2024 04:49  Patient On (Oxygen Delivery Method): room air    PHYSICAL EXAM  GENERAL:  Male lying comfortably in bed in NAD.  HEENT:  NC/AT. Sclera white. Mucous membranes moist.  CARDIO:  Regular rate and rhythm.   RESPIRATORY:  Nonlabored breathing, no accessory muscle use.   ABDOMEN:  Soft, nondistended, nontender.  EXTREMITIES: No calf tenderness bilaterally. Well-healed R TMA. L hallux wrapped w/ kerlix gauze, C/D/I. L lateral foot dressing C/D/I, without excess drainage/malodor. Nonpalpable DP/PT Doppler signals present  SKIN:  No jaundice, pallor, or cyanosis  NEURO:  A&O x 3    MEDICATIONS  MEDICATIONS  (STANDING):  atorvastatin 40 milliGRAM(s) Oral at bedtime  cefepime   IVPB      cefepime   IVPB 2000 milliGRAM(s) IV Intermittent every 8 hours  clopidogrel Tablet 75 milliGRAM(s) Oral daily  dextrose 5%. 1000 milliLiter(s) (50 mL/Hr) IV Continuous <Continuous>  dextrose 5%. 1000 milliLiter(s) (100 mL/Hr) IV Continuous <Continuous>  dextrose 50% Injectable 25 Gram(s) IV Push once  dextrose 50% Injectable 12.5 Gram(s) IV Push once  dextrose 50% Injectable 25 Gram(s) IV Push once  enoxaparin Injectable 40 milliGRAM(s) SubCutaneous every 24 hours  gabapentin 300 milliGRAM(s) Oral two times a day  glucagon  Injectable 1 milliGRAM(s) IntraMuscular once  insulin lispro (ADMELOG) corrective regimen sliding scale   SubCutaneous three times a day before meals  insulin lispro (ADMELOG) corrective regimen sliding scale   SubCutaneous at bedtime  lisinopril 40 milliGRAM(s) Oral daily  metoprolol succinate ER 50 milliGRAM(s) Oral daily    MEDICATIONS  (PRN):  acetaminophen     Tablet .. 650 milliGRAM(s) Oral every 6 hours PRN Mild Pain (1 - 3)  aluminum hydroxide/magnesium hydroxide/simethicone Suspension 30 milliLiter(s) Oral every 4 hours PRN Dyspepsia  dextrose Oral Gel 15 Gram(s) Oral once PRN Blood Glucose LESS THAN 70 milliGRAM(s)/deciliter  melatonin 3 milliGRAM(s) Oral at bedtime PRN Insomnia      LABS:                        14.4   8.93  )-----------( 272      ( 01 Dec 2024 07:31 )             43.1     140  |  108  |  22  ----------------------------<  164[H]  5.0   |  30  |  0.76    Ca    9.4      01 Dec 2024 07:31    TPro  7.4  /  Alb  3.9  /  TBili  0.2  /  DBili  x   /  AST  16  /  ALT  35  /  AlkPhos  81  11-30    PT/INR - ( 30 Nov 2024 12:30 )   PT: 10.6 sec;   INR: 0.90 ratio       PTT - ( 30 Nov 2024 12:30 )  PTT:30.3 sec      ASSESSMENT & PLAN  64 y/o M with PMHx DM2, hx osteomyelitis s/p R TMA (2023), CAD, PAD on plavix (s/p RLE angioplasty 2020), HTN sent in by wound care for L hallux and L lateral foot wounds. Outpatient FRANCISCA results as above, suggestive of microvascular disease in L foot. Pt with nonpalpable DP/PT, but +doppler signals.    - Planning for LLE angiogram Monday around 4pm  - Pre op needed  - Case discussed with Dr. Lewis and patient.    **Of note, LLE angiogram now booked with Dr. Mora for 12/2 as add-on for sometime in the morning/afternoon.

## 2024-12-01 NOTE — CARE COORDINATION ASSESSMENT. - OTHER PERTINENT DISCHARGE PLANNING INFORMATION:
CM met with patient at bedside, introduced self and explained role of CM and transitional care planning. He verbalized understanding. Patient is A&Ox4, resides in private home with spouse, with 0STE and 4 steps to main floor and 6 steps to bedroom. Reports being independent PTA with use of cane for uneven services outdoors. Denies any active HCS or HHA services. States he had home infusion via [facility] for long term IV abx in which he was able to administer IV medications independently. He has been attending the [facility] wound care center weekly for the past few weeks. When DC ready, his spouse will be transporting him home. Anticipated DC plan unclear at present, pending clinical course. He is planned for LLE angiogram tomorrow. PCP is Dannie Moreno. Pharmacy is [facility] in [place].

## 2024-12-01 NOTE — CARE COORDINATION ASSESSMENT. - NSCAREPROVIDERS_GEN_ALL_CORE_FT
CARE PROVIDERS:  Accepting Physician: Hira Calhoun  Administration: Gamal Page  Admitting: Hira Calhoun  Attending: Hira Calhoun  Consultant: Corey Gaines  Consultant: Efe Lewis  Consultant: Khan, Fahrina  Covering Team: Mamta Chapa  Covering Team: Katlyn Salas ED ACP: Selene Rojas ED Attending: Pablo Archibald ED Nurse: Geoffrey Guadalupe  Nurse: Shonna Alfaro  Nurse: Mega Eden  Nurse: Jennifer Sullivan  Nurse: Marco Garcia  Override: Marco Garcia  Override: Geoffrey Guadalupe  Override: Pan Perez  Override: Apple Condon  Override: Tammy Arshad  PCA/Nursing Assistant: Izabela Gentile  Registered Dietitian: Candis Benito

## 2024-12-01 NOTE — PROGRESS NOTE ADULT - PROBLEM SELECTOR PLAN 2
Chronic, with foot  wounds  - glucose elevated on arrival  - hold home Trulicity, metformin Jardiance  - start low ISS with FS QAC/QHS  - hypoglycemia protocol  - f/u AM A1c-8.2   HOLD home meds  Nutritional eval  Neuropathy-On Gabapentin 300 mg 3x day

## 2024-12-02 ENCOUNTER — APPOINTMENT (OUTPATIENT)
Dept: WOUND CARE | Facility: HOSPITAL | Age: 64
End: 2024-12-02

## 2024-12-02 ENCOUNTER — TRANSCRIPTION ENCOUNTER (OUTPATIENT)
Age: 64
End: 2024-12-02

## 2024-12-02 LAB
ANION GAP SERPL CALC-SCNC: 0 MMOL/L — LOW (ref 5–17)
APTT BLD: 28.9 SEC — SIGNIFICANT CHANGE UP (ref 24.5–35.6)
BASOPHILS # BLD AUTO: 0.06 K/UL — SIGNIFICANT CHANGE UP (ref 0–0.2)
BASOPHILS NFR BLD AUTO: 0.7 % — SIGNIFICANT CHANGE UP (ref 0–2)
BUN SERPL-MCNC: 25 MG/DL — HIGH (ref 7–23)
CALCIUM SERPL-MCNC: 9.2 MG/DL — SIGNIFICANT CHANGE UP (ref 8.5–10.1)
CHLORIDE SERPL-SCNC: 107 MMOL/L — SIGNIFICANT CHANGE UP (ref 96–108)
CO2 SERPL-SCNC: 32 MMOL/L — HIGH (ref 22–31)
CREAT SERPL-MCNC: 0.93 MG/DL — SIGNIFICANT CHANGE UP (ref 0.5–1.3)
EGFR: 91 ML/MIN/1.73M2 — SIGNIFICANT CHANGE UP
EOSINOPHIL # BLD AUTO: 0.42 K/UL — SIGNIFICANT CHANGE UP (ref 0–0.5)
EOSINOPHIL NFR BLD AUTO: 4.6 % — SIGNIFICANT CHANGE UP (ref 0–6)
GLUCOSE BLDC GLUCOMTR-MCNC: 131 MG/DL — HIGH (ref 70–99)
GLUCOSE BLDC GLUCOMTR-MCNC: 149 MG/DL — HIGH (ref 70–99)
GLUCOSE BLDC GLUCOMTR-MCNC: 184 MG/DL — HIGH (ref 70–99)
GLUCOSE BLDC GLUCOMTR-MCNC: 212 MG/DL — HIGH (ref 70–99)
GLUCOSE SERPL-MCNC: 192 MG/DL — HIGH (ref 70–99)
HCT VFR BLD CALC: 40.1 % — SIGNIFICANT CHANGE UP (ref 39–50)
HGB BLD-MCNC: 13.2 G/DL — SIGNIFICANT CHANGE UP (ref 13–17)
IMM GRANULOCYTES NFR BLD AUTO: 0.8 % — SIGNIFICANT CHANGE UP (ref 0–0.9)
INR BLD: 0.9 RATIO — SIGNIFICANT CHANGE UP (ref 0.85–1.16)
LYMPHOCYTES # BLD AUTO: 2.15 K/UL — SIGNIFICANT CHANGE UP (ref 1–3.3)
LYMPHOCYTES # BLD AUTO: 23.3 % — SIGNIFICANT CHANGE UP (ref 13–44)
MCHC RBC-ENTMCNC: 30.6 PG — SIGNIFICANT CHANGE UP (ref 27–34)
MCHC RBC-ENTMCNC: 32.9 G/DL — SIGNIFICANT CHANGE UP (ref 32–36)
MCV RBC AUTO: 93 FL — SIGNIFICANT CHANGE UP (ref 80–100)
MONOCYTES # BLD AUTO: 0.83 K/UL — SIGNIFICANT CHANGE UP (ref 0–0.9)
MONOCYTES NFR BLD AUTO: 9 % — SIGNIFICANT CHANGE UP (ref 2–14)
NEUTROPHILS # BLD AUTO: 5.7 K/UL — SIGNIFICANT CHANGE UP (ref 1.8–7.4)
NEUTROPHILS NFR BLD AUTO: 61.6 % — SIGNIFICANT CHANGE UP (ref 43–77)
NRBC # BLD: 0 /100 WBCS — SIGNIFICANT CHANGE UP (ref 0–0)
PLATELET # BLD AUTO: 245 K/UL — SIGNIFICANT CHANGE UP (ref 150–400)
POTASSIUM SERPL-MCNC: 5.3 MMOL/L — SIGNIFICANT CHANGE UP (ref 3.5–5.3)
POTASSIUM SERPL-SCNC: 5.3 MMOL/L — SIGNIFICANT CHANGE UP (ref 3.5–5.3)
PROTHROM AB SERPL-ACNC: 10.6 SEC — SIGNIFICANT CHANGE UP (ref 9.9–13.4)
RBC # BLD: 4.31 M/UL — SIGNIFICANT CHANGE UP (ref 4.2–5.8)
RBC # FLD: 12.9 % — SIGNIFICANT CHANGE UP (ref 10.3–14.5)
SODIUM SERPL-SCNC: 139 MMOL/L — SIGNIFICANT CHANGE UP (ref 135–145)
WBC # BLD: 9.23 K/UL — SIGNIFICANT CHANGE UP (ref 3.8–10.5)
WBC # FLD AUTO: 9.23 K/UL — SIGNIFICANT CHANGE UP (ref 3.8–10.5)

## 2024-12-02 PROCEDURE — 75625 CONTRAST EXAM ABDOMINL AORTA: CPT | Mod: 26

## 2024-12-02 PROCEDURE — 36246 INS CATH ABD/L-EXT ART 2ND: CPT | Mod: LT,59

## 2024-12-02 PROCEDURE — 37228: CPT | Mod: LT

## 2024-12-02 PROCEDURE — 76937 US GUIDE VASCULAR ACCESS: CPT | Mod: 26

## 2024-12-02 PROCEDURE — 75710 ARTERY X-RAYS ARM/LEG: CPT | Mod: 26,59

## 2024-12-02 DEVICE — GUIDEWIRE TERUMO GLIDEWIRE ANGLED .035" X 150CM STANDARD: Type: IMPLANTABLE DEVICE | Site: LEFT | Status: FUNCTIONAL

## 2024-12-02 DEVICE — CATH OMNI FLSH 0.035IN 5FRX65: Type: IMPLANTABLE DEVICE | Site: LEFT | Status: FUNCTIONAL

## 2024-12-02 DEVICE — GWIRE BENT STRT 0.035INX150CM: Type: IMPLANTABLE DEVICE | Site: LEFT | Status: FUNCTIONAL

## 2024-12-02 DEVICE — GUIDEWIRE ADVANTAGE .014INX300CM: Type: IMPLANTABLE DEVICE | Site: LEFT | Status: FUNCTIONAL

## 2024-12-02 DEVICE — BLLN COYOTE OTW 2X60MMX150CM: Type: IMPLANTABLE DEVICE | Site: LEFT | Status: FUNCTIONAL

## 2024-12-02 DEVICE — MICRO-INTRODUCER KIT 5FR 40CM NITINOL TC TIP 7CM ECHOGENIC REGULAR: Type: IMPLANTABLE DEVICE | Site: LEFT | Status: FUNCTIONAL

## 2024-12-02 DEVICE — SHEATH INTRODUCER TERUMO PINNACLE RADIOPAQUE 5FR X 10CM: Type: IMPLANTABLE DEVICE | Site: LEFT | Status: FUNCTIONAL

## 2024-12-02 DEVICE — IMPLANTABLE DEVICE: Type: IMPLANTABLE DEVICE | Site: LEFT | Status: FUNCTIONAL

## 2024-12-02 DEVICE — BLLN COYOTE OTW 2X150MMX150CM: Type: IMPLANTABLE DEVICE | Site: LEFT | Status: FUNCTIONAL

## 2024-12-02 DEVICE — GUIDEWIRE V-14 CONTROLWIRE ANGLED .014" X 300CM: Type: IMPLANTABLE DEVICE | Site: LEFT | Status: FUNCTIONAL

## 2024-12-02 DEVICE — CATH SUPPORT CXI 2.3X150CM ST TIP: Type: IMPLANTABLE DEVICE | Site: LEFT | Status: FUNCTIONAL

## 2024-12-02 DEVICE — GUIDEWIRE RADIFOCUS GLIDEWIRE ANGLED 0.035" X 260CM STIFF: Type: IMPLANTABLE DEVICE | Site: LEFT | Status: FUNCTIONAL

## 2024-12-02 DEVICE — DEVICE CLOSURE 5F MYNX GRIP MUST ORDER MIN OF 10 EA: Type: IMPLANTABLE DEVICE | Site: LEFT | Status: FUNCTIONAL

## 2024-12-02 DEVICE — CATH ANGIO GLIDECATH ANGLE TAPER 5FR X 65CM: Type: IMPLANTABLE DEVICE | Site: LEFT | Status: FUNCTIONAL

## 2024-12-02 RX ORDER — OXYCODONE HYDROCHLORIDE 30 MG/1
5 TABLET ORAL ONCE
Refills: 0 | Status: DISCONTINUED | OUTPATIENT
Start: 2024-12-02 | End: 2024-12-02

## 2024-12-02 RX ORDER — ONDANSETRON HYDROCHLORIDE 4 MG/1
4 TABLET, FILM COATED ORAL ONCE
Refills: 0 | Status: DISCONTINUED | OUTPATIENT
Start: 2024-12-02 | End: 2024-12-02

## 2024-12-02 RX ORDER — CEFEPIME 2 G/1
INJECTION, POWDER, FOR SOLUTION INTRAVENOUS
Refills: 0 | Status: DISCONTINUED | OUTPATIENT
Start: 2024-12-02 | End: 2024-12-04

## 2024-12-02 RX ORDER — MAGNESIUM, ALUMINUM HYDROXIDE 200-225/5
30 SUSPENSION, ORAL (FINAL DOSE FORM) ORAL EVERY 4 HOURS
Refills: 0 | Status: DISCONTINUED | OUTPATIENT
Start: 2024-12-02 | End: 2024-12-04

## 2024-12-02 RX ORDER — CEFEPIME 2 G/1
2000 INJECTION, POWDER, FOR SOLUTION INTRAVENOUS EVERY 8 HOURS
Refills: 0 | Status: DISCONTINUED | OUTPATIENT
Start: 2024-12-02 | End: 2024-12-02

## 2024-12-02 RX ORDER — CLOPIDOGREL 75 MG/1
75 TABLET, FILM COATED ORAL DAILY
Refills: 0 | Status: DISCONTINUED | OUTPATIENT
Start: 2024-12-02 | End: 2024-12-04

## 2024-12-02 RX ORDER — LISINOPRIL 20 MG/1
40 TABLET ORAL DAILY
Refills: 0 | Status: DISCONTINUED | OUTPATIENT
Start: 2024-12-02 | End: 2024-12-04

## 2024-12-02 RX ORDER — INSULIN GLARGINE 100 [IU]/ML
10 INJECTION, SOLUTION SUBCUTANEOUS AT BEDTIME
Refills: 0 | Status: DISCONTINUED | OUTPATIENT
Start: 2024-12-02 | End: 2024-12-04

## 2024-12-02 RX ORDER — POVIDONE-IODINE 7.5 %
1 SPONGE TOPICAL DAILY
Refills: 0 | Status: DISCONTINUED | OUTPATIENT
Start: 2024-12-02 | End: 2024-12-04

## 2024-12-02 RX ORDER — HYDROMORPHONE HYDROCHLORIDE 2 MG/1
0.5 TABLET ORAL
Refills: 0 | Status: DISCONTINUED | OUTPATIENT
Start: 2024-12-02 | End: 2024-12-02

## 2024-12-02 RX ORDER — CEFEPIME 2 G/1
2000 INJECTION, POWDER, FOR SOLUTION INTRAVENOUS ONCE
Refills: 0 | Status: COMPLETED | OUTPATIENT
Start: 2024-12-02 | End: 2024-12-02

## 2024-12-02 RX ORDER — ACETAMINOPHEN 500MG 500 MG/1
650 TABLET, COATED ORAL EVERY 6 HOURS
Refills: 0 | Status: DISCONTINUED | OUTPATIENT
Start: 2024-12-02 | End: 2024-12-04

## 2024-12-02 RX ORDER — CEFEPIME 2 G/1
2000 INJECTION, POWDER, FOR SOLUTION INTRAVENOUS EVERY 8 HOURS
Refills: 0 | Status: DISCONTINUED | OUTPATIENT
Start: 2024-12-02 | End: 2024-12-04

## 2024-12-02 RX ORDER — GLUCAGON INJECTION, SOLUTION 0.5 MG/.1ML
1 INJECTION, SOLUTION SUBCUTANEOUS ONCE
Refills: 0 | Status: DISCONTINUED | OUTPATIENT
Start: 2024-12-02 | End: 2024-12-04

## 2024-12-02 RX ORDER — ACETAMINOPHEN, DIPHENHYDRAMINE HCL, PHENYLEPHRINE HCL 325; 25; 5 MG/1; MG/1; MG/1
3 TABLET ORAL AT BEDTIME
Refills: 0 | Status: DISCONTINUED | OUTPATIENT
Start: 2024-12-02 | End: 2024-12-04

## 2024-12-02 RX ORDER — METOPROLOL TARTRATE 100 MG/1
50 TABLET, FILM COATED ORAL DAILY
Refills: 0 | Status: DISCONTINUED | OUTPATIENT
Start: 2024-12-02 | End: 2024-12-04

## 2024-12-02 RX ORDER — SODIUM CHLORIDE 9 MG/ML
1000 INJECTION, SOLUTION INTRAMUSCULAR; INTRAVENOUS; SUBCUTANEOUS
Refills: 0 | Status: DISCONTINUED | OUTPATIENT
Start: 2024-12-02 | End: 2024-12-02

## 2024-12-02 RX ORDER — 0.9 % SODIUM CHLORIDE 0.9 %
1000 INTRAVENOUS SOLUTION INTRAVENOUS
Refills: 0 | Status: DISCONTINUED | OUTPATIENT
Start: 2024-12-02 | End: 2024-12-02

## 2024-12-02 RX ORDER — GABAPENTIN 300 MG/1
300 CAPSULE ORAL THREE TIMES A DAY
Refills: 0 | Status: DISCONTINUED | OUTPATIENT
Start: 2024-12-02 | End: 2024-12-04

## 2024-12-02 RX ORDER — 0.9 % SODIUM CHLORIDE 0.9 %
1000 INTRAVENOUS SOLUTION INTRAVENOUS
Refills: 0 | Status: DISCONTINUED | OUTPATIENT
Start: 2024-12-02 | End: 2024-12-04

## 2024-12-02 RX ADMIN — ACETAMINOPHEN, DIPHENHYDRAMINE HCL, PHENYLEPHRINE HCL 3 MILLIGRAM(S): 325; 25; 5 TABLET ORAL at 21:12

## 2024-12-02 RX ADMIN — Medication 40 MILLIGRAM(S): at 21:12

## 2024-12-02 RX ADMIN — Medication 2: at 22:02

## 2024-12-02 RX ADMIN — CEFEPIME 100 MILLIGRAM(S): 2 INJECTION, POWDER, FOR SOLUTION INTRAVENOUS at 16:34

## 2024-12-02 RX ADMIN — Medication 2: at 08:00

## 2024-12-02 RX ADMIN — Medication 325 MILLIGRAM(S): at 16:46

## 2024-12-02 RX ADMIN — GABAPENTIN 300 MILLIGRAM(S): 300 CAPSULE ORAL at 21:15

## 2024-12-02 RX ADMIN — INSULIN GLARGINE 10 UNIT(S): 100 INJECTION, SOLUTION SUBCUTANEOUS at 22:03

## 2024-12-02 RX ADMIN — CEFEPIME 100 MILLIGRAM(S): 2 INJECTION, POWDER, FOR SOLUTION INTRAVENOUS at 21:59

## 2024-12-02 RX ADMIN — GABAPENTIN 300 MILLIGRAM(S): 300 CAPSULE ORAL at 05:50

## 2024-12-02 RX ADMIN — CEFEPIME 100 MILLIGRAM(S): 2 INJECTION, POWDER, FOR SOLUTION INTRAVENOUS at 05:39

## 2024-12-02 RX ADMIN — Medication 75 MILLILITER(S): at 16:41

## 2024-12-02 RX ADMIN — CLOPIDOGREL 75 MILLIGRAM(S): 75 TABLET, FILM COATED ORAL at 16:30

## 2024-12-02 NOTE — CONSULT NOTE ADULT - SUBJECTIVE AND OBJECTIVE BOX
Patient is a 64y old  Male who presents with a chief complaint of L foot wound (02 Dec 2024 09:10)    Type: DX year known complications Endocrine Last seen Rx home Hx DKA/HHS, Glucometer checks, needs, weight, diet, exercise  diabetes education provided  Hx ASCVD, CKD, HF    HPI:  Pt is a 62 y/o M with PMHx DM2,PAD,  hx osteomyelitis s/p surgical amputation of R great toe 2023, CAD, PAD on plavix, HTN who presents to the hospital at instruction of wound care for a L foot wound. Pt reports he has been following with wound care with Dr. Stark and Dr. French, has been on Augmentin for approx 2 weeks. No systemic symptoms. Pt states he was told to come in this weekend for IV antibiotics and further evaluation of Left foot wound, . Pt reports feeling well and denies any fevers, chills, chest pain, shortness of breath, nausea, vomiting, diarrhea, constipation. Pt has been dressing the wound daily himself.  pt has had MRI left foot 1 week ago     ED Course:   Vitals: BP: 134/69, HR: 94, Temp: 134/69, RR: 18, SpO2: 99% on RA   Labs: WBC 11.65, lactate 2.1 -> 1, BUN 29, Glucose 215   CXR: No active chest disease  EKG: NSR @ 90bpm  Received in the ED: Zosyn, Vancomycin, 1L NS bolus   (30 Nov 2024 17:09)      PAST MEDICAL & SURGICAL HISTORY:  HTN (hypertension)      Diabetes      Hyperlipidemia      PVD (peripheral vascular disease)      Toe osteomyelitis, right      H/O angioplasty  RLE      Status post amputation of toe          Allergies    No Known Allergies    Intolerances        MEDICATIONS  (STANDING):  atorvastatin 40 milliGRAM(s) Oral at bedtime  cefepime   IVPB      cefepime   IVPB 2000 milliGRAM(s) IV Intermittent every 8 hours  clopidogrel Tablet 75 milliGRAM(s) Oral daily  dextrose 5%. 1000 milliLiter(s) (50 mL/Hr) IV Continuous <Continuous>  dextrose 5%. 1000 milliLiter(s) (100 mL/Hr) IV Continuous <Continuous>  dextrose 50% Injectable 25 Gram(s) IV Push once  dextrose 50% Injectable 12.5 Gram(s) IV Push once  dextrose 50% Injectable 25 Gram(s) IV Push once  gabapentin 300 milliGRAM(s) Oral three times a day  glucagon  Injectable 1 milliGRAM(s) IntraMuscular once  insulin lispro (ADMELOG) corrective regimen sliding scale   SubCutaneous three times a day before meals  insulin lispro (ADMELOG) corrective regimen sliding scale   SubCutaneous at bedtime  lisinopril 40 milliGRAM(s) Oral daily  metoprolol succinate ER 50 milliGRAM(s) Oral daily  povidone iodine 10% Solution 1 Application(s) Topical daily  sodium chloride 0.9%. 1000 milliLiter(s) (50 mL/Hr) IV Continuous <Continuous>       Patient is a 64y old  Male who presents with a chief complaint of L foot wound (02 Dec 2024 09:10)    Type:2 DX >5 years. known complications: DFU. no Endocrine/ PCP Dr Frederick manages diabetes. Patient states recently started Trulicity x1 month. last a1c 9-10% now 8.3%. Patient would like to find endo in his plan. Rx Trulicity 1.5 mg weekly, Metformin 1000 mg/ x2 day; jardiance 25 mg/day- uses meter to test BG- would consider using CGM if approved by insurance. diabetes education provided.      HPI:  Pt is a 62 y/o M with PMHx DM2,PAD,  hx osteomyelitis s/p surgical amputation of R great toe 2023, CAD, PAD on plavix, HTN who presents to the hospital at instruction of wound care for a L foot wound. Pt reports he has been following with wound care with Dr. Stark and Dr. French, has been on Augmentin for approx 2 weeks. No systemic symptoms. Pt states he was told to come in this weekend for IV antibiotics and further evaluation of Left foot wound, . Pt reports feeling well and denies any fevers, chills, chest pain, shortness of breath, nausea, vomiting, diarrhea, constipation. Pt has been dressing the wound daily himself.  pt has had MRI left foot 1 week ago     ED Course:   Vitals: BP: 134/69, HR: 94, Temp: 134/69, RR: 18, SpO2: 99% on RA   Labs: WBC 11.65, lactate 2.1 -> 1, BUN 29, Glucose 215   CXR: No active chest disease  EKG: NSR @ 90bpm  Received in the ED: Zosyn, Vancomycin, 1L NS bolus   (30 Nov 2024 17:09)      PAST MEDICAL & SURGICAL HISTORY:  HTN (hypertension)      Diabetes      Hyperlipidemia      PVD (peripheral vascular disease)      Toe osteomyelitis, right      H/O angioplasty  RLE      Status post amputation of toe          Allergies    No Known Allergies    Intolerances        MEDICATIONS  (STANDING):  atorvastatin 40 milliGRAM(s) Oral at bedtime  cefepime   IVPB      cefepime   IVPB 2000 milliGRAM(s) IV Intermittent every 8 hours  clopidogrel Tablet 75 milliGRAM(s) Oral daily  dextrose 5%. 1000 milliLiter(s) (50 mL/Hr) IV Continuous <Continuous>  dextrose 5%. 1000 milliLiter(s) (100 mL/Hr) IV Continuous <Continuous>  dextrose 50% Injectable 25 Gram(s) IV Push once  dextrose 50% Injectable 12.5 Gram(s) IV Push once  dextrose 50% Injectable 25 Gram(s) IV Push once  gabapentin 300 milliGRAM(s) Oral three times a day  glucagon  Injectable 1 milliGRAM(s) IntraMuscular once  insulin lispro (ADMELOG) corrective regimen sliding scale   SubCutaneous three times a day before meals  insulin lispro (ADMELOG) corrective regimen sliding scale   SubCutaneous at bedtime  lisinopril 40 milliGRAM(s) Oral daily  metoprolol succinate ER 50 milliGRAM(s) Oral daily  povidone iodine 10% Solution 1 Application(s) Topical daily  sodium chloride 0.9%. 1000 milliLiter(s) (50 mL/Hr) IV Continuous <Continuous>

## 2024-12-02 NOTE — PROGRESS NOTE ADULT - PROBLEM SELECTOR PLAN 1
Pt presenting with L lateral  foot  wound, s/p outpatient course of augmenting x14 days- OM on out pt MRI 11/23   -Diabetic foot wound   - WBC mildly elevated 11.65, out pt wound c/s + Serratia   - s/p vanco and zosyn in ED  - culture results from outpatient wound care 11/25 with Serratia  susceptibility to cefepime  - Tylenol PRN for mild pain  - ID Dr. Calhoun, will appreciate recs - Cefepime 2 gm IVPB q 8 hrs  - Podiatry following Dr. French/Dr. Stark  - Vascular consulted- in house- plan for LL Ext Angio 12/2    *EKG reviewed; NSR. Patient is medically optimized for planned surgical procedure with vascular surgery with routine hemodynamic monitoring. Pt presenting with L lateral  foot  wound, s/p outpatient course of augmenting x14 days- OM on out pt MRI 11/23   -Diabetic foot wound   - WBC mildly elevated 11.65, out pt wound c/s + Serratia   - s/p vanco and zosyn in ED  - culture results from outpatient wound care 11/25 with Serratia  susceptibility to cefepime  - Tylenol PRN for mild pain  - ID Dr. Calhoun, will appreciate recs - Cefepime 2 gm IVPB q 8 hrs  - Podiatry following Dr. French/Dr. Stark d/w ? Plan   - Vascular consulted- in house- plan for LL Ext Angio 12/2 OR today     *EKG reviewed; NSR. Patient is medically optimized for planned surgical procedure with vascular surgery with routine hemodynamic monitoring.

## 2024-12-02 NOTE — CASE MANAGEMENT PROGRESS NOTE - NSCMPROGRESSNOTE_GEN_ALL_CORE
Discussed pt on rounds, pt remains acute, awaiting further testing, for angio today, on IV Cefepime,. CM will continue to collaborate with interdisciplinary team and remain available to assist.

## 2024-12-02 NOTE — PROGRESS NOTE ADULT - SUBJECTIVE AND OBJECTIVE BOX
Post Operative Note    Procedure: S/P LLE angiogram with L PT/AT angioplasty    Subjective:   Patient seen and examined at bedside.  No events post-operatively.  Patient with no new complaints at this time.  Patient denies any fevers, chills, chest pain, shortness of breath,  nausea, vomiting or diarrhea.    Medications: [Standing]  acetaminophen     Tablet .. 650 milliGRAM(s) Oral every 6 hours PRN  aluminum hydroxide/magnesium hydroxide/simethicone Suspension 30 milliLiter(s) Oral every 4 hours PRN  atorvastatin 40 milliGRAM(s) Oral at bedtime  cefepime   IVPB      cefepime   IVPB 2000 milliGRAM(s) IV Intermittent every 8 hours  clopidogrel Tablet 75 milliGRAM(s) Oral daily  dextrose 5%. 1000 milliLiter(s) IV Continuous <Continuous>  dextrose 50% Injectable 25 Gram(s) IV Push once  dextrose Oral Gel 15 Gram(s) Oral once PRN  gabapentin 300 milliGRAM(s) Oral three times a day  glucagon  Injectable 1 milliGRAM(s) IntraMuscular once  insulin glargine Injectable (LANTUS) 10 Unit(s) SubCutaneous at bedtime  insulin lispro (ADMELOG) corrective regimen sliding scale   SubCutaneous every 6 hours  lisinopril 40 milliGRAM(s) Oral daily  melatonin 3 milliGRAM(s) Oral at bedtime PRN  metoprolol succinate ER 50 milliGRAM(s) Oral daily  povidone iodine 10% Solution 1 Application(s) Topical daily    Medications: [PRN]  acetaminophen     Tablet .. 650 milliGRAM(s) Oral every 6 hours PRN  aluminum hydroxide/magnesium hydroxide/simethicone Suspension 30 milliLiter(s) Oral every 4 hours PRN  atorvastatin 40 milliGRAM(s) Oral at bedtime  cefepime   IVPB      cefepime   IVPB 2000 milliGRAM(s) IV Intermittent every 8 hours  clopidogrel Tablet 75 milliGRAM(s) Oral daily  dextrose 5%. 1000 milliLiter(s) IV Continuous <Continuous>  dextrose 50% Injectable 25 Gram(s) IV Push once  dextrose Oral Gel 15 Gram(s) Oral once PRN  gabapentin 300 milliGRAM(s) Oral three times a day  glucagon  Injectable 1 milliGRAM(s) IntraMuscular once  insulin glargine Injectable (LANTUS) 10 Unit(s) SubCutaneous at bedtime  insulin lispro (ADMELOG) corrective regimen sliding scale   SubCutaneous every 6 hours  lisinopril 40 milliGRAM(s) Oral daily  melatonin 3 milliGRAM(s) Oral at bedtime PRN  metoprolol succinate ER 50 milliGRAM(s) Oral daily  povidone iodine 10% Solution 1 Application(s) Topical daily      Objective:  Vitals: T(F): 98.3 (12-02-24 @ 20:09), Max: 98.4 (12-02-24 @ 16:15)  HR: 87 (12-02-24 @ 20:09)  BP: 151/76 (12-02-24 @ 20:09) (109/65 - 168/72)  RR: 18 (12-02-24 @ 20:09)  SpO2: 97% (12-02-24 @ 20:09)  Vent Settings:     In:   12-02-24 @ 07:01  -  12-02-24 @ 22:59  --------------------------------------------------------  IN: 465 mL      IV Fluids: dextrose 5%. 1000 milliLiter(s) (50 mL/Hr) IV Continuous <Continuous>      PHYSICAL EXAM:  GENERAL: No acute distress, lying comfortably in bed  HEAD:  Atraumatic, Normocephalic  CHEST: Non labored respirations, no accessory muscle use  CV: regular rate and rhythm   R GROIN: Puncture site c/d/i. No signs of swelling, surrounding tissue soft, no signs of erythema or fluid collection.  EXT: L AT & PT with doppler-able signals, Capillary refill >2sec b/l  NEURO: A&Ox3    Labs:                        13.2   9.23  )-----------( 245      ( 02 Dec 2024 06:42 )             40.1     12-02    139  |  107  |  25[H]  ----------------------------<  192[H]  5.3   |  32[H]  |  0.93    Ca    9.2      02 Dec 2024 06:42      PT/INR - ( 02 Dec 2024 06:42 )   PT: 10.6 sec;   INR: 0.90 ratio         PTT - ( 02 Dec 2024 06:42 )  PTT:28.9 sec      Imaging:  No post-op imaging studies    Assessment:  64yMale patient S/P LLE angiogram with L AT/PT angioplasty.    Plan:  - pain control  - OOB as tolerated  - REG diet  - labs in am  - Care per primary team    Surgical Team Spectralink: [id number]

## 2024-12-02 NOTE — CHART NOTE - NSCHARTNOTEFT_GEN_A_CORE
PRE-OP NOTE:    Pre-op diagnosis:   Planned procedure:    Surgeon:     HPI:  Pt is a 62 y/o M with PMHx DM2,PAD,  hx osteomyelitis s/p surgical amputation of R great toe 2023, CAD, PAD on plavix, HTN who presents to the hospital at instruction of wound care for a L foot wound. Pt reports he has been following with wound care with Dr. Stark and Dr. French, has been on Augmentin for approx 2 weeks. No systemic symptoms. Pt states he was told to come in this weekend for IV antibiotics and further evaluation of Left foot wound, . Pt reports feeling well and denies any fevers, chills, chest pain, shortness of breath, nausea, vomiting, diarrhea, constipation. Pt has been dressing the wound daily himself.  pt has had MRI left foot 1 week ago     ED Course:   Vitals: BP: 134/69, HR: 94, Temp: 134/69, RR: 18, SpO2: 99% on RA   Labs: WBC 11.65, lactate 2.1 -> 1, BUN 29, Glucose 215   CXR: No active chest disease  EKG: NSR @ 90bpm  Received in the ED: Zosyn, Vancomycin, 1L NS bolus   (30 Nov 2024 17:09)      Labs:                         14.4   8.93  )-----------( 272      ( 01 Dec 2024 07:31 )             43.1     12-01    140  |  108  |  22  ----------------------------<  164[H]  5.0   |  30  |  0.76    Ca    9.4      01 Dec 2024 07:31    TPro  7.4  /  Alb  3.9  /  TBili  0.2  /  DBili  x   /  AST  16  /  ALT  35  /  AlkPhos  81  11-30    LIVER FUNCTIONS - ( 30 Nov 2024 12:30 )  Alb: 3.9 g/dL / Pro: 7.4 g/dL / ALK PHOS: 81 U/L / ALT: 35 U/L / AST: 16 U/L / GGT: x           PT/INR - ( 30 Nov 2024 12:30 )   PT: 10.6 sec;   INR: 0.90 ratio         PTT - ( 30 Nov 2024 12:30 )  PTT:30.3 sec    Urinalysis:   Urinalysis Basic - ( 01 Dec 2024 07:31 )    Color: x / Appearance: x / SG: x / pH: x  Gluc: 164 mg/dL / Ketone: x  / Bili: x / Urobili: x   Blood: x / Protein: x / Nitrite: x   Leuk Esterase: x / RBC: x / WBC x   Sq Epi: x / Non Sq Epi: x / Bacteria: x        T&S x 2:     EKG:     CXR:     Urine BhCG:    A/P: 64yMale to OR   -NPO for OR, IVF  -Operative consent   -2U PRBCs on hold if necessary   -Pain/nausea control  -SQH, SCDs, OOB/A PRE-OP NOTE:    Pre-op diagnosis: microvascular disease in L foot  Planned procedure: E angio  Surgeon: Dr. Mora    HPI:  Pt is a 62 y/o M with PMHx DM2,PAD,  hx osteomyelitis s/p surgical amputation of R great toe 2023, CAD, PAD on plavix, HTN who presents to the hospital at instruction of wound care for a L foot wound. Pt reports he has been following with wound care with Dr. Stark and Dr. French, has been on Augmentin for approx 2 weeks. No systemic symptoms. Pt states he was told to come in this weekend for IV antibiotics and further evaluation of Left foot wound, . Pt reports feeling well and denies any fevers, chills, chest pain, shortness of breath, nausea, vomiting, diarrhea, constipation. Pt has been dressing the wound daily himself.  pt has had MRI left foot 1 week ago     ED Course:   Vitals: BP: 134/69, HR: 94, Temp: 134/69, RR: 18, SpO2: 99% on RA   Labs: WBC 11.65, lactate 2.1 -> 1, BUN 29, Glucose 215   CXR: No active chest disease  EKG: NSR @ 90bpm  Received in the ED: Zosyn, Vancomycin, 1L NS bolus   (30 Nov 2024 17:09)      Labs:                         14.4   8.93  )-----------( 272      ( 01 Dec 2024 07:31 )             43.1     12-01    140  |  108  |  22  ----------------------------<  164[H]  5.0   |  30  |  0.76    Ca    9.4      01 Dec 2024 07:31    TPro  7.4  /  Alb  3.9  /  TBili  0.2  /  DBili  x   /  AST  16  /  ALT  35  /  AlkPhos  81  11-30    LIVER FUNCTIONS - ( 30 Nov 2024 12:30 )  Alb: 3.9 g/dL / Pro: 7.4 g/dL / ALK PHOS: 81 U/L / ALT: 35 U/L / AST: 16 U/L / GGT: x           PT/INR - ( 30 Nov 2024 12:30 )   PT: 10.6 sec;   INR: 0.90 ratio         PTT - ( 30 Nov 2024 12:30 )  PTT:30.3 sec    Urinalysis:   Urinalysis Basic - ( 01 Dec 2024 07:31 )    Color: x / Appearance: x / SG: x / pH: x  Gluc: 164 mg/dL / Ketone: x  / Bili: x / Urobili: x   Blood: x / Protein: x / Nitrite: x   Leuk Esterase: x / RBC: x / WBC x   Sq Epi: x / Non Sq Epi: x / Bacteria: x        T&S x 2: ordered for this AM    -NPO for OR, IVF  -Operative consent to be attained by surgeon  -Pain/nausea control  -SQH, SCDs, OOB/A

## 2024-12-02 NOTE — CONSULT NOTE ADULT - PROBLEM SELECTOR RECOMMENDATION 9
Type 2 A1c 8.3% adm  Recommend endocrine-[facility] on consult  FU appt: TBA  DSC recommendations: return to home regimen and glucose monitoring  diabetes education provided  Diabetes support info and cell # [phone] given   Goal 100-180 mg/dL; 140-180 mg/dL in critical care areas Type 2 A1c 8.3% adm DFU  Recommend endocrine-[facility] on consult  FU appt: TBA  DSC recommendations: return to home trulicity, metformin, and jardiance regimen and glucose monitoring  diabetes education provided  Goal 100-180 mg/dL; 140-180 mg/dL in critical care areas Type 2 A1c 8.3% adm DFU  Recommend endocrine-[facility]lman on consult  lantus 10 units HS and moderate scale  FU appt: TBA  DSC recommendations: return to home trulicity, metformin, and jardiance regimen and glucose monitoring  diabetes education provided  Goal 100-180 mg/dL; 140-180 mg/dL in critical care areas

## 2024-12-02 NOTE — PROGRESS NOTE ADULT - SUBJECTIVE AND OBJECTIVE BOX
Patient is a 64y old  Male who presents with a chief complaint of L lateral  foot wound, OM (01 Dec 2024 16:50)    HPI:  Pt is a 64 y/o M with PMHx DM2,PAD,  hx osteomyelitis s/p surgical amputation of R great toe 2023, CAD, PAD on plavix, HTN who presents to the hospital at instruction of wound care for a L foot wound. Pt reports he has been following with wound care with Dr. Stark and Dr. French, has been on Augmentin for approx 2 weeks. No systemic symptoms. Pt states he was told to come in this weekend for IV antibiotics and further evaluation of Left foot wound, . Pt reports feeling well and denies any fevers, chills, chest pain, shortness of breath, nausea, vomiting, diarrhea, constipation. Pt has been dressing the wound daily himself.  pt has had MRI left foot 1 week ago     ED Course:   Vitals: BP: 134/69, HR: 94, Temp: 134/69, RR: 18, SpO2: 99% on RA   Labs: WBC 11.65, lactate 2.1 -> 1, BUN 29, Glucose 215   CXR: No active chest disease  EKG: NSR @ 90bpm  Received in the ED: Zosyn, Vancomycin, 1L NS bolus   (30 Nov 2024 17:09)    INTERVAL HPI: Examined at bedside, has no complaints at this time. Plan for angio this morning/afternoon with vascular.       OVERNIGHT EVENTS: NONE     Home Medications:  atorvastatin 40 mg oral tablet: 1 tab(s) orally once a day (30 Nov 2024 17:29)  clopidogrel 75 mg oral tablet: 1 tab(s) orally once a day (30 Nov 2024 17:29)  gabapentin 300 mg oral capsule: 1 cap(s) orally 2 times a day (30 Nov 2024 17:28)  Jardiance 25 mg oral tablet: 1 tab(s) orally once a day (30 Nov 2024 17:29)  lisinopril 40 mg oral tablet: 1 tab(s) orally once a day (30 Nov 2024 17:29)  metFORMIN 1000 mg oral tablet: 1 tab(s) orally 2 times a day (30 Nov 2024 17:29)  metoprolol succinate 50 mg oral tablet, extended release: 1 tab(s) orally once a day (30 Nov 2024 17:29)  Trulicity Pen 1.5 mg/0.5 mL subcutaneous solution: 1.5 milligram(s) subcutaneously once a week (30 Nov 2024 17:29)      MEDICATIONS  (STANDING):  atorvastatin 40 milliGRAM(s) Oral at bedtime  cefepime   IVPB      cefepime   IVPB 2000 milliGRAM(s) IV Intermittent every 8 hours  clopidogrel Tablet 75 milliGRAM(s) Oral daily  dextrose 5%. 1000 milliLiter(s) (50 mL/Hr) IV Continuous <Continuous>  dextrose 5%. 1000 milliLiter(s) (100 mL/Hr) IV Continuous <Continuous>  dextrose 50% Injectable 25 Gram(s) IV Push once  dextrose 50% Injectable 12.5 Gram(s) IV Push once  dextrose 50% Injectable 25 Gram(s) IV Push once  gabapentin 300 milliGRAM(s) Oral three times a day  glucagon  Injectable 1 milliGRAM(s) IntraMuscular once  insulin lispro (ADMELOG) corrective regimen sliding scale   SubCutaneous three times a day before meals  insulin lispro (ADMELOG) corrective regimen sliding scale   SubCutaneous at bedtime  lisinopril 40 milliGRAM(s) Oral daily  metoprolol succinate ER 50 milliGRAM(s) Oral daily  povidone iodine 10% Solution 1 Application(s) Topical daily  sodium chloride 0.9%. 1000 milliLiter(s) (50 mL/Hr) IV Continuous <Continuous>    MEDICATIONS  (PRN):  acetaminophen     Tablet .. 650 milliGRAM(s) Oral every 6 hours PRN Mild Pain (1 - 3)  aluminum hydroxide/magnesium hydroxide/simethicone Suspension 30 milliLiter(s) Oral every 4 hours PRN Dyspepsia  dextrose Oral Gel 15 Gram(s) Oral once PRN Blood Glucose LESS THAN 70 milliGRAM(s)/deciliter  melatonin 3 milliGRAM(s) Oral at bedtime PRN Insomnia      Allergies    No Known Allergies    Intolerances        Social History:  Lives: with wife and son who is in college  ADLs: independent  Diet:  Alcohol Use: rare occasional  Tobacco Use: denies  Recreational Drug Use: denies (30 Nov 2024 17:09)      REVIEW OF SYSTEMS:  CONSTITUTIONAL: No fever, No chills, No fatigue, No myalgia, No Body ache, No Weakness  EYES: No eye pain,  No visual disturbances, No discharge, NO Redness  ENMT:  No ear pain, No nose bleed, No vertigo; No sinus pain, NO throat pain, No Congestion  NECK: No pain, No stiffness  RESPIRATORY: No cough, NO wheezing, No  hemoptysis, NO  shortness of breath  CARDIOVASCULAR: No chest pain, palpitations  GASTROINTESTINAL: No abdominal pain, NO epigastric pain. No nausea, No vomiting; No diarrhea, No constipation. [  ] BM  GENITOURINARY: No dysuria, No frequency, No urgency, No hematuria, NO incontinence  NEUROLOGICAL: No headaches, No dizziness, No numbness, No tingling, No tremors, No weakness  EXT: No Swelling, No Pain, No Edema  SKIN:  [ X ] No itching, burning, rashes, or lesions   MUSCULOSKELETAL: No joint pain ,No Jt swelling; No muscle pain, No back pain, No extremity pain  PSYCHIATRIC: No depression,  No anxiety,  No mood swings ,No difficulty sleeping at night  PAIN SCALE: [ X ] None  [  ] Other-  ROS Unable to obtain due to - [  ] Dementia  [  ] Lethargy [  ] Drowsy [  ] Sedated [  ] non verbal  REST OF REVIEW Of SYSTEM - [ X ] Normal     Vital Signs Last 24 Hrs  T(C): 36.2 (02 Dec 2024 04:30), Max: 36.8 (01 Dec 2024 20:28)  T(F): 97.2 (02 Dec 2024 04:30), Max: 98.3 (01 Dec 2024 20:28)  HR: 65 (02 Dec 2024 04:30) (65 - 86)  BP: 109/65 (02 Dec 2024 04:30) (109/65 - 135/75)  BP(mean): --  RR: 18 (02 Dec 2024 04:30) (18 - 20)  SpO2: 99% (02 Dec 2024 04:30) (96% - 99%)    Parameters below as of 02 Dec 2024 04:30  Patient On (Oxygen Delivery Method): room air      Finger Stick          PHYSICAL EXAM:  GENERAL:  [X  ] NAD , [ X ] well appearing, [  ] Agitated, [  ] Drowsy,  [  ] Lethargy, [  ] confused   HEAD:  [X  ] Normal, [  ] Other  EYES:  [ X ] EOMI, [ X ] PERRLA, [  ] conjunctiva and sclera clear normal, [  ] Other,  [  ] Pallor,[  ] Discharge  ENMT:  [X  ] Normal, [ X ] Moist mucous membranes, [X  ] Good dentition, [X  ] No Thrush  NECK:  [X  ] Supple, [  ] No JVD, [X  ] Normal thyroid, [  ] Lymphadenopathy [  ] Other  CHEST/LUNG:  [X  ] Clear to auscultation bilaterally, [X  ] Breath Sounds equal B/L / Decrease, [  ] poor effort  [X  ] No rales, [ X] No rhonchi  [X ]  No wheezing,   HEART:  [ X  ] Regular rate and rhythm, [  ] tachycardia, [  ] Bradycardia,  [  ] irregular  [ X ] No murmurs, No rubs, No gallops, [  ] PPM in place (Mfr:  )  ABDOMEN:  [ X ] Soft, [ X ] Nontender, [ X ] Nondistended, [  ] No mass, [  ] Bowel sounds present, [  ] obese, [ X] Umbilical hernia   NERVOUS SYSTEM:  [ X ] Alert & Oriented X3, [X  ] Nonfocal  [  ] Confusion  [  ] Encephalopathic [  ] Sedated [  ] Unable to assess, [  ] Dementia [  ] Other-  EXTREMITIES: [ X ] 2+ Peripheral Pulses, No clubbing, No cyanosis,  [  ] edema B/L lower EXT. [  ] PVD stasis skin changes B/L Lower EXT, [  ] wound  LYMPH: No lymphadenopathy noted  SKIN:  [  ] No rashes or lesions, [  ] Pressure Ulcers, [  ] ecchymosis, [  ] Skin Tears, [ x ] Other-Left foot lateral border wound + Dressing. No necrotic tissue, No odor , No Drainage . Bg toe dry wound    DIET: Diet, NPO after Midnight:      NPO Start Date: 01-Dec-2024,   NPO Start Time: 23:59  Except Medications (12-01-24 @ 16:04)  Diet, Regular:   Consistent Carbohydrate Evening Snack  DASH/TLC Sodium & Cholesterol Restricted (11-30-24 @ 17:34)      LABS:                        13.2   9.23  )-----------( 245      ( 02 Dec 2024 06:42 )             40.1     02 Dec 2024 06:42    139    |  107    |  25     ----------------------------<  192    5.3     |  32     |  0.93     Ca    9.2        02 Dec 2024 06:42      PT/INR - ( 02 Dec 2024 06:42 )   PT: 10.6 sec;   INR: 0.90 ratio         PTT - ( 02 Dec 2024 06:42 )  PTT:28.9 sec  Urinalysis Basic - ( 02 Dec 2024 06:42 )    Color: x / Appearance: x / SG: x / pH: x  Gluc: 192 mg/dL / Ketone: x  / Bili: x / Urobili: x   Blood: x / Protein: x / Nitrite: x   Leuk Esterase: x / RBC: x / WBC x   Sq Epi: x / Non Sq Epi: x / Bacteria: x        Culture Results:   No growth at 24 hours (11-30 @ 12:35)  Culture Results:   No growth at 24 hours (11-30 @ 12:30)  Culture Results:   Numerous Serratia marcescens (11-25 @ 16:50)      culture blood  -- .Blood BLOOD 11-30 @ 12:35    culture urine  --  11-30 @ 12:35  culture blood  -- .Blood BLOOD 11-30 @ 12:30    culture urine  --  11-30 @ 12:30              Culture - Blood (collected 30 Nov 2024 12:35)  Source: .Blood BLOOD  Preliminary Report (01 Dec 2024 18:01):    No growth at 24 hours    Culture - Blood (collected 30 Nov 2024 12:30)  Source: .Blood BLOOD  Preliminary Report (01 Dec 2024 18:01):    No growth at 24 hours    Culture - Wound Aerobic (collected 25 Nov 2024 16:50)  Source: Skin/Wound  Final Report (27 Nov 2024 16:29):    Numerous Serratia marcescens  Organism: Serratia marcescens (27 Nov 2024 16:29)  Organism: Serratia marcescens (27 Nov 2024 16:29)         Anemia Panel:      Thyroid Panel:                RADIOLOGY & ADDITIONAL TESTS: < from: MR Foot No Cont, Left (12.01.24 @ 12:46) >  ACC: [id number] EXAM:  MR FOOT LT   ORDERED BY: BOSSMAN PITTMAN     PROCEDURE DATE:  12/01/2024          INTERPRETATION:  MR FOOT LEFT dated 12/1/2024 12:46 PM    INDICATION: Left foot wound    COMPARISON: Left foot MRI dated 11/23/2024    TECHNIQUE: Multi-sequential, multiplanar MRI imaging of the left midfoot   and forefoot was performed per standard protocol.    FINDINGS:    BONE MARROW: Subchondral edema and small subchondral cysts at the   navicula/middle cuneiform. Mild cartilage loss with subchondral cysts at   the second and third tarsometatarsal joints. Patchy marrow edema within   the first distal phalanx. Moderate patchy edema within the fifth middle   and distal phalanx. Persistent patchy edema at the base of the fifth   metatarsal.Overlying skin wound in this region. Mild patchy marrow edema   again noted within the second and third middle phalanges.  SYNOVIUM/ JOINT FLUID: No joint effusion  TENDONS: Intact tendons. No tenosynovitis.  MUSCLES: Mild atrophy and moderate edema throughout the musculature.   Nonspecific but may reflect neuropathic change.  NEUROVASCULAR STRUCTURES: Preserved  SUBCUTANEOUS SOFT TISSUES: There is soft tissue swelling about the foot.   Suspect a skin wound at the distal first digit. No drainablecollection.   Possible skin wound along the fifth distal digit. Overlying skin wound   adjacent to the fifth metatarsal base.    IMPRESSION:    1.  Skin wound at the great toe with deep soft tissue swelling. No   drainable collection.  2.  Abnormal marrow signal within the first distal phalangeal head.   Correlate for an overlying skin wound which would increase the   probability of osteomyelitis in this region.  3.  Abnormal marrow signal within the fifth middle and distal phalanx as   on prior study raising concern for osteomyelitis.  4.  Patchy marrow edema at the base of the fifth metatarsal. Given the   overlying skin wound, findings concerning for osteomyelitis as on prior   exam.  5.  Patchy marrow edema again noted in the second and third middle   phalanges. Correlate for overlying skin wound in these regions which   would increase the probability of osteomyelitis.  6.  Degenerative changes at the midfoot.    --- End of Report ---            KRISTAL MATIAS MD; Attending Radiologist  This document has been electronically signed. Dec  1 2024  1:23PM        HEALTH ISSUES - PROBLEM Dx:  Wound of left foot    DM (diabetes mellitus)    PAD (peripheral artery disease)    HTN (hypertension)    Need for prophylactic measure            Consultant(s) Notes Reviewed:  [ X ] YES     Care Discussed with [X] Consultants  [  ] Patient  [ X ] Family [  ] HCP [  ]   [  ] Social Service  [X  ] RN, [  ] Physical Therapy,[  ] Palliative care team  DVT PPX: [  ] Lovenox, [  ] S C Heparin, [  ] Coumadin, [  ] Xarelto, [  ] Eliquis, [  ] Pradaxa, [  ] IV Heparin drip, [  ] SCD [  ] Contraindication 2 to GI Bleed,[  ] Ambulation [  ] Contraindicated 2 to  bleed [  ] Contraindicated 2 to Brain Bleed  Advanced directive: [  ] None, [  ] DNR/DNI Patient is a 64y old  Male who presents with a chief complaint of L lateral  foot wound, OM (01 Dec 2024 16:50)    HPI:  Pt is a 64 y/o M with PMHx DM2,PAD,  hx osteomyelitis s/p surgical amputation of R great toe 2023, CAD, PAD on plavix, HTN who presents to the hospital at instruction of wound care for a L foot wound. Pt reports he has been following with wound care with Dr. Stark and Dr. French, has been on Augmentin for approx 2 weeks. No systemic symptoms. Pt states he was told to come in this weekend for IV antibiotics and further evaluation of Left foot wound, . Pt reports feeling well and denies any fevers, chills, chest pain, shortness of breath, nausea, vomiting, diarrhea, constipation. Pt has been dressing the wound daily himself.  pt has had MRI left foot 1 week ago     ED Course:   Vitals: BP: 134/69, HR: 94, Temp: 134/69, RR: 18, SpO2: 99% on RA   Labs: WBC 11.65, lactate 2.1 -> 1, BUN 29, Glucose 215   CXR: No active chest disease  EKG: NSR @ 90bpm  Received in the ED: Zosyn, Vancomycin, 1L NS bolus   (30 Nov 2024 17:09)    INTERVAL HPI:  12/1: Pt seen, Examined, No Complaints , feels OK, IV Abx ,Nl WBC  12/2: Examined at bedside, has no complaints at this time. Plan for angio this morning/afternoon with vascular.       OVERNIGHT EVENTS: NONE     Home Medications:  atorvastatin 40 mg oral tablet: 1 tab(s) orally once a day (30 Nov 2024 17:29)  clopidogrel 75 mg oral tablet: 1 tab(s) orally once a day (30 Nov 2024 17:29)  gabapentin 300 mg oral capsule: 1 cap(s) orally 2 times a day (30 Nov 2024 17:28)  Jardiance 25 mg oral tablet: 1 tab(s) orally once a day (30 Nov 2024 17:29)  lisinopril 40 mg oral tablet: 1 tab(s) orally once a day (30 Nov 2024 17:29)  metFORMIN 1000 mg oral tablet: 1 tab(s) orally 2 times a day (30 Nov 2024 17:29)  metoprolol succinate 50 mg oral tablet, extended release: 1 tab(s) orally once a day (30 Nov 2024 17:29)  Trulicity Pen 1.5 mg/0.5 mL subcutaneous solution: 1.5 milligram(s) subcutaneously once a week (30 Nov 2024 17:29)      MEDICATIONS  (STANDING):  atorvastatin 40 milliGRAM(s) Oral at bedtime  cefepime   IVPB      cefepime   IVPB 2000 milliGRAM(s) IV Intermittent every 8 hours  clopidogrel Tablet 75 milliGRAM(s) Oral daily  dextrose 5%. 1000 milliLiter(s) (50 mL/Hr) IV Continuous <Continuous>  dextrose 5%. 1000 milliLiter(s) (100 mL/Hr) IV Continuous <Continuous>  dextrose 50% Injectable 25 Gram(s) IV Push once  dextrose 50% Injectable 12.5 Gram(s) IV Push once  dextrose 50% Injectable 25 Gram(s) IV Push once  gabapentin 300 milliGRAM(s) Oral three times a day  glucagon  Injectable 1 milliGRAM(s) IntraMuscular once  insulin lispro (ADMELOG) corrective regimen sliding scale   SubCutaneous three times a day before meals  insulin lispro (ADMELOG) corrective regimen sliding scale   SubCutaneous at bedtime  lisinopril 40 milliGRAM(s) Oral daily  metoprolol succinate ER 50 milliGRAM(s) Oral daily  povidone iodine 10% Solution 1 Application(s) Topical daily  sodium chloride 0.9%. 1000 milliLiter(s) (50 mL/Hr) IV Continuous <Continuous>    MEDICATIONS  (PRN):  acetaminophen     Tablet .. 650 milliGRAM(s) Oral every 6 hours PRN Mild Pain (1 - 3)  aluminum hydroxide/magnesium hydroxide/simethicone Suspension 30 milliLiter(s) Oral every 4 hours PRN Dyspepsia  dextrose Oral Gel 15 Gram(s) Oral once PRN Blood Glucose LESS THAN 70 milliGRAM(s)/deciliter  melatonin 3 milliGRAM(s) Oral at bedtime PRN Insomnia      Allergies    No Known Allergies    Intolerances        Social History:  Lives: with wife and son who is in college  ADLs: independent  Diet:  Alcohol Use: rare occasional  Tobacco Use: denies  Recreational Drug Use: denies (30 Nov 2024 17:09)      REVIEW OF SYSTEMS:  CONSTITUTIONAL: No fever, No chills, No fatigue, No myalgia, No Body ache, No Weakness  EYES: No eye pain,  No visual disturbances, No discharge, NO Redness  ENMT:  No ear pain, No nose bleed, No vertigo; No sinus pain, NO throat pain, No Congestion  NECK: No pain, No stiffness  RESPIRATORY: No cough, NO wheezing, No  hemoptysis, NO  shortness of breath  CARDIOVASCULAR: No chest pain, palpitations  GASTROINTESTINAL: No abdominal pain, NO epigastric pain. No nausea, No vomiting; No diarrhea, No constipation. [  ] BM  GENITOURINARY: No dysuria, No frequency, No urgency, No hematuria, NO incontinence  NEUROLOGICAL: No headaches, No dizziness, No numbness, No tingling, No tremors, No weakness  EXT: No Swelling, No Pain, No Edema  SKIN:  [ X ] No itching, burning, rashes, or lesions   MUSCULOSKELETAL: No joint pain ,No Jt swelling; No muscle pain, No back pain, No extremity pain  PSYCHIATRIC: No depression,  No anxiety,  No mood swings ,No difficulty sleeping at night  PAIN SCALE: [ X ] None  [  ] Other-  ROS Unable to obtain due to - [  ] Dementia  [  ] Lethargy [  ] Drowsy [  ] Sedated [  ] non verbal  REST OF REVIEW Of SYSTEM - [ X ] Normal     Vital Signs Last 24 Hrs  T(C): 36.2 (02 Dec 2024 04:30), Max: 36.8 (01 Dec 2024 20:28)  T(F): 97.2 (02 Dec 2024 04:30), Max: 98.3 (01 Dec 2024 20:28)  HR: 65 (02 Dec 2024 04:30) (65 - 86)  BP: 109/65 (02 Dec 2024 04:30) (109/65 - 135/75)  BP(mean): --  RR: 18 (02 Dec 2024 04:30) (18 - 20)  SpO2: 99% (02 Dec 2024 04:30) (96% - 99%)    Parameters below as of 02 Dec 2024 04:30  Patient On (Oxygen Delivery Method): room air      Finger Stick          PHYSICAL EXAM:  GENERAL:  [X  ] NAD , [ X ] well appearing, [  ] Agitated, [  ] Drowsy,  [  ] Lethargy, [  ] confused   HEAD:  [X  ] Normal, [  ] Other  EYES:  [ X ] EOMI, [ X ] PERRLA, [  ] conjunctiva and sclera clear normal, [  ] Other,  [  ] Pallor,[  ] Discharge  ENMT:  [X  ] Normal, [ X ] Moist mucous membranes, [X  ] Good dentition, [X  ] No Thrush  NECK:  [X  ] Supple, [  ] No JVD, [X  ] Normal thyroid, [  ] Lymphadenopathy [  ] Other  CHEST/LUNG:  [X  ] Clear to auscultation bilaterally, [X  ] Breath Sounds equal B/L / Decrease, [  ] poor effort  [X  ] No rales, [ X] No rhonchi  [X ]  No wheezing,   HEART:  [ X  ] Regular rate and rhythm, [  ] tachycardia, [  ] Bradycardia,  [  ] irregular  [ X ] No murmurs, No rubs, No gallops, [  ] PPM in place (Mfr:  )  ABDOMEN:  [ X ] Soft, [ X ] Nontender, [ X ] Nondistended, [  ] No mass, [  ] Bowel sounds present, [  ] obese, [ X] Umbilical hernia   NERVOUS SYSTEM:  [ X ] Alert & Oriented X3, [X  ] Nonfocal  [  ] Confusion  [  ] Encephalopathic [  ] Sedated [  ] Unable to assess, [  ] Dementia [  ] Other-  EXTREMITIES: [ X ] 2+ Peripheral Pulses, No clubbing, No cyanosis,  [  ] edema B/L lower EXT. [  ] PVD stasis skin changes B/L Lower EXT, [  ] wound  LYMPH: No lymphadenopathy noted  SKIN:  [  ] No rashes or lesions, [  ] Pressure Ulcers, [  ] ecchymosis, [  ] Skin Tears, [ x ] Other-Left foot lateral border wound + Dressing. No necrotic tissue, No odor , No Drainage . Bg toe dry wound    DIET: Diet, NPO after Midnight:      NPO Start Date: 01-Dec-2024,   NPO Start Time: 23:59  Except Medications (12-01-24 @ 16:04)  Diet, Regular:   Consistent Carbohydrate Evening Snack  DASH/TLC Sodium & Cholesterol Restricted (11-30-24 @ 17:34)      LABS:                        13.2   9.23  )-----------( 245      ( 02 Dec 2024 06:42 )             40.1     02 Dec 2024 06:42    139    |  107    |  25     ----------------------------<  192    5.3     |  32     |  0.93     Ca    9.2        02 Dec 2024 06:42      PT/INR - ( 02 Dec 2024 06:42 )   PT: 10.6 sec;   INR: 0.90 ratio         PTT - ( 02 Dec 2024 06:42 )  PTT:28.9 sec  Urinalysis Basic - ( 02 Dec 2024 06:42 )    Color: x / Appearance: x / SG: x / pH: x  Gluc: 192 mg/dL / Ketone: x  / Bili: x / Urobili: x   Blood: x / Protein: x / Nitrite: x   Leuk Esterase: x / RBC: x / WBC x   Sq Epi: x / Non Sq Epi: x / Bacteria: x        Culture Results:   No growth at 24 hours (11-30 @ 12:35)  Culture Results:   No growth at 24 hours (11-30 @ 12:30)  Culture Results:   Numerous Serratia marcescens (11-25 @ 16:50)      culture blood  -- .Blood BLOOD 11-30 @ 12:35    culture urine  --  11-30 @ 12:35  culture blood  -- .Blood BLOOD 11-30 @ 12:30    culture urine  --  11-30 @ 12:30              Culture - Blood (collected 30 Nov 2024 12:35)  Source: .Blood BLOOD  Preliminary Report (01 Dec 2024 18:01):    No growth at 24 hours    Culture - Blood (collected 30 Nov 2024 12:30)  Source: .Blood BLOOD  Preliminary Report (01 Dec 2024 18:01):    No growth at 24 hours    Culture - Wound Aerobic (collected 25 Nov 2024 16:50)  Source: Skin/Wound  Final Report (27 Nov 2024 16:29):    Numerous Serratia marcescens  Organism: Serratia marcescens (27 Nov 2024 16:29)  Organism: Serratia marcescens (27 Nov 2024 16:29)         Anemia Panel:      Thyroid Panel:                RADIOLOGY & ADDITIONAL TESTS: < from: MR Foot No Cont, Left (12.01.24 @ 12:46) >  ACC: [id number] EXAM:  MR FOOT LT   ORDERED BY: BOSSMAN PITTMAN     PROCEDURE DATE:  12/01/2024          INTERPRETATION:  MR FOOT LEFT dated 12/1/2024 12:46 PM    INDICATION: Left foot wound    COMPARISON: Left foot MRI dated 11/23/2024    TECHNIQUE: Multi-sequential, multiplanar MRI imaging of the left midfoot   and forefoot was performed per standard protocol.    FINDINGS:    BONE MARROW: Subchondral edema and small subchondral cysts at the   navicula/middle cuneiform. Mild cartilage loss with subchondral cysts at   the second and third tarsometatarsal joints. Patchy marrow edema within   the first distal phalanx. Moderate patchy edema within the fifth middle   and distal phalanx. Persistent patchy edema at the base of the fifth   metatarsal.Overlying skin wound in this region. Mild patchy marrow edema   again noted within the second and third middle phalanges.  SYNOVIUM/ JOINT FLUID: No joint effusion  TENDONS: Intact tendons. No tenosynovitis.  MUSCLES: Mild atrophy and moderate edema throughout the musculature.   Nonspecific but may reflect neuropathic change.  NEUROVASCULAR STRUCTURES: Preserved  SUBCUTANEOUS SOFT TISSUES: There is soft tissue swelling about the foot.   Suspect a skin wound at the distal first digit. No drainablecollection.   Possible skin wound along the fifth distal digit. Overlying skin wound   adjacent to the fifth metatarsal base.    IMPRESSION:    1.  Skin wound at the great toe with deep soft tissue swelling. No   drainable collection.  2.  Abnormal marrow signal within the first distal phalangeal head.   Correlate for an overlying skin wound which would increase the   probability of osteomyelitis in this region.  3.  Abnormal marrow signal within the fifth middle and distal phalanx as   on prior study raising concern for osteomyelitis.  4.  Patchy marrow edema at the base of the fifth metatarsal. Given the   overlying skin wound, findings concerning for osteomyelitis as on prior   exam.  5.  Patchy marrow edema again noted in the second and third middle   phalanges. Correlate for overlying skin wound in these regions which   would increase the probability of osteomyelitis.  6.  Degenerative changes at the midfoot.    --- End of Report ---            KRISTAL MATIAS MD; Attending Radiologist  This document has been electronically signed. Dec  1 2024  1:23PM        HEALTH ISSUES - PROBLEM Dx:  Wound of left foot    DM (diabetes mellitus)    PAD (peripheral artery disease)    HTN (hypertension)    Need for prophylactic measure            Consultant(s) Notes Reviewed:  [ X ] YES     Care Discussed with [X] Consultants  [  ] Patient  [ X ] Family [  ] HCP [  ]   [  ] Social Service  [X  ] RN, [  ] Physical Therapy,[  ] Palliative care team  DVT PPX: [  ] Lovenox, [  ] S C Heparin, [  ] Coumadin, [  ] Xarelto, [  ] Eliquis, [  ] Pradaxa, [  ] IV Heparin drip, [  ] SCD [  ] Contraindication 2 to GI Bleed,[  ] Ambulation [  ] Contraindicated 2 to  bleed [  ] Contraindicated 2 to Brain Bleed  Advanced directive: [  ] None, [  ] DNR/DNI Patient is a 64y old  Male who presents with a chief complaint of L lateral  foot wound, OM (01 Dec 2024 16:50)    HPI:  Pt is a 64 y/o M with PMHx DM2,PAD,  hx osteomyelitis s/p surgical amputation of R great toe 2023, CAD, PAD on plavix, HTN who presents to the hospital at instruction of wound care for a L foot wound. Pt reports he has been following with wound care with Dr. Stark and Dr. French, has been on Augmentin for approx 2 weeks. No systemic symptoms. Pt states he was told to come in this weekend for IV antibiotics and further evaluation of Left foot wound, . Pt reports feeling well and denies any fevers, chills, chest pain, shortness of breath, nausea, vomiting, diarrhea, constipation. Pt has been dressing the wound daily himself.  pt has had MRI left foot 1 week ago     ED Course:   Vitals: BP: 134/69, HR: 94, Temp: 134/69, RR: 18, SpO2: 99% on RA   Labs: WBC 11.65, lactate 2.1 -> 1, BUN 29, Glucose 215   CXR: No active chest disease  EKG: NSR @ 90bpm  Received in the ED: Zosyn, Vancomycin, 1L NS bolus   (30 Nov 2024 17:09)    INTERVAL HPI:  12/1: Pt seen, Examined, No Complaints , feels OK, IV Abx ,Nl WBC  12/2: Examined at bedside, has no complaints at this time. Plan for angio this morning/afternoon with vascular.  NPO, IV Abx       OVERNIGHT EVENTS: NONE     Home Medications:  atorvastatin 40 mg oral tablet: 1 tab(s) orally once a day (30 Nov 2024 17:29)  clopidogrel 75 mg oral tablet: 1 tab(s) orally once a day (30 Nov 2024 17:29)  gabapentin 300 mg oral capsule: 1 cap(s) orally 2 times a day (30 Nov 2024 17:28)  Jardiance 25 mg oral tablet: 1 tab(s) orally once a day (30 Nov 2024 17:29)  lisinopril 40 mg oral tablet: 1 tab(s) orally once a day (30 Nov 2024 17:29)  metFORMIN 1000 mg oral tablet: 1 tab(s) orally 2 times a day (30 Nov 2024 17:29)  metoprolol succinate 50 mg oral tablet, extended release: 1 tab(s) orally once a day (30 Nov 2024 17:29)  Trulicity Pen 1.5 mg/0.5 mL subcutaneous solution: 1.5 milligram(s) subcutaneously once a week (30 Nov 2024 17:29)      MEDICATIONS  (STANDING):  atorvastatin 40 milliGRAM(s) Oral at bedtime  cefepime   IVPB      cefepime   IVPB 2000 milliGRAM(s) IV Intermittent every 8 hours  clopidogrel Tablet 75 milliGRAM(s) Oral daily  dextrose 5%. 1000 milliLiter(s) (50 mL/Hr) IV Continuous <Continuous>  dextrose 5%. 1000 milliLiter(s) (100 mL/Hr) IV Continuous <Continuous>  dextrose 50% Injectable 25 Gram(s) IV Push once  dextrose 50% Injectable 12.5 Gram(s) IV Push once  dextrose 50% Injectable 25 Gram(s) IV Push once  gabapentin 300 milliGRAM(s) Oral three times a day  glucagon  Injectable 1 milliGRAM(s) IntraMuscular once  insulin lispro (ADMELOG) corrective regimen sliding scale   SubCutaneous three times a day before meals  insulin lispro (ADMELOG) corrective regimen sliding scale   SubCutaneous at bedtime  lisinopril 40 milliGRAM(s) Oral daily  metoprolol succinate ER 50 milliGRAM(s) Oral daily  povidone iodine 10% Solution 1 Application(s) Topical daily  sodium chloride 0.9%. 1000 milliLiter(s) (50 mL/Hr) IV Continuous <Continuous>    MEDICATIONS  (PRN):  acetaminophen     Tablet .. 650 milliGRAM(s) Oral every 6 hours PRN Mild Pain (1 - 3)  aluminum hydroxide/magnesium hydroxide/simethicone Suspension 30 milliLiter(s) Oral every 4 hours PRN Dyspepsia  dextrose Oral Gel 15 Gram(s) Oral once PRN Blood Glucose LESS THAN 70 milliGRAM(s)/deciliter  melatonin 3 milliGRAM(s) Oral at bedtime PRN Insomnia      Allergies    No Known Allergies    Intolerances        Social History:  Lives: with wife and son who is in college  ADLs: independent  Diet:  Alcohol Use: rare occasional  Tobacco Use: denies  Recreational Drug Use: denies (30 Nov 2024 17:09)      REVIEW OF SYSTEMS:  CONSTITUTIONAL: No fever, No chills, No fatigue, No myalgia, No Body ache, No Weakness  EYES: No eye pain,  No visual disturbances, No discharge, NO Redness  ENMT:  No ear pain, No nose bleed, No vertigo; No sinus pain, NO throat pain, No Congestion  NECK: No pain, No stiffness  RESPIRATORY: No cough, NO wheezing, No  hemoptysis, NO  shortness of breath  CARDIOVASCULAR: No chest pain, palpitations  GASTROINTESTINAL: No abdominal pain, NO epigastric pain. No nausea, No vomiting; No diarrhea, No constipation. [  ] BM  GENITOURINARY: No dysuria, No frequency, No urgency, No hematuria, NO incontinence  NEUROLOGICAL: No headaches, No dizziness, No numbness, No tingling, No tremors, No weakness  EXT: No Swelling, No Pain, No Edema  SKIN:  [ X ] No itching, burning, rashes, or lesions   MUSCULOSKELETAL: No joint pain ,No Jt swelling; No muscle pain, No back pain, No extremity pain  PSYCHIATRIC: No depression,  No anxiety,  No mood swings ,No difficulty sleeping at night  PAIN SCALE: [ X ] None  [  ] Other-  ROS Unable to obtain due to - [  ] Dementia  [  ] Lethargy [  ] Drowsy [  ] Sedated [  ] non verbal  REST OF REVIEW Of SYSTEM - [ X ] Normal     Vital Signs Last 24 Hrs  T(C): 36.2 (02 Dec 2024 04:30), Max: 36.8 (01 Dec 2024 20:28)  T(F): 97.2 (02 Dec 2024 04:30), Max: 98.3 (01 Dec 2024 20:28)  HR: 65 (02 Dec 2024 04:30) (65 - 86)  BP: 109/65 (02 Dec 2024 04:30) (109/65 - 135/75)  BP(mean): --  RR: 18 (02 Dec 2024 04:30) (18 - 20)  SpO2: 99% (02 Dec 2024 04:30) (96% - 99%)    Parameters below as of 02 Dec 2024 04:30  Patient On (Oxygen Delivery Method): room air      Finger Stick          PHYSICAL EXAM:  GENERAL:  [X  ] NAD , [ X ] well appearing, [  ] Agitated, [  ] Drowsy,  [  ] Lethargy, [  ] confused   HEAD:  [X  ] Normal, [  ] Other  EYES:  [ X ] EOMI, [ X ] PERRLA, [  ] conjunctiva and sclera clear normal, [  ] Other,  [  ] Pallor,[  ] Discharge  ENMT:  [X  ] Normal, [ X ] Moist mucous membranes, [X  ] Good dentition, [X  ] No Thrush  NECK:  [X  ] Supple, [  ] No JVD, [X  ] Normal thyroid, [  ] Lymphadenopathy [  ] Other  CHEST/LUNG:  [X  ] Clear to auscultation bilaterally, [X  ] Breath Sounds equal B/L / Decrease, [  ] poor effort  [X  ] No rales, [ X] No rhonchi  [X ]  No wheezing,   HEART:  [ X  ] Regular rate and rhythm, [  ] tachycardia, [  ] Bradycardia,  [  ] irregular  [ X ] No murmurs, No rubs, No gallops, [  ] PPM in place (Mfr:  )  ABDOMEN:  [ X ] Soft, [ X ] Nontender, [ X ] Nondistended, [  ] No mass, [  ] Bowel sounds present, [  ] obese, [ X] Umbilical hernia   NERVOUS SYSTEM:  [ X ] Alert & Oriented X3, [X  ] Nonfocal  [  ] Confusion  [  ] Encephalopathic [  ] Sedated [  ] Unable to assess, [  ] Dementia [  ] Other-  EXTREMITIES: [ X ] 2+ Peripheral Pulses, No clubbing, No cyanosis,  [  ] edema B/L lower EXT. [  ] PVD stasis skin changes B/L Lower EXT, [  ] wound  LYMPH: No lymphadenopathy noted  SKIN:  [  ] No rashes or lesions, [  ] Pressure Ulcers, [  ] ecchymosis, [  ] Skin Tears, [ x ] Other-Left foot lateral border wound + Dressing. No necrotic tissue, No odor , No Drainage . Bg toe dry wound    DIET: Diet, NPO after Midnight:      NPO Start Date: 01-Dec-2024,   NPO Start Time: 23:59  Except Medications (12-01-24 @ 16:04)  Diet, Regular:   Consistent Carbohydrate Evening Snack  DASH/TLC Sodium & Cholesterol Restricted (11-30-24 @ 17:34)      LABS:                        13.2   9.23  )-----------( 245      ( 02 Dec 2024 06:42 )             40.1     02 Dec 2024 06:42    139    |  107    |  25     ----------------------------<  192    5.3     |  32     |  0.93     Ca    9.2        02 Dec 2024 06:42      PT/INR - ( 02 Dec 2024 06:42 )   PT: 10.6 sec;   INR: 0.90 ratio         PTT - ( 02 Dec 2024 06:42 )  PTT:28.9 sec  Urinalysis Basic - ( 02 Dec 2024 06:42 )    Color: x / Appearance: x / SG: x / pH: x  Gluc: 192 mg/dL / Ketone: x  / Bili: x / Urobili: x   Blood: x / Protein: x / Nitrite: x   Leuk Esterase: x / RBC: x / WBC x   Sq Epi: x / Non Sq Epi: x / Bacteria: x        Culture Results:   No growth at 24 hours (11-30 @ 12:35)  Culture Results:   No growth at 24 hours (11-30 @ 12:30)  Culture Results:   Numerous Serratia marcescens (11-25 @ 16:50)      culture blood  -- .Blood BLOOD 11-30 @ 12:35    culture urine  --  11-30 @ 12:35  culture blood  -- .Blood BLOOD 11-30 @ 12:30    culture urine  --  11-30 @ 12:30              Culture - Blood (collected 30 Nov 2024 12:35)  Source: .Blood BLOOD  Preliminary Report (01 Dec 2024 18:01):    No growth at 24 hours    Culture - Blood (collected 30 Nov 2024 12:30)  Source: .Blood BLOOD  Preliminary Report (01 Dec 2024 18:01):    No growth at 24 hours    Culture - Wound Aerobic (collected 25 Nov 2024 16:50)  Source: Skin/Wound  Final Report (27 Nov 2024 16:29):    Numerous Serratia marcescens  Organism: Serratia marcescens (27 Nov 2024 16:29)  Organism: Serratia marcescens (27 Nov 2024 16:29)         RADIOLOGY & ADDITIONAL TESTS: < from: MR Foot No Cont, Left (12.01.24 @ 12:46) >  ACC: [id number] EXAM:  MR FOOT LT   ORDERED BY: BOSSMAN PITTMAN     PROCEDURE DATE:  12/01/2024          INTERPRETATION:  MR FOOT LEFT dated 12/1/2024 12:46 PM    INDICATION: Left foot wound    COMPARISON: Left foot MRI dated 11/23/2024    TECHNIQUE: Multi-sequential, multiplanar MRI imaging of the left midfoot   and forefoot was performed per standard protocol.    FINDINGS:    BONE MARROW: Subchondral edema and small subchondral cysts at the   navicula/middle cuneiform. Mild cartilage loss with subchondral cysts at   the second and third tarsometatarsal joints. Patchy marrow edema within   the first distal phalanx. Moderate patchy edema within the fifth middle   and distal phalanx. Persistent patchy edema at the base of the fifth   metatarsal.Overlying skin wound in this region. Mild patchy marrow edema   again noted within the second and third middle phalanges.  SYNOVIUM/ JOINT FLUID: No joint effusion  TENDONS: Intact tendons. No tenosynovitis.  MUSCLES: Mild atrophy and moderate edema throughout the musculature.   Nonspecific but may reflect neuropathic change.  NEUROVASCULAR STRUCTURES: Preserved  SUBCUTANEOUS SOFT TISSUES: There is soft tissue swelling about the foot.   Suspect a skin wound at the distal first digit. No drainablecollection.   Possible skin wound along the fifth distal digit. Overlying skin wound   adjacent to the fifth metatarsal base.    IMPRESSION:    1.  Skin wound at the great toe with deep soft tissue swelling. No   drainable collection.  2.  Abnormal marrow signal within the first distal phalangeal head.   Correlate for an overlying skin wound which would increase the   probability of osteomyelitis in this region.  3.  Abnormal marrow signal within the fifth middle and distal phalanx as   on prior study raising concern for osteomyelitis.  4.  Patchy marrow edema at the base of the fifth metatarsal. Given the   overlying skin wound, findings concerning for osteomyelitis as on prior   exam.  5.  Patchy marrow edema again noted in the second and third middle   phalanges. Correlate for overlying skin wound in these regions which   would increase the probability of osteomyelitis.  6.  Degenerative changes at the midfoot.    --- End of Report ---            KRISTAL MATIAS MD; Attending Radiologist  This document has been electronically signed. Dec  1 2024  1:23PM        HEALTH ISSUES - PROBLEM Dx:  Wound of left foot    DM (diabetes mellitus)    PAD (peripheral artery disease)    HTN (hypertension)    Need for prophylactic measure            Consultant(s) Notes Reviewed:  [ X ] YES     Care Discussed with [X] Consultants  [  ] Patient  [ X ] Family [  ] HCP [  ]   [  ] Social Service  [X  ] RN, [  ] Physical Therapy,[  ] Palliative care team  DVT PPX: [  ] Lovenox, [  ] S C Heparin, [  ] Coumadin, [  ] Xarelto, [  ] Eliquis, [  ] Pradaxa, [  ] IV Heparin drip, [x  ] SCD [  ] Contraindication 2 to GI Bleed,[  ] Ambulation [  ] Contraindicated 2 to  bleed [  ] Contraindicated 2 to Brain Bleed  Advanced directive: [ x ] None, [  ] DNR/DNI

## 2024-12-02 NOTE — CONSULT NOTE ADULT - ASSESSMENT
Physical Exam:   Vital Signs Last 24 Hrs  T(C): 36.2 (02 Dec 2024 04:30), Max: 36.8 (01 Dec 2024 20:28)  T(F): 97.2 (02 Dec 2024 04:30), Max: 98.3 (01 Dec 2024 20:28)  HR: 65 (02 Dec 2024 04:30) (65 - 86)  BP: 109/65 (02 Dec 2024 04:30) (109/65 - 135/75)  BP(mean): --  RR: 18 (02 Dec 2024 04:30) (18 - 20)  SpO2: 99% (02 Dec 2024 04:30) (96% - 99%)    Parameters below as of 02 Dec 2024 04:30  Patient On (Oxygen Delivery Method): room air             CAPILLARY BLOOD GLUCOSE      POCT Blood Glucose.: 212 mg/dL (02 Dec 2024 07:36)  POCT Blood Glucose.: 191 mg/dL (01 Dec 2024 21:30)  POCT Blood Glucose.: 139 mg/dL (01 Dec 2024 17:00)  POCT Blood Glucose.: 180 mg/dL (01 Dec 2024 13:33)  POCT Blood Glucose.: 245 mg/dL (01 Dec 2024 11:42)      Cholesterol, Serum: 113 mg/dL (05.19.21 @ 08:36)     HDL Cholesterol, Serum: 22 mg/dL (05.19.21 @ 08:36)     LDL Cholesterol Calculated: 66 mg/dL (05.19.21 @ 08:36)     DIET: CC  >50%

## 2024-12-02 NOTE — PROVIDER CONTACT NOTE (OTHER) - SITUATION
md at bedside post op told patient to be on bedrest x4hrs  post angio,post op order stating patient should be on bed rest x6hrs Md MADE AWARE

## 2024-12-02 NOTE — PROGRESS NOTE ADULT - PROBLEM SELECTOR PLAN 2
Chronic, with foot  wounds  - glucose elevated on arrival  - hold home Trulicity, metformin Jardiance  - start low ISS with FS QAC/QHS  - hypoglycemia protocol  - f/u AM A1c-8.2   - HOLD home meds  - Nutritional eval  - Neuropathy-On Gabapentin 300 mg 3x day Chronic, with foot  wounds  - glucose elevated on arrival  - hold home Trulicity, metformin Jardiance  - LDISS increased to MDISS per diabetic NP; FS q6h while NPO; when diet resumed, to start FS QAC QHS   - hypoglycemia protocol  - f/u AM A1c-8.2   - HOLD home meds  - Nutritional eval  - Neuropathy-On Gabapentin 300 mg 3x day  - Diabetic NP consulted, recs appreciated

## 2024-12-02 NOTE — PROGRESS NOTE ADULT - ATTENDING COMMENTS
Pt is a 62 y/o M with PMHx DM2, hx osteomyelitis s/p surgical amputation of R great toe 2023, CAD, PAD on plavix, HTN who presents to the hospital at instruction of wound care for a L foot wound, adm for L foot wound. with likely OM  Pt seen, examined, Case & care plan d/w pt ,residents at detail.   Pt is medically optimized for schedule Angiogram LL ext  ,NPO after midnight for OR  Consult-  ID-Dr XENIA Calhoun follow up- Abx   Podiatry-Dr Stark follow up  Vascular -NPO for Angiogram LL Ext on 12/2   PO diet  AM Labs   DVT ppx  Total care time is 60 minutes.

## 2024-12-03 DIAGNOSIS — M86.9 OSTEOMYELITIS, UNSPECIFIED: ICD-10-CM

## 2024-12-03 DIAGNOSIS — E11.621 TYPE 2 DIABETES MELLITUS WITH FOOT ULCER: ICD-10-CM

## 2024-12-03 LAB
ANION GAP SERPL CALC-SCNC: 2 MMOL/L — LOW (ref 5–17)
BASOPHILS # BLD AUTO: 0.02 K/UL — SIGNIFICANT CHANGE UP (ref 0–0.2)
BASOPHILS NFR BLD AUTO: 0.1 % — SIGNIFICANT CHANGE UP (ref 0–2)
BUN SERPL-MCNC: 24 MG/DL — HIGH (ref 7–23)
CALCIUM SERPL-MCNC: 9.2 MG/DL — SIGNIFICANT CHANGE UP (ref 8.5–10.1)
CHLORIDE SERPL-SCNC: 109 MMOL/L — HIGH (ref 96–108)
CO2 SERPL-SCNC: 29 MMOL/L — SIGNIFICANT CHANGE UP (ref 22–31)
CREAT SERPL-MCNC: 0.99 MG/DL — SIGNIFICANT CHANGE UP (ref 0.5–1.3)
EGFR: 84 ML/MIN/1.73M2 — SIGNIFICANT CHANGE UP
EOSINOPHIL # BLD AUTO: 0.49 K/UL — SIGNIFICANT CHANGE UP (ref 0–0.5)
EOSINOPHIL NFR BLD AUTO: 2.9 % — SIGNIFICANT CHANGE UP (ref 0–6)
GLUCOSE BLDC GLUCOMTR-MCNC: 170 MG/DL — HIGH (ref 70–99)
GLUCOSE BLDC GLUCOMTR-MCNC: 180 MG/DL — HIGH (ref 70–99)
GLUCOSE BLDC GLUCOMTR-MCNC: 229 MG/DL — HIGH (ref 70–99)
GLUCOSE BLDC GLUCOMTR-MCNC: 241 MG/DL — HIGH (ref 70–99)
GLUCOSE BLDC GLUCOMTR-MCNC: 287 MG/DL — HIGH (ref 70–99)
GLUCOSE SERPL-MCNC: 181 MG/DL — HIGH (ref 70–99)
HCT VFR BLD CALC: 44.2 % — SIGNIFICANT CHANGE UP (ref 39–50)
HGB BLD-MCNC: 14.7 G/DL — SIGNIFICANT CHANGE UP (ref 13–17)
IMM GRANULOCYTES NFR BLD AUTO: 0.7 % — SIGNIFICANT CHANGE UP (ref 0–0.9)
LYMPHOCYTES # BLD AUTO: 0.88 K/UL — LOW (ref 1–3.3)
LYMPHOCYTES # BLD AUTO: 5.3 % — LOW (ref 13–44)
MCHC RBC-ENTMCNC: 30.8 PG — SIGNIFICANT CHANGE UP (ref 27–34)
MCHC RBC-ENTMCNC: 33.3 G/DL — SIGNIFICANT CHANGE UP (ref 32–36)
MCV RBC AUTO: 92.5 FL — SIGNIFICANT CHANGE UP (ref 80–100)
MONOCYTES # BLD AUTO: 0.36 K/UL — SIGNIFICANT CHANGE UP (ref 0–0.9)
MONOCYTES NFR BLD AUTO: 2.2 % — SIGNIFICANT CHANGE UP (ref 2–14)
NEUTROPHILS # BLD AUTO: 14.87 K/UL — HIGH (ref 1.8–7.4)
NEUTROPHILS NFR BLD AUTO: 88.8 % — HIGH (ref 43–77)
NRBC # BLD: 0 /100 WBCS — SIGNIFICANT CHANGE UP (ref 0–0)
PLATELET # BLD AUTO: 282 K/UL — SIGNIFICANT CHANGE UP (ref 150–400)
POTASSIUM SERPL-MCNC: 5 MMOL/L — SIGNIFICANT CHANGE UP (ref 3.5–5.3)
POTASSIUM SERPL-SCNC: 5 MMOL/L — SIGNIFICANT CHANGE UP (ref 3.5–5.3)
RBC # BLD: 4.78 M/UL — SIGNIFICANT CHANGE UP (ref 4.2–5.8)
RBC # FLD: 13 % — SIGNIFICANT CHANGE UP (ref 10.3–14.5)
SODIUM SERPL-SCNC: 140 MMOL/L — SIGNIFICANT CHANGE UP (ref 135–145)
WBC # BLD: 16.74 K/UL — HIGH (ref 3.8–10.5)
WBC # FLD AUTO: 16.74 K/UL — HIGH (ref 3.8–10.5)

## 2024-12-03 PROCEDURE — 99222 1ST HOSP IP/OBS MODERATE 55: CPT

## 2024-12-03 PROCEDURE — 99231 SBSQ HOSP IP/OBS SF/LOW 25: CPT

## 2024-12-03 PROCEDURE — 99221 1ST HOSP IP/OBS SF/LOW 40: CPT

## 2024-12-03 PROCEDURE — 36573 INSJ PICC RS&I 5 YR+: CPT

## 2024-12-03 RX ORDER — SODIUM CHLORIDE 9 MG/ML
10 INJECTION, SOLUTION INTRAMUSCULAR; INTRAVENOUS; SUBCUTANEOUS
Refills: 0 | Status: DISCONTINUED | OUTPATIENT
Start: 2024-12-03 | End: 2024-12-04

## 2024-12-03 RX ORDER — CHLORHEXIDINE GLUCONATE 1.2 MG/ML
1 RINSE ORAL
Refills: 0 | Status: DISCONTINUED | OUTPATIENT
Start: 2024-12-03 | End: 2024-12-04

## 2024-12-03 RX ADMIN — Medication 2: at 16:48

## 2024-12-03 RX ADMIN — INSULIN GLARGINE 10 UNIT(S): 100 INJECTION, SOLUTION SUBCUTANEOUS at 21:36

## 2024-12-03 RX ADMIN — Medication 1 APPLICATION(S): at 12:47

## 2024-12-03 RX ADMIN — CLOPIDOGREL 75 MILLIGRAM(S): 75 TABLET, FILM COATED ORAL at 12:45

## 2024-12-03 RX ADMIN — Medication 40 MILLIGRAM(S): at 21:14

## 2024-12-03 RX ADMIN — Medication 81 MILLIGRAM(S): at 12:46

## 2024-12-03 RX ADMIN — GABAPENTIN 300 MILLIGRAM(S): 300 CAPSULE ORAL at 21:14

## 2024-12-03 RX ADMIN — CEFEPIME 100 MILLIGRAM(S): 2 INJECTION, POWDER, FOR SOLUTION INTRAVENOUS at 13:40

## 2024-12-03 RX ADMIN — CEFEPIME 100 MILLIGRAM(S): 2 INJECTION, POWDER, FOR SOLUTION INTRAVENOUS at 05:20

## 2024-12-03 RX ADMIN — Medication 6: at 12:46

## 2024-12-03 RX ADMIN — CEFEPIME 100 MILLIGRAM(S): 2 INJECTION, POWDER, FOR SOLUTION INTRAVENOUS at 21:14

## 2024-12-03 RX ADMIN — Medication 2: at 06:26

## 2024-12-03 RX ADMIN — ACETAMINOPHEN, DIPHENHYDRAMINE HCL, PHENYLEPHRINE HCL 3 MILLIGRAM(S): 325; 25; 5 TABLET ORAL at 21:14

## 2024-12-03 RX ADMIN — GABAPENTIN 300 MILLIGRAM(S): 300 CAPSULE ORAL at 13:40

## 2024-12-03 RX ADMIN — GABAPENTIN 300 MILLIGRAM(S): 300 CAPSULE ORAL at 05:20

## 2024-12-03 NOTE — PROGRESS NOTE ADULT - PROBLEM SELECTOR PLAN 1
Pt presenting with L lateral  foot  wound, s/p outpatient course of augmenting x14 days- OM on out pt MRI 11/23   -Diabetic foot wound   - WBC mildly elevated 11.65, out pt wound c/s + Serratia   - s/p vanco and zosyn in ED  - culture results from outpatient wound care 11/25 with Serratia  susceptibility to cefepime  - Tylenol PRN for mild pain  - ID Dr. Calhoun, will appreciate recs - Cefepime 2 gm IVPB q 8 hrs  - Now S/P LLE Angio and angioplasty; started on ASA in addition to Plavix   - Ordered for PICC line, to be placed 12/3/24 by IR --> agreeable per ID for long term abx   - Podiatry plan for left foot sharp excisional wound debridement and bone biopsy 12/4/24; pt eager for discharge 12/5/24 due to work issues    - Podiatry following Dr. French/Dr. Stark  - Vascular consulted- in house    *EKG reviewed; NSR. Patient is medically optimized for planned surgical procedure with routine hemodynamic monitoring.  - Will obtain cardiac optimization per podiatry; Cardiology Dr. Chu consulted, f/u recs Pt presenting with L lateral  foot  wound, s/p outpatient course of augmenting x14 days- OM on out pt MRI 11/23   -Diabetic foot wound   - WBC mildly elevated 11.65, out pt wound c/s + Serratia   - s/p vanco and zosyn in ED  - culture results from outpatient wound care 11/25 with Serratia  susceptibility to cefepime  - Tylenol PRN for mild pain  - ID Dr. Calhoun, will appreciate recs - Cefepime 2 gm IVPB q 8 hrs  - Now S/P LLE Angio and angioplasty; started on ASA in addition to Plavix   - Ordered for PICC line, to be placed 12/3/24 by IR --> agreeable per ID for long term abx   - Podiatry plan for left foot sharp excisional wound debridement and bone biopsy 12/4/24; pt eager for discharge 12/5/24 due to work issues    - Podiatry following Dr. French/Dr. Stark-OR on 12/4  - Vascular consulted- in house    *EKG reviewed; NSR. Patient is medically optimized for planned surgical procedure with routine hemodynamic monitoring.  - Will obtain cardiac optimization per podiatry; Cardiology Dr. Chu consulted, f/u recs d/w

## 2024-12-03 NOTE — DIETITIAN INITIAL EVALUATION ADULT - PERTINENT LABORATORY DATA
12-03    140  |  109[H]  |  24[H]  ----------------------------<  181[H]  5.0   |  29  |  0.99    Ca    9.2      03 Dec 2024 08:18    POCT Blood Glucose.: 180 mg/dL (12-03-24 @ 06:23)  A1C with Estimated Average Glucose Result: 8.3 % (12-01-24 @ 07:31)

## 2024-12-03 NOTE — CONSULT NOTE ADULT - ASSESSMENT
63 yo M with PMH CAD, PAD on plavix, HTN, HLD, Type II DM, hx of OM s/p R toe amputation presents w/ non healing L foot wound. Cardiology consulted for Pre op optimization.    - presents with L lateral foot wound, OM on outpt MRI 12/23  - Id following, on iv antibiotics  -podiatry following, plan for debridement and bone biopsy 12/4    -11/30/24 EKG SR, no evidence of acute ischemia noted  -11/30/24 CXR w/o pleural effusion or pneumothorax.  - Patient euvolemic on examination with no overt signs of heart failure   - No severe valvular abnormalities noted on examination  - bp and hr controlled and stable, continue home metoprolol and lisinopril  - continue asa and statin for known CAD  - continue plavix for known PAD    - follows with Dr. Andre (outpatient cardiologist)  - abnormal stress echo done 11/2023    - Monitor and replete lytes, keep K>4, Mg>2.  - Will continue to follow.    Keanu Gutiérrez NP  Nurse Practitioner- Cardiology   Call TEAMS   65 yo M with PMH CAD, PAD on plavix, HTN, HLD, Type II DM, hx of OM s/p R toe amputation presents w/ non healing L foot wound. Cardiology consulted for Pre op optimization.    - presents with L lateral foot wound, OM on outpt MRI 12/23  - Id following, on iv antibiotics  -podiatry following, plan for debridement and bone biopsy 12/4    -11/30/24 EKG SR, no evidence of acute ischemia noted  -11/30/24 CXR w/o pleural effusion or pneumothorax.  - Patient euvolemic on examination with no overt signs of heart failure   - No severe valvular abnormalities noted on examination  - bp and hr controlled and stable, continue home metoprolol and lisinopril  - follows with Dr. Andre (outpatient cardiologist) abnormal stress echo done 11/2023.  anterior and anterolateral akinesis and inferolateral hypokinesis with exercise suggestive of obstructive CAD. refused angio  - continue asa and statin for known CAD  - continue plavix for known PAD  - check TTE    - Monitor and replete lytes, keep K>4, Mg>2.  - Will continue to follow.    Keanu Gutiérrez NP  Nurse Practitioner- Cardiology   Call TEAMS   65 yo M with PMH CAD, PAD on plavix, DVT( 11/2034, now off eliquis) HTN, HLD, Type II DM, hx of OM s/p R toe amputation presents w/ non healing L foot wound. Cardiology consulted for Pre op optimization.    - presents with L lateral foot wound, OM on outpt MRI 12/23  - Id following, on iv antibiotics  -podiatry following, plan for debridement and bone biopsy 12/4    -11/30/24 EKG SR, no evidence of acute ischemia noted  -11/30/24 CXR w/o pleural effusion or pneumothorax.  - Echocardiography January 2, 2024. This revealed a normal ejection fraction with minimal mitral regurgitation.  - Pharmacologic nuclear stress testing was performed January 24, 2024. This revealed no evidence of ischemia  - Patient euvolemic on examination with no overt signs of heart failure   - No severe valvular abnormalities noted on examination  - bp and hr controlled and stable, continue home metoprolol and lisinopril  - continue asa and statin for known CAD  - continue plavix for known PAD  --Pt has no active ischemia, decompensated heart failure, unstable arrythmia, or severe stenotic valvular disease. Patient is optimized from cardiovascular standpoint to proceed with planned procedure with routine hemodynamic monitoring.     - follows with Dr. Chu  - Monitor and replete lytes, keep K>4, Mg>2.  - Will continue to follow.    Keanu Gutiérrez NP  Nurse Practitioner- Cardiology   Call TEAMS   63 yo M with PMH CAD, PAD on plavix, DVT( 11/2034, now off eliquis) HTN, HLD, Type II DM, hx of OM s/p R toe amputation presents w/ non healing L foot wound. Cardiology consulted for Pre op optimization.    - presents with L lateral foot wound, OM on outpt MRI 12/23  - Id following, on iv antibiotics  - picc placement 12/3  -podiatry following, plan for debridement and bone biopsy 12/4    -11/30/24 EKG SR, no evidence of acute ischemia noted  -11/30/24 CXR w/o pleural effusion or pneumothorax.  - Echocardiography January 2, 2024. This revealed a normal ejection fraction with minimal mitral regurgitation.  - Pharmacologic nuclear stress testing was performed January 24, 2024. This revealed no evidence of ischemia  - bp and hr controlled and stable, continue home metoprolol and lisinopril  - continue asa and statin for known CAD  - known PAD, S/P angiogram/plasty 12/2/24 with vascular, started ASA 81mg, continue Plavix 75mg QD   -Pt has no active ischemia, decompensated heart failure, unstable arrythmia, or severe stenotic valvular disease. Patient is optimized from cardiovascular standpoint to proceed with planned procedure with routine hemodynamic monitoring.     - follows with Dr. Chu  - Monitor and replete lytes, keep K>4, Mg>2.  - Will continue to follow.    Keanu Gutiérrez NP  Nurse Practitioner- Cardiology   Call TEAMS

## 2024-12-03 NOTE — DIETITIAN INITIAL EVALUATION ADULT - NSICDXPASTMEDICALHX_GEN_ALL_CORE_FT
PAST MEDICAL HISTORY:  Diabetes     HTN (hypertension)     Hyperlipidemia     PVD (peripheral vascular disease)     Toe osteomyelitis, right

## 2024-12-03 NOTE — DIETITIAN INITIAL EVALUATION ADULT - PERTINENT MEDS FT
MEDICATIONS  (STANDING):  aspirin enteric coated 81 milliGRAM(s) Oral daily  atorvastatin 40 milliGRAM(s) Oral at bedtime  cefepime   IVPB      cefepime   IVPB 2000 milliGRAM(s) IV Intermittent every 8 hours  clopidogrel Tablet 75 milliGRAM(s) Oral daily  dextrose 5%. 1000 milliLiter(s) (50 mL/Hr) IV Continuous <Continuous>  dextrose 50% Injectable 25 Gram(s) IV Push once  gabapentin 300 milliGRAM(s) Oral three times a day  glucagon  Injectable 1 milliGRAM(s) IntraMuscular once  insulin glargine Injectable (LANTUS) 10 Unit(s) SubCutaneous at bedtime  insulin lispro (ADMELOG) corrective regimen sliding scale   SubCutaneous three times a day before meals  insulin lispro (ADMELOG) corrective regimen sliding scale   SubCutaneous at bedtime  lisinopril 40 milliGRAM(s) Oral daily  metoprolol succinate ER 50 milliGRAM(s) Oral daily  povidone iodine 10% Solution 1 Application(s) Topical daily    MEDICATIONS  (PRN):  acetaminophen     Tablet .. 650 milliGRAM(s) Oral every 6 hours PRN Mild Pain (1 - 3)  aluminum hydroxide/magnesium hydroxide/simethicone Suspension 30 milliLiter(s) Oral every 4 hours PRN Dyspepsia  dextrose Oral Gel 15 Gram(s) Oral once PRN Blood Glucose LESS THAN 70 milliGRAM(s)/deciliter  melatonin 3 milliGRAM(s) Oral at bedtime PRN Insomnia

## 2024-12-03 NOTE — DIETITIAN INITIAL EVALUATION ADULT - CALCULATED FROM (G/KG)
Drink plenty of water.  Follow up with your provider for recheck and discuss slightly elevated glucose levels that were noticed on your lab trends.  If urinary hesitancy continues, you may need urology consult.  Medication as prescribed.  Return to ER for any new, worsening, or change in condition.       
105.95
The mother chose not to exclusively breastfeed upon admission.

## 2024-12-03 NOTE — DIETITIAN INITIAL EVALUATION ADULT - HEIGHT FOR BMI (CENTIMETERS)
PANCHO contacted pt by phone to confirm her rescheduled new OB appointment on 6/11/24 at 10:15 am. Pt answered and consented to the call.     SW reviewed pt's appointment time and pt agreed to the appointment. \"I'm really going to try to make it\". SW acknowledged that pt had requested a 2 pm appointment, but advised that her desired time wasn't available. SW encouraged pt to make childcare and work arrangements in advance in order to receive needed care for herself and her baby. Pt agreed. SW will follow.    172.72

## 2024-12-03 NOTE — CONSULT NOTE ADULT - CONSULT REASON
L foot wound
LLE wound; PAD
cardiac optimization
Left foot diabetic ulcer with OM
64y A1C with Estimated Average Glucose Result: 8.3 % (12-01-24 @ 07:31)   diabetes mellitus uncontrolled type 2

## 2024-12-03 NOTE — DIETITIAN INITIAL EVALUATION ADULT - ENTER FROM (CAL/KG)
Regarding: [place]-post Op concerns  ----- Message from Cher Nereyda sent at 10/7/2023  4:56 AM CDT -----  Patient Name: Eugenia Messina    Specialist or PCP Name: Andrez Hess    Symptoms: Pt had a surgery yesterday, pt since 9pm pt started vomiting and nauseous, but for the last two hours vomiting more frequent, pt seeking advice    Pregnant (females aged 13-60. If Yes, how long?) : No    Call Back # : [phone]    Which State are you currently located in?: [place]    Name of Clinic Site / Acct# : [facility] - [address]     Use following scripting for patients waiting for a callback:   \"Nurse callback times vary based on call volumes; please be aware the return phone call may come from an unidentified or out of state phone number. If your symptoms worsen or become life threatening while waiting, you should seek immediate assistance by calling 911 or going to the ER for evaluation.\"     25

## 2024-12-03 NOTE — CONSULT NOTE ADULT - PROBLEM SELECTOR RECOMMENDATION 9
Patient was seen and evaluated   Patient's wound with warmth,  erythema, edema  and necrotic eschar   Applied silver alginate and sterile dry dressing to the left foot   Recommend IV antibiotics per infectious disease   Procedure discussed at length with patient regarding risks, complications, benefits, as well as pre/intra/post-operative expectations. Patient verbally consents to procedure and understood discussion regarding procedure and all questions were answered to patient's satisfaction at this time.  Patient scheduled for left foot sharp excisional wound debridement and bone biopsy for tomorrow 12/4/24.  . Discussed with patient and family that patient is still high risk for more proximal amputation, loss of limb, sepsis and loss of life.   Recommend PICC line for antibiotics, pending ID. 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   Request medical and cardiac optimization and risk stratification  Please keep patient NPO after midnight for surgery scheduled tomorrow

## 2024-12-03 NOTE — CASE MANAGEMENT PROGRESS NOTE - NSCMPROGRESSNOTE_GEN_ALL_CORE
CM met with pt to discuss transition of care. CM sent appropriate referrals. Pt accepted by [facility] [phone]/ fax [phone]. Pt made aware and in agreement. Per Xiomy Mcdermott at [facility] [phone]/ fax [phone], "[facility] drug, per pam, and RN covered at 80/20%, deductible met this calendar year, out of pocket $1600, met to date $1157.83. Accumulations will restart 1/1/25." Pt made aware, verbalized understanding and in agreement. CM to remain available.

## 2024-12-03 NOTE — PROGRESS NOTE ADULT - SUBJECTIVE AND OBJECTIVE BOX
Patient is a 64y old  Male who presents with a chief complaint of L foot wound (03 Dec 2024 10:59)    HPI:  Pt is a 64 y/o M with PMHx DM2,PAD,  hx osteomyelitis s/p surgical amputation of R great toe 2023, CAD, PAD on plavix, HTN who presents to the hospital at instruction of wound care for a L foot wound. Pt reports he has been following with wound care with Dr. Stark and Dr. French, has been on Augmentin for approx 2 weeks. No systemic symptoms. Pt states he was told to come in this weekend for IV antibiotics and further evaluation of Left foot wound, . Pt reports feeling well and denies any fevers, chills, chest pain, shortness of breath, nausea, vomiting, diarrhea, constipation. Pt has been dressing the wound daily himself.  pt has had MRI left foot 1 week ago     ED Course:   Vitals: BP: 134/69, HR: 94, Temp: 134/69, RR: 18, SpO2: 99% on RA   Labs: WBC 11.65, lactate 2.1 -> 1, BUN 29, Glucose 215   CXR: No active chest disease  EKG: NSR @ 90bpm  Received in the ED: Zosyn, Vancomycin, 1L NS bolus   (30 Nov 2024 17:09)    INTERVAL HPI:  12/1: Pt seen, Examined, No Complaints , feels OK, IV Abx ,Nl WBC  12/2: Examined at bedside, has no complaints at this time. Plan for angio this morning/afternoon with vascular. Kept NPO, IV Abx   12/3: Examined at bedside, no complaints at this time. Vascular angiogram/plasty went well yesterday, no issues regarding procedure. Pt is eager to discuss with podiatry regarding surgical plans. Denies fever, chills, chest pain, palpitations, lower extremity pain or swelling.       OVERNIGHT EVENTS: NONE     Home Medications:  atorvastatin 40 mg oral tablet: 1 tab(s) orally once a day (30 Nov 2024 17:29)  clopidogrel 75 mg oral tablet: 1 tab(s) orally once a day (30 Nov 2024 17:29)  gabapentin 300 mg oral capsule: 1 cap(s) orally 2 times a day (30 Nov 2024 17:28)  Jardiance 25 mg oral tablet: 1 tab(s) orally once a day (30 Nov 2024 17:29)  lisinopril 40 mg oral tablet: 1 tab(s) orally once a day (30 Nov 2024 17:29)  metFORMIN 1000 mg oral tablet: 1 tab(s) orally 2 times a day (30 Nov 2024 17:29)  metoprolol succinate 50 mg oral tablet, extended release: 1 tab(s) orally once a day (30 Nov 2024 17:29)  Trulicity Pen 1.5 mg/0.5 mL subcutaneous solution: 1.5 milligram(s) subcutaneously once a week (30 Nov 2024 17:29)      MEDICATIONS  (STANDING):  aspirin enteric coated 81 milliGRAM(s) Oral daily  atorvastatin 40 milliGRAM(s) Oral at bedtime  cefepime   IVPB      cefepime   IVPB 2000 milliGRAM(s) IV Intermittent every 8 hours  clopidogrel Tablet 75 milliGRAM(s) Oral daily  dextrose 5%. 1000 milliLiter(s) (50 mL/Hr) IV Continuous <Continuous>  dextrose 50% Injectable 25 Gram(s) IV Push once  gabapentin 300 milliGRAM(s) Oral three times a day  glucagon  Injectable 1 milliGRAM(s) IntraMuscular once  insulin glargine Injectable (LANTUS) 10 Unit(s) SubCutaneous at bedtime  insulin lispro (ADMELOG) corrective regimen sliding scale   SubCutaneous three times a day before meals  insulin lispro (ADMELOG) corrective regimen sliding scale   SubCutaneous at bedtime  lisinopril 40 milliGRAM(s) Oral daily  metoprolol succinate ER 50 milliGRAM(s) Oral daily  povidone iodine 10% Solution 1 Application(s) Topical daily    MEDICATIONS  (PRN):  acetaminophen     Tablet .. 650 milliGRAM(s) Oral every 6 hours PRN Mild Pain (1 - 3)  aluminum hydroxide/magnesium hydroxide/simethicone Suspension 30 milliLiter(s) Oral every 4 hours PRN Dyspepsia  dextrose Oral Gel 15 Gram(s) Oral once PRN Blood Glucose LESS THAN 70 milliGRAM(s)/deciliter  melatonin 3 milliGRAM(s) Oral at bedtime PRN Insomnia      Allergies    No Known Allergies    Intolerances        Social History:  Lives: with wife and son who is in college  ADLs: independent  Diet:  Alcohol Use: rare occasional  Tobacco Use: denies  Recreational Drug Use: denies (30 Nov 2024 17:09)      REVIEW OF SYSTEMS:  CONSTITUTIONAL: No fever, No chills, No fatigue, No myalgia, No Body ache, No Weakness  EYES: No eye pain,  No visual disturbances, No discharge, NO Redness  ENMT:  No ear pain, No nose bleed, No vertigo; No sinus pain, NO throat pain, No Congestion  NECK: No pain, No stiffness  RESPIRATORY: No cough, NO wheezing, No  hemoptysis, NO  shortness of breath  CARDIOVASCULAR: No chest pain, palpitations  GASTROINTESTINAL: No abdominal pain, NO epigastric pain. No nausea, No vomiting; No diarrhea, No constipation. [  ] BM  GENITOURINARY: No dysuria, No frequency, No urgency, No hematuria, NO incontinence  NEUROLOGICAL: No headaches, No dizziness, No numbness, No tingling, No tremors, No weakness  EXT: No Swelling, No Pain, No Edema  SKIN:  [ X ] No itching, burning, rashes, or lesions   MUSCULOSKELETAL: No joint pain ,No Jt swelling; No muscle pain, No back pain, No extremity pain  PSYCHIATRIC: No depression,  No anxiety,  No mood swings ,No difficulty sleeping at night  PAIN SCALE: [ X ] None  [  ] Other-  ROS Unable to obtain due to - [  ] Dementia  [  ] Lethargy [  ] Drowsy [  ] Sedated [  ] non verbal  REST OF REVIEW Of SYSTEM - [ X ] Normal       Vital Signs Last 24 Hrs  T(C): 36.5 (03 Dec 2024 05:08), Max: 36.9 (02 Dec 2024 16:15)  T(F): 97.7 (03 Dec 2024 05:08), Max: 98.4 (02 Dec 2024 16:15)  HR: 89 (03 Dec 2024 05:08) (70 - 89)  BP: 101/57 (03 Dec 2024 05:08) (101/57 - 152/63)  BP(mean): 97 (02 Dec 2024 20:04) (97 - 97)  RR: 18 (03 Dec 2024 05:08) (12 - 18)  SpO2: 94% (03 Dec 2024 05:08) (94% - 99%)    Parameters below as of 03 Dec 2024 05:08  Patient On (Oxygen Delivery Method): room air      Finger Stick        12-02 @ 07:01  -  12-03 @ 07:00  --------------------------------------------------------  IN: 465 mL / OUT: 300 mL / NET: 165 mL        PHYSICAL EXAM:  GENERAL:  [X  ] NAD , [ X ] well appearing, [  ] Agitated, [  ] Drowsy,  [  ] Lethargy, [  ] confused   HEAD:  [X  ] Normal, [  ] Other  EYES:  [ X ] EOMI, [ X ] PERRLA, [  ] conjunctiva and sclera clear normal, [  ] Other,  [  ] Pallor,[  ] Discharge  ENMT:  [X  ] Normal, [ X ] Moist mucous membranes, [X  ] Good dentition, [X  ] No Thrush  NECK:  [X  ] Supple, [  ] No JVD, [X  ] Normal thyroid, [  ] Lymphadenopathy [  ] Other  CHEST/LUNG:  [X  ] Clear to auscultation bilaterally, [X  ] Breath Sounds equal B/L / Decrease, [  ] poor effort  [X  ] No rales, [ X] No rhonchi  [X ]  No wheezing,   HEART:  [ X  ] Regular rate and rhythm, [  ] tachycardia, [  ] Bradycardia,  [  ] irregular  [ X ] No murmurs, No rubs, No gallops, [  ] PPM in place (Mfr:  )  ABDOMEN:  [ X ] Soft, [ X ] Nontender, [ X ] Nondistended, [  ] No mass, [  ] Bowel sounds present, [  ] obese, [ X] Umbilical hernia   NERVOUS SYSTEM:  [ X ] Alert & Oriented X3, [X  ] Nonfocal  [  ] Confusion  [  ] Encephalopathic [  ] Sedated [  ] Unable to assess, [  ] Dementia [  ] Other-  EXTREMITIES: [ X ] 2+ Peripheral Pulses, No clubbing, No cyanosis,  [  ] edema B/L lower EXT. [  ] PVD stasis skin changes B/L Lower EXT, [  ] wound  LYMPH: No lymphadenopathy noted  SKIN:  [  ] No rashes or lesions, [  ] Pressure Ulcers, [  ] ecchymosis, [  ] Skin Tears, [ x ] Other-Left foot lateral border wound + Dressing. No necrotic tissue, No odor , No Drainage . Bg toe dry wound [ x ] R groin surgi    DIET: Diet, Regular:   Consistent Carbohydrate Evening Snack  DASH/TLC Sodium & Cholesterol Restricted (12-02-24 @ 15:30)      LABS:                        14.7   16.74 )-----------( 282      ( 03 Dec 2024 08:18 )             44.2     03 Dec 2024 08:18    140    |  109    |  24     ----------------------------<  181    5.0     |  29     |  0.99     Ca    9.2        03 Dec 2024 08:18      PT/INR - ( 02 Dec 2024 06:42 )   PT: 10.6 sec;   INR: 0.90 ratio         PTT - ( 02 Dec 2024 06:42 )  PTT:28.9 sec  Urinalysis Basic - ( 03 Dec 2024 08:18 )    Color: x / Appearance: x / SG: x / pH: x  Gluc: 181 mg/dL / Ketone: x  / Bili: x / Urobili: x   Blood: x / Protein: x / Nitrite: x   Leuk Esterase: x / RBC: x / WBC x   Sq Epi: x / Non Sq Epi: x / Bacteria: x        Culture Results:   No growth at 48 Hours (11-30 @ 12:35)  Culture Results:   No growth at 48 Hours (11-30 @ 12:30)                  Culture - Blood (collected 30 Nov 2024 12:35)  Source: .Blood BLOOD  Preliminary Report (02 Dec 2024 18:02):    No growth at 48 Hours    Culture - Blood (collected 30 Nov 2024 12:30)  Source: .Blood BLOOD  Preliminary Report (02 Dec 2024 18:02):    No growth at 48 Hours         Anemia Panel:      Thyroid Panel:                RADIOLOGY & ADDITIONAL TESTS:      HEALTH ISSUES - PROBLEM Dx:  Wound of left foot    DM (diabetes mellitus)    PAD (peripheral artery disease)    HTN (hypertension)    Need for prophylactic measure    Diabetic ulcer of left foot    Osteomyelitis of left foot            Consultant(s) Notes Reviewed:  [  ] YES     Care Discussed with [X] Consultants  [  ] Patient  [  ] Family [  ] HCP [  ]   [  ] Social Service  [  ] RN, [  ] Physical Therapy,[  ] Palliative care team  DVT PPX: [  ] Lovenox, [  ] S C Heparin, [  ] Coumadin, [  ] Xarelto, [  ] Eliquis, [  ] Pradaxa, [  ] IV Heparin drip, [  ] SCD [  ] Contraindication 2 to GI Bleed,[  ] Ambulation [  ] Contraindicated 2 to  bleed [  ] Contraindicated 2 to Brain Bleed  Advanced directive: [  ] None, [  ] DNR/DNI Patient is a 64y old  Male who presents with a chief complaint of L foot wound (03 Dec 2024 10:59)    HPI:  Pt is a 64 y/o M with PMHx DM2,PAD,  hx osteomyelitis s/p surgical amputation of R great toe 2023, CAD, PAD on plavix, HTN who presents to the hospital at instruction of wound care for a L foot wound. Pt reports he has been following with wound care with Dr. Stark and Dr. French, has been on Augmentin for approx 2 weeks. No systemic symptoms. Pt states he was told to come in this weekend for IV antibiotics and further evaluation of Left foot wound, . Pt reports feeling well and denies any fevers, chills, chest pain, shortness of breath, nausea, vomiting, diarrhea, constipation. Pt has been dressing the wound daily himself.  pt has had MRI left foot 1 week ago     ED Course:   Vitals: BP: 134/69, HR: 94, Temp: 134/69, RR: 18, SpO2: 99% on RA   Labs: WBC 11.65, lactate 2.1 -> 1, BUN 29, Glucose 215   CXR: No active chest disease  EKG: NSR @ 90bpm  Received in the ED: Zosyn, Vancomycin, 1L NS bolus   (30 Nov 2024 17:09)    INTERVAL HPI:  12/1: Pt seen, Examined, No Complaints , feels OK, IV Abx ,Nl WBC  12/2: Examined at bedside, has no complaints at this time. Plan for angio this morning/afternoon with vascular. Kept NPO, IV Abx   12/3: Examined at bedside, no complaints at this time. Vascular angiogram/plasty went well yesterday, no issues regarding procedure. Pt is eager to discuss with podiatry regarding surgical plans. Denies fever, chills, chest pain, palpitations, lower extremity pain or swelling.       OVERNIGHT EVENTS: NONE     Home Medications:  atorvastatin 40 mg oral tablet: 1 tab(s) orally once a day (30 Nov 2024 17:29)  clopidogrel 75 mg oral tablet: 1 tab(s) orally once a day (30 Nov 2024 17:29)  gabapentin 300 mg oral capsule: 1 cap(s) orally 2 times a day (30 Nov 2024 17:28)  Jardiance 25 mg oral tablet: 1 tab(s) orally once a day (30 Nov 2024 17:29)  lisinopril 40 mg oral tablet: 1 tab(s) orally once a day (30 Nov 2024 17:29)  metFORMIN 1000 mg oral tablet: 1 tab(s) orally 2 times a day (30 Nov 2024 17:29)  metoprolol succinate 50 mg oral tablet, extended release: 1 tab(s) orally once a day (30 Nov 2024 17:29)  Trulicity Pen 1.5 mg/0.5 mL subcutaneous solution: 1.5 milligram(s) subcutaneously once a week (30 Nov 2024 17:29)      MEDICATIONS  (STANDING):  aspirin enteric coated 81 milliGRAM(s) Oral daily  atorvastatin 40 milliGRAM(s) Oral at bedtime  cefepime   IVPB      cefepime   IVPB 2000 milliGRAM(s) IV Intermittent every 8 hours  clopidogrel Tablet 75 milliGRAM(s) Oral daily  dextrose 5%. 1000 milliLiter(s) (50 mL/Hr) IV Continuous <Continuous>  dextrose 50% Injectable 25 Gram(s) IV Push once  gabapentin 300 milliGRAM(s) Oral three times a day  glucagon  Injectable 1 milliGRAM(s) IntraMuscular once  insulin glargine Injectable (LANTUS) 10 Unit(s) SubCutaneous at bedtime  insulin lispro (ADMELOG) corrective regimen sliding scale   SubCutaneous three times a day before meals  insulin lispro (ADMELOG) corrective regimen sliding scale   SubCutaneous at bedtime  lisinopril 40 milliGRAM(s) Oral daily  metoprolol succinate ER 50 milliGRAM(s) Oral daily  povidone iodine 10% Solution 1 Application(s) Topical daily    MEDICATIONS  (PRN):  acetaminophen     Tablet .. 650 milliGRAM(s) Oral every 6 hours PRN Mild Pain (1 - 3)  aluminum hydroxide/magnesium hydroxide/simethicone Suspension 30 milliLiter(s) Oral every 4 hours PRN Dyspepsia  dextrose Oral Gel 15 Gram(s) Oral once PRN Blood Glucose LESS THAN 70 milliGRAM(s)/deciliter  melatonin 3 milliGRAM(s) Oral at bedtime PRN Insomnia      Allergies    No Known Allergies    Intolerances        Social History:  Lives: with wife and son who is in college  ADLs: independent  Diet:  Alcohol Use: rare occasional  Tobacco Use: denies  Recreational Drug Use: denies (30 Nov 2024 17:09)      REVIEW OF SYSTEMS:  CONSTITUTIONAL: No fever, No chills, No fatigue, No myalgia, No Body ache, No Weakness  EYES: No eye pain,  No visual disturbances, No discharge, NO Redness  ENMT:  No ear pain, No nose bleed, No vertigo; No sinus pain, NO throat pain, No Congestion  NECK: No pain, No stiffness  RESPIRATORY: No cough, NO wheezing, No  hemoptysis, NO  shortness of breath  CARDIOVASCULAR: No chest pain, palpitations  GASTROINTESTINAL: No abdominal pain, NO epigastric pain. No nausea, No vomiting; No diarrhea, No constipation. [  ] BM  GENITOURINARY: No dysuria, No frequency, No urgency, No hematuria, NO incontinence  NEUROLOGICAL: No headaches, No dizziness, No numbness, No tingling, No tremors, No weakness  EXT: No Swelling, No Pain, No Edema  SKIN:  [ X ] No itching, burning, rashes, or lesions   MUSCULOSKELETAL: No joint pain ,No Jt swelling; No muscle pain, No back pain, No extremity pain  PSYCHIATRIC: No depression,  No anxiety,  No mood swings ,No difficulty sleeping at night  PAIN SCALE: [ X ] None  [  ] Other-  ROS Unable to obtain due to - [  ] Dementia  [  ] Lethargy [  ] Drowsy [  ] Sedated [  ] non verbal  REST OF REVIEW Of SYSTEM - [ X ] Normal       Vital Signs Last 24 Hrs  T(C): 36.5 (03 Dec 2024 05:08), Max: 36.9 (02 Dec 2024 16:15)  T(F): 97.7 (03 Dec 2024 05:08), Max: 98.4 (02 Dec 2024 16:15)  HR: 89 (03 Dec 2024 05:08) (70 - 89)  BP: 101/57 (03 Dec 2024 05:08) (101/57 - 152/63)  BP(mean): 97 (02 Dec 2024 20:04) (97 - 97)  RR: 18 (03 Dec 2024 05:08) (12 - 18)  SpO2: 94% (03 Dec 2024 05:08) (94% - 99%)    Parameters below as of 03 Dec 2024 05:08  Patient On (Oxygen Delivery Method): room air      Finger Stick        12-02 @ 07:01  -  12-03 @ 07:00  --------------------------------------------------------  IN: 465 mL / OUT: 300 mL / NET: 165 mL        PHYSICAL EXAM:  GENERAL:  [X  ] NAD , [ X ] well appearing, [  ] Agitated, [  ] Drowsy,  [  ] Lethargy, [  ] confused   HEAD:  [X  ] Normal, [  ] Other  EYES:  [ X ] EOMI, [ X ] PERRLA, [  ] conjunctiva and sclera clear normal, [  ] Other,  [  ] Pallor,[  ] Discharge  ENMT:  [X  ] Normal, [ X ] Moist mucous membranes, [X  ] Good dentition, [X  ] No Thrush  NECK:  [X  ] Supple, [  ] No JVD, [X  ] Normal thyroid, [  ] Lymphadenopathy [  ] Other  CHEST/LUNG:  [X  ] Clear to auscultation bilaterally, [X  ] Breath Sounds equal B/L / Decrease, [  ] poor effort  [X  ] No rales, [ X] No rhonchi  [X ]  No wheezing,   HEART:  [ X  ] Regular rate and rhythm, [  ] tachycardia, [  ] Bradycardia,  [  ] irregular  [ X ] No murmurs, No rubs, No gallops, [  ] PPM in place (Mfr:  )  ABDOMEN:  [ X ] Soft, [ X ] Nontender, [ X ] Nondistended, [  ] No mass, [  ] Bowel sounds present, [  ] obese, [ X] Umbilical hernia   NERVOUS SYSTEM:  [ X ] Alert & Oriented X3, [X  ] Nonfocal  [  ] Confusion  [  ] Encephalopathic [  ] Sedated [  ] Unable to assess, [  ] Dementia [  ] Other-  EXTREMITIES: [ X ] 2+ Peripheral Pulses, No clubbing, No cyanosis,  [  ] edema B/L lower EXT. [  ] PVD stasis skin changes B/L Lower EXT, [  ] wound  LYMPH: No lymphadenopathy noted  SKIN:  [  ] No rashes or lesions, [  ] Pressure Ulcers, [  ] ecchymosis, [  ] Skin Tears, [ x ] Other-Left foot lateral border wound + Dressing. No necrotic tissue, No odor , No Drainage . Bg toe dry wound [ x ] R groin surgiseal     DIET: Diet, Regular:   Consistent Carbohydrate Evening Snack  DASH/TLC Sodium & Cholesterol Restricted (12-02-24 @ 15:30)      LABS:                        14.7   16.74 )-----------( 282      ( 03 Dec 2024 08:18 )             44.2     03 Dec 2024 08:18    140    |  109    |  24     ----------------------------<  181    5.0     |  29     |  0.99     Ca    9.2        03 Dec 2024 08:18      PT/INR - ( 02 Dec 2024 06:42 )   PT: 10.6 sec;   INR: 0.90 ratio         PTT - ( 02 Dec 2024 06:42 )  PTT:28.9 sec  Urinalysis Basic - ( 03 Dec 2024 08:18 )    Color: x / Appearance: x / SG: x / pH: x  Gluc: 181 mg/dL / Ketone: x  / Bili: x / Urobili: x   Blood: x / Protein: x / Nitrite: x   Leuk Esterase: x / RBC: x / WBC x   Sq Epi: x / Non Sq Epi: x / Bacteria: x        Culture Results:   No growth at 48 Hours (11-30 @ 12:35)  Culture Results:   No growth at 48 Hours (11-30 @ 12:30)                  Culture - Blood (collected 30 Nov 2024 12:35)  Source: .Blood BLOOD  Preliminary Report (02 Dec 2024 18:02):    No growth at 48 Hours    Culture - Blood (collected 30 Nov 2024 12:30)  Source: .Blood BLOOD  Preliminary Report (02 Dec 2024 18:02):    No growth at 48 Hours         Anemia Panel:      Thyroid Panel:                RADIOLOGY & ADDITIONAL TESTS: N/A       HEALTH ISSUES - PROBLEM Dx:  Wound of left foot    DM (diabetes mellitus)    PAD (peripheral artery disease)    HTN (hypertension)    Need for prophylactic measure    Diabetic ulcer of left foot    Osteomyelitis of left foot            Consultant(s) Notes Reviewed:  [ X] YES     Care Discussed with [X] Consultants  [X  ] Patient  [ X ] Family [  ] HCP [X  ]   [X  ] Social Service  [  ] RN, [  ] Physical Therapy,[  ] Palliative care team  DVT PPX: [  ] Lovenox, [  ] S C Heparin, [  ] Coumadin, [  ] Xarelto, [  ] Eliquis, [  ] Pradaxa, [  ] IV Heparin drip, [ X ] SCD [  ] Contraindication 2 to GI Bleed,[ X  ] Ambulation [  ] Contraindicated 2 to  bleed [  ] Contraindicated 2 to Brain Bleed  Advanced directive: [ X ] None, [  ] DNR/DNI Patient is a 64y old  Male who presents with a chief complaint of L foot wound (03 Dec 2024 10:59)    HPI:  Pt is a 62 y/o M with PMHx DM2,PAD,  hx osteomyelitis s/p surgical amputation of R great toe 2023, CAD, PAD on plavix, HTN who presents to the hospital at instruction of wound care for a L foot wound. Pt reports he has been following with wound care with Dr. Stark and Dr. French, has been on Augmentin for approx 2 weeks. No systemic symptoms. Pt states he was told to come in this weekend for IV antibiotics and further evaluation of Left foot wound, . Pt reports feeling well and denies any fevers, chills, chest pain, shortness of breath, nausea, vomiting, diarrhea, constipation. Pt has been dressing the wound daily himself.  pt has had MRI left foot 1 week ago     ED Course:   Vitals: BP: 134/69, HR: 94, Temp: 134/69, RR: 18, SpO2: 99% on RA   Labs: WBC 11.65, lactate 2.1 -> 1, BUN 29, Glucose 215   CXR: No active chest disease  EKG: NSR @ 90bpm  Received in the ED: Zosyn, Vancomycin, 1L NS bolus   (30 Nov 2024 17:09)    INTERVAL HPI:  12/1: Pt seen, Examined, No Complaints , feels OK, IV Abx ,Nl WBC  12/2: Examined at bedside, has no complaints at this time. Plan for angio this morning/afternoon with vascular. Kept NPO, IV Abx   12/3: Examined at bedside, no complaints at this time. Vascular angiogram/plasty went well yesterday, no issues regarding procedure. Pt is eager to discuss with podiatry regarding surgical plans. Denies fever, chills, chest pain, palpitations, lower extremity pain or swelling. Rt u Ext PICC +      OVERNIGHT EVENTS: NONE     Home Medications:  atorvastatin 40 mg oral tablet: 1 tab(s) orally once a day (30 Nov 2024 17:29)  clopidogrel 75 mg oral tablet: 1 tab(s) orally once a day (30 Nov 2024 17:29)  gabapentin 300 mg oral capsule: 1 cap(s) orally 2 times a day (30 Nov 2024 17:28)  Jardiance 25 mg oral tablet: 1 tab(s) orally once a day (30 Nov 2024 17:29)  lisinopril 40 mg oral tablet: 1 tab(s) orally once a day (30 Nov 2024 17:29)  metFORMIN 1000 mg oral tablet: 1 tab(s) orally 2 times a day (30 Nov 2024 17:29)  metoprolol succinate 50 mg oral tablet, extended release: 1 tab(s) orally once a day (30 Nov 2024 17:29)  Trulicity Pen 1.5 mg/0.5 mL subcutaneous solution: 1.5 milligram(s) subcutaneously once a week (30 Nov 2024 17:29)      MEDICATIONS  (STANDING):  aspirin enteric coated 81 milliGRAM(s) Oral daily  atorvastatin 40 milliGRAM(s) Oral at bedtime  cefepime   IVPB      cefepime   IVPB 2000 milliGRAM(s) IV Intermittent every 8 hours  clopidogrel Tablet 75 milliGRAM(s) Oral daily  dextrose 5%. 1000 milliLiter(s) (50 mL/Hr) IV Continuous <Continuous>  dextrose 50% Injectable 25 Gram(s) IV Push once  gabapentin 300 milliGRAM(s) Oral three times a day  glucagon  Injectable 1 milliGRAM(s) IntraMuscular once  insulin glargine Injectable (LANTUS) 10 Unit(s) SubCutaneous at bedtime  insulin lispro (ADMELOG) corrective regimen sliding scale   SubCutaneous three times a day before meals  insulin lispro (ADMELOG) corrective regimen sliding scale   SubCutaneous at bedtime  lisinopril 40 milliGRAM(s) Oral daily  metoprolol succinate ER 50 milliGRAM(s) Oral daily  povidone iodine 10% Solution 1 Application(s) Topical daily    MEDICATIONS  (PRN):  acetaminophen     Tablet .. 650 milliGRAM(s) Oral every 6 hours PRN Mild Pain (1 - 3)  aluminum hydroxide/magnesium hydroxide/simethicone Suspension 30 milliLiter(s) Oral every 4 hours PRN Dyspepsia  dextrose Oral Gel 15 Gram(s) Oral once PRN Blood Glucose LESS THAN 70 milliGRAM(s)/deciliter  melatonin 3 milliGRAM(s) Oral at bedtime PRN Insomnia      Allergies    No Known Allergies    Intolerances        Social History:  Lives: with wife and son who is in college  ADLs: independent  Diet:  Alcohol Use: rare occasional  Tobacco Use: denies  Recreational Drug Use: denies (30 Nov 2024 17:09)      REVIEW OF SYSTEMS:  CONSTITUTIONAL: No fever, No chills, No fatigue, No myalgia, No Body ache, No Weakness  EYES: No eye pain,  No visual disturbances, No discharge, NO Redness  ENMT:  No ear pain, No nose bleed, No vertigo; No sinus pain, NO throat pain, No Congestion  NECK: No pain, No stiffness  RESPIRATORY: No cough, NO wheezing, No  hemoptysis, NO  shortness of breath  CARDIOVASCULAR: No chest pain, palpitations  GASTROINTESTINAL: No abdominal pain, NO epigastric pain. No nausea, No vomiting; No diarrhea, No constipation. [  ] BM  GENITOURINARY: No dysuria, No frequency, No urgency, No hematuria, NO incontinence  NEUROLOGICAL: No headaches, No dizziness, No numbness, No tingling, No tremors, No weakness  EXT: No Swelling, No Pain, No Edema  SKIN:  [ X ] No itching, burning, rashes, or lesions   MUSCULOSKELETAL: No joint pain ,No Jt swelling; No muscle pain, No back pain, No extremity pain  PSYCHIATRIC: No depression,  No anxiety,  No mood swings ,No difficulty sleeping at night  PAIN SCALE: [ X ] None  [  ] Other-  ROS Unable to obtain due to - [  ] Dementia  [  ] Lethargy [  ] Drowsy [  ] Sedated [  ] non verbal  REST OF REVIEW Of SYSTEM - [ X ] Normal       Vital Signs Last 24 Hrs  T(C): 36.5 (03 Dec 2024 05:08), Max: 36.9 (02 Dec 2024 16:15)  T(F): 97.7 (03 Dec 2024 05:08), Max: 98.4 (02 Dec 2024 16:15)  HR: 89 (03 Dec 2024 05:08) (70 - 89)  BP: 101/57 (03 Dec 2024 05:08) (101/57 - 152/63)  BP(mean): 97 (02 Dec 2024 20:04) (97 - 97)  RR: 18 (03 Dec 2024 05:08) (12 - 18)  SpO2: 94% (03 Dec 2024 05:08) (94% - 99%)    Parameters below as of 03 Dec 2024 05:08  Patient On (Oxygen Delivery Method): room air      Finger Stick        12-02 @ 07:01  -  12-03 @ 07:00  --------------------------------------------------------  IN: 465 mL / OUT: 300 mL / NET: 165 mL        PHYSICAL EXAM:  GENERAL:  [X  ] NAD , [ X ] well appearing, [  ] Agitated, [  ] Drowsy,  [  ] Lethargy, [  ] confused   HEAD:  [X  ] Normal, [  ] Other  EYES:  [ X ] EOMI, [ X ] PERRLA, [  ] conjunctiva and sclera clear normal, [  ] Other,  [  ] Pallor,[  ] Discharge  ENMT:  [X  ] Normal, [ X ] Moist mucous membranes, [X  ] Good dentition, [X  ] No Thrush  NECK:  [X  ] Supple, [  ] No JVD, [X  ] Normal thyroid, [  ] Lymphadenopathy [  ] Other  CHEST/LUNG:  [X  ] Clear to auscultation bilaterally, [X  ] Breath Sounds equal B/L / Decrease, [  ] poor effort  [X  ] No rales, [ X] No rhonchi  [X ]  No wheezing,   HEART:  [ X  ] Regular rate and rhythm, [  ] tachycardia, [  ] Bradycardia,  [  ] irregular  [ X ] No murmurs, No rubs, No gallops, [  ] PPM in place (Mfr:  )  ABDOMEN:  [ X ] Soft, [ X ] Nontender, [ X ] Nondistended, [ x ] No mass, [  x] Bowel sounds present, [  ] obese, [ X] Umbilical hernia   NERVOUS SYSTEM:  [ X ] Alert & Oriented X3, [X  ] Nonfocal  [  ] Confusion  [  ] Encephalopathic [  ] Sedated [  ] Unable to assess, [  ] Dementia [  ] Other-  EXTREMITIES: [ X ] 2+ Peripheral Pulses, No clubbing, No cyanosis,  [  ] edema B/L lower EXT. [  ] PVD stasis skin changes B/L Lower EXT, [ x ] wound-Left lateral foot necrotic wound -scab on rt big toe  LYMPH: No lymphadenopathy noted  SKIN:  [ x ] No rashes or lesions, [  ] Pressure Ulcers, [  ] ecchymosis, [  ] Skin Tears, [ x ] Other-Left foot lateral border wound + Dressing. No necrotic tissue, No odor , No Drainage . Bg toe dry wound [ x ] R groin surgiseal     DIET: Diet, Regular:   Consistent Carbohydrate Evening Snack  DASH/TLC Sodium & Cholesterol Restricted (12-02-24 @ 15:30)      LABS:                        14.7   16.74 )-----------( 282      ( 03 Dec 2024 08:18 )             44.2     03 Dec 2024 08:18    140    |  109    |  24     ----------------------------<  181    5.0     |  29     |  0.99     Ca    9.2        03 Dec 2024 08:18      PT/INR - ( 02 Dec 2024 06:42 )   PT: 10.6 sec;   INR: 0.90 ratio         PTT - ( 02 Dec 2024 06:42 )  PTT:28.9 sec  Urinalysis Basic - ( 03 Dec 2024 08:18 )    Color: x / Appearance: x / SG: x / pH: x  Gluc: 181 mg/dL / Ketone: x  / Bili: x / Urobili: x   Blood: x / Protein: x / Nitrite: x   Leuk Esterase: x / RBC: x / WBC x   Sq Epi: x / Non Sq Epi: x / Bacteria: x        Culture Results:   No growth at 48 Hours (11-30 @ 12:35)  Culture Results:   No growth at 48 Hours (11-30 @ 12:30)      Culture - Blood (collected 30 Nov 2024 12:35)  Source: .Blood BLOOD  Preliminary Report (02 Dec 2024 18:02):    No growth at 48 Hours    Culture - Blood (collected 30 Nov 2024 12:30)  Source: .Blood BLOOD  Preliminary Report (02 Dec 2024 18:02):    No growth at 48 Hours      RADIOLOGY & ADDITIONAL TESTS: N/A       HEALTH ISSUES - PROBLEM Dx:  Wound of left foot    DM (diabetes mellitus)    PAD (peripheral artery disease)    HTN (hypertension)    Need for prophylactic measure    Diabetic ulcer of left foot    Osteomyelitis of left foot      Consultant(s) Notes Reviewed:  [ X] YES     Care Discussed with [X] Consultants  [X  ] Patient  [ X ] Family [  ] HCP [X  ]   [  ] Social Service  [x  ] RN, [  ] Physical Therapy,[  ] Palliative care team  DVT PPX: [  ] Lovenox, [  ] S C Heparin, [  ] Coumadin, [  ] Xarelto, [  ] Eliquis, [  ] Pradaxa, [  ] IV Heparin drip, [ X ] SCD [  ] Contraindication 2 to GI Bleed,[ X  ] Ambulation [  ] Contraindicated 2 to  bleed [  ] Contraindicated 2 to Brain Bleed  Advanced directive: [ X ] None, [  ] DNR/DNI

## 2024-12-03 NOTE — PROGRESS NOTE ADULT - PROBLEM SELECTOR PLAN 2
Chronic, with foot  wounds  - glucose elevated on arrival  - hold home Trulicity, metformin Jardiance  - EVON; FS QAC QHS  - Lantus 10u QHS   - hypoglycemia protocol  - f/u AM A1c-8.2   - HOLD home meds  - Nutritional eval  - Neuropathy-On Gabapentin 300 mg 3x day  - Diabetic NP consulted, recs appreciated

## 2024-12-03 NOTE — DIETITIAN INITIAL EVALUATION ADULT - OTHER INFO
63 y/o male adm with left foot wound. Pt s/p OR POD #1 LLE angio and left PT/AT angioplasty. PMH right toe OM, PVD, HLD, HTN, DM. Pt visited at bedside this morning. Pt reports that he is eating well, tolerating meals. Reviewed consistent carb diet restriction with pt. Pt verbalized understanding. Pt tries to limit carbs at home. Has been on trulicity and noted his A1c was down to 7.0 (highest 9-10)Then pt was unable to obtain Trulicity 2/2 a shortage and his A1c increased. Pt back on Trulicity. Also takes Jardiance and metformin. Patient scheduled for left foot sharp excisional wound debridement and bone biopsy for tomorrow 12/4/24.  .

## 2024-12-03 NOTE — PATIENT CHOICE NOTE. - NSPTCHOICENOTES_GEN_ALL_CORE
D/C resource folder provided at bedside which includes lists for both rehab facilities and home care agencies and transportation letter advising on ambulance and ambulette transportation. CM reviewed folder during assessment. Pt has been previously known to [facility] [phone] and [facility] [phone]/ fax [phone] and would like to use their services again.

## 2024-12-03 NOTE — CASE MANAGEMENT PROGRESS NOTE - NSCMPROGRESSNOTE_GEN_ALL_CORE
Discussed pt on rounds, pt remains acute, for PICC line, on IV Cefepime. CM will continue to collaborate with interdisciplinary team and remain available to assist.

## 2024-12-03 NOTE — PROCEDURE NOTE - PROCEDURE FINDINGS AND DETAILS
43cm bard single lumen power picc inserted into patent right basilic vein under sterile conditions and image guidance.  Tip is in the SVC.  PICC can be accessed.

## 2024-12-03 NOTE — CONSULT NOTE ADULT - SUBJECTIVE AND OBJECTIVE BOX
[facility], Division of Infectious Diseases  OFELIA Harvey S. Shah, HEATHER Khoury [facility]  [phone]    LOIDA QUEZADA  64y, Male  [id number]    HPI--  HPI:  Pt is a 62 y/o M with PMHx DM2,PAD,  hx osteomyelitis s/p surgical amputation of R great toe 2023, CAD, PAD on plavix, HTN who presents to the hospital at instruction of wound care for a L foot wound. Pt reports he has been following with wound care with Dr. Stark and Dr. French, has been on Augmentin for approx 2 weeks. No systemic symptoms. Pt states he was told to come in this weekend for IV antibiotics and further evaluation of Left foot wound, . Pt reports feeling well and denies any fevers, chills, chest pain, shortness of breath, nausea, vomiting, diarrhea, constipation. Pt has been dressing the wound daily himself.  pt has had MRI left foot 1 week ago     ED Course:   Vitals: BP: 134/69, HR: 94, Temp: 134/69, RR: 18, SpO2: 99% on RA   Labs: WBC 11.65, lactate 2.1 -> 1, BUN 29, Glucose 215   CXR: No active chest disease  EKG: NSR @ 90bpm  Received in the ED: Zosyn, Vancomycin, 1L NS bolus   (30 Nov 2024 17:09)        Active Medications--  acetaminophen     Tablet .. 650 milliGRAM(s) Oral every 6 hours PRN  aluminum hydroxide/magnesium hydroxide/simethicone Suspension 30 milliLiter(s) Oral every 4 hours PRN  aspirin enteric coated 81 milliGRAM(s) Oral daily  atorvastatin 40 milliGRAM(s) Oral at bedtime  cefepime   IVPB      cefepime   IVPB 2000 milliGRAM(s) IV Intermittent every 8 hours  chlorhexidine 4% Liquid 1 Application(s) Topical <User Schedule>  clopidogrel Tablet 75 milliGRAM(s) Oral daily  dextrose 5%. 1000 milliLiter(s) IV Continuous <Continuous>  dextrose 50% Injectable 25 Gram(s) IV Push once  dextrose Oral Gel 15 Gram(s) Oral once PRN  gabapentin 300 milliGRAM(s) Oral three times a day  glucagon  Injectable 1 milliGRAM(s) IntraMuscular once  insulin glargine Injectable (LANTUS) 10 Unit(s) SubCutaneous at bedtime  insulin lispro (ADMELOG) corrective regimen sliding scale   SubCutaneous three times a day before meals  insulin lispro (ADMELOG) corrective regimen sliding scale   SubCutaneous at bedtime  lisinopril 40 milliGRAM(s) Oral daily  melatonin 3 milliGRAM(s) Oral at bedtime PRN  metoprolol succinate ER 50 milliGRAM(s) Oral daily  povidone iodine 10% Solution 1 Application(s) Topical daily  sodium chloride 0.9% lock flush 10 milliLiter(s) IV Push every 1 hour PRN    Antimicrobials:   cefepime   IVPB      cefepime   IVPB 2000 milliGRAM(s) IV Intermittent every 8 hours    Immunologic:     ROS:  CONSTITUTIONAL: No fevers or chills. No weakness or headache. No weight changes.  EYES/ENT: No visual or hearing changes. No sore throat or throat pain .  NECK: No pain or stiffness  RESPIRATORY: No cough, wheezing, or hemoptysis. No shortness of breath  CARDIOVASCULAR: No chest pain or palpitations  GASTROINTESTINAL: No abdominal pain. No nausea or vomiting. No diarrhea or constipation.  GENITOURINARY: No dysuria, frequency or hematuria  NEUROLOGICAL: No numbness or weakness  SKIN: No itching or rashes  PSYCHIATRIC: Pleasant. Appropriate affect    Allergies: No Known Allergies    PMH -- HTN (hypertension)    Diabetes    Hyperlipidemia    Peripheral vascular disease    PVD (peripheral vascular disease)    Toe osteomyelitis, right      PSH -- H/O angioplasty    Status post amputation of toe      FH -- FH: type 2 diabetes      Social History --  EtOH: denies   Tobacco: denies   Drug Use: denies     Travel/Environmental/Occupational History:    Physical Exam--  Vital Signs Last 24 Hrs  T(F): 98.3 (03 Dec 2024 12:02), Max: 98.4 (02 Dec 2024 16:15)  HR: 103 (03 Dec 2024 12:02) (71 - 103)  BP: 151/83 (03 Dec 2024 12:02) (101/57 - 152/63)  RR: 20 (03 Dec 2024 12:02) (12 - 20)  SpO2: 97% (03 Dec 2024 12:02) (94% - 99%)  General: nontoxic-appearing, no acute distress  HEENT: NC/AT, EOMI,  Lungs: Clear bilaterally without rales, wheezing or rhonchi  Heart: Regular rate and rhythm. No murmur, rub or gallop.  Abdomen: Soft. Nondistended. Nontender.   Extremities: foot in dressing.   Skin: Warm. Dry. Good turgor.     Laboratory & Imaging Data:  CBC:                       14.7   16.74 )-----------( 282      ( 03 Dec 2024 08:18 )             44.2     CMP: 12-03    140  |  109[H]  |  24[H]  ----------------------------<  181[H]  5.0   |  29  |  0.99    Ca    9.2      03 Dec 2024 08:18        Urinalysis Basic - ( 03 Dec 2024 08:18 )    Color: x / Appearance: x / SG: x / pH: x  Gluc: 181 mg/dL / Ketone: x  / Bili: x / Urobili: x   Blood: x / Protein: x / Nitrite: x   Leuk Esterase: x / RBC: x / WBC x   Sq Epi: x / Non Sq Epi: x / Bacteria: x        Microbiology: reviewed    Culture - Blood (collected 11-30-24 @ 12:35)  Source: .Blood BLOOD  Preliminary Report (12-02-24 @ 18:02):    No growth at 48 Hours    Culture - Blood (collected 11-30-24 @ 12:30)  Source: .Blood BLOOD  Preliminary Report (12-02-24 @ 18:02):    No growth at 48 Hours    Culture - Wound Aerobic (collected 11-25-24 @ 16:50)  Source: Skin/Wound  Final Report (11-27-24 @ 16:29):    Numerous Serratia marcescens  Organism: Serratia marcescens (11-27-24 @ 16:29)  Organism: Serratia marcescens (11-27-24 @ 16:29)      Method Type: GOLDNE      -  Amoxicillin/Clavulanic Acid: R >16/8      -  Ampicillin: R <=8 These ampicillin results predict results for amoxicillin      -  Ampicillin/Sulbactam: R 16/8      -  Aztreonam: S <=4      -  Cefazolin: R >16      -  Cefepime: S <=2      -  Cefoxitin: R 16      -  Ceftriaxone: S <=1      -  Ciprofloxacin: S <=0.25      -  Ertapenem: S <=0.5      -  Gentamicin: S <=2      -  Levofloxacin: S <=0.5      -  Meropenem: S <=1      -  Piperacillin/Tazobactam: S <=8      -  Tobramycin: S <=2      -  Trimethoprim/Sulfamethoxazole: S <=0.5/9.5          Radiology: reviewed

## 2024-12-03 NOTE — CONSULT NOTE ADULT - ASSESSMENT
Pt is a 64 y/o M with PMHx DM2,  hx osteomyelitis rt big toe s/p surgical amputation of R great toe 2023, CAD, PAD on plavix, HTN who presents to the hospital at instruction of wound care for a L foot lateral border  wound, adm for L foot wound.-cellulitis , possible OM      L foot wound/OM  Pt presenting with L lateral  foot  wound, s/p outpatient course of augmenting x14 days- OM on out pt MRI 11/23   WBC mildly elevated 11.65, out pt wound c/s + Serratia   Outpatient wound culture results from outpatient wound care 11/25 with Serratia susceptibility to cefepime  S/P LLE Angio and angioplasty; started on ASA in addition to Plavix   OR w/ podiatry tomorrow 12/4    Recommendations:   C/w cefepime 2gm q28h  OR tomorrow  F/u OR cx  F/u path  Pt already got PICC line; will likely plan cefepime for long term  Final recs to follow pending OR results    D/w Dr. Calhoun  Infectious Diseases will follow. Please call with any questions.  Melany Calhoun M.D.  [facility] Division of Infectious Diseases [phone]  For after 5 P.M. and weekends, please call [phone]  Available on [employer] TEAMS

## 2024-12-03 NOTE — CONSULT NOTE ADULT - NS ATTEND AMEND GEN_ALL_CORE FT
Chronic cervical radiculopathy, currently on percocet bid and medical marijuana.  Her pain management doctor moved to [facility] and she does not want to drive that far to get her pain meds.  She has been getting her medical marijuana card through a local doctor who requires her to be seen every 3 months.  Had ablation of C5 on December by pain management.  Currently on oxycodone-acetaminophen 5-325 mg bid along with prn medical marijuana gummies.      Bilateral feet pain for several month.  Left heel and right lateral at base of fifth metatarsal.    Has gained weight since stopping smoking 2 years ago.  Has not been exercising and not really watching her diet much.     O:   Vitals:    01/15/24 0744   BP: 130/72   Pulse: 94   Temp: (!) 95.3 °F (35.2 °C)   SpO2: 95%     No acute distress.  Alert and Oriented x 3, obese  HEENT: PERRLA, EOMI, Conjunctiva clear  NECK: without thyromegaly, lymphadenopathy, JVD  LUNGS:Clear to ascultation bilaterally.  Breathing comfortably  CARDIOVASCULAR:  Regular rate and rhythm, no murmurs, rubs, or gallops  EXTREMITY: Full range of motion. No clubbing/cyanosis/edema  SKIN: Warm, Dry, No rash.  PSYCH: Mood and Affect normal.    A:    Diagnosis Orders   1. Chronic pain syndrome  Pain Management Drug Screen   Controlled   2. Degeneration of cervical intervertebral disc  oxyCODONE-acetaminophen (PERCOCET) 5-325 MG per tablet   Controlled   3. Chronic prescription opiate use  Pain Management Drug Screen      4. Plantar fasciitis, left     Needs improvement   5. Right foot pain     Needs improvement   6. Class 1 obesity due to excess calories without serious comorbidity with body mass index (BMI) of 31.0 to 31.9 in adult     Needs improvement     P: Continue Percocet 5-325 mg 1 tablet twice daily as needed for pain due to effectiveness.  Home exercises given for plantar fasciitis and foot pain.  Patient will get insoles with heel pads for her shoes.  Discussed diet and exercise for 
pt seen 12/1/24 non healing ulcer left lateral foot , 1st toe ulcer + infection palpable pop pulse but non palpable dp/pt pulses + occlusive disease on FRANCISCA/PVR plan for LLE angio tomorrow .
63 yo M with PMH CAD, PAD on plavix, DVT( 11/2034, now off eliquis) HTN, HLD, Type II DM, hx of OM s/p R toe amputation presents w/ non healing L foot wound. Cardiology consulted for Pre op optimization.    - presents with L lateral foot wound, OM on outpt MRI 12/23  - Id following, on iv antibiotics  - picc placement 12/3  -podiatry following, plan for debridement and bone biopsy 12/4, with duration of abx to be determined  -11/30/24 EKG SR, no evidence of acute ischemia noted  -11/30/24 CXR w/o pleural effusion or pneumothorax.  - Echocardiography January 2, 2024. This revealed a normal ejection fraction with minimal mitral regurgitation.  - Pharmacologic nuclear stress testing was performed January 24, 2024. This revealed no evidence of ischemia  - bp and hr controlled and stable, continue home metoprolol and lisinopril  - continue asa and statin for known CAD  - known PAD, S/P angiogram/plasty 12/2/24 with vascular, started ASA 81mg, continue Plavix 75mg QD   -Pt has no active ischemia, decompensated heart failure, unstable arrythmia, or severe stenotic valvular disease. Patient is optimized from cardiovascular standpoint to proceed with planned procedure with routine hemodynamic monitoring.

## 2024-12-03 NOTE — DIETITIAN INITIAL EVALUATION ADULT - NS FNS DIET ORDER
Diet, Regular:   Consistent Carbohydrate {Evening Snack}  DASH/TLC {Sodium & Cholesterol Restricted} (12-02-24 @ 15:30)

## 2024-12-03 NOTE — CONSULT NOTE ADULT - SUBJECTIVE AND OBJECTIVE BOX
[facility] - Vlad Gallardo, Otoniel Bliss Goodger, David Riley  Office Number: [phone]    Initial Consult Note    CHIEF COMPLAINT: Patient is a 64y old  Male who presents with a chief complaint of L foot wound (03 Dec 2024 11:26)      HPI:  Pt is a 62 y/o M with PMHx DM2,PAD,  hx osteomyelitis s/p surgical amputation of R great toe 2023, CAD, PAD on plavix, HTN who presents to the hospital at instruction of wound care for a L foot wound. Pt reports he has been following with wound care with Dr. Stark and Dr. French, has been on Augmentin for approx 2 weeks. No systemic symptoms. Pt states he was told to come in this weekend for IV antibiotics and further evaluation of Left foot wound, . Pt reports feeling well and denies any fevers, chills, chest pain, shortness of breath, nausea, vomiting, diarrhea, constipation. Pt has been dressing the wound daily himself.  pt has had MRI left foot 1 week ago     ED Course:   Vitals: BP: 134/69, HR: 94, Temp: 134/69, RR: 18, SpO2: 99% on RA   Labs: WBC 11.65, lactate 2.1 -> 1, BUN 29, Glucose 215   CXR: No active chest disease  EKG: NSR @ 90bpm  Received in the ED: Zosyn, Vancomycin, 1L NS bolus   (30 Nov 2024 17:09)    PAST MEDICAL & SURGICAL HISTORY:  HTN (hypertension)      Diabetes      Hyperlipidemia      PVD (peripheral vascular disease)      Toe osteomyelitis, right      H/O angioplasty  RLE      Status post amputation of toe        SOCIAL HISTORY:  No tobacco, ethanol, or drug abuse.  FAMILY HISTORY:  FH: type 2 diabetes  Grandfather      No family history of acute MI or sudden cardiac death.  MEDICATIONS  (STANDING):  aspirin enteric coated 81 milliGRAM(s) Oral daily  atorvastatin 40 milliGRAM(s) Oral at bedtime  cefepime   IVPB      cefepime   IVPB 2000 milliGRAM(s) IV Intermittent every 8 hours  clopidogrel Tablet 75 milliGRAM(s) Oral daily  dextrose 5%. 1000 milliLiter(s) (50 mL/Hr) IV Continuous <Continuous>  dextrose 50% Injectable 25 Gram(s) IV Push once  gabapentin 300 milliGRAM(s) Oral three times a day  glucagon  Injectable 1 milliGRAM(s) IntraMuscular once  insulin glargine Injectable (LANTUS) 10 Unit(s) SubCutaneous at bedtime  insulin lispro (ADMELOG) corrective regimen sliding scale   SubCutaneous three times a day before meals  insulin lispro (ADMELOG) corrective regimen sliding scale   SubCutaneous at bedtime  lisinopril 40 milliGRAM(s) Oral daily  metoprolol succinate ER 50 milliGRAM(s) Oral daily  povidone iodine 10% Solution 1 Application(s) Topical daily    MEDICATIONS  (PRN):  acetaminophen     Tablet .. 650 milliGRAM(s) Oral every 6 hours PRN Mild Pain (1 - 3)  aluminum hydroxide/magnesium hydroxide/simethicone Suspension 30 milliLiter(s) Oral every 4 hours PRN Dyspepsia  dextrose Oral Gel 15 Gram(s) Oral once PRN Blood Glucose LESS THAN 70 milliGRAM(s)/deciliter  melatonin 3 milliGRAM(s) Oral at bedtime PRN Insomnia    Allergies    No Known Allergies    Intolerances      REVIEW OF SYSTEMS:  All other review of systems is negative unless indicated above  VITAL SIGNS:   Vital Signs Last 24 Hrs  T(C): 36.5 (03 Dec 2024 05:08), Max: 36.9 (02 Dec 2024 16:15)  T(F): 97.7 (03 Dec 2024 05:08), Max: 98.4 (02 Dec 2024 16:15)  HR: 89 (03 Dec 2024 05:08) (70 - 89)  BP: 101/57 (03 Dec 2024 05:08) (101/57 - 152/63)  BP(mean): 97 (02 Dec 2024 20:04) (97 - 97)  RR: 18 (03 Dec 2024 05:08) (12 - 18)  SpO2: 94% (03 Dec 2024 05:08) (94% - 99%)    Parameters below as of 03 Dec 2024 05:08  Patient On (Oxygen Delivery Method): room air      I&O's Summary    02 Dec 2024 07:01  -  03 Dec 2024 07:00  --------------------------------------------------------  IN: 465 mL / OUT: 300 mL / NET: 165 mL      On Exam:  Constitutional: NAD, alert and oriented x 3  Lungs:  Non-labored, breath sounds are clear bilaterally, No wheezing, rales or rhonchi  Cardiovascular: RRR.  S1 and S2 positive.  No murmurs, rubs, gallops or clicks  Gastrointestinal: Bowel Sounds present, soft, nontender.   Extremities: No peripheral edema.   Neurological: Alert, no focal deficits  Skin: No rashes or ulcers   Psych:  Mood & affect appropriate.    LABS: All Labs Reviewed:                        14.7   16.74 )-----------( 282      ( 03 Dec 2024 08:18 )             44.2                         13.2   9.23  )-----------( 245      ( 02 Dec 2024 06:42 )             40.1                         14.4   8.93  )-----------( 272      ( 01 Dec 2024 07:31 )             43.1     03 Dec 2024 08:18    140    |  109    |  24     ----------------------------<  181    5.0     |  29     |  0.99   02 Dec 2024 06:42    139    |  107    |  25     ----------------------------<  192    5.3     |  32     |  0.93   01 Dec 2024 07:31    140    |  108    |  22     ----------------------------<  164    5.0     |  30     |  0.76     Ca    9.2        03 Dec 2024 08:18  Ca    9.2        02 Dec 2024 06:42  Ca    9.4        01 Dec 2024 07:31    TPro  7.4    /  Alb  3.9    /  TBili  0.2    /  DBili  x      /  AST  16     /  ALT  35     /  AlkPhos  81     30 Nov 2024 12:30    PT/INR - ( 02 Dec 2024 06:42 )   PT: 10.6 sec;   INR: 0.90 ratio         PTT - ( 02 Dec 2024 06:42 )  PTT:28.9 sec      Blood Culture: Organism --  Gram Stain Blood -- Gram Stain --  Specimen Source .Blood BLOOD  Culture-Blood --    Organism --  Gram Stain Blood -- Gram Stain --  Specimen Source .Blood BLOOD  Culture-Blood --            RADIOLOGY:    EKG:       [facility] - Vlad Gallardo, Otoniel Bliss, Osvaldo Solis Cohen  Office Number: [phone]    Initial Consult Note    CHIEF COMPLAINT: Patient is a 64y old  Male who presents with a chief complaint of L foot wound (03 Dec 2024 11:26)      HPI:  65 yo M with PMH CAD, PAD on plavix, DVT( 11/2034, now off eliquis) HTN, HLD, Type II DM, hx of OM s/p R toe amputation presents w/ non healing L foot wound. Cardiology consulted for Pre op optimization. Pt reports he has been following with wound care with Dr. Stark and Dr. French, has been on Augmentin for approx 2 weeks. No systemic symptoms. Pt states he was told to come in this weekend for IV antibiotics and further evaluation of Left foot wound, . Pt reports feeling well and denies any fevers, chills, chest pain, shortness of breath, nausea, vomiting, diarrhea, constipation. Pt has been dressing the wound daily himself.  pt has had MRI left foot 1 week ago     ED Course:   Vitals: BP: 134/69, HR: 94, Temp: 134/69, RR: 18, SpO2: 99% on RA   Labs: WBC 11.65, lactate 2.1 -> 1, BUN 29, Glucose 215   CXR: No active chest disease  EKG: NSR @ 90bpm  Received in the ED: Zosyn, Vancomycin, 1L NS bolus   (30 Nov 2024 17:09)    PAST MEDICAL & SURGICAL HISTORY:  HTN (hypertension)      Diabetes      Hyperlipidemia      PVD (peripheral vascular disease)      Toe osteomyelitis, right      H/O angioplasty  RLE      Status post amputation of toe        SOCIAL HISTORY:  No tobacco, ethanol, or drug abuse.  FAMILY HISTORY:  FH: type 2 diabetes  Grandfather      No family history of acute MI or sudden cardiac death.  MEDICATIONS  (STANDING):  aspirin enteric coated 81 milliGRAM(s) Oral daily  atorvastatin 40 milliGRAM(s) Oral at bedtime  cefepime   IVPB      cefepime   IVPB 2000 milliGRAM(s) IV Intermittent every 8 hours  clopidogrel Tablet 75 milliGRAM(s) Oral daily  dextrose 5%. 1000 milliLiter(s) (50 mL/Hr) IV Continuous <Continuous>  dextrose 50% Injectable 25 Gram(s) IV Push once  gabapentin 300 milliGRAM(s) Oral three times a day  glucagon  Injectable 1 milliGRAM(s) IntraMuscular once  insulin glargine Injectable (LANTUS) 10 Unit(s) SubCutaneous at bedtime  insulin lispro (ADMELOG) corrective regimen sliding scale   SubCutaneous three times a day before meals  insulin lispro (ADMELOG) corrective regimen sliding scale   SubCutaneous at bedtime  lisinopril 40 milliGRAM(s) Oral daily  metoprolol succinate ER 50 milliGRAM(s) Oral daily  povidone iodine 10% Solution 1 Application(s) Topical daily    MEDICATIONS  (PRN):  acetaminophen     Tablet .. 650 milliGRAM(s) Oral every 6 hours PRN Mild Pain (1 - 3)  aluminum hydroxide/magnesium hydroxide/simethicone Suspension 30 milliLiter(s) Oral every 4 hours PRN Dyspepsia  dextrose Oral Gel 15 Gram(s) Oral once PRN Blood Glucose LESS THAN 70 milliGRAM(s)/deciliter  melatonin 3 milliGRAM(s) Oral at bedtime PRN Insomnia    Allergies    No Known Allergies    Intolerances      REVIEW OF SYSTEMS:  All other review of systems is negative unless indicated above  VITAL SIGNS:   Vital Signs Last 24 Hrs  T(C): 36.5 (03 Dec 2024 05:08), Max: 36.9 (02 Dec 2024 16:15)  T(F): 97.7 (03 Dec 2024 05:08), Max: 98.4 (02 Dec 2024 16:15)  HR: 89 (03 Dec 2024 05:08) (70 - 89)  BP: 101/57 (03 Dec 2024 05:08) (101/57 - 152/63)  BP(mean): 97 (02 Dec 2024 20:04) (97 - 97)  RR: 18 (03 Dec 2024 05:08) (12 - 18)  SpO2: 94% (03 Dec 2024 05:08) (94% - 99%)    Parameters below as of 03 Dec 2024 05:08  Patient On (Oxygen Delivery Method): room air      I&O's Summary    02 Dec 2024 07:01  -  03 Dec 2024 07:00  --------------------------------------------------------  IN: 465 mL / OUT: 300 mL / NET: 165 mL      On Exam:  Constitutional: NAD, alert and oriented x 3  Lungs:  Non-labored, breath sounds are clear bilaterally, No wheezing, rales or rhonchi  Cardiovascular: RRR.  S1 and S2 positive.  No murmurs, rubs, gallops or clicks  Gastrointestinal: Bowel Sounds present, soft, nontender.   Extremities: No peripheral edema.   Neurological: Alert, no focal deficits  Skin: No rashes or ulcers   Psych:  Mood & affect appropriate.    LABS: All Labs Reviewed:                        14.7   16.74 )-----------( 282      ( 03 Dec 2024 08:18 )             44.2                         13.2   9.23  )-----------( 245      ( 02 Dec 2024 06:42 )             40.1                         14.4   8.93  )-----------( 272      ( 01 Dec 2024 07:31 )             43.1     03 Dec 2024 08:18    140    |  109    |  24     ----------------------------<  181    5.0     |  29     |  0.99   02 Dec 2024 06:42    139    |  107    |  25     ----------------------------<  192    5.3     |  32     |  0.93   01 Dec 2024 07:31    140    |  108    |  22     ----------------------------<  164    5.0     |  30     |  0.76     Ca    9.2        03 Dec 2024 08:18  Ca    9.2        02 Dec 2024 06:42  Ca    9.4        01 Dec 2024 07:31    TPro  7.4    /  Alb  3.9    /  TBili  0.2    /  DBili  x      /  AST  16     /  ALT  35     /  AlkPhos  81     30 Nov 2024 12:30    PT/INR - ( 02 Dec 2024 06:42 )   PT: 10.6 sec;   INR: 0.90 ratio         PTT - ( 02 Dec 2024 06:42 )  PTT:28.9 sec      Blood Culture: Organism --  Gram Stain Blood -- Gram Stain --  Specimen Source .Blood BLOOD  Culture-Blood --    Organism --  Gram Stain Blood -- Gram Stain --  Specimen Source .Blood BLOOD  Culture-Blood --            cardiac workup:  Conclusions:  1.  The ECG is negative for ischemia.  2.  The patient underwent stress testing using pharmacological IV regadenoson (bolus injection, 400mcg   over 10 to 20 seconds followed by 5ml flush) protocol.  •   The total procedure time was 4 minutes . The test was stopped due to completion of protocol.  •   The peak heart rate was 99 bpm: 64 % of the patient's predicted maximal heart rate. The heart rate   response was normal pharmocologic heart rate response.  •   There was a normal pharmocologic blood pressure response with a maximum blood pressure of   150/64 mmHg.  3.  Qualitative Perfusion:  -  small  -  sized, mild defect(s) in the basal inferior wall that is fixed, partially normalizes with prone   imaging suggestive of diaphragmatic attenuation artifact.  4.  The left ventricular EF% during stress is 63 %. The stress end diastolic volume is 101 ml and systolic   volume is 37 ml.      CONCLUSIONS:  1.  Left ventricular systolic function is normal with an ejection fraction of 61 % by May's method of   disks. There are no regional wall motion abnormalities seen.  2.  There is normal LV mass and concentric remodeling.  3.  Normal left ventricular diastolic function.  4.  Normal right ventricular cavity size and systolic function.  5.  The left atrium is normal.  6.  There is mild calcification of the mitral valve annulus.  7.  Mitral valve leaflets are diffusely calcified.  8.  Trace mitral regurgitation.  9.  Trileaflet aortic valve with normal systolic excursion. There is mild calcification of the aortic valve   leaflets.  10.  No prior echocardiogram is available for comparison.      EKG: NSR

## 2024-12-03 NOTE — CONSULT NOTE ADULT - SUBJECTIVE AND OBJECTIVE BOX
HPI:  Pt is a 62 y/o M with PMHx DM2,PAD,  hx osteomyelitis s/p surgical amputation of R great toe 2023, CAD, PAD on plavix, HTN who presents to the hospital at instruction of wound care for a L foot wound. Pt reports he has been following with wound care with Dr. Stark and Dr. French, has been on Augmentin for approx 2 weeks. No systemic symptoms. Pt states he was told to come in this weekend for IV antibiotics and further evaluation of Left foot wound, . Pt reports feeling well and denies any fevers, chills, chest pain, shortness of breath, nausea, vomiting, diarrhea, constipation. Pt has been dressing the wound daily himself.  pt has had MRI left foot 1 week ago     ED Course:   Vitals: BP: 134/69, HR: 94, Temp: 134/69, RR: 18, SpO2: 99% on RA   Labs: WBC 11.65, lactate 2.1 -> 1, BUN 29, Glucose 215   CXR: No active chest disease  EKG: NSR @ 90bpm  Received in the ED: Zosyn, Vancomycin, 1L NS bolus   (30 Nov 2024 17:09)      64y year old Male seen at [facility] 2EAS 207 W1 for  left foot diabetic ulcer with necrotic eschar. Patient was recently seen at the [facility] and was advised to proceed to the ER for IV antibiotics. Patient needed time off from work and so returned to the ER on Satuday for Iv antibiotics. Patient underwent vascular procedure yesterday.   Denies any fever, chills, nausea, vomiting, chest pain, shortness of breath, or calf pain at this time.    REVIEW OF SYSTEMS    PAST MEDICAL & SURGICAL HISTORY:  HTN (hypertension)      Diabetes      Hyperlipidemia      PVD (peripheral vascular disease)      Toe osteomyelitis, right      H/O angioplasty  RLE      Status post amputation of toe          Allergies    No Known Allergies    Intolerances        MEDICATIONS  (STANDING):  aspirin enteric coated 81 milliGRAM(s) Oral daily  atorvastatin 40 milliGRAM(s) Oral at bedtime  cefepime   IVPB      cefepime   IVPB 2000 milliGRAM(s) IV Intermittent every 8 hours  clopidogrel Tablet 75 milliGRAM(s) Oral daily  dextrose 5%. 1000 milliLiter(s) (50 mL/Hr) IV Continuous <Continuous>  dextrose 50% Injectable 25 Gram(s) IV Push once  gabapentin 300 milliGRAM(s) Oral three times a day  glucagon  Injectable 1 milliGRAM(s) IntraMuscular once  insulin glargine Injectable (LANTUS) 10 Unit(s) SubCutaneous at bedtime  insulin lispro (ADMELOG) corrective regimen sliding scale   SubCutaneous three times a day before meals  insulin lispro (ADMELOG) corrective regimen sliding scale   SubCutaneous at bedtime  lisinopril 40 milliGRAM(s) Oral daily  metoprolol succinate ER 50 milliGRAM(s) Oral daily  povidone iodine 10% Solution 1 Application(s) Topical daily    MEDICATIONS  (PRN):  acetaminophen     Tablet .. 650 milliGRAM(s) Oral every 6 hours PRN Mild Pain (1 - 3)  aluminum hydroxide/magnesium hydroxide/simethicone Suspension 30 milliLiter(s) Oral every 4 hours PRN Dyspepsia  dextrose Oral Gel 15 Gram(s) Oral once PRN Blood Glucose LESS THAN 70 milliGRAM(s)/deciliter  melatonin 3 milliGRAM(s) Oral at bedtime PRN Insomnia      Social History:  Lives: with wife and son who is in college  ADLs: independent  Diet:  Alcohol Use: rare occasional  Tobacco Use: denies  Recreational Drug Use: denies (30 Nov 2024 17:09)      FAMILY HISTORY:  FH: type 2 diabetes  Grandfather        Vital Signs Last 24 Hrs  T(C): 36.5 (03 Dec 2024 05:08), Max: 36.9 (02 Dec 2024 16:15)  T(F): 97.7 (03 Dec 2024 05:08), Max: 98.4 (02 Dec 2024 16:15)  HR: 89 (03 Dec 2024 05:08) (70 - 89)  BP: 101/57 (03 Dec 2024 05:08) (101/57 - 168/72)  BP(mean): 97 (02 Dec 2024 20:04) (97 - 97)  RR: 18 (03 Dec 2024 05:08) (12 - 18)  SpO2: 94% (03 Dec 2024 05:08) (94% - 99%)    Parameters below as of 03 Dec 2024 05:08  Patient On (Oxygen Delivery Method): room air        PHYSICAL EXAM:  Vascular: DP & PT non  palpable bilaterally, Capillary refill 3 seconds left foot,, Digital hair absent bilaterally  Neurological: Light touch sensation diminished  bilaterally  Musculoskeletal: 5/5 strength in all quadrants bilaterally, AJ & STJ ROM intact, hx of TMA to the right foot   Dermatological: 1.5cm x 1.0 cm x 0.1cm necrotic eschar to the lateral base opf the left 5th metatarsal, mild  periwound erythema, no fluctuance, no malodor, no proximal streaking at this time    CBC Full  -  ( 03 Dec 2024 08:18 )  WBC Count : 16.74 K/uL  RBC Count : 4.78 M/uL  Hemoglobin : 14.7 g/dL  Hematocrit : 44.2 %  Platelet Count - Automated : 282 K/uL  Mean Cell Volume : 92.5 fl  Mean Cell Hemoglobin : 30.8 pg  Mean Cell Hemoglobin Concentration : 33.3 g/dL  Auto Neutrophil # : 14.87 K/uL  Auto Lymphocyte # : 0.88 K/uL  Auto Monocyte # : 0.36 K/uL  Auto Eosinophil # : 0.49 K/uL  Auto Basophil # : 0.02 K/uL  Auto Neutrophil % : 88.8 %  Auto Lymphocyte % : 5.3 %  Auto Monocyte % : 2.2 %  Auto Eosinophil % : 2.9 %  Auto Basophil % : 0.1 %      12-03    140  |  109[H]  |  24[H]  ----------------------------<  181[H]  5.0   |  29  |  0.99    Ca    9.2      03 Dec 2024 08:18        PT/INR - ( 02 Dec 2024 06:42 )   PT: 10.6 sec;   INR: 0.90 ratio                            14.7   16.74 )-----------( 282      ( 03 Dec 2024 08:18 )             44.2      PTT - ( 02 Dec 2024 06:42 )  PTT:28.9 sec      Culture - Blood (collected 30 Nov 2024 12:35)  Source: .Blood BLOOD  Preliminary Report (02 Dec 2024 18:02):    No growth at 48 Hours    Culture - Blood (collected 30 Nov 2024 12:30)  Source: .Blood BLOOD  Preliminary Report (02 Dec 2024 18:02):    No growth at 48 Hours        Imaging: ----------    1 / 1 Kristal Sky              Report date: 12/1/2024      View Order      (Report matches exam selected in Patient History pane)                     ACC: [id number] EXAM: MR FOOT LT ORDERED BY: BOSSMAN PITTMAN    PROCEDURE DATE: 12/01/2024        INTERPRETATION: MR FOOT LEFT dated 12/1/2024 12:46 PM    INDICATION: Left foot wound    COMPARISON: Left foot MRI dated 11/23/2024    TECHNIQUE: Multi-sequential, multiplanar MRI imaging of the left midfoot and forefoot was performed per standard protocol.    FINDINGS:    BONE MARROW: Subchondral edema and small subchondral cysts at the navicula/middle cuneiform. Mild cartilage loss with subchondral cysts at the second and third tarsometatarsal joints. Patchy marrow edema within the first distal phalanx. Moderate patchy edema within the fifth middle and distal phalanx. Persistent patchy edema at the base of the fifth metatarsal. Overlying skin wound in this region. Mild patchy marrow edema again noted within the second and third middle phalanges.  SYNOVIUM/ JOINT FLUID: No joint effusion  TENDONS: Intact tendons. No tenosynovitis.  MUSCLES: Mild atrophy and moderate edema throughout the musculature. Nonspecific but may reflect neuropathic change.  NEUROVASCULAR STRUCTURES: Preserved  SUBCUTANEOUS SOFT TISSUES: There is soft tissue swelling about the foot. Suspect a skin wound at the distal first digit. No drainable collection. Possible skin wound along the fifth distal digit. Overlying skin wound adjacent to the fifth metatarsal base.    IMPRESSION:    1. Skin wound at the great toe with deep soft tissue swelling. No drainable collection.  2. Abnormal marrow signal within the first distal phalangeal head. Correlate for an overlying skin wound which would increase the probability of osteomyelitis in this region.  3. Abnormal marrow signal within the fifth middle and distal phalanx as on prior study raising concern for osteomyelitis.  4. Patchy marrow edema at the base of the fifth metatarsal. Given the overlying skin wound, findings concerning for osteomyelitis as on prior exam.  5. Patchy marrow edema again noted in the second and third middle phalanges. Correlate for overlying skin wound in these regions which would increase the probability of osteomyelitis.  6. Degenerative changes at the midfoot.    --- End of Report ---            KRISTAL SKY MD; Attending Radiologist  This document has been electronically signed. Dec 1 2024 1:23PM

## 2024-12-03 NOTE — DIETITIAN INITIAL EVALUATION ADULT - PROBLEM SELECTOR PLAN 1
Pt presenting with L lateral  foot  wound, s/p outpatient course of augmenting x14 days- OM   - VS stable on admission  - WBC mildly elevated 11.65, out pt wound c/s + Serratia   - s/p vanco and zosyn in ED  - culture results from outpatient wound care 11/25 with susceptibility to cefepime  - will start cefepime 2g q12h pending ID recs  - wound care RN  - tylenol PRN for mild pain  - ID consulted Dr. Calhoun, will appreciate recs-Cefepime 2 gm IVPB q 8 hrs  - podiatry following Dr. French/Dr. tSark  - Vascular consulted- in house

## 2024-12-03 NOTE — PROGRESS NOTE ADULT - SUBJECTIVE AND OBJECTIVE BOX
Patient is a 64y old  Male who presents with a chief complaint of L foot wound (02 Dec 2024 22:59)    Pt without c/o pain, weakness, sensory changes, edema, fever or chills. No c/o groin access discomfort or swelling.  S/P LLE angio and AT angioplasty  POD #1    MEDICATIONS  (STANDING):  aspirin enteric coated 81 milliGRAM(s) Oral daily  atorvastatin 40 milliGRAM(s) Oral at bedtime  cefepime   IVPB      cefepime   IVPB 2000 milliGRAM(s) IV Intermittent every 8 hours  clopidogrel Tablet 75 milliGRAM(s) Oral daily  dextrose 5%. 1000 milliLiter(s) (50 mL/Hr) IV Continuous <Continuous>  dextrose 50% Injectable 25 Gram(s) IV Push once  gabapentin 300 milliGRAM(s) Oral three times a day  glucagon  Injectable 1 milliGRAM(s) IntraMuscular once  insulin glargine Injectable (LANTUS) 10 Unit(s) SubCutaneous at bedtime  insulin lispro (ADMELOG) corrective regimen sliding scale   SubCutaneous three times a day before meals  insulin lispro (ADMELOG) corrective regimen sliding scale   SubCutaneous at bedtime  lisinopril 40 milliGRAM(s) Oral daily  metoprolol succinate ER 50 milliGRAM(s) Oral daily  povidone iodine 10% Solution 1 Application(s) Topical daily    MEDICATIONS  (PRN):  acetaminophen     Tablet .. 650 milliGRAM(s) Oral every 6 hours PRN Mild Pain (1 - 3)  aluminum hydroxide/magnesium hydroxide/simethicone Suspension 30 milliLiter(s) Oral every 4 hours PRN Dyspepsia  dextrose Oral Gel 15 Gram(s) Oral once PRN Blood Glucose LESS THAN 70 milliGRAM(s)/deciliter  melatonin 3 milliGRAM(s) Oral at bedtime PRN Insomnia    Allergies  No Known Allergies    Vital Signs Last 24 Hrs  T(C): 36.5 (03 Dec 2024 05:08), Max: 36.9 (02 Dec 2024 16:15)  T(F): 97.7 (03 Dec 2024 05:08), Max: 98.4 (02 Dec 2024 16:15)  HR: 89 (03 Dec 2024 05:08) (70 - 89)  BP: 101/57 (03 Dec 2024 05:08) (101/57 - 168/72)  BP(mean): 97 (02 Dec 2024 20:04) (97 - 97)  RR: 18 (03 Dec 2024 05:08) (12 - 18)  SpO2: 94% (03 Dec 2024 05:08) (94% - 99%)    Parameters below as of 03 Dec 2024 05:08  Patient On (Oxygen Delivery Method): room air      I&O's Detail    02 Dec 2024 07:01  -  03 Dec 2024 07:00  --------------------------------------------------------  IN:    Lactated Ringers: 225 mL    Oral Fluid: 240 mL  Total IN: 465 mL    OUT:    Voided (mL): 300 mL  Total OUT: 300 mL    Total NET: 165 mL      Physical Exam:  General: NAD, resting comfortably in bed  Pulmonary: Nonlabored breathing, no respiratory distress, CTA  Cardiovascular: Normal S1, S2  Abdominal: soft, NT/ND  Extremities: BLE WWP. L foot dressing CDI. Sensory baseline and motor intact. R groin FA access site without ecchymosis or hematoma, dermabond  Pulses:   Right:                                                                             Left:  FEM [x ]2+ [ ]1+ [ ]doppler                                             FEM [x ]2+ [ ]1+ [ ]doppler    POP [x ]2+ [ ]1+ [ ]doppler                                             POP [x ]2+ [ ]1+ [ ]doppler    DP [ ]2+ [ ]1+ [x ]doppler                                                DP [ ]2+ [x ]1+ [ ]doppler  PT[ ]2+ [ ]1+ [x ]doppler                                                  PT [ ]2+ [ ]1+ [x ]doppler      LABS:                        13.2   9.23  )-----------( 245      ( 02 Dec 2024 06:42 )             40.1     12-02    139  |  107  |  25[H]  ----------------------------<  192[H]  5.3   |  32[H]  |  0.93    Ca    9.2      02 Dec 2024 06:42      PT/INR - ( 02 Dec 2024 06:42 )   PT: 10.6 sec;   INR: 0.90 ratio    PTT - ( 02 Dec 2024 06:42 )  PTT:28.9 sec    CAPILLARY BLOOD GLUCOSE  POCT Blood Glucose.: 180 mg/dL (03 Dec 2024 06:23)  POCT Blood Glucose.: 184 mg/dL (02 Dec 2024 21:32)  POCT Blood Glucose.: 131 mg/dL (02 Dec 2024 15:24)  POCT Blood Glucose.: 149 mg/dL (02 Dec 2024 11:21)    Radiology and Additional Studies:    < from: [facility] Physiol Extremity Lower 3+ Level, BI (11.18.24 @ 09:26) >    ACC: [id number] EXAM:  PHYSIOL EXTREM LOW 3+ LEV BI   ORDERED BY:  MARIA D SUN     PROCEDURE DATE:  11/18/2024          INTERPRETATION:  Clinical Information: Peripheral vascular disease, left   great toe wound.    Technique: Bilateral lower extremity ABIs/PVR    Comparison: 11/26/2023    Findings/  Impression:  Right lower extremity: The ankle brachial index is 1.07. The pulse   waveforms are diminished in the foot.    Left lower extremity: The ankle brachial index is 1.11. TBI is 0.13 with   digital pressures of 19 mmHg. The pulse waveforms are diminished in the   foot and further diminished in the toe.    Diffusely calcified noncompressible vessels limiting evaluation.  Microvascular disease suggested in the left foot.    < end of copied text >

## 2024-12-03 NOTE — DIETITIAN INITIAL EVALUATION ADULT - PROBLEM SELECTOR PLAN 2
Chronic, with foot  wounds  - glucose elevated on arrival  - hold home Trulicity, metformin Jardiance  - start low ISS with FS QAC/QHS  - hypoglycemia protocol  - f/u AM A1c  HOLD home meds

## 2024-12-03 NOTE — PATIENT CHOICE NOTE. - NSPTCHOICESTATE_GEN_ALL_CORE
I have met with the patient and/or caregiver to discuss discharge goals and treatment plan. Patient and/or caregiver also provided with instructions on accessing the CMS Compare websites for additional information related to Post Acute Provider quality and resource use measures to assist them in evaluation of the providers and in selecting their post-acute provider of choice. Patient and caregiver were informed of the facilities that are owned and/or operated by [facility]. I have discussed with the patient the availability of in-network facilities and providers. Patient and caregiver provided with a list of post-acute providers whose services are appropriate to the discharge plans and patient needs.     For patient requiring durable medical equipment, patient and/or caregiver were informed that they have the right to request who provides the required equipment.
I have met with the patient and/or caregiver to discuss discharge goals and treatment plan. Patient and/or caregiver also provided with instructions on accessing the CMS Compare websites for additional information related to Post Acute Provider quality and resource use measures to assist them in evaluation of the providers and in selecting their post-acute provider of choice. Patient and caregiver were informed of the facilities that are owned and/or operated by [facility]. I have discussed with the patient the availability of in-network facilities and providers. Patient and caregiver provided with a list of post-acute providers whose services are appropriate to the discharge plans and patient needs.     For patient requiring durable medical equipment, patient and/or caregiver were informed that they have the right to request who provides the required equipment.

## 2024-12-03 NOTE — PROGRESS NOTE ADULT - ATTENDING COMMENTS
Pt is a 62 y/o M with PMHx DM2, hx osteomyelitis s/p surgical amputation of R great toe 2023, CAD, PAD on plavix, HTN who presents to the hospital at instruction of wound care for a L foot wound, adm for L foot wound. with likely OM  Pt seen, examined, Case & care plan d/w pt ,residents at detail.   Pt is medically optimized for schedule Angiogram LL ext  ,NPO after midnight for OR  Consult-  ID-Dr XENIA Calhoun follow up- Abx continue  Podiatry-Dr Stark-Dr French  follow up-OR in AM , Pt medically ostomized for schedule left lateral  foot wound debridement & Bone biopsy.  -Pre op IV Abx as per ID-Podiatry  -Post op DVT ppx as per Podiatry  -Post op Incentive spirometry   Cardio-Pre op note in place   Vascular -on ASA , Plavix continue meds d/w Vacular NP   PO diet  AM Labs   DVT ppx  Total care time is 60 minutes.

## 2024-12-04 ENCOUNTER — TRANSCRIPTION ENCOUNTER (OUTPATIENT)
Age: 64
End: 2024-12-04

## 2024-12-04 LAB
ABO RH CONFIRMATION: SIGNIFICANT CHANGE UP
ANION GAP SERPL CALC-SCNC: 7 MMOL/L — SIGNIFICANT CHANGE UP (ref 5–17)
BASOPHILS # BLD AUTO: 0.02 K/UL — SIGNIFICANT CHANGE UP (ref 0–0.2)
BASOPHILS NFR BLD AUTO: 0.1 % — SIGNIFICANT CHANGE UP (ref 0–2)
BUN SERPL-MCNC: 22 MG/DL — SIGNIFICANT CHANGE UP (ref 7–23)
CALCIUM SERPL-MCNC: 8.3 MG/DL — LOW (ref 8.5–10.1)
CHLORIDE SERPL-SCNC: 110 MMOL/L — HIGH (ref 96–108)
CO2 SERPL-SCNC: 23 MMOL/L — SIGNIFICANT CHANGE UP (ref 22–31)
CREAT SERPL-MCNC: 0.95 MG/DL — SIGNIFICANT CHANGE UP (ref 0.5–1.3)
EGFR: 88 ML/MIN/1.73M2 — SIGNIFICANT CHANGE UP
EOSINOPHIL # BLD AUTO: 0.73 K/UL — HIGH (ref 0–0.5)
EOSINOPHIL NFR BLD AUTO: 5.4 % — SIGNIFICANT CHANGE UP (ref 0–6)
GLUCOSE BLDC GLUCOMTR-MCNC: 135 MG/DL — HIGH (ref 70–99)
GLUCOSE BLDC GLUCOMTR-MCNC: 137 MG/DL — HIGH (ref 70–99)
GLUCOSE BLDC GLUCOMTR-MCNC: 171 MG/DL — HIGH (ref 70–99)
GLUCOSE BLDC GLUCOMTR-MCNC: 201 MG/DL — HIGH (ref 70–99)
GLUCOSE SERPL-MCNC: 191 MG/DL — HIGH (ref 70–99)
HCT VFR BLD CALC: 37.8 % — LOW (ref 39–50)
HGB BLD-MCNC: 12.9 G/DL — LOW (ref 13–17)
IMM GRANULOCYTES NFR BLD AUTO: 0.6 % — SIGNIFICANT CHANGE UP (ref 0–0.9)
LYMPHOCYTES # BLD AUTO: 1.35 K/UL — SIGNIFICANT CHANGE UP (ref 1–3.3)
LYMPHOCYTES # BLD AUTO: 10 % — LOW (ref 13–44)
MCHC RBC-ENTMCNC: 31.3 PG — SIGNIFICANT CHANGE UP (ref 27–34)
MCHC RBC-ENTMCNC: 34.1 G/DL — SIGNIFICANT CHANGE UP (ref 32–36)
MCV RBC AUTO: 91.7 FL — SIGNIFICANT CHANGE UP (ref 80–100)
MONOCYTES # BLD AUTO: 0.78 K/UL — SIGNIFICANT CHANGE UP (ref 0–0.9)
MONOCYTES NFR BLD AUTO: 5.8 % — SIGNIFICANT CHANGE UP (ref 2–14)
NEUTROPHILS # BLD AUTO: 10.6 K/UL — HIGH (ref 1.8–7.4)
NEUTROPHILS NFR BLD AUTO: 78.1 % — HIGH (ref 43–77)
NRBC # BLD: 0 /100 WBCS — SIGNIFICANT CHANGE UP (ref 0–0)
PLATELET # BLD AUTO: 213 K/UL — SIGNIFICANT CHANGE UP (ref 150–400)
POTASSIUM SERPL-MCNC: 4.4 MMOL/L — SIGNIFICANT CHANGE UP (ref 3.5–5.3)
POTASSIUM SERPL-SCNC: 4.4 MMOL/L — SIGNIFICANT CHANGE UP (ref 3.5–5.3)
RBC # BLD: 4.12 M/UL — LOW (ref 4.2–5.8)
RBC # FLD: 13.1 % — SIGNIFICANT CHANGE UP (ref 10.3–14.5)
SODIUM SERPL-SCNC: 140 MMOL/L — SIGNIFICANT CHANGE UP (ref 135–145)
WBC # BLD: 13.56 K/UL — HIGH (ref 3.8–10.5)
WBC # FLD AUTO: 13.56 K/UL — HIGH (ref 3.8–10.5)

## 2024-12-04 PROCEDURE — 88311 DECALCIFY TISSUE: CPT | Mod: 26

## 2024-12-04 PROCEDURE — 73630 X-RAY EXAM OF FOOT: CPT | Mod: 26,LT

## 2024-12-04 PROCEDURE — 20240 BONE BIOPSY OPEN SUPERFICIAL: CPT | Mod: LT

## 2024-12-04 PROCEDURE — 88304 TISSUE EXAM BY PATHOLOGIST: CPT | Mod: 26

## 2024-12-04 PROCEDURE — 99232 SBSQ HOSP IP/OBS MODERATE 35: CPT

## 2024-12-04 DEVICE — SURGICEL NU-KNIT 6 X 9": Type: IMPLANTABLE DEVICE | Site: LEFT | Status: FUNCTIONAL

## 2024-12-04 RX ORDER — METOPROLOL TARTRATE 100 MG/1
50 TABLET, FILM COATED ORAL DAILY
Refills: 0 | Status: DISCONTINUED | OUTPATIENT
Start: 2024-12-04 | End: 2024-12-05

## 2024-12-04 RX ORDER — ONDANSETRON HYDROCHLORIDE 4 MG/1
4 TABLET, FILM COATED ORAL ONCE
Refills: 0 | Status: DISCONTINUED | OUTPATIENT
Start: 2024-12-04 | End: 2024-12-04

## 2024-12-04 RX ORDER — INSULIN GLARGINE 100 [IU]/ML
10 INJECTION, SOLUTION SUBCUTANEOUS AT BEDTIME
Refills: 0 | Status: DISCONTINUED | OUTPATIENT
Start: 2024-12-04 | End: 2024-12-05

## 2024-12-04 RX ORDER — GABAPENTIN 300 MG/1
300 CAPSULE ORAL THREE TIMES A DAY
Refills: 0 | Status: DISCONTINUED | OUTPATIENT
Start: 2024-12-04 | End: 2024-12-05

## 2024-12-04 RX ORDER — ACETAMINOPHEN, DIPHENHYDRAMINE HCL, PHENYLEPHRINE HCL 325; 25; 5 MG/1; MG/1; MG/1
3 TABLET ORAL AT BEDTIME
Refills: 0 | Status: DISCONTINUED | OUTPATIENT
Start: 2024-12-04 | End: 2024-12-05

## 2024-12-04 RX ORDER — GLUCAGON INJECTION, SOLUTION 0.5 MG/.1ML
1 INJECTION, SOLUTION SUBCUTANEOUS ONCE
Refills: 0 | Status: DISCONTINUED | OUTPATIENT
Start: 2024-12-04 | End: 2024-12-05

## 2024-12-04 RX ORDER — HYDROMORPHONE HYDROCHLORIDE 2 MG/1
0.5 TABLET ORAL
Refills: 0 | Status: DISCONTINUED | OUTPATIENT
Start: 2024-12-04 | End: 2024-12-05

## 2024-12-04 RX ORDER — 0.9 % SODIUM CHLORIDE 0.9 %
1000 INTRAVENOUS SOLUTION INTRAVENOUS
Refills: 0 | Status: DISCONTINUED | OUTPATIENT
Start: 2024-12-04 | End: 2024-12-04

## 2024-12-04 RX ORDER — LISINOPRIL 20 MG/1
40 TABLET ORAL DAILY
Refills: 0 | Status: DISCONTINUED | OUTPATIENT
Start: 2024-12-04 | End: 2024-12-05

## 2024-12-04 RX ORDER — SODIUM CHLORIDE 9 MG/ML
10 INJECTION, SOLUTION INTRAMUSCULAR; INTRAVENOUS; SUBCUTANEOUS
Refills: 0 | Status: DISCONTINUED | OUTPATIENT
Start: 2024-12-04 | End: 2024-12-05

## 2024-12-04 RX ORDER — HYDROMORPHONE HYDROCHLORIDE 2 MG/1
0.5 TABLET ORAL
Refills: 0 | Status: DISCONTINUED | OUTPATIENT
Start: 2024-12-04 | End: 2024-12-04

## 2024-12-04 RX ORDER — CEFEPIME 2 G/1
2000 INJECTION, POWDER, FOR SOLUTION INTRAVENOUS EVERY 8 HOURS
Refills: 0 | Status: DISCONTINUED | OUTPATIENT
Start: 2024-12-04 | End: 2024-12-05

## 2024-12-04 RX ORDER — OXYCODONE HYDROCHLORIDE 30 MG/1
5 TABLET ORAL ONCE
Refills: 0 | Status: DISCONTINUED | OUTPATIENT
Start: 2024-12-04 | End: 2024-12-04

## 2024-12-04 RX ORDER — MAGNESIUM, ALUMINUM HYDROXIDE 200-225/5
30 SUSPENSION, ORAL (FINAL DOSE FORM) ORAL EVERY 4 HOURS
Refills: 0 | Status: DISCONTINUED | OUTPATIENT
Start: 2024-12-04 | End: 2024-12-05

## 2024-12-04 RX ORDER — ACETAMINOPHEN 500MG 500 MG/1
650 TABLET, COATED ORAL EVERY 6 HOURS
Refills: 0 | Status: DISCONTINUED | OUTPATIENT
Start: 2024-12-04 | End: 2024-12-05

## 2024-12-04 RX ADMIN — CHLORHEXIDINE GLUCONATE 1 APPLICATION(S): 1.2 RINSE ORAL at 11:00

## 2024-12-04 RX ADMIN — GABAPENTIN 300 MILLIGRAM(S): 300 CAPSULE ORAL at 21:11

## 2024-12-04 RX ADMIN — Medication 40 MILLIGRAM(S): at 21:11

## 2024-12-04 RX ADMIN — CEFEPIME 100 MILLIGRAM(S): 2 INJECTION, POWDER, FOR SOLUTION INTRAVENOUS at 05:13

## 2024-12-04 RX ADMIN — LISINOPRIL 40 MILLIGRAM(S): 20 TABLET ORAL at 05:17

## 2024-12-04 RX ADMIN — Medication 2: at 21:48

## 2024-12-04 RX ADMIN — GABAPENTIN 300 MILLIGRAM(S): 300 CAPSULE ORAL at 05:17

## 2024-12-04 RX ADMIN — CEFEPIME 100 MILLIGRAM(S): 2 INJECTION, POWDER, FOR SOLUTION INTRAVENOUS at 17:34

## 2024-12-04 RX ADMIN — INSULIN GLARGINE 10 UNIT(S): 100 INJECTION, SOLUTION SUBCUTANEOUS at 21:48

## 2024-12-04 RX ADMIN — Medication 4: at 08:14

## 2024-12-04 RX ADMIN — METOPROLOL TARTRATE 50 MILLIGRAM(S): 100 TABLET, FILM COATED ORAL at 05:17

## 2024-12-04 RX ADMIN — Medication 75 MILLILITER(S): at 15:48

## 2024-12-04 RX ADMIN — ACETAMINOPHEN, DIPHENHYDRAMINE HCL, PHENYLEPHRINE HCL 3 MILLIGRAM(S): 325; 25; 5 TABLET ORAL at 21:11

## 2024-12-04 NOTE — DISCHARGE NOTE PROVIDER - NSDCHHNEEDSERVICE_GEN_ALL_CORE
Rehabilitation services/Wound care and assessment Wound care and assessment Medication teaching and assessment/Teaching and training/Wound care and assessment

## 2024-12-04 NOTE — DISCHARGE NOTE PROVIDER - NSDCACTIVITY_GEN_ALL_CORE
Activity as tolerated Bathing allowed/Walking - Indoors allowed/No heavy lifting/straining/Activity as tolerated

## 2024-12-04 NOTE — DISCHARGE NOTE PROVIDER - NSDCMRMEDTOKEN_GEN_ALL_CORE_FT
atorvastatin 40 mg oral tablet: 1 tab(s) orally once a day  clopidogrel 75 mg oral tablet: 1 tab(s) orally once a day  gabapentin 300 mg oral capsule: 1 cap(s) orally 2 times a day  Jardiance 25 mg oral tablet: 1 tab(s) orally once a day  lisinopril 40 mg oral tablet: 1 tab(s) orally once a day  metFORMIN 1000 mg oral tablet: 1 tab(s) orally 2 times a day  metoprolol succinate 50 mg oral tablet, extended release: 1 tab(s) orally once a day  Trulicity Pen 1.5 mg/0.5 mL subcutaneous solution: 1.5 milligram(s) subcutaneously once a week   aspirin 81 mg oral delayed release tablet: 1 tab(s) orally once a day  atorvastatin 40 mg oral tablet: 1 tab(s) orally once a day  cefepime 2 g intravenous injection: 2 gram(s) intravenous every 8 hours  clopidogrel 75 mg oral tablet: 1 tab(s) orally once a day  gabapentin 300 mg oral capsule: 1 cap(s) orally 2 times a day  Jardiance 25 mg oral tablet: 1 tab(s) orally once a day  lisinopril 40 mg oral tablet: 1 tab(s) orally once a day  metFORMIN 1000 mg oral tablet: 1 tab(s) orally 2 times a day  metoprolol succinate 50 mg oral tablet, extended release: 1 tab(s) orally once a day  Trulicity Pen 1.5 mg/0.5 mL subcutaneous solution: 1.5 milligram(s) subcutaneously once a week   aspirin 81 mg oral delayed release tablet: 1 tab(s) orally once a day  atorvastatin 40 mg oral tablet: 1 tab(s) orally once a day  cefepime 2 g intravenous injection: 2 gram(s) intravenous every 8 hours  clopidogrel 75 mg oral tablet: 1 tab(s) orally once a day  gabapentin 300 mg oral capsule: 1 cap(s) orally 3 times a day  Jardiance 25 mg oral tablet: 1 tab(s) orally once a day  lisinopril 40 mg oral tablet: 1 tab(s) orally once a day  metFORMIN 1000 mg oral tablet: 1 tab(s) orally 2 times a day  metoprolol succinate 50 mg oral tablet, extended release: 1 tab(s) orally once a day  Trulicity Pen 1.5 mg/0.5 mL subcutaneous solution: 1.5 milligram(s) subcutaneously once a week

## 2024-12-04 NOTE — DISCHARGE NOTE PROVIDER - CARE PROVIDERS DIRECT ADDRESSES
[email or web address],[email or web address]

## 2024-12-04 NOTE — DISCHARGE NOTE PROVIDER - HOSPITAL COURSE
ADMISSION DATE:  11-30-24    ---  FROM ADMISSION H+P:   HPI:  Pt is a 64 y/o M with PMHx DM2,PAD,  hx osteomyelitis s/p surgical amputation of R great toe 2023, CAD, PAD on plavix, HTN who presents to the hospital at instruction of wound care for a L foot wound. Pt reports he has been following with wound care with Dr. Stark and Dr. French, has been on Augmentin for approx 2 weeks. No systemic symptoms. Pt states he was told to come in this weekend for IV antibiotics and further evaluation of Left foot wound, . Pt reports feeling well and denies any fevers, chills, chest pain, shortness of breath, nausea, vomiting, diarrhea, constipation. Pt has been dressing the wound daily himself.  pt has had MRI left foot 1 week ago     ED Course:   Vitals: BP: 134/69, HR: 94, Temp: 134/69, RR: 18, SpO2: 99% on RA   Labs: WBC 11.65, lactate 2.1 -> 1, BUN 29, Glucose 215   CXR: No active chest disease  EKG: NSR @ 90bpm  Received in the ED: Zosyn, Vancomycin, 1L NS bolus   (30 Nov 2024 17:09)      ---  HOSPITAL COURSE/PERTINENT LABS/PROCEDURES PERFORMED/PENDING TESTS:   Pt was admitted from wound care for L hallux and L lateral foot wounds. S/P Vancomycin and Zosyn in ED; Abx was adjusted based on outpatient wound care cultures, revealing +Serratia susceptible to cefepime; cefepime was initiated. Pt was seen by vascular surgery, Pt with nonpalpable DP/PT, but +doppler signals.LLE Angiogram/Plasty was scheduled for 12/2/24, completed successfully without issues. Patient was started on ongoing Aspirin 81mg QD, along with his current Plavix 75QD for his PAD. Evaluated by podiatry (Dr. French and Dr. Stark), who decided to complete bone biopsy with wound debridement. Cardiology (Dr. Chu) was consulted for cardiac clearance, which was obtained. Pt was scheduled for the OR 12/4/24 ***. Pt was recommended to have PICC line established, ID (Dr. Melany Calhoun) was consulted, agreeable with PICC line placement, completed by IR.  ID recommended ***** course of IV ABx for discharge.   Pt had an A1C of 8.3 on admission; seen by diabetes NP, recommended increase of sliding scale to moderate, along with initiation of Lantus 10 QHS. Recommended re-initiation of trulicity, metformin, and jardiance upon discharge with close follow up with endocrinology. Recommended Endo Dr. Digna Thompson for follow-up upon discharge.     Patient is stable for discharge as per primary medical team and consultants.    PT consulted, recommends discharge ______    Patient showed improvement throughout hospitalization. Patient was seen and examined on day of discharge. Patient was medically optimized for discharge with close outpatient follow up.    ---  PATIENT CONDITION:  - stable    --  VITALS:   T(C): 36.6 (12-04-24 @ 12:52), Max: 36.6 (12-03-24 @ 19:55)  HR: 68 (12-04-24 @ 12:52) (68 - 91)  BP: 115/62 (12-04-24 @ 12:52) (103/68 - 124/65)  RR: 12 (12-04-24 @ 12:52) (12 - 20)  SpO2: 100% (12-04-24 @ 12:52) (94% - 100%)    PHYSICAL EXAM ON DAY OF DISCHARGE: Please check discharge date progress note for physical exam.     ---  CONSULTANTS:   - Vascular Surgery: Dr. Mora   - Podiatry: Dr. French/Dr. Stark  - Cardio: Dr. Chu   - Interventional Radiology  - Diabetes NP     ---  ADVANCED CARE PLANNING:  - Code status:      - MOLST completed:      [  ] NO     [  ] YES    ---  TIME SPENT:  I, the attending physician, was physically present for the key portions of the evaluation and management (E/M) service provided. The total amount of time spent reviewing the hospital notes, laboratory values, imaging findings, assessing/counseling the patient, discussing with consultant physicians, social work, nursing staff was -- minutes       ADMISSION DATE:  11-30-24    ---  FROM ADMISSION H+P:   HPI:  Pt is a 64 y/o M with PMHx DM2,PAD,  hx osteomyelitis s/p surgical amputation of R great toe 2023, CAD, PAD on plavix, HTN who presents to the hospital at instruction of wound care for a L foot wound. Pt reports he has been following with wound care with Dr. Stark and Dr. French, has been on Augmentin for approx 2 weeks. No systemic symptoms. Pt states he was told to come in this weekend for IV antibiotics and further evaluation of Left foot wound, . Pt reports feeling well and denies any fevers, chills, chest pain, shortness of breath, nausea, vomiting, diarrhea, constipation. Pt has been dressing the wound daily himself.  pt has had MRI left foot 1 week ago     ED Course:   Vitals: BP: 134/69, HR: 94, Temp: 134/69, RR: 18, SpO2: 99% on RA   Labs: WBC 11.65, lactate 2.1 -> 1, BUN 29, Glucose 215   CXR: No active chest disease  EKG: NSR @ 90bpm  Received in the ED: Zosyn, Vancomycin, 1L NS bolus   (30 Nov 2024 17:09)      ---  HOSPITAL COURSE/PERTINENT LABS/PROCEDURES PERFORMED/PENDING TESTS:   Pt was admitted from wound care for L hallux and L lateral foot wounds. S/P Vancomycin and Zosyn in ED; Abx was adjusted based on outpatient wound care cultures, revealing +Serratia susceptible to cefepime; cefepime was initiated. Pt was seen by vascular surgery, Pt with nonpalpable DP/PT, but +doppler signals. LLE Angiogram/Plasty was scheduled for 12/2/24, completed successfully without issues. Patient was started on ongoing Aspirin 81mg QD, along with his current Plavix 75QD for his PAD. Evaluated by podiatry (Dr. French and Dr. Stark), who decided to complete bone biopsy with wound debridement. Cardiology (Dr. Chu) was consulted for cardiac clearance, which was obtained. Pt was scheduled for the OR 12/4/24 ***. Pt was recommended to have PICC line established, ID (Dr. Melany Calhoun) was consulted, agreeable with PICC line placement, completed by IR.  ID recommended ***** course of IV ABx for discharge.   Pt had an A1C of 8.3 on admission; seen by diabetes NP, recommended increase of sliding scale to moderate, along with initiation of Lantus 10 QHS. Recommended re-initiation of trulicity, metformin, and jardiance upon discharge with close follow up with endocrinology. Recommended Endo Dr. Digna Thompson for follow-up upon discharge.     Patient is stable for discharge as per primary medical team and consultants.    PT consulted, recommends discharge ______    Patient showed improvement throughout hospitalization. Patient was seen and examined on day of discharge. Patient was medically optimized for discharge with close outpatient follow up.    ---  PATIENT CONDITION:  - stable    --  VITALS:   T(C): 36.6 (12-04-24 @ 12:52), Max: 36.6 (12-03-24 @ 19:55)  HR: 68 (12-04-24 @ 12:52) (68 - 91)  BP: 115/62 (12-04-24 @ 12:52) (103/68 - 124/65)  RR: 12 (12-04-24 @ 12:52) (12 - 20)  SpO2: 100% (12-04-24 @ 12:52) (94% - 100%)    PHYSICAL EXAM ON DAY OF DISCHARGE: Please check discharge date progress note for physical exam.     ---  CONSULTANTS:   - Vascular Surgery: Dr. Mora   - Podiatry: Dr. French/Dr. Stark  - Cardio: Dr. Chu   - Interventional Radiology  - Diabetes NP     ---  ADVANCED CARE PLANNING:  - Code status:      - MOLST completed:      [  ] NO     [  ] YES    ---  TIME SPENT:  I, the attending physician, was physically present for the key portions of the evaluation and management (E/M) service provided. The total amount of time spent reviewing the hospital notes, laboratory values, imaging findings, assessing/counseling the patient, discussing with consultant physicians, social work, nursing staff was -- minutes       ADMISSION DATE:  11-30-24    ---  FROM ADMISSION H+P:   HPI:  Pt is a 62 y/o M with PMHx DM2,PAD,  hx osteomyelitis s/p surgical amputation of R great toe 2023, CAD, PAD on plavix, HTN who presents to the hospital at instruction of wound care for a L foot wound. Pt reports he has been following with wound care with Dr. Stark and Dr. French, has been on Augmentin for approx 2 weeks. No systemic symptoms. Pt states he was told to come in this weekend for IV antibiotics and further evaluation of Left foot wound, . Pt reports feeling well and denies any fevers, chills, chest pain, shortness of breath, nausea, vomiting, diarrhea, constipation. Pt has been dressing the wound daily himself.  pt has had MRI left foot 1 week ago     ED Course:   Vitals: BP: 134/69, HR: 94, Temp: 134/69, RR: 18, SpO2: 99% on RA   Labs: WBC 11.65, lactate 2.1 -> 1, BUN 29, Glucose 215   CXR: No active chest disease  EKG: NSR @ 90bpm  Received in the ED: Zosyn, Vancomycin, 1L NS bolus   (30 Nov 2024 17:09)      ---  HOSPITAL COURSE/PERTINENT LABS/PROCEDURES PERFORMED/PENDING TESTS:   Pt was admitted from wound care for L hallux and L lateral foot wounds. S/P Vancomycin and Zosyn in ED; Abx was adjusted based on outpatient wound care cultures, revealing +Serratia susceptible to cefepime; cefepime was initiated. Pt was seen by vascular surgery, Pt with nonpalpable DP/PT, but +doppler signals. LLE Angiogram/Plasty was scheduled for 12/2/24, completed successfully without issues. Patient was started on ongoing Aspirin 81mg QD, along with his current Plavix 75QD for his PAD. Evaluated by podiatry (Dr. French and Dr. Stark), who decided to complete bone biopsy with wound debridement. Cardiology (Dr. Chu) was consulted for cardiac clearance, which was obtained. Pt was scheduled for the OR 12/4/24 for bone biopsy and debridement, cultures were collected successfully. Pt was recommended to have PICC line established, ID (Dr. Melany Calhoun) was consulted, agreeable with PICC line placement, completed 12/3/24 by IR.  ID recommended C/w cefepime 2gm q8h until 1/14/25 for now. Patient to follow culture results upon discharge outpatient.     Pt had an A1C of 8.3 on admission; seen by diabetes NP, recommended increase of sliding scale to moderate, along with initiation of Lantus 10 QHS. Recommended re-initiation of trulicity, metformin, and jardiance upon discharge with close follow up with endocrinology. Recommended Endo Dr. Digna Thompson for follow-up upon discharge.     Patient is stable for discharge as per primary medical team and consultants.    PT consulted, recommends discharge *****    Patient showed improvement throughout hospitalization. Patient was seen and examined on day of discharge. Patient was medically optimized for discharge with close outpatient follow up.    ---  PATIENT CONDITION:  - stable    --  VITALS:   T(C): 36.6 (12-04-24 @ 12:52), Max: 36.6 (12-03-24 @ 19:55)  HR: 68 (12-04-24 @ 12:52) (68 - 91)  BP: 115/62 (12-04-24 @ 12:52) (103/68 - 124/65)  RR: 12 (12-04-24 @ 12:52) (12 - 20)  SpO2: 100% (12-04-24 @ 12:52) (94% - 100%)    PHYSICAL EXAM ON DAY OF DISCHARGE: Please check discharge date progress note for physical exam.     ---  CONSULTANTS:   - Vascular Surgery: Dr. Mora   - Podiatry: Dr. French/Dr. Stark  - Cardio: Dr. Chu   - Interventional Radiology  - Diabetes NP     ---  ADVANCED CARE PLANNING:  - Code status:    FULL CODE   - MOLST completed:      [  X] NO     [  ] YES    ---  TIME SPENT:  I, the attending physician, was physically present for the key portions of the evaluation and management (E/M) service provided. The total amount of time spent reviewing the hospital notes, laboratory values, imaging findings, assessing/counseling the patient, discussing with consultant physicians, social work, nursing staff was -- minutes       ADMISSION DATE:  11-30-24    ---  FROM ADMISSION H+P:   HPI:  Pt is a 64 y/o M with PMHx DM2,PAD,  hx osteomyelitis s/p surgical amputation of R great toe 2023, CAD, PAD on plavix, HTN who presents to the hospital at instruction of wound care for a L foot wound. Pt reports he has been following with wound care with Dr. Stark and Dr. French, has been on Augmentin for approx 2 weeks. No systemic symptoms. Pt states he was told to come in this weekend for IV antibiotics and further evaluation of Left foot wound, . Pt reports feeling well and denies any fevers, chills, chest pain, shortness of breath, nausea, vomiting, diarrhea, constipation. Pt has been dressing the wound daily himself.  pt has had MRI left foot 1 week ago     ED Course:   Vitals: BP: 134/69, HR: 94, Temp: 134/69, RR: 18, SpO2: 99% on RA   Labs: WBC 11.65, lactate 2.1 -> 1, BUN 29, Glucose 215   CXR: No active chest disease  EKG: NSR @ 90bpm  Received in the ED: Zosyn, Vancomycin, 1L NS bolus   (30 Nov 2024 17:09)      ---  HOSPITAL COURSE/PERTINENT LABS/PROCEDURES PERFORMED/PENDING TESTS:   Pt was admitted from wound care for L hallux and L lateral foot wounds. S/P Vancomycin and Zosyn in ED; Abx was adjusted based on outpatient wound care cultures, revealing +Serratia susceptible to cefepime; cefepime was initiated. Pt was seen by vascular surgery, Pt with nonpalpable DP/PT, but +doppler signals. LLE Angiogram/Plasty was scheduled for 12/2/24, completed successfully without issues. Patient was started on ongoing Aspirin 81mg QD, along with his current Plavix 75QD for his PAD. Evaluated by podiatry (Dr. French and Dr. Stark), who decided to complete bone biopsy with wound debridement. Cardiology (Dr. Chu) was consulted for cardiac clearance, which was obtained. Pt was scheduled for the OR 12/4/24 for bone biopsy and debridement, cultures were collected successfully. Pt was recommended to have PICC line established, ID (Dr. Melany Calhoun) was consulted, agreeable with PICC line placement, completed 12/3/24 by IR.  ID recommended C/w cefepime 2gm q8h until 1/14/25 for now. Patient to follow culture results upon discharge outpatient.     Pt had an A1C of 8.3 on admission; seen by diabetes NP, recommended increase of sliding scale to moderate, along with initiation of Lantus 10 QHS. Recommended re-initiation of trulicity, metformin, and jardiance upon discharge with close follow up with endocrinology. Recommended Endo Dr. Digna Thompson for follow-up upon discharge.     Patient is stable for discharge as per primary medical team and consultants.    PT consulted, recommends no PT needs.     Patient showed improvement throughout hospitalization. Patient was seen and examined on day of discharge. Patient was medically optimized for discharge with close outpatient follow up.    ---  PATIENT CONDITION:  - stable    --  VITALS:   T(C): 36.6 (12-04-24 @ 12:52), Max: 36.6 (12-03-24 @ 19:55)  HR: 68 (12-04-24 @ 12:52) (68 - 91)  BP: 115/62 (12-04-24 @ 12:52) (103/68 - 124/65)  RR: 12 (12-04-24 @ 12:52) (12 - 20)  SpO2: 100% (12-04-24 @ 12:52) (94% - 100%)    PHYSICAL EXAM ON DAY OF DISCHARGE: Please check discharge date progress note for physical exam.     ---  CONSULTANTS:   - Vascular Surgery: Dr. Mora   - Podiatry: Dr. French/Dr. Stark  - Cardio: Dr. Chu   - Interventional Radiology  - Diabetes NP     ---  ADVANCED CARE PLANNING:  - Code status:    FULL CODE   - MOLST completed:      [  X] NO     [  ] YES    ---  TIME SPENT:  I, the attending physician, was physically present for the key portions of the evaluation and management (E/M) service provided. The total amount of time spent reviewing the hospital notes, laboratory values, imaging findings, assessing/counseling the patient, discussing with consultant physicians, social work, nursing staff was -- minutes       ADMISSION DATE:  11-30-24    ---  FROM ADMISSION H+P:   HPI:  Pt is a 64 y/o M with PMHx DM2,PAD,  hx osteomyelitis s/p surgical amputation of R great toe 2023, CAD, PAD on plavix, HTN who presents to the hospital at instruction of wound care for a L foot wound. Pt reports he has been following with wound care with Dr. Stark and Dr. French, has been on Augmentin for approx 2 weeks. No systemic symptoms. Pt states he was told to come in this weekend for IV antibiotics and further evaluation of Left foot wound, . Pt reports feeling well and denies any fevers, chills, chest pain, shortness of breath, nausea, vomiting, diarrhea, constipation. Pt has been dressing the wound daily himself.  pt has had MRI left foot 1 week ago     ED Course:   Vitals: BP: 134/69, HR: 94, Temp: 134/69, RR: 18, SpO2: 99% on RA   Labs: WBC 11.65, lactate 2.1 -> 1, BUN 29, Glucose 215   CXR: No active chest disease  EKG: NSR @ 90bpm  Received in the ED: Zosyn, Vancomycin, 1L NS bolus   (30 Nov 2024 17:09)      ---  HOSPITAL COURSE/PERTINENT LABS/PROCEDURES PERFORMED/PENDING TESTS:   Pt was admitted from wound care for L hallux and L lateral foot wounds. S/P Vancomycin and Zosyn in ED; Abx was adjusted based on outpatient wound care cultures, revealing +Serratia susceptible to cefepime; cefepime was initiated. Pt was seen by vascular surgery, Pt with nonpalpable DP/PT, but +doppler signals. LLE Angiogram/Plasty was scheduled for 12/2/24, completed successfully without issues. Patient was started on ongoing Aspirin 81mg QD, along with his current Plavix 75QD for his PAD. Evaluated by podiatry (Dr. French and Dr. Stark), who decided to complete bone biopsy with wound debridement. Cardiology (Dr. Chu) was consulted for cardiac clearance, which was obtained. Pt was scheduled for the OR 12/4/24 for bone biopsy and debridement, cultures were collected successfully. Pt was recommended to have PICC line established, ID (Dr. Melany Calhoun) was consulted, agreeable with PICC line placement, completed 12/3/24 by IR.  ID recommended C/w cefepime 2gm q8h until 1/14/25 for now. Patient to follow culture results upon discharge outpatient, abx regimen to be adjusted based on results.     Pt had an A1C of 8.3 on admission; seen by diabetes NP, recommended increase of sliding scale to moderate, along with initiation of Lantus 10 QHS. Recommended re-initiation of trulicity, metformin, and jardiance upon discharge with close follow up with endocrinology. Recommended Endo Dr. Digna Thompson for follow-up upon discharge.     Patient is stable for discharge as per primary medical team and consultants.    PT consulted, recommends no PT needs.     Patient showed improvement throughout hospitalization. Patient was seen and examined on day of discharge. Patient was medically optimized for discharge with close outpatient follow up.    ---  PATIENT CONDITION:  - stable    --  VITALS:   T(C): 36.6 (12-04-24 @ 12:52), Max: 36.6 (12-03-24 @ 19:55)  HR: 68 (12-04-24 @ 12:52) (68 - 91)  BP: 115/62 (12-04-24 @ 12:52) (103/68 - 124/65)  RR: 12 (12-04-24 @ 12:52) (12 - 20)  SpO2: 100% (12-04-24 @ 12:52) (94% - 100%)    PHYSICAL EXAM ON DAY OF DISCHARGE: Please check discharge date progress note for physical exam.     ---  CONSULTANTS:   - Vascular Surgery: Dr. Mora   - Podiatry: Dr. French/Dr. Stark  - Cardio: Dr. Chu   - Interventional Radiology  - Diabetes NP     ---  ADVANCED CARE PLANNING:  - Code status:    FULL CODE   - MOLST completed:      [  X] NO     [  ] YES    ---  TIME SPENT:  I, the attending physician, was physically present for the key portions of the evaluation and management (E/M) service provided. The total amount of time spent reviewing the hospital notes, laboratory values, imaging findings, assessing/counseling the patient, discussing with consultant physicians, social work, nursing staff was -- minutes       ADMISSION DATE:  11-30-24    ---  FROM ADMISSION H+P:   HPI:  Pt is a 62 y/o M with PMHx DM2,PAD,  hx osteomyelitis s/p surgical amputation of R great toe 2023, CAD, PAD on plavix, HTN who presents to the hospital at instruction of wound care for a L foot wound. Pt reports he has been following with wound care with Dr. Stark and Dr. French, has been on Augmentin for approx 2 weeks. No systemic symptoms. Pt states he was told to come in this weekend for IV antibiotics and further evaluation of Left foot wound, . Pt reports feeling well and denies any fevers, chills, chest pain, shortness of breath, nausea, vomiting, diarrhea, constipation. Pt has been dressing the wound daily himself.  pt has had MRI left foot 1 week ago     ED Course:   Vitals: BP: 134/69, HR: 94, Temp: 134/69, RR: 18, SpO2: 99% on RA   Labs: WBC 11.65, lactate 2.1 -> 1, BUN 29, Glucose 215   CXR: No active chest disease  EKG: NSR @ 90bpm  Received in the ED: Zosyn, Vancomycin, 1L NS bolus   (30 Nov 2024 17:09)      ---  HOSPITAL COURSE/PERTINENT LABS/PROCEDURES PERFORMED/PENDING TESTS:   Pt was admitted from wound care for L hallux and L lateral foot wounds. S/P Vancomycin and Zosyn in ED; Abx was adjusted based on outpatient wound care cultures, revealing +Serratia susceptible to cefepime; cefepime was initiated. Pt was seen by vascular surgery, Pt with nonpalpable DP/PT, but +doppler signals. LLE Angiogram/Plasty was scheduled for 12/2/24, completed successfully without issues. Patient was started on ongoing Aspirin 81mg QD, along with his current Plavix 75QD for his PAD. Evaluated by podiatry (Dr. French and Dr. Stark), who decided to complete bone biopsy with wound debridement. Cardiology (Dr. Chu) was consulted for cardiac clearance, which was obtained. Pt was scheduled for the OR 12/4/24 for bone biopsy and debridement, cultures were collected successfully. Pt was recommended to have PICC line established, ID (Dr. Melany Calhoun) was consulted, agreeable with PICC line placement, completed 12/3/24 by IR.  ID recommended C/w cefepime 2gm q8h until 1/14/25 for now. Patient to follow culture results upon discharge outpatient, abx regimen to be adjusted based on results.     Pt had an A1C of 8.3 on admission; seen by diabetes NP, recommended increase of sliding scale to moderate, along with initiation of Lantus 10 QHS. Recommended re-initiation of trulicity, metformin, and jardiance upon discharge with close follow up with endocrinology. Recommended Endo Dr. Digna Thompson for follow-up upon discharge.     Patient is stable for discharge as per primary medical team and consultants.    PT consulted, recommends no PT needs.     Patient showed improvement throughout hospitalization. Patient was seen and examined on day of discharge. Patient was medically optimized for discharge with close outpatient follow up.    ---  PATIENT CONDITION:  - stable    --  VITALS:   T(C): 36.6 (12-04-24 @ 12:52), Max: 36.6 (12-03-24 @ 19:55)  HR: 68 (12-04-24 @ 12:52) (68 - 91)  BP: 115/62 (12-04-24 @ 12:52) (103/68 - 124/65)  RR: 12 (12-04-24 @ 12:52) (12 - 20)  SpO2: 100% (12-04-24 @ 12:52) (94% - 100%)    PHYSICAL EXAM ON DAY OF DISCHARGE: Please check discharge date progress note for physical exam.     ---  CONSULTANTS:   - Vascular Surgery: Dr. Mora   - Podiatry: Dr. French/Dr. Stark  - Cardio: Dr. Chu   - Interventional Radiology  - Diabetes NP     ---  ADVANCED CARE PLANNING:  - Code status:    FULL CODE   - MOLST completed:      [  X] NO     [  ] YES    ---  TIME SPENT:  I, the attending physician, was physically present for the key portions of the evaluation and management (E/M) service provided. The total amount of time spent reviewing the hospital notes, laboratory values, imaging findings, assessing/counseling the patient, discussing with consultant physicians, social work, nursing staff was 60 minutes

## 2024-12-04 NOTE — DISCHARGE NOTE PROVIDER - CARE PROVIDER_API CALL
Digna Thompson  Endocrinology/Metab/Diabetes  [address], [address]  [place] [address]  Phone: [phone]  Fax: [phone]  Follow Up Time:     Dannie Frederick  Internal Medicine  [address] [place] [id number]  Phone: [phone]  Fax: [phone]  Follow Up Time: 2 weeks   Digna Thompson  Endocrinology/Metab/Diabetes  [address], [address]  [place] [address]  Phone: [phone]  Fax: [phone]  Follow Up Time:     Dannie Frederick  Internal Medicine  [address] [place] [id number]  Phone: [phone]  Fax: [phone]  Follow Up Time: 2 weeks    Joe StarkTitus  Podiatric Medicine  [address] [place] [id number]  Phone: [phone]  Fax: [phone]  Follow Up Time: 1 week

## 2024-12-04 NOTE — DISCHARGE NOTE PROVIDER - NSDCCPCAREPLAN_GEN_ALL_CORE_FT
PRINCIPAL DISCHARGE DIAGNOSIS  Diagnosis: Left foot infection  Assessment and Plan of Treatment: You were admitted to [facility] for L foot wound/infection. You were seen by the vascular surgery team, who completed a angiogram and angioplasty of or left lower extremity. You were started on aspirin 81mg daily, along with you plavix 75mg daily. Please continue to take both medications upon discharge. Please follow up with the vascular surgeon within 1 month of your discharge.   You were seen by the podiatry team (Dr. Stark and Dr. French). You were recommended to go to the operating room for a bone biopsy and wound cleaning. You were recommendd **** upon discharge. Pleaes follow up with your podiatrist within 1 week of discharge.      SECONDARY DISCHARGE DIAGNOSES  Diagnosis: DM (diabetes mellitus)  Assessment and Plan of Treatment: You were seen by our diabetes nurse practitioner, as your A1C was 8.3 You were started on a sliding scale of insulin, along with some night-time insulin during your hospital stay. You are recommended to resume your home diabetes medications upon discharge. Please remember to follow up this endocrinologist upon discharge for your diabetes management: Dr. Digna Thompson at [address] [place] [address], [address], [place].    Diagnosis: PAD (peripheral artery disease)  Assessment and Plan of Treatment: You were started on Aspirin 81mg daily in addition to your Plavix 75mg daily. Please continue both medications upon discharge.     PRINCIPAL DISCHARGE DIAGNOSIS  Diagnosis: Left foot infection  Assessment and Plan of Treatment: You were admitted to [facility] for L foot wound/infection. You were seen by the vascular surgery team, who completed a angiogram and angioplasty of or left lower extremity. You were started on aspirin 81mg daily, along with you plavix 75mg daily. Please continue to take both medications upon discharge. Please follow up with the vascular surgeon within 1 month of your discharge.   You were seen by the podiatry team (Dr. Stark and Dr. French). You were recommended to go to the operating room for a bone biopsy and wound cleaning. You were recommendd **** upon discharge. Pleaes follow up with your podiatrist within 1 week of discharge.      SECONDARY DISCHARGE DIAGNOSES  Diagnosis: DM (diabetes mellitus)  Assessment and Plan of Treatment: You were seen by our diabetes nurse practitioner, as your A1C was 8.3 You were started on a sliding scale of insulin, along with some night-time insulin during your hospital stay. You are recommended to resume your home diabetes medications upon discharge. Please remember to follow up this endocrinologist upon discharge for your diabetes management:   Dr. Digna Thompson at [address] [place] [address], [address], [place].    Diagnosis: PAD (peripheral artery disease)  Assessment and Plan of Treatment: You take Plavix 75mg QD for your peripheral vascular disease. You had an angiogram and angioplasty of your left lower extremity completed. Please continue both medications upon discharge.    Diagnosis: HTN (hypertension)  Assessment and Plan of Treatment: You take     PRINCIPAL DISCHARGE DIAGNOSIS  Diagnosis: Left foot infection  Assessment and Plan of Treatment: You were admitted to [facility] for L foot wound/infection. You were seen by the vascular surgery team, who completed a angiogram and angioplasty of or left lower extremity. You were started on aspirin 81mg daily, along with you plavix 75mg daily. Please continue to take both medications upon discharge. Please follow up with the vascular surgeon within 1 month of your discharge.   You were seen by the podiatry team (Dr. Stark and Dr. French). You were recommended to go to the operating room for a bone biopsy and wound cleaning. You were recommendd **** upon discharge. Pleaes follow up with your podiatrist within 1 week of discharge.      SECONDARY DISCHARGE DIAGNOSES  Diagnosis: DM (diabetes mellitus)  Assessment and Plan of Treatment: You were seen by our diabetes nurse practitioner, as your A1C was 8.3 You were started on a sliding scale of insulin, along with some night-time insulin during your hospital stay. You are recommended to resume your home diabetes medications at your home dose upon discharge: Trulicity, Metformin and Jardiance.   Please remember to follow up this endocrinologist upon discharge for your diabetes management:   Dr. Digna Thompson at [address] [place] [address], [address], [place], [place].    Diagnosis: PAD (peripheral artery disease)  Assessment and Plan of Treatment: You take Plavix 75mg QD for your peripheral vascular disease. You had an angiogram and angioplasty of your left lower extremity completed. Please continue both medications upon discharge.    Diagnosis: HTN (hypertension)  Assessment and Plan of Treatment: You take Lisinopril 40mg daily and Metoprolol XL 50mg daily. These medications were resumed during hospitalization. Please continue these medications upon discharge.     PRINCIPAL DISCHARGE DIAGNOSIS  Diagnosis: Left foot infection  Assessment and Plan of Treatment: You were admitted to [facility] for L foot wound/infection. You were seen by the vascular surgery team, who completed a angiogram and angioplasty of or left lower extremity. You were started on aspirin 81mg daily, along with you plavix 75mg daily. Please continue to take both medications upon discharge. Please follow up with the vascular surgeon within 1 month of your discharge.   You were seen by the podiatry team (Dr. Stark and Dr. French). You were recommended to go to the operating room for a bone biopsy and wound cleaning. You were recommend to follow up with the podiatrist within 1 week of discharge for monitoring of surgical site, biopsy culture results, and antibiotic management.      SECONDARY DISCHARGE DIAGNOSES  Diagnosis: DM (diabetes mellitus)  Assessment and Plan of Treatment: You were seen by our diabetes nurse practitioner, as your A1C was 8.3 You were started on a sliding scale of insulin, along with some night-time insulin during your hospital stay. You are recommended to resume your home diabetes medications at your home dose upon discharge: Trulicity, Metformin and Jardiance.   Please remember to follow up this endocrinologist upon discharge for your diabetes management:   Dr. Digna Thompson at [address] [place] [address], [address], [place], [place].    Diagnosis: PAD (peripheral artery disease)  Assessment and Plan of Treatment: You take Plavix 75mg QD for your peripheral vascular disease. You had an angiogram and angioplasty of your left lower extremity completed. Please continue both medications upon discharge. Please follow up with the vascular surgeon within 1 month of discharge.    Diagnosis: HTN (hypertension)  Assessment and Plan of Treatment: You take Lisinopril 40mg daily and Metoprolol XL 50mg daily. These medications were resumed during hospitalization. Please continue these medications upon discharge.     PRINCIPAL DISCHARGE DIAGNOSIS  Diagnosis: Left foot infection  Assessment and Plan of Treatment: You were admitted to [facility] for L foot wound/infection.  MRI Left foot showed OM    Abnormal marrow signal within the fifth middle and distal phalanx as   on prior study raising concern for osteomyelitis.  - You were seen by the vascular surgery team, who completed left lower ext angiogram and angioplasty of or left lower extremity.  - You were started on aspirin 81mg daily, along with you plavix 75mg daily. Please continue to take both medications upon discharge.   -DO NOT STOP ASA, & Plavix -Please continue daily with food  -Please follow up with the vascular surgeon within 1 month of your discharge.   -Started on IV Antibiotics -S/P PICC line Rt U Ext   C/w cefepime 2gm q8h until 1/14/25 for now  -Patient to follow culture results upon discharge outpatient, abx regimen to be adjusted based on results.   follow up with ID-DR XENIA Calhoun   You were seen by the podiatry team (Dr. Stark and Dr. French). You were recommended to go to the operating room for a bone biopsy and wound cleaning Left foot lateral side wound  -Wound care as per Podiatry  Keep the dressing dry/claen/Intact  - You were recommend to follow up with the podiatrist within 1 week of discharge for monitoring of surgical site, biopsy culture results, and antibiotic management.      SECONDARY DISCHARGE DIAGNOSES  Diagnosis: DM (diabetes mellitus)  Assessment and Plan of Treatment: You were seen by our diabetes nurse practitioner, as your A1C was 8.3 You were started on a sliding scale of insulin, along with some night-time insulin during your hospital stay. You are recommended to resume your home diabetes medications at your home dose upon discharge: Trulicity, Metformin and Jardiance.   -Neuropathy-Continue gabapentin 300 mg 1 tab 3x day .  Please remember to follow up this endocrinologist upon discharge for your diabetes management:   Dr. Digna Thompson at [address] [place] [address], [address], [place].    Diagnosis: PAD (peripheral artery disease)  Assessment and Plan of Treatment: You take Plavix 75mg QD for your peripheral arterial  disease.  -Start EC ASA 81 mg 1 tab daily, take both with food to avoid stomach irritation    You had an angiogram and angioplasty of your left lower extremity completed. Please continue both medications upon discharge. Please follow up with the vascular surgeon within 1 month of discharge.    Diagnosis: HTN (hypertension)  Assessment and Plan of Treatment: You take Lisinopril 40mg daily and Metoprolol XL 50mg daily. These medications were resumed during hospitalization. Please continue these medications upon discharge.     PRINCIPAL DISCHARGE DIAGNOSIS  Diagnosis: Left foot infection  Assessment and Plan of Treatment: You were admitted to [facility] for L foot wound/infection.  MRI Left foot showed OM    Abnormal marrow signal within the fifth middle and distal phalanx as   on prior study raising concern for osteomyelitis.  - You were seen by the vascular surgery team, who completed left lower ext angiogram and angioplasty of or left lower extremity.  - You were started on aspirin 81mg daily, along with you plavix 75mg daily. Please continue to take both medications upon discharge.   -DO NOT STOP ASA, & Plavix -Please continue daily with food  -Please follow up with the vascular surgeon within 1 month of your discharge.   -Started on IV Antibiotics -S/P PICC line Rt U Ext   C/w cefepime 2gm q8h until 1/14/25 for now  -Patient to follow culture results upon discharge outpatient, abx regimen to be adjusted based on results.   follow up with ID-DR XENIA Calhoun   You were seen by the podiatry team (Dr. Stark and Dr. French). You were recommended to go to the operating room for a bone biopsy and wound cleaning Left foot lateral side wound  -Wound care as per Podiatry  Keep the dressing dry/claen/Intact  Wound Care Instructions:  -Keep dressing clean, dry, and intact to the left foot.  -Apply Aquacel Ag, gauze, ABD, bren, change every other day   -Patient partial WB to left heel in surgical offloading shoe.  -Monitor for any signs of infection including redness, swelling in the leg above the bandage, nausea/vomiting/fever/chills/chest pain/shortness of breath, if any are present patient must report to the emergency department immediately  - You were recommend to follow up with the podiatrist within 1 week of discharge for monitoring of surgical site, biopsy culture results, and antibiotic management.      SECONDARY DISCHARGE DIAGNOSES  Diagnosis: DM (diabetes mellitus)  Assessment and Plan of Treatment: You were seen by our diabetes nurse practitioner, as your A1C was 8.3 You were started on a sliding scale of insulin, along with some night-time insulin during your hospital stay. You are recommended to resume your home diabetes medications at your home dose upon discharge: Trulicity, Metformin and Jardiance.   -Neuropathy-Continue gabapentin 300 mg 1 tab 3x day .  Please remember to follow up this endocrinologist upon discharge for your diabetes management:   Dr. Digna Thompson at [address] [facility], [address], [place], [place].    Diagnosis: PAD (peripheral artery disease)  Assessment and Plan of Treatment: You take Plavix 75mg QD for your peripheral arterial  disease.  -Start EC ASA 81 mg 1 tab daily, take both with food to avoid stomach irritation    You had an angiogram and angioplasty of your left lower extremity completed. Please continue both medications upon discharge. Please follow up with the vascular surgeon within 1 month of discharge.    Diagnosis: HTN (hypertension)  Assessment and Plan of Treatment: You take Lisinopril 40mg daily and Metoprolol XL 50mg daily. These medications were resumed during hospitalization. Please continue these medications upon discharge.

## 2024-12-04 NOTE — CASE MANAGEMENT PROGRESS NOTE - NSCMPROGRESSNOTE_GEN_ALL_CORE
Discussed pt on rounds, pt remains acute, for left foot sharp excisional wound debridement and bone biopsy 12/4/24, pt has PICC, on IV Cefepime. CM will continue to collaborate with interdisciplinary team and remain available to assist.

## 2024-12-04 NOTE — PROGRESS NOTE ADULT - SUBJECTIVE AND OBJECTIVE BOX
[facility] -- Vlad Gallardo, Otoniel Bliss Savella, Goodger, Cohen: Office # [phone]    Follow Up:  Cardiac clearance     Subjective/Observations: Patient awake, alert, resting in bed. Denies chest pain, palpitations and dizziness. Denies any difficulty breathing. No orthopnea and PND. Tolerating room air.     REVIEW OF SYSTEMS: All other review of systems are negative unless indicated above    PAST MEDICAL & SURGICAL HISTORY:  HTN (hypertension)      Diabetes      Hyperlipidemia      PVD (peripheral vascular disease)      Toe osteomyelitis, right      H/O angioplasty  RLE      Status post amputation of toe          MEDICATIONS  (STANDING):  aspirin enteric coated 81 milliGRAM(s) Oral daily  atorvastatin 40 milliGRAM(s) Oral at bedtime  cefepime   IVPB      cefepime   IVPB 2000 milliGRAM(s) IV Intermittent every 8 hours  chlorhexidine 4% Liquid 1 Application(s) Topical <User Schedule>  clopidogrel Tablet 75 milliGRAM(s) Oral daily  dextrose 5%. 1000 milliLiter(s) (50 mL/Hr) IV Continuous <Continuous>  dextrose 50% Injectable 25 Gram(s) IV Push once  gabapentin 300 milliGRAM(s) Oral three times a day  glucagon  Injectable 1 milliGRAM(s) IntraMuscular once  insulin glargine Injectable (LANTUS) 10 Unit(s) SubCutaneous at bedtime  insulin lispro (ADMELOG) corrective regimen sliding scale   SubCutaneous three times a day before meals  insulin lispro (ADMELOG) corrective regimen sliding scale   SubCutaneous at bedtime  lisinopril 40 milliGRAM(s) Oral daily  metoprolol succinate ER 50 milliGRAM(s) Oral daily  povidone iodine 10% Solution 1 Application(s) Topical daily    MEDICATIONS  (PRN):  acetaminophen     Tablet .. 650 milliGRAM(s) Oral every 6 hours PRN Mild Pain (1 - 3)  aluminum hydroxide/magnesium hydroxide/simethicone Suspension 30 milliLiter(s) Oral every 4 hours PRN Dyspepsia  dextrose Oral Gel 15 Gram(s) Oral once PRN Blood Glucose LESS THAN 70 milliGRAM(s)/deciliter  melatonin 3 milliGRAM(s) Oral at bedtime PRN Insomnia  sodium chloride 0.9% lock flush 10 milliLiter(s) IV Push every 1 hour PRN Pre/post blood products, medications, blood draw, and to maintain line patency    Allergies    No Known Allergies    Intolerances      Vital Signs Last 24 Hrs  T(C): 36.4 (04 Dec 2024 04:43), Max: 36.8 (03 Dec 2024 12:02)  T(F): 97.5 (04 Dec 2024 04:43), Max: 98.3 (03 Dec 2024 12:02)  HR: 71 (04 Dec 2024 04:43) (71 - 103)  BP: 124/65 (04 Dec 2024 04:43) (124/64 - 151/83)  BP(mean): --  RR: 18 (04 Dec 2024 04:43) (18 - 20)  SpO2: 96% (04 Dec 2024 04:43) (94% - 97%)    Parameters below as of 03 Dec 2024 19:55  Patient On (Oxygen Delivery Method): room air      I&O's Summary    Weight (kg): 81.5 (12-04 @ 04:09)    TELE: Not on telemetry   PHYSICAL EXAM:  Constitutional: NAD, awake and alert  HEENT: Moist Mucous Membranes, Anicteric  Pulmonary: Non-labored, breath sounds are clear bilaterally, No wheezing, rales or rhonchi  Cardiovascular: Regular, S1 and S2, No murmurs, No rubs, gallops or clicks  Gastrointestinal:  soft, nontender, nondistended   Lymph: No peripheral edema. No lymphadenopathy.   Skin: No visible rashes or ulcers.  Psych:  Mood & affect appropriate      LABS: All Labs Reviewed:                        12.9   13.56 )-----------( 213      ( 04 Dec 2024 06:30 )             37.8                         14.7   16.74 )-----------( 282      ( 03 Dec 2024 08:18 )             44.2                         13.2   9.23  )-----------( 245      ( 02 Dec 2024 06:42 )             40.1     04 Dec 2024 06:30    140    |  110    |  22     ----------------------------<  191    4.4     |  23     |  0.95   03 Dec 2024 08:18    140    |  109    |  24     ----------------------------<  181    5.0     |  29     |  0.99   02 Dec 2024 06:42    139    |  107    |  25     ----------------------------<  192    5.3     |  32     |  0.93     Ca    8.3        04 Dec 2024 06:30  Ca    9.2        03 Dec 2024 08:18  Ca    9.2        02 Dec 2024 06:42       Cholesterol: 149 mg/dL (12-01-24 @ 07:31)  HDL Cholesterol: 35 mg/dL (12-01-24 @ 07:31)  Triglycerides, Serum: 195 mg/dL (12-01-24 @ 07:31)    12 Lead ECG:   Ventricular Rate 90 BPM    Atrial Rate 90 BPM    P-R Interval 142 ms    QRS Duration 90 ms    Q-T Interval 362 ms    QTC Calculation(Bazett) 442 ms    P Axis 51 degrees    R Axis -3 degrees    T Axis 24 degrees    Diagnosis Line Normal sinus rhythm  Normal ECG  When compared with ECG of 24-NOV-2023 10:03,  Questionable change in QRS axis  Confirmed by Shanna Solis ([id number]) on 11/30/2024 1:53:37 PM (11-30-24 @ 12:25)

## 2024-12-04 NOTE — PROGRESS NOTE ADULT - PROBLEM SELECTOR PLAN 1
Pt presenting with L lateral  foot  wound, s/p outpatient course of augmenting x14 days- OM on out pt MRI 11/23   -Diabetic foot wound   - WBC mildly elevated 11.65, out pt wound c/s + Serratia   - s/p vanco and zosyn in ED  - culture results from outpatient wound care 11/25 with Serratia  susceptibility to cefepime  - Tylenol PRN for mild pain  - ID Dr. Calhoun, will appreciate recs - Cefepime 2 gm IVPB q 8 hrs  - Now S/P LLE Angio and angioplasty; started on ASA in addition to Plavix   - R PICC line placed for likely plan for long term antibiotics; ID agreeable --> will need to plan for discharge abx   - Podiatry plan for left foot sharp excisional wound debridement and bone biopsy 12/4/24; pt eager for discharge 12/5/24 due to work issues    - Podiatry following Dr. French/Dr. Stark-OR on 12/4  - Vascular consulted- in house    *EKG reviewed; NSR. Patient is medically optimized for planned surgical procedure with routine hemodynamic monitoring.  - Cardiac optimization required per podiatry, obtained per cardiology. Pt presenting with L lateral  foot  wound, s/p outpatient course of augmenting x14 days- OM on out pt MRI 11/23   -Diabetic foot wound   - WBC mildly elevated 11.65, out pt wound c/s + Serratia   - s/p vanco and zosyn in ED  - culture results from outpatient wound care 11/25 with Serratia  susceptibility to cefepime  - Tylenol PRN for mild pain  - ID Dr. Calhoun, will appreciate recs - Cefepime 2 gm IVPB q 8 hrs  - Now S/P LLE Angio and angioplasty; started on ASA in addition to Plavix   - R PICC line placed 12/3/24 for likely plan for long term antibiotics; ID agreeable --> will need to plan for discharge abx   - Podiatry plan for left foot sharp excisional wound debridement and bone biopsy 12/4/24; pt eager for discharge 12/5/24 due to work issues    - Podiatry following Dr. French/Dr. Stark-OR on 12/4  - Vascular consulted- in house    *EKG reviewed; NSR. Patient is medically optimized for planned surgical procedure with routine hemodynamic monitoring.  - Cardiac optimization required per podiatry, obtained per cardiology. Pt presenting with L lateral  foot  wound, s/p outpatient course of augmenting x14 days- OM on out pt MRI 11/23   -Diabetic foot wound   - WBC mildly elevated 11.65, out pt wound c/s + Serratia   - s/p vanco and zosyn in ED  - culture results from outpatient wound care 11/25 with Serratia  susceptibility to cefepime  - Tylenol PRN for mild pain  - ID Dr. Calhoun, will appreciate recs - Cefepime 2 gm IVPB q 8 hrs  - Now S/P LLE Angio and angioplasty; started on ASA in addition to Plavix   - R PICC line placed 12/3/24 for likely plan for long term antibiotics; ID agreeable --> will need to plan for discharge abx   - Podiatry plan for left foot sharp excisional wound debridement and bone biopsy 12/4/24; pt eager for discharge 12/5/24 due to work issues    - Podiatry following Dr. French/Dr. Stark-OR on 12/4 for wound debridment & Bone Biopsy  - Vascular consulted- in house    *EKG reviewed; NSR. Patient is medically optimized for planned surgical procedure with routine hemodynamic monitoring.  - Cardiac optimization required per podiatry, obtained per cardiology.

## 2024-12-04 NOTE — DISCHARGE NOTE PROVIDER - PROVIDER TOKENS
PROVIDER:[TOKEN:[[id number]]],PROVIDER:[TOKEN:[[id number]],FOLLOWUP:[2 weeks]] PROVIDER:[TOKEN:[[id number]]],PROVIDER:[TOKEN:[[id number]],FOLLOWUP:[2 weeks]],PROVIDER:[TOKEN:[[id number]],FOLLOWUP:[1 week]]

## 2024-12-04 NOTE — PROGRESS NOTE ADULT - PROBLEM SELECTOR PLAN 2
Chronic, with foot  wounds  - glucose elevated on arrival  - hold home Trulicity, metformin Jardiance  - EVON; FS QAC QHS  - Lantus 10u QHS   - hypoglycemia protocol  - f/u AM A1c-8.2   - HOLD home meds --> discharge diabetes rec: return to home trulicity, metformin, and jardiance regimen and glucose monitoring  - Nutritional eval  - Neuropathy-On Gabapentin 300 mg 3x day  - Diabetic NP consulted, recs appreciated

## 2024-12-05 ENCOUNTER — TRANSCRIPTION ENCOUNTER (OUTPATIENT)
Age: 64
End: 2024-12-05

## 2024-12-05 VITALS
HEART RATE: 85 BPM | SYSTOLIC BLOOD PRESSURE: 130 MMHG | OXYGEN SATURATION: 96 % | DIASTOLIC BLOOD PRESSURE: 73 MMHG | TEMPERATURE: 98 F | RESPIRATION RATE: 20 BRPM

## 2024-12-05 LAB
ANION GAP SERPL CALC-SCNC: 1 MMOL/L — LOW (ref 5–17)
BASOPHILS # BLD AUTO: 0.04 K/UL — SIGNIFICANT CHANGE UP (ref 0–0.2)
BASOPHILS NFR BLD AUTO: 0.3 % — SIGNIFICANT CHANGE UP (ref 0–2)
BUN SERPL-MCNC: 23 MG/DL — SIGNIFICANT CHANGE UP (ref 7–23)
CALCIUM SERPL-MCNC: 8.8 MG/DL — SIGNIFICANT CHANGE UP (ref 8.5–10.1)
CHLORIDE SERPL-SCNC: 109 MMOL/L — HIGH (ref 96–108)
CO2 SERPL-SCNC: 28 MMOL/L — SIGNIFICANT CHANGE UP (ref 22–31)
CREAT SERPL-MCNC: 1 MG/DL — SIGNIFICANT CHANGE UP (ref 0.5–1.3)
CULTURE RESULTS: SIGNIFICANT CHANGE UP
CULTURE RESULTS: SIGNIFICANT CHANGE UP
EGFR: 83 ML/MIN/1.73M2 — SIGNIFICANT CHANGE UP
EOSINOPHIL # BLD AUTO: 0.74 K/UL — HIGH (ref 0–0.5)
EOSINOPHIL NFR BLD AUTO: 5.6 % — SIGNIFICANT CHANGE UP (ref 0–6)
GLUCOSE BLDC GLUCOMTR-MCNC: 198 MG/DL — HIGH (ref 70–99)
GLUCOSE BLDC GLUCOMTR-MCNC: 210 MG/DL — HIGH (ref 70–99)
GLUCOSE BLDC GLUCOMTR-MCNC: 228 MG/DL — HIGH (ref 70–99)
GLUCOSE SERPL-MCNC: 211 MG/DL — HIGH (ref 70–99)
GRAM STN FLD: SIGNIFICANT CHANGE UP
GRAM STN FLD: SIGNIFICANT CHANGE UP
HCT VFR BLD CALC: 34.9 % — LOW (ref 39–50)
HGB BLD-MCNC: 11.6 G/DL — LOW (ref 13–17)
IMM GRANULOCYTES NFR BLD AUTO: 0.5 % — SIGNIFICANT CHANGE UP (ref 0–0.9)
LYMPHOCYTES # BLD AUTO: 1.42 K/UL — SIGNIFICANT CHANGE UP (ref 1–3.3)
LYMPHOCYTES # BLD AUTO: 10.7 % — LOW (ref 13–44)
MCHC RBC-ENTMCNC: 31.1 PG — SIGNIFICANT CHANGE UP (ref 27–34)
MCHC RBC-ENTMCNC: 33.2 G/DL — SIGNIFICANT CHANGE UP (ref 32–36)
MCV RBC AUTO: 93.6 FL — SIGNIFICANT CHANGE UP (ref 80–100)
MONOCYTES # BLD AUTO: 0.96 K/UL — HIGH (ref 0–0.9)
MONOCYTES NFR BLD AUTO: 7.2 % — SIGNIFICANT CHANGE UP (ref 2–14)
NEUTROPHILS # BLD AUTO: 10.1 K/UL — HIGH (ref 1.8–7.4)
NEUTROPHILS NFR BLD AUTO: 75.7 % — SIGNIFICANT CHANGE UP (ref 43–77)
NRBC # BLD: 0 /100 WBCS — SIGNIFICANT CHANGE UP (ref 0–0)
PLATELET # BLD AUTO: 211 K/UL — SIGNIFICANT CHANGE UP (ref 150–400)
POTASSIUM SERPL-MCNC: 4.8 MMOL/L — SIGNIFICANT CHANGE UP (ref 3.5–5.3)
POTASSIUM SERPL-SCNC: 4.8 MMOL/L — SIGNIFICANT CHANGE UP (ref 3.5–5.3)
RBC # BLD: 3.73 M/UL — LOW (ref 4.2–5.8)
RBC # FLD: 13 % — SIGNIFICANT CHANGE UP (ref 10.3–14.5)
SODIUM SERPL-SCNC: 138 MMOL/L — SIGNIFICANT CHANGE UP (ref 135–145)
SPECIMEN SOURCE: SIGNIFICANT CHANGE UP
WBC # BLD: 13.33 K/UL — HIGH (ref 3.8–10.5)
WBC # FLD AUTO: 13.33 K/UL — HIGH (ref 3.8–10.5)

## 2024-12-05 PROCEDURE — 82962 GLUCOSE BLOOD TEST: CPT

## 2024-12-05 PROCEDURE — 80053 COMPREHEN METABOLIC PANEL: CPT

## 2024-12-05 PROCEDURE — 87070 CULTURE OTHR SPECIMN AEROBIC: CPT

## 2024-12-05 PROCEDURE — 87077 CULTURE AEROBIC IDENTIFY: CPT

## 2024-12-05 PROCEDURE — 97116 GAIT TRAINING THERAPY: CPT

## 2024-12-05 PROCEDURE — 36573 INSJ PICC RS&I 5 YR+: CPT

## 2024-12-05 PROCEDURE — 36415 COLL VENOUS BLD VENIPUNCTURE: CPT

## 2024-12-05 PROCEDURE — 80061 LIPID PANEL: CPT

## 2024-12-05 PROCEDURE — 99232 SBSQ HOSP IP/OBS MODERATE 35: CPT

## 2024-12-05 PROCEDURE — 88311 DECALCIFY TISSUE: CPT

## 2024-12-05 PROCEDURE — 76937 US GUIDE VASCULAR ACCESS: CPT

## 2024-12-05 PROCEDURE — 99285 EMERGENCY DEPT VISIT HI MDM: CPT | Mod: 25

## 2024-12-05 PROCEDURE — 76000 FLUOROSCOPY <1 HR PHYS/QHP: CPT

## 2024-12-05 PROCEDURE — 86900 BLOOD TYPING SEROLOGIC ABO: CPT

## 2024-12-05 PROCEDURE — 71045 X-RAY EXAM CHEST 1 VIEW: CPT

## 2024-12-05 PROCEDURE — C1769: CPT

## 2024-12-05 PROCEDURE — 96365 THER/PROPH/DIAG IV INF INIT: CPT

## 2024-12-05 PROCEDURE — 80048 BASIC METABOLIC PNL TOTAL CA: CPT

## 2024-12-05 PROCEDURE — 97162 PT EVAL MOD COMPLEX 30 MIN: CPT

## 2024-12-05 PROCEDURE — 86850 RBC ANTIBODY SCREEN: CPT

## 2024-12-05 PROCEDURE — 88304 TISSUE EXAM BY PATHOLOGIST: CPT

## 2024-12-05 PROCEDURE — 87040 BLOOD CULTURE FOR BACTERIA: CPT

## 2024-12-05 PROCEDURE — 83036 HEMOGLOBIN GLYCOSYLATED A1C: CPT

## 2024-12-05 PROCEDURE — C1725: CPT

## 2024-12-05 PROCEDURE — 85025 COMPLETE CBC W/AUTO DIFF WBC: CPT

## 2024-12-05 PROCEDURE — 99024 POSTOP FOLLOW-UP VISIT: CPT

## 2024-12-05 PROCEDURE — 83605 ASSAY OF LACTIC ACID: CPT

## 2024-12-05 PROCEDURE — 86901 BLOOD TYPING SEROLOGIC RH(D): CPT

## 2024-12-05 PROCEDURE — 87075 CULTR BACTERIA EXCEPT BLOOD: CPT

## 2024-12-05 PROCEDURE — C1760: CPT

## 2024-12-05 PROCEDURE — C1751: CPT

## 2024-12-05 PROCEDURE — 85730 THROMBOPLASTIN TIME PARTIAL: CPT

## 2024-12-05 PROCEDURE — 73630 X-RAY EXAM OF FOOT: CPT

## 2024-12-05 PROCEDURE — 87186 SC STD MICRODIL/AGAR DIL: CPT

## 2024-12-05 PROCEDURE — 85610 PROTHROMBIN TIME: CPT

## 2024-12-05 PROCEDURE — C1894: CPT

## 2024-12-05 PROCEDURE — C1887: CPT

## 2024-12-05 PROCEDURE — 73718 MRI LOWER EXTREMITY W/O DYE: CPT | Mod: MC

## 2024-12-05 PROCEDURE — 93005 ELECTROCARDIOGRAM TRACING: CPT

## 2024-12-05 PROCEDURE — 96367 TX/PROPH/DG ADDL SEQ IV INF: CPT

## 2024-12-05 RX ORDER — CHLORHEXIDINE GLUCONATE 1.2 MG/ML
1 RINSE ORAL DAILY
Refills: 0 | Status: DISCONTINUED | OUTPATIENT
Start: 2024-12-05 | End: 2024-12-05

## 2024-12-05 RX ORDER — CEFEPIME 2 G/1
2 INJECTION, POWDER, FOR SOLUTION INTRAVENOUS
Qty: 0 | Refills: 0 | DISCHARGE
Start: 2024-12-05

## 2024-12-05 RX ORDER — CLOPIDOGREL 75 MG/1
75 TABLET, FILM COATED ORAL DAILY
Refills: 0 | Status: DISCONTINUED | OUTPATIENT
Start: 2024-12-05 | End: 2024-12-05

## 2024-12-05 RX ADMIN — CHLORHEXIDINE GLUCONATE 1 APPLICATION(S): 1.2 RINSE ORAL at 11:48

## 2024-12-05 RX ADMIN — Medication 4: at 08:14

## 2024-12-05 RX ADMIN — CEFEPIME 100 MILLIGRAM(S): 2 INJECTION, POWDER, FOR SOLUTION INTRAVENOUS at 08:14

## 2024-12-05 RX ADMIN — Medication 81 MILLIGRAM(S): at 12:10

## 2024-12-05 RX ADMIN — Medication 2: at 17:06

## 2024-12-05 RX ADMIN — Medication 4: at 12:11

## 2024-12-05 RX ADMIN — CEFEPIME 100 MILLIGRAM(S): 2 INJECTION, POWDER, FOR SOLUTION INTRAVENOUS at 01:17

## 2024-12-05 RX ADMIN — CLOPIDOGREL 75 MILLIGRAM(S): 75 TABLET, FILM COATED ORAL at 12:10

## 2024-12-05 RX ADMIN — GABAPENTIN 300 MILLIGRAM(S): 300 CAPSULE ORAL at 13:43

## 2024-12-05 RX ADMIN — GABAPENTIN 300 MILLIGRAM(S): 300 CAPSULE ORAL at 05:36

## 2024-12-05 NOTE — PROGRESS NOTE ADULT - PROBLEM SELECTOR PLAN 2
Chronic, with foot  wounds  - hold home Trulicity, metformin Jardiance  - EVON; FS QAC QHS  - Lantus 10u QHS   - hypoglycemia protocol  - f/u AM A1c-8.2   - HOLD home meds --> discharge diabetes rec: return to home Trulicity, metformin, and Jardiance regimen and glucose monitoring  - Nutritional eval  - Neuropathy-On Gabapentin 300 mg 3x day  - Diabetic NP consulted, recs appreciated d/w out pt Endo follow up d/w pt again

## 2024-12-05 NOTE — PROGRESS NOTE ADULT - SUBJECTIVE AND OBJECTIVE BOX
Patient is a 64y old  Male who presents with a chief complaint of L foot wound (05 Dec 2024 12:37)    HPI:  Pt is a 62 y/o M with PMHx DM2,PAD,  hx osteomyelitis s/p surgical amputation of R great toe 2023, CAD, PAD on plavix, HTN who presents to the hospital at instruction of wound care for a L foot wound. Pt reports he has been following with wound care with Dr. Stark and Dr. French, has been on Augmentin for approx 2 weeks. No systemic symptoms. Pt states he was told to come in this weekend for IV antibiotics and further evaluation of Left foot wound, . Pt reports feeling well and denies any fevers, chills, chest pain, shortness of breath, nausea, vomiting, diarrhea, constipation. Pt has been dressing the wound daily himself.  pt has had MRI left foot 1 week ago     ED Course:   Vitals: BP: 134/69, HR: 94, Temp: 134/69, RR: 18, SpO2: 99% on RA   Labs: WBC 11.65, lactate 2.1 -> 1, BUN 29, Glucose 215   CXR: No active chest disease  EKG: NSR @ 90bpm  Received in the ED: Zosyn, Vancomycin, 1L NS bolus   (30 Nov 2024 17:09)    INTERVAL HPI:    INTERVAL HPI:  12/1: Pt seen, Examined, No Complaints , feels OK, IV Abx ,Nl WBC  12/2: Examined at bedside, has no complaints at this time. Plan for angio this morning/afternoon with vascular. Kept NPO, IV Abx   12/3: Examined at bedside, no complaints at this time. Vascular angiogram/plasty went well yesterday, no issues regarding procedure. Pt is eager to discuss with podiatry regarding surgical plans. Denies fever, chills, chest pain, palpitations, lower extremity pain or swelling. Rt u Ext PICC +  12/4: Examined at bedside, no complaints, unchanged from yesterday. No issues from angiogram/plasty. R Ext PICC placed by IR. Plans for OR today with Dr. French. Continues to be on cefepime. NPO- OR , leukocytosis -likely reactive.  12/5: pt seen, examined, No Pain, D/C Plan today home with Home Care IV abx Via PICC line WBC improving     OVERNIGHT EVENTS: NONE    Home Medications:  aspirin 81 mg oral delayed release tablet: 1 tab(s) orally once a day (05 Dec 2024 12:06)  atorvastatin 40 mg oral tablet: 1 tab(s) orally once a day (30 Nov 2024 17:29)  cefepime 2 g intravenous injection: 2 gram(s) intravenous every 8 hours (05 Dec 2024 12:06)  clopidogrel 75 mg oral tablet: 1 tab(s) orally once a day (30 Nov 2024 17:29)  gabapentin 300 mg oral capsule: 1 cap(s) orally 3 times a day (05 Dec 2024 12:56)  Jardiance 25 mg oral tablet: 1 tab(s) orally once a day (30 Nov 2024 17:29)  lisinopril 40 mg oral tablet: 1 tab(s) orally once a day (30 Nov 2024 17:29)  metFORMIN 1000 mg oral tablet: 1 tab(s) orally 2 times a day (30 Nov 2024 17:29)  metoprolol succinate 50 mg oral tablet, extended release: 1 tab(s) orally once a day (30 Nov 2024 17:29)  Trulicity Pen 1.5 mg/0.5 mL subcutaneous solution: 1.5 milligram(s) subcutaneously once a week (30 Nov 2024 17:29)      MEDICATIONS  (STANDING):  aspirin enteric coated 81 milliGRAM(s) Oral daily  atorvastatin 40 milliGRAM(s) Oral at bedtime  cefepime   IVPB 2000 milliGRAM(s) IV Intermittent every 8 hours  chlorhexidine 4% Liquid 1 Application(s) Topical daily  clopidogrel Tablet 75 milliGRAM(s) Oral daily  dextrose 50% Injectable 25 Gram(s) IV Push once  gabapentin 300 milliGRAM(s) Oral three times a day  glucagon  Injectable 1 milliGRAM(s) IntraMuscular once  insulin glargine Injectable (LANTUS) 10 Unit(s) SubCutaneous at bedtime  insulin lispro (ADMELOG) corrective regimen sliding scale   SubCutaneous three times a day before meals  insulin lispro (ADMELOG) corrective regimen sliding scale   SubCutaneous at bedtime  lisinopril 40 milliGRAM(s) Oral daily  metoprolol succinate ER 50 milliGRAM(s) Oral daily    MEDICATIONS  (PRN):  acetaminophen     Tablet .. 650 milliGRAM(s) Oral every 6 hours PRN Mild Pain (1 - 3)  aluminum hydroxide/magnesium hydroxide/simethicone Suspension 30 milliLiter(s) Oral every 4 hours PRN Dyspepsia  dextrose Oral Gel 15 Gram(s) Oral once PRN Blood Glucose LESS THAN 70 milliGRAM(s)/deciliter  HYDROmorphone  Injectable 0.5 milliGRAM(s) IV Push every 10 minutes PRN Severe Pain (7 - 10)  melatonin 3 milliGRAM(s) Oral at bedtime PRN Insomnia  sodium chloride 0.9% lock flush 10 milliLiter(s) IV Push every 1 hour PRN Pre/post blood products, medications, blood draw, and to maintain line patency      Allergies    No Known Allergies    Intolerances        Social History:  Lives: with wife and son who is in college  ADLs: independent  Diet:  Alcohol Use: rare occasional  Tobacco Use: denies  Recreational Drug Use: denies (30 Nov 2024 17:09)      REVIEW OF SYSTEMS: i am OK  CONSTITUTIONAL: No fever, No chills, No fatigue, No myalgia, No Body ache, No Weakness  EYES: No eye pain,  No visual disturbances, No discharge, NO Redness  ENMT:  No ear pain, No nose bleed, No vertigo; No sinus pain, NO throat pain, No Congestion  NECK: No pain, No stiffness  RESPIRATORY: No cough, NO wheezing, No  hemoptysis, NO  shortness of breath  CARDIOVASCULAR: No chest pain, palpitations  GASTROINTESTINAL: No abdominal pain, NO epigastric pain. No nausea, No vomiting; No diarrhea, No constipation. [ x ] BM  GENITOURINARY: No dysuria, No frequency, No urgency, No hematuria, NO incontinence  NEUROLOGICAL: No headaches, No dizziness, No numbness, No tingling, No tremors, No weakness  EXT: No Swelling, No Pain, No Edema  SKIN:  [x  ] No itching, burning, rashes, or lesions   MUSCULOSKELETAL: No joint pain ,No Jt swelling; No muscle pain, No back pain, No extremity pain  PSYCHIATRIC: No depression,  No anxiety,  No mood swings ,No difficulty sleeping at night  PAIN SCALE: [x  ] None  [  ] Other-  ROS Unable to obtain due to - [  ] Dementia  [  ] Lethargy [  ] Drowsy [  ] Sedated [  ] non verbal  REST OF REVIEW Of SYSTEM - [x ] Normal     Vital Signs Last 24 Hrs  T(C): 36.7 (05 Dec 2024 12:16), Max: 36.7 (04 Dec 2024 21:01)  T(F): 98.1 (05 Dec 2024 12:16), Max: 98.1 (05 Dec 2024 12:16)  HR: 85 (05 Dec 2024 12:16) (74 - 90)  BP: 130/73 (05 Dec 2024 12:16) (102/61 - 130/73)  BP(mean): --  RR: 20 (05 Dec 2024 12:16) (18 - 20)  SpO2: 96% (05 Dec 2024 12:16) (94% - 99%)    Parameters below as of 05 Dec 2024 12:16  Patient On (Oxygen Delivery Method): room air      Finger Stick        12-04 @ 07:01  -  12-05 @ 07:00  --------------------------------------------------------  IN: 315 mL / OUT: 0 mL / NET: 315 mL          PHYSICAL EXAM:  GENERAL:  [X  ] NAD , [ X ] well appearing, [  ] Agitated, [  ] Drowsy,  [  ] Lethargy, [  ] confused   HEAD:  [X  ] Normal, [  ] Other  EYES:  [ X ] EOMI, [ X ] PERRLA, [  ] conjunctiva and sclera clear normal, [  ] Other,  [  ] Pallor,[  ] Discharge  ENMT:  [X  ] Normal, [ X ] Moist mucous membranes, [X  ] Good dentition, [X  ] No Thrush  NECK:  [X  ] Supple, [ x ] No JVD, [X  ] Normal thyroid, [  ] Lymphadenopathy [  ] Other  CHEST/LUNG:  [X  ] Clear to auscultation bilaterally, [X  ] Breath Sounds equal B/L / Decrease, [  ] poor effort  [X  ] No rales, [ X] No rhonchi  [X ]  No wheezing,   HEART:  [ X  ] Regular rate and rhythm, [  ] tachycardia, [  ] Bradycardia,  [  ] irregular  [ X ] No murmurs, No rubs, No gallops, [  ] PPM in place (Mfr:  )  ABDOMEN:  [ X ] Soft, [ X ] Nontender, [ X ] Nondistended, [ x ] No mass, [  x] Bowel sounds present, [ x ] obese, [ X] Umbilical hernia   NERVOUS SYSTEM:  [ X ] Alert & Oriented X3, [X  ] Nonfocal  [  ] Confusion  [  ] Encephalopathic [  ] Sedated [  ] Unable to assess, [  ] Dementia [  ] Other-  EXTREMITIES: [ X ] 2+ Peripheral Pulses, No clubbing, No cyanosis,  [  ] edema B/L lower EXT. [  ] PVD stasis skin changes B/L Lower EXT, [ x ] wound-Left lateral foot necrotic wound -scab on rt big toe  LYMPH: No lymphadenopathy noted  SKIN:  [ x ] No rashes or lesions, [  ] Pressure Ulcers, [  ] ecchymosis, [  ] Skin Tears, [ x ] Other-Left foot lateral border wound + Dressing. No necrotic tissue, No odor , No Drainage . Bg toe dry wound [ x ] R groin surgiseal     DIET: Diet, Regular:   Consistent Carbohydrate Evening Snack  DASH/TLC Sodium & Cholesterol Restricted (12-04-24 @ 16:27)      LABS:                        11.6   13.33 )-----------( 211      ( 05 Dec 2024 05:45 )             34.9     05 Dec 2024 05:45    138    |  109    |  23     ----------------------------<  211    4.8     |  28     |  1.00     Ca    8.8        05 Dec 2024 05:45        Urinalysis Basic - ( 05 Dec 2024 05:45 )    Color: x / Appearance: x / SG: x / pH: x  Gluc: 211 mg/dL / Ketone: x  / Bili: x / Urobili: x   Blood: x / Protein: x / Nitrite: x   Leuk Esterase: x / RBC: x / WBC x   Sq Epi: x / Non Sq Epi: x / Bacteria: x        Culture Results:   No growth at 4 days (11-30 @ 12:35)  Culture Results:   No growth at 4 days (11-30 @ 12:30)      culture blood  -- Tissue 12-04 @ 15:20    culture urine  --  12-04 @ 15:20  culture blood  -- Tissue 12-04 @ 15:15    culture urine  --  12-04 @ 15:15      Culture - Tissue with Gram Stain (collected 04 Dec 2024 15:20)  Source: Tissue  Gram Stain (05 Dec 2024 15:00):    Rare polymorphonuclear leukocytes per low power field    No organisms seen per oil power field    Culture - Tissue with Gram Stain (collected 04 Dec 2024 15:15)  Source: Tissue  Gram Stain (05 Dec 2024 13:16):    No polymorphonuclear cells seen per low power field    No organisms seen per oil power field    Culture - Blood (collected 30 Nov 2024 12:35)  Source: .Blood BLOOD  Preliminary Report (04 Dec 2024 18:01):    No growth at 4 days    Culture - Blood (collected 30 Nov 2024 12:30)  Source: .Blood BLOOD  Preliminary Report (04 Dec 2024 18:01):    No growth at 4 days             RADIOLOGY & ADDITIONAL TESTS:      HEALTH ISSUES - PROBLEM Dx:  Wound of left foot    DM (diabetes mellitus)  You were seen by our diabetes nurse practitioner, as your A1C was 8.3 You were started on a sliding scale of insulin, along with some night-time insulin during your hospital stay. You are recommended to resume your home diabetes medications upon discharge. Please remember to follow up this endocrinologist upon discharge for your diabetes management: Dr. Digna Thompson at [address] [place] [address], [address], [place].    PAD (peripheral artery disease)  You were started on Aspirin 81mg daily in addition to your Plavix 75mg daily. Please continue both medications upon discharge.    HTN (hypertension)    Need for prophylactic measure    Diabetic ulcer of left foot    Osteomyelitis of left foot            Consultant(s) Notes Reviewed:  [x  ] YES     Care Discussed with [X] Consultants  [x  ] Patient  [x  ] Family [  ] HCP [x  ]   [  ] Social Service  [ x ] RN, [ x ] Physical Therapy,[  ] Palliative care team  DVT PPX: [  ] Lovenox, [  ] S C Heparin, [  ] Coumadin, [  ] Xarelto, [  ] Eliquis, [  ] Pradaxa, [  ] IV Heparin drip, [x  ] SCD [  ] Contraindication 2 to GI Bleed,[  ] Ambulation [  ] Contraindicated 2 to  bleed [  ] Contraindicated 2 to Brain Bleed  Advanced directive: [ x ] None, [  ] DNR/DNI

## 2024-12-05 NOTE — PHYSICAL THERAPY INITIAL EVALUATION ADULT - ADDITIONAL COMMENTS
Patient lives in private home with spouse, [address], 4 stairs to main floor, + 6 stairs to bedroom. Patient was independent in all ADL and ambulated independently with SAC.

## 2024-12-05 NOTE — PROGRESS NOTE ADULT - PROBLEM SELECTOR PLAN 5
DVT PPx: SCDs, plan for OR with Podiatry  PT Partial weight bearing in heel with post op surgical shoe -done
DVT PPx: SCDs, plan for OR with Podiatry
DVT PPx: SCDs, plan for OR with Podiatry
DVT PPx: SCDs
Lovenox for DVT ppx
HOLDING DVT PPX for LLE Angio, per vascular

## 2024-12-05 NOTE — PROGRESS NOTE ADULT - SUBJECTIVE AND OBJECTIVE BOX
Patient is a 64y old  Male who presents with a chief complaint of L foot wound (05 Dec 2024 12:37)    HPI:  Pt is a 64 y/o M with PMHx DM2,PAD,  hx osteomyelitis s/p surgical amputation of R great toe 2023, CAD, PAD on plavix, HTN who presents to the hospital at instruction of wound care for a L foot wound. Pt reports he has been following with wound care with Dr. Stark and Dr. French, has been on Augmentin for approx 2 weeks. No systemic symptoms. Pt states he was told to come in this weekend for IV antibiotics and further evaluation of Left foot wound, . Pt reports feeling well and denies any fevers, chills, chest pain, shortness of breath, nausea, vomiting, diarrhea, constipation. Pt has been dressing the wound daily himself.  pt has had MRI left foot 1 week ago     ED Course:   Vitals: BP: 134/69, HR: 94, Temp: 134/69, RR: 18, SpO2: 99% on RA   Labs: WBC 11.65, lactate 2.1 -> 1, BUN 29, Glucose 215   CXR: No active chest disease  EKG: NSR @ 90bpm  Received in the ED: Zosyn, Vancomycin, 1L NS bolus   (30 Nov 2024 17:09)    INTERVAL HPI:  12/1: Pt seen, Examined, No Complaints , feels OK, IV Abx ,Nl WBC  12/2: Examined at bedside, has no complaints at this time. Plan for angio this morning/afternoon with vascular. Kept NPO, IV Abx   12/3: Examined at bedside, no complaints at this time. Vascular angiogram/plasty went well yesterday, no issues regarding procedure. Pt is eager to discuss with podiatry regarding surgical plans. Denies fever, chills, chest pain, palpitations, lower extremity pain or swelling. Rt u Ext PICC +  12/4: Examined at bedside, no complaints, unchanged from yesterday. No issues from angiogram/plasty. R Ext PICC placed by IR. Plans for OR today with Dr. French. Continues to be on cefepime. NPO- OR   12/5: Examined at  bedside, no complaints at this time. Surgery went well with podiatry. PICC line in place, plan for discharge today. On cefepime.     OVERNIGHT EVENTS: None     Home Medications:  aspirin 81 mg oral delayed release tablet: 1 tab(s) orally once a day (05 Dec 2024 12:06)  atorvastatin 40 mg oral tablet: 1 tab(s) orally once a day (30 Nov 2024 17:29)  cefepime 2 g intravenous injection: 2 gram(s) intravenous every 8 hours (05 Dec 2024 12:06)  clopidogrel 75 mg oral tablet: 1 tab(s) orally once a day (30 Nov 2024 17:29)  gabapentin 300 mg oral capsule: 1 cap(s) orally 3 times a day (05 Dec 2024 12:56)  Jardiance 25 mg oral tablet: 1 tab(s) orally once a day (30 Nov 2024 17:29)  lisinopril 40 mg oral tablet: 1 tab(s) orally once a day (30 Nov 2024 17:29)  metFORMIN 1000 mg oral tablet: 1 tab(s) orally 2 times a day (30 Nov 2024 17:29)  metoprolol succinate 50 mg oral tablet, extended release: 1 tab(s) orally once a day (30 Nov 2024 17:29)  Trulicity Pen 1.5 mg/0.5 mL subcutaneous solution: 1.5 milligram(s) subcutaneously once a week (30 Nov 2024 17:29)      MEDICATIONS  (STANDING):  aspirin enteric coated 81 milliGRAM(s) Oral daily  atorvastatin 40 milliGRAM(s) Oral at bedtime  cefepime   IVPB 2000 milliGRAM(s) IV Intermittent every 8 hours  chlorhexidine 4% Liquid 1 Application(s) Topical daily  clopidogrel Tablet 75 milliGRAM(s) Oral daily  dextrose 50% Injectable 25 Gram(s) IV Push once  gabapentin 300 milliGRAM(s) Oral three times a day  glucagon  Injectable 1 milliGRAM(s) IntraMuscular once  insulin glargine Injectable (LANTUS) 10 Unit(s) SubCutaneous at bedtime  insulin lispro (ADMELOG) corrective regimen sliding scale   SubCutaneous three times a day before meals  insulin lispro (ADMELOG) corrective regimen sliding scale   SubCutaneous at bedtime  lisinopril 40 milliGRAM(s) Oral daily  metoprolol succinate ER 50 milliGRAM(s) Oral daily    MEDICATIONS  (PRN):  acetaminophen     Tablet .. 650 milliGRAM(s) Oral every 6 hours PRN Mild Pain (1 - 3)  aluminum hydroxide/magnesium hydroxide/simethicone Suspension 30 milliLiter(s) Oral every 4 hours PRN Dyspepsia  dextrose Oral Gel 15 Gram(s) Oral once PRN Blood Glucose LESS THAN 70 milliGRAM(s)/deciliter  HYDROmorphone  Injectable 0.5 milliGRAM(s) IV Push every 10 minutes PRN Severe Pain (7 - 10)  melatonin 3 milliGRAM(s) Oral at bedtime PRN Insomnia  sodium chloride 0.9% lock flush 10 milliLiter(s) IV Push every 1 hour PRN Pre/post blood products, medications, blood draw, and to maintain line patency      Allergies    No Known Allergies    Intolerances        Social History:  Lives: with wife and son who is in college  ADLs: independent  Diet:  Alcohol Use: rare occasional  Tobacco Use: denies  Recreational Drug Use: denies (30 Nov 2024 17:09)      REVIEW OF SYSTEMS:  CONSTITUTIONAL: No fever, No chills, No fatigue, No myalgia, No Body ache, No Weakness  EYES: No eye pain,  No visual disturbances, No discharge, NO Redness  ENMT:  No ear pain, No nose bleed, No vertigo; No sinus pain, NO throat pain, No Congestion  NECK: No pain, No stiffness  RESPIRATORY: No cough, NO wheezing, No  hemoptysis, NO  shortness of breath  CARDIOVASCULAR: No chest pain, palpitations  GASTROINTESTINAL: No abdominal pain, NO epigastric pain. No nausea, No vomiting; No diarrhea, No constipation. [  ] BM  GENITOURINARY: No dysuria, No frequency, No urgency, No hematuria, NO incontinence  NEUROLOGICAL: No headaches, No dizziness, No numbness, No tingling, No tremors, No weakness  EXT: No Swelling, No Pain, No Edema  SKIN:  [ X ] No itching, burning, rashes, or lesions   MUSCULOSKELETAL: No joint pain ,No Jt swelling; No muscle pain, No back pain, No extremity pain  PSYCHIATRIC: No depression,  No anxiety,  No mood swings ,No difficulty sleeping at night  PAIN SCALE: [ X ] None  [  ] Other-  ROS Unable to obtain due to - [  ] Dementia  [  ] Lethargy [  ] Drowsy [  ] Sedated [  ] non verbal  REST OF REVIEW Of SYSTEM - [ X ] Normal     Vital Signs Last 24 Hrs  T(C): 36.7 (05 Dec 2024 12:16), Max: 36.7 (04 Dec 2024 21:01)  T(F): 98.1 (05 Dec 2024 12:16), Max: 98.1 (05 Dec 2024 12:16)  HR: 85 (05 Dec 2024 12:16) (74 - 90)  BP: 130/73 (05 Dec 2024 12:16) (102/61 - 130/73)  BP(mean): --  RR: 20 (05 Dec 2024 12:16) (18 - 20)  SpO2: 96% (05 Dec 2024 12:16) (94% - 99%)    Parameters below as of 05 Dec 2024 12:16  Patient On (Oxygen Delivery Method): room air      Finger Stick        12-04 @ 07:01  -  12-05 @ 07:00  --------------------------------------------------------  IN: 315 mL / OUT: 0 mL / NET: 315 mL        PHYSICAL EXAM:  GENERAL:  [X  ] NAD , [ X ] well appearing, [  ] Agitated, [  ] Drowsy,  [  ] Lethargy, [  ] confused   HEAD:  [X  ] Normal, [  ] Other  EYES:  [ X ] EOMI, [ X ] PERRLA, [  ] conjunctiva and sclera clear normal, [  ] Other,  [  ] Pallor,[  ] Discharge  ENMT:  [X  ] Normal, [ X ] Moist mucous membranes, [X  ] Good dentition, [X  ] No Thrush  NECK:  [X  ] Supple, [ x ] No JVD, [X  ] Normal thyroid, [  ] Lymphadenopathy [  ] Other  CHEST/LUNG:  [X  ] Clear to auscultation bilaterally, [X  ] Breath Sounds equal B/L / Decrease, [  ] poor effort  [X  ] No rales, [ X] No rhonchi  [X ]  No wheezing,   HEART:  [ X  ] Regular rate and rhythm, [  ] tachycardia, [  ] Bradycardia,  [  ] irregular  [ X ] No murmurs, No rubs, No gallops, [  ] PPM in place (Mfr:  )  ABDOMEN:  [ X ] Soft, [ X ] Nontender, [ X ] Nondistended, [ x ] No mass, [  x] Bowel sounds present, [  ] obese, [ X] Umbilical hernia   NERVOUS SYSTEM:  [ X ] Alert & Oriented X3, [X  ] Nonfocal  [  ] Confusion  [  ] Encephalopathic [  ] Sedated [  ] Unable to assess, [  ] Dementia [  ] Other-  EXTREMITIES: [ X ] 2+ Peripheral Pulses, No clubbing, No cyanosis,  [  ] edema B/L lower EXT. [  ] PVD stasis skin changes B/L Lower EXT, [ x ] wound-Left lateral foot necrotic wound -scab on rt big toe  LYMPH: No lymphadenopathy noted  SKIN:  [ x ] No rashes or lesions, [  ] Pressure Ulcers, [  ] ecchymosis, [  ] Skin Tears, [ x ] Other- surgical site wrapped with dressing, efrain-wound erythema  [ x ] R groin surgiseal     DIET: Diet, Regular:   Consistent Carbohydrate Evening Snack  DASH/TLC Sodium & Cholesterol Restricted (12-04-24 @ 16:27)      LABS:                        11.6   13.33 )-----------( 211      ( 05 Dec 2024 05:45 )             34.9     05 Dec 2024 05:45    138    |  109    |  23     ----------------------------<  211    4.8     |  28     |  1.00     Ca    8.8        05 Dec 2024 05:45        Urinalysis Basic - ( 05 Dec 2024 05:45 )    Color: x / Appearance: x / SG: x / pH: x  Gluc: 211 mg/dL / Ketone: x  / Bili: x / Urobili: x   Blood: x / Protein: x / Nitrite: x   Leuk Esterase: x / RBC: x / WBC x   Sq Epi: x / Non Sq Epi: x / Bacteria: x        Culture Results:   No growth at 4 days (11-30 @ 12:35)  Culture Results:   No growth at 4 days (11-30 @ 12:30)      culture blood  -- Tissue 12-04 @ 15:20    culture urine  --  12-04 @ 15:20  culture blood  -- Tissue 12-04 @ 15:15    culture urine  --  12-04 @ 15:15              Culture - Tissue with Gram Stain (collected 04 Dec 2024 15:20)  Source: Tissue  Gram Stain (05 Dec 2024 15:00):    Rare polymorphonuclear leukocytes per low power field    No organisms seen per oil power field    Culture - Tissue with Gram Stain (collected 04 Dec 2024 15:15)  Source: Tissue  Gram Stain (05 Dec 2024 13:16):    No polymorphonuclear cells seen per low power field    No organisms seen per oil power field    Culture - Blood (collected 30 Nov 2024 12:35)  Source: .Blood BLOOD  Preliminary Report (04 Dec 2024 18:01):    No growth at 4 days    Culture - Blood (collected 30 Nov 2024 12:30)  Source: .Blood BLOOD  Preliminary Report (04 Dec 2024 18:01):    No growth at 4 days         Anemia Panel:      Thyroid Panel:                RADIOLOGY & ADDITIONAL TESTS: < from: Xray Foot AP + Lateral + Oblique, Left (12.04.24 @ 16:32) >  ACC: [id number] EXAM:  XR FOOT COMP MIN 3 VIEWS LT   ORDERED BY: MALACHI FRENCH     PROCEDURE DATE:  12/04/2024          INTERPRETATION:  LEFT foot    CLINICAL INFORMATION: Status post wound debridement and bone biopsy of   the fifth metatarsal bone base.  .    TECHNIQUE: AP,lateral and oblique views.    FINDINGS:  Postoperative soft tissue changes at base of fifth metatarsal bone with   small peripheral fifth metatarsal bone base peripheral defect at site of   biopsy.  Remaining osseous and joint structures radiographically intact.      IMPRESSION:    Postoperative changes described.    --- End of Report ---      < end of copied text >        HEALTH ISSUES - PROBLEM Dx:  Wound of left foot    Need for prophylactic measure    Diabetic ulcer of left foot    Osteomyelitis of left foot    DM (diabetes mellitus)  You were seen by our diabetes nurse practitioner, as your A1C was 8.3 You were started on a sliding scale of insulin, along with some night-time insulin during your hospital stay. You are recommended to resume your home diabetes medications upon discharge. Please remember to follow up this endocrinologist upon discharge for your diabetes management: Dr. Digna Thompson at [address] [place] [address], [address], [place], [place].    PAD (peripheral artery disease)  You were started on Aspirin 81mg daily in addition to your Plavix 75mg daily. Please continue both medications upon discharge.    HTN (hypertension)            Consultant(s) Notes Reviewed:  [ X ] YES     Care Discussed with [X] Consultants  [ X ] Patient  [X  ] Family [  ] HCP [ X ]   [  ] Social Service  [X  ] RN, [ X ] Physical Therapy,[  ] Palliative care team  DVT PPX: [  ] Lovenox, [  ] S C Heparin, [  ] Coumadin, [  ] Xarelto, [  ] Eliquis, [  ] Pradaxa, [  ] IV Heparin drip, [ X ] SCD [  ] Contraindication 2 to GI Bleed,[ X ] Ambulation [  ] Contraindicated 2 to  bleed [  ] Contraindicated 2 to Brain Bleed  Advanced directive: [ X ] None, [  ] DNR/DNI

## 2024-12-05 NOTE — PHYSICAL THERAPY INITIAL EVALUATION ADULT - PERTINENT HX OF CURRENT PROBLEM, REHAB EVAL
8/20/2018    PRIMARY: Non-Ah Pcp      ASSESSMENT:    1. Coronary artery disease involving native coronary artery of native heart without angina pectoris    2. Non-ST elevation (NSTEMI) myocardial infarction (CMS/HCC)    3. Other hyperlipidemia        · No angina  · Good prevention  · 2016 EXMP: low risk Duke TM score, low risk small ischemia and infarct on MP, normal EF  · Tolerating new atorvastatin in place of lovastatin with good LDL level    PLAN:  ·     Patient Instructions   Continue same medications  See Dr. Johnson in 1 year    Mickie Herrera MA, Maria Esther QUIROZ, Estela AVILA, and I were pleased to meet with you today.     HERE IS SOME IMPORTANT INFORMATION FOR OUR PATIENTS   If you need refills - call your pharmacist and they will contact our office.   If you have medical concerns after hours, and to make appointments, call [phone].    If you have a question during office hours call [phone].  You will eventually speak with my nurses.  If you need to speak to me directly, let them know and I will get back to you as soon as possible.  Better yet, you can consider signing up for [employer], our popular online communication portal to ask for a refill or contact our staff.  Go to:  [email or web address]    Dr. Bernardo Johnson MD          Orders Placed This Encounter   • metoPROLOL tartrate (LOPRESSOR) 25 MG tablet     Return in about 1 year (around 8/20/2019) for CAD, HLD.    Javier Bond is a 68 year old male here for evaluation of:  1.  Coronary Artery Disease   A.  Hx MI 2000 and 2002 with LAD angioplasty  2.  Hyperlipidemia    SUBJECTIVE:  Feels well. No shortness of breath or chest pain. He plays softball regularly.    Cardiac Diagnostics:  Stress Test 8/2016:  PVC and PAC with exercise.  Small nontransmural infarct in mid anterior septal wall.  In addition small area of ischemia at basal inferior wall.  Normal lvef.     ROS:Review of systems as above otherwise negative.    Current Outpatient  Prescriptions   Medication Sig   • metoPROLOL tartrate (LOPRESSOR) 25 MG tablet Take 0.5 tablets by mouth 2 times daily.   • terazosin (HYTRIN) 10 MG capsule Take 1 capsule by mouth nightly.   • atorvastatin (LIPITOR) 40 MG tablet Take 1 tablet by mouth daily.   • lisinopril (ZESTRIL) 2.5 MG tablet Take 1 tablet by mouth daily.   • aspirin (ECOTRIN) 81 MG EC tablet Take 81 mg by mouth daily.   • Multiple Vitamins-Minerals (DAILY MULTIPLE VITAMINS/) TABS Take  by mouth. As directed from another provider.     No current facility-administered medications for this visit.        OBJECTIVE:  I have reviewed the patient's medications and allergies, problem list, past medical, surgical, social and family history, spending a lot of time updating these as appropriate, especially the problem list.  See Histories section of the EMR for a display of this information.     Patient Active Problem List    Diagnosis Date Noted   • Myocardial infarction in 2000 and 2002 with LAD angioplasty 08/11/2014     Priority: Low   • Benign prostatic hypertrophy 07/15/2013     Priority: Low   • Coronary artery disease 07/15/2013     Priority: Low      Myocardial infarction in 2000 and 2002.    LAD angioplasty 2000 and 2002.       • Hyperlipidemia 07/15/2013     Priority: Low   • Carpal tunnel syndrome 07/15/2013     Priority: Low       PHYSICAL EXAM:  Vitals:    08/20/18 0901   BP: (!) 88/58   Pulse: 79     BP Readings from Last 4 Encounters:   08/20/18 (!) 88/58   01/26/18 102/56   09/11/17 100/70   08/15/17 110/60     Wt Readings from Last 4 Encounters:   08/20/18 88 kg   01/26/18 89.4 kg   09/11/17 89.4 kg   08/15/17 87.5 kg     Body mass index is 28.65 kg/m².  General:  No distress   Head and Neck:  Normocephalic-atraumatic. No JVD  Chest:  Symmetrical expansion.  No chest wall deformity  Lungs:  Clear to auscultation bilaterally.  Heart:  Regular rate and rhythm, S1, S2 normal, no murmur, click, rub or gallop.  Skin:  Skin color,  texture, turgor normal. No rashes or lesions.  Neurologic: Alert and oriented to person, place and time  Extremities:  extremities normal, atraumatic, no cyanosis or edema.    LABS:    Recent Labs  Lab 08/15/18  0855 02/07/18  0909   CHOLESTEROL 105 112   HDL 51 55   TRIGLYCERIDE 61 38   CALCULATED LDL 42 49   CALCULATED NON HDL 54 57   Sodium 143  --    Potassium 4.3  --    Chloride 107  --    Carbon Dioxide 26  --    BUN 16  --    Creatinine 1.23*  --    Glucose 93  --      Greater than 50% of the visit was spent was spent counseling this patient on the above diagnosis(es), testing and treatment options.  Total time spent 20 minutes.    Dr. Bernardo Johnson              Pt is a 62 y/o M with PMHx DM2,PAD,  hx osteomyelitis s/p surgical amputation of R great toe 2023, CAD, PAD on plavix, HTN who presents to the hospital at instruction of wound care for a L foot wound. Pt reports he has been following with wound care with Dr. Stark and Dr. French, has been on Augmentin for approx 2 weeks. No systemic symptoms. Pt states he was told to come in this weekend for IV antibiotics and further evaluation of Left foot wound, . Pt reports feeling well and denies any fevers, chills, chest pain, shortness of breath, nausea, vomiting, diarrhea, constipation. Pt has been dressing the wound daily himself.  pt has had MRI left foot 1 week ago

## 2024-12-05 NOTE — PROGRESS NOTE ADULT - PROBLEM SELECTOR PLAN 3
Chronic  - patient on Plavix daily, continue  - pt follows with vascular outpatient,  - Plan for angioplasty on 12/2   - NPO after midnight   - AM labs   - HOLD DVT ppx as per Vascular Sx
Chronic  - patient on Plavix daily, continue  - pt follows with vascular outpatient,  - S/P angiogram/plasty 12/2/24 with vascular   - Started ASA 81mg, continue Plavix 75mg QD; per vascular, recommend ongoing DAPT on discharge
Chronic  - patient on Plavix daily, continue  - pt follows with vascular outpatient,  - S/P angiogram/plasty 12/2/24 with vascular   - Started ASA 81mg, continue Plavix 75mg QD; per vascular, recommend ongoing DAPT on discharge
Patient evaluated and chart reviewed.  He is POD#1 S/P left foot sharp excisional wound debridement.  Mild strikethrough bleeding noted to post-op bandages, but not active bleeding noted.  Adaptic and dsd reapplied today.  Rec continued abx per ID recs.  Patient to remain partial WB to left heel in surgical offloading shoe.  Patient advised to follow-up outpatient at wound care center with Dr. French.   Plan to be discussed with attending.    Wound Care Instructions:  -Keep dressing clean, dry, and intact to the left foot.  -Apply Aquacel Ag, gauze, ABD, bren, change every other day   -Patient partial WB to left heel in surgical offloading shoe.  -Monitor for any signs of infection including redness, swelling in the leg above the bandage, nausea/vomiting/fever/chills/chest pain/shortness of breath, if any are present patient must report to the emergency department immediately  -Patient is to follow up with Dr. Gomez/Dr. Stark/ Dr. French  within 5 days after discharge at [facility] [place]. Please call to make an appointment [phone]
Chronic  - patient on Plavix daily, continue  - pt follows with vascular outpatient,  - S/P angiogram/plasty 12/2/24 with vascular   - Started ASA 81mg, continue Plavix 75mg QD; per vascular, recommend ongoing DAPT on discharge
Chronic  - patient on Plavix daily, continue  - pt follows with vascular outpatient,  - S/P angiogram/plasty 12/2/24 with vascular   - Started ASA 81mg, continue Plavix 75mg QD   - AM labs
Chronic  - patient on Plavix daily, continue  - pt follows with vascular outpatient,  -Plan for angioplasty on 12/2   NPO after midnight   AM labs   HOLD DVT ppx as per Vascular Sx

## 2024-12-05 NOTE — PROGRESS NOTE ADULT - REASON FOR ADMISSION
L foot wound
L lateral  foot wound, OM
L foot wound
L foot wound

## 2024-12-05 NOTE — CASE MANAGEMENT PROGRESS NOTE - NSCMPROGRESSNOTE_GEN_ALL_CORE
Per MD pt is for possible discharge today 12/5/24. CM met with patient to discuss transition of care.  Pt is to be transitioned to home with [facility] [phone]/ fax [phone] for IV antibiotics. CM explained home care services, expectations, process, insurance provisions and home safety with pt verbalizing understanding. CM confirmed with home care agency, pt case is being accepted and home care was made aware of possible DC plan today, pending call back from [place] with SOC. Pt aware of plan and in agreement / verbalizes understanding. Pt is to be transitioned to home with no formal [facility] home care services for wound care or visiting nurse, pt offered home care services, pt declined [facility] home care services. Pt stated he will go to the wound care center and will make his own appoitments.  IMM discussed / copy given. Pt verbalized understanding. Confirmed pharmacy is [facility]. community MD is Dr. Frederick. Pt stated they would make their own follow up appointments.  DMEs in home include walker, cane.  Pt stated his wife will transport pt home. All questions answered. CM discussed plan with MD and RN. CM to remain available through hospitalization.

## 2024-12-05 NOTE — PROGRESS NOTE ADULT - PROBLEM SELECTOR PLAN 1
Pt presenting with L lateral  foot  wound, s/p outpatient course of augmenting x14 days- OM on out pt MRI 11/23   -Diabetic foot wound   - WBC mildly elevated 11.65, out pt wound c/s + Serratia   - s/p vanco and zosyn in ED  - culture results from outpatient wound care 11/25 with Serratia  susceptibility to cefepime  - Tylenol PRN for mild pain  - ID Dr. Calhoun, will appreciate recs - Cefepime 2 gm IVPB q 8 hrs  - Now S/P LLE Angio and angioplasty; started on ASA in addition to Plavix   - R PICC line placed 12/3/24 for likely plan for long term antibiotics; ID agreeable --> will need to plan for discharge abx   - S/p podiatry wound debridement, biopsy; stable and ready for discharge TODAY; podiatry recommendations for discharge appreciated   - Podiatry following Dr. French/Dr. Stark-OR on 12/4 for wound debridment & Bone Biopsy  - Vascular consulted- in house    *EKG reviewed; NSR. Patient is medically optimized for planned surgical procedure with routine hemodynamic monitoring.  - Cardiac optimization required per podiatry, obtained per cardiology.

## 2024-12-05 NOTE — DISCHARGE NOTE NURSING/CASE MANAGEMENT/SOCIAL WORK - FINANCIAL ASSISTANCE
[facility] provides services at a reduced cost to those who are determined to be eligible through [facility]’s financial assistance program. Information regarding [facility]’s financial assistance program can be found by going to [email or web address] or by calling [phone].

## 2024-12-05 NOTE — PHYSICAL THERAPY INITIAL EVALUATION ADULT - DISCHARGE PLANNER MADE AWARE
yes [Immunizations are up to date] : Immunizations are up to date [Synagis Injection] : synagis injection [Nl] : Allergy/Immunology [FreeTextEntry1] : PMD/ [facility]AG[place] should order. Discussed extensively with mom.

## 2024-12-05 NOTE — DISCHARGE NOTE NURSING/CASE MANAGEMENT/SOCIAL WORK - PATIENT PORTAL LINK FT
You can access the FollowMyHealth Patient Portal offered by [facility] by registering at the following website: [email or web address]. By joining [employer]’s FollowMyHealth portal, you will also be able to view your health information using other applications (apps) compatible with our system.

## 2024-12-05 NOTE — PROGRESS NOTE ADULT - ASSESSMENT
Pt is a 64 y/o M with PMHx DM2,  hx osteomyelitis rt big toe s/p surgical amputation of R great toe 2023, CAD, PAD on plavix, HTN who presents to the hospital at instruction of wound care for a L foot lateral border  wound, adm for L foot wound.-cellulitis , possible OM      L foot wound/OM  Pt presenting with L lateral  foot  wound, s/p outpatient course of augmenting x14 days- OM on out pt MRI 11/23   WBC mildly elevated 11.65, out pt wound c/s + Serratia   Outpatient wound culture results from outpatient wound care 11/25 with Serratia susceptibility to cefepime  S/P LLE Angio and angioplasty; started on ASA in addition to Plavix   S/p Open biopsy, bone, foot 04-Dec-2024 16:23:15  Randi French    Recommendations:   Will f/u OR TCx and path in the office   C/w cefepime 2gm q8h until 1/14/25 for now  Will re-evaluate duration in office  Orders called into [employer]    Pt can call [phone] to make f/u appt next week    D/w Dr. Calhoun  Infectious Diseases will follow. Please call with any questions.  Melany Calhoun M.D.  [facility] Division of Infectious Diseases [phone]  For after 5 P.M. and weekends, please call [phone]  Available on [employer] TEAMS    
65 yo M with PMH CAD, PAD on plavix, DVT( 11/2034, now off eliquis) HTN, HLD, Type II DM, hx of OM s/p R toe amputation presents w/ non healing L foot wound. Cardiology consulted for Pre op optimization.    - Pt presents with L lateral foot wound, OM on out pt MRI 12/23  - ID following  - Continue antibiotics   - S/p PICC placement 12/3  - Podiatry following, plan for debridement and bone biopsy 12/4  - Pt has no active ischemia, decompensated heart failure, unstable arrythmia, or severe stenotic valvular disease. Patient is optimized from cardiovascular standpoint to proceed with planned procedure with routine hemodynamic monitoring.     - 11/30/24 EKG SR, no evidence of acute ischemia noted  - Pharmacologic nuclear stress testing was performed January 24, 2024. This revealed no evidence of ischemia  - No evidence of any active ischemia    - 11/30/24 CXR w/o pleural effusion or pneumothorax.  - Echocardiography January 2, 2024. This revealed a normal ejection fraction with minimal mitral regurgitation.  - No evidence of any meaningful volume overload    - BP stable and controlled  - Continue metoprolol and lisinopril    - known PAD, S/P angiogram/plasty 12/2/24 with vascular  - Continue asa, Plavix and statin    - follows with Dr Chu  - Monitor and replete lytes, keep K>4, Mg>2.  - Will continue to follow.    Oksana Humphrey MS FNP, AGACNP  Nurse Practitioner- Cardiology   Please call on TEAMS
65 yo M with PMH CAD, PAD on plavix, DVT( 11/2034, now off eliquis) HTN, HLD, Type II DM, hx of OM s/p R toe amputation presents w/ non healing L foot wound. Cardiology consulted for Pre op optimization.    Cardiac Optimization/CAD/PAD  - With Lt lateral foot wound, OM on out pt MRI 12/23, now s/p debridement and Bx.  Tolerated procedure well  - Abx per ID following  - S/p PICC placement 12/3    - 11/30/24 EKG SR, no evidence of acute ischemia noted  - Pharmacologic nuclear stress test, 1/2024.  No evidence of ischemia  - No anginal complaints    - 11/30/24 CXR w/o pleural effusion or pneumothorax.  - Echo, 1/2024: normal ejection fraction with no significant valvular disease  - No evidence of any meaningful volume overload    - BP stable and controlled  - Continue metoprolol and lisinopril    - Known PAD, S/P angiogram/plasty 12/2/24 with vascular  - Continue DAPT with ASA and Plavix per Vascular  - Continue statin    - Follows with Dr. Chu  - Monitor and replete lytes, keep K>4, Mg>2.  - Will continue to follow.    Melvi Koo DNP, NP-C, AGACNP-C  Cardiology   Call TEAMS           
64yMale patient S/P LLE angiogram with L AT/PT angioplasty.  POD#1    Continue DAPT with ASA 81mg qd and Plavix 75mg   Continue statin  Optimized from vascular perspective for podiatric intervention if indicated  Follow up as outpatient with vascular surgery in 1 month  Will sign off  
Pt is a 64 y/o M with PMHx DM2,  hx osteomyelitis rt big toe s/p surgical amputation of R great toe 2023, CAD, PAD on plavix, HTN who presents to the hospital at instruction of wound care for a L foot lateral border  wound, adm for L foot wound.-cellulitis , possible OM
Pt is a 64 y/o M with PMHx DM2,  hx osteomyelitis rt big toe s/p surgical amputation of R great toe 2023, CAD, PAD on plavix, HTN who presents to the hospital at instruction of wound care for a L foot lateral border  wound, adm for L foot wound.-cellulitis , possible OM
Pt is a 62 y/o M with PMHx DM2,  hx osteomyelitis rt big toe s/p surgical amputation of R great toe 2023, CAD, PAD on plavix, HTN who presents to the hospital at instruction of wound care for a L foot lateral border  wound, adm for L foot wound.-cellulitis , possible OM
s/p left foot sharp excisional wound debridement
Pt is a 62 y/o M with PMHx DM2,  hx osteomyelitis rt big toe s/p surgical amputation of R great toe 2023, CAD, PAD on plavix, HTN who presents to the hospital at instruction of wound care for a L foot lateral border  wound, adm for L foot wound.-cellulitis , possible OM
Pt is a 64 y/o M with PMHx DM2,  hx osteomyelitis rt big toe s/p surgical amputation of R great toe 2023, CAD, PAD on plavix, HTN who presents to the hospital at instruction of wound care for a L foot lateral border  wound, adm for L foot wound.-cellulitis , possible OM
Pt is a 62 y/o M with PMHx DM2,  hx osteomyelitis rt big toe s/p surgical amputation of R great toe 2023, CAD, PAD on plavix, HTN who presents to the hospital at instruction of wound care for a L foot lateral border  wound, adm for L foot wound.-cellulitis , possible OM

## 2024-12-05 NOTE — PROGRESS NOTE ADULT - PROVIDER SPECIALTY LIST ADULT
Internal Medicine
Internal Medicine
Vascular Surgery
Infectious Disease
Vascular Surgery
Cardiology
Vascular Surgery
Cardiology
Podiatry
Internal Medicine

## 2024-12-05 NOTE — PROGRESS NOTE ADULT - SUBJECTIVE AND OBJECTIVE BOX
[facility] -- Vlad Gallardo, Otoniel Bliss Savella, , Irma Hernandez  Office # [phone]    Follow Up:      Subjective/Observations:     REVIEW OF SYSTEMS: All other review of systems is negative unless indicated above  PAST MEDICAL & SURGICAL HISTORY:  HTN (hypertension)  Diabetes  Hyperlipidemia  PVD (peripheral vascular disease)  Toe osteomyelitis, right  H/O angioplasty  RLE    Status post amputation of toe    MEDICATIONS  (STANDING):  atorvastatin 40 milliGRAM(s) Oral at bedtime  cefepime   IVPB 2000 milliGRAM(s) IV Intermittent every 8 hours  chlorhexidine 4% Liquid 1 Application(s) Topical daily  dextrose 50% Injectable 25 Gram(s) IV Push once  gabapentin 300 milliGRAM(s) Oral three times a day  glucagon  Injectable 1 milliGRAM(s) IntraMuscular once  insulin glargine Injectable (LANTUS) 10 Unit(s) SubCutaneous at bedtime  insulin lispro (ADMELOG) corrective regimen sliding scale   SubCutaneous three times a day before meals  insulin lispro (ADMELOG) corrective regimen sliding scale   SubCutaneous at bedtime  lisinopril 40 milliGRAM(s) Oral daily  metoprolol succinate ER 50 milliGRAM(s) Oral daily    MEDICATIONS  (PRN):  acetaminophen     Tablet .. 650 milliGRAM(s) Oral every 6 hours PRN Mild Pain (1 - 3)  aluminum hydroxide/magnesium hydroxide/simethicone Suspension 30 milliLiter(s) Oral every 4 hours PRN Dyspepsia  dextrose Oral Gel 15 Gram(s) Oral once PRN Blood Glucose LESS THAN 70 milliGRAM(s)/deciliter  HYDROmorphone  Injectable 0.5 milliGRAM(s) IV Push every 10 minutes PRN Severe Pain (7 - 10)  melatonin 3 milliGRAM(s) Oral at bedtime PRN Insomnia  sodium chloride 0.9% lock flush 10 milliLiter(s) IV Push every 1 hour PRN Pre/post blood products, medications, blood draw, and to maintain line patency    Allergies    No Known Allergies    Intolerances    Vital Signs Last 24 Hrs  T(C): 36.4 (05 Dec 2024 04:52), Max: 36.7 (04 Dec 2024 21:01)  T(F): 97.6 (05 Dec 2024 04:52), Max: 98 (04 Dec 2024 21:01)  HR: 74 (05 Dec 2024 04:52) (64 - 90)  BP: 102/61 (05 Dec 2024 04:52) (99/51 - 125/55)  BP(mean): --  RR: 18 (05 Dec 2024 04:52) (12 - 20)  SpO2: 94% (05 Dec 2024 04:52) (94% - 100%)    Parameters below as of 05 Dec 2024 04:52  Patient On (Oxygen Delivery Method): room air    I&O's Summary    04 Dec 2024 07:01  -  05 Dec 2024 07:00  --------------------------------------------------------  IN: 315 mL / OUT: 0 mL / NET: 315 mL    PHYSICAL EXAM:  TELE:   Constitutional: NAD, awake and alert, well-developed  HEENT: Moist Mucous Membranes, Anicteric  Pulmonary: Non-labored, breath sounds are clear bilaterally, No wheezing, rales or rhonchi  Cardiovascular: Regular, S1 and S2, No murmurs, rubs, gallops or clicks  Gastrointestinal: Bowel Sounds present, soft, nontender.   Lymph: No peripheral edema. No lymphadenopathy.  Skin: No visible rashes or ulcers.  Psych:  Mood & affect appropriate  LABS: All Labs Reviewed:                        11.6   13.33 )-----------( 211      ( 05 Dec 2024 05:45 )             34.9                         12.9   13.56 )-----------( 213      ( 04 Dec 2024 06:30 )             37.8                         14.7   16.74 )-----------( 282      ( 03 Dec 2024 08:18 )             44.2     05 Dec 2024 05:45    138    |  109    |  23     ----------------------------<  211    4.8     |  28     |  1.00   04 Dec 2024 06:30    140    |  110    |  22     ----------------------------<  191    4.4     |  23     |  0.95   03 Dec 2024 08:18    140    |  109    |  24     ----------------------------<  181    5.0     |  29     |  0.99     Ca    8.8        05 Dec 2024 05:45  Ca    8.3        04 Dec 2024 06:30  Ca    9.2        03 Dec 2024 08:18    12 Lead ECG:   Ventricular Rate 90 BPM    Atrial Rate 90 BPM    P-R Interval 142 ms    QRS Duration 90 ms    Q-T Interval 362 ms    QTC Calculation(Bazett) 442 ms    P Axis 51 degrees    R Axis -3 degrees    T Axis 24 degrees    Diagnosis Line Normal sinus rhythm  Normal ECG  When compared with ECG of 24-NOV-2023 10:03,  Questionable change in QRS axis  Confirmed by Shanna Solis ([id number]) on 11/30/2024 1:53:37 PM (11-30-24 @ 12:25)

## 2024-12-05 NOTE — PROGRESS NOTE ADULT - SUBJECTIVE AND OBJECTIVE BOX
64y year old Male seen at \[facility]\"" 2EAS 207 W1 s/p left foot sharp excisional wound debridement. Patient relates no overnight events and states that they are doing well at this time.  Denies any fever, chills, nausea, vomiting, chest pain, shortness of breath, or calf pain at this time.    Allergies    No Known Allergies    Intolerances        MEDICATIONS  (STANDING):  aspirin enteric coated 81 milliGRAM(s) Oral daily  atorvastatin 40 milliGRAM(s) Oral at bedtime  cefepime   IVPB 2000 milliGRAM(s) IV Intermittent every 8 hours  chlorhexidine 4% Liquid 1 Application(s) Topical daily  clopidogrel Tablet 75 milliGRAM(s) Oral daily  dextrose 50% Injectable 25 Gram(s) IV Push once  gabapentin 300 milliGRAM(s) Oral three times a day  glucagon  Injectable 1 milliGRAM(s) IntraMuscular once  insulin glargine Injectable (LANTUS) 10 Unit(s) SubCutaneous at bedtime  insulin lispro (ADMELOG) corrective regimen sliding scale   SubCutaneous three times a day before meals  insulin lispro (ADMELOG) corrective regimen sliding scale   SubCutaneous at bedtime  lisinopril 40 milliGRAM(s) Oral daily  metoprolol succinate ER 50 milliGRAM(s) Oral daily    MEDICATIONS  (PRN):  acetaminophen     Tablet .. 650 milliGRAM(s) Oral every 6 hours PRN Mild Pain (1 - 3)  aluminum hydroxide/magnesium hydroxide/simethicone Suspension 30 milliLiter(s) Oral every 4 hours PRN Dyspepsia  dextrose Oral Gel 15 Gram(s) Oral once PRN Blood Glucose LESS THAN 70 milliGRAM(s)/deciliter  HYDROmorphone  Injectable 0.5 milliGRAM(s) IV Push every 10 minutes PRN Severe Pain (7 - 10)  melatonin 3 milliGRAM(s) Oral at bedtime PRN Insomnia  sodium chloride 0.9% lock flush 10 milliLiter(s) IV Push every 1 hour PRN Pre/post blood products, medications, blood draw, and to maintain line patency      Vital Signs Last 24 Hrs  T(C): 36.7 (05 Dec 2024 12:16), Max: 36.7 (04 Dec 2024 21:01)  T(F): 98.1 (05 Dec 2024 12:16), Max: 98.1 (05 Dec 2024 12:16)  HR: 85 (05 Dec 2024 12:16) (64 - 90)  BP: 130/73 (05 Dec 2024 12:16) (99/51 - 130/73)  BP(mean): --  RR: 20 (05 Dec 2024 12:16) (12 - 20)  SpO2: 96% (05 Dec 2024 12:16) (94% - 100%)    Parameters below as of 05 Dec 2024 12:16  Patient On (Oxygen Delivery Method): room air        PHYSICAL EXAM:  Vascular: DP & PT non  palpable bilaterally, Capillary refill 3 seconds left foot,, Digital hair absent bilaterally  Neurological: Light touch sensation diminished  bilaterally  Musculoskeletal: 5/5 strength in all quadrants bilaterally, AJ & STJ ROM intact, 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                          11.6   13.33 )-----------( 211      ( 05 Dec 2024 05:45 )             34.9       12-05    138  |  109[H]  |  23  ----------------------------<  211[H]  4.8   |  28  |  1.00    Ca    8.8      05 Dec 2024 05:45      INTERPRETATION: MR FOOT LEFT dated 12/1/2024 12:46 PM    INDICATION: Left foot wound    COMPARISON: Left foot MRI dated 11/23/2024    TECHNIQUE: Multi-sequential, multiplanar MRI imaging of the left midfoot and forefoot was performed per standard protocol.    FINDINGS:    BONE MARROW: Subchondral edema and small subchondral cysts at the navicula/middle cuneiform. Mild cartilage loss with subchondral cysts at the second and third tarsometatarsal joints. Patchy marrow edema within the first distal phalanx. Moderate patchy edema within the fifth middle and distal phalanx. Persistent patchy edema at the base of the fifth metatarsal. Overlying skin wound in this region. Mild patchy marrow edema again noted within the second and third middle phalanges.  SYNOVIUM/ JOINT FLUID: No joint effusion  TENDONS: Intact tendons. No tenosynovitis.  MUSCLES: Mild atrophy and moderate edema throughout the musculature. Nonspecific but may reflect neuropathic change.  NEUROVASCULAR STRUCTURES: Preserved  SUBCUTANEOUS SOFT TISSUES: There is soft tissue swelling about the foot. Suspect a skin wound at the distal first digit. No drainable collection. Possible skin wound along the fifth distal digit. Overlying skin wound adjacent to the fifth metatarsal base.    IMPRESSION:    1. Skin wound at the great toe with deep soft tissue swelling. No drainable collection.  2. Abnormal marrow signal within the first distal phalangeal head. Correlate for an overlying skin wound which would increase the probability of osteomyelitis in this region.  3. Abnormal marrow signal within the fifth middle and distal phalanx as on prior study raising concern for osteomyelitis.  4. Patchy marrow edema at the base of the fifth metatarsal. Given the overlying skin wound, findings concerning for osteomyelitis as on prior exam.  5. Patchy marrow edema again noted in the second and third middle phalanges. Correlate for overlying skin wound in these regions which would increase the probability of osteomyelitis.  6. Degenerative changes at the midfoot.    --- End of Report ---    KRISTAL MATIAS MD; Attending Radiologist  This document has been electronically signed. Dec 1 2024 1:23PM       64y year old Male seen at [facility] 2EAS 207 W1 s/p left foot sharp excisional wound debridement. Patient relates no overnight events and states that they are doing well at this time.  Denies any fever, chills, nausea, vomiting, chest pain, shortness of breath, or calf pain at this time.    Allergies    No Known Allergies    Intolerances        MEDICATIONS  (STANDING):  aspirin enteric coated 81 milliGRAM(s) Oral daily  atorvastatin 40 milliGRAM(s) Oral at bedtime  cefepime   IVPB 2000 milliGRAM(s) IV Intermittent every 8 hours  chlorhexidine 4% Liquid 1 Application(s) Topical daily  clopidogrel Tablet 75 milliGRAM(s) Oral daily  dextrose 50% Injectable 25 Gram(s) IV Push once  gabapentin 300 milliGRAM(s) Oral three times a day  glucagon  Injectable 1 milliGRAM(s) IntraMuscular once  insulin glargine Injectable (LANTUS) 10 Unit(s) SubCutaneous at bedtime  insulin lispro (ADMELOG) corrective regimen sliding scale   SubCutaneous three times a day before meals  insulin lispro (ADMELOG) corrective regimen sliding scale   SubCutaneous at bedtime  lisinopril 40 milliGRAM(s) Oral daily  metoprolol succinate ER 50 milliGRAM(s) Oral daily    MEDICATIONS  (PRN):  acetaminophen     Tablet .. 650 milliGRAM(s) Oral every 6 hours PRN Mild Pain (1 - 3)  aluminum hydroxide/magnesium hydroxide/simethicone Suspension 30 milliLiter(s) Oral every 4 hours PRN Dyspepsia  dextrose Oral Gel 15 Gram(s) Oral once PRN Blood Glucose LESS THAN 70 milliGRAM(s)/deciliter  HYDROmorphone  Injectable 0.5 milliGRAM(s) IV Push every 10 minutes PRN Severe Pain (7 - 10)  melatonin 3 milliGRAM(s) Oral at bedtime PRN Insomnia  sodium chloride 0.9% lock flush 10 milliLiter(s) IV Push every 1 hour PRN Pre/post blood products, medications, blood draw, and to maintain line patency      Vital Signs Last 24 Hrs  T(C): 36.7 (05 Dec 2024 12:16), Max: 36.7 (04 Dec 2024 21:01)  T(F): 98.1 (05 Dec 2024 12:16), Max: 98.1 (05 Dec 2024 12:16)  HR: 85 (05 Dec 2024 12:16) (64 - 90)  BP: 130/73 (05 Dec 2024 12:16) (99/51 - 130/73)  BP(mean): --  RR: 20 (05 Dec 2024 12:16) (12 - 20)  SpO2: 96% (05 Dec 2024 12:16) (94% - 100%)    Parameters below as of 05 Dec 2024 12:16  Patient On (Oxygen Delivery Method): room air        PHYSICAL EXAM:  Vascular: DP & PT non  palpable bilaterally, Capillary refill 3 seconds left foot,, Digital hair absent bilaterally  Neurological: Light touch sensation diminished  bilaterally  Musculoskeletal: 5/5 strength in all quadrants bilaterally, AJ & STJ ROM intact, 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 Left distal hallux noted with stable eschar, no edema, no erythema                           11.6   13.33 )-----------( 211      ( 05 Dec 2024 05:45 )             34.9       12-05    138  |  109[H]  |  23  ----------------------------<  211[H]  4.8   |  28  |  1.00    Ca    8.8      05 Dec 2024 05:45      INTERPRETATION: MR FOOT LEFT dated 12/1/2024 12:46 PM    INDICATION: Left foot wound    COMPARISON: Left foot MRI dated 11/23/2024    TECHNIQUE: Multi-sequential, multiplanar MRI imaging of the left midfoot and forefoot was performed per standard protocol.    FINDINGS:    BONE MARROW: Subchondral edema and small subchondral cysts at the navicula/middle cuneiform. Mild cartilage loss with subchondral cysts at the second and third tarsometatarsal joints. Patchy marrow edema within the first distal phalanx. Moderate patchy edema within the fifth middle and distal phalanx. Persistent patchy edema at the base of the fifth metatarsal. Overlying skin wound in this region. Mild patchy marrow edema again noted within the second and third middle phalanges.  SYNOVIUM/ JOINT FLUID: No joint effusion  TENDONS: Intact tendons. No tenosynovitis.  MUSCLES: Mild atrophy and moderate edema throughout the musculature. Nonspecific but may reflect neuropathic change.  NEUROVASCULAR STRUCTURES: Preserved  SUBCUTANEOUS SOFT TISSUES: There is soft tissue swelling about the foot. Suspect a skin wound at the distal first digit. No drainable collection. Possible skin wound along the fifth distal digit. Overlying skin wound adjacent to the fifth metatarsal base.    IMPRESSION:    1. Skin wound at the great toe with deep soft tissue swelling. No drainable collection.  2. Abnormal marrow signal within the first distal phalangeal head. Correlate for an overlying skin wound which would increase the probability of osteomyelitis in this region.  3. Abnormal marrow signal within the fifth middle and distal phalanx as on prior study raising concern for osteomyelitis.  4. Patchy marrow edema at the base of the fifth metatarsal. Given the overlying skin wound, findings concerning for osteomyelitis as on prior exam.  5. Patchy marrow edema again noted in the second and third middle phalanges. Correlate for overlying skin wound in these regions which would increase the probability of osteomyelitis.  6. Degenerative changes at the midfoot.    --- End of Report ---    KRISTAL MATIAS MD; Attending Radiologist  This document has been electronically signed. Dec 1 2024 1:23PM

## 2024-12-05 NOTE — DISCHARGE NOTE NURSING/CASE MANAGEMENT/SOCIAL WORK - [facility]SS[facility]_GEN_ALL_CORE
you have been offered [facility] services, you have declined Visiting nurse for wound care. you have stated you will go to the wound care center

## 2024-12-05 NOTE — PROGRESS NOTE ADULT - ATTENDING COMMENTS
Pt is a 62 y/o M with PMHx DM2, hx osteomyelitis s/p surgical amputation of R great toe 2023, CAD, PAD on plavix, HTN who presents to the hospital at instruction of wound care for a L foot wound, adm for L foot wound. with likely OM  Pt seen, examined, Case & care plan d/w pt ,residents at detail.     Consult-  ID-Dr XENIA Calhoun follow up- Abx continue IV Ancef via PICC line   Podiatry-Dr Stark-Dr French  d/w stable for d/c home  -Podiatry- wound care -Dressing care   -Post op Incentive spirometry   Cardio-Pre op note in place   Vascular -on ASA , Plavix continue meds d/w Vacular NP   PO diet  DVT ppx  D/W Home care  RN ,ID, Podiatry team-stable for d/c home today    Total d/c care time is 60 minutes.

## 2024-12-05 NOTE — PROGRESS NOTE ADULT - ATTENDING COMMENTS
Pt is a 64 y/o M with PMHx DM2, hx osteomyelitis s/p surgical amputation of R great toe 2023, CAD, PAD on plavix, HTN who presents to the hospital at instruction of wound care for a L foot wound, adm for L foot wound. with likely OM  Pt seen, examined, Case & care plan d/w pt ,residents at detail.   Pt is medically optimized for schedule Angiogram LL ext  ,NPO after midnight for OR  Consult-  ID-Dr XENIA Calhoun follow up- Abx continue IV Ancef   Podiatry-Dr Stark-Dr French  follow up-OR in AM , Pt medically ostomized for schedule left lateral  foot wound debridement & Bone biopsy.  -Pre op IV Abx as per ID-Podiatry- wound care -Dressing care   -Post op DVT ppx as per Podiatry  -Post op Incentive spirometry   Cardio-Pre op note in place   Vascular -on ASA , Plavix continue meds d/w Vacular NP   PO diet  DVT ppx  D/W Home care  RN ,ID, Podiatry team-stable for d/c home today    Total d/c care time is 60 minutes. Pt is a 64 y/o M with PMHx DM2, hx osteomyelitis s/p surgical amputation of R great toe 2023, CAD, PAD on plavix, HTN who presents to the hospital at instruction of wound care for a L foot wound, adm for L foot wound. with likely OM  Pt seen, examined, Case & care plan d/w pt ,residents at detail.    Consult-  ID-Dr XENIA Calhoun follow up- Abx continue IV Ancef   Podiatry-Dr Stark-Dr French  follow up-as out pt  --Podiatry- wound care -Dressing care   -Post op DVT ppx as per Podiatry  -Post op Incentive spirometry   Vascular -on ASA , Plavix continue meds d/w Vacular NP   PO diet  D/W PMD - dr Dannie becerril at detail.  D/W Home care  RN ,ID, Podiatry team-stable for d/c home today    Total d/c care time is 60 minutes.

## 2024-12-05 NOTE — PROGRESS NOTE ADULT - NS ATTEND AMEND GEN_ALL_CORE FT
63 yo M with PMH CAD, PAD on plavix, DVT( 11/2034, now off eliquis) HTN, HLD, Type II DM, hx of OM s/p R toe amputation presents w/ non healing L foot wound. Cardiology consulted for Pre op optimization.    - ID following  - Continue antibiotics   - S/p PICC placement 12/3  - Podiatry following, plan for debridement and bone biopsy 12/4  - Pt has no active ischemia, decompensated heart failure, unstable arrythmia, or severe stenotic valvular disease. Patient is optimized from cardiovascular standpoint to proceed with planned procedure with routine hemodynamic monitoring.   - Continue metoprolol and lisinopril  - known PAD, S/P angiogram/plasty 12/2/24 with vascular  - Continue asa, Plavix and statin
63 yo M with PMH CAD, PAD on plavix, DVT( 11/2034, now off eliquis) HTN, HLD, Type II DM, hx of OM s/p R toe amputation presents w/ non healing L foot wound. Cardiology consulted for Pre op optimization.    Cardiac Optimization/CAD/PAD  - With Lt lateral foot wound, OM on out pt MRI 12/23, now s/p debridement and Bx.  Tolerated procedure well  - Abx per ID following  - S/p PICC placement 12/3    - 11/30/24 EKG SR, no evidence of acute ischemia noted  - Pharmacologic nuclear stress test, 1/2024.  No evidence of ischemia  - No anginal complaints    - 11/30/24 CXR w/o pleural effusion or pneumothorax.  - Echo, 1/2024: normal ejection fraction with no significant valvular disease  - No evidence of any meaningful volume overload    - BP stable and controlled  - Continue metoprolol and lisinopril    - Known PAD, S/P angiogram/plasty 12/2/24 with vascular  - Continue DAPT with ASA and Plavix per Vascular  - Continue statin

## 2024-12-05 NOTE — PROGRESS NOTE ADULT - PROBLEM SELECTOR PLAN 4
Chronic-stable  - takes lisinopril 40 mg daily and metoprolol XL 50 mg daily  - continue meds with hold parameters

## 2024-12-05 NOTE — PROGRESS NOTE ADULT - PROBLEM SELECTOR PLAN 1
Patient evaluated and chart reviewed.  He is POD#1 S/P left foot sharp excisional wound debridement with bone biopsy   Mild strikethrough bleeding noted to post-op bandages, but not active bleeding noted.  Adaptic and dsd reapplied today.  Rec continued abx per ID recs.  Patient to remain partial WB to left heel in surgical offloading shoe.  Patient advised to follow-up outpatient at wound care center with Dr. French.   Plan to be discussed with attending.    Wound Care Instructions:  -Keep dressing clean, dry, and intact to the left foot.  -Apply Aquacel Ag, gauze, ABD, bren, change every other day   -Patient partial WB to left heel in surgical offloading shoe.  -Monitor for any signs of infection including redness, swelling in the leg above the bandage, nausea/vomiting/fever/chills/chest pain/shortness of breath, if any are present patient must report to the emergency department immediately  -Patient is to follow up with Dr. Gomez/Dr. Stark/ Dr. French  within 5 days after discharge at [facility] [place]. Please call to make an appointment [phone]

## 2024-12-05 NOTE — PROGRESS NOTE ADULT - PROBLEM SELECTOR PROBLEM 4
HTN (hypertension)
Osteomyelitis of left foot
HTN (hypertension)

## 2024-12-05 NOTE — PROGRESS NOTE ADULT - PROBLEM SELECTOR PLAN 1
Pt presenting with L lateral  foot  wound, s/p outpatient course of augmenting x14 days- OM on out pt MRI 11/23   -Diabetic foot wound   - WBC mildly elevated 11.65, out pt wound c/s + Serratia   - s/p vanco and zosyn in ED  - culture results from outpatient wound care 11/25 with Serratia  susceptibility to cefepime  - Tylenol PRN for mild pain  - ID Dr. Calhoun, will appreciate recs - Cefepime 2 gm IVPB q 8 hrs Via PICC line x 6 weeks Total  - Now S/P LLE Angio and angioplasty; started on ASA in addition to Plavix   - R PICC line placed 12/3/24 for likely plan for long term antibiotics; ID agreeable --> will need to plan for discharge abx   - Podiatry plan for left foot sharp excisional wound debridement and bone biopsy 12/4/24; pt eager for discharge 12/5/24 due to work issues    - Podiatry following Dr. French/Dr. Stark-OR on 12/4 for wound debridement & Bone Biopsy-Follow up Pathology results' to adjust Abx as out pt  - Vascular follow up -Post LL Ext Angiogram/Angioplasty ASA, Plavix daily with food   Out pt follow up with Wound care & Vascular as recs

## 2024-12-05 NOTE — PROGRESS NOTE ADULT - SUBJECTIVE AND OBJECTIVE BOX
[facility], Division of Infectious Diseases  OFELIA Harvey Y. Patel, S. Shah, G. [facility]  [phone]    Name: LOIDA QUEZADA  Age: 64y  Gender: Male  MRN: [id number]    Interval History:  Patient seen and examined at bedside  No acute overnight events. Afebrile  S/p Open biopsy, bone, foot 04-Dec-2024 16:23:15  CaseiJaxon holthna  No complaints  Notes reviewed    Antibiotics:  cefepime   IVPB 2000 milliGRAM(s) IV Intermittent every 8 hours      Medications:  acetaminophen     Tablet .. 650 milliGRAM(s) Oral every 6 hours PRN  aluminum hydroxide/magnesium hydroxide/simethicone Suspension 30 milliLiter(s) Oral every 4 hours PRN  atorvastatin 40 milliGRAM(s) Oral at bedtime  cefepime   IVPB 2000 milliGRAM(s) IV Intermittent every 8 hours  chlorhexidine 4% Liquid 1 Application(s) Topical daily  dextrose 50% Injectable 25 Gram(s) IV Push once  dextrose Oral Gel 15 Gram(s) Oral once PRN  gabapentin 300 milliGRAM(s) Oral three times a day  glucagon  Injectable 1 milliGRAM(s) IntraMuscular once  HYDROmorphone  Injectable 0.5 milliGRAM(s) IV Push every 10 minutes PRN  insulin glargine Injectable (LANTUS) 10 Unit(s) SubCutaneous at bedtime  insulin lispro (ADMELOG) corrective regimen sliding scale   SubCutaneous three times a day before meals  insulin lispro (ADMELOG) corrective regimen sliding scale   SubCutaneous at bedtime  lisinopril 40 milliGRAM(s) Oral daily  melatonin 3 milliGRAM(s) Oral at bedtime PRN  metoprolol succinate ER 50 milliGRAM(s) Oral daily  sodium chloride 0.9% lock flush 10 milliLiter(s) IV Push every 1 hour PRN      Review of Systems:  A 10-point review of systems was obtained.   Review of systems otherwise negative except as previously noted.    Allergies: No Known Allergies    For details regarding the patient's past medical history, social history, family history, and other miscellaneous elements, please refer the initial infectious diseases consultation and/or the admitting history and physical examination for this admission.    Objective:  Vitals:   T(C): 36.4 (12-05-24 @ 04:52), Max: 36.7 (12-04-24 @ 21:01)  HR: 74 (12-05-24 @ 04:52) (64 - 90)  BP: 102/61 (12-05-24 @ 04:52) (99/51 - 125/55)  RR: 18 (12-05-24 @ 04:52) (12 - 20)  SpO2: 94% (12-05-24 @ 04:52) (94% - 100%)    Physical Examination:  General: no acute distress  HEENT: NC/AT, EOMI,  Cardio: RRR  Resp: no use resp acc muscles  Abd: soft, NT, ND  Ext: no edema or cyanosis, fott in dressing  Skin: warm, dry, no visible rash      Laboratory Studies:  CBC:                       11.6   13.33 )-----------( 211      ( 05 Dec 2024 05:45 )             34.9     CMP: 12-05    138  |  109[H]  |  23  ----------------------------<  211[H]  4.8   |  28  |  1.00    Ca    8.8      05 Dec 2024 05:45        Urinalysis Basic - ( 05 Dec 2024 05:45 )    Color: x / Appearance: x / SG: x / pH: x  Gluc: 211 mg/dL / Ketone: x  / Bili: x / Urobili: x   Blood: x / Protein: x / Nitrite: x   Leuk Esterase: x / RBC: x / WBC x   Sq Epi: x / Non Sq Epi: x / Bacteria: x        Microbiology: reviewed    Culture - Blood (collected 11-30-24 @ 12:35)  Source: .Blood BLOOD  Preliminary Report (12-04-24 @ 18:01):    No growth at 4 days    Culture - Blood (collected 11-30-24 @ 12:30)  Source: .Blood BLOOD  Preliminary Report (12-04-24 @ 18:01):    No growth at 4 days    Culture - Wound Aerobic (collected 11-25-24 @ 16:50)  Source: Skin/Wound  Final Report (11-27-24 @ 16:29):    Numerous Serratia marcescens  Organism: Serratia marcescens (11-27-24 @ 16:29)  Organism: Serratia marcescens (11-27-24 @ 16:29)      Method Type: GOLDEN      -  Amoxicillin/Clavulanic Acid: R >16/8      -  Ampicillin: R <=8 These ampicillin results predict results for amoxicillin      -  Ampicillin/Sulbactam: R 16/8      -  Aztreonam: S <=4      -  Cefazolin: R >16      -  Cefepime: S <=2      -  Cefoxitin: R 16      -  Ceftriaxone: S <=1      -  Ciprofloxacin: S <=0.25      -  Ertapenem: S <=0.5      -  Gentamicin: S <=2      -  Levofloxacin: S <=0.5      -  Meropenem: S <=1      -  Piperacillin/Tazobactam: S <=8      -  Tobramycin: S <=2      -  Trimethoprim/Sulfamethoxazole: S <=0.5/9.5          Radiology: reviewed

## 2024-12-06 ENCOUNTER — OUTPATIENT (OUTPATIENT)
Dept: OUTPATIENT SERVICES | Facility: HOSPITAL | Age: 64
LOS: 1 days | Discharge: HOME | End: 2024-12-06
Payer: COMMERCIAL

## 2024-12-06 ENCOUNTER — APPOINTMENT (OUTPATIENT)
Dept: WOUND CARE | Facility: HOSPITAL | Age: 64
End: 2024-12-06

## 2024-12-06 VITALS
BODY MASS INDEX: 27.13 KG/M2 | RESPIRATION RATE: 20 BRPM | OXYGEN SATURATION: 98 % | HEART RATE: 100 BPM | WEIGHT: 179 LBS | HEIGHT: 68 IN | DIASTOLIC BLOOD PRESSURE: 90 MMHG | SYSTOLIC BLOOD PRESSURE: 167 MMHG | TEMPERATURE: 98.3 F

## 2024-12-06 DIAGNOSIS — Z89.429 ACQUIRED ABSENCE OF OTHER TOE(S), UNSPECIFIED SIDE: Chronic | ICD-10-CM

## 2024-12-06 DIAGNOSIS — Z98.62 PERIPHERAL VASCULAR ANGIOPLASTY STATUS: Chronic | ICD-10-CM

## 2024-12-06 DIAGNOSIS — M79.673 PAIN IN UNSPECIFIED FOOT: ICD-10-CM

## 2024-12-06 DIAGNOSIS — E11.40 TYPE 2 DIABETES MELLITUS WITH DIABETIC NEUROPATHY, UNSPECIFIED: ICD-10-CM

## 2024-12-06 LAB — GRAM STN FLD: ABNORMAL

## 2024-12-06 PROCEDURE — G0463: CPT

## 2024-12-06 PROCEDURE — 99214 OFFICE O/P EST MOD 30 MIN: CPT

## 2024-12-07 LAB
-  AMOXICILLIN/CLAVULANIC ACID: SIGNIFICANT CHANGE UP
-  AMPICILLIN/SULBACTAM: SIGNIFICANT CHANGE UP
-  AMPICILLIN: SIGNIFICANT CHANGE UP
-  AZTREONAM: SIGNIFICANT CHANGE UP
-  CEFAZOLIN: SIGNIFICANT CHANGE UP
-  CEFEPIME: SIGNIFICANT CHANGE UP
-  CEFOXITIN: SIGNIFICANT CHANGE UP
-  CEFTRIAXONE: SIGNIFICANT CHANGE UP
-  CIPROFLOXACIN: SIGNIFICANT CHANGE UP
-  ERTAPENEM: SIGNIFICANT CHANGE UP
-  GENTAMICIN: SIGNIFICANT CHANGE UP
-  LEVOFLOXACIN: SIGNIFICANT CHANGE UP
-  MEROPENEM: SIGNIFICANT CHANGE UP
-  PIPERACILLIN/TAZOBACTAM: SIGNIFICANT CHANGE UP
-  TOBRAMYCIN: SIGNIFICANT CHANGE UP
-  TRIMETHOPRIM/SULFAMETHOXAZOLE: SIGNIFICANT CHANGE UP
GRAM STN FLD: ABNORMAL
METHOD TYPE: SIGNIFICANT CHANGE UP

## 2024-12-08 LAB
-  CLINDAMYCIN: SIGNIFICANT CHANGE UP
-  ERYTHROMYCIN: SIGNIFICANT CHANGE UP
-  GENTAMICIN: SIGNIFICANT CHANGE UP
-  OXACILLIN: SIGNIFICANT CHANGE UP
-  PENICILLIN: SIGNIFICANT CHANGE UP
-  RIFAMPIN: SIGNIFICANT CHANGE UP
-  TETRACYCLINE: SIGNIFICANT CHANGE UP
-  TRIMETHOPRIM/SULFAMETHOXAZOLE: SIGNIFICANT CHANGE UP
-  VANCOMYCIN: SIGNIFICANT CHANGE UP
METHOD TYPE: SIGNIFICANT CHANGE UP

## 2024-12-09 LAB — SURGICAL PATHOLOGY STUDY: SIGNIFICANT CHANGE UP

## 2024-12-10 LAB
CULTURE RESULTS: ABNORMAL
CULTURE RESULTS: ABNORMAL
ORGANISM # SPEC MICROSCOPIC CNT: ABNORMAL
ORGANISM # SPEC MICROSCOPIC CNT: ABNORMAL
ORGANISM # SPEC MICROSCOPIC CNT: SIGNIFICANT CHANGE UP
ORGANISM # SPEC MICROSCOPIC CNT: SIGNIFICANT CHANGE UP
SPECIMEN SOURCE: SIGNIFICANT CHANGE UP
SPECIMEN SOURCE: SIGNIFICANT CHANGE UP

## 2024-12-11 DIAGNOSIS — Z83.3 FAMILY HISTORY OF DIABETES MELLITUS: ICD-10-CM

## 2024-12-11 DIAGNOSIS — Z79.82 LONG TERM (CURRENT) USE OF ASPIRIN: ICD-10-CM

## 2024-12-11 DIAGNOSIS — E11.69 TYPE 2 DIABETES MELLITUS WITH OTHER SPECIFIED COMPLICATION: ICD-10-CM

## 2024-12-11 DIAGNOSIS — I10 ESSENTIAL (PRIMARY) HYPERTENSION: ICD-10-CM

## 2024-12-11 DIAGNOSIS — Z86.39 PERSONAL HISTORY OF OTHER ENDOCRINE, NUTRITIONAL AND METABOLIC DISEASE: ICD-10-CM

## 2024-12-11 DIAGNOSIS — Z79.899 OTHER LONG TERM (CURRENT) DRUG THERAPY: ICD-10-CM

## 2024-12-11 DIAGNOSIS — E11.51 TYPE 2 DIABETES MELLITUS WITH DIABETIC PERIPHERAL ANGIOPATHY WITHOUT GANGRENE: ICD-10-CM

## 2024-12-11 DIAGNOSIS — E78.00 PURE HYPERCHOLESTEROLEMIA, UNSPECIFIED: ICD-10-CM

## 2024-12-11 DIAGNOSIS — M86.672 OTHER CHRONIC OSTEOMYELITIS, LEFT ANKLE AND FOOT: ICD-10-CM

## 2024-12-11 DIAGNOSIS — L97.522 NON-PRESSURE CHRONIC ULCER OF OTHER PART OF LEFT FOOT WITH FAT LAYER EXPOSED: ICD-10-CM

## 2024-12-11 DIAGNOSIS — E11.40 TYPE 2 DIABETES MELLITUS WITH DIABETIC NEUROPATHY, UNSPECIFIED: ICD-10-CM

## 2024-12-11 DIAGNOSIS — I25.10 ATHEROSCLEROTIC HEART DISEASE OF NATIVE CORONARY ARTERY WITHOUT ANGINA PECTORIS: ICD-10-CM

## 2024-12-11 DIAGNOSIS — Z89.431 ACQUIRED ABSENCE OF RIGHT FOOT: ICD-10-CM

## 2024-12-11 DIAGNOSIS — Z98.890 OTHER SPECIFIED POSTPROCEDURAL STATES: ICD-10-CM

## 2024-12-11 DIAGNOSIS — Z80.3 FAMILY HISTORY OF MALIGNANT NEOPLASM OF BREAST: ICD-10-CM

## 2024-12-11 DIAGNOSIS — Z79.84 LONG TERM (CURRENT) USE OF ORAL HYPOGLYCEMIC DRUGS: ICD-10-CM

## 2024-12-13 ENCOUNTER — OUTPATIENT (OUTPATIENT)
Dept: OUTPATIENT SERVICES | Facility: HOSPITAL | Age: 64
LOS: 1 days | Discharge: HOME | End: 2024-12-13
Payer: COMMERCIAL

## 2024-12-13 ENCOUNTER — APPOINTMENT (OUTPATIENT)
Dept: WOUND CARE | Facility: HOSPITAL | Age: 64
End: 2024-12-13
Payer: COMMERCIAL

## 2024-12-13 VITALS
WEIGHT: 179 LBS | OXYGEN SATURATION: 98 % | HEART RATE: 83 BPM | DIASTOLIC BLOOD PRESSURE: 61 MMHG | RESPIRATION RATE: 18 BRPM | HEIGHT: 68 IN | BODY MASS INDEX: 27.13 KG/M2 | SYSTOLIC BLOOD PRESSURE: 152 MMHG | TEMPERATURE: 98.9 F

## 2024-12-13 DIAGNOSIS — Z98.62 PERIPHERAL VASCULAR ANGIOPLASTY STATUS: Chronic | ICD-10-CM

## 2024-12-13 DIAGNOSIS — Z89.429 ACQUIRED ABSENCE OF OTHER TOE(S), UNSPECIFIED SIDE: Chronic | ICD-10-CM

## 2024-12-13 DIAGNOSIS — M79.673 PAIN IN UNSPECIFIED FOOT: ICD-10-CM

## 2024-12-13 PROBLEM — M86.672 OSTEOMYELITIS, CHRONIC, ANKLE OR FOOT, LEFT: Status: ACTIVE | Noted: 2024-12-11

## 2024-12-13 PROCEDURE — G0463: CPT

## 2024-12-13 PROCEDURE — 99213 OFFICE O/P EST LOW 20 MIN: CPT

## 2024-12-15 DIAGNOSIS — Z80.3 FAMILY HISTORY OF MALIGNANT NEOPLASM OF BREAST: ICD-10-CM

## 2024-12-15 DIAGNOSIS — Z89.431 ACQUIRED ABSENCE OF RIGHT FOOT: ICD-10-CM

## 2024-12-15 DIAGNOSIS — I10 ESSENTIAL (PRIMARY) HYPERTENSION: ICD-10-CM

## 2024-12-15 DIAGNOSIS — Z98.890 OTHER SPECIFIED POSTPROCEDURAL STATES: ICD-10-CM

## 2024-12-15 DIAGNOSIS — E11.51 TYPE 2 DIABETES MELLITUS WITH DIABETIC PERIPHERAL ANGIOPATHY WITHOUT GANGRENE: ICD-10-CM

## 2024-12-15 DIAGNOSIS — E11.69 TYPE 2 DIABETES MELLITUS WITH OTHER SPECIFIED COMPLICATION: ICD-10-CM

## 2024-12-15 DIAGNOSIS — Z79.82 LONG TERM (CURRENT) USE OF ASPIRIN: ICD-10-CM

## 2024-12-15 DIAGNOSIS — M86.672 OTHER CHRONIC OSTEOMYELITIS, LEFT ANKLE AND FOOT: ICD-10-CM

## 2024-12-15 DIAGNOSIS — I25.10 ATHEROSCLEROTIC HEART DISEASE OF NATIVE CORONARY ARTERY WITHOUT ANGINA PECTORIS: ICD-10-CM

## 2024-12-15 DIAGNOSIS — Z83.3 FAMILY HISTORY OF DIABETES MELLITUS: ICD-10-CM

## 2024-12-15 DIAGNOSIS — L97.522 NON-PRESSURE CHRONIC ULCER OF OTHER PART OF LEFT FOOT WITH FAT LAYER EXPOSED: ICD-10-CM

## 2024-12-15 DIAGNOSIS — Z79.84 LONG TERM (CURRENT) USE OF ORAL HYPOGLYCEMIC DRUGS: ICD-10-CM

## 2024-12-15 DIAGNOSIS — Z79.899 OTHER LONG TERM (CURRENT) DRUG THERAPY: ICD-10-CM

## 2024-12-15 DIAGNOSIS — E11.40 TYPE 2 DIABETES MELLITUS WITH DIABETIC NEUROPATHY, UNSPECIFIED: ICD-10-CM

## 2024-12-15 DIAGNOSIS — Z86.39 PERSONAL HISTORY OF OTHER ENDOCRINE, NUTRITIONAL AND METABOLIC DISEASE: ICD-10-CM

## 2024-12-15 DIAGNOSIS — E78.00 PURE HYPERCHOLESTEROLEMIA, UNSPECIFIED: ICD-10-CM

## 2024-12-20 ENCOUNTER — OUTPATIENT (OUTPATIENT)
Dept: OUTPATIENT SERVICES | Facility: HOSPITAL | Age: 64
LOS: 1 days | Discharge: HOME | End: 2024-12-20
Payer: COMMERCIAL

## 2024-12-20 ENCOUNTER — APPOINTMENT (OUTPATIENT)
Dept: WOUND CARE | Facility: HOSPITAL | Age: 64
End: 2024-12-20
Payer: COMMERCIAL

## 2024-12-20 VITALS
DIASTOLIC BLOOD PRESSURE: 84 MMHG | RESPIRATION RATE: 18 BRPM | WEIGHT: 179 LBS | HEIGHT: 68 IN | OXYGEN SATURATION: 98 % | SYSTOLIC BLOOD PRESSURE: 155 MMHG | TEMPERATURE: 77 F | HEART RATE: 77 BPM | BODY MASS INDEX: 27.13 KG/M2

## 2024-12-20 DIAGNOSIS — Z79.84 LONG TERM (CURRENT) USE OF ORAL HYPOGLYCEMIC DRUGS: ICD-10-CM

## 2024-12-20 DIAGNOSIS — Z98.890 OTHER SPECIFIED POSTPROCEDURAL STATES: ICD-10-CM

## 2024-12-20 DIAGNOSIS — I10 ESSENTIAL (PRIMARY) HYPERTENSION: ICD-10-CM

## 2024-12-20 DIAGNOSIS — Z79.82 LONG TERM (CURRENT) USE OF ASPIRIN: ICD-10-CM

## 2024-12-20 DIAGNOSIS — L97.522 NON-PRESSURE CHRONIC ULCER OF OTHER PART OF LEFT FOOT WITH FAT LAYER EXPOSED: ICD-10-CM

## 2024-12-20 DIAGNOSIS — M79.673 PAIN IN UNSPECIFIED FOOT: ICD-10-CM

## 2024-12-20 DIAGNOSIS — Z89.429 ACQUIRED ABSENCE OF OTHER TOE(S), UNSPECIFIED SIDE: Chronic | ICD-10-CM

## 2024-12-20 DIAGNOSIS — E11.69 TYPE 2 DIABETES MELLITUS WITH OTHER SPECIFIED COMPLICATION: ICD-10-CM

## 2024-12-20 DIAGNOSIS — Z80.3 FAMILY HISTORY OF MALIGNANT NEOPLASM OF BREAST: ICD-10-CM

## 2024-12-20 DIAGNOSIS — E11.51 TYPE 2 DIABETES MELLITUS WITH DIABETIC PERIPHERAL ANGIOPATHY WITHOUT GANGRENE: ICD-10-CM

## 2024-12-20 DIAGNOSIS — E11.621 TYPE 2 DIABETES MELLITUS WITH FOOT ULCER: ICD-10-CM

## 2024-12-20 DIAGNOSIS — Z89.431 ACQUIRED ABSENCE OF RIGHT FOOT: ICD-10-CM

## 2024-12-20 DIAGNOSIS — Z83.3 FAMILY HISTORY OF DIABETES MELLITUS: ICD-10-CM

## 2024-12-20 DIAGNOSIS — Z86.39 PERSONAL HISTORY OF OTHER ENDOCRINE, NUTRITIONAL AND METABOLIC DISEASE: ICD-10-CM

## 2024-12-20 DIAGNOSIS — E11.40 TYPE 2 DIABETES MELLITUS WITH DIABETIC NEUROPATHY, UNSPECIFIED: ICD-10-CM

## 2024-12-20 DIAGNOSIS — Z98.62 PERIPHERAL VASCULAR ANGIOPLASTY STATUS: Chronic | ICD-10-CM

## 2024-12-20 DIAGNOSIS — M86.672 OTHER CHRONIC OSTEOMYELITIS, LEFT ANKLE AND FOOT: ICD-10-CM

## 2024-12-20 DIAGNOSIS — I25.10 ATHEROSCLEROTIC HEART DISEASE OF NATIVE CORONARY ARTERY WITHOUT ANGINA PECTORIS: ICD-10-CM

## 2024-12-20 DIAGNOSIS — E78.00 PURE HYPERCHOLESTEROLEMIA, UNSPECIFIED: ICD-10-CM

## 2024-12-20 DIAGNOSIS — Z79.899 OTHER LONG TERM (CURRENT) DRUG THERAPY: ICD-10-CM

## 2024-12-20 PROCEDURE — 99213 OFFICE O/P EST LOW 20 MIN: CPT

## 2024-12-20 PROCEDURE — G0463: CPT

## 2024-12-23 ENCOUNTER — RX RENEWAL (OUTPATIENT)
Age: 64
End: 2024-12-23

## 2024-12-23 RX ORDER — METOPROLOL SUCCINATE 50 MG/1
0 TABLET, EXTENDED RELEASE ORAL
Qty: 0 | Refills: 2 | DISCHARGE
Start: 2024-12-23

## 2024-12-27 ENCOUNTER — OUTPATIENT (OUTPATIENT)
Dept: OUTPATIENT SERVICES | Facility: HOSPITAL | Age: 64
LOS: 1 days | Discharge: HOME | End: 2024-12-27
Payer: COMMERCIAL

## 2024-12-27 ENCOUNTER — APPOINTMENT (OUTPATIENT)
Dept: WOUND CARE | Facility: HOSPITAL | Age: 64
End: 2024-12-27
Payer: COMMERCIAL

## 2024-12-27 VITALS
OXYGEN SATURATION: 99 % | WEIGHT: 179 LBS | HEART RATE: 88 BPM | HEIGHT: 68 IN | SYSTOLIC BLOOD PRESSURE: 137 MMHG | BODY MASS INDEX: 27.13 KG/M2 | TEMPERATURE: 99.1 F | DIASTOLIC BLOOD PRESSURE: 76 MMHG | RESPIRATION RATE: 18 BRPM

## 2024-12-27 DIAGNOSIS — Z89.429 ACQUIRED ABSENCE OF OTHER TOE(S), UNSPECIFIED SIDE: Chronic | ICD-10-CM

## 2024-12-27 DIAGNOSIS — Z98.62 PERIPHERAL VASCULAR ANGIOPLASTY STATUS: Chronic | ICD-10-CM

## 2024-12-27 DIAGNOSIS — M79.673 PAIN IN UNSPECIFIED FOOT: ICD-10-CM

## 2024-12-27 PROBLEM — L97.526 NON-PRESSURE CHRONIC ULCER OF OTHER PART OF LEFT FOOT WITH BONE INVOLVEMENT WITHOUT EVIDENCE OF NECROSIS: Status: ACTIVE | Noted: 2024-12-27

## 2024-12-27 PROCEDURE — G0463: CPT

## 2024-12-27 PROCEDURE — 99213 OFFICE O/P EST LOW 20 MIN: CPT

## 2024-12-29 DIAGNOSIS — Z79.899 OTHER LONG TERM (CURRENT) DRUG THERAPY: ICD-10-CM

## 2024-12-29 DIAGNOSIS — Z83.3 FAMILY HISTORY OF DIABETES MELLITUS: ICD-10-CM

## 2024-12-29 DIAGNOSIS — I25.10 ATHEROSCLEROTIC HEART DISEASE OF NATIVE CORONARY ARTERY WITHOUT ANGINA PECTORIS: ICD-10-CM

## 2024-12-29 DIAGNOSIS — Z86.39 PERSONAL HISTORY OF OTHER ENDOCRINE, NUTRITIONAL AND METABOLIC DISEASE: ICD-10-CM

## 2024-12-29 DIAGNOSIS — E11.69 TYPE 2 DIABETES MELLITUS WITH OTHER SPECIFIED COMPLICATION: ICD-10-CM

## 2024-12-29 DIAGNOSIS — Z98.890 OTHER SPECIFIED POSTPROCEDURAL STATES: ICD-10-CM

## 2024-12-29 DIAGNOSIS — Z89.431 ACQUIRED ABSENCE OF RIGHT FOOT: ICD-10-CM

## 2024-12-29 DIAGNOSIS — E11.51 TYPE 2 DIABETES MELLITUS WITH DIABETIC PERIPHERAL ANGIOPATHY WITHOUT GANGRENE: ICD-10-CM

## 2024-12-29 DIAGNOSIS — Z80.3 FAMILY HISTORY OF MALIGNANT NEOPLASM OF BREAST: ICD-10-CM

## 2024-12-29 DIAGNOSIS — E11.40 TYPE 2 DIABETES MELLITUS WITH DIABETIC NEUROPATHY, UNSPECIFIED: ICD-10-CM

## 2024-12-29 DIAGNOSIS — M86.672 OTHER CHRONIC OSTEOMYELITIS, LEFT ANKLE AND FOOT: ICD-10-CM

## 2024-12-29 DIAGNOSIS — Z79.84 LONG TERM (CURRENT) USE OF ORAL HYPOGLYCEMIC DRUGS: ICD-10-CM

## 2024-12-29 DIAGNOSIS — I10 ESSENTIAL (PRIMARY) HYPERTENSION: ICD-10-CM

## 2024-12-29 DIAGNOSIS — L97.526 NON-PRESSURE CHRONIC ULCER OF OTHER PART OF LEFT FOOT WITH BONE INVOLVEMENT WITHOUT EVIDENCE OF NECROSIS: ICD-10-CM

## 2024-12-29 DIAGNOSIS — E78.00 PURE HYPERCHOLESTEROLEMIA, UNSPECIFIED: ICD-10-CM

## 2024-12-29 DIAGNOSIS — Z79.82 LONG TERM (CURRENT) USE OF ASPIRIN: ICD-10-CM

## 2024-12-29 DIAGNOSIS — L97.522 NON-PRESSURE CHRONIC ULCER OF OTHER PART OF LEFT FOOT WITH FAT LAYER EXPOSED: ICD-10-CM

## 2025-01-03 ENCOUNTER — OUTPATIENT (OUTPATIENT)
Dept: OUTPATIENT SERVICES | Facility: HOSPITAL | Age: 65
LOS: 1 days | Discharge: HOME | End: 2025-01-03
Payer: COMMERCIAL

## 2025-01-03 ENCOUNTER — APPOINTMENT (OUTPATIENT)
Dept: WOUND CARE | Facility: HOSPITAL | Age: 65
End: 2025-01-03
Payer: COMMERCIAL

## 2025-01-03 VITALS
RESPIRATION RATE: 18 BRPM | DIASTOLIC BLOOD PRESSURE: 78 MMHG | TEMPERATURE: 99 F | HEART RATE: 81 BPM | BODY MASS INDEX: 27.13 KG/M2 | HEIGHT: 68 IN | SYSTOLIC BLOOD PRESSURE: 129 MMHG | OXYGEN SATURATION: 98 % | WEIGHT: 179 LBS

## 2025-01-03 DIAGNOSIS — M86.672 OTHER CHRONIC OSTEOMYELITIS, LEFT ANKLE AND FOOT: ICD-10-CM

## 2025-01-03 DIAGNOSIS — L97.526 NON-PRESSURE CHRONIC ULCER OF OTHER PART OF LEFT FOOT WITH BONE INVOLVEMENT WITHOUT EVIDENCE OF NECROSIS: ICD-10-CM

## 2025-01-03 DIAGNOSIS — Z86.39 PERSONAL HISTORY OF OTHER ENDOCRINE, NUTRITIONAL AND METABOLIC DISEASE: ICD-10-CM

## 2025-01-03 DIAGNOSIS — Z89.431 ACQUIRED ABSENCE OF RIGHT FOOT: ICD-10-CM

## 2025-01-03 DIAGNOSIS — Z79.899 OTHER LONG TERM (CURRENT) DRUG THERAPY: ICD-10-CM

## 2025-01-03 DIAGNOSIS — Z83.3 FAMILY HISTORY OF DIABETES MELLITUS: ICD-10-CM

## 2025-01-03 DIAGNOSIS — E78.00 PURE HYPERCHOLESTEROLEMIA, UNSPECIFIED: ICD-10-CM

## 2025-01-03 DIAGNOSIS — E11.40 TYPE 2 DIABETES MELLITUS WITH DIABETIC NEUROPATHY, UNSPECIFIED: ICD-10-CM

## 2025-01-03 DIAGNOSIS — Z79.82 LONG TERM (CURRENT) USE OF ASPIRIN: ICD-10-CM

## 2025-01-03 DIAGNOSIS — Z98.62 PERIPHERAL VASCULAR ANGIOPLASTY STATUS: Chronic | ICD-10-CM

## 2025-01-03 DIAGNOSIS — I25.10 ATHEROSCLEROTIC HEART DISEASE OF NATIVE CORONARY ARTERY WITHOUT ANGINA PECTORIS: ICD-10-CM

## 2025-01-03 DIAGNOSIS — Z79.84 LONG TERM (CURRENT) USE OF ORAL HYPOGLYCEMIC DRUGS: ICD-10-CM

## 2025-01-03 DIAGNOSIS — Z89.429 ACQUIRED ABSENCE OF OTHER TOE(S), UNSPECIFIED SIDE: Chronic | ICD-10-CM

## 2025-01-03 DIAGNOSIS — E11.51 TYPE 2 DIABETES MELLITUS WITH DIABETIC PERIPHERAL ANGIOPATHY WITHOUT GANGRENE: ICD-10-CM

## 2025-01-03 DIAGNOSIS — Z98.890 OTHER SPECIFIED POSTPROCEDURAL STATES: ICD-10-CM

## 2025-01-03 DIAGNOSIS — E11.69 TYPE 2 DIABETES MELLITUS WITH OTHER SPECIFIED COMPLICATION: ICD-10-CM

## 2025-01-03 DIAGNOSIS — I10 ESSENTIAL (PRIMARY) HYPERTENSION: ICD-10-CM

## 2025-01-03 DIAGNOSIS — Z80.3 FAMILY HISTORY OF MALIGNANT NEOPLASM OF BREAST: ICD-10-CM

## 2025-01-03 DIAGNOSIS — M79.673 PAIN IN UNSPECIFIED FOOT: ICD-10-CM

## 2025-01-03 DIAGNOSIS — L97.522 NON-PRESSURE CHRONIC ULCER OF OTHER PART OF LEFT FOOT WITH FAT LAYER EXPOSED: ICD-10-CM

## 2025-01-03 PROCEDURE — G0463: CPT

## 2025-01-03 PROCEDURE — 99213 OFFICE O/P EST LOW 20 MIN: CPT

## 2025-01-10 ENCOUNTER — APPOINTMENT (OUTPATIENT)
Dept: WOUND CARE | Facility: HOSPITAL | Age: 65
End: 2025-01-10
Payer: COMMERCIAL

## 2025-01-10 ENCOUNTER — OUTPATIENT (OUTPATIENT)
Dept: OUTPATIENT SERVICES | Facility: HOSPITAL | Age: 65
LOS: 1 days | Discharge: HOME | End: 2025-01-10
Payer: COMMERCIAL

## 2025-01-10 VITALS
HEIGHT: 68 IN | TEMPERATURE: 98.3 F | BODY MASS INDEX: 27.13 KG/M2 | RESPIRATION RATE: 18 BRPM | SYSTOLIC BLOOD PRESSURE: 118 MMHG | DIASTOLIC BLOOD PRESSURE: 78 MMHG | WEIGHT: 179 LBS | OXYGEN SATURATION: 99 % | HEART RATE: 88 BPM

## 2025-01-10 DIAGNOSIS — M79.673 PAIN IN UNSPECIFIED FOOT: ICD-10-CM

## 2025-01-10 DIAGNOSIS — Z98.62 PERIPHERAL VASCULAR ANGIOPLASTY STATUS: Chronic | ICD-10-CM

## 2025-01-10 DIAGNOSIS — Z89.429 ACQUIRED ABSENCE OF OTHER TOE(S), UNSPECIFIED SIDE: Chronic | ICD-10-CM

## 2025-01-10 PROCEDURE — G0463: CPT

## 2025-01-10 PROCEDURE — 99213 OFFICE O/P EST LOW 20 MIN: CPT

## 2025-01-12 DIAGNOSIS — Z79.84 LONG TERM (CURRENT) USE OF ORAL HYPOGLYCEMIC DRUGS: ICD-10-CM

## 2025-01-12 DIAGNOSIS — E11.69 TYPE 2 DIABETES MELLITUS WITH OTHER SPECIFIED COMPLICATION: ICD-10-CM

## 2025-01-12 DIAGNOSIS — Z86.39 PERSONAL HISTORY OF OTHER ENDOCRINE, NUTRITIONAL AND METABOLIC DISEASE: ICD-10-CM

## 2025-01-12 DIAGNOSIS — Z79.899 OTHER LONG TERM (CURRENT) DRUG THERAPY: ICD-10-CM

## 2025-01-12 DIAGNOSIS — Z98.890 OTHER SPECIFIED POSTPROCEDURAL STATES: ICD-10-CM

## 2025-01-12 DIAGNOSIS — M86.672 OTHER CHRONIC OSTEOMYELITIS, LEFT ANKLE AND FOOT: ICD-10-CM

## 2025-01-12 DIAGNOSIS — L84 CORNS AND CALLOSITIES: ICD-10-CM

## 2025-01-12 DIAGNOSIS — Z80.3 FAMILY HISTORY OF MALIGNANT NEOPLASM OF BREAST: ICD-10-CM

## 2025-01-12 DIAGNOSIS — Z89.431 ACQUIRED ABSENCE OF RIGHT FOOT: ICD-10-CM

## 2025-01-12 DIAGNOSIS — I25.10 ATHEROSCLEROTIC HEART DISEASE OF NATIVE CORONARY ARTERY WITHOUT ANGINA PECTORIS: ICD-10-CM

## 2025-01-12 DIAGNOSIS — Z79.82 LONG TERM (CURRENT) USE OF ASPIRIN: ICD-10-CM

## 2025-01-12 DIAGNOSIS — Z83.3 FAMILY HISTORY OF DIABETES MELLITUS: ICD-10-CM

## 2025-01-12 DIAGNOSIS — I10 ESSENTIAL (PRIMARY) HYPERTENSION: ICD-10-CM

## 2025-01-12 DIAGNOSIS — E78.00 PURE HYPERCHOLESTEROLEMIA, UNSPECIFIED: ICD-10-CM

## 2025-01-12 DIAGNOSIS — L97.522 NON-PRESSURE CHRONIC ULCER OF OTHER PART OF LEFT FOOT WITH FAT LAYER EXPOSED: ICD-10-CM

## 2025-01-12 DIAGNOSIS — L97.526 NON-PRESSURE CHRONIC ULCER OF OTHER PART OF LEFT FOOT WITH BONE INVOLVEMENT WITHOUT EVIDENCE OF NECROSIS: ICD-10-CM

## 2025-01-12 DIAGNOSIS — E11.51 TYPE 2 DIABETES MELLITUS WITH DIABETIC PERIPHERAL ANGIOPATHY WITHOUT GANGRENE: ICD-10-CM

## 2025-01-12 DIAGNOSIS — E11.40 TYPE 2 DIABETES MELLITUS WITH DIABETIC NEUROPATHY, UNSPECIFIED: ICD-10-CM

## 2025-01-17 ENCOUNTER — OUTPATIENT (OUTPATIENT)
Dept: OUTPATIENT SERVICES | Facility: HOSPITAL | Age: 65
LOS: 1 days | Discharge: HOME | End: 2025-01-17
Payer: COMMERCIAL

## 2025-01-17 ENCOUNTER — APPOINTMENT (OUTPATIENT)
Dept: WOUND CARE | Facility: HOSPITAL | Age: 65
End: 2025-01-17
Payer: COMMERCIAL

## 2025-01-17 VITALS
BODY MASS INDEX: 27.13 KG/M2 | SYSTOLIC BLOOD PRESSURE: 131 MMHG | WEIGHT: 179 LBS | TEMPERATURE: 98.3 F | HEART RATE: 73 BPM | RESPIRATION RATE: 18 BRPM | OXYGEN SATURATION: 98 % | HEIGHT: 68 IN | DIASTOLIC BLOOD PRESSURE: 77 MMHG

## 2025-01-17 DIAGNOSIS — M79.673 PAIN IN UNSPECIFIED FOOT: ICD-10-CM

## 2025-01-17 DIAGNOSIS — Z98.62 PERIPHERAL VASCULAR ANGIOPLASTY STATUS: Chronic | ICD-10-CM

## 2025-01-17 DIAGNOSIS — Z89.429 ACQUIRED ABSENCE OF OTHER TOE(S), UNSPECIFIED SIDE: Chronic | ICD-10-CM

## 2025-01-17 PROBLEM — L84 CALLUS: Status: ACTIVE | Noted: 2025-01-17

## 2025-01-17 PROCEDURE — G0463: CPT

## 2025-01-17 PROCEDURE — 99213 OFFICE O/P EST LOW 20 MIN: CPT

## 2025-01-18 DIAGNOSIS — Z83.3 FAMILY HISTORY OF DIABETES MELLITUS: ICD-10-CM

## 2025-01-18 DIAGNOSIS — E11.40 TYPE 2 DIABETES MELLITUS WITH DIABETIC NEUROPATHY, UNSPECIFIED: ICD-10-CM

## 2025-01-18 DIAGNOSIS — Z80.3 FAMILY HISTORY OF MALIGNANT NEOPLASM OF BREAST: ICD-10-CM

## 2025-01-18 DIAGNOSIS — L84 CORNS AND CALLOSITIES: ICD-10-CM

## 2025-01-18 DIAGNOSIS — Z86.39 PERSONAL HISTORY OF OTHER ENDOCRINE, NUTRITIONAL AND METABOLIC DISEASE: ICD-10-CM

## 2025-01-18 DIAGNOSIS — L97.522 NON-PRESSURE CHRONIC ULCER OF OTHER PART OF LEFT FOOT WITH FAT LAYER EXPOSED: ICD-10-CM

## 2025-01-18 DIAGNOSIS — Z79.82 LONG TERM (CURRENT) USE OF ASPIRIN: ICD-10-CM

## 2025-01-18 DIAGNOSIS — Z98.890 OTHER SPECIFIED POSTPROCEDURAL STATES: ICD-10-CM

## 2025-01-18 DIAGNOSIS — L97.526 NON-PRESSURE CHRONIC ULCER OF OTHER PART OF LEFT FOOT WITH BONE INVOLVEMENT WITHOUT EVIDENCE OF NECROSIS: ICD-10-CM

## 2025-01-18 DIAGNOSIS — I25.10 ATHEROSCLEROTIC HEART DISEASE OF NATIVE CORONARY ARTERY WITHOUT ANGINA PECTORIS: ICD-10-CM

## 2025-01-18 DIAGNOSIS — Z79.84 LONG TERM (CURRENT) USE OF ORAL HYPOGLYCEMIC DRUGS: ICD-10-CM

## 2025-01-18 DIAGNOSIS — E11.69 TYPE 2 DIABETES MELLITUS WITH OTHER SPECIFIED COMPLICATION: ICD-10-CM

## 2025-01-18 DIAGNOSIS — E78.00 PURE HYPERCHOLESTEROLEMIA, UNSPECIFIED: ICD-10-CM

## 2025-01-18 DIAGNOSIS — M86.672 OTHER CHRONIC OSTEOMYELITIS, LEFT ANKLE AND FOOT: ICD-10-CM

## 2025-01-18 DIAGNOSIS — E11.51 TYPE 2 DIABETES MELLITUS WITH DIABETIC PERIPHERAL ANGIOPATHY WITHOUT GANGRENE: ICD-10-CM

## 2025-01-18 DIAGNOSIS — Z79.899 OTHER LONG TERM (CURRENT) DRUG THERAPY: ICD-10-CM

## 2025-01-18 DIAGNOSIS — Z89.431 ACQUIRED ABSENCE OF RIGHT FOOT: ICD-10-CM

## 2025-01-18 DIAGNOSIS — I10 ESSENTIAL (PRIMARY) HYPERTENSION: ICD-10-CM

## 2025-01-24 ENCOUNTER — APPOINTMENT (OUTPATIENT)
Dept: WOUND CARE | Facility: HOSPITAL | Age: 65
End: 2025-01-24
Payer: COMMERCIAL

## 2025-01-24 ENCOUNTER — OUTPATIENT (OUTPATIENT)
Dept: OUTPATIENT SERVICES | Facility: HOSPITAL | Age: 65
LOS: 1 days | Discharge: HOME | End: 2025-01-24
Payer: COMMERCIAL

## 2025-01-24 VITALS
BODY MASS INDEX: 27.13 KG/M2 | OXYGEN SATURATION: 98 % | HEIGHT: 68 IN | RESPIRATION RATE: 18 BRPM | SYSTOLIC BLOOD PRESSURE: 153 MMHG | TEMPERATURE: 98.9 F | WEIGHT: 179 LBS | DIASTOLIC BLOOD PRESSURE: 93 MMHG | HEART RATE: 82 BPM

## 2025-01-24 DIAGNOSIS — L97.522 NON-PRESSURE CHRONIC ULCER OF OTHER PART OF LEFT FOOT WITH FAT LAYER EXPOSED: ICD-10-CM

## 2025-01-24 DIAGNOSIS — M79.673 PAIN IN UNSPECIFIED FOOT: ICD-10-CM

## 2025-01-24 DIAGNOSIS — Z98.62 PERIPHERAL VASCULAR ANGIOPLASTY STATUS: Chronic | ICD-10-CM

## 2025-01-24 DIAGNOSIS — L84 CORNS AND CALLOSITIES: ICD-10-CM

## 2025-01-24 DIAGNOSIS — Z89.429 ACQUIRED ABSENCE OF OTHER TOE(S), UNSPECIFIED SIDE: Chronic | ICD-10-CM

## 2025-01-24 PROCEDURE — 99213 OFFICE O/P EST LOW 20 MIN: CPT

## 2025-01-24 PROCEDURE — G0463: CPT

## 2025-01-26 DIAGNOSIS — E11.51 TYPE 2 DIABETES MELLITUS WITH DIABETIC PERIPHERAL ANGIOPATHY WITHOUT GANGRENE: ICD-10-CM

## 2025-01-26 DIAGNOSIS — E11.40 TYPE 2 DIABETES MELLITUS WITH DIABETIC NEUROPATHY, UNSPECIFIED: ICD-10-CM

## 2025-01-26 DIAGNOSIS — E11.69 TYPE 2 DIABETES MELLITUS WITH OTHER SPECIFIED COMPLICATION: ICD-10-CM

## 2025-01-26 DIAGNOSIS — Z79.899 OTHER LONG TERM (CURRENT) DRUG THERAPY: ICD-10-CM

## 2025-01-26 DIAGNOSIS — I10 ESSENTIAL (PRIMARY) HYPERTENSION: ICD-10-CM

## 2025-01-26 DIAGNOSIS — E78.00 PURE HYPERCHOLESTEROLEMIA, UNSPECIFIED: ICD-10-CM

## 2025-01-26 DIAGNOSIS — Z79.82 LONG TERM (CURRENT) USE OF ASPIRIN: ICD-10-CM

## 2025-01-26 DIAGNOSIS — M86.672 OTHER CHRONIC OSTEOMYELITIS, LEFT ANKLE AND FOOT: ICD-10-CM

## 2025-01-26 DIAGNOSIS — Z83.3 FAMILY HISTORY OF DIABETES MELLITUS: ICD-10-CM

## 2025-01-26 DIAGNOSIS — Z79.84 LONG TERM (CURRENT) USE OF ORAL HYPOGLYCEMIC DRUGS: ICD-10-CM

## 2025-01-26 DIAGNOSIS — Z86.39 PERSONAL HISTORY OF OTHER ENDOCRINE, NUTRITIONAL AND METABOLIC DISEASE: ICD-10-CM

## 2025-01-26 DIAGNOSIS — Z80.3 FAMILY HISTORY OF MALIGNANT NEOPLASM OF BREAST: ICD-10-CM

## 2025-01-26 DIAGNOSIS — L97.526 NON-PRESSURE CHRONIC ULCER OF OTHER PART OF LEFT FOOT WITH BONE INVOLVEMENT WITHOUT EVIDENCE OF NECROSIS: ICD-10-CM

## 2025-01-26 DIAGNOSIS — L97.522 NON-PRESSURE CHRONIC ULCER OF OTHER PART OF LEFT FOOT WITH FAT LAYER EXPOSED: ICD-10-CM

## 2025-01-26 DIAGNOSIS — Z98.890 OTHER SPECIFIED POSTPROCEDURAL STATES: ICD-10-CM

## 2025-01-26 DIAGNOSIS — L84 CORNS AND CALLOSITIES: ICD-10-CM

## 2025-01-26 DIAGNOSIS — Z89.431 ACQUIRED ABSENCE OF RIGHT FOOT: ICD-10-CM

## 2025-01-31 ENCOUNTER — OUTPATIENT (OUTPATIENT)
Dept: OUTPATIENT SERVICES | Facility: HOSPITAL | Age: 65
LOS: 1 days | Discharge: HOME | End: 2025-01-31
Payer: COMMERCIAL

## 2025-01-31 ENCOUNTER — APPOINTMENT (OUTPATIENT)
Dept: WOUND CARE | Facility: HOSPITAL | Age: 65
End: 2025-01-31
Payer: COMMERCIAL

## 2025-01-31 DIAGNOSIS — Z98.62 PERIPHERAL VASCULAR ANGIOPLASTY STATUS: Chronic | ICD-10-CM

## 2025-01-31 DIAGNOSIS — M79.673 PAIN IN UNSPECIFIED FOOT: ICD-10-CM

## 2025-01-31 DIAGNOSIS — Z89.429 ACQUIRED ABSENCE OF OTHER TOE(S), UNSPECIFIED SIDE: Chronic | ICD-10-CM

## 2025-01-31 PROCEDURE — G0463: CPT

## 2025-01-31 PROCEDURE — 99213 OFFICE O/P EST LOW 20 MIN: CPT

## 2025-02-02 DIAGNOSIS — E11.69 TYPE 2 DIABETES MELLITUS WITH OTHER SPECIFIED COMPLICATION: ICD-10-CM

## 2025-02-02 DIAGNOSIS — L97.522 NON-PRESSURE CHRONIC ULCER OF OTHER PART OF LEFT FOOT WITH FAT LAYER EXPOSED: ICD-10-CM

## 2025-02-02 DIAGNOSIS — Z79.899 OTHER LONG TERM (CURRENT) DRUG THERAPY: ICD-10-CM

## 2025-02-02 DIAGNOSIS — Z80.3 FAMILY HISTORY OF MALIGNANT NEOPLASM OF BREAST: ICD-10-CM

## 2025-02-02 DIAGNOSIS — I10 ESSENTIAL (PRIMARY) HYPERTENSION: ICD-10-CM

## 2025-02-02 DIAGNOSIS — Z89.431 ACQUIRED ABSENCE OF RIGHT FOOT: ICD-10-CM

## 2025-02-02 DIAGNOSIS — Z98.890 OTHER SPECIFIED POSTPROCEDURAL STATES: ICD-10-CM

## 2025-02-02 DIAGNOSIS — L97.526 NON-PRESSURE CHRONIC ULCER OF OTHER PART OF LEFT FOOT WITH BONE INVOLVEMENT WITHOUT EVIDENCE OF NECROSIS: ICD-10-CM

## 2025-02-02 DIAGNOSIS — I25.10 ATHEROSCLEROTIC HEART DISEASE OF NATIVE CORONARY ARTERY WITHOUT ANGINA PECTORIS: ICD-10-CM

## 2025-02-02 DIAGNOSIS — Z79.84 LONG TERM (CURRENT) USE OF ORAL HYPOGLYCEMIC DRUGS: ICD-10-CM

## 2025-02-02 DIAGNOSIS — Z79.82 LONG TERM (CURRENT) USE OF ASPIRIN: ICD-10-CM

## 2025-02-02 DIAGNOSIS — E11.40 TYPE 2 DIABETES MELLITUS WITH DIABETIC NEUROPATHY, UNSPECIFIED: ICD-10-CM

## 2025-02-02 DIAGNOSIS — Z83.3 FAMILY HISTORY OF DIABETES MELLITUS: ICD-10-CM

## 2025-02-02 DIAGNOSIS — M86.672 OTHER CHRONIC OSTEOMYELITIS, LEFT ANKLE AND FOOT: ICD-10-CM

## 2025-02-02 DIAGNOSIS — Z86.39 PERSONAL HISTORY OF OTHER ENDOCRINE, NUTRITIONAL AND METABOLIC DISEASE: ICD-10-CM

## 2025-02-02 DIAGNOSIS — E78.00 PURE HYPERCHOLESTEROLEMIA, UNSPECIFIED: ICD-10-CM

## 2025-02-02 DIAGNOSIS — E11.51 TYPE 2 DIABETES MELLITUS WITH DIABETIC PERIPHERAL ANGIOPATHY WITHOUT GANGRENE: ICD-10-CM

## 2025-02-06 ENCOUNTER — OUTPATIENT (OUTPATIENT)
Dept: OUTPATIENT SERVICES | Facility: HOSPITAL | Age: 65
LOS: 1 days | Discharge: HOME | End: 2025-02-06
Payer: COMMERCIAL

## 2025-02-06 ENCOUNTER — APPOINTMENT (OUTPATIENT)
Dept: WOUND CARE | Facility: HOSPITAL | Age: 65
End: 2025-02-06
Payer: COMMERCIAL

## 2025-02-06 VITALS
HEIGHT: 68 IN | DIASTOLIC BLOOD PRESSURE: 77 MMHG | HEART RATE: 87 BPM | OXYGEN SATURATION: 98 % | TEMPERATURE: 97.9 F | BODY MASS INDEX: 27.13 KG/M2 | SYSTOLIC BLOOD PRESSURE: 151 MMHG | WEIGHT: 179 LBS | RESPIRATION RATE: 18 BRPM

## 2025-02-06 DIAGNOSIS — Z89.429 ACQUIRED ABSENCE OF OTHER TOE(S), UNSPECIFIED SIDE: Chronic | ICD-10-CM

## 2025-02-06 DIAGNOSIS — Z98.62 PERIPHERAL VASCULAR ANGIOPLASTY STATUS: Chronic | ICD-10-CM

## 2025-02-06 DIAGNOSIS — M79.673 PAIN IN UNSPECIFIED FOOT: ICD-10-CM

## 2025-02-06 PROCEDURE — 99213 OFFICE O/P EST LOW 20 MIN: CPT

## 2025-02-06 PROCEDURE — G0463: CPT

## 2025-02-09 DIAGNOSIS — M86.672 OTHER CHRONIC OSTEOMYELITIS, LEFT ANKLE AND FOOT: ICD-10-CM

## 2025-02-09 DIAGNOSIS — Z79.82 LONG TERM (CURRENT) USE OF ASPIRIN: ICD-10-CM

## 2025-02-09 DIAGNOSIS — Z98.890 OTHER SPECIFIED POSTPROCEDURAL STATES: ICD-10-CM

## 2025-02-09 DIAGNOSIS — L97.522 NON-PRESSURE CHRONIC ULCER OF OTHER PART OF LEFT FOOT WITH FAT LAYER EXPOSED: ICD-10-CM

## 2025-02-09 DIAGNOSIS — E11.40 TYPE 2 DIABETES MELLITUS WITH DIABETIC NEUROPATHY, UNSPECIFIED: ICD-10-CM

## 2025-02-09 DIAGNOSIS — Z79.899 OTHER LONG TERM (CURRENT) DRUG THERAPY: ICD-10-CM

## 2025-02-09 DIAGNOSIS — Z89.431 ACQUIRED ABSENCE OF RIGHT FOOT: ICD-10-CM

## 2025-02-09 DIAGNOSIS — Z86.39 PERSONAL HISTORY OF OTHER ENDOCRINE, NUTRITIONAL AND METABOLIC DISEASE: ICD-10-CM

## 2025-02-09 DIAGNOSIS — E11.69 TYPE 2 DIABETES MELLITUS WITH OTHER SPECIFIED COMPLICATION: ICD-10-CM

## 2025-02-09 DIAGNOSIS — Z79.84 LONG TERM (CURRENT) USE OF ORAL HYPOGLYCEMIC DRUGS: ICD-10-CM

## 2025-02-09 DIAGNOSIS — I10 ESSENTIAL (PRIMARY) HYPERTENSION: ICD-10-CM

## 2025-02-09 DIAGNOSIS — E11.51 TYPE 2 DIABETES MELLITUS WITH DIABETIC PERIPHERAL ANGIOPATHY WITHOUT GANGRENE: ICD-10-CM

## 2025-02-09 DIAGNOSIS — L97.526 NON-PRESSURE CHRONIC ULCER OF OTHER PART OF LEFT FOOT WITH BONE INVOLVEMENT WITHOUT EVIDENCE OF NECROSIS: ICD-10-CM

## 2025-02-09 DIAGNOSIS — E78.00 PURE HYPERCHOLESTEROLEMIA, UNSPECIFIED: ICD-10-CM

## 2025-02-09 DIAGNOSIS — Z83.3 FAMILY HISTORY OF DIABETES MELLITUS: ICD-10-CM

## 2025-02-09 DIAGNOSIS — Z80.3 FAMILY HISTORY OF MALIGNANT NEOPLASM OF BREAST: ICD-10-CM

## 2025-02-09 DIAGNOSIS — I25.10 ATHEROSCLEROTIC HEART DISEASE OF NATIVE CORONARY ARTERY WITHOUT ANGINA PECTORIS: ICD-10-CM

## 2025-02-12 NOTE — ED ADULT NURSE NOTE - CAS TRG GENERAL AIRWAY, MLM
"[facility], [place]   Psychiatric Progress Note        Interim History:   The patient's care was discussed with the treatment team during the daily team meeting and/or staff's chart notes were reviewed.  Staff reports the patient slept for 5.5 hours last night and describe him as somnolent throughout the day. They tell us that the patient will frequently fall asleep while sitting in the rocking chair, during group activities, while eating food and that it takes a few minutes of shaking him to wake him up. Otherwise, staff describes him as cooperative and med compliant. Patient endorsed increased anxiety but denied suicidal/homicidal ideation or visual/auditory hallucinations.     Met with the patient and he tells us that he is very unhappy with his current situation and medication regimen. Jonny tells us that he is constantly sleeping throughout the day and its getting to the point where he is hitting his head on tables, chairs etc and he is having non stop panic attacks. He requested that we start him on 60 mg XR of Adderall because he is in a constant state of \"brain fog\" to the point of which he has difficulty filling out his meal order and remembering the questions he would like to ask us. We discussed with Jonny that Adderall is a controlled substance and we would like to avoid giving him this daily, especially, in light of his reported abuse of Adderall and try other medication adjustments. Jonny tells us that he feels like he has no input on his current treatment regimen and that \"the dream is over for me. I am never going to be financially independent because of this and I will never do anything cool again. I feel like I am an elderly person who has no say in anything they do as I am forced to be in these different facilities.\" In addition, he tells us that he was completing a safety plan with one of the staff here on station 30 and he became very frustrated because it felt like " "the staff member did not believe what he was telling them. When we asked Jonny what he was telling the staff member he says \"That all my electronics are hacked. I received an [employer] notification from [facility] which is the epicenter for hackers in the world. They are hacking my [employer] accounts, my facebook and 8/9 key identifiers including my social security number are out there on the dark web.\" Jonny continued to express his frustrations, pointed out that I was wearing an apple watch and said that I should not be wearing it because the bluetooth is easily hacked and anyone could be listening to our conversations. We also informed Jonny that unfortunately staff was not comfortable giving him his instant coffee everyday because it would be unfair to other patients. He again became frustrated after receiving this information and said he did not want to end the interview yet because we were ending on a bad note. 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 Lastly, we attempted to discuss increasing the dosage of the patients Vraylar and he prefers that we did not do this because in the past the higher dose caused his foot to tap uncontrollably. Patient denies suicidal or homicidal ideation.     Of note, the patient is planning to meet with the nutritionist regarding his eating habits/coffee preference and respiratory therapy regarding CPAP. Patient tells us that all the CPAP masks give him the sensation that he is suffocating and does not think finding a mask that works, would help him anyway.          Medications:     Current Facility-Administered Medications   Medication Dose Route Frequency Provider Last Rate Last Admin    atomoxetine (STRATTERA) capsule 10 mg  10 mg Oral " "Daily Kenneth Gibson MD   10 mg at 02/12/25 1142    calcium polycarbophil (FIBERCON) tablet 625 mg  625 mg Oral Daily Kenneth Gibson MD   625 mg at 02/12/25 0809    cariprazine (VRAYLAR) capsule 1.5 mg  1.5 mg Oral Daily Kenneth Gibson MD   1.5 mg at 02/12/25 0807    cetirizine (zyrTEC) tablet 10 mg  10 mg Oral Daily Kenneth Gibson MD   10 mg at 02/12/25 0807    clonazePAM (klonoPIN) tablet 1.5 mg  1.5 mg Oral TID Kenneth Gibson MD   1.5 mg at 02/12/25 1413    cloNIDine (CATAPRES) tablet 0.1 mg  0.1 mg Oral At Bedtime Kenneth Gibson MD   0.1 mg at 02/11/25 2128    gabapentin (NEURONTIN) capsule 400 mg  400 mg Oral TID Ciara Lawson PA-C   400 mg at 02/12/25 1413    metFORMIN (GLUCOPHAGE XR) 24 hr tablet 1,000 mg  1,000 mg Oral Daily with supper Kenneth Gibson MD   1,000 mg at 02/11/25 1802    naproxen (NAPROSYN) tablet 500 mg  500 mg Oral BID w/meals Kenneth Gibson MD   500 mg at 02/12/25 0807    nicotine (NICODERM CQ) 21 MG/24HR 24 hr patch 1 patch  1 patch Transdermal Daily Kenneth Gibson MD   1 patch at 02/12/25 0808    OLANZapine (zyPREXA) tablet 30 mg  30 mg Oral At Bedtime Andriy Holman MD   30 mg at 02/11/25 2119    pantoprazole (PROTONIX) EC tablet 40 mg  40 mg Oral Daily Andriy Holman MD   40 mg at 02/12/25 0808    testosterone (ANDROGEL) topical gel 60.75 mg  60.75 mg Transdermal QAM Kenneth Gibson MD   60.75 mg at 02/12/25 0808          Allergies:     Allergies   Allergen Reactions    Abilify [Aripiprazole]      Patient reports tardive dyskinesia effect in 2004 but likely akathisia since patient reports the effects only occurred while on med and resolved with a few days after discontinuing med.     Wellbutrin [Bupropion] Anxiety     Patient reports felt \"reved\" up and anxious when taken in 2001.           Labs:   No results found for this or any previous visit (from the past 24 hours).       Psychiatric " Examination:     BP (!) 153/94   Pulse 98   Temp 98  F (36.7  C) (Oral)   Resp 18   Wt (!) 149.2 kg (328 lb 14.4 oz)   SpO2 97%   BMI 38.99 kg/m    Weight is 328 lbs 14.4 oz  Body mass index is 38.99 kg/m .    Orthostatic Vitals         Most Recent      Sitting Orthostatic /97 02/12 0832    Sitting Orthostatic Pulse (bpm) 85 02/12 0832    Standing Orthostatic /114 02/12 0832    Standing Orthostatic Pulse (bpm) 90 02/12 0832          Appearance: dressed in hospital scrubs, frequently somnolent, uncooperative, mild distress, and drooling after having sips of water. Placed head in hands multiple times throughout interview  Less somnolent by the end of our visit.  Attitude:  less cooperative  Eye Contact:  fair  Mood:  angry, anxious, sad , and depressed   Affect:  intensity is heightened and reactive, labile  Speech:  clear, coherent and rambling raised voice multiple times throughout interview to voice his frustrations   Psychomotor Behavior:  no evidence of tardive dyskinesia, dystonia, or tics, some psychomotor agitation was present  Throught Process:  disorganized, illogical  Associations:  loosening of associations present  Thought Content:  no evidence of suicidal ideation or homicidal ideation and no visual hallucinations present  Insight:  partial  Judgement:  poor  Oriented to:  time, person, and place  Attention Span and Concentration:  poor  Recent and Remote Memory:  poor    Clinical Global Impressions  First: 6/4 2/7/2025      Most recent:            Precautions:     Behavioral Orders   Procedures    Code 1 - Restrict to Unit    Code 2     For X-ray only.    Fall precautions    Routine Programming     As clinically indicated    Status 15     Every 15 minutes.          Diagnoses:     Schizoaffective disorder, bipolar disorder vs paranoid schizophrenia   Polysubstance abuse is likely.  Class 1 obesity due to excess calories without serious comorbidity with body mass index of 34.0 to 34.9 in  Patent adult          Plan:     Patient refused the suggested medication adjustments (Vraylar), 2/12/25. Allowed to use guitar. Continue to provide support and structure while determining where Jonny would benefit the most from after being discharged here from station 30.      I was present with PA student who participated in the service and in the documentation of the note. I have verified the history and personally performed the physical exam and medical decision making. I agree with the assessment and plan of care as documented in the note.     Kenneth Gibson MD  [facility] Psychiatry    Total time spent was 37 minutes. Over 50% of times was spent counseling and coordination of care regarding coping skills, medication and discharge planning.

## 2025-02-14 ENCOUNTER — APPOINTMENT (OUTPATIENT)
Dept: WOUND CARE | Facility: HOSPITAL | Age: 65
End: 2025-02-14
Payer: COMMERCIAL

## 2025-02-14 ENCOUNTER — OUTPATIENT (OUTPATIENT)
Dept: OUTPATIENT SERVICES | Facility: HOSPITAL | Age: 65
LOS: 1 days | Discharge: HOME | End: 2025-02-14
Payer: COMMERCIAL

## 2025-02-14 VITALS
SYSTOLIC BLOOD PRESSURE: 148 MMHG | BODY MASS INDEX: 27.13 KG/M2 | WEIGHT: 179 LBS | HEIGHT: 68 IN | OXYGEN SATURATION: 98 % | DIASTOLIC BLOOD PRESSURE: 82 MMHG | TEMPERATURE: 98.2 F | RESPIRATION RATE: 18 BRPM | HEART RATE: 81 BPM

## 2025-02-14 DIAGNOSIS — M86.672 OTHER CHRONIC OSTEOMYELITIS, LEFT ANKLE AND FOOT: ICD-10-CM

## 2025-02-14 DIAGNOSIS — L97.522 NON-PRESSURE CHRONIC ULCER OF OTHER PART OF LEFT FOOT WITH FAT LAYER EXPOSED: ICD-10-CM

## 2025-02-14 DIAGNOSIS — Z98.62 PERIPHERAL VASCULAR ANGIOPLASTY STATUS: Chronic | ICD-10-CM

## 2025-02-14 DIAGNOSIS — L97.526 NON-PRESSURE CHRONIC ULCER OF OTHER PART OF LEFT FOOT WITH BONE INVOLVEMENT WITHOUT EVIDENCE OF NECROSIS: ICD-10-CM

## 2025-02-14 DIAGNOSIS — M79.673 PAIN IN UNSPECIFIED FOOT: ICD-10-CM

## 2025-02-14 DIAGNOSIS — Z89.429 ACQUIRED ABSENCE OF OTHER TOE(S), UNSPECIFIED SIDE: Chronic | ICD-10-CM

## 2025-02-14 DIAGNOSIS — E11.69 TYPE 2 DIABETES MELLITUS WITH OTHER SPECIFIED COMPLICATION: ICD-10-CM

## 2025-02-14 PROCEDURE — 99213 OFFICE O/P EST LOW 20 MIN: CPT

## 2025-02-14 PROCEDURE — G0463: CPT

## 2025-02-17 DIAGNOSIS — I10 ESSENTIAL (PRIMARY) HYPERTENSION: ICD-10-CM

## 2025-02-17 DIAGNOSIS — E11.40 TYPE 2 DIABETES MELLITUS WITH DIABETIC NEUROPATHY, UNSPECIFIED: ICD-10-CM

## 2025-02-17 DIAGNOSIS — E11.51 TYPE 2 DIABETES MELLITUS WITH DIABETIC PERIPHERAL ANGIOPATHY WITHOUT GANGRENE: ICD-10-CM

## 2025-02-17 DIAGNOSIS — Z89.431 ACQUIRED ABSENCE OF RIGHT FOOT: ICD-10-CM

## 2025-02-17 DIAGNOSIS — Z79.82 LONG TERM (CURRENT) USE OF ASPIRIN: ICD-10-CM

## 2025-02-17 DIAGNOSIS — Z79.84 LONG TERM (CURRENT) USE OF ORAL HYPOGLYCEMIC DRUGS: ICD-10-CM

## 2025-02-17 DIAGNOSIS — E11.69 TYPE 2 DIABETES MELLITUS WITH OTHER SPECIFIED COMPLICATION: ICD-10-CM

## 2025-02-17 DIAGNOSIS — Z79.899 OTHER LONG TERM (CURRENT) DRUG THERAPY: ICD-10-CM

## 2025-02-17 DIAGNOSIS — E78.00 PURE HYPERCHOLESTEROLEMIA, UNSPECIFIED: ICD-10-CM

## 2025-02-17 DIAGNOSIS — L97.522 NON-PRESSURE CHRONIC ULCER OF OTHER PART OF LEFT FOOT WITH FAT LAYER EXPOSED: ICD-10-CM

## 2025-02-17 DIAGNOSIS — Z86.39 PERSONAL HISTORY OF OTHER ENDOCRINE, NUTRITIONAL AND METABOLIC DISEASE: ICD-10-CM

## 2025-02-17 DIAGNOSIS — Z98.890 OTHER SPECIFIED POSTPROCEDURAL STATES: ICD-10-CM

## 2025-02-17 DIAGNOSIS — M86.672 OTHER CHRONIC OSTEOMYELITIS, LEFT ANKLE AND FOOT: ICD-10-CM

## 2025-02-17 DIAGNOSIS — L97.526 NON-PRESSURE CHRONIC ULCER OF OTHER PART OF LEFT FOOT WITH BONE INVOLVEMENT WITHOUT EVIDENCE OF NECROSIS: ICD-10-CM

## 2025-02-17 DIAGNOSIS — I25.10 ATHEROSCLEROTIC HEART DISEASE OF NATIVE CORONARY ARTERY WITHOUT ANGINA PECTORIS: ICD-10-CM

## 2025-02-17 DIAGNOSIS — Z80.3 FAMILY HISTORY OF MALIGNANT NEOPLASM OF BREAST: ICD-10-CM

## 2025-02-17 DIAGNOSIS — Z83.3 FAMILY HISTORY OF DIABETES MELLITUS: ICD-10-CM

## 2025-02-24 ENCOUNTER — OUTPATIENT (OUTPATIENT)
Dept: OUTPATIENT SERVICES | Facility: HOSPITAL | Age: 65
LOS: 1 days | Discharge: HOME | End: 2025-02-24
Payer: COMMERCIAL

## 2025-02-24 ENCOUNTER — APPOINTMENT (OUTPATIENT)
Dept: WOUND CARE | Facility: HOSPITAL | Age: 65
End: 2025-02-24
Payer: COMMERCIAL

## 2025-02-24 VITALS
DIASTOLIC BLOOD PRESSURE: 85 MMHG | OXYGEN SATURATION: 99 % | HEART RATE: 82 BPM | HEIGHT: 68 IN | RESPIRATION RATE: 18 BRPM | TEMPERATURE: 98 F | BODY MASS INDEX: 27.13 KG/M2 | WEIGHT: 179 LBS | SYSTOLIC BLOOD PRESSURE: 165 MMHG

## 2025-02-24 DIAGNOSIS — L97.526 NON-PRESSURE CHRONIC ULCER OF OTHER PART OF LEFT FOOT WITH BONE INVOLVEMENT WITHOUT EVIDENCE OF NECROSIS: ICD-10-CM

## 2025-02-24 DIAGNOSIS — M79.673 PAIN IN UNSPECIFIED FOOT: ICD-10-CM

## 2025-02-24 DIAGNOSIS — Z98.62 PERIPHERAL VASCULAR ANGIOPLASTY STATUS: Chronic | ICD-10-CM

## 2025-02-24 DIAGNOSIS — L97.522 NON-PRESSURE CHRONIC ULCER OF OTHER PART OF LEFT FOOT WITH FAT LAYER EXPOSED: ICD-10-CM

## 2025-02-24 DIAGNOSIS — E11.69 TYPE 2 DIABETES MELLITUS WITH OTHER SPECIFIED COMPLICATION: ICD-10-CM

## 2025-02-24 DIAGNOSIS — Z89.429 ACQUIRED ABSENCE OF OTHER TOE(S), UNSPECIFIED SIDE: Chronic | ICD-10-CM

## 2025-02-24 DIAGNOSIS — M86.672 OTHER CHRONIC OSTEOMYELITIS, LEFT ANKLE AND FOOT: ICD-10-CM

## 2025-02-24 PROCEDURE — 99213 OFFICE O/P EST LOW 20 MIN: CPT

## 2025-02-24 PROCEDURE — G0463: CPT

## 2025-02-26 DIAGNOSIS — Z98.890 OTHER SPECIFIED POSTPROCEDURAL STATES: ICD-10-CM

## 2025-02-26 DIAGNOSIS — L97.526 NON-PRESSURE CHRONIC ULCER OF OTHER PART OF LEFT FOOT WITH BONE INVOLVEMENT WITHOUT EVIDENCE OF NECROSIS: ICD-10-CM

## 2025-02-26 DIAGNOSIS — Z79.899 OTHER LONG TERM (CURRENT) DRUG THERAPY: ICD-10-CM

## 2025-02-26 DIAGNOSIS — Z79.84 LONG TERM (CURRENT) USE OF ORAL HYPOGLYCEMIC DRUGS: ICD-10-CM

## 2025-02-26 DIAGNOSIS — E11.40 TYPE 2 DIABETES MELLITUS WITH DIABETIC NEUROPATHY, UNSPECIFIED: ICD-10-CM

## 2025-02-26 DIAGNOSIS — Z79.82 LONG TERM (CURRENT) USE OF ASPIRIN: ICD-10-CM

## 2025-02-26 DIAGNOSIS — I10 ESSENTIAL (PRIMARY) HYPERTENSION: ICD-10-CM

## 2025-02-26 DIAGNOSIS — L97.522 NON-PRESSURE CHRONIC ULCER OF OTHER PART OF LEFT FOOT WITH FAT LAYER EXPOSED: ICD-10-CM

## 2025-02-26 DIAGNOSIS — E78.00 PURE HYPERCHOLESTEROLEMIA, UNSPECIFIED: ICD-10-CM

## 2025-02-26 DIAGNOSIS — E11.69 TYPE 2 DIABETES MELLITUS WITH OTHER SPECIFIED COMPLICATION: ICD-10-CM

## 2025-02-26 DIAGNOSIS — Z80.3 FAMILY HISTORY OF MALIGNANT NEOPLASM OF BREAST: ICD-10-CM

## 2025-02-26 DIAGNOSIS — M86.672 OTHER CHRONIC OSTEOMYELITIS, LEFT ANKLE AND FOOT: ICD-10-CM

## 2025-02-26 DIAGNOSIS — Z86.39 PERSONAL HISTORY OF OTHER ENDOCRINE, NUTRITIONAL AND METABOLIC DISEASE: ICD-10-CM

## 2025-02-26 DIAGNOSIS — E11.51 TYPE 2 DIABETES MELLITUS WITH DIABETIC PERIPHERAL ANGIOPATHY WITHOUT GANGRENE: ICD-10-CM

## 2025-02-26 DIAGNOSIS — I25.10 ATHEROSCLEROTIC HEART DISEASE OF NATIVE CORONARY ARTERY WITHOUT ANGINA PECTORIS: ICD-10-CM

## 2025-02-26 DIAGNOSIS — Z89.431 ACQUIRED ABSENCE OF RIGHT FOOT: ICD-10-CM

## 2025-02-26 DIAGNOSIS — Z83.3 FAMILY HISTORY OF DIABETES MELLITUS: ICD-10-CM

## 2025-03-14 ENCOUNTER — APPOINTMENT (OUTPATIENT)
Dept: WOUND CARE | Facility: HOSPITAL | Age: 65
End: 2025-03-14
Payer: COMMERCIAL

## 2025-03-14 ENCOUNTER — OUTPATIENT (OUTPATIENT)
Dept: OUTPATIENT SERVICES | Facility: HOSPITAL | Age: 65
LOS: 1 days | Discharge: HOME | End: 2025-03-14
Payer: COMMERCIAL

## 2025-03-14 VITALS
RESPIRATION RATE: 18 BRPM | OXYGEN SATURATION: 99 % | BODY MASS INDEX: 27.13 KG/M2 | WEIGHT: 179 LBS | DIASTOLIC BLOOD PRESSURE: 88 MMHG | TEMPERATURE: 97.9 F | HEIGHT: 68 IN | HEART RATE: 74 BPM | SYSTOLIC BLOOD PRESSURE: 160 MMHG

## 2025-03-14 DIAGNOSIS — Z89.429 ACQUIRED ABSENCE OF OTHER TOE(S), UNSPECIFIED SIDE: Chronic | ICD-10-CM

## 2025-03-14 DIAGNOSIS — Z98.62 PERIPHERAL VASCULAR ANGIOPLASTY STATUS: Chronic | ICD-10-CM

## 2025-03-14 DIAGNOSIS — M79.673 PAIN IN UNSPECIFIED FOOT: ICD-10-CM

## 2025-03-14 PROCEDURE — 99213 OFFICE O/P EST LOW 20 MIN: CPT

## 2025-03-14 PROCEDURE — G0463: CPT

## 2025-03-16 DIAGNOSIS — E11.69 TYPE 2 DIABETES MELLITUS WITH OTHER SPECIFIED COMPLICATION: ICD-10-CM

## 2025-03-16 DIAGNOSIS — Z79.82 LONG TERM (CURRENT) USE OF ASPIRIN: ICD-10-CM

## 2025-03-16 DIAGNOSIS — Z83.3 FAMILY HISTORY OF DIABETES MELLITUS: ICD-10-CM

## 2025-03-16 DIAGNOSIS — E11.40 TYPE 2 DIABETES MELLITUS WITH DIABETIC NEUROPATHY, UNSPECIFIED: ICD-10-CM

## 2025-03-16 DIAGNOSIS — E78.00 PURE HYPERCHOLESTEROLEMIA, UNSPECIFIED: ICD-10-CM

## 2025-03-16 DIAGNOSIS — Z89.431 ACQUIRED ABSENCE OF RIGHT FOOT: ICD-10-CM

## 2025-03-16 DIAGNOSIS — Z86.39 PERSONAL HISTORY OF OTHER ENDOCRINE, NUTRITIONAL AND METABOLIC DISEASE: ICD-10-CM

## 2025-03-16 DIAGNOSIS — Z79.899 OTHER LONG TERM (CURRENT) DRUG THERAPY: ICD-10-CM

## 2025-03-16 DIAGNOSIS — Z98.890 OTHER SPECIFIED POSTPROCEDURAL STATES: ICD-10-CM

## 2025-03-16 DIAGNOSIS — I25.10 ATHEROSCLEROTIC HEART DISEASE OF NATIVE CORONARY ARTERY WITHOUT ANGINA PECTORIS: ICD-10-CM

## 2025-03-16 DIAGNOSIS — Z80.3 FAMILY HISTORY OF MALIGNANT NEOPLASM OF BREAST: ICD-10-CM

## 2025-03-16 DIAGNOSIS — M86.672 OTHER CHRONIC OSTEOMYELITIS, LEFT ANKLE AND FOOT: ICD-10-CM

## 2025-03-16 DIAGNOSIS — I10 ESSENTIAL (PRIMARY) HYPERTENSION: ICD-10-CM

## 2025-03-16 DIAGNOSIS — E11.51 TYPE 2 DIABETES MELLITUS WITH DIABETIC PERIPHERAL ANGIOPATHY WITHOUT GANGRENE: ICD-10-CM

## 2025-03-16 DIAGNOSIS — L97.522 NON-PRESSURE CHRONIC ULCER OF OTHER PART OF LEFT FOOT WITH FAT LAYER EXPOSED: ICD-10-CM

## 2025-03-16 DIAGNOSIS — Z79.84 LONG TERM (CURRENT) USE OF ORAL HYPOGLYCEMIC DRUGS: ICD-10-CM

## 2025-03-16 DIAGNOSIS — L97.526 NON-PRESSURE CHRONIC ULCER OF OTHER PART OF LEFT FOOT WITH BONE INVOLVEMENT WITHOUT EVIDENCE OF NECROSIS: ICD-10-CM

## 2025-03-18 ENCOUNTER — RX RENEWAL (OUTPATIENT)
Age: 65
End: 2025-03-18

## 2025-03-21 ENCOUNTER — OUTPATIENT (OUTPATIENT)
Dept: OUTPATIENT SERVICES | Facility: HOSPITAL | Age: 65
LOS: 1 days | Discharge: HOME | End: 2025-03-21
Payer: COMMERCIAL

## 2025-03-21 ENCOUNTER — APPOINTMENT (OUTPATIENT)
Dept: WOUND CARE | Facility: HOSPITAL | Age: 65
End: 2025-03-21
Payer: COMMERCIAL

## 2025-03-21 VITALS
OXYGEN SATURATION: 95 % | BODY MASS INDEX: 27.13 KG/M2 | WEIGHT: 179 LBS | TEMPERATURE: 98.2 F | DIASTOLIC BLOOD PRESSURE: 71 MMHG | SYSTOLIC BLOOD PRESSURE: 138 MMHG | HEIGHT: 68 IN | HEART RATE: 80 BPM | RESPIRATION RATE: 18 BRPM

## 2025-03-21 DIAGNOSIS — Z98.62 PERIPHERAL VASCULAR ANGIOPLASTY STATUS: Chronic | ICD-10-CM

## 2025-03-21 DIAGNOSIS — M79.673 PAIN IN UNSPECIFIED FOOT: ICD-10-CM

## 2025-03-21 DIAGNOSIS — Z89.429 ACQUIRED ABSENCE OF OTHER TOE(S), UNSPECIFIED SIDE: Chronic | ICD-10-CM

## 2025-03-21 PROCEDURE — 99213 OFFICE O/P EST LOW 20 MIN: CPT

## 2025-03-21 PROCEDURE — G0463: CPT

## 2025-03-22 DIAGNOSIS — E11.51 TYPE 2 DIABETES MELLITUS WITH DIABETIC PERIPHERAL ANGIOPATHY WITHOUT GANGRENE: ICD-10-CM

## 2025-03-22 DIAGNOSIS — E78.00 PURE HYPERCHOLESTEROLEMIA, UNSPECIFIED: ICD-10-CM

## 2025-03-22 DIAGNOSIS — I10 ESSENTIAL (PRIMARY) HYPERTENSION: ICD-10-CM

## 2025-03-22 DIAGNOSIS — E11.40 TYPE 2 DIABETES MELLITUS WITH DIABETIC NEUROPATHY, UNSPECIFIED: ICD-10-CM

## 2025-03-22 DIAGNOSIS — Z83.3 FAMILY HISTORY OF DIABETES MELLITUS: ICD-10-CM

## 2025-03-22 DIAGNOSIS — L97.522 NON-PRESSURE CHRONIC ULCER OF OTHER PART OF LEFT FOOT WITH FAT LAYER EXPOSED: ICD-10-CM

## 2025-03-22 DIAGNOSIS — E11.69 TYPE 2 DIABETES MELLITUS WITH OTHER SPECIFIED COMPLICATION: ICD-10-CM

## 2025-03-22 DIAGNOSIS — I25.10 ATHEROSCLEROTIC HEART DISEASE OF NATIVE CORONARY ARTERY WITHOUT ANGINA PECTORIS: ICD-10-CM

## 2025-03-22 DIAGNOSIS — L97.526 NON-PRESSURE CHRONIC ULCER OF OTHER PART OF LEFT FOOT WITH BONE INVOLVEMENT WITHOUT EVIDENCE OF NECROSIS: ICD-10-CM

## 2025-03-22 DIAGNOSIS — Z79.899 OTHER LONG TERM (CURRENT) DRUG THERAPY: ICD-10-CM

## 2025-03-22 DIAGNOSIS — M86.672 OTHER CHRONIC OSTEOMYELITIS, LEFT ANKLE AND FOOT: ICD-10-CM

## 2025-03-22 DIAGNOSIS — Z80.3 FAMILY HISTORY OF MALIGNANT NEOPLASM OF BREAST: ICD-10-CM

## 2025-03-22 DIAGNOSIS — Z89.431 ACQUIRED ABSENCE OF RIGHT FOOT: ICD-10-CM

## 2025-03-22 DIAGNOSIS — Z79.84 LONG TERM (CURRENT) USE OF ORAL HYPOGLYCEMIC DRUGS: ICD-10-CM

## 2025-03-22 DIAGNOSIS — Z86.39 PERSONAL HISTORY OF OTHER ENDOCRINE, NUTRITIONAL AND METABOLIC DISEASE: ICD-10-CM

## 2025-03-22 DIAGNOSIS — Z98.890 OTHER SPECIFIED POSTPROCEDURAL STATES: ICD-10-CM

## 2025-03-22 DIAGNOSIS — Z79.82 LONG TERM (CURRENT) USE OF ASPIRIN: ICD-10-CM

## 2025-03-26 ENCOUNTER — APPOINTMENT (OUTPATIENT)
Dept: MRI IMAGING | Facility: CLINIC | Age: 65
End: 2025-03-26
Payer: COMMERCIAL

## 2025-03-26 PROCEDURE — 73718 MRI LOWER EXTREMITY W/O DYE: CPT | Mod: LT

## 2025-03-28 ENCOUNTER — OUTPATIENT (OUTPATIENT)
Dept: OUTPATIENT SERVICES | Facility: HOSPITAL | Age: 65
LOS: 1 days | Discharge: HOME | End: 2025-03-28
Payer: COMMERCIAL

## 2025-03-28 ENCOUNTER — APPOINTMENT (OUTPATIENT)
Dept: WOUND CARE | Facility: HOSPITAL | Age: 65
End: 2025-03-28
Payer: COMMERCIAL

## 2025-03-28 VITALS
DIASTOLIC BLOOD PRESSURE: 82 MMHG | WEIGHT: 179 LBS | TEMPERATURE: 98.3 F | OXYGEN SATURATION: 100 % | BODY MASS INDEX: 27.13 KG/M2 | RESPIRATION RATE: 18 BRPM | SYSTOLIC BLOOD PRESSURE: 174 MMHG | HEIGHT: 68 IN | HEART RATE: 80 BPM

## 2025-03-28 DIAGNOSIS — I25.10 ATHEROSCLEROTIC HEART DISEASE OF NATIVE CORONARY ARTERY WITHOUT ANGINA PECTORIS: ICD-10-CM

## 2025-03-28 DIAGNOSIS — L97.526 NON-PRESSURE CHRONIC ULCER OF OTHER PART OF LEFT FOOT WITH BONE INVOLVEMENT WITHOUT EVIDENCE OF NECROSIS: ICD-10-CM

## 2025-03-28 DIAGNOSIS — M79.673 PAIN IN UNSPECIFIED FOOT: ICD-10-CM

## 2025-03-28 DIAGNOSIS — Z86.39 PERSONAL HISTORY OF OTHER ENDOCRINE, NUTRITIONAL AND METABOLIC DISEASE: ICD-10-CM

## 2025-03-28 DIAGNOSIS — Z79.84 LONG TERM (CURRENT) USE OF ORAL HYPOGLYCEMIC DRUGS: ICD-10-CM

## 2025-03-28 DIAGNOSIS — Z98.62 PERIPHERAL VASCULAR ANGIOPLASTY STATUS: Chronic | ICD-10-CM

## 2025-03-28 DIAGNOSIS — L97.522 NON-PRESSURE CHRONIC ULCER OF OTHER PART OF LEFT FOOT WITH FAT LAYER EXPOSED: ICD-10-CM

## 2025-03-28 DIAGNOSIS — E11.51 TYPE 2 DIABETES MELLITUS WITH DIABETIC PERIPHERAL ANGIOPATHY WITHOUT GANGRENE: ICD-10-CM

## 2025-03-28 DIAGNOSIS — I10 ESSENTIAL (PRIMARY) HYPERTENSION: ICD-10-CM

## 2025-03-28 DIAGNOSIS — Z98.890 OTHER SPECIFIED POSTPROCEDURAL STATES: ICD-10-CM

## 2025-03-28 DIAGNOSIS — Z89.431 ACQUIRED ABSENCE OF RIGHT FOOT: ICD-10-CM

## 2025-03-28 DIAGNOSIS — E11.40 TYPE 2 DIABETES MELLITUS WITH DIABETIC NEUROPATHY, UNSPECIFIED: ICD-10-CM

## 2025-03-28 DIAGNOSIS — Z79.899 OTHER LONG TERM (CURRENT) DRUG THERAPY: ICD-10-CM

## 2025-03-28 DIAGNOSIS — E78.00 PURE HYPERCHOLESTEROLEMIA, UNSPECIFIED: ICD-10-CM

## 2025-03-28 DIAGNOSIS — M86.672 OTHER CHRONIC OSTEOMYELITIS, LEFT ANKLE AND FOOT: ICD-10-CM

## 2025-03-28 DIAGNOSIS — Z80.3 FAMILY HISTORY OF MALIGNANT NEOPLASM OF BREAST: ICD-10-CM

## 2025-03-28 DIAGNOSIS — Z83.3 FAMILY HISTORY OF DIABETES MELLITUS: ICD-10-CM

## 2025-03-28 DIAGNOSIS — E11.69 TYPE 2 DIABETES MELLITUS WITH OTHER SPECIFIED COMPLICATION: ICD-10-CM

## 2025-03-28 DIAGNOSIS — Z89.429 ACQUIRED ABSENCE OF OTHER TOE(S), UNSPECIFIED SIDE: Chronic | ICD-10-CM

## 2025-03-28 DIAGNOSIS — Z79.82 LONG TERM (CURRENT) USE OF ASPIRIN: ICD-10-CM

## 2025-03-28 PROCEDURE — G0463: CPT

## 2025-03-28 PROCEDURE — 99214 OFFICE O/P EST MOD 30 MIN: CPT

## 2025-04-04 ENCOUNTER — OUTPATIENT (OUTPATIENT)
Dept: OUTPATIENT SERVICES | Facility: HOSPITAL | Age: 65
LOS: 1 days | End: 2025-04-04
Payer: COMMERCIAL

## 2025-04-04 ENCOUNTER — APPOINTMENT (OUTPATIENT)
Dept: WOUND CARE | Facility: HOSPITAL | Age: 65
End: 2025-04-04
Payer: COMMERCIAL

## 2025-04-04 VITALS
BODY MASS INDEX: 27.13 KG/M2 | WEIGHT: 179 LBS | TEMPERATURE: 99 F | DIASTOLIC BLOOD PRESSURE: 66 MMHG | RESPIRATION RATE: 18 BRPM | OXYGEN SATURATION: 98 % | HEART RATE: 87 BPM | SYSTOLIC BLOOD PRESSURE: 106 MMHG | HEIGHT: 68 IN

## 2025-04-04 DIAGNOSIS — Z89.429 ACQUIRED ABSENCE OF OTHER TOE(S), UNSPECIFIED SIDE: Chronic | ICD-10-CM

## 2025-04-04 DIAGNOSIS — Z98.62 PERIPHERAL VASCULAR ANGIOPLASTY STATUS: Chronic | ICD-10-CM

## 2025-04-04 DIAGNOSIS — M79.673 PAIN IN UNSPECIFIED FOOT: ICD-10-CM

## 2025-04-04 PROCEDURE — G0463: CPT

## 2025-04-04 PROCEDURE — 99213 OFFICE O/P EST LOW 20 MIN: CPT

## 2025-04-07 DIAGNOSIS — I25.10 ATHEROSCLEROTIC HEART DISEASE OF NATIVE CORONARY ARTERY WITHOUT ANGINA PECTORIS: ICD-10-CM

## 2025-04-07 DIAGNOSIS — Z80.3 FAMILY HISTORY OF MALIGNANT NEOPLASM OF BREAST: ICD-10-CM

## 2025-04-07 DIAGNOSIS — I10 ESSENTIAL (PRIMARY) HYPERTENSION: ICD-10-CM

## 2025-04-07 DIAGNOSIS — M86.672 OTHER CHRONIC OSTEOMYELITIS, LEFT ANKLE AND FOOT: ICD-10-CM

## 2025-04-07 DIAGNOSIS — Z79.899 OTHER LONG TERM (CURRENT) DRUG THERAPY: ICD-10-CM

## 2025-04-07 DIAGNOSIS — E11.40 TYPE 2 DIABETES MELLITUS WITH DIABETIC NEUROPATHY, UNSPECIFIED: ICD-10-CM

## 2025-04-07 DIAGNOSIS — Z79.84 LONG TERM (CURRENT) USE OF ORAL HYPOGLYCEMIC DRUGS: ICD-10-CM

## 2025-04-07 DIAGNOSIS — Z89.431 ACQUIRED ABSENCE OF RIGHT FOOT: ICD-10-CM

## 2025-04-07 DIAGNOSIS — E11.69 TYPE 2 DIABETES MELLITUS WITH OTHER SPECIFIED COMPLICATION: ICD-10-CM

## 2025-04-07 DIAGNOSIS — L97.526 NON-PRESSURE CHRONIC ULCER OF OTHER PART OF LEFT FOOT WITH BONE INVOLVEMENT WITHOUT EVIDENCE OF NECROSIS: ICD-10-CM

## 2025-04-07 DIAGNOSIS — E11.51 TYPE 2 DIABETES MELLITUS WITH DIABETIC PERIPHERAL ANGIOPATHY WITHOUT GANGRENE: ICD-10-CM

## 2025-04-07 DIAGNOSIS — E78.00 PURE HYPERCHOLESTEROLEMIA, UNSPECIFIED: ICD-10-CM

## 2025-04-07 DIAGNOSIS — Z98.890 OTHER SPECIFIED POSTPROCEDURAL STATES: ICD-10-CM

## 2025-04-07 DIAGNOSIS — Z86.39 PERSONAL HISTORY OF OTHER ENDOCRINE, NUTRITIONAL AND METABOLIC DISEASE: ICD-10-CM

## 2025-04-07 DIAGNOSIS — L97.522 NON-PRESSURE CHRONIC ULCER OF OTHER PART OF LEFT FOOT WITH FAT LAYER EXPOSED: ICD-10-CM

## 2025-04-07 DIAGNOSIS — Z79.82 LONG TERM (CURRENT) USE OF ASPIRIN: ICD-10-CM

## 2025-04-07 DIAGNOSIS — Z83.3 FAMILY HISTORY OF DIABETES MELLITUS: ICD-10-CM

## 2025-04-11 ENCOUNTER — OUTPATIENT (OUTPATIENT)
Dept: OUTPATIENT SERVICES | Facility: HOSPITAL | Age: 65
LOS: 1 days | End: 2025-04-11
Payer: COMMERCIAL

## 2025-04-11 ENCOUNTER — APPOINTMENT (OUTPATIENT)
Dept: WOUND CARE | Facility: HOSPITAL | Age: 65
End: 2025-04-11
Payer: COMMERCIAL

## 2025-04-11 VITALS
TEMPERATURE: 98.4 F | HEIGHT: 68 IN | OXYGEN SATURATION: 99 % | DIASTOLIC BLOOD PRESSURE: 74 MMHG | BODY MASS INDEX: 27.13 KG/M2 | SYSTOLIC BLOOD PRESSURE: 136 MMHG | WEIGHT: 179 LBS | RESPIRATION RATE: 18 BRPM | HEART RATE: 78 BPM

## 2025-04-11 DIAGNOSIS — E11.69 TYPE 2 DIABETES MELLITUS WITH OTHER SPECIFIED COMPLICATION: ICD-10-CM

## 2025-04-11 DIAGNOSIS — M79.673 PAIN IN UNSPECIFIED FOOT: ICD-10-CM

## 2025-04-11 DIAGNOSIS — L97.526 NON-PRESSURE CHRONIC ULCER OF OTHER PART OF LEFT FOOT WITH BONE INVOLVEMENT WITHOUT EVIDENCE OF NECROSIS: ICD-10-CM

## 2025-04-11 DIAGNOSIS — M86.672 OTHER CHRONIC OSTEOMYELITIS, LEFT ANKLE AND FOOT: ICD-10-CM

## 2025-04-11 DIAGNOSIS — Z98.62 PERIPHERAL VASCULAR ANGIOPLASTY STATUS: Chronic | ICD-10-CM

## 2025-04-11 DIAGNOSIS — Z89.429 ACQUIRED ABSENCE OF OTHER TOE(S), UNSPECIFIED SIDE: Chronic | ICD-10-CM

## 2025-04-11 PROCEDURE — G0463: CPT

## 2025-04-11 PROCEDURE — 99213 OFFICE O/P EST LOW 20 MIN: CPT

## 2025-04-14 DIAGNOSIS — E11.51 TYPE 2 DIABETES MELLITUS WITH DIABETIC PERIPHERAL ANGIOPATHY WITHOUT GANGRENE: ICD-10-CM

## 2025-04-14 DIAGNOSIS — Z79.82 LONG TERM (CURRENT) USE OF ASPIRIN: ICD-10-CM

## 2025-04-14 DIAGNOSIS — I25.10 ATHEROSCLEROTIC HEART DISEASE OF NATIVE CORONARY ARTERY WITHOUT ANGINA PECTORIS: ICD-10-CM

## 2025-04-14 DIAGNOSIS — Z98.890 OTHER SPECIFIED POSTPROCEDURAL STATES: ICD-10-CM

## 2025-04-14 DIAGNOSIS — Z79.899 OTHER LONG TERM (CURRENT) DRUG THERAPY: ICD-10-CM

## 2025-04-14 DIAGNOSIS — Z89.431 ACQUIRED ABSENCE OF RIGHT FOOT: ICD-10-CM

## 2025-04-14 DIAGNOSIS — E11.40 TYPE 2 DIABETES MELLITUS WITH DIABETIC NEUROPATHY, UNSPECIFIED: ICD-10-CM

## 2025-04-14 DIAGNOSIS — I10 ESSENTIAL (PRIMARY) HYPERTENSION: ICD-10-CM

## 2025-04-14 DIAGNOSIS — M86.672 OTHER CHRONIC OSTEOMYELITIS, LEFT ANKLE AND FOOT: ICD-10-CM

## 2025-04-14 DIAGNOSIS — L97.522 NON-PRESSURE CHRONIC ULCER OF OTHER PART OF LEFT FOOT WITH FAT LAYER EXPOSED: ICD-10-CM

## 2025-04-14 DIAGNOSIS — L97.526 NON-PRESSURE CHRONIC ULCER OF OTHER PART OF LEFT FOOT WITH BONE INVOLVEMENT WITHOUT EVIDENCE OF NECROSIS: ICD-10-CM

## 2025-04-14 DIAGNOSIS — Z79.84 LONG TERM (CURRENT) USE OF ORAL HYPOGLYCEMIC DRUGS: ICD-10-CM

## 2025-04-14 DIAGNOSIS — Z80.3 FAMILY HISTORY OF MALIGNANT NEOPLASM OF BREAST: ICD-10-CM

## 2025-04-14 DIAGNOSIS — E78.00 PURE HYPERCHOLESTEROLEMIA, UNSPECIFIED: ICD-10-CM

## 2025-04-14 DIAGNOSIS — Z86.39 PERSONAL HISTORY OF OTHER ENDOCRINE, NUTRITIONAL AND METABOLIC DISEASE: ICD-10-CM

## 2025-04-14 DIAGNOSIS — Z83.3 FAMILY HISTORY OF DIABETES MELLITUS: ICD-10-CM

## 2025-04-14 DIAGNOSIS — E11.69 TYPE 2 DIABETES MELLITUS WITH OTHER SPECIFIED COMPLICATION: ICD-10-CM

## 2025-04-15 ENCOUNTER — OUTPATIENT (OUTPATIENT)
Dept: OUTPATIENT SERVICES | Facility: HOSPITAL | Age: 65
LOS: 1 days | End: 2025-04-15
Payer: COMMERCIAL

## 2025-04-15 ENCOUNTER — NON-APPOINTMENT (OUTPATIENT)
Age: 65
End: 2025-04-15

## 2025-04-15 ENCOUNTER — APPOINTMENT (OUTPATIENT)
Dept: WOUND CARE | Facility: HOSPITAL | Age: 65
End: 2025-04-15
Payer: COMMERCIAL

## 2025-04-15 VITALS
BODY MASS INDEX: 27.13 KG/M2 | WEIGHT: 179 LBS | RESPIRATION RATE: 18 BRPM | OXYGEN SATURATION: 99 % | HEIGHT: 68 IN | HEART RATE: 93 BPM | TEMPERATURE: 98.3 F | DIASTOLIC BLOOD PRESSURE: 80 MMHG | SYSTOLIC BLOOD PRESSURE: 174 MMHG

## 2025-04-15 DIAGNOSIS — E11.69 TYPE 2 DIABETES MELLITUS WITH OTHER SPECIFIED COMPLICATION: ICD-10-CM

## 2025-04-15 PROCEDURE — 99214 OFFICE O/P EST MOD 30 MIN: CPT

## 2025-04-15 PROCEDURE — 87077 CULTURE AEROBIC IDENTIFY: CPT

## 2025-04-15 PROCEDURE — 88304 TISSUE EXAM BY PATHOLOGIST: CPT

## 2025-04-15 PROCEDURE — 87070 CULTURE OTHR SPECIMN AEROBIC: CPT

## 2025-04-15 PROCEDURE — G0463: CPT

## 2025-04-15 PROCEDURE — 88304 TISSUE EXAM BY PATHOLOGIST: CPT | Mod: 26

## 2025-04-17 DIAGNOSIS — E78.00 PURE HYPERCHOLESTEROLEMIA, UNSPECIFIED: ICD-10-CM

## 2025-04-17 DIAGNOSIS — L97.522 NON-PRESSURE CHRONIC ULCER OF OTHER PART OF LEFT FOOT WITH FAT LAYER EXPOSED: ICD-10-CM

## 2025-04-17 DIAGNOSIS — E11.621 TYPE 2 DIABETES MELLITUS WITH FOOT ULCER: ICD-10-CM

## 2025-04-17 DIAGNOSIS — Z83.3 FAMILY HISTORY OF DIABETES MELLITUS: ICD-10-CM

## 2025-04-17 DIAGNOSIS — Z80.3 FAMILY HISTORY OF MALIGNANT NEOPLASM OF BREAST: ICD-10-CM

## 2025-04-17 DIAGNOSIS — E11.40 TYPE 2 DIABETES MELLITUS WITH DIABETIC NEUROPATHY, UNSPECIFIED: ICD-10-CM

## 2025-04-17 DIAGNOSIS — I25.10 ATHEROSCLEROTIC HEART DISEASE OF NATIVE CORONARY ARTERY WITHOUT ANGINA PECTORIS: ICD-10-CM

## 2025-04-17 DIAGNOSIS — I10 ESSENTIAL (PRIMARY) HYPERTENSION: ICD-10-CM

## 2025-04-17 DIAGNOSIS — E11.51 TYPE 2 DIABETES MELLITUS WITH DIABETIC PERIPHERAL ANGIOPATHY WITHOUT GANGRENE: ICD-10-CM

## 2025-04-17 DIAGNOSIS — Z79.82 LONG TERM (CURRENT) USE OF ASPIRIN: ICD-10-CM

## 2025-04-17 DIAGNOSIS — Z86.39 PERSONAL HISTORY OF OTHER ENDOCRINE, NUTRITIONAL AND METABOLIC DISEASE: ICD-10-CM

## 2025-04-17 DIAGNOSIS — Z79.899 OTHER LONG TERM (CURRENT) DRUG THERAPY: ICD-10-CM

## 2025-04-17 DIAGNOSIS — Z89.431 ACQUIRED ABSENCE OF RIGHT FOOT: ICD-10-CM

## 2025-04-17 DIAGNOSIS — Z79.84 LONG TERM (CURRENT) USE OF ORAL HYPOGLYCEMIC DRUGS: ICD-10-CM

## 2025-04-17 DIAGNOSIS — L97.422 NON-PRESSURE CHRONIC ULCER OF LEFT HEEL AND MIDFOOT WITH FAT LAYER EXPOSED: ICD-10-CM

## 2025-04-17 DIAGNOSIS — Z98.890 OTHER SPECIFIED POSTPROCEDURAL STATES: ICD-10-CM

## 2025-04-17 LAB
CULTURE RESULTS: SIGNIFICANT CHANGE UP
SPECIMEN SOURCE: SIGNIFICANT CHANGE UP

## 2025-04-18 ENCOUNTER — APPOINTMENT (OUTPATIENT)
Dept: WOUND CARE | Facility: HOSPITAL | Age: 65
End: 2025-04-18
Payer: COMMERCIAL

## 2025-04-18 ENCOUNTER — OUTPATIENT (OUTPATIENT)
Dept: OUTPATIENT SERVICES | Facility: HOSPITAL | Age: 65
LOS: 1 days | End: 2025-04-18
Payer: COMMERCIAL

## 2025-04-18 VITALS
BODY MASS INDEX: 27.13 KG/M2 | OXYGEN SATURATION: 100 % | RESPIRATION RATE: 20 BRPM | HEART RATE: 77 BPM | TEMPERATURE: 98.2 F | SYSTOLIC BLOOD PRESSURE: 127 MMHG | HEIGHT: 68 IN | DIASTOLIC BLOOD PRESSURE: 78 MMHG | WEIGHT: 179 LBS

## 2025-04-18 DIAGNOSIS — M79.673 PAIN IN UNSPECIFIED FOOT: ICD-10-CM

## 2025-04-18 DIAGNOSIS — E78.00 PURE HYPERCHOLESTEROLEMIA, UNSPECIFIED: ICD-10-CM

## 2025-04-18 DIAGNOSIS — L97.422 NON-PRESSURE CHRONIC ULCER OF LEFT HEEL AND MIDFOOT WITH FAT LAYER EXPOSED: ICD-10-CM

## 2025-04-18 DIAGNOSIS — Z86.39 PERSONAL HISTORY OF OTHER ENDOCRINE, NUTRITIONAL AND METABOLIC DISEASE: ICD-10-CM

## 2025-04-18 DIAGNOSIS — Z98.890 OTHER SPECIFIED POSTPROCEDURAL STATES: ICD-10-CM

## 2025-04-18 DIAGNOSIS — E11.40 TYPE 2 DIABETES MELLITUS WITH DIABETIC NEUROPATHY, UNSPECIFIED: ICD-10-CM

## 2025-04-18 DIAGNOSIS — Z98.62 PERIPHERAL VASCULAR ANGIOPLASTY STATUS: Chronic | ICD-10-CM

## 2025-04-18 DIAGNOSIS — Z80.3 FAMILY HISTORY OF MALIGNANT NEOPLASM OF BREAST: ICD-10-CM

## 2025-04-18 DIAGNOSIS — Z79.82 LONG TERM (CURRENT) USE OF ASPIRIN: ICD-10-CM

## 2025-04-18 DIAGNOSIS — E11.51 TYPE 2 DIABETES MELLITUS WITH DIABETIC PERIPHERAL ANGIOPATHY WITHOUT GANGRENE: ICD-10-CM

## 2025-04-18 DIAGNOSIS — Z79.84 LONG TERM (CURRENT) USE OF ORAL HYPOGLYCEMIC DRUGS: ICD-10-CM

## 2025-04-18 DIAGNOSIS — Z89.431 ACQUIRED ABSENCE OF RIGHT FOOT: ICD-10-CM

## 2025-04-18 DIAGNOSIS — I10 ESSENTIAL (PRIMARY) HYPERTENSION: ICD-10-CM

## 2025-04-18 DIAGNOSIS — Z79.899 OTHER LONG TERM (CURRENT) DRUG THERAPY: ICD-10-CM

## 2025-04-18 DIAGNOSIS — I25.10 ATHEROSCLEROTIC HEART DISEASE OF NATIVE CORONARY ARTERY WITHOUT ANGINA PECTORIS: ICD-10-CM

## 2025-04-18 DIAGNOSIS — E11.621 TYPE 2 DIABETES MELLITUS WITH FOOT ULCER: ICD-10-CM

## 2025-04-18 DIAGNOSIS — Z89.429 ACQUIRED ABSENCE OF OTHER TOE(S), UNSPECIFIED SIDE: Chronic | ICD-10-CM

## 2025-04-18 DIAGNOSIS — L97.522 NON-PRESSURE CHRONIC ULCER OF OTHER PART OF LEFT FOOT WITH FAT LAYER EXPOSED: ICD-10-CM

## 2025-04-18 PROCEDURE — 99213 OFFICE O/P EST LOW 20 MIN: CPT

## 2025-04-18 PROCEDURE — 71046 X-RAY EXAM CHEST 2 VIEWS: CPT

## 2025-04-18 PROCEDURE — G0463: CPT

## 2025-04-18 PROCEDURE — 71046 X-RAY EXAM CHEST 2 VIEWS: CPT | Mod: 26

## 2025-04-21 ENCOUNTER — NON-APPOINTMENT (OUTPATIENT)
Age: 65
End: 2025-04-21

## 2025-04-24 ENCOUNTER — OUTPATIENT (OUTPATIENT)
Dept: OUTPATIENT SERVICES | Facility: HOSPITAL | Age: 65
LOS: 1 days | End: 2025-04-24
Payer: COMMERCIAL

## 2025-04-24 ENCOUNTER — APPOINTMENT (OUTPATIENT)
Dept: HYPERBARIC MEDICINE | Facility: HOSPITAL | Age: 65
End: 2025-04-24
Payer: COMMERCIAL

## 2025-04-24 VITALS
TEMPERATURE: 98.2 F | DIASTOLIC BLOOD PRESSURE: 68 MMHG | OXYGEN SATURATION: 97 % | SYSTOLIC BLOOD PRESSURE: 118 MMHG | RESPIRATION RATE: 15 BRPM | HEART RATE: 80 BPM

## 2025-04-24 VITALS
OXYGEN SATURATION: 97 % | SYSTOLIC BLOOD PRESSURE: 152 MMHG | DIASTOLIC BLOOD PRESSURE: 77 MMHG | RESPIRATION RATE: 16 BRPM | TEMPERATURE: 98.3 F | HEART RATE: 84 BPM

## 2025-04-24 DIAGNOSIS — E11.621 TYPE 2 DIABETES MELLITUS WITH FOOT ULCER: ICD-10-CM

## 2025-04-24 DIAGNOSIS — Z98.62 PERIPHERAL VASCULAR ANGIOPLASTY STATUS: Chronic | ICD-10-CM

## 2025-04-24 DIAGNOSIS — L97.522 NON-PRESSURE CHRONIC ULCER OF OTHER PART OF LEFT FOOT WITH FAT LAYER EXPOSED: ICD-10-CM

## 2025-04-24 DIAGNOSIS — L97.422 NON-PRESSURE CHRONIC ULCER OF LEFT HEEL AND MIDFOOT WITH FAT LAYER EXPOSED: ICD-10-CM

## 2025-04-24 PROCEDURE — G0277: CPT

## 2025-04-24 PROCEDURE — 82962 GLUCOSE BLOOD TEST: CPT

## 2025-04-24 PROCEDURE — 99183 HYPERBARIC OXYGEN THERAPY: CPT

## 2025-04-25 ENCOUNTER — OUTPATIENT (OUTPATIENT)
Dept: OUTPATIENT SERVICES | Facility: HOSPITAL | Age: 65
LOS: 1 days | End: 2025-04-25
Payer: COMMERCIAL

## 2025-04-25 ENCOUNTER — APPOINTMENT (OUTPATIENT)
Dept: WOUND CARE | Facility: HOSPITAL | Age: 65
End: 2025-04-25
Payer: COMMERCIAL

## 2025-04-25 ENCOUNTER — APPOINTMENT (OUTPATIENT)
Dept: HYPERBARIC MEDICINE | Facility: HOSPITAL | Age: 65
End: 2025-04-25
Payer: COMMERCIAL

## 2025-04-25 VITALS
RESPIRATION RATE: 13 BRPM | HEART RATE: 75 BPM | DIASTOLIC BLOOD PRESSURE: 88 MMHG | OXYGEN SATURATION: 99 % | SYSTOLIC BLOOD PRESSURE: 137 MMHG | TEMPERATURE: 98.3 F

## 2025-04-25 VITALS
OXYGEN SATURATION: 97 % | TEMPERATURE: 98.2 F | SYSTOLIC BLOOD PRESSURE: 144 MMHG | HEART RATE: 80 BPM | RESPIRATION RATE: 14 BRPM | DIASTOLIC BLOOD PRESSURE: 69 MMHG

## 2025-04-25 DIAGNOSIS — L97.522 NON-PRESSURE CHRONIC ULCER OF OTHER PART OF LEFT FOOT WITH FAT LAYER EXPOSED: ICD-10-CM

## 2025-04-25 DIAGNOSIS — L97.422 NON-PRESSURE CHRONIC ULCER OF LEFT HEEL AND MIDFOOT WITH FAT LAYER EXPOSED: ICD-10-CM

## 2025-04-25 DIAGNOSIS — Z89.429 ACQUIRED ABSENCE OF OTHER TOE(S), UNSPECIFIED SIDE: Chronic | ICD-10-CM

## 2025-04-25 DIAGNOSIS — E11.621 TYPE 2 DIABETES MELLITUS WITH FOOT ULCER: ICD-10-CM

## 2025-04-25 PROCEDURE — 82962 GLUCOSE BLOOD TEST: CPT

## 2025-04-25 PROCEDURE — 99183 HYPERBARIC OXYGEN THERAPY: CPT

## 2025-04-25 PROCEDURE — G0277: CPT

## 2025-04-28 ENCOUNTER — OUTPATIENT (OUTPATIENT)
Dept: OUTPATIENT SERVICES | Facility: HOSPITAL | Age: 65
LOS: 1 days | End: 2025-04-28
Payer: COMMERCIAL

## 2025-04-28 ENCOUNTER — APPOINTMENT (OUTPATIENT)
Dept: HYPERBARIC MEDICINE | Facility: HOSPITAL | Age: 65
End: 2025-04-28
Payer: COMMERCIAL

## 2025-04-28 VITALS
TEMPERATURE: 98.3 F | OXYGEN SATURATION: 99 % | DIASTOLIC BLOOD PRESSURE: 77 MMHG | HEART RATE: 78 BPM | SYSTOLIC BLOOD PRESSURE: 136 MMHG | RESPIRATION RATE: 12 BRPM

## 2025-04-28 VITALS
DIASTOLIC BLOOD PRESSURE: 74 MMHG | OXYGEN SATURATION: 97 % | RESPIRATION RATE: 14 BRPM | HEART RATE: 87 BPM | SYSTOLIC BLOOD PRESSURE: 134 MMHG | TEMPERATURE: 98.3 F

## 2025-04-28 DIAGNOSIS — Z98.62 PERIPHERAL VASCULAR ANGIOPLASTY STATUS: Chronic | ICD-10-CM

## 2025-04-28 DIAGNOSIS — Z89.429 ACQUIRED ABSENCE OF OTHER TOE(S), UNSPECIFIED SIDE: Chronic | ICD-10-CM

## 2025-04-28 DIAGNOSIS — L97.422 NON-PRESSURE CHRONIC ULCER OF LEFT HEEL AND MIDFOOT WITH FAT LAYER EXPOSED: ICD-10-CM

## 2025-04-28 DIAGNOSIS — E11.621 TYPE 2 DIABETES MELLITUS WITH FOOT ULCER: ICD-10-CM

## 2025-04-28 DIAGNOSIS — L97.522 NON-PRESSURE CHRONIC ULCER OF OTHER PART OF LEFT FOOT WITH FAT LAYER EXPOSED: ICD-10-CM

## 2025-04-28 PROCEDURE — 82962 GLUCOSE BLOOD TEST: CPT

## 2025-04-28 PROCEDURE — 99183 HYPERBARIC OXYGEN THERAPY: CPT

## 2025-04-28 PROCEDURE — G0277: CPT

## 2025-04-29 ENCOUNTER — INPATIENT (INPATIENT)
Facility: HOSPITAL | Age: 65
LOS: 6 days | Discharge: HOME | DRG: 603 | End: 2025-05-06
Attending: INTERNAL MEDICINE | Admitting: INTERNAL MEDICINE
Payer: COMMERCIAL

## 2025-04-29 ENCOUNTER — OUTPATIENT (OUTPATIENT)
Dept: OUTPATIENT SERVICES | Facility: HOSPITAL | Age: 65
LOS: 1 days | End: 2025-04-29
Payer: COMMERCIAL

## 2025-04-29 ENCOUNTER — APPOINTMENT (OUTPATIENT)
Dept: HYPERBARIC MEDICINE | Facility: HOSPITAL | Age: 65
End: 2025-04-29
Payer: COMMERCIAL

## 2025-04-29 VITALS
TEMPERATURE: 98.2 F | RESPIRATION RATE: 14 BRPM | DIASTOLIC BLOOD PRESSURE: 66 MMHG | OXYGEN SATURATION: 99 % | HEART RATE: 82 BPM | SYSTOLIC BLOOD PRESSURE: 134 MMHG

## 2025-04-29 VITALS
WEIGHT: 184.97 LBS | SYSTOLIC BLOOD PRESSURE: 107 MMHG | DIASTOLIC BLOOD PRESSURE: 66 MMHG | RESPIRATION RATE: 16 BRPM | HEART RATE: 87 BPM | OXYGEN SATURATION: 100 % | HEIGHT: 69 IN | TEMPERATURE: 98 F

## 2025-04-29 VITALS
SYSTOLIC BLOOD PRESSURE: 122 MMHG | RESPIRATION RATE: 12 BRPM | TEMPERATURE: 98.9 F | OXYGEN SATURATION: 97 % | HEART RATE: 83 BPM | DIASTOLIC BLOOD PRESSURE: 72 MMHG

## 2025-04-29 DIAGNOSIS — L08.9 LOCAL INFECTION OF THE SKIN AND SUBCUTANEOUS TISSUE, UNSPECIFIED: ICD-10-CM

## 2025-04-29 DIAGNOSIS — E11.9 TYPE 2 DIABETES MELLITUS WITHOUT COMPLICATIONS: ICD-10-CM

## 2025-04-29 DIAGNOSIS — Z89.429 ACQUIRED ABSENCE OF OTHER TOE(S), UNSPECIFIED SIDE: Chronic | ICD-10-CM

## 2025-04-29 DIAGNOSIS — M86.9 OSTEOMYELITIS, UNSPECIFIED: ICD-10-CM

## 2025-04-29 DIAGNOSIS — Z98.62 PERIPHERAL VASCULAR ANGIOPLASTY STATUS: Chronic | ICD-10-CM

## 2025-04-29 DIAGNOSIS — I73.9 PERIPHERAL VASCULAR DISEASE, UNSPECIFIED: ICD-10-CM

## 2025-04-29 DIAGNOSIS — I10 ESSENTIAL (PRIMARY) HYPERTENSION: ICD-10-CM

## 2025-04-29 DIAGNOSIS — N17.9 ACUTE KIDNEY FAILURE, UNSPECIFIED: ICD-10-CM

## 2025-04-29 DIAGNOSIS — E11.621 TYPE 2 DIABETES MELLITUS WITH FOOT ULCER: ICD-10-CM

## 2025-04-29 DIAGNOSIS — L03.116 CELLULITIS OF LEFT LOWER LIMB: ICD-10-CM

## 2025-04-29 DIAGNOSIS — Z29.9 ENCOUNTER FOR PROPHYLACTIC MEASURES, UNSPECIFIED: ICD-10-CM

## 2025-04-29 DIAGNOSIS — L89.890 PRESSURE ULCER OF OTHER SITE, UNSTAGEABLE: ICD-10-CM

## 2025-04-29 LAB
ALBUMIN SERPL ELPH-MCNC: 3.3 G/DL — SIGNIFICANT CHANGE UP (ref 3.3–5)
ALP SERPL-CCNC: 78 U/L — SIGNIFICANT CHANGE UP (ref 40–120)
ALT FLD-CCNC: 26 U/L — SIGNIFICANT CHANGE UP (ref 12–78)
ANION GAP SERPL CALC-SCNC: 6 MMOL/L — SIGNIFICANT CHANGE UP (ref 5–17)
AST SERPL-CCNC: 15 U/L — SIGNIFICANT CHANGE UP (ref 15–37)
BASOPHILS # BLD AUTO: 0.04 K/UL — SIGNIFICANT CHANGE UP (ref 0–0.2)
BASOPHILS NFR BLD AUTO: 0.3 % — SIGNIFICANT CHANGE UP (ref 0–2)
BILIRUB SERPL-MCNC: 0.4 MG/DL — SIGNIFICANT CHANGE UP (ref 0.2–1.2)
BUN SERPL-MCNC: 25 MG/DL — HIGH (ref 7–23)
CALCIUM SERPL-MCNC: 8.5 MG/DL — SIGNIFICANT CHANGE UP (ref 8.5–10.1)
CHLORIDE SERPL-SCNC: 105 MMOL/L — SIGNIFICANT CHANGE UP (ref 96–108)
CO2 SERPL-SCNC: 26 MMOL/L — SIGNIFICANT CHANGE UP (ref 22–31)
CREAT SERPL-MCNC: 1.3 MG/DL — SIGNIFICANT CHANGE UP (ref 0.5–1.3)
CRP SERPL-MCNC: 117 MG/L — HIGH
EGFR: 61 ML/MIN/1.73M2 — SIGNIFICANT CHANGE UP
EGFR: 61 ML/MIN/1.73M2 — SIGNIFICANT CHANGE UP
EOSINOPHIL # BLD AUTO: 0.13 K/UL — SIGNIFICANT CHANGE UP (ref 0–0.5)
EOSINOPHIL NFR BLD AUTO: 1.1 % — SIGNIFICANT CHANGE UP (ref 0–6)
ERYTHROCYTE [SEDIMENTATION RATE] IN BLOOD: 53 MM/HR — HIGH (ref 0–20)
GLUCOSE BLDC GLUCOMTR-MCNC: 134 MG/DL — HIGH (ref 70–99)
GLUCOSE BLDC GLUCOMTR-MCNC: 171 MG/DL — HIGH (ref 70–99)
GLUCOSE SERPL-MCNC: 137 MG/DL — HIGH (ref 70–99)
HCT VFR BLD CALC: 33.5 % — LOW (ref 39–50)
HGB BLD-MCNC: 11 G/DL — LOW (ref 13–17)
IMM GRANULOCYTES NFR BLD AUTO: 0.5 % — SIGNIFICANT CHANGE UP (ref 0–0.9)
INR BLD: 1.04 RATIO — SIGNIFICANT CHANGE UP (ref 0.85–1.16)
LACTATE SERPL-SCNC: 0.8 MMOL/L — SIGNIFICANT CHANGE UP (ref 0.7–2)
LYMPHOCYTES # BLD AUTO: 1.63 K/UL — SIGNIFICANT CHANGE UP (ref 1–3.3)
LYMPHOCYTES # BLD AUTO: 13.3 % — SIGNIFICANT CHANGE UP (ref 13–44)
MCHC RBC-ENTMCNC: 29.6 PG — SIGNIFICANT CHANGE UP (ref 27–34)
MCHC RBC-ENTMCNC: 32.8 G/DL — SIGNIFICANT CHANGE UP (ref 32–36)
MCV RBC AUTO: 90.1 FL — SIGNIFICANT CHANGE UP (ref 80–100)
MONOCYTES # BLD AUTO: 1.02 K/UL — HIGH (ref 0–0.9)
MONOCYTES NFR BLD AUTO: 8.3 % — SIGNIFICANT CHANGE UP (ref 2–14)
NEUTROPHILS # BLD AUTO: 9.35 K/UL — HIGH (ref 1.8–7.4)
NEUTROPHILS NFR BLD AUTO: 76.5 % — SIGNIFICANT CHANGE UP (ref 43–77)
NRBC BLD AUTO-RTO: 0 /100 WBCS — SIGNIFICANT CHANGE UP (ref 0–0)
PLATELET # BLD AUTO: 258 K/UL — SIGNIFICANT CHANGE UP (ref 150–400)
POTASSIUM SERPL-MCNC: 4.9 MMOL/L — SIGNIFICANT CHANGE UP (ref 3.5–5.3)
POTASSIUM SERPL-SCNC: 4.9 MMOL/L — SIGNIFICANT CHANGE UP (ref 3.5–5.3)
PREALB SERPL-MCNC: 17 MG/DL — LOW (ref 20–40)
PROT SERPL-MCNC: 7 G/DL — SIGNIFICANT CHANGE UP (ref 6–8.3)
PROTHROM AB SERPL-ACNC: 12.2 SEC — SIGNIFICANT CHANGE UP (ref 9.9–13.4)
RBC # BLD: 3.72 M/UL — LOW (ref 4.2–5.8)
RBC # FLD: 13.7 % — SIGNIFICANT CHANGE UP (ref 10.3–14.5)
SODIUM SERPL-SCNC: 137 MMOL/L — SIGNIFICANT CHANGE UP (ref 135–145)
WBC # BLD: 12.23 K/UL — HIGH (ref 3.8–10.5)
WBC # FLD AUTO: 12.23 K/UL — HIGH (ref 3.8–10.5)

## 2025-04-29 PROCEDURE — 93923 UPR/LXTR ART STDY 3+ LVLS: CPT | Mod: 26

## 2025-04-29 PROCEDURE — 99285 EMERGENCY DEPT VISIT HI MDM: CPT

## 2025-04-29 PROCEDURE — 73630 X-RAY EXAM OF FOOT: CPT | Mod: 26,LT

## 2025-04-29 PROCEDURE — 82962 GLUCOSE BLOOD TEST: CPT

## 2025-04-29 PROCEDURE — G0277: CPT

## 2025-04-29 PROCEDURE — 99221 1ST HOSP IP/OBS SF/LOW 40: CPT

## 2025-04-29 PROCEDURE — 93010 ELECTROCARDIOGRAM REPORT: CPT

## 2025-04-29 PROCEDURE — 99183 HYPERBARIC OXYGEN THERAPY: CPT

## 2025-04-29 PROCEDURE — 73718 MRI LOWER EXTREMITY W/O DYE: CPT | Mod: 26,LT

## 2025-04-29 PROCEDURE — 99222 1ST HOSP IP/OBS MODERATE 55: CPT | Mod: 57

## 2025-04-29 PROCEDURE — 93970 EXTREMITY STUDY: CPT | Mod: 26

## 2025-04-29 RX ORDER — CEFEPIME 2 G/20ML
INJECTION, POWDER, FOR SOLUTION INTRAVENOUS
Refills: 0 | Status: DISCONTINUED | OUTPATIENT
Start: 2025-04-29 | End: 2025-05-02

## 2025-04-29 RX ORDER — VANCOMYCIN HCL IN 5 % DEXTROSE 1.5G/250ML
1000 PLASTIC BAG, INJECTION (ML) INTRAVENOUS EVERY 12 HOURS
Refills: 0 | Status: DISCONTINUED | OUTPATIENT
Start: 2025-04-30 | End: 2025-04-30

## 2025-04-29 RX ORDER — PIPERACILLIN-TAZO-DEXTROSE,ISO 2.25G/50ML
3.38 IV SOLUTION, PIGGYBACK PREMIX FROZEN(ML) INTRAVENOUS ONCE
Refills: 0 | Status: COMPLETED | OUTPATIENT
Start: 2025-04-29 | End: 2025-04-29

## 2025-04-29 RX ORDER — ASPIRIN 325 MG
81 TABLET ORAL DAILY
Refills: 0 | Status: DISCONTINUED | OUTPATIENT
Start: 2025-04-29 | End: 2025-05-02

## 2025-04-29 RX ORDER — SODIUM CHLORIDE 9 G/1000ML
1000 INJECTION, SOLUTION INTRAVENOUS
Refills: 0 | Status: DISCONTINUED | OUTPATIENT
Start: 2025-04-29 | End: 2025-05-02

## 2025-04-29 RX ORDER — DEXTROSE 50 % IN WATER 50 %
25 SYRINGE (ML) INTRAVENOUS ONCE
Refills: 0 | Status: DISCONTINUED | OUTPATIENT
Start: 2025-04-29 | End: 2025-05-02

## 2025-04-29 RX ORDER — INSULIN LISPRO 100 U/ML
INJECTION, SOLUTION INTRAVENOUS; SUBCUTANEOUS AT BEDTIME
Refills: 0 | Status: DISCONTINUED | OUTPATIENT
Start: 2025-04-29 | End: 2025-05-02

## 2025-04-29 RX ORDER — GABAPENTIN 400 MG/1
300 CAPSULE ORAL THREE TIMES A DAY
Refills: 0 | Status: DISCONTINUED | OUTPATIENT
Start: 2025-04-29 | End: 2025-05-02

## 2025-04-29 RX ORDER — LISINOPRIL 5 MG/1
40 TABLET ORAL DAILY
Refills: 0 | Status: DISCONTINUED | OUTPATIENT
Start: 2025-04-29 | End: 2025-05-02

## 2025-04-29 RX ORDER — DEXTROSE 50 % IN WATER 50 %
12.5 SYRINGE (ML) INTRAVENOUS ONCE
Refills: 0 | Status: DISCONTINUED | OUTPATIENT
Start: 2025-04-29 | End: 2025-05-02

## 2025-04-29 RX ORDER — CEFEPIME 2 G/20ML
INJECTION, POWDER, FOR SOLUTION INTRAVENOUS
Refills: 0 | Status: DISCONTINUED | OUTPATIENT
Start: 2025-04-29 | End: 2025-04-29

## 2025-04-29 RX ORDER — DEXTROSE 50 % IN WATER 50 %
15 SYRINGE (ML) INTRAVENOUS ONCE
Refills: 0 | Status: DISCONTINUED | OUTPATIENT
Start: 2025-04-29 | End: 2025-05-02

## 2025-04-29 RX ORDER — INSULIN LISPRO 100 U/ML
INJECTION, SOLUTION INTRAVENOUS; SUBCUTANEOUS
Refills: 0 | Status: DISCONTINUED | OUTPATIENT
Start: 2025-04-29 | End: 2025-05-02

## 2025-04-29 RX ORDER — ENOXAPARIN SODIUM 100 MG/ML
40 INJECTION SUBCUTANEOUS EVERY 24 HOURS
Refills: 0 | Status: DISCONTINUED | OUTPATIENT
Start: 2025-04-29 | End: 2025-05-01

## 2025-04-29 RX ORDER — METOPROLOL SUCCINATE 50 MG/1
50 TABLET, EXTENDED RELEASE ORAL DAILY
Refills: 0 | Status: DISCONTINUED | OUTPATIENT
Start: 2025-04-29 | End: 2025-05-02

## 2025-04-29 RX ORDER — GLUCAGON 3 MG/1
1 POWDER NASAL ONCE
Refills: 0 | Status: DISCONTINUED | OUTPATIENT
Start: 2025-04-29 | End: 2025-05-02

## 2025-04-29 RX ORDER — ATORVASTATIN CALCIUM 80 MG/1
40 TABLET, FILM COATED ORAL AT BEDTIME
Refills: 0 | Status: DISCONTINUED | OUTPATIENT
Start: 2025-04-29 | End: 2025-05-02

## 2025-04-29 RX ORDER — CLOPIDOGREL BISULFATE 75 MG/1
75 TABLET, FILM COATED ORAL DAILY
Refills: 0 | Status: DISCONTINUED | OUTPATIENT
Start: 2025-04-29 | End: 2025-05-02

## 2025-04-29 RX ORDER — CEFEPIME 2 G/20ML
2000 INJECTION, POWDER, FOR SOLUTION INTRAVENOUS EVERY 8 HOURS
Refills: 0 | Status: DISCONTINUED | OUTPATIENT
Start: 2025-04-29 | End: 2025-05-02

## 2025-04-29 RX ORDER — VANCOMYCIN HCL IN 5 % DEXTROSE 1.5G/250ML
1000 PLASTIC BAG, INJECTION (ML) INTRAVENOUS ONCE
Refills: 0 | Status: COMPLETED | OUTPATIENT
Start: 2025-04-29 | End: 2025-04-29

## 2025-04-29 RX ORDER — CEFEPIME 2 G/20ML
2000 INJECTION, POWDER, FOR SOLUTION INTRAVENOUS ONCE
Refills: 0 | Status: COMPLETED | OUTPATIENT
Start: 2025-04-29 | End: 2025-04-29

## 2025-04-29 RX ADMIN — Medication 250 MILLIGRAM(S): at 14:22

## 2025-04-29 RX ADMIN — ATORVASTATIN CALCIUM 40 MILLIGRAM(S): 80 TABLET, FILM COATED ORAL at 21:40

## 2025-04-29 RX ADMIN — CEFEPIME 100 MILLIGRAM(S): 2 INJECTION, POWDER, FOR SOLUTION INTRAVENOUS at 17:52

## 2025-04-29 RX ADMIN — GABAPENTIN 300 MILLIGRAM(S): 400 CAPSULE ORAL at 21:40

## 2025-04-29 RX ADMIN — ENOXAPARIN SODIUM 40 MILLIGRAM(S): 100 INJECTION SUBCUTANEOUS at 21:41

## 2025-04-29 RX ADMIN — Medication 200 GRAM(S): at 12:58

## 2025-04-29 RX ADMIN — CEFEPIME 100 MILLIGRAM(S): 2 INJECTION, POWDER, FOR SOLUTION INTRAVENOUS at 21:40

## 2025-04-29 NOTE — H&P ADULT - RESPIRATORY
clear to auscultation bilaterally/no respiratory distress/no use of accessory muscles clear to auscultation bilaterally/no wheezes/no rales/no rhonchi/no respiratory distress/no use of accessory muscles/breath sounds equal/good air movement/respirations non-labored

## 2025-04-29 NOTE — CONSULT NOTE ADULT - NS ATTEND AMEND GEN_ALL_CORE FT
Patient was evaluated at the bedside.  He has left heel wound, left hallux wound and medial first metatarsal wound.  He previously underwent left lower extremity angiogram with recanalization of an occluded posterior tibial artery.  He has strong posterior tibial artery signals.  Given worsening of wounds our plan is to take the patient to the operating for repeat left lower extremity angiogram possible intervention.  He does have evidence of significant small vessel disease.  He is at elevated risk for limb loss.  We also discussed some more advanced limb salvage procedure such as deep venous arterialization.  N.p.o. after midnight on Thursday to proceed with angiogram on Friday morning.  May continue aspirin and Plavix perioperatively.

## 2025-04-29 NOTE — ED PROVIDER NOTE - CLINICAL SUMMARY MEDICAL DECISION MAKING FREE TEXT BOX
Patient is a 64-year-old male with a history of peripheral vascular disease, HTN, DM.  Status post right foot transmetatarsal amputation 2 years ago.  Presents to the emergency room with cellulitis of his left leg status post infection of his left foot.  He was sent from the wound care clinic across the street.  He denies any cough fever chills or pain.  He denies any discharge.  He has a wound that is dressed on his left foot that he declines having us examined since he was just changed at the wound care center prior to coming to the emergency room.  He is here for admission.    On evaluation is a well-developed well-nourished male no apparent distress.  HEENT is unremarkable.  No G/F/R.  Neck is supple.  Cardiopulmonary examination is normal.  Abdomen is soft nontender without umbilical hernia.  No guarding or rebound.  Musculoskeletal lamination reveals cellulitis of the left lower extremity below the knee from the mid leg down to the foot.  The foot is dressed in the fresh bandage.  The right foot is status post transmetatarsal amputation without any evidence of infection or bleeding.    Plan of care includes empiric antibiotics, rule out osteo-.  Treat for the cellulitis.  Admit to the hospital.  This chart was made with dictation software and may contain typographical errors.

## 2025-04-29 NOTE — H&P ADULT - ADDITIONAL PE
Noted necrotic ulcers base medial, lateral aspects of foot, non stageable necrotic heel ulcer left, with edema, erythema, cellulitic changes, negative drainage  Noted distal hallux ulcer necrotic base, negative probe to bone.   left lower EXT erythema ,mild swelling

## 2025-04-29 NOTE — CONSULT NOTE ADULT - SUBJECTIVE AND OBJECTIVE BOX
Vascular Attending:  Dr Jones      HPI: 63 y/o M with PMHx DM2, hx osteomyelitis, CAD, PAD s/p RLE angioplasty 2020, s/p surgical amputation of R forefoot  S/P L PT angioplasty 12/2/24 for L lat foot DFU on plavix, HTN who presents to the hospital at instruction of wound care for a L foot wound.   Pt has been following in wound care, receiving HBOT. He did not have follow up with vascular after discharge in December 2024. He reports worsening L heel wound past month and developed L great toe wound. L Lat foot wound remains. He has been noting increase edema and redness past week and bleeding after L great toe debridement at wound care. He denies any fevers or chills. Baseline sensory is diminished.    PAST MEDICAL & SURGICAL HISTORY:  HTN (hypertension)      Diabetes      Hyperlipidemia      PVD (peripheral vascular disease)      Toe osteomyelitis, right      H/O angioplasty  RLE      Status post amputation of toe          REVIEW OF SYSTEMS-as above, otherwise negative    MEDICATIONS  (STANDING):  vancomycin  IVPB. 1000 milliGRAM(s) IV Intermittent once    MEDICATIONS  (PRN):      Allergies  No Known Allergies    SOCIAL HISTORY:  Lives: with wife and son who is in college  ADLs: independent  Alcohol Use: rare occasional  Tobacco Use: denies  Recreational Drug Use: denies      Vital Signs Last 24 Hrs  T(C): 36.7 (29 Apr 2025 11:16), Max: 36.7 (29 Apr 2025 11:16)  T(F): 98.1 (29 Apr 2025 11:16), Max: 98.1 (29 Apr 2025 11:16)  HR: 87 (29 Apr 2025 11:16) (87 - 87)  BP: 107/66 (29 Apr 2025 11:16) (107/66 - 107/66)  BP(mean): --  RR: 16 (29 Apr 2025 11:16) (16 - 16)  SpO2: 100% (29 Apr 2025 11:16) (100% - 100%)    Parameters below as of 29 Apr 2025 11:16  Patient On (Oxygen Delivery Method): room air        PHYSICAL EXAM:  Constitutional: WD M in NAD  Eyes: EOMI  ENMT: WNL  Neck: No JVD  Respiratory: CTA  Cardiovascular: normal S1, S2  Gastrointestinal: soft, ND, NT  Extremities: R TMA well healed. L great toe with gangrenous distal tip and laceration with periwound edema and erythema tracking medially to mid calf. Dorsal L foot with 3x3 cm dry necrotic eschar. L heel with large dry necrotic eschar without any fluctuance. L lat 2x2 cm wound with bony deformity without oozing, erythema noted  Neurological: A&O x 3 BERNAL X 4 =  Pulses:   Right:                                                                            Left:  FEM [x ]2+ [ ]1+ [ ]doppler                                            FEM [x ]2+ [ ]1+ [ ]doppler    POP [x ]2+ [ ]1+ [ ]doppler                                             POP [x ]2+ [ ]1+ [ ]doppler    AT [ ]2+ [ ]1+ [ ]doppler                                                AT [ ]2+ [ ]1+ [x ]doppler  PT[ ]2+ [ ]1+ [ ]doppler                                                 DP [ ]2+ [ ]1+ [x ]doppler                                                                                     PT [ ]2+ [ ]1+ [x ]doppler      LABS:                        11.0   12.23 )-----------( 258      ( 29 Apr 2025 12:45 )             33.5           RADIOLOGY & ADDITIONAL STUDIES    < from: MR Foot No Cont, Left (12.01.24 @ 12:46) >  ACC: [id number] EXAM:  MR FOOT LT   ORDERED BY: BOSSMAN PITTMAN     PROCEDURE DATE:  12/01/2024          INTERPRETATION:  MR FOOT LEFT dated 12/1/2024 12:46 PM    INDICATION: Left foot wound    COMPARISON: Left foot MRI dated 11/23/2024    TECHNIQUE: Multi-sequential, multiplanar MRI imaging of the left midfoot   and forefoot was performed per standard protocol.    FINDINGS:    BONE MARROW: Subchondral edema and small subchondral cysts at the   navicula/middle cuneiform. Mild cartilage loss with subchondral cysts at   the second and third tarsometatarsal joints. Patchy marrow edema within   the first distal phalanx. Moderate patchy edema within the fifth middle   and distal phalanx. Persistent patchy edema at the base of the fifth   metatarsal.Overlying skin wound in this region. Mild patchy marrow edema   again noted within the second and third middle phalanges.  SYNOVIUM/ JOINT FLUID: No joint effusion  TENDONS: Intact tendons. No tenosynovitis.  MUSCLES: Mild atrophy and moderate edema throughout the musculature.   Nonspecific but may reflect neuropathic change.  NEUROVASCULAR STRUCTURES: Preserved  SUBCUTANEOUS SOFT TISSUES: There is soft tissue swelling about the foot.   Suspect a skin wound at the distal first digit. No drainablecollection.   Possible skin wound along the fifth distal digit. Overlying skin wound   adjacent to the fifth metatarsal base.    IMPRESSION:    1.  Skin wound at the great toe with deep soft tissue swelling. No   drainable collection.  2.  Abnormal marrow signal within the first distal phalangeal head.   Correlate for an overlying skin wound which would increase the   probability of osteomyelitis in this region.  3.  Abnormal marrow signal within the fifth middle and distal phalanx as   on prior study raising concern for osteomyelitis.  4.  Patchy marrow edema at the base of the fifth metatarsal. Given the   overlying skin wound, findings concerning for osteomyelitis as on prior   exam.  5.  Patchy marrow edema again noted in the second and third middle   phalanges. Correlate for overlying skin wound in these regions which   would increase the probability of osteomyelitis.  6.  Degenerative changes at the midfoot.    < end of copied text >

## 2025-04-29 NOTE — H&P ADULT - PROBLEM SELECTOR PLAN 2
Cr 1.3 on admission  -Per chart review baseline is 1.0  -Avoid nephrotoxic agents  -C/w IVF?  -Monitor BMP daily Cr 1.3 on admission  -Per chart review baseline is 1.0  -Avoid nephrotoxic agents  -Monitor BMP daily

## 2025-04-29 NOTE — H&P ADULT - NSHPSOCIALHISTORY_GEN_ALL_CORE
Tobacco:   EtOH:   Recreational drug use:  Lives with:  Ambulates:  ADLs:  Occupation:  Vaccinations:  Mammogram/Pap Smear/Colonoscopy: Tobacco: Denies  EtOH: Denies  Recreational drug use: Denies  Lives with: Wife at home   Ambulates: Independently   ADLs: Independent

## 2025-04-29 NOTE — ED PROVIDER NOTE - MUSCULOSKELETAL, MLM
left foot dressing in place cdi right foot amputation nvi left foot dressing in place cdi right foot amputation non palpable DP PT left foot

## 2025-04-29 NOTE — CONSULT NOTE ADULT - SUBJECTIVE AND OBJECTIVE BOX
S : 64y year old Male seen at bedside for left foot ulcer. Pt states he was seen at wound care, sent over for vascular studies    Chief Complaint : Patient is a 64y old  Male who presents with a chief complaint of left foot ulcer  HPI :  Patient was sent in by wound care for an infected diabetic ulcer to the L foot. as per wound cares recommendation patient needs IV abx, blood work, ID consult and vascular consult.    Patient admits to  (-) Fevers, (-) Chills, (-) Nausea, (-) Vomiting, (-) Shortness of Breath      PMH: HTN (hypertension)    Diabetes    Hyperlipidemia    Peripheral vascular disease    PVD (peripheral vascular disease)    Toe osteomyelitis, right      PSH:H/O angioplasty    Status post amputation of toe        Allergies:No Known Allergies      Labs:                          11.0   12.23 )-----------( 258      ( 29 Apr 2025 12:45 )             33.5     WBC Trend  12.23[H] Date (04-29 @ 12:45)      Chem              T(F): 98.1 (04-29-25 @ 11:16), Max: 98.1 (04-29-25 @ 11:16)  HR: 87 (04-29-25 @ 11:16) (87 - 87)  BP: 107/66 (04-29-25 @ 11:16) (107/66 - 107/66)  RR: 16 (04-29-25 @ 11:16) (16 - 16)  SpO2: 100% (04-29-25 @ 11:16) (100% - 100%)  Wt(kg): --    O:   General: Pleasant  male NAD & AOX3.    Integument:  Skin warm, dry and supple bilateral.    Noted necrotic ulcers base medial, lateral aspects of foot, nonstageable necrotic heel ulcer left, with edema, erythema, cellulitic changes, negative drainage  Noted distal hallux ulcer necrotic base, negative probe to bone  Vascular: Dorsalis Pedis and Posterior Tibial pulses non palpable.  Capillary re-fill time greater then 3 seconds digits 1-5 left, TMA right    Neuro: Protective sensation diminished to the level of the digits bilateral.  MSK: Muscle strength 5/5 all major muscle groups bilateral. Noted right foot tma      MRI: 3/26  IMPRESSION:  1. Osteomyelitis of the base of the fifth metatarsal as well as first distal phalanx.  2. Osseous edema is seen within the fifth distal and middle phalanges without significant loss of T1 fatty marrow. Correlation for adjacent ulcer is advised. This could be secondary to early osteomyelitis versus reactive changes. Cortical correlation is advised. HPI:  Pt is a 65 y/o M with PMHx DM2, hx osteomyelitis, CAD, PAD s/p RLE angioplasty 2020, s/p surgical amputation of R forefoot , s/p L PT angioplasty 12/2/24 for L lat foot DFU on plavix, HTN sent in by wound clinic for left foot infections and multiple wounds. Pt states he was at [facility] for hyperbaric therapy today and they noticed his L foot had drainage with increased erythema and edema so they told him to come to the ED. Pt states yesterday his foot was dry, without drainage or swelling. Patient notes he follow with ID outpatient and was prescribed Bactrim 2 weeks ago for the chronic foot wounds without any improvement. Pt denies fever/chills, CP, SOB, HA, dizziness, n/v/d. Pt also denies any current pain in the foot.      ED Course:   Vitals: BP: 107/66 , HR: 87 , Temp: 98.1F , RR: 16 , SpO2: 100 % on RA  Labs:  ESR 53, WBC 12.23, Hgb 11, BUN 25, Glucose 137,   X-ray Foot: Cutaneous defect adjacent to the base of the fifth metatarsal again evident with no gross cortical destruction or soft tissue emphysema. Follow-up MRI can be ordered    Received in the ED:  3.375g Zosyn IVPBx1, Vanco 1g IVPB x1   (29 Apr 2025 15:39)      64y year old Male seen at [facility]"" [facility] for multiple left foot ulcer - left heel, medial and lateral midfoot and distal hallux. Patient is known to the wound care team and was undergoing HBOT but was noticed with increased redness and swelling to the left foot and was recommended to proceed to the ER for IV antibiotics and further evaluation and treatment   Denies any fever, chills, nausea, vomiting, chest pain, shortness of breath, or calf pain at this time.    REVIEW OF SYSTEMS    PAST MEDICAL & SURGICAL HISTORY:  HTN (hypertension)      Diabetes      Hyperlipidemia      PVD (peripheral vascular disease)      Toe osteomyelitis, right      H/O angioplasty  RLE      Status post amputation of toe          Allergies    No Known Allergies    Intolerances      Patient admits to  (-) Fevers, (-) Chills, (-) Nausea, (-) Vomiting, (-) Shortness of Breath      PMH: HTN (hypertension)    Diabetes    Hyperlipidemia    Peripheral vascular disease    PVD (peripheral vascular disease)    Toe osteomyelitis, right      PSH:H/O angioplasty    Status post amputation of toe        Allergies:No Known Allergies      Labs:                          11.0   12.23 )-----------( 258      ( 29 Apr 2025 12:45 )             33.5     WBC Trend  12.23[H] Date (04-29 @ 12:45)      04-29    137  |  105  |  25[H]  ----------------------------<  137[H]  4.9   |  26  |  1.30    Ca    8.5      29 Apr 2025 12:45    TPro  7.0  /  Alb  3.3  /  TBili  0.4  /  DBili  x   /  AST  15  /  ALT  26  /  AlkPhos  78  04-29                          11.0   12.23 )-----------( 258      ( 29 Apr 2025 12:45 )             33.5             T(F): 98.1 (04-29-25 @ 11:16), Max: 98.1 (04-29-25 @ 11:16)  HR: 87 (04-29-25 @ 11:16) (87 - 87)  BP: 107/66 (04-29-25 @ 11:16) (107/66 - 107/66)  RR: 16 (04-29-25 @ 11:16) (16 - 16)  SpO2: 100% (04-29-25 @ 11:16) (100% - 100%)  Wt(kg): --    O:   General: Pleasant  male NAD & AOX3.    Integument:  Skin warm, dry and supple bilateral.    Noted necrotic ulcers base medial, lateral aspects of foot, nonstageable necrotic heel ulcer left, with edema, erythema, cellulitic changes, negative drainage  Noted distal hallux ulcer necrotic base, negative probe to bone  Vascular: Dorsalis Pedis and Posterior Tibial pulses non palpable.  Capillary re-fill time greater then 3 seconds digits 1-5 left, TMA right    Neuro: Protective sensation diminished to the level of the digits bilateral.  MSK: Muscle strength 5/5 all major muscle groups bilateral. Noted right foot tma      MRI: 3/26  IMPRESSION:  1. Osteomyelitis of the base of the fifth metatarsal as well as first distal phalanx.  2. Osseous edema is seen within the fifth distal and middle phalanges without significant loss of T1 fatty marrow. Correlation for adjacent ulcer is advised. This could be secondary to early osteomyelitis versus reactive changes. Cortical correlation is advised.

## 2025-04-29 NOTE — H&P ADULT - ATTENDING COMMENTS
Pt is a 65 y/o M with PMHx of PVD, HTN, diabetes s/p metatarsal amputation sent in by wound clinic for left foot infections and multiple wounds admitted for worsening foot wound  infection, cellulitis left foot& Lower EXT.   Pt seen, examined, case & care plan d/w pt ,residents at detail.  Consult:  ID-Dr XENIA Calhoun -IV ABx   Podiatry-Dr Stark  d/w ABx, MRI left foot  Po diet  MRI Left foot   DVT ppx

## 2025-04-29 NOTE — ED ADULT NURSE NOTE - OBJECTIVE STATEMENT
Patient was sent in by wound care for an infected diabetic ulcer to the  foot. as per wound cares recommendation patient needs IV abx, blood work, ID consult and vascular consult.

## 2025-04-29 NOTE — H&P ADULT - HISTORY OF PRESENT ILLNESS
Pt is a 63 y/o M with PMHx DM2, hx osteomyelitis, CAD, PAD s/p RLE angioplasty 2020, s/p surgical amputation of R forefoot , s/p L PT angioplasty 12/2/24 for L lat foot DFU on plavix, HTN sent in by wound clinic for left foot infections and multiple wounds.  Patient states he has been on Bactrim denies any fever chills but states having drainage and increased redness and streaking.  Patient states he was just seen at wound clinic and has dressing in place which he does not want removed.    Of note pt last admitted to [facility] in December 2024 for L foot wound cellulitis vs OM      ED Course:   Vitals: BP: 107/66 , HR: 87 , Temp: 98.1F , RR: 16 , SpO2: 100 % on RA  Labs:  ESR 53, WBC 12.23, Hgb 11, BUN 25, Glucose 137,   X-ray Foot: Cutaneous defect adjacent to the base of the fifth metatarsal again evident with no gross cortical destruction or soft tissue emphysema. Follow-up MRI can be ordered    Received in the ED:  3.375g Zosyn IVPBx1, Vanco 1g IVPB x1   Pt is a 65 y/o M with PMHx DM2, hx osteomyelitis, CAD, PAD s/p RLE angioplasty 2020, s/p surgical amputation of R forefoot , s/p L PT angioplasty 12/2/24 for L lat foot DFU on plavix, HTN sent in by wound clinic for left foot infections and multiple wounds. Pt states he was at [facility] for hyperbaric therapy today and they noticed his L foot had drainage with increased erythema and edema so they told him to come to the ED. Pt states yesterday his foot was dry, without drainage or swelling. Patient notes he follow with ID outpatient and was prescribed Bactrim 2 weeks ago for the chronic foot wounds without any improvement. Pt denies fever/chills, CP, SOB, HA, dizziness, n/v/d. Pt also denies any current pain in the foot.      ED Course:   Vitals: BP: 107/66 , HR: 87 , Temp: 98.1F , RR: 16 , SpO2: 100 % on RA  Labs:  ESR 53, WBC 12.23, Hgb 11, BUN 25, Glucose 137,   X-ray Foot: Cutaneous defect adjacent to the base of the fifth metatarsal again evident with no gross cortical destruction or soft tissue emphysema. Follow-up MRI can be ordered    Received in the ED:  3.375g Zosyn IVPBx1, Vanco 1g IVPB x1

## 2025-04-29 NOTE — PATIENT PROFILE ADULT - FALL HARM RISK - HARM RISK INTERVENTIONS

## 2025-04-29 NOTE — H&P ADULT - NEUROLOGICAL
details… AAOx 4/normal/cranial nerves II-XII intact/sensation intact/deep reflexes intact/superficial reflexes intact

## 2025-04-29 NOTE — H&P ADULT - ASSESSMENT
Pt is a 63 y/o M with PMHx of PVD, HTN, diabetes s/p metatarsal amputation sent in by wound clinic for left foot infections and multiple wounds admitted for worsening foot infection.  Pt is a 65 y/o M with PMHx of PVD, HTN, diabetes s/p metatarsal amputation sent in by wound clinic for left foot infections and multiple wounds admitted for worsening foot wound  infection, cellulitis left foot& Lower EXT.

## 2025-04-29 NOTE — ED PROVIDER NOTE - OBJECTIVE STATEMENT
Patient is a 64-year-old male with a past medical history of PVD hypertension diabetes status post right metatarsal amputation sent in by wound clinic for left foot infections and multiple wounds.  Patient states he has been on Bactrim denies any fever chills but states having drainage and increased redness and streaking.  Patient states he was just seen at wound clinic and has dressing in place which he does not want removed.

## 2025-04-29 NOTE — H&P ADULT - PROBLEM SELECTOR PLAN 3
Chronic. Pt follows with Vascular outpt. Pt s/p L PT angioplasty 12/2/24 for L lat foot DFU on plavix  -On home ASA and Plavix   -C/w home meds Chronic. Pt follows with Vascular outpt. Pt s/p L PT angioplasty 12/2/24 for L lat foot DFU on Plavix  -On home ASA and Plavix   -C/w home meds.

## 2025-04-29 NOTE — CONSULT NOTE ADULT - ASSESSMENT
[facility]  Chart Reviewed-Full Consult to follow for any immediate concerns please fell free to contact us directly at  [phone] and have us paged or text my cell # [phone]  Laz Lyn MD PhD

## 2025-04-29 NOTE — CONSULT NOTE ADULT - ASSESSMENT
63 y/o M with PMHx DM2, hx osteomyelitis, CAD, PAD s/p RLE angioplasty 2020, s/p surgical amputation of R forefoot  S/P L PT angioplasty 12/2/24 for L lat foot DFU on plavix, HTN who presents to the hospital at instruction of wound care for a L foot wound. Pt has been followed in wound care and has worsening left foot ulcers and increased tissue loss. He has had no vascular followup since his L PT angioplasty. Know to have single vessel runoff and incomplete plantar arch from completion angio 2024. MRI March 2025 concerning for osteo.  Pt with nonpalpable DP/PT  Admit to medicine for IV abx for osteo  Will obtain FRANCISCA/PVR's  Cont ASA and Plavix  Local wound care as per podiatry  Pt at high risk for more proximal amputation   Further recommendations following   Will discuss with Dr Jones

## 2025-04-29 NOTE — H&P ADULT - PROBLEM SELECTOR PLAN 1
Pt with  L foot infection with multiple wounds sent in by [facility]. S/p surgical amputation of R forefoot  s/p L PT angioplasty 12/2/24 for L lat foot DFU on plavix  -Pt c/o drainage from wound, redness and streaking  -Vitals on admission: BP: 107/66 , HR: 87 , Temp: 98.1F , RR: 16 , SpO2: 100 % on RA  -Labs on admission: ESR 53, WBC 12.23  -X-ray Foot: Cutaneous defect adjacent to the base of the fifth metatarsal again evident with no gross cortical destruction or soft tissue emphysema. Follow-up MRI can be ordered  -s/p 3.375g Zosyn IVPBx1, Vanco 1g IVPB x1 in ED  -C/w Zosyn for possible Osteo and Vanco?  -Podiatry consulted, Dr. French   -Vascular surgery consulted  -ID consult, Dr. Lyn, appreciate recs   -F/u FRANCISCA/PVR's  -C/w ASA and plavix  -At high risk for more proximal amputation. Intervention will be based on results of FRANCISCA and PVR's. Possible BKA per podiatry   -NWB to JENNIFER Pt with  L foot infection with multiple wounds sent in by [facility]. S/p surgical amputation of R forefoot  s/p L PT angioplasty 12/2/24 for L lat foot DFU on plavix  -Pt c/o drainage from wound, redness and streaking  -Vitals on admission: BP: 107/66 , HR: 87 , Temp: 98.1F , RR: 16 , SpO2: 100 % on RA  -Labs on admission: ESR 53, WBC 12.23, lactate wnl   -X-ray Foot: Cutaneous defect adjacent to the base of the fifth metatarsal again evident with no gross cortical destruction or soft tissue emphysema. Follow-up MRI can be ordered  -s/p 3.375g Zosyn IVPBx1, Vanco 1g IVPB x1 in ED  -C/w Zosyn for possible Osteo and Vanco?  -Podiatry consulted, Dr. French   -Vascular surgery consulted  -ID consult, Dr. Lyn, appreciate recs   -F/u FRANCISCA/PVR's  -C/w ASA and plavix  -At high risk for more proximal amputation. Intervention will be based on results of FRANCISCA and PVR's. Possible BKA per podiatry   -NWB to LLE  -F/u Blood culture and MR foot Pt with  L foot infection with multiple wounds sent in by [facility]. S/p surgical amputation of R forefoot  s/p L PT angioplasty 12/2/24 for L lat foot DFU on plavix  -Pt c/o drainage from wound, redness and streaking. Concern for Cellulitis vs osteo   -Vitals on admission: BP: 107/66 , HR: 87 , Temp: 98.1F , RR: 16 , SpO2: 100 % on RA  -Labs on admission: ESR 53, WBC 12.23, lactate wnl   -X-ray Foot: Cutaneous defect adjacent to the base of the fifth metatarsal again evident with no gross cortical destruction or soft tissue emphysema. Follow-up MRI can be ordered  -s/p 3.375g Zosyn IVPBx1, Vanco 1g IVPB x1 in ED  -C/w Cefepime and Vanco for possible osteo   -Podiatry consulted, Dr. French   -Vascular surgery consulted  -ID consult, Melany Calhoun, f/u recs   -F/u FRANCISCA/PVR's  -C/w ASA and plavix  -At high risk for more proximal amputation. Intervention will be based on results of FRANCISCA and PVR's. Possible BKA per podiatry   -NWB to LLE  -F/u Blood culture and MR foot  -AM labs Pt with  L foot infection with multiple wounds sent in by [facility]. S/p surgical amputation of R forefoot  s/p L PT angioplasty 12/2/24 for L lat foot DFU on plavix  -Pt c/o drainage from wound, redness and streaking. Concern for Cellulitis vs osteo   -Vitals on admission: BP: 107/66 , HR: 87 , Temp: 98.1F , RR: 16 , SpO2: 100 % on RA  -Labs on admission: ESR 53, WBC 12.23, lactate wnl   -X-ray Foot: Cutaneous defect adjacent to the base of the fifth metatarsal again evident with no gross cortical destruction or soft tissue emphysema. Follow-up MRI can be ordered  -s/p 3.375g Zosyn IVPBx1, Vanco 1g IVPB x1 in ED  -C/w Cefepime and Vanco for possible osteo   -Podiatry consulted, Dr. French   -Vascular surgery consulted  -ID consult, Melany Calhoun, f/u recs   -F/u FRANCISCA/PVR's  -C/w ASA and plavix and Lipitor   -At high risk for more proximal amputation. Intervention will be based on results of FRANCISCA and PVR's. Possible BKA per podiatry   -NWB to LLE  -F/u Blood culture and MR foot  -AM labs Pt with  L foot infection with multiple wounds sent in by [facility]. S/p surgical amputation of R forefoot  s/p L PT angioplasty 12/2/24 for L lat foot DFU on Plavix  -Pt c/o drainage from wound, redness and streaking. Concern for Cellulitis & osteo   -Vitals on admission: BP: 107/66 , HR: 87 , Temp: 98.1F , RR: 16 , SpO2: 100 % on RA  -Labs on admission: ESR 53, WBC 12.23, lactate wnl   -X-ray Foot: Cutaneous defect adjacent to the base of the fifth metatarsal again evident with no gross cortical destruction or soft tissue emphysema. Follow-up MRI left foot can be ordered  -s/p 3.375g Zosyn IVPBx1, Vanco 1g IVPB x1 in ED  -C/w Cefepime 2 gm q 8 hrs  and Vanco 1 gm q 12  hrs for possible osteo /wound infection   -Podiatry consulted, Dr. French D/W -MRI Left foot   -Vascular surgery consulted- Abx, continue  ASA ,Plavix   -ID consult, Melany Calhoun, f/u recs -IV Abx ,  -F/u FRANCISCA/PVR's  -C/w ASA and Plavix and Lipitor   -At high risk for more proximal amputation. Intervention will be based on results of FRANCISCA and PVR's. Possible BKA per podiatry   -NWB to LLE  -F/u Blood culture x 2  and MRI left foot  -AM labs

## 2025-04-29 NOTE — H&P ADULT - SKIN COMMENTS
Left heel wound-serous drainage, Left Big toe wound, Noted necrotic ulcers base medial, lateral aspects of foot, non stageable necrotic heel ulcer left, with edema, erythema, cellulitic changes, negative drainage

## 2025-04-29 NOTE — H&P ADULT - GASTROINTESTINAL
details… normal/soft/nontender/nondistended/normal active bowel sounds normal/soft/nontender/nondistended/normal active bowel sounds/no guarding/no rigidity/no organomegaly/no palpable saira/no masses palpable

## 2025-04-29 NOTE — H&P ADULT - PROBLEM SELECTOR PLAN 5
Chronic  -BP: 107/66 on admission   -On home Metoprolol, Lisinopril  -C/w home meds with hold parameters  -Monitor hemodynamics

## 2025-04-29 NOTE — H&P ADULT - PROBLEM SELECTOR PLAN 4
Chronic  -On home Trulicity, Jardiance and Metformin?  -Hold home oral meds  -Start MDISS with FS QAC/QHS and Lantus?  -Hypoglycemia protocol  -A1c from December 2024 was 8.3  -Pt with neuropathy takes Gabapentin 300mg TID?   -C/w Gabapentin Chronic  -On home Trulicity, Jardiance and Metformin  -Hold home oral meds  -Start MDISS with FS QAC/QHS   -Hypoglycemia protocol  -A1c from December 2024 was 8.3  -F/u A1c  -Pt with neuropathy takes Gabapentin 300mg TID?   -C/w Gabapentin Chronic  -On home Trulicity, Jardiance and Metformin  -Hold home oral meds  -Start MDISS with FS QAC/QHS   -Hypoglycemia protocol  -A1c from December 2024 was 8.3  -F/u A1c  -Pt with neuropathy takes Gabapentin 300mg TID  -C/w Gabapentin

## 2025-04-30 ENCOUNTER — APPOINTMENT (OUTPATIENT)
Dept: HYPERBARIC MEDICINE | Facility: HOSPITAL | Age: 65
End: 2025-04-30

## 2025-04-30 LAB
A1C WITH ESTIMATED AVERAGE GLUCOSE RESULT: 7.8 % — HIGH (ref 4–5.6)
A1C WITH ESTIMATED AVERAGE GLUCOSE RESULT: 7.9 % — HIGH (ref 4–5.6)
ALBUMIN SERPL ELPH-MCNC: 3.2 G/DL — LOW (ref 3.3–5)
ALP SERPL-CCNC: 68 U/L — SIGNIFICANT CHANGE UP (ref 40–120)
ALT FLD-CCNC: 23 U/L — SIGNIFICANT CHANGE UP (ref 12–78)
ANION GAP SERPL CALC-SCNC: 5 MMOL/L — SIGNIFICANT CHANGE UP (ref 5–17)
AST SERPL-CCNC: 11 U/L — LOW (ref 15–37)
BASOPHILS # BLD AUTO: 0.06 K/UL — SIGNIFICANT CHANGE UP (ref 0–0.2)
BASOPHILS NFR BLD AUTO: 0.5 % — SIGNIFICANT CHANGE UP (ref 0–2)
BILIRUB SERPL-MCNC: 0.4 MG/DL — SIGNIFICANT CHANGE UP (ref 0.2–1.2)
BUN SERPL-MCNC: 30 MG/DL — HIGH (ref 7–23)
CALCIUM SERPL-MCNC: 9 MG/DL — SIGNIFICANT CHANGE UP (ref 8.5–10.1)
CHLORIDE SERPL-SCNC: 107 MMOL/L — SIGNIFICANT CHANGE UP (ref 96–108)
CO2 SERPL-SCNC: 24 MMOL/L — SIGNIFICANT CHANGE UP (ref 22–31)
CREAT SERPL-MCNC: 1.1 MG/DL — SIGNIFICANT CHANGE UP (ref 0.5–1.3)
EGFR: 74 ML/MIN/1.73M2 — SIGNIFICANT CHANGE UP
EGFR: 74 ML/MIN/1.73M2 — SIGNIFICANT CHANGE UP
EOSINOPHIL # BLD AUTO: 0.28 K/UL — SIGNIFICANT CHANGE UP (ref 0–0.5)
EOSINOPHIL NFR BLD AUTO: 2.2 % — SIGNIFICANT CHANGE UP (ref 0–6)
ESTIMATED AVERAGE GLUCOSE: 177 MG/DL — HIGH (ref 68–114)
ESTIMATED AVERAGE GLUCOSE: 180 MG/DL — HIGH (ref 68–114)
GLUCOSE BLDC GLUCOMTR-MCNC: 161 MG/DL — HIGH (ref 70–99)
GLUCOSE BLDC GLUCOMTR-MCNC: 164 MG/DL — HIGH (ref 70–99)
GLUCOSE BLDC GLUCOMTR-MCNC: 216 MG/DL — HIGH (ref 70–99)
GLUCOSE BLDC GLUCOMTR-MCNC: 228 MG/DL — HIGH (ref 70–99)
GLUCOSE SERPL-MCNC: 149 MG/DL — HIGH (ref 70–99)
HCT VFR BLD CALC: 34.1 % — LOW (ref 39–50)
HGB BLD-MCNC: 11.2 G/DL — LOW (ref 13–17)
IMM GRANULOCYTES NFR BLD AUTO: 0.7 % — SIGNIFICANT CHANGE UP (ref 0–0.9)
LYMPHOCYTES # BLD AUTO: 1.11 K/UL — SIGNIFICANT CHANGE UP (ref 1–3.3)
LYMPHOCYTES # BLD AUTO: 8.9 % — LOW (ref 13–44)
MCHC RBC-ENTMCNC: 30 PG — SIGNIFICANT CHANGE UP (ref 27–34)
MCHC RBC-ENTMCNC: 32.8 G/DL — SIGNIFICANT CHANGE UP (ref 32–36)
MCV RBC AUTO: 91.4 FL — SIGNIFICANT CHANGE UP (ref 80–100)
MONOCYTES # BLD AUTO: 1.12 K/UL — HIGH (ref 0–0.9)
MONOCYTES NFR BLD AUTO: 9 % — SIGNIFICANT CHANGE UP (ref 2–14)
NEUTROPHILS # BLD AUTO: 9.85 K/UL — HIGH (ref 1.8–7.4)
NEUTROPHILS NFR BLD AUTO: 78.7 % — HIGH (ref 43–77)
NRBC BLD AUTO-RTO: 0 /100 WBCS — SIGNIFICANT CHANGE UP (ref 0–0)
PLATELET # BLD AUTO: 260 K/UL — SIGNIFICANT CHANGE UP (ref 150–400)
POTASSIUM SERPL-MCNC: 4.8 MMOL/L — SIGNIFICANT CHANGE UP (ref 3.5–5.3)
POTASSIUM SERPL-SCNC: 4.8 MMOL/L — SIGNIFICANT CHANGE UP (ref 3.5–5.3)
PROT SERPL-MCNC: 7.1 G/DL — SIGNIFICANT CHANGE UP (ref 6–8.3)
RBC # BLD: 3.73 M/UL — LOW (ref 4.2–5.8)
RBC # FLD: 13.8 % — SIGNIFICANT CHANGE UP (ref 10.3–14.5)
SODIUM SERPL-SCNC: 136 MMOL/L — SIGNIFICANT CHANGE UP (ref 135–145)
VANCOMYCIN TROUGH SERPL-MCNC: 7.5 UG/ML — LOW (ref 10–20)
WBC # BLD: 12.51 K/UL — HIGH (ref 3.8–10.5)
WBC # FLD AUTO: 12.51 K/UL — HIGH (ref 3.8–10.5)

## 2025-04-30 PROCEDURE — 99232 SBSQ HOSP IP/OBS MODERATE 35: CPT

## 2025-04-30 RX ORDER — VANCOMYCIN HCL IN 5 % DEXTROSE 1.5G/250ML
1000 PLASTIC BAG, INJECTION (ML) INTRAVENOUS EVERY 12 HOURS
Refills: 0 | Status: DISCONTINUED | OUTPATIENT
Start: 2025-04-30 | End: 2025-05-01

## 2025-04-30 RX ORDER — ACETAMINOPHEN 500 MG/5ML
650 LIQUID (ML) ORAL ONCE
Refills: 0 | Status: COMPLETED | OUTPATIENT
Start: 2025-04-30 | End: 2025-04-30

## 2025-04-30 RX ADMIN — CEFEPIME 100 MILLIGRAM(S): 2 INJECTION, POWDER, FOR SOLUTION INTRAVENOUS at 14:06

## 2025-04-30 RX ADMIN — METOPROLOL SUCCINATE 50 MILLIGRAM(S): 50 TABLET, EXTENDED RELEASE ORAL at 06:27

## 2025-04-30 RX ADMIN — ATORVASTATIN CALCIUM 40 MILLIGRAM(S): 80 TABLET, FILM COATED ORAL at 21:50

## 2025-04-30 RX ADMIN — GABAPENTIN 300 MILLIGRAM(S): 400 CAPSULE ORAL at 14:06

## 2025-04-30 RX ADMIN — INSULIN LISPRO 2: 100 INJECTION, SOLUTION INTRAVENOUS; SUBCUTANEOUS at 08:16

## 2025-04-30 RX ADMIN — Medication 81 MILLIGRAM(S): at 11:30

## 2025-04-30 RX ADMIN — CEFEPIME 100 MILLIGRAM(S): 2 INJECTION, POWDER, FOR SOLUTION INTRAVENOUS at 06:29

## 2025-04-30 RX ADMIN — Medication 250 MILLIGRAM(S): at 17:28

## 2025-04-30 RX ADMIN — CLOPIDOGREL BISULFATE 75 MILLIGRAM(S): 75 TABLET, FILM COATED ORAL at 11:30

## 2025-04-30 RX ADMIN — CEFEPIME 100 MILLIGRAM(S): 2 INJECTION, POWDER, FOR SOLUTION INTRAVENOUS at 21:51

## 2025-04-30 RX ADMIN — GABAPENTIN 300 MILLIGRAM(S): 400 CAPSULE ORAL at 21:51

## 2025-04-30 RX ADMIN — LISINOPRIL 40 MILLIGRAM(S): 5 TABLET ORAL at 06:27

## 2025-04-30 RX ADMIN — ENOXAPARIN SODIUM 40 MILLIGRAM(S): 100 INJECTION SUBCUTANEOUS at 21:51

## 2025-04-30 RX ADMIN — INSULIN LISPRO 2: 100 INJECTION, SOLUTION INTRAVENOUS; SUBCUTANEOUS at 17:28

## 2025-04-30 RX ADMIN — INSULIN LISPRO 4: 100 INJECTION, SOLUTION INTRAVENOUS; SUBCUTANEOUS at 12:31

## 2025-04-30 RX ADMIN — Medication 250 MILLIGRAM(S): at 02:07

## 2025-04-30 RX ADMIN — Medication 650 MILLIGRAM(S): at 22:15

## 2025-04-30 RX ADMIN — Medication 650 MILLIGRAM(S): at 22:45

## 2025-04-30 RX ADMIN — GABAPENTIN 300 MILLIGRAM(S): 400 CAPSULE ORAL at 06:28

## 2025-04-30 NOTE — CARE COORDINATION ASSESSMENT. - NSCAREPROVIDERS_GEN_ALL_CORE_FT
CARE PROVIDERS:  Accepting Physician: Hira Calhoun  Admitting: Hira Calhoun  Attending: Hira Calhoun  Case Management: Jacki Mchugh  Consultant: Randi French  Consultant: Joe Stark  Consultant: Laz Lyn  Consultant: Rosemary Diane  Consultant: Corey Gaines  Consultant: Giovani Lopez  Consultant: Toney Camacho  Consultant: Khan, Fahrina  Consultant: Winnie Jones  Consultant: Eri Daniels  Covering Team: Anna Burnett  ED ACP: Raul Case  ED Attending: Oskar Quintanilla  ED Nurse: Slime Joyce  Nurse: Ana Maria Bernardo  Nurse: Kristofer Mata  Override: Carmen Maher  Override: Morena Boss  PCA/Nursing Assistant: Bere Cox  PCA/Nursing Assistant: Maylin Hung  Physical Therapy: Brynn Grady  Primary Team: Raul Case  Primary Team: Irving Miles  Primary Team: Fely Meadows  Primary Team: Marilia Rondon  Registered Dietitian: Krista Munson

## 2025-04-30 NOTE — CARE COORDINATION ASSESSMENT. - NSPASTMEDSURGHISTORY_GEN_ALL_CORE_FT
PAST MEDICAL & SURGICAL HISTORY:  Hyperlipidemia      Diabetes      HTN (hypertension)      H/O angioplasty  RLE      PVD (peripheral vascular disease)      Status post amputation of toe      Toe osteomyelitis, right

## 2025-04-30 NOTE — PROGRESS NOTE ADULT - PROBLEM SELECTOR PLAN 5
Chronic  -On home Metoprolol, Lisinopril  -C/w home meds with hold parameters  -Monitor hemodynamics

## 2025-04-30 NOTE — PROGRESS NOTE ADULT - PROBLEM SELECTOR PLAN 1
Pt with  L foot infection with multiple wounds sent in by [facility]. S/p surgical amputation of R forefoot  s/p L PT angioplasty 12/2/24 for L lat foot DFU on Plavix  -Pt c/o drainage from wound, redness and streaking. Concern for Cellulitis & osteo   -MRI with soft tissue wound along the 5th metatarsal base with cortical erosive change and marrow edema concerning for osteomyelitis. Soft tissue wound along the posterolateral calcaneal tuberosity with curvilinear marrow hyperemia but no definite osteomyelitis.  -C/w Cefepime 2 gm q 8 hrs  and Vanco 1 gm q 12  hrs for osteo  - F/u BCx  - NWB LLE  -At high risk for more proximal amputation. Intervention will be based on results of FRANCISCA and PVR's. Possible BKA per podiatry   -C/w ASA and Plavix and Lipitor   -Podiatry consulted, Dr. French  -Vascular surgery consulted- ASA ,Plavix, plan for LLE angio 5/2  -ID consult, Melany Calhoun, f/u recs -IV Abx

## 2025-04-30 NOTE — PROGRESS NOTE ADULT - ASSESSMENT
Pt is a 65 y/o M with PMHx of PVD, HTN, diabetes s/p metatarsal amputation sent in by wound clinic for left foot infections and multiple wounds admitted for worsening foot wound  infection, cellulitis left foot& Lower EXT.

## 2025-04-30 NOTE — PROGRESS NOTE ADULT - ASSESSMENT
63 y/o M with PMHx DM2, hx osteomyelitis, CAD, PAD s/p RLE angioplasty 2020, s/p surgical amputation of R forefoot  S/P L PT angioplasty 12/2/24 for L lat foot DFU on plavix, HTN who presents to the hospital at instruction of wound care for a L foot wound. Pt has been followed in wound care and has worsening left foot ulcers and increased tissue loss. He has had no vascular followup since his L PT angioplasty. Know to have single vessel runoff and incomplete plantar arch from completion angio 2024. MRI March 2025 concerning for osteo.  Pt with nonpalpable DP/PT  Admitted to medicine for IV abx for osteo  FRANCISCA/PVR's reviewed  Will need LLE angio for further evaluation scheduled for Friday am  Cardiac and medical optimization requested  Cont ASA and Plavix  Cont abx per ID  Local wound care as per podiatry  Discussed with Dr Jones

## 2025-04-30 NOTE — CARE COORDINATION ASSESSMENT. - OTHER PERTINENT DISCHARGE PLANNING INFORMATION:
Met patient at bedside.  Explained role of CM, verbalized understanding. Pt was made aware a CM will remain available through hospitalization.  Contact information given in discharge/ transitions resource folder. Patient reports he lives w/wife, has 12 stairs to negotiate at home, uses a cane, works full time and is independent with activities of daily living.

## 2025-04-30 NOTE — PROGRESS NOTE ADULT - PROBLEM SELECTOR PLAN 4
Chronic  -On home Trulicity, Jardiance and Metformin  -Hold home oral meds  -Start MDISS with FS QAC/QHS   -Hypoglycemia protocol  -A1c 7.9  -Pt with neuropathy takes Gabapentin 300mg TID  -C/w Gabapentin

## 2025-04-30 NOTE — CONSULT NOTE ADULT - SUBJECTIVE AND OBJECTIVE BOX
[facility]  OFELIA Harvey S. Shah, Y. Patel, G. [facility]  [phone]    LOIDA QUEZADA  64y, Male  [id number]    HPI--  HPI:  Pt is a 65 y/o M with PMHx DM2, hx osteomyelitis, CAD, PAD s/p RLE angioplasty 2020, s/p surgical amputation of R forefoot , s/p L PT angioplasty 12/2/24 for L lat foot DFU on plavix, HTN sent in by wound clinic for left foot infections and multiple wounds. Pt states he was at [facility] for hyperbaric therapy today and they noticed his L foot had drainage with increased erythema and edema so they told him to come to the ED. Pt states yesterday his foot was dry, without drainage or swelling. Patient notes he follow with ID outpatient and was prescribed Bactrim 2 weeks ago for the chronic foot wounds without any improvement. Pt denies fever/chills, CP, SOB, HA, dizziness, n/v/d. Pt also denies any current pain in the foot.      ED Course:   Vitals: BP: 107/66 , HR: 87 , Temp: 98.1F , RR: 16 , SpO2: 100 % on RA  Labs:  ESR 53, WBC 12.23, Hgb 11, BUN 25, Glucose 137,   X-ray Foot: Cutaneous defect adjacent to the base of the fifth metatarsal again evident with no gross cortical destruction or soft tissue emphysema. Follow-up MRI can be ordered    Received in the ED:  3.375g Zosyn IVPBx1, Vanco 1g IVPB x1   (29 Apr 2025 15:39)        Active Medications--  aspirin enteric coated 81 milliGRAM(s) Oral daily  atorvastatin 40 milliGRAM(s) Oral at bedtime  cefepime   IVPB 2000 milliGRAM(s) IV Intermittent every 8 hours  cefepime   IVPB      clopidogrel Tablet 75 milliGRAM(s) Oral daily  dextrose 5%. 1000 milliLiter(s) IV Continuous <Continuous>  dextrose 5%. 1000 milliLiter(s) IV Continuous <Continuous>  dextrose 50% Injectable 25 Gram(s) IV Push once  dextrose 50% Injectable 12.5 Gram(s) IV Push once  dextrose 50% Injectable 25 Gram(s) IV Push once  dextrose Oral Gel 15 Gram(s) Oral once PRN  enoxaparin Injectable 40 milliGRAM(s) SubCutaneous every 24 hours  gabapentin 300 milliGRAM(s) Oral three times a day  glucagon  Injectable 1 milliGRAM(s) IntraMuscular once  insulin lispro (ADMELOG) corrective regimen sliding scale   SubCutaneous three times a day before meals  insulin lispro (ADMELOG) corrective regimen sliding scale   SubCutaneous at bedtime  lisinopril 40 milliGRAM(s) Oral daily  metoprolol succinate ER 50 milliGRAM(s) Oral daily  vancomycin  IVPB 1000 milliGRAM(s) IV Intermittent every 12 hours    Antimicrobials:   cefepime   IVPB 2000 milliGRAM(s) IV Intermittent every 8 hours  cefepime   IVPB      vancomycin  IVPB 1000 milliGRAM(s) IV Intermittent every 12 hours    Immunologic:     ROS:  CONSTITUTIONAL: No fevers or chills. No weakness or headache. No weight changes.  EYES/ENT: No visual or hearing changes. No sore throat or throat pain .  NECK: No pain or stiffness  RESPIRATORY: No cough, wheezing, or hemoptysis. No shortness of breath  CARDIOVASCULAR: No chest pain or palpitations  GASTROINTESTINAL: No abdominal pain. No nausea or vomiting. No diarrhea or constipation.  GENITOURINARY: No dysuria, frequency or hematuria  NEUROLOGICAL: No numbness or weakness  SKIN: No itching or rashes  PSYCHIATRIC: Pleasant. Appropriate affect    Allergies: No Known Allergies    PMH -- HTN (hypertension)    Diabetes    Hyperlipidemia    Peripheral vascular disease    PVD (peripheral vascular disease)    Toe osteomyelitis, right      PSH -- H/O angioplasty    Status post amputation of toe      FH -- FH: type 2 diabetes      Social History --  EtOH: denies   Tobacco: denies   Drug Use: denies     Travel/Environmental/Occupational History:    Physical Exam--  Vital Signs Last 24 Hrs  T(F): 98.3 (30 Apr 2025 04:46), Max: 98.5 (29 Apr 2025 18:18)  HR: 67 (30 Apr 2025 04:46) (67 - 87)  BP: 117/67 (30 Apr 2025 04:46) (107/66 - 144/74)  RR: 19 (30 Apr 2025 04:46) (16 - 19)  SpO2: 97% (30 Apr 2025 04:46) (96% - 100%)  General: nontoxic-appearing, no acute distress  HEENT: NC/AT, EOMI  Lungs:   Heart: Regular rate and rhythm.   Abdomen: Soft. Nondistended. Nontender.   Extremities: No cyanosis or clubbing. No edema.   Skin: Warm. Dry. Good turgor. No rash.     Laboratory & Imaging Data:  CBC:                       11.2   12.51 )-----------( 260      ( 30 Apr 2025 07:45 )             34.1     CMP: 04-30    136  |  107  |  30[H]  ----------------------------<  149[H]  4.8   |  24  |  1.10    Ca    9.0      30 Apr 2025 07:45    TPro  7.1  /  Alb  3.2[L]  /  TBili  0.4  /  DBili  x   /  AST  11[L]  /  ALT  23  /  AlkPhos  68  04-30    LIVER FUNCTIONS - ( 30 Apr 2025 07:45 )  Alb: 3.2 g/dL / Pro: 7.1 g/dL / ALK PHOS: 68 U/L / ALT: 23 U/L / AST: 11 U/L / GGT: x           Urinalysis Basic - ( 30 Apr 2025 07:45 )    Color: x / Appearance: x / SG: x / pH: x  Gluc: 149 mg/dL / Ketone: x  / Bili: x / Urobili: x   Blood: x / Protein: x / Nitrite: x   Leuk Esterase: x / RBC: x / WBC x   Sq Epi: x / Non Sq Epi: x / Bacteria: x        Microbiology: reviewed        Radiology: reviewed     [facility]  OFELIA Harvey S. Shah, Y. Patel, G. [facility]  [phone]    LOIDA QUEZADA  64y, Male  [id number]    HPI--  HPI:  Pt is a 63 y/o M with PMHx DM2, hx osteomyelitis, CAD, PAD s/p RLE angioplasty 2020, s/p surgical amputation of R forefoot , s/p L PT angioplasty 12/2/24 for L lat foot DFU on plavix, HTN sent in by wound clinic for left foot infections and multiple wounds. Pt states he was at [facility] for hyperbaric therapy today and they noticed his L foot had drainage with increased erythema and edema so they told him to come to the ED. Pt states yesterday his foot was dry, without drainage or swelling. Patient notes he follow with ID outpatient and was prescribed Bactrim 2 weeks ago for the chronic foot wounds without any improvement. Pt denies fever/chills, CP, SOB, HA, dizziness, n/v/d. Pt also denies any current pain in the foot.      ED Course:   Vitals: BP: 107/66 , HR: 87 , Temp: 98.1F , RR: 16 , SpO2: 100 % on RA  Labs:  ESR 53, WBC 12.23, Hgb 11, BUN 25, Glucose 137,   X-ray Foot: Cutaneous defect adjacent to the base of the fifth metatarsal again evident with no gross cortical destruction or soft tissue emphysema. Follow-up MRI can be ordered    Received in the ED:  3.375g Zosyn IVPBx1, Vanco 1g IVPB x1   (29 Apr 2025 15:39)        Active Medications--  aspirin enteric coated 81 milliGRAM(s) Oral daily  atorvastatin 40 milliGRAM(s) Oral at bedtime  cefepime   IVPB 2000 milliGRAM(s) IV Intermittent every 8 hours  cefepime   IVPB      clopidogrel Tablet 75 milliGRAM(s) Oral daily  dextrose 5%. 1000 milliLiter(s) IV Continuous <Continuous>  dextrose 5%. 1000 milliLiter(s) IV Continuous <Continuous>  dextrose 50% Injectable 25 Gram(s) IV Push once  dextrose 50% Injectable 12.5 Gram(s) IV Push once  dextrose 50% Injectable 25 Gram(s) IV Push once  dextrose Oral Gel 15 Gram(s) Oral once PRN  enoxaparin Injectable 40 milliGRAM(s) SubCutaneous every 24 hours  gabapentin 300 milliGRAM(s) Oral three times a day  glucagon  Injectable 1 milliGRAM(s) IntraMuscular once  insulin lispro (ADMELOG) corrective regimen sliding scale   SubCutaneous three times a day before meals  insulin lispro (ADMELOG) corrective regimen sliding scale   SubCutaneous at bedtime  lisinopril 40 milliGRAM(s) Oral daily  metoprolol succinate ER 50 milliGRAM(s) Oral daily  vancomycin  IVPB 1000 milliGRAM(s) IV Intermittent every 12 hours    Antimicrobials:   cefepime   IVPB 2000 milliGRAM(s) IV Intermittent every 8 hours  cefepime   IVPB      vancomycin  IVPB 1000 milliGRAM(s) IV Intermittent every 12 hours    Immunologic:     ROS:  CONSTITUTIONAL: No fevers or chills. No weakness or headache. No weight changes.  EYES/ENT: No visual or hearing changes. No sore throat or throat pain .  NECK: No pain or stiffness  RESPIRATORY: No cough, wheezing, or hemoptysis. No shortness of breath  CARDIOVASCULAR: No chest pain or palpitations  GASTROINTESTINAL: No abdominal pain. No nausea or vomiting. No diarrhea or constipation.  GENITOURINARY: No dysuria, frequency or hematuria  NEUROLOGICAL: No numbness or weakness  SKIN: No itching or rashes  PSYCHIATRIC: Pleasant. Appropriate affect    Allergies: No Known Allergies    PMH -- HTN (hypertension)    Diabetes    Hyperlipidemia    Peripheral vascular disease    PVD (peripheral vascular disease)    Toe osteomyelitis, right      PSH -- H/O angioplasty    Status post amputation of toe      FH -- FH: type 2 diabetes      Social History --  EtOH: denies   Tobacco: denies   Drug Use: denies     Travel/Environmental/Occupational History:    Physical Exam--  Vital Signs Last 24 Hrs  T(F): 98.3 (30 Apr 2025 04:46), Max: 98.5 (29 Apr 2025 18:18)  HR: 67 (30 Apr 2025 04:46) (67 - 87)  BP: 117/67 (30 Apr 2025 04:46) (107/66 - 144/74)  RR: 19 (30 Apr 2025 04:46) (16 - 19)  SpO2: 97% (30 Apr 2025 04:46) (96% - 100%)  General: nontoxic-appearing, no acute distress  HEENT: NC/AT, EOMI  Lungs: no use resp acc muscles  Heart: Regular rate and rhythm.   Abdomen: Soft. Nondistended. Nontender.   Extremities: No cyanosis or clubbing. No edema.   Skin: Warm. Dry. Good turgor. No rash.     Laboratory & Imaging Data:  CBC:                       11.2   12.51 )-----------( 260      ( 30 Apr 2025 07:45 )             34.1     CMP: 04-30    136  |  107  |  30[H]  ----------------------------<  149[H]  4.8   |  24  |  1.10    Ca    9.0      30 Apr 2025 07:45    TPro  7.1  /  Alb  3.2[L]  /  TBili  0.4  /  DBili  x   /  AST  11[L]  /  ALT  23  /  AlkPhos  68  04-30    LIVER FUNCTIONS - ( 30 Apr 2025 07:45 )  Alb: 3.2 g/dL / Pro: 7.1 g/dL / ALK PHOS: 68 U/L / ALT: 23 U/L / AST: 11 U/L / GGT: x           Urinalysis Basic - ( 30 Apr 2025 07:45 )    Color: x / Appearance: x / SG: x / pH: x  Gluc: 149 mg/dL / Ketone: x  / Bili: x / Urobili: x   Blood: x / Protein: x / Nitrite: x   Leuk Esterase: x / RBC: x / WBC x   Sq Epi: x / Non Sq Epi: x / Bacteria: x        Microbiology: reviewed        Radiology: reviewed    < from: US Duplex Venous Lower Ext Complete, Bilateral (04.29.25 @ 19:45) >    ACC: [id number] EXAM:  US DPLX LWR EXT VEINS COMPL BI   ORDERED BY: FEDERICO TAVERA     PROCEDURE DATE:  04/29/2025          INTERPRETATION:  CLINICAL INFORMATION: Leg swelling    COMPARISON: None available.    TECHNIQUE: Duplex sonography of the BILATERAL LOWER extremity veins with   color and spectral Doppler, with and without compression.    FINDINGS:    RIGHT:  Normal compressibility of the RIGHT common femoral, femoral and popliteal   veins.  Doppler examination shows normal spontaneous andphasic flow.  No RIGHT calf vein thrombosis is detected.  3.6 cm right popliteal cyst.  LEFT:  Normal compressibility of the LEFT common femoral, femoral and popliteal   veins.  Doppler examination shows normal spontaneous and phasic flow.  No LEFT calf vein thrombosis is detected.    IMPRESSION:  No evidence of deep venous thrombosis in either lower extremity.    3.6 cm right popliteal cyst.          --- End of Report ---            PATITO SHERMAN MD; Attending Radiologist  This document has been electronically signed. Apr 30 2025  5:28AM    < end of copied text >  < from: MR Bertha Powell, Left (04.29.25 @ 19:14) >    ACC: [id number] EXAM:  MR FOOT LT   ORDERED BY: MALACHI CONROY     PROCEDURE DATE:  04/29/2025          INTERPRETATION:  MR LEFT HINDFOOT/ANKLE    HISTORY:  Heel ulcer and bilateral midfoot ulcer. Cellulitis.    TECHNIQUE:  Multiplanar MRI of the right hindfoot/ankle was performed   without contrast.    COMPARISON:  Left foot x-rays dated 4/29/2025. MRI left forefoot dated   3/26/2025.    FINDINGS:    OSSEOUS STRUCTURES    Fractures:  None.    Marrow:  A small subcortical cyst within the posterior lateral   malleolus. Curvilinear hyperemia of the calcaneal tubercle and body. Soft   tissue wound along the lateral margin of the 5th metatarsal base with   cortical erosive change and marrow edema extending into the proximal 3rd   of the diaphysis. There is faint T1 marrow replacement.    LIGAMENTS    Tibiofibular/Syndesmotic Ligaments:  Intact.    Talofibular/Calcaneofibular Ligaments:  Intact.    Medial Deltoid Ligament Complex:  Intact.    Spring/Subtalar Ligaments:  Intact.    TENDONS    Posterior Tibialis/Medial Flexor Tendons:  Intact.    Peroneal Tendons:  Intact.    Extensor Tendons:  Intact.    ACHILLES TENDON  Intact.    PLANTAR FASCIA  Intact.    JOINTS    Tibiotalar Joint:  Preserved.    Subtalar Joints:  Preserved. Borderline small posterior subtalar joint   effusion.    Intertarsal Joints:  Scattered subchondral reactive changes of the   midfoot.    Tarsometatarsal Joints:  Scattered subchondral reactive changes of the   2nd through 4th tarsometatarsal joints. Intraosseous ganglion of the 2nd   metatarsal proximal metadiaphysis.    SOFT TISSUES    Masses/Cysts:  None.    Musculature:  Mild generalized muscle atrophy with increased T2 signal   suggesting diabetic neuropathy.    Subcutaneous Tissues:  Moderate edema. Soft tissue wound along the   posterolateral calcaneal tuberosity. Soft tissue wound along the lateral   margin of the 5th metatarsal base. No discrete abscess or tracking gas.    NEUROVASCULAR STRUCTURES    Tarsal Tunnel:  Preserved.    IMPRESSION:  1.  Soft tissue wound along the 5th metatarsal base with cortical erosive   change and marrow edema concerning for osteomyelitis.  2.  Soft tissue wound along the posterolateral calcaneal tuberosity with   curvilinear marrow hyperemia but no definite osteomyelitis.    --- End of Report ---            TAN GOMEZ MD; Attending Radiologist  This document has been electronically signed. Apr 29 2025  7:50PM    < end of copied text >  < from: Xray Foot AP + Lateral + Oblique, Left (04.29.25 @ 12:58) >    ACC: [id number] EXAM:  XR FOOT COMP MIN 3 VIEWS LT   ORDERED BY: MYRIAM CID     PROCEDURE DATE:  04/29/2025          INTERPRETATION:  Clinical history: 64-year-old male, wounds.    Three views of the left foot are compared to 12/4/2024 and are correlated   with the MRI of 3/26/2025.    FINDINGS: Mild degenerative change with no fracture, dislocation, gross   cortical destruction or soft tissue emphysema.    A 1.5 cm cutaneous defect adjacent to the base of the fifth metatarsal.    IMPRESSION:    Cutaneous defect adjacent to the base of the fifth metatarsal again   evident with no gross cortical destruction or soft tissue emphysema.   Follow-up MRI can be ordered    --- End of Report ---            TOMAS JONES DO; Attending Radiologist  This document has been electronically signed. Apr 29 2025  3:01PM    < end of copied text >

## 2025-04-30 NOTE — PROGRESS NOTE ADULT - SUBJECTIVE AND OBJECTIVE BOX
PROGRESS NOTE   Patient is a 64y old  Male who presents with a chief complaint of Foot infection (30 Apr 2025 11:03)      HPI:  Pt is a 65 y/o M with PMHx DM2, hx osteomyelitis, CAD, PAD s/p RLE angioplasty 2020, s/p surgical amputation of R forefoot , s/p L PT angioplasty 12/2/24 for L lat foot DFU on plavix, HTN sent in by wound clinic for left foot infections and multiple wounds. Pt states he was at [facility] for hyperbaric therapy today and they noticed his L foot had drainage with increased erythema and edema so they told him to come to the ED. Pt states yesterday his foot was dry, without drainage or swelling. Patient notes he follow with ID outpatient and was prescribed Bactrim 2 weeks ago for the chronic foot wounds without any improvement. Pt denies fever/chills, CP, SOB, HA, dizziness, n/v/d. Pt also denies any current pain in the foot.      ED Course:   Vitals: BP: 107/66 , HR: 87 , Temp: 98.1F , RR: 16 , SpO2: 100 % on RA  Labs:  ESR 53, WBC 12.23, Hgb 11, BUN 25, Glucose 137,   X-ray Foot: Cutaneous defect adjacent to the base of the fifth metatarsal again evident with no gross cortical destruction or soft tissue emphysema. Follow-up MRI can be ordered    Received in the ED:  3.375g Zosyn IVPBx1, Vanco 1g IVPB x1   (29 Apr 2025 15:39)      Vital Signs Last 24 Hrs  T(C): 36.6 (30 Apr 2025 12:18), Max: 36.9 (29 Apr 2025 18:18)  T(F): 97.9 (30 Apr 2025 12:18), Max: 98.5 (29 Apr 2025 18:18)  HR: 76 (30 Apr 2025 12:18) (67 - 84)  BP: 130/68 (30 Apr 2025 12:18) (115/70 - 144/74)  BP(mean): --  RR: 18 (30 Apr 2025 12:18) (18 - 19)  SpO2: 98% (30 Apr 2025 12:18) (96% - 99%)    Parameters below as of 30 Apr 2025 12:18  Patient On (Oxygen Delivery Method): room air                              11.2   12.51 )-----------( 260      ( 30 Apr 2025 07:45 )             34.1               04-30    136  |  107  |  30[H]  ----------------------------<  149[H]  4.8   |  24  |  1.10    Ca    9.0      30 Apr 2025 07:45    TPro  7.1  /  Alb  3.2[L]  /  TBili  0.4  /  DBili  x   /  AST  11[L]  /  ALT  23  /  AlkPhos  68  04-30    O:   General: Pleasant  male NAD & AOX3.    Integument:  Skin warm, dry and supple bilateral.    Noted necrotic ulcers base medial, lateral aspects of foot, nonstageable necrotic heel ulcer left, with edema, reduced erythema, reduced cellulitic changes, negative drainage  Noted distal hallux ulcer necrotic base, negative probe to bone  Vascular: Dorsalis Pedis and Posterior Tibial pulses non palpable.  Capillary re-fill time greater then 3 seconds digits 1-5 left, TMA right    Neuro: Protective sensation diminished to the level of the digits bilateral.  MSK: Muscle strength 5/5 all major muscle groups bilateral. Noted right foot tma      MRI: 3/26  IMPRESSION:  1. Osteomyelitis of the base of the fifth metatarsal as well as first distal phalanx.  2. Osseous edema is seen within the fifth distal and middle phalanges without significant loss of T1 fatty marrow. Correlation for adjacent ulcer is advised. This could be secondary to early osteomyelitis versus reactive changes. Cortical correlation is advised.

## 2025-04-30 NOTE — CONSULT NOTE ADULT - ASSESSMENT
Pt is a 63 y/o M with PMHx of PVD, HTN, diabetes s/p metatarsal amputation sent in by wound clinic for left foot infections and multiple wounds admitted for worsening foot wound  infection, cellulitis left foot& Lower EXT.     C/w vanco and cefepime  Full note to follow    Patient evaluated with face-to-face time in addition to reviewing history, labs, microbiology, and imaging.   Antibiotic stewardship, local antibiogram, infection control strategies and potential transmission issues taken into consideration at time of treatment decision making process.   Thank you for allowing us to participate in the care of your patient.  Infectious Diseases will follow. Please call with any questions.  Melany Calhoun M.D.  Available on [employer] TEAMS -- *[facility]*  [place] [facility] [phone]  For after 5 P.M. and weekends, please call [phone] Pt is a 63 y/o M with PMHx of PVD, HTN, diabetes s/p metatarsal amputation sent in by wound clinic for left foot infections and multiple wounds admitted for worsening foot wound  infection, cellulitis left foot& Lower EXT.     L foot wound infection/cellulitis +/- underlying OM  PAD  DELORIS  - pt p/w multiple L foot wounds  -- pt well known to service; is s/p surgical amputation of R forefoot  s/p L PT angioplasty 12/2/24 for L lat foot DFU on Plavix  - afebrile, leukocytosis to 12. Elevated ESR 53  - X-ray Foot: Cutaneous defect adjacent to the base of the fifth metatarsal again evident with no gross cortical destruction or soft tissue emphysema.   - MR L foot Soft tissue wound along the 5th metatarsal base with cortical erosive change and marrow edema concerning for osteomyelitis.    Recommendations:   Vascular following, for angio 5/1  Podiatry following, pending intervention  C/w vancomycin  C/w cefepime  F/u pending cx  Trend temps/WBC  Monitor renal fxn, renally dose medications  Local wound care  Further recs to follow pending clinical course-At high risk for more proximal amputation. Intervention will be based on results of FRANCISCA and PVR's. Possible BKA per podiatry     Patient evaluated with face-to-face time in addition to reviewing history, labs, microbiology, and imaging.   Antibiotic stewardship, local antibiogram, infection control strategies and potential transmission issues taken into consideration at time of treatment decision making process.   Thank you for allowing us to participate in the care of your patient.  Infectious Diseases will follow. Please call with any questions.  Melany Calhoun M.D.  Available on [employer] TEAMS -- *[facility]*  [place] [facility] [phone]  For after 5 P.M. and weekends, please call [phone]

## 2025-04-30 NOTE — PROGRESS NOTE ADULT - SUBJECTIVE AND OBJECTIVE BOX
Patient is a 64y old  Male who presents with a chief complaint of Foot infection (30 Apr 2025 12:50)    HPI: 65 y/o M with PMHx DM2, hx osteomyelitis, CAD, PAD s/p RLE angioplasty 2020, s/p surgical amputation of R forefoot , s/p L PT angioplasty 12/2/24 for L lat foot DFU on plavix, HTN sent in by wound clinic for left foot infections and multiple wounds. Pt states he was at [facility] for hyperbaric therapy today and they noticed his L foot had drainage with increased erythema and edema so they told him to come to the ED. Pt states yesterday his foot was dry, without drainage or swelling. Patient notes he follow with ID outpatient and was prescribed Bactrim 2 weeks ago for the chronic foot wounds without any improvement. Pt denies fever/chills, CP, SOB, HA, dizziness, n/v/d. Pt also denies any current pain in the foot.      ED Course:   Vitals: BP: 107/66 , HR: 87 , Temp: 98.1F , RR: 16 , SpO2: 100 % on RA  Labs:  ESR 53, WBC 12.23, Hgb 11, BUN 25, Glucose 137,   X-ray Foot: Cutaneous defect adjacent to the base of the fifth metatarsal again evident with no gross cortical destruction or soft tissue emphysema. Follow-up MRI can be ordered    Received in the ED:  3.375g Zosyn IVPBx1, Vanco 1g IVPB x1    INTERVAL HPI:  4/30: Pt seen and examined at bedside this morning, feels well, no acute complaints. On IV abx vanc and cefepime, planning for LLE angio on 5/2    OVERNIGHT EVENTS: None    Home Medications:  aspirin 81 mg oral delayed release tablet: 1 tab(s) orally once a day (29 Apr 2025 16:40)  atorvastatin 40 mg oral tablet: 1 tab(s) orally once a day (29 Apr 2025 16:40)  clopidogrel 75 mg oral tablet: 1 tab(s) orally once a day (29 Apr 2025 16:40)  gabapentin 300 mg oral capsule: 1 cap(s) orally 3 times a day (29 Apr 2025 16:40)  Jardiance 25 mg oral tablet: 1 tab(s) orally once a day (29 Apr 2025 16:40)  lisinopril 40 mg oral tablet: 1 tab(s) orally once a day (29 Apr 2025 16:40)  metFORMIN 1000 mg oral tablet: 1 tab(s) orally 2 times a day (29 Apr 2025 16:40)  metoprolol succinate 50 mg oral tablet, extended release: 1 tab(s) orally once a day (29 Apr 2025 16:40)  Trulicity Pen 1.5 mg/0.5 mL subcutaneous solution: 1.5 milligram(s) subcutaneously once a week (29 Apr 2025 16:40)      MEDICATIONS  (STANDING):  aspirin enteric coated 81 milliGRAM(s) Oral daily  atorvastatin 40 milliGRAM(s) Oral at bedtime  cefepime   IVPB 2000 milliGRAM(s) IV Intermittent every 8 hours  cefepime   IVPB      clopidogrel Tablet 75 milliGRAM(s) Oral daily  dextrose 5%. 1000 milliLiter(s) (50 mL/Hr) IV Continuous <Continuous>  dextrose 5%. 1000 milliLiter(s) (100 mL/Hr) IV Continuous <Continuous>  dextrose 50% Injectable 25 Gram(s) IV Push once  dextrose 50% Injectable 12.5 Gram(s) IV Push once  dextrose 50% Injectable 25 Gram(s) IV Push once  enoxaparin Injectable 40 milliGRAM(s) SubCutaneous every 24 hours  gabapentin 300 milliGRAM(s) Oral three times a day  glucagon  Injectable 1 milliGRAM(s) IntraMuscular once  insulin lispro (ADMELOG) corrective regimen sliding scale   SubCutaneous three times a day before meals  insulin lispro (ADMELOG) corrective regimen sliding scale   SubCutaneous at bedtime  lisinopril 40 milliGRAM(s) Oral daily  metoprolol succinate ER 50 milliGRAM(s) Oral daily  vancomycin  IVPB 1000 milliGRAM(s) IV Intermittent every 12 hours    MEDICATIONS  (PRN):  dextrose Oral Gel 15 Gram(s) Oral once PRN Blood Glucose LESS THAN 70 milliGRAM(s)/deciliter      No Known Allergies      Social History:  Tobacco: Denies  EtOH: Denies  Recreational drug use: Denies  Lives with: Wife at home   Ambulates: Independently   ADLs: Independent (29 Apr 2025 15:39)      REVIEW OF SYSTEMS:  CONSTITUTIONAL: No fever, No chills, No fatigue, No myalgia, No Body ache, No Weakness  EYES: No eye pain,  No visual disturbances, No discharge, No Redness  ENMT: No ear pain, No nose bleed, No vertigo; No sinus pain, No throat pain, No Congestion  NECK: No pain, No stiffness  RESPIRATORY: No cough, No wheezing, No hemoptysis, No shortness of breath  CARDIOVASCULAR: No chest pain, No palpitations  GASTROINTESTINAL: No abdominal pain, No epigastric pain. No nausea, No vomiting, No diarrhea, No constipation; [  ] BM  GENITOURINARY: No dysuria, No frequency, No urgency, No hematuria, No incontinence  NEUROLOGICAL: No headaches, No dizziness, No numbness, No tingling, No tremors, No weakness  EXTREMITIES: No Swelling, No Pain, No Edema  SKIN: [ x ] No itching, burning, rashes, or lesions   MUSCULOSKELETAL: No joint pain, No joint swelling; No muscle pain, No back pain, No extremity pain  PSYCHIATRIC: No depression, No anxiety, No mood swings, No difficulty sleeping at night  PAIN SCALE: [x  ] None  [  ] Other-  ROS Unable to obtain due to: [  ] Dementia  [  ] Lethargy  [  ] Sedated  [  ] Non verbal  REST OF REVIEW OF SYSTEMS: [  ] Normal     Vital Signs Last 24 Hrs  T(C): 36.6 (30 Apr 2025 12:18), Max: 36.9 (29 Apr 2025 18:18)  T(F): 97.9 (30 Apr 2025 12:18), Max: 98.5 (29 Apr 2025 18:18)  HR: 76 (30 Apr 2025 12:18) (67 - 84)  BP: 130/68 (30 Apr 2025 12:18) (115/70 - 144/74)  BP(mean): --  RR: 18 (30 Apr 2025 12:18) (18 - 19)  SpO2: 98% (30 Apr 2025 12:18) (96% - 99%)    Parameters below as of 30 Apr 2025 12:18  Patient On (Oxygen Delivery Method): room air        CAPILLARY BLOOD GLUCOSE      POCT Blood Glucose.: 228 mg/dL (30 Apr 2025 11:44)  POCT Blood Glucose.: 164 mg/dL (30 Apr 2025 07:50)  POCT Blood Glucose.: 171 mg/dL (29 Apr 2025 21:32)  POCT Blood Glucose.: 134 mg/dL (29 Apr 2025 19:50)      I&O's Summary    29 Apr 2025 07:01  -  30 Apr 2025 07:00  --------------------------------------------------------  IN: 350 mL / OUT: 0 mL / NET: 350 mL      PHYSICAL EXAM:  GENERAL:  [x  ] NAD, [x  ] Well appearing, [  ] Agitated, [  ] Drowsy, [  ] Lethargy, [  ] Confused   HEAD:  [x  ] Normal, [  ] Other  EYES:  [x  ] EOMI, [ x ] PERRL, [ x ] Conjunctiva and sclera clear normal, [  ] Other, [  ] Pallor, [  ] Discharge  ENMT:  [ x ] Normal, [ x ] Moist mucous membranes, [ x ] Good dentition, [  ] No thrush  NECK:  [x  ] Supple, [  x] No JVD, [  ] Normal thyroid, [  ] Lymphadenopathy, [  ] Other  CHEST/LUNG:  [ x ] Clear to auscultation bilaterally, [ x ] Breath Sounds equal B/L / decreased, [  ] Poor effort, [ x ] No rales, [x  ] No rhonchi, [ x ] No wheezing  HEART:  [ x ] Regular rate and rhythm, [  ] Tachycardia, [  ] Bradycardia, [  ] Irregular, [x  ] No murmurs, No rubs, No gallops, [  ] PPM in place (Mfr:  )  ABDOMEN:  [ x ] Soft, [ x ] Nontender, [x  ] Nondistended, [ x ] No mass, [x  ] Bowel sounds present, [  ] Obese  NERVOUS SYSTEM:  [ x ] Alert & Oriented x3, [ x ] Nonfocal, [  ] Confusion, [  ] Encephalopathic, [  ] Sedated, [  ] Unable to assess, [  ] Dementia, [  ] Other-  EXTREMITIES:  [ x ] 2+ Peripheral Pulses, No clubbing, No cyanosis,  [  ] Edema B/L lower EXT, [  ] PVD stasis skin changes B/L lower EXT, [x  ] Wound - left foot in dressing c/d/i  LYMPH:  No lymphadenopathy noted  SKIN:  [ x] No rashes or lesions, [  ] Pressure ulcers, [  ] Ecchymosis, [  ] Skin tears, [  ] Other    DIET: Diet, Consistent Carbohydrate w/Evening Snack:   Low Sodium (04-29-25 @ 15:41)      LABS:                        11.2   12.51 )-----------( 260      ( 30 Apr 2025 07:45 )             34.1     30 Apr 2025 07:45    136    |  107    |  30     ----------------------------<  149    4.8     |  24     |  1.10     Ca    9.0        30 Apr 2025 07:45    TPro  7.1    /  Alb  3.2    /  TBili  0.4    /  DBili  x      /  AST  11     /  ALT  23     /  AlkPhos  68     30 Apr 2025 07:45    PT/INR - ( 29 Apr 2025 12:45 )   PT: 12.2 sec;   INR: 1.04 ratio           Urinalysis Basic - ( 30 Apr 2025 07:45 )    Color: x / Appearance: x / SG: x / pH: x  Gluc: 149 mg/dL / Ketone: x  / Bili: x / Urobili: x   Blood: x / Protein: x / Nitrite: x   Leuk Esterase: x / RBC: x / WBC x   Sq Epi: x / Non Sq Epi: x / Bacteria: x        RADIOLOGY & ADDITIONAL TESTS:      HEALTH ISSUES - PROBLEM Dx:  Type 2 diabetes mellitus    HTN (hypertension)    DELORIS (acute kidney injury)    Need for prophylactic measure    Infection of left foot    PAD (peripheral artery disease)    Diabetic ulcer of left foot    Osteomyelitis of left foot    Unstageable pressure ulcer of left foot          Consultant(s) Notes Reviewed:  [  ] YES     Care Discussed with [ x ] Consultants, [  ] Patient, [  ] Family, [  ] HCP, [  ] , [  ] Social Service, [  ] RN, [  ] Physical Therapy, [  ] Palliative Care Team  DVT PPX: [  ] Lovenox, [  ] SC Heparin, [  ] Coumadin, [  ] Xarelto, [  ] Eliquis, [  ] Pradaxa, [  ] IV Heparin drip, [  ] SCD, [  ] Ambulation, [  ] Contraindicated 2/2 GI Bleed, [  ] Contraindicated 2/2  Bleed, [  ] Contraindicated 2/2 Brain Bleed  Advanced Directive: [  ] None, [  ] DNR/DNI Patient is a 64y old  Male who presents with a chief complaint of Foot infection (30 Apr 2025 12:50)    HPI: 65 y/o M with PMHx DM2, hx osteomyelitis, CAD, PAD s/p RLE angioplasty 2020, s/p surgical amputation of R forefoot , s/p L PT angioplasty 12/2/24 for L lat foot DFU on plavix, HTN sent in by wound clinic for left foot infections and multiple wounds. Pt states he was at [facility] for hyperbaric therapy today and they noticed his L foot had drainage with increased erythema and edema so they told him to come to the ED. Pt states yesterday his foot was dry, without drainage or swelling. Patient notes he follow with ID outpatient and was prescribed Bactrim 2 weeks ago for the chronic foot wounds without any improvement. Pt denies fever/chills, CP, SOB, HA, dizziness, n/v/d. Pt also denies any current pain in the foot.      ED Course:   Vitals: BP: 107/66 , HR: 87 , Temp: 98.1F , RR: 16 , SpO2: 100 % on RA  Labs:  ESR 53, WBC 12.23, Hgb 11, BUN 25, Glucose 137,   X-ray Foot: Cutaneous defect adjacent to the base of the fifth metatarsal again evident with no gross cortical destruction or soft tissue emphysema. Follow-up MRI can be ordered    Received in the ED:  3.375g Zosyn IVPBx1, Vanco 1g IVPB x1    INTERVAL HPI:  4/30: Pt seen and examined at bedside this morning, feels well, no acute complaints. On IV abx vanc and cefepime, planning for LLE angio on 5/2    OVERNIGHT EVENTS: None    Home Medications:  aspirin 81 mg oral delayed release tablet: 1 tab(s) orally once a day (29 Apr 2025 16:40)  atorvastatin 40 mg oral tablet: 1 tab(s) orally once a day (29 Apr 2025 16:40)  clopidogrel 75 mg oral tablet: 1 tab(s) orally once a day (29 Apr 2025 16:40)  gabapentin 300 mg oral capsule: 1 cap(s) orally 3 times a day (29 Apr 2025 16:40)  Jardiance 25 mg oral tablet: 1 tab(s) orally once a day (29 Apr 2025 16:40)  lisinopril 40 mg oral tablet: 1 tab(s) orally once a day (29 Apr 2025 16:40)  metFORMIN 1000 mg oral tablet: 1 tab(s) orally 2 times a day (29 Apr 2025 16:40)  metoprolol succinate 50 mg oral tablet, extended release: 1 tab(s) orally once a day (29 Apr 2025 16:40)  Trulicity Pen 1.5 mg/0.5 mL subcutaneous solution: 1.5 milligram(s) subcutaneously once a week (29 Apr 2025 16:40)      MEDICATIONS  (STANDING):  aspirin enteric coated 81 milliGRAM(s) Oral daily  atorvastatin 40 milliGRAM(s) Oral at bedtime  cefepime   IVPB 2000 milliGRAM(s) IV Intermittent every 8 hours  cefepime   IVPB      clopidogrel Tablet 75 milliGRAM(s) Oral daily  dextrose 5%. 1000 milliLiter(s) (50 mL/Hr) IV Continuous <Continuous>  dextrose 5%. 1000 milliLiter(s) (100 mL/Hr) IV Continuous <Continuous>  dextrose 50% Injectable 25 Gram(s) IV Push once  dextrose 50% Injectable 12.5 Gram(s) IV Push once  dextrose 50% Injectable 25 Gram(s) IV Push once  enoxaparin Injectable 40 milliGRAM(s) SubCutaneous every 24 hours  gabapentin 300 milliGRAM(s) Oral three times a day  glucagon  Injectable 1 milliGRAM(s) IntraMuscular once  insulin lispro (ADMELOG) corrective regimen sliding scale   SubCutaneous three times a day before meals  insulin lispro (ADMELOG) corrective regimen sliding scale   SubCutaneous at bedtime  lisinopril 40 milliGRAM(s) Oral daily  metoprolol succinate ER 50 milliGRAM(s) Oral daily  vancomycin  IVPB 1000 milliGRAM(s) IV Intermittent every 12 hours    MEDICATIONS  (PRN):  dextrose Oral Gel 15 Gram(s) Oral once PRN Blood Glucose LESS THAN 70 milliGRAM(s)/deciliter      No Known Allergies      Social History:  Tobacco: Denies  EtOH: Denies  Recreational drug use: Denies  Lives with: Wife at home   Ambulates: Independently   ADLs: Independent (29 Apr 2025 15:39)      REVIEW OF SYSTEMS:  CONSTITUTIONAL: No fever, No chills, No fatigue, No myalgia, No Body ache, No Weakness  EYES: No eye pain,  No visual disturbances, No discharge, No Redness  ENMT: No ear pain, No nose bleed, No vertigo; No sinus pain, No throat pain, No Congestion  NECK: No pain, No stiffness  RESPIRATORY: No cough, No wheezing, No hemoptysis, No shortness of breath  CARDIOVASCULAR: No chest pain, No palpitations  GASTROINTESTINAL: No abdominal pain, No epigastric pain. No nausea, No vomiting, No diarrhea, No constipation; [  ] BM  GENITOURINARY: No dysuria, No frequency, No urgency, No hematuria, No incontinence  NEUROLOGICAL: No headaches, No dizziness, No numbness, No tingling, No tremors, No weakness  EXTREMITIES: No Swelling, No Pain, No Edema  SKIN: [ x ] No itching, burning, rashes, or lesions   MUSCULOSKELETAL: No joint pain, No joint swelling; No muscle pain, No back pain, No extremity pain  PSYCHIATRIC: No depression, No anxiety, No mood swings, No difficulty sleeping at night  PAIN SCALE: [x  ] None  [  ] Other-  ROS Unable to obtain due to: [  ] Dementia  [  ] Lethargy  [  ] Sedated  [  ] Non verbal  REST OF REVIEW OF SYSTEMS: [  ] Normal     Vital Signs Last 24 Hrs  T(C): 36.6 (30 Apr 2025 12:18), Max: 36.9 (29 Apr 2025 18:18)  T(F): 97.9 (30 Apr 2025 12:18), Max: 98.5 (29 Apr 2025 18:18)  HR: 76 (30 Apr 2025 12:18) (67 - 84)  BP: 130/68 (30 Apr 2025 12:18) (115/70 - 144/74)  BP(mean): --  RR: 18 (30 Apr 2025 12:18) (18 - 19)  SpO2: 98% (30 Apr 2025 12:18) (96% - 99%)    Parameters below as of 30 Apr 2025 12:18  Patient On (Oxygen Delivery Method): room air        CAPILLARY BLOOD GLUCOSE      POCT Blood Glucose.: 228 mg/dL (30 Apr 2025 11:44)  POCT Blood Glucose.: 164 mg/dL (30 Apr 2025 07:50)  POCT Blood Glucose.: 171 mg/dL (29 Apr 2025 21:32)  POCT Blood Glucose.: 134 mg/dL (29 Apr 2025 19:50)      I&O's Summary    29 Apr 2025 07:01  -  30 Apr 2025 07:00  --------------------------------------------------------  IN: 350 mL / OUT: 0 mL / NET: 350 mL      PHYSICAL EXAM:  GENERAL:  [x  ] NAD, [x  ] Well appearing, [  ] Agitated, [  ] Drowsy, [  ] Lethargy, [  ] Confused   HEAD:  [x  ] Normal, [  ] Other  EYES:  [x  ] EOMI, [ x ] PERRL, [ x ] Conjunctiva and sclera clear normal, [  ] Other, [  ] Pallor, [  ] Discharge  ENMT:  [ x ] Normal, [ x ] Moist mucous membranes, [ x ] Good dentition, [  ] No thrush  NECK:  [x  ] Supple, [  x] No JVD, [  ] Normal thyroid, [  ] Lymphadenopathy, [  ] Other  CHEST/LUNG:  [ x ] Clear to auscultation bilaterally, [ x ] Breath Sounds equal B/L / decreased, [  ] Poor effort, [ x ] No rales, [x  ] No rhonchi, [ x ] No wheezing  HEART:  [ x ] Regular rate and rhythm, [  ] Tachycardia, [  ] Bradycardia, [  ] Irregular, [x  ] No murmurs, No rubs, No gallops, [  ] PPM in place (Mfr:  )  ABDOMEN:  [ x ] Soft, [ x ] Nontender, [x  ] Nondistended, [ x ] No mass, [x  ] Bowel sounds present, [  ] Obese  NERVOUS SYSTEM:  [ x ] Alert & Oriented x3, [ x ] Nonfocal, [  ] Confusion, [  ] Encephalopathic, [  ] Sedated, [  ] Unable to assess, [  ] Dementia, [  ] Other-  EXTREMITIES:  [ x ] 2+ Peripheral Pulses, No clubbing, No cyanosis,  [  ] Edema B/L lower EXT, [  ] PVD stasis skin changes B/L lower EXT, [x  ] Wound - left foot in dressing c/d/i  LYMPH:  No lymphadenopathy noted  SKIN:  [ x] No rashes or lesions, [  ] Pressure ulcers, [  ] Ecchymosis, [  ] Skin tears, [  ] Other    DIET: Diet, Consistent Carbohydrate w/Evening Snack:   Low Sodium (04-29-25 @ 15:41)      LABS:                        11.2   12.51 )-----------( 260      ( 30 Apr 2025 07:45 )             34.1     30 Apr 2025 07:45    136    |  107    |  30     ----------------------------<  149    4.8     |  24     |  1.10     Ca    9.0        30 Apr 2025 07:45    TPro  7.1    /  Alb  3.2    /  TBili  0.4    /  DBili  x      /  AST  11     /  ALT  23     /  AlkPhos  68     30 Apr 2025 07:45    PT/INR - ( 29 Apr 2025 12:45 )   PT: 12.2 sec;   INR: 1.04 ratio           Urinalysis Basic - ( 30 Apr 2025 07:45 )    Color: x / Appearance: x / SG: x / pH: x  Gluc: 149 mg/dL / Ketone: x  / Bili: x / Urobili: x   Blood: x / Protein: x / Nitrite: x   Leuk Esterase: x / RBC: x / WBC x   Sq Epi: x / Non Sq Epi: x / Bacteria: x        RADIOLOGY & ADDITIONAL TESTS:      HEALTH ISSUES - PROBLEM Dx:  Type 2 diabetes mellitus    HTN (hypertension)    DELORIS (acute kidney injury)    Need for prophylactic measure    Infection of left foot    PAD (peripheral artery disease)    Diabetic ulcer of left foot    Osteomyelitis of left foot    Unstageable pressure ulcer of left foot          Consultant(s) Notes Reviewed:  [  ] YES     Care Discussed with [ x ] Consultants, [  x] Patient, [  ] Family, [  ] HCP, [  ] , [  ] Social Service, [  ] RN, [  ] Physical Therapy, [  ] Palliative Care Team  DVT PPX: [  x] Lovenox, [  ] SC Heparin, [  ] Coumadin, [  ] Xarelto, [  ] Eliquis, [  ] Pradaxa, [  ] IV Heparin drip, [  ] SCD, [  ] Ambulation, [  ] Contraindicated 2/2 GI Bleed, [  ] Contraindicated 2/2  Bleed, [  ] Contraindicated 2/2 Brain Bleed  Advanced Directive: [ x ] None, [  ] DNR/DNI Patient is a 64y old  Male who presents with a chief complaint of Foot infection (30 Apr 2025 12:50)    HPI: 63 y/o M with PMHx DM2, hx osteomyelitis, CAD, PAD s/p RLE angioplasty 2020, s/p surgical amputation of R forefoot , s/p L PT angioplasty 12/2/24 for L lat foot DFU on plavix, HTN sent in by wound clinic for left foot infections and multiple wounds. Pt states he was at [facility] for hyperbaric therapy today and they noticed his L foot had drainage with increased erythema and edema so they told him to come to the ED. Pt states yesterday his foot was dry, without drainage or swelling. Patient notes he follow with ID outpatient and was prescribed Bactrim 2 weeks ago for the chronic foot wounds without any improvement. Pt denies fever/chills, CP, SOB, HA, dizziness, n/v/d. Pt also denies any current pain in the foot.      ED Course:   Vitals: BP: 107/66 , HR: 87 , Temp: 98.1F , RR: 16 , SpO2: 100 % on RA  Labs:  ESR 53, WBC 12.23, Hgb 11, BUN 25, Glucose 137,   X-ray Foot: Cutaneous defect adjacent to the base of the fifth metatarsal again evident with no gross cortical destruction or soft tissue emphysema. Follow-up MRI can be ordered    Received in the ED:  3.375g Zosyn IVPBx1, Vanco 1g IVPB x1    INTERVAL HPI:  4/30: Pt seen and examined at bedside this morning, feels well, no acute complaints. On IV abx vanc and cefepime, planning for LLE angio on 5/2 continue Abx    OVERNIGHT EVENTS: None    Home Medications:  aspirin 81 mg oral delayed release tablet: 1 tab(s) orally once a day (29 Apr 2025 16:40)  atorvastatin 40 mg oral tablet: 1 tab(s) orally once a day (29 Apr 2025 16:40)  clopidogrel 75 mg oral tablet: 1 tab(s) orally once a day (29 Apr 2025 16:40)  gabapentin 300 mg oral capsule: 1 cap(s) orally 3 times a day (29 Apr 2025 16:40)  Jardiance 25 mg oral tablet: 1 tab(s) orally once a day (29 Apr 2025 16:40)  lisinopril 40 mg oral tablet: 1 tab(s) orally once a day (29 Apr 2025 16:40)  metFORMIN 1000 mg oral tablet: 1 tab(s) orally 2 times a day (29 Apr 2025 16:40)  metoprolol succinate 50 mg oral tablet, extended release: 1 tab(s) orally once a day (29 Apr 2025 16:40)  Trulicity Pen 1.5 mg/0.5 mL subcutaneous solution: 1.5 milligram(s) subcutaneously once a week (29 Apr 2025 16:40)      MEDICATIONS  (STANDING):  aspirin enteric coated 81 milliGRAM(s) Oral daily  atorvastatin 40 milliGRAM(s) Oral at bedtime  cefepime   IVPB 2000 milliGRAM(s) IV Intermittent every 8 hours  cefepime   IVPB      clopidogrel Tablet 75 milliGRAM(s) Oral daily  dextrose 5%. 1000 milliLiter(s) (50 mL/Hr) IV Continuous <Continuous>  dextrose 5%. 1000 milliLiter(s) (100 mL/Hr) IV Continuous <Continuous>  dextrose 50% Injectable 25 Gram(s) IV Push once  dextrose 50% Injectable 12.5 Gram(s) IV Push once  dextrose 50% Injectable 25 Gram(s) IV Push once  enoxaparin Injectable 40 milliGRAM(s) SubCutaneous every 24 hours  gabapentin 300 milliGRAM(s) Oral three times a day  glucagon  Injectable 1 milliGRAM(s) IntraMuscular once  insulin lispro (ADMELOG) corrective regimen sliding scale   SubCutaneous three times a day before meals  insulin lispro (ADMELOG) corrective regimen sliding scale   SubCutaneous at bedtime  lisinopril 40 milliGRAM(s) Oral daily  metoprolol succinate ER 50 milliGRAM(s) Oral daily  vancomycin  IVPB 1000 milliGRAM(s) IV Intermittent every 12 hours    MEDICATIONS  (PRN):  dextrose Oral Gel 15 Gram(s) Oral once PRN Blood Glucose LESS THAN 70 milliGRAM(s)/deciliter      No Known Allergies      Social History:  Tobacco: Denies  EtOH: Denies  Recreational drug use: Denies  Lives with: Wife at home   Ambulates: Independently   ADLs: Independent (29 Apr 2025 15:39)      REVIEW OF SYSTEMS: i am ok, feel better  CONSTITUTIONAL: No fever, No chills, No fatigue, No myalgia, No Body ache, No Weakness  EYES: No eye pain,  No visual disturbances, No discharge, No Redness  ENMT: No ear pain, No nose bleed, No vertigo; No sinus pain, No throat pain, No Congestion  NECK: No pain, No stiffness  RESPIRATORY: No cough, No wheezing, No hemoptysis, No shortness of breath  CARDIOVASCULAR: No chest pain, No palpitations  GASTROINTESTINAL: No abdominal pain, No epigastric pain. No nausea, No vomiting, No diarrhea, No constipation; [  ] BM  GENITOURINARY: No dysuria, No frequency, No urgency, No hematuria, No incontinence  NEUROLOGICAL: No headaches, No dizziness, No numbness, No tingling, No tremors, No weakness  EXTREMITIES: No Swelling, No Pain, No Edema  SKIN: [ x ] No itching, burning, rashes, or lesions   MUSCULOSKELETAL: No joint pain, No joint swelling; No muscle pain, No back pain, No extremity pain  PSYCHIATRIC: No depression, No anxiety, No mood swings, No difficulty sleeping at night  PAIN SCALE: [x  ] None  [  ] Other-  ROS Unable to obtain due to: [  ] Dementia  [  ] Lethargy  [  ] Sedated  [  ] Non verbal  REST OF REVIEW OF SYSTEMS: [ x ] Normal     Vital Signs Last 24 Hrs  T(C): 36.6 (30 Apr 2025 12:18), Max: 36.9 (29 Apr 2025 18:18)  T(F): 97.9 (30 Apr 2025 12:18), Max: 98.5 (29 Apr 2025 18:18)  HR: 76 (30 Apr 2025 12:18) (67 - 84)  BP: 130/68 (30 Apr 2025 12:18) (115/70 - 144/74)  BP(mean): --  RR: 18 (30 Apr 2025 12:18) (18 - 19)  SpO2: 98% (30 Apr 2025 12:18) (96% - 99%)    Parameters below as of 30 Apr 2025 12:18  Patient On (Oxygen Delivery Method): room air        CAPILLARY BLOOD GLUCOSE      POCT Blood Glucose.: 228 mg/dL (30 Apr 2025 11:44)  POCT Blood Glucose.: 164 mg/dL (30 Apr 2025 07:50)  POCT Blood Glucose.: 171 mg/dL (29 Apr 2025 21:32)  POCT Blood Glucose.: 134 mg/dL (29 Apr 2025 19:50)      I&O's Summary    29 Apr 2025 07:01  -  30 Apr 2025 07:00  --------------------------------------------------------  IN: 350 mL / OUT: 0 mL / NET: 350 mL      PHYSICAL EXAM:  GENERAL:  [x  ] NAD, [x  ] Well appearing, [  ] Agitated, [  ] Drowsy, [  ] Lethargy, [  ] Confused   HEAD:  [x  ] Normal, [  ] Other  EYES:  [x  ] EOMI, [ x ] PERRL, [ x ] Conjunctiva and sclera clear normal, [  ] Other, [  ] Pallor, [  ] Discharge  ENMT:  [ x ] Normal, [ x ] Moist mucous membranes, [ x ] Good dentition, [ x ] No thrush  NECK:  [x  ] Supple, [  x] No JVD, [ x ] Normal thyroid, [  ] Lymphadenopathy, [  ] Other  CHEST/LUNG:  [ x ] Clear to auscultation bilaterally, [ x ] Breath Sounds equal B/L / decreased, [  ] Poor effort, [ x ] No rales, [x  ] No rhonchi, [ x ] No wheezing  HEART:  [ x ] Regular rate and rhythm, [  ] Tachycardia, [  ] Bradycardia, [  ] Irregular, [x  ] No murmurs, No rubs, No gallops, [  ] PPM in place (Mfr:  )  ABDOMEN:  [ x ] Soft, [ x ] Nontender, [x  ] Nondistended, [ x ] No mass, [x  ] Bowel sounds present, [  ] Obese  NERVOUS SYSTEM:  [ x ] Alert & Oriented x3, [ x ] Nonfocal, [  ] Confusion, [  ] Encephalopathic, [  ] Sedated, [  ] Unable to assess, [  ] Dementia, [  ] Other-  EXTREMITIES:  [ x ] 2+ Peripheral Pulses, No clubbing, No cyanosis,  [  ] Edema B/L lower EXT, x[  ] PVD stasis skin changes B/L lower EXT, [x  ] Wound - left foot in dressing c/d/i  LYMPH:  No lymphadenopathy noted  SKIN:  [ x] No rashes or lesions, [  ] Pressure ulcers, [  ] Ecchymosis, [  ] Skin tears, [ x ] Other Left foot wound-necrotic ulcers base medial, lateral aspects of foot, nonstageable necrotic heel ulcer left, with edema, reduced erythema, LL Ext  cellulitic improved,, negative drainage  distal hallux ulcer necrotic base, No Drainage       DIET: Diet, Consistent Carbohydrate w/Evening Snack:   Low Sodium (04-29-25 @ 15:41)      LABS:                        11.2   12.51 )-----------( 260      ( 30 Apr 2025 07:45 )             34.1     30 Apr 2025 07:45    136    |  107    |  30     ----------------------------<  149    4.8     |  24     |  1.10     Ca    9.0        30 Apr 2025 07:45    TPro  7.1    /  Alb  3.2    /  TBili  0.4    /  DBili  x      /  AST  11     /  ALT  23     /  AlkPhos  68     30 Apr 2025 07:45    PT/INR - ( 29 Apr 2025 12:45 )   PT: 12.2 sec;   INR: 1.04 ratio           Urinalysis Basic - ( 30 Apr 2025 07:45 )    Color: x / Appearance: x / SG: x / pH: x  Gluc: 149 mg/dL / Ketone: x  / Bili: x / Urobili: x   Blood: x / Protein: x / Nitrite: x   Leuk Esterase: x / RBC: x / WBC x   Sq Epi: x / Non Sq Epi: x / Bacteria: x        RADIOLOGY & ADDITIONAL TESTS:      HEALTH ISSUES - PROBLEM Dx:  Type 2 diabetes mellitus    HTN (hypertension)    DELORIS (acute kidney injury)    Need for prophylactic measure    Infection of left foot    PAD (peripheral artery disease)    Diabetic ulcer of left foot    Osteomyelitis of left foot    Unstageable pressure ulcer of left foot          Consultant(s) Notes Reviewed:  [ x ] YES     Care Discussed with [ x ] Consultants, [  x] Patient, [  ] Family, [  ] HCP, [  ] , [  ] Social Service, [ x ] RN, [  ] Physical Therapy, [  ] Palliative Care Team  DVT PPX: [  x] Lovenox, [  ] SC Heparin, [  ] Coumadin, [  ] Xarelto, [  ] Eliquis, [  ] Pradaxa, [  ] IV Heparin drip, [  ] SCD, [  ] Ambulation, [  ] Contraindicated 2/2 GI Bleed, [  ] Contraindicated 2/2  Bleed, [  ] Contraindicated 2/2 Brain Bleed  Advanced Directive: [ x ] None, [  ] DNR/DNI

## 2025-04-30 NOTE — PROGRESS NOTE ADULT - PROBLEM SELECTOR PLAN 2
Chronic. Pt follows with Vascular outpt. Pt s/p L PT angioplasty 12/2/24 for L lat foot DFU on Plavix  -C/w ASA and Plavix and Lipitor   -Plan for LLE angio 5/2 with vascular Chronic. Pt follows with Vascular outpt. Pt s/p L PT angioplasty 12/2/24 for L lat foot DFU on Plavix  -C/w ASA and Plavix and Lipitor   -Plan for LLE angio 5/2 with vascular  [facility]

## 2025-04-30 NOTE — PROGRESS NOTE ADULT - SUBJECTIVE AND OBJECTIVE BOX
Patient is a 64y old  Male who presents with a chief complaint of Foot infection (29 Apr 2025 18:45)    Pt without c/o pain or weakness. Reports calf redness and swelling improved. Denies any fevers or chills. .     MEDICATIONS  (STANDING):  aspirin enteric coated 81 milliGRAM(s) Oral daily  atorvastatin 40 milliGRAM(s) Oral at bedtime  cefepime   IVPB 2000 milliGRAM(s) IV Intermittent every 8 hours  cefepime   IVPB      clopidogrel Tablet 75 milliGRAM(s) Oral daily  dextrose 5%. 1000 milliLiter(s) (50 mL/Hr) IV Continuous <Continuous>  dextrose 5%. 1000 milliLiter(s) (100 mL/Hr) IV Continuous <Continuous>  dextrose 50% Injectable 25 Gram(s) IV Push once  dextrose 50% Injectable 12.5 Gram(s) IV Push once  dextrose 50% Injectable 25 Gram(s) IV Push once  enoxaparin Injectable 40 milliGRAM(s) SubCutaneous every 24 hours  gabapentin 300 milliGRAM(s) Oral three times a day  glucagon  Injectable 1 milliGRAM(s) IntraMuscular once  insulin lispro (ADMELOG) corrective regimen sliding scale   SubCutaneous three times a day before meals  insulin lispro (ADMELOG) corrective regimen sliding scale   SubCutaneous at bedtime  lisinopril 40 milliGRAM(s) Oral daily  metoprolol succinate ER 50 milliGRAM(s) Oral daily  vancomycin  IVPB 1000 milliGRAM(s) IV Intermittent every 12 hours    MEDICATIONS  (PRN):  dextrose Oral Gel 15 Gram(s) Oral once PRN Blood Glucose LESS THAN 70 milliGRAM(s)/deciliter    Allergies  No Known Allergies      Vital Signs Last 24 Hrs  T(C): 36.8 (30 Apr 2025 04:46), Max: 36.9 (29 Apr 2025 18:18)  T(F): 98.3 (30 Apr 2025 04:46), Max: 98.5 (29 Apr 2025 18:18)  HR: 67 (30 Apr 2025 04:46) (67 - 87)  BP: 117/67 (30 Apr 2025 04:46) (107/66 - 144/74)  BP(mean): --  RR: 19 (30 Apr 2025 04:46) (16 - 19)  SpO2: 97% (30 Apr 2025 04:46) (96% - 100%)    Parameters below as of 30 Apr 2025 04:46  Patient On (Oxygen Delivery Method): room air      I&O's Detail    29 Apr 2025 07:01  -  30 Apr 2025 07:00  --------------------------------------------------------  IN:    IV PiggyBack: 100 mL    IV PiggyBack: 250 mL  Total IN: 350 mL    OUT:  Total OUT: 0 mL    Total NET: 350 mL        PHYSICAL EXAM  Constitutional: WD M in NAD  Extremities: R TMA well healed. L great toe with gangrenous distal tip and laceration with periwound edema and erythema tracking medially to mid calf. Dorsal L foot with 3x3 cm dry necrotic eschar. L heel with large dry necrotic eschar without any fluctuance. L lat 2x2 cm wound with bony deformity without oozing, erythema noted  Neurological: A&O x 3 BERNAL X 4 =  Pulses:   Right:                                                                            Left:  FEM [x ]2+ [ ]1+ [ ]doppler                                            FEM [x ]2+ [ ]1+ [ ]doppler    POP [x ]2+ [ ]1+ [ ]doppler                                             POP [x ]2+ [ ]1+ [ ]doppler    AT [ ]2+ [ ]1+ [ ]doppler                                                AT [ ]2+ [ ]1+ [x ]doppler  PT[ ]2+ [ ]1+ [ ]doppler                                                 DP [ ]2+ [ ]1+ [x ]doppler                                                                                     PT [ ]2+ [ ]1+ [x ]doppler        LABS:                        11.0   12.23 )-----------( 258      ( 29 Apr 2025 12:45 )             33.5     04-29    137  |  105  |  25[H]  ----------------------------<  137[H]  4.9   |  26  |  1.30    Ca    8.5      29 Apr 2025 12:45    TPro  7.0  /  Alb  3.3  /  TBili  0.4  /  DBili  x   /  AST  15  /  ALT  26  /  AlkPhos  78  04-29    PT/INR - ( 29 Apr 2025 12:45 )   PT: 12.2 sec;   INR: 1.04 ratio      CAPILLARY BLOOD GLUCOSE  POCT Blood Glucose.: 164 mg/dL (30 Apr 2025 07:50)  POCT Blood Glucose.: 171 mg/dL (29 Apr 2025 21:32)  POCT Blood Glucose.: 134 mg/dL (29 Apr 2025 19:50)  POCT Blood Glucose.: 143 mg/dL (29 Apr 2025 12:23)  POCT Blood Glucose.: 177 mg/dL (29 Apr 2025 10:08)    Radiology and Additional Studies:  < from: [facility] Physiol Extremity Lower 3+ Level, BI (04.29.25 @ 15:53) >  ACC: [id number] EXAM:  PHYSIOL EXTREM LOW 3+ LEV BI   ORDERED BY: LEN SHANNON     PROCEDURE DATE:  04/29/2025          INTERPRETATION:  CLINICAL INDICATION: Nonpalpable pulses. Leg wounds.   Status post bilateral leg angioplasty most recentlyof the left posterior   tibial artery on 12/2/2024.    EXAMINATION: FRANCISCA PVR evaluation    COMPARISON: 11/18/2024    FINDINGS:    Right FRANCISCA is 1.29, within normal limits. Left FRANCISCA 0.84, mildly abnormal.    Right toe brachial index is not obtained in the setting of amputation.   Left third toe pressure is 21 mmHg with toe brachial index of 0.17,   severely abnormal.    Segmental pressure evaluation is limited due to calcified noncompressible   vasculature. Segmental pressure drop identified from left calf to ankle   and from left ankle to toe stations.    PVR waveforms asymmetrically diminished in amplitude at the metatarsal   stations and mildly pulsatile at left digital level.    IMPRESSION:    Limited evaluation due to calcified noncompressible vasculature. 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 Correlate with arterial   duplex ultrasound for improved evaluation given limitations of   noncompressible vasculature.    < end of copied text >

## 2025-05-01 ENCOUNTER — TRANSCRIPTION ENCOUNTER (OUTPATIENT)
Age: 65
End: 2025-05-01

## 2025-05-01 ENCOUNTER — APPOINTMENT (OUTPATIENT)
Dept: HYPERBARIC MEDICINE | Facility: HOSPITAL | Age: 65
End: 2025-05-01

## 2025-05-01 LAB
-  COAGULASE NEGATIVE STAPHYLOCOCCUS: SIGNIFICANT CHANGE UP
ANION GAP SERPL CALC-SCNC: 3 MMOL/L — LOW (ref 5–17)
BUN SERPL-MCNC: 27 MG/DL — HIGH (ref 7–23)
CALCIUM SERPL-MCNC: 9.3 MG/DL — SIGNIFICANT CHANGE UP (ref 8.5–10.1)
CHLORIDE SERPL-SCNC: 107 MMOL/L — SIGNIFICANT CHANGE UP (ref 96–108)
CO2 SERPL-SCNC: 26 MMOL/L — SIGNIFICANT CHANGE UP (ref 22–31)
CREAT SERPL-MCNC: 1 MG/DL — SIGNIFICANT CHANGE UP (ref 0.5–1.3)
CULTURE RESULTS: ABNORMAL
EGFR: 83 ML/MIN/1.73M2 — SIGNIFICANT CHANGE UP
EGFR: 83 ML/MIN/1.73M2 — SIGNIFICANT CHANGE UP
GLUCOSE BLDC GLUCOMTR-MCNC: 172 MG/DL — HIGH (ref 70–99)
GLUCOSE BLDC GLUCOMTR-MCNC: 194 MG/DL — HIGH (ref 70–99)
GLUCOSE BLDC GLUCOMTR-MCNC: 230 MG/DL — HIGH (ref 70–99)
GLUCOSE BLDC GLUCOMTR-MCNC: 259 MG/DL — HIGH (ref 70–99)
GLUCOSE SERPL-MCNC: 174 MG/DL — HIGH (ref 70–99)
GRAM STN FLD: ABNORMAL
HCT VFR BLD CALC: 36 % — LOW (ref 39–50)
HGB BLD-MCNC: 11.9 G/DL — LOW (ref 13–17)
MCHC RBC-ENTMCNC: 29.8 PG — SIGNIFICANT CHANGE UP (ref 27–34)
MCHC RBC-ENTMCNC: 33.1 G/DL — SIGNIFICANT CHANGE UP (ref 32–36)
MCV RBC AUTO: 90 FL — SIGNIFICANT CHANGE UP (ref 80–100)
METHOD TYPE: SIGNIFICANT CHANGE UP
NRBC BLD AUTO-RTO: 0 /100 WBCS — SIGNIFICANT CHANGE UP (ref 0–0)
ORGANISM # SPEC MICROSCOPIC CNT: ABNORMAL
ORGANISM # SPEC MICROSCOPIC CNT: SIGNIFICANT CHANGE UP
PLATELET # BLD AUTO: 285 K/UL — SIGNIFICANT CHANGE UP (ref 150–400)
POTASSIUM SERPL-MCNC: 4.6 MMOL/L — SIGNIFICANT CHANGE UP (ref 3.5–5.3)
POTASSIUM SERPL-SCNC: 4.6 MMOL/L — SIGNIFICANT CHANGE UP (ref 3.5–5.3)
RBC # BLD: 4 M/UL — LOW (ref 4.2–5.8)
RBC # FLD: 13.4 % — SIGNIFICANT CHANGE UP (ref 10.3–14.5)
SODIUM SERPL-SCNC: 136 MMOL/L — SIGNIFICANT CHANGE UP (ref 135–145)
SPECIMEN SOURCE: SIGNIFICANT CHANGE UP
WBC # BLD: 12.91 K/UL — HIGH (ref 3.8–10.5)
WBC # FLD AUTO: 12.91 K/UL — HIGH (ref 3.8–10.5)

## 2025-05-01 PROCEDURE — 99222 1ST HOSP IP/OBS MODERATE 55: CPT

## 2025-05-01 PROCEDURE — 99232 SBSQ HOSP IP/OBS MODERATE 35: CPT

## 2025-05-01 RX ORDER — VANCOMYCIN HCL IN 5 % DEXTROSE 1.5G/250ML
1250 PLASTIC BAG, INJECTION (ML) INTRAVENOUS EVERY 12 HOURS
Refills: 0 | Status: DISCONTINUED | OUTPATIENT
Start: 2025-05-01 | End: 2025-05-02

## 2025-05-01 RX ORDER — HEPARIN SODIUM 1000 [USP'U]/ML
5000 INJECTION INTRAVENOUS; SUBCUTANEOUS ONCE
Refills: 0 | Status: COMPLETED | OUTPATIENT
Start: 2025-05-01 | End: 2025-05-01

## 2025-05-01 RX ADMIN — INSULIN LISPRO 2: 100 INJECTION, SOLUTION INTRAVENOUS; SUBCUTANEOUS at 08:18

## 2025-05-01 RX ADMIN — Medication 250 MILLIGRAM(S): at 06:43

## 2025-05-01 RX ADMIN — CEFEPIME 100 MILLIGRAM(S): 2 INJECTION, POWDER, FOR SOLUTION INTRAVENOUS at 13:17

## 2025-05-01 RX ADMIN — GABAPENTIN 300 MILLIGRAM(S): 400 CAPSULE ORAL at 05:17

## 2025-05-01 RX ADMIN — CEFEPIME 100 MILLIGRAM(S): 2 INJECTION, POWDER, FOR SOLUTION INTRAVENOUS at 05:17

## 2025-05-01 RX ADMIN — Medication 81 MILLIGRAM(S): at 13:03

## 2025-05-01 RX ADMIN — GABAPENTIN 300 MILLIGRAM(S): 400 CAPSULE ORAL at 13:17

## 2025-05-01 RX ADMIN — METOPROLOL SUCCINATE 50 MILLIGRAM(S): 50 TABLET, EXTENDED RELEASE ORAL at 05:17

## 2025-05-01 RX ADMIN — HEPARIN SODIUM 5000 UNIT(S): 1000 INJECTION INTRAVENOUS; SUBCUTANEOUS at 21:30

## 2025-05-01 RX ADMIN — CLOPIDOGREL BISULFATE 75 MILLIGRAM(S): 75 TABLET, FILM COATED ORAL at 13:02

## 2025-05-01 RX ADMIN — GABAPENTIN 300 MILLIGRAM(S): 400 CAPSULE ORAL at 21:30

## 2025-05-01 RX ADMIN — ATORVASTATIN CALCIUM 40 MILLIGRAM(S): 80 TABLET, FILM COATED ORAL at 21:30

## 2025-05-01 RX ADMIN — Medication 166.67 MILLIGRAM(S): at 18:56

## 2025-05-01 RX ADMIN — INSULIN LISPRO 6: 100 INJECTION, SOLUTION INTRAVENOUS; SUBCUTANEOUS at 12:23

## 2025-05-01 RX ADMIN — LISINOPRIL 40 MILLIGRAM(S): 5 TABLET ORAL at 05:17

## 2025-05-01 RX ADMIN — INSULIN LISPRO 2: 100 INJECTION, SOLUTION INTRAVENOUS; SUBCUTANEOUS at 17:43

## 2025-05-01 RX ADMIN — CEFEPIME 100 MILLIGRAM(S): 2 INJECTION, POWDER, FOR SOLUTION INTRAVENOUS at 21:31

## 2025-05-01 NOTE — CHART NOTE - NSCHARTNOTEFT_GEN_A_CORE
Pt to have LLE angiogram 5/2. Will give heparin 5000U x1 for tonight and will hold lovenox for now.   -day team to restart lovenox DVT ppx after procedure

## 2025-05-01 NOTE — DISCHARGE NOTE PROVIDER - CARE PROVIDERS DIRECT ADDRESSES
,DirectAddress_Unknown ,DirectAddress_Unknown,DirectAddress_Unknown,DirectAddress_Unknown ,DirectAddress_Unknown,DirectAddress_Unknown,DirectAddress_Unknown,[email or web address]

## 2025-05-01 NOTE — PROGRESS NOTE ADULT - ASSESSMENT
Pt is a 63 y/o M with PMHx of PVD, HTN, diabetes s/p metatarsal amputation sent in by wound clinic for left foot infections and multiple wounds admitted for worsening foot wound  infection, cellulitis left foot& Lower EXT.

## 2025-05-01 NOTE — DISCHARGE NOTE PROVIDER - NSDCFUSCHEDAPPT_GEN_ALL_CORE_FT
Doctor, Unknown  [facility]  [facility] Preadmit  Scheduled Appointment: 05/02/2025    Winnie Jones  [facility]  VASCULAR  [place]  Scheduled Appointment: 05/07/2025     Doctor, Unknown  [facility]  PL[facility] Preadmit  Scheduled Appointment: 05/05/2025    Doctor, Unknown  [facility]  PL[facility] Preadmit  Scheduled Appointment: 05/06/2025    [facility]  VASCULAR  [place]  Scheduled Appointment: 05/07/2025     Doctor, Unknown  [facility]  PL[facility] Preadmit  Scheduled Appointment: 05/06/2025    Winnie Jones  [facility]  VASCULAR  [place]  Scheduled Appointment: 05/07/2025     Winnie Jones  [facility]  VASCULAR  [place]  Scheduled Appointment: 05/07/2025     Winnie Jones  [facility]  PLV Preadmit  Scheduled Appointment: 05/07/2025    Winnie Jones  [facility]  VASCULAR  [place]  Scheduled Appointment: 05/07/2025    Doctor, Oscar  [facility]  PLV Preadmit  Scheduled Appointment: 05/07/2025

## 2025-05-01 NOTE — CONSULT NOTE ADULT - ASSESSMENT
consult for cardiac clearance for LLE angiogram  Last seen by Dr. Chu on 3/1/24.  TTE (1/2/24): LVEF 61%  Stress test 1/24/24: no evidence of ischemia      RCRI score of 2   Pt is a 65 y/o M with PMHx DM2, hx osteomyelitis, CAD, PAD s/p RLE angioplasty 2020, s/p surgical amputation of R forefoot , s/p L PT angioplasty 12/2/24 for L lat foot DFU on plavix, HTN, HLD sent in by wound clinic for left foot infections and multiple wounds. Vascular surgery to evaluate further with LLE angiogram.     Cardiology consulted for pre-surgical clearance.    #CAD  - Patient is not complaining of any cardiac symptoms at this time.    - EKG: Sinus rhythm with premature supraventricular complexes  - No acute changes on EKG compared to previous studies.    - No meaningful evidence of volume overload.  - Previous TTE (1/2/24) shows EF 61%, Normal LV mass and diastolic function, Normal RV size and function. .    - BP well controlled, monitor routine hemodynamics.  - On home lisinopril 40 mg and Metoprolol succinate 50mg   - Continue home medications.    - Pt has no active ischemia, decompensated heart failure, unstable arrythmia, or severe stenotic valvular disease, and has moderate cardiac risk factors. In the setting of LLE angiogram, he is optimized from cardiovascular standpoint to proceed with planned procedure with routine hemodynamic monitoring.   - Pt has no modifiable active cardiac risk factors and in the setting of low risk _____, patient is optimized as best as possible from cardiovascular standpoint to proceed with planned procedure with routine hemodynamic monitoring.    - Follow outpatient with Dr. Chu  - All other workup per primary team.                  consult for cardiac clearance for LLE angiogram  Last seen by Dr. Chu on 3/1/24.  TTE (1/2/24): LVEF 61%  Stress test 1/24/24: no evidence of ischemia      RCRI score of 2   Pt is a 65 y/o M with PMHx DM2, hx osteomyelitis, CAD, PAD s/p RLE angioplasty 2020, s/p surgical amputation of R forefoot , s/p L PT angioplasty 12/2/24 for L lat foot DFU on plavix, HTN, HLD sent in by wound clinic for left foot infections and multiple wounds. Vascular surgery to evaluate further with LLE angiogram.     Cardiology consulted for pre-surgical clearance.    #Medical clearance  - Patient is not complaining of any cardiac symptoms at this time.  - Pt has no active ischemia, decompensated heart failure, unstable arrythmia, or severe stenotic valvular disease, and has moderate cardiac risk factors. In the setting of LLE angiogram, he is optimized from cardiovascular standpoint to proceed with planned procedure with routine hemodynamic monitoring.     #PAD  - continue home ASA/Plavix     - EKG: Sinus rhythm with premature supraventricular complexes  - No acute changes on EKG compared to previous studies.    - No meaningful evidence of volume overload.  - Previous TTE (1/2/24) shows EF 61%, Normal LV mass and diastolic function, Normal RV size and function.    - BP well controlled, monitor routine hemodynamics.  - On home lisinopril 40 mg and Metoprolol succinate 50mg   - Continue home medications.    - Follow outpatient with Dr. Chu  - All other workup per primary team.                  consult for cardiac clearance for LLE angiogram  Last seen by Dr. Chu on 3/1/24.  TTE (1/2/24): LVEF 61%  Stress test 1/24/24: no evidence of ischemia      RCRI score of 2   Pt is a 63 y/o M with PMHx DM2, hx osteomyelitis, CAD, PAD s/p RLE angioplasty 2020, s/p surgical amputation of R forefoot , s/p L PT angioplasty 12/2/24 for L lat foot DFU on plavix, HTN, HLD sent in by wound clinic for left foot infections and multiple wounds. Vascular surgery to evaluate further with LLE angiogram.     Cardiology consulted for pre-surgical clearance.    #Cardiac clearance  - Patient is not complaining of any cardiac symptoms at this time.  - EKG: Sinus rhythm with premature supraventricular complexes  - No meaningful evidence of volume overload.  - Previous TTE (1/2/24) shows EF 61%, Normal LV mass and diastolic function, Normal RV size and function.  - Pt has no active ischemia, decompensated heart failure, unstable arrythmia, or severe stenotic valvular disease  - RCRI score of 1  - METS >4  - In the setting of LLE angiogram, he is optimized from cardiovascular standpoint to proceed with planned procedure with routine hemodynamic monitoring.     #PAD  - continue home ASA, plavix, and lipitor    #HTN:  - BP well controlled, monitor routine hemodynamics.  - On home lisinopril 40 mg and Metoprolol succinate 50mg     - Follow outpatient with Dr. Chu  - All other workup per primary team.                  consult for cardiac clearance for LLE angiogram  Last seen by Dr. Chu on 3/1/24.  TTE (1/2/24): LVEF 61%  Stress test 1/24/24: no evidence of ischemia      RCRI score of 2   Pt is a 65 y/o M with PMHx DM2, hx osteomyelitis, CAD, PAD s/p RLE angioplasty 2020, s/p surgical amputation of R forefoot , s/p L PT angioplasty 12/2/24 for L lat foot DFU on plavix, HTN, HLD sent in by wound clinic for left foot infections and multiple wounds. Vascular surgery to evaluate further with LLE angiogram.     Cardiology consulted for pre-surgical clearance.    #Cardiac clearance  - Patient is not complaining of any cardiac symptoms at this time.  - EKG: Sinus rhythm with premature supraventricular complexes  - No meaningful evidence of volume overload.  - Previous TTE (1/2/24) shows EF 61%, Normal LV mass and diastolic function, Normal RV size and function.  - NST done 1/2024 with small sized, mild defect in the basal inferior wall that is fixed and partially normalizes with prone, suggestive of diaphragmatic artifact.   - Pt has no active ischemia, decompensated heart failure, unstable arrythmia, or severe stenotic valvular disease  - In the setting of LLE angiogram, he is optimized from cardiovascular standpoint to proceed with planned procedure with routine hemodynamic monitoring.     #PAD  - continue home ASA, plavix, and lipitor    #HTN:  - BP well controlled, monitor routine hemodynamics.  - On home lisinopril 40 mg and Metoprolol succinate 50mg     - Follow outpatient with Dr. Chu  - All other workup per primary team.

## 2025-05-01 NOTE — PROGRESS NOTE ADULT - PROBLEM SELECTOR PLAN 2
Chronic. Pt follows with Vascular outpt. Pt s/p L PT angioplasty 12/2/24 for L lat foot DFU on Plavix  -C/w ASA and Plavix and Lipitor   -Plan for LLE angio 5/2 with vascular  - Cardiology ([facility]) consulted for clearance Chronic. Pt follows with Vascular outpt. Pt s/p L PT angioplasty 12/2/24 for L lat foot DFU on Plavix  -C/w ASA and Plavix and Lipitor   -Plan for LLE angio 5/2 with vascular  - Cardiology ([facility] consulted for cardiac clearance    Patient with METS > 4, with no current modifiable risk factors - he is medically optimized to proceed with planned intervention with routine hemodynamic monitoring

## 2025-05-01 NOTE — DISCHARGE NOTE PROVIDER - NSDCCPCAREPLAN_GEN_ALL_CORE_FT
PRINCIPAL DISCHARGE DIAGNOSIS  Diagnosis: Osteomyelitis of ankle or foot, acute  Assessment and Plan of Treatment: You came into the hospital with an infection in your left foot that went down to bone. You were started on IV antibiotics      SECONDARY DISCHARGE DIAGNOSES  Diagnosis: PAD (peripheral artery disease)  Assessment and Plan of Treatment: You had an arterial scan of your legs done which showed evidence of blockages. You underwent an angiogram of your left leg     PRINCIPAL DISCHARGE DIAGNOSIS  Diagnosis: Osteomyelitis of ankle or foot, acute  Assessment and Plan of Treatment: You came into the hospital with an infection in your left foot that went down to bone. You underwent LE angiogram on 5/2 showing occluded PT. You were started on IV antibiotics and placed on PICC line on 5/5 for further antibiotic coverage (rocephin sent to [facility] care for infusions).  Please remain non weight bearing as much as possible, dressings noted to be falling off foot   Wound Care Instructions:  -Keep dressing clean, dry, and intact to the right foot  -Apply DSD, change every other day   -Patient NWB as much as possible left foot in surgical shoe   -Monitor for any signs of infection including redness, swelling in the leg above the bandage, nausea/vomiting/fever/chills/chest pain/shortness of breath, if any are present patient must report to the emergency department immediately  -Please follow up with Dr. Stark within 5 days after discharge at [facility]. Please call to make an appointment [phone].  Please follow up with your PCP within one week after discharge for further management.         SECONDARY DISCHARGE DIAGNOSES  Diagnosis: PAD (peripheral artery disease)  Assessment and Plan of Treatment: You had an arterial scan of your legs done which showed evidence of blockages. You underwent an angiogram of your left leg as discussed above.  Please follow up with Dr. Jones (vascular surgery) for futher management including limflow procedure for further care within 5 days. Please follow up with your PCP within one week after discharge for further management.        PRINCIPAL DISCHARGE DIAGNOSIS  Diagnosis: Osteomyelitis of ankle or foot, acute  Assessment and Plan of Treatment: You came into the hospital with an infection in your left foot that went down to bone. You underwent LE angiogram on 5/2 showing occluded PT.   You were started on IV antibiotics and placed on PICC line on 5/5 for further antibiotic coverage (rocephin sent to [facility] for infusions). You will complete 6 week course of antibiotics until 6/10/25.   You will need CMP, CBC, ESR CRP to be checked.   Please remain non weight bearing as much as possible, dressings noted to be falling off foot   Wound Care Instructions:  -Keep dressing clean, dry, and intact to the right foot  -Apply DSD, change every other day   -Patient NWB as much as possible left foot in surgical shoe   -Monitor for any signs of infection including redness, swelling in the leg above the bandage, nausea/vomiting/fever/chills/chest pain/shortness of breath, if any are present patient must report to the emergency department immediately  -Please follow up with Dr. Stark within 5 days after discharge at [facility]. Please call to make an appointment [phone].  Please follow up with your PCP and Dr. Brasher ( Infectious disease doctor- Phone number [phone])  within one week after discharge for further management.         SECONDARY DISCHARGE DIAGNOSES  Diagnosis: PAD (peripheral artery disease)  Assessment and Plan of Treatment: You had an arterial scan of your legs done which showed evidence of blockages. You underwent an angiogram of your left leg as discussed above.  Please follow up with Dr. Jones (vascular surgery) for futher management including limflow procedure for further care within 5 days. Please follow up with your PCP within one week after discharge for further management.        PRINCIPAL DISCHARGE DIAGNOSIS  Diagnosis: Osteomyelitis of ankle or foot, acute  Assessment and Plan of Treatment: You came into the hospital with an infection in your left foot that went down to bone. You underwent LE angiogram on 5/2 showing occluded PT.   You were started on IV antibiotics and placed on PICC line on 5/5 for further antibiotic coverage (rocephin sent to [facility] for infusions). You will complete 6 week course of antibiotics until 6/10/25. Rocephin 2 gm IVPB Daily  via PICC line   You will need CMP, CBC, ESR CRP to be checked.   Please remain non weight bearing as much as possible, dressings noted to be falling off foot   Wound Care Instructions:  -Keep dressing clean, dry, and intact to the right foot  -Apply DSD, change every other day   -Patient NWB as much as possible left foot in surgical shoe   -Monitor for any signs of infection including redness, swelling in the leg above the bandage, nausea/vomiting/fever/chills/chest pain/shortness of breath, if any are present patient must report to the emergency department immediately  -Please follow up with Dr. Stark within 5 days after discharge at [facility]. Please call to make an appointment [phone].  Please follow up with your PCP and Dr. Brasher ( Infectious disease doctor- Phone number [phone])  within one week after discharge for further management.         SECONDARY DISCHARGE DIAGNOSES  Diagnosis: PAD (peripheral artery disease)  Assessment and Plan of Treatment: You had an arterial scan of your legs done which showed evidence of blockages. You underwent an angiogram of your left leg as discussed above.  Please follow up with Dr. Jones (vascular surgery) for futher management including limflow procedure for further care within 5 days. Please follow up with your PCP within one week after discharge for further management.       Diagnosis: HTN (hypertension)  Assessment and Plan of Treatment:     Diagnosis: Type 2 diabetes mellitus  Assessment and Plan of Treatment:      PRINCIPAL DISCHARGE DIAGNOSIS  Diagnosis: Osteomyelitis of ankle or foot, acute  Assessment and Plan of Treatment: You came into the hospital with an infection in your left foot that went down to bone. You underwent LE angiogram on 5/2 showing occluded PT.   You were started on IV antibiotics and placed on PICC line on 5/5 for further antibiotic coverage (rocephin sent to [facility] for infusions). You will complete 6 week course of antibiotics until 6/10/25. Rocephin 2 gm IVPB Daily  via PICC line   You will need CMP, CBC, ESR CRP to be checked.   Please remain non weight bearing as much as possible, dressings noted to be falling off foot   Wound Care Instructions:  -Keep dressing clean, dry, and intact to the right foot  -Apply DSD, change every other day   -Patient NWB as much as possible left foot in surgical shoe   -Monitor for any signs of infection including redness, swelling in the leg above the bandage, nausea/vomiting/fever/chills/chest pain/shortness of breath, if any are present patient must report to the emergency department immediately  -Please follow up with Dr. Stark within 5 days after discharge at [facility]. Please call to make an appointment [phone].  Please follow up with your PCP and Dr. Brasher ( Infectious disease doctor- Phone number [phone])  within one week after discharge for further management.         SECONDARY DISCHARGE DIAGNOSES  Diagnosis: PAD (peripheral artery disease)  Assessment and Plan of Treatment: You had an arterial scan of your legs done which showed evidence of blockages. You underwent an angiogram of your left leg as discussed above.  Continue your home aspirin, plavix, gabapentin and statin  Please follow up with Dr. Jones (vascular surgery) for futher management including limflow procedure for further care within 5 days. Please follow up with your PCP within one week after discharge for further management.       Diagnosis: Type 2 diabetes mellitus  Assessment and Plan of Treatment: Continue your home jardiance, metformin, trulicity  Your A1C was 8.1  Diabetic diet compliance       Diagnosis: HTN (hypertension)  Assessment and Plan of Treatment: Continue your home lisinopril, metoprolol succinate daily   Low salt diet

## 2025-05-01 NOTE — PHARMACOTHERAPY INTERVENTION NOTE - COMMENTS
Patient is a 64y Male presenting with foot cellulitis/OM being treated with vancomycin 1000mg q12. Vancomycin level 04/30 14:45 = 7.5 ug/mL, current AUC/GOLDEN = 364 hr*ug/mL. Discussed with Dr. Calhoun, recommended increasing dose of vancomycin to 1250mg q12 to achieve goal therapeutic AUC/GOLDEN 400-600 hr*ug/mL. Recommended pre-4th trough for continued pharmacokinetic monitoring. Accepted and order entered.

## 2025-05-01 NOTE — DISCHARGE NOTE PROVIDER - PROVIDER TOKENS
PROVIDER:[TOKEN:[[id number]],FOLLOWUP:[1 week]] PROVIDER:[TOKEN:[[id number]],FOLLOWUP:[1 week]],PROVIDER:[TOKEN:[[id number]]],PROVIDER:[TOKEN:[[id number]]] PROVIDER:[TOKEN:[[id number]],FOLLOWUP:[1 week]],PROVIDER:[TOKEN:[[id number]]],PROVIDER:[TOKEN:[[id number]]],PROVIDER:[TOKEN:[[id number]]]

## 2025-05-01 NOTE — DISCHARGE NOTE PROVIDER - NSDCHHHOMEBOUND_GEN_ALL_CORE
Psychiatry recommendations reviewed.  Patient without capacity to make medical decisions.  Spoke with patient and granddaughter present.  Will discuss with Anesthesia regarding operative interventions for fistulogram.   Fall risk

## 2025-05-01 NOTE — DISCHARGE NOTE PROVIDER - NPI NUMBER (FOR SYSADMIN USE ONLY) :
[[id number]] [[id number]],[[id number]],[[id number]] [[id number]],[[id number]],[[id number]],[[id number]]

## 2025-05-01 NOTE — DISCHARGE NOTE PROVIDER - NSDCHHNEEDSERVICE_GEN_ALL_CORE
Teaching and training Medication teaching and assessment/Teaching and training/Wound care and assessment

## 2025-05-01 NOTE — PROGRESS NOTE ADULT - ASSESSMENT
65 y/o M with PMHx DM2, hx osteomyelitis, CAD, PAD s/p RLE angioplasty 2020, s/p surgical amputation of R forefoot  S/P L PT angioplasty 12/2/24 for L lat foot DFU on plavix, HTN who presents to the hospital at instruction of wound care for a L foot wound. Pt has been followed in wound care and has worsening left foot ulcers and increased tissue loss. He has had no vascular followup since his L PT angioplasty. Know to have single vessel runoff and incomplete plantar arch from completion angio 2024. MRI March 2025 concerning for osteo.  Pt with nonpalpable DP/PT  Admitted to medicine for IV abx for osteo  FRANCISCA/PVR's reviewed  Will need LLE angio for further evaluation scheduled for Friday 5/2 am  Cardiac and medical optimization requested  Cont ASA and Plavix  Cont abx per ID  Local wound care as per podiatry  Discussed with Dr Jones 63 y/o M with PMHx DM2, hx osteomyelitis, CAD, PAD s/p RLE angioplasty 2020, s/p surgical amputation of R forefoot  S/P L PT angioplasty 12/2/24 for L lat foot DFU on plavix, HTN who presents to the hospital at instruction of wound care for a L foot wound. Pt has been followed in wound care and has worsening left foot ulcers and increased tissue loss. He has had no vascular followup since his L PT angioplasty. Know to have single vessel runoff and incomplete plantar arch from completion angio 2024. MRI March 2025 concerning for osteo.  Pt with nonpalpable DP/PT  Admitted to medicine for IV abx for osteo  FRANCISCA/PVR's reviewed  Will need LLE angio for further evaluation scheduled for Friday 5/2 am  +BC 4/29  Cardiac and medical optimization requested  Cont ASA and Plavix  Cont abx per ID; f/u repeat BC  Local wound care as per podiatry  Discussed with Dr Jones

## 2025-05-01 NOTE — PROGRESS NOTE ADULT - SUBJECTIVE AND OBJECTIVE BOX
Patient is a 64y old  Male who presents with a chief complaint of Foot infection (01 May 2025 07:39)    HPI: 65 y/o M with PMHx DM2, hx osteomyelitis, CAD, PAD s/p RLE angioplasty 2020, s/p surgical amputation of R forefoot , s/p L PT angioplasty 12/2/24 for L lat foot DFU on plavix, HTN sent in by wound clinic for left foot infections and multiple wounds. Pt states he was at [facility] for hyperbaric therapy today and they noticed his L foot had drainage with increased erythema and edema so they told him to come to the ED. Pt states yesterday his foot was dry, without drainage or swelling. Patient notes he follow with ID outpatient and was prescribed Bactrim 2 weeks ago for the chronic foot wounds without any improvement. Pt denies fever/chills, CP, SOB, HA, dizziness, n/v/d. Pt also denies any current pain in the foot.    ED Course:   Vitals: BP: 107/66 , HR: 87 , Temp: 98.1F , RR: 16 , SpO2: 100 % on RA  Labs:  ESR 53, WBC 12.23, Hgb 11, BUN 25, Glucose 137,   X-ray Foot: Cutaneous defect adjacent to the base of the fifth metatarsal again evident with no gross cortical destruction or soft tissue emphysema. Follow-up MRI can be ordered    Received in the ED:  3.375g Zosyn IVPBx1, Vanco 1g IVPB x1    INTERVAL HPI:  4/30: Pt seen and examined at bedside this morning, feels well, no acute complaints. On IV abx vanc and cefepime, planning for LLE angio on 5/2 continue Abx  5/1: Pt seen and examined this morning, feels well no acute complaints. Requesting left foot dressing change, c/d/i. Pain controlled. On IV abx vanc and cefepime, for LLE angio tomorrow    OVERNIGHT EVENTS: None    Home Medications:  aspirin 81 mg oral delayed release tablet: 1 tab(s) orally once a day (29 Apr 2025 16:40)  atorvastatin 40 mg oral tablet: 1 tab(s) orally once a day (29 Apr 2025 16:40)  clopidogrel 75 mg oral tablet: 1 tab(s) orally once a day (29 Apr 2025 16:40)  gabapentin 300 mg oral capsule: 1 cap(s) orally 3 times a day (29 Apr 2025 16:40)  Jardiance 25 mg oral tablet: 1 tab(s) orally once a day (29 Apr 2025 16:40)  lisinopril 40 mg oral tablet: 1 tab(s) orally once a day (29 Apr 2025 16:40)  metFORMIN 1000 mg oral tablet: 1 tab(s) orally 2 times a day (29 Apr 2025 16:40)  metoprolol succinate 50 mg oral tablet, extended release: 1 tab(s) orally once a day (29 Apr 2025 16:40)  Trulicity Pen 1.5 mg/0.5 mL subcutaneous solution: 1.5 milligram(s) subcutaneously once a week (29 Apr 2025 16:40)      MEDICATIONS  (STANDING):  aspirin enteric coated 81 milliGRAM(s) Oral daily  atorvastatin 40 milliGRAM(s) Oral at bedtime  cefepime   IVPB 2000 milliGRAM(s) IV Intermittent every 8 hours  cefepime   IVPB      clopidogrel Tablet 75 milliGRAM(s) Oral daily  dextrose 5%. 1000 milliLiter(s) (50 mL/Hr) IV Continuous <Continuous>  dextrose 5%. 1000 milliLiter(s) (100 mL/Hr) IV Continuous <Continuous>  dextrose 50% Injectable 25 Gram(s) IV Push once  dextrose 50% Injectable 12.5 Gram(s) IV Push once  dextrose 50% Injectable 25 Gram(s) IV Push once  enoxaparin Injectable 40 milliGRAM(s) SubCutaneous every 24 hours  gabapentin 300 milliGRAM(s) Oral three times a day  glucagon  Injectable 1 milliGRAM(s) IntraMuscular once  insulin lispro (ADMELOG) corrective regimen sliding scale   SubCutaneous three times a day before meals  insulin lispro (ADMELOG) corrective regimen sliding scale   SubCutaneous at bedtime  lisinopril 40 milliGRAM(s) Oral daily  metoprolol succinate ER 50 milliGRAM(s) Oral daily  sodium chloride 0.9%. 1000 milliLiter(s) (75 mL/Hr) IV Continuous <Continuous>  vancomycin  IVPB 1000 milliGRAM(s) IV Intermittent every 12 hours    MEDICATIONS  (PRN):  dextrose Oral Gel 15 Gram(s) Oral once PRN Blood Glucose LESS THAN 70 milliGRAM(s)/deciliter      No Known Allergies      Social History:  Tobacco: Denies  EtOH: Denies  Recreational drug use: Denies  Lives with: Wife at home   Ambulates: Independently   ADLs: Independent (29 Apr 2025 15:39)      REVIEW OF SYSTEMS:  CONSTITUTIONAL: No fever, No chills, No fatigue, No myalgia, No Body ache, No Weakness  EYES: No eye pain,  No visual disturbances, No discharge, No Redness  ENMT: No ear pain, No nose bleed, No vertigo; No sinus pain, No throat pain, No Congestion  NECK: No pain, No stiffness  RESPIRATORY: No cough, No wheezing, No hemoptysis, No shortness of breath  CARDIOVASCULAR: No chest pain, No palpitations  GASTROINTESTINAL: No abdominal pain, No epigastric pain. No nausea, No vomiting, No diarrhea, No constipation; [  ] BM  GENITOURINARY: No dysuria, No frequency, No urgency, No hematuria, No incontinence  NEUROLOGICAL: No headaches, No dizziness, No numbness, No tingling, No tremors, No weakness  EXTREMITIES: No Swelling, No Pain, No Edema  SKIN: [ x ] No itching, burning, rashes, or lesions   MUSCULOSKELETAL: No joint pain, No joint swelling; No muscle pain, No back pain, No extremity pain  PSYCHIATRIC: No depression, No anxiety, No mood swings, No difficulty sleeping at night  PAIN SCALE: [x  ] None  [  ] Other-  ROS Unable to obtain due to: [  ] Dementia  [  ] Lethargy  [  ] Sedated  [  ] Non verbal  REST OF REVIEW OF SYSTEMS: [ x ] Normal     Vital Signs Last 24 Hrs  T(C): 36.8 (01 May 2025 04:47), Max: 37.2 (30 Apr 2025 20:36)  T(F): 98.2 (01 May 2025 04:47), Max: 99 (30 Apr 2025 20:36)  HR: 68 (01 May 2025 04:47) (68 - 86)  BP: 137/78 (01 May 2025 04:47) (130/68 - 137/78)  BP(mean): --  RR: 19 (01 May 2025 04:47) (18 - 19)  SpO2: 97% (01 May 2025 04:47) (95% - 98%)    Parameters below as of 01 May 2025 04:47  Patient On (Oxygen Delivery Method): room air        CAPILLARY BLOOD GLUCOSE      POCT Blood Glucose.: 194 mg/dL (01 May 2025 07:35)  POCT Blood Glucose.: 216 mg/dL (30 Apr 2025 21:46)  POCT Blood Glucose.: 161 mg/dL (30 Apr 2025 17:03)  POCT Blood Glucose.: 228 mg/dL (30 Apr 2025 11:44)      I&O's Summary    30 Apr 2025 07:01  -  01 May 2025 07:00  --------------------------------------------------------  IN: 350 mL / OUT: 0 mL / NET: 350 mL      PHYSICAL EXAM:  GENERAL:  [x  ] NAD, [x  ] Well appearing, [  ] Agitated, [  ] Drowsy, [  ] Lethargy, [  ] Confused   HEAD:  [x  ] Normal, [  ] Other  EYES:  [x  ] EOMI, [ x ] PERRL, [ x ] Conjunctiva and sclera clear normal, [  ] Other, [  ] Pallor, [  ] Discharge  ENMT:  [ x ] Normal, [ x ] Moist mucous membranes, [ x ] Good dentition, [ x ] No thrush  NECK:  [x  ] Supple, [  x] No JVD, [ x ] Normal thyroid, [  ] Lymphadenopathy, [  ] Other  CHEST/LUNG:  [ x ] Clear to auscultation bilaterally, [ x ] Breath Sounds equal B/L / decreased, [  ] Poor effort, [ x ] No rales, [x  ] No rhonchi, [ x ] No wheezing  HEART:  [ x ] Regular rate and rhythm, [  ] Tachycardia, [  ] Bradycardia, [  ] Irregular, [x  ] No murmurs, No rubs, No gallops, [  ] PPM in place (Mfr:  )  ABDOMEN:  [ x ] Soft, [ x ] Nontender, [x  ] Nondistended, [ x ] No mass, [x  ] Bowel sounds present, [  ] Obese  NERVOUS SYSTEM:  [ x ] Alert & Oriented x3, [ x ] Nonfocal, [  ] Confusion, [  ] Encephalopathic, [  ] Sedated, [  ] Unable to assess, [  ] Dementia, [  ] Other-  EXTREMITIES:  [ x ] 2+ Peripheral Pulses, No clubbing, No cyanosis,  [  ] Edema B/L lower EXT, x[  ] PVD stasis skin changes B/L lower EXT, [x  ] Wound - left foot in dressing c/d/i  LYMPH:  No lymphadenopathy noted  SKIN:  [ x] No rashes or lesions, [  ] Pressure ulcers, [  ] Ecchymosis, [  ] Skin tears, [ x ] Other Left foot wound-necrotic ulcers base medial, lateral aspects of foot, nonstageable necrotic heel ulcer left, with edema, reduced erythema, LL Ext  cellulitic improved,, negative drainage  distal hallux ulcer necrotic base, No Drainage     DIET: Diet, NPO after Midnight:      NPO Start Date: 01-May-2025,   NPO Start Time: 23:59  Except Medications (05-01-25 @ 07:48)  Diet, NPO after Midnight:      NPO Start Date: 01-May-2025,   NPO Start Time: 23:59  Except Medications (05-01-25 @ 03:56)  Diet, Consistent Carbohydrate w/Evening Snack:   Low Sodium (04-29-25 @ 15:41)      LABS:                        11.9   12.91 )-----------( 285      ( 01 May 2025 05:52 )             36.0     01 May 2025 05:52    136    |  107    |  27     ----------------------------<  174    4.6     |  26     |  1.00     Ca    9.3        01 May 2025 05:52      PT/INR - ( 29 Apr 2025 12:45 )   PT: 12.2 sec;   INR: 1.04 ratio           Urinalysis Basic - ( 01 May 2025 05:52 )    Color: x / Appearance: x / SG: x / pH: x  Gluc: 174 mg/dL / Ketone: x  / Bili: x / Urobili: x   Blood: x / Protein: x / Nitrite: x   Leuk Esterase: x / RBC: x / WBC x   Sq Epi: x / Non Sq Epi: x / Bacteria: x      Culture Results:   Growth in aerobic bottle: Gram Positive Cocci in Clusters  Direct identification is available within approximately 3-5  hours either by Blood Panel Multiplexed PCR or Direct  MALDI-TOF. Details: [email or web address] (04-29 @ 12:45)  Culture Results:   No growth at 24 hours (04-29 @ 12:30)    culture blood  -- Blood Blood 04-29 @ 12:45    culture urine  --  04-29 @ 12:45  culture blood  -- Blood Blood 04-29 @ 12:30    culture urine  --  04-29 @ 12:30          Culture - Blood (collected 29 Apr 2025 12:45)  Source: Blood Blood  Gram Stain (01 May 2025 04:09):    Growth in aerobic bottle: Gram Positive Cocci in Clusters  Preliminary Report (01 May 2025 04:09):    Growth in aerobic bottle: Gram Positive Cocci in Clusters    Direct identification is available within approximately 3-5    hours either by Blood Panel Multiplexed PCR or Direct    MALDI-TOF. Details: [email or web address]  Organism: Blood Culture PCR (01 May 2025 05:53)  Organism: Blood Culture PCR (01 May 2025 05:53)    Culture - Blood (collected 29 Apr 2025 12:30)  Source: Blood Blood  Preliminary Report (30 Apr 2025 19:02):    No growth at 24 hours    RADIOLOGY & ADDITIONAL TESTS: No new imaging      HEALTH ISSUES - PROBLEM Dx:  Infection of left foot    PAD (peripheral artery disease)    DELORIS (acute kidney injury)    Type 2 diabetes mellitus    HTN (hypertension)    Need for prophylactic measure    Diabetic ulcer of left foot    Osteomyelitis of left foot    Unstageable pressure ulcer of left foot          Consultant(s) Notes Reviewed:  [x  ] YES     Care Discussed with [ x ] Consultants, [ x ] Patient, [  ] Family, [  ] HCP, [  ] , [  ] Social Service, [  ] RN, [  ] Physical Therapy, [  ] Palliative Care Team  DVT PPX: [ x ] Lovenox, [  ] SC Heparin, [  ] Coumadin, [  ] Xarelto, [  ] Eliquis, [  ] Pradaxa, [  ] IV Heparin drip, [  ] SCD, [  ] Ambulation, [  ] Contraindicated 2/2 GI Bleed, [  ] Contraindicated 2/2  Bleed, [  ] Contraindicated 2/2 Brain Bleed  Advanced Directive: [ x ] None, [  ] DNR/DNI Patient is a 64y old  Male who presents with a chief complaint of Foot infection (01 May 2025 07:39)    HPI: 63 y/o M with PMHx DM2, hx osteomyelitis, CAD, PAD s/p RLE angioplasty 2020, s/p surgical amputation of R forefoot , s/p L PT angioplasty 12/2/24 for L lat foot DFU on plavix, HTN sent in by wound clinic for left foot infections and multiple wounds. Pt states he was at [facility] for hyperbaric therapy today and they noticed his L foot had drainage with increased erythema and edema so they told him to come to the ED. Pt states yesterday his foot was dry, without drainage or swelling. Patient notes he follow with ID outpatient and was prescribed Bactrim 2 weeks ago for the chronic foot wounds without any improvement. Pt denies fever/chills, CP, SOB, HA, dizziness, n/v/d. Pt also denies any current pain in the foot.    ED Course:   Vitals: BP: 107/66 , HR: 87 , Temp: 98.1F , RR: 16 , SpO2: 100 % on RA  Labs:  ESR 53, WBC 12.23, Hgb 11, BUN 25, Glucose 137,   X-ray Foot: Cutaneous defect adjacent to the base of the fifth metatarsal again evident with no gross cortical destruction or soft tissue emphysema. Follow-up MRI can be ordered    Received in the ED:  3.375g Zosyn IVPBx1, Vanco 1g IVPB x1    INTERVAL HPI:  4/30: Pt seen and examined at bedside this morning, feels well, no acute complaints. On IV abx vanc and cefepime, planning for LLE angio on 5/2 continue Abx  5/1: Pt seen and examined this morning, feels well no acute complaints. Requesting left foot dressing change, c/d/i. Pain controlled. On IV abx vanc and cefepime, for LLE angio tomorrow NPO    OVERNIGHT EVENTS: None    Home Medications:  aspirin 81 mg oral delayed release tablet: 1 tab(s) orally once a day (29 Apr 2025 16:40)  atorvastatin 40 mg oral tablet: 1 tab(s) orally once a day (29 Apr 2025 16:40)  clopidogrel 75 mg oral tablet: 1 tab(s) orally once a day (29 Apr 2025 16:40)  gabapentin 300 mg oral capsule: 1 cap(s) orally 3 times a day (29 Apr 2025 16:40)  Jardiance 25 mg oral tablet: 1 tab(s) orally once a day (29 Apr 2025 16:40)  lisinopril 40 mg oral tablet: 1 tab(s) orally once a day (29 Apr 2025 16:40)  metFORMIN 1000 mg oral tablet: 1 tab(s) orally 2 times a day (29 Apr 2025 16:40)  metoprolol succinate 50 mg oral tablet, extended release: 1 tab(s) orally once a day (29 Apr 2025 16:40)  Trulicity Pen 1.5 mg/0.5 mL subcutaneous solution: 1.5 milligram(s) subcutaneously once a week (29 Apr 2025 16:40)      MEDICATIONS  (STANDING):  aspirin enteric coated 81 milliGRAM(s) Oral daily  atorvastatin 40 milliGRAM(s) Oral at bedtime  cefepime   IVPB 2000 milliGRAM(s) IV Intermittent every 8 hours  cefepime   IVPB      clopidogrel Tablet 75 milliGRAM(s) Oral daily  dextrose 5%. 1000 milliLiter(s) (50 mL/Hr) IV Continuous <Continuous>  dextrose 5%. 1000 milliLiter(s) (100 mL/Hr) IV Continuous <Continuous>  dextrose 50% Injectable 25 Gram(s) IV Push once  dextrose 50% Injectable 12.5 Gram(s) IV Push once  dextrose 50% Injectable 25 Gram(s) IV Push once  enoxaparin Injectable 40 milliGRAM(s) SubCutaneous every 24 hours  gabapentin 300 milliGRAM(s) Oral three times a day  glucagon  Injectable 1 milliGRAM(s) IntraMuscular once  insulin lispro (ADMELOG) corrective regimen sliding scale   SubCutaneous three times a day before meals  insulin lispro (ADMELOG) corrective regimen sliding scale   SubCutaneous at bedtime  lisinopril 40 milliGRAM(s) Oral daily  metoprolol succinate ER 50 milliGRAM(s) Oral daily  sodium chloride 0.9%. 1000 milliLiter(s) (75 mL/Hr) IV Continuous <Continuous>  vancomycin  IVPB 1000 milliGRAM(s) IV Intermittent every 12 hours    MEDICATIONS  (PRN):  dextrose Oral Gel 15 Gram(s) Oral once PRN Blood Glucose LESS THAN 70 milliGRAM(s)/deciliter      No Known Allergies      Social History:  Tobacco: Denies  EtOH: Denies  Recreational drug use: Denies  Lives with: Wife at home   Ambulates: Independently   ADLs: Independent (29 Apr 2025 15:39)      REVIEW OF SYSTEMS: i am OK  CONSTITUTIONAL: No fever, No chills, No fatigue, No myalgia, No Body ache, No Weakness  EYES: No eye pain,  No visual disturbances, No discharge, No Redness  ENMT: No ear pain, No nose bleed, No vertigo; No sinus pain, No throat pain, No Congestion  NECK: No pain, No stiffness  RESPIRATORY: No cough, No wheezing, No hemoptysis, No shortness of breath  CARDIOVASCULAR: No chest pain, No palpitations  GASTROINTESTINAL: No abdominal pain, No epigastric pain. No nausea, No vomiting, No diarrhea, No constipation; [  ] BM  GENITOURINARY: No dysuria, No frequency, No urgency, No hematuria, No incontinence  NEUROLOGICAL: No headaches, No dizziness, No numbness, No tingling, No tremors, No weakness  EXTREMITIES: No Swelling, No Pain, No Edema  SKIN: [ x ] No itching, burning, rashes, or lesions   MUSCULOSKELETAL: No joint pain, No joint swelling; No muscle pain, No back pain, No extremity pain  PSYCHIATRIC: No depression, No anxiety, No mood swings, No difficulty sleeping at night  PAIN SCALE: [x  ] None  [  ] Other-  ROS Unable to obtain due to: [  ] Dementia  [  ] Lethargy  [  ] Sedated  [  ] Non verbal  REST OF REVIEW OF SYSTEMS: [ x ] Normal     Vital Signs Last 24 Hrs  T(C): 36.8 (01 May 2025 04:47), Max: 37.2 (30 Apr 2025 20:36)  T(F): 98.2 (01 May 2025 04:47), Max: 99 (30 Apr 2025 20:36)  HR: 68 (01 May 2025 04:47) (68 - 86)  BP: 137/78 (01 May 2025 04:47) (130/68 - 137/78)  BP(mean): --  RR: 19 (01 May 2025 04:47) (18 - 19)  SpO2: 97% (01 May 2025 04:47) (95% - 98%)    Parameters below as of 01 May 2025 04:47  Patient On (Oxygen Delivery Method): room air        CAPILLARY BLOOD GLUCOSE      POCT Blood Glucose.: 194 mg/dL (01 May 2025 07:35)  POCT Blood Glucose.: 216 mg/dL (30 Apr 2025 21:46)  POCT Blood Glucose.: 161 mg/dL (30 Apr 2025 17:03)  POCT Blood Glucose.: 228 mg/dL (30 Apr 2025 11:44)      I&O's Summary    30 Apr 2025 07:01  -  01 May 2025 07:00  --------------------------------------------------------  IN: 350 mL / OUT: 0 mL / NET: 350 mL      PHYSICAL EXAM:  GENERAL:  [x  ] NAD, [x  ] Well appearing, [  ] Agitated, [  ] Drowsy, [  ] Lethargy, [  ] Confused   HEAD:  [x  ] Normal, [  ] Other  EYES:  [x  ] EOMI, [ x ] PERRL, [ x ] Conjunctiva and sclera clear normal, [  ] Other, [  ] Pallor, [  ] Discharge  ENMT:  [ x ] Normal, [ x ] Moist mucous membranes, [ x ] Good dentition, [ x ] No thrush  NECK:  [x  ] Supple, [  x] No JVD, [ x ] Normal thyroid, [  ] Lymphadenopathy, [  ] Other  CHEST/LUNG:  [ x ] Clear to auscultation bilaterally, [ x ] Breath Sounds equal B/L / decreased, [  ] Poor effort, [ x ] No rales, [x  ] No rhonchi, [ x ] No wheezing  HEART:  [ x ] Regular rate and rhythm, [  ] Tachycardia, [  ] Bradycardia, [  ] Irregular, [x  ] No murmurs, No rubs, No gallops, [  ] PPM in place (Mfr:  )  ABDOMEN:  [ x ] Soft, [ x ] Nontender, [x  ] Nondistended, [ x ] No mass, [x  ] Bowel sounds present, [  ] Obese  NERVOUS SYSTEM:  [ x ] Alert & Oriented x3, [ x ] Nonfocal, [  ] Confusion, [  ] Encephalopathic, [  ] Sedated, [  ] Unable to assess, [  ] Dementia, [  ] Other-  EXTREMITIES:  [ x ] 2+ Peripheral Pulses, No clubbing, No cyanosis,  [  ] Edema B/L lower EXT, [x ] PVD stasis skin changes B/L lower EXT, [x  ] Wound - left foot in dressing c/d/i  LYMPH:  No lymphadenopathy noted  SKIN:  [ x] No rashes or lesions, [  ] Pressure ulcers, [  ] Ecchymosis, [  ] Skin tears, [ x ] Other Left foot wound-necrotic ulcers base medial, lateral aspects of foot, nonstageable necrotic heel ulcer left, with edema, reduced erythema, LL Ext  cellulitic improved,, negative drainage  distal hallux ulcer necrotic base, No Drainage     DIET: Diet, NPO after Midnight:      NPO Start Date: 01-May-2025,   NPO Start Time: 23:59  Except Medications (05-01-25 @ 07:48)  Diet, NPO after Midnight:      NPO Start Date: 01-May-2025,   NPO Start Time: 23:59  Except Medications (05-01-25 @ 03:56)  Diet, Consistent Carbohydrate w/Evening Snack:   Low Sodium (04-29-25 @ 15:41)      LABS:                        11.9   12.91 )-----------( 285      ( 01 May 2025 05:52 )             36.0     01 May 2025 05:52    136    |  107    |  27     ----------------------------<  174    4.6     |  26     |  1.00     Ca    9.3        01 May 2025 05:52      PT/INR - ( 29 Apr 2025 12:45 )   PT: 12.2 sec;   INR: 1.04 ratio           Urinalysis Basic - ( 01 May 2025 05:52 )    Color: x / Appearance: x / SG: x / pH: x  Gluc: 174 mg/dL / Ketone: x  / Bili: x / Urobili: x   Blood: x / Protein: x / Nitrite: x   Leuk Esterase: x / RBC: x / WBC x   Sq Epi: x / Non Sq Epi: x / Bacteria: x      Culture Results:   Growth in aerobic bottle: Gram Positive Cocci in Clusters  Direct identification is available within approximately 3-5  hours either by Blood Panel Multiplexed PCR or Direct  MALDI-TOF. Details: [email or web address] (04-29 @ 12:45)  Culture Results:   No growth at 24 hours (04-29 @ 12:30)    culture blood  -- Blood Blood 04-29 @ 12:45    culture urine  --  04-29 @ 12:45  culture blood  -- Blood Blood 04-29 @ 12:30    culture urine  --  04-29 @ 12:30          Culture - Blood (collected 29 Apr 2025 12:45)  Source: Blood Blood  Gram Stain (01 May 2025 04:09):    Growth in aerobic bottle: Gram Positive Cocci in Clusters  Preliminary Report (01 May 2025 04:09):    Growth in aerobic bottle: Gram Positive Cocci in Clusters    Direct identification is available within approximately 3-5    hours either by Blood Panel Multiplexed PCR or Direct    MALDI-TOF. Details: [email or web address]  Organism: Blood Culture PCR (01 May 2025 05:53)  Organism: Blood Culture PCR (01 May 2025 05:53)    Culture - Blood (collected 29 Apr 2025 12:30)  Source: Blood Blood  Preliminary Report (30 Apr 2025 19:02):    No growth at 24 hours    RADIOLOGY & ADDITIONAL TESTS: No new imaging      HEALTH ISSUES - PROBLEM Dx:  Infection of left foot    PAD (peripheral artery disease)    DELORIS (acute kidney injury)    Type 2 diabetes mellitus    HTN (hypertension)    Need for prophylactic measure    Diabetic ulcer of left foot    Osteomyelitis of left foot    Unstageable pressure ulcer of left foot          Consultant(s) Notes Reviewed:  [x  ] YES     Care Discussed with [ x ] Consultants, [ x ] Patient, [  ] Family, [  ] HCP, [  ] , [  ] Social Service, [x  ] RN, [  ] Physical Therapy, [  ] Palliative Care Team  DVT PPX: [ x ] Lovenox, [  ] SC Heparin, [  ] Coumadin, [  ] Xarelto, [  ] Eliquis, [  ] Pradaxa, [  ] IV Heparin drip, [  ] SCD, [  ] Ambulation, [  ] Contraindicated 2/2 GI Bleed, [  ] Contraindicated 2/2  Bleed, [  ] Contraindicated 2/2 Brain Bleed  Advanced Directive: [ x ] None, [  ] DNR/DNI

## 2025-05-01 NOTE — DISCHARGE NOTE PROVIDER - HOSPITAL COURSE
HPI:  Pt is a 65 y/o M with PMHx DM2, hx osteomyelitis, CAD, PAD s/p RLE angioplasty 2020, s/p surgical amputation of R forefoot , s/p L PT angioplasty 12/2/24 for L lat foot DFU on plavix, HTN sent in by wound clinic for left foot infections and multiple wounds. Pt states he was at [facility] for hyperbaric therapy today and they noticed his L foot had drainage with increased erythema and edema so they told him to come to the ED. Pt states yesterday his foot was dry, without drainage or swelling. Patient notes he follow with ID outpatient and was prescribed Bactrim 2 weeks ago for the chronic foot wounds without any improvement. Pt denies fever/chills, CP, SOB, HA, dizziness, n/v/d. Pt also denies any current pain in the foot.      ED Course:   Vitals: BP: 107/66 , HR: 87 , Temp: 98.1F , RR: 16 , SpO2: 100 % on RA  Labs:  ESR 53, WBC 12.23, Hgb 11, BUN 25, Glucose 137,   X-ray Foot: Cutaneous defect adjacent to the base of the fifth metatarsal again evident with no gross cortical destruction or soft tissue emphysema. Follow-up MRI can be ordered    Received in the ED:  3.375g Zosyn IVPBx1, Vanco 1g IVPB x1   (29 Apr 2025 15:39)      ---  HOSPITAL COURSE: Admitted with concern for left foot cellulitis/osteo, MRI with evidence of left foot 5th metatarsal osteomyelitis, started on IV abx vancomycin and cefepime. Lower extremity arterial duplex suggestive of trifurcation artery disease. Patient on DAPT with aspirin and Plavix, and statin. Vascular surgery consulted, patient underwent LLE angiogram on 5/2    ---  CONSULTANTS:   Podiatry: Dr. Stark  ID: Dr. Melany Calhoun  Vascular: Dr. Jones  Cardio: Dr. Seble Hernandez    ---  TIME SPENT:  I, the attending physician, was physically present for the key portions of the evaluation and management (E/M) service provided. The total amount of time spent reviewing the hospital notes, laboratory values, imaging findings, assessing/counseling the patient, discussing with consultant physicians, social work, nursing staff was -- minutes    ---  Primary care provider was made aware of plan for discharge:      [  ] NO     [  ] YES   HPI:  Pt is a 65 y/o M with PMHx DM2, hx osteomyelitis, CAD, PAD s/p RLE angioplasty 2020, s/p surgical amputation of R forefoot , s/p L PT angioplasty 12/2/24 for L lat foot DFU on plavix, HTN sent in by wound clinic for left foot infections and multiple wounds. Pt states he was at [facility] for hyperbaric therapy today and they noticed his L foot had drainage with increased erythema and edema so they told him to come to the ED. Pt states yesterday his foot was dry, without drainage or swelling. Patient notes he follow with ID outpatient and was prescribed Bactrim 2 weeks ago for the chronic foot wounds without any improvement. Pt denies fever/chills, CP, SOB, HA, dizziness, n/v/d. Pt also denies any current pain in the foot.      ED Course:   Vitals: BP: 107/66 , HR: 87 , Temp: 98.1F , RR: 16 , SpO2: 100 % on RA  Labs:  ESR 53, WBC 12.23, Hgb 11, BUN 25, Glucose 137,   X-ray Foot: Cutaneous defect adjacent to the base of the fifth metatarsal again evident with no gross cortical destruction or soft tissue emphysema. Follow-up MRI can be ordered    Received in the ED:  3.375g Zosyn IVPBx1, Vanco 1g IVPB x1   (29 Apr 2025 15:39)      ---  HOSPITAL COURSE: Admitted with concern for left foot cellulitis/osteo, MRI with evidence of left foot 5th metatarsal osteomyelitis, started on IV abx vancomycin and cefepime, transitioned to Rocephin. Lower extremity arterial duplex suggestive of trifurcation artery disease. Patient on DAPT with aspirin and Plavix, and statin. Vascular surgery consulted, patient underwent LLE angiogram on 5/2 showing occluded PT.     ---  CONSULTANTS:   Podiatry: Dr. Stark  ID: Dr. Melany Calhoun  Vascular: Dr. Jones  Cardio: Dr. Seble Hernandez    ---  TIME SPENT:  I, the attending physician, was physically present for the key portions of the evaluation and management (E/M) service provided. The total amount of time spent reviewing the hospital notes, laboratory values, imaging findings, assessing/counseling the patient, discussing with consultant physicians, social work, nursing staff was -- minutes    ---  Primary care provider was made aware of plan for discharge:      [  ] NO     [  ] YES   HPI:  Pt is a 63 y/o M with PMHx DM2, hx osteomyelitis, CAD, PAD s/p RLE angioplasty 2020, s/p surgical amputation of R forefoot , s/p L PT angioplasty 12/2/24 for L lat foot DFU on plavix, HTN sent in by wound clinic for left foot infections and multiple wounds. Pt states he was at [facility] for hyperbaric therapy today and they noticed his L foot had drainage with increased erythema and edema so they told him to come to the ED. Pt states yesterday his foot was dry, without drainage or swelling. Patient notes he follow with ID outpatient and was prescribed Bactrim 2 weeks ago for the chronic foot wounds without any improvement. Pt denies fever/chills, CP, SOB, HA, dizziness, n/v/d. Pt also denies any current pain in the foot.      ED Course:   Vitals: BP: 107/66 , HR: 87 , Temp: 98.1F , RR: 16 , SpO2: 100 % on RA  Labs:  ESR 53, WBC 12.23, Hgb 11, BUN 25, Glucose 137,   X-ray Foot: Cutaneous defect adjacent to the base of the fifth metatarsal again evident with no gross cortical destruction or soft tissue emphysema. Follow-up MRI can be ordered    Received in the ED:  3.375g Zosyn IVPBx1, Vanco 1g IVPB x1   (29 Apr 2025 15:39)      ---  HOSPITAL COURSE: Admitted with concern for left foot cellulitis/osteo, MRI with evidence of left foot 5th metatarsal osteomyelitis, started on IV abx vancomycin and cefepime, transitioned to Rocephin. Lower extremity arterial duplex suggestive of trifurcation artery disease. Patient on DAPT with aspirin and Plavix, and statin. Vascular surgery consulted, patient underwent LLE angiogram on 5/2 showing occluded PT. Patient was deemed not appropriate for further interventions during current hospital course. Patient was placed on PICC line on 5/5 for continued antibiotic coverage. Patient medically improved throughout hospital course. Patient was medically stable for discharge to home with close outpatient follow up.     Vital Signs Last 24 Hrs  T(C): 36.6 (05 May 2025 04:36), Max: 36.7 (04 May 2025 11:08)  T(F): 97.9 (05 May 2025 04:36), Max: 98.1 (04 May 2025 11:08)  HR: 73 (05 May 2025 04:36) (73 - 82)  BP: 138/66 (05 May 2025 04:36) (127/70 - 151/73)  BP(mean): --  RR: 18 (05 May 2025 04:36) (18 - 18)  SpO2: 96% (05 May 2025 04:36) (95% - 98%)    Parameters below as of 05 May 2025 04:36  Patient On (Oxygen Delivery Method): room air    PHYSICAL EXAM:  GENERAL:  [ x ] NAD, [x ] Well appearing, [  ] Agitated, [  ] Drowsy, [  ] Lethargy, [  ] Confused   HEAD:  [ x ] Normal, [  ] Other  EYES:  [x  ] EOMI, [ x] PERRL, [ x ] Conjunctiva and sclera clear normal, [  ] Other, [  ] Pallor, [  ] Discharge  ENMT:  [ x ] Normal, [ x ] Moist mucous membranes, [ x ] Good dentition, [ x ] No thrush  NECK:  [ x ] Supple, [x  ] No JVD, [ x ] Normal thyroid, [  ] Lymphadenopathy, [  ] Other  CHEST/LUNG:  [ x ] Clear to auscultation bilaterally, [ x ] Breath Sounds equal B/L / decreased, [x  ] Poor effort, [ x ] No rales, [ x ] No rhonchi, [x  ] No wheezing  HEART:  [ x ] Regular rate and rhythm, [  ] Tachycardia, [  ] Bradycardia, [  ] Irregular, [ x ] No murmurs, No rubs, No gallops, [  ] PPM in place (Mfr:  )  ABDOMEN:  [ x ] Soft, [x ] Nontender, [ x ] Nondistended, [ x ] No mass, [ x ] Bowel sounds present, [  ] Obese  NERVOUS SYSTEM:  [ x ] Alert & Oriented x3, [ x ] Nonfocal, [  ] Confusion, [  ] Encephalopathic, [  ] Sedated, [  ] Unable to assess, [  ] Dementia, [  ] Other-  EXTREMITIES:  [ x ] 2+ Peripheral Pulses, No clubbing, No cyanosis,  [  ] Edema B/L lower EXT, [x ] PVD stasis skin changes B/L lower EXT, [x  ] Wound - left foot in dressing c/d/i  SKIN:  [ x] No rashes or lesions, [  ] Pressure ulcers, [  ] Ecchymosis, [  ] Skin tears, [ x ] Other Left foot wound-necrotic ulcers base medial, lateral aspects of foot, nonstageable necrotic heel ulcer left, with edema, reduced erythema, LL Ext  cellulitic improved,, negative drainage  distal hallux ulcer necrotic base, No Drainage         ---  CONSULTANTS:   Podiatry: Dr. Stark  ID: Dr. Melany Calhoun  Vascular: Dr. Jones  Cardio: Dr. Seble Hernandez    ---  TIME SPENT:  I, the attending physician, was physically present for the key portions of the evaluation and management (E/M) service provided. The total amount of time spent reviewing the hospital notes, laboratory values, imaging findings, assessing/counseling the patient, discussing with consultant physicians, social work, nursing staff was -- minutes    ---  Primary care provider was made aware of plan for discharge:      [  ] NO     [  ] YES   HPI:  Pt is a 63 y/o M with PMHx DM2, hx osteomyelitis, CAD, PAD s/p RLE angioplasty 2020, s/p surgical amputation of R forefoot , s/p L PT angioplasty 12/2/24 for L lat foot DFU on plavix, HTN sent in by wound clinic for left foot infections and multiple wounds. Pt states he was at [facility] for hyperbaric therapy today and they noticed his L foot had drainage with increased erythema and edema so they told him to come to the ED. Pt states yesterday his foot was dry, without drainage or swelling. Patient notes he follow with ID outpatient and was prescribed Bactrim 2 weeks ago for the chronic foot wounds without any improvement. Pt denies fever/chills, CP, SOB, HA, dizziness, n/v/d. Pt also denies any current pain in the foot.      ED Course:   Vitals: BP: 107/66 , HR: 87 , Temp: 98.1F , RR: 16 , SpO2: 100 % on RA  Labs:  ESR 53, WBC 12.23, Hgb 11, BUN 25, Glucose 137,   X-ray Foot: Cutaneous defect adjacent to the base of the fifth metatarsal again evident with no gross cortical destruction or soft tissue emphysema. Follow-up MRI can be ordered    Received in the ED:  3.375g Zosyn IVPBx1, Vanco 1g IVPB x1   (29 Apr 2025 15:39)      ---  HOSPITAL COURSE: Admitted with concern for left foot cellulitis/osteo, MRI with evidence of left foot 5th metatarsal osteomyelitis, started on IV abx vancomycin and cefepime, transitioned to Rocephin. Lower extremity arterial duplex suggestive of trifurcation artery disease. Patient on DAPT with aspirin and Plavix, and statin. Vascular surgery consulted, patient underwent LLE angiogram on 5/2 showing occluded PT. Patient was deemed not appropriate for further interventions during current hospital course. Patient was placed on PICC line on 5/5 for continued antibiotic coverage. Patient medically improved throughout hospital course.  Last dose of IV ABx  6/10/25. Patient was medically stable for discharge to home with close outpatient follow up.     Vital Signs Last 24 Hrs  T(C): 36.6 (05 May 2025 04:36), Max: 36.7 (04 May 2025 11:08)  T(F): 97.9 (05 May 2025 04:36), Max: 98.1 (04 May 2025 11:08)  HR: 73 (05 May 2025 04:36) (73 - 82)  BP: 138/66 (05 May 2025 04:36) (127/70 - 151/73)  BP(mean): --  RR: 18 (05 May 2025 04:36) (18 - 18)  SpO2: 96% (05 May 2025 04:36) (95% - 98%)    Parameters below as of 05 May 2025 04:36  Patient On (Oxygen Delivery Method): room air    PHYSICAL EXAM:  GENERAL:  [ x ] NAD, [x ] Well appearing, [  ] Agitated, [  ] Drowsy, [  ] Lethargy, [  ] Confused   HEAD:  [ x ] Normal, [  ] Other  EYES:  [x  ] EOMI, [ x] PERRL, [ x ] Conjunctiva and sclera clear normal, [  ] Other, [  ] Pallor, [  ] Discharge  ENMT:  [ x ] Normal, [ x ] Moist mucous membranes, [ x ] Good dentition, [ x ] No thrush  NECK:  [ x ] Supple, [x  ] No JVD, [ x ] Normal thyroid, [  ] Lymphadenopathy, [  ] Other  CHEST/LUNG:  [ x ] Clear to auscultation bilaterally, [ x ] Breath Sounds equal B/L / decreased, [x  ] Poor effort, [ x ] No rales, [ x ] No rhonchi, [x  ] No wheezing  HEART:  [ x ] Regular rate and rhythm, [  ] Tachycardia, [  ] Bradycardia, [  ] Irregular, [ x ] No murmurs, No rubs, No gallops, [  ] PPM in place (Mfr:  )  ABDOMEN:  [ x ] Soft, [x ] Nontender, [ x ] Nondistended, [ x ] No mass, [ x ] Bowel sounds present, [  ] Obese  NERVOUS SYSTEM:  [ x ] Alert & Oriented x3, [ x ] Nonfocal, [  ] Confusion, [  ] Encephalopathic, [  ] Sedated, [  ] Unable to assess, [  ] Dementia, [  ] Other-  EXTREMITIES:  [ x ] 2+ Peripheral Pulses, No clubbing, No cyanosis,  [  ] Edema B/L lower EXT, [x ] PVD stasis skin changes B/L lower EXT, [x  ] Wound - left foot in dressing c/d/i  SKIN:  [ x] No rashes or lesions, [  ] Pressure ulcers, [  ] Ecchymosis, [  ] Skin tears, [ x ] Other Left foot wound-necrotic ulcers base medial, lateral aspects of foot, nonstageable necrotic heel ulcer left, with edema, reduced erythema, LL Ext  cellulitic improved,, negative drainage  distal hallux ulcer necrotic base, No Drainage         ---  CONSULTANTS:   Podiatry: Dr. Stark  ID: Dr. Melany Calhoun  Vascular: Dr. Jones  Cardio: Dr. Seble Hernandez    ---  TIME SPENT:  I, the attending physician, was physically present for the key portions of the evaluation and management (E/M) service provided. The total amount of time spent reviewing the hospital notes, laboratory values, imaging findings, assessing/counseling the patient, discussing with consultant physicians, social work, nursing staff was -60 minutes    ---  Primary care provider was made aware of plan for discharge:      [  ] NO     [ x ] YES

## 2025-05-01 NOTE — PROGRESS NOTE ADULT - PROBLEM SELECTOR PLAN 1
Pt with  L foot infection with multiple wounds sent in by [facility]. S/p surgical amputation of R forefoot  s/p L PT angioplasty 12/2/24 for L lat foot DFU on Plavix  -Pt c/o drainage from wound, redness and streaking. Concern for Cellulitis & osteo   -MRI with soft tissue wound along the 5th metatarsal base with cortical erosive change and marrow edema concerning for osteomyelitis. Soft tissue wound along the posterolateral calcaneal tuberosity with curvilinear marrow hyperemia but no definite osteomyelitis.  -C/w Cefepime 2 gm q 8 hrs  and Vanco 1 gm q 12  hrs for osteo  - BCx 1/2 bottles with Coag negative Staph, possible contaminant, repeat BCx pending  - NWB LLE  -At high risk for more proximal amputation. Intervention will be based on results of FRANCISCA and PVR's. Possible BKA per podiatry   -C/w ASA and Plavix and Lipitor   -Podiatry consulted, Dr. French  -Vascular surgery consulted- ASA ,Plavix, plan for LLE angio 5/2  -ID consult, Melany Calhoun, f/u recs -IV Abx

## 2025-05-01 NOTE — CONSULT NOTE ADULT - SUBJECTIVE AND OBJECTIVE BOX
Patient is a 64y old  Male who presents with a chief complaint of Foot infection (01 May 2025 09:47)      HPI:  Pt is a 63 y/o M with PMHx DM2, hx osteomyelitis, CAD, PAD s/p RLE angioplasty 2020, s/p surgical amputation of R forefoot , s/p L PT angioplasty 12/2/24 for L lat foot DFU on plavix, HTN, HLD sent in by wound clinic for left foot infections and multiple wounds. Pt states he was at [facility] for hyperbaric therapy today and they noticed his L foot had drainage with increased erythema and edema so they told him to come to the ED. Pt states yesterday his foot was dry, without drainage or swelling. Patient notes he follow with ID outpatient and was prescribed Bactrim 2 weeks ago for the chronic foot wounds without any improvement. Pt denies fever/chills, CP, SOB, HA, dizziness, n/v/d. Pt also denies any current pain in the foot.      ED Course:   Vitals: BP: 107/66 , HR: 87 , Temp: 98.1F , RR: 16 , SpO2: 100 % on RA  Labs:  ESR 53, WBC 12.23, Hgb 11, BUN 25, Glucose 137,   X-ray Foot: Cutaneous defect adjacent to the base of the fifth metatarsal again evident with no gross cortical destruction or soft tissue emphysema. Follow-up MRI can be ordered    Received in the ED:  3.375g Zosyn IVPBx1, Vanco 1g IVPB x1   (29 Apr 2025 15:39)      PAST MEDICAL & SURGICAL HISTORY:  HTN (hypertension)      Diabetes      Hyperlipidemia      PVD (peripheral vascular disease)      Toe osteomyelitis, right      H/O angioplasty  RLE      Status post amputation of toe                ECHO  FINDINGS:      MEDICATIONS  (STANDING):  aspirin enteric coated 81 milliGRAM(s) Oral daily  atorvastatin 40 milliGRAM(s) Oral at bedtime  cefepime   IVPB 2000 milliGRAM(s) IV Intermittent every 8 hours  cefepime   IVPB      clopidogrel Tablet 75 milliGRAM(s) Oral daily  dextrose 5%. 1000 milliLiter(s) (50 mL/Hr) IV Continuous <Continuous>  dextrose 5%. 1000 milliLiter(s) (100 mL/Hr) IV Continuous <Continuous>  dextrose 50% Injectable 25 Gram(s) IV Push once  dextrose 50% Injectable 12.5 Gram(s) IV Push once  dextrose 50% Injectable 25 Gram(s) IV Push once  enoxaparin Injectable 40 milliGRAM(s) SubCutaneous every 24 hours  gabapentin 300 milliGRAM(s) Oral three times a day  glucagon  Injectable 1 milliGRAM(s) IntraMuscular once  insulin lispro (ADMELOG) corrective regimen sliding scale   SubCutaneous three times a day before meals  insulin lispro (ADMELOG) corrective regimen sliding scale   SubCutaneous at bedtime  lisinopril 40 milliGRAM(s) Oral daily  metoprolol succinate ER 50 milliGRAM(s) Oral daily  sodium chloride 0.9%. 1000 milliLiter(s) (75 mL/Hr) IV Continuous <Continuous>  vancomycin  IVPB 1000 milliGRAM(s) IV Intermittent every 12 hours    MEDICATIONS  (PRN):  dextrose Oral Gel 15 Gram(s) Oral once PRN Blood Glucose LESS THAN 70 milliGRAM(s)/deciliter      FAMILY HISTORY:  FH: type 2 diabetes  Grandfather      Denies Family history of CAD or early MI    ROS:  Constitutional: denies fever, chills  HEENT: denies blurry vision, difficulty hearing  Respiratory: denies SOB, BELL, cough  Cardiovascular: denies CP, palpitations, orthopnea, PND, LE edema  Gastrointestinal: denies nausea, vomiting, abdominal pain  Genitourinary: denies urinary changes  Skin: Denies rashes, itching  Neurologic: denies headache, weakness, dizziness  Hematology/Oncology: denies bleeding, easy bruising  ROS negative except as noted above      SOCIAL HISTORY:    No tobacco, Alcohol or Ddrug use    Vital Signs Last 24 Hrs  T(C): 36.8 (01 May 2025 04:47), Max: 37.2 (30 Apr 2025 20:36)  T(F): 98.2 (01 May 2025 04:47), Max: 99 (30 Apr 2025 20:36)  HR: 68 (01 May 2025 04:47) (68 - 86)  BP: 137/78 (01 May 2025 04:47) (130/68 - 137/78)  BP(mean): --  RR: 19 (01 May 2025 04:47) (18 - 19)  SpO2: 97% (01 May 2025 04:47) (95% - 98%)    Parameters below as of 01 May 2025 04:47  Patient On (Oxygen Delivery Method): room air        Physical Exam:  General: Well developed, well nourished, NAD  HEENT: NCAT, PERRLA, EOMI bl, moist mucous membranes   Neck: Supple, nontender, no mass  Neurology: A&Ox3, nonfocal, sensation intact   Respiratory: CTA B/L, No W/R/R  CV: RRR, +S1/S2, no murmurs, rubs or gallops  Abdominal: Soft, NT, ND +BSx4, no palpable masses  Extremities: No C/C/E, + peripheral pulses  MSK: Normal ROM, no joint erythema or warmth, no joint swelling   Heme: No obvious ecchymosis or petechiae   Skin: warm, dry, normal color      ECG:    I&O's Detail    30 Apr 2025 07:01  -  01 May 2025 07:00  --------------------------------------------------------  IN:    IV PiggyBack: 100 mL    IV PiggyBack: 250 mL  Total IN: 350 mL    OUT:  Total OUT: 0 mL    Total NET: 350 mL          LABS:                        11.9   12.91 )-----------( 285      ( 01 May 2025 05:52 )             36.0     05-01    136  |  107  |  27[H]  ----------------------------<  174[H]  4.6   |  26  |  1.00    Ca    9.3      01 May 2025 05:52    TPro  7.1  /  Alb  3.2[L]  /  TBili  0.4  /  DBili  x   /  AST  11[L]  /  ALT  23  /  AlkPhos  68  04-30        PT/INR - ( 29 Apr 2025 12:45 )   PT: 12.2 sec;   INR: 1.04 ratio           Urinalysis Basic - ( 01 May 2025 05:52 )    Color: x / Appearance: x / SG: x / pH: x  Gluc: 174 mg/dL / Ketone: x  / Bili: x / Urobili: x   Blood: x / Protein: x / Nitrite: x   Leuk Esterase: x / RBC: x / WBC x   Sq Epi: x / Non Sq Epi: x / Bacteria: x      I&O's Summary    30 Apr 2025 07:01  -  01 May 2025 07:00  --------------------------------------------------------  IN: 350 mL / OUT: 0 mL / NET: 350 mL      BNP  RADIOLOGY & ADDITIONAL STUDIES: Patient is a 64y old  Male who presents with a chief complaint of Foot infection (01 May 2025 09:47)      HPI:  Pt is a 63 y/o M with PMHx DM2, hx osteomyelitis, CAD, PAD s/p RLE angioplasty 2020, s/p surgical amputation of R forefoot , s/p L PT angioplasty 12/2/24 for L lat foot DFU on plavix, HTN, HLD sent in by wound clinic for left foot infections and multiple wounds. Pt states he was at [facility] for hyperbaric therapy today and they noticed his L foot had drainage with increased erythema and edema so they told him to come to the ED. Pt states yesterday his foot was dry, without drainage or swelling. Patient notes he follow with ID outpatient and was prescribed Bactrim 2 weeks ago for the chronic foot wounds without any improvement. Pt denies fever/chills, CP, SOB, HA, dizziness, n/v/d. Pt also denies any current pain in the foot.      Last seen by Dr. Chu on 3/1/24.  TTE (1/2/24): LVEF 61%  Stress test 1/24/24: no evidence of ischemia        ED Course:   Vitals: BP: 107/66 , HR: 87 , Temp: 98.1F , RR: 16 , SpO2: 100 % on RA  Labs:  ESR 53, WBC 12.23, Hgb 11, BUN 25, Glucose 137,   X-ray Foot: Cutaneous defect adjacent to the base of the fifth metatarsal again evident with no gross cortical destruction or soft tissue emphysema. Follow-up MRI can be ordered    Received in the ED:  3.375g Zosyn IVPBx1, Vanco 1g IVPB x1   (29 Apr 2025 15:39)      PAST MEDICAL & SURGICAL HISTORY:  HTN (hypertension)      Diabetes      Hyperlipidemia      PVD (peripheral vascular disease)      Toe osteomyelitis, right      H/O angioplasty  RLE      Status post amputation of toe                ECHO  FINDINGS:      MEDICATIONS  (STANDING):  aspirin enteric coated 81 milliGRAM(s) Oral daily  atorvastatin 40 milliGRAM(s) Oral at bedtime  cefepime   IVPB 2000 milliGRAM(s) IV Intermittent every 8 hours  cefepime   IVPB      clopidogrel Tablet 75 milliGRAM(s) Oral daily  dextrose 5%. 1000 milliLiter(s) (50 mL/Hr) IV Continuous <Continuous>  dextrose 5%. 1000 milliLiter(s) (100 mL/Hr) IV Continuous <Continuous>  dextrose 50% Injectable 25 Gram(s) IV Push once  dextrose 50% Injectable 12.5 Gram(s) IV Push once  dextrose 50% Injectable 25 Gram(s) IV Push once  enoxaparin Injectable 40 milliGRAM(s) SubCutaneous every 24 hours  gabapentin 300 milliGRAM(s) Oral three times a day  glucagon  Injectable 1 milliGRAM(s) IntraMuscular once  insulin lispro (ADMELOG) corrective regimen sliding scale   SubCutaneous three times a day before meals  insulin lispro (ADMELOG) corrective regimen sliding scale   SubCutaneous at bedtime  lisinopril 40 milliGRAM(s) Oral daily  metoprolol succinate ER 50 milliGRAM(s) Oral daily  sodium chloride 0.9%. 1000 milliLiter(s) (75 mL/Hr) IV Continuous <Continuous>  vancomycin  IVPB 1000 milliGRAM(s) IV Intermittent every 12 hours    MEDICATIONS  (PRN):  dextrose Oral Gel 15 Gram(s) Oral once PRN Blood Glucose LESS THAN 70 milliGRAM(s)/deciliter      FAMILY HISTORY:  FH: type 2 diabetes  Grandfather      Denies Family history of CAD or early MI    ROS:  Constitutional: denies fever, chills  HEENT: denies blurry vision, difficulty hearing  Respiratory: denies SOB, BELL, cough  Cardiovascular: denies CP, palpitations, orthopnea, PND, LE edema  Gastrointestinal: denies nausea, vomiting, abdominal pain  Genitourinary: denies urinary changes  Skin: Denies rashes, itching  Neurologic: denies headache, weakness, dizziness  Hematology/Oncology: denies bleeding, easy bruising  ROS negative except as noted above      SOCIAL HISTORY:    No tobacco, Alcohol or Ddrug use    Vital Signs Last 24 Hrs  T(C): 36.8 (01 May 2025 04:47), Max: 37.2 (30 Apr 2025 20:36)  T(F): 98.2 (01 May 2025 04:47), Max: 99 (30 Apr 2025 20:36)  HR: 68 (01 May 2025 04:47) (68 - 86)  BP: 137/78 (01 May 2025 04:47) (130/68 - 137/78)  BP(mean): --  RR: 19 (01 May 2025 04:47) (18 - 19)  SpO2: 97% (01 May 2025 04:47) (95% - 98%)    Parameters below as of 01 May 2025 04:47  Patient On (Oxygen Delivery Method): room air        Physical Exam:  General: Well developed, well nourished, NAD  HEENT: NCAT, PERRLA, EOMI bl, moist mucous membranes   Neck: Supple, nontender, no mass  Neurology: A&Ox3, nonfocal, sensation intact   Respiratory: CTA B/L, No W/R/R  CV: RRR, +S1/S2, no murmurs, rubs or gallops  Abdominal: Soft, NT, ND +BSx4, no palpable masses  Extremities: No C/C/E, + peripheral pulses  MSK: Normal ROM, no joint erythema or warmth, no joint swelling   Heme: No obvious ecchymosis or petechiae   Skin: warm, dry, normal color      ECG:    I&O's Detail    30 Apr 2025 07:01  -  01 May 2025 07:00  --------------------------------------------------------  IN:    IV PiggyBack: 100 mL    IV PiggyBack: 250 mL  Total IN: 350 mL    OUT:  Total OUT: 0 mL    Total NET: 350 mL          LABS:                        11.9   12.91 )-----------( 285      ( 01 May 2025 05:52 )             36.0     05-01    136  |  107  |  27[H]  ----------------------------<  174[H]  4.6   |  26  |  1.00    Ca    9.3      01 May 2025 05:52    TPro  7.1  /  Alb  3.2[L]  /  TBili  0.4  /  DBili  x   /  AST  11[L]  /  ALT  23  /  AlkPhos  68  04-30        PT/INR - ( 29 Apr 2025 12:45 )   PT: 12.2 sec;   INR: 1.04 ratio           Urinalysis Basic - ( 01 May 2025 05:52 )    Color: x / Appearance: x / SG: x / pH: x  Gluc: 174 mg/dL / Ketone: x  / Bili: x / Urobili: x   Blood: x / Protein: x / Nitrite: x   Leuk Esterase: x / RBC: x / WBC x   Sq Epi: x / Non Sq Epi: x / Bacteria: x      I&O's Summary    30 Apr 2025 07:01  -  01 May 2025 07:00  --------------------------------------------------------  IN: 350 mL / OUT: 0 mL / NET: 350 mL      BNP  RADIOLOGY & ADDITIONAL STUDIES: Patient is a 64y old  Male who presents with a chief complaint of Foot infection (01 May 2025 09:47)      HPI:  Pt is a 63 y/o M with PMHx DM2, hx osteomyelitis, CAD, PAD s/p RLE angioplasty 2020, s/p surgical amputation of R forefoot , s/p L PT angioplasty 12/2/24 for L lat foot DFU on plavix, HTN, HLD sent in by wound clinic for left foot infections and multiple wounds. Pt states he was at [facility] for hyperbaric therapy today and they noticed his L foot had drainage with increased erythema and edema so they told him to come to the ED. Pt states yesterday his foot was dry, without drainage or swelling. Patient notes he follow with ID outpatient and was prescribed Bactrim 2 weeks ago for the chronic foot wounds without any improvement. Pt denies fever/chills, CP, SOB, HA, dizziness, n/v/d. Pt also denies any current pain in the foot.    Patient was seen and evaluated in the AM. Patient denies chest pain, palpitations, SOB, LE edema, BELL, lightheadedness, d/c, fever/chills. Patient reports no issues performing activities of daily living, walking or climbing stairs.       Last seen by Dr. Chu on 3/1/24.  TTE (1/2/24): LVEF 61%  Stress test 1/24/24: no evidence of ischemia        ED Course:   Vitals: BP: 107/66 , HR: 87 , Temp: 98.1F , RR: 16 , SpO2: 100 % on RA  Labs:  ESR 53, WBC 12.23, Hgb 11, BUN 25, Glucose 137,   X-ray Foot: Cutaneous defect adjacent to the base of the fifth metatarsal again evident with no gross cortical destruction or soft tissue emphysema. Follow-up MRI can be ordered    Received in the ED:  3.375g Zosyn IVPBx1, Vanco 1g IVPB x1   (29 Apr 2025 15:39)      PAST MEDICAL & SURGICAL HISTORY:  HTN (hypertension)      Diabetes      Hyperlipidemia      PVD (peripheral vascular disease)      Toe osteomyelitis, right      H/O angioplasty  RLE      Status post amputation of toe                ECHO  FINDINGS: (1/2/24) EF 61%, Normal LV mass and diastolic function, Normal RV size and function.       MEDICATIONS  (STANDING):  aspirin enteric coated 81 milliGRAM(s) Oral daily  atorvastatin 40 milliGRAM(s) Oral at bedtime  cefepime   IVPB 2000 milliGRAM(s) IV Intermittent every 8 hours  cefepime   IVPB      clopidogrel Tablet 75 milliGRAM(s) Oral daily  dextrose 5%. 1000 milliLiter(s) (50 mL/Hr) IV Continuous <Continuous>  dextrose 5%. 1000 milliLiter(s) (100 mL/Hr) IV Continuous <Continuous>  dextrose 50% Injectable 25 Gram(s) IV Push once  dextrose 50% Injectable 12.5 Gram(s) IV Push once  dextrose 50% Injectable 25 Gram(s) IV Push once  enoxaparin Injectable 40 milliGRAM(s) SubCutaneous every 24 hours  gabapentin 300 milliGRAM(s) Oral three times a day  glucagon  Injectable 1 milliGRAM(s) IntraMuscular once  insulin lispro (ADMELOG) corrective regimen sliding scale   SubCutaneous three times a day before meals  insulin lispro (ADMELOG) corrective regimen sliding scale   SubCutaneous at bedtime  lisinopril 40 milliGRAM(s) Oral daily  metoprolol succinate ER 50 milliGRAM(s) Oral daily  sodium chloride 0.9%. 1000 milliLiter(s) (75 mL/Hr) IV Continuous <Continuous>  vancomycin  IVPB 1000 milliGRAM(s) IV Intermittent every 12 hours    MEDICATIONS  (PRN):  dextrose Oral Gel 15 Gram(s) Oral once PRN Blood Glucose LESS THAN 70 milliGRAM(s)/deciliter      FAMILY HISTORY:  FH: type 2 diabetes  Grandfather      Denies Family history of CAD or early MI    ROS:  Constitutional: denies fever, chills  Respiratory: denies SOB, BELL, cough  Cardiovascular: denies CP, palpitations, orthopnea, PND, LE edema  Gastrointestinal: denies nausea, vomiting, abdominal pain  Genitourinary: denies urinary changes  Skin: Denies rashes, itching  Neurologic: denies headache, weakness, dizziness  ROS negative except as noted above      Vital Signs Last 24 Hrs  T(C): 36.8 (01 May 2025 04:47), Max: 37.2 (30 Apr 2025 20:36)  T(F): 98.2 (01 May 2025 04:47), Max: 99 (30 Apr 2025 20:36)  HR: 68 (01 May 2025 04:47) (68 - 86)  BP: 137/78 (01 May 2025 04:47) (130/68 - 137/78)  BP(mean): --  RR: 19 (01 May 2025 04:47) (18 - 19)  SpO2: 97% (01 May 2025 04:47) (95% - 98%)    Parameters below as of 01 May 2025 04:47  Patient On (Oxygen Delivery Method): room air        Physical Exam:  General: Well developed, well nourished, NAD, lying comfortably in bed  HEENT: NCAT, PERRLA, EOMI bl, moist mucous membranes   Neurology: A&Ox3, nonfocal, sensation intact   Respiratory: CTA B/L, No W/R/R. Diminished breath sounds in lower lobes  CV: RRR, +S1/S2, no murmurs, rubs or gallops  Abdominal: Soft, NT, ND, no palpable masses  Extremities: No C/C/E, + peripheral pulses. Weeping L foot wound, dressing noted to be saturated, strike through present  MSK: Normal ROM, no joint erythema or warmth, no joint swelling   Heme: No obvious ecchymosis or petechiae   Skin: warm, dry, normal color      ECG:    I&O's Detail    30 Apr 2025 07:01  -  01 May 2025 07:00  --------------------------------------------------------  IN:    IV PiggyBack: 100 mL    IV PiggyBack: 250 mL  Total IN: 350 mL    OUT:  Total OUT: 0 mL    Total NET: 350 mL          LABS:                        11.9   12.91 )-----------( 285      ( 01 May 2025 05:52 )             36.0     05-01    136  |  107  |  27[H]  ----------------------------<  174[H]  4.6   |  26  |  1.00    Ca    9.3      01 May 2025 05:52    TPro  7.1  /  Alb  3.2[L]  /  TBili  0.4  /  DBili  x   /  AST  11[L]  /  ALT  23  /  AlkPhos  68  04-30        PT/INR - ( 29 Apr 2025 12:45 )   PT: 12.2 sec;   INR: 1.04 ratio           Urinalysis Basic - ( 01 May 2025 05:52 )    Color: x / Appearance: x / SG: x / pH: x  Gluc: 174 mg/dL / Ketone: x  / Bili: x / Urobili: x   Blood: x / Protein: x / Nitrite: x   Leuk Esterase: x / RBC: x / WBC x   Sq Epi: x / Non Sq Epi: x / Bacteria: x      I&O's Summary    30 Apr 2025 07:01  -  01 May 2025 07:00  --------------------------------------------------------  IN: 350 mL / OUT: 0 mL / NET: 350 mL      BNP  RADIOLOGY & ADDITIONAL STUDIES: Patient is a 64y old  Male who presents with a chief complaint of Foot infection (01 May 2025 09:47)      HPI:  Pt is a 63 y/o M with PMHx DM2, hx osteomyelitis, CAD, PAD s/p RLE angioplasty 2020, s/p surgical amputation of R forefoot , s/p L PT angioplasty 12/2/24 for L lat foot DFU on plavix, HTN, HLD sent in by wound clinic for left foot infections and multiple wounds. Pt states he was at [facility] for hyperbaric therapy today and they noticed his L foot had drainage with increased erythema and edema so they told him to come to the ED. Pt states yesterday his foot was dry, without drainage or swelling. Patient notes he follow with ID outpatient and was prescribed Bactrim 2 weeks ago for the chronic foot wounds without any improvement. Pt denies fever/chills, CP, SOB, HA, dizziness, n/v/d. Pt also denies any current pain in the foot.    Patient was seen and evaluated in the AM. Patient denies chest pain, palpitations, SOB, LE edema, BELL, lightheadedness, d/c, fever/chills. Patient reports no issues performing activities of daily living, walking or climbing stairs.       Last seen by Dr. Chu on 3/1/24.  TTE (1/2/24): LVEF 61%  Stress test 1/24/24: no evidence of ischemia        ED Course:   Vitals: BP: 107/66 , HR: 87 , Temp: 98.1F , RR: 16 , SpO2: 100 % on RA  Labs:  ESR 53, WBC 12.23, Hgb 11, BUN 25, Glucose 137,   X-ray Foot: Cutaneous defect adjacent to the base of the fifth metatarsal again evident with no gross cortical destruction or soft tissue emphysema. Follow-up MRI can be ordered    Received in the ED:  3.375g Zosyn IVPBx1, Vanco 1g IVPB x1   (29 Apr 2025 15:39)      PAST MEDICAL & SURGICAL HISTORY:  HTN (hypertension)      Diabetes      Hyperlipidemia      PVD (peripheral vascular disease)      Toe osteomyelitis, right      H/O angioplasty  RLE      Status post amputation of toe                ECHO  FINDINGS: (1/2/24) EF 61%, Normal LV mass and diastolic function, Normal RV size and function.       MEDICATIONS  (STANDING):  aspirin enteric coated 81 milliGRAM(s) Oral daily  atorvastatin 40 milliGRAM(s) Oral at bedtime  cefepime   IVPB 2000 milliGRAM(s) IV Intermittent every 8 hours  cefepime   IVPB      clopidogrel Tablet 75 milliGRAM(s) Oral daily  dextrose 5%. 1000 milliLiter(s) (50 mL/Hr) IV Continuous <Continuous>  dextrose 5%. 1000 milliLiter(s) (100 mL/Hr) IV Continuous <Continuous>  dextrose 50% Injectable 25 Gram(s) IV Push once  dextrose 50% Injectable 12.5 Gram(s) IV Push once  dextrose 50% Injectable 25 Gram(s) IV Push once  enoxaparin Injectable 40 milliGRAM(s) SubCutaneous every 24 hours  gabapentin 300 milliGRAM(s) Oral three times a day  glucagon  Injectable 1 milliGRAM(s) IntraMuscular once  insulin lispro (ADMELOG) corrective regimen sliding scale   SubCutaneous three times a day before meals  insulin lispro (ADMELOG) corrective regimen sliding scale   SubCutaneous at bedtime  lisinopril 40 milliGRAM(s) Oral daily  metoprolol succinate ER 50 milliGRAM(s) Oral daily  sodium chloride 0.9%. 1000 milliLiter(s) (75 mL/Hr) IV Continuous <Continuous>  vancomycin  IVPB 1000 milliGRAM(s) IV Intermittent every 12 hours    MEDICATIONS  (PRN):  dextrose Oral Gel 15 Gram(s) Oral once PRN Blood Glucose LESS THAN 70 milliGRAM(s)/deciliter      FAMILY HISTORY:  FH: type 2 diabetes  Grandfather      Denies Family history of CAD or early MI    ROS:  Constitutional: denies fever, chills  Respiratory: denies SOB, BELL, cough  Cardiovascular: denies CP, palpitations, orthopnea, PND, LE edema  Gastrointestinal: denies nausea, vomiting, abdominal pain  Genitourinary: denies urinary changes  Skin: Denies rashes, itching  Neurologic: denies headache, weakness, dizziness  ROS negative except as noted above      Vital Signs Last 24 Hrs  T(C): 36.8 (01 May 2025 04:47), Max: 37.2 (30 Apr 2025 20:36)  T(F): 98.2 (01 May 2025 04:47), Max: 99 (30 Apr 2025 20:36)  HR: 68 (01 May 2025 04:47) (68 - 86)  BP: 137/78 (01 May 2025 04:47) (130/68 - 137/78)  BP(mean): --  RR: 19 (01 May 2025 04:47) (18 - 19)  SpO2: 97% (01 May 2025 04:47) (95% - 98%)    Parameters below as of 01 May 2025 04:47  Patient On (Oxygen Delivery Method): room air        Physical Exam:  General: Well developed, well nourished, NAD, lying comfortably in bed  HEENT: NCAT, PERRLA, EOMI bl, moist mucous membranes   Neurology: A&Ox3, nonfocal, sensation intact   Respiratory: CTA B/L, No W/R/R. Diminished breath sounds in lower lobes  CV: RRR, +S1/S2, no murmurs, rubs or gallops  Abdominal: Soft, NT, ND, no palpable masses  Extremities: No C/C/E, + peripheral pulses. Weeping L foot wound, dressing noted to be saturated, strike through present  MSK: Normal ROM, no joint erythema or warmth, no joint swelling   Heme: No obvious ecchymosis or petechiae   Skin: warm, dry, normal color      ECG: Sinus rhythm with premature supraventricular complexes    I&O's Detail    30 Apr 2025 07:01  -  01 May 2025 07:00  --------------------------------------------------------  IN:    IV PiggyBack: 100 mL    IV PiggyBack: 250 mL  Total IN: 350 mL    OUT:  Total OUT: 0 mL    Total NET: 350 mL          LABS:                        11.9   12.91 )-----------( 285      ( 01 May 2025 05:52 )             36.0     05-01    136  |  107  |  27[H]  ----------------------------<  174[H]  4.6   |  26  |  1.00    Ca    9.3      01 May 2025 05:52    TPro  7.1  /  Alb  3.2[L]  /  TBili  0.4  /  DBili  x   /  AST  11[L]  /  ALT  23  /  AlkPhos  68  04-30        PT/INR - ( 29 Apr 2025 12:45 )   PT: 12.2 sec;   INR: 1.04 ratio           Urinalysis Basic - ( 01 May 2025 05:52 )    Color: x / Appearance: x / SG: x / pH: x  Gluc: 174 mg/dL / Ketone: x  / Bili: x / Urobili: x   Blood: x / Protein: x / Nitrite: x   Leuk Esterase: x / RBC: x / WBC x   Sq Epi: x / Non Sq Epi: x / Bacteria: x      I&O's Summary    30 Apr 2025 07:01  -  01 May 2025 07:00  --------------------------------------------------------  IN: 350 mL / OUT: 0 mL / NET: 350 mL      BNP  RADIOLOGY & ADDITIONAL STUDIES: Patient is a 64y old  Male who presents with a chief complaint of Foot infection (01 May 2025 09:47)      HPI:  Pt is a 63 y/o M with PMHx DM2, hx osteomyelitis, PAD s/p RLE angioplasty 2020, s/p surgical amputation of R forefoot , s/p L PT angioplasty 12/2/24 for L lat foot DFU on plavix, HTN, HLD sent in by wound clinic for left foot infections and multiple wounds. Pt states he was at [facility] for hyperbaric therapy today and they noticed his L foot had drainage with increased erythema and edema so they told him to come to the ED. Pt states yesterday his foot was dry, without drainage or swelling. Patient notes he follow with ID outpatient and was prescribed Bactrim 2 weeks ago for the chronic foot wounds without any improvement. Pt denies fever/chills, CP, SOB, HA, dizziness, n/v/d. Pt also denies any current pain in the foot.    Patient was seen and evaluated today. Patient feels well. Patient denies chest pain, palpitations, SOB, LE edema, BELL, lightheadedness, d/c, fever/chills. Patient reports no issues performing activities of daily living, walking or climbing stairs.       Last seen by Dr. Chu on 3/1/24.  TTE (1/2/24): LVEF 61%  Stress test 1/24/24: no evidence of ischemia        ED Course:   Vitals: BP: 107/66 , HR: 87 , Temp: 98.1F , RR: 16 , SpO2: 100 % on RA  Labs:  ESR 53, WBC 12.23, Hgb 11, BUN 25, Glucose 137,   X-ray Foot: Cutaneous defect adjacent to the base of the fifth metatarsal again evident with no gross cortical destruction or soft tissue emphysema. Follow-up MRI can be ordered    Received in the ED:  3.375g Zosyn IVPBx1, Vanco 1g IVPB x1   (29 Apr 2025 15:39)      PAST MEDICAL & SURGICAL HISTORY:  HTN (hypertension)      Diabetes      Hyperlipidemia      PVD (peripheral vascular disease)      Toe osteomyelitis, right      H/O angioplasty  RLE      Status post amputation of toe                ECHO  FINDINGS: (1/2/24) EF 61%, Normal LV mass and diastolic function, Normal RV size and function.       MEDICATIONS  (STANDING):  aspirin enteric coated 81 milliGRAM(s) Oral daily  atorvastatin 40 milliGRAM(s) Oral at bedtime  cefepime   IVPB 2000 milliGRAM(s) IV Intermittent every 8 hours  cefepime   IVPB      clopidogrel Tablet 75 milliGRAM(s) Oral daily  dextrose 5%. 1000 milliLiter(s) (50 mL/Hr) IV Continuous <Continuous>  dextrose 5%. 1000 milliLiter(s) (100 mL/Hr) IV Continuous <Continuous>  dextrose 50% Injectable 25 Gram(s) IV Push once  dextrose 50% Injectable 12.5 Gram(s) IV Push once  dextrose 50% Injectable 25 Gram(s) IV Push once  enoxaparin Injectable 40 milliGRAM(s) SubCutaneous every 24 hours  gabapentin 300 milliGRAM(s) Oral three times a day  glucagon  Injectable 1 milliGRAM(s) IntraMuscular once  insulin lispro (ADMELOG) corrective regimen sliding scale   SubCutaneous three times a day before meals  insulin lispro (ADMELOG) corrective regimen sliding scale   SubCutaneous at bedtime  lisinopril 40 milliGRAM(s) Oral daily  metoprolol succinate ER 50 milliGRAM(s) Oral daily  sodium chloride 0.9%. 1000 milliLiter(s) (75 mL/Hr) IV Continuous <Continuous>  vancomycin  IVPB 1000 milliGRAM(s) IV Intermittent every 12 hours    MEDICATIONS  (PRN):  dextrose Oral Gel 15 Gram(s) Oral once PRN Blood Glucose LESS THAN 70 milliGRAM(s)/deciliter      FAMILY HISTORY:  FH: type 2 diabetes  Grandfather      Denies Family history of CAD or early MI    ROS:  Constitutional: denies fever, chills  Respiratory: denies SOB, BELL, cough  Cardiovascular: denies CP, palpitations, orthopnea, PND, LE edema  Gastrointestinal: denies nausea, vomiting, abdominal pain  Genitourinary: denies urinary changes  Skin: Denies rashes, itching  Neurologic: denies headache, weakness, dizziness  ROS negative except as noted above      Vital Signs Last 24 Hrs  T(C): 36.8 (01 May 2025 04:47), Max: 37.2 (30 Apr 2025 20:36)  T(F): 98.2 (01 May 2025 04:47), Max: 99 (30 Apr 2025 20:36)  HR: 68 (01 May 2025 04:47) (68 - 86)  BP: 137/78 (01 May 2025 04:47) (130/68 - 137/78)  BP(mean): --  RR: 19 (01 May 2025 04:47) (18 - 19)  SpO2: 97% (01 May 2025 04:47) (95% - 98%)    Parameters below as of 01 May 2025 04:47  Patient On (Oxygen Delivery Method): room air        Physical Exam:  General: Well developed, well nourished, NAD, lying comfortably in bed  HEENT: NCAT, PERRLA, EOMI bl, moist mucous membranes   Neurology: A&Ox3, nonfocal, sensation intact   Respiratory: CTA B/L, No W/R/R. Diminished breath sounds in lower lobes  CV: RRR, +S1/S2, no murmurs, rubs or gallops  Abdominal: Soft, NT, ND, no palpable masses  Extremities: No C/C/E, + peripheral pulses. Weeping L foot wound, dressing noted to be saturated, strike through present  MSK: Normal ROM, no joint erythema or warmth, no joint swelling   Heme: No obvious ecchymosis or petechiae   Skin: warm, dry, normal color      ECG: Sinus rhythm with premature supraventricular complexes, 76 BPM. QTc 438    I&O's Detail    30 Apr 2025 07:01  -  01 May 2025 07:00  --------------------------------------------------------  IN:    IV PiggyBack: 100 mL    IV PiggyBack: 250 mL  Total IN: 350 mL    OUT:  Total OUT: 0 mL    Total NET: 350 mL          LABS:                        11.9   12.91 )-----------( 285      ( 01 May 2025 05:52 )             36.0     05-01    136  |  107  |  27[H]  ----------------------------<  174[H]  4.6   |  26  |  1.00    Ca    9.3      01 May 2025 05:52    TPro  7.1  /  Alb  3.2[L]  /  TBili  0.4  /  DBili  x   /  AST  11[L]  /  ALT  23  /  AlkPhos  68  04-30        PT/INR - ( 29 Apr 2025 12:45 )   PT: 12.2 sec;   INR: 1.04 ratio           Urinalysis Basic - ( 01 May 2025 05:52 )    Color: x / Appearance: x / SG: x / pH: x  Gluc: 174 mg/dL / Ketone: x  / Bili: x / Urobili: x   Blood: x / Protein: x / Nitrite: x   Leuk Esterase: x / RBC: x / WBC x   Sq Epi: x / Non Sq Epi: x / Bacteria: x      I&O's Summary    30 Apr 2025 07:01  -  01 May 2025 07:00  --------------------------------------------------------  IN: 350 mL / OUT: 0 mL / NET: 350 mL     Patient is a 64y old  Male who presents with a chief complaint of Foot infection (01 May 2025 09:47)      HPI:  Pt is a 63 y/o M with PMHx DM2, hx osteomyelitis, PAD s/p RLE angioplasty 2020, s/p surgical amputation of R forefoot , s/p L PT angioplasty 12/2/24 for L lat foot DFU on plavix, HTN, HLD sent in by wound clinic for left foot infections and multiple wounds. Pt states he was at [facility] for hyperbaric therapy today and they noticed his L foot had drainage with increased erythema and edema so they told him to come to the ED. Pt states yesterday his foot was dry, without drainage or swelling. Patient notes he follow with ID outpatient and was prescribed Bactrim 2 weeks ago for the chronic foot wounds without any improvement. Pt denies fever/chills, CP, SOB, HA, dizziness, n/v/d. Pt also denies any current pain in the foot.    Patient was seen and evaluated today. Patient feels well. Patient denies chest pain, palpitations, SOB, LE edema, BELL, lightheadedness, d/c, fever/chills. Patient reports no issues performing activities of daily living, walking or climbing stairs.       Last seen by Dr. Chu on 3/1/24.  TTE (1/2/24): LVEF 61%  Stress test 1/24/24: no evidence of ischemia        ED Course:   Vitals: BP: 107/66 , HR: 87 , Temp: 98.1F , RR: 16 , SpO2: 100 % on RA  Labs:  ESR 53, WBC 12.23, Hgb 11, BUN 25, Glucose 137,   X-ray Foot: Cutaneous defect adjacent to the base of the fifth metatarsal again evident with no gross cortical destruction or soft tissue emphysema. Follow-up MRI can be ordered    Received in the ED:  3.375g Zosyn IVPBx1, Vanco 1g IVPB x1   (29 Apr 2025 15:39)      PAST MEDICAL & SURGICAL HISTORY:  HTN (hypertension)      Diabetes      Hyperlipidemia      PVD (peripheral vascular disease)      Toe osteomyelitis, right      H/O angioplasty  RLE      Status post amputation of toe                ECHO  FINDINGS: (1/2/24) EF 61%, Normal LV mass and diastolic function, Normal RV size and function.       MEDICATIONS  (STANDING):  aspirin enteric coated 81 milliGRAM(s) Oral daily  atorvastatin 40 milliGRAM(s) Oral at bedtime  cefepime   IVPB 2000 milliGRAM(s) IV Intermittent every 8 hours  cefepime   IVPB      clopidogrel Tablet 75 milliGRAM(s) Oral daily  dextrose 5%. 1000 milliLiter(s) (50 mL/Hr) IV Continuous <Continuous>  dextrose 5%. 1000 milliLiter(s) (100 mL/Hr) IV Continuous <Continuous>  dextrose 50% Injectable 25 Gram(s) IV Push once  dextrose 50% Injectable 12.5 Gram(s) IV Push once  dextrose 50% Injectable 25 Gram(s) IV Push once  enoxaparin Injectable 40 milliGRAM(s) SubCutaneous every 24 hours  gabapentin 300 milliGRAM(s) Oral three times a day  glucagon  Injectable 1 milliGRAM(s) IntraMuscular once  insulin lispro (ADMELOG) corrective regimen sliding scale   SubCutaneous three times a day before meals  insulin lispro (ADMELOG) corrective regimen sliding scale   SubCutaneous at bedtime  lisinopril 40 milliGRAM(s) Oral daily  metoprolol succinate ER 50 milliGRAM(s) Oral daily  sodium chloride 0.9%. 1000 milliLiter(s) (75 mL/Hr) IV Continuous <Continuous>  vancomycin  IVPB 1000 milliGRAM(s) IV Intermittent every 12 hours    MEDICATIONS  (PRN):  dextrose Oral Gel 15 Gram(s) Oral once PRN Blood Glucose LESS THAN 70 milliGRAM(s)/deciliter      FAMILY HISTORY:  FH: type 2 diabetes  Grandfather      Denies Family history of CAD or early MI    ROS:  Constitutional: denies fever, chills  Respiratory: denies SOB, BELL, cough  Cardiovascular: denies CP, palpitations, orthopnea, PND, LE edema  Gastrointestinal: denies nausea, vomiting, abdominal pain  Genitourinary: denies urinary changes  Skin: Denies rashes, itching  Neurologic: denies headache, weakness, dizziness  ROS negative except as noted above      Vital Signs Last 24 Hrs  T(C): 36.8 (01 May 2025 04:47), Max: 37.2 (30 Apr 2025 20:36)  T(F): 98.2 (01 May 2025 04:47), Max: 99 (30 Apr 2025 20:36)  HR: 68 (01 May 2025 04:47) (68 - 86)  BP: 137/78 (01 May 2025 04:47) (130/68 - 137/78)  BP(mean): --  RR: 19 (01 May 2025 04:47) (18 - 19)  SpO2: 97% (01 May 2025 04:47) (95% - 98%)    Parameters below as of 01 May 2025 04:47  Patient On (Oxygen Delivery Method): room air        Physical Exam:  General: Well developed, well nourished, NAD, lying comfortably in bed  HEENT: NCAT, PERRLA, EOMI bl, moist mucous membranes   Neurology: A&Ox3, nonfocal, sensation intact   Respiratory: CTA B/L, No W/R/R. Diminished breath sounds in lower lobes  CV: RRR, +S1/S2, no murmurs, rubs or gallops  Abdominal: Soft, NT, ND, no palpable masses  Extremities: No C/C/E, + peripheral pulses. Weeping L foot wound, dressing noted to be saturated, strike through present  MSK: Normal ROM, no joint erythema or warmth, no joint swelling   Heme: No obvious ecchymosis or petechiae   Skin: warm, dry, normal color      ECG: Sinus rhythm with premature supraventricular complexes, 76 BPM. QTc 438    I&O's Detail    30 Apr 2025 07:01  -  01 May 2025 07:00  --------------------------------------------------------  IN:    IV PiggyBack: 100 mL    IV PiggyBack: 250 mL  Total IN: 350 mL    OUT:  Total OUT: 0 mL    Total NET: 350 mL          LABS:                        11.9   12.91 )-----------( 285      ( 01 May 2025 05:52 )             36.0     05-01    136  |  107  |  27[H]  ----------------------------<  174[H]  4.6   |  26  |  1.00    Ca    9.3      01 May 2025 05:52    TPro  7.1  /  Alb  3.2[L]  /  TBili  0.4  /  DBili  x   /  AST  11[L]  /  ALT  23  /  AlkPhos  68  04-30        PT/INR - ( 29 Apr 2025 12:45 )   PT: 12.2 sec;   INR: 1.04 ratio           Urinalysis Basic - ( 01 May 2025 05:52 )    Color: x / Appearance: x / SG: x / pH: x  Gluc: 174 mg/dL / Ketone: x  / Bili: x / Urobili: x   Blood: x / Protein: x / Nitrite: x   Leuk Esterase: x / RBC: x / WBC x   Sq Epi: x / Non Sq Epi: x / Bacteria: x      I&O's Summary    30 Apr 2025 07:01  -  01 May 2025 07:00  --------------------------------------------------------  IN: 350 mL / OUT: 0 mL / NET: 350 mL

## 2025-05-01 NOTE — PROGRESS NOTE ADULT - SUBJECTIVE AND OBJECTIVE BOX
[facility]  OFELIA Harvey Y. Patel, S. Shah, [facility]  [phone]    Name: LOIDA QUEZADA  Age: 64y  Gender: Male  MRN: [id number]    Interval History:  Patient seen and examined at bedside  No acute overnight events. Afebrile  Notes reviewed    Antibiotics:  cefepime   IVPB 2000 milliGRAM(s) IV Intermittent every 8 hours  cefepime   IVPB      vancomycin  IVPB 1000 milliGRAM(s) IV Intermittent every 12 hours      Medications:  aspirin enteric coated 81 milliGRAM(s) Oral daily  atorvastatin 40 milliGRAM(s) Oral at bedtime  cefepime   IVPB 2000 milliGRAM(s) IV Intermittent every 8 hours  cefepime   IVPB      clopidogrel Tablet 75 milliGRAM(s) Oral daily  dextrose 5%. 1000 milliLiter(s) IV Continuous <Continuous>  dextrose 5%. 1000 milliLiter(s) IV Continuous <Continuous>  dextrose 50% Injectable 25 Gram(s) IV Push once  dextrose 50% Injectable 12.5 Gram(s) IV Push once  dextrose 50% Injectable 25 Gram(s) IV Push once  dextrose Oral Gel 15 Gram(s) Oral once PRN  enoxaparin Injectable 40 milliGRAM(s) SubCutaneous every 24 hours  gabapentin 300 milliGRAM(s) Oral three times a day  glucagon  Injectable 1 milliGRAM(s) IntraMuscular once  insulin lispro (ADMELOG) corrective regimen sliding scale   SubCutaneous three times a day before meals  insulin lispro (ADMELOG) corrective regimen sliding scale   SubCutaneous at bedtime  lisinopril 40 milliGRAM(s) Oral daily  metoprolol succinate ER 50 milliGRAM(s) Oral daily  sodium chloride 0.9%. 1000 milliLiter(s) IV Continuous <Continuous>  vancomycin  IVPB 1000 milliGRAM(s) IV Intermittent every 12 hours      Review of Systems:  A 10-point review of systems was obtained. .  Review of systems otherwise negative except as previously noted.    Allergies: No Known Allergies    For details regarding the patient's past medical history, social history, family history, and other miscellaneous elements, please refer the initial infectious diseases consultation and/or the admitting history and physical examination for this admission.    Objective:  Vitals:   T(C): 37.1 (05-01-25 @ 11:32), Max: 37.2 (04-30-25 @ 20:36)  HR: 74 (05-01-25 @ 11:32) (68 - 86)  BP: 131/68 (05-01-25 @ 11:32) (130/68 - 137/78)  RR: 18 (05-01-25 @ 11:32) (18 - 19)  SpO2: 96% (05-01-25 @ 11:32) (95% - 98%)    Physical Examination:  General: no acute distress  HEENT: NC/AT, EOMI  Cardio: RRR  Resp: breath sounds heard bilaterally  Abd: soft, NT, ND  Ext: no edema or cyanosis, foot in dressing  Skin: warm, dry, no visible rash      Laboratory Studies:  CBC:                       11.9   12.91 )-----------( 285      ( 01 May 2025 05:52 )             36.0     CMP: 05-01    136  |  107  |  27[H]  ----------------------------<  174[H]  4.6   |  26  |  1.00    Ca    9.3      01 May 2025 05:52    TPro  7.1  /  Alb  3.2[L]  /  TBili  0.4  /  DBili  x   /  AST  11[L]  /  ALT  23  /  AlkPhos  68  04-30    LIVER FUNCTIONS - ( 30 Apr 2025 07:45 )  Alb: 3.2 g/dL / Pro: 7.1 g/dL / ALK PHOS: 68 U/L / ALT: 23 U/L / AST: 11 U/L / GGT: x           Urinalysis Basic - ( 01 May 2025 05:52 )    Color: x / Appearance: x / SG: x / pH: x  Gluc: 174 mg/dL / Ketone: x  / Bili: x / Urobili: x   Blood: x / Protein: x / Nitrite: x   Leuk Esterase: x / RBC: x / WBC x   Sq Epi: x / Non Sq Epi: x / Bacteria: x        Microbiology: reviewed    Culture - Blood (collected 04-29-25 @ 12:45)  Source: Blood Blood  Gram Stain (05-01-25 @ 04:09):    Growth in aerobic bottle: Gram Positive Cocci in Clusters  Preliminary Report (05-01-25 @ 04:09):    Growth in aerobic bottle: Gram Positive Cocci in Clusters    Direct identification is available within approximately 3-5    hours either by Blood Panel Multiplexed PCR or Direct    MALDI-TOF. Details: [email or web address]  Organism: Blood Culture PCR (05-01-25 @ 05:53)  Organism: Blood Culture PCR (05-01-25 @ 05:53)      -  Coagulase negative Staphylococcus: Detec      Method Type: PCR    Culture - Blood (collected 04-29-25 @ 12:30)  Source: Blood Blood  Preliminary Report (04-30-25 @ 19:02):    No growth at 24 hours          Radiology: reviewed

## 2025-05-01 NOTE — CONSULT NOTE ADULT - ATTENDING COMMENTS
Pt is a 65 y/o M with PMHx DM2, hx osteomyelitis, PAD s/p RLE angioplasty 2020, s/p surgical amputation of R forefoot , s/p L PT angioplasty 12/2/24 for L lat foot DFU on plavix, HTN, HLD sent in by wound clinic for left foot infections and multiple wounds. Vascular surgery to evaluate further with LLE angiogram.     - Patient is not complaining of any cardiac symptoms at this time.  - EKG with NSR, PAC  - No meaningful evidence of volume overload.  - Previous TTE (1/2/24) shows EF 61%, Normal LV mass and diastolic function, Normal RV size and function.  - NST done 1/2024 with small sized, mild defect in the basal inferior wall that is fixed and partially normalizes with prone, suggestive of diaphragmatic artifact.   - Pt has no active ischemia, decompensated heart failure, unstable arrythmia, or severe stenotic valvular disease  - In the setting of LLE angiogram, he is optimized from cardiovascular standpoint to proceed with planned procedure with routine hemodynamic monitoring.     #PAD  - continue home ASA, plavix, and lipitor    #HTN:  - BP well controlled, monitor routine hemodynamics.  - On home lisinopril 40 mg and Metoprolol succinate 50mg
Patient seen at wound care center after HBOT for dressing change and was noted with increased edema and erythema to the left foot. Patient also has necrotic ulcers to the left hallux and left heel.   Ordered MRI  to assess for OM to the midfoot and rearfoot   Patient already has MRI of the forefoot from March 2025 noted  with OM to the left  distal hallux and 5th metatarsal   Recommend IV antibiotics and ID consult   Recommend vascular consult   Will closely monitor the wounds      Discussed with patient  that patient is still high risk for more proximal amputation, loss of limb, sepsis and loss of life.

## 2025-05-01 NOTE — CASE MANAGEMENT PROGRESS NOTE - NSCMPROGRESSNOTE_GEN_ALL_CORE
Patient remains medically acute, on IV cefepime and vancomycin; planned for OR tomorrow for LLE angio. Anticipated DC plan unclear at present, pending clinical course. CM will continue to follow.

## 2025-05-01 NOTE — DISCHARGE NOTE PROVIDER - CARE PROVIDER_API CALL
Dannie Frederick  Internal Medicine  [address] [place] [id number]  Phone: [phone]  Fax: [phone]  Follow Up Time: 1 week   Dannie Frederick  Internal Medicine  [address] [place] [address]  Phone: [phone]  Fax: [phone]  Follow Up Time: 1 week    Winnie Jones  Vascular Surgery  [address] [place] [address]  Phone: [phone]  Fax: [phone]  Follow Up Time:     Joe StarkTitus  Podiatric Medicine  [address] [place] [address]  Phone: [phone]  Fax: [phone]  Follow Up Time:    Dannie Frederick  Internal Medicine  [address] [place] [address]  Phone: [phone]  Fax: [phone]  Follow Up Time: 1 week    Winnie Jones  Vascular Surgery  [address] [place] [address]  Phone: [phone]  Fax: [phone]  Follow Up Time:     Joe StarkTitus  Podiatric Medicine  [address] [place] [address]  Phone: [phone]  Fax: [phone]  Follow Up Time:     Natasha Brasher  Infectious Disease  [address] [facility] [address]  [place] [address]  Phone: [phone]  Fax: [phone]  Follow Up Time:

## 2025-05-01 NOTE — PROGRESS NOTE ADULT - SUBJECTIVE AND OBJECTIVE BOX
Patient is a 64y old  Male who presents with a chief complaint of Foot infection left foot wound (30 Apr 2025 15:04)    Pt denies any pain, weakness to LLE, fever or chills. Noted with + BC overnight.    MEDICATIONS  (STANDING):  aspirin enteric coated 81 milliGRAM(s) Oral daily  atorvastatin 40 milliGRAM(s) Oral at bedtime  cefepime   IVPB 2000 milliGRAM(s) IV Intermittent every 8 hours  cefepime   IVPB      clopidogrel Tablet 75 milliGRAM(s) Oral daily  dextrose 5%. 1000 milliLiter(s) (50 mL/Hr) IV Continuous <Continuous>  dextrose 5%. 1000 milliLiter(s) (100 mL/Hr) IV Continuous <Continuous>  dextrose 50% Injectable 25 Gram(s) IV Push once  dextrose 50% Injectable 12.5 Gram(s) IV Push once  dextrose 50% Injectable 25 Gram(s) IV Push once  enoxaparin Injectable 40 milliGRAM(s) SubCutaneous every 24 hours  gabapentin 300 milliGRAM(s) Oral three times a day  glucagon  Injectable 1 milliGRAM(s) IntraMuscular once  insulin lispro (ADMELOG) corrective regimen sliding scale   SubCutaneous three times a day before meals  insulin lispro (ADMELOG) corrective regimen sliding scale   SubCutaneous at bedtime  lisinopril 40 milliGRAM(s) Oral daily  metoprolol succinate ER 50 milliGRAM(s) Oral daily  vancomycin  IVPB 1000 milliGRAM(s) IV Intermittent every 12 hours    MEDICATIONS  (PRN):  dextrose Oral Gel 15 Gram(s) Oral once PRN Blood Glucose LESS THAN 70 milliGRAM(s)/deciliter    Allergies  No Known Allergies    Vital Signs Last 24 Hrs  T(C): 36.8 (01 May 2025 04:47), Max: 37.2 (30 Apr 2025 20:36)  T(F): 98.2 (01 May 2025 04:47), Max: 99 (30 Apr 2025 20:36)  HR: 68 (01 May 2025 04:47) (68 - 86)  BP: 137/78 (01 May 2025 04:47) (130/68 - 137/78)  BP(mean): --  RR: 19 (01 May 2025 04:47) (18 - 19)  SpO2: 97% (01 May 2025 04:47) (95% - 98%)    Parameters below as of 01 May 2025 04:47  Patient On (Oxygen Delivery Method): room air      I&O's Detail    30 Apr 2025 07:01  -  01 May 2025 07:00  --------------------------------------------------------  IN:    IV PiggyBack: 100 mL    IV PiggyBack: 250 mL  Total IN: 350 mL    OUT:  Total OUT: 0 mL    Total NET: 350 mL      Physical Exam:  Constitutional: WD M in NAD  Extremities: R TMA well healed. L foot dressing in place, CDI  Neurological: A&O x 3 BERNAL X 4 =  Pulses:   Right:                                                                            Left:  FEM [x ]2+ [ ]1+ [ ]doppler                                            FEM [x ]2+ [ ]1+ [ ]doppler    POP [x ]2+ [ ]1+ [ ]doppler                                             POP [x ]2+ [ ]1+ [ ]doppler    AT [ ]2+ [ ]1+ [ ]doppler                                                AT [ ]2+ [ ]1+ [x ]doppler  PT[ ]2+ [ ]1+ [ ]doppler                                                 DP [ ]2+ [ ]1+ [x ]doppler                                                                                     PT [ ]2+ [ ]1+ [x ]doppler      LABS:                        11.9   12.91 )-----------( 285      ( 01 May 2025 05:52 )             36.0     05-01    136  |  107  |  27[H]  ----------------------------<  174[H]  4.6   |  26  |  1.00    Ca    9.3      01 May 2025 05:52    TPro  7.1  /  Alb  3.2[L]  /  TBili  0.4  /  DBili  x   /  AST  11[L]  /  ALT  23  /  AlkPhos  68  04-30    PT/INR - ( 29 Apr 2025 12:45 )   PT: 12.2 sec;   INR: 1.04 ratio      CAPILLARY BLOOD GLUCOSE  POCT Blood Glucose.: 216 mg/dL (30 Apr 2025 21:46)  POCT Blood Glucose.: 161 mg/dL (30 Apr 2025 17:03)  POCT Blood Glucose.: 228 mg/dL (30 Apr 2025 11:44)  POCT Blood Glucose.: 164 mg/dL (30 Apr 2025 07:50)    RECENT CULTURES:  04-29 Blood Blood Blood Culture PCR   Growth in aerobic bottle: Gram Positive Cocci in Clusters   Growth in aerobic bottle: Gram Positive Cocci in Clusters  Direct identification is available within approximately 3-5  hours either by Blood Panel Multiplexed PCR or Direct  MALDI-TOF. Details: [email or web address]    04-29 Blood Blood XXXX XXXX   No growth at 24 hours      Radiology and Additional Studies:    < from: [facility] Physiol Extremity Lower 3+ Level, BI (04.29.25 @ 15:53) >  ACC: [id number] EXAM:  PHYSIOL EXTREM LOW 3+ LEV BI   ORDERED BY: LEN SHANNON     PROCEDURE DATE:  04/29/2025          INTERPRETATION:  CLINICAL INDICATION: Nonpalpable pulses. Leg wounds.   Status post bilateral leg angioplasty most recentlyof the left posterior   tibial artery on 12/2/2024.    EXAMINATION: FRANCISCA PVR evaluation    COMPARISON: 11/18/2024    FINDINGS:    Right FRANCISCA is 1.29, within normal limits. Left FRANCISCA 0.84, mildly abnormal.    Right toe brachial index is not obtained in the setting of amputation.   Left third toe pressure is 21 mmHg with toe brachial index of 0.17,   severely abnormal.    Segmental pressure evaluation is limited due to calcified noncompressible   vasculature. Segmental pressure drop identified from left calf to ankle   and from left ankle to toe stations.    PVR waveforms asymmetrically diminished in amplitude at the metatarsal   stations and mildly pulsatile at left digital level.    IMPRESSION:    Limited evaluation due to calcified noncompressible vasculature. 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 Correlate with arterial   duplex ultrasound for improved evaluation given limitations of   noncompressible vasculature.      < end of copied text >

## 2025-05-01 NOTE — DISCHARGE NOTE PROVIDER - NSDCMRMEDTOKEN_GEN_ALL_CORE_FT
aspirin 81 mg oral delayed release tablet: 1 tab(s) orally once a day  atorvastatin 40 mg oral tablet: 1 tab(s) orally once a day  clopidogrel 75 mg oral tablet: 1 tab(s) orally once a day  gabapentin 300 mg oral capsule: 1 cap(s) orally 3 times a day  Jardiance 25 mg oral tablet: 1 tab(s) orally once a day  lisinopril 40 mg oral tablet: 1 tab(s) orally once a day  metFORMIN 1000 mg oral tablet: 1 tab(s) orally 2 times a day  metoprolol succinate 50 mg oral tablet, extended release: 1 tab(s) orally once a day  Trulicity Pen 1.5 mg/0.5 mL subcutaneous solution: 1.5 milligram(s) subcutaneously once a week   aspirin 81 mg oral delayed release tablet: 1 tab(s) orally once a day  atorvastatin 40 mg oral tablet: 1 tab(s) orally once a day (at bedtime)  clopidogrel 75 mg oral tablet: 1 tab(s) orally once a day  gabapentin 300 mg oral capsule: 1 cap(s) orally 3 times a day  Jardiance 25 mg oral tablet: 1 tab(s) orally once a day  lisinopril 40 mg oral tablet: 1 tab(s) orally once a day  metFORMIN 1000 mg oral tablet: 1 tab(s) orally 2 times a day  metoprolol succinate 50 mg oral tablet, extended release: 1 tab(s) orally once a day  Trulicity Pen 1.5 mg/0.5 mL subcutaneous solution: 1.5 milligram(s) subcutaneously once a week   aspirin 81 mg oral delayed release tablet: 1 tab(s) orally once a day  atorvastatin 40 mg oral tablet: 1 tab(s) orally once a day (at bedtime)  cefTRIAXone: 2 gram(s) intravenous once a day Last dose on 6/10/25 Via PICC line  clopidogrel 75 mg oral tablet: 1 tab(s) orally once a day  gabapentin 300 mg oral capsule: 1 cap(s) orally 3 times a day  Jardiance 25 mg oral tablet: 1 tab(s) orally once a day  lisinopril 40 mg oral tablet: 1 tab(s) orally once a day  metFORMIN 1000 mg oral tablet: 1 tab(s) orally 2 times a day  metoprolol succinate 50 mg oral tablet, extended release: 1 tab(s) orally once a day  Trulicity Pen 1.5 mg/0.5 mL subcutaneous solution: 1.5 milligram(s) subcutaneously once a week

## 2025-05-01 NOTE — CHART NOTE - NSCHARTNOTEFT_GEN_A_CORE
Called by RN prelim positive blood cultures show gram positive cocci in clusters.   -currently covered w/ vanco and cefepime   -repeat blood cx x2 ordered   -day team to reassess

## 2025-05-02 ENCOUNTER — APPOINTMENT (OUTPATIENT)
Dept: HYPERBARIC MEDICINE | Facility: HOSPITAL | Age: 65
End: 2025-05-02

## 2025-05-02 ENCOUNTER — TRANSCRIPTION ENCOUNTER (OUTPATIENT)
Age: 65
End: 2025-05-02

## 2025-05-02 LAB
ABO RH CONFIRMATION: SIGNIFICANT CHANGE UP
ALBUMIN SERPL ELPH-MCNC: 2.8 G/DL — LOW (ref 3.3–5)
ALP SERPL-CCNC: 66 U/L — SIGNIFICANT CHANGE UP (ref 40–120)
ALT FLD-CCNC: 25 U/L — SIGNIFICANT CHANGE UP (ref 12–78)
ANION GAP SERPL CALC-SCNC: 6 MMOL/L — SIGNIFICANT CHANGE UP (ref 5–17)
AST SERPL-CCNC: 11 U/L — LOW (ref 15–37)
BASOPHILS # BLD AUTO: 0.04 K/UL — SIGNIFICANT CHANGE UP (ref 0–0.2)
BASOPHILS NFR BLD AUTO: 0.3 % — SIGNIFICANT CHANGE UP (ref 0–2)
BILIRUB SERPL-MCNC: 0.3 MG/DL — SIGNIFICANT CHANGE UP (ref 0.2–1.2)
BUN SERPL-MCNC: 28 MG/DL — HIGH (ref 7–23)
CALCIUM SERPL-MCNC: 8.9 MG/DL — SIGNIFICANT CHANGE UP (ref 8.5–10.1)
CHLORIDE SERPL-SCNC: 108 MMOL/L — SIGNIFICANT CHANGE UP (ref 96–108)
CO2 SERPL-SCNC: 26 MMOL/L — SIGNIFICANT CHANGE UP (ref 22–31)
CREAT SERPL-MCNC: 0.96 MG/DL — SIGNIFICANT CHANGE UP (ref 0.5–1.3)
EGFR: 87 ML/MIN/1.73M2 — SIGNIFICANT CHANGE UP
EGFR: 87 ML/MIN/1.73M2 — SIGNIFICANT CHANGE UP
EOSINOPHIL # BLD AUTO: 0.48 K/UL — SIGNIFICANT CHANGE UP (ref 0–0.5)
EOSINOPHIL NFR BLD AUTO: 4.2 % — SIGNIFICANT CHANGE UP (ref 0–6)
GLUCOSE BLDC GLUCOMTR-MCNC: 188 MG/DL — HIGH (ref 70–99)
GLUCOSE BLDC GLUCOMTR-MCNC: 197 MG/DL — HIGH (ref 70–99)
GLUCOSE BLDC GLUCOMTR-MCNC: 198 MG/DL — HIGH (ref 70–99)
GLUCOSE BLDC GLUCOMTR-MCNC: 210 MG/DL — HIGH (ref 70–99)
GLUCOSE BLDC GLUCOMTR-MCNC: 213 MG/DL — HIGH (ref 70–99)
GLUCOSE BLDC GLUCOMTR-MCNC: 231 MG/DL — HIGH (ref 70–99)
GLUCOSE BLDC GLUCOMTR-MCNC: 279 MG/DL — HIGH (ref 70–99)
GLUCOSE SERPL-MCNC: 165 MG/DL — HIGH (ref 70–99)
HCT VFR BLD CALC: 34.2 % — LOW (ref 39–50)
HGB BLD-MCNC: 11.2 G/DL — LOW (ref 13–17)
IMM GRANULOCYTES NFR BLD AUTO: 0.6 % — SIGNIFICANT CHANGE UP (ref 0–0.9)
LYMPHOCYTES # BLD AUTO: 1.48 K/UL — SIGNIFICANT CHANGE UP (ref 1–3.3)
LYMPHOCYTES # BLD AUTO: 12.9 % — LOW (ref 13–44)
MCHC RBC-ENTMCNC: 29.7 PG — SIGNIFICANT CHANGE UP (ref 27–34)
MCHC RBC-ENTMCNC: 32.7 G/DL — SIGNIFICANT CHANGE UP (ref 32–36)
MCV RBC AUTO: 90.7 FL — SIGNIFICANT CHANGE UP (ref 80–100)
MONOCYTES # BLD AUTO: 1.04 K/UL — HIGH (ref 0–0.9)
MONOCYTES NFR BLD AUTO: 9 % — SIGNIFICANT CHANGE UP (ref 2–14)
NEUTROPHILS # BLD AUTO: 8.4 K/UL — HIGH (ref 1.8–7.4)
NEUTROPHILS NFR BLD AUTO: 73 % — SIGNIFICANT CHANGE UP (ref 43–77)
NRBC BLD AUTO-RTO: 0 /100 WBCS — SIGNIFICANT CHANGE UP (ref 0–0)
PLATELET # BLD AUTO: 263 K/UL — SIGNIFICANT CHANGE UP (ref 150–400)
POTASSIUM SERPL-MCNC: 4.7 MMOL/L — SIGNIFICANT CHANGE UP (ref 3.5–5.3)
POTASSIUM SERPL-SCNC: 4.7 MMOL/L — SIGNIFICANT CHANGE UP (ref 3.5–5.3)
PROT SERPL-MCNC: 7 G/DL — SIGNIFICANT CHANGE UP (ref 6–8.3)
RBC # BLD: 3.77 M/UL — LOW (ref 4.2–5.8)
RBC # FLD: 13.4 % — SIGNIFICANT CHANGE UP (ref 10.3–14.5)
SODIUM SERPL-SCNC: 140 MMOL/L — SIGNIFICANT CHANGE UP (ref 135–145)
WBC # BLD: 11.51 K/UL — HIGH (ref 3.8–10.5)
WBC # FLD AUTO: 11.51 K/UL — HIGH (ref 3.8–10.5)

## 2025-05-02 PROCEDURE — 75630 X-RAY AORTA LEG ARTERIES: CPT | Mod: 26

## 2025-05-02 PROCEDURE — 99232 SBSQ HOSP IP/OBS MODERATE 35: CPT

## 2025-05-02 PROCEDURE — 36246 INS CATH ABD/L-EXT ART 2ND: CPT | Mod: LT

## 2025-05-02 PROCEDURE — 75710 ARTERY X-RAYS ARM/LEG: CPT | Mod: 26,LT

## 2025-05-02 PROCEDURE — 76937 US GUIDE VASCULAR ACCESS: CPT | Mod: 26

## 2025-05-02 DEVICE — MICRO-INTRODUCER KIT 5FR 40CM NITINOL TC TIP 7CM ECHOGENIC REGULAR: Type: IMPLANTABLE DEVICE | Site: LEFT | Status: FUNCTIONAL

## 2025-05-02 DEVICE — GUIDEWIRE RADIFOCUS GLIDEWIRE ANGLED 0.035" X 260CM STIFF: Type: IMPLANTABLE DEVICE | Site: LEFT | Status: FUNCTIONAL

## 2025-05-02 DEVICE — GUIDEWIRE ADVANTAGE .014INX300CM: Type: IMPLANTABLE DEVICE | Site: LEFT | Status: FUNCTIONAL

## 2025-05-02 DEVICE — CATH OMNI FLSH 0.035IN 5FRX65: Type: IMPLANTABLE DEVICE | Site: LEFT | Status: FUNCTIONAL

## 2025-05-02 DEVICE — ANGIO CATH GLIDECATH ANGLE TAPER 5FR X 65CM: Type: IMPLANTABLE DEVICE | Site: LEFT | Status: FUNCTIONAL

## 2025-05-02 DEVICE — IMPLANTABLE DEVICE: Type: IMPLANTABLE DEVICE | Site: LEFT | Status: FUNCTIONAL

## 2025-05-02 DEVICE — DEVICE CLOSURE 6F/7F MYNX GRIP MUST ORDER MIN OF 10 EA: Type: IMPLANTABLE DEVICE | Site: LEFT | Status: FUNCTIONAL

## 2025-05-02 DEVICE — SHEATH INTRODUCER TERUMO PINNACLE RADIOPAQUE 5FR X 10CM: Type: IMPLANTABLE DEVICE | Site: LEFT | Status: FUNCTIONAL

## 2025-05-02 DEVICE — GUIDEWIRE TERUMO GLIDEWIRE ANGLED .035" X 150CM STANDARD: Type: IMPLANTABLE DEVICE | Site: LEFT | Status: FUNCTIONAL

## 2025-05-02 DEVICE — GUIDEWIRE RADIFOCUS GLIDEWIRE STANDARD ANGLED TIP 0.035" X 260CM: Type: IMPLANTABLE DEVICE | Site: LEFT | Status: FUNCTIONAL

## 2025-05-02 DEVICE — INTRO SHEATH ANSEL 5FR 90CM: Type: IMPLANTABLE DEVICE | Site: LEFT | Status: FUNCTIONAL

## 2025-05-02 DEVICE — SHEATH INTRODUCER TERUMO PINNACLE RADIOPAQUE 7FR X 10CM: Type: IMPLANTABLE DEVICE | Site: LEFT | Status: FUNCTIONAL

## 2025-05-02 RX ORDER — DEXTROSE 50 % IN WATER 50 %
25 SYRINGE (ML) INTRAVENOUS ONCE
Refills: 0 | Status: DISCONTINUED | OUTPATIENT
Start: 2025-05-02 | End: 2025-05-06

## 2025-05-02 RX ORDER — METOPROLOL SUCCINATE 50 MG/1
50 TABLET, EXTENDED RELEASE ORAL DAILY
Refills: 0 | Status: DISCONTINUED | OUTPATIENT
Start: 2025-05-03 | End: 2025-05-06

## 2025-05-02 RX ORDER — DEXTROSE 50 % IN WATER 50 %
12.5 SYRINGE (ML) INTRAVENOUS ONCE
Refills: 0 | Status: DISCONTINUED | OUTPATIENT
Start: 2025-05-02 | End: 2025-05-06

## 2025-05-02 RX ORDER — HYDROMORPHONE/SOD CHLOR,ISO/PF 2 MG/10 ML
1 SYRINGE (ML) INJECTION
Refills: 0 | Status: DISCONTINUED | OUTPATIENT
Start: 2025-05-02 | End: 2025-05-02

## 2025-05-02 RX ORDER — ASPIRIN 325 MG
81 TABLET ORAL DAILY
Refills: 0 | Status: DISCONTINUED | OUTPATIENT
Start: 2025-05-02 | End: 2025-05-06

## 2025-05-02 RX ORDER — ENOXAPARIN SODIUM 100 MG/ML
40 INJECTION SUBCUTANEOUS EVERY 24 HOURS
Refills: 0 | Status: DISCONTINUED | OUTPATIENT
Start: 2025-05-03 | End: 2025-05-06

## 2025-05-02 RX ORDER — CLOPIDOGREL BISULFATE 75 MG/1
75 TABLET, FILM COATED ORAL DAILY
Refills: 0 | Status: DISCONTINUED | OUTPATIENT
Start: 2025-05-02 | End: 2025-05-06

## 2025-05-02 RX ORDER — INSULIN LISPRO 100 U/ML
INJECTION, SOLUTION INTRAVENOUS; SUBCUTANEOUS
Refills: 0 | Status: DISCONTINUED | OUTPATIENT
Start: 2025-05-02 | End: 2025-05-06

## 2025-05-02 RX ORDER — GLUCAGON 3 MG/1
1 POWDER NASAL ONCE
Refills: 0 | Status: DISCONTINUED | OUTPATIENT
Start: 2025-05-02 | End: 2025-05-06

## 2025-05-02 RX ORDER — LISINOPRIL 5 MG/1
40 TABLET ORAL DAILY
Refills: 0 | Status: DISCONTINUED | OUTPATIENT
Start: 2025-05-03 | End: 2025-05-03

## 2025-05-02 RX ORDER — HYDROMORPHONE/SOD CHLOR,ISO/PF 2 MG/10 ML
0.5 SYRINGE (ML) INJECTION
Refills: 0 | Status: DISCONTINUED | OUTPATIENT
Start: 2025-05-02 | End: 2025-05-02

## 2025-05-02 RX ORDER — ATORVASTATIN CALCIUM 80 MG/1
40 TABLET, FILM COATED ORAL AT BEDTIME
Refills: 0 | Status: DISCONTINUED | OUTPATIENT
Start: 2025-05-02 | End: 2025-05-06

## 2025-05-02 RX ORDER — CEFTRIAXONE 500 MG/1
2000 INJECTION, POWDER, FOR SOLUTION INTRAMUSCULAR; INTRAVENOUS EVERY 24 HOURS
Refills: 0 | Status: DISCONTINUED | OUTPATIENT
Start: 2025-05-03 | End: 2025-05-06

## 2025-05-02 RX ORDER — CEFTRIAXONE 500 MG/1
INJECTION, POWDER, FOR SOLUTION INTRAMUSCULAR; INTRAVENOUS
Refills: 0 | Status: DISCONTINUED | OUTPATIENT
Start: 2025-05-02 | End: 2025-05-06

## 2025-05-02 RX ORDER — SODIUM CHLORIDE 9 G/1000ML
1000 INJECTION, SOLUTION INTRAVENOUS
Refills: 0 | Status: DISCONTINUED | OUTPATIENT
Start: 2025-05-02 | End: 2025-05-02

## 2025-05-02 RX ORDER — DEXTROSE 50 % IN WATER 50 %
15 SYRINGE (ML) INTRAVENOUS ONCE
Refills: 0 | Status: DISCONTINUED | OUTPATIENT
Start: 2025-05-02 | End: 2025-05-06

## 2025-05-02 RX ORDER — VANCOMYCIN HCL IN 5 % DEXTROSE 1.5G/250ML
1250 PLASTIC BAG, INJECTION (ML) INTRAVENOUS EVERY 12 HOURS
Refills: 0 | Status: DISCONTINUED | OUTPATIENT
Start: 2025-05-02 | End: 2025-05-02

## 2025-05-02 RX ORDER — ONDANSETRON HCL/PF 4 MG/2 ML
4 VIAL (ML) INJECTION ONCE
Refills: 0 | Status: DISCONTINUED | OUTPATIENT
Start: 2025-05-02 | End: 2025-05-02

## 2025-05-02 RX ORDER — INSULIN LISPRO 100 U/ML
INJECTION, SOLUTION INTRAVENOUS; SUBCUTANEOUS AT BEDTIME
Refills: 0 | Status: DISCONTINUED | OUTPATIENT
Start: 2025-05-02 | End: 2025-05-06

## 2025-05-02 RX ORDER — CEFEPIME 2 G/20ML
2000 INJECTION, POWDER, FOR SOLUTION INTRAVENOUS EVERY 8 HOURS
Refills: 0 | Status: DISCONTINUED | OUTPATIENT
Start: 2025-05-02 | End: 2025-05-02

## 2025-05-02 RX ORDER — GABAPENTIN 400 MG/1
300 CAPSULE ORAL THREE TIMES A DAY
Refills: 0 | Status: DISCONTINUED | OUTPATIENT
Start: 2025-05-02 | End: 2025-05-06

## 2025-05-02 RX ORDER — CEFTRIAXONE 500 MG/1
2000 INJECTION, POWDER, FOR SOLUTION INTRAMUSCULAR; INTRAVENOUS ONCE
Refills: 0 | Status: COMPLETED | OUTPATIENT
Start: 2025-05-02 | End: 2025-05-02

## 2025-05-02 RX ADMIN — LISINOPRIL 40 MILLIGRAM(S): 5 TABLET ORAL at 05:26

## 2025-05-02 RX ADMIN — INSULIN LISPRO 2: 100 INJECTION, SOLUTION INTRAVENOUS; SUBCUTANEOUS at 12:37

## 2025-05-02 RX ADMIN — CLOPIDOGREL BISULFATE 75 MILLIGRAM(S): 75 TABLET, FILM COATED ORAL at 12:39

## 2025-05-02 RX ADMIN — GABAPENTIN 300 MILLIGRAM(S): 400 CAPSULE ORAL at 14:13

## 2025-05-02 RX ADMIN — ATORVASTATIN CALCIUM 40 MILLIGRAM(S): 80 TABLET, FILM COATED ORAL at 21:49

## 2025-05-02 RX ADMIN — INSULIN LISPRO 2: 100 INJECTION, SOLUTION INTRAVENOUS; SUBCUTANEOUS at 07:35

## 2025-05-02 RX ADMIN — GABAPENTIN 300 MILLIGRAM(S): 400 CAPSULE ORAL at 05:26

## 2025-05-02 RX ADMIN — METOPROLOL SUCCINATE 50 MILLIGRAM(S): 50 TABLET, EXTENDED RELEASE ORAL at 05:26

## 2025-05-02 RX ADMIN — SODIUM CHLORIDE 75 MILLILITER(S): 9 INJECTION, SOLUTION INTRAVENOUS at 10:58

## 2025-05-02 RX ADMIN — GABAPENTIN 300 MILLIGRAM(S): 400 CAPSULE ORAL at 21:51

## 2025-05-02 RX ADMIN — Medication 75 MILLILITER(S): at 00:00

## 2025-05-02 RX ADMIN — Medication 81 MILLIGRAM(S): at 12:39

## 2025-05-02 RX ADMIN — CEFTRIAXONE 100 MILLIGRAM(S): 500 INJECTION, POWDER, FOR SOLUTION INTRAMUSCULAR; INTRAVENOUS at 14:14

## 2025-05-02 RX ADMIN — Medication 166.67 MILLIGRAM(S): at 06:18

## 2025-05-02 RX ADMIN — CEFEPIME 100 MILLIGRAM(S): 2 INJECTION, POWDER, FOR SOLUTION INTRAVENOUS at 05:25

## 2025-05-02 RX ADMIN — INSULIN LISPRO 2: 100 INJECTION, SOLUTION INTRAVENOUS; SUBCUTANEOUS at 23:10

## 2025-05-02 RX ADMIN — INSULIN LISPRO 4: 100 INJECTION, SOLUTION INTRAVENOUS; SUBCUTANEOUS at 17:12

## 2025-05-02 NOTE — PROGRESS NOTE ADULT - SUBJECTIVE AND OBJECTIVE BOX
[facility]  OFELIA Harvey Y. Patel, S. Shah, G. [facility]  [phone]    Name: LOIDA QUEZADA  Age: 64y  Gender: Male  MRN: [id number]    Interval History:  S/p LLE angio this AM  Notes reviewed    Antibiotics:  cefepime   IVPB 2000 milliGRAM(s) IV Intermittent every 8 hours  vancomycin  IVPB 1250 milliGRAM(s) IV Intermittent every 12 hours      Medications:  aspirin enteric coated 81 milliGRAM(s) Oral daily  atorvastatin 40 milliGRAM(s) Oral at bedtime  cefepime   IVPB 2000 milliGRAM(s) IV Intermittent every 8 hours  clopidogrel Tablet 75 milliGRAM(s) Oral daily  dextrose 50% Injectable 25 Gram(s) IV Push once  dextrose 50% Injectable 12.5 Gram(s) IV Push once  dextrose 50% Injectable 25 Gram(s) IV Push once  dextrose Oral Gel 15 Gram(s) Oral once PRN  gabapentin 300 milliGRAM(s) Oral three times a day  glucagon  Injectable 1 milliGRAM(s) IntraMuscular once  insulin lispro (ADMELOG) corrective regimen sliding scale   SubCutaneous three times a day before meals  insulin lispro (ADMELOG) corrective regimen sliding scale   SubCutaneous at bedtime  lisinopril 40 milliGRAM(s) Oral daily  metoprolol succinate ER 50 milliGRAM(s) Oral daily  vancomycin  IVPB 1250 milliGRAM(s) IV Intermittent every 12 hours      Review of Systems:  A 10-point review of systems was obtained.   Review of systems otherwise negative except as previously noted.    Allergies: No Known Allergies    For details regarding the patient's past medical history, social history, family history, and other miscellaneous elements, please refer the initial infectious diseases consultation and/or the admitting history and physical examination for this admission.    Objective:  Vitals:   T(C): 36.6 (05-02-25 @ 12:49), Max: 37.2 (05-01-25 @ 20:25)  HR: 67 (05-02-25 @ 12:49) (67 - 84)  BP: 128/68 (05-02-25 @ 12:49) (107/68 - 142/72)  RR: 18 (05-02-25 @ 12:49) (11 - 20)  SpO2: 98% (05-02-25 @ 12:49) (94% - 99%)    Physical Examination:  General: no acute distress  HEENT: NC/AT, EOMI  Cardio: RRR  Resp: decreased breath sounds  Abd: soft, NT, ND  Ext: no edema or cyanosis  Skin: warm, dry, no visible rash      Laboratory Studies:  CBC:                       11.2   11.51 )-----------( 263      ( 02 May 2025 05:10 )             34.2     CMP: 05-02    140  |  108  |  28[H]  ----------------------------<  165[H]  4.7   |  26  |  0.96    Ca    8.9      02 May 2025 05:10    TPro  7.0  /  Alb  2.8[L]  /  TBili  0.3  /  DBili  x   /  AST  11[L]  /  ALT  25  /  AlkPhos  66  05-02    LIVER FUNCTIONS - ( 02 May 2025 05:10 )  Alb: 2.8 g/dL / Pro: 7.0 g/dL / ALK PHOS: 66 U/L / ALT: 25 U/L / AST: 11 U/L / GGT: x           Urinalysis Basic - ( 02 May 2025 05:10 )    Color: x / Appearance: x / SG: x / pH: x  Gluc: 165 mg/dL / Ketone: x  / Bili: x / Urobili: x   Blood: x / Protein: x / Nitrite: x   Leuk Esterase: x / RBC: x / WBC x   Sq Epi: x / Non Sq Epi: x / Bacteria: x        Microbiology: reviewed    Culture - Blood (collected 05-01-25 @ 05:58)  Source: Blood Blood-Peripheral  Preliminary Report (05-02-25 @ 10:01):    No growth at 24 hours    Culture - Blood (collected 05-01-25 @ 05:52)  Source: Blood Blood-Peripheral  Preliminary Report (05-02-25 @ 10:01):    No growth at 24 hours    Culture - Blood (collected 04-29-25 @ 12:45)  Source: Blood Blood  Gram Stain (05-01-25 @ 04:09):    Growth in aerobic bottle: Gram Positive Cocci in Clusters  Final Report (05-01-25 @ 23:19):    Growth in aerobic bottle: Staphylococcus pseudintermedius    "Susceptibilities not performed"    Direct identification is available within approximately 3-5    hours either by Blood Panel Multiplexed PCR or Direct    MALDI-TOF. Details: [email or web address]  Organism: Blood Culture PCR (05-01-25 @ 23:19)  Organism: Blood Culture PCR (05-01-25 @ 23:19)      -  Coagulase negative Staphylococcus: Detec      Method Type: PCR    Culture - Blood (collected 04-29-25 @ 12:30)  Source: Blood Blood  Preliminary Report (05-01-25 @ 19:01):    No growth at 48 Hours          Radiology: reviewed

## 2025-05-02 NOTE — PROGRESS NOTE ADULT - ASSESSMENT
Pt is a 65 y/o M with PMHx of PVD, HTN, diabetes s/p metatarsal amputation sent in by wound clinic for left foot infections and multiple wounds admitted for worsening foot wound  infection, cellulitis left foot& Lower EXT.     L foot wound infection/cellulitis +/- underlying OM  PAD  DELORIS  - pt p/w multiple L foot wounds  -- pt well known to service; is s/p surgical amputation of R forefoot  s/p L PT angioplasty 12/2/24 for L lat foot DFU on Plavix  - afebrile, leukocytosis to 12. Elevated ESR 53  - X-ray Foot: Cutaneous defect adjacent to the base of the fifth metatarsal again evident with no gross cortical destruction or soft tissue emphysema.   - MR L foot Soft tissue wound along the 5th metatarsal base with cortical erosive change and marrow edema concerning for osteomyelitis.  - 4/29 BCx + CoNS -- likely procurement contaminant; 5/1 BCx NGTD  - 5/2 s/p LLE angio    Podiatry notes reviewed; pending further OR once further vascular studies are obtained    Recommendations:   Stop vancomycin  Stop cefepime  Will narrow to ceftriaxone 2gm q24h based on prior cx of S. marascens from December 2024  Trend temps/WBC  Monitor renal fxn, renally dose medications  Local wound care  Further recs to follow pending clinical course-At high risk for more proximal amputation. Intervention will be based on results of FRANCISCA and PVR's. Possible BKA per podiatry     Patient evaluated with face-to-face time in addition to reviewing history, labs, microbiology, and imaging.   Antibiotic stewardship, local antibiogram, infection control strategies and potential transmission issues taken into consideration at time of treatment decision making process.   Thank you for allowing us to participate in the care of your patient.  Infectious Diseases will follow. Please call with any questions.  Melany Calhoun M.D.  Available on [employer] TEAMS -- *[facility]*  [facility] [phone]  For after 5 P.M. and weekends, please call [phone] Pt is a 65 y/o M with PMHx of PVD, HTN, diabetes s/p metatarsal amputation sent in by wound clinic for left foot infections and multiple wounds admitted for worsening foot wound  infection, cellulitis left foot& Lower EXT.     L foot wound infection/cellulitis +/- underlying OM  PAD  DELORIS  - pt p/w multiple L foot wounds  -- pt well known to service; is s/p surgical amputation of R forefoot  s/p L PT angioplasty 12/2/24 for L lat foot DFU on Plavix  - afebrile, leukocytosis to 12. Elevated ESR 53  - X-ray Foot: Cutaneous defect adjacent to the base of the fifth metatarsal again evident with no gross cortical destruction or soft tissue emphysema.   - MR L foot Soft tissue wound along the 5th metatarsal base with cortical erosive change and marrow edema concerning for osteomyelitis.  - 4/29 BCx + CoNS -- likely procurement contaminant; 5/1 BCx NGTD  - 5/2 s/p LLE angio    Podiatry notes reviewed; pending further OR once further vascular studies are obtained    Recommendations:   Stop vancomycin  Stop cefepime  Will narrow to ceftriaxone 2gm q24h based on prior cx of S. marascens from December 2024  Trend temps/WBC  Monitor renal fxn, renally dose medications  Local wound care  Further recs to follow pending clinical course-At high risk for more proximal amputation. Intervention will be based on results of FRANCISCA and PVR's. Possible BKA per podiatry     Patient evaluated with face-to-face time in addition to reviewing history, labs, microbiology, and imaging.   Antibiotic stewardship, local antibiogram, infection control strategies and potential transmission issues taken into consideration at time of treatment decision making process.   Thank you for allowing us to participate in the care of your patient.  Dr. Lyn covering weekend service from 05/03/25-05/04/25  I will resume care 5/05/25  Infectious Diseases will follow. Please call with any questions.  Melany Calhoun M.D.  Available on [employer] TEAMS -- *[facility]*  [facility] [phone]  For after 5 P.M. and weekends, please call [phone] Pt is a 63 y/o M with PMHx of PVD, HTN, diabetes s/p metatarsal amputation sent in by wound clinic for left foot infections and multiple wounds admitted for worsening foot wound  infection, cellulitis left foot& Lower EXT.     L foot wound infection/cellulitis +/- underlying OM  PAD  DELORIS  - pt p/w multiple L foot wounds  -- pt well known to service; is s/p surgical amputation of R forefoot  s/p L PT angioplasty 12/2/24 for L lat foot DFU on Plavix  - afebrile, leukocytosis to 12. Elevated ESR 53  - X-ray Foot: Cutaneous defect adjacent to the base of the fifth metatarsal again evident with no gross cortical destruction or soft tissue emphysema.   - MR L foot Soft tissue wound along the 5th metatarsal base with cortical erosive change and marrow edema concerning for osteomyelitis.  - 4/29 BCx + CoNS -- likely procurement contaminant; 5/1 BCx NGTD  - 5/2 s/p LLE angio    Podiatry notes reviewed; pending further OR once further vascular studies are obtained    Recommendations:   Stop vancomycin  Stop cefepime  Will narrow to ceftriaxone 2gm q24h based on prior cx of S. marascens from December 2024  Trend temps/WBC  Monitor renal fxn, renally dose medications  Local wound care  Further recs to follow pending clinical course/podiatry plan    Patient evaluated with face-to-face time in addition to reviewing history, labs, microbiology, and imaging.   Antibiotic stewardship, local antibiogram, infection control strategies and potential transmission issues taken into consideration at time of treatment decision making process.   Thank you for allowing us to participate in the care of your patient.  Dr. Lyn covering weekend service from 05/03/25-05/04/25  I will resume care 5/05/25  Infectious Diseases will follow. Please call with any questions.  Melany Calhoun M.D.  Available on [employer] TEAMS -- *[facility]*  [facility] [phone]  For after 5 P.M. and weekends, please call [phone]

## 2025-05-02 NOTE — PROGRESS NOTE ADULT - SUBJECTIVE AND OBJECTIVE BOX
Patient is a 64y old  Male who presents with a chief complaint of Foot infection (01 May 2025 14:31)      HPI: 65 y/o M with PMHx DM2, hx osteomyelitis, CAD, PAD s/p RLE angioplasty 2020, s/p surgical amputation of R forefoot , s/p L PT angioplasty 12/2/24 for L lat foot DFU on plavix, HTN sent in by wound clinic for left foot infections and multiple wounds. Pt states he was at [facility] for hyperbaric therapy today and they noticed his L foot had drainage with increased erythema and edema so they told him to come to the ED. Pt states yesterday his foot was dry, without drainage or swelling. Patient notes he follow with ID outpatient and was prescribed Bactrim 2 weeks ago for the chronic foot wounds without any improvement. Pt denies fever/chills, CP, SOB, HA, dizziness, n/v/d. Pt also denies any current pain in the foot.    ED Course:   Vitals: BP: 107/66 , HR: 87 , Temp: 98.1F , RR: 16 , SpO2: 100 % on RA  Labs:  ESR 53, WBC 12.23, Hgb 11, BUN 25, Glucose 137,   X-ray Foot: Cutaneous defect adjacent to the base of the fifth metatarsal again evident with no gross cortical destruction or soft tissue emphysema. Follow-up MRI can be ordered    Received in the ED:  3.375g Zosyn IVPBx1, Vanco 1g IVPB x1    INTERVAL HPI:  4/30: Pt seen and examined at bedside this morning, feels well, no acute complaints. On IV abx vanc and cefepime, planning for LLE angio on 5/2 continue Abx  5/1: Pt seen and examined this morning, feels well no acute complaints. Requesting left foot dressing change, c/d/i. Pain controlled. On IV abx vanc and cefepime, for LLE angio tomorrow NPO  5/2:     OVERNIGHT EVENTS:    Home Medications:  aspirin 81 mg oral delayed release tablet: 1 tab(s) orally once a day (29 Apr 2025 16:40)  atorvastatin 40 mg oral tablet: 1 tab(s) orally once a day (29 Apr 2025 16:40)  clopidogrel 75 mg oral tablet: 1 tab(s) orally once a day (29 Apr 2025 16:40)  gabapentin 300 mg oral capsule: 1 cap(s) orally 3 times a day (29 Apr 2025 16:40)  Jardiance 25 mg oral tablet: 1 tab(s) orally once a day (29 Apr 2025 16:40)  lisinopril 40 mg oral tablet: 1 tab(s) orally once a day (29 Apr 2025 16:40)  metFORMIN 1000 mg oral tablet: 1 tab(s) orally 2 times a day (29 Apr 2025 16:40)  metoprolol succinate 50 mg oral tablet, extended release: 1 tab(s) orally once a day (29 Apr 2025 16:40)  Trulicity Pen 1.5 mg/0.5 mL subcutaneous solution: 1.5 milligram(s) subcutaneously once a week (29 Apr 2025 16:40)      MEDICATIONS  (STANDING):    MEDICATIONS  (PRN):      No Known Allergies      Social History:  Tobacco: Denies  EtOH: Denies  Recreational drug use: Denies  Lives with: Wife at home   Ambulates: Independently   ADLs: Independent (29 Apr 2025 15:39)      REVIEW OF SYSTEMS:  CONSTITUTIONAL: No fever, No chills, No fatigue, No myalgia, No Body ache, No Weakness  EYES: No eye pain,  No visual disturbances, No discharge, No Redness  ENMT: No ear pain, No nose bleed, No vertigo; No sinus pain, No throat pain, No Congestion  NECK: No pain, No stiffness  RESPIRATORY: No cough, No wheezing, No hemoptysis, No shortness of breath  CARDIOVASCULAR: No chest pain, No palpitations  GASTROINTESTINAL: No abdominal pain, No epigastric pain. No nausea, No vomiting, No diarrhea, No constipation; [  ] BM  GENITOURINARY: No dysuria, No frequency, No urgency, No hematuria, No incontinence  NEUROLOGICAL: No headaches, No dizziness, No numbness, No tingling, No tremors, No weakness  EXTREMITIES: No Swelling, No Pain, No Edema  SKIN: [  ] No itching, burning, rashes, or lesions   MUSCULOSKELETAL: No joint pain, No joint swelling; No muscle pain, No back pain, No extremity pain  PSYCHIATRIC: No depression, No anxiety, No mood swings, No difficulty sleeping at night  PAIN SCALE: [  ] None  [  ] Other-  ROS Unable to obtain due to: [  ] Dementia  [  ] Lethargy  [  ] Sedated  [  ] Non verbal  REST OF REVIEW OF SYSTEMS: [  ] Normal     Vital Signs Last 24 Hrs  T(C): 36.7 (02 May 2025 07:45), Max: 37.2 (01 May 2025 20:25)  T(F): 98.1 (02 May 2025 07:45), Max: 99 (01 May 2025 20:25)  HR: 73 (02 May 2025 07:45) (71 - 84)  BP: 125/70 (02 May 2025 07:45) (125/70 - 142/72)  BP(mean): --  RR: 18 (02 May 2025 07:38) (18 - 18)  SpO2: 99% (02 May 2025 07:45) (94% - 99%)    Parameters below as of 02 May 2025 07:38  Patient On (Oxygen Delivery Method): room air        CAPILLARY BLOOD GLUCOSE      POCT Blood Glucose.: 210 mg/dL (02 May 2025 07:54)  POCT Blood Glucose.: 188 mg/dL (02 May 2025 07:33)  POCT Blood Glucose.: 213 mg/dL (02 May 2025 06:00)  POCT Blood Glucose.: 230 mg/dL (01 May 2025 21:39)  POCT Blood Glucose.: 172 mg/dL (01 May 2025 16:52)  POCT Blood Glucose.: 259 mg/dL (01 May 2025 11:58)      I&O's Summary    01 May 2025 07:01  -  02 May 2025 07:00  --------------------------------------------------------  IN: 825 mL / OUT: 0 mL / NET: 825 mL      PHYSICAL EXAM:  GENERAL:  [  ] NAD, [  ] Well appearing, [  ] Agitated, [  ] Drowsy, [  ] Lethargy, [  ] Confused   HEAD:  [  ] Normal, [  ] Other  EYES:  [  ] EOMI, [  ] PERRLA, [  ] Conjunctiva and sclera clear normal, [  ] Other, [  ] Pallor, [  ] Discharge  ENMT:  [  ] Normal, [  ] Moist mucous membranes, [  ] Good dentition, [  ] No thrush  NECK:  [  ] Supple, [  ] No JVD, [  ] Normal thyroid, [  ] Lymphadenopathy, [  ] Other  CHEST/LUNG:  [  ] Clear to auscultation bilaterally, [  ] Breath Sounds equal B/L / decreased, [  ] Poor effort, [  ] No rales, [  ] No rhonchi, [  ] No wheezing  HEART:  [  ] Regular rate and rhythm, [  ] Tachycardia, [  ] Bradycardia, [  ] Irregular, [  ] No murmurs, No rubs, No gallops, [  ] PPM in place (Mfr:  )  ABDOMEN:  [  ] Soft, [  ] Nontender, [  ] Nondistended, [  ] No mass, [  ] Bowel sounds present, [  ] Obese  NERVOUS SYSTEM:  [  ] Alert & Oriented x3, [  ] Nonfocal, [  ] Confusion, [  ] Encephalopathic, [  ] Sedated, [  ] Unable to assess, [  ] Dementia, [  ] Other-  EXTREMITIES:  [  ] 2+ Peripheral Pulses, No clubbing, No cyanosis,  [  ] Edema B/L lower EXT, [  ] PVD stasis skin changes B/L lower EXT, [  ] Wound  LYMPH:  No lymphadenopathy noted  SKIN:  [  ] No rashes or lesions, [  ] Pressure ulcers, [  ] Ecchymosis, [  ] Skin tears, [  ] Other    DIET:     LABS:                        11.2   11.51 )-----------( 263      ( 02 May 2025 05:10 )             34.2     02 May 2025 05:10    140    |  108    |  28     ----------------------------<  165    4.7     |  26     |  0.96     Ca    8.9        02 May 2025 05:10    TPro  7.0    /  Alb  2.8    /  TBili  0.3    /  DBili  x      /  AST  11     /  ALT  25     /  AlkPhos  66     02 May 2025 05:10      Urinalysis Basic - ( 02 May 2025 05:10 )    Color: x / Appearance: x / SG: x / pH: x  Gluc: 165 mg/dL / Ketone: x  / Bili: x / Urobili: x   Blood: x / Protein: x / Nitrite: x   Leuk Esterase: x / RBC: x / WBC x   Sq Epi: x / Non Sq Epi: x / Bacteria: x      Culture Results:   Growth in aerobic bottle: Staphylococcus pseudintermedius  "Susceptibilities not performed"  Direct identification is available within approximately 3-5  hours either by Blood Panel Multiplexed PCR or Direct  MALDI-TOF. Details: [email or web address] (04-29 @ 12:45)  Culture Results:   No growth at 48 Hours (04-29 @ 12:30)            Culture - Blood (collected 29 Apr 2025 12:45)  Source: Blood Blood  Gram Stain (01 May 2025 04:09):    Growth in aerobic bottle: Gram Positive Cocci in Clusters  Final Report (01 May 2025 23:19):    Growth in aerobic bottle: Staphylococcus pseudintermedius    "Susceptibilities not performed"    Direct identification is available within approximately 3-5    hours either by Blood Panel Multiplexed PCR or Direct    MALDI-TOF. Details: [email or web address]  Organism: Blood Culture PCR (01 May 2025 23:19)  Organism: Blood Culture PCR (01 May 2025 23:19)    Culture - Blood (collected 29 Apr 2025 12:30)  Source: Blood Blood  Preliminary Report (01 May 2025 19:01):    No growth at 48 Hours      RADIOLOGY & ADDITIONAL TESTS: No new imaging      HEALTH ISSUES - PROBLEM Dx:  Infection of left foot    DELORIS (acute kidney injury)    Type 2 diabetes mellitus    HTN (hypertension)    Need for prophylactic measure    Diabetic ulcer of left foot    Osteomyelitis of left foot    Unstageable pressure ulcer of left foot    PAD (peripheral artery disease)          Consultant(s) Notes Reviewed:  [x  ] YES     Care Discussed with [ x ] Consultants, [x  ] Patient, [  ] Family, [  ] HCP, [  ] , [  ] Social Service, [  ] RN, [  ] Physical Therapy, [  ] Palliative Care Team  DVT PPX: [  ] Lovenox, [  ] SC Heparin, [  ] Coumadin, [  ] Xarelto, [  ] Eliquis, [  ] Pradaxa, [  ] IV Heparin drip, [  ] SCD, [  ] Ambulation, [  ] Contraindicated 2/2 GI Bleed, [  ] Contraindicated 2/2  Bleed, [  ] Contraindicated 2/2 Brain Bleed  Advanced Directive: [ x ] None, [  ] DNR/DNI Patient is a 64y old  Male who presents with a chief complaint of Foot infection (01 May 2025 14:31)      HPI: 65 y/o M with PMHx DM2, hx osteomyelitis, CAD, PAD s/p RLE angioplasty 2020, s/p surgical amputation of R forefoot , s/p L PT angioplasty 12/2/24 for L lat foot DFU on plavix, HTN sent in by wound clinic for left foot infections and multiple wounds. Pt states he was at [facility] for hyperbaric therapy today and they noticed his L foot had drainage with increased erythema and edema so they told him to come to the ED. Pt states yesterday his foot was dry, without drainage or swelling. Patient notes he follow with ID outpatient and was prescribed Bactrim 2 weeks ago for the chronic foot wounds without any improvement. Pt denies fever/chills, CP, SOB, HA, dizziness, n/v/d. Pt also denies any current pain in the foot.    ED Course:   Vitals: BP: 107/66 , HR: 87 , Temp: 98.1F , RR: 16 , SpO2: 100 % on RA  Labs:  ESR 53, WBC 12.23, Hgb 11, BUN 25, Glucose 137,   X-ray Foot: Cutaneous defect adjacent to the base of the fifth metatarsal again evident with no gross cortical destruction or soft tissue emphysema. Follow-up MRI can be ordered    Received in the ED:  3.375g Zosyn IVPBx1, Vanco 1g IVPB x1    INTERVAL HPI:  4/30: Pt seen and examined at bedside this morning, feels well, no acute complaints. On IV abx vanc and cefepime, planning for LLE angio on 5/2 continue Abx  5/1: Pt seen and examined this morning, feels well no acute complaints. Requesting left foot dressing change, c/d/i. Pain controlled. On IV abx vanc and cefepime, for LLE angio tomorrow NPO  5/2: Pt seen at bedside this afternoon s/p LLE angiogram, feels well no acute complaints. L foot dressing c/d/i, pain controlled. On IV abx vanc and cefepime    OVERNIGHT EVENTS: None    Home Medications:  aspirin 81 mg oral delayed release tablet: 1 tab(s) orally once a day (29 Apr 2025 16:40)  atorvastatin 40 mg oral tablet: 1 tab(s) orally once a day (29 Apr 2025 16:40)  clopidogrel 75 mg oral tablet: 1 tab(s) orally once a day (29 Apr 2025 16:40)  gabapentin 300 mg oral capsule: 1 cap(s) orally 3 times a day (29 Apr 2025 16:40)  Jardiance 25 mg oral tablet: 1 tab(s) orally once a day (29 Apr 2025 16:40)  lisinopril 40 mg oral tablet: 1 tab(s) orally once a day (29 Apr 2025 16:40)  metFORMIN 1000 mg oral tablet: 1 tab(s) orally 2 times a day (29 Apr 2025 16:40)  metoprolol succinate 50 mg oral tablet, extended release: 1 tab(s) orally once a day (29 Apr 2025 16:40)  Trulicity Pen 1.5 mg/0.5 mL subcutaneous solution: 1.5 milligram(s) subcutaneously once a week (29 Apr 2025 16:40)      MEDICATIONS  (STANDING):    MEDICATIONS  (PRN):      No Known Allergies      Social History:  Tobacco: Denies  EtOH: Denies  Recreational drug use: Denies  Lives with: Wife at home   Ambulates: Independently   ADLs: Independent (29 Apr 2025 15:39)      REVIEW OF SYSTEMS:  CONSTITUTIONAL: No fever, No chills, No fatigue, No myalgia, No Body ache, No Weakness  EYES: No eye pain,  No visual disturbances, No discharge, No Redness  ENMT: No ear pain, No nose bleed, No vertigo; No sinus pain, No throat pain, No Congestion  NECK: No pain, No stiffness  RESPIRATORY: No cough, No wheezing, No hemoptysis, No shortness of breath  CARDIOVASCULAR: No chest pain, No palpitations  GASTROINTESTINAL: No abdominal pain, No epigastric pain. No nausea, No vomiting, No diarrhea, No constipation; [  ] BM  GENITOURINARY: No dysuria, No frequency, No urgency, No hematuria, No incontinence  NEUROLOGICAL: No headaches, No dizziness, No numbness, No tingling, No tremors, No weakness  EXTREMITIES: No Swelling, No Pain, No Edema  SKIN: [ x ] No itching, burning, rashes, or lesions   MUSCULOSKELETAL: No joint pain, No joint swelling; No muscle pain, No back pain, No extremity pain  PSYCHIATRIC: No depression, No anxiety, No mood swings, No difficulty sleeping at night  PAIN SCALE: [ x ] None  [  ] Other-  ROS Unable to obtain due to: [  ] Dementia  [  ] Lethargy  [  ] Sedated  [  ] Non verbal  REST OF REVIEW OF SYSTEMS: [  ] Normal     Vital Signs Last 24 Hrs  T(C): 36.7 (02 May 2025 07:45), Max: 37.2 (01 May 2025 20:25)  T(F): 98.1 (02 May 2025 07:45), Max: 99 (01 May 2025 20:25)  HR: 73 (02 May 2025 07:45) (71 - 84)  BP: 125/70 (02 May 2025 07:45) (125/70 - 142/72)  BP(mean): --  RR: 18 (02 May 2025 07:38) (18 - 18)  SpO2: 99% (02 May 2025 07:45) (94% - 99%)    Parameters below as of 02 May 2025 07:38  Patient On (Oxygen Delivery Method): room air        CAPILLARY BLOOD GLUCOSE      POCT Blood Glucose.: 210 mg/dL (02 May 2025 07:54)  POCT Blood Glucose.: 188 mg/dL (02 May 2025 07:33)  POCT Blood Glucose.: 213 mg/dL (02 May 2025 06:00)  POCT Blood Glucose.: 230 mg/dL (01 May 2025 21:39)  POCT Blood Glucose.: 172 mg/dL (01 May 2025 16:52)  POCT Blood Glucose.: 259 mg/dL (01 May 2025 11:58)      I&O's Summary    01 May 2025 07:01  -  02 May 2025 07:00  --------------------------------------------------------  IN: 825 mL / OUT: 0 mL / NET: 825 mL      PHYSICAL EXAM:  GENERAL:  [ x ] NAD, [x ] Well appearing, [  ] Agitated, [  ] Drowsy, [  ] Lethargy, [  ] Confused   HEAD:  [ x ] Normal, [  ] Other  EYES:  [x  ] EOMI, [ x] PERRL, [ x ] Conjunctiva and sclera clear normal, [  ] Other, [  ] Pallor, [  ] Discharge  ENMT:  [ x ] Normal, [ x ] Moist mucous membranes, [ x ] Good dentition, [ x ] No thrush  NECK:  [ x ] Supple, [x  ] No JVD, [ x ] Normal thyroid, [  ] Lymphadenopathy, [  ] Other  CHEST/LUNG:  [ x ] Clear to auscultation bilaterally, [ x ] Breath Sounds equal B/L / decreased, [  ] Poor effort, [ x ] No rales, [ x ] No rhonchi, [x  ] No wheezing  HEART:  [ x ] Regular rate and rhythm, [  ] Tachycardia, [  ] Bradycardia, [  ] Irregular, [ x ] No murmurs, No rubs, No gallops, [  ] PPM in place (Mfr:  )  ABDOMEN:  [ x ] Soft, [x ] Nontender, [ x ] Nondistended, [ x ] No mass, [ x ] Bowel sounds present, [  ] Obese  NERVOUS SYSTEM:  [ x ] Alert & Oriented x3, [ x ] Nonfocal, [  ] Confusion, [  ] Encephalopathic, [  ] Sedated, [  ] Unable to assess, [  ] Dementia, [  ] Other-  EXTREMITIES:  [ x ] 2+ Peripheral Pulses, No clubbing, No cyanosis,  [  ] Edema B/L lower EXT, [x ] PVD stasis skin changes B/L lower EXT, [x  ] Wound - left foot in dressing c/d/i  LYMPH:  No lymphadenopathy noted  SKIN:  [ x] No rashes or lesions, [  ] Pressure ulcers, [  ] Ecchymosis, [  ] Skin tears, [ x ] Other Left foot wound-necrotic ulcers base medial, lateral aspects of foot, nonstageable necrotic heel ulcer left, with edema, reduced erythema, LL Ext  cellulitic improved,, negative drainage  distal hallux ulcer necrotic base, No Drainage       LABS:                        11.2   11.51 )-----------( 263      ( 02 May 2025 05:10 )             34.2     02 May 2025 05:10    140    |  108    |  28     ----------------------------<  165    4.7     |  26     |  0.96     Ca    8.9        02 May 2025 05:10    TPro  7.0    /  Alb  2.8    /  TBili  0.3    /  DBili  x      /  AST  11     /  ALT  25     /  AlkPhos  66     02 May 2025 05:10      Urinalysis Basic - ( 02 May 2025 05:10 )    Color: x / Appearance: x / SG: x / pH: x  Gluc: 165 mg/dL / Ketone: x  / Bili: x / Urobili: x   Blood: x / Protein: x / Nitrite: x   Leuk Esterase: x / RBC: x / WBC x   Sq Epi: x / Non Sq Epi: x / Bacteria: x      Culture Results:   Growth in aerobic bottle: Staphylococcus pseudintermedius  "Susceptibilities not performed"  Direct identification is available within approximately 3-5  hours either by Blood Panel Multiplexed PCR or Direct  MALDI-TOF. Details: [email or web address] (04-29 @ 12:45)  Culture Results:   No growth at 48 Hours (04-29 @ 12:30)            Culture - Blood (collected 29 Apr 2025 12:45)  Source: Blood Blood  Gram Stain (01 May 2025 04:09):    Growth in aerobic bottle: Gram Positive Cocci in Clusters  Final Report (01 May 2025 23:19):    Growth in aerobic bottle: Staphylococcus pseudintermedius    "Susceptibilities not performed"    Direct identification is available within approximately 3-5    hours either by Blood Panel Multiplexed PCR or Direct    MALDI-TOF. Details: [email or web address]  Organism: Blood Culture PCR (01 May 2025 23:19)  Organism: Blood Culture PCR (01 May 2025 23:19)    Culture - Blood (collected 29 Apr 2025 12:30)  Source: Blood Blood  Preliminary Report (01 May 2025 19:01):    No growth at 48 Hours      RADIOLOGY & ADDITIONAL TESTS: No new imaging      HEALTH ISSUES - PROBLEM Dx:  Infection of left foot    DELORIS (acute kidney injury)    Type 2 diabetes mellitus    HTN (hypertension)    Need for prophylactic measure    Diabetic ulcer of left foot    Osteomyelitis of left foot    Unstageable pressure ulcer of left foot    PAD (peripheral artery disease)          Consultant(s) Notes Reviewed:  [x  ] YES     Care Discussed with [ x ] Consultants, [x  ] Patient, [  ] Family, [  ] HCP, [  ] , [  ] Social Service, [  ] RN, [  ] Physical Therapy, [  ] Palliative Care Team  DVT PPX: [ x ] Lovenox, [  ] SC Heparin, [  ] Coumadin, [  ] Xarelto, [  ] Eliquis, [  ] Pradaxa, [  ] IV Heparin drip, [  ] SCD, [  ] Ambulation, [  ] Contraindicated 2/2 GI Bleed, [  ] Contraindicated 2/2  Bleed, [  ] Contraindicated 2/2 Brain Bleed  Advanced Directive: [ x ] None, [  ] DNR/DNI Patient is a 64y old  Male who presents with a chief complaint of Foot infection (01 May 2025 14:31)      HPI: 65 y/o M with PMHx DM2, hx osteomyelitis, CAD, PAD s/p RLE angioplasty 2020, s/p surgical amputation of R forefoot , s/p L PT angioplasty 12/2/24 for L lat foot DFU on plavix, HTN sent in by wound clinic for left foot infections and multiple wounds. Pt states he was at [facility] for hyperbaric therapy today and they noticed his L foot had drainage with increased erythema and edema so they told him to come to the ED. Pt states yesterday his foot was dry, without drainage or swelling. Patient notes he follow with ID outpatient and was prescribed Bactrim 2 weeks ago for the chronic foot wounds without any improvement. Pt denies fever/chills, CP, SOB, HA, dizziness, n/v/d. Pt also denies any current pain in the foot.    ED Course:   Vitals: BP: 107/66 , HR: 87 , Temp: 98.1F , RR: 16 , SpO2: 100 % on RA  Labs:  ESR 53, WBC 12.23, Hgb 11, BUN 25, Glucose 137,   X-ray Foot: Cutaneous defect adjacent to the base of the fifth metatarsal again evident with no gross cortical destruction or soft tissue emphysema. Follow-up MRI can be ordered    Received in the ED:  3.375g Zosyn IVPBx1, Vanco 1g IVPB x1    INTERVAL HPI:  4/30: Pt seen and examined at bedside this morning, feels well, no acute complaints. On IV abx vanc and cefepime, planning for LLE angio on 5/2 continue Abx  5/1: Pt seen and examined this morning, feels well no acute complaints. Requesting left foot dressing change, c/d/i. Pain controlled. On IV abx vanc and cefepime, for LLE angio tomorrow NPO  5/2: Pt seen at bedside this afternoon s/p LLE angiogram, feels well no acute complaints. L foot dressing c/d/i, pain controlled. On IV abx vanc and cefepime---> Rocephin     OVERNIGHT EVENTS: None    Home Medications:  aspirin 81 mg oral delayed release tablet: 1 tab(s) orally once a day (29 Apr 2025 16:40)  atorvastatin 40 mg oral tablet: 1 tab(s) orally once a day (29 Apr 2025 16:40)  clopidogrel 75 mg oral tablet: 1 tab(s) orally once a day (29 Apr 2025 16:40)  gabapentin 300 mg oral capsule: 1 cap(s) orally 3 times a day (29 Apr 2025 16:40)  Jardiance 25 mg oral tablet: 1 tab(s) orally once a day (29 Apr 2025 16:40)  lisinopril 40 mg oral tablet: 1 tab(s) orally once a day (29 Apr 2025 16:40)  metFORMIN 1000 mg oral tablet: 1 tab(s) orally 2 times a day (29 Apr 2025 16:40)  metoprolol succinate 50 mg oral tablet, extended release: 1 tab(s) orally once a day (29 Apr 2025 16:40)  Trulicity Pen 1.5 mg/0.5 mL subcutaneous solution: 1.5 milligram(s) subcutaneously once a week (29 Apr 2025 16:40)      MEDICATIONS  (STANDING):    MEDICATIONS  (PRN):      No Known Allergies      Social History:  Tobacco: Denies  EtOH: Denies  Recreational drug use: Denies  Lives with: Wife at home   Ambulates: Independently   ADLs: Independent (29 Apr 2025 15:39)      REVIEW OF SYSTEMS: i am OK  CONSTITUTIONAL: No fever, No chills, No fatigue, No myalgia, No Body ache, No Weakness  EYES: No eye pain,  No visual disturbances, No discharge, No Redness  ENMT: No ear pain, No nose bleed, No vertigo; No sinus pain, No throat pain, No Congestion  NECK: No pain, No stiffness  RESPIRATORY: No cough, No wheezing, No hemoptysis, No shortness of breath  CARDIOVASCULAR: No chest pain, No palpitations  GASTROINTESTINAL: No abdominal pain, No epigastric pain. No nausea, No vomiting, No diarrhea, No constipation; [ x ] BM  GENITOURINARY: No dysuria, No frequency, No urgency, No hematuria, No incontinence  NEUROLOGICAL: No headaches, No dizziness, No numbness, No tingling, No tremors, No weakness  EXTREMITIES: No Swelling, No Pain, No Edema  SKIN: [ x ] No itching, burning, rashes, or lesions   MUSCULOSKELETAL: No joint pain, No joint swelling; No muscle pain, No back pain, No extremity pain  PSYCHIATRIC: No depression, No anxiety, No mood swings, No difficulty sleeping at night  PAIN SCALE: [ x ] None  [  ] Other-  ROS Unable to obtain due to: [  ] Dementia  [  ] Lethargy  [  ] Sedated  [  ] Non verbal  REST OF REVIEW OF SYSTEMS: [x  ] Normal     Vital Signs Last 24 Hrs  T(C): 36.7 (02 May 2025 07:45), Max: 37.2 (01 May 2025 20:25)  T(F): 98.1 (02 May 2025 07:45), Max: 99 (01 May 2025 20:25)  HR: 73 (02 May 2025 07:45) (71 - 84)  BP: 125/70 (02 May 2025 07:45) (125/70 - 142/72)  BP(mean): --  RR: 18 (02 May 2025 07:38) (18 - 18)  SpO2: 99% (02 May 2025 07:45) (94% - 99%)    Parameters below as of 02 May 2025 07:38  Patient On (Oxygen Delivery Method): room air        CAPILLARY BLOOD GLUCOSE      POCT Blood Glucose.: 210 mg/dL (02 May 2025 07:54)  POCT Blood Glucose.: 188 mg/dL (02 May 2025 07:33)  POCT Blood Glucose.: 213 mg/dL (02 May 2025 06:00)  POCT Blood Glucose.: 230 mg/dL (01 May 2025 21:39)  POCT Blood Glucose.: 172 mg/dL (01 May 2025 16:52)  POCT Blood Glucose.: 259 mg/dL (01 May 2025 11:58)      I&O's Summary    01 May 2025 07:01  -  02 May 2025 07:00  --------------------------------------------------------  IN: 825 mL / OUT: 0 mL / NET: 825 mL      PHYSICAL EXAM:  GENERAL:  [ x ] NAD, [x ] Well appearing, [  ] Agitated, [  ] Drowsy, [  ] Lethargy, [  ] Confused   HEAD:  [ x ] Normal, [  ] Other  EYES:  [x  ] EOMI, [ x] PERRL, [ x ] Conjunctiva and sclera clear normal, [  ] Other, [  ] Pallor, [  ] Discharge  ENMT:  [ x ] Normal, [ x ] Moist mucous membranes, [ x ] Good dentition, [ x ] No thrush  NECK:  [ x ] Supple, [x  ] No JVD, [ x ] Normal thyroid, [  ] Lymphadenopathy, [  ] Other  CHEST/LUNG:  [ x ] Clear to auscultation bilaterally, [ x ] Breath Sounds equal B/L / decreased, [  ] Poor effort, [ x ] No rales, [ x ] No rhonchi, [x  ] No wheezing  HEART:  [ x ] Regular rate and rhythm, [  ] Tachycardia, [  ] Bradycardia, [  ] Irregular, [ x ] No murmurs, No rubs, No gallops, [  ] PPM in place (Mfr:  )  ABDOMEN:  [ x ] Soft, [x ] Nontender, [ x ] Nondistended, [ x ] No mass, [ x ] Bowel sounds present, [  ] Obese  NERVOUS SYSTEM:  [ x ] Alert & Oriented x3, [ x ] Nonfocal, [  ] Confusion, [  ] Encephalopathic, [  ] Sedated, [  ] Unable to assess, [  ] Dementia, [  ] Other-  EXTREMITIES:  [ x ] 2+ Peripheral Pulses, No clubbing, No cyanosis,  [  ] Edema B/L lower EXT, [x ] PVD stasis skin changes B/L lower EXT, [x  ] Wound - left foot in dressing c/d/i  LYMPH:  No lymphadenopathy noted  SKIN:  [ x] No rashes or lesions, [  ] Pressure ulcers, [  ] Ecchymosis, [  ] Skin tears, [ x ] Other Left foot wound-necrotic ulcers base medial, lateral aspects of foot, nonstageable necrotic heel ulcer left, with edema, reduced erythema, LL Ext  cellulitic improved,, negative drainage  distal hallux ulcer necrotic base, No Drainage       LABS:                        11.2   11.51 )-----------( 263      ( 02 May 2025 05:10 )             34.2     02 May 2025 05:10    140    |  108    |  28     ----------------------------<  165    4.7     |  26     |  0.96     Ca    8.9        02 May 2025 05:10    TPro  7.0    /  Alb  2.8    /  TBili  0.3    /  DBili  x      /  AST  11     /  ALT  25     /  AlkPhos  66     02 May 2025 05:10      Urinalysis Basic - ( 02 May 2025 05:10 )    Color: x / Appearance: x / SG: x / pH: x  Gluc: 165 mg/dL / Ketone: x  / Bili: x / Urobili: x   Blood: x / Protein: x / Nitrite: x   Leuk Esterase: x / RBC: x / WBC x   Sq Epi: x / Non Sq Epi: x / Bacteria: x      Culture Results:   Growth in aerobic bottle: Staphylococcus pseudintermedius  "Susceptibilities not performed"  Direct identification is available within approximately 3-5  hours either by Blood Panel Multiplexed PCR or Direct  MALDI-TOF. Details: [email or web address] (04-29 @ 12:45)  Culture Results:   No growth at 48 Hours (04-29 @ 12:30)            Culture - Blood (collected 29 Apr 2025 12:45)  Source: Blood Blood  Gram Stain (01 May 2025 04:09):    Growth in aerobic bottle: Gram Positive Cocci in Clusters  Final Report (01 May 2025 23:19):    Growth in aerobic bottle: Staphylococcus pseudintermedius    "Susceptibilities not performed"    Direct identification is available within approximately 3-5    hours either by Blood Panel Multiplexed PCR or Direct    MALDI-TOF. Details: [email or web address]  Organism: Blood Culture PCR (01 May 2025 23:19)  Organism: Blood Culture PCR (01 May 2025 23:19)    Culture - Blood (collected 29 Apr 2025 12:30)  Source: Blood Blood  Preliminary Report (01 May 2025 19:01):    No growth at 48 Hours      RADIOLOGY & ADDITIONAL TESTS: No new imaging      HEALTH ISSUES - PROBLEM Dx:  Infection of left foot    DELORIS (acute kidney injury)    Type 2 diabetes mellitus    HTN (hypertension)    Need for prophylactic measure    Diabetic ulcer of left foot    Osteomyelitis of left foot    Unstageable pressure ulcer of left foot    PAD (peripheral artery disease)          Consultant(s) Notes Reviewed:  [x  ] YES     Care Discussed with [ x ] Consultants, [x  ] Patient, [  ] Family, [  ] HCP, [  ] , [  ] Social Service, [ x ] RN, [  ] Physical Therapy, [  ] Palliative Care Team  DVT PPX: [ x ] Lovenox, [  ] SC Heparin, [  ] Coumadin, [  ] Xarelto, [  ] Eliquis, [  ] Pradaxa, [  ] IV Heparin drip, [  ] SCD, [  ] Ambulation, [  ] Contraindicated 2/2 GI Bleed, [  ] Contraindicated 2/2  Bleed, [  ] Contraindicated 2/2 Brain Bleed  Advanced Directive: [ x ] None, [  ] DNR/DNI

## 2025-05-02 NOTE — PROGRESS NOTE ADULT - PROBLEM SELECTOR PLAN 1
Pt with  L foot infection with multiple wounds sent in by [facility]. S/p surgical amputation of R forefoot  s/p L PT angioplasty 12/2/24 for L lat foot DFU on Plavix  -Pt c/o drainage from wound, redness and streaking. Concern for Cellulitis & osteo   -MRI with soft tissue wound along the 5th metatarsal base with cortical erosive change and marrow edema concerning for osteomyelitis. Soft tissue wound along the posterolateral calcaneal tuberosity with curvilinear marrow hyperemia but no definite osteomyelitis.  -C/w Cefepime 2 gm q 8 hrs  and Vanco 1 gm q 12  hrs for osteo  - BCx 1/2 bottles with Coag negative Staph, possible contaminant, repeat BCx pending  - NWB LLE  -At high risk for more proximal amputation. Intervention will be based on results of FRANCISCA and PVR's. Possible BKA per podiatry   -C/w ASA and Plavix and Lipitor   -Podiatry consulted, Dr. French  -Vascular surgery consulted- ASA ,Plavix, plan for LLE angio 5/2  -ID consult, Melany Calhoun, f/u recs -IV Abx Pt with  L foot infection with multiple wounds sent in by [facility]. S/p surgical amputation of R forefoot s/p L PT angioplasty 12/2/24 for L lat foot DFU on Plavix  -Pt c/o drainage from wound, redness and streaking. Concern for Cellulitis & osteo   -MRI with soft tissue wound along the 5th metatarsal base with cortical erosive change and marrow edema concerning for osteomyelitis. Soft tissue wound along the posterolateral calcaneal tuberosity with curvilinear marrow hyperemia but no definite osteomyelitis.  -C/w Cefepime 2 gm q 8 hrs  and Vanco 1 gm q 12  hrs for osteo  - BCx 1/2 bottles with Coag negative Staph, possible contaminant, repeat BCx pending  - NWB LLE  -At high risk for more proximal amputation. Intervention will be based on results of FRANCISCA and PVR's. Possible BKA per podiatry   -C/w ASA and Plavix and Lipitor   -Podiatry consulted, Dr. French  -Vascular surgery consulted- ASA ,Plavix, plan for LLE angio 5/2  -ID consult, Melany Calhoun, f/u recs -IV Abx Pt with  L foot infection with multiple wounds sent in by [facility]. S/p surgical amputation of R forefoot s/p L PT angioplasty 12/2/24 for L lat foot DFU on Plavix  -Pt c/o drainage from wound, redness and streaking. Concern for Cellulitis & osteo   -MRI with soft tissue wound along the 5th metatarsal base with cortical erosive change and marrow edema concerning for osteomyelitis. Soft tissue wound along the posterolateral calcaneal tuberosity with curvilinear marrow hyperemia but no definite osteomyelitis.  -S/P  Cefepime 2 gm q 8 hrs  and Vanco 1 gm q 12  hrs for osteo---> On Rocephin daily  - BCx 1/2 bottles with Coag negative Staph, possible contaminant, repeat BCx pending  - NWB LLE  -At high risk for more proximal amputation. Intervention will be based on results of FRANCISCA and PVR's. Possible BKA per podiatry   -C/w ASA and Plavix and Lipitor  Daily  -Podiatry d/w Dr. Stark d/w   -Vascular surgery follow up- ASA ,Plavix, plan for LLE angio 5/2/25  -ID Dr Melany Calhoun, -IV Abx Rocephin daily

## 2025-05-02 NOTE — PROGRESS NOTE ADULT - SUBJECTIVE AND OBJECTIVE BOX
POST OPERATIVE NOTE    POD #0   S/p LLE diagnostic angiogram with findings of occluded PT    Examined the patient at bedside in no acute distress. Reports pain is controlled. Tolerating diet. Pain controlled. Denies any pain in the angiogram right groin access. Left foot with dressing in place. Denies chest pain or shortness of breath. Denies fever or chills. Denies nausea or vomiting. Spirometer and using at bedside.     Patient with many questions regarding the diagnostic angiogram; answered to the best of my ability. Explained to the patient he has a high risk of limb loss of the left lower extremity, BKA. Attempt for limb salvage, Life vs death. Educated patient regarding importance of DM control associated with PAD. Patient states he understands.    Objective:  Vitals: T(F): 97.8 (05-02-25 @ 12:49), Max: 99 (05-01-25 @ 20:25)  HR: 67 (05-02-25 @ 12:49)  BP: 128/68 (05-02-25 @ 12:49) (107/68 - 142/72)  RR: 18 (05-02-25 @ 12:49)  SpO2: 98% (05-02-25 @ 12:49)  Vent Settings:     In:   05-01-25 @ 07:01  -  05-02-25 @ 07:00  --------------------------------------------------------  IN: 825 mL      IV Fluids:     Out:   05-01-25 @ 07:01  -  05-02-25 @ 07:00  --------------------------------------------------------  OUT: 0 mL    Physical Exam:  GENERAL: NAD, resting comfortably in bed  CHEST/LUNG: Good inspiratory effort  EXTREMITIES:  Right groin with dressing over access site. C/D/i. Groin nontender to palpation. No hematoma noted.  Left lower extremity focused: Left foot with dressing in place per podiatry which is clean/dry/intact. left leg is warm and dry to touch. nontender to touch/compression.   PSYCH: A&O x3  SKIN: Warm & dry    Medications: [Standing]  aspirin enteric coated 81 milliGRAM(s) Oral daily  atorvastatin 40 milliGRAM(s) Oral at bedtime  cefTRIAXone   IVPB      clopidogrel Tablet 75 milliGRAM(s) Oral daily  dextrose 50% Injectable 25 Gram(s) IV Push once  dextrose 50% Injectable 12.5 Gram(s) IV Push once  dextrose 50% Injectable 25 Gram(s) IV Push once  dextrose Oral Gel 15 Gram(s) Oral once PRN  gabapentin 300 milliGRAM(s) Oral three times a day  glucagon  Injectable 1 milliGRAM(s) IntraMuscular once  insulin lispro (ADMELOG) corrective regimen sliding scale   SubCutaneous three times a day before meals  insulin lispro (ADMELOG) corrective regimen sliding scale   SubCutaneous at bedtime  lisinopril 40 milliGRAM(s) Oral daily  metoprolol succinate ER 50 milliGRAM(s) Oral daily    Medications: [PRN]  aspirin enteric coated 81 milliGRAM(s) Oral daily  atorvastatin 40 milliGRAM(s) Oral at bedtime  cefTRIAXone   IVPB      clopidogrel Tablet 75 milliGRAM(s) Oral daily  dextrose 50% Injectable 25 Gram(s) IV Push once  dextrose 50% Injectable 12.5 Gram(s) IV Push once  dextrose 50% Injectable 25 Gram(s) IV Push once  dextrose Oral Gel 15 Gram(s) Oral once PRN  gabapentin 300 milliGRAM(s) Oral three times a day  glucagon  Injectable 1 milliGRAM(s) IntraMuscular once  insulin lispro (ADMELOG) corrective regimen sliding scale   SubCutaneous three times a day before meals  insulin lispro (ADMELOG) corrective regimen sliding scale   SubCutaneous at bedtime  lisinopril 40 milliGRAM(s) Oral daily  metoprolol succinate ER 50 milliGRAM(s) Oral daily    Labs:             11.2   11.51 )-----------( 263      ( 02 May 2025 05:10 )             34.2     05-02    140  |  108  |  28[H]  ----------------------------<  165[H]  4.7   |  26  |  0.96    Ca    8.9      02 May 2025 05:10    TPro  7.0  /  Alb  2.8[L]  /  TBili  0.3  /  DBili  x   /  AST  11[L]  /  ALT  25  /  AlkPhos  66  05-02    hgb A1C 7.9      Imaging:  No post-op imaging studies    Assessment:  64y Male patient S/p LLE diagnostic angiogram with findings of occluded PT. Podiatry on board; attempt debridement vs BKA of the LLE. Antibiotics per ID, Ceftriaxone.     Plan:  - Continue current care as per primary  - Continue diet  - Tight glycemic control  - Rec endocrine consult for better DM management  - Continue abx as per ID  - Pain control PRN, supportive care  - Incentive spirometry  - Podiatry recs appreciated; attempted debridement vs BKA  - Resume LXV tomorrow morning  - Will continue to monitor    Surgical Team: [id number]         POST OPERATIVE NOTE    POD #0   S/p LLE diagnostic angiogram with findings of occluded PT    Examined the patient at bedside in no acute distress. Reports pain is controlled. Tolerating diet. Pain controlled. Denies any pain in the angiogram right groin access. Left foot with dressing in place. Denies chest pain or shortness of breath. Denies fever or chills. Denies nausea or vomiting. Spirometer and using at bedside.     Patient with many questions regarding the diagnostic angiogram; answered to the best of my ability. Explained to the patient he has a high risk of limb loss of the left lower extremity, BKA. Attempt for limb salvage, Life vs death. Educated patient regarding importance of DM control associated with PAD. Patient states he understands.    Objective:  Vitals: T(F): 97.8 (05-02-25 @ 12:49), Max: 99 (05-01-25 @ 20:25)  HR: 67 (05-02-25 @ 12:49)  BP: 128/68 (05-02-25 @ 12:49) (107/68 - 142/72)  RR: 18 (05-02-25 @ 12:49)  SpO2: 98% (05-02-25 @ 12:49)  Vent Settings:     In:   05-01-25 @ 07:01  -  05-02-25 @ 07:00  --------------------------------------------------------  IN: 825 mL      IV Fluids:     Out:   05-01-25 @ 07:01  -  05-02-25 @ 07:00  --------------------------------------------------------  OUT: 0 mL    Physical Exam:  GENERAL: NAD, resting comfortably in bed  CHEST/LUNG: Good inspiratory effort  EXTREMITIES:  Right groin with dressing over access site. C/D/i. Groin nontender to palpation. No hematoma noted.  Left lower extremity focused: Left foot with dressing in place per podiatry which is clean/dry/intact. left leg is warm and dry to touch. nontender to touch/compression.   PSYCH: A&O x3  SKIN: Warm & dry    Medications: [Standing]  aspirin enteric coated 81 milliGRAM(s) Oral daily  atorvastatin 40 milliGRAM(s) Oral at bedtime  cefTRIAXone   IVPB      clopidogrel Tablet 75 milliGRAM(s) Oral daily  dextrose 50% Injectable 25 Gram(s) IV Push once  dextrose 50% Injectable 12.5 Gram(s) IV Push once  dextrose 50% Injectable 25 Gram(s) IV Push once  dextrose Oral Gel 15 Gram(s) Oral once PRN  gabapentin 300 milliGRAM(s) Oral three times a day  glucagon  Injectable 1 milliGRAM(s) IntraMuscular once  insulin lispro (ADMELOG) corrective regimen sliding scale   SubCutaneous three times a day before meals  insulin lispro (ADMELOG) corrective regimen sliding scale   SubCutaneous at bedtime  lisinopril 40 milliGRAM(s) Oral daily  metoprolol succinate ER 50 milliGRAM(s) Oral daily    Medications: [PRN]  aspirin enteric coated 81 milliGRAM(s) Oral daily  atorvastatin 40 milliGRAM(s) Oral at bedtime  cefTRIAXone   IVPB      clopidogrel Tablet 75 milliGRAM(s) Oral daily  dextrose 50% Injectable 25 Gram(s) IV Push once  dextrose 50% Injectable 12.5 Gram(s) IV Push once  dextrose 50% Injectable 25 Gram(s) IV Push once  dextrose Oral Gel 15 Gram(s) Oral once PRN  gabapentin 300 milliGRAM(s) Oral three times a day  glucagon  Injectable 1 milliGRAM(s) IntraMuscular once  insulin lispro (ADMELOG) corrective regimen sliding scale   SubCutaneous three times a day before meals  insulin lispro (ADMELOG) corrective regimen sliding scale   SubCutaneous at bedtime  lisinopril 40 milliGRAM(s) Oral daily  metoprolol succinate ER 50 milliGRAM(s) Oral daily    Labs:             11.2   11.51 )-----------( 263      ( 02 May 2025 05:10 )             34.2     05-02    140  |  108  |  28[H]  ----------------------------<  165[H]  4.7   |  26  |  0.96    Ca    8.9      02 May 2025 05:10    TPro  7.0  /  Alb  2.8[L]  /  TBili  0.3  /  DBili  x   /  AST  11[L]  /  ALT  25  /  AlkPhos  66  05-02    hgb A1C 7.9      Imaging:  No post-op imaging studies

## 2025-05-02 NOTE — PROGRESS NOTE ADULT - PROBLEM SELECTOR PLAN 2
Chronic. Pt follows with Vascular outpt. Pt s/p L PT angioplasty 12/2/24 for L lat foot DFU on Plavix  -C/w ASA and Plavix and Lipitor   -Plan for LLE angio 5/2 with vascular  - Cardiology ([facility]) consulted for cardiac clearance    Patient with METS > 4, with no current modifiable risk factors - he is medically optimized to proceed with planned intervention with routine hemodynamic monitoring Chronic. Pt follows with Vascular outpt. Pt s/p L PT angioplasty 12/2/24 for L lat foot DFU on Plavix  -C/w ASA and Plavix and Lipitor   -Plan for LLE angio 5/2 with vascular-S/p LLE diagnostic angiogram       - Cardiology ([facility]) consulted for cardiac clearance    Patient with METS > 4, with no current modifiable risk factors - he is medically optimized to proceed with planned intervention with routine hemodynamic monitoring

## 2025-05-02 NOTE — PROGRESS NOTE ADULT - PROBLEM SELECTOR PLAN 1
Patient seen and evaluated  Pt had angio today  After discussion with vascular, podiatry will hold of on OR procedure until further vascular studies  Rec PICC line, outpt f/u  Left foot dressed with DSD  Advised pt to remain NWB as much as possible, dressings noted to be falling off foot   Rec cont iv abx  Pt understands he is at risk to lost part of the foot, all of the foot, bka  Podiatry will follow while in house.  Wound Care Instructions:  -Keep dressing clean, dry, and intact to the right foot  -Apply DSD, change every other day   -Patient NWB as much as possible left foot in surgical shoe   -Monitor for any signs of infection including redness, swelling in the leg above the bandage, nausea/vomiting/fever/chills/chest pain/shortness of breath, if any are present patient must report to the emergency department immediately  -Patient is to follow up with Dr. Stark within 5 days after discharge at [facility]. Please call to make an appointment [phone]

## 2025-05-02 NOTE — CASE MANAGEMENT PROGRESS NOTE - NSCMPROGRESSNOTE_GEN_ALL_CORE
Patient remains medically acute, on IV rocephin; for LLE angio. Anticipated DC plan unclear at present, pending clinical course. CM will continue to follow.

## 2025-05-02 NOTE — PROGRESS NOTE ADULT - ASSESSMENT
Assessment:  64y Male patient S/p LLE diagnostic angiogram with findings of occluded PT. Podiatry on board; attempt debridement vs BKA of the LLE. Antibiotics per ID, Ceftriaxone.     Plan:  - Continue current care as per primary  - Continue diet  - Tight glycemic control  - Rec endocrine consult for better DM management  - Continue abx as per ID  - Pain control PRN, supportive care  - Incentive spirometry  - Podiatry recs appreciated; attempted debridement vs BKA  - Resume LXV tomorrow morning  - ASA/Plavix  - Will continue to monitor    Surgical Team: [id number]       Assessment:  64y Male patient S/p LLE diagnostic angiogram with findings of occluded PT with no option of revascularization. Podiatry on board; attempt debridement vs BKA of the LLE. Antibiotics per ID, Ceftriaxone.     Plan:  - Continue current care as per primary  - Continue diet  - Tight glycemic control  - Rec endocrine consult for better DM management  - Continue abx as per ID  - Pain control PRN, supportive care  - Incentive spirometry  - Podiatry recs appreciated; attempted debridement vs BKA  - Resume LXV tomorrow morning  - ASA/Plavix  - Will continue to monitor    Surgical Team: [id number]       Assessment:  64y Male patient S/p LLE diagnostic angiogram     Plan:  - Continue current care as per primary  - Continue diet  - Tight glycemic control  - Rec endocrine consult for better DM management  - Continue abx as per ID  - Pain control PRN, supportive care  - Incentive spirometry  - Podiatry recs appreciated; attempted debridement vs BKA  - Resume LXV tomorrow morning  - ASA/Plavix  - Will continue to monitor    Surgical Team: [id number]

## 2025-05-02 NOTE — PROGRESS NOTE ADULT - ASSESSMENT
DOCUMENTATION IN PROGRESS     64M DM2, hx osteomyelitis, CAD, PAD s/p RLE angioplasty 2020, s/p surgical amputation of R forefoot , s/p L PT angioplasty 12/2/24 for L lat foot DFU on plavix, HTN, HLD sent in by wound clinic for left foot infections and multiple wounds. Vascular surgery to evaluate further with LLE angiogram. Cardiology consulted for pre-surgical clearance.    - EKG: Sinus rhythm with premature supraventricular complexes  - No evidence of any active ischemia   - NST done 1/2024 with small sized, mild defect in the basal inferior wall that is fixed and partially normalizes with prone, suggestive of diaphragmatic artifact.   - Continue home ASA, Plavix and Lipitor    - Previous TTE (1/2/24) shows EF 61%, Normal LV mass and diastolic function, Normal RV size and function.  - No evidence of any meaningful volume overload     - BP stable and controlled   - Continue lisinopril 40 mg and Metoprolol succinate 50mg     - Pt with  L foot infection with multiple wounds sent in by [facility].   - Continue antibiotics per ID  - Vascular surgery seen, s/p LLE angio 5/2  - Tolerated procedure well, cardiac status optimal post operatively   - Possible BKA, Podiatry following     - Follow outpatient with Dr. Chu  - Monitor and replete lytes, keep K>4, Mg>2.  - Will continue to follow.    Oksana Humphrey, MS FNP, AGACNP  Nurse Practitioner- Cardiology   Please call on TEAMS             64M DM2, hx osteomyelitis, CAD, PAD s/p RLE angioplasty 2020, s/p surgical amputation of R forefoot , s/p L PT angioplasty 12/2/24 for L lat foot DFU on plavix, HTN, HLD sent in by wound clinic for left foot infections and multiple wounds. Vascular surgery to evaluate further with LLE angiogram. Cardiology consulted for pre-surgical clearance.    - EKG: Sinus rhythm with premature supraventricular complexes  - No evidence of any active ischemia   - NST done 1/2024 with small sized, mild defect in the basal inferior wall that is fixed and partially normalizes with prone, suggestive of diaphragmatic artifact.   - Continue home ASA, Plavix and Lipitor    - Previous TTE (1/2/24) shows EF 61%, Normal LV mass and diastolic function, Normal RV size and function.  - No evidence of any meaningful volume overload     - BP stable and controlled   - Continue lisinopril 40 mg and Metoprolol succinate 50mg     - Pt with  L foot infection with multiple wounds sent in by [facility].   - Continue antibiotics per ID  - Vascular surgery seen, s/p LLE angio 5/2  - Tolerated procedure well, cardiac status optimal post operatively   - Possible BKA, Podiatry following, plan to hold of on OR procedure    - Follow outpatient with Dr. Chu  - Monitor and replete lytes, keep K>4, Mg>2.  - Will continue to follow.    Oksana Humphrey, MS FNP, AGACNP  Nurse Practitioner- Cardiology   Please call on TEAMS

## 2025-05-02 NOTE — PRE-OP CHECKLIST - HEIGHT IN FEET
5 FREE:[LAST:[GRETTA SLATER],PHONE:[phone]],FAX:[(   [phone]    -],ADDRESS:[[facility]  [phone] [place], [place] [id number]]]

## 2025-05-02 NOTE — PROGRESS NOTE ADULT - SUBJECTIVE AND OBJECTIVE BOX
[facility] -- Vlad Gallardo, Otoniel Bliss Savella, Goodger, Cohen: Office # [phone]    Follow Up:  Cardiac clearance     Subjective/Observations: Patient awake, alert, resting in bed. Denies chest pain, palpitations and dizziness. Denies any difficulty breathing. No orthopnea and PND. Tolerating room air.     REVIEW OF SYSTEMS: All other review of systems are negative unless indicated above    PAST MEDICAL & SURGICAL HISTORY:  HTN (hypertension)      Diabetes      Hyperlipidemia      PVD (peripheral vascular disease)      Toe osteomyelitis, right      H/O angioplasty  RLE      Status post amputation of toe    MEDICATIONS  (STANDING):  aspirin enteric coated 81 milliGRAM(s) Oral daily  atorvastatin 40 milliGRAM(s) Oral at bedtime  cefepime   IVPB 2000 milliGRAM(s) IV Intermittent every 8 hours  clopidogrel Tablet 75 milliGRAM(s) Oral daily  dextrose 50% Injectable 25 Gram(s) IV Push once  dextrose 50% Injectable 12.5 Gram(s) IV Push once  dextrose 50% Injectable 25 Gram(s) IV Push once  gabapentin 300 milliGRAM(s) Oral three times a day  glucagon  Injectable 1 milliGRAM(s) IntraMuscular once  insulin lispro (ADMELOG) corrective regimen sliding scale   SubCutaneous three times a day before meals  insulin lispro (ADMELOG) corrective regimen sliding scale   SubCutaneous at bedtime  lactated ringers. 1000 milliLiter(s) (75 mL/Hr) IV Continuous <Continuous>  lisinopril 40 milliGRAM(s) Oral daily  metoprolol succinate ER 50 milliGRAM(s) Oral daily  vancomycin  IVPB 1250 milliGRAM(s) IV Intermittent every 12 hours    MEDICATIONS  (PRN):  dextrose Oral Gel 15 Gram(s) Oral once PRN Blood Glucose LESS THAN 70 milliGRAM(s)/deciliter  HYDROmorphone  Injectable 0.5 milliGRAM(s) IV Push every 10 minutes PRN Moderate Pain (4 - 6)  HYDROmorphone  Injectable 1 milliGRAM(s) IV Push every 10 minutes PRN Severe Pain (7 - 10)  ondansetron Injectable 4 milliGRAM(s) IV Push once PRN Nausea and/or Vomiting    Allergies  No Known Allergies    Vital Signs Last 24 Hrs  T(C): 36.5 (02 May 2025 10:57), Max: 37.2 (01 May 2025 20:25)  T(F): 97.7 (02 May 2025 10:57), Max: 99 (01 May 2025 20:25)  HR: 71 (02 May 2025 10:57) (68 - 84)  BP: 109/65 (02 May 2025 10:57) (107/68 - 142/72)  BP(mean): --  RR: 15 (02 May 2025 10:57) (14 - 20)  SpO2: 98% (02 May 2025 10:57) (94% - 99%)    Parameters below as of 02 May 2025 10:57  Patient On (Oxygen Delivery Method): room air      I&O's Summary    01 May 2025 07:01  -  02 May 2025 07:00  --------------------------------------------------------  IN: 825 mL / OUT: 0 mL / NET: 825 mL      Weight (kg): 83.9 (05-02 @ 07:50)    TELE: Not on telemetry   PHYSICAL EXAM:  Constitutional: NAD, awake and alert  HEENT: Moist Mucous Membranes, Anicteric  Pulmonary: Non-labored, breath sounds are clear bilaterally, No wheezing, rales or rhonchi  Cardiovascular: Regular, S1 and S2, No murmurs, No rubs, gallops or clicks  Gastrointestinal:  soft, nontender, nondistended   Lymph: No peripheral edema. No lymphadenopathy.   Skin: No visible rashes Left foot DSD intact.   Psych:  Mood & affect appropriate      LABS: All Labs Reviewed:                        11.2   11.51 )-----------( 263      ( 02 May 2025 05:10 )             34.2                         11.9   12.91 )-----------( 285      ( 01 May 2025 05:52 )             36.0                         11.2   12.51 )-----------( 260      ( 30 Apr 2025 07:45 )             34.1     02 May 2025 05:10    140    |  108    |  28     ----------------------------<  165    4.7     |  26     |  0.96   01 May 2025 05:52    136    |  107    |  27     ----------------------------<  174    4.6     |  26     |  1.00   30 Apr 2025 07:45    136    |  107    |  30     ----------------------------<  149    4.8     |  24     |  1.10     Ca    8.9        02 May 2025 05:10  Ca    9.3        01 May 2025 05:52  Ca    9.0        30 Apr 2025 07:45    TPro  7.0    /  Alb  2.8    /  TBili  0.3    /  DBili  x      /  AST  11     /  ALT  25     /  AlkPhos  66     02 May 2025 05:10  TPro  7.1    /  Alb  3.2    /  TBili  0.4    /  DBili  x      /  AST  11     /  ALT  23     /  AlkPhos  68     30 Apr 2025 07:45  TPro  7.0    /  Alb  3.3    /  TBili  0.4    /  DBili  x      /  AST  15     /  ALT  26     /  AlkPhos  78     29 Apr 2025 12:45   LIVER FUNCTIONS - ( 02 May 2025 05:10 )  Alb: 2.8 g/dL / Pro: 7.0 g/dL / ALK PHOS: 66 U/L / ALT: 25 U/L / AST: 11 U/L / GGT: x           Lactate, Blood: 0.8 mmol/L (04-29-25 @ 12:45)    12 Lead ECG:   Ventricular Rate 76 BPM    Atrial Rate 76 BPM    P-R Interval 156 ms    QRS Duration 92 ms    Q-T Interval 390 ms    QTC Calculation(Bazett) 438 ms    P Axis 6 degrees    R Axis -1 degrees    T Axis 3 degrees    Diagnosis Line Sinus rhythm with premature supraventricular complexes  Otherwise normal ECG  When compared with ECG of 30-NOV-2024 12:25,  premature supraventricular complexes are now present  Confirmed by RAMÓN SHAFFER ([id number]) on 4/30/2025 6:26:16 PM (04-29-25 @ 13:07)     [facility] -- Vlad Gallardo Pannella, Patel, Savella, Goodger, Cohen: Office # [phone]    Follow Up:  Cardiac clearance     Subjective/Observations: Patient awake, alert, resting in bed. Denies chest pain, palpitations and dizziness. Denies any difficulty breathing. No orthopnea and PND. Tolerating room air. S/p angiogram. Tolerated procedure well, cardiac status optimal post operatively     REVIEW OF SYSTEMS: All other review of systems are negative unless indicated above    PAST MEDICAL & SURGICAL HISTORY:  HTN (hypertension)      Diabetes      Hyperlipidemia      PVD (peripheral vascular disease)      Toe osteomyelitis, right      H/O angioplasty  RLE      Status post amputation of toe    MEDICATIONS  (STANDING):  aspirin enteric coated 81 milliGRAM(s) Oral daily  atorvastatin 40 milliGRAM(s) Oral at bedtime  cefepime   IVPB 2000 milliGRAM(s) IV Intermittent every 8 hours  clopidogrel Tablet 75 milliGRAM(s) Oral daily  dextrose 50% Injectable 25 Gram(s) IV Push once  dextrose 50% Injectable 12.5 Gram(s) IV Push once  dextrose 50% Injectable 25 Gram(s) IV Push once  gabapentin 300 milliGRAM(s) Oral three times a day  glucagon  Injectable 1 milliGRAM(s) IntraMuscular once  insulin lispro (ADMELOG) corrective regimen sliding scale   SubCutaneous three times a day before meals  insulin lispro (ADMELOG) corrective regimen sliding scale   SubCutaneous at bedtime  lactated ringers. 1000 milliLiter(s) (75 mL/Hr) IV Continuous <Continuous>  lisinopril 40 milliGRAM(s) Oral daily  metoprolol succinate ER 50 milliGRAM(s) Oral daily  vancomycin  IVPB 1250 milliGRAM(s) IV Intermittent every 12 hours    MEDICATIONS  (PRN):  dextrose Oral Gel 15 Gram(s) Oral once PRN Blood Glucose LESS THAN 70 milliGRAM(s)/deciliter  HYDROmorphone  Injectable 0.5 milliGRAM(s) IV Push every 10 minutes PRN Moderate Pain (4 - 6)  HYDROmorphone  Injectable 1 milliGRAM(s) IV Push every 10 minutes PRN Severe Pain (7 - 10)  ondansetron Injectable 4 milliGRAM(s) IV Push once PRN Nausea and/or Vomiting    Allergies  No Known Allergies    Vital Signs Last 24 Hrs  T(C): 36.5 (02 May 2025 10:57), Max: 37.2 (01 May 2025 20:25)  T(F): 97.7 (02 May 2025 10:57), Max: 99 (01 May 2025 20:25)  HR: 71 (02 May 2025 10:57) (68 - 84)  BP: 109/65 (02 May 2025 10:57) (107/68 - 142/72)  BP(mean): --  RR: 15 (02 May 2025 10:57) (14 - 20)  SpO2: 98% (02 May 2025 10:57) (94% - 99%)    Parameters below as of 02 May 2025 10:57  Patient On (Oxygen Delivery Method): room air      I&O's Summary    01 May 2025 07:01  -  02 May 2025 07:00  --------------------------------------------------------  IN: 825 mL / OUT: 0 mL / NET: 825 mL      Weight (kg): 83.9 (05-02 @ 07:50)    TELE: Not on telemetry   PHYSICAL EXAM:  Constitutional: NAD, awake and alert  HEENT: Moist Mucous Membranes, Anicteric  Pulmonary: Non-labored, breath sounds are clear bilaterally, No wheezing, rales or rhonchi  Cardiovascular: Regular, S1 and S2, No murmurs, No rubs, gallops or clicks  Gastrointestinal:  soft, nontender, nondistended   Lymph: No peripheral edema. No lymphadenopathy.   Skin: No visible rashes Left foot DSD intact.   Psych:  Mood & affect appropriate      LABS: All Labs Reviewed:                        11.2   11.51 )-----------( 263      ( 02 May 2025 05:10 )             34.2                         11.9   12.91 )-----------( 285      ( 01 May 2025 05:52 )             36.0                         11.2   12.51 )-----------( 260      ( 30 Apr 2025 07:45 )             34.1     02 May 2025 05:10    140    |  108    |  28     ----------------------------<  165    4.7     |  26     |  0.96   01 May 2025 05:52    136    |  107    |  27     ----------------------------<  174    4.6     |  26     |  1.00   30 Apr 2025 07:45    136    |  107    |  30     ----------------------------<  149    4.8     |  24     |  1.10     Ca    8.9        02 May 2025 05:10  Ca    9.3        01 May 2025 05:52  Ca    9.0        30 Apr 2025 07:45    TPro  7.0    /  Alb  2.8    /  TBili  0.3    /  DBili  x      /  AST  11     /  ALT  25     /  AlkPhos  66     02 May 2025 05:10  TPro  7.1    /  Alb  3.2    /  TBili  0.4    /  DBili  x      /  AST  11     /  ALT  23     /  AlkPhos  68     30 Apr 2025 07:45  TPro  7.0    /  Alb  3.3    /  TBili  0.4    /  DBili  x      /  AST  15     /  ALT  26     /  AlkPhos  78     29 Apr 2025 12:45   LIVER FUNCTIONS - ( 02 May 2025 05:10 )  Alb: 2.8 g/dL / Pro: 7.0 g/dL / ALK PHOS: 66 U/L / ALT: 25 U/L / AST: 11 U/L / GGT: x           Lactate, Blood: 0.8 mmol/L (04-29-25 @ 12:45)    12 Lead ECG:   Ventricular Rate 76 BPM    Atrial Rate 76 BPM    P-R Interval 156 ms    QRS Duration 92 ms    Q-T Interval 390 ms    QTC Calculation(Bazett) 438 ms    P Axis 6 degrees    R Axis -1 degrees    T Axis 3 degrees    Diagnosis Line Sinus rhythm with premature supraventricular complexes  Otherwise normal ECG  When compared with ECG of 30-NOV-2024 12:25,  premature supraventricular complexes are now present  Confirmed by RAMÓN SHAFFER ([id number]) on 4/30/2025 6:26:16 PM (04-29-25 @ 13:07)

## 2025-05-02 NOTE — PROGRESS NOTE ADULT - SUBJECTIVE AND OBJECTIVE BOX
PROGRESS NOTE   Patient is a 64y old  Male who presents with a chief complaint of Foot infection (02 May 2025 11:25)      HPI:  Pt is a 63 y/o M with PMHx DM2, hx osteomyelitis, CAD, PAD s/p RLE angioplasty 2020, s/p surgical amputation of R forefoot , s/p L PT angioplasty 12/2/24 for L lat foot DFU on plavix, HTN sent in by wound clinic for left foot infections and multiple wounds. Pt states he was at [facility] for hyperbaric therapy today and they noticed his L foot had drainage with increased erythema and edema so they told him to come to the ED. Pt states yesterday his foot was dry, without drainage or swelling. Patient notes he follow with ID outpatient and was prescribed Bactrim 2 weeks ago for the chronic foot wounds without any improvement. Pt denies fever/chills, CP, SOB, HA, dizziness, n/v/d. Pt also denies any current pain in the foot.      ED Course:   Vitals: BP: 107/66 , HR: 87 , Temp: 98.1F , RR: 16 , SpO2: 100 % on RA  Labs:  ESR 53, WBC 12.23, Hgb 11, BUN 25, Glucose 137,   X-ray Foot: Cutaneous defect adjacent to the base of the fifth metatarsal again evident with no gross cortical destruction or soft tissue emphysema. Follow-up MRI can be ordered    Received in the ED:  3.375g Zosyn IVPBx1, Vanco 1g IVPB x1   (29 Apr 2025 15:39)      Vital Signs Last 24 Hrs  T(C): 36.5 (02 May 2025 11:57), Max: 37.2 (01 May 2025 20:25)  T(F): 97.7 (02 May 2025 11:57), Max: 99 (01 May 2025 20:25)  HR: 69 (02 May 2025 11:57) (68 - 84)  BP: 119/95 (02 May 2025 11:57) (107/68 - 142/72)  BP(mean): --  RR: 15 (02 May 2025 11:57) (11 - 20)  SpO2: 96% (02 May 2025 11:57) (94% - 99%)    Parameters below as of 02 May 2025 11:57  Patient On (Oxygen Delivery Method): room air                              11.2   11.51 )-----------( 263      ( 02 May 2025 05:10 )             34.2               05-02    140  |  108  |  28[H]  ----------------------------<  165[H]  4.7   |  26  |  0.96    Ca    8.9      02 May 2025 05:10    TPro  7.0  /  Alb  2.8[L]  /  TBili  0.3  /  DBili  x   /  AST  11[L]  /  ALT  25  /  AlkPhos  66  05-02      O:   General: Pleasant  male NAD & AOX3.    Integument:  Skin warm, dry and supple bilateral.    Noted necrotic ulcers base medial, lateral aspects of foot, nonstageable necrotic heel ulcer left, with edema, reduced erythema, reduced cellulitic changes, negative drainage  Noted distal hallux ulcer necrotic base, negative probe to bone  Vascular: Dorsalis Pedis and Posterior Tibial pulses non palpable.  Capillary re-fill time greater then 3 seconds digits 1-5 left, TMA right    Neuro: Protective sensation diminished to the level of the digits bilateral.  MSK: Muscle strength 5/5 all major muscle groups bilateral. Noted right foot tma      MRI: 3/26  IMPRESSION:  1. Osteomyelitis of the base of the fifth metatarsal as well as first distal phalanx.  2. Osseous edema is seen within the fifth distal and middle phalanges without significant loss of T1 fatty marrow. Correlation for adjacent ulcer is advised. This could be secondary to early osteomyelitis versus reactive changes. Cortical correlation is advised.

## 2025-05-03 LAB
A1C WITH ESTIMATED AVERAGE GLUCOSE RESULT: 8.1 % — HIGH (ref 4–5.6)
ALBUMIN SERPL ELPH-MCNC: 2.6 G/DL — LOW (ref 3.3–5)
ALP SERPL-CCNC: 63 U/L — SIGNIFICANT CHANGE UP (ref 40–120)
ALT FLD-CCNC: 23 U/L — SIGNIFICANT CHANGE UP (ref 12–78)
ANION GAP SERPL CALC-SCNC: 7 MMOL/L — SIGNIFICANT CHANGE UP (ref 5–17)
AST SERPL-CCNC: 11 U/L — LOW (ref 15–37)
BASOPHILS # BLD AUTO: 0.04 K/UL — SIGNIFICANT CHANGE UP (ref 0–0.2)
BASOPHILS NFR BLD AUTO: 0.2 % — SIGNIFICANT CHANGE UP (ref 0–2)
BILIRUB SERPL-MCNC: 0.5 MG/DL — SIGNIFICANT CHANGE UP (ref 0.2–1.2)
BUN SERPL-MCNC: 41 MG/DL — HIGH (ref 7–23)
CALCIUM SERPL-MCNC: 8.5 MG/DL — SIGNIFICANT CHANGE UP (ref 8.5–10.1)
CHLORIDE SERPL-SCNC: 104 MMOL/L — SIGNIFICANT CHANGE UP (ref 96–108)
CO2 SERPL-SCNC: 25 MMOL/L — SIGNIFICANT CHANGE UP (ref 22–31)
CREAT SERPL-MCNC: 1.6 MG/DL — HIGH (ref 0.5–1.3)
EGFR: 47 ML/MIN/1.73M2 — LOW
EGFR: 47 ML/MIN/1.73M2 — LOW
EOSINOPHIL # BLD AUTO: 0.58 K/UL — HIGH (ref 0–0.5)
EOSINOPHIL NFR BLD AUTO: 3.1 % — SIGNIFICANT CHANGE UP (ref 0–6)
ESTIMATED AVERAGE GLUCOSE: 186 MG/DL — HIGH (ref 68–114)
GLUCOSE BLDC GLUCOMTR-MCNC: 214 MG/DL — HIGH (ref 70–99)
GLUCOSE BLDC GLUCOMTR-MCNC: 232 MG/DL — HIGH (ref 70–99)
GLUCOSE BLDC GLUCOMTR-MCNC: 255 MG/DL — HIGH (ref 70–99)
GLUCOSE BLDC GLUCOMTR-MCNC: 278 MG/DL — HIGH (ref 70–99)
GLUCOSE SERPL-MCNC: 220 MG/DL — HIGH (ref 70–99)
HCT VFR BLD CALC: 31.6 % — LOW (ref 39–50)
HGB BLD-MCNC: 10.5 G/DL — LOW (ref 13–17)
IMM GRANULOCYTES NFR BLD AUTO: 0.7 % — SIGNIFICANT CHANGE UP (ref 0–0.9)
LYMPHOCYTES # BLD AUTO: 1.13 K/UL — SIGNIFICANT CHANGE UP (ref 1–3.3)
LYMPHOCYTES # BLD AUTO: 6 % — LOW (ref 13–44)
MCHC RBC-ENTMCNC: 30 PG — SIGNIFICANT CHANGE UP (ref 27–34)
MCHC RBC-ENTMCNC: 33.2 G/DL — SIGNIFICANT CHANGE UP (ref 32–36)
MCV RBC AUTO: 90.3 FL — SIGNIFICANT CHANGE UP (ref 80–100)
MONOCYTES # BLD AUTO: 0.74 K/UL — SIGNIFICANT CHANGE UP (ref 0–0.9)
MONOCYTES NFR BLD AUTO: 3.9 % — SIGNIFICANT CHANGE UP (ref 2–14)
NEUTROPHILS # BLD AUTO: 16.35 K/UL — HIGH (ref 1.8–7.4)
NEUTROPHILS NFR BLD AUTO: 86.1 % — HIGH (ref 43–77)
NRBC BLD AUTO-RTO: 0 /100 WBCS — SIGNIFICANT CHANGE UP (ref 0–0)
PLATELET # BLD AUTO: 306 K/UL — SIGNIFICANT CHANGE UP (ref 150–400)
POTASSIUM SERPL-MCNC: 5 MMOL/L — SIGNIFICANT CHANGE UP (ref 3.5–5.3)
POTASSIUM SERPL-SCNC: 5 MMOL/L — SIGNIFICANT CHANGE UP (ref 3.5–5.3)
PROT SERPL-MCNC: 6.6 G/DL — SIGNIFICANT CHANGE UP (ref 6–8.3)
RBC # BLD: 3.5 M/UL — LOW (ref 4.2–5.8)
RBC # FLD: 13.4 % — SIGNIFICANT CHANGE UP (ref 10.3–14.5)
SODIUM SERPL-SCNC: 136 MMOL/L — SIGNIFICANT CHANGE UP (ref 135–145)
WBC # BLD: 18.98 K/UL — HIGH (ref 3.8–10.5)
WBC # FLD AUTO: 18.98 K/UL — HIGH (ref 3.8–10.5)

## 2025-05-03 PROCEDURE — 99232 SBSQ HOSP IP/OBS MODERATE 35: CPT

## 2025-05-03 RX ORDER — INSULIN GLARGINE-YFGN 100 [IU]/ML
10 INJECTION, SOLUTION SUBCUTANEOUS AT BEDTIME
Refills: 0 | Status: DISCONTINUED | OUTPATIENT
Start: 2025-05-03 | End: 2025-05-04

## 2025-05-03 RX ORDER — INSULIN LISPRO 100 U/ML
3 INJECTION, SOLUTION INTRAVENOUS; SUBCUTANEOUS
Refills: 0 | Status: DISCONTINUED | OUTPATIENT
Start: 2025-05-03 | End: 2025-05-04

## 2025-05-03 RX ADMIN — Medication 81 MILLIGRAM(S): at 12:19

## 2025-05-03 RX ADMIN — GABAPENTIN 300 MILLIGRAM(S): 400 CAPSULE ORAL at 06:22

## 2025-05-03 RX ADMIN — GABAPENTIN 300 MILLIGRAM(S): 400 CAPSULE ORAL at 21:56

## 2025-05-03 RX ADMIN — CEFTRIAXONE 100 MILLIGRAM(S): 500 INJECTION, POWDER, FOR SOLUTION INTRAMUSCULAR; INTRAVENOUS at 13:15

## 2025-05-03 RX ADMIN — GABAPENTIN 300 MILLIGRAM(S): 400 CAPSULE ORAL at 14:48

## 2025-05-03 RX ADMIN — INSULIN LISPRO 6: 100 INJECTION, SOLUTION INTRAVENOUS; SUBCUTANEOUS at 12:18

## 2025-05-03 RX ADMIN — INSULIN LISPRO 4: 100 INJECTION, SOLUTION INTRAVENOUS; SUBCUTANEOUS at 16:57

## 2025-05-03 RX ADMIN — INSULIN GLARGINE-YFGN 10 UNIT(S): 100 INJECTION, SOLUTION SUBCUTANEOUS at 22:04

## 2025-05-03 RX ADMIN — INSULIN LISPRO 6: 100 INJECTION, SOLUTION INTRAVENOUS; SUBCUTANEOUS at 08:15

## 2025-05-03 RX ADMIN — ENOXAPARIN SODIUM 40 MILLIGRAM(S): 100 INJECTION SUBCUTANEOUS at 06:15

## 2025-05-03 RX ADMIN — INSULIN LISPRO 3 UNIT(S): 100 INJECTION, SOLUTION INTRAVENOUS; SUBCUTANEOUS at 16:57

## 2025-05-03 RX ADMIN — CLOPIDOGREL BISULFATE 75 MILLIGRAM(S): 75 TABLET, FILM COATED ORAL at 12:19

## 2025-05-03 RX ADMIN — ATORVASTATIN CALCIUM 40 MILLIGRAM(S): 80 TABLET, FILM COATED ORAL at 21:56

## 2025-05-03 NOTE — PROGRESS NOTE ADULT - SUBJECTIVE AND OBJECTIVE BOX
[facility]  Laz Lyn MD PhD, Lori Burgos MD, Melany Calhoun MD, Efren Frederick MD, Darryl Aguilar MD  and providing coverage with Marcy Austin MD  Providing Infectious Disease Consultations at [facility], [facility], [facility], [facility]'s    Office# [phone] to schedule follow up appointments  Answering Service for urgent calls or New Consults [phone]  Cell# to text for urgent issues Laz Lyn [phone]     infectious diseases progress note:    LOIDA QUEZADA is a 64y y. o. Male patient    Overnight and events of the last 24hrs reviewed    Allergies    No Known Allergies    Intolerances        ANTIBIOTICS/RELEVANT:  antimicrobials  cefTRIAXone   IVPB 2000 milliGRAM(s) IV Intermittent every 24 hours  cefTRIAXone   IVPB        immunologic:    OTHER:  aspirin enteric coated 81 milliGRAM(s) Oral daily  atorvastatin 40 milliGRAM(s) Oral at bedtime  clopidogrel Tablet 75 milliGRAM(s) Oral daily  dextrose 50% Injectable 25 Gram(s) IV Push once  dextrose 50% Injectable 12.5 Gram(s) IV Push once  dextrose 50% Injectable 25 Gram(s) IV Push once  dextrose Oral Gel 15 Gram(s) Oral once PRN  enoxaparin Injectable 40 milliGRAM(s) SubCutaneous every 24 hours  gabapentin 300 milliGRAM(s) Oral three times a day  glucagon  Injectable 1 milliGRAM(s) IntraMuscular once  insulin lispro (ADMELOG) corrective regimen sliding scale   SubCutaneous three times a day before meals  insulin lispro (ADMELOG) corrective regimen sliding scale   SubCutaneous at bedtime  metoprolol succinate ER 50 milliGRAM(s) Oral daily      Objective:  Vital Signs Last 24 Hrs  T(C): 36.6 (03 May 2025 11:29), Max: 37.2 (02 May 2025 20:15)  T(F): 97.9 (03 May 2025 11:29), Max: 98.9 (02 May 2025 20:15)  HR: 75 (03 May 2025 11:29) (67 - 96)  BP: 103/66 (03 May 2025 11:29) (91/53 - 147/62)  BP(mean): --  RR: 18 (03 May 2025 11:29) (15 - 18)  SpO2: 100% (03 May 2025 11:29) (96% - 100%)    Parameters below as of 03 May 2025 11:29  Patient On (Oxygen Delivery Method): room air        T(C): 36.6 (05-03-25 @ 11:29), Max: 37.2 (05-01-25 @ 20:25)  T(C): 36.6 (05-03-25 @ 11:29), Max: 37.2 (04-30-25 @ 20:36)  T(C): 36.6 (05-03-25 @ 11:29), Max: 37.2 (04-30-25 @ 20:36)    PHYSICAL EXAM:  HEENT: NC atraumatic  Neck: supple  Respiratory: no accessory muscle use, breathing comfortably  Cardiovascular: distant  Gastrointestinal: normal appearing, nondistended  Extremities: no clubbing, no cyanosis,        LABS:                          10.5   18.98 )-----------( 306      ( 03 May 2025 06:31 )             31.6       WBC  18.98 05-03 @ 06:31  11.51 05-02 @ 05:10  12.91 05-01 @ 05:52  12.51 04-30 @ 07:45  12.23 04-29 @ 12:45      05-03    136  |  104  |  41[H]  ----------------------------<  220[H]  5.0   |  25  |  1.60[H]    Ca    8.5      03 May 2025 06:31    TPro  6.6  /  Alb  2.6[L]  /  TBili  0.5  /  DBili  x   /  AST  11[L]  /  ALT  23  /  AlkPhos  63  05-03      Creatinine: 1.60 mg/dL (05-03-25 @ 06:31)  Creatinine: 0.96 mg/dL (05-02-25 @ 05:10)  Creatinine: 1.00 mg/dL (05-01-25 @ 05:52)  Creatinine: 1.10 mg/dL (04-30-25 @ 07:45)  Creatinine: 1.30 mg/dL (04-29-25 @ 12:45)        Urinalysis Basic - ( 03 May 2025 06:31 )    Color: x / Appearance: x / SG: x / pH: x  Gluc: 220 mg/dL / Ketone: x  / Bili: x / Urobili: x   Blood: x / Protein: x / Nitrite: x   Leuk Esterase: x / RBC: x / WBC x   Sq Epi: x / Non Sq Epi: x / Bacteria: x            INFLAMMATORY MARKERS      MICROBIOLOGY:    Culture - Tissue with Gram Stain (12.04.24 @ 15:20)    -  Vancomycin: S 0.5   -  Trimethoprim/Sulfamethoxazole: S <=0.5/9.5   -  Oxacillin: S <=0.25   -  Penicillin: R >2   -  Rifampin: S <=1 Should not be used as monotherapy   -  Tetracycline: S <=4   -  Clindamycin: R >4   -  Erythromycin: S <=0.25   -  Gentamicin: S <=4 Should not be used as monotherapy   Gram Stain:   Rare polymorphonuclear leukocytes per low power field  No organisms seen per oil power field   Specimen Source: Tissue   Culture Results:   Rare Staphylococcus capitis   Organism Identification: Staphylococcus capitis   Organism: Staphylococcus capitis   Method Type: GOLDEN        RADIOLOGY & ADDITIONAL STUDIES:

## 2025-05-03 NOTE — PROGRESS NOTE ADULT - SUBJECTIVE AND OBJECTIVE BOX
[facility] -- Vlad Gallardo Pannella, Patel, Savella, Goodger, Cohen  Office # [phone]    Follow Up:  Cardiac clearance     Subjective/Observations:     REVIEW OF SYSTEMS: All other review of systems is negative unless indicated above  PAST MEDICAL & SURGICAL HISTORY:  HTN (hypertension)      Diabetes      Hyperlipidemia      PVD (peripheral vascular disease)      Toe osteomyelitis, right      H/O angioplasty  RLE      Status post amputation of toe        MEDICATIONS  (STANDING):  aspirin enteric coated 81 milliGRAM(s) Oral daily  atorvastatin 40 milliGRAM(s) Oral at bedtime  cefTRIAXone   IVPB 2000 milliGRAM(s) IV Intermittent every 24 hours  cefTRIAXone   IVPB      clopidogrel Tablet 75 milliGRAM(s) Oral daily  dextrose 50% Injectable 25 Gram(s) IV Push once  dextrose 50% Injectable 12.5 Gram(s) IV Push once  dextrose 50% Injectable 25 Gram(s) IV Push once  enoxaparin Injectable 40 milliGRAM(s) SubCutaneous every 24 hours  gabapentin 300 milliGRAM(s) Oral three times a day  glucagon  Injectable 1 milliGRAM(s) IntraMuscular once  insulin lispro (ADMELOG) corrective regimen sliding scale   SubCutaneous three times a day before meals  insulin lispro (ADMELOG) corrective regimen sliding scale   SubCutaneous at bedtime  lisinopril 40 milliGRAM(s) Oral daily  metoprolol succinate ER 50 milliGRAM(s) Oral daily    MEDICATIONS  (PRN):  dextrose Oral Gel 15 Gram(s) Oral once PRN Blood Glucose LESS THAN 70 milliGRAM(s)/deciliter    Allergies    No Known Allergies    Intolerances      Vital Signs Last 24 Hrs  T(C): 37.1 (03 May 2025 04:55), Max: 37.2 (02 May 2025 20:15)  T(F): 98.7 (03 May 2025 04:55), Max: 98.9 (02 May 2025 20:15)  HR: 83 (03 May 2025 04:55) (67 - 96)  BP: 147/62 (02 May 2025 20:15) (107/68 - 147/62)  BP(mean): --  RR: 18 (03 May 2025 04:55) (11 - 20)  SpO2: 99% (03 May 2025 04:55) (94% - 99%)    Parameters below as of 03 May 2025 04:55  Patient On (Oxygen Delivery Method): room air      I&O's Summary    01 May 2025 07:01  -  02 May 2025 07:00  --------------------------------------------------------  IN: 825 mL / OUT: 0 mL / NET: 825 mL      Weight (kg): 83.9 (05-02 @ 07:50)    TELE: Not on telemetry   PHYSICAL EXAM:  Constitutional: NAD, awake and alert  HEENT: Moist Mucous Membranes, Anicteric  Pulmonary: Non-labored, breath sounds are clear bilaterally, No wheezing, rales or rhonchi  Cardiovascular: Regular, S1 and S2, No murmurs, No rubs, gallops or clicks  Gastrointestinal:  soft, nontender, nondistended   Lymph: No peripheral edema. No lymphadenopathy.   Skin: No visible rashes Left foot DSD intact.   Psych:  Mood & affect appropriate                          11.2   11.51 )-----------( 263      ( 02 May 2025 05:10 )             34.2                         11.9   12.91 )-----------( 285      ( 01 May 2025 05:52 )             36.0                         11.2   12.51 )-----------( 260      ( 30 Apr 2025 07:45 )             34.1     02 May 2025 05:10    140    |  108    |  28     ----------------------------<  165    4.7     |  26     |  0.96   01 May 2025 05:52    136    |  107    |  27     ----------------------------<  174    4.6     |  26     |  1.00   30 Apr 2025 07:45    136    |  107    |  30     ----------------------------<  149    4.8     |  24     |  1.10     Ca    8.9        02 May 2025 05:10  Ca    9.3        01 May 2025 05:52  Ca    9.0        30 Apr 2025 07:45    TPro  7.0    /  Alb  2.8    /  TBili  0.3    /  DBili  x      /  AST  11     /  ALT  25     /  AlkPhos  66     02 May 2025 05:10  TPro  7.1    /  Alb  3.2    /  TBili  0.4    /  DBili  x      /  AST  11     /  ALT  23     /  AlkPhos  68     30 Apr 2025 07:45          12 Lead ECG:   Ventricular Rate 76 BPM    Atrial Rate 76 BPM    P-R Interval 156 ms    QRS Duration 92 ms    Q-T Interval 390 ms    QTC Calculation(Bazett) 438 ms    P Axis 6 degrees    R Axis -1 degrees    T Axis 3 degrees    Diagnosis Line Sinus rhythm with premature supraventricular complexes  Otherwise normal ECG  When compared with ECG of 30-NOV-2024 12:25,  premature supraventricular complexes are now present  Confirmed by RAMÓN SHAFFER ([id number]) on 4/30/2025 6:26:16 PM (04-29-25 @ 13:07)        [facility] -- Vlad Gallardo Pannella, Patel, Savella, Goodger, Cohen  Office # [phone]    Follow Up:  Cardiac clearance     Subjective/Observations: seen and examined, awake, alert, OOB to chair, denies chest pain, dyspnea, palpitations or dizziness, orthopnea and PND. Tolerating room air.    REVIEW OF SYSTEMS: All other review of systems is negative unless indicated above  PAST MEDICAL & SURGICAL HISTORY:  HTN (hypertension)      Diabetes      Hyperlipidemia      PVD (peripheral vascular disease)      Toe osteomyelitis, right      H/O angioplasty  RLE      Status post amputation of toe        MEDICATIONS  (STANDING):  aspirin enteric coated 81 milliGRAM(s) Oral daily  atorvastatin 40 milliGRAM(s) Oral at bedtime  cefTRIAXone   IVPB 2000 milliGRAM(s) IV Intermittent every 24 hours  cefTRIAXone   IVPB      clopidogrel Tablet 75 milliGRAM(s) Oral daily  dextrose 50% Injectable 25 Gram(s) IV Push once  dextrose 50% Injectable 12.5 Gram(s) IV Push once  dextrose 50% Injectable 25 Gram(s) IV Push once  enoxaparin Injectable 40 milliGRAM(s) SubCutaneous every 24 hours  gabapentin 300 milliGRAM(s) Oral three times a day  glucagon  Injectable 1 milliGRAM(s) IntraMuscular once  insulin lispro (ADMELOG) corrective regimen sliding scale   SubCutaneous three times a day before meals  insulin lispro (ADMELOG) corrective regimen sliding scale   SubCutaneous at bedtime  lisinopril 40 milliGRAM(s) Oral daily  metoprolol succinate ER 50 milliGRAM(s) Oral daily    MEDICATIONS  (PRN):  dextrose Oral Gel 15 Gram(s) Oral once PRN Blood Glucose LESS THAN 70 milliGRAM(s)/deciliter    Allergies    No Known Allergies    Intolerances      Vital Signs Last 24 Hrs  T(C): 37.1 (03 May 2025 04:55), Max: 37.2 (02 May 2025 20:15)  T(F): 98.7 (03 May 2025 04:55), Max: 98.9 (02 May 2025 20:15)  HR: 83 (03 May 2025 04:55) (67 - 96)  BP: 147/62 (02 May 2025 20:15) (107/68 - 147/62)  BP(mean): --  RR: 18 (03 May 2025 04:55) (11 - 20)  SpO2: 99% (03 May 2025 04:55) (94% - 99%)    Parameters below as of 03 May 2025 04:55  Patient On (Oxygen Delivery Method): room air      I&O's Summary    01 May 2025 07:01  -  02 May 2025 07:00  --------------------------------------------------------  IN: 825 mL / OUT: 0 mL / NET: 825 mL      Weight (kg): 83.9 (05-02 @ 07:50)    TELE: Not on telemetry   PHYSICAL EXAM:  Constitutional: NAD, awake and alert  HEENT: Moist Mucous Membranes, Anicteric  Pulmonary: Non-labored, breath sounds are clear bilaterally, No wheezing, rales or rhonchi  Cardiovascular: Regular, S1 and S2, No murmurs, No rubs, gallops or clicks  Gastrointestinal:  soft, nontender, nondistended   Lymph: No peripheral edema. No lymphadenopathy.   Skin: No visible rashes Left foot DSD intact.   Psych:  Mood & affect appropriate                          11.2   11.51 )-----------( 263      ( 02 May 2025 05:10 )             34.2                         11.9   12.91 )-----------( 285      ( 01 May 2025 05:52 )             36.0                         11.2   12.51 )-----------( 260      ( 30 Apr 2025 07:45 )             34.1     02 May 2025 05:10    140    |  108    |  28     ----------------------------<  165    4.7     |  26     |  0.96   01 May 2025 05:52    136    |  107    |  27     ----------------------------<  174    4.6     |  26     |  1.00   30 Apr 2025 07:45    136    |  107    |  30     ----------------------------<  149    4.8     |  24     |  1.10     Ca    8.9        02 May 2025 05:10  Ca    9.3        01 May 2025 05:52  Ca    9.0        30 Apr 2025 07:45    TPro  7.0    /  Alb  2.8    /  TBili  0.3    /  DBili  x      /  AST  11     /  ALT  25     /  AlkPhos  66     02 May 2025 05:10  TPro  7.1    /  Alb  3.2    /  TBili  0.4    /  DBili  x      /  AST  11     /  ALT  23     /  AlkPhos  68     30 Apr 2025 07:45          12 Lead ECG:   Ventricular Rate 76 BPM    Atrial Rate 76 BPM    P-R Interval 156 ms    QRS Duration 92 ms    Q-T Interval 390 ms    QTC Calculation(Bazett) 438 ms    P Axis 6 degrees    R Axis -1 degrees    T Axis 3 degrees    Diagnosis Line Sinus rhythm with premature supraventricular complexes  Otherwise normal ECG  When compared with ECG of 30-NOV-2024 12:25,  premature supraventricular complexes are now present  Confirmed by RAMÓN SHAFFER ([id number]) on 4/30/2025 6:26:16 PM (04-29-25 @ 13:07)

## 2025-05-03 NOTE — PROGRESS NOTE ADULT - SUBJECTIVE AND OBJECTIVE BOX
S/p LLE diagnostic angiogram with findings of occluded PT    64y Male seen and examined this morning in NAD answering question appropriately. Patient noted to be hypotensive, cardiology following and primary team/RN aware, patient asx. States groin access site with no pain, left foot with dressing in place, c/d/i.  Tolerating diet and voiding. Denies chest pain or shortness of breath. Denies dizziness or lightheadedness.     Patient with more questions regarding his angiogram, possible bypass or amputation. Patient expressed concern regarding his angiogram and how he would like to get a second opinion regarding his options for his left leg.       T(C): 37.1 (05-03-25 @ 04:55), Max: 37.2 (05-02-25 @ 20:15)  HR: 83 (05-03-25 @ 04:55) (67 - 96)  BP: 94/48 (05-03-25 @ 07:31) (91/53 - 147/62)  RR: 18 (05-03-25 @ 04:55) (11 - 20)  SpO2: 99% (05-03-25 @ 04:55) (94% - 99%)    Physical Exam:  GENERAL: NAD, resting comfortably in bed answering questions appropriately  CHEST/LUNG: Good inspiratory effort, breathing on room air  EXTREMITIES:  Right groin with dressing over access site. C/D/i. Groin nontender to palpation. No hematoma noted.  Left lower extremity focused: Left foot with dressing in place per podiatry which is clean/dry/intact. left leg is warm and dry to touch. nontender to touch/compression.   PSYCH: A&O x3  SKIN: Warm & dry    I&O's Detail    02 May 2025 07:01  -  03 May 2025 07:00  --------------------------------------------------------  IN:    Oral Fluid: 240 mL  Total IN: 240 mL    OUT:  Total OUT: 0 mL    Total NET: 240 mL            No Known Allergies        aspirin enteric coated 81 milliGRAM(s) Oral daily  atorvastatin 40 milliGRAM(s) Oral at bedtime  cefTRIAXone   IVPB 2000 milliGRAM(s) IV Intermittent every 24 hours  cefTRIAXone   IVPB      clopidogrel Tablet 75 milliGRAM(s) Oral daily  dextrose 50% Injectable 25 Gram(s) IV Push once  dextrose 50% Injectable 12.5 Gram(s) IV Push once  dextrose 50% Injectable 25 Gram(s) IV Push once  dextrose Oral Gel 15 Gram(s) Oral once PRN  enoxaparin Injectable 40 milliGRAM(s) SubCutaneous every 24 hours  gabapentin 300 milliGRAM(s) Oral three times a day  glucagon  Injectable 1 milliGRAM(s) IntraMuscular once  insulin lispro (ADMELOG) corrective regimen sliding scale   SubCutaneous three times a day before meals  insulin lispro (ADMELOG) corrective regimen sliding scale   SubCutaneous at bedtime  lisinopril 40 milliGRAM(s) Oral daily  metoprolol succinate ER 50 milliGRAM(s) Oral daily      LABS:                        10.5   18.98 )-----------( 306      ( 03 May 2025 06:31 )             31.6     05-03    136  |  104  |  41[H]  ----------------------------<  220[H]  5.0   |  25  |  1.60[H]    Ca    8.5      03 May 2025 06:31    TPro  6.6  /  Alb  2.6[L]  /  TBili  0.5  /  DBili  x   /  AST  11[L]  /  ALT  23  /  AlkPhos  63  05-03      Urinalysis Basic - ( 03 May 2025 06:31 )    Color: x / Appearance: x / SG: x / pH: x  Gluc: 220 mg/dL / Ketone: x  / Bili: x / Urobili: x   Blood: x / Protein: x / Nitrite: x   Leuk Esterase: x / RBC: x / WBC x   Sq Epi: x / Non Sq Epi: x / Bacteria: x        Culture - Blood (collected 01 May 2025 05:58)  Source: Blood Blood-Peripheral  Preliminary Report (02 May 2025 10:01):    No growth at 24 hours    Culture - Blood (collected 01 May 2025 05:52)  Source: Blood Blood-Peripheral  Preliminary Report (02 May 2025 10:01):    No growth at 24 hours

## 2025-05-03 NOTE — PROGRESS NOTE ADULT - PROBLEM SELECTOR PLAN 1
Pt with  L foot infection with multiple wounds sent in by [facility]. S/p surgical amputation of R forefoot s/p L PT angioplasty 12/2/24 for L lat foot DFU on Plavix  -Pt c/o drainage from wound, redness and streaking. Concern for Cellulitis & osteo   -MRI with soft tissue wound along the 5th metatarsal base with cortical erosive change and marrow edema concerning for osteomyelitis. Soft tissue wound along the posterolateral calcaneal tuberosity with curvilinear marrow hyperemia but no definite osteomyelitis.   IV abx cefepime and vanc transitioned to IV Rocephin  - BCx 1/2 bottles with Coag negative Staph, possible contaminant, repeat BCx NGTD  - NWB LLE  -At high risk for more proximal amputation. Intervention will be based on results of FRANCISCA and PVR's. Possible BKA per podiatry   - Plan for PICC placement Monday 5/5 for long term abx  -C/w ASA and Plavix and Lipitor  Daily  -Podiatry d/w Dr. Stark d/w   -Vascular surgery follow up- ASA ,Plavix, s/p LLE angio with occluded PT  -ID Dr Melany Calhoun, -IV Abx Rocephin daily

## 2025-05-03 NOTE — PROGRESS NOTE ADULT - PROBLEM SELECTOR PLAN 5
[facility] NEUROLOGY  [facility], [place]    Date: 2/2/22  Patient Name: Dot Coleman   MRN: [id number]   PCP: Primary Doctor No  Referring Provider: Santos Wood MD    Chief Complaint:  Changes in memory  Subjective:   Patient seen in consultation at the request of Santos Wood MD for the evaluation of changes in memory. A copy of this note will be sent to the referring physician.      HPI:   Ms. Dot Coleman is a 57 y.o. female presenting to discuss changes in memory.  She shares that after retired a couple of years ago, she has moved back to the [facility] from [place] where she lived for over 30 years.  After the move, she began to notice issues with her short-term memory.  She gives the example of forgetting her dog outside for a prolonged period of time once.  She typically misplaces small objects such as keys or remote around the house.  She has had at least 1 instance of leaving the stove on unattended.  Overall, cognitive fog is most bothersome.  She notes waking up in the morning and having to focus and struggle to recall what day of the week it is or what she has to do that day.  Picked up a book to read recently and eventually realized that she had read the book just a few months earlier.  Typically cannot remember what she watched on TV the day before.  Daughter has brought up in the last year that she frequently forgets the conversations or information they have discussed.    Patient completes all of her activities of daily living independently including cooking, cleaning, bathing, driving, handling finances.  Denies any accidents driving or major issues navigating.  She does however admit to significant anxiety about driving to or experiencing new places.  She is quite tearful as she describes anxiety leading up to today's visit and driving to our clinic in an area where she is not completely familiar.    Denies noticing any tremors or changes in gait.  No major  falls.  Denies a family history of neuro degenerative processes.    Admits to depressed mood.  She tearfully explains that her cognitive fog and anxiety are very abnormal for her.  She is obviously distressed as she explains being somewhat isolated now as she lives alone.   passed away in 2005 and she raised her daughters as a single parent.  Few hobbies, no changes in appetite, no thoughts of self-harm or harm to others, admits to issues with insomnia over the last several months.    The patient does point out that she feels as if her memory was becoming a problem even before changes in mood.    At the time of today's encounter, the patient presents medical records from outside facility where she had received extensive neurological workup in late 2020. These results are scanned into chart media.  They include routine reversible causes of dementia screening such as B12, TSH and others.  Eeg report reviewed; normal.  These notes referenced a previously conducted MRI brain with evidence of diffuse atrophy but no reports of actual imaging was available for review at the time of today's encounter.    PAST MEDICAL HISTORY:  History reviewed. No pertinent past medical history.    PAST SURGICAL HISTORY:  History reviewed. No pertinent surgical history.    CURRENT MEDS:  Current Outpatient Medications   Medication Sig Dispense Refill    estradioL (ESTRACE) 0.01 % (0.1 mg/gram) vaginal cream estradiol 0.01% (0.1 mg/gram) vaginal cream      dexamethasone (DECADRON) 4 mg/mL injection 1 mL.      DULoxetine (CYMBALTA) 30 MG capsule Take 1 capsule (30 mg total) by mouth once daily. 30 capsule 2    ibuprofen (ADVIL,MOTRIN) 600 MG tablet Take 1 tablet (600 mg total) by mouth every 6 (six) hours as needed. (Patient not taking: Reported on 2/2/2022) 24 tablet 0     No current facility-administered medications for this visit.       ALLERGIES:  Review of patient's allergies indicates:   Allergen Reactions    Penicillins  "Other (See Comments)     Bernard          FAMILY HISTORY:  History reviewed. No pertinent family history.    SOCIAL HISTORY:  Social History     Tobacco Use    Smoking status: Never Smoker    Smokeless tobacco: Never Used       Review of Systems:  Gen: no fever, no chills, no generalized feeling of weakness   HEENT: no double vision, no blurred vision, no eye pain, no eye exudates. no nasal congestion,   no traumatic injury of head, no neck pain, no neck stiffness. no photophobia or phonophobia at this time. ?    Heart: no chest pain, no SOB    Lungs: no SOB, no cough    MSK: no weakness of legs, intact ROM    ABD: no abd pain, no N/V/D/C, no difficulty with defecation.    Extremities: No leg pain, no edema.       Objective:     Vitals:    02/02/22 1113   BP: 115/84   Pulse: 82   Weight: 67.4 kg (148 lb 9.4 oz)   Height: 5' 1" (1.549 m)     General:  Female in NAD, alert and awake, Aox3, well groomed. ?    ? ?    HEENT: Head is NC/AT EOMI, pupil size: 4 mm B/L, no nystagmus noted; hearing grossly intact b/l.     Neck: Supple. no nuchal rigidity.      Cardiovascular: well perfused, no cyanosis        Respiratory: Symmetric chest rise noted       Musculoskeletal: Muscle tone noted to be adequate for patient age, muscle mass is WNL. No spontaneous movements or fasciculations noted during this examination.       Extremities: No pedal edema or calf tenderness. No cogwheel rigidity noted on B/L UE extremities.       Neurological Examination.    Mental status: AA&O x3; Affect/mood is euthymic/congruent; no aphasia noted during examination. Patient answers simple questions appropriately & follows simple commands; no dysarthria or expressive aphasia; no bert-neglect or extinction. Vocabulary/word finding: excellent.       Cranial Nerves: II-XII grossly intact. visual fields intact to confrontation, EOMI, no nystagmus, PERRLA,?facial muscles symmetric and no facial droop noted, patient hearing is grossly intact b/l, ?facial " sensation to sharp and light touch grossly intact B/L. Patient smiles, frowns, closes eyes ?forcefully uneventfully. Uvula midline and no difficulty with pronunciation. No SCM/trapezius/muscle of mastication weakness noted B/L. Patient has adequate control of tongue and may protrude it and move it adequately.       Muscle Function:  5/5 throughout     Reflexes: Left and Right biceps, triceps, brachioradialis are 2+/4. patellar 2+/4 on Left and 2+/4 on Right     Gait: adequate casual gait with stride length and arm swing WNL.     02/02/2022 MOCA: 25/30  Visuospatial/Executive 4, Naming 2, Attention 6, Language 3, Abstraction 2, Delayed Recall 2, Orientation 6        Assessment:   Dot Coleman is a 57 y.o. female presenting to discuss changes in memory.  We had a lengthy discussion regarding history, today's exam, the effects of mood on memory.  Ultimately, we agreed to initiate treatment for ongoing depression.  Benefits, risks, side effects, alternatives to duloxetine were discussed at length at the time of the encounter.  Reversible causes of dementia screening labs completed by previous examiners; scanned into chart.  Previous imaging reportedly revealed diffuse atrophy; we will initiate orders for baseline imaging today for memory loss.  Given the patient's age and tremendous anxiety around the prospect of memory disturbance, we will initiate a referral for neuropsychology.    Plan:     Problem List Items Addressed This Visit        Neuro    Mild cognitive impairment with memory loss - Primary    Relevant Orders    Ambulatory referral/consult to Neuropsychology       Psychiatric    Depression    Relevant Medications    DULoxetine (CYMBALTA) 30 MG capsule    Other Relevant Orders    Ambulatory referral/consult to Neuropsychology      Other Visit Diagnoses     Other amnesia        Relevant Orders    CT Head Without Contrast        - follow-up with our clinic in 6 weeks or as needed  - safety counseling as it  relates to memory conducted at the time of the encounter  - advised to reach out to our clinic immediately if side effects occur with new Cymbalta regimen  - encouraged to participate in cognitively stimulating activities such as regular socialization, puzzles, reading.    I spent a total of 60 minutes on the day of the visit. This includes face to face time and non-face to face time preparing to see the patient (eg, review of tests), obtaining and/or reviewing separately obtained history, documenting clinical information in the electronic or other health record, independently interpreting results and communicating results to the patient/family/caregiver, or care coordinator.    A dictation device was used to produce this document. Use of such devices sometimes results in grammatical errors or replacement of words that sound similarly.    Rodrigo Rivera, DO       Chronic  -On home Metoprolol, Lisinopril  -C/w home meds with hold parameters  -Monitor hemodynamics

## 2025-05-03 NOTE — PROGRESS NOTE ADULT - ASSESSMENT
Patient is a 64y old  Male who presented with a chief complaint of Foot infection and is now S/p LLE diagnostic angiogram     Plan:  - Continue current care as per primary  - Continue diet  - Tight glycemic control  - Rec endocrine consult for better DM management  - Continue abx as per ID  - Pain control PRN, supportive care  - Incentive spirometry  - Podiatry recs appreciated; attempt debridement vs BKA  - dvt ppx; LXV   - ASA/Plavix  - No further acute vascular surgery interventions required at this time  - Follow up in vascular clinic as outpatient/seek second opinion as patient expressed concern  - To be discussed with Dr. Jones    Surgical Team: [id number]

## 2025-05-03 NOTE — PROGRESS NOTE ADULT - SUBJECTIVE AND OBJECTIVE BOX
Patient is a 64y old  Male who presents with a chief complaint of Foot infection (03 May 2025 11:43)    HPI: 63 y/o M with PMHx DM2, hx osteomyelitis, CAD, PAD s/p RLE angioplasty 2020, s/p surgical amputation of R forefoot , s/p L PT angioplasty 12/2/24 for L lat foot DFU on plavix, HTN sent in by wound clinic for left foot infections and multiple wounds. Pt states he was at [facility] for hyperbaric therapy today and they noticed his L foot had drainage with increased erythema and edema so they told him to come to the ED. Pt states yesterday his foot was dry, without drainage or swelling. Patient notes he follow with ID outpatient and was prescribed Bactrim 2 weeks ago for the chronic foot wounds without any improvement. Pt denies fever/chills, CP, SOB, HA, dizziness, n/v/d. Pt also denies any current pain in the foot.    ED Course:   Vitals: BP: 107/66 , HR: 87 , Temp: 98.1F , RR: 16 , SpO2: 100 % on RA  Labs:  ESR 53, WBC 12.23, Hgb 11, BUN 25, Glucose 137,   X-ray Foot: Cutaneous defect adjacent to the base of the fifth metatarsal again evident with no gross cortical destruction or soft tissue emphysema. Follow-up MRI can be ordered    Received in the ED:  3.375g Zosyn IVPBx1, Vanco 1g IVPB x1    INTERVAL HPI:  4/30: Pt seen and examined at bedside this morning, feels well, no acute complaints. On IV abx vanc and cefepime, planning for LLE angio on 5/2 continue Abx  5/1: Pt seen and examined this morning, feels well no acute complaints. Requesting left foot dressing change, c/d/i. Pain controlled. On IV abx vanc and cefepime, for LLE angio tomorrow NPO  5/2: Pt seen at bedside this afternoon s/p LLE angiogram, feels well no acute complaints. L foot dressing c/d/i, pain controlled. On IV abx vanc and cefepime---> Rocephin   5/3: Pt seen and examined at bedside, feels well no acute complaints. L foot dressing c/d/i, pain controlled. On IV Rocephin    OVERNIGHT EVENTS: None    Home Medications:  aspirin 81 mg oral delayed release tablet: 1 tab(s) orally once a day (29 Apr 2025 16:40)  atorvastatin 40 mg oral tablet: 1 tab(s) orally once a day (29 Apr 2025 16:40)  clopidogrel 75 mg oral tablet: 1 tab(s) orally once a day (29 Apr 2025 16:40)  gabapentin 300 mg oral capsule: 1 cap(s) orally 3 times a day (29 Apr 2025 16:40)  Jardiance 25 mg oral tablet: 1 tab(s) orally once a day (29 Apr 2025 16:40)  lisinopril 40 mg oral tablet: 1 tab(s) orally once a day (29 Apr 2025 16:40)  metFORMIN 1000 mg oral tablet: 1 tab(s) orally 2 times a day (29 Apr 2025 16:40)  metoprolol succinate 50 mg oral tablet, extended release: 1 tab(s) orally once a day (29 Apr 2025 16:40)  Trulicity Pen 1.5 mg/0.5 mL subcutaneous solution: 1.5 milligram(s) subcutaneously once a week (29 Apr 2025 16:40)      MEDICATIONS  (STANDING):  aspirin enteric coated 81 milliGRAM(s) Oral daily  atorvastatin 40 milliGRAM(s) Oral at bedtime  cefTRIAXone   IVPB 2000 milliGRAM(s) IV Intermittent every 24 hours  cefTRIAXone   IVPB      clopidogrel Tablet 75 milliGRAM(s) Oral daily  dextrose 50% Injectable 25 Gram(s) IV Push once  dextrose 50% Injectable 12.5 Gram(s) IV Push once  dextrose 50% Injectable 25 Gram(s) IV Push once  enoxaparin Injectable 40 milliGRAM(s) SubCutaneous every 24 hours  gabapentin 300 milliGRAM(s) Oral three times a day  glucagon  Injectable 1 milliGRAM(s) IntraMuscular once  insulin glargine Injectable (LANTUS) 10 Unit(s) SubCutaneous at bedtime  insulin lispro (ADMELOG) corrective regimen sliding scale   SubCutaneous three times a day before meals  insulin lispro (ADMELOG) corrective regimen sliding scale   SubCutaneous at bedtime  insulin lispro Injectable (ADMELOG) 3 Unit(s) SubCutaneous three times a day before meals  metoprolol succinate ER 50 milliGRAM(s) Oral daily    MEDICATIONS  (PRN):  dextrose Oral Gel 15 Gram(s) Oral once PRN Blood Glucose LESS THAN 70 milliGRAM(s)/deciliter      No Known Allergies      Social History:  Tobacco: Denies  EtOH: Denies  Recreational drug use: Denies  Lives with: Wife at home   Ambulates: Independently   ADLs: Independent (29 Apr 2025 15:39)      REVIEW OF SYSTEMS:  CONSTITUTIONAL: No fever, No chills, No fatigue, No myalgia, No Body ache, No Weakness  EYES: No eye pain,  No visual disturbances, No discharge, No Redness  ENMT: No ear pain, No nose bleed, No vertigo; No sinus pain, No throat pain, No Congestion  NECK: No pain, No stiffness  RESPIRATORY: No cough, No wheezing, No hemoptysis, No shortness of breath  CARDIOVASCULAR: No chest pain, No palpitations  GASTROINTESTINAL: No abdominal pain, No epigastric pain. No nausea, No vomiting, No diarrhea, No constipation; [ x ] BM  GENITOURINARY: No dysuria, No frequency, No urgency, No hematuria, No incontinence  NEUROLOGICAL: No headaches, No dizziness, No numbness, No tingling, No tremors, No weakness  EXTREMITIES: No Swelling, No Pain, No Edema  SKIN: [ x ] No itching, burning, rashes, or lesions   MUSCULOSKELETAL: No joint pain, No joint swelling; No muscle pain, No back pain, No extremity pain  PSYCHIATRIC: No depression, No anxiety, No mood swings, No difficulty sleeping at night  PAIN SCALE: [ x ] None  [  ] Other-  ROS Unable to obtain due to: [  ] Dementia  [  ] Lethargy  [  ] Sedated  [  ] Non verbal  REST OF REVIEW OF SYSTEMS: [x  ] Normal     Vital Signs Last 24 Hrs  T(C): 36.6 (03 May 2025 11:29), Max: 37.2 (02 May 2025 20:15)  T(F): 97.9 (03 May 2025 11:29), Max: 98.9 (02 May 2025 20:15)  HR: 75 (03 May 2025 11:29) (75 - 96)  BP: 103/66 (03 May 2025 11:29) (91/53 - 147/62)  BP(mean): --  RR: 18 (03 May 2025 11:29) (18 - 18)  SpO2: 100% (03 May 2025 11:29) (96% - 100%)    Parameters below as of 03 May 2025 11:29  Patient On (Oxygen Delivery Method): room air        CAPILLARY BLOOD GLUCOSE      POCT Blood Glucose.: 278 mg/dL (03 May 2025 11:42)  POCT Blood Glucose.: 255 mg/dL (03 May 2025 08:07)  POCT Blood Glucose.: 279 mg/dL (02 May 2025 22:33)  POCT Blood Glucose.: 231 mg/dL (02 May 2025 16:38)      I&O's Summary    02 May 2025 07:01  -  03 May 2025 07:00  --------------------------------------------------------  IN: 240 mL / OUT: 0 mL / NET: 240 mL      PHYSICAL EXAM:  GENERAL:  [ x ] NAD, [x ] Well appearing, [  ] Agitated, [  ] Drowsy, [  ] Lethargy, [  ] Confused   HEAD:  [ x ] Normal, [  ] Other  EYES:  [x  ] EOMI, [ x] PERRL, [ x ] Conjunctiva and sclera clear normal, [  ] Other, [  ] Pallor, [  ] Discharge  ENMT:  [ x ] Normal, [ x ] Moist mucous membranes, [ x ] Good dentition, [ x ] No thrush  NECK:  [ x ] Supple, [x  ] No JVD, [ x ] Normal thyroid, [  ] Lymphadenopathy, [  ] Other  CHEST/LUNG:  [ x ] Clear to auscultation bilaterally, [ x ] Breath Sounds equal B/L / decreased, [  ] Poor effort, [ x ] No rales, [ x ] No rhonchi, [x  ] No wheezing  HEART:  [ x ] Regular rate and rhythm, [  ] Tachycardia, [  ] Bradycardia, [  ] Irregular, [ x ] No murmurs, No rubs, No gallops, [  ] PPM in place (Mfr:  )  ABDOMEN:  [ x ] Soft, [x ] Nontender, [ x ] Nondistended, [ x ] No mass, [ x ] Bowel sounds present, [  ] Obese  NERVOUS SYSTEM:  [ x ] Alert & Oriented x3, [ x ] Nonfocal, [  ] Confusion, [  ] Encephalopathic, [  ] Sedated, [  ] Unable to assess, [  ] Dementia, [  ] Other-  EXTREMITIES:  [ x ] 2+ Peripheral Pulses, No clubbing, No cyanosis,  [  ] Edema B/L lower EXT, [x ] PVD stasis skin changes B/L lower EXT, [x  ] Wound - left foot in dressing c/d/i  LYMPH:  No lymphadenopathy noted  SKIN:  [ x] No rashes or lesions, [  ] Pressure ulcers, [  ] Ecchymosis, [  ] Skin tears, [ x ] Other Left foot wound-necrotic ulcers base medial, lateral aspects of foot, nonstageable necrotic heel ulcer left, with edema, reduced erythema, LL Ext  cellulitic improved,, negative drainage  distal hallux ulcer necrotic base, No Drainage     DIET: Diet, Consistent Carbohydrate w/Evening Snack:   Low Sodium (05-02-25 @ 10:36)      LABS:                        10.5   18.98 )-----------( 306      ( 03 May 2025 06:31 )             31.6     03 May 2025 06:31    136    |  104    |  41     ----------------------------<  220    5.0     |  25     |  1.60     Ca    8.5        03 May 2025 06:31    TPro  6.6    /  Alb  2.6    /  TBili  0.5    /  DBili  x      /  AST  11     /  ALT  23     /  AlkPhos  63     03 May 2025 06:31      Urinalysis Basic - ( 03 May 2025 06:31 )    Color: x / Appearance: x / SG: x / pH: x  Gluc: 220 mg/dL / Ketone: x  / Bili: x / Urobili: x   Blood: x / Protein: x / Nitrite: x   Leuk Esterase: x / RBC: x / WBC x   Sq Epi: x / Non Sq Epi: x / Bacteria: x      Culture Results:   No growth at 48 Hours (05-01 @ 05:58)  Culture Results:   No growth at 48 Hours (05-01 @ 05:52)  Culture Results:   Growth in aerobic bottle: Staphylococcus pseudintermedius  "Susceptibilities not performed"  Direct identification is available within approximately 3-5  hours either by Blood Panel Multiplexed PCR or Direct  MALDI-TOF. Details: [email or web address] (04-29 @ 12:45)  Culture Results:   No growth at 72 Hours (04-29 @ 12:30)            Culture - Blood (collected 01 May 2025 05:58)  Source: Blood Blood-Peripheral  Preliminary Report (03 May 2025 10:01):    No growth at 48 Hours    Culture - Blood (collected 01 May 2025 05:52)  Source: Blood Blood-Peripheral  Preliminary Report (03 May 2025 10:01):    No growth at 48 Hours    Culture - Blood (collected 29 Apr 2025 12:45)  Source: Blood Blood  Gram Stain (01 May 2025 04:09):    Growth in aerobic bottle: Gram Positive Cocci in Clusters  Final Report (01 May 2025 23:19):    Growth in aerobic bottle: Staphylococcus pseudintermedius    "Susceptibilities not performed"    Direct identification is available within approximately 3-5    hours either by Blood Panel Multiplexed PCR or Direct    MALDI-TOF. Details: [email or web address]  Organism: Blood Culture PCR (01 May 2025 23:19)  Organism: Blood Culture PCR (01 May 2025 23:19)    Culture - Blood (collected 29 Apr 2025 12:30)  Source: Blood Blood  Preliminary Report (02 May 2025 19:01):    No growth at 72 Hours            RADIOLOGY & ADDITIONAL TESTS: No new imaging      HEALTH ISSUES - PROBLEM Dx:  Infection of left foot    DELORIS (acute kidney injury)    Type 2 diabetes mellitus    HTN (hypertension)    Need for prophylactic measure    Diabetic ulcer of left foot    Osteomyelitis of left foot    Unstageable pressure ulcer of left foot    PAD (peripheral artery disease)          Consultant(s) Notes Reviewed:  [  ] YES     Care Discussed with [ x ] Consultants, [ x ] Patient, [  ] Family, [  ] HCP, [  ] , [  ] Social Service, [  ] RN, [  ] Physical Therapy, [  ] Palliative Care Team  DVT PPX: [ x ] Lovenox, [  ] SC Heparin, [  ] Coumadin, [  ] Xarelto, [  ] Eliquis, [  ] Pradaxa, [  ] IV Heparin drip, [  ] SCD, [  ] Ambulation, [  ] Contraindicated 2/2 GI Bleed, [  ] Contraindicated 2/2  Bleed, [  ] Contraindicated 2/2 Brain Bleed  Advanced Directive: [ x ] None, [  ] DNR/DNI Patient is a 64y old  Male who presents with a chief complaint of Foot infection (03 May 2025 11:43)    HPI: 65 y/o M with PMHx DM2, hx osteomyelitis, CAD, PAD s/p RLE angioplasty 2020, s/p surgical amputation of R forefoot , s/p L PT angioplasty 12/2/24 for L lat foot DFU on plavix, HTN sent in by wound clinic for left foot infections and multiple wounds. Pt states he was at [facility] for hyperbaric therapy today and they noticed his L foot had drainage with increased erythema and edema so they told him to come to the ED. Pt states yesterday his foot was dry, without drainage or swelling. Patient notes he follow with ID outpatient and was prescribed Bactrim 2 weeks ago for the chronic foot wounds without any improvement. Pt denies fever/chills, CP, SOB, HA, dizziness, n/v/d. Pt also denies any current pain in the foot.    ED Course:   Vitals: BP: 107/66 , HR: 87 , Temp: 98.1F , RR: 16 , SpO2: 100 % on RA  Labs:  ESR 53, WBC 12.23, Hgb 11, BUN 25, Glucose 137,   X-ray Foot: Cutaneous defect adjacent to the base of the fifth metatarsal again evident with no gross cortical destruction or soft tissue emphysema. Follow-up MRI can be ordered    Received in the ED:  3.375g Zosyn IVPBx1, Vanco 1g IVPB x1    INTERVAL HPI:  4/30: Pt seen and examined at bedside this morning, feels well, no acute complaints. On IV abx vanc and cefepime, planning for LLE angio on 5/2 continue Abx  5/1: Pt seen and examined this morning, feels well no acute complaints. Requesting left foot dressing change, c/d/i. Pain controlled. On IV abx vanc and cefepime, for LLE angio tomorrow NPO  5/2: Pt seen at bedside this afternoon s/p LLE angiogram, feels well no acute complaints. L foot dressing c/d/i, pain controlled. On IV abx vanc and cefepime---> Rocephin   5/3: Pt seen and examined at bedside, feels well no acute complaints. L foot dressing c/d/i, pain controlled. On IV Rocephin, Podiatry follow up.    OVERNIGHT EVENTS: None    Home Medications:  aspirin 81 mg oral delayed release tablet: 1 tab(s) orally once a day (29 Apr 2025 16:40)  atorvastatin 40 mg oral tablet: 1 tab(s) orally once a day (29 Apr 2025 16:40)  clopidogrel 75 mg oral tablet: 1 tab(s) orally once a day (29 Apr 2025 16:40)  gabapentin 300 mg oral capsule: 1 cap(s) orally 3 times a day (29 Apr 2025 16:40)  Jardiance 25 mg oral tablet: 1 tab(s) orally once a day (29 Apr 2025 16:40)  lisinopril 40 mg oral tablet: 1 tab(s) orally once a day (29 Apr 2025 16:40)  metFORMIN 1000 mg oral tablet: 1 tab(s) orally 2 times a day (29 Apr 2025 16:40)  metoprolol succinate 50 mg oral tablet, extended release: 1 tab(s) orally once a day (29 Apr 2025 16:40)  Trulicity Pen 1.5 mg/0.5 mL subcutaneous solution: 1.5 milligram(s) subcutaneously once a week (29 Apr 2025 16:40)      MEDICATIONS  (STANDING):  aspirin enteric coated 81 milliGRAM(s) Oral daily  atorvastatin 40 milliGRAM(s) Oral at bedtime  cefTRIAXone   IVPB 2000 milliGRAM(s) IV Intermittent every 24 hours  cefTRIAXone   IVPB      clopidogrel Tablet 75 milliGRAM(s) Oral daily  dextrose 50% Injectable 25 Gram(s) IV Push once  dextrose 50% Injectable 12.5 Gram(s) IV Push once  dextrose 50% Injectable 25 Gram(s) IV Push once  enoxaparin Injectable 40 milliGRAM(s) SubCutaneous every 24 hours  gabapentin 300 milliGRAM(s) Oral three times a day  glucagon  Injectable 1 milliGRAM(s) IntraMuscular once  insulin glargine Injectable (LANTUS) 10 Unit(s) SubCutaneous at bedtime  insulin lispro (ADMELOG) corrective regimen sliding scale   SubCutaneous three times a day before meals  insulin lispro (ADMELOG) corrective regimen sliding scale   SubCutaneous at bedtime  insulin lispro Injectable (ADMELOG) 3 Unit(s) SubCutaneous three times a day before meals  metoprolol succinate ER 50 milliGRAM(s) Oral daily    MEDICATIONS  (PRN):  dextrose Oral Gel 15 Gram(s) Oral once PRN Blood Glucose LESS THAN 70 milliGRAM(s)/deciliter      No Known Allergies      Social History:  Tobacco: Denies  EtOH: Denies  Recreational drug use: Denies  Lives with: Wife at home   Ambulates: Independently   ADLs: Independent (29 Apr 2025 15:39)      REVIEW OF SYSTEMS:  CONSTITUTIONAL: No fever, No chills, No fatigue, No myalgia, No Body ache, No Weakness  EYES: No eye pain,  No visual disturbances, No discharge, No Redness  ENMT: No ear pain, No nose bleed, No vertigo; No sinus pain, No throat pain, No Congestion  NECK: No pain, No stiffness  RESPIRATORY: No cough, No wheezing, No hemoptysis, No shortness of breath  CARDIOVASCULAR: No chest pain, No palpitations  GASTROINTESTINAL: No abdominal pain, No epigastric pain. No nausea, No vomiting, No diarrhea, No constipation; [ x ] BM  GENITOURINARY: No dysuria, No frequency, No urgency, No hematuria, No incontinence  NEUROLOGICAL: No headaches, No dizziness, No numbness, No tingling, No tremors, No weakness  EXTREMITIES: No Swelling, No Pain, No Edema  SKIN: [ x ] No itching, burning, rashes, or lesions   MUSCULOSKELETAL: No joint pain, No joint swelling; No muscle pain, No back pain, No extremity pain  PSYCHIATRIC: No depression, No anxiety, No mood swings, No difficulty sleeping at night  PAIN SCALE: [ x ] None  [  ] Other-  ROS Unable to obtain due to: [  ] Dementia  [  ] Lethargy  [  ] Sedated  [  ] Non verbal  REST OF REVIEW OF SYSTEMS: [x  ] Normal     Vital Signs Last 24 Hrs  T(C): 36.6 (03 May 2025 11:29), Max: 37.2 (02 May 2025 20:15)  T(F): 97.9 (03 May 2025 11:29), Max: 98.9 (02 May 2025 20:15)  HR: 75 (03 May 2025 11:29) (75 - 96)  BP: 103/66 (03 May 2025 11:29) (91/53 - 147/62)  BP(mean): --  RR: 18 (03 May 2025 11:29) (18 - 18)  SpO2: 100% (03 May 2025 11:29) (96% - 100%)    Parameters below as of 03 May 2025 11:29  Patient On (Oxygen Delivery Method): room air        CAPILLARY BLOOD GLUCOSE      POCT Blood Glucose.: 278 mg/dL (03 May 2025 11:42)  POCT Blood Glucose.: 255 mg/dL (03 May 2025 08:07)  POCT Blood Glucose.: 279 mg/dL (02 May 2025 22:33)  POCT Blood Glucose.: 231 mg/dL (02 May 2025 16:38)      I&O's Summary    02 May 2025 07:01  -  03 May 2025 07:00  --------------------------------------------------------  IN: 240 mL / OUT: 0 mL / NET: 240 mL      PHYSICAL EXAM:  GENERAL:  [ x ] NAD, [x ] Well appearing, [  ] Agitated, [  ] Drowsy, [  ] Lethargy, [  ] Confused   HEAD:  [ x ] Normal, [  ] Other  EYES:  [x  ] EOMI, [ x] PERRL, [ x ] Conjunctiva and sclera clear normal, [  ] Other, [  ] Pallor, [  ] Discharge  ENMT:  [ x ] Normal, [ x ] Moist mucous membranes, [ x ] Good dentition, [ x ] No thrush  NECK:  [ x ] Supple, [x  ] No JVD, [ x ] Normal thyroid, [  ] Lymphadenopathy, [  ] Other  CHEST/LUNG:  [ x ] Clear to auscultation bilaterally, [ x ] Breath Sounds equal B/L / decreased, [  ] Poor effort, [ x ] No rales, [ x ] No rhonchi, [x  ] No wheezing  HEART:  [ x ] Regular rate and rhythm, [  ] Tachycardia, [  ] Bradycardia, [  ] Irregular, [ x ] No murmurs, No rubs, No gallops, [  ] PPM in place (Mfr:  )  ABDOMEN:  [ x ] Soft, [x ] Nontender, [ x ] Nondistended, [ x ] No mass, [ x ] Bowel sounds present, [  ] Obese  NERVOUS SYSTEM:  [ x ] Alert & Oriented x3, [ x ] Nonfocal, [  ] Confusion, [  ] Encephalopathic, [  ] Sedated, [  ] Unable to assess, [  ] Dementia, [  ] Other-  EXTREMITIES:  [ x ] 2+ Peripheral Pulses, No clubbing, No cyanosis,  [  ] Edema B/L lower EXT, [x ] PVD stasis skin changes B/L lower EXT, [x  ] Wound - left foot in dressing c/d/i  LYMPH:  No lymphadenopathy noted  SKIN:  [ x] No rashes or lesions, [  ] Pressure ulcers, [  ] Ecchymosis, [  ] Skin tears, [ x ] Other Left foot wound-necrotic ulcers base medial, lateral aspects of foot, nonstageable necrotic heel ulcer left, with edema, reduced erythema, LL Ext  cellulitic improved,, negative drainage  distal hallux ulcer necrotic base, No Drainage     DIET: Diet, Consistent Carbohydrate w/Evening Snack:   Low Sodium (05-02-25 @ 10:36)      LABS:                        10.5   18.98 )-----------( 306      ( 03 May 2025 06:31 )             31.6     03 May 2025 06:31    136    |  104    |  41     ----------------------------<  220    5.0     |  25     |  1.60     Ca    8.5        03 May 2025 06:31    TPro  6.6    /  Alb  2.6    /  TBili  0.5    /  DBili  x      /  AST  11     /  ALT  23     /  AlkPhos  63     03 May 2025 06:31      Urinalysis Basic - ( 03 May 2025 06:31 )    Color: x / Appearance: x / SG: x / pH: x  Gluc: 220 mg/dL / Ketone: x  / Bili: x / Urobili: x   Blood: x / Protein: x / Nitrite: x   Leuk Esterase: x / RBC: x / WBC x   Sq Epi: x / Non Sq Epi: x / Bacteria: x      Culture Results:   No growth at 48 Hours (05-01 @ 05:58)  Culture Results:   No growth at 48 Hours (05-01 @ 05:52)  Culture Results:   Growth in aerobic bottle: Staphylococcus pseudintermedius  "Susceptibilities not performed"  Direct identification is available within approximately 3-5  hours either by Blood Panel Multiplexed PCR or Direct  MALDI-TOF. Details: [email or web address] (04-29 @ 12:45)  Culture Results:   No growth at 72 Hours (04-29 @ 12:30)            Culture - Blood (collected 01 May 2025 05:58)  Source: Blood Blood-Peripheral  Preliminary Report (03 May 2025 10:01):    No growth at 48 Hours    Culture - Blood (collected 01 May 2025 05:52)  Source: Blood Blood-Peripheral  Preliminary Report (03 May 2025 10:01):    No growth at 48 Hours    Culture - Blood (collected 29 Apr 2025 12:45)  Source: Blood Blood  Gram Stain (01 May 2025 04:09):    Growth in aerobic bottle: Gram Positive Cocci in Clusters  Final Report (01 May 2025 23:19):    Growth in aerobic bottle: Staphylococcus pseudintermedius    "Susceptibilities not performed"    Direct identification is available within approximately 3-5    hours either by Blood Panel Multiplexed PCR or Direct    MALDI-TOF. Details: [email or web address]  Organism: Blood Culture PCR (01 May 2025 23:19)  Organism: Blood Culture PCR (01 May 2025 23:19)    Culture - Blood (collected 29 Apr 2025 12:30)  Source: Blood Blood  Preliminary Report (02 May 2025 19:01):    No growth at 72 Hours      RADIOLOGY & ADDITIONAL TESTS: No new imaging      HEALTH ISSUES - PROBLEM Dx:  Infection of left foot    DELORIS (acute kidney injury)    Type 2 diabetes mellitus    HTN (hypertension)    Need for prophylactic measure    Diabetic ulcer of left foot    Osteomyelitis of left foot    Unstageable pressure ulcer of left foot    PAD (peripheral artery disease)          Consultant(s) Notes Reviewed:  [ x ] YES     Care Discussed with [ x ] Consultants, [ x ] Patient, [  ] Family, [  ] HCP, [  ] , [  ] Social Service, [ x ] RN, [  ] Physical Therapy, [  ] Palliative Care Team  DVT PPX: [ x ] Lovenox, [  ] SC Heparin, [  ] Coumadin, [  ] Xarelto, [  ] Eliquis, [  ] Pradaxa, [  ] IV Heparin drip, [  ] SCD, [  ] Ambulation, [  ] Contraindicated 2/2 GI Bleed, [  ] Contraindicated 2/2  Bleed, [  ] Contraindicated 2/2 Brain Bleed  Advanced Directive: [ x ] None, [  ] DNR/DNI

## 2025-05-03 NOTE — PROGRESS NOTE ADULT - ASSESSMENT
65 y/o M with PVD, HTN, diabetes s/p metatarsal amputation sent in by wound clinic for left foot infections and multiple wounds admitted for worsening foot wound  infection, cellulitis left dylon t& Lower EXT.     L foot wound infection/cellulitis +/- underlying OM, PAD, DELORIS  - pt p/w multiple L foot wounds  -- pt well known to service; is s/p surgical amputation of R forefoot  s/p L PT angioplasty 12/2/24 for L lat foot DFU on Plavix  - afebrile, leukocytosis to 12. Elevated ESR 53  - X-ray Foot: Cutaneous defect adjacent to the base of the fifth metatarsal again evident with no gross cortical destruction or soft tissue emphysema.   - MR L foot Soft tissue wound along the 5th metatarsal base with cortical erosive change and marrow edema concerning for osteomyelitis.  - 4/29 BCx + CoNS -- likely procurement contaminant; 5/1 BCx NGTD  - 5/2 s/p LLE angio  Podiatry notes reviewed; pending further OR once further vascular studies are obtained    Stop vancomycin  Stop cefepime  Ceftriaxone 2gm q24h based on prior cx of S. marascens from December 2024  -pt considering PICC and IV abx and then second opinion-pt will discuss with Podiatry    Thank you for consulting us and involving us in the management of this most interesting and challenging case.  In addition to reviewing history, imaging, documents, labs, microbiology, took into account antibiotic stewardship, local antibiogram and infection control strategies and potential transmission issues.    We will follow along in the care of this patient. Please contact me by texting me directly on my cell# at [phone] using TEAMS or call our answering service at [phone] with any concerns.

## 2025-05-03 NOTE — PROGRESS NOTE ADULT - PROBLEM SELECTOR PLAN 2
Chronic. Pt follows with Vascular outpt. Pt s/p L PT angioplasty 12/2/24 for L lat foot DFU on Plavix  -C/w ASA and Plavix and Lipitor   -s/p LLE angio with occluded PT  - Cardiology ([facility]) consulted for cardiac clearance

## 2025-05-03 NOTE — PROGRESS NOTE ADULT - PROBLEM SELECTOR PLAN 4
Chronic  -On home Trulicity, Jardiance and Metformin  -Hold home oral meds  -Start MDISS with FS QAC/QHS   -Start Lantus 10u qhs and 3u premeal for uncontrolled sugars  -Hypoglycemia protocol  -A1c 7.9  -Pt with neuropathy continue Gabapentin 300mg TID  -Endo Dr. Perlman consulted Chronic  -On home Trulicity, Jardiance and Metformin  -Hold home oral meds  -Start MDISS with FS QAC/QHS   -Start Lantus 10u qhs and 3u pre meal for uncontrolled sugars  -Hypoglycemia protocol  -A1c 7.9  -Pt with neuropathy continue Gabapentin 300mg TID  -Endo Dr. Perlman consulted

## 2025-05-03 NOTE — PROGRESS NOTE ADULT - ASSESSMENT
64M DM2, hx osteomyelitis, CAD, PAD s/p RLE angioplasty 2020, s/p surgical amputation of R forefoot , s/p L PT angioplasty 12/2/24 for L lat foot DFU on plavix, HTN, HLD sent in by wound clinic for left foot infections and multiple wounds. Vascular surgery to evaluate further with LLE angiogram.     Cardiac clearance  - Pt with  L foot infection with multiple wounds sent in by [facility].   - Continue antibiotics per ID  - Vascular surgery seen, s/p LLE angio 5/2  - Tolerated procedure well, cardiac status optimal post operatively   - Possible BKA, Podiatry following, plan to hold of on OR procedure    - EKG: Sinus rhythm with premature supraventricular complexes  - No evidence of any active ischemia   - NST done 1/2024 with small sized, mild defect in the basal inferior wall that is fixed and partially normalizes with prone, suggestive of diaphragmatic artifact.   - Continue home ASA, Plavix and Lipitor    - Previous TTE (1/2/24) shows EF 61%, Normal LV mass and diastolic function, Normal RV size and function.  - No evidence of any meaningful volume overload     - BP stable and controlled   - Continue ACEi and BB     - Follow outpatient with Dr. Chu  - Monitor and replete lytes, keep K>4, Mg>2.  - Will continue to follow.    GARY HolderCNP  Nurse Practitioner - Cardiology   call TEAMS   64M DM2, hx osteomyelitis, CAD, PAD s/p RLE angioplasty 2020, s/p surgical amputation of R forefoot , s/p L PT angioplasty 12/2/24 for L lat foot DFU on plavix, HTN, HLD sent in by wound clinic for left foot infections and multiple wounds. Vascular surgery to evaluate further with LLE angiogram.     Cardiac clearance  - p/w L foot infection with multiple wounds sent in by [facility].   - Continue antibiotics per ID  - Vascular surgery seen, s/p LLE angio 5/2  - Tolerated procedure well, cardiac status optimal post operatively  - pending PICC placement   - Possible BKA vs Debridement vs  Podiatry following    - EKG: Sinus rhythm with premature supraventricular complexes  - No evidence of any active ischemia   - NST done 1/2024 with small sized, mild defect in the basal inferior wall that is fixed and partially normalizes with prone, suggestive of diaphragmatic artifact.   - Continue home ASA, Plavix and Lipitor    - Previous TTE (1/2/24) shows EF 61%, Normal LV mass and diastolic function, Normal RV size and function.  - No evidence of any meaningful volume overload     - BP stable and controlled   - Continue ACEi and BB   - Monitor and replete lytes, keep K>4, Mg>2.    - Follow outpatient with Dr. Chu    - Will continue to follow.    HILARIO Holder  Nurse Practitioner - Cardiology   call TEAMS

## 2025-05-04 LAB
ALBUMIN SERPL ELPH-MCNC: 2.7 G/DL — LOW (ref 3.3–5)
ALP SERPL-CCNC: 61 U/L — SIGNIFICANT CHANGE UP (ref 40–120)
ALT FLD-CCNC: 24 U/L — SIGNIFICANT CHANGE UP (ref 12–78)
ANION GAP SERPL CALC-SCNC: 6 MMOL/L — SIGNIFICANT CHANGE UP (ref 5–17)
AST SERPL-CCNC: 10 U/L — LOW (ref 15–37)
BASOPHILS # BLD AUTO: 0.02 K/UL — SIGNIFICANT CHANGE UP (ref 0–0.2)
BASOPHILS NFR BLD AUTO: 0.1 % — SIGNIFICANT CHANGE UP (ref 0–2)
BILIRUB SERPL-MCNC: 0.3 MG/DL — SIGNIFICANT CHANGE UP (ref 0.2–1.2)
BUN SERPL-MCNC: 43 MG/DL — HIGH (ref 7–23)
CALCIUM SERPL-MCNC: 9.1 MG/DL — SIGNIFICANT CHANGE UP (ref 8.5–10.1)
CHLORIDE SERPL-SCNC: 107 MMOL/L — SIGNIFICANT CHANGE UP (ref 96–108)
CO2 SERPL-SCNC: 25 MMOL/L — SIGNIFICANT CHANGE UP (ref 22–31)
CREAT SERPL-MCNC: 1.1 MG/DL — SIGNIFICANT CHANGE UP (ref 0.5–1.3)
CULTURE RESULTS: SIGNIFICANT CHANGE UP
EGFR: 74 ML/MIN/1.73M2 — SIGNIFICANT CHANGE UP
EGFR: 74 ML/MIN/1.73M2 — SIGNIFICANT CHANGE UP
EOSINOPHIL # BLD AUTO: 0.84 K/UL — HIGH (ref 0–0.5)
EOSINOPHIL NFR BLD AUTO: 6.1 % — HIGH (ref 0–6)
GLUCOSE BLDC GLUCOMTR-MCNC: 181 MG/DL — HIGH (ref 70–99)
GLUCOSE BLDC GLUCOMTR-MCNC: 210 MG/DL — HIGH (ref 70–99)
GLUCOSE BLDC GLUCOMTR-MCNC: 222 MG/DL — HIGH (ref 70–99)
GLUCOSE BLDC GLUCOMTR-MCNC: 231 MG/DL — HIGH (ref 70–99)
GLUCOSE SERPL-MCNC: 185 MG/DL — HIGH (ref 70–99)
HCT VFR BLD CALC: 31.4 % — LOW (ref 39–50)
HGB BLD-MCNC: 10.4 G/DL — LOW (ref 13–17)
IMM GRANULOCYTES NFR BLD AUTO: 0.9 % — SIGNIFICANT CHANGE UP (ref 0–0.9)
LYMPHOCYTES # BLD AUTO: 1.74 K/UL — SIGNIFICANT CHANGE UP (ref 1–3.3)
LYMPHOCYTES # BLD AUTO: 12.6 % — LOW (ref 13–44)
MCHC RBC-ENTMCNC: 29.5 PG — SIGNIFICANT CHANGE UP (ref 27–34)
MCHC RBC-ENTMCNC: 33.1 G/DL — SIGNIFICANT CHANGE UP (ref 32–36)
MCV RBC AUTO: 89 FL — SIGNIFICANT CHANGE UP (ref 80–100)
MONOCYTES # BLD AUTO: 0.88 K/UL — SIGNIFICANT CHANGE UP (ref 0–0.9)
MONOCYTES NFR BLD AUTO: 6.4 % — SIGNIFICANT CHANGE UP (ref 2–14)
NEUTROPHILS # BLD AUTO: 10.18 K/UL — HIGH (ref 1.8–7.4)
NEUTROPHILS NFR BLD AUTO: 73.9 % — SIGNIFICANT CHANGE UP (ref 43–77)
NRBC BLD AUTO-RTO: 0 /100 WBCS — SIGNIFICANT CHANGE UP (ref 0–0)
PLATELET # BLD AUTO: 306 K/UL — SIGNIFICANT CHANGE UP (ref 150–400)
POTASSIUM SERPL-MCNC: 4.7 MMOL/L — SIGNIFICANT CHANGE UP (ref 3.5–5.3)
POTASSIUM SERPL-SCNC: 4.7 MMOL/L — SIGNIFICANT CHANGE UP (ref 3.5–5.3)
PROT SERPL-MCNC: 6.5 G/DL — SIGNIFICANT CHANGE UP (ref 6–8.3)
RBC # BLD: 3.53 M/UL — LOW (ref 4.2–5.8)
RBC # FLD: 13.5 % — SIGNIFICANT CHANGE UP (ref 10.3–14.5)
SODIUM SERPL-SCNC: 138 MMOL/L — SIGNIFICANT CHANGE UP (ref 135–145)
SPECIMEN SOURCE: SIGNIFICANT CHANGE UP
WBC # BLD: 13.79 K/UL — HIGH (ref 3.8–10.5)
WBC # FLD AUTO: 13.79 K/UL — HIGH (ref 3.8–10.5)

## 2025-05-04 PROCEDURE — 99232 SBSQ HOSP IP/OBS MODERATE 35: CPT

## 2025-05-04 RX ORDER — INSULIN LISPRO 100 U/ML
5 INJECTION, SOLUTION INTRAVENOUS; SUBCUTANEOUS
Refills: 0 | Status: DISCONTINUED | OUTPATIENT
Start: 2025-05-04 | End: 2025-05-06

## 2025-05-04 RX ORDER — INSULIN GLARGINE-YFGN 100 [IU]/ML
15 INJECTION, SOLUTION SUBCUTANEOUS AT BEDTIME
Refills: 0 | Status: DISCONTINUED | OUTPATIENT
Start: 2025-05-04 | End: 2025-05-06

## 2025-05-04 RX ADMIN — GABAPENTIN 300 MILLIGRAM(S): 400 CAPSULE ORAL at 06:42

## 2025-05-04 RX ADMIN — Medication 81 MILLIGRAM(S): at 11:15

## 2025-05-04 RX ADMIN — ATORVASTATIN CALCIUM 40 MILLIGRAM(S): 80 TABLET, FILM COATED ORAL at 22:24

## 2025-05-04 RX ADMIN — CLOPIDOGREL BISULFATE 75 MILLIGRAM(S): 75 TABLET, FILM COATED ORAL at 11:15

## 2025-05-04 RX ADMIN — INSULIN LISPRO 5 UNIT(S): 100 INJECTION, SOLUTION INTRAVENOUS; SUBCUTANEOUS at 12:07

## 2025-05-04 RX ADMIN — GABAPENTIN 300 MILLIGRAM(S): 400 CAPSULE ORAL at 14:02

## 2025-05-04 RX ADMIN — INSULIN GLARGINE-YFGN 15 UNIT(S): 100 INJECTION, SOLUTION SUBCUTANEOUS at 22:23

## 2025-05-04 RX ADMIN — INSULIN LISPRO 2: 100 INJECTION, SOLUTION INTRAVENOUS; SUBCUTANEOUS at 08:25

## 2025-05-04 RX ADMIN — CEFTRIAXONE 100 MILLIGRAM(S): 500 INJECTION, POWDER, FOR SOLUTION INTRAMUSCULAR; INTRAVENOUS at 14:02

## 2025-05-04 RX ADMIN — INSULIN LISPRO 4: 100 INJECTION, SOLUTION INTRAVENOUS; SUBCUTANEOUS at 17:10

## 2025-05-04 RX ADMIN — INSULIN LISPRO 5 UNIT(S): 100 INJECTION, SOLUTION INTRAVENOUS; SUBCUTANEOUS at 08:26

## 2025-05-04 RX ADMIN — GABAPENTIN 300 MILLIGRAM(S): 400 CAPSULE ORAL at 22:24

## 2025-05-04 RX ADMIN — INSULIN LISPRO 4: 100 INJECTION, SOLUTION INTRAVENOUS; SUBCUTANEOUS at 12:06

## 2025-05-04 RX ADMIN — INSULIN LISPRO 5 UNIT(S): 100 INJECTION, SOLUTION INTRAVENOUS; SUBCUTANEOUS at 17:10

## 2025-05-04 RX ADMIN — METOPROLOL SUCCINATE 50 MILLIGRAM(S): 50 TABLET, EXTENDED RELEASE ORAL at 06:42

## 2025-05-04 RX ADMIN — ENOXAPARIN SODIUM 40 MILLIGRAM(S): 100 INJECTION SUBCUTANEOUS at 06:42

## 2025-05-04 NOTE — PROGRESS NOTE ADULT - SUBJECTIVE AND OBJECTIVE BOX
Patient is a 64y old  Male who presents with a chief complaint of Foot infection (04 May 2025 10:57)    HPI: 65 y/o M with PMHx DM2, hx osteomyelitis, CAD, PAD s/p RLE angioplasty 2020, s/p surgical amputation of R forefoot , s/p L PT angioplasty 12/2/24 for L lat foot DFU on plavix, HTN sent in by wound clinic for left foot infections and multiple wounds. Pt states he was at [facility] for hyperbaric therapy today and they noticed his L foot had drainage with increased erythema and edema so they told him to come to the ED. Pt states yesterday his foot was dry, without drainage or swelling. Patient notes he follow with ID outpatient and was prescribed Bactrim 2 weeks ago for the chronic foot wounds without any improvement. Pt denies fever/chills, CP, SOB, HA, dizziness, n/v/d. Pt also denies any current pain in the foot.    ED Course:   Vitals: BP: 107/66 , HR: 87 , Temp: 98.1F , RR: 16 , SpO2: 100 % on RA  Labs:  ESR 53, WBC 12.23, Hgb 11, BUN 25, Glucose 137,   X-ray Foot: Cutaneous defect adjacent to the base of the fifth metatarsal again evident with no gross cortical destruction or soft tissue emphysema. Follow-up MRI can be ordered    Received in the ED:  3.375g Zosyn IVPBx1, Vanco 1g IVPB x1    INTERVAL HPI:  4/30: Pt seen and examined at bedside this morning, feels well, no acute complaints. On IV abx vanc and cefepime, planning for LLE angio on 5/2 continue Abx  5/1: Pt seen and examined this morning, feels well no acute complaints. Requesting left foot dressing change, c/d/i. Pain controlled. On IV abx vanc and cefepime, for LLE angio tomorrow NPO  5/2: Pt seen at bedside this afternoon s/p LLE angiogram, feels well no acute complaints. L foot dressing c/d/i, pain controlled. On IV abx vanc and cefepime---> Rocephin   5/3: Pt seen and examined at bedside, feels well no acute complaints. L foot dressing c/d/i, pain controlled. On IV Rocephin, Podiatry follow up.  5/4: Pt seen at bedside, surgical PA present as well, +L PT on Doppler noted. Pt feels well, no acute complaints. L foot dressing c/d/i, pain controlled. On IV Rocephin    OVERNIGHT EVENTS: None    Home Medications:  aspirin 81 mg oral delayed release tablet: 1 tab(s) orally once a day (29 Apr 2025 16:40)  atorvastatin 40 mg oral tablet: 1 tab(s) orally once a day (29 Apr 2025 16:40)  clopidogrel 75 mg oral tablet: 1 tab(s) orally once a day (29 Apr 2025 16:40)  gabapentin 300 mg oral capsule: 1 cap(s) orally 3 times a day (29 Apr 2025 16:40)  Jardiance 25 mg oral tablet: 1 tab(s) orally once a day (29 Apr 2025 16:40)  lisinopril 40 mg oral tablet: 1 tab(s) orally once a day (29 Apr 2025 16:40)  metFORMIN 1000 mg oral tablet: 1 tab(s) orally 2 times a day (29 Apr 2025 16:40)  metoprolol succinate 50 mg oral tablet, extended release: 1 tab(s) orally once a day (29 Apr 2025 16:40)  Trulicity Pen 1.5 mg/0.5 mL subcutaneous solution: 1.5 milligram(s) subcutaneously once a week (29 Apr 2025 16:40)      MEDICATIONS  (STANDING):  aspirin enteric coated 81 milliGRAM(s) Oral daily  atorvastatin 40 milliGRAM(s) Oral at bedtime  cefTRIAXone   IVPB 2000 milliGRAM(s) IV Intermittent every 24 hours  cefTRIAXone   IVPB      clopidogrel Tablet 75 milliGRAM(s) Oral daily  dextrose 50% Injectable 25 Gram(s) IV Push once  dextrose 50% Injectable 12.5 Gram(s) IV Push once  dextrose 50% Injectable 25 Gram(s) IV Push once  enoxaparin Injectable 40 milliGRAM(s) SubCutaneous every 24 hours  gabapentin 300 milliGRAM(s) Oral three times a day  glucagon  Injectable 1 milliGRAM(s) IntraMuscular once  insulin glargine Injectable (LANTUS) 15 Unit(s) SubCutaneous at bedtime  insulin lispro (ADMELOG) corrective regimen sliding scale   SubCutaneous three times a day before meals  insulin lispro (ADMELOG) corrective regimen sliding scale   SubCutaneous at bedtime  insulin lispro Injectable (ADMELOG) 5 Unit(s) SubCutaneous three times a day before meals  metoprolol succinate ER 50 milliGRAM(s) Oral daily    MEDICATIONS  (PRN):  dextrose Oral Gel 15 Gram(s) Oral once PRN Blood Glucose LESS THAN 70 milliGRAM(s)/deciliter      No Known Allergies      Social History:  Tobacco: Denies  EtOH: Denies  Recreational drug use: Denies  Lives with: Wife at home   Ambulates: Independently   ADLs: Independent (29 Apr 2025 15:39)      REVIEW OF SYSTEMS:  CONSTITUTIONAL: No fever, No chills, No fatigue, No myalgia, No Body ache, No Weakness  EYES: No eye pain,  No visual disturbances, No discharge, No Redness  ENMT: No ear pain, No nose bleed, No vertigo; No sinus pain, No throat pain, No Congestion  NECK: No pain, No stiffness  RESPIRATORY: No cough, No wheezing, No hemoptysis, No shortness of breath  CARDIOVASCULAR: No chest pain, No palpitations  GASTROINTESTINAL: No abdominal pain, No epigastric pain. No nausea, No vomiting, No diarrhea, No constipation; [ x ] BM  GENITOURINARY: No dysuria, No frequency, No urgency, No hematuria, No incontinence  NEUROLOGICAL: No headaches, No dizziness, No numbness, No tingling, No tremors, No weakness  EXTREMITIES: No Swelling, No Pain, No Edema  SKIN: [ x ] No itching, burning, rashes, or lesions   MUSCULOSKELETAL: No joint pain, No joint swelling; No muscle pain, No back pain, No extremity pain  PSYCHIATRIC: No depression, No anxiety, No mood swings, No difficulty sleeping at night  PAIN SCALE: [ x ] None  [  ] Other-  ROS Unable to obtain due to: [  ] Dementia  [  ] Lethargy  [  ] Sedated  [  ] Non verbal  REST OF REVIEW OF SYSTEMS: [x  ] Normal     Vital Signs Last 24 Hrs  T(C): 36.7 (04 May 2025 11:08), Max: 37.2 (03 May 2025 21:37)  T(F): 98.1 (04 May 2025 11:08), Max: 98.9 (03 May 2025 21:37)  HR: 76 (04 May 2025 11:08) (76 - 84)  BP: 127/70 (04 May 2025 11:08) (106/68 - 127/70)  BP(mean): --  RR: 18 (04 May 2025 11:08) (18 - 18)  SpO2: 98% (04 May 2025 11:08) (96% - 98%)    Parameters below as of 04 May 2025 11:08  Patient On (Oxygen Delivery Method): room air        CAPILLARY BLOOD GLUCOSE      POCT Blood Glucose.: 181 mg/dL (04 May 2025 07:58)  POCT Blood Glucose.: 232 mg/dL (03 May 2025 21:18)  POCT Blood Glucose.: 214 mg/dL (03 May 2025 16:39)  POCT Blood Glucose.: 278 mg/dL (03 May 2025 11:42)      I&O's Summary    PHYSICAL EXAM:  GENERAL:  [ x ] NAD, [x ] Well appearing, [  ] Agitated, [  ] Drowsy, [  ] Lethargy, [  ] Confused   HEAD:  [ x ] Normal, [  ] Other  EYES:  [x  ] EOMI, [ x] PERRL, [ x ] Conjunctiva and sclera clear normal, [  ] Other, [  ] Pallor, [  ] Discharge  ENMT:  [ x ] Normal, [ x ] Moist mucous membranes, [ x ] Good dentition, [ x ] No thrush  NECK:  [ x ] Supple, [x  ] No JVD, [ x ] Normal thyroid, [  ] Lymphadenopathy, [  ] Other  CHEST/LUNG:  [ x ] Clear to auscultation bilaterally, [ x ] Breath Sounds equal B/L / decreased, [  ] Poor effort, [ x ] No rales, [ x ] No rhonchi, [x  ] No wheezing  HEART:  [ x ] Regular rate and rhythm, [  ] Tachycardia, [  ] Bradycardia, [  ] Irregular, [ x ] No murmurs, No rubs, No gallops, [  ] PPM in place (Mfr:  )  ABDOMEN:  [ x ] Soft, [x ] Nontender, [ x ] Nondistended, [ x ] No mass, [ x ] Bowel sounds present, [  ] Obese  NERVOUS SYSTEM:  [ x ] Alert & Oriented x3, [ x ] Nonfocal, [  ] Confusion, [  ] Encephalopathic, [  ] Sedated, [  ] Unable to assess, [  ] Dementia, [  ] Other-  EXTREMITIES:  [ x ] 2+ Peripheral Pulses, No clubbing, No cyanosis,  [  ] Edema B/L lower EXT, [x ] PVD stasis skin changes B/L lower EXT, [x  ] Wound - left foot in dressing c/d/i  SKIN:  [ x] No rashes or lesions, [  ] Pressure ulcers, [  ] Ecchymosis, [  ] Skin tears, [ x ] Other Left foot wound-necrotic ulcers base medial, lateral aspects of foot, nonstageable necrotic heel ulcer left, with edema, reduced erythema, LL Ext  cellulitic improved,, negative drainage  distal hallux ulcer necrotic base, No Drainage     DIET: Diet, Consistent Carbohydrate w/Evening Snack:   Low Sodium (05-02-25 @ 10:36)      LABS:                        10.4   13.79 )-----------( 306      ( 04 May 2025 07:40 )             31.4     04 May 2025 07:40    138    |  107    |  43     ----------------------------<  185    4.7     |  25     |  1.10     Ca    9.1        04 May 2025 07:40    TPro  6.5    /  Alb  2.7    /  TBili  0.3    /  DBili  x      /  AST  10     /  ALT  24     /  AlkPhos  61     04 May 2025 07:40      Urinalysis Basic - ( 04 May 2025 07:40 )    Color: x / Appearance: x / SG: x / pH: x  Gluc: 185 mg/dL / Ketone: x  / Bili: x / Urobili: x   Blood: x / Protein: x / Nitrite: x   Leuk Esterase: x / RBC: x / WBC x   Sq Epi: x / Non Sq Epi: x / Bacteria: x      Culture Results:   No growth at 72 Hours (05-01 @ 05:58)  Culture Results:   No growth at 72 Hours (05-01 @ 05:52)  Culture Results:   Growth in aerobic bottle: Staphylococcus pseudintermedius  "Susceptibilities not performed"  Direct identification is available within approximately 3-5  hours either by Blood Panel Multiplexed PCR or Direct  MALDI-TOF. Details: [email or web address] (04-29 @ 12:45)  Culture Results:   No growth at 4 days (04-29 @ 12:30)            Culture - Blood (collected 01 May 2025 05:58)  Source: Blood Blood-Peripheral  Preliminary Report (04 May 2025 10:01):    No growth at 72 Hours    Culture - Blood (collected 01 May 2025 05:52)  Source: Blood Blood-Peripheral  Preliminary Report (04 May 2025 10:01):    No growth at 72 Hours    Culture - Blood (collected 29 Apr 2025 12:45)  Source: Blood Blood  Gram Stain (01 May 2025 04:09):    Growth in aerobic bottle: Gram Positive Cocci in Clusters  Final Report (01 May 2025 23:19):    Growth in aerobic bottle: Staphylococcus pseudintermedius    "Susceptibilities not performed"    Direct identification is available within approximately 3-5    hours either by Blood Panel Multiplexed PCR or Direct    MALDI-TOF. Details: [email or web address]  Organism: Blood Culture PCR (01 May 2025 23:19)  Organism: Blood Culture PCR (01 May 2025 23:19)    Culture - Blood (collected 29 Apr 2025 12:30)  Source: Blood Blood  Preliminary Report (03 May 2025 19:00):    No growth at 4 days      RADIOLOGY & ADDITIONAL TESTS: No new imaging      HEALTH ISSUES - PROBLEM Dx:  Infection of left foot    DELORIS (acute kidney injury)    Type 2 diabetes mellitus    HTN (hypertension)    Need for prophylactic measure    Diabetic ulcer of left foot    Osteomyelitis of left foot    Unstageable pressure ulcer of left foot    PAD (peripheral artery disease)          Consultant(s) Notes Reviewed:  [ x ] YES     Care Discussed with [ x ] Consultants, [ x ] Patient, [  ] Family, [  ] HCP, [  ] , [  ] Social Service, [  ] RN, [  ] Physical Therapy, [  ] Palliative Care Team  DVT PPX: [ x ] Lovenox, [  ] SC Heparin, [  ] Coumadin, [  ] Xarelto, [  ] Eliquis, [  ] Pradaxa, [  ] IV Heparin drip, [  ] SCD, [  ] Ambulation, [  ] Contraindicated 2/2 GI Bleed, [  ] Contraindicated 2/2  Bleed, [  ] Contraindicated 2/2 Brain Bleed  Advanced Directive: [ x ] None, [  ] DNR/DNI Patient is a 64y old  Male who presents with a chief complaint of Foot infection (04 May 2025 10:57)    HPI: 65 y/o M with PMHx DM2, hx osteomyelitis, CAD, PAD s/p RLE angioplasty 2020, s/p surgical amputation of R forefoot , s/p L PT angioplasty 12/2/24 for L lat foot DFU on plavix, HTN sent in by wound clinic for left foot infections and multiple wounds. Pt states he was at [facility] for hyperbaric therapy today and they noticed his L foot had drainage with increased erythema and edema so they told him to come to the ED. Pt states yesterday his foot was dry, without drainage or swelling. Patient notes he follow with ID outpatient and was prescribed Bactrim 2 weeks ago for the chronic foot wounds without any improvement. Pt denies fever/chills, CP, SOB, HA, dizziness, n/v/d. Pt also denies any current pain in the foot.    ED Course:   Vitals: BP: 107/66 , HR: 87 , Temp: 98.1F , RR: 16 , SpO2: 100 % on RA  Labs:  ESR 53, WBC 12.23, Hgb 11, BUN 25, Glucose 137,   X-ray Foot: Cutaneous defect adjacent to the base of the fifth metatarsal again evident with no gross cortical destruction or soft tissue emphysema. Follow-up MRI can be ordered    Received in the ED:  3.375g Zosyn IVPBx1, Vanco 1g IVPB x1    INTERVAL HPI:  4/30: Pt seen and examined at bedside this morning, feels well, no acute complaints. On IV abx vanc and cefepime, planning for LLE angio on 5/2 continue Abx  5/1: Pt seen and examined this morning, feels well no acute complaints. Requesting left foot dressing change, c/d/i. Pain controlled. On IV abx vanc and cefepime, for LLE angio tomorrow NPO  5/2: Pt seen at bedside this afternoon s/p LLE angiogram, feels well no acute complaints. L foot dressing c/d/i, pain controlled. On IV abx vanc and cefepime---> Rocephin   5/3: Pt seen and examined at bedside, feels well no acute complaints. L foot dressing c/d/i, pain controlled. On IV Rocephin, Podiatry follow up.  5/4: Pt seen at bedside, surgical PA present as well, +L PT on Doppler noted. Pt feels well, no acute complaints. L foot dressing c/d/i, pain controlled. On IV Rocephin    OVERNIGHT EVENTS: None    Home Medications:  aspirin 81 mg oral delayed release tablet: 1 tab(s) orally once a day (29 Apr 2025 16:40)  atorvastatin 40 mg oral tablet: 1 tab(s) orally once a day (29 Apr 2025 16:40)  clopidogrel 75 mg oral tablet: 1 tab(s) orally once a day (29 Apr 2025 16:40)  gabapentin 300 mg oral capsule: 1 cap(s) orally 3 times a day (29 Apr 2025 16:40)  Jardiance 25 mg oral tablet: 1 tab(s) orally once a day (29 Apr 2025 16:40)  lisinopril 40 mg oral tablet: 1 tab(s) orally once a day (29 Apr 2025 16:40)  metFORMIN 1000 mg oral tablet: 1 tab(s) orally 2 times a day (29 Apr 2025 16:40)  metoprolol succinate 50 mg oral tablet, extended release: 1 tab(s) orally once a day (29 Apr 2025 16:40)  Trulicity Pen 1.5 mg/0.5 mL subcutaneous solution: 1.5 milligram(s) subcutaneously once a week (29 Apr 2025 16:40)      MEDICATIONS  (STANDING):  aspirin enteric coated 81 milliGRAM(s) Oral daily  atorvastatin 40 milliGRAM(s) Oral at bedtime  cefTRIAXone   IVPB 2000 milliGRAM(s) IV Intermittent every 24 hours  cefTRIAXone   IVPB      clopidogrel Tablet 75 milliGRAM(s) Oral daily  dextrose 50% Injectable 25 Gram(s) IV Push once  dextrose 50% Injectable 12.5 Gram(s) IV Push once  dextrose 50% Injectable 25 Gram(s) IV Push once  enoxaparin Injectable 40 milliGRAM(s) SubCutaneous every 24 hours  gabapentin 300 milliGRAM(s) Oral three times a day  glucagon  Injectable 1 milliGRAM(s) IntraMuscular once  insulin glargine Injectable (LANTUS) 15 Unit(s) SubCutaneous at bedtime  insulin lispro (ADMELOG) corrective regimen sliding scale   SubCutaneous three times a day before meals  insulin lispro (ADMELOG) corrective regimen sliding scale   SubCutaneous at bedtime  insulin lispro Injectable (ADMELOG) 5 Unit(s) SubCutaneous three times a day before meals  metoprolol succinate ER 50 milliGRAM(s) Oral daily    MEDICATIONS  (PRN):  dextrose Oral Gel 15 Gram(s) Oral once PRN Blood Glucose LESS THAN 70 milliGRAM(s)/deciliter      No Known Allergies      Social History:  Tobacco: Denies  EtOH: Denies  Recreational drug use: Denies  Lives with: Wife at home   Ambulates: Independently   ADLs: Independent (29 Apr 2025 15:39)      REVIEW OF SYSTEMS: i am ok  CONSTITUTIONAL: No fever, No chills, No fatigue, No myalgia, No Body ache, No Weakness  EYES: No eye pain,  No visual disturbances, No discharge, No Redness  ENMT: No ear pain, No nose bleed, No vertigo; No sinus pain, No throat pain, No Congestion  NECK: No pain, No stiffness  RESPIRATORY: No cough, No wheezing, No hemoptysis, No shortness of breath  CARDIOVASCULAR: No chest pain, No palpitations  GASTROINTESTINAL: No abdominal pain, No epigastric pain. No nausea, No vomiting, No diarrhea, No constipation; [ x ] BM  GENITOURINARY: No dysuria, No frequency, No urgency, No hematuria, No incontinence  NEUROLOGICAL: No headaches, No dizziness, No numbness, No tingling, No tremors, No weakness  EXTREMITIES: No Swelling, No Pain, No Edema  SKIN: [ x ] No itching, burning, rashes, or lesions   MUSCULOSKELETAL: No joint pain, No joint swelling; No muscle pain, No back pain, No extremity pain  PSYCHIATRIC: No depression, No anxiety, No mood swings, No difficulty sleeping at night  PAIN SCALE: [ x ] None  [  ] Other-  ROS Unable to obtain due to: [  ] Dementia  [  ] Lethargy  [  ] Sedated  [  ] Non verbal  REST OF REVIEW OF SYSTEMS: [x  ] Normal     Vital Signs Last 24 Hrs  T(C): 36.7 (04 May 2025 11:08), Max: 37.2 (03 May 2025 21:37)  T(F): 98.1 (04 May 2025 11:08), Max: 98.9 (03 May 2025 21:37)  HR: 76 (04 May 2025 11:08) (76 - 84)  BP: 127/70 (04 May 2025 11:08) (106/68 - 127/70)  BP(mean): --  RR: 18 (04 May 2025 11:08) (18 - 18)  SpO2: 98% (04 May 2025 11:08) (96% - 98%)    Parameters below as of 04 May 2025 11:08  Patient On (Oxygen Delivery Method): room air        CAPILLARY BLOOD GLUCOSE      POCT Blood Glucose.: 181 mg/dL (04 May 2025 07:58)  POCT Blood Glucose.: 232 mg/dL (03 May 2025 21:18)  POCT Blood Glucose.: 214 mg/dL (03 May 2025 16:39)  POCT Blood Glucose.: 278 mg/dL (03 May 2025 11:42)      I&O's Summary    PHYSICAL EXAM:  GENERAL:  [ x ] NAD, [x ] Well appearing, [  ] Agitated, [  ] Drowsy, [  ] Lethargy, [  ] Confused   HEAD:  [ x ] Normal, [  ] Other  EYES:  [x  ] EOMI, [ x] PERRL, [ x ] Conjunctiva and sclera clear normal, [  ] Other, [  ] Pallor, [  ] Discharge  ENMT:  [ x ] Normal, [ x ] Moist mucous membranes, [ x ] Good dentition, [ x ] No thrush  NECK:  [ x ] Supple, [x  ] No JVD, [ x ] Normal thyroid, [  ] Lymphadenopathy, [  ] Other  CHEST/LUNG:  [ x ] Clear to auscultation bilaterally, [ x ] Breath Sounds equal B/L / decreased, [x  ] Poor effort, [ x ] No rales, [ x ] No rhonchi, [x  ] No wheezing  HEART:  [ x ] Regular rate and rhythm, [  ] Tachycardia, [  ] Bradycardia, [  ] Irregular, [ x ] No murmurs, No rubs, No gallops, [  ] PPM in place (Mfr:  )  ABDOMEN:  [ x ] Soft, [x ] Nontender, [ x ] Nondistended, [ x ] No mass, [ x ] Bowel sounds present, [  ] Obese  NERVOUS SYSTEM:  [ x ] Alert & Oriented x3, [ x ] Nonfocal, [  ] Confusion, [  ] Encephalopathic, [  ] Sedated, [  ] Unable to assess, [  ] Dementia, [  ] Other-  EXTREMITIES:  [ x ] 2+ Peripheral Pulses, No clubbing, No cyanosis,  [  ] Edema B/L lower EXT, [x ] PVD stasis skin changes B/L lower EXT, [x  ] Wound - left foot in dressing c/d/i  SKIN:  [ x] No rashes or lesions, [  ] Pressure ulcers, [  ] Ecchymosis, [  ] Skin tears, [ x ] Other Left foot wound-necrotic ulcers base medial, lateral aspects of foot, nonstageable necrotic heel ulcer left, with edema, reduced erythema, LL Ext  cellulitic improved,, negative drainage  distal hallux ulcer necrotic base, No Drainage     DIET: Diet, Consistent Carbohydrate w/Evening Snack:   Low Sodium (05-02-25 @ 10:36)      LABS:                        10.4   13.79 )-----------( 306      ( 04 May 2025 07:40 )             31.4     04 May 2025 07:40    138    |  107    |  43     ----------------------------<  185    4.7     |  25     |  1.10     Ca    9.1        04 May 2025 07:40    TPro  6.5    /  Alb  2.7    /  TBili  0.3    /  DBili  x      /  AST  10     /  ALT  24     /  AlkPhos  61     04 May 2025 07:40      Urinalysis Basic - ( 04 May 2025 07:40 )    Color: x / Appearance: x / SG: x / pH: x  Gluc: 185 mg/dL / Ketone: x  / Bili: x / Urobili: x   Blood: x / Protein: x / Nitrite: x   Leuk Esterase: x / RBC: x / WBC x   Sq Epi: x / Non Sq Epi: x / Bacteria: x      Culture Results:   No growth at 72 Hours (05-01 @ 05:58)  Culture Results:   No growth at 72 Hours (05-01 @ 05:52)  Culture Results:   Growth in aerobic bottle: Staphylococcus pseudintermedius  "Susceptibilities not performed"  Direct identification is available within approximately 3-5  hours either by Blood Panel Multiplexed PCR or Direct  MALDI-TOF. Details: [email or web address] (04-29 @ 12:45)  Culture Results:   No growth at 4 days (04-29 @ 12:30)        Culture - Blood (collected 01 May 2025 05:58)  Source: Blood Blood-Peripheral  Preliminary Report (04 May 2025 10:01):    No growth at 72 Hours    Culture - Blood (collected 01 May 2025 05:52)  Source: Blood Blood-Peripheral  Preliminary Report (04 May 2025 10:01):    No growth at 72 Hours    Culture - Blood (collected 29 Apr 2025 12:45)  Source: Blood Blood  Gram Stain (01 May 2025 04:09):    Growth in aerobic bottle: Gram Positive Cocci in Clusters  Final Report (01 May 2025 23:19):    Growth in aerobic bottle: Staphylococcus pseudintermedius    "Susceptibilities not performed"    Direct identification is available within approximately 3-5    hours either by Blood Panel Multiplexed PCR or Direct    MALDI-TOF. Details: [email or web address]  Organism: Blood Culture PCR (01 May 2025 23:19)  Organism: Blood Culture PCR (01 May 2025 23:19)    Culture - Blood (collected 29 Apr 2025 12:30)  Source: Blood Blood  Preliminary Report (03 May 2025 19:00):    No growth at 4 days      RADIOLOGY & ADDITIONAL TESTS: No new imaging      HEALTH ISSUES - PROBLEM Dx:  Infection of left foot    DELORIS (acute kidney injury)    Type 2 diabetes mellitus    HTN (hypertension)    Need for prophylactic measure    Diabetic ulcer of left foot    Osteomyelitis of left foot    Unstageable pressure ulcer of left foot    PAD (peripheral artery disease)          Consultant(s) Notes Reviewed:  [ x ] YES     Care Discussed with [ x ] Consultants, [ x ] Patient, [  ] Family, [  ] HCP, [  ] , [  ] Social Service, [  x] RN, [  ] Physical Therapy, [  ] Palliative Care Team  DVT PPX: [ x ] Lovenox, [  ] SC Heparin, [  ] Coumadin, [  ] Xarelto, [  ] Eliquis, [  ] Pradaxa, [  ] IV Heparin drip, [  ] SCD, [  ] Ambulation, [  ] Contraindicated 2/2 GI Bleed, [  ] Contraindicated 2/2  Bleed, [  ] Contraindicated 2/2 Brain Bleed  Advanced Directive: [ x ] None, [  ] DNR/DNI

## 2025-05-04 NOTE — PROGRESS NOTE ADULT - SUBJECTIVE AND OBJECTIVE BOX
[facility] -- Vlad Gallardo Pannella, Patel, Savella, Goodger, Cohen  Office # [phone]    Follow Up:  Cardiac clearance     Subjective/Observations: : seen and examined, awake, alert in bed, denies chest pain, dyspnea, palpitations or dizziness, orthopnea and PND. Tolerating room air.    REVIEW OF SYSTEMS: All other review of systems is negative unless indicated above  PAST MEDICAL & SURGICAL HISTORY:  HTN (hypertension)      Diabetes      Hyperlipidemia      PVD (peripheral vascular disease)      Toe osteomyelitis, right      H/O angioplasty  RLE      Status post amputation of toe        MEDICATIONS  (STANDING):  aspirin enteric coated 81 milliGRAM(s) Oral daily  atorvastatin 40 milliGRAM(s) Oral at bedtime  cefTRIAXone   IVPB      cefTRIAXone   IVPB 2000 milliGRAM(s) IV Intermittent every 24 hours  clopidogrel Tablet 75 milliGRAM(s) Oral daily  dextrose 50% Injectable 25 Gram(s) IV Push once  dextrose 50% Injectable 12.5 Gram(s) IV Push once  dextrose 50% Injectable 25 Gram(s) IV Push once  enoxaparin Injectable 40 milliGRAM(s) SubCutaneous every 24 hours  gabapentin 300 milliGRAM(s) Oral three times a day  glucagon  Injectable 1 milliGRAM(s) IntraMuscular once  insulin glargine Injectable (LANTUS) 15 Unit(s) SubCutaneous at bedtime  insulin lispro (ADMELOG) corrective regimen sliding scale   SubCutaneous three times a day before meals  insulin lispro (ADMELOG) corrective regimen sliding scale   SubCutaneous at bedtime  insulin lispro Injectable (ADMELOG) 5 Unit(s) SubCutaneous three times a day before meals  metoprolol succinate ER 50 milliGRAM(s) Oral daily    MEDICATIONS  (PRN):  dextrose Oral Gel 15 Gram(s) Oral once PRN Blood Glucose LESS THAN 70 milliGRAM(s)/deciliter    Allergies    No Known Allergies    Intolerances      Vital Signs Last 24 Hrs  T(C): 36.7 (04 May 2025 04:36), Max: 37.2 (03 May 2025 21:37)  T(F): 98.1 (04 May 2025 04:36), Max: 98.9 (03 May 2025 21:37)  HR: 76 (04 May 2025 04:36) (75 - 84)  BP: 121/60 (04 May 2025 04:36) (103/66 - 121/63)  BP(mean): --  RR: 18 (04 May 2025 04:36) (18 - 18)  SpO2: 97% (04 May 2025 04:36) (96% - 100%)    Parameters below as of 04 May 2025 04:36  Patient On (Oxygen Delivery Method): room air      I&O's Summary        TELE: Not on telemetry   PHYSICAL EXAM:  Constitutional: NAD, awake and alert  HEENT: Moist Mucous Membranes, Anicteric  Pulmonary: Non-labored, breath sounds are clear bilaterally, No wheezing, rales or rhonchi  Cardiovascular: Regular, S1 and S2, No murmurs, No rubs, gallops or clicks  Gastrointestinal:  soft, nontender, nondistended   Lymph: No peripheral edema. No lymphadenopathy.   Skin: No visible rashes Left foot DSD intact.   Psych:  Mood & affect appropriate                      10.4   13.79 )-----------( 306      ( 04 May 2025 07:40 )             31.4                         10.5   18.98 )-----------( 306      ( 03 May 2025 06:31 )             31.6                         11.2   11.51 )-----------( 263      ( 02 May 2025 05:10 )             34.2     04 May 2025 07:40    138    |  107    |  43     ----------------------------<  185    4.7     |  25     |  1.10   03 May 2025 06:31    136    |  104    |  41     ----------------------------<  220    5.0     |  25     |  1.60   02 May 2025 05:10    140    |  108    |  28     ----------------------------<  165    4.7     |  26     |  0.96     Ca    9.1        04 May 2025 07:40  Ca    8.5        03 May 2025 06:31  Ca    8.9        02 May 2025 05:10    TPro  6.5    /  Alb  2.7    /  TBili  0.3    /  DBili  x      /  AST  10     /  ALT  24     /  AlkPhos  61     04 May 2025 07:40  TPro  6.6    /  Alb  2.6    /  TBili  0.5    /  DBili  x      /  AST  11     /  ALT  23     /  AlkPhos  63     03 May 2025 06:31  TPro  7.0    /  Alb  2.8    /  TBili  0.3    /  DBili  x      /  AST  11     /  ALT  25     /  AlkPhos  66     02 May 2025 05:10          12 Lead ECG:   Ventricular Rate 76 BPM    Atrial Rate 76 BPM    P-R Interval 156 ms    QRS Duration 92 ms    Q-T Interval 390 ms    QTC Calculation(Bazett) 438 ms    P Axis 6 degrees    R Axis -1 degrees    T Axis 3 degrees    Diagnosis Line Sinus rhythm with premature supraventricular complexes  Otherwise normal ECG  When compared with ECG of 30-NOV-2024 12:25,  premature supraventricular complexes are now present  Confirmed by RAMÓN SHAFFER ([id number]) on 4/30/2025 6:26:16 PM (04-29-25 @ 13:07)

## 2025-05-04 NOTE — PROGRESS NOTE ADULT - ASSESSMENT
64M DM2, hx osteomyelitis, CAD, PAD s/p RLE angioplasty 2020, s/p surgical amputation of R forefoot , s/p L PT angioplasty 12/2/24 for L lat foot DFU on plavix, HTN, HLD sent in by wound clinic for left foot infections and multiple wounds. Vascular surgery to evaluate further with LLE angiogram.     Cardiac clearance  - p/w L foot infection with multiple wounds sent in by [facility], podiatry following   - Continue antibiotics per ID  - s/p LLE angio 5/2,  vascular following   - Tolerated procedure well, cardiac status optimal post operatively  - pending PICC placement   - Possible BKA vs Debridement vs  seeking second opinion     - EKG: Sinus rhythm with premature supraventricular complexes  - No evidence of any active ischemia   - NST done 1/2024 with small sized, mild defect in the basal inferior wall that is fixed and partially normalizes with prone, suggestive of diaphragmatic artifact.   - Continue home ASA, Plavix and Lipitor    - Previous TTE (1/2/24) shows EF 61%, Normal LV mass and diastolic function, Normal RV size and function.  - No evidence of any meaningful volume overload     - BP stable and controlled   - Continue ACEi and BB   - Monitor and replete lytes, keep K>4, Mg>2.    - Follows with Dr. Chu (o/p cardiologist)   - Will continue to follow.    GARY HolderJONAS  Nurse Practitioner - Cardiology   call TEAMS

## 2025-05-04 NOTE — PROGRESS NOTE ADULT - SUBJECTIVE AND OBJECTIVE BOX
[facility]  Laz Lyn MD PhD, Lori Burgos MD, Melany Calhoun MD, Efren Frederick MD, Darryl Aguilar MD  and providing coverage with Marcy Austni MD  Providing Infectious Disease Consultations at [facility], [facility], [facility], [facility]    Office# [phone] to schedule follow up appointments  Answering Service for urgent calls or New Consults [phone]  Cell# to text for urgent issues Laz Lyn [phone]     infectious diseases progress note:    LOIDA QUEZADA is a 64y y. o. Male patient    Overnight and events of the last 24hrs reviewed    Allergies    No Known Allergies    Intolerances        ANTIBIOTICS/RELEVANT:  antimicrobials  cefTRIAXone   IVPB      cefTRIAXone   IVPB 2000 milliGRAM(s) IV Intermittent every 24 hours    immunologic:    OTHER:  aspirin enteric coated 81 milliGRAM(s) Oral daily  atorvastatin 40 milliGRAM(s) Oral at bedtime  clopidogrel Tablet 75 milliGRAM(s) Oral daily  dextrose 50% Injectable 25 Gram(s) IV Push once  dextrose 50% Injectable 12.5 Gram(s) IV Push once  dextrose 50% Injectable 25 Gram(s) IV Push once  dextrose Oral Gel 15 Gram(s) Oral once PRN  enoxaparin Injectable 40 milliGRAM(s) SubCutaneous every 24 hours  gabapentin 300 milliGRAM(s) Oral three times a day  glucagon  Injectable 1 milliGRAM(s) IntraMuscular once  insulin glargine Injectable (LANTUS) 15 Unit(s) SubCutaneous at bedtime  insulin lispro (ADMELOG) corrective regimen sliding scale   SubCutaneous three times a day before meals  insulin lispro (ADMELOG) corrective regimen sliding scale   SubCutaneous at bedtime  insulin lispro Injectable (ADMELOG) 5 Unit(s) SubCutaneous three times a day before meals  metoprolol succinate ER 50 milliGRAM(s) Oral daily      Objective:  Vital Signs Last 24 Hrs  T(C): 36.7 (04 May 2025 04:36), Max: 37.2 (03 May 2025 21:37)  T(F): 98.1 (04 May 2025 04:36), Max: 98.9 (03 May 2025 21:37)  HR: 76 (04 May 2025 04:36) (75 - 84)  BP: 121/60 (04 May 2025 04:36) (103/66 - 121/63)  BP(mean): --  RR: 18 (04 May 2025 04:36) (18 - 18)  SpO2: 97% (04 May 2025 04:36) (96% - 100%)    Parameters below as of 04 May 2025 04:36  Patient On (Oxygen Delivery Method): room air        T(C): 36.7 (05-04-25 @ 04:36), Max: 37.2 (05-02-25 @ 20:15)  T(C): 36.7 (05-04-25 @ 04:36), Max: 37.2 (05-01-25 @ 20:25)  T(C): 36.7 (05-04-25 @ 04:36), Max: 37.2 (04-30-25 @ 20:36)    PHYSICAL EXAM:  HEENT: NC atraumatic  Neck: supple  Respiratory: no accessory muscle use, breathing comfortably  Cardiovascular: distant  Gastrointestinal: normal appearing, nondistended  Extremities: no clubbing, no cyanosis,        LABS:                          10.4   13.79 )-----------( 306      ( 04 May 2025 07:40 )             31.4       WBC  13.79 05-04 @ 07:40  18.98 05-03 @ 06:31  11.51 05-02 @ 05:10  12.91 05-01 @ 05:52  12.51 04-30 @ 07:45  12.23 04-29 @ 12:45      05-04    138  |  107  |  43[H]  ----------------------------<  185[H]  4.7   |  25  |  1.10    Ca    9.1      04 May 2025 07:40    TPro  6.5  /  Alb  2.7[L]  /  TBili  0.3  /  DBili  x   /  AST  10[L]  /  ALT  24  /  AlkPhos  61  05-04      Creatinine: 1.10 mg/dL (05-04-25 @ 07:40)  Creatinine: 1.60 mg/dL (05-03-25 @ 06:31)  Creatinine: 0.96 mg/dL (05-02-25 @ 05:10)  Creatinine: 1.00 mg/dL (05-01-25 @ 05:52)  Creatinine: 1.10 mg/dL (04-30-25 @ 07:45)  Creatinine: 1.30 mg/dL (04-29-25 @ 12:45)        Urinalysis Basic - ( 04 May 2025 07:40 )    Color: x / Appearance: x / SG: x / pH: x  Gluc: 185 mg/dL / Ketone: x  / Bili: x / Urobili: x   Blood: x / Protein: x / Nitrite: x   Leuk Esterase: x / RBC: x / WBC x   Sq Epi: x / Non Sq Epi: x / Bacteria: x            INFLAMMATORY MARKERS      MICROBIOLOGY:              RADIOLOGY & ADDITIONAL STUDIES:

## 2025-05-04 NOTE — CONSULT NOTE ADULT - SUBJECTIVE AND OBJECTIVE BOX
Patient is a 64y old  Male who presents with a chief complaint of Foot infection (03 May 2025 13:02)      Reason For Consult: dm2 uncontrolled    HPI:  Pt is a 65 y/o M with PMHx DM2, hx osteomyelitis, CAD, PAD s/p RLE angioplasty 2020, s/p surgical amputation of R forefoot , s/p L PT angioplasty 12/2/24 for L lat foot DFU on plavix, HTN sent in by wound clinic for left foot infections and multiple wounds. Pt states he was at [facility] for hyperbaric therapy today and they noticed his L foot had drainage with increased erythema and edema so they told him to come to the ED. Pt states yesterday his foot was dry, without drainage or swelling. Patient notes he follow with ID outpatient and was prescribed Bactrim 2 weeks ago for the chronic foot wounds without any improvement. Pt denies fever/chills, CP, SOB, HA, dizziness, n/v/d. Pt also denies any current pain in the foot.      ED Course:   Vitals: BP: 107/66 , HR: 87 , Temp: 98.1F , RR: 16 , SpO2: 100 % on RA  Labs:  ESR 53, WBC 12.23, Hgb 11, BUN 25, Glucose 137,   X-ray Foot: Cutaneous defect adjacent to the base of the fifth metatarsal again evident with no gross cortical destruction or soft tissue emphysema. Follow-up MRI can be ordered    Received in the ED:  3.375g Zosyn IVPBx1, Vanco 1g IVPB x1   (29 Apr 2025 15:39)      PAST MEDICAL & SURGICAL HISTORY:  HTN (hypertension)      Diabetes      Hyperlipidemia      PVD (peripheral vascular disease)      Toe osteomyelitis, right      H/O angioplasty  RLE      Status post amputation of toe          FAMILY HISTORY:  FH: type 2 diabetes  Grandfather          Social History:    MEDICATIONS  (STANDING):  aspirin enteric coated 81 milliGRAM(s) Oral daily  atorvastatin 40 milliGRAM(s) Oral at bedtime  cefTRIAXone   IVPB 2000 milliGRAM(s) IV Intermittent every 24 hours  cefTRIAXone   IVPB      clopidogrel Tablet 75 milliGRAM(s) Oral daily  dextrose 50% Injectable 25 Gram(s) IV Push once  dextrose 50% Injectable 12.5 Gram(s) IV Push once  dextrose 50% Injectable 25 Gram(s) IV Push once  enoxaparin Injectable 40 milliGRAM(s) SubCutaneous every 24 hours  gabapentin 300 milliGRAM(s) Oral three times a day  glucagon  Injectable 1 milliGRAM(s) IntraMuscular once  insulin glargine Injectable (LANTUS) 10 Unit(s) SubCutaneous at bedtime  insulin lispro (ADMELOG) corrective regimen sliding scale   SubCutaneous three times a day before meals  insulin lispro (ADMELOG) corrective regimen sliding scale   SubCutaneous at bedtime  insulin lispro Injectable (ADMELOG) 3 Unit(s) SubCutaneous three times a day before meals  metoprolol succinate ER 50 milliGRAM(s) Oral daily    MEDICATIONS  (PRN):  dextrose Oral Gel 15 Gram(s) Oral once PRN Blood Glucose LESS THAN 70 milliGRAM(s)/deciliter        T(C): 36.7 (05-04-25 @ 04:36), Max: 37.2 (05-03-25 @ 21:37)  HR: 76 (05-04-25 @ 04:36) (75 - 84)  BP: 121/60 (05-04-25 @ 04:36) (103/66 - 121/63)  RR: 18 (05-04-25 @ 04:36) (18 - 18)  SpO2: 97% (05-04-25 @ 04:36) (96% - 100%)  Wt(kg): --    PHYSICAL EXAM:  GENERAL: NAD, well-groomed, well-developed  HEAD:  Atraumatic, Normocephalic  NECK: Supple, No JVD, Normal thyroid  CHEST/LUNG: Clear to percussion bilaterally; No rales, rhonchi, wheezing, or rubs  HEART: Regular rate and rhythm; No murmurs, rubs, or gallops  ABDOMEN: Soft, Nontender, Nondistended; Bowel sounds present  EXTREMITIES: le foot dsg intact    CAPILLARY BLOOD GLUCOSE      POCT Blood Glucose.: 232 mg/dL (03 May 2025 21:18)  POCT Blood Glucose.: 214 mg/dL (03 May 2025 16:39)  POCT Blood Glucose.: 278 mg/dL (03 May 2025 11:42)  POCT Blood Glucose.: 255 mg/dL (03 May 2025 08:07)                            10.5   18.98 )-----------( 306      ( 03 May 2025 06:31 )             31.6       CMP:  05-03 @ 06:31  SGPT 23  Albumin 2.6   Alk Phos 63   Anion Gap 7   SGOT 11   Total Bili 0.5   BUN 41   Calcium Total 8.5   CO2 25   Chloride 104   Creatinine 1.60   eGFR if AA --   eGFR if non AA --   Glucose 220   Potassium 5.0   Protein 6.6   Sodium 136      Thyroid Function Tests:      Diabetes Tests:       Radiology:

## 2025-05-04 NOTE — CONSULT NOTE ADULT - PROBLEM SELECTOR RECOMMENDATION 9
Patient seen and evaluated  Pt sent from wound care   Vascular team seen with patient, will await further recs pending studies, concern for possible BKA  Left foot dressed with DSD  Rec nwb to left lower extremity  Rec ID consult for IV abx  Pt understands he is at risk to lost part of the foot, all of the foot, bka  Podiatry will follow while in house
outpatient anti-diabetes regimen on hold during hospitalization  increase lantus 15 units qhs  increase admelog 5 units 3x/day before meals  cont mod dose admelog corrective scale coverage qac/qhs  cont cons cho diet  goal bg 100-180 in hosp setting

## 2025-05-04 NOTE — PROGRESS NOTE ADULT - PROBLEM SELECTOR PLAN 1
Pt with  L foot infection with multiple wounds sent in by [facility]. S/p surgical amputation of R forefoot s/p L PT angioplasty 12/2/24 for L lat foot DFU on Plavix  -Pt c/o drainage from wound, redness and streaking. Concern for Cellulitis & osteo   -MRI with soft tissue wound along the 5th metatarsal base with cortical erosive change and marrow edema concerning for osteomyelitis. Soft tissue wound along the posterolateral calcaneal tuberosity with curvilinear marrow hyperemia but no definite osteomyelitis.   IV abx cefepime and vanc transitioned to IV Rocephin  - BCx 1/2 bottles with Coag negative Staph, possible contaminant, repeat BCx NGTD  - NWB LLE  -At high risk for more proximal amputation. Intervention will be based on results of FRANCISCA and PVR's. Possible BKA per podiatry   - Plan for PICC placement Monday 5/5 for long term abx  -C/w ASA and Plavix and Lipitor  Daily  -Podiatry d/w Dr. Stark d/w   -Vascular surgery follow up- ASA ,Plavix, s/p LLE angio with occluded PT  -ID Dr Melany Calhoun, -IV Abx Rocephin daily Pt with  L foot infection with multiple wounds sent in by [facility]. S/p surgical amputation of R forefoot s/p L PT angioplasty 12/2/24 for L lat foot DFU on Plavix  -Pt c/o drainage from wound, redness and streaking. Concern for Cellulitis & osteo   -MRI with soft tissue wound along the 5th metatarsal base with cortical erosive change and marrow edema concerning for osteomyelitis. Soft tissue wound along the posterolateral calcaneal tuberosity with curvilinear marrow hyperemia but no definite osteomyelitis.   IV abx cefepime and vanc transitioned to IV Rocephin  - BCx 1/2 bottles with Coag negative Staph, possible contaminant, repeat BCx NGTD  - NWB LLE  -At high risk for more proximal amputation. Intervention will be based on results of FRANCISCA and PVR's. Possible BKA per podiatry   - Plan for PICC placement Monday 5/5 for long term abx  -C/w ASA and Plavix and Lipitor  Daily  -Podiatry d/w Dr. Stark d/w   -Vascular surgery follow up- ASA ,Plavix, s/p LLE angio with occluded PT  -ID Dr Melany Calhoun, -IV Abx Rocephin 2 gm daily

## 2025-05-04 NOTE — CONSULT NOTE ADULT - CONSULT REQUESTED DATE/TIME
30-Apr-2025 11:04
04-May-2025 07:38
29-Apr-2025 18:45
29-Apr-2025 13:24
01-May-2025 10:33
29-Apr-2025 13:34

## 2025-05-04 NOTE — PROGRESS NOTE ADULT - ASSESSMENT
63 y/o M with PVD, HTN, diabetes s/p metatarsal amputation sent in by wound clinic for left foot infections and multiple wounds admitted for worsening foot wound  infection, cellulitis left dylon t& Lower EXT.     L foot wound infection/cellulitis +/- underlying OM, PAD, DELORIS  - pt p/w multiple L foot wounds  -- pt well known to service; is s/p surgical amputation of R forefoot  s/p L PT angioplasty 12/2/24 for L lat foot DFU on Plavix  - afebrile, leukocytosis to 12. Elevated ESR 53  - X-ray Foot: Cutaneous defect adjacent to the base of the fifth metatarsal again evident with no gross cortical destruction or soft tissue emphysema.   - MR L foot Soft tissue wound along the 5th metatarsal base with cortical erosive change and marrow edema concerning for osteomyelitis.  - 4/29 BCx + CoNS -- likely procurement contaminant; 5/1 BCx NGTD  - 5/2 s/p LLE angio  Podiatry notes reviewed; pending further OR once further vascular studies are obtained    Ceftriaxone 2gm q24h based on prior cx of S. marascens from December 2024  -pt considering PICC and IV abx and then second opinion-pt will discuss with Podiatry and Dr Calhoun on Monday    Thank you for consulting us and involving us in the management of this most interesting and challenging case.  In addition to reviewing history, imaging, documents, labs, microbiology, took into account antibiotic stewardship, local antibiogram and infection control strategies and potential transmission issues.    We will follow along in the care of this patient. Please contact me by texting me directly on my cell# at [phone] using TEAMS or call our answering service at [phone] with any concerns.    Starting tomorrow Dr Melany Calhoun will be assuming care of this patient so please contact her with any questions, concerns or new micro data.

## 2025-05-04 NOTE — PROGRESS NOTE ADULT - PROBLEM SELECTOR PLAN 4
RESOLVED  Cr 1.3 on admission. Per chart review baseline is 1.0  - Cr 1.1 this AM  -Avoid nephrotoxic agents  -Monitor BMP daily

## 2025-05-04 NOTE — PROGRESS NOTE ADULT - ASSESSMENT
Pt is a 63 y/o M with PMHx of PVD, HTN, diabetes s/p metatarsal amputation sent in by wound clinic for left foot infections and multiple wounds admitted for worsening foot wound  infection, cellulitis left foot& Lower EXT.  no Soft, non-tender, no hepatosplenomegaly, normal bowel sounds

## 2025-05-04 NOTE — PROGRESS NOTE ADULT - PROBLEM SELECTOR PLAN 5
Chronic  -On home Metoprolol  -Lisinopril held in setting of prior DELORIS and low end BPs, will re-initiate as BP requires  -C/w home meds with hold parameters  -Monitor hemodynamics

## 2025-05-04 NOTE — CONSULT NOTE ADULT - CONSULT REASON
Cardiac clearance for LLE angiogram
dm2 uncontrolled
left foot ulcer
Foot infection
LLE nonhealing DFU
cellulitis

## 2025-05-04 NOTE — PROGRESS NOTE ADULT - SUBJECTIVE AND OBJECTIVE BOX
POD#2 s/p LLE diagnostic angiogram with findings of occluded PT    S: Patient seen and examined at bedside.  No overnight events.  Patient reports no new complaints at this time. Patient denies any fever, chills, chest pain, shortness of breath, nausea, vomiting, or urinary complaints.    MEDICATIONS:  MEDICATIONS  (STANDING):  aspirin enteric coated 81 milliGRAM(s) Oral daily  atorvastatin 40 milliGRAM(s) Oral at bedtime  cefTRIAXone   IVPB      cefTRIAXone   IVPB 2000 milliGRAM(s) IV Intermittent every 24 hours  clopidogrel Tablet 75 milliGRAM(s) Oral daily  dextrose 50% Injectable 25 Gram(s) IV Push once  dextrose 50% Injectable 12.5 Gram(s) IV Push once  dextrose 50% Injectable 25 Gram(s) IV Push once  enoxaparin Injectable 40 milliGRAM(s) SubCutaneous every 24 hours  gabapentin 300 milliGRAM(s) Oral three times a day  glucagon  Injectable 1 milliGRAM(s) IntraMuscular once  insulin glargine Injectable (LANTUS) 15 Unit(s) SubCutaneous at bedtime  insulin lispro (ADMELOG) corrective regimen sliding scale   SubCutaneous three times a day before meals  insulin lispro (ADMELOG) corrective regimen sliding scale   SubCutaneous at bedtime  insulin lispro Injectable (ADMELOG) 5 Unit(s) SubCutaneous three times a day before meals  metoprolol succinate ER 50 milliGRAM(s) Oral daily    MEDICATIONS  (PRN):  dextrose Oral Gel 15 Gram(s) Oral once PRN Blood Glucose LESS THAN 70 milliGRAM(s)/deciliter      O:  VITAL SIGNS:  Vital Signs Last 24 Hrs  T(C): 36.7 (04 May 2025 04:36), Max: 37.2 (03 May 2025 21:37)  T(F): 98.1 (04 May 2025 04:36), Max: 98.9 (03 May 2025 21:37)  HR: 76 (04 May 2025 04:36) (75 - 84)  BP: 121/60 (04 May 2025 04:36) (103/66 - 121/63)  RR: 18 (04 May 2025 04:36) (18 - 18)  SpO2: 97% (04 May 2025 04:36) (96% - 100%)    Parameters below as of 04 May 2025 04:36  Patient On (Oxygen Delivery Method): room air        PHYSICAL EXAM:  GENERAL: No acute distress, lying comfortably in bed  HEAD:  Atraumatic, Normocephalic  CHEST/LUNG: Non labored respirations, no accessory muscle use  HEART: Regular rate and rhythm  ABDOMEN: Soft, non-tender, non-distended  EXT: calves non-tender b/l, LLE with dressing in place c/d/i. Doppler-able signals of L PT and DP. R groin site of angio access soft non tender, no edema, no concerns for hematoma at this time.  NEUROLOGY: A&O x 3, no focal deficits    INTAKE & OUTPUT:  I&O's Summary    I&O's Detail      LABS:                        10.4   13.79 )-----------( 306      ( 04 May 2025 07:40 )             31.4     05-04    138  |  107  |  43[H]  ----------------------------<  185[H]  4.7   |  25  |  1.10    Ca    9.1      04 May 2025 07:40    TPro  6.5  /  Alb  2.7[L]  /  TBili  0.3  /  DBili  x   /  AST  10[L]  /  ALT  24  /  AlkPhos  61  05-04      A: 65 y/o M with PMHx DM2, OM, HTN, CAD, PAD s/p RLE angioplasty 2020, s/p R TMA, s/p L PT angioplasty 12/2/24 for L lat foot DFU on plavix, admitted from wound care for a L foot wound. Pt has been followed in wound care and has worsening left foot ulcers and increased tissue loss. He has had no vascular followup since his L PT angioplasty. Know to have single vessel runoff and incomplete plantar arch from completion angio 2024. MRI March 2025 concerning for osteo. Now s/p LLE diagnostic angiogram POD#2, with no occluded L PT, and severe small vessel disease.     PLAN:  - Pt would like to continue discussions on possible interventions. Outpatient follow up recommended  - Continue ASA/PLVX  - Appreciate endocrine recs  - Continue current care per primary team.   - No acute vascular surgery interventions required at this time    To be discussed with Dr. Jones   POD#2 s/p LLE diagnostic angiogram with findings of occluded PT    S: Patient seen and examined at bedside.  No overnight events.  Patient reports no new complaints at this time. Patient denies any fever, chills, chest pain, shortness of breath, nausea, vomiting, or urinary complaints.    MEDICATIONS:  MEDICATIONS  (STANDING):  aspirin enteric coated 81 milliGRAM(s) Oral daily  atorvastatin 40 milliGRAM(s) Oral at bedtime  cefTRIAXone   IVPB      cefTRIAXone   IVPB 2000 milliGRAM(s) IV Intermittent every 24 hours  clopidogrel Tablet 75 milliGRAM(s) Oral daily  dextrose 50% Injectable 25 Gram(s) IV Push once  dextrose 50% Injectable 12.5 Gram(s) IV Push once  dextrose 50% Injectable 25 Gram(s) IV Push once  enoxaparin Injectable 40 milliGRAM(s) SubCutaneous every 24 hours  gabapentin 300 milliGRAM(s) Oral three times a day  glucagon  Injectable 1 milliGRAM(s) IntraMuscular once  insulin glargine Injectable (LANTUS) 15 Unit(s) SubCutaneous at bedtime  insulin lispro (ADMELOG) corrective regimen sliding scale   SubCutaneous three times a day before meals  insulin lispro (ADMELOG) corrective regimen sliding scale   SubCutaneous at bedtime  insulin lispro Injectable (ADMELOG) 5 Unit(s) SubCutaneous three times a day before meals  metoprolol succinate ER 50 milliGRAM(s) Oral daily    MEDICATIONS  (PRN):  dextrose Oral Gel 15 Gram(s) Oral once PRN Blood Glucose LESS THAN 70 milliGRAM(s)/deciliter      O:  VITAL SIGNS:  Vital Signs Last 24 Hrs  T(C): 36.7 (04 May 2025 04:36), Max: 37.2 (03 May 2025 21:37)  T(F): 98.1 (04 May 2025 04:36), Max: 98.9 (03 May 2025 21:37)  HR: 76 (04 May 2025 04:36) (75 - 84)  BP: 121/60 (04 May 2025 04:36) (103/66 - 121/63)  RR: 18 (04 May 2025 04:36) (18 - 18)  SpO2: 97% (04 May 2025 04:36) (96% - 100%)    Parameters below as of 04 May 2025 04:36  Patient On (Oxygen Delivery Method): room air        PHYSICAL EXAM:  GENERAL: No acute distress, lying comfortably in bed  HEAD:  Atraumatic, Normocephalic  CHEST/LUNG: Non labored respirations, no accessory muscle use  HEART: Regular rate and rhythm  ABDOMEN: Soft, non-tender, non-distended  EXT: calves non-tender b/l, LLE with dressing in place c/d/i. Doppler-able signals of L PT and DP. R groin site of angio access soft non tender, no edema, no concerns for hematoma at this time.  NEUROLOGY: A&O x 3, no focal deficits    INTAKE & OUTPUT:  I&O's Summary    I&O's Detail      LABS:                        10.4   13.79 )-----------( 306      ( 04 May 2025 07:40 )             31.4     05-04    138  |  107  |  43[H]  ----------------------------<  185[H]  4.7   |  25  |  1.10    Ca    9.1      04 May 2025 07:40    TPro  6.5  /  Alb  2.7[L]  /  TBili  0.3  /  DBili  x   /  AST  10[L]  /  ALT  24  /  AlkPhos  61  05-04      A: 63 y/o M with PMHx DM2, OM, HTN, CAD, PAD s/p RLE angioplasty 2020, s/p R TMA, s/p L PT angioplasty 12/2/24 for L lat foot DFU on plavix, admitted from wound care for a L foot wound. Pt has been followed in wound care and has worsening left foot ulcers and increased tissue loss. He has had no vascular followup since his L PT angioplasty. Know to have single vessel runoff and incomplete plantar arch from completion angio 2024. MRI March 2025 concerning for osteo. Now s/p LLE diagnostic angiogram POD#2, with no occluded L PT, and severe small vessel disease.     PLAN:  - Pt would like to continue discussions on possible interventions.  - Continue ASA/PLVX  - Appreciate endocrine recs  - Continue current care per primary team.     To be discussed with Dr. Jones

## 2025-05-04 NOTE — PROGRESS NOTE ADULT - PROBLEM SELECTOR PLAN 2
Chronic. Pt follows with Vascular outpt. Pt s/p L PT angioplasty 12/2/24 for L lat foot DFU on Plavix  -C/w ASA and Plavix and Lipitor   -s/p LLE angio with occluded PT  - Cardiology ([facility]) consulted for cardiac clearance

## 2025-05-05 ENCOUNTER — TRANSCRIPTION ENCOUNTER (OUTPATIENT)
Age: 65
End: 2025-05-05

## 2025-05-05 LAB
ALBUMIN SERPL ELPH-MCNC: 2.8 G/DL — LOW (ref 3.3–5)
ALP SERPL-CCNC: 63 U/L — SIGNIFICANT CHANGE UP (ref 40–120)
ALT FLD-CCNC: 26 U/L — SIGNIFICANT CHANGE UP (ref 12–78)
ANION GAP SERPL CALC-SCNC: 6 MMOL/L — SIGNIFICANT CHANGE UP (ref 5–17)
AST SERPL-CCNC: 10 U/L — LOW (ref 15–37)
BASOPHILS # BLD AUTO: 0.04 K/UL — SIGNIFICANT CHANGE UP (ref 0–0.2)
BASOPHILS NFR BLD AUTO: 0.3 % — SIGNIFICANT CHANGE UP (ref 0–2)
BILIRUB SERPL-MCNC: 0.3 MG/DL — SIGNIFICANT CHANGE UP (ref 0.2–1.2)
BUN SERPL-MCNC: 25 MG/DL — HIGH (ref 7–23)
CALCIUM SERPL-MCNC: 8.7 MG/DL — SIGNIFICANT CHANGE UP (ref 8.5–10.1)
CHLORIDE SERPL-SCNC: 106 MMOL/L — SIGNIFICANT CHANGE UP (ref 96–108)
CO2 SERPL-SCNC: 27 MMOL/L — SIGNIFICANT CHANGE UP (ref 22–31)
CREAT SERPL-MCNC: 0.89 MG/DL — SIGNIFICANT CHANGE UP (ref 0.5–1.3)
EGFR: 95 ML/MIN/1.73M2 — SIGNIFICANT CHANGE UP
EGFR: 95 ML/MIN/1.73M2 — SIGNIFICANT CHANGE UP
EOSINOPHIL # BLD AUTO: 0.61 K/UL — HIGH (ref 0–0.5)
EOSINOPHIL NFR BLD AUTO: 4.8 % — SIGNIFICANT CHANGE UP (ref 0–6)
GLUCOSE BLDC GLUCOMTR-MCNC: 148 MG/DL — HIGH (ref 70–99)
GLUCOSE BLDC GLUCOMTR-MCNC: 170 MG/DL — HIGH (ref 70–99)
GLUCOSE BLDC GLUCOMTR-MCNC: 183 MG/DL — HIGH (ref 70–99)
GLUCOSE BLDC GLUCOMTR-MCNC: 252 MG/DL — HIGH (ref 70–99)
GLUCOSE SERPL-MCNC: 163 MG/DL — HIGH (ref 70–99)
HCT VFR BLD CALC: 30.6 % — LOW (ref 39–50)
HGB BLD-MCNC: 10.1 G/DL — LOW (ref 13–17)
IMM GRANULOCYTES NFR BLD AUTO: 1.3 % — HIGH (ref 0–0.9)
LYMPHOCYTES # BLD AUTO: 1.6 K/UL — SIGNIFICANT CHANGE UP (ref 1–3.3)
LYMPHOCYTES # BLD AUTO: 12.6 % — LOW (ref 13–44)
MCHC RBC-ENTMCNC: 29.6 PG — SIGNIFICANT CHANGE UP (ref 27–34)
MCHC RBC-ENTMCNC: 33 G/DL — SIGNIFICANT CHANGE UP (ref 32–36)
MCV RBC AUTO: 89.7 FL — SIGNIFICANT CHANGE UP (ref 80–100)
MONOCYTES # BLD AUTO: 1.13 K/UL — HIGH (ref 0–0.9)
MONOCYTES NFR BLD AUTO: 8.9 % — SIGNIFICANT CHANGE UP (ref 2–14)
NEUTROPHILS # BLD AUTO: 9.18 K/UL — HIGH (ref 1.8–7.4)
NEUTROPHILS NFR BLD AUTO: 72.1 % — SIGNIFICANT CHANGE UP (ref 43–77)
NRBC BLD AUTO-RTO: 0 /100 WBCS — SIGNIFICANT CHANGE UP (ref 0–0)
PLATELET # BLD AUTO: 341 K/UL — SIGNIFICANT CHANGE UP (ref 150–400)
POTASSIUM SERPL-MCNC: 4.4 MMOL/L — SIGNIFICANT CHANGE UP (ref 3.5–5.3)
POTASSIUM SERPL-SCNC: 4.4 MMOL/L — SIGNIFICANT CHANGE UP (ref 3.5–5.3)
PROT SERPL-MCNC: 6.7 G/DL — SIGNIFICANT CHANGE UP (ref 6–8.3)
RBC # BLD: 3.41 M/UL — LOW (ref 4.2–5.8)
RBC # FLD: 13.5 % — SIGNIFICANT CHANGE UP (ref 10.3–14.5)
SODIUM SERPL-SCNC: 139 MMOL/L — SIGNIFICANT CHANGE UP (ref 135–145)
WBC # BLD: 12.72 K/UL — HIGH (ref 3.8–10.5)
WBC # FLD AUTO: 12.72 K/UL — HIGH (ref 3.8–10.5)

## 2025-05-05 PROCEDURE — 99232 SBSQ HOSP IP/OBS MODERATE 35: CPT

## 2025-05-05 RX ORDER — CLOPIDOGREL BISULFATE 75 MG/1
1 TABLET, FILM COATED ORAL
Qty: 0 | Refills: 0 | DISCHARGE
Start: 2025-05-05

## 2025-05-05 RX ORDER — ATORVASTATIN CALCIUM 80 MG/1
1 TABLET, FILM COATED ORAL
Qty: 0 | Refills: 0 | DISCHARGE
Start: 2025-05-05

## 2025-05-05 RX ORDER — LISINOPRIL 5 MG/1
1 TABLET ORAL
Qty: 0 | Refills: 0 | DISCHARGE
Start: 2025-05-05

## 2025-05-05 RX ORDER — METOPROLOL SUCCINATE 50 MG/1
1 TABLET, EXTENDED RELEASE ORAL
Qty: 0 | Refills: 0 | DISCHARGE
Start: 2025-05-05

## 2025-05-05 RX ORDER — ASPIRIN 325 MG
1 TABLET ORAL
Qty: 0 | Refills: 0 | DISCHARGE
Start: 2025-05-05

## 2025-05-05 RX ORDER — LISINOPRIL 5 MG/1
40 TABLET ORAL DAILY
Refills: 0 | Status: DISCONTINUED | OUTPATIENT
Start: 2025-05-05 | End: 2025-05-06

## 2025-05-05 RX ORDER — GABAPENTIN 400 MG/1
1 CAPSULE ORAL
Qty: 0 | Refills: 0 | DISCHARGE
Start: 2025-05-05

## 2025-05-05 RX ADMIN — INSULIN LISPRO 5 UNIT(S): 100 INJECTION, SOLUTION INTRAVENOUS; SUBCUTANEOUS at 08:12

## 2025-05-05 RX ADMIN — INSULIN LISPRO 5 UNIT(S): 100 INJECTION, SOLUTION INTRAVENOUS; SUBCUTANEOUS at 17:09

## 2025-05-05 RX ADMIN — GABAPENTIN 300 MILLIGRAM(S): 400 CAPSULE ORAL at 05:25

## 2025-05-05 RX ADMIN — Medication 81 MILLIGRAM(S): at 12:23

## 2025-05-05 RX ADMIN — INSULIN GLARGINE-YFGN 15 UNIT(S): 100 INJECTION, SOLUTION SUBCUTANEOUS at 22:23

## 2025-05-05 RX ADMIN — CLOPIDOGREL BISULFATE 75 MILLIGRAM(S): 75 TABLET, FILM COATED ORAL at 12:23

## 2025-05-05 RX ADMIN — GABAPENTIN 300 MILLIGRAM(S): 400 CAPSULE ORAL at 22:24

## 2025-05-05 RX ADMIN — INSULIN LISPRO 5 UNIT(S): 100 INJECTION, SOLUTION INTRAVENOUS; SUBCUTANEOUS at 12:23

## 2025-05-05 RX ADMIN — INSULIN LISPRO 2: 100 INJECTION, SOLUTION INTRAVENOUS; SUBCUTANEOUS at 17:09

## 2025-05-05 RX ADMIN — GABAPENTIN 300 MILLIGRAM(S): 400 CAPSULE ORAL at 13:16

## 2025-05-05 RX ADMIN — LISINOPRIL 40 MILLIGRAM(S): 5 TABLET ORAL at 13:16

## 2025-05-05 RX ADMIN — ENOXAPARIN SODIUM 40 MILLIGRAM(S): 100 INJECTION SUBCUTANEOUS at 05:25

## 2025-05-05 RX ADMIN — INSULIN LISPRO 2: 100 INJECTION, SOLUTION INTRAVENOUS; SUBCUTANEOUS at 08:11

## 2025-05-05 RX ADMIN — INSULIN LISPRO 2: 100 INJECTION, SOLUTION INTRAVENOUS; SUBCUTANEOUS at 22:25

## 2025-05-05 RX ADMIN — METOPROLOL SUCCINATE 50 MILLIGRAM(S): 50 TABLET, EXTENDED RELEASE ORAL at 05:25

## 2025-05-05 RX ADMIN — ATORVASTATIN CALCIUM 40 MILLIGRAM(S): 80 TABLET, FILM COATED ORAL at 22:24

## 2025-05-05 RX ADMIN — CEFTRIAXONE 100 MILLIGRAM(S): 500 INJECTION, POWDER, FOR SOLUTION INTRAMUSCULAR; INTRAVENOUS at 13:16

## 2025-05-05 NOTE — PROGRESS NOTE ADULT - SUBJECTIVE AND OBJECTIVE BOX
Patient is a 64y old  Male who presents with a chief complaint of Foot infection (04 May 2025 11:32)    HPI:  Pt is a 65 y/o M with PMHx DM2, hx osteomyelitis, CAD, PAD s/p RLE angioplasty 2020, s/p surgical amputation of R forefoot , s/p L PT angioplasty 12/2/24 for L lat foot DFU on plavix, HTN sent in by wound clinic for left foot infections and multiple wounds. Pt states he was at [facility] for hyperbaric therapy today and they noticed his L foot had drainage with increased erythema and edema so they told him to come to the ED. Pt states yesterday his foot was dry, without drainage or swelling. Patient notes he follow with ID outpatient and was prescribed Bactrim 2 weeks ago for the chronic foot wounds without any improvement. Pt denies fever/chills, CP, SOB, HA, dizziness, n/v/d. Pt also denies any current pain in the foot.      ED Course:   Vitals: BP: 107/66 , HR: 87 , Temp: 98.1F , RR: 16 , SpO2: 100 % on RA  Labs:  ESR 53, WBC 12.23, Hgb 11, BUN 25, Glucose 137,   X-ray Foot: Cutaneous defect adjacent to the base of the fifth metatarsal again evident with no gross cortical destruction or soft tissue emphysema. Follow-up MRI can be ordered    Received in the ED:  3.375g Zosyn IVPBx1, Vanco 1g IVPB x1   (29 Apr 2025 15:39)    INTERVAL HPI:  4/30: Pt seen and examined at bedside this morning, feels well, no acute complaints. On IV abx vanc and cefepime, planning for LLE angio on 5/2 continue Abx  5/1: Pt seen and examined this morning, feels well no acute complaints. Requesting left foot dressing change, c/d/i. Pain controlled. On IV abx vanc and cefepime, for LLE angio tomorrow NPO  5/2: Pt seen at bedside this afternoon s/p LLE angiogram, feels well no acute complaints. L foot dressing c/d/i, pain controlled. On IV abx vanc and cefepime---> Rocephin   5/3: Pt seen and examined at bedside, feels well no acute complaints. L foot dressing c/d/i, pain controlled. On IV Rocephin, Podiatry follow up.  5/4: Pt seen at bedside, surgical PA present as well, +L PT on Doppler noted. Pt feels well, no acute complaints. L foot dressing c/d/i, pain controlled. On IV Rocephin  5/5: Pt seen and examined at bedside. No acute complaints; pain well controlled. L. foot dressing changed by podiatry. On IV Rocephin; to go for PICC line today    OVERNIGHT EVENTS: None    Home Medications:  aspirin 81 mg oral delayed release tablet: 1 tab(s) orally once a day (29 Apr 2025 16:40)  atorvastatin 40 mg oral tablet: 1 tab(s) orally once a day (29 Apr 2025 16:40)  clopidogrel 75 mg oral tablet: 1 tab(s) orally once a day (29 Apr 2025 16:40)  gabapentin 300 mg oral capsule: 1 cap(s) orally 3 times a day (29 Apr 2025 16:40)  Jardiance 25 mg oral tablet: 1 tab(s) orally once a day (29 Apr 2025 16:40)  lisinopril 40 mg oral tablet: 1 tab(s) orally once a day (29 Apr 2025 16:40)  metFORMIN 1000 mg oral tablet: 1 tab(s) orally 2 times a day (29 Apr 2025 16:40)  metoprolol succinate 50 mg oral tablet, extended release: 1 tab(s) orally once a day (29 Apr 2025 16:40)  Trulicity Pen 1.5 mg/0.5 mL subcutaneous solution: 1.5 milligram(s) subcutaneously once a week (29 Apr 2025 16:40)      MEDICATIONS  (STANDING):  aspirin enteric coated 81 milliGRAM(s) Oral daily  atorvastatin 40 milliGRAM(s) Oral at bedtime  cefTRIAXone   IVPB 2000 milliGRAM(s) IV Intermittent every 24 hours  cefTRIAXone   IVPB      clopidogrel Tablet 75 milliGRAM(s) Oral daily  dextrose 50% Injectable 25 Gram(s) IV Push once  dextrose 50% Injectable 12.5 Gram(s) IV Push once  dextrose 50% Injectable 25 Gram(s) IV Push once  enoxaparin Injectable 40 milliGRAM(s) SubCutaneous every 24 hours  gabapentin 300 milliGRAM(s) Oral three times a day  glucagon  Injectable 1 milliGRAM(s) IntraMuscular once  insulin glargine Injectable (LANTUS) 15 Unit(s) SubCutaneous at bedtime  insulin lispro (ADMELOG) corrective regimen sliding scale   SubCutaneous three times a day before meals  insulin lispro (ADMELOG) corrective regimen sliding scale   SubCutaneous at bedtime  insulin lispro Injectable (ADMELOG) 5 Unit(s) SubCutaneous three times a day before meals  metoprolol succinate ER 50 milliGRAM(s) Oral daily    MEDICATIONS  (PRN):  dextrose Oral Gel 15 Gram(s) Oral once PRN Blood Glucose LESS THAN 70 milliGRAM(s)/deciliter      No Known Allergies      Social History:  Tobacco: Denies  EtOH: Denies  Recreational drug use: Denies  Lives with: Wife at home   Ambulates: Independently   ADLs: Independent (29 Apr 2025 15:39)        REVIEW OF SYSTEMS: i am ok  CONSTITUTIONAL: No fever, No chills, No fatigue, No myalgia, No Body ache, No Weakness  EYES: No eye pain,  No visual disturbances, No discharge, No Redness  ENMT: No ear pain, No nose bleed, No vertigo; No sinus pain, No throat pain, No Congestion  NECK: No pain, No stiffness  RESPIRATORY: No cough, No wheezing, No hemoptysis, No shortness of breath  CARDIOVASCULAR: No chest pain, No palpitations  GASTROINTESTINAL: No abdominal pain, No epigastric pain. No nausea, No vomiting, No diarrhea, No constipation; [ x ] BM  GENITOURINARY: No dysuria, No frequency, No urgency, No hematuria, No incontinence  NEUROLOGICAL: No headaches, No dizziness, No numbness, No tingling, No tremors, No weakness  EXTREMITIES: No Swelling, No Pain, No Edema  SKIN: [ x ] No itching, burning, rashes, or lesions   MUSCULOSKELETAL: No joint pain, No joint swelling; No muscle pain, No back pain, No extremity pain  PSYCHIATRIC: No depression, No anxiety, No mood swings, No difficulty sleeping at night  PAIN SCALE: [ x ] None  [  ] Other-  ROS Unable to obtain due to: [  ] Dementia  [  ] Lethargy  [  ] Sedated  [  ] Non verbal  REST OF REVIEW OF SYSTEMS: [x  ] Normal     Vital Signs Last 24 Hrs  T(C): 36.6 (05 May 2025 04:36), Max: 36.7 (04 May 2025 11:08)  T(F): 97.9 (05 May 2025 04:36), Max: 98.1 (04 May 2025 11:08)  HR: 73 (05 May 2025 04:36) (73 - 82)  BP: 138/66 (05 May 2025 04:36) (127/70 - 151/73)  BP(mean): --  RR: 18 (05 May 2025 04:36) (18 - 18)  SpO2: 96% (05 May 2025 04:36) (95% - 98%)    Parameters below as of 05 May 2025 04:36  Patient On (Oxygen Delivery Method): room air        CAPILLARY BLOOD GLUCOSE      POCT Blood Glucose.: 183 mg/dL (05 May 2025 07:59)  POCT Blood Glucose.: 210 mg/dL (04 May 2025 22:01)  POCT Blood Glucose.: 231 mg/dL (04 May 2025 17:03)  POCT Blood Glucose.: 222 mg/dL (04 May 2025 11:39)      I&O's Summary    PHYSICAL EXAM:  GENERAL:  [ x ] NAD, [x ] Well appearing, [  ] Agitated, [  ] Drowsy, [  ] Lethargy, [  ] Confused   HEAD:  [ x ] Normal, [  ] Other  EYES:  [x  ] EOMI, [ x] PERRL, [ x ] Conjunctiva and sclera clear normal, [  ] Other, [  ] Pallor, [  ] Discharge  ENMT:  [ x ] Normal, [ x ] Moist mucous membranes, [ x ] Good dentition, [ x ] No thrush  NECK:  [ x ] Supple, [x  ] No JVD, [ x ] Normal thyroid, [  ] Lymphadenopathy, [  ] Other  CHEST/LUNG:  [ x ] Clear to auscultation bilaterally, [ x ] Breath Sounds equal B/L / decreased, [x  ] Poor effort, [ x ] No rales, [ x ] No rhonchi, [x  ] No wheezing  HEART:  [ x ] Regular rate and rhythm, [  ] Tachycardia, [  ] Bradycardia, [  ] Irregular, [ x ] No murmurs, No rubs, No gallops, [  ] PPM in place (Mfr:  )  ABDOMEN:  [ x ] Soft, [x ] Nontender, [ x ] Nondistended, [ x ] No mass, [ x ] Bowel sounds present, [  ] Obese  NERVOUS SYSTEM:  [ x ] Alert & Oriented x3, [ x ] Nonfocal, [  ] Confusion, [  ] Encephalopathic, [  ] Sedated, [  ] Unable to assess, [  ] Dementia, [  ] Other-  EXTREMITIES:  [ x ] 2+ Peripheral Pulses, No clubbing, No cyanosis,  [  ] Edema B/L lower EXT, [x ] PVD stasis skin changes B/L lower EXT, [x  ] Wound - left foot in dressing c/d/i  SKIN:  [ x] No rashes or lesions, [  ] Pressure ulcers, [  ] Ecchymosis, [  ] Skin tears, [ x ] Other Left foot wound-necrotic ulcers base medial, lateral aspects of foot, nonstageable necrotic heel ulcer left, with edema, reduced erythema, LL Ext  cellulitic improved,, negative drainage  distal hallux ulcer necrotic base, No Drainage     DIET: Diet, Consistent Carbohydrate w/Evening Snack:   Low Sodium (05-02-25 @ 10:36)      LABS:                        10.1   12.72 )-----------( 341      ( 05 May 2025 05:57 )             30.6     05 May 2025 05:57    139    |  106    |  25     ----------------------------<  163    4.4     |  27     |  0.89     Ca    8.7        05 May 2025 05:57    TPro  6.7    /  Alb  2.8    /  TBili  0.3    /  DBili  x      /  AST  10     /  ALT  26     /  AlkPhos  63     05 May 2025 05:57      Urinalysis Basic - ( 05 May 2025 05:57 )    Color: x / Appearance: x / SG: x / pH: x  Gluc: 163 mg/dL / Ketone: x  / Bili: x / Urobili: x   Blood: x / Protein: x / Nitrite: x   Leuk Esterase: x / RBC: x / WBC x   Sq Epi: x / Non Sq Epi: x / Bacteria: x      Culture Results:   No growth at 72 Hours (05-01 @ 05:58)  Culture Results:   No growth at 72 Hours (05-01 @ 05:52)  Culture Results:   Growth in aerobic bottle: Staphylococcus pseudintermedius  "Susceptibilities not performed"  Direct identification is available within approximately 3-5  hours either by Blood Panel Multiplexed PCR or Direct  MALDI-TOF. Details: [email or web address] (04-29 @ 12:45)  Culture Results:   No growth at 5 days (04-29 @ 12:30)            Culture - Blood (collected 01 May 2025 05:58)  Source: Blood Blood-Peripheral  Preliminary Report (04 May 2025 10:01):    No growth at 72 Hours    Culture - Blood (collected 01 May 2025 05:52)  Source: Blood Blood-Peripheral  Preliminary Report (04 May 2025 10:01):    No growth at 72 Hours    Culture - Blood (collected 29 Apr 2025 12:45)  Source: Blood Blood  Gram Stain (01 May 2025 04:09):    Growth in aerobic bottle: Gram Positive Cocci in Clusters  Final Report (01 May 2025 23:19):    Growth in aerobic bottle: Staphylococcus pseudintermedius    "Susceptibilities not performed"    Direct identification is available within approximately 3-5    hours either by Blood Panel Multiplexed PCR or Direct    MALDI-TOF. Details: [email or web address]  Organism: Blood Culture PCR (01 May 2025 23:19)  Organism: Blood Culture PCR (01 May 2025 23:19)    Culture - Blood (collected 29 Apr 2025 12:30)  Source: Blood Blood  Final Report (04 May 2025 19:01):    No growth at 5 days       Anemia Panel:      Thyroid Panel:                RADIOLOGY & ADDITIONAL TESTS: _______      HEALTH ISSUES - PROBLEM Dx:  Type 2 diabetes mellitus    HTN (hypertension)    DELORIS (acute kidney injury)    Need for prophylactic measure    Infection of left foot    PAD (peripheral artery disease)    Diabetic ulcer of left foot    Osteomyelitis of left foot    Unstageable pressure ulcer of left foot          Consultant(s) Notes Reviewed:  [ x ] YES     Care Discussed with [ x ] Consultants, [ x ] Patient, [ x ] Family, [  ] HCP, [ x ] , [  ] Social Service, [ x ] RN, [  ] Physical Therapy, [  ] Palliative Care Team  DVT PPX: [  ] Lovenox, [  ] SC Heparin, [  ] Coumadin, [  ] Xarelto, [  ] Eliquis, [  ] Pradaxa, [  ] IV Heparin drip, [  ] SCD, [  ] Ambulation, [  ] Contraindicated 2/2 GI Bleed, [  ] Contraindicated 2/2  Bleed, [  ] Contraindicated 2/2 Brain Bleed  Advanced Directive: [  ] None, [  ] DNR/DNI Patient is a 64y old  Male who presents with a chief complaint of Foot infection (04 May 2025 11:32)    HPI:  Pt is a 63 y/o M with PMHx DM2, hx osteomyelitis, CAD, PAD s/p RLE angioplasty 2020, s/p surgical amputation of R forefoot , s/p L PT angioplasty 12/2/24 for L lat foot DFU on plavix, HTN sent in by wound clinic for left foot infections and multiple wounds. Pt states he was at [facility] for hyperbaric therapy today and they noticed his L foot had drainage with increased erythema and edema so they told him to come to the ED. Pt states yesterday his foot was dry, without drainage or swelling. Patient notes he follow with ID outpatient and was prescribed Bactrim 2 weeks ago for the chronic foot wounds without any improvement. Pt denies fever/chills, CP, SOB, HA, dizziness, n/v/d. Pt also denies any current pain in the foot.      ED Course:   Vitals: BP: 107/66 , HR: 87 , Temp: 98.1F , RR: 16 , SpO2: 100 % on RA  Labs:  ESR 53, WBC 12.23, Hgb 11, BUN 25, Glucose 137,   X-ray Foot: Cutaneous defect adjacent to the base of the fifth metatarsal again evident with no gross cortical destruction or soft tissue emphysema. Follow-up MRI can be ordered    Received in the ED:  3.375g Zosyn IVPBx1, Vanco 1g IVPB x1   (29 Apr 2025 15:39)    INTERVAL HPI:  4/30: Pt seen and examined at bedside this morning, feels well, no acute complaints. On IV abx vanc and cefepime, planning for LLE angio on 5/2 continue Abx  5/1: Pt seen and examined this morning, feels well no acute complaints. Requesting left foot dressing change, c/d/i. Pain controlled. On IV abx vanc and cefepime, for LLE angio tomorrow NPO  5/2: Pt seen at bedside this afternoon s/p LLE angiogram, feels well no acute complaints. L foot dressing c/d/i, pain controlled. On IV abx vanc and cefepime---> Rocephin   5/3: Pt seen and examined at bedside, feels well no acute complaints. L foot dressing c/d/i, pain controlled. On IV Rocephin, Podiatry follow up.  5/4: Pt seen at bedside, surgical PA present as well, +L PT on Doppler noted. Pt feels well, no acute complaints. L foot dressing c/d/i, pain controlled. On IV Rocephin  5/5: Pt seen and examined at bedside. No acute complaints; pain well controlled. L. foot dressing changed by podiatry. On IV Rocephin; to go for PICC line today.    OVERNIGHT EVENTS: None    Home Medications:  aspirin 81 mg oral delayed release tablet: 1 tab(s) orally once a day (29 Apr 2025 16:40)  atorvastatin 40 mg oral tablet: 1 tab(s) orally once a day (29 Apr 2025 16:40)  clopidogrel 75 mg oral tablet: 1 tab(s) orally once a day (29 Apr 2025 16:40)  gabapentin 300 mg oral capsule: 1 cap(s) orally 3 times a day (29 Apr 2025 16:40)  Jardiance 25 mg oral tablet: 1 tab(s) orally once a day (29 Apr 2025 16:40)  lisinopril 40 mg oral tablet: 1 tab(s) orally once a day (29 Apr 2025 16:40)  metFORMIN 1000 mg oral tablet: 1 tab(s) orally 2 times a day (29 Apr 2025 16:40)  metoprolol succinate 50 mg oral tablet, extended release: 1 tab(s) orally once a day (29 Apr 2025 16:40)  Trulicity Pen 1.5 mg/0.5 mL subcutaneous solution: 1.5 milligram(s) subcutaneously once a week (29 Apr 2025 16:40)      MEDICATIONS  (STANDING):  aspirin enteric coated 81 milliGRAM(s) Oral daily  atorvastatin 40 milliGRAM(s) Oral at bedtime  cefTRIAXone   IVPB 2000 milliGRAM(s) IV Intermittent every 24 hours  cefTRIAXone   IVPB      clopidogrel Tablet 75 milliGRAM(s) Oral daily  dextrose 50% Injectable 25 Gram(s) IV Push once  dextrose 50% Injectable 12.5 Gram(s) IV Push once  dextrose 50% Injectable 25 Gram(s) IV Push once  enoxaparin Injectable 40 milliGRAM(s) SubCutaneous every 24 hours  gabapentin 300 milliGRAM(s) Oral three times a day  glucagon  Injectable 1 milliGRAM(s) IntraMuscular once  insulin glargine Injectable (LANTUS) 15 Unit(s) SubCutaneous at bedtime  insulin lispro (ADMELOG) corrective regimen sliding scale   SubCutaneous three times a day before meals  insulin lispro (ADMELOG) corrective regimen sliding scale   SubCutaneous at bedtime  insulin lispro Injectable (ADMELOG) 5 Unit(s) SubCutaneous three times a day before meals  metoprolol succinate ER 50 milliGRAM(s) Oral daily    MEDICATIONS  (PRN):  dextrose Oral Gel 15 Gram(s) Oral once PRN Blood Glucose LESS THAN 70 milliGRAM(s)/deciliter      No Known Allergies      Social History:  Tobacco: Denies  EtOH: Denies  Recreational drug use: Denies  Lives with: Wife at home   Ambulates: Independently   ADLs: Independent (29 Apr 2025 15:39)        REVIEW OF SYSTEMS: i am ok  CONSTITUTIONAL: No fever, No chills, No fatigue, No myalgia, No Body ache, No Weakness  EYES: No eye pain,  No visual disturbances, No discharge, No Redness  ENMT: No ear pain, No nose bleed, No vertigo; No sinus pain, No throat pain, No Congestion  NECK: No pain, No stiffness  RESPIRATORY: No cough, No wheezing, No hemoptysis, No shortness of breath  CARDIOVASCULAR: No chest pain, No palpitations  GASTROINTESTINAL: No abdominal pain, No epigastric pain. No nausea, No vomiting, No diarrhea, No constipation; [ x ] BM  GENITOURINARY: No dysuria, No frequency, No urgency, No hematuria, No incontinence  NEUROLOGICAL: No headaches, No dizziness, No numbness, No tingling, No tremors, No weakness  EXTREMITIES: No Swelling, No Pain, No Edema  SKIN: [ x ] No itching, burning, rashes, or lesions   MUSCULOSKELETAL: No joint pain, No joint swelling; No muscle pain, No back pain, No extremity pain  PSYCHIATRIC: No depression, No anxiety, No mood swings, No difficulty sleeping at night  PAIN SCALE: [ x ] None  [  ] Other-  ROS Unable to obtain due to: [  ] Dementia  [  ] Lethargy  [  ] Sedated  [  ] Non verbal  REST OF REVIEW OF SYSTEMS: [x  ] Normal     Vital Signs Last 24 Hrs  T(C): 36.6 (05 May 2025 04:36), Max: 36.7 (04 May 2025 11:08)  T(F): 97.9 (05 May 2025 04:36), Max: 98.1 (04 May 2025 11:08)  HR: 73 (05 May 2025 04:36) (73 - 82)  BP: 138/66 (05 May 2025 04:36) (127/70 - 151/73)  BP(mean): --  RR: 18 (05 May 2025 04:36) (18 - 18)  SpO2: 96% (05 May 2025 04:36) (95% - 98%)    Parameters below as of 05 May 2025 04:36  Patient On (Oxygen Delivery Method): room air        CAPILLARY BLOOD GLUCOSE      POCT Blood Glucose.: 183 mg/dL (05 May 2025 07:59)  POCT Blood Glucose.: 210 mg/dL (04 May 2025 22:01)  POCT Blood Glucose.: 231 mg/dL (04 May 2025 17:03)  POCT Blood Glucose.: 222 mg/dL (04 May 2025 11:39)      I&O's Summary    PHYSICAL EXAM:  GENERAL:  [ x ] NAD, [x ] Well appearing, [  ] Agitated, [  ] Drowsy, [  ] Lethargy, [  ] Confused   HEAD:  [ x ] Normal, [  ] Other  EYES:  [x  ] EOMI, [ x] PERRL, [ x ] Conjunctiva and sclera clear normal, [  ] Other, [  ] Pallor, [  ] Discharge  ENMT:  [ x ] Normal, [ x ] Moist mucous membranes, [ x ] Good dentition, [ x ] No thrush  NECK:  [ x ] Supple, [x  ] No JVD, [ x ] Normal thyroid, [  ] Lymphadenopathy, [  ] Other  CHEST/LUNG:  [ x ] Clear to auscultation bilaterally, [ x ] Breath Sounds equal B/L / decreased, [x  ] Poor effort, [ x ] No rales, [ x ] No rhonchi, [x  ] No wheezing  HEART:  [ x ] Regular rate and rhythm, [  ] Tachycardia, [  ] Bradycardia, [  ] Irregular, [ x ] No murmurs, No rubs, No gallops, [  ] PPM in place (Mfr:  )  ABDOMEN:  [ x ] Soft, [x ] Nontender, [ x ] Nondistended, [ x ] No mass, [ x ] Bowel sounds present, [  ] Obese  NERVOUS SYSTEM:  [ x ] Alert & Oriented x3, [ x ] Nonfocal, [  ] Confusion, [  ] Encephalopathic, [  ] Sedated, [  ] Unable to assess, [  ] Dementia, [  ] Other-  EXTREMITIES:  [ x ] 2+ Peripheral Pulses, No clubbing, No cyanosis,  [  ] Edema B/L lower EXT, [x ] PVD stasis skin changes B/L lower EXT, [x  ] Wound - left foot in dressing c/d/i  SKIN:  [ x] No rashes or lesions, [  ] Pressure ulcers, [  ] Ecchymosis, [  ] Skin tears, [ x ] Other Left foot wound-necrotic ulcers base medial, lateral aspects of foot, nonstageable necrotic heel ulcer left, with edema, reduced erythema, LL Ext  cellulitic improved,, negative drainage  distal hallux ulcer necrotic base, No Drainage     DIET: Diet, Consistent Carbohydrate w/Evening Snack:   Low Sodium (05-02-25 @ 10:36)      LABS:                        10.1   12.72 )-----------( 341      ( 05 May 2025 05:57 )             30.6     05 May 2025 05:57    139    |  106    |  25     ----------------------------<  163    4.4     |  27     |  0.89     Ca    8.7        05 May 2025 05:57    TPro  6.7    /  Alb  2.8    /  TBili  0.3    /  DBili  x      /  AST  10     /  ALT  26     /  AlkPhos  63     05 May 2025 05:57      Urinalysis Basic - ( 05 May 2025 05:57 )    Color: x / Appearance: x / SG: x / pH: x  Gluc: 163 mg/dL / Ketone: x  / Bili: x / Urobili: x   Blood: x / Protein: x / Nitrite: x   Leuk Esterase: x / RBC: x / WBC x   Sq Epi: x / Non Sq Epi: x / Bacteria: x      Culture Results:   No growth at 72 Hours (05-01 @ 05:58)  Culture Results:   No growth at 72 Hours (05-01 @ 05:52)  Culture Results:   Growth in aerobic bottle: Staphylococcus pseudintermedius  "Susceptibilities not performed"  Direct identification is available within approximately 3-5  hours either by Blood Panel Multiplexed PCR or Direct  MALDI-TOF. Details: [email or web address] (04-29 @ 12:45)  Culture Results:   No growth at 5 days (04-29 @ 12:30)            Culture - Blood (collected 01 May 2025 05:58)  Source: Blood Blood-Peripheral  Preliminary Report (04 May 2025 10:01):    No growth at 72 Hours    Culture - Blood (collected 01 May 2025 05:52)  Source: Blood Blood-Peripheral  Preliminary Report (04 May 2025 10:01):    No growth at 72 Hours    Culture - Blood (collected 29 Apr 2025 12:45)  Source: Blood Blood  Gram Stain (01 May 2025 04:09):    Growth in aerobic bottle: Gram Positive Cocci in Clusters  Final Report (01 May 2025 23:19):    Growth in aerobic bottle: Staphylococcus pseudintermedius    "Susceptibilities not performed"    Direct identification is available within approximately 3-5    hours either by Blood Panel Multiplexed PCR or Direct    MALDI-TOF. Details: [email or web address]  Organism: Blood Culture PCR (01 May 2025 23:19)  Organism: Blood Culture PCR (01 May 2025 23:19)    Culture - Blood (collected 29 Apr 2025 12:30)  Source: Blood Blood  Final Report (04 May 2025 19:01):    No growth at 5 days      RADIOLOGY & ADDITIONAL TESTS:    HEALTH ISSUES - PROBLEM Dx:  Type 2 diabetes mellitus    HTN (hypertension)    DELORIS (acute kidney injury)    Need for prophylactic measure    Infection of left foot    PAD (peripheral artery disease)    Diabetic ulcer of left foot    Osteomyelitis of left foot    Unstageable pressure ulcer of left foot          Consultant(s) Notes Reviewed:  [ x ] YES     Care Discussed with [ x ] Consultants, [ x ] Patient, [ x ] Family, [  ] HCP, [ x ] , [  ] Social Service, [ x ] RN, [  ] Physical Therapy, [  ] Palliative Care Team  DVT PPX: [  ] Lovenox, [  ] SC Heparin, [  ] Coumadin, [  ] Xarelto, [  ] Eliquis, [  ] Pradaxa, [  ] IV Heparin drip, [  ] SCD, [  ] Ambulation, [  ] Contraindicated 2/2 GI Bleed, [  ] Contraindicated 2/2  Bleed, [  ] Contraindicated 2/2 Brain Bleed  Advanced Directive: [ x ] None, [  ] DNR/DNI Patient is a 64y old  Male who presents with a chief complaint of Foot infection (04 May 2025 11:32)    HPI:  Pt is a 65 y/o M with PMHx DM2, hx osteomyelitis, CAD, PAD s/p RLE angioplasty 2020, s/p surgical amputation of R forefoot , s/p L PT angioplasty 12/2/24 for L lat foot DFU on plavix, HTN sent in by wound clinic for left foot infections and multiple wounds. Pt states he was at [facility] for hyperbaric therapy today and they noticed his L foot had drainage with increased erythema and edema so they told him to come to the ED. Pt states yesterday his foot was dry, without drainage or swelling. Patient notes he follow with ID outpatient and was prescribed Bactrim 2 weeks ago for the chronic foot wounds without any improvement. Pt denies fever/chills, CP, SOB, HA, dizziness, n/v/d. Pt also denies any current pain in the foot.      ED Course:   Vitals: BP: 107/66 , HR: 87 , Temp: 98.1F , RR: 16 , SpO2: 100 % on RA  Labs:  ESR 53, WBC 12.23, Hgb 11, BUN 25, Glucose 137,   X-ray Foot: Cutaneous defect adjacent to the base of the fifth metatarsal again evident with no gross cortical destruction or soft tissue emphysema. Follow-up MRI can be ordered    Received in the ED:  3.375g Zosyn IVPBx1, Vanco 1g IVPB x1   (29 Apr 2025 15:39)    INTERVAL HPI:  4/30: Pt seen and examined at bedside this morning, feels well, no acute complaints. On IV abx vanc and cefepime, planning for LLE angio on 5/2 continue Abx  5/1: Pt seen and examined this morning, feels well no acute complaints. Requesting left foot dressing change, c/d/i. Pain controlled. On IV abx vanc and cefepime, for LLE angio tomorrow NPO  5/2: Pt seen at bedside this afternoon s/p LLE angiogram, feels well no acute complaints. L foot dressing c/d/i, pain controlled. On IV abx vanc and cefepime---> Rocephin   5/3: Pt seen and examined at bedside, feels well no acute complaints. L foot dressing c/d/i, pain controlled. On IV Rocephin, Podiatry follow up.  5/4: Pt seen at bedside, surgical PA present as well, +L PT on Doppler noted. Pt feels well, no acute complaints. L foot dressing c/d/i, pain controlled. On IV Rocephin  5/5: Pt seen and examined at bedside. No acute complaints; pain well controlled. L. foot dressing changed by podiatry. On IV Rocephin; to go for PICC line today.    OVERNIGHT EVENTS: None    Home Medications:  aspirin 81 mg oral delayed release tablet: 1 tab(s) orally once a day (29 Apr 2025 16:40)  atorvastatin 40 mg oral tablet: 1 tab(s) orally once a day (29 Apr 2025 16:40)  clopidogrel 75 mg oral tablet: 1 tab(s) orally once a day (29 Apr 2025 16:40)  gabapentin 300 mg oral capsule: 1 cap(s) orally 3 times a day (29 Apr 2025 16:40)  Jardiance 25 mg oral tablet: 1 tab(s) orally once a day (29 Apr 2025 16:40)  lisinopril 40 mg oral tablet: 1 tab(s) orally once a day (29 Apr 2025 16:40)  metFORMIN 1000 mg oral tablet: 1 tab(s) orally 2 times a day (29 Apr 2025 16:40)  metoprolol succinate 50 mg oral tablet, extended release: 1 tab(s) orally once a day (29 Apr 2025 16:40)  Trulicity Pen 1.5 mg/0.5 mL subcutaneous solution: 1.5 milligram(s) subcutaneously once a week (29 Apr 2025 16:40)      MEDICATIONS  (STANDING):  aspirin enteric coated 81 milliGRAM(s) Oral daily  atorvastatin 40 milliGRAM(s) Oral at bedtime  cefTRIAXone   IVPB 2000 milliGRAM(s) IV Intermittent every 24 hours  cefTRIAXone   IVPB      clopidogrel Tablet 75 milliGRAM(s) Oral daily  dextrose 50% Injectable 25 Gram(s) IV Push once  dextrose 50% Injectable 12.5 Gram(s) IV Push once  dextrose 50% Injectable 25 Gram(s) IV Push once  enoxaparin Injectable 40 milliGRAM(s) SubCutaneous every 24 hours  gabapentin 300 milliGRAM(s) Oral three times a day  glucagon  Injectable 1 milliGRAM(s) IntraMuscular once  insulin glargine Injectable (LANTUS) 15 Unit(s) SubCutaneous at bedtime  insulin lispro (ADMELOG) corrective regimen sliding scale   SubCutaneous three times a day before meals  insulin lispro (ADMELOG) corrective regimen sliding scale   SubCutaneous at bedtime  insulin lispro Injectable (ADMELOG) 5 Unit(s) SubCutaneous three times a day before meals  metoprolol succinate ER 50 milliGRAM(s) Oral daily    MEDICATIONS  (PRN):  dextrose Oral Gel 15 Gram(s) Oral once PRN Blood Glucose LESS THAN 70 milliGRAM(s)/deciliter      No Known Allergies      Social History:  Tobacco: Denies  EtOH: Denies  Recreational drug use: Denies  Lives with: Wife at home   Ambulates: Independently   ADLs: Independent (29 Apr 2025 15:39)        REVIEW OF SYSTEMS: i am ok  CONSTITUTIONAL: No fever, No chills, No fatigue, No myalgia, No Body ache, No Weakness  EYES: No eye pain,  No visual disturbances, No discharge, No Redness  ENMT: No ear pain, No nose bleed, No vertigo; No sinus pain, No throat pain, No Congestion  NECK: No pain, No stiffness  RESPIRATORY: No cough, No wheezing, No hemoptysis, No shortness of breath  CARDIOVASCULAR: No chest pain, No palpitations  GASTROINTESTINAL: No abdominal pain, No epigastric pain. No nausea, No vomiting, No diarrhea, No constipation; [ x ] BM  GENITOURINARY: No dysuria, No frequency, No urgency, No hematuria, No incontinence  NEUROLOGICAL: No headaches, No dizziness, No numbness, No tingling, No tremors, No weakness  EXTREMITIES: No Swelling, No Pain, No Edema  SKIN: [ x ] No itching, burning, rashes, or lesions   MUSCULOSKELETAL: No joint pain, No joint swelling; No muscle pain, No back pain, No extremity pain  PSYCHIATRIC: No depression, No anxiety, No mood swings, No difficulty sleeping at night  PAIN SCALE: [ x ] None  [  ] Other-  ROS Unable to obtain due to: [  ] Dementia  [  ] Lethargy  [  ] Sedated  [  ] Non verbal  REST OF REVIEW OF SYSTEMS: [x  ] Normal     Vital Signs Last 24 Hrs  T(C): 36.6 (05 May 2025 04:36), Max: 36.7 (04 May 2025 11:08)  T(F): 97.9 (05 May 2025 04:36), Max: 98.1 (04 May 2025 11:08)  HR: 73 (05 May 2025 04:36) (73 - 82)  BP: 138/66 (05 May 2025 04:36) (127/70 - 151/73)  BP(mean): --  RR: 18 (05 May 2025 04:36) (18 - 18)  SpO2: 96% (05 May 2025 04:36) (95% - 98%)    Parameters below as of 05 May 2025 04:36  Patient On (Oxygen Delivery Method): room air        CAPILLARY BLOOD GLUCOSE      POCT Blood Glucose.: 183 mg/dL (05 May 2025 07:59)  POCT Blood Glucose.: 210 mg/dL (04 May 2025 22:01)  POCT Blood Glucose.: 231 mg/dL (04 May 2025 17:03)  POCT Blood Glucose.: 222 mg/dL (04 May 2025 11:39)      I&O's Summary    PHYSICAL EXAM:  GENERAL:  [ x ] NAD, [x ] Well appearing, [  ] Agitated, [  ] Drowsy, [  ] Lethargy, [  ] Confused   HEAD:  [ x ] Normal, [  ] Other  EYES:  [x  ] EOMI, [ x] PERRL, [ x ] Conjunctiva and sclera clear normal, [  ] Other, [  ] Pallor, [  ] Discharge  ENMT:  [ x ] Normal, [ x ] Moist mucous membranes, [ x ] Good dentition, [ x ] No thrush  NECK:  [ x ] Supple, [x  ] No JVD, [ x ] Normal thyroid, [  ] Lymphadenopathy, [  ] Other  CHEST/LUNG:  [ x ] Clear to auscultation bilaterally, [ x ] Breath Sounds equal B/L / decreased, [x  ] Poor effort, [ x ] No rales, [ x ] No rhonchi, [x  ] No wheezing  HEART:  [ x ] Regular rate and rhythm, [  ] Tachycardia, [  ] Bradycardia, [  ] Irregular, [ x ] No murmurs, No rubs, No gallops, [  ] PPM in place (Mfr:  )  ABDOMEN:  [ x ] Soft, [x ] Nontender, [ x ] Nondistended, [ x ] No mass, [ x ] Bowel sounds present, [  ] Obese + umbilical hernia   NERVOUS SYSTEM:  [ x ] Alert & Oriented x3, [ x ] Nonfocal, [  ] Confusion, [  ] Encephalopathic, [  ] Sedated, [  ] Unable to assess, [  ] Dementia, [  ] Other-  EXTREMITIES:  [ x ] 2+ Peripheral Pulses, No clubbing, No cyanosis,  [  ] Edema B/L lower EXT, [x ] PVD stasis skin changes B/L lower EXT, [x  ] Wound - left foot in dressing c/d/i  SKIN:  [ x] No rashes or lesions, [  ] Pressure ulcers, [  ] Ecchymosis, [  ] Skin tears, [ x ] Other Left foot wound-necrotic ulcers base medial, lateral aspects of foot, nonstageable necrotic heel ulcer left, with edema, reduced erythema, LL Ext  cellulitic improved,, negative drainage  distal hallux ulcer necrotic base, No Drainage ,Rt TMA -old    DIET: Diet, Consistent Carbohydrate w/Evening Snack:   Low Sodium (05-02-25 @ 10:36)      LABS:                        10.1   12.72 )-----------( 341      ( 05 May 2025 05:57 )             30.6     05 May 2025 05:57    139    |  106    |  25     ----------------------------<  163    4.4     |  27     |  0.89     Ca    8.7        05 May 2025 05:57    TPro  6.7    /  Alb  2.8    /  TBili  0.3    /  DBili  x      /  AST  10     /  ALT  26     /  AlkPhos  63     05 May 2025 05:57      Urinalysis Basic - ( 05 May 2025 05:57 )    Color: x / Appearance: x / SG: x / pH: x  Gluc: 163 mg/dL / Ketone: x  / Bili: x / Urobili: x   Blood: x / Protein: x / Nitrite: x   Leuk Esterase: x / RBC: x / WBC x   Sq Epi: x / Non Sq Epi: x / Bacteria: x      Culture Results:   No growth at 72 Hours (05-01 @ 05:58)  Culture Results:   No growth at 72 Hours (05-01 @ 05:52)  Culture Results:   Growth in aerobic bottle: Staphylococcus pseudintermedius  "Susceptibilities not performed"  Direct identification is available within approximately 3-5  hours either by Blood Panel Multiplexed PCR or Direct  MALDI-TOF. Details: [email or web address] (04-29 @ 12:45)  Culture Results:   No growth at 5 days (04-29 @ 12:30)            Culture - Blood (collected 01 May 2025 05:58)  Source: Blood Blood-Peripheral  Preliminary Report (04 May 2025 10:01):    No growth at 72 Hours    Culture - Blood (collected 01 May 2025 05:52)  Source: Blood Blood-Peripheral  Preliminary Report (04 May 2025 10:01):    No growth at 72 Hours    Culture - Blood (collected 29 Apr 2025 12:45)  Source: Blood Blood  Gram Stain (01 May 2025 04:09):    Growth in aerobic bottle: Gram Positive Cocci in Clusters  Final Report (01 May 2025 23:19):    Growth in aerobic bottle: Staphylococcus pseudintermedius    "Susceptibilities not performed"    Direct identification is available within approximately 3-5    hours either by Blood Panel Multiplexed PCR or Direct    MALDI-TOF. Details: [email or web address]  Organism: Blood Culture PCR (01 May 2025 23:19)  Organism: Blood Culture PCR (01 May 2025 23:19)    Culture - Blood (collected 29 Apr 2025 12:30)  Source: Blood Blood  Final Report (04 May 2025 19:01):    No growth at 5 days      RADIOLOGY & ADDITIONAL TESTS:    HEALTH ISSUES - PROBLEM Dx:  Type 2 diabetes mellitus    HTN (hypertension)    DELORIS (acute kidney injury)    Need for prophylactic measure    Infection of left foot    PAD (peripheral artery disease)    Diabetic ulcer of left foot    Osteomyelitis of left foot    Unstageable pressure ulcer of left foot          Consultant(s) Notes Reviewed:  [ x ] YES     Care Discussed with [ x ] Consultants, [ x ] Patient, [ x ] Family, [  ] HCP, [ x ] , [  ] Social Service, [ x ] RN, [  ] Physical Therapy, [  ] Palliative Care Team  DVT PPX: [  ] Lovenox, [  ] SC Heparin, [  ] Coumadin, [  ] Xarelto, [  ] Eliquis, [  ] Pradaxa, [  ] IV Heparin drip, [  ] SCD, [  ] Ambulation, [  ] Contraindicated 2/2 GI Bleed, [  ] Contraindicated 2/2  Bleed, [  ] Contraindicated 2/2 Brain Bleed  Advanced Directive: [ x ] None, [  ] DNR/DNI

## 2025-05-05 NOTE — DISCHARGE NOTE NURSING/CASE MANAGEMENT/SOCIAL WORK - NSDCPEFALRISK_GEN_ALL_CORE
For information on Fall & Injury Prevention, visit: [email or web address] OR  [email or web address] OR  https://www.cdc.gov/steadi/patient.html

## 2025-05-05 NOTE — PROGRESS NOTE ADULT - PROBLEM SELECTOR PLAN 1
Patient seen and evaluated  Pt will have PICC line, outpt f/u  Left foot dressed with DSD  Advised pt to remain NWB as much as possible, dressings noted to be falling off foot   Rec cont iv abx  Pt understands he is at risk to lost part of the foot, all of the foot, bka  Podiatry will follow while in house.  Wound Care Instructions:  -Keep dressing clean, dry, and intact to the right foot  -Apply DSD, change every other day   -Patient NWB as much as possible left foot in surgical shoe   -Monitor for any signs of infection including redness, swelling in the leg above the bandage, nausea/vomiting/fever/chills/chest pain/shortness of breath, if any are present patient must report to the emergency department immediately  -Patient is to follow up with Dr. Stark within 5 days after discharge at [facility]. Please call to make an appointment [phone]

## 2025-05-05 NOTE — PROGRESS NOTE ADULT - PROBLEM SELECTOR PLAN 2
Chronic. Pt follows with Vascular outpt. Pt s/p L PT angioplasty 12/2/24 for L lat foot DFU on Plavix  -C/w ASA and Plavix and Lipitor   -s/p LLE angio with occluded PT  - Cardiology ([facility]) consulted for cardiac clearance Chronic. Pt follows with Vascular outpt. Pt s/p L PT angioplasty 12/2/24 for L lat foot DFU on Plavix  -C/w ASA and Plavix and Lipitor   -s/p LLE angio with occluded PT  - Cardiology ([facility]) consulted for cardiac clearance  out pt follow up for test & intervention

## 2025-05-05 NOTE — DISCHARGE NOTE PROVIDER - TIME SPENT: (MINUTES SPENT ON THE DISCHARGE SERVICE)
Bayron reached out to return my call from last week when I was checking in. Reported he had to cancel his appt with Cardio and Thoracis surgery due to being weak from hospital admission and transportation.     Pt reported that he still feels weak and dizzy and it is hard to do much. Weight loss seems to have finally slowed down but he can't gain any weight back. Pt has appt with Dr Varela on 5/12/25 which he will attended and wanted to wait until that appt before he makes any other follow ups. I highly encouraged him to not wait and get appts scheduled now. Educated on not having to wait until after seeing Dr Varela and that the hospital follow ups are all very important.   I was unable to find an opening for Dr Gonzalez so sent a message to his clerical staff and asked that they contact patient.      I encouraged Bayron to contact Thoracic surg office to get appt rescheduled .  I was unable to find appt with Becky Gustafson CNP  and Bayron prefers to see her instead of  since she has been handling his care for last year but asked Bayron to contact his PCP office today to get something scheduled asap. Bayron agreed with plan and he will contact them now.   
60

## 2025-05-05 NOTE — DISCHARGE NOTE NURSING/CASE MANAGEMENT/SOCIAL WORK - PATIENT PORTAL LINK FT
You can access the FollowMyHealth Patient Portal offered by [facility] by registering at the following website: [email or web address]. By joining [employer]’s FollowMyHealth portal, you will also be able to view your health information using other applications (apps) compatible with our system.

## 2025-05-05 NOTE — CASE MANAGEMENT PROGRESS NOTE - NSCMPROGRESSNOTE_GEN_ALL_CORE
Anticipated DC plan for patient to DC home with home IV antibiotics. Patient is known to [facility] and requested referral be sent to them. Patient has been referred, pending PICC line placement and acceptance. Patient declined visiting nurse services/[facility] services stating he plans to follow up with the wound care center for wound care. When DC confirmed, he will drive himself home. CM will continue to follow.

## 2025-05-05 NOTE — PROGRESS NOTE ADULT - SUBJECTIVE AND OBJECTIVE BOX
[facility] -- Vlad Gallardo Pannella, Patel, Savella Goodger, Cohen  Office # [phone]      Follow Up:    cardiac optimization     Subjective/Observations:   No events overnight resting comfortably in bed.  No complaints of chest pain, dyspnea, or palpitations reported. No signs of orthopnea or PND.      REVIEW OF SYSTEMS: All other review of systems is negative unless indicated above    PAST MEDICAL & SURGICAL HISTORY:  HTN (hypertension)      Diabetes      Hyperlipidemia      PVD (peripheral vascular disease)      Toe osteomyelitis, right      H/O angioplasty  RLE      Status post amputation of toe          MEDICATIONS  (STANDING):  aspirin enteric coated 81 milliGRAM(s) Oral daily  atorvastatin 40 milliGRAM(s) Oral at bedtime  cefTRIAXone   IVPB 2000 milliGRAM(s) IV Intermittent every 24 hours  cefTRIAXone   IVPB      clopidogrel Tablet 75 milliGRAM(s) Oral daily  dextrose 50% Injectable 25 Gram(s) IV Push once  dextrose 50% Injectable 12.5 Gram(s) IV Push once  dextrose 50% Injectable 25 Gram(s) IV Push once  enoxaparin Injectable 40 milliGRAM(s) SubCutaneous every 24 hours  gabapentin 300 milliGRAM(s) Oral three times a day  glucagon  Injectable 1 milliGRAM(s) IntraMuscular once  insulin glargine Injectable (LANTUS) 15 Unit(s) SubCutaneous at bedtime  insulin lispro (ADMELOG) corrective regimen sliding scale   SubCutaneous three times a day before meals  insulin lispro (ADMELOG) corrective regimen sliding scale   SubCutaneous at bedtime  insulin lispro Injectable (ADMELOG) 5 Unit(s) SubCutaneous three times a day before meals  metoprolol succinate ER 50 milliGRAM(s) Oral daily    MEDICATIONS  (PRN):  dextrose Oral Gel 15 Gram(s) Oral once PRN Blood Glucose LESS THAN 70 milliGRAM(s)/deciliter      Allergies    No Known Allergies    Intolerances        Vital Signs Last 24 Hrs  T(C): 36.6 (05 May 2025 04:36), Max: 36.7 (04 May 2025 11:08)  T(F): 97.9 (05 May 2025 04:36), Max: 98.1 (04 May 2025 11:08)  HR: 73 (05 May 2025 04:36) (73 - 82)  BP: 138/66 (05 May 2025 04:36) (127/70 - 151/73)  BP(mean): --  RR: 18 (05 May 2025 04:36) (18 - 18)  SpO2: 96% (05 May 2025 04:36) (95% - 98%)    Parameters below as of 05 May 2025 04:36  Patient On (Oxygen Delivery Method): room air        I&O's Summary        PHYSICAL EXAM:  TELE: not on tele   Constitutional: NAD, awake and alert, well-developed  HEENT: Moist Mucous Membranes, Anicteric  Pulmonary: Non-labored, breath sounds are clear bilaterally, No wheezing, crackles or rhonchi  Cardiovascular: Regular, S1 and S2 nl, No murmurs, rubs, gallops or clicks  Gastrointestinal: Bowel Sounds present, soft, nontender.   Lymph: No lymphadenopathy. No peripheral edema.  Skin: No visible rashes or ulcers.  Psych:  Mood & affect appropriate    LABS: All Labs Reviewed:                        10.1   12.72 )-----------( 341      ( 05 May 2025 05:57 )             30.6                         10.4   13.79 )-----------( 306      ( 04 May 2025 07:40 )             31.4                         10.5   18.98 )-----------( 306      ( 03 May 2025 06:31 )             31.6     05 May 2025 05:57    139    |  106    |  25     ----------------------------<  163    4.4     |  27     |  0.89   04 May 2025 07:40    138    |  107    |  43     ----------------------------<  185    4.7     |  25     |  1.10   03 May 2025 06:31    136    |  104    |  41     ----------------------------<  220    5.0     |  25     |  1.60     Ca    8.7        05 May 2025 05:57  Ca    9.1        04 May 2025 07:40  Ca    8.5        03 May 2025 06:31    TPro  6.7    /  Alb  2.8    /  TBili  0.3    /  DBili  x      /  AST  10     /  ALT  26     /  AlkPhos  63     05 May 2025 05:57  TPro  6.5    /  Alb  2.7    /  TBili  0.3    /  DBili  x      /  AST  10     /  ALT  24     /  AlkPhos  61     04 May 2025 07:40  TPro  6.6    /  Alb  2.6    /  TBili  0.5    /  DBili  x      /  AST  11     /  ALT  23     /  AlkPhos  63     03 May 2025 06:31             EC Lead ECG:   Ventricular Rate 76 BPM    Atrial Rate 76 BPM    P-R Interval 156 ms    QRS Duration 92 ms    Q-T Interval 390 ms    QTC Calculation(Bazett) 438 ms    P Axis 6 degrees    R Axis -1 degrees    T Axis 3 degrees    Diagnosis Line Sinus rhythm with premature supraventricular complexes  Otherwise normal ECG  When compared with ECG of 2024 12:25,  premature supraventricular complexes are now present  Confirmed by RAMÓN SHAFFER ([id number]) on 2025 6:26:16 PM (25 @ 13:07)        Radiology:

## 2025-05-05 NOTE — PROGRESS NOTE ADULT - SUBJECTIVE AND OBJECTIVE BOX
PROGRESS NOTE   Patient is a 64y old  Male who presents with a chief complaint of Foot infection (05 May 2025 09:13)      HPI:  Pt is a 63 y/o M with PMHx DM2, hx osteomyelitis, CAD, PAD s/p RLE angioplasty 2020, s/p surgical amputation of R forefoot , s/p L PT angioplasty 12/2/24 for L lat foot DFU on plavix, HTN sent in by wound clinic for left foot infections and multiple wounds. Pt states he was at [facility] for hyperbaric therapy today and they noticed his L foot had drainage with increased erythema and edema so they told him to come to the ED. Pt states yesterday his foot was dry, without drainage or swelling. Patient notes he follow with ID outpatient and was prescribed Bactrim 2 weeks ago for the chronic foot wounds without any improvement. Pt denies fever/chills, CP, SOB, HA, dizziness, n/v/d. Pt also denies any current pain in the foot.      ED Course:   Vitals: BP: 107/66 , HR: 87 , Temp: 98.1F , RR: 16 , SpO2: 100 % on RA  Labs:  ESR 53, WBC 12.23, Hgb 11, BUN 25, Glucose 137,   X-ray Foot: Cutaneous defect adjacent to the base of the fifth metatarsal again evident with no gross cortical destruction or soft tissue emphysema. Follow-up MRI can be ordered    Received in the ED:  3.375g Zosyn IVPBx1, Vanco 1g IVPB x1   (29 Apr 2025 15:39)      Vital Signs Last 24 Hrs  T(C): 36.6 (05 May 2025 04:36), Max: 36.7 (04 May 2025 11:08)  T(F): 97.9 (05 May 2025 04:36), Max: 98.1 (04 May 2025 11:08)  HR: 73 (05 May 2025 04:36) (73 - 82)  BP: 138/66 (05 May 2025 04:36) (127/70 - 151/73)  BP(mean): --  RR: 18 (05 May 2025 04:36) (18 - 18)  SpO2: 96% (05 May 2025 04:36) (95% - 98%)    Parameters below as of 05 May 2025 04:36  Patient On (Oxygen Delivery Method): room air                              10.1   12.72 )-----------( 341      ( 05 May 2025 05:57 )             30.6               05-05    139  |  106  |  25[H]  ----------------------------<  163[H]  4.4   |  27  |  0.89    Ca    8.7      05 May 2025 05:57    TPro  6.7  /  Alb  2.8[L]  /  TBili  0.3  /  DBili  x   /  AST  10[L]  /  ALT  26  /  AlkPhos  63  05-05    O:   General: Pleasant  male NAD & AOX3.    Integument:  Skin warm, dry and supple bilateral.    Noted necrotic ulcers base medial, lateral aspects of foot, nonstageable necrotic heel ulcer left, with edema, reduced erythema, reduced cellulitic changes, negative drainage  Noted distal hallux ulcer necrotic base, negative probe to bone  Vascular: Dorsalis Pedis and Posterior Tibial pulses non palpable.  Capillary re-fill time greater then 3 seconds digits 1-5 left, TMA right    Neuro: Protective sensation diminished to the level of the digits bilateral.  MSK: Muscle strength 5/5 all major muscle groups bilateral. Noted right foot tma      MRI: 3/26  IMPRESSION:  1. Osteomyelitis of the base of the fifth metatarsal as well as first distal phalanx.  2. Osseous edema is seen within the fifth distal and middle phalanges without significant loss of T1 fatty marrow. Correlation for adjacent ulcer is advised. This could be secondary to early osteomyelitis versus reactive changes. Cortical correlation is advised.

## 2025-05-05 NOTE — DISCHARGE NOTE NURSING/CASE MANAGEMENT/SOCIAL WORK - FINANCIAL ASSISTANCE
[facility] provides services at a reduced cost to those who are determined to be eligible through [facility]’s financial assistance program. Information regarding [facility]’s financial assistance program can be found by going to [email or web address] or by calling [phone].

## 2025-05-05 NOTE — PROGRESS NOTE ADULT - PROBLEM SELECTOR PLAN 1
Pt with  L foot infection with multiple wounds sent in by [facility]. S/p surgical amputation of R forefoot s/p L PT angioplasty 12/2/24 for L lat foot DFU on Plavix  -Pt c/o drainage from wound, redness and streaking. Concern for Cellulitis & osteo   -MRI with soft tissue wound along the 5th metatarsal base with cortical erosive change and marrow edema concerning for osteomyelitis. Soft tissue wound along the posterolateral calcaneal tuberosity with curvilinear marrow hyperemia but no definite osteomyelitis.   IV abx cefepime and vanc transitioned to IV Rocephin  - BCx 1/2 bottles with Coag negative Staph, possible contaminant, repeat BCx NGTD  - NWB LLE  -At high risk for more proximal amputation. Intervention will be based on results of FRANCISCA and PVR's. Possible BKA per podiatry   - Plan for PICC placement Monday 5/5 for long term abx  -C/w ASA and Plavix and Lipitor  Daily  -Podiatry d/w Dr. Stark d/w   -Vascular surgery follow up- ASA ,Plavix, s/p LLE angio with occluded PT  -ID Dr Melany Calhoun, -IV Abx Rocephin 2 gm daily Pt with  L foot infection with multiple wounds sent in by [facility]. S/p surgical amputation of R forefoot s/p L PT angioplasty 12/2/24 for L lat foot DFU on Plavix  -Pt c/o drainage from wound, redness and streaking. Concern for Cellulitis & osteo   -MRI with soft tissue wound along the 5th metatarsal base with cortical erosive change and marrow edema concerning for osteomyelitis. Soft tissue wound along the posterolateral calcaneal tuberosity with curvilinear marrow hyperemia but no definite osteomyelitis.   IV abx cefepime and vanc transitioned to IV Rocephin  - BCx 1/2 bottles with Coag negative Staph, possible contaminant, repeat BCx NGTD  - NWB LLE  -At high risk for more proximal amputation. Intervention will be based on results of FRANCISCA and PVR's. Possible BKA per podiatry   - Plan for PICC placement Monday 5/5 for long term abx  -C/w ASA and Plavix and Lipitor  Daily  -Podiatry d/w Dr. Stark d/w   -Vascular surgery follow up- ASA ,Plavix, s/p LLE angio with occluded PT-out pt follow up   -ID Dr Melany Calhoun, -IV Abx Rocephin 2 gm daily---Podiatry planning procedure after further vascular evaluation/studies  --will plan x6 week course IV Abx until 6/10/25  --PICC ordered, pending  --will need CMP, CBC, ESR CRP  --orders called into [facility]

## 2025-05-05 NOTE — PROGRESS NOTE ADULT - ASSESSMENT
64M DM2, hx osteomyelitis, CAD, PAD s/p RLE angioplasty 2020, s/p surgical amputation of R forefoot , s/p L PT angioplasty 12/2/24 for L lat foot DFU on plavix, HTN, HLD sent in by wound clinic for left foot infections and multiple wounds. Vascular surgery to evaluate further with LLE angiogram.     Cardiac clearance  - p/w L foot infection with multiple wounds sent in by [facility], podiatry following   - Continue antibiotics per ID  - s/p LLE angio 5/2,  vascular following   - Tolerated procedure well, cardiac status optimal post operatively  - pending picc placement   -to fu Vascular on DC for further plan     - EKG: Sinus rhythm with premature supraventricular complexes  - No evidence of any active ischemia   - NST done 1/2024 with small sized, mild defect in the basal inferior wall that is fixed and partially normalizes with prone, suggestive of diaphragmatic artifact.   - Continue home ASA, Plavix and Lipitor    - Previous TTE (1/2/24) shows EF 61%, Normal LV mass and diastolic function, Normal RV size and function.  - No evidence of any meaningful volume overload     - BP stable and controlled   - Continue BB   - Monitor and replete lytes, keep K>4, Mg>2.    - Follows with Dr. Chu (o/p cardiologist)   - Will continue to follow.

## 2025-05-05 NOTE — PROGRESS NOTE ADULT - SUBJECTIVE AND OBJECTIVE BOX
[facility] [facility]  OFELIA Harvey Y. Patel, S. Shah, [facility]. [facility]  [phone]    Name: LOIDA QUEZADA  Age: 64y  Gender: Male  MRN: [id number]    Interval History:  No acute overnight events.   Notes reviewed    Antibiotics:  cefTRIAXone   IVPB 2000 milliGRAM(s) IV Intermittent every 24 hours  cefTRIAXone   IVPB          Medications:  aspirin enteric coated 81 milliGRAM(s) Oral daily  atorvastatin 40 milliGRAM(s) Oral at bedtime  cefTRIAXone   IVPB 2000 milliGRAM(s) IV Intermittent every 24 hours  cefTRIAXone   IVPB      clopidogrel Tablet 75 milliGRAM(s) Oral daily  dextrose 50% Injectable 25 Gram(s) IV Push once  dextrose 50% Injectable 12.5 Gram(s) IV Push once  dextrose 50% Injectable 25 Gram(s) IV Push once  dextrose Oral Gel 15 Gram(s) Oral once PRN  enoxaparin Injectable 40 milliGRAM(s) SubCutaneous every 24 hours  gabapentin 300 milliGRAM(s) Oral three times a day  glucagon  Injectable 1 milliGRAM(s) IntraMuscular once  insulin glargine Injectable (LANTUS) 15 Unit(s) SubCutaneous at bedtime  insulin lispro (ADMELOG) corrective regimen sliding scale   SubCutaneous three times a day before meals  insulin lispro (ADMELOG) corrective regimen sliding scale   SubCutaneous at bedtime  insulin lispro Injectable (ADMELOG) 5 Unit(s) SubCutaneous three times a day before meals  lisinopril 40 milliGRAM(s) Oral daily  metoprolol succinate ER 50 milliGRAM(s) Oral daily      Review of Systems:  A 10-point review of systems was obtained.   Review of systems otherwise negative except as previously noted.    Allergies: No Known Allergies    For details regarding the patient's past medical history, social history, family history, and other miscellaneous elements, please refer the initial infectious diseases consultation and/or the admitting history and physical examination for this admission.    Objective:  Vitals:   T(C): 36.6 (05-05-25 @ 04:36), Max: 36.6 (05-04-25 @ 21:46)  HR: 73 (05-05-25 @ 04:36) (73 - 82)  BP: 138/66 (05-05-25 @ 04:36) (138/66 - 151/73)  RR: 18 (05-05-25 @ 04:36) (18 - 18)  SpO2: 96% (05-05-25 @ 04:36) (95% - 96%)    Physical Examination:  General: no acute distress  HEENT: NC/AT, EOMI  Cardio: RRR  Resp: breath sounds heard bilaterally  Abd: soft, NT, ND  Ext: foot in dressing  Skin: warm, dry, no visible rash      Laboratory Studies:  CBC:                       10.1   12.72 )-----------( 341      ( 05 May 2025 05:57 )             30.6     CMP: 05-05    139  |  106  |  25[H]  ----------------------------<  163[H]  4.4   |  27  |  0.89    Ca    8.7      05 May 2025 05:57    TPro  6.7  /  Alb  2.8[L]  /  TBili  0.3  /  DBili  x   /  AST  10[L]  /  ALT  26  /  AlkPhos  63  05-05    LIVER FUNCTIONS - ( 05 May 2025 05:57 )  Alb: 2.8 g/dL / Pro: 6.7 g/dL / ALK PHOS: 63 U/L / ALT: 26 U/L / AST: 10 U/L / GGT: x           Urinalysis Basic - ( 05 May 2025 05:57 )    Color: x / Appearance: x / SG: x / pH: x  Gluc: 163 mg/dL / Ketone: x  / Bili: x / Urobili: x   Blood: x / Protein: x / Nitrite: x   Leuk Esterase: x / RBC: x / WBC x   Sq Epi: x / Non Sq Epi: x / Bacteria: x        Microbiology: reviewed    Culture - Blood (collected 05-01-25 @ 05:58)  Source: Blood Blood-Peripheral  Preliminary Report (05-05-25 @ 10:00):    No growth at 4 days    Culture - Blood (collected 05-01-25 @ 05:52)  Source: Blood Blood-Peripheral  Preliminary Report (05-05-25 @ 10:00):    No growth at 4 days    Culture - Blood (collected 04-29-25 @ 12:45)  Source: Blood Blood  Gram Stain (05-01-25 @ 04:09):    Growth in aerobic bottle: Gram Positive Cocci in Clusters  Final Report (05-01-25 @ 23:19):    Growth in aerobic bottle: Staphylococcus pseudintermedius    "Susceptibilities not performed"    Direct identification is available within approximately 3-5    hours either by Blood Panel Multiplexed PCR or Direct    MALDI-TOF. Details: [email or web address]  Organism: Blood Culture PCR (05-01-25 @ 23:19)  Organism: Blood Culture PCR (05-01-25 @ 23:19)      -  Coagulase negative Staphylococcus: Detec      Method Type: PCR    Culture - Blood (collected 04-29-25 @ 12:30)  Source: Blood Blood  Final Report (05-04-25 @ 19:01):    No growth at 5 days          Radiology: reviewed

## 2025-05-05 NOTE — PROGRESS NOTE ADULT - ASSESSMENT
63 y/o M with PVD, HTN, diabetes s/p metatarsal amputation sent in by wound clinic for left foot infections and multiple wounds admitted for worsening foot wound  infection, cellulitis left dylon t& Lower EXT.     L foot wound infection/cellulitis +/- underlying OM, PAD, DELORIS  - pt p/w multiple L foot wounds  -- pt well known to service; is s/p surgical amputation of R forefoot  s/p L PT angioplasty 12/2/24 for L lat foot DFU on Plavix  - afebrile, leukocytosis to 12. Elevated ESR 53  - X-ray Foot: Cutaneous defect adjacent to the base of the fifth metatarsal again evident with no gross cortical destruction or soft tissue emphysema.   - MR L foot Soft tissue wound along the 5th metatarsal base with cortical erosive change and marrow edema concerning for osteomyelitis.  - 4/29 BCx + CoNS -- likely procurement contaminant; 5/1 BCx NGTD  - 5/2 s/p LLE angio    Recommendations:   C/w ceftriaxone 2gm q24h based on prior cx of S. marascens from December 2024  --Podiatry planning procedure after further vascular evaluation/studies  --will plan x6 week course IV Abx until 6/10/25  --PICC ordered, pending  Trend temps/WBC  Monitor renal fxn, renally dose medications  Local wound care  Additional care, d/c planning per primary team    Pt to f/u Dr. Brasher next week [phone]    Patient evaluated with face-to-face time in addition to reviewing history, labs, microbiology, and imaging.   Antibiotic stewardship, local antibiogram, infection control strategies and potential transmission issues taken into consideration at time of treatment decision making process.   Thank you for allowing us to participate in the care of your patient.  Infectious Diseases will follow. Please call with any questions.  Melany Calhoun M.D.  Available on [employer] TEAMS -- *[facility]*  [place] [facility] [phone]  For after 5 P.M. and weekends, please call [phone]     63 y/o M with PVD, HTN, diabetes s/p metatarsal amputation sent in by wound clinic for left foot infections and multiple wounds admitted for worsening foot wound  infection, cellulitis left dylon t& Lower EXT.     L foot wound infection/cellulitis +/- underlying OM, PAD, DELORIS  - pt p/w multiple L foot wounds  -- pt well known to service; is s/p surgical amputation of R forefoot  s/p L PT angioplasty 12/2/24 for L lat foot DFU on Plavix  - afebrile, leukocytosis to 12. Elevated ESR 53  - X-ray Foot: Cutaneous defect adjacent to the base of the fifth metatarsal again evident with no gross cortical destruction or soft tissue emphysema.   - MR L foot Soft tissue wound along the 5th metatarsal base with cortical erosive change and marrow edema concerning for osteomyelitis.  - 4/29 BCx + CoNS -- likely procurement contaminant; 5/1 BCx NGTD  - 5/2 s/p LLE angio    Recommendations:   C/w ceftriaxone 2gm q24h based on prior cx of S. dino from December 2024  --Podiatry planning procedure after further vascular evaluation/studies  --will plan x6 week course IV Abx until 6/10/25  --PICC ordered, pending  --will need CMP, CBC, ESR CRP  --orders called into [employer]  Trend temps/WBC  Monitor renal fxn, renally dose medications  Local wound care  Additional care, d/c planning per primary team    Pt to f/u Dr. Brasher next week [phone]    Patient evaluated with face-to-face time in addition to reviewing history, labs, microbiology, and imaging.   Antibiotic stewardship, local antibiogram, infection control strategies and potential transmission issues taken into consideration at time of treatment decision making process.   Thank you for allowing us to participate in the care of your patient.  Infectious Diseases will follow. Please call with any questions.  Melany Calhoun M.D.  Available on [employer] TEAMS -- *[facility]*  [place] [facility] [phone]  For after 5 P.M. and weekends, please call [phone]

## 2025-05-05 NOTE — PROGRESS NOTE ADULT - PROBLEM SELECTOR PLAN 4
RESOLVED  Cr 1.3 on admission. Per chart review baseline is 1.0  -Avoid nephrotoxic agents  -Monitor BMP daily

## 2025-05-06 VITALS
TEMPERATURE: 98 F | OXYGEN SATURATION: 99 % | DIASTOLIC BLOOD PRESSURE: 75 MMHG | SYSTOLIC BLOOD PRESSURE: 132 MMHG | HEART RATE: 70 BPM | RESPIRATION RATE: 19 BRPM

## 2025-05-06 LAB
ALBUMIN SERPL ELPH-MCNC: 3 G/DL — LOW (ref 3.3–5)
ALP SERPL-CCNC: 68 U/L — SIGNIFICANT CHANGE UP (ref 40–120)
ALT FLD-CCNC: 29 U/L — SIGNIFICANT CHANGE UP (ref 12–78)
ANION GAP SERPL CALC-SCNC: 7 MMOL/L — SIGNIFICANT CHANGE UP (ref 5–17)
AST SERPL-CCNC: 12 U/L — LOW (ref 15–37)
BASOPHILS # BLD AUTO: 0.05 K/UL — SIGNIFICANT CHANGE UP (ref 0–0.2)
BASOPHILS NFR BLD AUTO: 0.5 % — SIGNIFICANT CHANGE UP (ref 0–2)
BILIRUB SERPL-MCNC: 0.3 MG/DL — SIGNIFICANT CHANGE UP (ref 0.2–1.2)
BUN SERPL-MCNC: 22 MG/DL — SIGNIFICANT CHANGE UP (ref 7–23)
CALCIUM SERPL-MCNC: 9.1 MG/DL — SIGNIFICANT CHANGE UP (ref 8.5–10.1)
CHLORIDE SERPL-SCNC: 102 MMOL/L — SIGNIFICANT CHANGE UP (ref 96–108)
CO2 SERPL-SCNC: 29 MMOL/L — SIGNIFICANT CHANGE UP (ref 22–31)
CREAT SERPL-MCNC: 0.81 MG/DL — SIGNIFICANT CHANGE UP (ref 0.5–1.3)
CULTURE RESULTS: SIGNIFICANT CHANGE UP
CULTURE RESULTS: SIGNIFICANT CHANGE UP
EGFR: 97 ML/MIN/1.73M2 — SIGNIFICANT CHANGE UP
EGFR: 97 ML/MIN/1.73M2 — SIGNIFICANT CHANGE UP
EOSINOPHIL # BLD AUTO: 0.51 K/UL — HIGH (ref 0–0.5)
EOSINOPHIL NFR BLD AUTO: 4.6 % — SIGNIFICANT CHANGE UP (ref 0–6)
GLUCOSE BLDC GLUCOMTR-MCNC: 170 MG/DL — HIGH (ref 70–99)
GLUCOSE BLDC GLUCOMTR-MCNC: 191 MG/DL — HIGH (ref 70–99)
GLUCOSE BLDC GLUCOMTR-MCNC: 237 MG/DL — HIGH (ref 70–99)
GLUCOSE SERPL-MCNC: 181 MG/DL — HIGH (ref 70–99)
HCT VFR BLD CALC: 33.2 % — LOW (ref 39–50)
HGB BLD-MCNC: 10.6 G/DL — LOW (ref 13–17)
IMM GRANULOCYTES NFR BLD AUTO: 1.9 % — HIGH (ref 0–0.9)
LYMPHOCYTES # BLD AUTO: 1.74 K/UL — SIGNIFICANT CHANGE UP (ref 1–3.3)
LYMPHOCYTES # BLD AUTO: 15.7 % — SIGNIFICANT CHANGE UP (ref 13–44)
MCHC RBC-ENTMCNC: 29.4 PG — SIGNIFICANT CHANGE UP (ref 27–34)
MCHC RBC-ENTMCNC: 31.9 G/DL — LOW (ref 32–36)
MCV RBC AUTO: 92 FL — SIGNIFICANT CHANGE UP (ref 80–100)
MONOCYTES # BLD AUTO: 1.03 K/UL — HIGH (ref 0–0.9)
MONOCYTES NFR BLD AUTO: 9.3 % — SIGNIFICANT CHANGE UP (ref 2–14)
NEUTROPHILS # BLD AUTO: 7.54 K/UL — HIGH (ref 1.8–7.4)
NEUTROPHILS NFR BLD AUTO: 68 % — SIGNIFICANT CHANGE UP (ref 43–77)
NRBC BLD AUTO-RTO: 0 /100 WBCS — SIGNIFICANT CHANGE UP (ref 0–0)
PLATELET # BLD AUTO: 353 K/UL — SIGNIFICANT CHANGE UP (ref 150–400)
POTASSIUM SERPL-MCNC: 4.8 MMOL/L — SIGNIFICANT CHANGE UP (ref 3.5–5.3)
POTASSIUM SERPL-SCNC: 4.8 MMOL/L — SIGNIFICANT CHANGE UP (ref 3.5–5.3)
PROT SERPL-MCNC: 7.1 G/DL — SIGNIFICANT CHANGE UP (ref 6–8.3)
RBC # BLD: 3.61 M/UL — LOW (ref 4.2–5.8)
RBC # FLD: 13.4 % — SIGNIFICANT CHANGE UP (ref 10.3–14.5)
SODIUM SERPL-SCNC: 138 MMOL/L — SIGNIFICANT CHANGE UP (ref 135–145)
SPECIMEN SOURCE: SIGNIFICANT CHANGE UP
SPECIMEN SOURCE: SIGNIFICANT CHANGE UP
WBC # BLD: 11.08 K/UL — HIGH (ref 3.8–10.5)
WBC # FLD AUTO: 11.08 K/UL — HIGH (ref 3.8–10.5)

## 2025-05-06 PROCEDURE — 85025 COMPLETE CBC W/AUTO DIFF WBC: CPT

## 2025-05-06 PROCEDURE — 80048 BASIC METABOLIC PNL TOTAL CA: CPT

## 2025-05-06 PROCEDURE — 76000 FLUOROSCOPY <1 HR PHYS/QHP: CPT

## 2025-05-06 PROCEDURE — 96374 THER/PROPH/DIAG INJ IV PUSH: CPT

## 2025-05-06 PROCEDURE — 93970 EXTREMITY STUDY: CPT

## 2025-05-06 PROCEDURE — 82962 GLUCOSE BLOOD TEST: CPT

## 2025-05-06 PROCEDURE — 85027 COMPLETE CBC AUTOMATED: CPT

## 2025-05-06 PROCEDURE — 83605 ASSAY OF LACTIC ACID: CPT

## 2025-05-06 PROCEDURE — 84134 ASSAY OF PREALBUMIN: CPT

## 2025-05-06 PROCEDURE — 36573 INSJ PICC RS&I 5 YR+: CPT

## 2025-05-06 PROCEDURE — 86850 RBC ANTIBODY SCREEN: CPT

## 2025-05-06 PROCEDURE — 73718 MRI LOWER EXTREMITY W/O DYE: CPT | Mod: MC

## 2025-05-06 PROCEDURE — 93923 UPR/LXTR ART STDY 3+ LVLS: CPT

## 2025-05-06 PROCEDURE — 36415 COLL VENOUS BLD VENIPUNCTURE: CPT

## 2025-05-06 PROCEDURE — 80053 COMPREHEN METABOLIC PANEL: CPT

## 2025-05-06 PROCEDURE — C1894: CPT

## 2025-05-06 PROCEDURE — 96375 TX/PRO/DX INJ NEW DRUG ADDON: CPT

## 2025-05-06 PROCEDURE — 80202 ASSAY OF VANCOMYCIN: CPT

## 2025-05-06 PROCEDURE — C1887: CPT

## 2025-05-06 PROCEDURE — 85652 RBC SED RATE AUTOMATED: CPT

## 2025-05-06 PROCEDURE — 87150 DNA/RNA AMPLIFIED PROBE: CPT

## 2025-05-06 PROCEDURE — 85610 PROTHROMBIN TIME: CPT

## 2025-05-06 PROCEDURE — 86140 C-REACTIVE PROTEIN: CPT

## 2025-05-06 PROCEDURE — 73630 X-RAY EXAM OF FOOT: CPT

## 2025-05-06 PROCEDURE — 93005 ELECTROCARDIOGRAM TRACING: CPT

## 2025-05-06 PROCEDURE — 83036 HEMOGLOBIN GLYCOSYLATED A1C: CPT

## 2025-05-06 PROCEDURE — 76937 US GUIDE VASCULAR ACCESS: CPT

## 2025-05-06 PROCEDURE — C1769: CPT

## 2025-05-06 PROCEDURE — 99232 SBSQ HOSP IP/OBS MODERATE 35: CPT

## 2025-05-06 PROCEDURE — 87040 BLOOD CULTURE FOR BACTERIA: CPT

## 2025-05-06 PROCEDURE — C1760: CPT

## 2025-05-06 PROCEDURE — C1751: CPT

## 2025-05-06 PROCEDURE — 87077 CULTURE AEROBIC IDENTIFY: CPT

## 2025-05-06 PROCEDURE — 86900 BLOOD TYPING SEROLOGIC ABO: CPT

## 2025-05-06 PROCEDURE — 86901 BLOOD TYPING SEROLOGIC RH(D): CPT

## 2025-05-06 PROCEDURE — 99285 EMERGENCY DEPT VISIT HI MDM: CPT | Mod: 25

## 2025-05-06 RX ORDER — INSULIN GLARGINE-YFGN 100 [IU]/ML
18 INJECTION, SOLUTION SUBCUTANEOUS AT BEDTIME
Refills: 0 | Status: DISCONTINUED | OUTPATIENT
Start: 2025-05-06 | End: 2025-05-06

## 2025-05-06 RX ORDER — CEFTRIAXONE 500 MG/1
2 INJECTION, POWDER, FOR SOLUTION INTRAMUSCULAR; INTRAVENOUS
Qty: 0 | Refills: 0 | DISCHARGE
Start: 2025-05-06

## 2025-05-06 RX ORDER — INSULIN LISPRO 100 U/ML
6 INJECTION, SOLUTION INTRAVENOUS; SUBCUTANEOUS
Refills: 0 | Status: DISCONTINUED | OUTPATIENT
Start: 2025-05-06 | End: 2025-05-06

## 2025-05-06 RX ADMIN — Medication 81 MILLIGRAM(S): at 12:59

## 2025-05-06 RX ADMIN — INSULIN LISPRO 6 UNIT(S): 100 INJECTION, SOLUTION INTRAVENOUS; SUBCUTANEOUS at 11:57

## 2025-05-06 RX ADMIN — GABAPENTIN 300 MILLIGRAM(S): 400 CAPSULE ORAL at 06:35

## 2025-05-06 RX ADMIN — METOPROLOL SUCCINATE 50 MILLIGRAM(S): 50 TABLET, EXTENDED RELEASE ORAL at 06:34

## 2025-05-06 RX ADMIN — Medication 1 APPLICATION(S): at 13:00

## 2025-05-06 RX ADMIN — INSULIN LISPRO 6 UNIT(S): 100 INJECTION, SOLUTION INTRAVENOUS; SUBCUTANEOUS at 17:21

## 2025-05-06 RX ADMIN — GABAPENTIN 300 MILLIGRAM(S): 400 CAPSULE ORAL at 13:33

## 2025-05-06 RX ADMIN — CEFTRIAXONE 100 MILLIGRAM(S): 500 INJECTION, POWDER, FOR SOLUTION INTRAMUSCULAR; INTRAVENOUS at 12:59

## 2025-05-06 RX ADMIN — INSULIN LISPRO 2: 100 INJECTION, SOLUTION INTRAVENOUS; SUBCUTANEOUS at 17:21

## 2025-05-06 RX ADMIN — LISINOPRIL 40 MILLIGRAM(S): 5 TABLET ORAL at 06:35

## 2025-05-06 RX ADMIN — INSULIN LISPRO 4: 100 INJECTION, SOLUTION INTRAVENOUS; SUBCUTANEOUS at 11:57

## 2025-05-06 RX ADMIN — CLOPIDOGREL BISULFATE 75 MILLIGRAM(S): 75 TABLET, FILM COATED ORAL at 13:00

## 2025-05-06 NOTE — PROGRESS NOTE ADULT - PROBLEM/PLAN-2
DISPLAY PLAN FREE TEXT
Not applicable

## 2025-05-06 NOTE — PROGRESS NOTE ADULT - ATTENDING COMMENTS
Agree with above findings and plan
Pt is a 63 y/o M with PMHx of PVD, HTN, diabetes s/p metatarsal amputation sent in by wound clinic for left foot infections and multiple wounds admitted for worsening foot wound  infection, cellulitis left foot& Lower EXT.   Pt seen, examined, case & care plan d/w pt ,residents at [facility].  Consult:  ID-Dr XENIA Calhoun -IV ABx continue. Rocephin  2 gm daily.  Podiatry-Dr Stark  d/w ABx, follow up for further plan.  Vascular Sx follow up for further Vascular intervention  Cardiology - follow up    Cont ASA and Plavix as per Vascular    Po diet  DVT ppx .   Total care time is 60 minutes.
Pt is a 63 y/o M with PMHx of PVD, HTN, diabetes s/p metatarsal amputation sent in by wound clinic for left foot infections and multiple wounds admitted for worsening foot wound  infection, cellulitis left foot& Lower EXT.   Pt seen, examined, case & care plan d/w pt ,residents at [facility].  Consult:  ID-Dr XENIA Calhoun -IV ABx continue. Rocephin  2 gm daily., Plan for PICC line for d/c on 5/6   Podiatry-Dr Stark  d/w ABx,d/c plan & follow up at [facility] ,   Vascular Sx follow up -Out pt  Vascular Sx follow up for testing   Cardiology - follow up  -stable  Cont ASA and Plavix as per Vascular    Po diet  DVT ppx .   Total care time is 60 minutes.
Pt is a 65 y/o M with PMHx of PVD, HTN, diabetes s/p metatarsal amputation sent in by wound clinic for left foot infections and multiple wounds admitted for worsening foot wound  infection, cellulitis left foot& Lower EXT.   Pt seen, examined, case & care plan d/w pt ,residents at [facility].  Consult:  ID-Dr XENIA Calhoun -IV ABx  Rocephin  2 gm daily via  PICC line x 6 weeks   Podiatry-Dr Stark  d/w ABx, follow up at [facility]  on 5/7 , dressing as per podiatry  Vascular Sx follow up -Out pt  Vascular Sx follow up for testing & procedure   Cardiology - follow up  -stable  Cont ASA and Plavix as per Vascular    Po diet  DVT ppx .   D/C home today d/w CM   Total d/c  care time is 60 minutes.
Pt is a 63 y/o M with PMHx of PVD, HTN, diabetes s/p metatarsal amputation sent in by wound clinic for left foot infections and multiple wounds admitted for worsening foot wound  infection, cellulitis left foot& Lower EXT.   Pt seen, examined, case & care plan d/w pt ,residents at [facility].  Consult:  ID-Dr XENIA Calhoun -IV ABx continue.  Podiatry-Dr Stark  d/w ABx, -Vascular intervention followed by Podiatry Sx NPO after midnight.  Vascular Sx eval done-Will need LLE angio for further evaluation scheduled for Friday AM  pt is Medically optimized for schedule Vascular intervention   Cardiology -Dr Hernandez  cardiac optimization  in chart   NPO past mid night    Cont ASA and Plavix as per Vascular    Po diet  DVT ppx .   Total care time is 60 minutes.
Pt is a 65 y/o M with PMHx of PVD, HTN, diabetes s/p metatarsal amputation sent in by wound clinic for left foot infections and multiple wounds admitted for worsening foot wound  infection, cellulitis left foot& Lower EXT.   Pt seen, examined, case & care plan d/w pt ,residents at detail.  Consult:  ID-Dr XENIA Calhoun -IV ABx continue. Rocephin daily.  Podiatry-Dr Stark  d/w ABx,   Vascular Sx follow up   Cardiology - follow up    Cont ASA and Plavix as per Vascular    Po diet  DVT ppx .   Total care time is 60 minutes.
Pt is a 63 y/o M with PMHx of PVD, HTN, diabetes s/p metatarsal amputation sent in by wound clinic for left foot infections and multiple wounds admitted for worsening foot wound  infection, cellulitis left foot& Lower EXT.   Pt seen, examined, case & care plan d/w pt ,residents at detail.  Consult:  ID-Dr XENIA Calhoun -IV ABx continue   Podiatry-Dr Stark  d/w ABx, -Vascular intervention followed by Podiatry Sx   Vascular Sx eval done-Will need LLE angio for further evaluation scheduled for Friday am  Cardiac and medical optimization requested  Cont ASA and Plavix   Po diet  DVT ppx .   Total care time is 60 minutes.
Pt is a 65 y/o M with PMHx of PVD, HTN, diabetes s/p metatarsal amputation sent in by wound clinic for left foot infections and multiple wounds admitted for worsening foot wound  infection, cellulitis left foot& Lower EXT.   Pt seen, examined, case & care plan d/w pt ,residents at detail.  Consult:  ID-Dr XENIA Calhoun -IV ABx continue. Rocephin daily  Podiatry-Dr Stark  d/w ABx, -Vascular intervention followed by Podiatry Sx NPO after midnight.  Vascular Sx eval done-Will need LLE angio for further evaluation scheduled for Friday AM  pt is Medically optimized for schedule Vascular intervention   Cardiology -Dr Hernandez  cardiac optimization  in chart   NPO past mid night    Cont ASA and Plavix as per Vascular    Po diet  DVT ppx .   Total care time is 60 minutes.

## 2025-05-06 NOTE — PROGRESS NOTE ADULT - ASSESSMENT
64M DM2, hx osteomyelitis, CAD, PAD s/p RLE angioplasty 2020, s/p surgical amputation of R forefoot , s/p L PT angioplasty 12/2/24 for L lat foot DFU on plavix, HTN, HLD sent in by wound clinic for left foot infections and multiple wounds. Vascular surgery to evaluate further with LLE angiogram.     Cardiac clearance  - p/w L foot infection with multiple wounds sent in by [facility], podiatry following   - Continue antibiotics per ID  - s/p LLE angio 5/2,  vascular following   - Tolerated procedure well, cardiac status optimal post operatively  - pending picc placement   -to [facility] on DC for further plan     - EKG: Sinus rhythm with premature supraventricular complexes  - No evidence of any active ischemia   - NST done 1/2024 with small sized, mild defect in the basal inferior wall that is fixed and partially normalizes with prone, suggestive of diaphragmatic artifact.   - Continue home ASA, Plavix and Lipitor    - Previous TTE (1/2/24) shows EF 61%, Normal LV mass and diastolic function, Normal RV size and function.  - No evidence of any meaningful volume overload     - BP stable and controlled   - Continue BB   - Monitor and replete lytes, keep K>4, Mg>2.    - Follows with Dr. Chu (o/p cardiologist)   - Will continue to follow.       64M DM2, hx osteomyelitis, CAD, PAD s/p RLE angioplasty 2020, s/p surgical amputation of R forefoot , s/p L PT angioplasty 12/2/24 for L lat foot DFU on plavix, HTN, HLD sent in by wound clinic for left foot infections and multiple wounds. Vascular surgery to evaluate further with LLE angiogram.     Cardiac clearance  - p/w L foot infection with multiple wounds sent in by [facility], podiatry following   - Continue antibiotics per ID  - s/p LLE angio 5/2,  vascular following   - Tolerated procedure well, cardiac status optimal post operatively  - sp PICC this am  -to [facility] on DC    - EKG: Sinus rhythm with premature supraventricular complexes  - No evidence of any active ischemia   - NST done 1/2024 with small sized, mild defect in the basal inferior wall that is fixed and partially normalizes with prone, suggestive of diaphragmatic artifact.   - Continue home ASA, Plavix and Lipitor    - Previous TTE (1/2/24) shows EF 61%, Normal LV mass and diastolic function, Normal RV size and function.  - No evidence of any meaningful volume overload     - BP stable and controlled   - Continue BB   - Monitor and replete lytes, keep K>4, Mg>2.    dc planning in place   - Follows with Dr. Chu (o/p cardiologist)

## 2025-05-06 NOTE — PROGRESS NOTE ADULT - SUBJECTIVE AND OBJECTIVE BOX
[place] [facility]  OFELIA Harvey Y. Patel, S. Shah, G. [facility]  [phone]    Name: LOIDA QUEZADA  Age: 64y  Gender: Male  MRN: [id number]    Interval History:  Patient seen and examined at bedside  No acute overnight events.   Notes reviewed    Antibiotics:  cefTRIAXone   IVPB 2000 milliGRAM(s) IV Intermittent every 24 hours  cefTRIAXone   IVPB          Medications:  aspirin enteric coated 81 milliGRAM(s) Oral daily  atorvastatin 40 milliGRAM(s) Oral at bedtime  cefTRIAXone   IVPB 2000 milliGRAM(s) IV Intermittent every 24 hours  cefTRIAXone   IVPB      chlorhexidine 2% Cloths 1 Application(s) Topical <User Schedule>  clopidogrel Tablet 75 milliGRAM(s) Oral daily  dextrose 50% Injectable 25 Gram(s) IV Push once  dextrose 50% Injectable 12.5 Gram(s) IV Push once  dextrose 50% Injectable 25 Gram(s) IV Push once  dextrose Oral Gel 15 Gram(s) Oral once PRN  enoxaparin Injectable 40 milliGRAM(s) SubCutaneous every 24 hours  gabapentin 300 milliGRAM(s) Oral three times a day  glucagon  Injectable 1 milliGRAM(s) IntraMuscular once  insulin glargine Injectable (LANTUS) 18 Unit(s) SubCutaneous at bedtime  insulin lispro (ADMELOG) corrective regimen sliding scale   SubCutaneous three times a day before meals  insulin lispro (ADMELOG) corrective regimen sliding scale   SubCutaneous at bedtime  insulin lispro Injectable (ADMELOG) 6 Unit(s) SubCutaneous three times a day before meals  lisinopril 40 milliGRAM(s) Oral daily  metoprolol succinate ER 50 milliGRAM(s) Oral daily  sodium chloride 0.9% lock flush 10 milliLiter(s) IV Push every 1 hour PRN      Review of Systems:  A 10-point review of systems was obtained.   Review of systems otherwise negative except as previously noted.    Allergies: No Known Allergies    For details regarding the patient's past medical history, social history, family history, and other miscellaneous elements, please refer the initial infectious diseases consultation and/or the admitting history and physical examination for this admission.    Objective:  Vitals:   T(C): 36.8 (05-06-25 @ 04:42), Max: 36.8 (05-05-25 @ 20:58)  HR: 68 (05-06-25 @ 04:42) (68 - 96)  BP: 127/76 (05-06-25 @ 04:42) (125/66 - 127/76)  RR: 19 (05-06-25 @ 04:42) (18 - 19)  SpO2: 98% (05-06-25 @ 04:42) (96% - 98%)    Physical Examination:  General: no acute distress  HEENT: NC/AT, EOMI  Cardio: S1, S2 heard, RRR, no murmurs  Resp: breath sounds heard bilaterally  Abd: soft, NT, ND  Ext: no edema or cyanosis  Skin: warm, dry, no visible rash  RUE PICC      Laboratory Studies:  CBC:                       10.6   11.08 )-----------( 353      ( 06 May 2025 07:02 )             33.2     CMP: 05-06    138  |  102  |  22  ----------------------------<  181[H]  4.8   |  29  |  0.81    Ca    9.1      06 May 2025 07:02    TPro  7.1  /  Alb  3.0[L]  /  TBili  0.3  /  DBili  x   /  AST  12[L]  /  ALT  29  /  AlkPhos  68  05-06    LIVER FUNCTIONS - ( 06 May 2025 07:02 )  Alb: 3.0 g/dL / Pro: 7.1 g/dL / ALK PHOS: 68 U/L / ALT: 29 U/L / AST: 12 U/L / GGT: x           Urinalysis Basic - ( 06 May 2025 07:02 )    Color: x / Appearance: x / SG: x / pH: x  Gluc: 181 mg/dL / Ketone: x  / Bili: x / Urobili: x   Blood: x / Protein: x / Nitrite: x   Leuk Esterase: x / RBC: x / WBC x   Sq Epi: x / Non Sq Epi: x / Bacteria: x        Microbiology: reviewed    Culture - Blood (collected 05-01-25 @ 05:58)  Source: Blood Blood-Peripheral  Final Report (05-06-25 @ 10:01):    No growth at 5 days    Culture - Blood (collected 05-01-25 @ 05:52)  Source: Blood Blood-Peripheral  Final Report (05-06-25 @ 10:01):    No growth at 5 days    Culture - Blood (collected 04-29-25 @ 12:45)  Source: Blood Blood  Gram Stain (05-01-25 @ 04:09):    Growth in aerobic bottle: Gram Positive Cocci in Clusters  Final Report (05-01-25 @ 23:19):    Growth in aerobic bottle: Staphylococcus pseudintermedius    "Susceptibilities not performed"    Direct identification is available within approximately 3-5    hours either by Blood Panel Multiplexed PCR or Direct    MALDI-TOF. Details: [email or web address]  Organism: Blood Culture PCR (05-01-25 @ 23:19)  Organism: Blood Culture PCR (05-01-25 @ 23:19)      -  Coagulase negative Staphylococcus: Detec      Method Type: PCR    Culture - Blood (collected 04-29-25 @ 12:30)  Source: Blood Blood  Final Report (05-04-25 @ 19:01):    No growth at 5 days          Radiology: reviewed

## 2025-05-06 NOTE — PROGRESS NOTE ADULT - PROBLEM SELECTOR PROBLEM 2
PAD (peripheral artery disease)
Infection of left foot
Infection of left foot
PAD (peripheral artery disease)
Infection of left foot

## 2025-05-06 NOTE — PROGRESS NOTE ADULT - PROBLEM SELECTOR PLAN 3
Chronic  -On home Trulicity, Jardiance and Metformin  -Hold home oral meds  -MDISS with FS QAC/QHS   -Lantus 15u qhs and 5u pre meal   -Hypoglycemia protocol  -A1c 7.9  -Pt with neuropathy continue Gabapentin 300mg TID  -Endo Dr. Perlman consulted
Chronic  -On home Trulicity, Jardiance and Metformin  -Hold home oral meds  -MDISS with FS QAC/QHS   -Lantus 15u qhs and 5u pre meal   -Hypoglycemia protocol  -A1c 7.9  -Pt with neuropathy continue Gabapentin 300mg TID  -Endo Dr. Perlman consulted
RESOLVED  Cr 1.3 on admission. Per chart review baseline is 1.0  - Cr 1.1 this AM  -Avoid nephrotoxic agents  -Monitor BMP daily
Chronic  -On home Trulicity, Jardiance and Metformin  -Hold home oral meds  -MDISS with FS QAC/QHS   -Lantus 15u qhs and 5u pre meal   -Hypoglycemia protocol  -A1c 7.9  -Pt with neuropathy continue Gabapentin 300mg TID  -Endo Dr. Perlman consulted
Cr 1.3 on admission. Per chart review baseline is 1.0  - Cr 1.6 this AM - likely s/p angiogram as had resolved prior  -Avoid nephrotoxic agents  -Monitor BMP daily
RESOLVED  Cr 1.3 on admission. Per chart review baseline is 1.0  - Cr 0.96 this AM  -Avoid nephrotoxic agents  -Monitor BMP daily
RESOLVED  Cr 1.3 on admission. Per chart review baseline is 1.0  - Cr 1.1 this AM  -Avoid nephrotoxic agents  -Monitor BMP daily

## 2025-05-06 NOTE — PROGRESS NOTE ADULT - SUBJECTIVE AND OBJECTIVE BOX
[facility] -- Vlad Gallardo Pannella, Patel, Savella Goodger, Cohen  Office # [phone]      Follow Up:    Cardiac optimization    Subjective/Observations:     No events overnight resting comfortably in bed.  No complaints of chest pain, dyspnea, or palpitations reported. No signs of orthopnea or PND.    REVIEW OF SYSTEMS: All other review of systems is negative unless indicated above    PAST MEDICAL & SURGICAL HISTORY:  HTN (hypertension)      Diabetes      Hyperlipidemia      PVD (peripheral vascular disease)      Toe osteomyelitis, right      H/O angioplasty  RLE      Status post amputation of toe          MEDICATIONS  (STANDING):  aspirin enteric coated 81 milliGRAM(s) Oral daily  atorvastatin 40 milliGRAM(s) Oral at bedtime  cefTRIAXone   IVPB 2000 milliGRAM(s) IV Intermittent every 24 hours  cefTRIAXone   IVPB      clopidogrel Tablet 75 milliGRAM(s) Oral daily  dextrose 50% Injectable 25 Gram(s) IV Push once  dextrose 50% Injectable 12.5 Gram(s) IV Push once  dextrose 50% Injectable 25 Gram(s) IV Push once  enoxaparin Injectable 40 milliGRAM(s) SubCutaneous every 24 hours  gabapentin 300 milliGRAM(s) Oral three times a day  glucagon  Injectable 1 milliGRAM(s) IntraMuscular once  insulin glargine Injectable (LANTUS) 18 Unit(s) SubCutaneous at bedtime  insulin lispro (ADMELOG) corrective regimen sliding scale   SubCutaneous three times a day before meals  insulin lispro (ADMELOG) corrective regimen sliding scale   SubCutaneous at bedtime  insulin lispro Injectable (ADMELOG) 6 Unit(s) SubCutaneous three times a day before meals  lisinopril 40 milliGRAM(s) Oral daily  metoprolol succinate ER 50 milliGRAM(s) Oral daily    MEDICATIONS  (PRN):  dextrose Oral Gel 15 Gram(s) Oral once PRN Blood Glucose LESS THAN 70 milliGRAM(s)/deciliter      Allergies    No Known Allergies    Intolerances        Vital Signs Last 24 Hrs  T(C): 36.8 (06 May 2025 04:42), Max: 36.9 (05 May 2025 12:00)  T(F): 98.2 (06 May 2025 04:42), Max: 98.4 (05 May 2025 12:00)  HR: 68 (06 May 2025 04:42) (68 - 96)  BP: 127/76 (06 May 2025 04:42) (125/66 - 158/80)  BP(mean): --  RR: 19 (06 May 2025 04:42) (18 - 19)  SpO2: 98% (06 May 2025 04:42) (96% - 99%)    Parameters below as of 06 May 2025 04:42  Patient On (Oxygen Delivery Method): room air        I&O's Summary    05 May 2025 07:01  -  06 May 2025 07:00  --------------------------------------------------------  IN: 770 mL / OUT: 400 mL / NET: 370 mL          PHYSICAL EXAM:  TELE: not on tele   Constitutional: NAD, awake and alert, well-developed  HEENT: Moist Mucous Membranes, Anicteric  Pulmonary: Non-labored, breath sounds are clear bilaterally, No wheezing, crackles or rhonchi  Cardiovascular: Regular, S1 and S2 nl, No murmurs, rubs, gallops or clicks  Gastrointestinal: Bowel Sounds present, soft, nontender.   Lymph: No lymphadenopathy. No peripheral edema.  Skin: No visible rashes or ulcers.  Psych:  Mood & affect appropriate    LABS: All Labs Reviewed:                        10.1   1272 )-----------( 341      ( 05 May 2025 05:57 )             30.6                         10.4   13.79 )-----------( 306      ( 04 May 2025 07:40 )             31.4     05 May 2025 05:57    139    |  106    |  25     ----------------------------<  163    4.4     |  27     |  0.89   04 May 2025 07:40    138    |  107    |  43     ----------------------------<  185    4.7     |  25     |  1.10     Ca    8.7        05 May 2025 05:57  Ca    9.1        04 May 2025 07:40    TPro  6.7    /  Alb  2.8    /  TBili  0.3    /  DBili  x      /  AST  10     /  ALT  26     /  AlkPhos  63     05 May 2025 05:57  TPro  6.5    /  Alb  2.7    /  TBili  0.3    /  DBili  x      /  AST  10     /  ALT  24     /  AlkPhos  61     04 May 2025 07:40             EC Lead ECG:   Ventricular Rate 76 BPM    Atrial Rate 76 BPM    P-R Interval 156 ms    QRS Duration 92 ms    Q-T Interval 390 ms    QTC Calculation(Bazett) 438 ms    P Axis 6 degrees    R Axis -1 degrees    T Axis 3 degrees    Diagnosis Line Sinus rhythm with premature supraventricular complexes  Otherwise normal ECG  When compared with ECG of 2024 12:25,  premature supraventricular complexes are now present  Confirmed by RAMÓN SHAFFER ([id number]) on 2025 6:26:16 PM (25 @ 13:07)         [facility] -- Vlad Gallardo Pannella, Patel, Savella Goodger, Cohen  Office # [phone]      Follow Up:    Cardiac optimization    Subjective/Observations:     No events overnight resting comfortably in bed.  No complaints of chest pain, dyspnea, or palpitations reported. No signs of orthopnea or PND.  Remains on room air   for DC today     REVIEW OF SYSTEMS: All other review of systems is negative unless indicated above    PAST MEDICAL & SURGICAL HISTORY:  HTN (hypertension)      Diabetes      Hyperlipidemia      PVD (peripheral vascular disease)      Toe osteomyelitis, right      H/O angioplasty  RLE      Status post amputation of toe          MEDICATIONS  (STANDING):  aspirin enteric coated 81 milliGRAM(s) Oral daily  atorvastatin 40 milliGRAM(s) Oral at bedtime  cefTRIAXone   IVPB 2000 milliGRAM(s) IV Intermittent every 24 hours  cefTRIAXone   IVPB      clopidogrel Tablet 75 milliGRAM(s) Oral daily  dextrose 50% Injectable 25 Gram(s) IV Push once  dextrose 50% Injectable 12.5 Gram(s) IV Push once  dextrose 50% Injectable 25 Gram(s) IV Push once  enoxaparin Injectable 40 milliGRAM(s) SubCutaneous every 24 hours  gabapentin 300 milliGRAM(s) Oral three times a day  glucagon  Injectable 1 milliGRAM(s) IntraMuscular once  insulin glargine Injectable (LANTUS) 18 Unit(s) SubCutaneous at bedtime  insulin lispro (ADMELOG) corrective regimen sliding scale   SubCutaneous three times a day before meals  insulin lispro (ADMELOG) corrective regimen sliding scale   SubCutaneous at bedtime  insulin lispro Injectable (ADMELOG) 6 Unit(s) SubCutaneous three times a day before meals  lisinopril 40 milliGRAM(s) Oral daily  metoprolol succinate ER 50 milliGRAM(s) Oral daily    MEDICATIONS  (PRN):  dextrose Oral Gel 15 Gram(s) Oral once PRN Blood Glucose LESS THAN 70 milliGRAM(s)/deciliter      Allergies    No Known Allergies    Intolerances        Vital Signs Last 24 Hrs  T(C): 36.8 (06 May 2025 04:42), Max: 36.9 (05 May 2025 12:00)  T(F): 98.2 (06 May 2025 04:42), Max: 98.4 (05 May 2025 12:00)  HR: 68 (06 May 2025 04:42) (68 - 96)  BP: 127/76 (06 May 2025 04:42) (125/66 - 158/80)  BP(mean): --  RR: 19 (06 May 2025 04:42) (18 - 19)  SpO2: 98% (06 May 2025 04:42) (96% - 99%)    Parameters below as of 06 May 2025 04:42  Patient On (Oxygen Delivery Method): room air        I&O's Summary    05 May 2025 07:01  -  06 May 2025 07:00  --------------------------------------------------------  IN: 770 mL / OUT: 400 mL / NET: 370 mL          PHYSICAL EXAM:  TELE: not on tele   Constitutional: NAD, awake and alert, well-developed  HEENT: Moist Mucous Membranes, Anicteric  Pulmonary: Non-labored, breath sounds are clear bilaterally, No wheezing, crackles or rhonchi  Cardiovascular: Regular, S1 and S2 nl, No murmurs, rubs, gallops or clicks  Gastrointestinal: Bowel Sounds present, soft, nontender.   Lymph: No lymphadenopathy. No peripheral edema.  Skin: No visible rashes or ulcers.  Psych:  Mood & affect appropriate    LABS: All Labs Reviewed:                        10. )-----------( 341      ( 05 May 2025 05:57 )             30.6                         10.4   13.79 )-----------( 306      ( 04 May 2025 07:40 )             31.4     05 May 2025 05:57    139    |  106    |  25     ----------------------------<  163    4.4     |  27     |  0.89   04 May 2025 07:40    138    |  107    |  43     ----------------------------<  185    4.7     |  25     |  1.10     Ca    8.7        05 May 2025 05:57  Ca    9.1        04 May 2025 07:40    TPro  6.7    /  Alb  2.8    /  TBili  0.3    /  DBili  x      /  AST  10     /  ALT  26     /  AlkPhos  63     05 May 2025 05:57  TPro  6.5    /  Alb  2.7    /  TBili  0.3    /  DBili  x      /  AST  10     /  ALT  24     /  AlkPhos  61     04 May 2025 07:40             EC Lead ECG:   Ventricular Rate 76 BPM    Atrial Rate 76 BPM    P-R Interval 156 ms    QRS Duration 92 ms    Q-T Interval 390 ms    QTC Calculation(Bazett) 438 ms    P Axis 6 degrees    R Axis -1 degrees    T Axis 3 degrees    Diagnosis Line Sinus rhythm with premature supraventricular complexes  Otherwise normal ECG  When compared with ECG of 2024 12:25,  premature supraventricular complexes are now present  Confirmed by RAMÓN SHAFFER ([id number]) on 2025 6:26:16 PM (25 @ 13:07)

## 2025-05-06 NOTE — PROGRESS NOTE ADULT - PROBLEM SELECTOR PLAN 5
Chronic  -On home Metoprolol  -Lisinopril held in setting of prior DELORIS and low end BPs, will re-initiate as BP requires  -C/w home meds with hold parameters  -Monitor hemodynamics Chronic  -On home Metoprolol  -Lisinopril daily restarted   -C/w home meds with hold parameters  -Monitor hemodynamics

## 2025-05-06 NOTE — PROGRESS NOTE ADULT - SUBJECTIVE AND OBJECTIVE BOX
CAPILLARY BLOOD GLUCOSE      POCT Blood Glucose.: 252 mg/dL (05 May 2025 22:22)  POCT Blood Glucose.: 170 mg/dL (05 May 2025 17:05)  POCT Blood Glucose.: 148 mg/dL (05 May 2025 11:57)  POCT Blood Glucose.: 183 mg/dL (05 May 2025 07:59)      Vital Signs Last 24 Hrs  T(C): 36.8 (06 May 2025 04:42), Max: 36.9 (05 May 2025 12:00)  T(F): 98.2 (06 May 2025 04:42), Max: 98.4 (05 May 2025 12:00)  HR: 68 (06 May 2025 04:42) (68 - 96)  BP: 127/76 (06 May 2025 04:42) (125/66 - 158/80)  BP(mean): --  RR: 19 (06 May 2025 04:42) (18 - 19)  SpO2: 98% (06 May 2025 04:42) (96% - 99%)    Parameters below as of 06 May 2025 04:42  Patient On (Oxygen Delivery Method): room air        General: WN/WD NAD  Respiratory: CTA B/L  CV: RRR, S1S2, no murmurs, rubs or gallops  Abdominal: Soft, NT, ND +BS, Last BM  Extremities: le foot dsg intact     05-05    139  |  106  |  25[H]  ----------------------------<  163[H]  4.4   |  27  |  0.89    Ca    8.7      05 May 2025 05:57    TPro  6.7  /  Alb  2.8[L]  /  TBili  0.3  /  DBili  x   /  AST  10[L]  /  ALT  26  /  AlkPhos  63  05-05      atorvastatin 40 milliGRAM(s) Oral at bedtime  dextrose 50% Injectable 25 Gram(s) IV Push once  dextrose 50% Injectable 12.5 Gram(s) IV Push once  dextrose 50% Injectable 25 Gram(s) IV Push once  dextrose Oral Gel 15 Gram(s) Oral once PRN  glucagon  Injectable 1 milliGRAM(s) IntraMuscular once  insulin glargine Injectable (LANTUS) 15 Unit(s) SubCutaneous at bedtime  insulin lispro (ADMELOG) corrective regimen sliding scale   SubCutaneous three times a day before meals  insulin lispro (ADMELOG) corrective regimen sliding scale   SubCutaneous at bedtime  insulin lispro Injectable (ADMELOG) 5 Unit(s) SubCutaneous three times a day before meals

## 2025-05-06 NOTE — PROGRESS NOTE ADULT - PROBLEM SELECTOR PROBLEM 6
Need for prophylactic measure
R/O Unstageable pressure ulcer of heel
Need for prophylactic measure

## 2025-05-06 NOTE — PROGRESS NOTE ADULT - PROBLEM SELECTOR PLAN 1
increase lantus 18 units qhs  increase admelog 6 units 3x/day before meals  cont mod dose admelog corrective scale coverage qac/qhs  cont cons cho diet  goal bg 100-180 in hosp setting

## 2025-05-06 NOTE — PROGRESS NOTE ADULT - SUBJECTIVE AND OBJECTIVE BOX
Patient is a 64y old  Male who presents with a chief complaint of Foot infection (06 May 2025 07:29)    HPI:  Pt is a 63 y/o M with PMHx DM2, hx osteomyelitis, CAD, PAD s/p RLE angioplasty 2020, s/p surgical amputation of R forefoot , s/p L PT angioplasty 12/2/24 for L lat foot DFU on plavix, HTN sent in by wound clinic for left foot infections and multiple wounds. Pt states he was at [facility] for hyperbaric therapy today and they noticed his L foot had drainage with increased erythema and edema so they told him to come to the ED. Pt states yesterday his foot was dry, without drainage or swelling. Patient notes he follow with ID outpatient and was prescribed Bactrim 2 weeks ago for the chronic foot wounds without any improvement. Pt denies fever/chills, CP, SOB, HA, dizziness, n/v/d. Pt also denies any current pain in the foot.      ED Course:   Vitals: BP: 107/66 , HR: 87 , Temp: 98.1F , RR: 16 , SpO2: 100 % on RA  Labs:  ESR 53, WBC 12.23, Hgb 11, BUN 25, Glucose 137,   X-ray Foot: Cutaneous defect adjacent to the base of the fifth metatarsal again evident with no gross cortical destruction or soft tissue emphysema. Follow-up MRI can be ordered    Received in the ED:  3.375g Zosyn IVPBx1, Vanco 1g IVPB x1   (29 Apr 2025 15:39)    INTERVAL HPI:  4/30: Pt seen and examined at bedside this morning, feels well, no acute complaints. On IV abx vanc and cefepime, planning for LLE angio on 5/2 continue Abx  5/1: Pt seen and examined this morning, feels well no acute complaints. Requesting left foot dressing change, c/d/i. Pain controlled. On IV abx vanc and cefepime, for LLE angio tomorrow NPO  5/2: Pt seen at bedside this afternoon s/p LLE angiogram, feels well no acute complaints. L foot dressing c/d/i, pain controlled. On IV abx vanc and cefepime---> Rocephin   5/3: Pt seen and examined at bedside, feels well no acute complaints. L foot dressing c/d/i, pain controlled. On IV Rocephin, Podiatry follow up.  5/4: Pt seen at bedside, surgical PA present as well, +L PT on Doppler noted. Pt feels well, no acute complaints. L foot dressing c/d/i, pain controlled. On IV Rocephin  5/5: Pt seen and examined at bedside. No acute complaints; pain well controlled. L. foot dressing changed by podiatry. On IV Rocephin; to go for PICC line today.  5/6: Pt seen and examined at bedside. No acute complaint; pain well controlled. DC planning today with PICC line.    OVERNIGHT EVENTS: No acute overnight events.     Home Medications:  aspirin 81 mg oral delayed release tablet: 1 tab(s) orally once a day (05 May 2025 10:27)  atorvastatin 40 mg oral tablet: 1 tab(s) orally once a day (at bedtime) (05 May 2025 10:27)  clopidogrel 75 mg oral tablet: 1 tab(s) orally once a day (05 May 2025 10:27)  gabapentin 300 mg oral capsule: 1 cap(s) orally 3 times a day (05 May 2025 10:27)  Jardiance 25 mg oral tablet: 1 tab(s) orally once a day (29 Apr 2025 16:40)  lisinopril 40 mg oral tablet: 1 tab(s) orally once a day (05 May 2025 10:27)  metFORMIN 1000 mg oral tablet: 1 tab(s) orally 2 times a day (29 Apr 2025 16:40)  metoprolol succinate 50 mg oral tablet, extended release: 1 tab(s) orally once a day (05 May 2025 10:27)  Trulicity Pen 1.5 mg/0.5 mL subcutaneous solution: 1.5 milligram(s) subcutaneously once a week (29 Apr 2025 16:40)      MEDICATIONS  (STANDING):  aspirin enteric coated 81 milliGRAM(s) Oral daily  atorvastatin 40 milliGRAM(s) Oral at bedtime  cefTRIAXone   IVPB 2000 milliGRAM(s) IV Intermittent every 24 hours  cefTRIAXone   IVPB      clopidogrel Tablet 75 milliGRAM(s) Oral daily  dextrose 50% Injectable 25 Gram(s) IV Push once  dextrose 50% Injectable 12.5 Gram(s) IV Push once  dextrose 50% Injectable 25 Gram(s) IV Push once  enoxaparin Injectable 40 milliGRAM(s) SubCutaneous every 24 hours  gabapentin 300 milliGRAM(s) Oral three times a day  glucagon  Injectable 1 milliGRAM(s) IntraMuscular once  insulin glargine Injectable (LANTUS) 18 Unit(s) SubCutaneous at bedtime  insulin lispro (ADMELOG) corrective regimen sliding scale   SubCutaneous three times a day before meals  insulin lispro (ADMELOG) corrective regimen sliding scale   SubCutaneous at bedtime  insulin lispro Injectable (ADMELOG) 6 Unit(s) SubCutaneous three times a day before meals  lisinopril 40 milliGRAM(s) Oral daily  metoprolol succinate ER 50 milliGRAM(s) Oral daily    MEDICATIONS  (PRN):  dextrose Oral Gel 15 Gram(s) Oral once PRN Blood Glucose LESS THAN 70 milliGRAM(s)/deciliter      No Known Allergies      Social History:  Tobacco: Denies  EtOH: Denies  Recreational drug use: Denies  Lives with: Wife at home   Ambulates: Independently   ADLs: Independent (29 Apr 2025 15:39)      REVIEW OF SYSTEMS: i am ok  CONSTITUTIONAL: No fever, No chills, No fatigue, No myalgia, No Body ache, No Weakness  EYES: No eye pain,  No visual disturbances, No discharge, No Redness  ENMT: No ear pain, No nose bleed, No vertigo; No sinus pain, No throat pain, No Congestion  NECK: No pain, No stiffness  RESPIRATORY: No cough, No wheezing, No hemoptysis, No shortness of breath  CARDIOVASCULAR: No chest pain, No palpitations  GASTROINTESTINAL: No abdominal pain, No epigastric pain. No nausea, No vomiting, No diarrhea, No constipation; [ x ] BM  GENITOURINARY: No dysuria, No frequency, No urgency, No hematuria, No incontinence  NEUROLOGICAL: No headaches, No dizziness, No numbness, No tingling, No tremors, No weakness  EXTREMITIES: No Swelling, No Pain, No Edema  SKIN: [ x ] No itching, burning, rashes, or lesions   MUSCULOSKELETAL: No joint pain, No joint swelling; No muscle pain, No back pain, No extremity pain  PSYCHIATRIC: No depression, No anxiety, No mood swings, No difficulty sleeping at night  PAIN SCALE: [ x ] None  [  ] Other-  ROS Unable to obtain due to: [  ] Dementia  [  ] Lethargy  [  ] Sedated  [  ] Non verbal  REST OF REVIEW OF SYSTEMS: [x  ] Normal     Vital Signs Last 24 Hrs  T(C): 36.8 (06 May 2025 04:42), Max: 36.9 (05 May 2025 12:00)  T(F): 98.2 (06 May 2025 04:42), Max: 98.4 (05 May 2025 12:00)  HR: 68 (06 May 2025 04:42) (68 - 96)  BP: 127/76 (06 May 2025 04:42) (125/66 - 158/80)  BP(mean): --  RR: 19 (06 May 2025 04:42) (18 - 19)  SpO2: 98% (06 May 2025 04:42) (96% - 99%)    Parameters below as of 06 May 2025 04:42  Patient On (Oxygen Delivery Method): room air        CAPILLARY BLOOD GLUCOSE      POCT Blood Glucose.: 191 mg/dL (06 May 2025 07:51)  POCT Blood Glucose.: 252 mg/dL (05 May 2025 22:22)  POCT Blood Glucose.: 170 mg/dL (05 May 2025 17:05)  POCT Blood Glucose.: 148 mg/dL (05 May 2025 11:57)      I&O's Summary    05 May 2025 07:01  -  06 May 2025 07:00  --------------------------------------------------------  IN: 770 mL / OUT: 400 mL / NET: 370 mL      PHYSICAL EXAM:  GENERAL:  [ x ] NAD, [x ] Well appearing, [  ] Agitated, [  ] Drowsy, [  ] Lethargy, [  ] Confused   HEAD:  [ x ] Normal, [  ] Other  EYES:  [x  ] EOMI, [ x] PERRL, [ x ] Conjunctiva and sclera clear normal, [  ] Other, [  ] Pallor, [  ] Discharge  ENMT:  [ x ] Normal, [ x ] Moist mucous membranes, [ x ] Good dentition, [ x ] No thrush  NECK:  [ x ] Supple, [x  ] No JVD, [ x ] Normal thyroid, [  ] Lymphadenopathy, [  ] Other  CHEST/LUNG:  [ x ] Clear to auscultation bilaterally, [ x ] Breath Sounds equal B/L / decreased, [x  ] Poor effort, [ x ] No rales, [ x ] No rhonchi, [x  ] No wheezing  HEART:  [ x ] Regular rate and rhythm, [  ] Tachycardia, [  ] Bradycardia, [  ] Irregular, [ x ] No murmurs, No rubs, No gallops, [  ] PPM in place (Mfr:  )  ABDOMEN:  [ x ] Soft, [x ] Nontender, [ x ] Nondistended, [ x ] No mass, [ x ] Bowel sounds present, [  ] Obese  NERVOUS SYSTEM:  [ x ] Alert & Oriented x3, [ x ] Nonfocal, [  ] Confusion, [  ] Encephalopathic, [  ] Sedated, [  ] Unable to assess, [  ] Dementia, [  ] Other-  EXTREMITIES:  [ x ] 2+ Peripheral Pulses, No clubbing, No cyanosis,  [  ] Edema B/L lower EXT, [x ] PVD stasis skin changes B/L lower EXT, [x  ] Wound - left foot in dressing c/d/i  SKIN:  [ x] No rashes or lesions, [  ] Pressure ulcers, [  ] Ecchymosis, [  ] Skin tears, [ x ] Other Left foot wound-necrotic ulcers base medial, lateral aspects of foot, nonstageable necrotic heel ulcer left, with edema, reduced erythema, LL Ext  cellulitic improved,, negative drainage  distal hallux ulcer necrotic base, No Drainage     DIET: Diet, Consistent Carbohydrate w/Evening Snack:   Low Sodium (05-02-25 @ 10:36)      LABS:      Ca    8.7        05 May 2025 05:57        Urinalysis Basic - ( 05 May 2025 05:57 )    Color: x / Appearance: x / SG: x / pH: x  Gluc: 163 mg/dL / Ketone: x  / Bili: x / Urobili: x   Blood: x / Protein: x / Nitrite: x   Leuk Esterase: x / RBC: x / WBC x   Sq Epi: x / Non Sq Epi: x / Bacteria: x      Culture Results:   No growth at 4 days (05-01 @ 05:58)  Culture Results:   No growth at 4 days (05-01 @ 05:52)  Culture Results:   Growth in aerobic bottle: Staphylococcus pseudintermedius  "Susceptibilities not performed"  Direct identification is available within approximately 3-5  hours either by Blood Panel Multiplexed PCR or Direct  MALDI-TOF. Details: [email or web address] (04-29 @ 12:45)  Culture Results:   No growth at 5 days (04-29 @ 12:30)            Culture - Blood (collected 01 May 2025 05:58)  Source: Blood Blood-Peripheral  Preliminary Report (05 May 2025 10:00):    No growth at 4 days    Culture - Blood (collected 01 May 2025 05:52)  Source: Blood Blood-Peripheral  Preliminary Report (05 May 2025 10:00):    No growth at 4 days    Culture - Blood (collected 29 Apr 2025 12:45)  Source: Blood Blood  Gram Stain (01 May 2025 04:09):    Growth in aerobic bottle: Gram Positive Cocci in Clusters  Final Report (01 May 2025 23:19):    Growth in aerobic bottle: Staphylococcus pseudintermedius    "Susceptibilities not performed"    Direct identification is available within approximately 3-5    hours either by Blood Panel Multiplexed PCR or Direct    MALDI-TOF. Details: [email or web address]  Organism: Blood Culture PCR (01 May 2025 23:19)  Organism: Blood Culture PCR (01 May 2025 23:19)    Culture - Blood (collected 29 Apr 2025 12:30)  Source: Blood Blood  Final Report (04 May 2025 19:01):    No growth at 5 days       Anemia Panel:      Thyroid Panel:                RADIOLOGY & ADDITIONAL TESTS: _______      HEALTH ISSUES - PROBLEM Dx:  Type 2 diabetes mellitus    HTN (hypertension)    DELORIS (acute kidney injury)    Need for prophylactic measure    Infection of left foot    PAD (peripheral artery disease)    Diabetic ulcer of left foot    Osteomyelitis of left foot    Unstageable pressure ulcer of left foot          Consultant(s) Notes Reviewed:  [ x ] YES     Care Discussed with [ x ] Consultants, [ x ] Patient, [ x ] Family, [  ] HCP, [ x ] , [  ] Social Service, [ x ] RN, [  ] Physical Therapy, [  ] Palliative Care Team  DVT PPX: [  ] Lovenox, [  ] SC Heparin, [  ] Coumadin, [  ] Xarelto, [  ] Eliquis, [  ] Pradaxa, [  ] IV Heparin drip, [  ] SCD, [  ] Ambulation, [  ] Contraindicated 2/2 GI Bleed, [  ] Contraindicated 2/2  Bleed, [  ] Contraindicated 2/2 Brain Bleed  Advanced Directive: [  ] None, [  ] DNR/DNI Patient is a 64y old  Male who presents with a chief complaint of Foot infection (06 May 2025 07:29)    HPI:  Pt is a 65 y/o M with PMHx DM2, hx osteomyelitis, CAD, PAD s/p RLE angioplasty 2020, s/p surgical amputation of R forefoot , s/p L PT angioplasty 12/2/24 for L lat foot DFU on plavix, HTN sent in by wound clinic for left foot infections and multiple wounds. Pt states he was at [facility] for hyperbaric therapy today and they noticed his L foot had drainage with increased erythema and edema so they told him to come to the ED. Pt states yesterday his foot was dry, without drainage or swelling. Patient notes he follow with ID outpatient and was prescribed Bactrim 2 weeks ago for the chronic foot wounds without any improvement. Pt denies fever/chills, CP, SOB, HA, dizziness, n/v/d. Pt also denies any current pain in the foot.      ED Course:   Vitals: BP: 107/66 , HR: 87 , Temp: 98.1F , RR: 16 , SpO2: 100 % on RA  Labs:  ESR 53, WBC 12.23, Hgb 11, BUN 25, Glucose 137,   X-ray Foot: Cutaneous defect adjacent to the base of the fifth metatarsal again evident with no gross cortical destruction or soft tissue emphysema. Follow-up MRI can be ordered    Received in the ED:  3.375g Zosyn IVPBx1, Vanco 1g IVPB x1   (29 Apr 2025 15:39)    INTERVAL HPI:  4/30: Pt seen and examined at bedside this morning, feels well, no acute complaints. On IV abx vanc and cefepime, planning for LLE angio on 5/2 continue Abx  5/1: Pt seen and examined this morning, feels well no acute complaints. Requesting left foot dressing change, c/d/i. Pain controlled. On IV abx vanc and cefepime, for LLE angio tomorrow NPO  5/2: Pt seen at bedside this afternoon s/p LLE angiogram, feels well no acute complaints. L foot dressing c/d/i, pain controlled. On IV abx vanc and cefepime---> Rocephin   5/3: Pt seen and examined at bedside, feels well no acute complaints. L foot dressing c/d/i, pain controlled. On IV Rocephin, Podiatry follow up.  5/4: Pt seen at bedside, surgical PA present as well, +L PT on Doppler noted. Pt feels well, no acute complaints. L foot dressing c/d/i, pain controlled. On IV Rocephin  5/5: Pt seen and examined at bedside. No acute complaints; pain well controlled. L. foot dressing changed by podiatry. On IV Rocephin; to go for PICC line today.  5/6: Pt seen and examined at bedside. No acute complaint; pain well controlled. DC planning today with PICC line.    OVERNIGHT EVENTS: No acute overnight events.     Home Medications:  aspirin 81 mg oral delayed release tablet: 1 tab(s) orally once a day (05 May 2025 10:27)  atorvastatin 40 mg oral tablet: 1 tab(s) orally once a day (at bedtime) (05 May 2025 10:27)  clopidogrel 75 mg oral tablet: 1 tab(s) orally once a day (05 May 2025 10:27)  gabapentin 300 mg oral capsule: 1 cap(s) orally 3 times a day (05 May 2025 10:27)  Jardiance 25 mg oral tablet: 1 tab(s) orally once a day (29 Apr 2025 16:40)  lisinopril 40 mg oral tablet: 1 tab(s) orally once a day (05 May 2025 10:27)  metFORMIN 1000 mg oral tablet: 1 tab(s) orally 2 times a day (29 Apr 2025 16:40)  metoprolol succinate 50 mg oral tablet, extended release: 1 tab(s) orally once a day (05 May 2025 10:27)  Trulicity Pen 1.5 mg/0.5 mL subcutaneous solution: 1.5 milligram(s) subcutaneously once a week (29 Apr 2025 16:40)      MEDICATIONS  (STANDING):  aspirin enteric coated 81 milliGRAM(s) Oral daily  atorvastatin 40 milliGRAM(s) Oral at bedtime  cefTRIAXone   IVPB 2000 milliGRAM(s) IV Intermittent every 24 hours  cefTRIAXone   IVPB      clopidogrel Tablet 75 milliGRAM(s) Oral daily  dextrose 50% Injectable 25 Gram(s) IV Push once  dextrose 50% Injectable 12.5 Gram(s) IV Push once  dextrose 50% Injectable 25 Gram(s) IV Push once  enoxaparin Injectable 40 milliGRAM(s) SubCutaneous every 24 hours  gabapentin 300 milliGRAM(s) Oral three times a day  glucagon  Injectable 1 milliGRAM(s) IntraMuscular once  insulin glargine Injectable (LANTUS) 18 Unit(s) SubCutaneous at bedtime  insulin lispro (ADMELOG) corrective regimen sliding scale   SubCutaneous three times a day before meals  insulin lispro (ADMELOG) corrective regimen sliding scale   SubCutaneous at bedtime  insulin lispro Injectable (ADMELOG) 6 Unit(s) SubCutaneous three times a day before meals  lisinopril 40 milliGRAM(s) Oral daily  metoprolol succinate ER 50 milliGRAM(s) Oral daily    MEDICATIONS  (PRN):  dextrose Oral Gel 15 Gram(s) Oral once PRN Blood Glucose LESS THAN 70 milliGRAM(s)/deciliter      No Known Allergies      Social History:  Tobacco: Denies  EtOH: Denies  Recreational drug use: Denies  Lives with: Wife at home   Ambulates: Independently   ADLs: Independent (29 Apr 2025 15:39)      REVIEW OF SYSTEMS: i am ok  CONSTITUTIONAL: No fever, No chills, No fatigue, No myalgia, No Body ache, No Weakness  EYES: No eye pain,  No visual disturbances, No discharge, No Redness  ENMT: No ear pain, No nose bleed, No vertigo; No sinus pain, No throat pain, No Congestion  NECK: No pain, No stiffness  RESPIRATORY: No cough, No wheezing, No hemoptysis, No shortness of breath  CARDIOVASCULAR: No chest pain, No palpitations  GASTROINTESTINAL: No abdominal pain, No epigastric pain. No nausea, No vomiting, No diarrhea, No constipation; [ x ] BM  GENITOURINARY: No dysuria, No frequency, No urgency, No hematuria, No incontinence  NEUROLOGICAL: No headaches, No dizziness, No numbness, No tingling, No tremors, No weakness  EXTREMITIES: No Swelling, No Pain, No Edema  SKIN: [ x ] No itching, burning, rashes, or lesions   MUSCULOSKELETAL: No joint pain, No joint swelling; No muscle pain, No back pain, No extremity pain  PSYCHIATRIC: No depression, No anxiety, No mood swings, No difficulty sleeping at night  PAIN SCALE: [ x ] None  [  ] Other-  ROS Unable to obtain due to: [  ] Dementia  [  ] Lethargy  [  ] Sedated  [  ] Non verbal  REST OF REVIEW OF SYSTEMS: [x  ] Normal     Vital Signs Last 24 Hrs  T(C): 36.8 (06 May 2025 04:42), Max: 36.9 (05 May 2025 12:00)  T(F): 98.2 (06 May 2025 04:42), Max: 98.4 (05 May 2025 12:00)  HR: 68 (06 May 2025 04:42) (68 - 96)  BP: 127/76 (06 May 2025 04:42) (125/66 - 158/80)  BP(mean): --  RR: 19 (06 May 2025 04:42) (18 - 19)  SpO2: 98% (06 May 2025 04:42) (96% - 99%)    Parameters below as of 06 May 2025 04:42  Patient On (Oxygen Delivery Method): room air        CAPILLARY BLOOD GLUCOSE      POCT Blood Glucose.: 191 mg/dL (06 May 2025 07:51)  POCT Blood Glucose.: 252 mg/dL (05 May 2025 22:22)  POCT Blood Glucose.: 170 mg/dL (05 May 2025 17:05)  POCT Blood Glucose.: 148 mg/dL (05 May 2025 11:57)      I&O's Summary    05 May 2025 07:01  -  06 May 2025 07:00  --------------------------------------------------------  IN: 770 mL / OUT: 400 mL / NET: 370 mL      PHYSICAL EXAM:  GENERAL:  [ x ] NAD, [x ] Well appearing, [  ] Agitated, [  ] Drowsy, [  ] Lethargy, [  ] Confused   HEAD:  [ x ] Normal, [  ] Other  EYES:  [x  ] EOMI, [ x] PERRL, [ x ] Conjunctiva and sclera clear normal, [  ] Other, [  ] Pallor, [  ] Discharge  ENMT:  [ x ] Normal, [ x ] Moist mucous membranes, [ x ] Good dentition, [ x ] No thrush  NECK:  [ x ] Supple, [x  ] No JVD, [ x ] Normal thyroid, [  ] Lymphadenopathy, [  ] Other  CHEST/LUNG:  [ x ] Clear to auscultation bilaterally, [ x ] Breath Sounds equal B/L / decreased, [x  ] Poor effort, [ x ] No rales, [ x ] No rhonchi, [x  ] No wheezing  HEART:  [ x ] Regular rate and rhythm, [  ] Tachycardia, [  ] Bradycardia, [  ] Irregular, [ x ] No murmurs, No rubs, No gallops, [  ] PPM in place (Mfr:  )  ABDOMEN:  [ x ] Soft, [x ] Nontender, [ x ] Nondistended, [ x ] No mass, [ x ] Bowel sounds present, [ x ] Obese + umbilical hernia   NERVOUS SYSTEM:  [ x ] Alert & Oriented x3, [ x ] Nonfocal, [  ] Confusion, [  ] Encephalopathic, [  ] Sedated, [  ] Unable to assess, [  ] Dementia, [  ] Other-  EXTREMITIES:  [ x ] 2+ Peripheral Pulses, No clubbing, No cyanosis,  [  ] Edema B/L lower EXT, [x ] PVD stasis skin changes B/L lower EXT, [x  ] Wound - left foot in dressing c/d/i  SKIN:  [ x] No rashes or lesions, [  ] Pressure ulcers, [  ] Ecchymosis, [  ] Skin tears, [ x ] Other Left foot wound-necrotic ulcers base medial, lateral aspects of foot, nonstageable necrotic heel ulcer left, with edema, reduced erythema, LL Ext  cellulitic improved,, negative drainage  distal hallux ulcer necrotic base, No Drainage ,Rt TMA     DIET: Diet, Consistent Carbohydrate w/Evening Snack:   Low Sodium (05-02-25 @ 10:36)      LABS:                        10.6   11.08 )-----------( 353      ( 06 May 2025 07:02 )             33.2     06 May 2025 07:02    138    |  102    |  22     ----------------------------<  181    4.8     |  29     |  0.81     Ca    9.1        06 May 2025 07:02    TPro  7.1    /  Alb  3.0    /  TBili  0.3    /  DBili  x      /  AST  12     /  ALT  29     /  AlkPhos  68     06 May 2025 07:02      Ca    8.7        05 May 2025 05:57        Urinalysis Basic - ( 05 May 2025 05:57 )    Color: x / Appearance: x / SG: x / pH: x  Gluc: 163 mg/dL / Ketone: x  / Bili: x / Urobili: x   Blood: x / Protein: x / Nitrite: x   Leuk Esterase: x / RBC: x / WBC x   Sq Epi: x / Non Sq Epi: x / Bacteria: x      Culture Results:   No growth at 4 days (05-01 @ 05:58)  Culture Results:   No growth at 4 days (05-01 @ 05:52)  Culture Results:   Growth in aerobic bottle: Staphylococcus pseudintermedius  "Susceptibilities not performed"  Direct identification is available within approximately 3-5  hours either by Blood Panel Multiplexed PCR or Direct  MALDI-TOF. Details: [email or web address] (04-29 @ 12:45)  Culture Results:   No growth at 5 days (04-29 @ 12:30)            Culture - Blood (collected 01 May 2025 05:58)  Source: Blood Blood-Peripheral  Preliminary Report (05 May 2025 10:00):    No growth at 4 days    Culture - Blood (collected 01 May 2025 05:52)  Source: Blood Blood-Peripheral  Preliminary Report (05 May 2025 10:00):    No growth at 4 days    Culture - Blood (collected 29 Apr 2025 12:45)  Source: Blood Blood  Gram Stain (01 May 2025 04:09):    Growth in aerobic bottle: Gram Positive Cocci in Clusters  Final Report (01 May 2025 23:19):    Growth in aerobic bottle: Staphylococcus pseudintermedius    "Susceptibilities not performed"    Direct identification is available within approximately 3-5    hours either by Blood Panel Multiplexed PCR or Direct    MALDI-TOF. Details: [email or web address]  Organism: Blood Culture PCR (01 May 2025 23:19)  Organism: Blood Culture PCR (01 May 2025 23:19)    Culture - Blood (collected 29 Apr 2025 12:30)  Source: Blood Blood  Final Report (04 May 2025 19:01):    No growth at 5 days    RADIOLOGY & ADDITIONAL TESTS: _______      HEALTH ISSUES - PROBLEM Dx:  Type 2 diabetes mellitus    HTN (hypertension)    DELORIS (acute kidney injury)    Need for prophylactic measure    Infection of left foot    PAD (peripheral artery disease)    Diabetic ulcer of left foot    Osteomyelitis of left foot    Unstageable pressure ulcer of left foot          Consultant(s) Notes Reviewed:  [ x ] YES     Care Discussed with [ x ] Consultants, [ x ] Patient, [ x ] Family, [  ] HCP, [ x ] , [  ] Social Service, [ x ] RN, [  ] Physical Therapy, [  ] Palliative Care Team  DVT PPX: [x ] Lovenox, [  ] SC Heparin, [  ] Coumadin, [  ] Xarelto, [  ] Eliquis, [  ] Pradaxa, [  ] IV Heparin drip, [  ] SCD, [  ] Ambulation, [  ] Contraindicated 2/2 GI Bleed, [  ] Contraindicated 2/2  Bleed, [  ] Contraindicated 2/2 Brain Bleed  Advanced Directive: [x  ] None, [  ] DNR/DNI

## 2025-05-06 NOTE — PROGRESS NOTE ADULT - ASSESSMENT
65 y/o M with PVD, HTN, diabetes s/p metatarsal amputation sent in by wound clinic for left foot infections and multiple wounds admitted for worsening foot wound  infection, cellulitis left dylon t& Lower EXT.     L foot wound infection/cellulitis +/- underlying OM, PAD, DELORIS  - pt p/w multiple L foot wounds  -- pt well known to service; is s/p surgical amputation of R forefoot  s/p L PT angioplasty 12/2/24 for L lat foot DFU on Plavix  - afebrile, leukocytosis to 12. Elevated ESR 53  - X-ray Foot: Cutaneous defect adjacent to the base of the fifth metatarsal again evident with no gross cortical destruction or soft tissue emphysema.   - MR L foot Soft tissue wound along the 5th metatarsal base with cortical erosive change and marrow edema concerning for osteomyelitis.  - 4/29 BCx + CoNS -- likely procurement contaminant; 5/1 BCx NGTD  - 5/2 s/p LLE angio    Recommendations:   C/w ceftriaxone 2gm q24h based on prior cx of S. kristin from December 2024  --Podiatry planning procedure after further vascular evaluation/studies  --will plan x6 week course IV Abx until 6/10/25  --s/p PICC placement  --weekly CMP, CBC, ESR CRP  --orders called into [facility]  Trend temps/WBC  Monitor renal fxn, renally dose medications  Local wound care  Additional care, d/c planning per primary team    Pt to f/u Vascular  Pt to f/u Dr. Brasher next week [phone]    Patient evaluated with face-to-face time in addition to reviewing history, labs, microbiology, and imaging.   Antibiotic stewardship, local antibiogram, infection control strategies and potential transmission issues taken into consideration at time of treatment decision making process.   Thank you for allowing us to participate in the care of your patient.  D/w patient at bedside  Infectious Diseases will follow. Please call with any questions.  Melany Calhoun M.D.  Available on [employer] TEAMS -- *[facility]*  [place] [facility] [phone]  For after 5 P.M. and weekends, please call [phone]

## 2025-05-06 NOTE — PROGRESS NOTE ADULT - PROBLEM SELECTOR PROBLEM 5
HTN (hypertension)
HTN (hypertension)
Unstageable pressure ulcer of left foot
HTN (hypertension)
Unstageable pressure ulcer of left foot
HTN (hypertension)
Unstageable pressure ulcer of left foot

## 2025-05-06 NOTE — PROGRESS NOTE ADULT - PROVIDER SPECIALTY LIST ADULT
Cardiology
Infectious Disease
Infectious Disease
Vascular Surgery
Cardiology
Cardiology
Infectious Disease
Internal Medicine
Vascular Surgery
Vascular Surgery
Cardiology
Infectious Disease
Infectious Disease
Cardiology
Infectious Disease
Internal Medicine
Internal Medicine
Endocrinology
Podiatry
Podiatry
Internal Medicine
Podiatry

## 2025-05-06 NOTE — CASE MANAGEMENT PROGRESS NOTE - NSCMPROGRESSNOTE_GEN_ALL_CORE
Patient is s/p PICC line placement today. Patient has been accepted by [facility] for SOC tomorrow, as confirmed with representative Jonna via telephone. Patient declining [facility] services for wound care; reports will be attending the [facility] wound care center for wound care services. He reports his wife will be transporting him home later today. Discharge notice reviewed and explained to patient; he verbalized understanding. He is in agreement to the DC plan. CM remains available.

## 2025-05-06 NOTE — PROGRESS NOTE ADULT - PROBLEM SELECTOR PROBLEM 4
DELORIS (acute kidney injury)
Osteomyelitis of left foot
Osteomyelitis of left foot
Type 2 diabetes mellitus
Osteomyelitis of left foot
Type 2 diabetes mellitus
DELORIS (acute kidney injury)
Type 2 diabetes mellitus
DELORIS (acute kidney injury)
Type 2 diabetes mellitus

## 2025-05-06 NOTE — PROGRESS NOTE ADULT - PROBLEM SELECTOR PROBLEM 1
Type 2 diabetes mellitus
Type 2 diabetes mellitus
Infection of left foot
Type 2 diabetes mellitus
Infection of left foot
Type 2 diabetes mellitus
Infection of left foot

## 2025-05-06 NOTE — PROGRESS NOTE ADULT - PROBLEM SELECTOR PLAN 2
Chronic. Pt follows with Vascular outpt. Pt s/p L PT angioplasty 12/2/24 for L lat foot DFU on Plavix  -C/w ASA and Plavix and Lipitor   -s/p LLE angio with occluded PT  - Cardiology ([facility]) consulted for cardiac clearance  out pt follow up for test & intervention Chronic. Pt follows with Vascular outpt. Pt s/p L PT angioplasty 12/2/24 for L lat foot DFU on Plavix  -C/w ASA and Plavix and Lipitor   -s/p LLE angio with occluded PT   -Cardiology folllow up  out pt follow up for test & intervention

## 2025-05-06 NOTE — PROGRESS NOTE ADULT - PROBLEM SELECTOR PROBLEM 3
Diabetic ulcer of left foot
Type 2 diabetes mellitus
Type 2 diabetes mellitus
DELORIS (acute kidney injury)
DELORIS (acute kidney injury)
Diabetic ulcer of left foot
DELORIS (acute kidney injury)
Diabetic ulcer of left foot
Type 2 diabetes mellitus
DELORIS (acute kidney injury)

## 2025-05-06 NOTE — PROGRESS NOTE ADULT - PROBLEM SELECTOR PLAN 1
Pt with  L foot infection with multiple wounds sent in by [facility]. S/p surgical amputation of R forefoot s/p L PT angioplasty 12/2/24 for L lat foot DFU on Plavix  -Pt c/o drainage from wound, redness and streaking. Concern for Cellulitis & osteo   -MRI with soft tissue wound along the 5th metatarsal base with cortical erosive change and marrow edema concerning for osteomyelitis. Soft tissue wound along the posterolateral calcaneal tuberosity with curvilinear marrow hyperemia but no definite osteomyelitis.   IV abx cefepime and vanc transitioned to IV Rocephin  - BCx 1/2 bottles with Coag negative Staph, possible contaminant, repeat BCx NGTD  - NWB LLE  -At high risk for more proximal amputation. Intervention will be based on results of FRANCISCA and PVR's. Possible BKA per podiatry   - Plan for PICC placement Monday 5/5 for long term abx  -C/w ASA and Plavix and Lipitor  Daily  -Podiatry d/w Dr. Stark d/w   -Vascular surgery follow up- ASA ,Plavix, s/p LLE angio with occluded PT-out pt follow up   -ID Dr Melany Calhoun, -IV Abx Rocephin 2 gm daily---Podiatry planning procedure after further vascular evaluation/studies  --will plan x6 week course IV Abx until 6/10/25  --PICC placed; DC planning today   --will need CMP, CBC, ESR CRP  --orders called into [facility] Pt with  L foot infection with multiple wounds sent in by [facility]. S/p surgical amputation of R forefoot s/p L PT angioplasty 12/2/24 for L lat foot DFU on Plavix  -Pt c/o drainage from wound, redness and streaking. Concern for Cellulitis & osteo   -MRI with soft tissue wound along the 5th metatarsal base with cortical erosive change and marrow edema concerning for osteomyelitis. Soft tissue wound along the posterolateral calcaneal tuberosity with curvilinear marrow hyperemia but no definite osteomyelitis.   IV abx cefepime and vanc transitioned to IV Rocephin  - BCx 1/2 bottles with Coag negative Staph, possible contaminant, repeat BCx NGTD  - NWB LLE  -At high risk for more proximal amputation. Intervention will be based on results of FRANCISCA and PVR's. Possible BKA per podiatry   - Plan for PICC placement Monday 5/5 for long term abx  -C/w ASA and Plavix and Lipitor  Daily  -Podiatry d/w Dr. Stark d/w   -Vascular surgery follow up- ASA ,Plavix, s/p LLE angio with occluded PT-out pt follow up   -ID Dr Melany Calhoun, -IV Abx Rocephin 2 gm daily-- plan x6 week course IV Abx until 6/10/25--Podiatry planning procedure after further vascular evaluation/studies  -- plan x6 week course IV Abx until 6/10/25  --PICC placed; DC today  [facility] follow up on 5/7   --will need CMP, CBC, ESR CRP q weekly   --orders called into [facility]

## 2025-05-06 NOTE — PROGRESS NOTE ADULT - NS ATTEND AMEND GEN_ALL_CORE FT
Patient was evaluated at bedside.  He is doing well.  Right groin access site is soft.  Denies any pain in the left foot.  He has a strong left PT signal.  Foot is wrapped with dressings.  We had discussion regarding findings of angiogram and further management.  I explained to the patient that he has single-vessel runoff via the peroneal artery and pretty significant small vessel disease.  His options would be to proceed with metatarsal surgery with podiatry however my concern would be wound healing after surgery given significant small vessel disease.  We also discussed the option of deep venous arterialization.  I explained to the patient that this is a newer procedure being performed for patients with diabetes and severe small vessel disease.  Also explained that if we were to proceed with DVA, foot surgery would need to be delayed for a couple of weeks in order to allow for the veins in the left foot to be arterialized.  Finally the option of a below-knee amputation as a last resort was discussed.  Patient currently not interested in below-knee amputation.  He would also be interested in proceeding with a second opinion as well.  I encouraged the patient to do so.  I will discuss further with his podiatrist and the patient for final plan.
64M DM2, hx osteomyelitis, CAD, PAD s/p RLE angioplasty 2020, s/p surgical amputation of R forefoot , s/p L PT angioplasty 12/2/24 for L lat foot DFU on plavix, HTN, HLD sent in by wound clinic for left foot infections and multiple wounds. Vascular surgery to evaluate further with LLE angiogram.      - p/w L foot infection with multiple wounds sent in by [facility], podiatry following   - Continue antibiotics per ID  - s/p LLE angio 5/2,  vascular following   - Tolerated procedure well, cardiac status optimal post operatively  - pending picc placement   -to fu Vascular on DC for further plan    - No evidence of any active ischemia   - NST done 1/2024 with small sized, mild defect in the basal inferior wall that is fixed and partially normalizes with prone, suggestive of diaphragmatic artifact.   - Continue home ASA, Plavix and Lipitor   - Previous TTE (1/2/24) shows EF 61%, Normal LV mass and diastolic function, Normal RV size and function.  - No evidence of any meaningful volume overload    - BP stable and controlled   - Continue BB
64M DM2, hx osteomyelitis, CAD, PAD s/p RLE angioplasty 2020, s/p surgical amputation of R forefoot , s/p L PT angioplasty 12/2/24 for L lat foot DFU on plavix, HTN, HLD sent in by wound clinic for left foot infections and multiple wounds. Vascular surgery to evaluate further with LLE angiogram. Cardiology consulted for pre-surgical clearance.    - EKG: Sinus rhythm with premature supraventricular complexes  - No evidence of any active ischemia   - NST done 1/2024 with small sized, mild defect in the basal inferior wall that is fixed and partially normalizes with prone, suggestive of diaphragmatic artifact.   - Continue home ASA, Plavix and Lipitor    - Previous TTE (1/2/24) shows EF 61%, Normal LV mass and diastolic function, Normal RV size and function.  - No evidence of any meaningful volume overload     - BP stable and controlled   - Continue lisinopril 40 mg and Metoprolol succinate 50mg     - Pt with  L foot infection with multiple wounds sent in by [facility].   - Continue antibiotics per ID  - Vascular surgery seen, s/p LLE angio 5/2  - Tolerated procedure well, cardiac status optimal post operatively   - Possible BKA, Podiatry following, plan to hold of on OR procedure    - Follow outpatient with Dr. Chu  - Monitor and replete lytes, keep K>4, Mg>2.  - Will continue to follow.
64M DM2, hx osteomyelitis, CAD, PAD s/p RLE angioplasty 2020, s/p surgical amputation of R forefoot , s/p L PT angioplasty 12/2/24 for L lat foot DFU on plavix, HTN, HLD sent in by wound clinic for left foot infections and multiple wounds. Vascular surgery to evaluate further with LLE angiogram. Cardiology consulted for pre-surgical clearance.    - EKG: Sinus rhythm with premature supraventricular complexes  - No evidence of any active ischemia   - NST done 1/2024 with small sized, mild defect in the basal inferior wall that is fixed and partially normalizes with prone, suggestive of diaphragmatic artifact.   - Continue home ASA, Plavix and Lipitor    - Previous TTE (1/2/24) shows EF 61%, Normal LV mass and diastolic function, Normal RV size and function.  - No evidence of any meaningful volume overload     - BP stable and controlled   - creatinine up to 1.6 on 5/3 and hypotensive. Stopped lisinopril, which can be re-added if bp up.  - Continue Metoprolol succinate 50mg     - Pt with  L foot infection with multiple wounds sent in by [facility].   - Continue antibiotics per ID  - Vascular surgery seen, s/p LLE angio 5/2  - Tolerated procedure well, cardiac status optimal post operatively   - Possible BKA, Podiatry following, plan to hold of on OR procedure    - Follows outpatient with Dr. Chu  - Monitor and replete lytes, keep K>4, Mg>2.  - Will continue to follow.
64M DM2, hx osteomyelitis, CAD, PAD s/p RLE angioplasty 2020, s/p surgical amputation of R forefoot , s/p L PT angioplasty 12/2/24 for L lat foot DFU on plavix, HTN, HLD sent in by wound clinic for left foot infections and multiple wounds. Vascular surgery to evaluate further with LLE angiogram.     - p/w L foot infection with multiple wounds sent in by [facility], podiatry following   - Continue antibiotics per ID  - s/p LLE angio 5/2,  vascular following   - Tolerated procedure well, cardiac status optimal post operatively  - sp PICC this aM      - No signs of significant ischemia or volume overload.   - Continue home ASA, Plavix and Lipitor  - Previous TTE (1/2/24) shows EF 61%, Normal LV mass and diastolic function, Normal RV size and function.  - Continue BB   - Monitor and replete lytes, keep K>4, Mg>2.    DC planning per primary team.
64M DM2, hx osteomyelitis, CAD, PAD s/p RLE angioplasty 2020, s/p surgical amputation of R forefoot , s/p L PT angioplasty 12/2/24 for L lat foot DFU on plavix, HTN, HLD sent in by wound clinic for left foot infections and multiple wounds. Vascular surgery to evaluate further with LLE angiogram. Cardiology consulted for pre-surgical clearance.    - EKG: Sinus rhythm with premature supraventricular complexes  - No evidence of any active ischemia   - NST done 1/2024 with small sized, mild defect in the basal inferior wall that is fixed and partially normalizes with prone, suggestive of diaphragmatic artifact.   - Continue home ASA, Plavix and Lipitor    - Previous TTE (1/2/24) shows EF 61%, Normal LV mass and diastolic function, Normal RV size and function.  - No evidence of any meaningful volume overload     - BP stable and controlled   - creatinine up and hypotensive, and would hold lisinopril  - Continue Metoprolol succinate 50mg     - Pt with  L foot infection with multiple wounds sent in by [facility].   - Continue antibiotics per ID  - Vascular surgery seen, s/p LLE angio 5/2  - Tolerated procedure well, cardiac status optimal post operatively   - Possible BKA, Podiatry following, plan to hold of on OR procedure    - Follows outpatient with Dr. Chu  - Monitor and replete lytes, keep K>4, Mg>2.  - Will continue to follow.
Status post diagnostic angiogram.  Single-vessel runoff via peroneal.  Reoccluded posterior artery.  Severe small vessel disease.  Options are debridement versus deep venous arterialization for further revascularization versus below-knee amputation.

## 2025-05-07 ENCOUNTER — APPOINTMENT (OUTPATIENT)
Dept: WOUND CARE | Facility: HOSPITAL | Age: 65
End: 2025-05-07

## 2025-05-07 ENCOUNTER — APPOINTMENT (OUTPATIENT)
Dept: WOUND CARE | Facility: HOSPITAL | Age: 65
End: 2025-05-07
Payer: COMMERCIAL

## 2025-05-07 ENCOUNTER — OUTPATIENT (OUTPATIENT)
Dept: OUTPATIENT SERVICES | Facility: HOSPITAL | Age: 65
LOS: 1 days | End: 2025-05-07
Payer: COMMERCIAL

## 2025-05-07 VITALS
SYSTOLIC BLOOD PRESSURE: 159 MMHG | BODY MASS INDEX: 27.13 KG/M2 | OXYGEN SATURATION: 99 % | DIASTOLIC BLOOD PRESSURE: 76 MMHG | HEART RATE: 86 BPM | WEIGHT: 179 LBS | HEIGHT: 68 IN | RESPIRATION RATE: 18 BRPM | TEMPERATURE: 98.5 F

## 2025-05-07 DIAGNOSIS — Z98.62 PERIPHERAL VASCULAR ANGIOPLASTY STATUS: Chronic | ICD-10-CM

## 2025-05-07 DIAGNOSIS — E11.621 TYPE 2 DIABETES MELLITUS WITH FOOT ULCER: ICD-10-CM

## 2025-05-07 PROCEDURE — 99213 OFFICE O/P EST LOW 20 MIN: CPT

## 2025-05-07 PROCEDURE — G0463: CPT

## 2025-05-09 ENCOUNTER — APPOINTMENT (OUTPATIENT)
Dept: VASCULAR SURGERY | Facility: CLINIC | Age: 65
End: 2025-05-09

## 2025-05-09 DIAGNOSIS — L97.522 NON-PRESSURE CHRONIC ULCER OF OTHER PART OF LEFT FOOT WITH FAT LAYER EXPOSED: ICD-10-CM

## 2025-05-09 DIAGNOSIS — L97.422 NON-PRESSURE CHRONIC ULCER OF LEFT HEEL AND MIDFOOT WITH FAT LAYER EXPOSED: ICD-10-CM

## 2025-05-09 DIAGNOSIS — E11.621 TYPE 2 DIABETES MELLITUS WITH FOOT ULCER: ICD-10-CM

## 2025-05-12 ENCOUNTER — OUTPATIENT (OUTPATIENT)
Dept: OUTPATIENT SERVICES | Facility: HOSPITAL | Age: 65
LOS: 1 days | End: 2025-05-12
Payer: COMMERCIAL

## 2025-05-12 ENCOUNTER — APPOINTMENT (OUTPATIENT)
Dept: HYPERBARIC MEDICINE | Facility: HOSPITAL | Age: 65
End: 2025-05-12
Payer: COMMERCIAL

## 2025-05-12 VITALS
OXYGEN SATURATION: 99 % | SYSTOLIC BLOOD PRESSURE: 154 MMHG | TEMPERATURE: 98.6 F | RESPIRATION RATE: 15 BRPM | HEART RATE: 82 BPM | DIASTOLIC BLOOD PRESSURE: 75 MMHG

## 2025-05-12 VITALS
RESPIRATION RATE: 16 BRPM | OXYGEN SATURATION: 99 % | HEART RATE: 73 BPM | SYSTOLIC BLOOD PRESSURE: 150 MMHG | TEMPERATURE: 97.9 F | DIASTOLIC BLOOD PRESSURE: 83 MMHG

## 2025-05-12 DIAGNOSIS — Z89.429 ACQUIRED ABSENCE OF OTHER TOE(S), UNSPECIFIED SIDE: Chronic | ICD-10-CM

## 2025-05-12 DIAGNOSIS — L97.422 NON-PRESSURE CHRONIC ULCER OF LEFT HEEL AND MIDFOOT WITH FAT LAYER EXPOSED: ICD-10-CM

## 2025-05-12 DIAGNOSIS — E11.621 TYPE 2 DIABETES MELLITUS WITH FOOT ULCER: ICD-10-CM

## 2025-05-12 DIAGNOSIS — L97.522 NON-PRESSURE CHRONIC ULCER OF OTHER PART OF LEFT FOOT WITH FAT LAYER EXPOSED: ICD-10-CM

## 2025-05-12 DIAGNOSIS — Z98.62 PERIPHERAL VASCULAR ANGIOPLASTY STATUS: Chronic | ICD-10-CM

## 2025-05-12 PROCEDURE — 82962 GLUCOSE BLOOD TEST: CPT

## 2025-05-12 PROCEDURE — G0277: CPT

## 2025-05-12 PROCEDURE — 99183 HYPERBARIC OXYGEN THERAPY: CPT

## 2025-05-13 ENCOUNTER — APPOINTMENT (OUTPATIENT)
Dept: HYPERBARIC MEDICINE | Facility: HOSPITAL | Age: 65
End: 2025-05-13
Payer: COMMERCIAL

## 2025-05-13 ENCOUNTER — OUTPATIENT (OUTPATIENT)
Dept: OUTPATIENT SERVICES | Facility: HOSPITAL | Age: 65
LOS: 1 days | End: 2025-05-13
Payer: COMMERCIAL

## 2025-05-13 VITALS
OXYGEN SATURATION: 96 % | HEART RATE: 79 BPM | RESPIRATION RATE: 15 BRPM | SYSTOLIC BLOOD PRESSURE: 153 MMHG | DIASTOLIC BLOOD PRESSURE: 90 MMHG | TEMPERATURE: 98.2 F

## 2025-05-13 VITALS
SYSTOLIC BLOOD PRESSURE: 148 MMHG | HEART RATE: 80 BPM | OXYGEN SATURATION: 97 % | DIASTOLIC BLOOD PRESSURE: 76 MMHG | TEMPERATURE: 98 F | RESPIRATION RATE: 15 BRPM

## 2025-05-13 DIAGNOSIS — Z89.429 ACQUIRED ABSENCE OF OTHER TOE(S), UNSPECIFIED SIDE: Chronic | ICD-10-CM

## 2025-05-13 DIAGNOSIS — I10 ESSENTIAL (PRIMARY) HYPERTENSION: ICD-10-CM

## 2025-05-13 DIAGNOSIS — Z89.431 ACQUIRED ABSENCE OF RIGHT FOOT: ICD-10-CM

## 2025-05-13 DIAGNOSIS — Z86.39 PERSONAL HISTORY OF OTHER ENDOCRINE, NUTRITIONAL AND METABOLIC DISEASE: ICD-10-CM

## 2025-05-13 DIAGNOSIS — L97.422 NON-PRESSURE CHRONIC ULCER OF LEFT HEEL AND MIDFOOT WITH FAT LAYER EXPOSED: ICD-10-CM

## 2025-05-13 DIAGNOSIS — Z79.899 OTHER LONG TERM (CURRENT) DRUG THERAPY: ICD-10-CM

## 2025-05-13 DIAGNOSIS — Z80.3 FAMILY HISTORY OF MALIGNANT NEOPLASM OF BREAST: ICD-10-CM

## 2025-05-13 DIAGNOSIS — E11.621 TYPE 2 DIABETES MELLITUS WITH FOOT ULCER: ICD-10-CM

## 2025-05-13 DIAGNOSIS — I25.10 ATHEROSCLEROTIC HEART DISEASE OF NATIVE CORONARY ARTERY WITHOUT ANGINA PECTORIS: ICD-10-CM

## 2025-05-13 DIAGNOSIS — L97.522 NON-PRESSURE CHRONIC ULCER OF OTHER PART OF LEFT FOOT WITH FAT LAYER EXPOSED: ICD-10-CM

## 2025-05-13 DIAGNOSIS — Z79.84 LONG TERM (CURRENT) USE OF ORAL HYPOGLYCEMIC DRUGS: ICD-10-CM

## 2025-05-13 DIAGNOSIS — Z83.3 FAMILY HISTORY OF DIABETES MELLITUS: ICD-10-CM

## 2025-05-13 DIAGNOSIS — E11.51 TYPE 2 DIABETES MELLITUS WITH DIABETIC PERIPHERAL ANGIOPATHY WITHOUT GANGRENE: ICD-10-CM

## 2025-05-13 DIAGNOSIS — Z79.82 LONG TERM (CURRENT) USE OF ASPIRIN: ICD-10-CM

## 2025-05-13 DIAGNOSIS — E78.00 PURE HYPERCHOLESTEROLEMIA, UNSPECIFIED: ICD-10-CM

## 2025-05-13 DIAGNOSIS — Z98.62 PERIPHERAL VASCULAR ANGIOPLASTY STATUS: Chronic | ICD-10-CM

## 2025-05-13 DIAGNOSIS — E11.40 TYPE 2 DIABETES MELLITUS WITH DIABETIC NEUROPATHY, UNSPECIFIED: ICD-10-CM

## 2025-05-13 PROCEDURE — 82962 GLUCOSE BLOOD TEST: CPT

## 2025-05-13 PROCEDURE — G0277: CPT

## 2025-05-13 PROCEDURE — 99183 HYPERBARIC OXYGEN THERAPY: CPT

## 2025-05-14 ENCOUNTER — APPOINTMENT (OUTPATIENT)
Dept: HYPERBARIC MEDICINE | Facility: HOSPITAL | Age: 65
End: 2025-05-14
Payer: COMMERCIAL

## 2025-05-14 ENCOUNTER — OUTPATIENT (OUTPATIENT)
Dept: OUTPATIENT SERVICES | Facility: HOSPITAL | Age: 65
LOS: 1 days | End: 2025-05-14
Payer: COMMERCIAL

## 2025-05-14 VITALS
TEMPERATURE: 98.3 F | RESPIRATION RATE: 15 BRPM | HEART RATE: 79 BPM | OXYGEN SATURATION: 98 % | SYSTOLIC BLOOD PRESSURE: 143 MMHG | DIASTOLIC BLOOD PRESSURE: 81 MMHG

## 2025-05-14 VITALS
DIASTOLIC BLOOD PRESSURE: 81 MMHG | HEART RATE: 77 BPM | SYSTOLIC BLOOD PRESSURE: 149 MMHG | RESPIRATION RATE: 18 BRPM | OXYGEN SATURATION: 98 % | TEMPERATURE: 98.5 F

## 2025-05-14 DIAGNOSIS — Z89.429 ACQUIRED ABSENCE OF OTHER TOE(S), UNSPECIFIED SIDE: Chronic | ICD-10-CM

## 2025-05-14 DIAGNOSIS — E11.621 TYPE 2 DIABETES MELLITUS WITH FOOT ULCER: ICD-10-CM

## 2025-05-14 DIAGNOSIS — L97.422 NON-PRESSURE CHRONIC ULCER OF LEFT HEEL AND MIDFOOT WITH FAT LAYER EXPOSED: ICD-10-CM

## 2025-05-14 DIAGNOSIS — L97.522 NON-PRESSURE CHRONIC ULCER OF OTHER PART OF LEFT FOOT WITH FAT LAYER EXPOSED: ICD-10-CM

## 2025-05-14 PROCEDURE — 99183 HYPERBARIC OXYGEN THERAPY: CPT

## 2025-05-14 PROCEDURE — 82962 GLUCOSE BLOOD TEST: CPT

## 2025-05-14 PROCEDURE — G0277: CPT

## 2025-05-15 ENCOUNTER — APPOINTMENT (OUTPATIENT)
Dept: HYPERBARIC MEDICINE | Facility: HOSPITAL | Age: 65
End: 2025-05-15
Payer: COMMERCIAL

## 2025-05-15 ENCOUNTER — OUTPATIENT (OUTPATIENT)
Dept: OUTPATIENT SERVICES | Facility: HOSPITAL | Age: 65
LOS: 1 days | End: 2025-05-15
Payer: COMMERCIAL

## 2025-05-15 VITALS
RESPIRATION RATE: 18 BRPM | OXYGEN SATURATION: 98 % | TEMPERATURE: 98.6 F | DIASTOLIC BLOOD PRESSURE: 78 MMHG | HEART RATE: 78 BPM | SYSTOLIC BLOOD PRESSURE: 150 MMHG

## 2025-05-15 VITALS
OXYGEN SATURATION: 98 % | SYSTOLIC BLOOD PRESSURE: 135 MMHG | RESPIRATION RATE: 18 BRPM | HEART RATE: 78 BPM | TEMPERATURE: 98 F | DIASTOLIC BLOOD PRESSURE: 79 MMHG

## 2025-05-15 DIAGNOSIS — E11.621 TYPE 2 DIABETES MELLITUS WITH FOOT ULCER: ICD-10-CM

## 2025-05-15 DIAGNOSIS — Z89.429 ACQUIRED ABSENCE OF OTHER TOE(S), UNSPECIFIED SIDE: Chronic | ICD-10-CM

## 2025-05-15 PROCEDURE — 99183 HYPERBARIC OXYGEN THERAPY: CPT

## 2025-05-15 PROCEDURE — G0277: CPT

## 2025-05-15 PROCEDURE — 82962 GLUCOSE BLOOD TEST: CPT

## 2025-05-16 ENCOUNTER — APPOINTMENT (OUTPATIENT)
Dept: HYPERBARIC MEDICINE | Facility: HOSPITAL | Age: 65
End: 2025-05-16
Payer: COMMERCIAL

## 2025-05-16 ENCOUNTER — OUTPATIENT (OUTPATIENT)
Dept: OUTPATIENT SERVICES | Facility: HOSPITAL | Age: 65
LOS: 1 days | End: 2025-05-16
Payer: COMMERCIAL

## 2025-05-16 ENCOUNTER — APPOINTMENT (OUTPATIENT)
Dept: VASCULAR SURGERY | Facility: CLINIC | Age: 65
End: 2025-05-16
Payer: COMMERCIAL

## 2025-05-16 VITALS
HEART RATE: 79 BPM | SYSTOLIC BLOOD PRESSURE: 145 MMHG | OXYGEN SATURATION: 96 % | DIASTOLIC BLOOD PRESSURE: 74 MMHG | TEMPERATURE: 97.7 F | RESPIRATION RATE: 16 BRPM

## 2025-05-16 VITALS
TEMPERATURE: 98.1 F | OXYGEN SATURATION: 99 % | RESPIRATION RATE: 16 BRPM | HEART RATE: 76 BPM | DIASTOLIC BLOOD PRESSURE: 95 MMHG | SYSTOLIC BLOOD PRESSURE: 183 MMHG

## 2025-05-16 VITALS
RESPIRATION RATE: 16 BRPM | TEMPERATURE: 98.2 F | OXYGEN SATURATION: 99 % | SYSTOLIC BLOOD PRESSURE: 133 MMHG | DIASTOLIC BLOOD PRESSURE: 81 MMHG | HEART RATE: 85 BPM

## 2025-05-16 VITALS — SYSTOLIC BLOOD PRESSURE: 153 MMHG | DIASTOLIC BLOOD PRESSURE: 90 MMHG

## 2025-05-16 DIAGNOSIS — I70.209 UNSPECIFIED ATHEROSCLEROSIS OF NATIVE ARTERIES OF EXTREMITIES, UNSPECIFIED EXTREMITY: ICD-10-CM

## 2025-05-16 DIAGNOSIS — Z89.429 ACQUIRED ABSENCE OF OTHER TOE(S), UNSPECIFIED SIDE: Chronic | ICD-10-CM

## 2025-05-16 DIAGNOSIS — L97.422 NON-PRESSURE CHRONIC ULCER OF LEFT HEEL AND MIDFOOT WITH FAT LAYER EXPOSED: ICD-10-CM

## 2025-05-16 DIAGNOSIS — L97.522 NON-PRESSURE CHRONIC ULCER OF OTHER PART OF LEFT FOOT WITH FAT LAYER EXPOSED: ICD-10-CM

## 2025-05-16 DIAGNOSIS — E11.621 TYPE 2 DIABETES MELLITUS WITH FOOT ULCER: ICD-10-CM

## 2025-05-16 DIAGNOSIS — Z98.62 PERIPHERAL VASCULAR ANGIOPLASTY STATUS: Chronic | ICD-10-CM

## 2025-05-16 DIAGNOSIS — L98.499 UNSPECIFIED ATHEROSCLEROSIS OF NATIVE ARTERIES OF EXTREMITIES, UNSPECIFIED EXTREMITY: ICD-10-CM

## 2025-05-16 DIAGNOSIS — I70.222 ATHEROSCLEROSIS OF NATIVE ARTERIES OF EXTREMITIES WITH REST PAIN, LEFT LEG: ICD-10-CM

## 2025-05-16 PROCEDURE — 93922 UPR/L XTREMITY ART 2 LEVELS: CPT

## 2025-05-16 PROCEDURE — 93971 EXTREMITY STUDY: CPT | Mod: LT

## 2025-05-16 PROCEDURE — 99214 OFFICE O/P EST MOD 30 MIN: CPT

## 2025-05-16 PROCEDURE — G0277: CPT

## 2025-05-16 PROCEDURE — 82962 GLUCOSE BLOOD TEST: CPT

## 2025-05-16 PROCEDURE — 99183 HYPERBARIC OXYGEN THERAPY: CPT

## 2025-05-17 DIAGNOSIS — L97.522 NON-PRESSURE CHRONIC ULCER OF OTHER PART OF LEFT FOOT WITH FAT LAYER EXPOSED: ICD-10-CM

## 2025-05-17 DIAGNOSIS — L97.422 NON-PRESSURE CHRONIC ULCER OF LEFT HEEL AND MIDFOOT WITH FAT LAYER EXPOSED: ICD-10-CM

## 2025-05-17 DIAGNOSIS — E11.621 TYPE 2 DIABETES MELLITUS WITH FOOT ULCER: ICD-10-CM

## 2025-05-19 ENCOUNTER — OUTPATIENT (OUTPATIENT)
Dept: OUTPATIENT SERVICES | Facility: HOSPITAL | Age: 65
LOS: 1 days | End: 2025-05-19
Payer: COMMERCIAL

## 2025-05-19 ENCOUNTER — APPOINTMENT (OUTPATIENT)
Dept: HYPERBARIC MEDICINE | Facility: HOSPITAL | Age: 65
End: 2025-05-19

## 2025-05-19 VITALS
DIASTOLIC BLOOD PRESSURE: 82 MMHG | SYSTOLIC BLOOD PRESSURE: 147 MMHG | RESPIRATION RATE: 14 BRPM | HEART RATE: 79 BPM | TEMPERATURE: 98.1 F | OXYGEN SATURATION: 98 %

## 2025-05-19 VITALS
SYSTOLIC BLOOD PRESSURE: 130 MMHG | HEART RATE: 88 BPM | DIASTOLIC BLOOD PRESSURE: 75 MMHG | OXYGEN SATURATION: 99 % | RESPIRATION RATE: 15 BRPM | TEMPERATURE: 98.4 F

## 2025-05-19 DIAGNOSIS — L97.522 NON-PRESSURE CHRONIC ULCER OF OTHER PART OF LEFT FOOT WITH FAT LAYER EXPOSED: ICD-10-CM

## 2025-05-19 DIAGNOSIS — L97.422 NON-PRESSURE CHRONIC ULCER OF LEFT HEEL AND MIDFOOT WITH FAT LAYER EXPOSED: ICD-10-CM

## 2025-05-19 DIAGNOSIS — E11.621 TYPE 2 DIABETES MELLITUS WITH FOOT ULCER: ICD-10-CM

## 2025-05-19 DIAGNOSIS — Z98.62 PERIPHERAL VASCULAR ANGIOPLASTY STATUS: Chronic | ICD-10-CM

## 2025-05-19 LAB
GLUCOSE BLDC GLUCOMTR-MCNC: 136 MG/DL — HIGH (ref 70–99)
GLUCOSE BLDC GLUCOMTR-MCNC: 140 MG/DL — HIGH (ref 70–99)

## 2025-05-19 PROCEDURE — G0277: CPT

## 2025-05-19 PROCEDURE — 82962 GLUCOSE BLOOD TEST: CPT

## 2025-05-19 PROCEDURE — 99183 HYPERBARIC OXYGEN THERAPY: CPT

## 2025-05-20 ENCOUNTER — APPOINTMENT (OUTPATIENT)
Dept: HYPERBARIC MEDICINE | Facility: HOSPITAL | Age: 65
End: 2025-05-20
Payer: COMMERCIAL

## 2025-05-20 ENCOUNTER — OUTPATIENT (OUTPATIENT)
Dept: OUTPATIENT SERVICES | Facility: HOSPITAL | Age: 65
LOS: 1 days | End: 2025-05-20
Payer: COMMERCIAL

## 2025-05-20 VITALS
RESPIRATION RATE: 14 BRPM | TEMPERATURE: 97.6 F | DIASTOLIC BLOOD PRESSURE: 88 MMHG | SYSTOLIC BLOOD PRESSURE: 156 MMHG | HEART RATE: 74 BPM | OXYGEN SATURATION: 100 %

## 2025-05-20 VITALS
HEART RATE: 76 BPM | SYSTOLIC BLOOD PRESSURE: 136 MMHG | TEMPERATURE: 98 F | DIASTOLIC BLOOD PRESSURE: 76 MMHG | OXYGEN SATURATION: 98 % | RESPIRATION RATE: 16 BRPM

## 2025-05-20 DIAGNOSIS — E11.621 TYPE 2 DIABETES MELLITUS WITH FOOT ULCER: ICD-10-CM

## 2025-05-20 DIAGNOSIS — Z89.429 ACQUIRED ABSENCE OF OTHER TOE(S), UNSPECIFIED SIDE: Chronic | ICD-10-CM

## 2025-05-20 DIAGNOSIS — L97.422 NON-PRESSURE CHRONIC ULCER OF LEFT HEEL AND MIDFOOT WITH FAT LAYER EXPOSED: ICD-10-CM

## 2025-05-20 DIAGNOSIS — L97.522 NON-PRESSURE CHRONIC ULCER OF OTHER PART OF LEFT FOOT WITH FAT LAYER EXPOSED: ICD-10-CM

## 2025-05-20 LAB
GLUCOSE BLDC GLUCOMTR-MCNC: 114 MG/DL — HIGH (ref 70–99)
GLUCOSE BLDC GLUCOMTR-MCNC: 147 MG/DL — HIGH (ref 70–99)

## 2025-05-20 PROCEDURE — 99183 HYPERBARIC OXYGEN THERAPY: CPT

## 2025-05-20 PROCEDURE — G0277: CPT

## 2025-05-20 PROCEDURE — 82962 GLUCOSE BLOOD TEST: CPT

## 2025-05-21 ENCOUNTER — OUTPATIENT (OUTPATIENT)
Dept: OUTPATIENT SERVICES | Facility: HOSPITAL | Age: 65
LOS: 1 days | End: 2025-05-21
Payer: COMMERCIAL

## 2025-05-21 ENCOUNTER — NON-APPOINTMENT (OUTPATIENT)
Age: 65
End: 2025-05-21

## 2025-05-21 ENCOUNTER — APPOINTMENT (OUTPATIENT)
Dept: HYPERBARIC MEDICINE | Facility: HOSPITAL | Age: 65
End: 2025-05-21
Payer: COMMERCIAL

## 2025-05-21 VITALS
OXYGEN SATURATION: 100 % | TEMPERATURE: 97.8 F | DIASTOLIC BLOOD PRESSURE: 80 MMHG | HEART RATE: 74 BPM | RESPIRATION RATE: 18 BRPM | SYSTOLIC BLOOD PRESSURE: 175 MMHG

## 2025-05-21 VITALS
OXYGEN SATURATION: 100 % | HEART RATE: 91 BPM | SYSTOLIC BLOOD PRESSURE: 136 MMHG | DIASTOLIC BLOOD PRESSURE: 76 MMHG | RESPIRATION RATE: 17 BRPM | TEMPERATURE: 98.7 F

## 2025-05-21 DIAGNOSIS — E11.621 TYPE 2 DIABETES MELLITUS WITH FOOT ULCER: ICD-10-CM

## 2025-05-21 DIAGNOSIS — L97.422 NON-PRESSURE CHRONIC ULCER OF LEFT HEEL AND MIDFOOT WITH FAT LAYER EXPOSED: ICD-10-CM

## 2025-05-21 DIAGNOSIS — L97.522 NON-PRESSURE CHRONIC ULCER OF OTHER PART OF LEFT FOOT WITH FAT LAYER EXPOSED: ICD-10-CM

## 2025-05-21 DIAGNOSIS — Z98.62 PERIPHERAL VASCULAR ANGIOPLASTY STATUS: Chronic | ICD-10-CM

## 2025-05-21 DIAGNOSIS — Z89.429 ACQUIRED ABSENCE OF OTHER TOE(S), UNSPECIFIED SIDE: Chronic | ICD-10-CM

## 2025-05-21 PROCEDURE — 82962 GLUCOSE BLOOD TEST: CPT

## 2025-05-21 PROCEDURE — 99183 HYPERBARIC OXYGEN THERAPY: CPT

## 2025-05-21 PROCEDURE — G0277: CPT

## 2025-05-22 ENCOUNTER — APPOINTMENT (OUTPATIENT)
Dept: HYPERBARIC MEDICINE | Facility: HOSPITAL | Age: 65
End: 2025-05-22
Payer: COMMERCIAL

## 2025-05-22 ENCOUNTER — OUTPATIENT (OUTPATIENT)
Dept: OUTPATIENT SERVICES | Facility: HOSPITAL | Age: 65
LOS: 1 days | End: 2025-05-22
Payer: COMMERCIAL

## 2025-05-22 VITALS
HEIGHT: 68 IN | BODY MASS INDEX: 27.13 KG/M2 | SYSTOLIC BLOOD PRESSURE: 124 MMHG | WEIGHT: 179 LBS | HEART RATE: 87 BPM | TEMPERATURE: 98.6 F | DIASTOLIC BLOOD PRESSURE: 78 MMHG | OXYGEN SATURATION: 99 % | RESPIRATION RATE: 18 BRPM

## 2025-05-22 DIAGNOSIS — E11.621 TYPE 2 DIABETES MELLITUS WITH FOOT ULCER: ICD-10-CM

## 2025-05-22 DIAGNOSIS — Z89.429 ACQUIRED ABSENCE OF OTHER TOE(S), UNSPECIFIED SIDE: Chronic | ICD-10-CM

## 2025-05-22 DIAGNOSIS — Z98.62 PERIPHERAL VASCULAR ANGIOPLASTY STATUS: Chronic | ICD-10-CM

## 2025-05-22 PROCEDURE — G0463: CPT

## 2025-05-22 PROCEDURE — 99214 OFFICE O/P EST MOD 30 MIN: CPT

## 2025-05-23 ENCOUNTER — APPOINTMENT (OUTPATIENT)
Dept: HYPERBARIC MEDICINE | Facility: HOSPITAL | Age: 65
End: 2025-05-23
Payer: COMMERCIAL

## 2025-05-23 ENCOUNTER — OUTPATIENT (OUTPATIENT)
Dept: OUTPATIENT SERVICES | Facility: HOSPITAL | Age: 65
LOS: 1 days | End: 2025-05-23
Payer: COMMERCIAL

## 2025-05-23 VITALS
HEART RATE: 71 BPM | OXYGEN SATURATION: 100 % | TEMPERATURE: 98.2 F | DIASTOLIC BLOOD PRESSURE: 81 MMHG | SYSTOLIC BLOOD PRESSURE: 146 MMHG | RESPIRATION RATE: 16 BRPM

## 2025-05-23 VITALS
SYSTOLIC BLOOD PRESSURE: 137 MMHG | TEMPERATURE: 98.2 F | OXYGEN SATURATION: 100 % | DIASTOLIC BLOOD PRESSURE: 73 MMHG | RESPIRATION RATE: 18 BRPM | HEART RATE: 80 BPM

## 2025-05-23 DIAGNOSIS — E78.00 PURE HYPERCHOLESTEROLEMIA, UNSPECIFIED: ICD-10-CM

## 2025-05-23 DIAGNOSIS — E11.621 TYPE 2 DIABETES MELLITUS WITH FOOT ULCER: ICD-10-CM

## 2025-05-23 DIAGNOSIS — L97.509 TYPE 2 DIABETES MELLITUS WITH FOOT ULCER: ICD-10-CM

## 2025-05-23 DIAGNOSIS — E11.40 TYPE 2 DIABETES MELLITUS WITH DIABETIC NEUROPATHY, UNSPECIFIED: ICD-10-CM

## 2025-05-23 DIAGNOSIS — Z83.3 FAMILY HISTORY OF DIABETES MELLITUS: ICD-10-CM

## 2025-05-23 DIAGNOSIS — Z79.82 LONG TERM (CURRENT) USE OF ASPIRIN: ICD-10-CM

## 2025-05-23 DIAGNOSIS — Z79.899 OTHER LONG TERM (CURRENT) DRUG THERAPY: ICD-10-CM

## 2025-05-23 DIAGNOSIS — Z80.3 FAMILY HISTORY OF MALIGNANT NEOPLASM OF BREAST: ICD-10-CM

## 2025-05-23 DIAGNOSIS — E11.51 TYPE 2 DIABETES MELLITUS WITH DIABETIC PERIPHERAL ANGIOPATHY WITHOUT GANGRENE: ICD-10-CM

## 2025-05-23 DIAGNOSIS — L97.522 NON-PRESSURE CHRONIC ULCER OF OTHER PART OF LEFT FOOT WITH FAT LAYER EXPOSED: ICD-10-CM

## 2025-05-23 DIAGNOSIS — Z98.890 OTHER SPECIFIED POSTPROCEDURAL STATES: ICD-10-CM

## 2025-05-23 DIAGNOSIS — L97.422 NON-PRESSURE CHRONIC ULCER OF LEFT HEEL AND MIDFOOT WITH FAT LAYER EXPOSED: ICD-10-CM

## 2025-05-23 DIAGNOSIS — I10 ESSENTIAL (PRIMARY) HYPERTENSION: ICD-10-CM

## 2025-05-23 DIAGNOSIS — Z79.84 LONG TERM (CURRENT) USE OF ORAL HYPOGLYCEMIC DRUGS: ICD-10-CM

## 2025-05-23 DIAGNOSIS — Z98.62 PERIPHERAL VASCULAR ANGIOPLASTY STATUS: Chronic | ICD-10-CM

## 2025-05-23 DIAGNOSIS — Z89.431 ACQUIRED ABSENCE OF RIGHT FOOT: ICD-10-CM

## 2025-05-23 DIAGNOSIS — I25.10 ATHEROSCLEROTIC HEART DISEASE OF NATIVE CORONARY ARTERY WITHOUT ANGINA PECTORIS: ICD-10-CM

## 2025-05-23 DIAGNOSIS — Z86.39 PERSONAL HISTORY OF OTHER ENDOCRINE, NUTRITIONAL AND METABOLIC DISEASE: ICD-10-CM

## 2025-05-23 PROCEDURE — G0277: CPT

## 2025-05-23 PROCEDURE — 99183 HYPERBARIC OXYGEN THERAPY: CPT

## 2025-05-23 PROCEDURE — 82962 GLUCOSE BLOOD TEST: CPT

## 2025-05-24 ENCOUNTER — OUTPATIENT (OUTPATIENT)
Dept: OUTPATIENT SERVICES | Facility: HOSPITAL | Age: 65
LOS: 1 days | End: 2025-05-24
Payer: COMMERCIAL

## 2025-05-24 ENCOUNTER — APPOINTMENT (OUTPATIENT)
Dept: HYPERBARIC MEDICINE | Facility: HOSPITAL | Age: 65
End: 2025-05-24

## 2025-05-24 ENCOUNTER — NON-APPOINTMENT (OUTPATIENT)
Age: 65
End: 2025-05-24

## 2025-05-24 VITALS
OXYGEN SATURATION: 100 % | RESPIRATION RATE: 18 BRPM | SYSTOLIC BLOOD PRESSURE: 153 MMHG | TEMPERATURE: 97.9 F | HEART RATE: 79 BPM | DIASTOLIC BLOOD PRESSURE: 78 MMHG

## 2025-05-24 VITALS
RESPIRATION RATE: 18 BRPM | TEMPERATURE: 98.3 F | HEART RATE: 92 BPM | DIASTOLIC BLOOD PRESSURE: 76 MMHG | SYSTOLIC BLOOD PRESSURE: 142 MMHG | OXYGEN SATURATION: 98 %

## 2025-05-24 DIAGNOSIS — E11.621 TYPE 2 DIABETES MELLITUS WITH FOOT ULCER: ICD-10-CM

## 2025-05-24 DIAGNOSIS — Z89.429 ACQUIRED ABSENCE OF OTHER TOE(S), UNSPECIFIED SIDE: Chronic | ICD-10-CM

## 2025-05-24 DIAGNOSIS — Z98.62 PERIPHERAL VASCULAR ANGIOPLASTY STATUS: Chronic | ICD-10-CM

## 2025-05-24 PROCEDURE — 99183 HYPERBARIC OXYGEN THERAPY: CPT

## 2025-05-24 PROCEDURE — 82962 GLUCOSE BLOOD TEST: CPT

## 2025-05-27 ENCOUNTER — INPATIENT (INPATIENT)
Facility: HOSPITAL | Age: 65
LOS: 7 days | Discharge: HOME | DRG: 541 | End: 2025-06-04
Attending: INTERNAL MEDICINE | Admitting: INTERNAL MEDICINE
Payer: COMMERCIAL

## 2025-05-27 ENCOUNTER — APPOINTMENT (OUTPATIENT)
Dept: HYPERBARIC MEDICINE | Facility: HOSPITAL | Age: 65
End: 2025-05-27

## 2025-05-27 VITALS
HEIGHT: 69 IN | RESPIRATION RATE: 19 BRPM | TEMPERATURE: 98 F | SYSTOLIC BLOOD PRESSURE: 110 MMHG | WEIGHT: 182.98 LBS | OXYGEN SATURATION: 100 % | DIASTOLIC BLOOD PRESSURE: 69 MMHG | HEART RATE: 99 BPM

## 2025-05-27 DIAGNOSIS — I73.9 PERIPHERAL VASCULAR DISEASE, UNSPECIFIED: ICD-10-CM

## 2025-05-27 DIAGNOSIS — E11.621 TYPE 2 DIABETES MELLITUS WITH FOOT ULCER: ICD-10-CM

## 2025-05-27 DIAGNOSIS — E11.9 TYPE 2 DIABETES MELLITUS WITHOUT COMPLICATIONS: ICD-10-CM

## 2025-05-27 DIAGNOSIS — I10 ESSENTIAL (PRIMARY) HYPERTENSION: ICD-10-CM

## 2025-05-27 DIAGNOSIS — M86.9 OSTEOMYELITIS, UNSPECIFIED: ICD-10-CM

## 2025-05-27 DIAGNOSIS — Z29.9 ENCOUNTER FOR PROPHYLACTIC MEASURES, UNSPECIFIED: ICD-10-CM

## 2025-05-27 DIAGNOSIS — Z89.429 ACQUIRED ABSENCE OF OTHER TOE(S), UNSPECIFIED SIDE: Chronic | ICD-10-CM

## 2025-05-27 LAB
ALBUMIN SERPL ELPH-MCNC: 3.9 G/DL — SIGNIFICANT CHANGE UP (ref 3.3–5)
ALP SERPL-CCNC: 85 U/L — SIGNIFICANT CHANGE UP (ref 40–120)
ALT FLD-CCNC: 26 U/L — SIGNIFICANT CHANGE UP (ref 12–78)
ANION GAP SERPL CALC-SCNC: 9 MMOL/L — SIGNIFICANT CHANGE UP (ref 5–17)
APTT BLD: 34.6 SEC — SIGNIFICANT CHANGE UP (ref 26.1–36.8)
AST SERPL-CCNC: 16 U/L — SIGNIFICANT CHANGE UP (ref 15–37)
BASOPHILS # BLD AUTO: 0.11 K/UL — SIGNIFICANT CHANGE UP (ref 0–0.2)
BASOPHILS NFR BLD AUTO: 1 % — SIGNIFICANT CHANGE UP (ref 0–2)
BILIRUB SERPL-MCNC: 0.5 MG/DL — SIGNIFICANT CHANGE UP (ref 0.2–1.2)
BLD GP AB SCN SERPL QL: SIGNIFICANT CHANGE UP
BUN SERPL-MCNC: 22 MG/DL — SIGNIFICANT CHANGE UP (ref 7–23)
CALCIUM SERPL-MCNC: 9.4 MG/DL — SIGNIFICANT CHANGE UP (ref 8.5–10.1)
CHLORIDE SERPL-SCNC: 101 MMOL/L — SIGNIFICANT CHANGE UP (ref 96–108)
CO2 SERPL-SCNC: 28 MMOL/L — SIGNIFICANT CHANGE UP (ref 22–31)
CREAT SERPL-MCNC: 1.2 MG/DL — SIGNIFICANT CHANGE UP (ref 0.5–1.3)
CRP SERPL-MCNC: 47 MG/L — HIGH
EGFR: 67 ML/MIN/1.73M2 — SIGNIFICANT CHANGE UP
EGFR: 67 ML/MIN/1.73M2 — SIGNIFICANT CHANGE UP
EOSINOPHIL # BLD AUTO: 0.21 K/UL — SIGNIFICANT CHANGE UP (ref 0–0.5)
EOSINOPHIL NFR BLD AUTO: 1.9 % — SIGNIFICANT CHANGE UP (ref 0–6)
ERYTHROCYTE [SEDIMENTATION RATE] IN BLOOD: 64 MM/HR — HIGH (ref 0–15)
GLUCOSE BLDC GLUCOMTR-MCNC: 119 MG/DL — HIGH (ref 70–99)
GLUCOSE BLDC GLUCOMTR-MCNC: 134 MG/DL — HIGH (ref 70–99)
GLUCOSE SERPL-MCNC: 148 MG/DL — HIGH (ref 70–99)
HCT VFR BLD CALC: 39.4 % — SIGNIFICANT CHANGE UP (ref 39–50)
HGB BLD-MCNC: 12.6 G/DL — LOW (ref 13–17)
IMM GRANULOCYTES NFR BLD AUTO: 0.7 % — SIGNIFICANT CHANGE UP (ref 0–0.9)
INR BLD: 1.02 RATIO — SIGNIFICANT CHANGE UP (ref 0.85–1.16)
LYMPHOCYTES # BLD AUTO: 1.51 K/UL — SIGNIFICANT CHANGE UP (ref 1–3.3)
LYMPHOCYTES # BLD AUTO: 14 % — SIGNIFICANT CHANGE UP (ref 13–44)
MCHC RBC-ENTMCNC: 29.2 PG — SIGNIFICANT CHANGE UP (ref 27–34)
MCHC RBC-ENTMCNC: 32 G/DL — SIGNIFICANT CHANGE UP (ref 32–36)
MCV RBC AUTO: 91.4 FL — SIGNIFICANT CHANGE UP (ref 80–100)
MONOCYTES # BLD AUTO: 0.76 K/UL — SIGNIFICANT CHANGE UP (ref 0–0.9)
MONOCYTES NFR BLD AUTO: 7.1 % — SIGNIFICANT CHANGE UP (ref 2–14)
NEUTROPHILS # BLD AUTO: 8.1 K/UL — HIGH (ref 1.8–7.4)
NEUTROPHILS NFR BLD AUTO: 75.3 % — SIGNIFICANT CHANGE UP (ref 43–77)
NRBC BLD AUTO-RTO: 0 /100 WBCS — SIGNIFICANT CHANGE UP (ref 0–0)
PLATELET # BLD AUTO: 393 K/UL — SIGNIFICANT CHANGE UP (ref 150–400)
POTASSIUM SERPL-MCNC: 4.7 MMOL/L — SIGNIFICANT CHANGE UP (ref 3.5–5.3)
POTASSIUM SERPL-SCNC: 4.7 MMOL/L — SIGNIFICANT CHANGE UP (ref 3.5–5.3)
PROT SERPL-MCNC: 8.6 G/DL — HIGH (ref 6–8.3)
PROTHROM AB SERPL-ACNC: 11.9 SEC — SIGNIFICANT CHANGE UP (ref 9.9–13.4)
RBC # BLD: 4.31 M/UL — SIGNIFICANT CHANGE UP (ref 4.2–5.8)
RBC # FLD: 13.2 % — SIGNIFICANT CHANGE UP (ref 10.3–14.5)
SODIUM SERPL-SCNC: 138 MMOL/L — SIGNIFICANT CHANGE UP (ref 135–145)
WBC # BLD: 10.77 K/UL — HIGH (ref 3.8–10.5)
WBC # FLD AUTO: 10.77 K/UL — HIGH (ref 3.8–10.5)

## 2025-05-27 PROCEDURE — 99285 EMERGENCY DEPT VISIT HI MDM: CPT

## 2025-05-27 PROCEDURE — 73718 MRI LOWER EXTREMITY W/O DYE: CPT | Mod: 26,LT

## 2025-05-27 PROCEDURE — 99221 1ST HOSP IP/OBS SF/LOW 40: CPT

## 2025-05-27 PROCEDURE — 71045 X-RAY EXAM CHEST 1 VIEW: CPT | Mod: 26

## 2025-05-27 PROCEDURE — 73721 MRI JNT OF LWR EXTRE W/O DYE: CPT | Mod: 26,LT

## 2025-05-27 PROCEDURE — 73620 X-RAY EXAM OF FOOT: CPT | Mod: 26,LT

## 2025-05-27 PROCEDURE — 99222 1ST HOSP IP/OBS MODERATE 55: CPT

## 2025-05-27 PROCEDURE — 93010 ELECTROCARDIOGRAM REPORT: CPT

## 2025-05-27 RX ORDER — ERTAPENEM SODIUM 1 G/1
1000 INJECTION, POWDER, LYOPHILIZED, FOR SOLUTION INTRAMUSCULAR; INTRAVENOUS ONCE
Refills: 0 | Status: COMPLETED | OUTPATIENT
Start: 2025-05-27 | End: 2025-05-27

## 2025-05-27 RX ORDER — ATORVASTATIN CALCIUM 80 MG/1
40 TABLET, FILM COATED ORAL AT BEDTIME
Refills: 0 | Status: DISCONTINUED | OUTPATIENT
Start: 2025-05-27 | End: 2025-05-28

## 2025-05-27 RX ORDER — ACETAMINOPHEN 500 MG/5ML
650 LIQUID (ML) ORAL EVERY 6 HOURS
Refills: 0 | Status: DISCONTINUED | OUTPATIENT
Start: 2025-05-27 | End: 2025-05-28

## 2025-05-27 RX ORDER — DEXTROSE 50 % IN WATER 50 %
25 SYRINGE (ML) INTRAVENOUS ONCE
Refills: 0 | Status: DISCONTINUED | OUTPATIENT
Start: 2025-05-27 | End: 2025-05-28

## 2025-05-27 RX ORDER — INSULIN LISPRO 100 U/ML
INJECTION, SOLUTION INTRAVENOUS; SUBCUTANEOUS
Refills: 0 | Status: DISCONTINUED | OUTPATIENT
Start: 2025-05-27 | End: 2025-05-28

## 2025-05-27 RX ORDER — INSULIN GLARGINE-YFGN 100 [IU]/ML
15 INJECTION, SOLUTION SUBCUTANEOUS EVERY MORNING
Refills: 0 | Status: DISCONTINUED | OUTPATIENT
Start: 2025-05-28 | End: 2025-05-28

## 2025-05-27 RX ORDER — DEXTROSE 50 % IN WATER 50 %
12.5 SYRINGE (ML) INTRAVENOUS ONCE
Refills: 0 | Status: DISCONTINUED | OUTPATIENT
Start: 2025-05-27 | End: 2025-05-28

## 2025-05-27 RX ORDER — SODIUM CHLORIDE 9 G/1000ML
1000 INJECTION, SOLUTION INTRAVENOUS
Refills: 0 | Status: DISCONTINUED | OUTPATIENT
Start: 2025-05-27 | End: 2025-05-27

## 2025-05-27 RX ORDER — INSULIN LISPRO 100 U/ML
INJECTION, SOLUTION INTRAVENOUS; SUBCUTANEOUS
Refills: 0 | Status: DISCONTINUED | OUTPATIENT
Start: 2025-05-27 | End: 2025-05-27

## 2025-05-27 RX ORDER — ERTAPENEM SODIUM 1 G/1
1000 INJECTION, POWDER, LYOPHILIZED, FOR SOLUTION INTRAMUSCULAR; INTRAVENOUS EVERY 24 HOURS
Refills: 0 | Status: DISCONTINUED | OUTPATIENT
Start: 2025-05-28 | End: 2025-05-28

## 2025-05-27 RX ORDER — MELATONIN 5 MG
3 TABLET ORAL AT BEDTIME
Refills: 0 | Status: DISCONTINUED | OUTPATIENT
Start: 2025-05-27 | End: 2025-05-28

## 2025-05-27 RX ORDER — DEXTROSE 50 % IN WATER 50 %
25 SYRINGE (ML) INTRAVENOUS ONCE
Refills: 0 | Status: DISCONTINUED | OUTPATIENT
Start: 2025-05-27 | End: 2025-05-27

## 2025-05-27 RX ORDER — SODIUM CHLORIDE 9 G/1000ML
1000 INJECTION, SOLUTION INTRAVENOUS
Refills: 0 | Status: DISCONTINUED | OUTPATIENT
Start: 2025-05-27 | End: 2025-05-28

## 2025-05-27 RX ORDER — ASPIRIN 325 MG
81 TABLET ORAL DAILY
Refills: 0 | Status: DISCONTINUED | OUTPATIENT
Start: 2025-05-27 | End: 2025-05-28

## 2025-05-27 RX ORDER — INSULIN LISPRO 100 U/ML
INJECTION, SOLUTION INTRAVENOUS; SUBCUTANEOUS AT BEDTIME
Refills: 0 | Status: DISCONTINUED | OUTPATIENT
Start: 2025-05-27 | End: 2025-05-28

## 2025-05-27 RX ORDER — METOPROLOL SUCCINATE 50 MG/1
50 TABLET, EXTENDED RELEASE ORAL DAILY
Refills: 0 | Status: DISCONTINUED | OUTPATIENT
Start: 2025-05-27 | End: 2025-05-28

## 2025-05-27 RX ORDER — DEXTROSE 50 % IN WATER 50 %
15 SYRINGE (ML) INTRAVENOUS ONCE
Refills: 0 | Status: DISCONTINUED | OUTPATIENT
Start: 2025-05-27 | End: 2025-05-27

## 2025-05-27 RX ORDER — ENOXAPARIN SODIUM 100 MG/ML
40 INJECTION SUBCUTANEOUS ONCE
Refills: 0 | Status: COMPLETED | OUTPATIENT
Start: 2025-05-27 | End: 2025-05-27

## 2025-05-27 RX ORDER — GLUCAGON 3 MG/1
1 POWDER NASAL ONCE
Refills: 0 | Status: DISCONTINUED | OUTPATIENT
Start: 2025-05-27 | End: 2025-05-28

## 2025-05-27 RX ORDER — LISINOPRIL 5 MG/1
40 TABLET ORAL DAILY
Refills: 0 | Status: DISCONTINUED | OUTPATIENT
Start: 2025-05-27 | End: 2025-05-28

## 2025-05-27 RX ORDER — ERTAPENEM SODIUM 1 G/1
INJECTION, POWDER, LYOPHILIZED, FOR SOLUTION INTRAMUSCULAR; INTRAVENOUS
Refills: 0 | Status: DISCONTINUED | OUTPATIENT
Start: 2025-05-27 | End: 2025-05-28

## 2025-05-27 RX ORDER — MAGNESIUM, ALUMINUM HYDROXIDE 200-200 MG
30 TABLET,CHEWABLE ORAL EVERY 4 HOURS
Refills: 0 | Status: DISCONTINUED | OUTPATIENT
Start: 2025-05-27 | End: 2025-05-28

## 2025-05-27 RX ORDER — ONDANSETRON HCL/PF 4 MG/2 ML
4 VIAL (ML) INJECTION EVERY 8 HOURS
Refills: 0 | Status: DISCONTINUED | OUTPATIENT
Start: 2025-05-27 | End: 2025-05-28

## 2025-05-27 RX ORDER — GLUCAGON 3 MG/1
1 POWDER NASAL ONCE
Refills: 0 | Status: DISCONTINUED | OUTPATIENT
Start: 2025-05-27 | End: 2025-05-27

## 2025-05-27 RX ORDER — MELATONIN 2.5MG/10ML
1 LIQUID (ML) ORAL
Refills: 0 | DISCHARGE

## 2025-05-27 RX ORDER — METOPROLOL SUCCINATE 50 MG/1
50 TABLET, EXTENDED RELEASE ORAL DAILY
Refills: 0 | Status: DISCONTINUED | OUTPATIENT
Start: 2025-05-27 | End: 2025-05-27

## 2025-05-27 RX ORDER — DEXTROSE 50 % IN WATER 50 %
15 SYRINGE (ML) INTRAVENOUS ONCE
Refills: 0 | Status: DISCONTINUED | OUTPATIENT
Start: 2025-05-27 | End: 2025-05-28

## 2025-05-27 RX ORDER — DEXTROSE 50 % IN WATER 50 %
12.5 SYRINGE (ML) INTRAVENOUS ONCE
Refills: 0 | Status: DISCONTINUED | OUTPATIENT
Start: 2025-05-27 | End: 2025-05-27

## 2025-05-27 RX ORDER — GABAPENTIN 400 MG/1
300 CAPSULE ORAL THREE TIMES A DAY
Refills: 0 | Status: DISCONTINUED | OUTPATIENT
Start: 2025-05-27 | End: 2025-05-28

## 2025-05-27 RX ORDER — INSULIN LISPRO 100 U/ML
5 INJECTION, SOLUTION INTRAVENOUS; SUBCUTANEOUS
Refills: 0 | Status: DISCONTINUED | OUTPATIENT
Start: 2025-05-27 | End: 2025-05-28

## 2025-05-27 RX ORDER — INSULIN LISPRO 100 U/ML
INJECTION, SOLUTION INTRAVENOUS; SUBCUTANEOUS AT BEDTIME
Refills: 0 | Status: DISCONTINUED | OUTPATIENT
Start: 2025-05-27 | End: 2025-05-27

## 2025-05-27 RX ADMIN — ENOXAPARIN SODIUM 40 MILLIGRAM(S): 100 INJECTION SUBCUTANEOUS at 21:27

## 2025-05-27 RX ADMIN — ERTAPENEM SODIUM 100 MILLIGRAM(S): 1 INJECTION, POWDER, LYOPHILIZED, FOR SOLUTION INTRAMUSCULAR; INTRAVENOUS at 16:41

## 2025-05-27 RX ADMIN — INSULIN LISPRO 5 UNIT(S): 100 INJECTION, SOLUTION INTRAVENOUS; SUBCUTANEOUS at 17:59

## 2025-05-27 RX ADMIN — GABAPENTIN 300 MILLIGRAM(S): 400 CAPSULE ORAL at 16:35

## 2025-05-27 RX ADMIN — GABAPENTIN 300 MILLIGRAM(S): 400 CAPSULE ORAL at 21:27

## 2025-05-27 RX ADMIN — ATORVASTATIN CALCIUM 40 MILLIGRAM(S): 80 TABLET, FILM COATED ORAL at 21:29

## 2025-05-27 NOTE — H&P ADULT - MENTAL STATUS
Vascular: Dorsalis Pedis and Posterior Tibial pulses non palpable.  Capillary re-fill time greater then 3 seconds digits 1-5 left, TMA right

## 2025-05-27 NOTE — ED PROVIDER NOTE - OBJECTIVE STATEMENT
63 yo M PMH htn, hld, dm, pvd,  left foot OM (sees Dr Stark for wound care, has picc for iv abx) presents sent by Dr. Stark for admission for OR intervention tomorrow for the left heel. pt has pmh of amp of  right toes from same issue before. denies fever, cough, n/v/d/c/abd pain.

## 2025-05-27 NOTE — CONSULT NOTE ADULT - ASSESSMENT
65 y/o M with PMHx DM2, osteomyelitis, CAD, PAD s/p RLE angioplasty 2020, s/p surgical amputation of R forefoot , s/p L PT angioplasty 12/2/24 for L lat foot DFU on plavix, HTN, s/p recent admission for left foot infection s/p PICC placement for IV abx  sent by Podiatry Dr. Stark for admission for OR intervention tomorrow.    L foot OM/DM Foot Ulcer  PAD  - sent for OR tomorrow 5/28 for left foot debridement and partial 1st toe amputation 5/28   - MRI of the left foot demonstrates ulceration at the great toe with underlying cortical destruction of the distal phalanx and acute osteomyelitis involving the distal and proximal phalanx of the great toe.  Focal ulceration at the base of the fifth metatarsal with underlying marrow edema within the fifth metatarsal base and intramedullary cavity. Finding is indeterminate and may represent concurrent early osteomyelitis versus reactive changes.  Marrow edema involving the distal phalanx and middle phalanx of the little toe without definitive corresponding ulceration or abnormal T1 weighted signal.   Nonspecific bone marrow edema pattern within the intermediate cuneiform, lateral cuneiform, and navicular bone favored to represent reactive changes from osteoarthrosis.    Review of prior ID notes show that the patient was sent out  on ceftriaxone 2gm q24h x6 week course IV Abx until 6/10/25    Recommendations:    To follow    Patient evaluated with face-to-face time in addition to reviewing history, labs, microbiology, and imaging.   Antibiotic stewardship, local antibiogram, infection control strategies and potential transmission issues taken into consideration at time of treatment decision making process.   Thank you for allowing us to participate in the care of your patient.  Infectious Diseases will follow. Please call with any questions.  Melany Calhoun M.D.  Available on [employer] TEAMS -- *[facility]*  [place] [facility] [phone]  For after 5 P.M. and weekends, please call [phone] 63 y/o M with PMHx DM2, osteomyelitis, CAD, PAD s/p RLE angioplasty 2020, s/p surgical amputation of R forefoot , s/p L PT angioplasty 12/2/24 for L lat foot DFU on plavix, HTN, s/p recent admission for left foot infection s/p PICC placement for IV abx  sent by Podiatry Dr. Stark for admission for OR intervention tomorrow.    L foot OM/DM Foot Ulcer  PAD  - sent for OR tomorrow 5/28 for left foot debridement and partial 1st toe amputation 5/28   - MRI of the left foot demonstrates ulceration at the great toe with underlying cortical destruction of the distal phalanx and acute osteomyelitis involving the distal and proximal phalanx of the great toe.  Focal ulceration at the base of the fifth metatarsal with underlying marrow edema within the fifth metatarsal base and intramedullary cavity. Finding is indeterminate and may represent concurrent early osteomyelitis versus reactive changes.  Marrow edema involving the distal phalanx and middle phalanx of the little toe without definitive corresponding ulceration or abnormal T1 weighted signal.   Nonspecific bone marrow edema pattern within the intermediate cuneiform, lateral cuneiform, and navicular bone favored to represent reactive changes from osteoarthrosis.    Review of prior ID notes show that the patient was sent out  on ceftriaxone 2gm q24h x6 week course IV Abx until 6/10/25  In in the interim pt was noted to have worsening leukocytosis, transitioned to ertapenem since 5/24    Recommendations:  Resume ertapenem  OR tomorrow--please send path/culture  Trend temps/WBC  Further recs to follow pending above    Patient evaluated with face-to-face time in addition to reviewing history, labs, microbiology, and imaging.   Antibiotic stewardship, local antibiogram, infection control strategies and potential transmission issues taken into consideration at time of treatment decision making process.   Thank you for allowing us to participate in the care of your patient.  Infectious Diseases will follow. Please call with any questions.  Melany Calhoun M.D.  Available on [employer] TEAMS -- *[facility]*  [place] [facility] [phone]  For after 5 P.M. and weekends, please call [phone]

## 2025-05-27 NOTE — CONSULT NOTE ADULT - ATTENDING COMMENTS
Patient examined and evaluated. Given the patient's severity of symptoms, will plan for surgical intervention at this time as his condition requires urgent surgical intervention. Discussed risks, benefits, and potential complications with patient regarding surgical intervention including sepsis, loss of limb, and loss of life. Will plan for left hallux and 1st metatarsal head amputation, left heel wound debridement down to bone with application of antibiotic cement, and left 5th metatarsal resection with peroneal tendon detachment and reattachment. Discussed with patient and Dr. Jones regarding his vascular disease as patient may end up with a chronic non-healing wound or a worsening condition. Patient understands the risks and would like to attempt surgical intervention prior to pursuing more vascular intervention including Limflow procedure. All questions answered to satisfaction and patient verbalized understanding.
64M DM2, hx osteomyelitis, CAD, PAD s/p RLE angioplasty 2020, s/p surgical amputation of R forefoot , s/p L PT angioplasty 12/2/24 for L lat foot DFU on plavix, HTN, HLD s/p surgical amputation of R forefoot , s/p L PT angioplasty 12/2/24 for L lat foot DFU on plavix, HTN presenting with wound infection.     scheduled to have left foot debridement with partial 1st ray amputation tomorrow 5/28.  No signs of significant ischemia or volume overload.   Previous TTE (1/2/24) shows EF 61%, Normal LV mass and diastolic function, Normal RV size and function.  Continue BB   cont dapt and statin  Currently no active cardiac conditions. No signs of ischemia, ADHF, clinical exam not consistent with severe stenotic valvular disease, no unstable arrhythmias noted. Therefore able to proceed with this low to intermediate risk podiatric  surgery without any further cardiac workup. Routine hemodynamic monitoring is suggested during the procedure.

## 2025-05-27 NOTE — H&P ADULT - GASTROINTESTINAL
normal/soft/nontender/nondistended/normal active bowel sounds negative details… normal/soft/nontender/nondistended/normal active bowel sounds/no guarding/no rigidity/no organomegaly/no palpable saira/no masses palpable

## 2025-05-27 NOTE — ED ADULT NURSE NOTE - OBJECTIVE STATEMENT
pt aox4, " sent by [facility], lt foot area infection, on iv antibiotics, possible OR  in AM " FROM, poor cap refill

## 2025-05-27 NOTE — CONSULT NOTE ADULT - SUBJECTIVE AND OBJECTIVE BOX
[facility]  OFELIA Harvey S. Shah, Y. Patel, G. [facility]  [phone]    LOIDA QUEZADA  64y, Male  [id number]    HPI--  HPI:  65 y/o M with PMHx DM2, osteomyelitis, CAD, PAD s/p RLE angioplasty 2020, s/p surgical amputation of R forefoot , s/p L PT angioplasty 12/2/24 for L lat foot DFU on plavix, HTN, s/p recent admission for left foot infection s/p PICC placement for IV abx  sent by Podiatry Dr. Stark for admission for OR intervention tomorrow.    ED course:   Vitals: T 97.7 HR 99 /69   Labs: ESR 64 wbc 10.77   Imaging: Cxray- Right PICC with tip in the SVC.  The heart is not accurately assessed in this AP projection.  The lungs are clear.  There is no pneumothorax or pleural effusion.  No acute bony abnormality.  Clear lungs  Given:  (27 May 2025 13:05)        Active Medications--    Antimicrobials:     Immunologic:     ROS:  CONSTITUTIONAL: No fevers or chills. No weakness or headache. No weight changes.  EYES/ENT: No visual or hearing changes. No sore throat or throat pain .  NECK: No pain or stiffness  RESPIRATORY: No cough, wheezing, or hemoptysis. No shortness of breath  CARDIOVASCULAR: No chest pain or palpitations  GASTROINTESTINAL: No abdominal pain. No nausea or vomiting. No diarrhea or constipation.  GENITOURINARY: No dysuria, frequency or hematuria  NEUROLOGICAL: No numbness or weakness  SKIN: No itching or rashes  PSYCHIATRIC: Pleasant. Appropriate affect    Allergies: No Known Allergies    PMH -- HTN (hypertension)    Diabetes    Hyperlipidemia    Peripheral vascular disease    PVD (peripheral vascular disease)    Toe osteomyelitis, right      PSH -- H/O angioplasty    Status post amputation of toe      FH -- FH: type 2 diabetes      Social History --  EtOH: denies   Tobacco: denies   Drug Use: denies     Travel/Environmental/Occupational History:    Physical Exam--  Vital Signs Last 24 Hrs  T(F): 98 (27 May 2025 12:30), Max: 98 (27 May 2025 12:30)  HR: 86 (27 May 2025 12:30) (86 - 99)  BP: 112/70 (27 May 2025 12:30) (110/69 - 112/70)  RR: 18 (27 May 2025 12:30) (18 - 19)  SpO2: 99% (27 May 2025 12:30) (99% - 100%)  General: nontoxic-appearing, no acute distress  HEENT: NC/AT, EOMI  Lungs: no use resp acc muscles  Heart: Regular rate and rhythm.   Abdomen: Soft. Nondistended. Nontender.   Extremities: No cyanosis or clubbing. No edema.   Skin: Warm. Dry. Good turgor. No rash.     Laboratory & Imaging Data:  CBC:                       12.6   10.77 )-----------( 393      ( 27 May 2025 09:10 )             39.4     CMP: 05-27    138  |  101  |  22  ----------------------------<  148[H]  4.7   |  28  |  1.20    Ca    9.4      27 May 2025 09:10    TPro  8.6[H]  /  Alb  3.9  /  TBili  0.5  /  DBili  x   /  AST  16  /  ALT  26  /  AlkPhos  85  05-27    LIVER FUNCTIONS - ( 27 May 2025 09:10 )  Alb: 3.9 g/dL / Pro: 8.6 g/dL / ALK PHOS: 85 U/L / ALT: 26 U/L / AST: 16 U/L / GGT: x           Urinalysis Basic - ( 27 May 2025 09:10 )    Color: x / Appearance: x / SG: x / pH: x  Gluc: 148 mg/dL / Ketone: x  / Bili: x / Urobili: x   Blood: x / Protein: x / Nitrite: x   Leuk Esterase: x / RBC: x / WBC x   Sq Epi: x / Non Sq Epi: x / Bacteria: x        Microbiology: reviewed        Radiology: reviewed    < from: MR Ankle No Cont, Left (05.27.25 @ 13:08) >    ACC: [id number] EXAM:  MR FOOT LT   ORDERED BY: KEM JETER     ACC: [id number] EXAM:  MR ANKLE LT   ORDERED BY: KEM JETER     PROCEDURE DATE:  05/27/2025          INTERPRETATION:  MRI of the left ankle and left foot    INDICATION: Ulcer. Concern forosteomyelitis.    COMPARISON: Same day radiographs of the left foot    TECHNIQUE: Multiplanar multisequence MRI of the left ankle and left foot   without contrast.    FINDINGS:    Left ankle MRI:    There is soft tissue ulceration at the posterior ankle at the level of   the calcaneus, laterally with questionable ulceration to the level of the   bone. There is underlying marrow edema at the calcaneus with patchy   corresponding T1-weighted signal (series 14, image 12).    There is bone marrow edema pattern within the navicular bone, nonspecific   without adjacent ulceration.    There is focal ulceration at the base of the fifth metatarsal with   underlying cortical destruction and mild marrow edema with corresponding   abnormal T1 weighted signal (series 10, image 37 posterior.    There is feathery edema throughout the muscle bellies of the ankle. There   is no tendon tear.    The plantar fascia is intact.    Left foot MRI:    Ulceration to the level of the bone at the great toe with cortical   destruction of the distal phalanx tuft with underlying bone marrow edema   involving the distal phalanx and proximal phalanx with corresponding low   to intermediate T1 weighted signal. Multiple foci of signal dropout   within the distal phalanx favored to represent gas in the setting of   ulceration.    Marrow edema involving the distal phalanx and middle phalanx of the   little toe without definitive corresponding ulceration or abnormal T1   weighted signal.    Ulceration at the base of the fifth metatarsal as described above with   marrow edema within the intramedullary cavity and patchy intermediate T1   weighted signal.    Nonspecific edema within the median cuneiform, lateral cuneiform, and   navicular bone.    Mild diffuse intrinsic muscle belly edema within the forefoot as well as   adjacent subcutaneous edema. There is no drainable fluid collection.    There is no tendon tear.      IMPRESSION:    MRI of the left ankle demonstrates ulceration at the posterior soft   tissues at the level of the posterior lateral calcaneus. Recommend   correlation with ulceration as conus appears exposed. There is underlying   bone marrow edema and intermediate T1 weighted signal involving the   posterior lateral margin of the calcaneus suggestive of acute   osteomyelitis in the setting of adjacent wound.    MRI of the left foot demonstrates ulceration at the great toe with   underlying cortical destruction of the distal phalanx and acute   osteomyelitis involving the distal and proximal phalanx of the great toe.    Focal ulceration at the base of the fifth metatarsal with underlying   marrow edema within the fifth metatarsal base and intramedullary cavity.   Finding is indeterminate and may represent concurrent early osteomyelitis  versus reactive changes.    Marrow edema involving the distal phalanx and middle phalanx of the   little toe without definitive corresponding ulceration or abnormal T1   weighted signal. Correlate with physical exam for ulceration and   underlying osteomyelitis.      Nonspecific bone marrow edema pattern within the intermediate cuneiform,   lateral cuneiform, and navicular bone favored to represent reactive   changes from osteoarthrosis.    --- End of Report ---             TRISTEN REYES; Attending Radiologist  This document has been electronically signed. May 27 2025  1:31PM    < end of copied text >  < from: Xray Chest 1 View-PORTABLE IMMEDIATE (Xray Chest 1 View-PORTABLE IMMEDIATE .) (05.27.25 @ 09:51) >    ACC: [id number] EXAM:  XR CHEST PORTABLE IMMED 1V   ORDERED BY: ANGELINA WILHELM     PROCEDURE DATE:  05/27/2025          INTERPRETATION:  EXAMINATION: XR CHEST IMMEDIATE    CLINICAL INDICATION: preop.    TECHNIQUE: Single frontal, portable view of the chest was obtained.    COMPARISON: Chest x-ray 4/18/2025.    FINDINGS:  Right PICC with tip in the SVC.  The heart is not accurately assessed in this AP projection.  The lungs are clear.  There is no pneumothorax or pleural effusion.  No acute bony abnormality.    IMPRESSION:  The lungs are clear.    --- End of Report ---          AB HOLCOMB MD; Resident Radiologist  This document has been electronically signed.  SANDY SAUNDERS MD; Attending Radiologist  This document has been electronically signed. May 27 2025 10:53AM    < end of copied text >   [facility]  OFELIA Harvey S. Shah, Y. Patel, G. [facility]  [phone]    LOIDA QUEZADA  64y, Male  [id number]    HPI--  HPI:  63 y/o M with PMHx DM2, osteomyelitis, CAD, PAD s/p RLE angioplasty 2020, s/p surgical amputation of R forefoot , s/p L PT angioplasty 12/2/24 for L lat foot DFU on plavix, HTN, s/p recent admission for left foot infection s/p PICC placement for IV abx  sent by Podiatry Dr. Stark for admission for OR intervention tomorrow.    ED course:   Vitals: T 97.7 HR 99 /69   Labs: ESR 64 wbc 10.77   Imaging: Cxray- Right PICC with tip in the SVC.  The heart is not accurately assessed in this AP projection.  The lungs are clear.  There is no pneumothorax or pleural effusion.  No acute bony abnormality.  Clear lungs  Given:  (27 May 2025 13:05)    Pt seen at bedside  In the interim pt was switched to ertapenem in setting of worsening WBC on 5/24    Active Medications--    Antimicrobials:     Immunologic:     ROS:  CONSTITUTIONAL: No fevers or chills. No weakness or headache. No weight changes.  EYES/ENT: No visual or hearing changes. No sore throat or throat pain .  NECK: No pain or stiffness  RESPIRATORY: No cough, wheezing, or hemoptysis. No shortness of breath  CARDIOVASCULAR: No chest pain or palpitations  GASTROINTESTINAL: No abdominal pain. No nausea or vomiting. No diarrhea or constipation.  GENITOURINARY: No dysuria, frequency or hematuria  NEUROLOGICAL: No numbness or weakness  SKIN: No itching or rashes  PSYCHIATRIC: Pleasant. Appropriate affect    Allergies: No Known Allergies    PMH -- HTN (hypertension)    Diabetes    Hyperlipidemia    Peripheral vascular disease    PVD (peripheral vascular disease)    Toe osteomyelitis, right      PSH -- H/O angioplasty    Status post amputation of toe      FH -- FH: type 2 diabetes      Social History --  EtOH: denies   Tobacco: denies   Drug Use: denies     Travel/Environmental/Occupational History:    Physical Exam--  Vital Signs Last 24 Hrs  T(F): 98 (27 May 2025 12:30), Max: 98 (27 May 2025 12:30)  HR: 86 (27 May 2025 12:30) (86 - 99)  BP: 112/70 (27 May 2025 12:30) (110/69 - 112/70)  RR: 18 (27 May 2025 12:30) (18 - 19)  SpO2: 99% (27 May 2025 12:30) (99% - 100%)  General: nontoxic-appearing, no acute distress  HEENT: NC/AT, EOMI  Lungs: no use resp acc muscles  Heart: Regular rate and rhythm.   Abdomen: Soft. Nondistended. Nontender.   Extremities: No cyanosis or clubbing. No edema.   Skin: Warm. Dry. Good turgor. No rash.     Laboratory & Imaging Data:  CBC:                       12.6   10.77 )-----------( 393      ( 27 May 2025 09:10 )             39.4     CMP: 05-27    138  |  101  |  22  ----------------------------<  148[H]  4.7   |  28  |  1.20    Ca    9.4      27 May 2025 09:10    TPro  8.6[H]  /  Alb  3.9  /  TBili  0.5  /  DBili  x   /  AST  16  /  ALT  26  /  AlkPhos  85  05-27    LIVER FUNCTIONS - ( 27 May 2025 09:10 )  Alb: 3.9 g/dL / Pro: 8.6 g/dL / ALK PHOS: 85 U/L / ALT: 26 U/L / AST: 16 U/L / GGT: x           Urinalysis Basic - ( 27 May 2025 09:10 )    Color: x / Appearance: x / SG: x / pH: x  Gluc: 148 mg/dL / Ketone: x  / Bili: x / Urobili: x   Blood: x / Protein: x / Nitrite: x   Leuk Esterase: x / RBC: x / WBC x   Sq Epi: x / Non Sq Epi: x / Bacteria: x        Microbiology: reviewed        Radiology: reviewed    < from: MR Ankle No Cont, Left (05.27.25 @ 13:08) >    ACC: [id number] EXAM:  MR FOOT LT   ORDERED BY: KEM JETER     ACC: [id number] EXAM:  MR ANKLE LT   ORDERED BY: KEM JETER     PROCEDURE DATE:  05/27/2025          INTERPRETATION:  MRI of the left ankle and left foot    INDICATION: Ulcer. Concern forosteomyelitis.    COMPARISON: Same day radiographs of the left foot    TECHNIQUE: Multiplanar multisequence MRI of the left ankle and left foot   without contrast.    FINDINGS:    Left ankle MRI:    There is soft tissue ulceration at the posterior ankle at the level of   the calcaneus, laterally with questionable ulceration to the level of the   bone. There is underlying marrow edema at the calcaneus with patchy   corresponding T1-weighted signal (series 14, image 12).    There is bone marrow edema pattern within the navicular bone, nonspecific   without adjacent ulceration.    There is focal ulceration at the base of the fifth metatarsal with   underlying cortical destruction and mild marrow edema with corresponding   abnormal T1 weighted signal (series 10, image 37 posterior.    There is feathery edema throughout the muscle bellies of the ankle. There   is no tendon tear.    The plantar fascia is intact.    Left foot MRI:    Ulceration to the level of the bone at the great toe with cortical   destruction of the distal phalanx tuft with underlying bone marrow edema   involving the distal phalanx and proximal phalanx with corresponding low   to intermediate T1 weighted signal. Multiple foci of signal dropout   within the distal phalanx favored to represent gas in the setting of   ulceration.    Marrow edema involving the distal phalanx and middle phalanx of the   little toe without definitive corresponding ulceration or abnormal T1   weighted signal.    Ulceration at the base of the fifth metatarsal as described above with   marrow edema within the intramedullary cavity and patchy intermediate T1   weighted signal.    Nonspecific edema within the median cuneiform, lateral cuneiform, and   navicular bone.    Mild diffuse intrinsic muscle belly edema within the forefoot as well as   adjacent subcutaneous edema. There is no drainable fluid collection.    There is no tendon tear.      IMPRESSION:    MRI of the left ankle demonstrates ulceration at the posterior soft   tissues at the level of the posterior lateral calcaneus. Recommend   correlation with ulceration as conus appears exposed. There is underlying   bone marrow edema and intermediate T1 weighted signal involving the   posterior lateral margin of the calcaneus suggestive of acute   osteomyelitis in the setting of adjacent wound.    MRI of the left foot demonstrates ulceration at the great toe with   underlying cortical destruction of the distal phalanx and acute   osteomyelitis involving the distal and proximal phalanx of the great toe.    Focal ulceration at the base of the fifth metatarsal with underlying   marrow edema within the fifth metatarsal base and intramedullary cavity.   Finding is indeterminate and may represent concurrent early osteomyelitis  versus reactive changes.    Marrow edema involving the distal phalanx and middle phalanx of the   little toe without definitive corresponding ulceration or abnormal T1   weighted signal. Correlate with physical exam for ulceration and   underlying osteomyelitis.      Nonspecific bone marrow edema pattern within the intermediate cuneiform,   lateral cuneiform, and navicular bone favored to represent reactive   changes from osteoarthrosis.    --- End of Report ---             TRISTEN REYES; Attending Radiologist  This document has been electronically signed. May 27 2025  1:31PM    < end of copied text >  < from: Xray Chest 1 View-PORTABLE IMMEDIATE (Xray Chest 1 View-PORTABLE IMMEDIATE .) (05.27.25 @ 09:51) >    ACC: [id number] EXAM:  XR CHEST PORTABLE IMMED 1V   ORDERED BY: ANGELINA WILHELM     PROCEDURE DATE:  05/27/2025          INTERPRETATION:  EXAMINATION: XR CHEST IMMEDIATE    CLINICAL INDICATION: preop.    TECHNIQUE: Single frontal, portable view of the chest was obtained.    COMPARISON: Chest x-ray 4/18/2025.    FINDINGS:  Right PICC with tip in the SVC.  The heart is not accurately assessed in this AP projection.  The lungs are clear.  There is no pneumothorax or pleural effusion.  No acute bony abnormality.    IMPRESSION:  The lungs are clear.    --- End of Report ---          AB HOLCOMB MD; Resident Radiologist  This document has been electronically signed.  SANDY SAUNDERS MD; Attending Radiologist  This document has been electronically signed. May 27 2025 10:53AM    < end of copied text >

## 2025-05-27 NOTE — PATIENT PROFILE ADULT - DO YOU LACK THE NECESSARY SUPPORT TO HELP YOU COPE WITH LIFE CHALLENGES?
Comprehensive Intake Entered On:  4/19/2021 5:11 PM CDT    Performed On:  4/19/2021 5:04 PM CDT by Saw INGRAM, Natali               Summary   Chief Complaint :   c/o left middle finger red, swollen, painful/tender to touch.  denies injury.   Weight Measured :   197.6 lb(Converted to: 197 lb 10 oz, 89.630 kg)    Height Measured :   67.5 in(Converted to: 5 ft 7 in, 171.45 cm)    Body Mass Index :   30.49 kg/m2 (HI)    Body Surface Area :   2.06 m2   Systolic Blood Pressure :   110 mmHg   Diastolic Blood Pressure :   68 mmHg   Mean Arterial Pressure :   82 mmHg   Peripheral Pulse Rate :   84 bpm   BP Site :   Right arm   Pulse Site :   Radial artery   BP Method :   Manual   HR Method :   Manual   Temperature Tympanic :   98.6 DegF(Converted to: 37.0 DegC)    Oxygen Saturation :   93 % (LOW)    Natali Spring MA - 4/19/2021 5:04 PM CDT   Health Status   Allergies Verified? :   Yes   Medication History Verified? :   Yes   Medical History Verified? :   Yes   Pre-Visit Planning Status :   Not completed   Tobacco Use? :   Former smoker   Natali Spring MA - 4/19/2021 5:04 PM CDT   Consents   Consent for Immunization Exchange :   Consent Granted   Consent for Immunizations to Providers :   Consent Granted   Natali Spring MA - 4/19/2021 5:04 PM CDT   Meds / Allergies   (As Of: 4/19/2021 5:11:12 PM CDT)   Allergies (Active)   No known allergies  Estimated Onset Date:   Unspecified ; Created By:   Loyda Medel CMA; Reaction Status:   Active ; Category:   Drug ; Substance:   No known allergies ; Type:   Allergy ; Updated By:   Loyda Medel CMA; Source:   Patient ; Reviewed Date:   8/17/2020 4:12 PM CDT        Medication List   (As Of: 4/19/2021 5:11:12 PM CDT)   Prescription/Discharge Order    atorvastatin  :   atorvastatin ; Status:   Prescribed ; Ordered As Mnemonic:   atorvastatin 40 mg oral tablet ; Simple Display Line:   1 tab(s), Oral, daily, 30 tab(s), 0 Refill(s) ; Ordering Provider:   Will Morocho MD;  Catalog Code:   atorvastatin ; Order Dt/Tm:   3/31/2021 2:03:22 PM CDT          cetirizine  :   cetirizine ; Status:   Prescribed ; Ordered As Mnemonic:   ZyrTEC 10 mg oral tablet ; Simple Display Line:   10 mg, 1 tab(s), po, daily, 90 tab(s), 3 Refill(s) ; Ordering Provider:   Will Morocho MD; Catalog Code:   cetirizine ; Order Dt/Tm:   4/8/2021 11:39:52 AM CDT            Home Meds    aspirin  :   aspirin ; Status:   Documented ; Ordered As Mnemonic:   aspirin 81 mg oral tablet ; Simple Display Line:   81 mg, 1 tab(s), po, daily, tab(s) ; Catalog Code:   aspirin ; Order Dt/Tm:   1/21/2011 2:25:02 PM CST          multivitamin  :   multivitamin ; Status:   Documented ; Ordered As Mnemonic:   Daily Multiple Vitamins oral tablet ; Simple Display Line:   1 tab(s), po, daily ; Catalog Code:   multivitamin ; Order Dt/Tm:   1/21/2011 2:24:44 PM CST            ID Risk Screen   Recent Travel History :   No recent travel   Family Member Travel History :   No recent travel   Other Exposure to Infectious Disease :   Unknown   COVID-19 Testing Status :   No positive COVID-19 test   Natali Spring MA - 4/19/2021 5:04 PM CDT   Social History   Social History   (As Of: 4/19/2021 5:11:12 PM CDT)   Alcohol:        Current, 4-5 times per week, 2 drinks/episode average.   (Last Updated: 7/2/2018 8:05:32 AM CDT by Akua Pelayo)          Tobacco:        Former smoker, quit more than 30 days ago   (Last Updated: 4/19/2021 5:05:09 PM CDT by Natali Spring MA)          Electronic Cigarette/Vaping:        Electronic Cigarette Use: Never.   (Last Updated: 4/19/2021 5:05:14 PM CDT by Natali Spring MA)          Substance Abuse:        Past, Marijuana   (Last Updated: 7/26/2013 9:55:01 AM CDT by Cayla Crawley)          Employment/School:        Employed, Work/School description: .   (Last Updated: 7/6/2016 3:32:53 PM CDT by Elisa Aguilera)          Home/Environment:        Marital status: .  Spouse/Partner  name: Elisa.  Risks in environment: owns secured gun.   (Last Updated: 6/30/2017 1:06:42 PM CDT by Akua Pelayo)          Nutrition/Health:        Type of diet: Regular.   (Last Updated: 6/30/2017 1:07:14 PM CDT by Akua Pelayo)          Exercise:        Exercise frequency: Never.   (Last Updated: 6/30/2017 1:07:21 PM CDT by Akua Pelayo)   no

## 2025-05-27 NOTE — ED PROVIDER NOTE - PHYSICAL EXAMINATION
Constitutional: Awake, Alert. NAD. Well appearing, well nourished. Cooperative. VSS.  HEAD: NC/AT; no signs of trauma   EYES: Conjunctiva and sclera are clear bilaterally.   ENT: MMM.   NECK: FROM. Supple.   CARDIOVASCULAR: RRR, Radial pulses +2 B/L.  RESPIRATORY: Speaking full sentences. Normal respiratory effort;  ABDOMEN: Soft; NTND.   EXTREMITIES: Full active ROM in all extremities; no deformities; non-tender to palpation; distal pulses palpable and symmetric. Warm and well perfused. No palpable cords B/L. No discoloration.  SKIN: Warm, dry; good skin turgor, no ecchymosis, brisk capillary refill.  NEURO: AAOx3 follows commands. No facial droop. No truncal ataxia. Moving all extremities spontaneously.

## 2025-05-27 NOTE — H&P ADULT - HISTORY OF PRESENT ILLNESS
63 y/o M with PMHx DM2, osteomyelitis, CAD, PAD s/p RLE angioplasty 2020, s/p surgical amputation of R forefoot , s/p L PT angioplasty 12/2/24 for L lat foot DFU on plavix, HTN, s/p recent admission for left foot infection s/p PICC placement for IV abx  sent by Podiatry Dr. Stark for admission for OR intervention tomorrow.    ED course:   Vitals: T 97.7 HR 99 /69   Labs: ESR 64 wbc 10.77   Imaging: Cxray- Right PICC with tip in the SVC.  The heart is not accurately assessed in this AP projection.  The lungs are clear.  There is no pneumothorax or pleural effusion.  No acute bony abnormality.  Clear lungs  Given:  63 y/o M with PMHx DM2, osteomyelitis, CAD, PAD s/p RLE angioplasty 2020, s/p surgical amputation of R forefoot , s/p L PT angioplasty 12/2/24 for L lat foot DFU on plavix, HTN, s/p recent admission for left foot infection s/p PICC placement for IV abx  sent by Podiatry Dr. Stark for admission for OR intervention tomorrow. Patient denies any complaints today. States after discharge he had outpatient visits with Vascular physician. He had imaging done and was suggested to have LimFlow procedure. Patient requested to have surgery as per Podiatry for his left foot wound prior to LimFlow. Vascular agreed. Patient states he can walk independently without pain and is currently comfortable.   Of note, patient was transitioned from Rocephin which was to be given via PICC line after previous discharge to Ertapenem by ID this Saturday.     ED course:   Vitals: T 97.7 HR 99 /69   Labs: ESR 64 wbc 10.77   Imaging: Cxray- Right PICC with tip in the SVC.  The heart is not accurately assessed in this AP projection.  The lungs are clear.  There is no pneumothorax or pleural effusion.  No acute bony abnormality.  Clear lungs

## 2025-05-27 NOTE — ED PROVIDER NOTE - CONSTITUTIONAL NEGATIVE STATEMENT, MLM
no fever and no chills. Cephalexin Counseling: I counseled the patient regarding use of cephalexin as an antibiotic for prophylactic and/or therapeutic purposes. Cephalexin (commonly prescribed under brand name Keflex) is a cephalosporin antibiotic which is active against numerous classes of bacteria, including most skin bacteria. Side effects may include nausea, diarrhea, gastrointestinal upset, rash, hives, yeast infections, and in rare cases, hepatitis, kidney disease, seizures, fever, confusion, neurologic symptoms, and others. Patients with severe allergies to penicillin medications are cautioned that there is about a 10% incidence of cross-reactivity with cephalosporins. When possible, patients with penicillin allergies should use alternatives to cephalosporins for antibiotic therapy.

## 2025-05-27 NOTE — H&P ADULT - PROBLEM SELECTOR PLAN 2
Chronic. Pt follows with Vascular outpt. Pt s/p L PT angioplasty 12/2/24 for L lat foot DFU on Plavix  -C/w ASA and Plavix and Lipitor   -s/p LLE angio with occluded PT   -Cardiology folllow up  out pt follow up for test & intervention. Chronic. Pt follows with Vascular outpt. Pt s/p L PT angioplasty 12/2/24 for L lat foot DFU on Plavix  -C/w ASA and Lipitor  - Hold Plavix given OR tomorrow

## 2025-05-27 NOTE — H&P ADULT - NSHPADDITIONALINFOADULT_GEN_ALL_CORE
MRI left foot -    IMPRESSION:    MRI of the left ankle demonstrates ulceration at the posterior soft   tissues at the level of the posterior lateral calcaneus. Recommend   correlation with ulceration as conus appears exposed. There is underlying   bone marrow edema and intermediate T1 weighted signal involving the   posterior lateral margin of the calcaneus suggestive of acute   osteomyelitis in the setting of adjacent wound.    MRI of the left foot demonstrates ulceration at the great toe with   underlying cortical destruction of the distal phalanx and acute   osteomyelitis involving the distal and proximal phalanx of the great toe.    Focal ulceration at the base of the fifth metatarsal with underlying   marrow edema within the fifth metatarsal base and intramedullary cavity.   Finding is indeterminate and may represent concurrent early osteomyelitis   versus reactive changes.    Marrow edema involving the distal phalanx and middle phalanx of the   little toe without definitive corresponding ulceration or abnormal T1   weighted signal. Correlate with physical exam for ulceration and   underlying osteomyelitis.      Nonspecific bone marrow edema pattern within the intermediate cuneiform,   lateral cuneiform, and navicular bone favored to represent reactive   changes from osteoarthrosis.

## 2025-05-27 NOTE — PATIENT PROFILE ADULT - FALL HARM RISK - HARM RISK INTERVENTIONS

## 2025-05-27 NOTE — CONSULT NOTE ADULT - SUBJECTIVE AND OBJECTIVE BOX
Patient is a 64y old  Male who presents with a chief complaint of left foot wound    HPI:  63 yo M PMH htn, hld, dm, pvd,  left foot OM (sees Dr Stark for wound care, has picc for iv abx) presents sent by Dr. Stark for admission for OR intervention tomorrow for the left heel. pt has pmh of amp of  right toes from same issue before. denies fever, cough, n/v/d/c/abd pain    PAST MEDICAL & SURGICAL HISTORY:  HTN (hypertension)      Diabetes      Hyperlipidemia      PVD (peripheral vascular disease)      Toe osteomyelitis, right      H/O angioplasty  RLE      Status post amputation of toe          MEDICATIONS  (STANDING):    MEDICATIONS  (PRN):      Allergies    No Known Allergies    Intolerances        VITALS:    Vital Signs Last 24 Hrs  T(C): 36.5 (27 May 2025 08:38), Max: 36.5 (27 May 2025 08:38)  T(F): 97.7 (27 May 2025 08:38), Max: 97.7 (27 May 2025 08:38)  HR: 99 (27 May 2025 08:38) (99 - 99)  BP: 110/69 (27 May 2025 08:38) (110/69 - 110/69)  BP(mean): --  RR: 19 (27 May 2025 08:38) (19 - 19)  SpO2: 100% (27 May 2025 08:38) (100% - 100%)    Parameters below as of 27 May 2025 08:38  Patient On (Oxygen Delivery Method): room air        LABS:                          12.6   10.77 )-----------( 393      ( 27 May 2025 09:10 )             39.4       05-27    138  |  101  |  22  ----------------------------<  148[H]  4.7   |  28  |  1.20    Ca    9.4      27 May 2025 09:10    TPro  8.6[H]  /  Alb  3.9  /  TBili  0.5  /  DBili  x   /  AST  16  /  ALT  26  /  AlkPhos  85  05-27      PT/INR - ( 27 May 2025 09:10 )   PT: 11.9 sec;   INR: 1.02 ratio         PTT - ( 27 May 2025 09:10 )  PTT:34.6 sec    LOWER EXTREMITY PHYSICAL EXAM:  General: Pleasant  male NAD & AOX3.    Integument:  Skin warm, dry and supple bilateral.    Noted necrotic ulcers base medial, lateral aspects of foot, nonstageable necrotic heel ulcer left, with mild edema, negative erythema, negative cellulitic changes, negative drainage  Noted distal hallux ulcer necrotic base, negative probe to bone  Vascular: Dorsalis Pedis and Posterior Tibial pulses non palpable.  Capillary re-fill time greater then 3 seconds digits 1-5 left, TMA right    Neuro: Protective sensation diminished to the level of the digits bilateral.  MSK: Muscle strength 5/5 all major muscle groups bilateral. Noted right foot tma

## 2025-05-27 NOTE — CONSULT NOTE ADULT - ASSESSMENT
Pt is a 65 y/o M with PMHx DM2, hx osteomyelitis, CAD, PAD s/p RLE angioplasty 2020, s/p surgical amputation of R forefoot , s/p L PT angioplasty 12/2/24 for L lat foot DFU on plavix, HTN presenting with wound infection. Cardio consulted for cardiac clearance.    #Cardiac Clearance  - Pt with worsening foot infection, on IV abx and plan for possible OR with Podiatry    #Volume  - TTE (2024): LVEF 61%  - Pt does not appear acutely volume overloaded on examination    #Ischemia   - EKG:   - NST (1/2024): Small sized, mild defect in the basal inferior wall that is fixed and partially normalizes with prone, suggestive of diaphragmatic artifact.   - No evidence of acute ischemia  - Hx PAD: c/w home ASA/Plavix and lipitor    #Arrhythmia  - EKG:  - Monitor and replete lytes, keep K>4, Mg>2.    #HTN  - BP stable currently  - Continue home metoprolol and lisinopril  - Continue to monitor hemodynamics     - Other cardiovascular workup will depend on clinical course.  - All other workup per primary team.  - Will continue to follow.       Pt is a 63 y/o M with PMHx DM2, hx osteomyelitis, CAD, PAD s/p RLE angioplasty 2020, s/p surgical amputation of R forefoot , s/p L PT angioplasty 12/2/24 for L lat foot DFU on plavix, HTN presenting with wound infection. Cardio consulted for cardiac clearance.    #Cardiac Clearance  - Pt with worsening foot infection, on IV abx and plan for possible OR with Podiatry for L heel OM procedure  - Pt has no active ischemia, decompensated heart failure, unstable arrythmia, or severe stenotic valvular disease  - He is optimized from cardiovascular standpoint to proceed with planned procedure with routine hemodynamic monitoring.     #Volume  - TTE (2024): LVEF 61%  - Pt does not appear acutely volume overloaded on examination    #Ischemia   - EKG:   - NST (1/2024): Small sized, mild defect in the basal inferior wall that is fixed and partially normalizes with prone, suggestive of diaphragmatic artifact.   - No evidence of acute ischemia  - Hx PAD: c/w home ASA/Plavix and lipitor    #Arrhythmia  - EKG:  - Monitor and replete lytes, keep K>4, Mg>2.    #HTN  - BP stable currently  - Continue home metoprolol and lisinopril  - Continue to monitor hemodynamics     - Other cardiovascular workup will depend on clinical course.  - All other workup per primary team.  - Will continue to follow.    ***CHARTING IN PROGRESS***   Pt is a 65 y/o M with PMHx DM2, hx osteomyelitis, CAD, PAD s/p RLE angioplasty 2020, s/p surgical amputation of R forefoot , s/p L PT angioplasty 12/2/24 for L lat foot DFU on plavix, HTN presenting with wound infection. Cardio consulted for cardiac clearance.    #Cardiac Clearance  - Pt with worsening foot infection, on IV abx and plan for possible OR with Podiatry for L heel OM procedure  - Pt has no active ischemia, decompensated heart failure, unstable arrythmia, or severe stenotic valvular disease  - He is optimized from cardiovascular standpoint to proceed with planned procedure with routine hemodynamic monitoring.     #Volume  - TTE (2024): LVEF 61%  - Pt does not appear acutely volume overloaded on examination    #Ischemia   - EKG: NSR @87 bpm  - NST (1/2024): Small sized, mild defect in the basal inferior wall that is fixed and partially normalizes with prone, suggestive of diaphragmatic artifact.   - No evidence of acute ischemia  - Hx PAD: c/w home ASA/Plavix and lipitor    #Arrhythmia  - EKG:  - Monitor and replete lytes, keep K>4, Mg>2.    #HTN  - BP stable currently  - Continue home metoprolol and lisinopril  - Continue to monitor hemodynamics     - Other cardiovascular workup will depend on clinical course.  - All other workup per primary team.  - Will continue to follow.    ***CHARTING IN PROGRESS***   Pt is a 65 y/o M with PMHx DM2, hx osteomyelitis, CAD, PAD s/p RLE angioplasty 2020, s/p surgical amputation of R forefoot , s/p L PT angioplasty 12/2/24 for L lat foot DFU on plavix, HTN presenting with wound infection. Cardio consulted for cardiac clearance.    #Cardiac optimization   - Pt with worsening foot infection, on IV abx and plan for possible OR with Podiatry for L heel OM procedure  - Pt has no active ischemia, decompensated heart failure, unstable arrythmia, or severe stenotic valvular disease  - He is optimized from cardiovascular standpoint to proceed with planned procedure with routine hemodynamic monitoring.     #Volume  - TTE (2024): LVEF 61%  - Pt does not appear acutely volume overloaded on examination    #Ischemia   - EKG: NSR @87 bpm  - NST (1/2024): Small sized, mild defect in the basal inferior wall that is fixed and partially normalizes with prone, suggestive of diaphragmatic artifact.   - No evidence of acute ischemia  - Hx PAD: c/w home ASA/Plavix and lipitor    #Arrhythmia  - EKG:  - Monitor and replete lytes, keep K>4, Mg>2.    #HTN  - BP stable currently  - Continue home metoprolol and lisinopril  - Continue to monitor hemodynamics     - Other cardiovascular workup will depend on clinical course.  - All other workup per primary team.  - Will continue to follow.

## 2025-05-27 NOTE — ED PROVIDER NOTE - CLINICAL SUMMARY MEDICAL DECISION MAKING FREE TEXT BOX
65 yo M PMH htn, hld, dm, pvd,  left foot OM, right toe om s/p amp, sent by Dr Stark for surgical intervention of left heel wound om   vss  left foot in dressing    will get labs, esr crp, cxr, xr left foot  admit

## 2025-05-27 NOTE — H&P ADULT - ATTENDING COMMENTS
65 y/o M with PMHx DM2, osteomyelitis, CAD, PAD s/p RLE angioplasty 2020, s/p surgical amputation of R forefoot , s/p L PT angioplasty 12/2/24 for L lat foot DFU on plavix, HTN, s/p recent admission for left foot infection s/p PICC placement for IV abx  sent by Podiatry Dr. Stark for admission for OR intervention tomorrow.  Pt seen, examined case & care plan d/w pt, residents at detail.  Consult-  ID-DR XENIA Calhoun -IV abx  VIA PICC line  Podiatry-DR Joe Stark  PO diet   AM labs   DVT ppx   NPO after midnight for OR for Left foot Big toe Amputation,  Pt is medically optimized for schedule podiatry surgery on 5/28  -Pre op IV antibiotics as per ID  -Pre Op Cardiology Eval in Chart .

## 2025-05-27 NOTE — H&P ADULT - SKIN
+left hallux necrotic base +left heel ulcer +necrotic changes on lateral aspect of the foot/no induration/wound +left hallux necrotic base +left heel ulcer +necrotic changes on lateral aspect of the foot/warm and dry/no rashes/no induration/wound

## 2025-05-27 NOTE — H&P ADULT - ASSESSMENT
63 y/o M with PMHx DM2, osteomyelitis, CAD, PAD s/p RLE angioplasty 2020, s/p surgical amputation of R forefoot , s/p L PT angioplasty 12/2/24 for L lat foot DFU on plavix, HTN, s/p recent admission for left foot infection s/p PICC placement for IV abx  sent by Podiatry Dr. Stark for admission for OR intervention tomorrow.

## 2025-05-27 NOTE — H&P ADULT - PROBLEM SELECTOR PLAN 4
Chronic  -Continue home Lisinopril and Metoprolol   -C/w home meds with hold parameters  -Monitor hemodynamics.

## 2025-05-27 NOTE — CONSULT NOTE ADULT - SUBJECTIVE AND OBJECTIVE BOX
[facility]         Sami Golden, Ngoc, Vlad, Ruthy, Otoniel, Osvaldo        [phone] (office)    Reason for Consult: Cardiac Clearance    HPI: Pt is a 65 y/o M with PMHx DM2, hx osteomyelitis, CAD, PAD s/p RLE angioplasty 2020, s/p surgical amputation of R forefoot , s/p L PT angioplasty 12/2/24 for L lat foot DFU on plavix, HTN presenting from wound care with foot infection.      PAST MEDICAL & SURGICAL HISTORY:  HTN (hypertension)      Diabetes      Hyperlipidemia      PVD (peripheral vascular disease)      Toe osteomyelitis, right      H/O angioplasty  RLE      Status post amputation of toe          SOCIAL HISTORY: No active tobacco, alcohol or illicit drug use    FAMILY HISTORY:  FH: type 2 diabetes  Grandfather        Home Medications:  aspirin 81 mg oral delayed release tablet: 1 tab(s) orally once a day (05 May 2025 10:27)  atorvastatin 40 mg oral tablet: 1 tab(s) orally once a day (at bedtime) (05 May 2025 10:27)  cefTRIAXone: 2 gram(s) intravenous once a day Last dose on 6/10/25 Via PICC line (06 May 2025 10:42)  clopidogrel 75 mg oral tablet: 1 tab(s) orally once a day (05 May 2025 10:27)  gabapentin 300 mg oral capsule: 1 cap(s) orally 3 times a day (05 May 2025 10:27)  Jardiance 25 mg oral tablet: 1 tab(s) orally once a day (29 Apr 2025 16:40)  lisinopril 40 mg oral tablet: 1 tab(s) orally once a day (05 May 2025 10:27)  metFORMIN 1000 mg oral tablet: 1 tab(s) orally 2 times a day (29 Apr 2025 16:40)  metoprolol succinate 50 mg oral tablet, extended release: 1 tab(s) orally once a day (05 May 2025 10:27)  Trulicity Pen 1.5 mg/0.5 mL subcutaneous solution: 1.5 milligram(s) subcutaneously once a week (29 Apr 2025 16:40)      MEDICATIONS  (STANDING):    MEDICATIONS  (PRN):      Allergies    No Known Allergies    Intolerances        REVIEW OF SYSTEMS: Negative except as per HPI.    VITAL SIGNS:   Vital Signs Last 24 Hrs  T(C): 36.5 (27 May 2025 08:38), Max: 36.5 (27 May 2025 08:38)  T(F): 97.7 (27 May 2025 08:38), Max: 97.7 (27 May 2025 08:38)  HR: 99 (27 May 2025 08:38) (99 - 99)  BP: 110/69 (27 May 2025 08:38) (110/69 - 110/69)  BP(mean): --  RR: 19 (27 May 2025 08:38) (19 - 19)  SpO2: 100% (27 May 2025 08:38) (100% - 100%)    Parameters below as of 27 May 2025 08:38  Patient On (Oxygen Delivery Method): room air        I&O's Summary      PHYSICAL EXAM:  Constitutional: NAD, well-developed  HEENT NC/AT, moist mucous membranes  Pulmonary: Non-labored, breath sounds are clear bilaterally, no wheezing, rales or rhonchi  Cardiovascular: +S1, S2, RRR, no murmur  Gastrointestinal: Soft, nontender, nondistended, normoactive bowel sounds  Extremities: No peripheral edema   Neurological: Alert, strength and sensitivity are grossly intact  Skin: No obvious lesions/rashes  Psych: Mood & affect appropriate    LABS: All Labs Reviewed:                        12.6   10.77 )-----------( 393      ( 27 May 2025 09:10 )             39.4                 Blood Culture:         EKG:  [facility]         Sami Golden, Ngoc, Vlad, Ruthy, Otoniel, Osvaldo        [phone] (office)    Reason for Consult: Cardiac Clearance    HPI: Pt is a 63 y/o M with PMHx DM2, hx osteomyelitis, CAD, PAD s/p RLE angioplasty 2020, s/p surgical amputation of R forefoot , s/p L PT angioplasty 12/2/24 for L lat foot DFU on plavix, HTN presenting from wound care with foot infection.      PAST MEDICAL & SURGICAL HISTORY:  HTN (hypertension)      Diabetes      Hyperlipidemia      PVD (peripheral vascular disease)      Toe osteomyelitis, right      H/O angioplasty  RLE      Status post amputation of toe          SOCIAL HISTORY: No active tobacco, alcohol or illicit drug use    FAMILY HISTORY:  FH: type 2 diabetes  Grandfather        Home Medications:  aspirin 81 mg oral delayed release tablet: 1 tab(s) orally once a day (05 May 2025 10:27)  atorvastatin 40 mg oral tablet: 1 tab(s) orally once a day (at bedtime) (05 May 2025 10:27)  cefTRIAXone: 2 gram(s) intravenous once a day Last dose on 6/10/25 Via PICC line (06 May 2025 10:42)  clopidogrel 75 mg oral tablet: 1 tab(s) orally once a day (05 May 2025 10:27)  gabapentin 300 mg oral capsule: 1 cap(s) orally 3 times a day (05 May 2025 10:27)  Jardiance 25 mg oral tablet: 1 tab(s) orally once a day (29 Apr 2025 16:40)  lisinopril 40 mg oral tablet: 1 tab(s) orally once a day (05 May 2025 10:27)  metFORMIN 1000 mg oral tablet: 1 tab(s) orally 2 times a day (29 Apr 2025 16:40)  metoprolol succinate 50 mg oral tablet, extended release: 1 tab(s) orally once a day (05 May 2025 10:27)  Trulicity Pen 1.5 mg/0.5 mL subcutaneous solution: 1.5 milligram(s) subcutaneously once a week (29 Apr 2025 16:40)      MEDICATIONS  (STANDING):    MEDICATIONS  (PRN):      Allergies    No Known Allergies    Intolerances        REVIEW OF SYSTEMS: Negative except as per HPI.    VITAL SIGNS:   Vital Signs Last 24 Hrs  T(C): 36.5 (27 May 2025 08:38), Max: 36.5 (27 May 2025 08:38)  T(F): 97.7 (27 May 2025 08:38), Max: 97.7 (27 May 2025 08:38)  HR: 99 (27 May 2025 08:38) (99 - 99)  BP: 110/69 (27 May 2025 08:38) (110/69 - 110/69)  BP(mean): --  RR: 19 (27 May 2025 08:38) (19 - 19)  SpO2: 100% (27 May 2025 08:38) (100% - 100%)    Parameters below as of 27 May 2025 08:38  Patient On (Oxygen Delivery Method): room air        I&O's Summary      PHYSICAL EXAM:  Constitutional: NAD, well-developed  HEENT NC/AT, moist mucous membranes  Pulmonary: Non-labored, breath sounds are clear bilaterally, no wheezing, rales or rhonchi  Cardiovascular: +S1, S2, RRR, no murmur  Gastrointestinal: Soft, nontender, nondistended, normoactive bowel sounds  Extremities: No peripheral edema   Neurological: Alert, strength and sensitivity are grossly intact  Skin: No obvious lesions/rashes  Psych: Mood & affect appropriate    LABS: All Labs Reviewed:                        12.6   10.77 )-----------( 393      ( 27 May 2025 09:10 )             39.4                 Blood Culture: Pending        EKG: Normal sinus rhythm Normal ECG   When compared with ECG of 29-APR-2025 13:07, premature supraventricular complexes are no longer present  Vent. rate 87 BPM   ND interval 144 ms   QRS duration 82 ms   QT/QTc 378/454 ms   P-R-T axes 60 -1 31 [facility]         Sami Golden, Ngoc, Vlad, Ruthy, Otoniel, Osvaldo        [phone] (office)    Reason for Consult: Cardiac Clearance    HPI: Pt is a 63 y/o M with PMHx DM2, hx osteomyelitis, CAD, PAD s/p RLE angioplasty 2020, s/p surgical amputation of R forefoot , s/p L PT angioplasty 12/2/24 for L lat foot DFU on plavix, HTN presenting from wound care with foot infection.      PAST MEDICAL & SURGICAL HISTORY:  HTN (hypertension)      Diabetes      Hyperlipidemia      PVD (peripheral vascular disease)      Toe osteomyelitis, right      H/O angioplasty  RLE      Status post amputation of toe          SOCIAL HISTORY: No active tobacco, alcohol or illicit drug use    FAMILY HISTORY:  FH: type 2 diabetes  Grandfather  no scd       Home Medications:  aspirin 81 mg oral delayed release tablet: 1 tab(s) orally once a day (05 May 2025 10:27)  atorvastatin 40 mg oral tablet: 1 tab(s) orally once a day (at bedtime) (05 May 2025 10:27)  cefTRIAXone: 2 gram(s) intravenous once a day Last dose on 6/10/25 Via PICC line (06 May 2025 10:42)  clopidogrel 75 mg oral tablet: 1 tab(s) orally once a day (05 May 2025 10:27)  gabapentin 300 mg oral capsule: 1 cap(s) orally 3 times a day (05 May 2025 10:27)  Jardiance 25 mg oral tablet: 1 tab(s) orally once a day (29 Apr 2025 16:40)  lisinopril 40 mg oral tablet: 1 tab(s) orally once a day (05 May 2025 10:27)  metFORMIN 1000 mg oral tablet: 1 tab(s) orally 2 times a day (29 Apr 2025 16:40)  metoprolol succinate 50 mg oral tablet, extended release: 1 tab(s) orally once a day (05 May 2025 10:27)  Trulicity Pen 1.5 mg/0.5 mL subcutaneous solution: 1.5 milligram(s) subcutaneously once a week (29 Apr 2025 16:40)      MEDICATIONS  (STANDING):    MEDICATIONS  (PRN):      Allergies    No Known Allergies    Intolerances        REVIEW OF SYSTEMS: Negative except as per HPI.    VITAL SIGNS:   Vital Signs Last 24 Hrs  T(C): 36.5 (27 May 2025 08:38), Max: 36.5 (27 May 2025 08:38)  T(F): 97.7 (27 May 2025 08:38), Max: 97.7 (27 May 2025 08:38)  HR: 99 (27 May 2025 08:38) (99 - 99)  BP: 110/69 (27 May 2025 08:38) (110/69 - 110/69)  BP(mean): --  RR: 19 (27 May 2025 08:38) (19 - 19)  SpO2: 100% (27 May 2025 08:38) (100% - 100%)    Parameters below as of 27 May 2025 08:38  Patient On (Oxygen Delivery Method): room air        I&O's Summary      PHYSICAL EXAM:  Constitutional: NAD, well-developed  HEENT NC/AT, moist mucous membranes  Pulmonary: Non-labored, breath sounds are clear bilaterally, no wheezing, rales or rhonchi  Cardiovascular: +S1, S2, RRR, no murmur  Gastrointestinal: Soft, nontender, nondistended, normoactive bowel sounds  Extremities: No peripheral edema   Neurological: Alert, strength and sensitivity are grossly intact  Skin: No obvious lesions/rashes  Psych: Mood & affect appropriate    LABS: All Labs Reviewed:                        12.6   10.77 )-----------( 393      ( 27 May 2025 09:10 )             39.4                 Blood Culture: Pending        EKG: Normal sinus rhythm Normal ECG   When compared with ECG of 29-APR-2025 13:07, premature supraventricular complexes are no longer present  Vent. rate 87 BPM   NV interval 144 ms   QRS duration 82 ms   QT/QTc 378/454 ms   P-R-T axes 60 -1 31

## 2025-05-27 NOTE — PATIENT PROFILE ADULT - DO YOU EVER NEED HELP READING HOSPITAL MATERIALS?
GASTROSCOPY OR ERCP  1. Don't eat or drink anything for about an hour after the test. You can then resume your regular diet.   2. Don't drive or drink alcohol for 24 hours. The medication you received will make you too drowsy.  3. Don't take any coffee, tea, or aspirin products until after you see your doctor. These can harm the lining of your stomach.  4. If you begin to vomit bloody material, or develop black or bloody stools, call your doctor as soon as possible.   5. If you have any neck, chest, abdominal pain or temp over 100 degrees call your doctor.     COLONOSCOPY OR FLEXIBLE SIGMOIDOSCOPY  1. If you received a barium enema, take a mild laxative such as dulcolax to clean out the barium.   2. Drink plenty of fluids. Eat a diet high in fiber; such foods as whole-grain breads, fresh fruit and vegetables, nuts are recommended.  3. You may notice a few drops of blood with your first bowel movement. If you develop any large amount of bleeding, black stools, a fever, or abdominal pain, call your doctor right away.   4. Don't drive or drink alcohol for 24 hours. The medication you received will make you too drowsy.   5. No heavy lifting, ASA products or ASA x 5 days     Discharge time: 0945    You should call 911 if you develop problems with breathing or chest pain.    Nurse's Signature: ___________________________________    I acknowledge receipt and understanding of these Home Care instructions.   no

## 2025-05-27 NOTE — H&P ADULT - PROBLEM SELECTOR PLAN 3
Type 2 diabetes mellitus.   ·  Plan: Chronic  -On home Trulicity, Jardiance and Metformin  -Hold home oral meds  -MDISS with FS QAC/QHS   -Lantus 15u qhs and 5u pre meal   -Hypoglycemia protocol  -A1c 7.9  -Pt with neuropathy continue Gabapentin 300mg TID  -Endo Dr. Perlman consulted.

## 2025-05-27 NOTE — H&P ADULT - PROBLEM SELECTOR PLAN 1
Patient presents with left DM foot ulcer, sent by podiatry Dr. Stark  - Plan for OR tomorrow for left foot debridgement and partial 1st toe amputation 5/28   - left foot foot and ankle MRI ordered, f/u results  - cardiac clearance obtained for surgery   - patient is medically optimized for procedure  - NPO after midnight   - On previous admission, patient was discharged with IV Rocephin 2 mg daily via PICC line until 6/10--> can continue for now   - F/u cultures   - ID Dr. Martinez consulted appreciate results Patient presents with left DM foot ulcer, sent by podiatry Dr. Stark  - Plan for OR tomorrow for left foot debridgement and partial 1st toe amputation 5/28   - MRI of the left foot demonstrates ulceration at the great toe with underlying cortical destruction of the distal phalanx and acute osteomyelitis involving the distal and proximal phalanx of the great toe.  Focal ulceration at the base of the fifth metatarsal with underlying marrow edema within the fifth metatarsal base and intramedullary cavity. Finding is indeterminate and may represent concurrent early osteomyelitis versus reactive changes.  Marrow edema involving the distal phalanx and middle phalanx of the little toe without definitive corresponding ulceration or abnormal T1 weighted signal.   Nonspecific bone marrow edema pattern within the intermediate cuneiform, lateral cuneiform, and navicular bone favored to represent reactive changes from osteoarthrosis.  - cardiac clearance obtained for surgery   - patient is medically optimized for procedure  - NPO after midnight   - On previous admission, patient was discharged with IV Rocephin 2 mg daily via PICC line until 6/10--> switched to Ertapenem by ID   - Continue Ertapenem for now   - F/u cultures   - ID Dr. Melany Calhoun consulted appreciate results Patient presents with left DM foot ulcer, sent by podiatry Dr. Stark  Gangrene Left BIG Toe with left heel wound   - Plan for OR tomorrow for left foot debridgement and partial 1st toe amputation 5/28   - MRI of the left foot demonstrates ulceration at the great toe with underlying cortical destruction of the distal phalanx and acute osteomyelitis involving the distal and proximal phalanx of the great toe.  Focal ulceration at the base of the fifth metatarsal with underlying marrow edema within the fifth metatarsal base and intramedullary cavity. Finding is indeterminate and may represent concurrent early osteomyelitis versus reactive changes.  Marrow edema involving the distal phalanx and middle phalanx of the little toe without definitive corresponding ulceration or abnormal T1 weighted signal.   Nonspecific bone marrow edema pattern within the intermediate cuneiform, lateral cuneiform, and navicular bone favored to represent reactive changes from osteoarthrosis.  - cardiac clearance obtained for surgery   - patient is medically optimized for procedure  - NPO after midnight   - On previous admission, patient was discharged with IV Rocephin 2 mg daily via PICC line until 6/10--> switched to Ertapenem by ID   - Continue Ertapenem for now   - F/u cultures   - ID Dr. Melany Calhoun consulted appreciate results

## 2025-05-27 NOTE — H&P ADULT - CARDIOVASCULAR
negative normal/regular rate and rhythm/S1 S2 present/no gallops/no rub/no murmur normal/regular rate and rhythm/S1 S2 present/no gallops/no rub/no murmur/no JVD/no pedal edema/vascular details…

## 2025-05-28 ENCOUNTER — TRANSCRIPTION ENCOUNTER (OUTPATIENT)
Age: 65
End: 2025-05-28

## 2025-05-28 ENCOUNTER — APPOINTMENT (OUTPATIENT)
Dept: HYPERBARIC MEDICINE | Facility: HOSPITAL | Age: 65
End: 2025-05-28

## 2025-05-28 LAB
ACETONE SERPL-MCNC: NEGATIVE — SIGNIFICANT CHANGE UP
BASE EXCESS BLDV CALC-SCNC: -4 MMOL/L — LOW (ref -2–3)
BLOOD GAS COMMENTS, VENOUS: SIGNIFICANT CHANGE UP
GLUCOSE BLDC GLUCOMTR-MCNC: 114 MG/DL — HIGH (ref 70–99)
GLUCOSE BLDC GLUCOMTR-MCNC: 131 MG/DL — HIGH (ref 70–99)
GLUCOSE BLDC GLUCOMTR-MCNC: 136 MG/DL — HIGH (ref 70–99)
GLUCOSE BLDC GLUCOMTR-MCNC: 136 MG/DL — HIGH (ref 70–99)
GLUCOSE BLDC GLUCOMTR-MCNC: 142 MG/DL — HIGH (ref 70–99)
GLUCOSE BLDC GLUCOMTR-MCNC: 159 MG/DL — HIGH (ref 70–99)
GRAM STN FLD: SIGNIFICANT CHANGE UP
HCO3 BLDV-SCNC: 23 MMOL/L — SIGNIFICANT CHANGE UP (ref 22–29)
PCO2 BLDV: 47 MMHG — SIGNIFICANT CHANGE UP (ref 42–55)
PH BLDV: 7.29 — LOW (ref 7.32–7.43)
PO2 BLDV: 51 MMHG — HIGH (ref 25–45)
SAO2 % BLDV: 80.9 % — SIGNIFICANT CHANGE UP (ref 67–88)
SPECIMEN SOURCE: SIGNIFICANT CHANGE UP

## 2025-05-28 PROCEDURE — 28810 AMPUTATION TOE & METATARSAL: CPT | Mod: TA

## 2025-05-28 PROCEDURE — 11047 DBRDMT BONE EACH ADDL: CPT | Mod: 59

## 2025-05-28 PROCEDURE — 27691 REVISE LOWER LEG TENDON: CPT | Mod: LT

## 2025-05-28 PROCEDURE — 28122 PARTIAL REMOVAL OF FOOT BONE: CPT | Mod: LT

## 2025-05-28 PROCEDURE — 88305 TISSUE EXAM BY PATHOLOGIST: CPT | Mod: 26

## 2025-05-28 PROCEDURE — 73630 X-RAY EXAM OF FOOT: CPT | Mod: 26,LT

## 2025-05-28 PROCEDURE — 99221 1ST HOSP IP/OBS SF/LOW 40: CPT

## 2025-05-28 PROCEDURE — 99232 SBSQ HOSP IP/OBS MODERATE 35: CPT

## 2025-05-28 PROCEDURE — 11044 DBRDMT BONE 1ST 20 SQ CM/<: CPT

## 2025-05-28 PROCEDURE — 88304 TISSUE EXAM BY PATHOLOGIST: CPT | Mod: 26

## 2025-05-28 PROCEDURE — 88311 DECALCIFY TISSUE: CPT | Mod: 26

## 2025-05-28 DEVICE — IMPLANTABLE DEVICE: Type: IMPLANTABLE DEVICE | Site: LEFT | Status: FUNCTIONAL

## 2025-05-28 RX ORDER — ATORVASTATIN CALCIUM 80 MG/1
40 TABLET, FILM COATED ORAL AT BEDTIME
Refills: 0 | Status: DISCONTINUED | OUTPATIENT
Start: 2025-05-28 | End: 2025-06-04

## 2025-05-28 RX ORDER — ONDANSETRON HCL/PF 4 MG/2 ML
4 VIAL (ML) INJECTION EVERY 8 HOURS
Refills: 0 | Status: DISCONTINUED | OUTPATIENT
Start: 2025-05-28 | End: 2025-06-04

## 2025-05-28 RX ORDER — MAGNESIUM, ALUMINUM HYDROXIDE 200-200 MG
30 TABLET,CHEWABLE ORAL EVERY 4 HOURS
Refills: 0 | Status: DISCONTINUED | OUTPATIENT
Start: 2025-05-28 | End: 2025-06-04

## 2025-05-28 RX ORDER — CEFEPIME 2 G/20ML
2000 INJECTION, POWDER, FOR SOLUTION INTRAVENOUS EVERY 8 HOURS
Refills: 0 | Status: DISCONTINUED | OUTPATIENT
Start: 2025-05-28 | End: 2025-06-03

## 2025-05-28 RX ORDER — ERTAPENEM SODIUM 1 G/1
1000 INJECTION, POWDER, LYOPHILIZED, FOR SOLUTION INTRAMUSCULAR; INTRAVENOUS EVERY 24 HOURS
Refills: 0 | Status: DISCONTINUED | OUTPATIENT
Start: 2025-05-28 | End: 2025-05-28

## 2025-05-28 RX ORDER — HYDROMORPHONE/SOD CHLOR,ISO/PF 2 MG/10 ML
0.5 SYRINGE (ML) INJECTION EVERY 4 HOURS
Refills: 0 | Status: DISCONTINUED | OUTPATIENT
Start: 2025-05-28 | End: 2025-05-31

## 2025-05-28 RX ORDER — ASPIRIN 325 MG
81 TABLET ORAL DAILY
Refills: 0 | Status: DISCONTINUED | OUTPATIENT
Start: 2025-05-28 | End: 2025-05-29

## 2025-05-28 RX ORDER — DEXTROSE 50 % IN WATER 50 %
15 SYRINGE (ML) INTRAVENOUS ONCE
Refills: 0 | Status: DISCONTINUED | OUTPATIENT
Start: 2025-05-28 | End: 2025-06-04

## 2025-05-28 RX ORDER — MELATONIN 5 MG
3 TABLET ORAL AT BEDTIME
Refills: 0 | Status: DISCONTINUED | OUTPATIENT
Start: 2025-05-28 | End: 2025-06-04

## 2025-05-28 RX ORDER — SENNA 187 MG
2 TABLET ORAL AT BEDTIME
Refills: 0 | Status: DISCONTINUED | OUTPATIENT
Start: 2025-05-28 | End: 2025-06-04

## 2025-05-28 RX ORDER — INSULIN LISPRO 100 U/ML
INJECTION, SOLUTION INTRAVENOUS; SUBCUTANEOUS AT BEDTIME
Refills: 0 | Status: DISCONTINUED | OUTPATIENT
Start: 2025-05-28 | End: 2025-06-04

## 2025-05-28 RX ORDER — GLUCAGON 3 MG/1
1 POWDER NASAL ONCE
Refills: 0 | Status: DISCONTINUED | OUTPATIENT
Start: 2025-05-28 | End: 2025-06-04

## 2025-05-28 RX ORDER — INSULIN GLARGINE-YFGN 100 [IU]/ML
15 INJECTION, SOLUTION SUBCUTANEOUS EVERY MORNING
Refills: 0 | Status: DISCONTINUED | OUTPATIENT
Start: 2025-05-28 | End: 2025-06-04

## 2025-05-28 RX ORDER — HYDROMORPHONE/SOD CHLOR,ISO/PF 2 MG/10 ML
1 SYRINGE (ML) INJECTION EVERY 4 HOURS
Refills: 0 | Status: DISCONTINUED | OUTPATIENT
Start: 2025-05-28 | End: 2025-05-31

## 2025-05-28 RX ORDER — DEXTROSE 50 % IN WATER 50 %
25 SYRINGE (ML) INTRAVENOUS ONCE
Refills: 0 | Status: DISCONTINUED | OUTPATIENT
Start: 2025-05-28 | End: 2025-06-04

## 2025-05-28 RX ORDER — GABAPENTIN 400 MG/1
300 CAPSULE ORAL THREE TIMES A DAY
Refills: 0 | Status: DISCONTINUED | OUTPATIENT
Start: 2025-05-28 | End: 2025-06-04

## 2025-05-28 RX ORDER — CEFEPIME 2 G/20ML
2000 INJECTION, POWDER, FOR SOLUTION INTRAVENOUS ONCE
Refills: 0 | Status: COMPLETED | OUTPATIENT
Start: 2025-05-28 | End: 2025-05-28

## 2025-05-28 RX ORDER — ACETAMINOPHEN 500 MG/5ML
650 LIQUID (ML) ORAL EVERY 6 HOURS
Refills: 0 | Status: DISCONTINUED | OUTPATIENT
Start: 2025-05-28 | End: 2025-05-28

## 2025-05-28 RX ORDER — LISINOPRIL 5 MG/1
40 TABLET ORAL DAILY
Refills: 0 | Status: DISCONTINUED | OUTPATIENT
Start: 2025-05-28 | End: 2025-06-04

## 2025-05-28 RX ORDER — SODIUM CHLORIDE 9 G/1000ML
1000 INJECTION, SOLUTION INTRAVENOUS
Refills: 0 | Status: DISCONTINUED | OUTPATIENT
Start: 2025-05-28 | End: 2025-06-04

## 2025-05-28 RX ORDER — CEFEPIME 2 G/20ML
INJECTION, POWDER, FOR SOLUTION INTRAVENOUS
Refills: 0 | Status: DISCONTINUED | OUTPATIENT
Start: 2025-05-28 | End: 2025-06-03

## 2025-05-28 RX ORDER — METOPROLOL SUCCINATE 50 MG/1
50 TABLET, EXTENDED RELEASE ORAL DAILY
Refills: 0 | Status: DISCONTINUED | OUTPATIENT
Start: 2025-05-28 | End: 2025-06-04

## 2025-05-28 RX ORDER — ACETAMINOPHEN 500 MG/5ML
1000 LIQUID (ML) ORAL ONCE
Refills: 0 | Status: COMPLETED | OUTPATIENT
Start: 2025-05-28 | End: 2025-05-28

## 2025-05-28 RX ORDER — INSULIN LISPRO 100 U/ML
INJECTION, SOLUTION INTRAVENOUS; SUBCUTANEOUS
Refills: 0 | Status: DISCONTINUED | OUTPATIENT
Start: 2025-05-28 | End: 2025-06-04

## 2025-05-28 RX ORDER — ACETAMINOPHEN 500 MG/5ML
1000 LIQUID (ML) ORAL EVERY 8 HOURS
Refills: 0 | Status: DISCONTINUED | OUTPATIENT
Start: 2025-05-28 | End: 2025-06-04

## 2025-05-28 RX ORDER — INSULIN LISPRO 100 U/ML
5 INJECTION, SOLUTION INTRAVENOUS; SUBCUTANEOUS
Refills: 0 | Status: DISCONTINUED | OUTPATIENT
Start: 2025-05-28 | End: 2025-06-04

## 2025-05-28 RX ORDER — DEXTROSE 50 % IN WATER 50 %
12.5 SYRINGE (ML) INTRAVENOUS ONCE
Refills: 0 | Status: DISCONTINUED | OUTPATIENT
Start: 2025-05-28 | End: 2025-06-04

## 2025-05-28 RX ORDER — ENOXAPARIN SODIUM 100 MG/ML
40 INJECTION SUBCUTANEOUS EVERY 24 HOURS
Refills: 0 | Status: DISCONTINUED | OUTPATIENT
Start: 2025-05-29 | End: 2025-05-29

## 2025-05-28 RX ADMIN — GABAPENTIN 300 MILLIGRAM(S): 400 CAPSULE ORAL at 05:28

## 2025-05-28 RX ADMIN — GABAPENTIN 300 MILLIGRAM(S): 400 CAPSULE ORAL at 13:49

## 2025-05-28 RX ADMIN — Medication 1 MILLIGRAM(S): at 17:57

## 2025-05-28 RX ADMIN — Medication 400 MILLIGRAM(S): at 12:00

## 2025-05-28 RX ADMIN — ATORVASTATIN CALCIUM 40 MILLIGRAM(S): 80 TABLET, FILM COATED ORAL at 21:44

## 2025-05-28 RX ADMIN — INSULIN LISPRO 5 UNIT(S): 100 INJECTION, SOLUTION INTRAVENOUS; SUBCUTANEOUS at 17:35

## 2025-05-28 RX ADMIN — INSULIN LISPRO 5 UNIT(S): 100 INJECTION, SOLUTION INTRAVENOUS; SUBCUTANEOUS at 12:05

## 2025-05-28 RX ADMIN — GABAPENTIN 300 MILLIGRAM(S): 400 CAPSULE ORAL at 21:44

## 2025-05-28 RX ADMIN — Medication 1 MILLIGRAM(S): at 16:57

## 2025-05-28 RX ADMIN — Medication 1000 MILLIGRAM(S): at 22:54

## 2025-05-28 RX ADMIN — Medication 81 MILLIGRAM(S): at 12:00

## 2025-05-28 RX ADMIN — CEFEPIME 100 MILLIGRAM(S): 2 INJECTION, POWDER, FOR SOLUTION INTRAVENOUS at 15:31

## 2025-05-28 RX ADMIN — Medication 1000 MILLIGRAM(S): at 13:00

## 2025-05-28 RX ADMIN — Medication 400 MILLIGRAM(S): at 21:54

## 2025-05-28 RX ADMIN — INSULIN GLARGINE-YFGN 15 UNIT(S): 100 INJECTION, SOLUTION SUBCUTANEOUS at 12:02

## 2025-05-28 RX ADMIN — METOPROLOL SUCCINATE 50 MILLIGRAM(S): 50 TABLET, EXTENDED RELEASE ORAL at 05:28

## 2025-05-28 RX ADMIN — CEFEPIME 100 MILLIGRAM(S): 2 INJECTION, POWDER, FOR SOLUTION INTRAVENOUS at 21:44

## 2025-05-28 RX ADMIN — LISINOPRIL 40 MILLIGRAM(S): 5 TABLET ORAL at 05:28

## 2025-05-28 NOTE — CONSULT NOTE ADULT - SUBJECTIVE AND OBJECTIVE BOX
Patient is a 64y old  Male who presents with a chief complaint of left foot osteomyelitis (28 May 2025 11:43)    Type: 2 DM dx >5 years, known complications DFU, no Endocrine managed by PCP Dr. Frederick manages diabetes, seen 3 months ago,  a1c 8.1%, Rx home trulicity 1.5mg weekly, metformin 1000mg BID, jaridance 25mg daily. uses meter to test, reports readings 120-200. reviewed CC diet and carb identification/portion control, priortizing lean protein and nonstarchy vegetables, provided diabetes daily meal planning guide. reviewed a1c goal <7%, discussed titrating up on trulicity vs adding basal insulin. discussed f/u with endo, will provided list of [facility] endos.  patient has all meds and testing supplies @ home.  diabetes education provided- A1c measure and BG targets  fasting, <180 2 hours postmeal. medication MOA and considerations/side effects, inhospital BGM frequency and insulin administration, s/s of hyperglycemia/hypoglycemia and management, glycemic control and preventing complications, consistent carb diet, balanced plate method, consistent meal planning. sick day management, provider f/u      HPI:  65 y/o M with PMHx DM2, osteomyelitis, CAD, PAD s/p RLE angioplasty 2020, s/p surgical amputation of R forefoot , s/p L PT angioplasty 12/2/24 for L lat foot DFU on plavix, HTN, s/p recent admission for left foot infection s/p PICC placement for IV abx  sent by Podiatry Dr. Stark for admission for OR intervention tomorrow. Patient denies any complaints today. States after discharge he had outpatient visits with Vascular physician. He had imaging done and was suggested to have LimFlow procedure. Patient requested to have surgery as per Podiatry for his left foot wound prior to LimFlow. Vascular agreed. Patient states he can walk independently without pain and is currently comfortable.   Of note, patient was transitioned from Rocephin which was to be given via PICC line after previous discharge to Ertapenem by ID this Saturday.     ED course:   Vitals: T 97.7 HR 99 /69   Labs: ESR 64 wbc 10.77   Imaging: Cxray- Right PICC with tip in the SVC.  The heart is not accurately assessed in this AP projection.  The lungs are clear.  There is no pneumothorax or pleural effusion.  No acute bony abnormality.  Clear lungs    (27 May 2025 13:05)      PAST MEDICAL & SURGICAL HISTORY:  HTN (hypertension)      Diabetes      Hyperlipidemia      PVD (peripheral vascular disease)      Toe osteomyelitis, right      H/O angioplasty  RLE      Status post amputation of toe    Allergies    No Known Allergies    Intolerances        MEDICATIONS  (STANDING):  aspirin enteric coated 81 milliGRAM(s) Oral daily  atorvastatin 40 milliGRAM(s) Oral at bedtime  dextrose 5%. 1000 milliLiter(s) (50 mL/Hr) IV Continuous <Continuous>  dextrose 5%. 1000 milliLiter(s) (100 mL/Hr) IV Continuous <Continuous>  dextrose 50% Injectable 25 Gram(s) IV Push once  dextrose 50% Injectable 12.5 Gram(s) IV Push once  dextrose 50% Injectable 25 Gram(s) IV Push once  ertapenem  IVPB 1000 milliGRAM(s) IV Intermittent every 24 hours  gabapentin 300 milliGRAM(s) Oral three times a day  glucagon  Injectable 1 milliGRAM(s) IntraMuscular once  insulin glargine Injectable (LANTUS) 15 Unit(s) SubCutaneous every morning  insulin lispro (ADMELOG) corrective regimen sliding scale   SubCutaneous three times a day before meals  insulin lispro (ADMELOG) corrective regimen sliding scale   SubCutaneous at bedtime  insulin lispro Injectable (ADMELOG) 5 Unit(s) SubCutaneous three times a day before meals  lisinopril 40 milliGRAM(s) Oral daily  metoprolol succinate ER 50 milliGRAM(s) Oral daily

## 2025-05-28 NOTE — CONSULT NOTE ADULT - ASSESSMENT
Physical Exam:   Vital Signs Last 24 Hrs  T(C): 36.4 (28 May 2025 11:42), Max: 36.9 (28 May 2025 02:35)  T(F): 97.6 (28 May 2025 11:42), Max: 98.4 (28 May 2025 02:35)  HR: 78 (28 May 2025 11:42) (75 - 86)  BP: 110/71 (28 May 2025 11:42) (101/60 - 139/73)  BP(mean): --  RR: 18 (28 May 2025 11:42) (12 - 26)  SpO2: 98% (28 May 2025 11:42) (96% - 100%)    Parameters below as of 28 May 2025 11:42  Patient On (Oxygen Delivery Method): room air       CAPILLARY BLOOD GLUCOSE      POCT Blood Glucose.: 142 mg/dL (28 May 2025 12:13)  POCT Blood Glucose.: 136 mg/dL (28 May 2025 12:01)  POCT Blood Glucose.: 131 mg/dL (28 May 2025 10:08)  POCT Blood Glucose.: 114 mg/dL (28 May 2025 07:07)  POCT Blood Glucose.: 119 mg/dL (27 May 2025 21:26)  POCT Blood Glucose.: 134 mg/dL (27 May 2025 17:22)        DIET: CC  >50%

## 2025-05-28 NOTE — PROGRESS NOTE ADULT - SUBJECTIVE AND OBJECTIVE BOX
Patient is a 64y old  Male who presents with a chief complaint of left foot osteomyelitis (28 May 2025 13:46)    HPI:  63 y/o M with PMHx DM2, osteomyelitis, CAD, PAD s/p RLE angioplasty 2020, s/p surgical amputation of R forefoot , s/p L PT angioplasty 12/2/24 for L lat foot DFU on plavix, HTN, s/p recent admission for left foot infection s/p PICC placement for IV abx  sent by Podiatry Dr. Stark for admission for OR intervention tomorrow. Patient denies any complaints today. States after discharge he had outpatient visits with Vascular physician. He had imaging done and was suggested to have LimFlow procedure. Patient requested to have surgery as per Podiatry for his left foot wound prior to LimFlow. Vascular agreed. Patient states he can walk independently without pain and is currently comfortable.   Of note, patient was transitioned from Rocephin which was to be given via PICC line after previous discharge to Ertapenem by ID this Saturday.     ED course:   Vitals: T 97.7 HR 99 /69   Labs: ESR 64 wbc 10.77   Imaging: Cxray- Right PICC with tip in the SVC.  The heart is not accurately assessed in this AP projection.  The lungs are clear.  There is no pneumothorax or pleural effusion.  No acute bony abnormality.  Clear lungs    (27 May 2025 13:05)    INTERVAL HPI:  5/28: Patient seen and examined post op. complains of mild pain 3-4/10 IV tylenol ordered otherwise denies complaints     OVERNIGHT EVENTS:    Home Medications:  atorvastatin 40 mg oral tablet: 1 tab(s) orally once a day (at bedtime) (27 May 2025 15:49)  Cinnamon: 1 cap(s) orally once a day (27 May 2025 15:51)  clopidogrel 75 mg oral tablet: 1 tab(s) orally once a day (27 May 2025 15:49)  gabapentin 300 mg oral capsule: 1 cap(s) orally 3 times a day (27 May 2025 15:49)  Jardiance 25 mg oral tablet: 1 tab(s) orally once a day (27 May 2025 15:49)  lisinopril 40 mg oral tablet: 1 tab(s) orally once a day (27 May 2025 15:49)  metFORMIN 1000 mg oral tablet: 1 tab(s) orally 2 times a day (27 May 2025 15:49)  metoprolol succinate 50 mg oral tablet, extended release: 1 tab(s) orally once a day (27 May 2025 15:49)  Trulicity Pen 1.5 mg/0.5 mL subcutaneous solution: 1.5 milligram(s) subcutaneously once a week (Sundays) (27 May 2025 15:52)  Tumeric : 1 cap orally once a day (27 May 2025 15:51)      MEDICATIONS  (STANDING):  aspirin enteric coated 81 milliGRAM(s) Oral daily  atorvastatin 40 milliGRAM(s) Oral at bedtime  dextrose 5%. 1000 milliLiter(s) (50 mL/Hr) IV Continuous <Continuous>  dextrose 5%. 1000 milliLiter(s) (100 mL/Hr) IV Continuous <Continuous>  dextrose 50% Injectable 25 Gram(s) IV Push once  dextrose 50% Injectable 12.5 Gram(s) IV Push once  dextrose 50% Injectable 25 Gram(s) IV Push once  ertapenem  IVPB 1000 milliGRAM(s) IV Intermittent every 24 hours  gabapentin 300 milliGRAM(s) Oral three times a day  glucagon  Injectable 1 milliGRAM(s) IntraMuscular once  insulin glargine Injectable (LANTUS) 15 Unit(s) SubCutaneous every morning  insulin lispro (ADMELOG) corrective regimen sliding scale   SubCutaneous three times a day before meals  insulin lispro (ADMELOG) corrective regimen sliding scale   SubCutaneous at bedtime  insulin lispro Injectable (ADMELOG) 5 Unit(s) SubCutaneous three times a day before meals  lisinopril 40 milliGRAM(s) Oral daily  metoprolol succinate ER 50 milliGRAM(s) Oral daily    MEDICATIONS  (PRN):  acetaminophen     Tablet .. 650 milliGRAM(s) Oral every 6 hours PRN Temp greater or equal to 38C (100.4F), Mild Pain (1 - 3)  aluminum hydroxide/magnesium hydroxide/simethicone Suspension 30 milliLiter(s) Oral every 4 hours PRN Dyspepsia  dextrose Oral Gel 15 Gram(s) Oral once PRN Blood Glucose LESS THAN 70 milliGRAM(s)/deciliter  melatonin 3 milliGRAM(s) Oral at bedtime PRN Insomnia  ondansetron Injectable 4 milliGRAM(s) IV Push every 8 hours PRN Nausea and/or Vomiting      Allergies    No Known Allergies    Intolerances        Social History:  No  Tobacco: Denies  EtOH: Denies  Recreational drug use: Denies  Lives with: Wife at home   Ambulates: Independently   ADLs: Independent (27 May 2025 13:05)      REVIEW OF SYSTEMS:  CONSTITUTIONAL: No fever, No chills, No fatigue, No myalgia, No Body ache, No Weakness  EYES: No eye pain,  No visual disturbances, No discharge, NO Redness  ENMT:  No ear pain, No nose bleed, No vertigo; No sinus pain, NO throat pain, No Congestion  NECK: No pain, No stiffness  RESPIRATORY: No cough, NO wheezing, No  hemoptysis, NO  shortness of breath  CARDIOVASCULAR: No chest pain, palpitations  GASTROINTESTINAL: No abdominal pain, NO epigastric pain. No nausea, No vomiting; No diarrhea, No constipation. [  ] BM  GENITOURINARY: No dysuria, No frequency, No urgency, No hematuria, NO incontinence  NEUROLOGICAL: No headaches, No dizziness, No numbness, No tingling, No tremors, No weakness  EXT: No Swelling, No Pain, No Edema  SKIN:  [  ] No itching, burning, rashes, or lesions   MUSCULOSKELETAL: [x]3-4/10 left foot pain   PSYCHIATRIC: No depression,  No anxiety,  No mood swings ,No difficulty sleeping at night  PAIN SCALE: [  ] None  [  ] Other-  ROS Unable to obtain due to - [  ] Dementia  [  ] Lethargy [  ] Drowsy [  ] Sedated [  ] non verbal  REST OF REVIEW Of SYSTEM - [  ] Normal     Vital Signs Last 24 Hrs  T(C): 36.4 (28 May 2025 11:42), Max: 36.9 (28 May 2025 02:35)  T(F): 97.6 (28 May 2025 11:42), Max: 98.4 (28 May 2025 02:35)  HR: 78 (28 May 2025 11:42) (75 - 86)  BP: 110/71 (28 May 2025 11:42) (101/60 - 139/73)  BP(mean): --  RR: 18 (28 May 2025 11:42) (12 - 26)  SpO2: 98% (28 May 2025 11:42) (96% - 100%)    Parameters below as of 28 May 2025 11:42  Patient On (Oxygen Delivery Method): room air      Finger Stick          PHYSICAL EXAM:  GENERAL:  [ x ] NAD , [ x ] well appearing, [  ] Agitated, [  ] Drowsy,  [  ] Lethargy, [  ] confused   HEAD:  [  x] Normal, [  ] Other  EYES:  [  ] EOMI, [  ] PERRLA, [  ] conjunctiva and sclera clear normal, [  ] Other,  [  ] Pallor,[  ] Discharge  ENMT:  [  ] Normal, [  ] Moist mucous membranes, [  ] Good dentition, [  ] No Thrush  NECK:  [  x] Supple, [  ] No JVD, [  ] Normal thyroid, [  ] Lymphadenopathy [  ] Other  CHEST/LUNG:  [  x] Clear to auscultation bilaterally, [  ] Breath Sounds equal B/L / Decrease, [  ] poor effort  [  ] No rales, [  ] No rhonchi  [  ]  No wheezing,   HEART:  [ x ] Regular rate and rhythm, [  ] tachycardia, [  ] Bradycardia,  [  ] irregular  [  ] No murmurs, No rubs, No gallops, [  ] PPM in place (Mfr:  )  ABDOMEN:  [ x ] Soft, [  ]x Nontender, [ x ] Nondistended, [  ] No mass, [  ] Bowel sounds present, [  ] obese  NERVOUS SYSTEM:  [ x ] Alert & Oriented X3, [  ] Nonfocal  [  ] Confusion  [  ] Encephalopathic [  ] Sedated [  ] Unable to assess, [  ] Dementia [  ] Other-  EXTREMITIES: [x] left foot and ankle in dressing   LYMPH: No lymphadenopathy noted  SKIN:  [  ] No rashes or lesions, [  ] Pressure Ulcers, [  ] ecchymosis, [  ] Skin Tears, [  ] Other    DIET: Diet, DASH/TLC:   Sodium & Cholesterol Restricted  Consistent Carbohydrate No Snacks (05-28-25 @ 12:07)      LABS:      Ca    9.4        27 May 2025 09:10      PT/INR - ( 27 May 2025 09:10 )   PT: 11.9 sec;   INR: 1.02 ratio         PTT - ( 27 May 2025 09:10 )  PTT:34.6 sec  Urinalysis Basic - ( 27 May 2025 09:10 )    Color: x / Appearance: x / SG: x / pH: x  Gluc: 148 mg/dL / Ketone: x  / Bili: x / Urobili: x   Blood: x / Protein: x / Nitrite: x   Leuk Esterase: x / RBC: x / WBC x   Sq Epi: x / Non Sq Epi: x / Bacteria: x        Culture Results:   No growth at 24 hours (05-27 @ 09:10)  Culture Results:   No growth at 24 hours (05-27 @ 09:00)      culture blood  -- Blood Blood-Peripheral 05-27 @ 09:10    culture urine  --  05-27 @ 09:10  culture blood  -- Blood Blood-Peripheral 05-27 @ 09:00    culture urine  --  05-27 @ 09:00              Culture - Blood (collected 27 May 2025 09:10)  Source: Blood Blood-Peripheral  Preliminary Report (28 May 2025 12:02):    No growth at 24 hours    Culture - Blood (collected 27 May 2025 09:00)  Source: Blood Blood-Peripheral  Preliminary Report (28 May 2025 12:02):    No growth at 24 hours         Anemia Panel:      Thyroid Panel:                RADIOLOGY & ADDITIONAL TESTS:      HEALTH ISSUES - PROBLEM Dx:  DM foot ulcer    PAD (peripheral artery disease)    Type 2 diabetes mellitus    HTN (hypertension)    Encounter for deep vein thrombosis (DVT) prophylaxis            Consultant(s) Notes Reviewed:  [  ] YES     Care Discussed with [X] Consultants  [  ] Patient  [  ] Family [  ] HCP [  ]   [  ] Social Service  [  ] RN, [  ] Physical Therapy,[  ] Palliative care team  DVT PPX: [  ] Lovenox, [  ] S C Heparin, [  ] Coumadin, [  ] Xarelto, [  ] Eliquis, [  ] Pradaxa, [  ] IV Heparin drip, [  ] SCD [  ] Contraindication 2 to GI Bleed,[  ] Ambulation [  ] Contraindicated 2 to  bleed [  ] Contraindicated 2 to Brain Bleed  Advanced directive: [  ] None, [  ] DNR/DNI

## 2025-05-28 NOTE — PROGRESS NOTE ADULT - ATTENDING COMMENTS
65 y/o M with PMHx DM2, osteomyelitis, CAD, PAD s/p RLE angioplasty 2020, s/p surgical amputation of R forefoot , s/p L PT angioplasty 12/2/24 for L lat foot DFU on plavix, HTN, s/p recent admission for left foot infection s/p PICC placement for IV abx  sent by Podiatry Dr. Stark for admission for OR intervention tomorrow.  Pt seen, examined case & care plan d/w pt, residents at detail.  Consult-  ID-DR XENIA Calhoun -IV abx  VIA PICC line-Change IV Cefepime 2 gm IVPB q 8 hrs  Podiatry-DR Joe Stark d/w POST Op -NWB Left foot   PO diet   AM labs   DVT ppx start in AM   Total care time is 60 minutes

## 2025-05-28 NOTE — PROGRESS NOTE ADULT - PROBLEM SELECTOR PLAN 3
Type 2 diabetes mellitus.   ·  Plan: Chronic  -On home Trulicity, Jardiance and Metformin  -Hold home oral meds  -MDISS with FS QAC/QHS   -Lantus 15u qhs and 5u pre meal   -Hypoglycemia protocol  -A1c 7.9  -Pt with neuropathy continue Gabapentin 300mg TID  -Endo Dr. Perlman consulted.  - Since patient took Jardiance day before surgery , prior to admission, VBG, serum ketones, urine ketones ordered for AM, fu results Type 2 diabetes mellitus.   ·  Plan: Chronic  -On home Trulicity, Jardiance and Metformin  -Hold home oral meds  -MDISS with FS QAC/QHS   -Lantus 15u qhs and 5u pre meal   -Hypoglycemia protocol  -A1c 7.9  -Pt with neuropathy continue Gabapentin 300mg TID  -Endo Dr. Perlman consulted.  - Since patient took Jardiance day before surgery , prior to admission, VBG, serum ketones, urine ketones ordered for AM, fu results NEG Acetone

## 2025-05-28 NOTE — PROGRESS NOTE ADULT - ASSESSMENT
64M DM2, hx osteomyelitis, CAD, PAD s/p RLE angioplasty 2020, s/p surgical amputation of R forefoot , s/p L PT angioplasty 12/2/24 for L lat foot DFU on plavix, HTN, HLD sent in by wound clinic for left foot infections and multiple wounds. Vascular surgery to evaluate further with LLE angiogram.     Cardiac clearance  - p/w L foot infection with multiple wounds sent in by C, podiatry following   - Continue antibiotics per ID  - s/p LLE angio 5/2,  vascular following   - Tolerated procedure well, cardiac status optimal post operatively  - sp PICC this am  -to fu Vascular on DC  -S/P debridement on 5/28 w/o cardiac complications    - EKG: Sinus rhythm with premature supraventricular complexes  - No evidence of any active ischemia   - NST done 1/2024 with small sized, mild defect in the basal inferior wall that is fixed and partially normalizes with prone, suggestive of diaphragmatic artifact.   - Continue home ASA, Plavix and Lipitor    - Previous TTE (1/2/24) shows EF 61%, Normal LV mass and diastolic function, Normal RV size and function.  - No evidence of any meaningful volume overload     - BP stable and controlled   - Continue BB   - Monitor and replete lytes, keep K>4, Mg>2.        - Follows with Dr. Chu (o/p cardiologist)   Oskar Saldivar DNP, AGACNP-BC  Cardiology   Call Teams

## 2025-05-28 NOTE — PROGRESS NOTE ADULT - SUBJECTIVE AND OBJECTIVE BOX
[facility] -- Vlad Gallardo Pannella, Patel, Savella, Goodger, Cohen: Office # [phone]    Follow Up:  Cardiac optimization     Subjective/Observations: No events overnight resting comfortably in bed.  No complaints of chest pain, dyspnea, or palpitations reported. No signs of orthopnea or PND.     REVIEW OF SYSTEMS: All other review of systems are negative unless indicated above    PAST MEDICAL & SURGICAL HISTORY:  HTN (hypertension)      Diabetes      Hyperlipidemia      PVD (peripheral vascular disease)      Toe osteomyelitis, right      H/O angioplasty  RLE      Status post amputation of toe          MEDICATIONS  (STANDING):  aspirin enteric coated 81 milliGRAM(s) Oral daily  atorvastatin 40 milliGRAM(s) Oral at bedtime  dextrose 5%. 1000 milliLiter(s) (50 mL/Hr) IV Continuous <Continuous>  dextrose 5%. 1000 milliLiter(s) (100 mL/Hr) IV Continuous <Continuous>  dextrose 50% Injectable 25 Gram(s) IV Push once  dextrose 50% Injectable 12.5 Gram(s) IV Push once  dextrose 50% Injectable 25 Gram(s) IV Push once  ertapenem  IVPB 1000 milliGRAM(s) IV Intermittent every 24 hours  gabapentin 300 milliGRAM(s) Oral three times a day  glucagon  Injectable 1 milliGRAM(s) IntraMuscular once  insulin glargine Injectable (LANTUS) 15 Unit(s) SubCutaneous every morning  insulin lispro (ADMELOG) corrective regimen sliding scale   SubCutaneous three times a day before meals  insulin lispro (ADMELOG) corrective regimen sliding scale   SubCutaneous at bedtime  insulin lispro Injectable (ADMELOG) 5 Unit(s) SubCutaneous three times a day before meals  lisinopril 40 milliGRAM(s) Oral daily  metoprolol succinate ER 50 milliGRAM(s) Oral daily    MEDICATIONS  (PRN):  acetaminophen     Tablet .. 650 milliGRAM(s) Oral every 6 hours PRN Temp greater or equal to 38C (100.4F), Mild Pain (1 - 3)  aluminum hydroxide/magnesium hydroxide/simethicone Suspension 30 milliLiter(s) Oral every 4 hours PRN Dyspepsia  dextrose Oral Gel 15 Gram(s) Oral once PRN Blood Glucose LESS THAN 70 milliGRAM(s)/deciliter  melatonin 3 milliGRAM(s) Oral at bedtime PRN Insomnia  ondansetron Injectable 4 milliGRAM(s) IV Push every 8 hours PRN Nausea and/or Vomiting    Allergies    No Known Allergies    Intolerances      Vital Signs Last 24 Hrs  T(C): 36.5 (28 May 2025 10:48), Max: 36.9 (28 May 2025 02:35)  T(F): 97.7 (28 May 2025 10:48), Max: 98.4 (28 May 2025 02:35)  HR: 78 (28 May 2025 11:03) (75 - 86)  BP: 101/60 (28 May 2025 11:03) (101/60 - 139/73)  BP(mean): --  RR: 14 (28 May 2025 11:03) (12 - 26)  SpO2: 99% (28 May 2025 11:03) (96% - 100%)    Parameters below as of 28 May 2025 10:33  Patient On (Oxygen Delivery Method): room air      I&O's Summary    Weight (kg): 81.6 (05-28 @ 07:14)    TELE: Off cardiac monitor   PHYSICAL EXAM:  Constitutional: NAD, awake and alert  HEENT: Moist Mucous Membranes, Anicteric  Pulmonary: Non-labored, breath sounds are clear bilaterally, No wheezing, rales or rhonchi  Cardiovascular: Regular, S1 and S2, No murmurs, No rubs, gallops or clicks  Gastrointestinal:  soft, nontender, nondistended   Lymph: No peripheral edema. No lymphadenopathy.   Skin: No visible rashes or ulcers.  Psych:  Mood & affect appropriate      LABS: All Labs Reviewed:                        12.6   10.77 )-----------( 393      ( 27 May 2025 09:10 )             39.4     27 May 2025 09:10    138    |  101    |  22     ----------------------------<  148    4.7     |  28     |  1.20     Ca    9.4        27 May 2025 09:10    TPro  8.6    /  Alb  3.9    /  TBili  0.5    /  DBili  x      /  AST  16     /  ALT  26     /  AlkPhos  85     27 May 2025 09:10   LIVER FUNCTIONS - ( 27 May 2025 09:10 )  Alb: 3.9 g/dL / Pro: 8.6 g/dL / ALK PHOS: 85 U/L / ALT: 26 U/L / AST: 16 U/L / GGT: x           PT/INR - ( 27 May 2025 09:10 )   PT: 11.9 sec;   INR: 1.02 ratio         PTT - ( 27 May 2025 09:10 )  PTT:34.6 sec  12 Lead ECG:   Ventricular Rate 87 BPM    Atrial Rate 87 BPM    P-R Interval 144 ms    QRS Duration 82 ms    Q-T Interval 378 ms    QTC Calculation(Bazett) 454 ms    P Axis 60 degrees    R Axis -1 degrees    T Axis 31 degrees    Diagnosis Line Normal sinus rhythm  Normal ECG  When compared with ECG of 29-APR-2025 13:07,  premature supraventricular complexes are no longer present  Confirmed by RAMÓN SHAFFER ([id number]) on 5/27/2025 6:53:29 PM (05-27-25 @ 10:18)

## 2025-05-28 NOTE — CARE COORDINATION ASSESSMENT. - NS SW HOMECARE DISCIPLINE
[facility] [phone]/ fax [phone]./skilled nursing

## 2025-05-28 NOTE — PROGRESS NOTE ADULT - PROBLEM SELECTOR PLAN 4
Chronic  -Continue home Lisinopril and Metoprolol   -C/w home meds with hold parameters  -Monitor hemodynamics. Detail Level: Detailed

## 2025-05-28 NOTE — PHARMACOTHERAPY INTERVENTION NOTE - COMMENTS
Medication reconciliation completed on admission by pharmacy representative. Updated medication list in OMR/prescription writer.    Home Medications:  atorvastatin 40 mg oral tablet: 1 tab(s) orally once a day (at bedtime) (27 May 2025 15:49)  Cinnamon: 1 cap(s) orally once a day (27 May 2025 15:51)  clopidogrel 75 mg oral tablet: 1 tab(s) orally once a day (27 May 2025 15:49)  gabapentin 300 mg oral capsule: 1 cap(s) orally 3 times a day (27 May 2025 15:49)  Jardiance 25 mg oral tablet: 1 tab(s) orally once a day (27 May 2025 15:49)  lisinopril 40 mg oral tablet: 1 tab(s) orally once a day (27 May 2025 15:49)  metFORMIN 1000 mg oral tablet: 1 tab(s) orally 2 times a day (27 May 2025 15:49)  metoprolol succinate 50 mg oral tablet, extended release: 1 tab(s) orally once a day (27 May 2025 15:49)  Trulicity Pen 1.5 mg/0.5 mL subcutaneous solution: 1.5 milligram(s) subcutaneously once a week (Sundays) (27 May 2025 15:52)  Tumeric : 1 cap orally once a day (27 May 2025 15:51)    Katlyn Garsia, PharmD  Clinical Pharmacy Specialist  [phone] or Teams

## 2025-05-28 NOTE — CONSULT NOTE ADULT - PROBLEM SELECTOR RECOMMENDATION 9
Patient seen and evaluated  Patient is scheduled to have left foot debridement with partial 1st ray amputation tomorrow 5/28.  Xray reviewed with no acute emphysematous changes  Left foot MRI ordered  Requesting medical optimization prior to the surgery  NPO after midnight   Medical management as per primary team  Discussed with attending who agreed
Type 2 A1c 8.1% adm L foot OM  lantus 15 units in AM  admelog 5 units TIDAC  admelog mod ISS ACHS  Recommend endocrine-Perlman onconsult  FU appt: TBA  DSC recommendations: return to home regimen metformin 1000mg BID, Jardiance 25mg daily, Trulicity 1.5mg weekly and glucose monitoring, lifestyle and diet modification  diabetes education provided as documented above  Diabetes support info and cell # [phone] given   Goal 100-180 mg/dL; 140-180 mg/dL in critical care areas

## 2025-05-28 NOTE — CARE COORDINATION ASSESSMENT. - OTHER PERTINENT DISCHARGE PLANNING INFORMATION:
met with patient at bedside to introduce self. Role of case management and transition explained, patient verbalized understanding. Patient is from home with spouse. Admitted for Left foot Osteomyelitis, on Cefepime. Choice and transition resources discussed,  information. Patient  understands that  will remain available.

## 2025-05-28 NOTE — PROGRESS NOTE ADULT - PROBLEM SELECTOR PLAN 1
Patient presents with left DM foot ulcer, sent by podiatry Dr. Stark  Gangrene Left BIG Toe with left heel wound   - s/p OR today with podiatry: partial 1st and 5th ray resection, heel debridement with antibiotic cement placement and reattachment of peroneus brevis tendon into the cuboid   - MRI of the left foot demonstrates ulceration at the great toe with underlying cortical destruction of the distal phalanx and acute osteomyelitis involving the distal and proximal phalanx of the great toe.  Focal ulceration at the base of the fifth metatarsal with underlying marrow edema within the fifth metatarsal base and intramedullary cavity. Finding is indeterminate and may represent concurrent early osteomyelitis versus reactive changes.  Marrow edema involving the distal phalanx and middle phalanx of the little toe without definitive corresponding ulceration or abnormal T1 weighted signal.   Nonspecific bone marrow edema pattern within the intermediate cuneiform, lateral cuneiform, and navicular bone favored to represent reactive changes from osteoarthrosis.  - continue Invanz  - F/u cultures   - ID Dr. Melany Calhoun consulted appreciate results  - PT eval tomorrow ordered by podiatry Patient presents with left DM foot ulcer/ wound Gangrene Left BIG Toe, sent by podiatry Dr. Lincoln lawson [facility]  Gangrene Left BIG Toe with left heel wound   - s/p OR today with podiatry: partial 1st and 5th ray resection, heel debridement with antibiotic cement placement and reattachment of peroneus brevis tendon into the cuboid   - MRI of the left foot demonstrates ulceration at the great toe with underlying cortical destruction of the distal phalanx and acute osteomyelitis involving the distal and proximal phalanx of the great toe.  Focal ulceration at the base of the fifth metatarsal with underlying marrow edema within the fifth metatarsal base and intramedullary cavity. Finding is indeterminate and may represent concurrent early osteomyelitis versus reactive changes.  Marrow edema involving the distal phalanx and middle phalanx of the little toe without definitive corresponding ulceration or abnormal T1 weighted signal.   Nonspecific bone marrow edema pattern within the intermediate cuneiform, lateral cuneiform, and navicular bone favored to represent reactive changes from osteoarthrosis.  - continue Invanz  - F/u cultures   - ID Dr. Melany Valle ---> IV Cefepime q 8 hrs  Pain meds  IV Tylenol, PRN, IV Dilaudid PRN for Mod & severe pain  - PT eval tomorrow ordered by podiatry

## 2025-05-28 NOTE — PROGRESS NOTE ADULT - SUBJECTIVE AND OBJECTIVE BOX
[facility]  OFELIA Harvey Y. Patel, S. Shah, G. [facility]  [phone]    Name: LOIDA QUEZADA  Age: 64y  Gender: Male  MRN: [id number]    Interval History:  No acute overnight events.   S/p OR today  Notes reviewed    Antibiotics:  ertapenem  IVPB 1000 milliGRAM(s) IV Intermittent every 24 hours      Medications:  acetaminophen     Tablet .. 650 milliGRAM(s) Oral every 6 hours PRN  aluminum hydroxide/magnesium hydroxide/simethicone Suspension 30 milliLiter(s) Oral every 4 hours PRN  aspirin enteric coated 81 milliGRAM(s) Oral daily  atorvastatin 40 milliGRAM(s) Oral at bedtime  dextrose 5%. 1000 milliLiter(s) IV Continuous <Continuous>  dextrose 5%. 1000 milliLiter(s) IV Continuous <Continuous>  dextrose 50% Injectable 25 Gram(s) IV Push once  dextrose 50% Injectable 12.5 Gram(s) IV Push once  dextrose 50% Injectable 25 Gram(s) IV Push once  dextrose Oral Gel 15 Gram(s) Oral once PRN  ertapenem  IVPB 1000 milliGRAM(s) IV Intermittent every 24 hours  gabapentin 300 milliGRAM(s) Oral three times a day  glucagon  Injectable 1 milliGRAM(s) IntraMuscular once  insulin glargine Injectable (LANTUS) 15 Unit(s) SubCutaneous every morning  insulin lispro (ADMELOG) corrective regimen sliding scale   SubCutaneous three times a day before meals  insulin lispro (ADMELOG) corrective regimen sliding scale   SubCutaneous at bedtime  insulin lispro Injectable (ADMELOG) 5 Unit(s) SubCutaneous three times a day before meals  lisinopril 40 milliGRAM(s) Oral daily  melatonin 3 milliGRAM(s) Oral at bedtime PRN  metoprolol succinate ER 50 milliGRAM(s) Oral daily  ondansetron Injectable 4 milliGRAM(s) IV Push every 8 hours PRN      Review of Systems:  A 10-point review of systems was obtained.   Review of systems otherwise negative except as previously noted.    Allergies: No Known Allergies    For details regarding the patient's past medical history, social history, family history, and other miscellaneous elements, please refer the initial infectious diseases consultation and/or the admitting history and physical examination for this admission.    Objective:  Vitals:   T(C): 36.4 (05-28-25 @ 11:42), Max: 36.9 (05-28-25 @ 02:35)  HR: 78 (05-28-25 @ 11:42) (75 - 86)  BP: 110/71 (05-28-25 @ 11:42) (101/60 - 139/73)  RR: 18 (05-28-25 @ 11:42) (12 - 26)  SpO2: 98% (05-28-25 @ 11:42) (96% - 100%)    Physical Examination:  General: no acute distress  HEENT: NC/AT, EOMI  Cardio: RRR  Resp: decreased breath sounds  Abd: soft, NT  Ext: foot in dressing  Skin: warm, dry, no visible rash      Laboratory Studies:  CBC:                       12.6   10.77 )-----------( 393      ( 27 May 2025 09:10 )             39.4     CMP: 05-27    138  |  101  |  22  ----------------------------<  148[H]  4.7   |  28  |  1.20    Ca    9.4      27 May 2025 09:10    TPro  8.6[H]  /  Alb  3.9  /  TBili  0.5  /  DBili  x   /  AST  16  /  ALT  26  /  AlkPhos  85  05-27    LIVER FUNCTIONS - ( 27 May 2025 09:10 )  Alb: 3.9 g/dL / Pro: 8.6 g/dL / ALK PHOS: 85 U/L / ALT: 26 U/L / AST: 16 U/L / GGT: x           Urinalysis Basic - ( 27 May 2025 09:10 )    Color: x / Appearance: x / SG: x / pH: x  Gluc: 148 mg/dL / Ketone: x  / Bili: x / Urobili: x   Blood: x / Protein: x / Nitrite: x   Leuk Esterase: x / RBC: x / WBC x   Sq Epi: x / Non Sq Epi: x / Bacteria: x        Microbiology: reviewed    Culture - Blood (collected 05-27-25 @ 09:10)  Source: Blood Blood-Peripheral  Preliminary Report (05-28-25 @ 12:02):    No growth at 24 hours    Culture - Blood (collected 05-27-25 @ 09:00)  Source: Blood Blood-Peripheral  Preliminary Report (05-28-25 @ 12:02):    No growth at 24 hours          Radiology: reviewed

## 2025-05-28 NOTE — BRIEF OPERATIVE NOTE - NSICDXBRIEFPROCEDURE_GEN_ALL_CORE_FT
PROCEDURES:  Detachment, debridement, and reattachment of peroneus brevis tendon 28-May-2025 10:07:14  Ricki Wilson  Detachment at left foot, partial 1st ray, open approach 28-May-2025 10:07:26  Ricki Wilson  Detachment at left foot, partial 5th ray, open approach 28-May-2025 10:07:40  Ricki Wilson  Debridement, soft tissue and bone, heel region of diabetic foot 28-May-2025 10:07:50  Ricki Wilson

## 2025-05-28 NOTE — PROGRESS NOTE ADULT - ASSESSMENT
63 y/o M with PMHx DM2, osteomyelitis, CAD, PAD s/p RLE angioplasty 2020, s/p surgical amputation of R forefoot , s/p L PT angioplasty 12/2/24 for L lat foot DFU on plavix, HTN, s/p recent admission for left foot infection s/p PICC placement for IV abx  sent by Podiatry Dr. Stark for admission for OR intervention tomorrow.    L foot OM/DM Foot Ulcer  PAD  - sent for OR tomorrow 5/28 for left foot debridement and partial 1st toe amputation 5/28   - MRI of the left foot demonstrates ulceration at the great toe with underlying cortical destruction of the distal phalanx and acute osteomyelitis involving the distal and proximal phalanx of the great toe.  Focal ulceration at the base of the fifth metatarsal with underlying marrow edema within the fifth metatarsal base and intramedullary cavity. Finding is indeterminate and may represent concurrent early osteomyelitis versus reactive changes.  Marrow edema involving the distal phalanx and middle phalanx of the little toe without definitive corresponding ulceration or abnormal T1 weighted signal.   Nonspecific bone marrow edema pattern within the intermediate cuneiform, lateral cuneiform, and navicular bone favored to represent reactive changes from osteoarthrosis.    Review of prior ID notes show that the patient was sent out  on ceftriaxone 2gm q24h x6 week course IV Abx until 6/10/25  In in the interim pt was noted to have worsening leukocytosis, transitioned to ertapenem since 5/24    PROCEDURES:  Detachment, debridement, and reattachment of peroneus brevis tendon 28-May-2025 10:07:14  Ricki Wilson  Detachment at left foot, partial 1st ray, open approach 28-May-2025 10:07:26  Ricki Wilson  Detachment at left foot, partial 5th ray, open approach 28-May-2025 10:07:40  Ricki Wilson  Debridement, soft tissue and bone, heel region of diabetic foot 28-May-2025 10:07:50  Ricki Wilson  Non-viable bone and soft tissue.    Recommendations:  C/w ertapenem  F/u culture  F/u path  Trend temps/WBC  Podiatry following  Further recs to follow pending above    Patient evaluated with face-to-face time in addition to reviewing history, labs, microbiology, and imaging.   Antibiotic stewardship, local antibiogram, infection control strategies and potential transmission issues taken into consideration at time of treatment decision making process.   Thank you for allowing us to participate in the care of your patient.  Infectious Diseases will follow. Please call with any questions.  Melany Calhoun M.D.  Available on [employer] TEAMS -- *[facility]*  [facility] [phone]  For after 5 P.M. and weekends, please call [phone] 65 y/o M with PMHx DM2, osteomyelitis, CAD, PAD s/p RLE angioplasty 2020, s/p surgical amputation of R forefoot , s/p L PT angioplasty 12/2/24 for L lat foot DFU on plavix, HTN, s/p recent admission for left foot infection s/p PICC placement for IV abx  sent by Podiatry Dr. Stark for admission for OR intervention tomorrow.    L foot OM/DM Foot Ulcer  PAD  - sent for OR tomorrow 5/28 for left foot debridement and partial 1st toe amputation 5/28   - MRI of the left foot demonstrates ulceration at the great toe with underlying cortical destruction of the distal phalanx and acute osteomyelitis involving the distal and proximal phalanx of the great toe.  Focal ulceration at the base of the fifth metatarsal with underlying marrow edema within the fifth metatarsal base and intramedullary cavity. Finding is indeterminate and may represent concurrent early osteomyelitis versus reactive changes.  Marrow edema involving the distal phalanx and middle phalanx of the little toe without definitive corresponding ulceration or abnormal T1 weighted signal.   Nonspecific bone marrow edema pattern within the intermediate cuneiform, lateral cuneiform, and navicular bone favored to represent reactive changes from osteoarthrosis.    Review of prior ID notes show that the patient was sent out  on ceftriaxone 2gm q24h x6 week course IV Abx until 6/10/25  In in the interim pt was noted to have worsening leukocytosis, transitioned to ertapenem since 5/24    PROCEDURES:  Detachment, debridement, and reattachment of peroneus brevis tendon 28-May-2025 10:07:14  Ricki Wilson  Detachment at left foot, partial 1st ray, open approach 28-May-2025 10:07:26  Ricki Wilson  Detachment at left foot, partial 5th ray, open approach 28-May-2025 10:07:40  Ricki Wilson  Debridement, soft tissue and bone, heel region of diabetic foot 28-May-2025 10:07:50  Ricki Wilson  Non-viable bone and soft tissue.    Recommendations:  Stop ertapenem  C/w cefpeime  F/u culture  F/u path  Trend temps/WBC  Podiatry following  Further recs to follow pending above    Patient evaluated with face-to-face time in addition to reviewing history, labs, microbiology, and imaging.   Antibiotic stewardship, local antibiogram, infection control strategies and potential transmission issues taken into consideration at time of treatment decision making process.   Thank you for allowing us to participate in the care of your patient.  D/w Dr. Calhoun  Infectious Diseases will follow. Please call with any questions.  Melany Calhoun M.D.  Available on [employer] TEAMS -- *[facility]*  [facility] [phone]  For after 5 P.M. and weekends, please call [phone]

## 2025-05-28 NOTE — BRIEF OPERATIVE NOTE - SPECIMENS
[FreeTextEntry1] : Nieves is a well-developed, well-nourished female who is in no apparent distress. She is cooperative with the exam, appropriate for her age. Focused examination of the cervical spine demonstrates no spinal tenderness to palpation or palpable stepoffs. She has tenderness to palpation over the left rhomboid muscles. She has full passive and active range of motion of her shoulders. She has full active range of motion of her cervical spine with mild pain with flexion. Spurlings test was negative. She has normal reflexes in her bilateral upper and lower extremities (biceps and knee jerk). Hoffmans test was negative. Gross sensation is intact to all extremities. 
Bone culture and pathology.

## 2025-05-28 NOTE — PROGRESS NOTE ADULT - PROBLEM SELECTOR PLAN 2
Chronic. Pt follows with Vascular outpt. Pt s/p L PT angioplasty 12/2/24 for L lat foot DFU on Plavix  -C/w ASA and Lipitor  - Hold Plavix given post op Chronic. Pt follows with Vascular outpt. Pt s/p L PT angioplasty 12/2/24 for L lat foot DFU on Plavix  -C/w ASA and Lipitor  - Hold Plavix given post op- may resume tomorrow as per podiatry

## 2025-05-28 NOTE — CONSULT NOTE ADULT - CONSULT REASON
64y A1C with Estimated Average Glucose Result: 8.1 % (05-03-25 @ 06:31)  A1C with Estimated Average Glucose Result: 7.9 % (04-30-25 @ 07:45)  A1C with Estimated Average Glucose Result: 7.8 % (04-29-25 @ 12:45)   diabetes mellitus uncontrolled type 2

## 2025-05-29 ENCOUNTER — APPOINTMENT (OUTPATIENT)
Dept: HYPERBARIC MEDICINE | Facility: HOSPITAL | Age: 65
End: 2025-05-29

## 2025-05-29 DIAGNOSIS — D64.9 ANEMIA, UNSPECIFIED: ICD-10-CM

## 2025-05-29 LAB
ACETONE SERPL-MCNC: NEGATIVE — SIGNIFICANT CHANGE UP
ALBUMIN SERPL ELPH-MCNC: 2.8 G/DL — LOW (ref 3.3–5)
ALBUMIN SERPL ELPH-MCNC: 3.1 G/DL — LOW (ref 3.3–5)
ALP SERPL-CCNC: 64 U/L — SIGNIFICANT CHANGE UP (ref 40–120)
ALP SERPL-CCNC: 70 U/L — SIGNIFICANT CHANGE UP (ref 40–120)
ALT FLD-CCNC: 18 U/L — SIGNIFICANT CHANGE UP (ref 12–78)
ALT FLD-CCNC: 21 U/L — SIGNIFICANT CHANGE UP (ref 12–78)
ANION GAP SERPL CALC-SCNC: 7 MMOL/L — SIGNIFICANT CHANGE UP (ref 5–17)
ANION GAP SERPL CALC-SCNC: 8 MMOL/L — SIGNIFICANT CHANGE UP (ref 5–17)
APPEARANCE UR: CLEAR — SIGNIFICANT CHANGE UP
AST SERPL-CCNC: 13 U/L — LOW (ref 15–37)
AST SERPL-CCNC: 9 U/L — LOW (ref 15–37)
BASE EXCESS BLDV CALC-SCNC: 2.6 MMOL/L — SIGNIFICANT CHANGE UP (ref -2–3)
BILIRUB SERPL-MCNC: 0.3 MG/DL — SIGNIFICANT CHANGE UP (ref 0.2–1.2)
BILIRUB SERPL-MCNC: 0.4 MG/DL — SIGNIFICANT CHANGE UP (ref 0.2–1.2)
BILIRUB UR-MCNC: NEGATIVE — SIGNIFICANT CHANGE UP
BUN SERPL-MCNC: 18 MG/DL — SIGNIFICANT CHANGE UP (ref 7–23)
BUN SERPL-MCNC: 19 MG/DL — SIGNIFICANT CHANGE UP (ref 7–23)
CALCIUM SERPL-MCNC: 8.1 MG/DL — LOW (ref 8.5–10.1)
CALCIUM SERPL-MCNC: 8.5 MG/DL — SIGNIFICANT CHANGE UP (ref 8.5–10.1)
CHLORIDE SERPL-SCNC: 101 MMOL/L — SIGNIFICANT CHANGE UP (ref 96–108)
CHLORIDE SERPL-SCNC: 101 MMOL/L — SIGNIFICANT CHANGE UP (ref 96–108)
CK MB BLD-MCNC: 3.9 % — HIGH (ref 0–3.5)
CK MB CFR SERPL CALC: 2.4 NG/ML — SIGNIFICANT CHANGE UP (ref 0–3.6)
CK SERPL-CCNC: 61 U/L — SIGNIFICANT CHANGE UP (ref 26–308)
CO2 SERPL-SCNC: 27 MMOL/L — SIGNIFICANT CHANGE UP (ref 22–31)
CO2 SERPL-SCNC: 29 MMOL/L — SIGNIFICANT CHANGE UP (ref 22–31)
COLOR SPEC: YELLOW — SIGNIFICANT CHANGE UP
CREAT SERPL-MCNC: 0.93 MG/DL — SIGNIFICANT CHANGE UP (ref 0.5–1.3)
CREAT SERPL-MCNC: 1.2 MG/DL — SIGNIFICANT CHANGE UP (ref 0.5–1.3)
DIFF PNL FLD: NEGATIVE — SIGNIFICANT CHANGE UP
EGFR: 67 ML/MIN/1.73M2 — SIGNIFICANT CHANGE UP
EGFR: 67 ML/MIN/1.73M2 — SIGNIFICANT CHANGE UP
EGFR: 91 ML/MIN/1.73M2 — SIGNIFICANT CHANGE UP
EGFR: 91 ML/MIN/1.73M2 — SIGNIFICANT CHANGE UP
GAS PNL BLDV: SIGNIFICANT CHANGE UP
GLUCOSE BLDC GLUCOMTR-MCNC: 135 MG/DL — HIGH (ref 70–99)
GLUCOSE BLDC GLUCOMTR-MCNC: 142 MG/DL — HIGH (ref 70–99)
GLUCOSE BLDC GLUCOMTR-MCNC: 159 MG/DL — HIGH (ref 70–99)
GLUCOSE BLDC GLUCOMTR-MCNC: 184 MG/DL — HIGH (ref 70–99)
GLUCOSE BLDC GLUCOMTR-MCNC: 204 MG/DL — HIGH (ref 70–99)
GLUCOSE SERPL-MCNC: 156 MG/DL — HIGH (ref 70–99)
GLUCOSE SERPL-MCNC: 219 MG/DL — HIGH (ref 70–99)
GLUCOSE UR QL: >=1000 MG/DL
HCO3 BLDV-SCNC: 29 MMOL/L — SIGNIFICANT CHANGE UP (ref 22–29)
HCT VFR BLD CALC: 30.1 % — LOW (ref 39–50)
HCT VFR BLD CALC: 31.8 % — LOW (ref 39–50)
HGB BLD-MCNC: 10.5 G/DL — LOW (ref 13–17)
HGB BLD-MCNC: 9.6 G/DL — LOW (ref 13–17)
KETONES UR QL: NEGATIVE MG/DL — SIGNIFICANT CHANGE UP
LEUKOCYTE ESTERASE UR-ACNC: NEGATIVE — SIGNIFICANT CHANGE UP
MCHC RBC-ENTMCNC: 28.7 PG — SIGNIFICANT CHANGE UP (ref 27–34)
MCHC RBC-ENTMCNC: 29.7 PG — SIGNIFICANT CHANGE UP (ref 27–34)
MCHC RBC-ENTMCNC: 31.9 G/DL — LOW (ref 32–36)
MCHC RBC-ENTMCNC: 33 G/DL — SIGNIFICANT CHANGE UP (ref 32–36)
MCV RBC AUTO: 90.1 FL — SIGNIFICANT CHANGE UP (ref 80–100)
MCV RBC AUTO: 90.1 FL — SIGNIFICANT CHANGE UP (ref 80–100)
NITRITE UR-MCNC: NEGATIVE — SIGNIFICANT CHANGE UP
NRBC BLD AUTO-RTO: 0 /100 WBCS — SIGNIFICANT CHANGE UP (ref 0–0)
NRBC BLD AUTO-RTO: 0 /100 WBCS — SIGNIFICANT CHANGE UP (ref 0–0)
PCO2 BLDV: 59 MMHG — HIGH (ref 42–55)
PH BLDV: 7.3 — LOW (ref 7.32–7.43)
PH UR: 5.5 — SIGNIFICANT CHANGE UP (ref 5–8)
PLATELET # BLD AUTO: 283 K/UL — SIGNIFICANT CHANGE UP (ref 150–400)
PLATELET # BLD AUTO: 322 K/UL — SIGNIFICANT CHANGE UP (ref 150–400)
PO2 BLDV: 43 MMHG — SIGNIFICANT CHANGE UP (ref 25–45)
POTASSIUM SERPL-MCNC: 4.5 MMOL/L — SIGNIFICANT CHANGE UP (ref 3.5–5.3)
POTASSIUM SERPL-MCNC: 4.5 MMOL/L — SIGNIFICANT CHANGE UP (ref 3.5–5.3)
POTASSIUM SERPL-SCNC: 4.5 MMOL/L — SIGNIFICANT CHANGE UP (ref 3.5–5.3)
POTASSIUM SERPL-SCNC: 4.5 MMOL/L — SIGNIFICANT CHANGE UP (ref 3.5–5.3)
PROT SERPL-MCNC: 6.5 G/DL — SIGNIFICANT CHANGE UP (ref 6–8.3)
PROT SERPL-MCNC: 7.1 G/DL — SIGNIFICANT CHANGE UP (ref 6–8.3)
PROT UR-MCNC: NEGATIVE MG/DL — SIGNIFICANT CHANGE UP
RBC # BLD: 3.34 M/UL — LOW (ref 4.2–5.8)
RBC # BLD: 3.53 M/UL — LOW (ref 4.2–5.8)
RBC # FLD: 13.1 % — SIGNIFICANT CHANGE UP (ref 10.3–14.5)
RBC # FLD: 13.2 % — SIGNIFICANT CHANGE UP (ref 10.3–14.5)
SAO2 % BLDV: 71.4 % — SIGNIFICANT CHANGE UP (ref 67–88)
SODIUM SERPL-SCNC: 136 MMOL/L — SIGNIFICANT CHANGE UP (ref 135–145)
SODIUM SERPL-SCNC: 137 MMOL/L — SIGNIFICANT CHANGE UP (ref 135–145)
SP GR SPEC: 1.02 — SIGNIFICANT CHANGE UP (ref 1–1.03)
TROPONIN I, HIGH SENSITIVITY RESULT: 7.2 NG/L — SIGNIFICANT CHANGE UP
UROBILINOGEN FLD QL: 0.2 MG/DL — SIGNIFICANT CHANGE UP (ref 0.2–1)
WBC # BLD: 12.99 K/UL — HIGH (ref 3.8–10.5)
WBC # BLD: 14.44 K/UL — HIGH (ref 3.8–10.5)
WBC # FLD AUTO: 12.99 K/UL — HIGH (ref 3.8–10.5)
WBC # FLD AUTO: 14.44 K/UL — HIGH (ref 3.8–10.5)

## 2025-05-29 PROCEDURE — 99233 SBSQ HOSP IP/OBS HIGH 50: CPT

## 2025-05-29 PROCEDURE — 93010 ELECTROCARDIOGRAM REPORT: CPT

## 2025-05-29 RX ORDER — CLOPIDOGREL BISULFATE 75 MG/1
75 TABLET, FILM COATED ORAL DAILY
Refills: 0 | Status: DISCONTINUED | OUTPATIENT
Start: 2025-05-29 | End: 2025-05-29

## 2025-05-29 RX ORDER — CLOPIDOGREL BISULFATE 75 MG/1
75 TABLET, FILM COATED ORAL DAILY
Refills: 0 | Status: DISCONTINUED | OUTPATIENT
Start: 2025-05-30 | End: 2025-06-04

## 2025-05-29 RX ORDER — ASPIRIN 325 MG
81 TABLET ORAL DAILY
Refills: 0 | Status: DISCONTINUED | OUTPATIENT
Start: 2025-05-30 | End: 2025-06-04

## 2025-05-29 RX ADMIN — GABAPENTIN 300 MILLIGRAM(S): 400 CAPSULE ORAL at 22:48

## 2025-05-29 RX ADMIN — Medication 0.5 MILLIGRAM(S): at 03:23

## 2025-05-29 RX ADMIN — INSULIN LISPRO 5 UNIT(S): 100 INJECTION, SOLUTION INTRAVENOUS; SUBCUTANEOUS at 12:33

## 2025-05-29 RX ADMIN — CEFEPIME 100 MILLIGRAM(S): 2 INJECTION, POWDER, FOR SOLUTION INTRAVENOUS at 22:49

## 2025-05-29 RX ADMIN — Medication 0.5 MILLIGRAM(S): at 10:51

## 2025-05-29 RX ADMIN — Medication 0.5 MILLIGRAM(S): at 09:51

## 2025-05-29 RX ADMIN — INSULIN LISPRO 5 UNIT(S): 100 INJECTION, SOLUTION INTRAVENOUS; SUBCUTANEOUS at 08:44

## 2025-05-29 RX ADMIN — METOPROLOL SUCCINATE 50 MILLIGRAM(S): 50 TABLET, EXTENDED RELEASE ORAL at 05:27

## 2025-05-29 RX ADMIN — ATORVASTATIN CALCIUM 40 MILLIGRAM(S): 80 TABLET, FILM COATED ORAL at 22:48

## 2025-05-29 RX ADMIN — INSULIN GLARGINE-YFGN 15 UNIT(S): 100 INJECTION, SOLUTION SUBCUTANEOUS at 08:43

## 2025-05-29 RX ADMIN — Medication 1000 MILLIGRAM(S): at 22:20

## 2025-05-29 RX ADMIN — Medication 400 MILLIGRAM(S): at 21:21

## 2025-05-29 RX ADMIN — LISINOPRIL 40 MILLIGRAM(S): 5 TABLET ORAL at 05:27

## 2025-05-29 RX ADMIN — INSULIN LISPRO 5 UNIT(S): 100 INJECTION, SOLUTION INTRAVENOUS; SUBCUTANEOUS at 17:05

## 2025-05-29 RX ADMIN — INSULIN LISPRO 4: 100 INJECTION, SOLUTION INTRAVENOUS; SUBCUTANEOUS at 08:43

## 2025-05-29 RX ADMIN — GABAPENTIN 300 MILLIGRAM(S): 400 CAPSULE ORAL at 05:27

## 2025-05-29 RX ADMIN — ENOXAPARIN SODIUM 40 MILLIGRAM(S): 100 INJECTION SUBCUTANEOUS at 05:28

## 2025-05-29 RX ADMIN — CEFEPIME 100 MILLIGRAM(S): 2 INJECTION, POWDER, FOR SOLUTION INTRAVENOUS at 05:28

## 2025-05-29 RX ADMIN — GABAPENTIN 300 MILLIGRAM(S): 400 CAPSULE ORAL at 14:21

## 2025-05-29 RX ADMIN — CEFEPIME 100 MILLIGRAM(S): 2 INJECTION, POWDER, FOR SOLUTION INTRAVENOUS at 14:21

## 2025-05-29 RX ADMIN — Medication 0.5 MILLIGRAM(S): at 02:23

## 2025-05-29 NOTE — PROGRESS NOTE ADULT - ASSESSMENT
65 y/o M with PMHx DM2, osteomyelitis, CAD, PAD s/p RLE angioplasty 2020, s/p surgical amputation of R forefoot , s/p L PT angioplasty 12/2/24 for L lat foot DFU on plavix, HTN, s/p recent admission for left foot infection s/p PICC placement for IV abx  sent by Podiatry Dr. Stark for admission for OR intervention tomorrow. 63 y/o M with PMHx DM2, osteomyelitis, CAD, PAD s/p RLE angioplasty 2020, s/p surgical amputation of R forefoot , s/p L PT angioplasty 12/2/24 for L lat foot DFU on plavix, HTN, s/p recent admission for left foot infection s/p PICC placement for IV abx  sent by Podiatry Dr. Stark for admission for OR left foot partial 1st and 5th ray resection, heel debridement with antibiotic.

## 2025-05-29 NOTE — PROGRESS NOTE ADULT - PROBLEM SELECTOR PLAN 1
Patient presents with left DM foot ulcer/ wound Gangrene Left BIG Toe, sent by podiatry Dr. Lincoln lawson [facility]  Gangrene Left BIG Toe with left heel wound   - s/p OR today with podiatry: partial 1st and 5th ray resection, heel debridement with antibiotic cement placement and reattachment of peroneus brevis tendon into the cuboid   - MRI of the left foot demonstrates ulceration at the great toe with underlying cortical destruction of the distal phalanx and acute osteomyelitis involving the distal and proximal phalanx of the great toe.  Focal ulceration at the base of the fifth metatarsal with underlying marrow edema within the fifth metatarsal base and intramedullary cavity. Finding is indeterminate and may represent concurrent early osteomyelitis versus reactive changes.  Marrow edema involving the distal phalanx and middle phalanx of the little toe without definitive corresponding ulceration or abnormal T1 weighted signal.   Nonspecific bone marrow edema pattern within the intermediate cuneiform, lateral cuneiform, and navicular bone favored to represent reactive changes from osteoarthrosis.  - F/u cultures   - ID Dr. Melany Calhoun IV Invanz ---> IV Cefepime q 8 hrs  Pain meds  IV Tylenol, PRN, IV Dilaudid PRN for Mod & severe pain  - PT eval tomorrow

## 2025-05-29 NOTE — PROGRESS NOTE ADULT - PROBLEM SELECTOR PLAN 2
H/H 9.6/30.1   - RRT today for vasovagal syncope 2/2 to acute blood loss  - Type and screen ordered  - F/u AM labs   - Hold Lovenox  - Continue Asp and plavix tomorrow H/H 9.6/30.1 -Ac on chronic -Blood loss intra op, Post op  - RRT today for vasovagal syncope 2/2 to acute blood loss  - Type and screen ordered  - F/u AM labs   - Hold Lovenox  - Continue Asp and Plavix tomorrow  -s/p bleeding from left foot surgical site - dressing changed

## 2025-05-29 NOTE — PROGRESS NOTE ADULT - SUBJECTIVE AND OBJECTIVE BOX
[facility]  OFELIA Harvey Y. Patel, S. Shah, G[facility]  [phone]    Name: LOIDA QUEZADA  Age: 64y  Gender: Male  MRN: [id number]    Interval History:  tired, was having lot of pain this AM  S/p dilaudid, now feeling groggy  No acute overnight events.   Notes reviewed    Antibiotics:  cefepime   IVPB 2000 milliGRAM(s) IV Intermittent every 8 hours  cefepime   IVPB          Medications:  acetaminophen   IVPB .. 1000 milliGRAM(s) IV Intermittent every 8 hours PRN  aluminum hydroxide/magnesium hydroxide/simethicone Suspension 30 milliLiter(s) Oral every 4 hours PRN  atorvastatin 40 milliGRAM(s) Oral at bedtime  cefepime   IVPB 2000 milliGRAM(s) IV Intermittent every 8 hours  cefepime   IVPB      dextrose 5%. 1000 milliLiter(s) IV Continuous <Continuous>  dextrose 5%. 1000 milliLiter(s) IV Continuous <Continuous>  dextrose 50% Injectable 25 Gram(s) IV Push once  dextrose 50% Injectable 12.5 Gram(s) IV Push once  dextrose 50% Injectable 25 Gram(s) IV Push once  dextrose Oral Gel 15 Gram(s) Oral once PRN  gabapentin 300 milliGRAM(s) Oral three times a day  glucagon  Injectable 1 milliGRAM(s) IntraMuscular once  HYDROmorphone  Injectable 0.5 milliGRAM(s) IV Push every 4 hours PRN  HYDROmorphone  Injectable 1 milliGRAM(s) IV Push every 4 hours PRN  insulin glargine Injectable (LANTUS) 15 Unit(s) SubCutaneous every morning  insulin lispro (ADMELOG) corrective regimen sliding scale   SubCutaneous three times a day before meals  insulin lispro (ADMELOG) corrective regimen sliding scale   SubCutaneous at bedtime  insulin lispro Injectable (ADMELOG) 5 Unit(s) SubCutaneous three times a day before meals  lisinopril 40 milliGRAM(s) Oral daily  melatonin 3 milliGRAM(s) Oral at bedtime PRN  metoprolol succinate ER 50 milliGRAM(s) Oral daily  ondansetron Injectable 4 milliGRAM(s) IV Push every 8 hours PRN  senna 2 Tablet(s) Oral at bedtime      Review of Systems:  A 10-point review of systems was obtained.   Review of systems otherwise negative except as previously noted.    Allergies: No Known Allergies    For details regarding the patient's past medical history, social history, family history, and other miscellaneous elements, please refer the initial infectious diseases consultation and/or the admitting history and physical examination for this admission.    Objective:  Vitals:   T(C): 36.8 (05-29-25 @ 05:10), Max: 36.8 (05-29-25 @ 05:10)  HR: 85 (05-29-25 @ 05:10) (70 - 85)  BP: 132/73 (05-29-25 @ 05:10) (120/76 - 132/73)  RR: 18 (05-29-25 @ 05:10) (18 - 18)  SpO2: 98% (05-29-25 @ 05:10) (97% - 98%)    Physical Examination:  General: no acute distress  HEENT: NC/AT, EOMI  Cardio: S1, S2 heard, RRR, no murmurs  Resp: breath sounds heard bilaterally  Abd: soft, NT, ND  Ext: foot in dressing  Skin: warm, dry, no visible rash      Laboratory Studies:  CBC:                       9.6    14.44 )-----------( 283      ( 29 May 2025 09:00 )             30.1     CMP: 05-29    136  |  101  |  19  ----------------------------<  219[H]  4.5   |  27  |  1.20    Ca    8.1[L]      29 May 2025 09:00    TPro  6.5  /  Alb  2.8[L]  /  TBili  0.4  /  DBili  x   /  AST  9[L]  /  ALT  18  /  AlkPhos  64  05-29    LIVER FUNCTIONS - ( 29 May 2025 09:00 )  Alb: 2.8 g/dL / Pro: 6.5 g/dL / ALK PHOS: 64 U/L / ALT: 18 U/L / AST: 9 U/L / GGT: x           Urinalysis Basic - ( 29 May 2025 09:00 )    Color: x / Appearance: x / SG: x / pH: x  Gluc: 219 mg/dL / Ketone: x  / Bili: x / Urobili: x   Blood: x / Protein: x / Nitrite: x   Leuk Esterase: x / RBC: x / WBC x   Sq Epi: x / Non Sq Epi: x / Bacteria: x        Microbiology: reviewed    Culture - Tissue with Gram Stain (collected 05-28-25 @ 10:00)  Source: Tissue Other, LEFT FIFTH METATARSAL BONE  Gram Stain (05-28-25 @ 19:10):    No polymorphonuclear cells seen    No organisms seen  Preliminary Report (05-29-25 @ 12:48):    No growth to date.    Culture - Tissue with Gram Stain (collected 05-28-25 @ 10:00)  Source: Tissue Other, LEFT CALCANEUS BONE  Gram Stain (05-28-25 @ 19:11):    No polymorphonuclear cells seen    No organisms seen  Preliminary Report (05-29-25 @ 12:48):    No growth to date.    Culture - Tissue with Gram Stain (collected 05-28-25 @ 10:00)  Source: Tissue Other, LEFT HALLUX BONE  Gram Stain (05-28-25 @ 19:06):    No polymorphonuclear cells seen    No organisms seen  Preliminary Report (05-29-25 @ 12:50):    No growth to date.    Culture - Blood (collected 05-27-25 @ 09:10)  Source: Blood Blood-Peripheral  Preliminary Report (05-29-25 @ 12:01):    No growth at 48 Hours    Culture - Blood (collected 05-27-25 @ 09:00)  Source: Blood Blood-Peripheral  Preliminary Report (05-29-25 @ 12:01):    No growth at 48 Hours          Radiology: reviewed

## 2025-05-29 NOTE — PROGRESS NOTE ADULT - ASSESSMENT
64M DM2, hx osteomyelitis, CAD, PAD s/p RLE angioplasty 2020, s/p surgical amputation of R forefoot , s/p L PT angioplasty 12/2/24 for L lat foot DFU on plavix, HTN, HLD sent in by wound clinic for left foot infections and multiple wounds. Vascular surgery to evaluate further with LLE angiogram.     - Pt p/w L foot infection with multiple wounds sent in by [facility], podiatry following   - S/p LLE angio 5/2,  vascular following   - S/p debridement on 5/28 w/o cardiac complications  - S/p RRT this am for lethargy ? vasovagal  - Continue antibiotics per ID    - EKG: Sinus rhythm with premature supraventricular complexes  - No evidence of any active ischemia   - NST done 1/2024 with small sized, mild defect in the basal inferior wall that is fixed and partially normalizes with prone, suggestive of diaphragmatic artifact.   - Holding ASA, Plavix  - Continue Lipitor    - Previous TTE (1/2/24) shows EF 61%, Normal LV mass and diastolic function, Normal RV size and function.  - No evidence of any meaningful volume overload     - BP stable and controlled   - Continue BB and lisinopril     - Follows with Dr. Chu (o/p cardiologist)   - Monitor and replete lytes, keep K>4, Mg>2.  - Will continue to follow.    Oksana Humphrey, MS FNP, AGACNP  Nurse Practitioner- Cardiology   Please call on TEAMS   64M DM2, hx osteomyelitis, CAD, PAD s/p RLE angioplasty 2020, s/p surgical amputation of R forefoot , s/p L PT angioplasty 12/2/24 for L lat foot DFU on plavix, HTN, HLD sent in by wound clinic for left foot infections and multiple wounds. Vascular surgery to evaluate further with LLE angiogram.     - Pt p/w L foot infection with multiple wounds sent in by [facility], podiatry following   - S/p LLE angio 5/2,  vascular following   - S/p debridement on 5/28 w/o cardiac complications  - S/p RRT this am for lethargy ? vasovagal  - Continue antibiotics per ID    - EKG: Sinus rhythm with premature supraventricular complexes  - No evidence of any active ischemia   - NST done 1/2024 with small sized, mild defect in the basal inferior wall that is fixed and partially normalizes with prone, suggestive of diaphragmatic artifact.   - Continue ASA, Plavix  - Continue Lipitor    - Previous TTE (1/2/24) shows EF 61%, Normal LV mass and diastolic function, Normal RV size and function.  - No evidence of any meaningful volume overload     - BP stable and controlled   - Continue BB and lisinopril     - Follows with Dr. Chu (o/p cardiologist)   - Monitor and replete lytes, keep K>4, Mg>2.  - Will continue to follow.    Oksana Humphrey, MS FNP, AGACNP  Nurse Practitioner- Cardiology   Please call on TEAMS

## 2025-05-29 NOTE — PROGRESS NOTE ADULT - SUBJECTIVE AND OBJECTIVE BOX
Patient is a 64y old  Male who presents with a chief complaint of left foot osteomyelitis (29 May 2025 09:28)    HPI:  65 y/o M with PMHx DM2, osteomyelitis, CAD, PAD s/p RLE angioplasty 2020, s/p surgical amputation of R forefoot , s/p L PT angioplasty 12/2/24 for L lat foot DFU on plavix, HTN, s/p recent admission for left foot infection s/p PICC placement for IV abx  sent by Podiatry Dr. Stark for admission for OR intervention tomorrow. Patient denies any complaints today. States after discharge he had outpatient visits with Vascular physician. He had imaging done and was suggested to have LimFlow procedure. Patient requested to have surgery as per Podiatry for his left foot wound prior to LimFlow. Vascular agreed. Patient states he can walk independently without pain and is currently comfortable.   Of note, patient was transitioned from Rocephin which was to be given via PICC line after previous discharge to Ertapenem by ID this Saturday.     ED course:   Vitals: T 97.7 HR 99 /69   Labs: ESR 64 wbc 10.77   Imaging: Cxray- Right PICC with tip in the SVC.  The heart is not accurately assessed in this AP projection.  The lungs are clear.  There is no pneumothorax or pleural effusion.  No acute bony abnormality.  Clear lungs    (27 May 2025 13:05)    INTERVAL HPI:  5/28: Patient seen and examined post op. complains of mild pain 3-4/10 IV tylenol ordered otherwise denies complaints   5/29: Patient seen and examined at bedside. Denies complaints this AM however has bleeding from surgical site. RRT later called this AM (refer to rapid note) for weakness, diaphoresis likely vasovagal secondary to blood loss. Hb downtrended. Plan to hold Lovenox. Resume Asp and Plavix tomorrow. PT eval tomorrow     OVERNIGHT EVENTS:    Home Medications:  atorvastatin 40 mg oral tablet: 1 tab(s) orally once a day (at bedtime) (27 May 2025 15:49)  Cinnamon: 1 cap(s) orally once a day (27 May 2025 15:51)  clopidogrel 75 mg oral tablet: 1 tab(s) orally once a day (27 May 2025 15:49)  gabapentin 300 mg oral capsule: 1 cap(s) orally 3 times a day (27 May 2025 15:49)  Jardiance 25 mg oral tablet: 1 tab(s) orally once a day (27 May 2025 15:49)  lisinopril 40 mg oral tablet: 1 tab(s) orally once a day (27 May 2025 15:49)  metFORMIN 1000 mg oral tablet: 1 tab(s) orally 2 times a day (27 May 2025 15:49)  metoprolol succinate 50 mg oral tablet, extended release: 1 tab(s) orally once a day (27 May 2025 15:49)  Trulicity Pen 1.5 mg/0.5 mL subcutaneous solution: 1.5 milligram(s) subcutaneously once a week (Sundays) (27 May 2025 15:52)  Tumeric : 1 cap orally once a day (27 May 2025 15:51)      MEDICATIONS  (STANDING):  atorvastatin 40 milliGRAM(s) Oral at bedtime  cefepime   IVPB 2000 milliGRAM(s) IV Intermittent every 8 hours  cefepime   IVPB      dextrose 5%. 1000 milliLiter(s) (100 mL/Hr) IV Continuous <Continuous>  dextrose 5%. 1000 milliLiter(s) (50 mL/Hr) IV Continuous <Continuous>  dextrose 50% Injectable 25 Gram(s) IV Push once  dextrose 50% Injectable 12.5 Gram(s) IV Push once  dextrose 50% Injectable 25 Gram(s) IV Push once  gabapentin 300 milliGRAM(s) Oral three times a day  glucagon  Injectable 1 milliGRAM(s) IntraMuscular once  insulin glargine Injectable (LANTUS) 15 Unit(s) SubCutaneous every morning  insulin lispro (ADMELOG) corrective regimen sliding scale   SubCutaneous three times a day before meals  insulin lispro (ADMELOG) corrective regimen sliding scale   SubCutaneous at bedtime  insulin lispro Injectable (ADMELOG) 5 Unit(s) SubCutaneous three times a day before meals  lisinopril 40 milliGRAM(s) Oral daily  metoprolol succinate ER 50 milliGRAM(s) Oral daily  senna 2 Tablet(s) Oral at bedtime    MEDICATIONS  (PRN):  acetaminophen   IVPB .. 1000 milliGRAM(s) IV Intermittent every 8 hours PRN Mild Pain (1 - 3)  aluminum hydroxide/magnesium hydroxide/simethicone Suspension 30 milliLiter(s) Oral every 4 hours PRN Dyspepsia  dextrose Oral Gel 15 Gram(s) Oral once PRN Blood Glucose LESS THAN 70 milliGRAM(s)/deciliter  HYDROmorphone  Injectable 0.5 milliGRAM(s) IV Push every 4 hours PRN Moderate Pain (4 - 6)  HYDROmorphone  Injectable 1 milliGRAM(s) IV Push every 4 hours PRN Severe Pain (7 - 10)  melatonin 3 milliGRAM(s) Oral at bedtime PRN Insomnia  ondansetron Injectable 4 milliGRAM(s) IV Push every 8 hours PRN Nausea and/or Vomiting      Allergies    No Known Allergies    Intolerances        Social History:  No  Tobacco: Denies  EtOH: Denies  Recreational drug use: Denies  Lives with: Wife at home   Ambulates: Independently   ADLs: Independent (27 May 2025 13:05)      REVIEW OF SYSTEMS:  CONSTITUTIONAL: No fever, No chills, No fatigue, No myalgia, No Body ache, No Weakness  EYES: No eye pain,  No visual disturbances, No discharge, NO Redness  ENMT:  No ear pain, No nose bleed, No vertigo; No sinus pain, NO throat pain, No Congestion  NECK: No pain, No stiffness  RESPIRATORY: No cough, NO wheezing, No  hemoptysis, NO  shortness of breath  CARDIOVASCULAR: No chest pain, palpitations  GASTROINTESTINAL: No abdominal pain, NO epigastric pain. No nausea, No vomiting; No diarrhea, No constipation. [  ] BM  GENITOURINARY: No dysuria, No frequency, No urgency, No hematuria, NO incontinence  NEUROLOGICAL: No headaches, No dizziness, No numbness, No tingling, No tremors, No weakness  EXT: No Swelling, No Pain, No Edema  SKIN:  [  ] No itching, burning, rashes, or lesions   MUSCULOSKELETAL: No joint pain ,No Jt swelling; No muscle pain, No back pain, No extremity pain  PSYCHIATRIC: No depression,  No anxiety,  No mood swings ,No difficulty sleeping at night  PAIN SCALE: [x} pain at surgical site   ROS Unable to obtain due to - [  ] Dementia  [  ] Lethargy [  ] Drowsy [  ] Sedated [  ] non verbal  REST OF REVIEW Of SYSTEM - [  ] Normal     Vital Signs Last 24 Hrs  T(C): 36.8 (29 May 2025 05:10), Max: 36.8 (29 May 2025 05:10)  T(F): 98.2 (29 May 2025 05:10), Max: 98.2 (29 May 2025 05:10)  HR: 85 (29 May 2025 05:10) (70 - 85)  BP: 132/73 (29 May 2025 05:10) (120/76 - 132/73)  BP(mean): --  RR: 18 (29 May 2025 05:10) (18 - 18)  SpO2: 98% (29 May 2025 05:10) (97% - 98%)    Parameters below as of 29 May 2025 05:10  Patient On (Oxygen Delivery Method): room air      Finger Stick          PHYSICAL EXAM:  GENERAL:  [ x ] NAD , [ x ] well appearing, [  ] Agitated, [  ] Drowsy,  [  ] Lethargy, [  ] confused   HEAD:  [  x] Normal, [  ] Other  EYES:  [  ] EOMI, [  ] PERRLA, [  ] conjunctiva and sclera clear normal, [  ] Other,  [  ] Pallor,[  ] Discharge  ENMT:  [  ] Normal, [  ] Moist mucous membranes, [  ] Good dentition, [  ] No Thrush  NECK:  [  x] Supple, [  ] No JVD, [  ] Normal thyroid, [  ] Lymphadenopathy [  ] Other  CHEST/LUNG:  [  x] Clear to auscultation bilaterally, [  ] Breath Sounds equal B/L / Decrease, [  ] poor effort  [  ] No rales, [  ] No rhonchi  [  ]  No wheezing,   HEART:  [ x ] Regular rate and rhythm, [  ] tachycardia, [  ] Bradycardia,  [  ] irregular  [  ] No murmurs, No rubs, No gallops, [  ] PPM in place (Mfr:  )  ABDOMEN:  [ x ] Soft, [ x ] Nontender, [ x ] Nondistended, [  ] No mass, [  ] Bowel sounds present, [  ] obese  NERVOUS SYSTEM:  [ x ] Alert & Oriented X3, [  ] Nonfocal  [  ] Confusion  [  ] Encephalopathic [  ] Sedated [  ] Unable to assess, [  ] Dementia [  ] Other-  EXTREMITIES: [x] left foot and ankle in dressing   LYMPH: No lymphadenopathy noted  SKIN:  [  ] No rashes or lesions, [  ] Pressure Ulcers, [  ] ecchymosis, [  ] Skin Tears, [  ] Other    DIET: Diet, DASH/TLC:   Sodium & Cholesterol Restricted  Consistent Carbohydrate No Snacks (05-28-25 @ 12:07)      LABS:                        9.6    14.44 )-----------( 283      ( 29 May 2025 09:00 )             30.1     29 May 2025 09:00    136    |  101    |  19     ----------------------------<  219    4.5     |  27     |  1.20     Ca    8.1        29 May 2025 09:00    TPro  6.5    /  Alb  2.8    /  TBili  0.4    /  DBili  x      /  AST  9      /  ALT  18     /  AlkPhos  64     29 May 2025 09:00      Urinalysis Basic - ( 29 May 2025 09:00 )    Color: x / Appearance: x / SG: x / pH: x  Gluc: 219 mg/dL / Ketone: x  / Bili: x / Urobili: x   Blood: x / Protein: x / Nitrite: x   Leuk Esterase: x / RBC: x / WBC x   Sq Epi: x / Non Sq Epi: x / Bacteria: x        Culture Results:   No growth at 48 Hours (05-27 @ 09:10)  Culture Results:   No growth at 48 Hours (05-27 @ 09:00)      culture blood  -- Tissue Other, LEFT HALLUX BONE 05-28 @ 10:00    culture urine  --  05-28 @ 10:00      CARDIAC MARKERS ( 29 May 2025 09:00 )  x     / x     / x     / x     / 2.4 ng/mL          Culture - Tissue with Gram Stain (collected 28 May 2025 10:00)  Source: Tissue Other, LEFT FIFTH METATARSAL BONE  Gram Stain (28 May 2025 19:10):    No polymorphonuclear cells seen    No organisms seen    Culture - Tissue with Gram Stain (collected 28 May 2025 10:00)  Source: Tissue Other, LEFT CALCANEUS BONE  Gram Stain (28 May 2025 19:11):    No polymorphonuclear cells seen    No organisms seen    Culture - Tissue with Gram Stain (collected 28 May 2025 10:00)  Source: Tissue Other, LEFT HALLUX BONE  Gram Stain (28 May 2025 19:06):    No polymorphonuclear cells seen    No organisms seen    Culture - Blood (collected 27 May 2025 09:10)  Source: Blood Blood-Peripheral  Preliminary Report (29 May 2025 12:01):    No growth at 48 Hours    Culture - Blood (collected 27 May 2025 09:00)  Source: Blood Blood-Peripheral  Preliminary Report (29 May 2025 12:01):    No growth at 48 Hours         Anemia Panel:      Thyroid Panel:                RADIOLOGY & ADDITIONAL TESTS:      HEALTH ISSUES - PROBLEM Dx:  DM foot ulcer    PAD (peripheral artery disease)    Type 2 diabetes mellitus    HTN (hypertension)    Encounter for deep vein thrombosis (DVT) prophylaxis            Consultant(s) Notes Reviewed:  [  ] YES     Care Discussed with [X] Consultants  [  ] Patient  [  ] Family [  ] HCP [  ]   [  ] Social Service  [  ] RN, [  ] Physical Therapy,[  ] Palliative care team  DVT PPX: [  ] Lovenox, [  ] S C Heparin, [  ] Coumadin, [  ] Xarelto, [  ] Eliquis, [  ] Pradaxa, [  ] IV Heparin drip, [  ] SCD [  ] Contraindication 2 to GI Bleed,[  ] Ambulation [  ] Contraindicated 2 to  bleed [  ] Contraindicated 2 to Brain Bleed  Advanced directive: [  ] None, [  ] DNR/DNI Patient is a 64y old  Male who presents with a chief complaint of left foot osteomyelitis (29 May 2025 09:28)    HPI:  65 y/o M with PMHx DM2, osteomyelitis, CAD, PAD s/p RLE angioplasty 2020, s/p surgical amputation of R forefoot , s/p L PT angioplasty 12/2/24 for L lat foot DFU on plavix, HTN, s/p recent admission for left foot infection s/p PICC placement for IV abx  sent by Podiatry Dr. Stark for admission for OR intervention tomorrow. Patient denies any complaints today. States after discharge he had outpatient visits with Vascular physician. He had imaging done and was suggested to have LimFlow procedure. Patient requested to have surgery as per Podiatry for his left foot wound prior to LimFlow. Vascular agreed. Patient states he can walk independently without pain and is currently comfortable.   Of note, patient was transitioned from Rocephin which was to be given via PICC line after previous discharge to Ertapenem by ID this Saturday.     ED course:   Vitals: T 97.7 HR 99 /69   Labs: ESR 64 wbc 10.77   Imaging: Cxray- Right PICC with tip in the SVC.  The heart is not accurately assessed in this AP projection.  The lungs are clear.  There is no pneumothorax or pleural effusion.  No acute bony abnormality.  Clear lungs    (27 May 2025 13:05)    INTERVAL HPI:  5/28: Patient seen and examined post op. complains of mild pain 3-4/10 IV tylenol ordered otherwise denies complaints   5/29: Patient seen and examined at bedside. Denies complaints this AM however has bleeding from surgical site. RRT later called this AM (refer to rapid note) for weakness, diaphoresis likely vasovagal secondary to blood loss. Hb downtrended. Plan to hold Lovenox. Resume Asp and Plavix tomorrow. PT eval tomorrow , mild leukocytosis, drop in H/H    OVERNIGHT EVENTS: none    Home Medications:  atorvastatin 40 mg oral tablet: 1 tab(s) orally once a day (at bedtime) (27 May 2025 15:49)  Cinnamon: 1 cap(s) orally once a day (27 May 2025 15:51)  clopidogrel 75 mg oral tablet: 1 tab(s) orally once a day (27 May 2025 15:49)  gabapentin 300 mg oral capsule: 1 cap(s) orally 3 times a day (27 May 2025 15:49)  Jardiance 25 mg oral tablet: 1 tab(s) orally once a day (27 May 2025 15:49)  lisinopril 40 mg oral tablet: 1 tab(s) orally once a day (27 May 2025 15:49)  metFORMIN 1000 mg oral tablet: 1 tab(s) orally 2 times a day (27 May 2025 15:49)  metoprolol succinate 50 mg oral tablet, extended release: 1 tab(s) orally once a day (27 May 2025 15:49)  Trulicity Pen 1.5 mg/0.5 mL subcutaneous solution: 1.5 milligram(s) subcutaneously once a week (Sundays) (27 May 2025 15:52)  Tumeric : 1 cap orally once a day (27 May 2025 15:51)      MEDICATIONS  (STANDING):  atorvastatin 40 milliGRAM(s) Oral at bedtime  cefepime   IVPB 2000 milliGRAM(s) IV Intermittent every 8 hours  cefepime   IVPB      dextrose 5%. 1000 milliLiter(s) (100 mL/Hr) IV Continuous <Continuous>  dextrose 5%. 1000 milliLiter(s) (50 mL/Hr) IV Continuous <Continuous>  dextrose 50% Injectable 25 Gram(s) IV Push once  dextrose 50% Injectable 12.5 Gram(s) IV Push once  dextrose 50% Injectable 25 Gram(s) IV Push once  gabapentin 300 milliGRAM(s) Oral three times a day  glucagon  Injectable 1 milliGRAM(s) IntraMuscular once  insulin glargine Injectable (LANTUS) 15 Unit(s) SubCutaneous every morning  insulin lispro (ADMELOG) corrective regimen sliding scale   SubCutaneous three times a day before meals  insulin lispro (ADMELOG) corrective regimen sliding scale   SubCutaneous at bedtime  insulin lispro Injectable (ADMELOG) 5 Unit(s) SubCutaneous three times a day before meals  lisinopril 40 milliGRAM(s) Oral daily  metoprolol succinate ER 50 milliGRAM(s) Oral daily  senna 2 Tablet(s) Oral at bedtime    MEDICATIONS  (PRN):  acetaminophen   IVPB .. 1000 milliGRAM(s) IV Intermittent every 8 hours PRN Mild Pain (1 - 3)  aluminum hydroxide/magnesium hydroxide/simethicone Suspension 30 milliLiter(s) Oral every 4 hours PRN Dyspepsia  dextrose Oral Gel 15 Gram(s) Oral once PRN Blood Glucose LESS THAN 70 milliGRAM(s)/deciliter  HYDROmorphone  Injectable 0.5 milliGRAM(s) IV Push every 4 hours PRN Moderate Pain (4 - 6)  HYDROmorphone  Injectable 1 milliGRAM(s) IV Push every 4 hours PRN Severe Pain (7 - 10)  melatonin 3 milliGRAM(s) Oral at bedtime PRN Insomnia  ondansetron Injectable 4 milliGRAM(s) IV Push every 8 hours PRN Nausea and/or Vomiting      Allergies    No Known Allergies    Intolerances        Social History:  No  Tobacco: Denies  EtOH: Denies  Recreational drug use: Denies  Lives with: Wife at home   Ambulates: Independently   ADLs: Independent (27 May 2025 13:05)      REVIEW OF SYSTEMS: i am ok  CONSTITUTIONAL: No fever, No chills, No fatigue, No myalgia, No Body ache, No Weakness  EYES: No eye pain,  No visual disturbances, No discharge, NO Redness  ENMT:  No ear pain, No nose bleed, No vertigo; No sinus pain, NO throat pain, No Congestion  NECK: No pain, No stiffness  RESPIRATORY: No cough, NO wheezing, No  hemoptysis, NO  shortness of breath  CARDIOVASCULAR: No chest pain, palpitations  GASTROINTESTINAL: No abdominal pain, NO epigastric pain. No nausea, No vomiting; No diarrhea, No constipation. [ x ] BM  GENITOURINARY: No dysuria, No frequency, No urgency, No hematuria, NO incontinence  NEUROLOGICAL: No headaches, No dizziness, No numbness, No tingling, No tremors, No weakness  EXT: No Swelling, No Pain, No Edema  SKIN:  [ x ] No itching, burning, rashes, or lesions   MUSCULOSKELETAL: No joint pain ,No Jt swelling; No muscle pain, No back pain, No extremity pain  PSYCHIATRIC: No depression,  No anxiety,  No mood swings ,No difficulty sleeping at night  PAIN SCALE: [x} pain at surgical site   ROS Unable to obtain due to - [  ] Dementia  [  ] Lethargy [  ] Drowsy [  ] Sedated [  ] non verbal  REST OF REVIEW Of SYSTEM - [ x ] Normal     Vital Signs Last 24 Hrs  T(C): 36.8 (29 May 2025 05:10), Max: 36.8 (29 May 2025 05:10)  T(F): 98.2 (29 May 2025 05:10), Max: 98.2 (29 May 2025 05:10)  HR: 85 (29 May 2025 05:10) (70 - 85)  BP: 132/73 (29 May 2025 05:10) (120/76 - 132/73)  BP(mean): --  RR: 18 (29 May 2025 05:10) (18 - 18)  SpO2: 98% (29 May 2025 05:10) (97% - 98%)    Parameters below as of 29 May 2025 05:10  Patient On (Oxygen Delivery Method): room air      Finger Stick          PHYSICAL EXAM:  GENERAL:  [ x ] NAD , [ x ] well appearing, [  ] Agitated, [  ] Drowsy,  [  ] Lethargy, [  ] confused   HEAD:  [  x] Normal, [  ] Other  EYES:  [ x ] EOMI, [ x ] PERRLA, [x  ] conjunctiva and sclera clear normal, [  ] Other,  [ x ] Pallor,[  ] Discharge  ENMT:  [x  ] Normal, [ x ] Moist mucous membranes, [x  ] Good dentition, [ x ] No Thrush  NECK:  [  x] Supple, [x  ] No JVD, [ x ] Normal thyroid, [  ] Lymphadenopathy [  ] Other  CHEST/LUNG:  [  x] Clear to auscultation bilaterally, [ x ] Breath Sounds equal B/L / Decrease, [x  ] poor effort  [x  ] No rales, [  x] No rhonchi  [ x ]  No wheezing,   HEART:  [ x ] Regular rate and rhythm, [  ] tachycardia, [  ] Bradycardia,  [  ] irregular  [  ] No murmurs, No rubs, No gallops, [  ] PPM in place (Mfr:  )  ABDOMEN:  [ x ] Soft, [ x ] Nontender, [ x ] Nondistended, [ x ] No mass, [ x ] Bowel sounds present, [  ] obese  NERVOUS SYSTEM:  [ x ] Alert & Oriented X3, [  ] Nonfocal  [  ] Confusion  [  ] Encephalopathic [  ] Sedated [  ] Unable to assess, [  ] Dementia [  ] Other-  EXTREMITIES: [x] left foot and ankle in dressing , Rt Foot TMA + P Pulse   LYMPH: No lymphadenopathy noted  SKIN:  [ x ] No rashes or lesions, [  ] Pressure Ulcers, [  ] ecchymosis, [  ] Skin Tears, [  ] Other    DIET: Diet, DASH/TLC:   Sodium & Cholesterol Restricted  Consistent Carbohydrate No Snacks (05-28-25 @ 12:07)      LABS:                        9.6    14.44 )-----------( 283      ( 29 May 2025 09:00 )             30.1     29 May 2025 09:00    136    |  101    |  19     ----------------------------<  219    4.5     |  27     |  1.20     Ca    8.1        29 May 2025 09:00    TPro  6.5    /  Alb  2.8    /  TBili  0.4    /  DBili  x      /  AST  9      /  ALT  18     /  AlkPhos  64     29 May 2025 09:00      Urinalysis Basic - ( 29 May 2025 09:00 )    Color: x / Appearance: x / SG: x / pH: x  Gluc: 219 mg/dL / Ketone: x  / Bili: x / Urobili: x   Blood: x / Protein: x / Nitrite: x   Leuk Esterase: x / RBC: x / WBC x   Sq Epi: x / Non Sq Epi: x / Bacteria: x        Culture Results:   No growth at 48 Hours (05-27 @ 09:10)  Culture Results:   No growth at 48 Hours (05-27 @ 09:00)      culture blood  -- Tissue Other, LEFT HALLUX BONE 05-28 @ 10:00    culture urine  --  05-28 @ 10:00      CARDIAC MARKERS ( 29 May 2025 09:00 )  x     / x     / x     / x     / 2.4 ng/mL    Culture - Tissue with Gram Stain (collected 28 May 2025 10:00)  Source: Tissue Other, LEFT FIFTH METATARSAL BONE  Gram Stain (28 May 2025 19:10):    No polymorphonuclear cells seen    No organisms seen    Culture - Tissue with Gram Stain (collected 28 May 2025 10:00)  Source: Tissue Other, LEFT CALCANEUS BONE  Gram Stain (28 May 2025 19:11):    No polymorphonuclear cells seen    No organisms seen    Culture - Tissue with Gram Stain (collected 28 May 2025 10:00)  Source: Tissue Other, LEFT HALLUX BONE  Gram Stain (28 May 2025 19:06):    No polymorphonuclear cells seen    No organisms seen    Culture - Blood (collected 27 May 2025 09:10)  Source: Blood Blood-Peripheral  Preliminary Report (29 May 2025 12:01):    No growth at 48 Hours    Culture - Blood (collected 27 May 2025 09:00)  Source: Blood Blood-Peripheral  Preliminary Report (29 May 2025 12:01):    No growth at 48 Hours      RADIOLOGY & ADDITIONAL TESTS:      HEALTH ISSUES - PROBLEM Dx:  DM foot ulcer    PAD (peripheral artery disease)    Type 2 diabetes mellitus    HTN (hypertension)    Encounter for deep vein thrombosis (DVT) prophylaxis      Consultant(s) Notes Reviewed:  [ x ] YES     Care Discussed with [X] Consultants  [x  ] Patient  [  ] Family [  ] HCP [  ]   [  ] Social Service  [ x ] RN, [  ] Physical Therapy,[  ] Palliative care team  DVT PPX: [  ] Lovenox, [  ] S C Heparin, [  ] Coumadin, [  ] Xarelto, [  ] Eliquis, [  ] Pradaxa, [  ] IV Heparin drip, [ x ] SCD [  ] Contraindication 2 to GI Bleed,[  ] Ambulation [  ] Contraindicated 2 to  bleed [ x ] Contraindicated 2 to left  foot  Bleed  Advanced directive: [x  ] None, [  ] DNR/DNI

## 2025-05-29 NOTE — PROGRESS NOTE ADULT - ASSESSMENT
s/p left foot partial 1st and 5th ray resection, detachment and reattachment of peroneus brevis tendon, and debridement of heel with antibiotic cement placement

## 2025-05-29 NOTE — CHART NOTE - NSCHARTNOTEFT_GEN_A_CORE
Rapid response was called at 0821 for lethargy, diaphoresis when patient stood up to go to bathroom. Significant blood     Events leading up to Rapid Response:  Patient was seen and examined at the bedside by the rapid response team.  ___ () / ICU PA at bedside.    Rapid Response Vital Signs:    BP: 113/61  HR: 87  RR: 19  SpO2:97 % on RA  Temp: 98.1  FS: 204      Physical Exam:  Gen: well appearing, NAD  HEENT: NCAT, PEERLA b/l, EOMI b/l  Cardio: regular rate and rhythm, +s1s2, no murmurs, rubs, or gallops  Pulm: CTA b/l, no wheezes, rales or rhonchi  Abdomen: soft, nontender, nondistended, +BS x4 quadrants, no guarding  Extremities: no cyanosis or edema, +2 pedal pulses  Neuro: AAOx3, CNII-XII intact, 5/5 strength all extremities, sensation intact  Skin: warm and dry      Assessment/Plan:   _____ year old _____. Rapid response called for  diaphoresis and lethargy.    - Vasovagal likely 2/2 acute blood loss  - check CBC, cmp, cardiac enzymes, EKG  -Discussed with Dr. Calhoun agrees with above plan  -RN to call if any changes Rapid response was called at 0821 for lethargy, diaphoresis when patient stood up to go to bathroom. Of note patient is post op day 1 for s/p OR  with podiatry: partial 1st and 5th ray resection, heel debridement with antibiotic cement placement and reattachment of peroneus brevis tendon into the cuboid. Patient had bleeding from surgical site yesterday and today. Patient received dose of Lovenox this AM for DVT prophylaxis. Due to bleeding, aspirin, plavix and lovenox was discontinued.   Prior to RRT this AM patient was in bed with no complaints stated that his pain is adequately controlled  Dr. Wilson Podiatry and ICU PA at bedside.    Rapid Response Vital Signs:    BP: 113/61  HR: 87  RR: 19  SpO2:97 % on RA  Temp: 98.1  FS: 204      Physical Exam:  Gen: diaphoretic, pale appearing   HEENT: NCAT, PEERLA b/l, EOMI b/l  Cardio: regular rate and rhythm, +s1s2, no murmurs, rubs, or gallops  Pulm: CTA b/l, no wheezes, rales or rhonchi  Abdomen: soft, nontender, nondistended, +BS x4 quadrants, no guarding  Extremities: no cyanosis or edema, +2 pedal pulses  Neuro: AAOx3, CNII-XII intact, 5/5 strength all extremities, sensation intact        Assessment/Plan:   65 y/o M with PMHx DM2, osteomyelitis, CAD, PAD s/p RLE angioplasty 2020, s/p surgical amputation of R forefoot , s/p L PT angioplasty 12/2/24 for L lat foot DFU on plavix, HTN, s/p recent admission for left foot infection s/p PICC placement for IV abx s/p OR intervention yesterday   Rapid response called for  diaphoresis and lethargy.  - Vasovagal likely 2/2 acute blood loss  - check CBC, cmp, cardiac enzymes, EKG (NSR)   - will transfuse if needed   - Podiatry resident used silver nitrate to control wound at bedside - patient reports feeling more comfortable   - RN to call if any changes

## 2025-05-29 NOTE — PROGRESS NOTE ADULT - SUBJECTIVE AND OBJECTIVE BOX
64y year old Male seen at [facility] 1EAS 113 W1 s/p left foot partial 1st and 5th ray resection, detachment and reattachment of peroneus brevis tendon, and debridement of heel with antibiotic cement placement. Patient relates no overnight events and states that they are doing well at this time.  Denies any fever, chills, nausea, vomiting, chest pain, shortness of breath, or calf pain at this time.    Allergies    No Known Allergies    Intolerances        MEDICATIONS  (STANDING):  aspirin enteric coated 81 milliGRAM(s) Oral daily  atorvastatin 40 milliGRAM(s) Oral at bedtime  cefepime   IVPB 2000 milliGRAM(s) IV Intermittent every 8 hours  cefepime   IVPB      clopidogrel Tablet 75 milliGRAM(s) Oral daily  dextrose 5%. 1000 milliLiter(s) (100 mL/Hr) IV Continuous <Continuous>  dextrose 5%. 1000 milliLiter(s) (50 mL/Hr) IV Continuous <Continuous>  dextrose 50% Injectable 25 Gram(s) IV Push once  dextrose 50% Injectable 12.5 Gram(s) IV Push once  dextrose 50% Injectable 25 Gram(s) IV Push once  enoxaparin Injectable 40 milliGRAM(s) SubCutaneous every 24 hours  gabapentin 300 milliGRAM(s) Oral three times a day  glucagon  Injectable 1 milliGRAM(s) IntraMuscular once  insulin glargine Injectable (LANTUS) 15 Unit(s) SubCutaneous every morning  insulin lispro (ADMELOG) corrective regimen sliding scale   SubCutaneous three times a day before meals  insulin lispro (ADMELOG) corrective regimen sliding scale   SubCutaneous at bedtime  insulin lispro Injectable (ADMELOG) 5 Unit(s) SubCutaneous three times a day before meals  lisinopril 40 milliGRAM(s) Oral daily  metoprolol succinate ER 50 milliGRAM(s) Oral daily  senna 2 Tablet(s) Oral at bedtime    MEDICATIONS  (PRN):  acetaminophen   IVPB .. 1000 milliGRAM(s) IV Intermittent every 8 hours PRN Mild Pain (1 - 3)  aluminum hydroxide/magnesium hydroxide/simethicone Suspension 30 milliLiter(s) Oral every 4 hours PRN Dyspepsia  dextrose Oral Gel 15 Gram(s) Oral once PRN Blood Glucose LESS THAN 70 milliGRAM(s)/deciliter  HYDROmorphone  Injectable 0.5 milliGRAM(s) IV Push every 4 hours PRN Moderate Pain (4 - 6)  HYDROmorphone  Injectable 1 milliGRAM(s) IV Push every 4 hours PRN Severe Pain (7 - 10)  melatonin 3 milliGRAM(s) Oral at bedtime PRN Insomnia  ondansetron Injectable 4 milliGRAM(s) IV Push every 8 hours PRN Nausea and/or Vomiting      Vital Signs Last 24 Hrs  T(C): 36.8 (29 May 2025 05:10), Max: 36.8 (28 May 2025 10:03)  T(F): 98.2 (29 May 2025 05:10), Max: 98.2 (28 May 2025 10:03)  HR: 85 (29 May 2025 05:10) (70 - 86)  BP: 132/73 (29 May 2025 05:10) (101/60 - 132/88)  BP(mean): --  RR: 18 (29 May 2025 05:10) (12 - 18)  SpO2: 98% (29 May 2025 05:10) (96% - 100%)    Parameters below as of 29 May 2025 05:10  Patient On (Oxygen Delivery Method): room air        PHYSICAL EXAM:  Vascular: Dorsalis Pedis and Posterior Tibial pulses non palpable.  Capillary re-fill time greater then 3 seconds digits 1-5 left, TMA right    Neuro: Protective sensation diminished to the level of the digits bilateral.  MSK: Muscle strength 5/5 all major muscle groups bilateral. Noted right foot tma  Dermatological: Incision site of the left foot noted with intact sutures, no dehiscence, mild efrain-incision erythema, no proximal streaking, no fluctuance, no malodor, no signs of infection at this time.                          12.6   10.77 )-----------( 393      ( 27 May 2025 09:10 )             39.4       05-27    138  |  101  |  22  ----------------------------<  148[H]  4.7   |  28  |  1.20    Ca    9.4      27 May 2025 09:10    TPro  8.6[H]  /  Alb  3.9  /  TBili  0.5  /  DBili  x   /  AST  16  /  ALT  26  /  AlkPhos  85  05-27      PT/INR - ( 27 May 2025 09:10 )   PT: 11.9 sec;   INR: 1.02 ratio         PTT - ( 27 May 2025 09:10 )  PTT:34.6 sec      Culture - Tissue with Gram Stain (collected 28 May 2025 10:00)  Source: Tissue Other, LEFT FIFTH METATARSAL BONE  Gram Stain (28 May 2025 19:10):    No polymorphonuclear cells seen    No organisms seen    Culture - Tissue with Gram Stain (collected 28 May 2025 10:00)  Source: Tissue Other, LEFT CALCANEUS BONE  Gram Stain (28 May 2025 19:11):    No polymorphonuclear cells seen    No organisms seen    Culture - Tissue with Gram Stain (collected 28 May 2025 10:00)  Source: Tissue Other, LEFT HALLUX BONE  Gram Stain (28 May 2025 19:06):    No polymorphonuclear cells seen    No organisms seen    Culture - Blood (collected 27 May 2025 09:10)  Source: Blood Blood-Peripheral  Preliminary Report (28 May 2025 12:02):    No growth at 24 hours    Culture - Blood (collected 27 May 2025 09:00)  Source: Blood Blood-Peripheral  Preliminary Report (28 May 2025 12:02):    No growth at 24 hours

## 2025-05-29 NOTE — PROGRESS NOTE ADULT - ATTENDING COMMENTS
65 y/o M with PMHx DM2, osteomyelitis, CAD, PAD s/p RLE angioplasty 2020, s/p surgical amputation of R forefoot , s/p L PT angioplasty 12/2/24 for L lat foot DFU on plavix, HTN, s/p recent admission for left foot infection s/p PICC placement for IV abx  sent by Podiatry Dr. Stark for admission for OR intervention tomorrow.  Pt seen, examined case & care plan d/w pt, residents at detail.  Consult-  ID-DR XENIA Calhoun -IV abx  VIA PICC line-Change IV Cefepime 2 gm IVPB q 8 hrs  Podiatry-DR Joe Stark d/w POST Op -NWB Left foot   PO diet   AM labs   DVT ppx start in AM   Total care time is 60 minutes 63 y/o M with PMHx DM2, osteomyelitis, CAD, PAD s/p RLE angioplasty 2020, s/p surgical amputation of R forefoot , s/p L PT angioplasty 12/2/24 for L lat foot DFU on plavix, HTN, s/p recent admission for left foot infection s/p PICC placement for IV abx  sent by Podiatry Dr. Stark for admission for OR intervention tomorrow.  Pt seen, examined case & care plan d/w pt, residents at detail.  Consult-  ID-DR XENIA Calhoun -IV abx  VIA PICC line-Change IV Cefepime 2 gm IVPB q 8 hrs,   Podiatry-DR Joe Stark d/w POST Op -NWB Left foot -bleeding post op -dressing changed   PO diet   AM labs   DVT ppx on hold 2/2 bleeding from surgical site  Total care time is 60 minutes

## 2025-05-29 NOTE — CASE MANAGEMENT PROGRESS NOTE - NSCMPROGRESSNOTE_GEN_ALL_CORE
POD#1 Detachment, debridement, and reattachment of peroneus brevis tendon. remains on Cefepime. Surgical path pending.  will continue to follow.

## 2025-05-29 NOTE — PROGRESS NOTE ADULT - PROBLEM SELECTOR PLAN 1
Patient evaluated and chart reviewed.  POD#1 s/p left foot partial 1st and 5th ray resection, detachment and reattachment of peroneus brevis tendon, and debridement of heel with antibiotic cement placement.  No signs of post-operative infection, hematoma, or dehiscence at this time.  DSD reapplied with xeroform, Aquacel Aq, gauze, ABD, and bren.  Surgicel applied to heel wound due to slow venous bleed.  Will continue to monitor for signs of active bleeding.   Surgical pathology report pending.  Continue IV abx per ID recs.  Podiatry will continue to follow while in-house.  Plan to be discussed with attending.

## 2025-05-29 NOTE — PROGRESS NOTE ADULT - SUBJECTIVE AND OBJECTIVE BOX
[facility] -- Vlad Gallardo Pannella, Patel, Savella, Goodger, Cohen: Office # [phone]    Follow Up:  Cardiac optimization      Subjective/Observations: Patient seen and examined. Patient awake, alert, resting in bed. S/p RRT for for lethargy, diaphoresis when patient stood up to go to bathroom. Of note patient is post op day 1 for s/p OR  with podiatry: partial 1st and 5th ray resection, heel debridement with antibiotic cement placement and reattachment of peroneus brevis tendon into the cuboid. Patient had bleeding from surgical site yesterday and today. Prior to RRT this AM patient was in bed with no complaints stated that his pain is adequately controlled. No complaints of chest pain, dyspnea, palpitations or dizziness. No signs of orthopnea or PND. Tolerating room air. Heel DSD being changed .    REVIEW OF SYSTEMS: All other review of systems are negative unless indicated above    PAST MEDICAL & SURGICAL HISTORY:  HTN (hypertension)      Diabetes      Hyperlipidemia      PVD (peripheral vascular disease)      Toe osteomyelitis, right      H/O angioplasty  RLE      Status post amputation of toe    MEDICATIONS  (STANDING):  atorvastatin 40 milliGRAM(s) Oral at bedtime  cefepime   IVPB 2000 milliGRAM(s) IV Intermittent every 8 hours  cefepime   IVPB      dextrose 5%. 1000 milliLiter(s) (100 mL/Hr) IV Continuous <Continuous>  dextrose 5%. 1000 milliLiter(s) (50 mL/Hr) IV Continuous <Continuous>  dextrose 50% Injectable 25 Gram(s) IV Push once  dextrose 50% Injectable 12.5 Gram(s) IV Push once  dextrose 50% Injectable 25 Gram(s) IV Push once  gabapentin 300 milliGRAM(s) Oral three times a day  glucagon  Injectable 1 milliGRAM(s) IntraMuscular once  insulin glargine Injectable (LANTUS) 15 Unit(s) SubCutaneous every morning  insulin lispro (ADMELOG) corrective regimen sliding scale   SubCutaneous three times a day before meals  insulin lispro (ADMELOG) corrective regimen sliding scale   SubCutaneous at bedtime  insulin lispro Injectable (ADMELOG) 5 Unit(s) SubCutaneous three times a day before meals  lisinopril 40 milliGRAM(s) Oral daily  metoprolol succinate ER 50 milliGRAM(s) Oral daily  senna 2 Tablet(s) Oral at bedtime    MEDICATIONS  (PRN):  acetaminophen   IVPB .. 1000 milliGRAM(s) IV Intermittent every 8 hours PRN Mild Pain (1 - 3)  aluminum hydroxide/magnesium hydroxide/simethicone Suspension 30 milliLiter(s) Oral every 4 hours PRN Dyspepsia  dextrose Oral Gel 15 Gram(s) Oral once PRN Blood Glucose LESS THAN 70 milliGRAM(s)/deciliter  HYDROmorphone  Injectable 0.5 milliGRAM(s) IV Push every 4 hours PRN Moderate Pain (4 - 6)  HYDROmorphone  Injectable 1 milliGRAM(s) IV Push every 4 hours PRN Severe Pain (7 - 10)  melatonin 3 milliGRAM(s) Oral at bedtime PRN Insomnia  ondansetron Injectable 4 milliGRAM(s) IV Push every 8 hours PRN Nausea and/or Vomiting    Allergies    No Known Allergies    Intolerances      Vital Signs Last 24 Hrs  T(C): 36.8 (29 May 2025 05:10), Max: 36.8 (28 May 2025 10:03)  T(F): 98.2 (29 May 2025 05:10), Max: 98.2 (28 May 2025 10:03)  HR: 85 (29 May 2025 05:10) (70 - 86)  BP: 132/73 (29 May 2025 05:10) (101/60 - 132/88)  BP(mean): --  RR: 18 (29 May 2025 05:10) (12 - 18)  SpO2: 98% (29 May 2025 05:10) (96% - 100%)    Parameters below as of 29 May 2025 05:10  Patient On (Oxygen Delivery Method): room air      I&O's Summary    Weight (kg): 81.6 (05-28 @ 12:11)    TELE: Not on telemetry   PHYSICAL EXAM:  Constitutional: NAD, awake and alert  HEENT: Moist Mucous Membranes, Anicteric  Pulmonary: Non-labored, breath sounds are clear bilaterally, No wheezing, rales or rhonchi  Cardiovascular: Regular, S1 and S2, No murmurs, No rubs, gallops or clicks  Gastrointestinal:  soft, nontender, nondistended   Lymph: No peripheral edema. No lymphadenopathy.   Skin: No visible rashes Left foot DSD  Psych:  Mood & affect appropriate      LABS: All Labs Reviewed:                        10.5   12.99 )-----------( 322      ( 29 May 2025 06:03 )             31.8                         12.6   10.77 )-----------( 393      ( 27 May 2025 09:10 )             39.4     29 May 2025 06:03    137    |  101    |  18     ----------------------------<  156    4.5     |  29     |  0.93   27 May 2025 09:10    138    |  101    |  22     ----------------------------<  148    4.7     |  28     |  1.20     Ca    8.5        29 May 2025 06:03  Ca    9.4        27 May 2025 09:10    TPro  7.1    /  Alb  3.1    /  TBili  0.3    /  DBili  x      /  AST  13     /  ALT  21     /  AlkPhos  70     29 May 2025 06:03  TPro  8.6    /  Alb  3.9    /  TBili  0.5    /  DBili  x      /  AST  16     /  ALT  26     /  AlkPhos  85     27 May 2025 09:10   LIVER FUNCTIONS - ( 29 May 2025 06:03 )  Alb: 3.1 g/dL / Pro: 7.1 g/dL / ALK PHOS: 70 U/L / ALT: 21 U/L / AST: 13 U/L / GGT: x             12 Lead ECG:   Ventricular Rate 87 BPM    Atrial Rate 87 BPM    P-R Interval 144 ms    QRS Duration 82 ms    Q-T Interval 378 ms    QTC Calculation(Bazett) 454 ms    P Axis 60 degrees    R Axis -1 degrees    T Axis 31 degrees    Diagnosis Line Normal sinus rhythm  Normal ECG  When compared with ECG of 29-APR-2025 13:07,  premature supraventricular complexes are no longer present  Confirmed by RAMÓN SHAFFER ([id number]) on 5/27/2025 6:53:29 PM (05-27-25 @ 10:18)

## 2025-05-29 NOTE — PROGRESS NOTE ADULT - ASSESSMENT
65 y/o M with PMHx DM2, osteomyelitis, CAD, PAD s/p RLE angioplasty 2020, s/p surgical amputation of R forefoot , s/p L PT angioplasty 12/2/24 for L lat foot DFU on plavix, HTN, s/p recent admission for left foot infection s/p PICC placement for IV abx  sent by Podiatry Dr. Stark for admission for OR intervention tomorrow.    L foot OM/DM Foot Ulcer  PAD  - sent for OR tomorrow 5/28 for left foot debridement and partial 1st toe amputation 5/28   - MRI of the left foot demonstrates ulceration at the great toe with underlying cortical destruction of the distal phalanx and acute osteomyelitis involving the distal and proximal phalanx of the great toe.  Focal ulceration at the base of the fifth metatarsal with underlying marrow edema within the fifth metatarsal base and intramedullary cavity. Finding is indeterminate and may represent concurrent early osteomyelitis versus reactive changes.  Marrow edema involving the distal phalanx and middle phalanx of the little toe without definitive corresponding ulceration or abnormal T1 weighted signal.   Nonspecific bone marrow edema pattern within the intermediate cuneiform, lateral cuneiform, and navicular bone favored to represent reactive changes from osteoarthrosis.    Review of prior ID notes show that the patient was sent out  on ceftriaxone 2gm q24h x6 week course IV Abx until 6/10/25  In in the interim pt was noted to have worsening leukocytosis, transitioned to ertapenem since 5/24    PROCEDURES:  Detachment, debridement, and reattachment of peroneus brevis tendon 28-May-2025 10:07:14  Ricki Wilson  Detachment at left foot, partial 1st ray, open approach 28-May-2025 10:07:26  Ricki Wilson  Detachment at left foot, partial 5th ray, open approach 28-May-2025 10:07:40  Ricki Wilson  Debridement, soft tissue and bone, heel region of diabetic foot 28-May-2025 10:07:50  Ricki Wilson  Non-viable bone and soft tissue.    OR cx NGTD    Now on cefepime (5/28-)    Recommendations:  C/w cefepime  F/u path  Trend temps/WBC  Podiatry following  Further recs to follow pending above    Patient evaluated with face-to-face time in addition to reviewing history, labs, microbiology, and imaging.   Antibiotic stewardship, local antibiogram, infection control strategies and potential transmission issues taken into consideration at time of treatment decision making process.   Thank you for allowing us to participate in the care of your patient.  Infectious Diseases will follow. Please call with any questions.  Melany Calhoun M.D.  Available on [employer] TEAMS -- *PREFERRED*  [facility] [phone]  For after 5 P.M. and weekends, please call [phone]

## 2025-05-29 NOTE — PROGRESS NOTE ADULT - PROBLEM SELECTOR PLAN 3
Chronic. Pt follows with Vascular outpt. Pt s/p L PT angioplasty 12/2/24 for L lat foot DFU on Plavix  -C/w Lipitor  - C/w Aspirin and plavix from tomorrow Chronic. Pt follows with Vascular outpt. Pt s/p L PT angioplasty 12/2/24 for L lat foot DFU on Plavix  -C/w Lipitor  - C/w Aspirin and Plavix from tomorrow

## 2025-05-30 ENCOUNTER — APPOINTMENT (OUTPATIENT)
Dept: HYPERBARIC MEDICINE | Facility: HOSPITAL | Age: 65
End: 2025-05-30

## 2025-05-30 LAB
ALBUMIN SERPL ELPH-MCNC: 2.9 G/DL — LOW (ref 3.3–5)
ALP SERPL-CCNC: 69 U/L — SIGNIFICANT CHANGE UP (ref 40–120)
ALT FLD-CCNC: 18 U/L — SIGNIFICANT CHANGE UP (ref 12–78)
ANION GAP SERPL CALC-SCNC: 8 MMOL/L — SIGNIFICANT CHANGE UP (ref 5–17)
AST SERPL-CCNC: 10 U/L — LOW (ref 15–37)
BILIRUB SERPL-MCNC: 0.3 MG/DL — SIGNIFICANT CHANGE UP (ref 0.2–1.2)
BUN SERPL-MCNC: 22 MG/DL — SIGNIFICANT CHANGE UP (ref 7–23)
CALCIUM SERPL-MCNC: 8.7 MG/DL — SIGNIFICANT CHANGE UP (ref 8.5–10.1)
CHLORIDE SERPL-SCNC: 103 MMOL/L — SIGNIFICANT CHANGE UP (ref 96–108)
CO2 SERPL-SCNC: 27 MMOL/L — SIGNIFICANT CHANGE UP (ref 22–31)
CREAT SERPL-MCNC: 1.2 MG/DL — SIGNIFICANT CHANGE UP (ref 0.5–1.3)
EGFR: 67 ML/MIN/1.73M2 — SIGNIFICANT CHANGE UP
EGFR: 67 ML/MIN/1.73M2 — SIGNIFICANT CHANGE UP
GLUCOSE BLDC GLUCOMTR-MCNC: 140 MG/DL — HIGH (ref 70–99)
GLUCOSE BLDC GLUCOMTR-MCNC: 145 MG/DL — HIGH (ref 70–99)
GLUCOSE BLDC GLUCOMTR-MCNC: 209 MG/DL — HIGH (ref 70–99)
GLUCOSE BLDC GLUCOMTR-MCNC: 216 MG/DL — HIGH (ref 70–99)
GLUCOSE SERPL-MCNC: 197 MG/DL — HIGH (ref 70–99)
HCT VFR BLD CALC: 28.2 % — LOW (ref 39–50)
HGB BLD-MCNC: 9.3 G/DL — LOW (ref 13–17)
MCHC RBC-ENTMCNC: 29.9 PG — SIGNIFICANT CHANGE UP (ref 27–34)
MCHC RBC-ENTMCNC: 33 G/DL — SIGNIFICANT CHANGE UP (ref 32–36)
MCV RBC AUTO: 90.7 FL — SIGNIFICANT CHANGE UP (ref 80–100)
NRBC BLD AUTO-RTO: 0 /100 WBCS — SIGNIFICANT CHANGE UP (ref 0–0)
PLATELET # BLD AUTO: 301 K/UL — SIGNIFICANT CHANGE UP (ref 150–400)
POTASSIUM SERPL-MCNC: 4.5 MMOL/L — SIGNIFICANT CHANGE UP (ref 3.5–5.3)
POTASSIUM SERPL-SCNC: 4.5 MMOL/L — SIGNIFICANT CHANGE UP (ref 3.5–5.3)
PROT SERPL-MCNC: 7.1 G/DL — SIGNIFICANT CHANGE UP (ref 6–8.3)
RBC # BLD: 3.11 M/UL — LOW (ref 4.2–5.8)
RBC # FLD: 13.2 % — SIGNIFICANT CHANGE UP (ref 10.3–14.5)
SODIUM SERPL-SCNC: 138 MMOL/L — SIGNIFICANT CHANGE UP (ref 135–145)
SURGICAL PATHOLOGY STUDY: SIGNIFICANT CHANGE UP
WBC # BLD: 14.73 K/UL — HIGH (ref 3.8–10.5)
WBC # FLD AUTO: 14.73 K/UL — HIGH (ref 3.8–10.5)

## 2025-05-30 PROCEDURE — 99232 SBSQ HOSP IP/OBS MODERATE 35: CPT

## 2025-05-30 RX ORDER — ACETAMINOPHEN 500 MG/5ML
1000 LIQUID (ML) ORAL ONCE
Refills: 0 | Status: COMPLETED | OUTPATIENT
Start: 2025-05-30 | End: 2025-05-30

## 2025-05-30 RX ORDER — ENOXAPARIN SODIUM 100 MG/ML
40 INJECTION SUBCUTANEOUS EVERY 24 HOURS
Refills: 0 | Status: DISCONTINUED | OUTPATIENT
Start: 2025-05-30 | End: 2025-06-04

## 2025-05-30 RX ADMIN — CEFEPIME 100 MILLIGRAM(S): 2 INJECTION, POWDER, FOR SOLUTION INTRAVENOUS at 05:54

## 2025-05-30 RX ADMIN — ATORVASTATIN CALCIUM 40 MILLIGRAM(S): 80 TABLET, FILM COATED ORAL at 21:17

## 2025-05-30 RX ADMIN — Medication 400 MILLIGRAM(S): at 14:54

## 2025-05-30 RX ADMIN — CEFEPIME 100 MILLIGRAM(S): 2 INJECTION, POWDER, FOR SOLUTION INTRAVENOUS at 14:55

## 2025-05-30 RX ADMIN — Medication 1000 MILLIGRAM(S): at 15:15

## 2025-05-30 RX ADMIN — GABAPENTIN 300 MILLIGRAM(S): 400 CAPSULE ORAL at 21:17

## 2025-05-30 RX ADMIN — GABAPENTIN 300 MILLIGRAM(S): 400 CAPSULE ORAL at 14:55

## 2025-05-30 RX ADMIN — INSULIN LISPRO 4: 100 INJECTION, SOLUTION INTRAVENOUS; SUBCUTANEOUS at 17:22

## 2025-05-30 RX ADMIN — Medication 400 MILLIGRAM(S): at 21:17

## 2025-05-30 RX ADMIN — CLOPIDOGREL BISULFATE 75 MILLIGRAM(S): 75 TABLET, FILM COATED ORAL at 12:25

## 2025-05-30 RX ADMIN — LISINOPRIL 40 MILLIGRAM(S): 5 TABLET ORAL at 05:54

## 2025-05-30 RX ADMIN — INSULIN LISPRO 5 UNIT(S): 100 INJECTION, SOLUTION INTRAVENOUS; SUBCUTANEOUS at 17:23

## 2025-05-30 RX ADMIN — Medication 1000 MILLIGRAM(S): at 22:17

## 2025-05-30 RX ADMIN — Medication 1000 MILLIGRAM(S): at 04:45

## 2025-05-30 RX ADMIN — Medication 81 MILLIGRAM(S): at 12:25

## 2025-05-30 RX ADMIN — INSULIN GLARGINE-YFGN 15 UNIT(S): 100 INJECTION, SOLUTION SUBCUTANEOUS at 08:37

## 2025-05-30 RX ADMIN — INSULIN LISPRO 5 UNIT(S): 100 INJECTION, SOLUTION INTRAVENOUS; SUBCUTANEOUS at 12:25

## 2025-05-30 RX ADMIN — GABAPENTIN 300 MILLIGRAM(S): 400 CAPSULE ORAL at 05:54

## 2025-05-30 RX ADMIN — CEFEPIME 100 MILLIGRAM(S): 2 INJECTION, POWDER, FOR SOLUTION INTRAVENOUS at 21:17

## 2025-05-30 RX ADMIN — Medication 400 MILLIGRAM(S): at 03:49

## 2025-05-30 RX ADMIN — ENOXAPARIN SODIUM 40 MILLIGRAM(S): 100 INJECTION SUBCUTANEOUS at 19:25

## 2025-05-30 RX ADMIN — METOPROLOL SUCCINATE 50 MILLIGRAM(S): 50 TABLET, EXTENDED RELEASE ORAL at 05:54

## 2025-05-30 RX ADMIN — INSULIN LISPRO 5 UNIT(S): 100 INJECTION, SOLUTION INTRAVENOUS; SUBCUTANEOUS at 08:38

## 2025-05-30 NOTE — PROVIDER CONTACT NOTE (OTHER) - ASSESSMENT
patient is alert and oriented x 4, in room air; no s/s of respiratory distress noted; patient denies any chest pain or shortness of pain; patient complained of throbbing pain at lt foot (s/p surgery) pain score 5; patient requested IV tylenol. as per pt, he wants to take IV tylenol first and if it does not help his pain, he will take dilaudid IV push.

## 2025-05-30 NOTE — PHYSICAL THERAPY INITIAL EVALUATION ADULT - GENERAL OBSERVATIONS, REHAB EVAL
Received supine in bed with + heplock IV, post-op dressing to (L) foot, RA. Patient in no apparent distress.

## 2025-05-30 NOTE — PROGRESS NOTE ADULT - SUBJECTIVE AND OBJECTIVE BOX
[facility] -- Vlad Gallardo Pannella, Patel, Savella, Goodger, Cohen: Office # [phone]    Follow Up:  Cardiac clearance, Post op    Subjective/Observations: Patient awake, alert, resting in bed. Denies chest pain, palpitations and dizziness. Denies any difficulty breathing. No orthopnea and PND. Tolerating room air.     REVIEW OF SYSTEMS: All other review of systems are negative unless indicated above    PAST MEDICAL & SURGICAL HISTORY:  HTN (hypertension)      Diabetes      Hyperlipidemia      PVD (peripheral vascular disease)      Toe osteomyelitis, right      H/O angioplasty  RLE      Status post amputation of toe          MEDICATIONS  (STANDING):  aspirin enteric coated 81 milliGRAM(s) Oral daily  atorvastatin 40 milliGRAM(s) Oral at bedtime  cefepime   IVPB 2000 milliGRAM(s) IV Intermittent every 8 hours  cefepime   IVPB      clopidogrel Tablet 75 milliGRAM(s) Oral daily  dextrose 5%. 1000 milliLiter(s) (100 mL/Hr) IV Continuous <Continuous>  dextrose 5%. 1000 milliLiter(s) (50 mL/Hr) IV Continuous <Continuous>  dextrose 50% Injectable 25 Gram(s) IV Push once  dextrose 50% Injectable 12.5 Gram(s) IV Push once  dextrose 50% Injectable 25 Gram(s) IV Push once  enoxaparin Injectable 40 milliGRAM(s) SubCutaneous every 24 hours  gabapentin 300 milliGRAM(s) Oral three times a day  glucagon  Injectable 1 milliGRAM(s) IntraMuscular once  insulin glargine Injectable (LANTUS) 15 Unit(s) SubCutaneous every morning  insulin lispro (ADMELOG) corrective regimen sliding scale   SubCutaneous three times a day before meals  insulin lispro (ADMELOG) corrective regimen sliding scale   SubCutaneous at bedtime  insulin lispro Injectable (ADMELOG) 5 Unit(s) SubCutaneous three times a day before meals  lisinopril 40 milliGRAM(s) Oral daily  metoprolol succinate ER 50 milliGRAM(s) Oral daily  senna 2 Tablet(s) Oral at bedtime    MEDICATIONS  (PRN):  acetaminophen   IVPB .. 1000 milliGRAM(s) IV Intermittent every 8 hours PRN Mild Pain (1 - 3)  aluminum hydroxide/magnesium hydroxide/simethicone Suspension 30 milliLiter(s) Oral every 4 hours PRN Dyspepsia  dextrose Oral Gel 15 Gram(s) Oral once PRN Blood Glucose LESS THAN 70 milliGRAM(s)/deciliter  HYDROmorphone  Injectable 0.5 milliGRAM(s) IV Push every 4 hours PRN Moderate Pain (4 - 6)  HYDROmorphone  Injectable 1 milliGRAM(s) IV Push every 4 hours PRN Severe Pain (7 - 10)  melatonin 3 milliGRAM(s) Oral at bedtime PRN Insomnia  ondansetron Injectable 4 milliGRAM(s) IV Push every 8 hours PRN Nausea and/or Vomiting    Allergies    No Known Allergies    Intolerances      Vital Signs Last 24 Hrs  T(C): 36.3 (30 May 2025 05:05), Max: 37.2 (29 May 2025 21:43)  T(F): 97.4 (30 May 2025 05:05), Max: 99 (29 May 2025 21:43)  HR: 79 (30 May 2025 05:05) (67 - 102)  BP: 112/64 (30 May 2025 05:05) (94/57 - 112/64)  BP(mean): --  RR: 18 (30 May 2025 05:05) (18 - 18)  SpO2: 96% (30 May 2025 05:05) (94% - 96%)    Parameters below as of 30 May 2025 05:05  Patient On (Oxygen Delivery Method): room air      I&O's Summary    29 May 2025 07:01  -  30 May 2025 07:00  --------------------------------------------------------  IN: 50 mL / OUT: 0 mL / NET: 50 mL        TELE: Not on telemetry   PHYSICAL EXAM:  Constitutional: NAD, awake and alert  HEENT: Moist Mucous Membranes, Anicteric  Pulmonary: Non-labored, breath sounds are clear bilaterally, No wheezing, rales or rhonchi  Cardiovascular: Regular, S1 and S2, No murmurs, No rubs, gallops or clicks  Gastrointestinal:  soft, nontender, nondistended   Lymph: No peripheral edema. No lymphadenopathy.   Skin: No visible rashes Left foot DSD  Psych:  Mood & affect appropriate      LABS: All Labs Reviewed:                        9.3    14.73 )-----------( 301      ( 30 May 2025 08:45 )             28.2                         9.6    14.44 )-----------( 283      ( 29 May 2025 09:00 )             30.1                         10.5   12.99 )-----------( 322      ( 29 May 2025 06:03 )             31.8     30 May 2025 08:45    138    |  103    |  22     ----------------------------<  197    4.5     |  27     |  1.20   29 May 2025 09:00    136    |  101    |  19     ----------------------------<  219    4.5     |  27     |  1.20   29 May 2025 06:03    137    |  101    |  18     ----------------------------<  156    4.5     |  29     |  0.93     Ca    8.7        30 May 2025 08:45  Ca    8.1        29 May 2025 09:00  Ca    8.5        29 May 2025 06:03    TPro  7.1    /  Alb  2.9    /  TBili  0.3    /  DBili  x      /  AST  10     /  ALT  18     /  AlkPhos  69     30 May 2025 08:45  TPro  6.5    /  Alb  2.8    /  TBili  0.4    /  DBili  x      /  AST  9      /  ALT  18     /  AlkPhos  64     29 May 2025 09:00  TPro  7.1    /  Alb  3.1    /  TBili  0.3    /  DBili  x      /  AST  13     /  ALT  21     /  AlkPhos  70     29 May 2025 06:03   LIVER FUNCTIONS - ( 30 May 2025 08:45 )  Alb: 2.9 g/dL / Pro: 7.1 g/dL / ALK PHOS: 69 U/L / ALT: 18 U/L / AST: 10 U/L / GGT: x           Troponin I, High Sensitivity Result: 7.2 ng/L (05-29-25 @ 09:00)    12 Lead ECG:   Ventricular Rate 89 BPM    Atrial Rate 89 BPM    P-R Interval 138 ms    QRS Duration 88 ms    Q-T Interval 408 ms    QTC Calculation(Bazett) 496 ms    P Axis 60 degrees    R Axis 12 degrees    T Axis 38 degrees    Diagnosis Line Normal sinus rhythm  Prolonged QT  Confirmed by CATHERINE ERBOLLEDO ([id number]) on 5/29/2025 1:05:54 PM (05-29-25 @ 08:20)

## 2025-05-30 NOTE — PROGRESS NOTE ADULT - PROBLEM SELECTOR PLAN 3
Chronic. Pt follows with Vascular outpt. Pt s/p L PT angioplasty 12/2/24 for L lat foot DFU on Plavix  -C/w Lipitor and Aspirin and Plavix

## 2025-05-30 NOTE — PHYSICAL THERAPY INITIAL EVALUATION ADULT - PERTINENT HX OF CURRENT PROBLEM, REHAB EVAL
63 y/o M with PMHx DM2, osteomyelitis, CAD, PAD s/p RLE angioplasty 2020, s/p surgical amputation of R forefoot , s/p L PT angioplasty 12/2/24 for L lat foot DFU on plavix, HTN, s/p recent admission for left foot infection s/p PICC placement for IV abx  sent by Podiatry Dr. Stark for admission for OR intervention tomorrow. Patient denies any complaints today. States after discharge he had outpatient visits with Vascular physician. He had imaging done and was suggested to have LimFlow procedure. Patient requested to have surgery as per Podiatry for his left foot wound prior to LimFlow. Vascular agreed. Patient states he can walk independently without pain and is currently comfortable.
64y year old Male seen at [facility] 113 W1 s/p left foot partial 1st and 5th ray resection, detachment and reattachment of peroneus brevis tendon, and debridement of heel with antibiotic cement placement.

## 2025-05-30 NOTE — PROGRESS NOTE ADULT - SUBJECTIVE AND OBJECTIVE BOX
64y year old Male seen at [facility] 1EAS 113 W1 s/p left foot partial 1st and 5th ray resection, detachment and reattachment of peroneus brevis tendon, and debridement of heel with antibiotic cement placement. Patient relates no overnight events and states that they are doing well at this time.  Denies any fever, chills, nausea, vomiting, chest pain, shortness of breath, or calf pain at this time.    Allergies    No Known Allergies    Intolerances        MEDICATIONS  (STANDING):  aspirin enteric coated 81 milliGRAM(s) Oral daily  atorvastatin 40 milliGRAM(s) Oral at bedtime  cefepime   IVPB 2000 milliGRAM(s) IV Intermittent every 8 hours  cefepime   IVPB      clopidogrel Tablet 75 milliGRAM(s) Oral daily  dextrose 5%. 1000 milliLiter(s) (100 mL/Hr) IV Continuous <Continuous>  dextrose 5%. 1000 milliLiter(s) (50 mL/Hr) IV Continuous <Continuous>  dextrose 50% Injectable 25 Gram(s) IV Push once  dextrose 50% Injectable 12.5 Gram(s) IV Push once  dextrose 50% Injectable 25 Gram(s) IV Push once  gabapentin 300 milliGRAM(s) Oral three times a day  glucagon  Injectable 1 milliGRAM(s) IntraMuscular once  insulin glargine Injectable (LANTUS) 15 Unit(s) SubCutaneous every morning  insulin lispro (ADMELOG) corrective regimen sliding scale   SubCutaneous three times a day before meals  insulin lispro (ADMELOG) corrective regimen sliding scale   SubCutaneous at bedtime  insulin lispro Injectable (ADMELOG) 5 Unit(s) SubCutaneous three times a day before meals  lisinopril 40 milliGRAM(s) Oral daily  metoprolol succinate ER 50 milliGRAM(s) Oral daily  senna 2 Tablet(s) Oral at bedtime    MEDICATIONS  (PRN):  acetaminophen   IVPB .. 1000 milliGRAM(s) IV Intermittent every 8 hours PRN Mild Pain (1 - 3)  aluminum hydroxide/magnesium hydroxide/simethicone Suspension 30 milliLiter(s) Oral every 4 hours PRN Dyspepsia  dextrose Oral Gel 15 Gram(s) Oral once PRN Blood Glucose LESS THAN 70 milliGRAM(s)/deciliter  HYDROmorphone  Injectable 0.5 milliGRAM(s) IV Push every 4 hours PRN Moderate Pain (4 - 6)  HYDROmorphone  Injectable 1 milliGRAM(s) IV Push every 4 hours PRN Severe Pain (7 - 10)  melatonin 3 milliGRAM(s) Oral at bedtime PRN Insomnia  ondansetron Injectable 4 milliGRAM(s) IV Push every 8 hours PRN Nausea and/or Vomiting      Vital Signs Last 24 Hrs  T(C): 36.3 (30 May 2025 05:05), Max: 37.2 (29 May 2025 21:43)  T(F): 97.4 (30 May 2025 05:05), Max: 99 (29 May 2025 21:43)  HR: 79 (30 May 2025 05:05) (67 - 102)  BP: 112/64 (30 May 2025 05:05) (94/57 - 112/64)  BP(mean): --  RR: 18 (30 May 2025 05:05) (18 - 18)  SpO2: 96% (30 May 2025 05:05) (94% - 96%)    Parameters below as of 30 May 2025 05:05  Patient On (Oxygen Delivery Method): room air        PHYSICAL EXAM:  Vascular: Dorsalis Pedis and Posterior Tibial pulses non palpable.  Capillary re-fill time greater then 3 seconds digits 1-5 left, TMA right    Neuro: Protective sensation diminished to the level of the digits bilateral.  MSK: Muscle strength 5/5 all major muscle groups bilateral. Noted right foot tma  Dermatological: Incision site of the left foot noted with intact sutures, no dehiscence, mild efrain-incision erythema, no proximal streaking, no fluctuance, no malodor, no signs of infection at this time.                          9.6    14.44 )-----------( 283      ( 29 May 2025 09:00 )             30.1       05-29    136  |  101  |  19  ----------------------------<  219[H]  4.5   |  27  |  1.20    Ca    8.1[L]      29 May 2025 09:00    TPro  6.5  /  Alb  2.8[L]  /  TBili  0.4  /  DBili  x   /  AST  9[L]  /  ALT  18  /  AlkPhos  64  05-29            Culture - Tissue with Gram Stain (collected 28 May 2025 10:00)  Source: Tissue Other, LEFT CALCANEUS BONE  Gram Stain (28 May 2025 19:11):    No polymorphonuclear cells seen    No organisms seen  Preliminary Report (29 May 2025 12:48):    No growth to date.    Culture - Tissue with Gram Stain (collected 28 May 2025 10:00)  Source: Tissue Other, LEFT FIFTH METATARSAL BONE  Gram Stain (28 May 2025 19:10):    No polymorphonuclear cells seen    No organisms seen  Preliminary Report (29 May 2025 12:48):    No growth to date.    Culture - Tissue with Gram Stain (collected 28 May 2025 10:00)  Source: Tissue Other, LEFT HALLUX BONE  Gram Stain (28 May 2025 19:06):    No polymorphonuclear cells seen    No organisms seen  Preliminary Report (29 May 2025 12:50):    No growth to date.    Culture - Blood (collected 27 May 2025 09:10)  Source: Blood Blood-Peripheral  Preliminary Report (29 May 2025 12:01):    No growth at 48 Hours    Culture - Blood (collected 27 May 2025 09:00)  Source: Blood Blood-Peripheral  Preliminary Report (29 May 2025 12:01):    No growth at 48 Hours

## 2025-05-30 NOTE — PHYSICAL THERAPY INITIAL EVALUATION ADULT - DID THE PATIENT HAVE SURGERY?
s/p left foot partial 1st and 5th ray resection, detachment and reattachment of peroneus brevis tendon, and debridement of heel with antibiotic cement placement/yes
n/a

## 2025-05-30 NOTE — PROGRESS NOTE ADULT - PROBLEM SELECTOR PLAN 2
H/H 9.3 today   - RRT 5/29 for vasovagal syncope 2/2 to acute blood loss  - Type and screen ordered  - F/u AM labs   - Continue Aspirin, Lovenox and Plavix as bleeding has stopped from surgical site

## 2025-05-30 NOTE — PROGRESS NOTE ADULT - PROBLEM SELECTOR PLAN 1
Patient presents with left DM foot ulcer/ wound Gangrene Left BIG Toe, sent by podiatry Dr. Lincoln lawson [facility]  Gangrene Left BIG Toe with left heel wound   - s/p OR today with podiatry: partial 1st and 5th ray resection, heel debridement with antibiotic cement placement and reattachment of peroneus brevis tendon into the cuboid   - MRI of the left foot demonstrates ulceration at the great toe with underlying cortical destruction of the distal phalanx and acute osteomyelitis involving the distal and proximal phalanx of the great toe.  Focal ulceration at the base of the fifth metatarsal with underlying marrow edema within the fifth metatarsal base and intramedullary cavity. Finding is indeterminate and may represent concurrent early osteomyelitis versus reactive changes.  Marrow edema involving the distal phalanx and middle phalanx of the little toe without definitive corresponding ulceration or abnormal T1 weighted signal.   Nonspecific bone marrow edema pattern within the intermediate cuneiform, lateral cuneiform, and navicular bone favored to represent reactive changes from osteoarthrosis.  - F/u cultures   - ID Dr. Melany Valle ---> IV Cefepime q 8 hrs  Pain meds  IV Tylenol, PRN, IV Dilaudid PRN for Mod & severe pain  - PT eval today - recommends home PT

## 2025-05-30 NOTE — PROGRESS NOTE ADULT - ATTENDING COMMENTS
63 y/o M with PMHx DM2, osteomyelitis, CAD, PAD s/p RLE angioplasty 2020, s/p surgical amputation of R forefoot , s/p L PT angioplasty 12/2/24 for L lat foot DFU on plavix, HTN, s/p recent admission for left foot infection s/p PICC placement for IV abx  sent by Podiatry Dr. Stark for admission for OR intervention tomorrow.  Pt seen, examined case & care plan d/w pt, residents at detail.  Consult-  ID-DR XENIA Calhoun -IV abx  VIA PICC line-Change IV Cefepime 2 gm IVPB q 8 hrs, awaiting clean margin Biopsy  Podiatry-DR Joe Stark d/w POST Op -NWB Left foot -bleeding post op -dressing changed ,Abx , follow pathology clean margin  PO diet   AM labs   DVT ppx restart as d/w podiatry  Total care time is 60 minutes.

## 2025-05-30 NOTE — PROGRESS NOTE ADULT - PROBLEM SELECTOR PLAN 1
Patient evaluated and chart reviewed.  POD#2 s/p left foot partial 1st and 5th ray resection, detachment and reattachment of peroneus brevis tendon, and debridement of heel with antibiotic cement placement.  No active bleeding noted at this time.   No signs of post-operative infection, hematoma, or dehiscence at this time.  DSD reapplied with xeroform, Aquacel Aq, gauze, ABD, and bren.  Will continue to monitor for signs of active bleeding.   Surgical pathology report pending.  Continue IV abx per ID recs.  Podiatry will continue to follow while in-house.  Plan to be discussed with attending.

## 2025-05-30 NOTE — PHYSICAL THERAPY INITIAL EVALUATION ADULT - ADDITIONAL COMMENTS
Patient lives in private home, 0 SHANNA then 5 stairs to negotiate to floor where he will be staying. patient was independent in all ADLs and ambulated independently with SAC, also has knee scooter.
Patient reports that he lives in a private house with wife, 5 steps to living room, then additional 7 steps to bedroom. Plans to stay on living room level upon return home. Has knee scooter and RW. Indep with ADLs/ambulation without device PTA.

## 2025-05-30 NOTE — PROVIDER CONTACT NOTE (OTHER) - REASON
patient complained of throbbing pain at lt foot (s/p surgery) pain score 5; patient requested IV tylenol. as per pt, he wants to take IV tylenol first and if it does not help his pain, he will take dilaudid IV push. Pt with co cough and sob states took a nebulizer treatment prior to arrival, pt does not look tachypneic with no fever no cough noted, iv palced medicated for his compliant. Pts wife at bedside.

## 2025-05-30 NOTE — PROGRESS NOTE ADULT - ASSESSMENT
64M DM2, hx osteomyelitis, CAD, PAD s/p RLE angioplasty 2020, s/p surgical amputation of R forefoot , s/p L PT angioplasty 12/2/24 for L lat foot DFU on plavix, HTN, HLD sent in by wound clinic for left foot infections and multiple wounds. Vascular surgery to evaluate further with LLE angiogram.     - Pt p/w L foot infection with multiple wounds sent in by [facility], podiatry following   - S/p LLE angio 5/2,  vascular following   - S/p debridement on 5/28 w/o cardiac complications  - S/p RRT 5/29 for lethargy ? vasovagal, now asymptomatic   - Continue antibiotics per ID    - EKG: Sinus rhythm with premature supraventricular complexes  - No evidence of any active ischemia   - NST done 1/2024 with small sized, mild defect in the basal inferior wall that is fixed and partially normalizes with prone, suggestive of diaphragmatic artifact.   - Continue ASA, Plavix  - Continue Lipitor    - Previous TTE (1/2/24) shows EF 61%, Normal LV mass and diastolic function, Normal RV size and function.  - No evidence of any meaningful volume overload     - BP stable and controlled   - Continue BB and lisinopril     - Follows with Dr. Chu (o/p cardiologist)   - Monitor and replete lytes, keep K>4, Mg>2.  - Will continue to follow.    Oksana Humphrey, MS FNP, AGACNP  Nurse Practitioner- Cardiology   Please call on TEAMS

## 2025-05-30 NOTE — PROGRESS NOTE ADULT - SUBJECTIVE AND OBJECTIVE BOX
Patient is a 64y old  Male who presents with a chief complaint of left foot osteomyelitis (30 May 2025 14:25)    HPI:  65 y/o M with PMHx DM2, osteomyelitis, CAD, PAD s/p RLE angioplasty 2020, s/p surgical amputation of R forefoot , s/p L PT angioplasty 12/2/24 for L lat foot DFU on plavix, HTN, s/p recent admission for left foot infection s/p PICC placement for IV abx  sent by Podiatry Dr. Stark for admission for OR intervention tomorrow. Patient denies any complaints today. States after discharge he had outpatient visits with Vascular physician. He had imaging done and was suggested to have LimFlow procedure. Patient requested to have surgery as per Podiatry for his left foot wound prior to LimFlow. Vascular agreed. Patient states he can walk independently without pain and is currently comfortable.   Of note, patient was transitioned from Rocephin which was to be given via PICC line after previous discharge to Ertapenem by ID this Saturday.     ED course:   Vitals: T 97.7 HR 99 /69   Labs: ESR 64 wbc 10.77   Imaging: Cxray- Right PICC with tip in the SVC.  The heart is not accurately assessed in this AP projection.  The lungs are clear.  There is no pneumothorax or pleural effusion.  No acute bony abnormality.  Clear lungs    (27 May 2025 13:05)    INTERVAL HPI:  5/28: Patient seen and examined post op. complains of mild pain 3-4/10 IV tylenol ordered otherwise denies complaints   5/29: Patient seen and examined at bedside. Denies complaints this AM however has bleeding from surgical site. RRT later called this AM (refer to rapid note) for weakness, diaphoresis likely vasovagal secondary to blood loss. Hb downtrended. Plan to hold Lovenox. Resume Asp and Plavix tomorrow. PT eval tomorrow , mild leukocytosis, drop in H/H  5/30: Patient seen and examined at bedside. Denies complaints this AM- no further bleeding. Aspirin, Plavix and Lovenox resumed. PT eval today recommends home PT    OVERNIGHT EVENTS:    Home Medications:  atorvastatin 40 mg oral tablet: 1 tab(s) orally once a day (at bedtime) (27 May 2025 15:49)  Cinnamon: 1 cap(s) orally once a day (27 May 2025 15:51)  clopidogrel 75 mg oral tablet: 1 tab(s) orally once a day (27 May 2025 15:49)  gabapentin 300 mg oral capsule: 1 cap(s) orally 3 times a day (27 May 2025 15:49)  Jardiance 25 mg oral tablet: 1 tab(s) orally once a day (27 May 2025 15:49)  lisinopril 40 mg oral tablet: 1 tab(s) orally once a day (27 May 2025 15:49)  metFORMIN 1000 mg oral tablet: 1 tab(s) orally 2 times a day (27 May 2025 15:49)  metoprolol succinate 50 mg oral tablet, extended release: 1 tab(s) orally once a day (27 May 2025 15:49)  Trulicity Pen 1.5 mg/0.5 mL subcutaneous solution: 1.5 milligram(s) subcutaneously once a week (Sundays) (27 May 2025 15:52)  Tumeric : 1 cap orally once a day (27 May 2025 15:51)      MEDICATIONS  (STANDING):  aspirin enteric coated 81 milliGRAM(s) Oral daily  atorvastatin 40 milliGRAM(s) Oral at bedtime  cefepime   IVPB 2000 milliGRAM(s) IV Intermittent every 8 hours  cefepime   IVPB      clopidogrel Tablet 75 milliGRAM(s) Oral daily  dextrose 5%. 1000 milliLiter(s) (100 mL/Hr) IV Continuous <Continuous>  dextrose 5%. 1000 milliLiter(s) (50 mL/Hr) IV Continuous <Continuous>  dextrose 50% Injectable 25 Gram(s) IV Push once  dextrose 50% Injectable 12.5 Gram(s) IV Push once  dextrose 50% Injectable 25 Gram(s) IV Push once  enoxaparin Injectable 40 milliGRAM(s) SubCutaneous every 24 hours  gabapentin 300 milliGRAM(s) Oral three times a day  glucagon  Injectable 1 milliGRAM(s) IntraMuscular once  insulin glargine Injectable (LANTUS) 15 Unit(s) SubCutaneous every morning  insulin lispro (ADMELOG) corrective regimen sliding scale   SubCutaneous three times a day before meals  insulin lispro (ADMELOG) corrective regimen sliding scale   SubCutaneous at bedtime  insulin lispro Injectable (ADMELOG) 5 Unit(s) SubCutaneous three times a day before meals  lisinopril 40 milliGRAM(s) Oral daily  metoprolol succinate ER 50 milliGRAM(s) Oral daily  senna 2 Tablet(s) Oral at bedtime    MEDICATIONS  (PRN):  acetaminophen   IVPB .. 1000 milliGRAM(s) IV Intermittent every 8 hours PRN Mild Pain (1 - 3)  aluminum hydroxide/magnesium hydroxide/simethicone Suspension 30 milliLiter(s) Oral every 4 hours PRN Dyspepsia  dextrose Oral Gel 15 Gram(s) Oral once PRN Blood Glucose LESS THAN 70 milliGRAM(s)/deciliter  HYDROmorphone  Injectable 0.5 milliGRAM(s) IV Push every 4 hours PRN Moderate Pain (4 - 6)  HYDROmorphone  Injectable 1 milliGRAM(s) IV Push every 4 hours PRN Severe Pain (7 - 10)  melatonin 3 milliGRAM(s) Oral at bedtime PRN Insomnia  ondansetron Injectable 4 milliGRAM(s) IV Push every 8 hours PRN Nausea and/or Vomiting      Allergies    No Known Allergies    Intolerances        Social History:  No  Tobacco: Denies  EtOH: Denies  Recreational drug use: Denies  Lives with: Wife at home   Ambulates: Independently   ADLs: Independent (27 May 2025 13:05)      REVIEW OF SYSTEMS:  CONSTITUTIONAL: No fever, No chills, No fatigue, No myalgia, No Body ache, No Weakness  EYES: No eye pain,  No visual disturbances, No discharge, NO Redness  ENMT:  No ear pain, No nose bleed, No vertigo; No sinus pain, NO throat pain, No Congestion  NECK: No pain, No stiffness  RESPIRATORY: No cough, NO wheezing, No  hemoptysis, NO  shortness of breath  CARDIOVASCULAR: No chest pain, palpitations  GASTROINTESTINAL: No abdominal pain, NO epigastric pain. No nausea, No vomiting; No diarrhea, No constipation. [  ] BM  GENITOURINARY: No dysuria, No frequency, No urgency, No hematuria, NO incontinence  NEUROLOGICAL: No headaches, No dizziness, No numbness, No tingling, No tremors, No weakness  EXT: No Swelling, No Pain, No Edema  SKIN:  [ x ] No itching, burning, rashes, or lesions   MUSCULOSKELETAL: No joint pain ,No Jt swelling; No muscle pain, No back pain, No extremity pain  PSYCHIATRIC: No depression,  No anxiety,  No mood swings ,No difficulty sleeping at night  PAIN SCALE: [x  ] None  [  ] Other-  ROS Unable to obtain due to - [  ] Dementia  [  ] Lethargy [  ] Drowsy [  ] Sedated [  ] non verbal  REST OF REVIEW Of SYSTEM - [  ] Normal     Vital Signs Last 24 Hrs  T(C): 36.3 (30 May 2025 05:05), Max: 37.2 (29 May 2025 21:43)  T(F): 97.4 (30 May 2025 05:05), Max: 99 (29 May 2025 21:43)  HR: 79 (30 May 2025 05:05) (79 - 102)  BP: 112/64 (30 May 2025 05:05) (94/57 - 112/64)  BP(mean): --  RR: 18 (30 May 2025 05:05) (18 - 18)  SpO2: 96% (30 May 2025 05:05) (94% - 96%)    Parameters below as of 30 May 2025 05:05  Patient On (Oxygen Delivery Method): room air      Finger Stick        05-29 @ 07:01  -  05-30 @ 07:00  --------------------------------------------------------  IN: 50 mL / OUT: 0 mL / NET: 50 mL        PHYSICAL EXAM:  GENERAL:  [ x ] NAD , [ x ] well appearing, [  ] Agitated, [  ] Drowsy,  [  ] Lethargy, [  ] confused   HEAD:  [  x] Normal, [  ] Other  EYES:  [ x ] EOMI, [ x ] PERRLA, [x  ] conjunctiva and sclera clear normal, [  ] Other,  [ x ] Pallor,[  ] Discharge  ENMT:  [x  ] Normal, [ x ] Moist mucous membranes, [x  ] Good dentition, [ x ] No Thrush  NECK:  [  x] Supple, [x  ] No JVD, [ x ] Normal thyroid, [  ] Lymphadenopathy [  ] Other  CHEST/LUNG:  [  x] Clear to auscultation bilaterally, [ x ] Breath Sounds equal B/L / Decrease, [x  ] poor effort  [x  ] No rales, [  x] No rhonchi  [ x ]  No wheezing,   HEART:  [ x ] Regular rate and rhythm, [  ] tachycardia, [  ] Bradycardia,  [  ] irregular  [  ] No murmurs, No rubs, No gallops, [  ] PPM in place (Mfr:  )  ABDOMEN:  [ x ] Soft, [ x ] Nontender, [ x ] Nondistended, [ x ] No mass, [ x ] Bowel sounds present, [  ] obese  NERVOUS SYSTEM:  [ x ] Alert & Oriented X3, [  ] Nonfocal  [  ] Confusion  [  ] Encephalopathic [  ] Sedated [  ] Unable to assess, [  ] Dementia [  ] Other-  EXTREMITIES: [x] left foot and ankle in dressing , Rt Foot TMA + P Pulse   LYMPH: No lymphadenopathy noted  SKIN:  [ x ] No rashes or lesions, [  ] Pressure Ulcers, [  ] ecchymosis, [  ] Skin Tears, [  ] Other      DIET: Diet, DASH/TLC:   Sodium & Cholesterol Restricted  Consistent Carbohydrate No Snacks (05-28-25 @ 12:07)      LABS:                        9.3    14.73 )-----------( 301      ( 30 May 2025 08:45 )             28.2     30 May 2025 08:45    138    |  103    |  22     ----------------------------<  197    4.5     |  27     |  1.20     Ca    8.7        30 May 2025 08:45    TPro  7.1    /  Alb  2.9    /  TBili  0.3    /  DBili  x      /  AST  10     /  ALT  18     /  AlkPhos  69     30 May 2025 08:45      Urinalysis Basic - ( 30 May 2025 08:45 )    Color: x / Appearance: x / SG: x / pH: x  Gluc: 197 mg/dL / Ketone: x  / Bili: x / Urobili: x   Blood: x / Protein: x / Nitrite: x   Leuk Esterase: x / RBC: x / WBC x   Sq Epi: x / Non Sq Epi: x / Bacteria: x        Culture Results:   No growth to date. (05-28 @ 10:00)  Culture Results:   No growth to date. (05-28 @ 10:00)  Culture Results:   No growth to date. (05-28 @ 10:00)  Culture Results:   No growth at 72 Hours (05-27 @ 09:10)  Culture Results:   No growth at 72 Hours (05-27 @ 09:00)          CARDIAC MARKERS ( 29 May 2025 09:00 )  x     / x     / x     / x     / 2.4 ng/mL          Culture - Tissue with Gram Stain (collected 28 May 2025 10:00)  Source: Tissue Other, LEFT CALCANEUS BONE  Gram Stain (28 May 2025 19:11):    No polymorphonuclear cells seen    No organisms seen  Preliminary Report (29 May 2025 12:48):    No growth to date.    Culture - Tissue with Gram Stain (collected 28 May 2025 10:00)  Source: Tissue Other, LEFT FIFTH METATARSAL BONE  Gram Stain (28 May 2025 19:10):    No polymorphonuclear cells seen    No organisms seen  Preliminary Report (29 May 2025 12:48):    No growth to date.    Culture - Tissue with Gram Stain (collected 28 May 2025 10:00)  Source: Tissue Other, LEFT HALLUX BONE  Gram Stain (28 May 2025 19:06):    No polymorphonuclear cells seen    No organisms seen  Preliminary Report (29 May 2025 12:50):    No growth to date.    Culture - Blood (collected 27 May 2025 09:10)  Source: Blood Blood-Peripheral  Preliminary Report (30 May 2025 12:00):    No growth at 72 Hours    Culture - Blood (collected 27 May 2025 09:00)  Source: Blood Blood-Peripheral  Preliminary Report (30 May 2025 12:00):    No growth at 72 Hours         Anemia Panel:      Thyroid Panel:                RADIOLOGY & ADDITIONAL TESTS:      HEALTH ISSUES - PROBLEM Dx:  DM foot ulcer    Anemia    PAD (peripheral artery disease)    Type 2 diabetes mellitus    HTN (hypertension)    Encounter for deep vein thrombosis (DVT) prophylaxis            Consultant(s) Notes Reviewed:  [  ] YES     Care Discussed with [X] Consultants  [  ] Patient  [  ] Family [  ] HCP [  ]   [  ] Social Service  [  ] RN, [  ] Physical Therapy,[  ] Palliative care team  DVT PPX: [  ] Lovenox, [  ] S C Heparin, [  ] Coumadin, [  ] Xarelto, [  ] Eliquis, [  ] Pradaxa, [  ] IV Heparin drip, [  ] SCD [  ] Contraindication 2 to GI Bleed,[  ] Ambulation [  ] Contraindicated 2 to  bleed [  ] Contraindicated 2 to Brain Bleed  Advanced directive: [  ] None, [  ] DNR/DNI Patient is a 64y old  Male who presents with a chief complaint of left foot osteomyelitis (30 May 2025 14:25)    HPI:  65 y/o M with PMHx DM2, osteomyelitis, CAD, PAD s/p RLE angioplasty 2020, s/p surgical amputation of R forefoot , s/p L PT angioplasty 12/2/24 for L lat foot DFU on plavix, HTN, s/p recent admission for left foot infection s/p PICC placement for IV abx  sent by Podiatry Dr. Stark for admission for OR intervention tomorrow. Patient denies any complaints today. States after discharge he had outpatient visits with Vascular physician. He had imaging done and was suggested to have LimFlow procedure. Patient requested to have surgery as per Podiatry for his left foot wound prior to LimFlow. Vascular agreed. Patient states he can walk independently without pain and is currently comfortable.   Of note, patient was transitioned from Rocephin which was to be given via PICC line after previous discharge to Ertapenem by ID this Saturday.     ED course:   Vitals: T 97.7 HR 99 /69   Labs: ESR 64 wbc 10.77   Imaging: Cxray- Right PICC with tip in the SVC.  The heart is not accurately assessed in this AP projection.  The lungs are clear.  There is no pneumothorax or pleural effusion.  No acute bony abnormality.  Clear lungs    (27 May 2025 13:05)    INTERVAL HPI:  5/28: Patient seen and examined post op. complains of mild pain 3-4/10 IV tylenol ordered otherwise denies complaints   5/29: Patient seen and examined at bedside. Denies complaints this AM however has bleeding from surgical site. RRT later called this AM (refer to rapid note) for weakness, diaphoresis likely vasovagal secondary to blood loss. Hb downtrended. Plan to hold Lovenox. Resume Asp and Plavix tomorrow. PT eval tomorrow , mild leukocytosis, drop in H/H  5/30: Patient seen and examined at bedside. Denies complaints this AM- no further bleeding. Aspirin, Plavix and Lovenox resumed. PT eval today recommends home PT, Low H/H     OVERNIGHT EVENTS: NONE    Home Medications:  atorvastatin 40 mg oral tablet: 1 tab(s) orally once a day (at bedtime) (27 May 2025 15:49)  Cinnamon: 1 cap(s) orally once a day (27 May 2025 15:51)  clopidogrel 75 mg oral tablet: 1 tab(s) orally once a day (27 May 2025 15:49)  gabapentin 300 mg oral capsule: 1 cap(s) orally 3 times a day (27 May 2025 15:49)  Jardiance 25 mg oral tablet: 1 tab(s) orally once a day (27 May 2025 15:49)  lisinopril 40 mg oral tablet: 1 tab(s) orally once a day (27 May 2025 15:49)  metFORMIN 1000 mg oral tablet: 1 tab(s) orally 2 times a day (27 May 2025 15:49)  metoprolol succinate 50 mg oral tablet, extended release: 1 tab(s) orally once a day (27 May 2025 15:49)  Trulicity Pen 1.5 mg/0.5 mL subcutaneous solution: 1.5 milligram(s) subcutaneously once a week (Sundays) (27 May 2025 15:52)  Tumeric : 1 cap orally once a day (27 May 2025 15:51)      MEDICATIONS  (STANDING):  aspirin enteric coated 81 milliGRAM(s) Oral daily  atorvastatin 40 milliGRAM(s) Oral at bedtime  cefepime   IVPB 2000 milliGRAM(s) IV Intermittent every 8 hours  cefepime   IVPB      clopidogrel Tablet 75 milliGRAM(s) Oral daily  dextrose 5%. 1000 milliLiter(s) (100 mL/Hr) IV Continuous <Continuous>  dextrose 5%. 1000 milliLiter(s) (50 mL/Hr) IV Continuous <Continuous>  dextrose 50% Injectable 25 Gram(s) IV Push once  dextrose 50% Injectable 12.5 Gram(s) IV Push once  dextrose 50% Injectable 25 Gram(s) IV Push once  enoxaparin Injectable 40 milliGRAM(s) SubCutaneous every 24 hours  gabapentin 300 milliGRAM(s) Oral three times a day  glucagon  Injectable 1 milliGRAM(s) IntraMuscular once  insulin glargine Injectable (LANTUS) 15 Unit(s) SubCutaneous every morning  insulin lispro (ADMELOG) corrective regimen sliding scale   SubCutaneous three times a day before meals  insulin lispro (ADMELOG) corrective regimen sliding scale   SubCutaneous at bedtime  insulin lispro Injectable (ADMELOG) 5 Unit(s) SubCutaneous three times a day before meals  lisinopril 40 milliGRAM(s) Oral daily  metoprolol succinate ER 50 milliGRAM(s) Oral daily  senna 2 Tablet(s) Oral at bedtime    MEDICATIONS  (PRN):  acetaminophen   IVPB .. 1000 milliGRAM(s) IV Intermittent every 8 hours PRN Mild Pain (1 - 3)  aluminum hydroxide/magnesium hydroxide/simethicone Suspension 30 milliLiter(s) Oral every 4 hours PRN Dyspepsia  dextrose Oral Gel 15 Gram(s) Oral once PRN Blood Glucose LESS THAN 70 milliGRAM(s)/deciliter  HYDROmorphone  Injectable 0.5 milliGRAM(s) IV Push every 4 hours PRN Moderate Pain (4 - 6)  HYDROmorphone  Injectable 1 milliGRAM(s) IV Push every 4 hours PRN Severe Pain (7 - 10)  melatonin 3 milliGRAM(s) Oral at bedtime PRN Insomnia  ondansetron Injectable 4 milliGRAM(s) IV Push every 8 hours PRN Nausea and/or Vomiting      Allergies    No Known Allergies    Intolerances        Social History:  No  Tobacco: Denies  EtOH: Denies  Recreational drug use: Denies  Lives with: Wife at home   Ambulates: Independently   ADLs: Independent (27 May 2025 13:05)      REVIEW OF SYSTEMS:  CONSTITUTIONAL: No fever, No chills, No fatigue, No myalgia, No Body ache, No Weakness  EYES: No eye pain,  No visual disturbances, No discharge, NO Redness  ENMT:  No ear pain, No nose bleed, No vertigo; No sinus pain, NO throat pain, No Congestion  NECK: No pain, No stiffness  RESPIRATORY: No cough, NO wheezing, No  hemoptysis, NO  shortness of breath  CARDIOVASCULAR: No chest pain, palpitations  GASTROINTESTINAL: No abdominal pain, NO epigastric pain. No nausea, No vomiting; No diarrhea, No constipation. [  ] BM  GENITOURINARY: No dysuria, No frequency, No urgency, No hematuria, NO incontinence  NEUROLOGICAL: No headaches, No dizziness, No numbness, No tingling, No tremors, No weakness  EXT: No Swelling, No Pain, No Edema  SKIN:  [ x ] No itching, burning, rashes, or lesions   MUSCULOSKELETAL: No joint pain ,No Jt swelling; No muscle pain, No back pain, No extremity pain  PSYCHIATRIC: No depression,  No anxiety,  No mood swings ,No difficulty sleeping at night  PAIN SCALE: [x  ] None  [  ] Other-  ROS Unable to obtain due to - [  ] Dementia  [  ] Lethargy [  ] Drowsy [  ] Sedated [  ] non verbal  REST OF REVIEW Of SYSTEM - [x  ] Normal     Vital Signs Last 24 Hrs  T(C): 36.3 (30 May 2025 05:05), Max: 37.2 (29 May 2025 21:43)  T(F): 97.4 (30 May 2025 05:05), Max: 99 (29 May 2025 21:43)  HR: 79 (30 May 2025 05:05) (79 - 102)  BP: 112/64 (30 May 2025 05:05) (94/57 - 112/64)  BP(mean): --  RR: 18 (30 May 2025 05:05) (18 - 18)  SpO2: 96% (30 May 2025 05:05) (94% - 96%)    Parameters below as of 30 May 2025 05:05  Patient On (Oxygen Delivery Method): room air      Finger Stick        05-29 @ 07:01  -  05-30 @ 07:00  --------------------------------------------------------  IN: 50 mL / OUT: 0 mL / NET: 50 mL        PHYSICAL EXAM:  GENERAL:  [ x ] NAD , [ x ] well appearing, [  ] Agitated, [  ] Drowsy,  [  ] Lethargy, [  ] confused   HEAD:  [  x] Normal, [  ] Other  EYES:  [ x ] EOMI, [ x ] PERRLA, [x  ] conjunctiva and sclera clear normal, [  ] Other,  [ x ] Pallor,[  ] Discharge  ENMT:  [x  ] Normal, [ x ] Moist mucous membranes, [x  ] Good dentition, [ x ] No Thrush  NECK:  [  x] Supple, [x  ] No JVD, [ x ] Normal thyroid, [  ] Lymphadenopathy [  ] Other  CHEST/LUNG:  [  x] Clear to auscultation bilaterally, [ x ] Breath Sounds equal B/L / Decrease, [x  ] poor effort  [x  ] No rales, [  x] No rhonchi  [ x ]  No wheezing,   HEART:  [ x ] Regular rate and rhythm, [  ] tachycardia, [  ] Bradycardia,  [  ] irregular  [ x ] No murmurs, No rubs, No gallops, [  ] PPM in place (Mfr:  )  ABDOMEN:  [ x ] Soft, [ x ] Nontender, [ x ] Nondistended, [ x ] No mass, [ x ] Bowel sounds present, [  ] obese += umblical hernia  NERVOUS SYSTEM:  [ x ] Alert & Oriented X3, [  ] Nonfocal  [  ] Confusion  [  ] Encephalopathic [  ] Sedated [  ] Unable to assess, [  ] Dementia [  ] Other-  EXTREMITIES: [x] left foot and ankle in dressing , Rt Foot TMA + P Pulse   LYMPH: No lymphadenopathy noted  SKIN:  [ x ] No rashes or lesions, [  ] Pressure Ulcers, [  ] ecchymosis, [  ] Skin Tears, [  ] Other      DIET: Diet, DASH/TLC:   Sodium & Cholesterol Restricted  Consistent Carbohydrate No Snacks (05-28-25 @ 12:07)      LABS:                        9.3    14.73 )-----------( 301      ( 30 May 2025 08:45 )             28.2     30 May 2025 08:45    138    |  103    |  22     ----------------------------<  197    4.5     |  27     |  1.20     Ca    8.7        30 May 2025 08:45    TPro  7.1    /  Alb  2.9    /  TBili  0.3    /  DBili  x      /  AST  10     /  ALT  18     /  AlkPhos  69     30 May 2025 08:45      Urinalysis Basic - ( 30 May 2025 08:45 )    Color: x / Appearance: x / SG: x / pH: x  Gluc: 197 mg/dL / Ketone: x  / Bili: x / Urobili: x   Blood: x / Protein: x / Nitrite: x   Leuk Esterase: x / RBC: x / WBC x   Sq Epi: x / Non Sq Epi: x / Bacteria: x        Culture Results:   No growth to date. (05-28 @ 10:00)  Culture Results:   No growth to date. (05-28 @ 10:00)  Culture Results:   No growth to date. (05-28 @ 10:00)  Culture Results:   No growth at 72 Hours (05-27 @ 09:10)  Culture Results:   No growth at 72 Hours (05-27 @ 09:00)          CARDIAC MARKERS ( 29 May 2025 09:00 )  x     / x     / x     / x     / 2.4 ng/mL          Culture - Tissue with Gram Stain (collected 28 May 2025 10:00)  Source: Tissue Other, LEFT CALCANEUS BONE  Gram Stain (28 May 2025 19:11):    No polymorphonuclear cells seen    No organisms seen  Preliminary Report (29 May 2025 12:48):    No growth to date.    Culture - Tissue with Gram Stain (collected 28 May 2025 10:00)  Source: Tissue Other, LEFT FIFTH METATARSAL BONE  Gram Stain (28 May 2025 19:10):    No polymorphonuclear cells seen    No organisms seen  Preliminary Report (29 May 2025 12:48):    No growth to date.    Culture - Tissue with Gram Stain (collected 28 May 2025 10:00)  Source: Tissue Other, LEFT HALLUX BONE  Gram Stain (28 May 2025 19:06):    No polymorphonuclear cells seen    No organisms seen  Preliminary Report (29 May 2025 12:50):    No growth to date.    Culture - Blood (collected 27 May 2025 09:10)  Source: Blood Blood-Peripheral  Preliminary Report (30 May 2025 12:00):    No growth at 72 Hours    Culture - Blood (collected 27 May 2025 09:00)  Source: Blood Blood-Peripheral  Preliminary Report (30 May 2025 12:00):    No growth at 72 Hours         RADIOLOGY & ADDITIONAL TESTS:      HEALTH ISSUES - PROBLEM Dx:  DM foot ulcer    Anemia    PAD (peripheral artery disease)    Type 2 diabetes mellitus    HTN (hypertension)    Encounter for deep vein thrombosis (DVT) prophylaxis      Consultant(s) Notes Reviewed:  [ x ] YES     Care Discussed with [X] Consultants  [ x ] Patient  [x  ] Family [  ] HCP [  ]   [  ] Social Service  [x  ] RN, [  ] Physical Therapy,[  ] Palliative care team  DVT PPX: [ x ] Lovenox, [  ] S C Heparin, [  ] Coumadin, [  ] Xarelto, [  ] Eliquis, [  ] Pradaxa, [  ] IV Heparin drip, [  ] SCD [  ] Contraindication 2 to GI Bleed,[  ] Ambulation [  ] Contraindicated 2 to  bleed [  ] Contraindicated 2 to Brain Bleed  Advanced directive: [ x ] None, [  ] DNR/DNI

## 2025-05-30 NOTE — PROGRESS NOTE ADULT - SUBJECTIVE AND OBJECTIVE BOX
[facility]  OFELIA Harvey Y. Patel, S. Shah, [facility]  [phone]    Name: LOIDA QUEZADA  Age: 64y  Gender: Male  MRN: [id number]    Interval History:  No acute overnight events.   Notes reviewed    Antibiotics:  cefepime   IVPB 2000 milliGRAM(s) IV Intermittent every 8 hours  cefepime   IVPB          Medications:  acetaminophen   IVPB .. 1000 milliGRAM(s) IV Intermittent every 8 hours PRN  aluminum hydroxide/magnesium hydroxide/simethicone Suspension 30 milliLiter(s) Oral every 4 hours PRN  aspirin enteric coated 81 milliGRAM(s) Oral daily  atorvastatin 40 milliGRAM(s) Oral at bedtime  cefepime   IVPB 2000 milliGRAM(s) IV Intermittent every 8 hours  cefepime   IVPB      clopidogrel Tablet 75 milliGRAM(s) Oral daily  dextrose 5%. 1000 milliLiter(s) IV Continuous <Continuous>  dextrose 5%. 1000 milliLiter(s) IV Continuous <Continuous>  dextrose 50% Injectable 25 Gram(s) IV Push once  dextrose 50% Injectable 12.5 Gram(s) IV Push once  dextrose 50% Injectable 25 Gram(s) IV Push once  dextrose Oral Gel 15 Gram(s) Oral once PRN  enoxaparin Injectable 40 milliGRAM(s) SubCutaneous every 24 hours  gabapentin 300 milliGRAM(s) Oral three times a day  glucagon  Injectable 1 milliGRAM(s) IntraMuscular once  HYDROmorphone  Injectable 0.5 milliGRAM(s) IV Push every 4 hours PRN  HYDROmorphone  Injectable 1 milliGRAM(s) IV Push every 4 hours PRN  insulin glargine Injectable (LANTUS) 15 Unit(s) SubCutaneous every morning  insulin lispro (ADMELOG) corrective regimen sliding scale   SubCutaneous three times a day before meals  insulin lispro (ADMELOG) corrective regimen sliding scale   SubCutaneous at bedtime  insulin lispro Injectable (ADMELOG) 5 Unit(s) SubCutaneous three times a day before meals  lisinopril 40 milliGRAM(s) Oral daily  melatonin 3 milliGRAM(s) Oral at bedtime PRN  metoprolol succinate ER 50 milliGRAM(s) Oral daily  ondansetron Injectable 4 milliGRAM(s) IV Push every 8 hours PRN  senna 2 Tablet(s) Oral at bedtime      Review of Systems:  A 10-point review of systems was obtained.   Review of systems otherwise negative except as previously noted.    Allergies: No Known Allergies    For details regarding the patient's past medical history, social history, family history, and other miscellaneous elements, please refer the initial infectious diseases consultation and/or the admitting history and physical examination for this admission.    Objective:  Vitals:   T(C): 36.3 (05-30-25 @ 05:05), Max: 37.2 (05-29-25 @ 21:43)  HR: 79 (05-30-25 @ 05:05) (79 - 102)  BP: 112/64 (05-30-25 @ 05:05) (94/57 - 112/64)  RR: 18 (05-30-25 @ 05:05) (18 - 18)  SpO2: 96% (05-30-25 @ 05:05) (94% - 96%)    Physical Examination:  General: no acute distress  HEENT: NC/AT, EOMI  Cardio: RRR  Resp: decreased breath sounds   Abd: soft, NT, ND  Ext: no edema or cyanosis  Skin: warm, dry, no visible rash      Laboratory Studies:  CBC:                       9.3    14.73 )-----------( 301      ( 30 May 2025 08:45 )             28.2     CMP: 05-30    138  |  103  |  22  ----------------------------<  197[H]  4.5   |  27  |  1.20    Ca    8.7      30 May 2025 08:45    TPro  7.1  /  Alb  2.9[L]  /  TBili  0.3  /  DBili  x   /  AST  10[L]  /  ALT  18  /  AlkPhos  69  05-30    LIVER FUNCTIONS - ( 30 May 2025 08:45 )  Alb: 2.9 g/dL / Pro: 7.1 g/dL / ALK PHOS: 69 U/L / ALT: 18 U/L / AST: 10 U/L / GGT: x           Urinalysis Basic - ( 30 May 2025 08:45 )    Color: x / Appearance: x / SG: x / pH: x  Gluc: 197 mg/dL / Ketone: x  / Bili: x / Urobili: x   Blood: x / Protein: x / Nitrite: x   Leuk Esterase: x / RBC: x / WBC x   Sq Epi: x / Non Sq Epi: x / Bacteria: x        Microbiology: reviewed    Culture - Tissue with Gram Stain (collected 05-28-25 @ 10:00)  Source: Tissue Other, LEFT CALCANEUS BONE  Gram Stain (05-28-25 @ 19:11):    No polymorphonuclear cells seen    No organisms seen  Preliminary Report (05-29-25 @ 12:48):    No growth to date.    Culture - Tissue with Gram Stain (collected 05-28-25 @ 10:00)  Source: Tissue Other, LEFT FIFTH METATARSAL BONE  Gram Stain (05-28-25 @ 19:10):    No polymorphonuclear cells seen    No organisms seen  Preliminary Report (05-29-25 @ 12:48):    No growth to date.    Culture - Tissue with Gram Stain (collected 05-28-25 @ 10:00)  Source: Tissue Other, LEFT HALLUX BONE  Gram Stain (05-28-25 @ 19:06):    No polymorphonuclear cells seen    No organisms seen  Preliminary Report (05-29-25 @ 12:50):    No growth to date.    Culture - Blood (collected 05-27-25 @ 09:10)  Source: Blood Blood-Peripheral  Preliminary Report (05-30-25 @ 12:00):    No growth at 72 Hours    Culture - Blood (collected 05-27-25 @ 09:00)  Source: Blood Blood-Peripheral  Preliminary Report (05-30-25 @ 12:00):    No growth at 72 Hours          Radiology: reviewed

## 2025-05-30 NOTE — PROGRESS NOTE ADULT - ASSESSMENT
63 y/o M with PMHx DM2, osteomyelitis, CAD, PAD s/p RLE angioplasty 2020, s/p surgical amputation of R forefoot , s/p L PT angioplasty 12/2/24 for L lat foot DFU on plavix, HTN, s/p recent admission for left foot infection s/p PICC placement for IV abx  sent by Podiatry Dr. Stark for admission for OR left foot partial 1st and 5th ray resection, heel debridement with antibiotic.

## 2025-05-30 NOTE — CASE MANAGEMENT PROGRESS NOTE - NSCMPROGRESSNOTE_GEN_ALL_CORE
Patient discussed during morning round.POD#2. Patient remains on Cefepime. Patient evaluated by Physical therapy, recommendation is or Home PT. Referral initiated for Home Care and Home Infusion. Surgical Path pending. Case Management will continue to follow.

## 2025-05-30 NOTE — PHYSICAL THERAPY INITIAL EVALUATION ADULT - MANUAL MUSCLE TESTING RESULTS, REHAB EVAL
no strength deficits were identified
(B) UE and (B) LE >3+/5 upon functional assessment against gravity.

## 2025-05-30 NOTE — PROGRESS NOTE ADULT - ASSESSMENT
65 y/o M with PMHx DM2, osteomyelitis, CAD, PAD s/p RLE angioplasty 2020, s/p surgical amputation of R forefoot , s/p L PT angioplasty 12/2/24 for L lat foot DFU on plavix, HTN, s/p recent admission for left foot infection s/p PICC placement for IV abx  sent by Podiatry Dr. Stark for admission for OR intervention tomorrow.    L foot OM/DM Foot Ulcer  PAD  - sent for OR tomorrow 5/28 for left foot debridement and partial 1st toe amputation 5/28   - MRI of the left foot demonstrates ulceration at the great toe with underlying cortical destruction of the distal phalanx and acute osteomyelitis involving the distal and proximal phalanx of the great toe.  Focal ulceration at the base of the fifth metatarsal with underlying marrow edema within the fifth metatarsal base and intramedullary cavity. Finding is indeterminate and may represent concurrent early osteomyelitis versus reactive changes.  Marrow edema involving the distal phalanx and middle phalanx of the little toe without definitive corresponding ulceration or abnormal T1 weighted signal.   Nonspecific bone marrow edema pattern within the intermediate cuneiform, lateral cuneiform, and navicular bone favored to represent reactive changes from osteoarthrosis.    Review of prior ID notes show that the patient was sent out  on ceftriaxone 2gm q24h x6 week course IV Abx until 6/10/25  In in the interim pt was noted to have worsening leukocytosis, transitioned to ertapenem since 5/24    PROCEDURES:  Detachment, debridement, and reattachment of peroneus brevis tendon 28-May-2025 10:07:14  Ricki Wilson  Detachment at left foot, partial 1st ray, open approach 28-May-2025 10:07:26  Ricki Wilson  Detachment at left foot, partial 5th ray, open approach 28-May-2025 10:07:40  Ricki Wilson  Debridement, soft tissue and bone, heel region of diabetic foot 28-May-2025 10:07:50  Ricki Wilson  Non-viable bone and soft tissue.    OR cx NGTD    Now on cefepime (5/28-)    Recommendations:  C/w cefepime  F/u path  Trend temps/WBC  Podiatry following  Further recs to follow pending above    Patient evaluated with face-to-face time in addition to reviewing history, labs, microbiology, and imaging.   Antibiotic stewardship, local antibiogram, infection control strategies and potential transmission issues taken into consideration at time of treatment decision making process.   Thank you for allowing us to participate in the care of your patient.  Dr. Lyn covering weekend service from 05/31/25-06/01/25  I will resume care 6/02/25  Infectious Diseases will follow. Please call with any questions.  Melany Calhoun M.D.  Available on [employer] TEAMS -- *[facility]*  [facility] [phone]  For after 5 P.M. and weekends, please call [phone]

## 2025-05-30 NOTE — PROVIDER CONTACT NOTE (OTHER) - SITUATION
patient complained of throbbing pain at lt foot (s/p surgery) pain score 5; patient requested IV tylenol.

## 2025-05-31 ENCOUNTER — TRANSCRIPTION ENCOUNTER (OUTPATIENT)
Age: 65
End: 2025-05-31

## 2025-05-31 LAB
ALBUMIN SERPL ELPH-MCNC: 2.5 G/DL — LOW (ref 3.3–5)
ALP SERPL-CCNC: 63 U/L — SIGNIFICANT CHANGE UP (ref 40–120)
ALT FLD-CCNC: 16 U/L — SIGNIFICANT CHANGE UP (ref 12–78)
ANION GAP SERPL CALC-SCNC: 5 MMOL/L — SIGNIFICANT CHANGE UP (ref 5–17)
AST SERPL-CCNC: 9 U/L — LOW (ref 15–37)
BILIRUB SERPL-MCNC: 0.3 MG/DL — SIGNIFICANT CHANGE UP (ref 0.2–1.2)
BUN SERPL-MCNC: 22 MG/DL — SIGNIFICANT CHANGE UP (ref 7–23)
CALCIUM SERPL-MCNC: 8.4 MG/DL — LOW (ref 8.5–10.1)
CHLORIDE SERPL-SCNC: 107 MMOL/L — SIGNIFICANT CHANGE UP (ref 96–108)
CO2 SERPL-SCNC: 26 MMOL/L — SIGNIFICANT CHANGE UP (ref 22–31)
CREAT SERPL-MCNC: 1.1 MG/DL — SIGNIFICANT CHANGE UP (ref 0.5–1.3)
EGFR: 74 ML/MIN/1.73M2 — SIGNIFICANT CHANGE UP
EGFR: 74 ML/MIN/1.73M2 — SIGNIFICANT CHANGE UP
GLUCOSE BLDC GLUCOMTR-MCNC: 167 MG/DL — HIGH (ref 70–99)
GLUCOSE BLDC GLUCOMTR-MCNC: 170 MG/DL — HIGH (ref 70–99)
GLUCOSE BLDC GLUCOMTR-MCNC: 184 MG/DL — HIGH (ref 70–99)
GLUCOSE BLDC GLUCOMTR-MCNC: 188 MG/DL — HIGH (ref 70–99)
GLUCOSE BLDC GLUCOMTR-MCNC: 203 MG/DL — HIGH (ref 70–99)
GLUCOSE BLDC GLUCOMTR-MCNC: 204 MG/DL — HIGH (ref 70–99)
GLUCOSE SERPL-MCNC: 185 MG/DL — HIGH (ref 70–99)
GRAM STN FLD: ABNORMAL
GRAM STN FLD: ABNORMAL
HCT VFR BLD CALC: 25.7 % — LOW (ref 39–50)
HGB BLD-MCNC: 8.4 G/DL — LOW (ref 13–17)
MCHC RBC-ENTMCNC: 29.6 PG — SIGNIFICANT CHANGE UP (ref 27–34)
MCHC RBC-ENTMCNC: 32.7 G/DL — SIGNIFICANT CHANGE UP (ref 32–36)
MCV RBC AUTO: 90.5 FL — SIGNIFICANT CHANGE UP (ref 80–100)
NRBC BLD AUTO-RTO: 0 /100 WBCS — SIGNIFICANT CHANGE UP (ref 0–0)
PLATELET # BLD AUTO: 270 K/UL — SIGNIFICANT CHANGE UP (ref 150–400)
POTASSIUM SERPL-MCNC: 4.7 MMOL/L — SIGNIFICANT CHANGE UP (ref 3.5–5.3)
POTASSIUM SERPL-SCNC: 4.7 MMOL/L — SIGNIFICANT CHANGE UP (ref 3.5–5.3)
PROT SERPL-MCNC: 6.5 G/DL — SIGNIFICANT CHANGE UP (ref 6–8.3)
RBC # BLD: 2.84 M/UL — LOW (ref 4.2–5.8)
RBC # FLD: 13.3 % — SIGNIFICANT CHANGE UP (ref 10.3–14.5)
SODIUM SERPL-SCNC: 138 MMOL/L — SIGNIFICANT CHANGE UP (ref 135–145)
WBC # BLD: 13.86 K/UL — HIGH (ref 3.8–10.5)
WBC # FLD AUTO: 13.86 K/UL — HIGH (ref 3.8–10.5)

## 2025-05-31 PROCEDURE — 99232 SBSQ HOSP IP/OBS MODERATE 35: CPT

## 2025-05-31 RX ORDER — HYDROMORPHONE/SOD CHLOR,ISO/PF 2 MG/10 ML
0.5 SYRINGE (ML) INJECTION
Refills: 0 | Status: DISCONTINUED | OUTPATIENT
Start: 2025-05-31 | End: 2025-05-31

## 2025-05-31 RX ORDER — TRAMADOL HYDROCHLORIDE 50 MG/1
50 TABLET, FILM COATED ORAL EVERY 6 HOURS
Refills: 0 | Status: DISCONTINUED | OUTPATIENT
Start: 2025-05-31 | End: 2025-06-04

## 2025-05-31 RX ORDER — HYDROMORPHONE/SOD CHLOR,ISO/PF 2 MG/10 ML
0.5 SYRINGE (ML) INJECTION
Refills: 0 | Status: DISCONTINUED | OUTPATIENT
Start: 2025-05-31 | End: 2025-06-04

## 2025-05-31 RX ORDER — HYDROMORPHONE/SOD CHLOR,ISO/PF 2 MG/10 ML
0.5 SYRINGE (ML) INJECTION EVERY 4 HOURS
Refills: 0 | Status: DISCONTINUED | OUTPATIENT
Start: 2025-05-31 | End: 2025-05-31

## 2025-05-31 RX ADMIN — Medication 81 MILLIGRAM(S): at 11:58

## 2025-05-31 RX ADMIN — LISINOPRIL 40 MILLIGRAM(S): 5 TABLET ORAL at 05:11

## 2025-05-31 RX ADMIN — CEFEPIME 100 MILLIGRAM(S): 2 INJECTION, POWDER, FOR SOLUTION INTRAVENOUS at 22:13

## 2025-05-31 RX ADMIN — Medication 3 MILLIGRAM(S): at 21:31

## 2025-05-31 RX ADMIN — INSULIN LISPRO 5 UNIT(S): 100 INJECTION, SOLUTION INTRAVENOUS; SUBCUTANEOUS at 08:32

## 2025-05-31 RX ADMIN — INSULIN LISPRO 4: 100 INJECTION, SOLUTION INTRAVENOUS; SUBCUTANEOUS at 08:31

## 2025-05-31 RX ADMIN — GABAPENTIN 300 MILLIGRAM(S): 400 CAPSULE ORAL at 13:13

## 2025-05-31 RX ADMIN — INSULIN LISPRO 4: 100 INJECTION, SOLUTION INTRAVENOUS; SUBCUTANEOUS at 17:02

## 2025-05-31 RX ADMIN — CLOPIDOGREL BISULFATE 75 MILLIGRAM(S): 75 TABLET, FILM COATED ORAL at 11:59

## 2025-05-31 RX ADMIN — TRAMADOL HYDROCHLORIDE 50 MILLIGRAM(S): 50 TABLET, FILM COATED ORAL at 16:16

## 2025-05-31 RX ADMIN — INSULIN LISPRO 5 UNIT(S): 100 INJECTION, SOLUTION INTRAVENOUS; SUBCUTANEOUS at 17:01

## 2025-05-31 RX ADMIN — INSULIN GLARGINE-YFGN 15 UNIT(S): 100 INJECTION, SOLUTION SUBCUTANEOUS at 08:32

## 2025-05-31 RX ADMIN — GABAPENTIN 300 MILLIGRAM(S): 400 CAPSULE ORAL at 21:30

## 2025-05-31 RX ADMIN — GABAPENTIN 300 MILLIGRAM(S): 400 CAPSULE ORAL at 05:11

## 2025-05-31 RX ADMIN — Medication 400 MILLIGRAM(S): at 04:27

## 2025-05-31 RX ADMIN — INSULIN LISPRO 5 UNIT(S): 100 INJECTION, SOLUTION INTRAVENOUS; SUBCUTANEOUS at 12:27

## 2025-05-31 RX ADMIN — Medication 2 TABLET(S): at 21:31

## 2025-05-31 RX ADMIN — INSULIN LISPRO 2: 100 INJECTION, SOLUTION INTRAVENOUS; SUBCUTANEOUS at 12:27

## 2025-05-31 RX ADMIN — ENOXAPARIN SODIUM 40 MILLIGRAM(S): 100 INJECTION SUBCUTANEOUS at 17:05

## 2025-05-31 RX ADMIN — TRAMADOL HYDROCHLORIDE 50 MILLIGRAM(S): 50 TABLET, FILM COATED ORAL at 15:16

## 2025-05-31 RX ADMIN — Medication 1000 MILLIGRAM(S): at 05:27

## 2025-05-31 RX ADMIN — ATORVASTATIN CALCIUM 40 MILLIGRAM(S): 80 TABLET, FILM COATED ORAL at 22:13

## 2025-05-31 RX ADMIN — METOPROLOL SUCCINATE 50 MILLIGRAM(S): 50 TABLET, EXTENDED RELEASE ORAL at 05:11

## 2025-05-31 RX ADMIN — CEFEPIME 100 MILLIGRAM(S): 2 INJECTION, POWDER, FOR SOLUTION INTRAVENOUS at 13:13

## 2025-05-31 RX ADMIN — CEFEPIME 100 MILLIGRAM(S): 2 INJECTION, POWDER, FOR SOLUTION INTRAVENOUS at 05:11

## 2025-05-31 NOTE — PROGRESS NOTE ADULT - ASSESSMENT
64M DM2, hx osteomyelitis, CAD, PAD s/p RLE angioplasty , s/p surgical amputation of R forefoot , s/p L PT angioplasty 24 for L lat foot DFU on plavix, HTN, HLD sent in by wound clinic for left foot infections and multiple wounds. Vascular surgery to evaluate further with LLE angiogram.     - Pt p/w L foot infection with multiple wounds sent in by [facility], podiatry following   - S/p LLE angio ,  vascular following   - S/p debridement on  w/o cardiac complications  - S/p RRT  for lethargy ? vasovagal, now asymptomatic   - Continue antibiotics per ID  - Hgb downtrendin.4 today.   - C/w ASA, plavix, lovenox for now. No current s/s bleeding      - EKG: Sinus rhythm with premature supraventricular complexes  - No evidence of any active ischemia   - NST done 2024 with small sized, mild defect in the basal inferior wall that is fixed and partially normalizes with prone, suggestive of diaphragmatic artifact.   - Continue ASA, Plavix  - Continue Lipitor      - Previous TTE (24) shows EF 61%, Normal LV mass and diastolic function, Normal RV size and function.  - No evidence of any meaningful volume overload     - BP stable and controlled   - Continue BB and lisinopril     - Follows with Dr. Chu (o/p cardiologist)   - Monitor and replete lytes, keep K>4, Mg>2.  - Will continue to follow.

## 2025-05-31 NOTE — PROGRESS NOTE ADULT - SUBJECTIVE AND OBJECTIVE BOX
PROGRESS NOTE   Patient is a 64y old  Male who presents with a chief complaint of left foot osteomyelitis (31 May 2025 13:02)      HPI:  63 y/o M with PMHx DM2, osteomyelitis, CAD, PAD s/p RLE angioplasty 2020, s/p surgical amputation of R forefoot , s/p L PT angioplasty 12/2/24 for L lat foot DFU on plavix, HTN, s/p recent admission for left foot infection s/p PICC placement for IV abx  sent by Podiatry Dr. Stark for admission for OR intervention tomorrow. Patient denies any complaints today. States after discharge he had outpatient visits with Vascular physician. He had imaging done and was suggested to have LimFlow procedure. Patient requested to have surgery as per Podiatry for his left foot wound prior to LimFlow. Vascular agreed. Patient states he can walk independently without pain and is currently comfortable.   Of note, patient was transitioned from Rocephin which was to be given via PICC line after previous discharge to Ertapenem by ID this Saturday.     ED course:   Vitals: T 97.7 HR 99 /69   Labs: ESR 64 wbc 10.77   Imaging: Cxray- Right PICC with tip in the SVC.  The heart is not accurately assessed in this AP projection.  The lungs are clear.  There is no pneumothorax or pleural effusion.  No acute bony abnormality.  Clear lungs    (27 May 2025 13:05)      Vital Signs Last 24 Hrs  T(C): 37.4 (31 May 2025 12:01), Max: 37.4 (31 May 2025 12:01)  T(F): 99.3 (31 May 2025 12:01), Max: 99.3 (31 May 2025 12:01)  HR: 93 (31 May 2025 12:01) (92 - 96)  BP: 105/67 (31 May 2025 12:01) (96/55 - 122/65)  BP(mean): --  RR: 18 (31 May 2025 12:01) (18 - 18)  SpO2: 98% (31 May 2025 12:01) (96% - 98%)    Parameters below as of 31 May 2025 12:01  Patient On (Oxygen Delivery Method): room air                              8.4    13.86 )-----------( 270      ( 31 May 2025 05:40 )             25.7               05-31    138  |  107  |  22  ----------------------------<  185[H]  4.7   |  26  |  1.10    Ca    8.4[L]      31 May 2025 05:40    TPro  6.5  /  Alb  2.5[L]  /  TBili  0.3  /  DBili  x   /  AST  9[L]  /  ALT  16  /  AlkPhos  63  05-31        PHYSICAL EXAM:  Vascular: Dorsalis Pedis and Posterior Tibial pulses non palpable.  Capillary re-fill time greater then 3 seconds digits 1-5 left, TMA right    Neuro: Protective sensation diminished to the level of the digits bilateral.  MSK: Muscle strength 5/5 all major muscle groups bilateral. Noted right foot tma  Dermatological: Incision site of the left foot noted with intact sutures, no dehiscence, mild efrain-incision erythema, no proximal streaking, no fluctuance, no malodor, no signs of infection at this time.      Surgical pathology report    Final Diagnosis    1.  Left hallux bone:  -   Acute and chronic osteomyelitis of bone.  -   Ulceration and acute inflammation of skin with viable skin  margin.    2.  Left first metatarsal bone:  -   Acute and chronic osteomyelitis of bone.    3.  Left fifth metatarsal bone:  -   Acute and chronic osteomyelitis of bone.    4.  Left calcaneus bone:  -   Acute and chronic osteomyelitis of bone.  -   Fat necrosis of bone marrow.    5.  Left first metatarsal proximal margin:  -   Focal mild chronic osteomyelitis of bone.    6.  Left fifth metatarsal distal margin:  -   Focal mild chronic osteomyelitis of bone.

## 2025-05-31 NOTE — PROGRESS NOTE ADULT - PROBLEM SELECTOR PLAN 4
Type 2 diabetes mellitus.   ·  Plan: Chronic  -On home Trulicity, Jardiance and Metformin  -Hold home oral meds  -MDISS with FS QAC/QHS   -Lantus 15u qhs and 5u pre meal   -Hypoglycemia protocol  -A1c 7.9  -Pt with neuropathy continue Gabapentin 300mg TID  -Endo Dr. Perlman consulted. Type 2 diabetes mellitus.   ·  Plan: Chronic  -On home Trulicity, Jardiance and Metformin  -Hold home oral meds  -MDISS with FS QAC/QHS   -Lantus 15u qhs and 5u pre meal   -Hypoglycemia protocol  -A1c 7.9  -Pt with neuropathy continue Gabapentin 300mg TID  -Endo Dr. Perlman follow up

## 2025-05-31 NOTE — DISCHARGE NOTE NURSING/CASE MANAGEMENT/SOCIAL WORK - PATIENT PORTAL LINK FT
You can access the FollowMyHealth Patient Portal offered by  by registering at the following website: [email or web address]. By joining [facility]s FollowMyHealth portal, you will also be able to view your health information using other applications (apps) compatible with our system.

## 2025-05-31 NOTE — DISCHARGE NOTE NURSING/CASE MANAGEMENT/SOCIAL WORK - FINANCIAL ASSISTANCE
[facility] provides services at a reduced cost to those who are determined to be eligible through [facility]’s financial assistance program. Information regarding [facility]’s financial assistance program can be found by going to [email or web address] or by calling [phone].

## 2025-05-31 NOTE — DISCHARGE NOTE PROVIDER - NSDCFUSCHEDAPPT_GEN_ALL_CORE_FT
[facility]  HYPERBARIC  OldCntr  Scheduled Appointment: 06/02/2025    Oscar Crane  [facility]  PLV Preadmit  Scheduled Appointment: 06/02/2025    [facility]  HYPERBARIC  OldCntr  Scheduled Appointment: 06/03/2025    [facility]  HYPERBARIC  OldCntr  Scheduled Appointment: 06/04/2025    [facility]  HYPERBARIC  OldCntr  Scheduled Appointment: 06/05/2025    [facility]  HYPERBARIC  OldCntr  Scheduled Appointment: 06/06/2025     [facility]  HYPERBARIC  OldCntr  Scheduled Appointment: 06/03/2025    [facility]  PLV Preadmit  Scheduled Appointment: 06/03/2025    [facility]  HYPERBARIC  OldCntr  Scheduled Appointment: 06/04/2025    Doctor, [facility]  PLV Preadmit  Scheduled Appointment: 06/04/2025    [facility]  HYPERBARIC  OldCntr  Scheduled Appointment: 06/05/2025    [facility]  HYPERBARIC  OldCntr  Scheduled Appointment: 06/06/2025     [facility]  HYPERBARIC  OldCntr  Scheduled Appointment: 06/04/2025    Oscar Crane  [facility]  PLV Preadmit  Scheduled Appointment: 06/04/2025    [facility]  HYPERBARIC  OldCntr  Scheduled Appointment: 06/05/2025    [facility]  HYPERBARIC  OldCntr  Scheduled Appointment: 06/06/2025    [facility]  HYPERBARIC  OldCntr  Scheduled Appointment: 06/09/2025    [facility]  HYPERBARIC  OldCntr  Scheduled Appointment: 06/10/2025    [facility]  HYPERBARIC  OldCntr  Scheduled Appointment: 06/11/2025    [facility]  HYPERBARIC  OldCntr  Scheduled Appointment: 06/12/2025    [facility]  HYPERBARIC  OldCntr  Scheduled Appointment: 06/13/2025     [facility]  HYPERBARIC  OldCntr  Scheduled Appointment: 06/05/2025    DoctorOscar  [facility]  PLV Preadmit  Scheduled Appointment: 06/05/2025    [facility]  HYPERBARIC  OldCntr  Scheduled Appointment: 06/06/2025    Doctor, [facility]  PLV Preadmit  Scheduled Appointment: 06/06/2025    [facility]  HYPERBARIC  OldCntr  Scheduled Appointment: 06/09/2025    [facility]  HYPERBARIC  OldCntr  Scheduled Appointment: 06/10/2025    [facility]  HYPERBARIC  OldCntr  Scheduled Appointment: 06/11/2025    [facility]  HYPERBARIC  OldCntr  Scheduled Appointment: 06/12/2025    [facility]  HYPERBARIC  OldCntr  Scheduled Appointment: 06/13/2025

## 2025-05-31 NOTE — DISCHARGE NOTE PROVIDER - CARE PROVIDER_API CALL
Dannie Frederick  Internal Medicine  [address] [place] [id number]  Phone: [phone]  Fax: [phone]  Established Patient  Follow Up Time: 1 week   Dannie Frederick  Internal Medicine  [address] [place] [id number]  Phone: [phone]  Fax: [phone]  Established Patient  Follow Up Time: 1 week    Natasha Brasher  Infectious Disease  [address] [place] [address], [facility] [address]  [place] [address]  Phone: [phone]  Fax: [phone]  Follow Up Time: 1 week   Dannie Frederick  Internal Medicine  [address] [place] [address]  Phone: [phone]  Fax: [phone]  Established Patient  Follow Up Time: 1 week    Natasha Brasher  Infectious Disease  [address] [place] [address], [facility] [address]  [place] [address]  Phone: [phone]  Fax: [phone]  Follow Up Time: 1 week    Joe StarkTitus  Podiatric Medicine  [address] [place] [id number]  Phone: [phone]  Fax: [phone]  Follow Up Time: 1-3 days

## 2025-05-31 NOTE — PROGRESS NOTE ADULT - PROBLEM SELECTOR PLAN 2
H/H 8.4 today slowly dropping   - RRT 5/29 for vasovagal syncope 2/2 to acute blood loss  - Type and screen ordered  - F/u hb closely   - Continue Aspirin, Lovenox and Plavix as bleeding has stopped from surgical site

## 2025-05-31 NOTE — DISCHARGE NOTE PROVIDER - CARE PROVIDERS DIRECT ADDRESSES
,DirectAddress_Unknown ,DirectAddress_Unknown,[email or web address] ,DirectAddress_Unknown,[email or web address],DirectAddress_Unknown

## 2025-05-31 NOTE — DISCHARGE NOTE PROVIDER - NSDCFUADDINST_GEN_ALL_CORE_FT
Non-weight bearing left lower extremity Non-weight bearing left lower extremity  Dressing change at [facility] Non-weight bearing left lower extremity  Dressing change at [facility]  ID Recs:  C/w ertapenem 1gm q24h for ease of dosing; Plan x6 weeks until 7/8/25  --weekly CMP, CBC, ESR, CRP  --orders called into [facility]  --pt to call [phone] to f/u Dr. Brasher  minocycline 200mg BID PO (6/2-); Plan x6 weeks until 7/13/25

## 2025-05-31 NOTE — CASE MANAGEMENT PROGRESS NOTE - NSCMPROGRESSNOTE_GEN_ALL_CORE
CM WE Follow up:  Pt not likely for DC per MD, pt is pending ID recommendations, pending wound care recommendations, pending podiatry. Pt has PICC line. pt previously known to [facility] for [facility]. CM initiated [facility]CI referral. CM to remain avialable

## 2025-05-31 NOTE — PROGRESS NOTE ADULT - PROBLEM SELECTOR PLAN 3
Chronic. Pt follows with Vascular outpt. Pt s/p L PT angioplasty 12/2/24 for L lat foot DFU on Plavix  -C/w Lipitor and Aspirin and Plavix No

## 2025-05-31 NOTE — DISCHARGE NOTE PROVIDER - NSDCHHNEEDSERVICE_GEN_ALL_CORE
Rehabilitation services Medication teaching and assessment/Rehabilitation services/Teaching and training/Wound care and assessment

## 2025-05-31 NOTE — PROGRESS NOTE ADULT - ASSESSMENT
65 y/o M with PMHx DM2, osteomyelitis, CAD, PAD s/p RLE angioplasty 2020, s/p surgical amputation of R forefoot , s/p L PT angioplasty 12/2/24 for L lat foot DFU on plavix, HTN, s/p recent admission for left foot infection s/p PICC placement for IV abx  sent by Podiatry Dr. Stark for admission for OR intervention tomorrow.    L foot OM/DM Foot Ulcer  PAD  - sent for OR 5/28 for left foot debridement and partial 1st toe amputation 5/28   - MRI of the left foot demonstrates ulceration at the great toe with underlying cortical destruction of the distal phalanx and acute osteomyelitis involving the distal and proximal phalanx of the great toe.  Focal ulceration at the base of the fifth metatarsal with underlying marrow edema within the fifth metatarsal base and intramedullary cavity. Finding is indeterminate and may represent concurrent early osteomyelitis versus reactive changes.  Marrow edema involving the distal phalanx and middle phalanx of the little toe without definitive corresponding ulceration or abnormal T1 weighted signal.   Nonspecific bone marrow edema pattern within the intermediate cuneiform, lateral cuneiform, and navicular bone favored to represent reactive changes from osteoarthrosis.    Review of prior ID notes show that the patient was sent out  on ceftriaxone 2gm q24h x6 week course IV Abx until 6/10/25  In in the interim pt was noted to have worsening leukocytosis, transitioned to ertapenem since 5/24    PROCEDURES:  Detachment, debridement, and reattachment of peroneus brevis tendon 28-May-2025 10:07:14  Ricki Wilson  Detachment at left foot, partial 1st ray, open approach 28-May-2025 10:07:26  Ricki Wilson  Detachment at left foot, partial 5th ray, open approach 28-May-2025 10:07:40  Ricki Wilson  Debridement, soft tissue and bone, heel region of diabetic foot 28-May-2025 10:07:50  Ricki Wilson  Non-viable bone and soft tissue.    OR cx NGTD  PaTH-clean margin - Left first metatarsal proximal margin:  -   Focal mild chronic osteomyelitis of bone.  Now on cefepime (5/28-)continue for now  Podiatry following, ?further site control versus extended abx  Further recs to follow   Thank you for consulting us and involving us in the management of this most interesting and challenging case.  In addition to reviewing history, imaging, documents, labs, microbiology, took into account antibiotic stewardship, local antibiogram and infection control strategies and potential transmission issues.    We will follow along in the care of this patient. Please contact me by texting me directly on my cell# at [phone] using TEAMS or call our answering service at [phone] with any concerns.    Starting Monday Dr Melany Calhoun will be assuming care of this patient so please contact her with any questions, concerns or new micro data.

## 2025-05-31 NOTE — DISCHARGE NOTE PROVIDER - HOSPITAL COURSE
HPI:  65 y/o M with PMHx DM2, osteomyelitis, CAD, PAD s/p RLE angioplasty 2020, s/p surgical amputation of R forefoot , s/p L PT angioplasty 12/2/24 for L lat foot DFU on plavix, HTN, s/p recent admission for left foot infection s/p PICC placement for IV abx  sent by Podiatry Dr. Stark for admission for OR intervention tomorrow. Patient denies any complaints today. States after discharge he had outpatient visits with Vascular physician. He had imaging done and was suggested to have LimFlow procedure. Patient requested to have surgery as per Podiatry for his left foot wound prior to LimFlow. Vascular agreed. Patient states he can walk independently without pain and is currently comfortable.   Of note, patient was transitioned from Rocephin which was to be given via PICC line after previous discharge to Ertapenem by ID this Saturday.     ED course:   Vitals: T 97.7 HR 99 /69   Labs: ESR 64 wbc 10.77   Imaging: Cxray- Right PICC with tip in the SVC.  The heart is not accurately assessed in this AP projection.  The lungs are clear.  There is no pneumothorax or pleural effusion.  No acute bony abnormality.  Clear lungs    (27 May 2025 13:05)      ---  HOSPITAL COURSE:   Patient presents with left DM foot ulcer/wound gangrene left big toe sent by podiatry. He had surgery with podiatry for 1 st and 5 th ray resection, heel debridement with antibiotic cement placement and reattachment of peroneus brevis tendon into the cuboid. MRI of the left foot demonstrates ulceration  at the great toe with underlying cortical destruction of the distal phalanx and acute osteomyelitis involving te distal and proximal phalanx of the great toe. Focal ulceration at the base of the fifth metatarsal with underlying marrow edema within the fifth metatarsal base and intramedullary cavity. Finding is indeterminate and may represent concurrent early osteomyelitis versus reactive changes.  Marrow edema involving the distal phalanx and middle phalanx of the little toe without definitive corresponding ulceration or abnormal T1 weighted signal.   Nonspecific bone marrow edema pattern within the intermediate cuneiform, lateral cuneiform, and navicular bone favored to represent reactive changes from osteoarthrosis. Follow up cultures. Patients hemoglobin slowly decreased throughout clinical course. Patient had a rapid response on 5/29 d/t vasovagal syncope secondary to acute blood loss. Aspirin, Plavix and Lovenox held for a day. It was later resumed after bleeding stopped from surgical site. ID Dr. Melany Calhoun was consulted and patient was started on IV Cefepime via PICC line.   Patient's clinical condition improved throughout hospital course and patient is stable for discharge home PT with close outpatient follow up.     ---  CONSULTANTS:   Dr. Melany Calhoun   ---  Physical Exam on the day of discharge:    ---  TIME SPENT:  I, the attending physician, was physically present for the key portions of the evaluation and management (E/M) service provided. The total amount of time spent reviewing the hospital notes, laboratory values, imaging findings, assessing/counseling the patient, discussing with consultant physicians, social work, nursing staff was -- minutes    ---  Primary care provider was made aware of plan for discharge:      [  ] NO     [  ] YES   HPI:  63 y/o M with PMHx DM2, osteomyelitis, CAD, PAD s/p RLE angioplasty 2020, s/p surgical amputation of R forefoot , s/p L PT angioplasty 12/2/24 for L lat foot DFU on plavix, HTN, s/p recent admission for left foot infection s/p PICC placement for IV abx  sent by Podiatry Dr. Stark for admission for OR intervention tomorrow. Patient denies any complaints today. States after discharge he had outpatient visits with Vascular physician. He had imaging done and was suggested to have LimFlow procedure. Patient requested to have surgery as per Podiatry for his left foot wound prior to LimFlow. Vascular agreed. Patient states he can walk independently without pain and is currently comfortable.   Of note, patient was transitioned from Rocephin which was to be given via PICC line after previous discharge to Ertapenem by ID this Saturday.     ED course:   Vitals: T 97.7 HR 99 /69   Labs: ESR 64 wbc 10.77   Imaging: Cxray- Right PICC with tip in the SVC.  The heart is not accurately assessed in this AP projection.  The lungs are clear.  There is no pneumothorax or pleural effusion.  No acute bony abnormality.  Clear lungs    (27 May 2025 13:05)      ---  HOSPITAL COURSE:   Patient presents with left DM foot ulcer/wound gangrene left big toe sent by podiatry. He had surgery with podiatry for 1 st and 5 th ray resection, heel debridement with antibiotic cement placement and reattachment of peroneus brevis tendon into the cuboid. MRI of the left foot demonstrates ulceration  at the great toe with underlying cortical destruction of the distal phalanx and acute osteomyelitis involving te distal and proximal phalanx of the great toe. Focal ulceration at the base of the fifth metatarsal with underlying marrow edema within the fifth metatarsal base and intramedullary cavity. Finding is indeterminate and may represent concurrent early osteomyelitis versus reactive changes.  Marrow edema involving the distal phalanx and middle phalanx of the little toe without definitive corresponding ulceration or abnormal T1 weighted signal.   Nonspecific bone marrow edema pattern within the intermediate cuneiform, lateral cuneiform, and navicular bone favored to represent reactive changes from osteoarthrosis. Follow up cultures. Patients hemoglobin slowly decreased throughout clinical course. Patient had a rapid response on 5/29 d/t vasovagal syncope secondary to acute blood loss. Aspirin, Plavix and Lovenox held for a day. It was later resumed after bleeding stopped from surgical site. ID Dr. Melany Calhoun was consulted and patient was started on IV Cefepime via PICC line. Surgical path results show acute and chronic osteomyelitis in first and fifth metatarsals   Patient's clinical condition improved throughout hospital course and patient is stable for discharge home PT with close outpatient follow up.     ---  CONSULTANTS:   Dr. Melany Calhoun   ---  Physical Exam on the day of discharge:    ---  TIME SPENT:  I, the attending physician, was physically present for the key portions of the evaluation and management (E/M) service provided. The total amount of time spent reviewing the hospital notes, laboratory values, imaging findings, assessing/counseling the patient, discussing with consultant physicians, social work, nursing staff was -- minutes    ---  Primary care provider was made aware of plan for discharge:      [  ] NO     [  ] YES   HPI:  65 y/o M with PMHx DM2, osteomyelitis, CAD, PAD s/p RLE angioplasty 2020, s/p surgical amputation of R forefoot , s/p L PT angioplasty 12/2/24 for L lat foot DFU on plavix, HTN, s/p recent admission for left foot infection s/p PICC placement for IV abx  sent by Podiatry Dr. Stark for admission for OR intervention tomorrow. Patient denies any complaints today. States after discharge he had outpatient visits with Vascular physician. He had imaging done and was suggested to have LimFlow procedure. Patient requested to have surgery as per Podiatry for his left foot wound prior to LimFlow. Vascular agreed. Patient states he can walk independently without pain and is currently comfortable.   Of note, patient was transitioned from Rocephin which was to be given via PICC line after previous discharge to Ertapenem by ID this Saturday.     ED course:   Vitals: T 97.7 HR 99 /69   Labs: ESR 64 wbc 10.77   Imaging: Cxray- Right PICC with tip in the SVC.  The heart is not accurately assessed in this AP projection.  The lungs are clear.  There is no pneumothorax or pleural effusion.  No acute bony abnormality.  Clear lungs    (27 May 2025 13:05)      ---  HOSPITAL COURSE:   Patient presents with left DM foot ulcer/wound gangrene left big toe sent by podiatry. He had surgery with podiatry for 1 st and 5 th ray resection, heel debridement with antibiotic cement placement and reattachment of peroneus brevis tendon into the cuboid. MRI of the left foot demonstrates ulceration  at the great toe with underlying cortical destruction of the distal phalanx and acute osteomyelitis involving the distal and proximal phalanx of the great toe. Focal ulceration at the base of the fifth metatarsal with underlying marrow edema within the fifth metatarsal base and intramedullary cavity. Finding is indeterminate and may represent concurrent early osteomyelitis versus reactive changes.  Marrow edema involving the distal phalanx and middle phalanx of the little toe without definitive corresponding ulceration or abnormal T1 weighted signal.   Nonspecific bone marrow edema pattern within the intermediate cuneiform, lateral cuneiform, and navicular bone favored to represent reactive changes from osteoarthrosis. Follow up cultures. Patients hemoglobin slowly decreased throughout clinical course. Patient had a rapid response on 5/29 d/t vasovagal syncope secondary to acute blood loss. Aspirin, Plavix and Lovenox held for a day. It was later resumed after bleeding stopped from surgical site. Pt again had a rapid response on 6/1 for fall/ hypotension, likely 2/2 known orthostatic hypotension. Improved with NS bolus and 2u pRBCs. ID Dr. Melany Calhoun was consulted and patient was started on IV Cefepime via PICC line. Surgical path results show acute and chronic osteomyelitis in first and fifth metatarsals . Minocycline was added given cultures growing Stenotrophomonas maltophilia. Diabetes NP also consulted for blood sugar control, as his A1c is 8.1%. Started on lantus 15 units in AM, admelog 5 units TIDAC and admelog mod ISS ACHS. Recommended return to home regimen metformin 1000mg BID, Jardiance 25mg daily, Trulicity 1.5mg weekly and glucose monitoring, lifestyle and diet modification. Patient's clinical condition improved throughout hospital course and patient is stable for discharge home PT with close outpatient follow up.     ---  CONSULTANTS:   Infectious Disease (Dr. Melany Calhoun)  Cardiology (Dr. Riley)  Podiatry (Dr. Stark)  Diabetes NP (Peter Myers)  ---  Physical Exam on the day of discharge:    ---  TIME SPENT:  I, the attending physician, was physically present for the key portions of the evaluation and management (E/M) service provided. The total amount of time spent reviewing the hospital notes, laboratory values, imaging findings, assessing/counseling the patient, discussing with consultant physicians, social work, nursing staff was -- minutes    ---  Primary care provider was made aware of plan for discharge:      [  ] NO     [  ] YES   HPI:  63 y/o M with PMHx DM2, osteomyelitis, CAD, PAD s/p RLE angioplasty 2020, s/p surgical amputation of R forefoot , s/p L PT angioplasty 12/2/24 for L lat foot DFU on plavix, HTN, s/p recent admission for left foot infection s/p PICC placement for IV abx  sent by Podiatry Dr. Stark for admission for OR intervention tomorrow. Patient denies any complaints today. States after discharge he had outpatient visits with Vascular physician. He had imaging done and was suggested to have LimFlow procedure. Patient requested to have surgery as per Podiatry for his left foot wound prior to LimFlow. Vascular agreed. Patient states he can walk independently without pain and is currently comfortable.   Of note, patient was transitioned from Rocephin which was to be given via PICC line after previous discharge to Ertapenem by ID this Saturday.     ED course:   Vitals: T 97.7 HR 99 /69   Labs: ESR 64 wbc 10.77   Imaging: Cxray- Right PICC with tip in the SVC.  The heart is not accurately assessed in this AP projection.  The lungs are clear.  There is no pneumothorax or pleural effusion.  No acute bony abnormality.  Clear lungs    (27 May 2025 13:05)      ---  HOSPITAL COURSE:   Patient presents with left DM foot ulcer/wound gangrene left big toe sent by podiatry. He had surgery with podiatry for 1 st and 5 th ray resection, heel debridement with antibiotic cement placement and reattachment of peroneus brevis tendon into the cuboid. MRI of the left foot demonstrates ulceration  at the great toe with underlying cortical destruction of the distal phalanx and acute osteomyelitis involving the distal and proximal phalanx of the great toe. Focal ulceration at the base of the fifth metatarsal with underlying marrow edema within the fifth metatarsal base and intramedullary cavity. Finding is indeterminate and may represent concurrent early osteomyelitis versus reactive changes.  Marrow edema involving the distal phalanx and middle phalanx of the little toe without definitive corresponding ulceration or abnormal T1 weighted signal.   Nonspecific bone marrow edema pattern within the intermediate cuneiform, lateral cuneiform, and navicular bone favored to represent reactive changes from osteoarthrosis. Follow up cultures. Patients hemoglobin slowly decreased throughout clinical course. Patient had a rapid response on 5/29 d/t vasovagal syncope secondary to acute blood loss. Aspirin, Plavix and Lovenox held for a day. It was later resumed after bleeding stopped from surgical site. Pt again had a rapid response on 6/1 for fall/ hypotension, likely 2/2 known orthostatic hypotension. Improved with NS bolus and 2u pRBCs. ID Dr. Melany Calhoun was consulted and patient was started on IV Cefepime via PICC line. Surgical path results show acute and chronic osteomyelitis in first and fifth metatarsals . Minocycline was added given cultures growing Stenotrophomonas maltophilia. Diabetes NP also consulted for blood sugar control, as his A1c is 8.1%. Started on lantus 15 units in AM, admelog 5 units TIDAC and admelog mod ISS ACHS. Recommended return to home regimen metformin 1000mg BID, Jardiance 25mg daily, Trulicity 1.5mg weekly and glucose monitoring, lifestyle and diet modification. Patient's clinical condition improved throughout hospital course and patient is stable for discharge home PT with close outpatient follow up.     ---  CONSULTANTS:   Infectious Disease (Dr. Melany Calhoun)  Cardiology (Dr. Riley)  Podiatry (Dr. Stark)  Diabetes NP (Peter Myers)  ---    Vitals on day of discharge:  T(C): 36.9 (06-04-25 @ 04:52), Max: 36.9 (06-03-25 @ 13:26)  HR: 79 (06-04-25 @ 04:52) (73 - 79)  BP: 123/58 (06-04-25 @ 04:52) (123/58 - 143/81)  RR: 18 (06-04-25 @ 04:52) (16 - 18)  SpO2: 99% (06-04-25 @ 04:52) (98% - 100%)    Physical Exam on the day of discharge:  GENERAL: NAD, lying in bed comfortably  HEAD:  Atraumatic, normocephalic  EYES: EOMI, PERRLA, conjunctiva and sclera clear  ENT: Moist mucous membranes  NECK: Supple, no JVD  HEART: Regular rate and rhythm, no murmurs  LUNGS: Unlabored respirations.  Clear to auscultation bilaterally, no crackles, wheezing, or rhonchi  ABDOMEN: Soft, nontender, nondistended, +BS  EXTREMITIES: +L foot with dressing C/D/I. No edema  NERVOUS SYSTEM:  A&Ox3, no focal deficits   SKIN: Warm and dry    ---  TIME SPENT:  I, the attending physician, was physically present for the key portions of the evaluation and management (E/M) service provided. The total amount of time spent reviewing the hospital notes, laboratory values, imaging findings, assessing/counseling the patient, discussing with consultant physicians, social work, nursing staff was -- minutes    ---  Primary care provider was made aware of plan for discharge:      [  ] NO     [  ] YES   HPI:  65 y/o M with PMHx DM2, osteomyelitis, CAD, PAD s/p RLE angioplasty 2020, s/p surgical amputation of R forefoot , s/p L PT angioplasty 12/2/24 for L lat foot DFU on plavix, HTN, s/p recent admission for left foot infection s/p PICC placement for IV abx  sent by Podiatry Dr. Stark for admission for OR intervention tomorrow. Patient denies any complaints today. States after discharge he had outpatient visits with Vascular physician. He had imaging done and was suggested to have LimFlow procedure. Patient requested to have surgery as per Podiatry for his left foot wound prior to LimFlow. Vascular agreed. Patient states he can walk independently without pain and is currently comfortable.   Of note, patient was transitioned from Rocephin which was to be given via PICC line after previous discharge to Ertapenem by ID this Saturday.     ED course:   Vitals: T 97.7 HR 99 /69   Labs: ESR 64 wbc 10.77   Imaging: Cxray- Right PICC with tip in the SVC.  The heart is not accurately assessed in this AP projection.  The lungs are clear.  There is no pneumothorax or pleural effusion.  No acute bony abnormality.  Clear lungs    (27 May 2025 13:05)      ---  HOSPITAL COURSE:   Patient presents with left DM foot ulcer/wound gangrene left big toe sent by podiatry. He had surgery with podiatry for 1 st and 5 th ray resection, heel debridement with antibiotic cement placement and reattachment of peroneus brevis tendon into the cuboid. MRI of the left foot demonstrates ulceration  at the great toe with underlying cortical destruction of the distal phalanx and acute osteomyelitis involving the distal and proximal phalanx of the great toe. Focal ulceration at the base of the fifth metatarsal with underlying marrow edema within the fifth metatarsal base and intramedullary cavity. Finding is indeterminate and may represent concurrent early osteomyelitis versus reactive changes.  Marrow edema involving the distal phalanx and middle phalanx of the little toe without definitive corresponding ulceration or abnormal T1 weighted signal.   Nonspecific bone marrow edema pattern within the intermediate cuneiform, lateral cuneiform, and navicular bone favored to represent reactive changes from osteoarthrosis. Follow up cultures. Patients hemoglobin slowly decreased throughout clinical course. Patient had a rapid response on 5/29 d/t vasovagal syncope secondary to acute blood loss. Aspirin, Plavix and Lovenox held for a day. It was later resumed after bleeding stopped from surgical site. Pt again had a rapid response on 6/1 for fall/ hypotension, likely 2/2 known orthostatic hypotension. Improved with NS bolus and 2u pRBCs. ID Dr. Melany Calhoun was consulted and patient was started on IV Cefepime via PICC line. Surgical path results show acute and chronic osteomyelitis in first and fifth metatarsals . Minocycline was added given cultures growing Stenotrophomonas maltophilia, for a 6-week course (last day 7/13). Cefepime was transitioned to Invanz, patient to complete a 6-week course (last day 7/8). Diabetes NP also consulted for blood sugar control, as his A1c is 8.1%. Started on lantus 15 units in AM, admelog 5 units TIDAC and admelog mod ISS ACHS. Recommended return to home regimen metformin 1000mg BID, Jardiance 25mg daily, Trulicity 1.5mg weekly and glucose monitoring, lifestyle and diet modification. Patient's clinical condition improved throughout hospital course and patient is stable for discharge home PT with close outpatient follow up.     ---  CONSULTANTS:   Infectious Disease (Dr. Melany Calhoun)  Cardiology (Dr. Riley)  Podiatry (Dr. Satrk)  Diabetes NP (Peter Myers)  ---    Vitals on day of discharge:  T(C): 36.9 (06-04-25 @ 04:52), Max: 36.9 (06-03-25 @ 13:26)  HR: 79 (06-04-25 @ 04:52) (73 - 79)  BP: 123/58 (06-04-25 @ 04:52) (123/58 - 143/81)  RR: 18 (06-04-25 @ 04:52) (16 - 18)  SpO2: 99% (06-04-25 @ 04:52) (98% - 100%)    Physical Exam on the day of discharge:  GENERAL: NAD, lying in bed comfortably  HEAD:  Atraumatic, normocephalic  EYES: EOMI, PERRLA, conjunctiva and sclera clear  ENT: Moist mucous membranes  NECK: Supple, no JVD  HEART: Regular rate and rhythm, no murmurs  LUNGS: Unlabored respirations.  Clear to auscultation bilaterally, no crackles, wheezing, or rhonchi  ABDOMEN: Soft, nontender, nondistended, +BS  EXTREMITIES: +L foot with dressing C/D/I. No edema  NERVOUS SYSTEM:  A&Ox3, no focal deficits   SKIN: Warm and dry    ---  TIME SPENT:  I, the attending physician, was physically present for the key portions of the evaluation and management (E/M) service provided. The total amount of time spent reviewing the hospital notes, laboratory values, imaging findings, assessing/counseling the patient, discussing with consultant physicians, social work, nursing staff was -- minutes    ---  Primary care provider was made aware of plan for discharge:      [  ] NO     [  ] YES   HPI:  63 y/o M with PMHx DM2, osteomyelitis, CAD, PAD s/p RLE angioplasty 2020, s/p surgical amputation of R forefoot , s/p L PT angioplasty 12/2/24 for L lat foot DFU on plavix, HTN, s/p recent admission for left foot infection s/p PICC placement for IV abx  sent by Podiatry Dr. Stark for admission for OR intervention tomorrow. Patient denies any complaints today. States after discharge he had outpatient visits with Vascular physician. He had imaging done and was suggested to have LimFlow procedure. Patient requested to have surgery as per Podiatry for his left foot wound prior to LimFlow. Vascular agreed. Patient states he can walk independently without pain and is currently comfortable.   Of note, patient was transitioned from Rocephin which was to be given via PICC line after previous discharge to Ertapenem by ID this Saturday.     ED course:   Vitals: T 97.7 HR 99 /69   Labs: ESR 64 wbc 10.77   Imaging: Cxray- Right PICC with tip in the SVC.  The heart is not accurately assessed in this AP projection.  The lungs are clear.  There is no pneumothorax or pleural effusion.  No acute bony abnormality.  Clear lungs    (27 May 2025 13:05)      ---  HOSPITAL COURSE:   Patient presents with left DM foot ulcer/wound gangrene left big toe sent by podiatry. He had surgery with podiatry for 1 st and 5 th ray resection, heel debridement with antibiotic cement placement and reattachment of peroneus brevis tendon into the cuboid. MRI of the left foot demonstrates ulceration  at the great toe with underlying cortical destruction of the distal phalanx and acute osteomyelitis involving the distal and proximal phalanx of the great toe. Focal ulceration at the base of the fifth metatarsal with underlying marrow edema within the fifth metatarsal base and intramedullary cavity. Finding is indeterminate and may represent concurrent early osteomyelitis versus reactive changes.  Marrow edema involving the distal phalanx and middle phalanx of the little toe without definitive corresponding ulceration or abnormal T1 weighted signal.   Nonspecific bone marrow edema pattern within the intermediate cuneiform, lateral cuneiform, and navicular bone favored to represent reactive changes from osteoarthrosis. Follow up cultures. Patients hemoglobin slowly decreased throughout clinical course. Patient had a rapid response on 5/29 d/t vasovagal syncope secondary to acute blood loss. Aspirin, Plavix and Lovenox held for a day. It was later resumed after bleeding stopped from surgical site. Pt again had a rapid response on 6/1 for fall/ hypotension, likely 2/2 known orthostatic hypotension. Improved with NS bolus and 2u pRBCs. ID Dr. Melany Calhoun was consulted and patient was started on IV Cefepime via PICC line. Surgical path results show acute and chronic osteomyelitis in first and fifth metatarsals . Minocycline was added given cultures growing Stenotrophomonas maltophilia, for a 6-week course (last day 7/13). Cefepime was transitioned to Invanz, patient to complete a 6-week course (last day 7/8). Diabetes NP also consulted for blood sugar control, as his A1c is 8.1%. Started on lantus 15 units in AM, admelog 5 units TIDAC and admelog mod ISS ACHS. Recommended return to home regimen metformin 1000mg BID, Jardiance 25mg daily, Trulicity 1.5mg weekly and glucose monitoring, lifestyle and diet modification. Patient's clinical condition improved throughout hospital course and patient is stable for discharge home PT with close outpatient follow up.     ---  CONSULTANTS:   Infectious Disease (Dr. Melany Calhoun)  Cardiology (Dr. Riley)  Podiatry (Dr. Stark)  Diabetes NP (Peter Myers)  ---    Vitals on day of discharge:  T(C): 36.9 (06-04-25 @ 04:52), Max: 36.9 (06-03-25 @ 13:26)  HR: 79 (06-04-25 @ 04:52) (73 - 79)  BP: 123/58 (06-04-25 @ 04:52) (123/58 - 143/81)  RR: 18 (06-04-25 @ 04:52) (16 - 18)  SpO2: 99% (06-04-25 @ 04:52) (98% - 100%)    Physical Exam on the day of discharge:  GENERAL: NAD, lying in bed comfortably  HEAD:  Atraumatic, normocephalic  EYES: EOMI, PERRLA, conjunctiva and sclera clear  ENT: Moist mucous membranes  NECK: Supple, no JVD  HEART: Regular rate and rhythm, no murmurs  LUNGS: Unlabored respirations.  Clear to auscultation bilaterally, no crackles, wheezing, or rhonchi  ABDOMEN: Soft, nontender, nondistended, +BS  EXTREMITIES: +L foot with dressing C/D/I. No edema  NERVOUS SYSTEM:  A&Ox3, no focal deficits   SKIN: Warm and dry    ---  TIME SPENT:  I, the attending physician, was physically present for the key portions of the evaluation and management (E/M) service provided. The total amount of time spent reviewing the hospital notes, laboratory values, imaging findings, assessing/counseling the patient, discussing with consultant physicians, social work, nursing staff was 60 minutes    ---  Primary care provider was made aware of plan for discharge:      [  ] NO     [ x ] YES   HPI:  65 y/o M with PMHx DM2, osteomyelitis, CAD, PAD s/p RLE angioplasty 2020, s/p surgical amputation of R forefoot , s/p L PT angioplasty 12/2/24 for L lat foot DFU on plavix, HTN, s/p recent admission for left foot infection s/p PICC placement for IV abx  sent by Podiatry Dr. Stark for admission for OR intervention tomorrow. Patient denies any complaints today. States after discharge he had outpatient visits with Vascular physician. He had imaging done and was suggested to have LimFlow procedure. Patient requested to have surgery as per Podiatry for his left foot wound prior to LimFlow. Vascular agreed. Patient states he can walk independently without pain and is currently comfortable.   Of note, patient was transitioned from Rocephin which was to be given via PICC line after previous discharge to Ertapenem by ID this Saturday.     ED course:   Vitals: T 97.7 HR 99 /69   Labs: ESR 64 wbc 10.77   Imaging: Cxray- Right PICC with tip in the SVC.  The heart is not accurately assessed in this AP projection.  The lungs are clear.  There is no pneumothorax or pleural effusion.  No acute bony abnormality.  Clear lungs    (27 May 2025 13:05)      ---  HOSPITAL COURSE:   Patient presents with left DM foot ulcer/wound gangrene left big toe sent by podiatry. He had surgery with podiatry for 1 st and 5 th ray resection, heel debridement with antibiotic cement placement and reattachment of peroneus brevis tendon into the cuboid. MRI of the left foot demonstrates ulceration  at the great toe with underlying cortical destruction of the distal phalanx and acute osteomyelitis involving the distal and proximal phalanx of the great toe. Focal ulceration at the base of the fifth metatarsal with underlying marrow edema within the fifth metatarsal base and intramedullary cavity. Finding is indeterminate and may represent concurrent early osteomyelitis versus reactive changes.  Marrow edema involving the distal phalanx and middle phalanx of the little toe without definitive corresponding ulceration or abnormal T1 weighted signal.   Nonspecific bone marrow edema pattern within the intermediate cuneiform, lateral cuneiform, and navicular bone favored to represent reactive changes from osteoarthrosis. Follow up cultures. Patients hemoglobin slowly decreased throughout clinical course. Patient had a rapid response on 5/29 d/t vasovagal syncope secondary to acute blood loss. Aspirin, Plavix and Lovenox held for a day. It was later resumed after bleeding stopped from surgical site. Pt again had a rapid response on 6/1 for fall/ hypotension, likely 2/2 known orthostatic hypotension. Improved with NS bolus and 2u pRBCs. ID Dr. Melany Calhoun was consulted and patient was started on IV Cefepime via PICC line. Surgical path results show acute and chronic osteomyelitis in first and fifth metatarsals . Minocycline was added given cultures growing Stenotrophomonas maltophilia, for a 6-week course (last day 7/13). Cefepime was transitioned to Invanz, patient to complete a 6-week course (last day 7/8). Diabetes NP also consulted for blood sugar control, as his A1c is 8.1%. Started on Lantus 15 units in AM, admelog 5 units TIDAC and admelog mod ISS ACHS. C/w ertapenem 1gm q24h for ease of dosing; Plan x6 weeks until 7/8/25  --weekly CMP, CBC, ESR, CRP  --orders called into [facility]  --pt to call [phone] to f/u Dr. Brasher  minocycline 200mg BID PO (6/2-); Plan x6 weeks until 7/13/25  Recommended return to home regimen metformin 1000mg BID, Jardiance 25mg daily, Trulicity 1.5mg weekly and glucose monitoring, lifestyle and diet modification. Patient's clinical condition improved throughout hospital course and patient is stable for discharge home PT with close outpatient follow up.     ---  CONSULTANTS:   Infectious Disease (Dr. Melany Calhoun)  Cardiology (Dr. Riley)  Podiatry (Dr. Stark)  Diabetes NP (Peter Myers)  ---    Vitals on day of discharge:  T(C): 36.9 (06-04-25 @ 04:52), Max: 36.9 (06-03-25 @ 13:26)  HR: 79 (06-04-25 @ 04:52) (73 - 79)  BP: 123/58 (06-04-25 @ 04:52) (123/58 - 143/81)  RR: 18 (06-04-25 @ 04:52) (16 - 18)  SpO2: 99% (06-04-25 @ 04:52) (98% - 100%)    Physical Exam on the day of discharge:  GENERAL: NAD, lying in bed comfortably  HEAD:  Atraumatic, normocephalic  EYES: EOMI, PERRLA, conjunctiva and sclera clear  ENT: Moist mucous membranes  NECK: Supple, no JVD  HEART: Regular rate and rhythm, no murmurs  LUNGS: Unlabored respirations.  Clear to auscultation bilaterally, no crackles, wheezing, or rhonchi  ABDOMEN: Soft, nontender, nondistended, +BS  EXTREMITIES: +L foot with dressing C/D/I. No edema  NERVOUS SYSTEM:  A&Ox3, no focal deficits   SKIN: Warm and dry    ---  TIME SPENT:  I, the attending physician, was physically present for the key portions of the evaluation and management (E/M) service provided. The total amount of time spent reviewing the hospital notes, laboratory values, imaging findings, assessing/counseling the patient, discussing with consultant physicians, social work, nursing staff was 60 minutes    ---  Primary care provider was made aware of plan for discharge:      [  ] NO     [ x ] YES   HPI:  65 y/o M with PMHx DM2, osteomyelitis, CAD, PAD s/p RLE angioplasty 2020, s/p surgical amputation of R forefoot , s/p L PT angioplasty 12/2/24 for L lat foot DFU on plavix, HTN, s/p recent admission for left foot infection s/p PICC placement for IV abx  sent by Podiatry Dr. Stark for admission for OR intervention . Patient denies any complaints today. States after discharge he had outpatient visits with Vascular physician. He had imaging done and was suggested to have Limb Flow procedure. Patient requested to have surgery as per Podiatry for his left foot wound prior to Limb Flow. Vascular agreed. Patient states he can walk independently without pain and is currently comfortable.   Of note, patient was transitioned from Rocephin which was to be given via PICC line after previous discharge to Ertapenem by ID this Saturday.     ED course:   Vitals: T 97.7 HR 99 /69   Labs: ESR 64 wbc 10.77   Imaging: Cxray- Right PICC with tip in the SVC.  The heart is not accurately assessed in this AP projection.  The lungs are clear.  There is no pneumothorax or pleural effusion.  No acute bony abnormality.  Clear lungs    (27 May 2025 13:05)      ---  HOSPITAL COURSE:   Patient presents with left DM foot ulcer/wound gangrene left big toe sent by podiatry. He had surgery with podiatry for 1 st and 5 th ray resection, heel debridement with antibiotic cement placement and reattachment of peroneus brevis tendon into the cuboid. MRI of the left foot demonstrates ulceration  at the great toe with underlying cortical destruction of the distal phalanx and acute osteomyelitis involving the distal and proximal phalanx of the great toe. Focal ulceration at the base of the fifth metatarsal with underlying marrow edema within the fifth metatarsal base and intramedullary cavity. Finding is indeterminate and may represent concurrent early osteomyelitis versus reactive changes.  Marrow edema involving the distal phalanx and middle phalanx of the little toe without definitive corresponding ulceration or abnormal T1 weighted signal.   Nonspecific bone marrow edema pattern within the intermediate cuneiform, lateral cuneiform, and navicular bone favored to represent reactive changes from osteoarthrosis. Follow up cultures. Patients hemoglobin slowly decreased throughout clinical course. Patient had a rapid response on 5/29 d/t vasovagal syncope secondary to acute blood loss. Aspirin, Plavix and Lovenox held for a day. It was later resumed after bleeding stopped from surgical site. Pt again had a rapid response on 6/1 for fall/ hypotension, likely 2/2 known orthostatic hypotension. Improved with NS bolus and 2u pRBCs. ID Dr. Melany Calhoun was consulted and patient was started on IV Cefepime via PICC line. Surgical path results show acute and chronic osteomyelitis in first and fifth metatarsals . Minocycline was added given cultures growing Stenotrophomonas maltophilia, for a 6-week course (last day 7/13). Cefepime was transitioned to Invanz, patient to complete a 6-week course (last day 7/8). Diabetes NP also consulted for blood sugar control, as his A1c is 8.1%. Started on Lantus 15 units in AM, admelog 5 units TIDAC and admelog mod ISS ACHS. C/w ertapenem 1gm q24h for ease of dosing; Plan x6 weeks until 7/8/25  --weekly CMP, CBC, ESR, CRP  --orders called into [facility]  --pt to call [phone] to f/u Dr. Brasher  minocycline 200mg BID PO (6/2-); Plan x6 weeks until 7/13/25  Recommended return to home regimen metformin 1000mg BID, Jardiance 25mg daily, Trulicity 1.5mg weekly and glucose monitoring, lifestyle and diet modification. Patient's clinical condition improved throughout hospital course and patient is stable for discharge home PT with close outpatient follow up.     --Pathology surgical results-    Final Diagnosis    1.  Left hallux bone:  -   Acute and chronic osteomyelitis of bone.  -   Ulceration and acute inflammation of skin with viable skin  margin.    2.  Left first metatarsal bone:  -   Acute and chronic osteomyelitis of bone.    3.  Left fifth metatarsal bone:  -   Acute and chronic osteomyelitis of bone.    4.  Left calcaneus bone:  -   Acute and chronic osteomyelitis of bone.  -   Fat necrosis of bone marrow.    5.  Left first metatarsal proximal margin:  -   Focal mild chronic osteomyelitis of bone.    6.  Left fifth metatarsal distal margin:  -   Focal mild chronic osteomyelitis of bone.    -  CONSULTANTS:   Infectious Disease (Dr. Melany Calhoun)  Cardiology (Dr. Riley)  Podiatry (Dr. Stark)  Diabetes NP (Peter Myers)  ---    Vitals on day of discharge:  T(C): 36.9 (06-04-25 @ 04:52), Max: 36.9 (06-03-25 @ 13:26)  HR: 79 (06-04-25 @ 04:52) (73 - 79)  BP: 123/58 (06-04-25 @ 04:52) (123/58 - 143/81)  RR: 18 (06-04-25 @ 04:52) (16 - 18)  SpO2: 99% (06-04-25 @ 04:52) (98% - 100%)    Physical Exam on the day of discharge:  GENERAL: NAD, lying in bed comfortably  HEAD:  Atraumatic, normocephalic  EYES: EOMI, PERRLA, conjunctiva and sclera clear  ENT: Moist mucous membranes  NECK: Supple, no JVD  HEART: Regular rate and rhythm, no murmurs  LUNGS: Unlabored respirations.  Clear to auscultation bilaterally, no crackles, wheezing, or rhonchi  ABDOMEN: Soft, nontender, nondistended, +BS  EXTREMITIES: +L foot with dressing C/D/I. No edema  NERVOUS SYSTEM:  A&Ox3, no focal deficits   SKIN: Warm and dry    ---  TIME SPENT:  I, the attending physician, was physically present for the key portions of the evaluation and management (E/M) service provided. The total amount of time spent reviewing the hospital notes, laboratory values, imaging findings, assessing/counseling the patient, discussing with consultant physicians, social work, nursing staff was 60 minutes,     ---  Primary care provider was made aware of plan for discharge:      [  ] NO     [ x ] YES

## 2025-05-31 NOTE — DISCHARGE NOTE PROVIDER - PROVIDER TOKENS
PROVIDER:[TOKEN:[[id number]],FOLLOWUP:[1 week],ESTABLISHEDPATIENT:[T]] PROVIDER:[TOKEN:[[id number]],FOLLOWUP:[1 week],ESTABLISHEDPATIENT:[T]],PROVIDER:[TOKEN:[[id number]],FOLLOWUP:[1 week]] PROVIDER:[TOKEN:[[id number]],FOLLOWUP:[1 week],ESTABLISHEDPATIENT:[T]],PROVIDER:[TOKEN:[[id number]],FOLLOWUP:[1 week]],PROVIDER:[TOKEN:[[id number]],FOLLOWUP:[1-3 days]]

## 2025-05-31 NOTE — PROGRESS NOTE ADULT - ASSESSMENT
65 y/o M with PMHx DM2, osteomyelitis, CAD, PAD s/p RLE angioplasty 2020, s/p surgical amputation of R forefoot , s/p L PT angioplasty 12/2/24 for L lat foot DFU on plavix, HTN, s/p recent admission for left foot infection s/p PICC placement for IV abx  sent by Podiatry Dr. Stark for admission for OR left foot partial 1st and 5th ray resection, heel debridement with antibiotic.

## 2025-05-31 NOTE — PROGRESS NOTE ADULT - ATTENDING COMMENTS
64M DM2, hx osteomyelitis, CAD, PAD s/p RLE angioplasty 2020, s/p surgical amputation of R forefoot , s/p L PT angioplasty 12/2/24 for L lat foot DFU on plavix, HTN, HLD sent in by wound clinic for left foot infections and multiple wounds. Vascular surgery to evaluate further with LLE angiogram.     - Pt p/w L foot infection with multiple wounds sent in by [facility], podiatry following   - S/p LLE angio 5/2,  vascular following   - S/p debridement on 5/28 w/o cardiac complications  - S/p RRT 5/29 for lethargy ? vasovagal, now asymptomatic    - No evidence of any active ischemia    - Continue ASA, Plavix  - Continue Lipitor   - No evidence of any meaningful volume overload.

## 2025-05-31 NOTE — PROGRESS NOTE ADULT - SUBJECTIVE AND OBJECTIVE BOX
[facility]  Laz Lyn MD PhD, Lori Burgos MD, Melany Calhoun MD, Efren Frederick MD, Darryl Aguilar MD  and providing coverage with Marcy Austin MD  Providing Infectious Disease Consultations at [facility], [facility], [facility], [facility]    Office# [phone] to schedule follow up appointments  Answering Service for urgent calls or New Consults [phone]  Cell# to text for urgent issues Laz Lyn [phone]     infectious diseases progress note:    LOIDA QUEZADA is a 64y y. o. Male patient    Overnight and events of the last 24hrs reviewed    Allergies    No Known Allergies    Intolerances        ANTIBIOTICS/RELEVANT:  antimicrobials  cefepime   IVPB 2000 milliGRAM(s) IV Intermittent every 8 hours  cefepime   IVPB        immunologic:    OTHER:  acetaminophen   IVPB .. 1000 milliGRAM(s) IV Intermittent every 8 hours PRN  aluminum hydroxide/magnesium hydroxide/simethicone Suspension 30 milliLiter(s) Oral every 4 hours PRN  aspirin enteric coated 81 milliGRAM(s) Oral daily  atorvastatin 40 milliGRAM(s) Oral at bedtime  clopidogrel Tablet 75 milliGRAM(s) Oral daily  dextrose 5%. 1000 milliLiter(s) IV Continuous <Continuous>  dextrose 5%. 1000 milliLiter(s) IV Continuous <Continuous>  dextrose 50% Injectable 25 Gram(s) IV Push once  dextrose 50% Injectable 12.5 Gram(s) IV Push once  dextrose 50% Injectable 25 Gram(s) IV Push once  dextrose Oral Gel 15 Gram(s) Oral once PRN  enoxaparin Injectable 40 milliGRAM(s) SubCutaneous every 24 hours  gabapentin 300 milliGRAM(s) Oral three times a day  glucagon  Injectable 1 milliGRAM(s) IntraMuscular once  HYDROmorphone  Injectable 0.5 milliGRAM(s) IV Push every 4 hours PRN  HYDROmorphone  Injectable 1 milliGRAM(s) IV Push every 4 hours PRN  insulin glargine Injectable (LANTUS) 15 Unit(s) SubCutaneous every morning  insulin lispro (ADMELOG) corrective regimen sliding scale   SubCutaneous three times a day before meals  insulin lispro (ADMELOG) corrective regimen sliding scale   SubCutaneous at bedtime  insulin lispro Injectable (ADMELOG) 5 Unit(s) SubCutaneous three times a day before meals  lisinopril 40 milliGRAM(s) Oral daily  melatonin 3 milliGRAM(s) Oral at bedtime PRN  metoprolol succinate ER 50 milliGRAM(s) Oral daily  ondansetron Injectable 4 milliGRAM(s) IV Push every 8 hours PRN  senna 2 Tablet(s) Oral at bedtime      Objective:  Vital Signs Last 24 Hrs  T(C): 37.4 (31 May 2025 12:01), Max: 37.4 (31 May 2025 12:01)  T(F): 99.3 (31 May 2025 12:01), Max: 99.3 (31 May 2025 12:01)  HR: 93 (31 May 2025 12:01) (92 - 96)  BP: 105/67 (31 May 2025 12:01) (96/55 - 122/65)  BP(mean): --  RR: 18 (31 May 2025 12:01) (18 - 18)  SpO2: 98% (31 May 2025 12:01) (96% - 98%)    Parameters below as of 31 May 2025 12:01  Patient On (Oxygen Delivery Method): room air        T(C): 37.4 (05-31-25 @ 12:01), Max: 37.4 (05-31-25 @ 12:01)  T(C): 37.4 (05-31-25 @ 12:01), Max: 37.4 (05-31-25 @ 12:01)  T(C): 37.4 (05-31-25 @ 12:01), Max: 37.4 (05-31-25 @ 12:01)    PHYSICAL EXAM:  HEENT: NC atraumatic  Neck: supple  Respiratory: no accessory muscle use, breathing comfortably  Cardiovascular: distant  Gastrointestinal: normal appearing, nondistended  Extremities: no clubbing, no cyanosis,        LABS:                          8.4    13.86 )-----------( 270      ( 31 May 2025 05:40 )             25.7       WBC  13.86 05-31 @ 05:40  14.73 05-30 @ 08:45  14.44 05-29 @ 09:00  12.99 05-29 @ 06:03  10.77 05-27 @ 09:10      05-31    138  |  107  |  22  ----------------------------<  185[H]  4.7   |  26  |  1.10    Ca    8.4[L]      31 May 2025 05:40    TPro  6.5  /  Alb  2.5[L]  /  TBili  0.3  /  DBili  x   /  AST  9[L]  /  ALT  16  /  AlkPhos  63  05-31      Creatinine: 1.10 mg/dL (05-31-25 @ 05:40)  Creatinine: 1.20 mg/dL (05-30-25 @ 08:45)  Creatinine: 1.20 mg/dL (05-29-25 @ 09:00)  Creatinine: 0.93 mg/dL (05-29-25 @ 06:03)  Creatinine: 1.20 mg/dL (05-27-25 @ 09:10)        Urinalysis Basic - ( 31 May 2025 05:40 )    Color: x / Appearance: x / SG: x / pH: x  Gluc: 185 mg/dL / Ketone: x  / Bili: x / Urobili: x   Blood: x / Protein: x / Nitrite: x   Leuk Esterase: x / RBC: x / WBC x   Sq Epi: x / Non Sq Epi: x / Bacteria: x            INFLAMMATORY MARKERS      MICROBIOLOGY:              RADIOLOGY & ADDITIONAL STUDIES:  Surgical Pathology Report (05.28.25 @ 09:48)    Surgical Pathology Report:   ACCESSION No:  [id number]  Patient:     LOIDA QUEZADA      Accession:                             [id number]    Collected Date/Time:                   5/28/2025 09:48 EDT  Received Date/Time:                    5/28/2025 11:52 EDT    Surgical Pathology Report - Auth (Verified)    Specimen(s) Submitted  1  Left hallux bone pathology  2  Left first metatarsal bone pathology  3  Left fifth metatarsal bone pathology  4  Left calceneus bone pathology  5  Left first metatarsal proximal margin bone pathology  6  Left fifth metatarsal distal margin bone pathology    Final Diagnosis    1.  Left hallux bone:  -   Acute and chronic osteomyelitis of bone.  -   Ulceration and acute inflammation of skin with viable skin  margin.    2.  Left first metatarsal bone:  -   Acute and chronic osteomyelitis of bone.    3.  Left fifth metatarsal bone:  -   Acute and chronic osteomyelitis of bone.    4.  Left calcaneus bone:  -   Acute and chronic osteomyelitis of bone.  -   Fat necrosis of bone marrow.    5.  Left first metatarsal proximal margin:  -   Focal mild chronic osteomyelitis of bone.    6.  Left fifth metatarsal distal margin:  -   Focal mild chronic osteomyelitis of bone.    Verified by: Ramez Ponce MD  (Electronic Signature)  Reported on: 05/30/25 10:54 EDT, [facility], [address] [place] [id number]  _________________________________________________________________    Clinical Information  Procedure: Left first toe and metatarsal head amputation, left heel  debridement , left fifth metatarsal resection with possible peroneous  brevis tendon detachment and reattachment    Perioperative Diagnosis  Left foot Osteomyelitis      Postoperative Diagnosis  Same    Gross Description  1.  Received: In formalin labeled  "left hallux bone"  Left hallux:  6.7 x 3.2 x 3.0 cm  Integrity:  Intact  Lesion:  The distal tip is hard blackened and mummified skin sloughing is  present  The blackened mummification is noted on the plantar dorsal lateral  medial, distal surfaces 0.8 cm from the skin margin, 1.5 cm from soft  tissue margin and 2.7 cm from bone resection margin  Remaining Skin:  Skin sloughing  Nail:  The nail is present, thickened  Bone/Soft Tissue Margin:   Grossly viable  Inking:  Soft tissue margin-blue, bone margin-black  Submitted:  Representative sections following fixation and acid  decalcification in 3 cassettes:  1A: Perpendicular section skin and soft tissue margin  1B: Blackened distal tip with underlying bone  1C: Representative section of bone margin    2.  Received: In formalin labeled  "left first metatarsal bone"  ,  consisting  of three portions of bone two with scant adherent soft tissue  Dimensions:  2.5 x 1.0 x 0.7 cm, , 2.6 x 2.5 x 0.6 cm 2.0 x 2.0 x 1.8 cm  Submitted:  Representative sections of each after decalcification in 2  cassettes:  2A: Representative sections 2.0 cm bone  2B: Representative sections of 2 additional bones    3.  Received: In formalin labeled  "left fifth metatarsal" , consisting of  one portion of bone with scant adherent soft tissue  Dimensions:  4.2 x 2.0 x 2.0 cm  Additional Comments:   The bone contains an articular surface and opposite  resected margin.  Sectioning reveals a focally soft tan-gray area.  1 full cross-section is submitted  Submitted:  Representative sections following fixation and acid  decalcification in two cassettes: 3A-3B    4.  Received: In formalin labeled  "left calcaneus bone" , consisting of a  portion of hard tan bone    Dimensions:  2.0 x 1.7 x 0.5 cm  Additional Comments:   The specimen is sectioned  Submitted:  Entirely following acid decalcification in one cassette: 4A    5.  Received: In formalin labeled  "left first metatarsal margin"  ,  consisting of an ovoid flat portion of hard tan bone  Dimensions:  2.0 x 1.5 x 0.3 cm  Submitted:  Entirely following acid decalcification in one cassette: 5A    6.  Received: In formalin labeled  "left fifth metatarsal distal margin  bone" , consisting of an ovoid flat portion of hard tan bone  Dimensions:  1.2 x0.9 x 0.3 cm  Submitted:  Entirely following acid decalcification in one cassette: 6A    Grace SOUSA 05/28/2025 05:39 PM    Disclaimer  In addition to other data that may appear on the specimen containers, all  labels have been inspected to confirm the presence of the patient's name  and date of birth.      Histological processing performed at [facility], Department of  Pathology, [address] [place] [address].    For slide(s) received with immunohistochemical study control(s), the  control result(s) has/have been verified by the sign out pathologist.  For slide(s) without received controls positive control slides has/have  been reviewed and approved by performing immunohistochemistry lab*.*    For [place] [address]: The immunohistochemical/in-situ hybridization tests  have been developed and their performance characteristics determined by  [facility], [address] [place] [address]. They have  not been cleared or approved by the U.S. Food and Drug Administration.  The FDA has  determined that such clearance or approval is not necessary.  This test is used for clinical purposes. The laboratory is certified  under the CLIA-88 as qualified to perform high complexity clinical  testing.    For those cases signed out at [address] [place] [address]:  These immunohistochemical/ in-situ hybridization tests have been    developed and their performance characteristicsdetermined by [facility] /  , Department of Pathology, Division of  Immunopathology, [phone] [place] [address], [place] [address]. They have  not been cleared or approved by the U.S. Food and Drug Administration.  The FDA has determined that such clearance or approval is not necessary.  This test is used for clinical purposes. The laboratory is certified  under the CLIA-88 as qualified to perform high complexity clinical  testing.  These assays have not been validated ondecalcified tissues.  Results should be interpreted with caution given the possibility of false  negative results on decalcified specimens.    For tests performed at [facility]: [address] [place] [address].    For tests performed at [facility] [address], [address] [place] [address]. For tests performed at [facility]: [address], [place] [address]. For tests  performed at [facility] [address] [place]  [address]. For tests performed at [facility] [address], [address]  [place] [address]. For tests performed at [facility]: [facility] [place] [address]. For tests performed at [facility]: [address], [place] [address]. For tests performed at  [facility]  [address].

## 2025-05-31 NOTE — DISCHARGE NOTE PROVIDER - NSDCCPCAREPLAN_GEN_ALL_CORE_FT
PRINCIPAL DISCHARGE DIAGNOSIS  Diagnosis: DM foot ulcer  Assessment and Plan of Treatment: You presnted to the hospital with a left diabetic foot ulcer would with left big toe gangrene. You had surgery done with podiatry which included a partial 1st and 5th ray resection, heel debridement with antibiotic cement placement and reattachement of peroneus brevis tendon into the cuboid. You were started on IV Cefepeime an antibiotic by Infectious Disease Dr. Melany Calhoun.     PRINCIPAL DISCHARGE DIAGNOSIS  Diagnosis: DM foot ulcer  Assessment and Plan of Treatment: You presnted to the hospital with a left diabetic foot ulcer would with left big toe gangrene. You had surgery done with podiatry which included a partial 1st and 5th ray resection, heel debridement with antibiotic cement placement and reattachement of peroneus brevis tendon into the cuboid. You were started on the antibiotics IV Cefepeime and minocycline (oral)  by Infectious Disease Dr. Melany Calhoun.  Please follow up with your Primary Care Doctor within 1-2 weeks for continued medical management.      SECONDARY DISCHARGE DIAGNOSES  Diagnosis: Anemia  Assessment and Plan of Treatment: You had low blood counts, also called anemia, likely due to blood loss from your wounds. You had two episodes of falls and low blood pressure, likely related to this. You were given blood transfusions throughout your hospitalization to help improve your blood counts.  Please follow up with your Primary Care Doctor within 1-2 weeks for continued medical management.     PRINCIPAL DISCHARGE DIAGNOSIS  Diagnosis: DM foot ulcer  Assessment and Plan of Treatment: You presnted to the hospital with a left diabetic foot ulcer would with left big toe gangrene. You had surgery done with podiatry which included a partial 1st and 5th ray resection, heel debridement with antibiotic cement placement and reattachement of peroneus brevis tendon into the cuboid. You were started on the antibiotics IV Cefepeime and minocycline (oral)  by Infectious Disease Dr. Melany Calhoun.  Please follow up with your Primary Care Doctor within 1-2 weeks for continued medical management.      SECONDARY DISCHARGE DIAGNOSES  Diagnosis: Anemia  Assessment and Plan of Treatment: You had low blood counts, also called anemia, likely due to blood loss from your wounds. You had two episodes of falls and low blood pressure, likely related to this. You were given blood transfusions throughout your hospitalization to help improve your blood counts.  Please follow up with your Primary Care Doctor within 1-2 weeks for continued medical management.    Diagnosis: Type 2 diabetes mellitus  Assessment and Plan of Treatment: You have a history of Type 2 Diabetes. You were seen by the Diabetes NP. You were treated with insulin during this hospitalization. It was recommend you continue metformin 1000mg BID, Jardiance 25mg daily, Trulicity 1.5mg weekly and glucose monitoring, lifestyle and diet modification on discharge.   Please follow up with your Primary Care Doctor within 1-2 weeks for continued medical management.     PRINCIPAL DISCHARGE DIAGNOSIS  Diagnosis: DM foot ulcer  Assessment and Plan of Treatment: You presnted to the hospital with a left diabetic foot ulcer would with left big toe gangrene. You had surgery done with podiatry which included a partial 1st and 5th ray resection, heel debridement with antibiotic cement placement and reattachement of peroneus brevis tendon into the cuboid. You were started on the antibiotics IV Ertapenem and minocycline (oral)  by Infectious Disease Dr. Melany Calhoun.  - Please TAKE IV Invanz (Ertapenem) once a day via PICC to complete a 6-week course (last day 7/8)  - Please take oral Minocycline twice a day to complete a 6-week course (last day 7/13)  Please follow up with your Primary Care Doctor within 1-2 weeks for continued medical management.      SECONDARY DISCHARGE DIAGNOSES  Diagnosis: Anemia  Assessment and Plan of Treatment: You had low blood counts, also called anemia, likely due to blood loss from your wounds. You had two episodes of falls and low blood pressure, likely related to this. You were given blood transfusions throughout your hospitalization to help improve your blood counts.  Please follow up with your Primary Care Doctor within 1-2 weeks for continued medical management.    Diagnosis: Type 2 diabetes mellitus  Assessment and Plan of Treatment: You have a history of Type 2 Diabetes. You were seen by the Diabetes NP. You were treated with insulin during this hospitalization. It was recommend you continue metformin 1000mg BID, Jardiance 25mg daily, Trulicity 1.5mg weekly and glucose monitoring, lifestyle and diet modification on discharge.   Please follow up with your Primary Care Doctor within 1-2 weeks for continued medical management.     PRINCIPAL DISCHARGE DIAGNOSIS  Diagnosis: DM foot ulcer  Assessment and Plan of Treatment: You presnted to the hospital with a left diabetic foot ulcer would with left big toe gangrene. You had surgery done with podiatry which included a partial 1st and 5th ray resection, heel debridement with antibiotic cement placement and reattachement of peroneus brevis tendon into the cuboid. You were started on the antibiotics IV Ertapenem and minocycline (oral)  by Infectious Disease Dr. Melany Calhoun.  - Please take IV Invanz (Ertapenem) once a day via PICC to complete a 6-week course (last day 7/8)  - Please take oral Minocycline twice a day to complete a 6-week course (last day 7/13)  - Please take tramadol 50mg every 6 hours as needed for pain  Please follow up with your Primary Care Doctor within 1-2 weeks for continued medical management.      SECONDARY DISCHARGE DIAGNOSES  Diagnosis: Anemia  Assessment and Plan of Treatment: You had low blood counts, also called anemia, likely due to blood loss from your wounds. You had two episodes of falls and low blood pressure, likely related to this. You were given blood transfusions throughout your hospitalization to help improve your blood counts.  Please follow up with your Primary Care Doctor within 1-2 weeks for continued medical management.    Diagnosis: Type 2 diabetes mellitus  Assessment and Plan of Treatment: You have a history of Type 2 Diabetes. You were seen by the Diabetes NP. You were treated with insulin during this hospitalization. It was recommend you continue metformin 1000mg BID, Jardiance 25mg daily, Trulicity 1.5mg weekly and glucose monitoring, lifestyle and diet modification on discharge.   Please follow up with your Primary Care Doctor within 1-2 weeks for continued medical management.     PRINCIPAL DISCHARGE DIAGNOSIS  Diagnosis: DM foot ulcer  Assessment and Plan of Treatment: You presnted to the hospital with a left diabetic foot ulcer would with left big toe gangrene. You had surgery done with podiatry which included a partial 1st and 5th ray resection, heel debridement with antibiotic cement placement and reattachement of peroneus brevis tendon into the cuboid. You were started on the antibiotics IV Ertapenem and minocycline (oral)  by Infectious Disease Dr. Melany Calhoun.  - Please take IV Invanz (Ertapenem) once a day via PICC to complete a 6-week course (last day 7/8)  - Please take oral Minocycline twice a day to complete a 6-week course (last day 7/13)  - Please take tramadol 50mg every 6 hours as needed for pain  Please make an appointment with Dr. Brasher (Infectious Disease; call [phone]) for continued medical management.  Please follow up with your Primary Care Doctor within 1-2 weeks for continued medical management.      SECONDARY DISCHARGE DIAGNOSES  Diagnosis: Anemia  Assessment and Plan of Treatment: You had low blood counts, also called anemia, likely due to blood loss from your wounds. You had two episodes of falls and low blood pressure, likely related to this. You were given blood transfusions throughout your hospitalization to help improve your blood counts.  Please follow up with your Primary Care Doctor within 1-2 weeks for continued medical management.    Diagnosis: Type 2 diabetes mellitus  Assessment and Plan of Treatment: You have a history of Type 2 Diabetes. You were seen by the Diabetes NP. You were treated with insulin during this hospitalization. It was recommend you continue metformin 1000mg BID, Jardiance 25mg daily, Trulicity 1.5mg weekly and glucose monitoring, lifestyle and diet modification on discharge.   Please follow up with your Primary Care Doctor within 1-2 weeks for continued medical management.     PRINCIPAL DISCHARGE DIAGNOSIS  Diagnosis: DM foot ulcer  Assessment and Plan of Treatment: You presnted to the hospital with a left diabetic foot ulcer would with left big toe gangrene. You had surgery done with podiatry which included a partial 1st and 5th ray resection, heel debridement with antibiotic cement placement and reattachement of peroneus brevis tendon into the cuboid. You were started on the antibiotics IV Ertapenem and minocycline (oral)  by Infectious Disease Dr. Melany Calhoun.  - Please take IV Invanz (Ertapenem) once a day via PICC to complete a 6-week course (last day 7/8)  - Please take oral Minocycline twice a day to complete a 6-week course (last day 7/13)  - Please take tramadol 50mg every 6 hours as needed for pain  Please make an appointment with Dr. Brasher (Infectious Disease; call [phone]) for continued medical management.  Please follow up with your Primary Care Doctor within 1-2 weeks for continued medical management.      SECONDARY DISCHARGE DIAGNOSES  Diagnosis: Anemia  Assessment and Plan of Treatment: You had low blood counts, also called anemia, likely due to blood loss from your wounds. You had two episodes of falls and low blood pressure, likely related to this. You were given blood transfusions throughout your hospitalization to help improve your blood counts.  Please follow up with your Primary Care Doctor within 1-2 weeks for continued medical management.    Diagnosis: Type 2 diabetes mellitus  Assessment and Plan of Treatment: You have a history of Type 2 Diabetes. You were seen by the Diabetes NP. You were treated with insulin during this hospitalization. It was recommend you continue metformin 1000mg BID, Jardiance 25mg daily, Trulicity 1.5mg weekly and glucose monitoring, lifestyle and diet modification on discharge.   Please follow up with your Primary Care Doctor within 1-2 weeks for continued medical management.    Diagnosis: S/P PICC central line placement  Assessment and Plan of Treatment: You have a PICC line in place for your IV antibiotics. Please use one 10 mL saline flush to clear each lumen before and after each infusion. Please ensure there is blood return before beginning an infusion. If the patient develops a fever or pain, redness, or swelling at the PICC insertion site, please contact a medical provider or interventional radiology (IR) as soon as possible.    Notify your primary physician and/or Interventional Radiology IMMEDIATELY if you experience any of the following       - Fever of 101F or 38C       - Chills or Rigors/ Shakes       - Swelling and/or Redness in the area around the port       - Worsening Pain       - Blood soaked bandages or worsening bleeding       - Lightheadedness and/or dizziness upon standing       - Chest Pain/ Tightness       - Shortness of Breath       - Difficulty walking  If you have a problem that you believe requires IMMEDIATE attention, please go to your NEAREST Emergency Room.       PRINCIPAL DISCHARGE DIAGNOSIS  Diagnosis: DM foot ulcer  Assessment and Plan of Treatment: You presnted to the hospital with a left diabetic foot ulcer would with left big toe gangrene. You had surgery done with podiatry which included a partial 1st and 5th ray resection, heel debridement with antibiotic cement placement and reattachement of peroneus brevis tendon into the cuboid. You were started on the antibiotics IV Ertapenem and minocycline (oral)  by Infectious Disease Dr. Melany Calhoun.  start antibiotics from 6/5/25   - Please take IV Invanz (Ertapenem) once a day via PICC to complete a 6-week course (last day 7/8)  - Please take oral Minocycline twice a day to complete a 6-week course (last day 7/13)  - Please take tramadol 50mg every 6 hours as needed for pain  Non weight bearing Left Lower EXT   -[facility],   -Hyperbaric treatment as out pt  Please make an appointment with Dr. Brasher (Infectious Disease; call [phone]) for continued medical management.  Please follow up with your Primary Care Doctor within 1-2 weeks for continued medical management.      SECONDARY DISCHARGE DIAGNOSES  Diagnosis: Anemia  Assessment and Plan of Treatment: You had low blood counts, also called anemia, post op  & blood loss from your wounds.   -You had two episodes of falls and low blood pressure, likely related to this.  - You were given blood transfusions throughout your hospitalization to help improve your blood counts.  Please follow up with your Primary Care Doctor within 1-2 weeks for continued medical management.    Diagnosis: Type 2 diabetes mellitus  Assessment and Plan of Treatment: You have a history of Type 2 Diabetes. You were seen by the Diabetes NP. You were treated with insulin during this hospitalization.  - It was recommend you continue metformin 1000mg BID, Jardiance 25mg daily, Trulicity 1.5mg weekly and glucose monitoring, lifestyle and diet modification on discharge.   Please follow up with your Primary Care Doctor within 1-2 weeks for continued medical management.    Diagnosis: PAD (peripheral artery disease)  Assessment and Plan of Treatment: Continue ASA  & Plavix daily    Diagnosis: S/P PICC central line placement  Assessment and Plan of Treatment: You have a PICC line in place for your IV antibiotics. Please use one 10 mL saline flush to clear each lumen before and after each infusion. Please ensure there is blood return before beginning an infusion. If the patient develops a fever or pain, redness, or swelling at the PICC insertion site, please contact a medical provider or interventional radiology (IR) as soon as possible.    Notify your primary physician and/or Interventional Radiology IMMEDIATELY if you experience any of the following       - Fever of 101F or 38C       - Chills or Rigors/ Shakes       - Swelling and/or Redness in the area around the port       - Worsening Pain       - Blood soaked bandages or worsening bleeding       - Lightheadedness and/or dizziness upon standing       - Chest Pain/ Tightness       - Shortness of Breath       - Difficulty walking  If you have a problem that you believe requires IMMEDIATE attention, please go to your NEAREST Emergency Room.      Diagnosis: HTN (hypertension)  Assessment and Plan of Treatment: Continue Toprol XLdaily, Lisinopril daily

## 2025-05-31 NOTE — PROGRESS NOTE ADULT - PROBLEM SELECTOR PLAN 1
Patient evaluated and chart reviewed.  POD#3 s/p left foot partial 1st and 5th ray resection, detachment and reattachment of peroneus brevis tendon, and debridement of heel with antibiotic cement placement.  No active bleeding noted at this time.   No signs of post-operative infection, hematoma, or dehiscence at this time.  DSD reapplied with xeroform, Aquacel Aq, gauze, ABD, and bren.  Will continue to monitor for signs of active bleeding.   Surgical pathology report reviewed.  Continue IV abx per ID recs.  Podiatry will continue to follow while in-house.  Plan to be discussed with attending.

## 2025-05-31 NOTE — PROGRESS NOTE ADULT - ATTENDING COMMENTS
65 y/o M with PMHx DM2, osteomyelitis, CAD, PAD s/p RLE angioplasty 2020, s/p surgical amputation of R forefoot , s/p L PT angioplasty 12/2/24 for L lat foot DFU on plavix, HTN, s/p recent admission for left foot infection s/p PICC placement for IV abx  sent by Podiatry Dr. Stark for admission for OR intervention tomorrow.  Pt seen, examined case & care plan d/w pt, residents at detail.  Consult-  ID-DR XENIA Lyn  -IV abx  VIA PICC line-Change IV Cefepime 2 gm IVPB q 8 hrs, + clean margin AC on chronic OM on Pathology results   Podiatry-DR Joe Stark d/w POST Op -NWB Left foot -bleeding post op -dressing changed ,Abx ,D/W DR French -about Pathology results   PO diet   AM labs   DVT ppx restart as d/w podiatry  Total care time is 60 minutes.

## 2025-05-31 NOTE — DISCHARGE NOTE PROVIDER - NSDCMRMEDTOKEN_GEN_ALL_CORE_FT
atorvastatin 40 mg oral tablet: 1 tab(s) orally once a day (at bedtime)  Cinnamon: 1 cap(s) orally once a day  clopidogrel 75 mg oral tablet: 1 tab(s) orally once a day  gabapentin 300 mg oral capsule: 1 cap(s) orally 3 times a day  Jardiance 25 mg oral tablet: 1 tab(s) orally once a day  lisinopril 40 mg oral tablet: 1 tab(s) orally once a day  metFORMIN 1000 mg oral tablet: 1 tab(s) orally 2 times a day  metoprolol succinate 50 mg oral tablet, extended release: 1 tab(s) orally once a day  Trulicity Pen 1.5 mg/0.5 mL subcutaneous solution: 1.5 milligram(s) subcutaneously once a week (Sundays)  Tumeric : 1 cap orally once a day   aspirin 81 mg oral delayed release tablet: 1 tab(s) orally once a day  atorvastatin 40 mg oral tablet: 1 tab(s) orally once a day (at bedtime)  Cinnamon: 1 cap(s) orally once a day  clopidogrel 75 mg oral tablet: 1 tab(s) orally once a day  ertapenem 1 g injection: 1 gram(s) injectable every 24 hours Via PICC line  gabapentin 300 mg oral capsule: 1 cap(s) orally 3 times a day  Jardiance 25 mg oral tablet: 1 tab(s) orally once a day  lisinopril 40 mg oral tablet: 1 tab(s) orally once a day  metFORMIN 1000 mg oral tablet: 1 tab(s) orally 2 times a day  metoprolol succinate 50 mg oral tablet, extended release: 1 tab(s) orally once a day  minocycline 100 mg oral capsule: 2 cap(s) orally every 12 hours  Trulicity Pen 1.5 mg/0.5 mL subcutaneous solution: 1.5 milligram(s) subcutaneously once a week (Sundays)  Tumeric : 1 cap orally once a day   aspirin 81 mg oral delayed release tablet: 1 tab(s) orally once a day  atorvastatin 40 mg oral tablet: 1 tab(s) orally once a day (at bedtime)  Cinnamon: 1 cap(s) orally once a day  clopidogrel 75 mg oral tablet: 1 tab(s) orally once a day  ertapenem 1 g injection: 1 gram(s) injectable every 24 hours Via PICC line  gabapentin 300 mg oral capsule: 1 cap(s) orally 3 times a day  Jardiance 25 mg oral tablet: 1 tab(s) orally once a day  lisinopril 40 mg oral tablet: 1 tab(s) orally once a day  metFORMIN 1000 mg oral tablet: 1 tab(s) orally 2 times a day  metoprolol succinate 50 mg oral tablet, extended release: 1 tab(s) orally once a day  minocycline 100 mg oral capsule: 2 cap(s) orally every 12 hours Please take 2 caps every twelve hours to complete a 6-week course (last day 7/13)  traMADol 50 mg oral tablet: 1 tab(s) orally every 6 hours As needed Moderate Pain (4 - 6)  Trulicity Pen 1.5 mg/0.5 mL subcutaneous solution: 1.5 milligram(s) subcutaneously once a week (Sundays)  Tumeric : 1 cap orally once a day   aspirin 81 mg oral delayed release tablet: 1 tab(s) orally once a day  atorvastatin 40 mg oral tablet: 1 tab(s) orally once a day (at bedtime)  Cinnamon: 1 cap(s) orally once a day  clopidogrel 75 mg oral tablet: 1 tab(s) orally once a day  ertapenem 1 g injection: 1 gram(s) injectable every 24 hours Via PICC line  gabapentin 300 mg oral capsule: 1 cap(s) orally 3 times a day  Jardiance 25 mg oral tablet: 1 tab(s) orally once a day  lisinopril 40 mg oral tablet: 1 tab(s) orally once a day  metFORMIN 1000 mg oral tablet: 1 tab(s) orally 2 times a day  metoprolol succinate 50 mg oral tablet, extended release: 1 tab(s) orally once a day  minocycline 100 mg oral capsule: 2 cap(s) orally every 12 hours Please take 2 caps every twelve hours to complete a 6-week course (last day 7/13)  traMADol 50 mg oral tablet: 1 tab(s) orally every 6 hours MDD: 4/ tab 24 hrs  traMADol 50 mg oral tablet: 1 tab(s) orally every 6 hours As needed Moderate Pain (4 - 6)  Trulicity Pen 1.5 mg/0.5 mL subcutaneous solution: 1.5 milligram(s) subcutaneously once a week (Sundays)  Tumeric : 1 cap orally once a day   aspirin 81 mg oral delayed release tablet: 1 tab(s) orally once a day  atorvastatin 40 mg oral tablet: 1 tab(s) orally once a day (at bedtime)  Cinnamon: 1 cap(s) orally once a day  clopidogrel 75 mg oral tablet: 1 tab(s) orally once a day  ertapenem 1 g injection: 1 gram(s) injectable every 24 hours Via PICC line  gabapentin 300 mg oral capsule: 1 cap(s) orally 3 times a day  Jardiance 25 mg oral tablet: 1 tab(s) orally once a day  lisinopril 40 mg oral tablet: 1 tab(s) orally once a day  metFORMIN 1000 mg oral tablet: 1 tab(s) orally 2 times a day  metoprolol succinate 50 mg oral tablet, extended release: 1 tab(s) orally once a day  minocycline 100 mg oral capsule: 2 cap(s) orally every 12 hours Please take 2 caps every twelve hours to complete a 6-week course (last day 7/13)  traMADol 50 mg oral tablet: 1 tab(s) orally every 6 hours Every 6 hours as needed for moderate to severe pain MDD: 4/ tab 24 hrs  Trulicity Pen 1.5 mg/0.5 mL subcutaneous solution: 1.5 milligram(s) subcutaneously once a week (Sundays)  Tumeric : 1 cap orally once a day  Tylenol 325 mg oral tablet: 2 tab(s) orally every 6 hours for moderate to severe pain   aspirin 81 mg oral delayed release tablet: 1 tab(s) orally once a day  atorvastatin 40 mg oral tablet: 1 tab(s) orally once a day (at bedtime)  Cinnamon: 1 cap(s) orally once a day  clopidogrel 75 mg oral tablet: 1 tab(s) orally once a day  ertapenem 1 g injection: 1 gram(s) injectable every 24 hours Via PICC line  gabapentin 300 mg oral capsule: 1 cap(s) orally 3 times a day  Jardiance 25 mg oral tablet: 1 tab(s) orally once a day  lisinopril 40 mg oral tablet: 1 tab(s) orally once a day  metFORMIN 1000 mg oral tablet: 1 tab(s) orally 2 times a day  metoprolol succinate 50 mg oral tablet, extended release: 1 tab(s) orally once a day  minocycline 100 mg oral capsule: 2 cap(s) orally every 12 hours Please take 2 caps every twelve hours to complete a 6-week course (last day 7/13)  traMADol 50 mg oral tablet: 1 tab(s) orally every 6 hours Every 6 hours as needed for moderate to severe pain MDD: 4/ tab 24 hrs  Trulicity Pen 1.5 mg/0.5 mL subcutaneous solution: 1.5 milligram(s) subcutaneously once a week (Sundays)  Tumeric : 1 cap orally once a day  Tylenol 325 mg oral tablet: 2 tab(s) orally every 6 hours mild pain

## 2025-05-31 NOTE — PROGRESS NOTE ADULT - SUBJECTIVE AND OBJECTIVE BOX
[facility]    Vlad Gallardo, Ruthy, Otoniel, Osvaldo, David      [phone]    CHIEF COMPLAINT: Patient is a 64y old  Male who presents with a chief complaint of left foot osteomyelitis (31 May 2025 08:08)    Interim history:   No acute events overnight. Seen and examined at bedside this AM. Denies chest pain, dyspnea, palpitations, and dizziness. No orthopnea and PND. Tolerating room air.     MEDICATIONS  (STANDING):  aspirin enteric coated 81 milliGRAM(s) Oral daily  atorvastatin 40 milliGRAM(s) Oral at bedtime  cefepime   IVPB 2000 milliGRAM(s) IV Intermittent every 8 hours  cefepime   IVPB      clopidogrel Tablet 75 milliGRAM(s) Oral daily  dextrose 5%. 1000 milliLiter(s) (100 mL/Hr) IV Continuous <Continuous>  dextrose 5%. 1000 milliLiter(s) (50 mL/Hr) IV Continuous <Continuous>  dextrose 50% Injectable 25 Gram(s) IV Push once  dextrose 50% Injectable 12.5 Gram(s) IV Push once  dextrose 50% Injectable 25 Gram(s) IV Push once  enoxaparin Injectable 40 milliGRAM(s) SubCutaneous every 24 hours  gabapentin 300 milliGRAM(s) Oral three times a day  glucagon  Injectable 1 milliGRAM(s) IntraMuscular once  insulin glargine Injectable (LANTUS) 15 Unit(s) SubCutaneous every morning  insulin lispro (ADMELOG) corrective regimen sliding scale   SubCutaneous three times a day before meals  insulin lispro (ADMELOG) corrective regimen sliding scale   SubCutaneous at bedtime  insulin lispro Injectable (ADMELOG) 5 Unit(s) SubCutaneous three times a day before meals  lisinopril 40 milliGRAM(s) Oral daily  metoprolol succinate ER 50 milliGRAM(s) Oral daily  senna 2 Tablet(s) Oral at bedtime    MEDICATIONS  (PRN):  acetaminophen   IVPB .. 1000 milliGRAM(s) IV Intermittent every 8 hours PRN Mild Pain (1 - 3)  aluminum hydroxide/magnesium hydroxide/simethicone Suspension 30 milliLiter(s) Oral every 4 hours PRN Dyspepsia  dextrose Oral Gel 15 Gram(s) Oral once PRN Blood Glucose LESS THAN 70 milliGRAM(s)/deciliter  HYDROmorphone  Injectable 0.5 milliGRAM(s) IV Push every 4 hours PRN Moderate Pain (4 - 6)  HYDROmorphone  Injectable 1 milliGRAM(s) IV Push every 4 hours PRN Severe Pain (7 - 10)  melatonin 3 milliGRAM(s) Oral at bedtime PRN Insomnia  ondansetron Injectable 4 milliGRAM(s) IV Push every 8 hours PRN Nausea and/or Vomiting      REVIEW OF SYSTEMS: unable to provide  Vital Signs Last 24 Hrs  T(C): 37.1 (31 May 2025 04:42), Max: 37.1 (31 May 2025 04:42)  T(F): 98.8 (31 May 2025 04:42), Max: 98.8 (31 May 2025 04:42)  HR: 92 (31 May 2025 04:42) (92 - 96)  BP: 122/65 (31 May 2025 04:42) (96/55 - 122/65)  BP(mean): --  RR: 18 (31 May 2025 04:42) (18 - 18)  SpO2: 96% (31 May 2025 04:42) (96% - 98%)    Parameters below as of 31 May 2025 04:42  Patient On (Oxygen Delivery Method): room air        I&O's Summary    30 May 2025 07:01  -  31 May 2025 07:00  --------------------------------------------------------  IN: 0 mL / OUT: 1000 mL / NET: -1000 mL      PHYSICAL EXAM:  Constitutional: NAD, awake and alert  HEENT: Moist Mucous Membranes, Anicteric  Pulmonary: Non-labored, breath sounds are clear bilaterally, No wheezing, rales or rhonchi  Cardiovascular: Regular, S1 and S2, No murmurs, No rubs, gallops or clicks  Gastrointestinal:  soft, nontender, nondistended   Lymph: No peripheral edema. No lymphadenopathy.   Skin: No visible rashes Left foot DSD  Psych:  Mood & affect appropriate    LABS: All Labs Reviewed:                        8.4    13.86 )-----------( 270      ( 31 May 2025 05:40 )             25.7                         9.3    14.73 )-----------( 301      ( 30 May 2025 08:45 )             28.2                         9.6    14.44 )-----------( 283      ( 29 May 2025 09:00 )             30.1     31 May 2025 05:40    138    |  107    |  22     ----------------------------<  185    4.7     |  26     |  1.10   30 May 2025 08:45    138    |  103    |  22     ----------------------------<  197    4.5     |  27     |  1.20   29 May 2025 09:00    136    |  101    |  19     ----------------------------<  219    4.5     |  27     |  1.20     Ca    8.4        31 May 2025 05:40  Ca    8.7        30 May 2025 08:45  Ca    8.1        29 May 2025 09:00    TPro  6.5    /  Alb  2.5    /  TBili  0.3    /  DBili  x      /  AST  9      /  ALT  16     /  AlkPhos  63     31 May 2025 05:40  TPro  7.1    /  Alb  2.9    /  TBili  0.3    /  DBili  x      /  AST  10     /  ALT  18     /  AlkPhos  69     30 May 2025 08:45  TPro  6.5    /  Alb  2.8    /  TBili  0.4    /  DBili  x      /  AST  9      /  ALT  18     /  AlkPhos  64     29 May 2025 09:00          Blood Culture: Organism --  Gram Stain Blood -- Gram Stain   No polymorphonuclear cells seen  No organisms seen  Specimen Source Tissue Other, LEFT HALLUX BONE  Culture-Blood --    Organism --  Gram Stain Blood -- Gram Stain --  Specimen Source Blood Blood-Peripheral  Culture-Blood --    Organism --  Gram Stain Blood -- Gram Stain --  Specimen Source Blood Blood-Peripheral  Culture-Blood -- [facility]    Vlad Gallardo, Ruthy, Otoniel, Osvaldo, David      [phone]    CHIEF COMPLAINT: Patient is a 64y old  Male who presents with a chief complaint of left foot osteomyelitis (31 May 2025 08:08)    Interim history:   No acute events overnight. Seen and examined at bedside this AM. Denies chest pain, dyspnea, palpitations, and dizziness. No orthopnea and PND. Tolerating room air.     MEDICATIONS  (STANDING):  aspirin enteric coated 81 milliGRAM(s) Oral daily  atorvastatin 40 milliGRAM(s) Oral at bedtime  cefepime   IVPB 2000 milliGRAM(s) IV Intermittent every 8 hours  cefepime   IVPB      clopidogrel Tablet 75 milliGRAM(s) Oral daily  dextrose 5%. 1000 milliLiter(s) (100 mL/Hr) IV Continuous <Continuous>  dextrose 5%. 1000 milliLiter(s) (50 mL/Hr) IV Continuous <Continuous>  dextrose 50% Injectable 25 Gram(s) IV Push once  dextrose 50% Injectable 12.5 Gram(s) IV Push once  dextrose 50% Injectable 25 Gram(s) IV Push once  enoxaparin Injectable 40 milliGRAM(s) SubCutaneous every 24 hours  gabapentin 300 milliGRAM(s) Oral three times a day  glucagon  Injectable 1 milliGRAM(s) IntraMuscular once  insulin glargine Injectable (LANTUS) 15 Unit(s) SubCutaneous every morning  insulin lispro (ADMELOG) corrective regimen sliding scale   SubCutaneous three times a day before meals  insulin lispro (ADMELOG) corrective regimen sliding scale   SubCutaneous at bedtime  insulin lispro Injectable (ADMELOG) 5 Unit(s) SubCutaneous three times a day before meals  lisinopril 40 milliGRAM(s) Oral daily  metoprolol succinate ER 50 milliGRAM(s) Oral daily  senna 2 Tablet(s) Oral at bedtime    MEDICATIONS  (PRN):  acetaminophen   IVPB .. 1000 milliGRAM(s) IV Intermittent every 8 hours PRN Mild Pain (1 - 3)  aluminum hydroxide/magnesium hydroxide/simethicone Suspension 30 milliLiter(s) Oral every 4 hours PRN Dyspepsia  dextrose Oral Gel 15 Gram(s) Oral once PRN Blood Glucose LESS THAN 70 milliGRAM(s)/deciliter  HYDROmorphone  Injectable 0.5 milliGRAM(s) IV Push every 4 hours PRN Moderate Pain (4 - 6)  HYDROmorphone  Injectable 1 milliGRAM(s) IV Push every 4 hours PRN Severe Pain (7 - 10)  melatonin 3 milliGRAM(s) Oral at bedtime PRN Insomnia  ondansetron Injectable 4 milliGRAM(s) IV Push every 8 hours PRN Nausea and/or Vomiting    REVIEW OF SYSTEMS:   CONSTITUTIONAL: denies fever, chills, fatigue, weakness  HEENT: denies blurred vision, sore throat  SKIN: denies new lesions, rash  CARDIOVASCULAR: denies chest pain, chest pressure, palpitations  RESPIRATORY: denies shortness of breath, sputum production  GASTROINTESTINAL: denies nausea, vomiting, diarrhea, abdominal pain  GENITOURINARY: denies dysuria, discharge  NEUROLOGICAL: denies numbness, headache, focal weakness  MUSCULOSKELETAL: denies new joint pain, muscle aches  HEMATOLOGIC: denies gross bleeding, bruising      Vital Signs Last 24 Hrs  T(C): 37.1 (31 May 2025 04:42), Max: 37.1 (31 May 2025 04:42)  T(F): 98.8 (31 May 2025 04:42), Max: 98.8 (31 May 2025 04:42)  HR: 92 (31 May 2025 04:42) (92 - 96)  BP: 122/65 (31 May 2025 04:42) (96/55 - 122/65)  BP(mean): --  RR: 18 (31 May 2025 04:42) (18 - 18)  SpO2: 96% (31 May 2025 04:42) (96% - 98%)    Parameters below as of 31 May 2025 04:42  Patient On (Oxygen Delivery Method): room air        I&O's Summary    30 May 2025 07:01  -  31 May 2025 07:00  --------------------------------------------------------  IN: 0 mL / OUT: 1000 mL / NET: -1000 mL      PHYSICAL EXAM:  Constitutional: NAD, awake and alert  HEENT: Moist Mucous Membranes, Anicteric  Pulmonary: Non-labored, breath sounds are clear bilaterally, No wheezing, rales or rhonchi  Cardiovascular: Regular, S1 and S2, No murmurs, No rubs, gallops or clicks  Gastrointestinal:  soft, nontender, nondistended   Lymph: No peripheral edema. No lymphadenopathy.   Skin: No visible rashes Left foot DSD  Psych:  Mood & affect appropriate    LABS: All Labs Reviewed:                        8.4    13.86 )-----------( 270      ( 31 May 2025 05:40 )             25.7                         9.3    14.73 )-----------( 301      ( 30 May 2025 08:45 )             28.2                         9.6    14.44 )-----------( 283      ( 29 May 2025 09:00 )             30.1     31 May 2025 05:40    138    |  107    |  22     ----------------------------<  185    4.7     |  26     |  1.10   30 May 2025 08:45    138    |  103    |  22     ----------------------------<  197    4.5     |  27     |  1.20   29 May 2025 09:00    136    |  101    |  19     ----------------------------<  219    4.5     |  27     |  1.20     Ca    8.4        31 May 2025 05:40  Ca    8.7        30 May 2025 08:45  Ca    8.1        29 May 2025 09:00    TPro  6.5    /  Alb  2.5    /  TBili  0.3    /  DBili  x      /  AST  9      /  ALT  16     /  AlkPhos  63     31 May 2025 05:40  TPro  7.1    /  Alb  2.9    /  TBili  0.3    /  DBili  x      /  AST  10     /  ALT  18     /  AlkPhos  69     30 May 2025 08:45  TPro  6.5    /  Alb  2.8    /  TBili  0.4    /  DBili  x      /  AST  9      /  ALT  18     /  AlkPhos  64     29 May 2025 09:00          Blood Culture: Organism --  Gram Stain Blood -- Gram Stain   No polymorphonuclear cells seen  No organisms seen  Specimen Source Tissue Other, LEFT HALLUX BONE  Culture-Blood --    Organism --  Gram Stain Blood -- Gram Stain --  Specimen Source Blood Blood-Peripheral  Culture-Blood --    Organism --  Gram Stain Blood -- Gram Stain --  Specimen Source Blood Blood-Peripheral  Culture-Blood --

## 2025-05-31 NOTE — PROGRESS NOTE ADULT - PROBLEM SELECTOR PLAN 1
Patient presents with left DM foot ulcer/ wound Gangrene Left BIG Toe, sent by podiatry Dr. Lincoln lawson [facility]  Gangrene Left BIG Toe with left heel wound   - s/p OR today with podiatry: partial 1st and 5th ray resection, heel debridement with antibiotic cement placement and reattachment of peroneus brevis tendon into the cuboid   - MRI of the left foot demonstrates ulceration at the great toe with underlying cortical destruction of the distal phalanx and acute osteomyelitis involving the distal and proximal phalanx of the great toe.  Focal ulceration at the base of the fifth metatarsal with underlying marrow edema within the fifth metatarsal base and intramedullary cavity. Finding is indeterminate and may represent concurrent early osteomyelitis versus reactive changes.  Marrow edema involving the distal phalanx and middle phalanx of the little toe without definitive corresponding ulceration or abnormal T1 weighted signal.   Nonspecific bone marrow edema pattern within the intermediate cuneiform, lateral cuneiform, and navicular bone favored to represent reactive changes from osteoarthrosis.  - F/u cultures   - ID Dr. Melany Valle ---> IV Cefepime q 8 hrs  Pain meds  IV Tylenol, PRN, IV Dilaudid PRN for Mod & severe pain  - PT eval today - recommends home PT Patient presents with left DM foot ulcer/ wound Gangrene Left BIG Toe, sent by podiatry Dr. Lincoln lawson [facility]  Gangrene Left BIG Toe with left heel wound   - s/p OR today with podiatry: partial 1st and 5th ray resection, heel debridement with antibiotic cement placement and reattachment of peroneus brevis tendon into the cuboid   - MRI of the left foot demonstrates ulceration at the great toe with underlying cortical destruction of the distal phalanx and acute osteomyelitis involving the distal and proximal phalanx of the great toe.  Focal ulceration at the base of the fifth metatarsal with underlying marrow edema within the fifth metatarsal base and intramedullary cavity. Finding is indeterminate and may represent concurrent early osteomyelitis versus reactive changes.  Marrow edema involving the distal phalanx and middle phalanx of the little toe without definitive corresponding ulceration or abnormal T1 weighted signal.   Nonspecific bone marrow edema pattern within the intermediate cuneiform, lateral cuneiform, and navicular bone favored to represent reactive changes from osteoarthrosis.  - Surgical path results show acute and chronic osteomyelitis in first and fifth metatarsals   - ID Dr. Melany Calhoun IV Invanz ---> IV Cefepime q 8 hrs  Pain meds  IV Tylenol, PRN, IV Dilaudid PRN for Mod & severe pain  - PT eval today - recommends home PT

## 2025-05-31 NOTE — PROGRESS NOTE ADULT - SUBJECTIVE AND OBJECTIVE BOX
Patient is a 64y old  Male who presents with a chief complaint of left foot osteomyelitis (31 May 2025 07:34)    HPI:  63 y/o M with PMHx DM2, osteomyelitis, CAD, PAD s/p RLE angioplasty 2020, s/p surgical amputation of R forefoot , s/p L PT angioplasty 12/2/24 for L lat foot DFU on plavix, HTN, s/p recent admission for left foot infection s/p PICC placement for IV abx  sent by Podiatry Dr. Stark for admission for OR intervention tomorrow. Patient denies any complaints today. States after discharge he had outpatient visits with Vascular physician. He had imaging done and was suggested to have LimFlow procedure. Patient requested to have surgery as per Podiatry for his left foot wound prior to LimFlow. Vascular agreed. Patient states he can walk independently without pain and is currently comfortable.   Of note, patient was transitioned from Rocephin which was to be given via PICC line after previous discharge to Ertapenem by ID this Saturday.     ED course:   Vitals: T 97.7 HR 99 /69   Labs: ESR 64 wbc 10.77   Imaging: Cxray- Right PICC with tip in the SVC.  The heart is not accurately assessed in this AP projection.  The lungs are clear.  There is no pneumothorax or pleural effusion.  No acute bony abnormality.  Clear lungs    (27 May 2025 13:05)    INTERVAL HPI:  5/28: Patient seen and examined post op. complains of mild pain 3-4/10 IV tylenol ordered otherwise denies complaints   5/29: Patient seen and examined at bedside. Denies complaints this AM however has bleeding from surgical site. RRT later called this AM (refer to rapid note) for weakness, diaphoresis likely vasovagal secondary to blood loss. Hb downtrended. Plan to hold Lovenox. Resume Asp and Plavix tomorrow. PT eval tomorrow , mild leukocytosis, drop in H/H  5/30: Patient seen and examined at bedside. Denies complaints this AM. Hb dropped to 8.4 this AM     OVERNIGHT EVENTS:    Home Medications:  atorvastatin 40 mg oral tablet: 1 tab(s) orally once a day (at bedtime) (27 May 2025 15:49)  Cinnamon: 1 cap(s) orally once a day (27 May 2025 15:51)  clopidogrel 75 mg oral tablet: 1 tab(s) orally once a day (27 May 2025 15:49)  gabapentin 300 mg oral capsule: 1 cap(s) orally 3 times a day (27 May 2025 15:49)  Jardiance 25 mg oral tablet: 1 tab(s) orally once a day (27 May 2025 15:49)  lisinopril 40 mg oral tablet: 1 tab(s) orally once a day (27 May 2025 15:49)  metFORMIN 1000 mg oral tablet: 1 tab(s) orally 2 times a day (27 May 2025 15:49)  metoprolol succinate 50 mg oral tablet, extended release: 1 tab(s) orally once a day (27 May 2025 15:49)  Trulicity Pen 1.5 mg/0.5 mL subcutaneous solution: 1.5 milligram(s) subcutaneously once a week (Sundays) (27 May 2025 15:52)  Tumeric : 1 cap orally once a day (27 May 2025 15:51)      MEDICATIONS  (STANDING):  aspirin enteric coated 81 milliGRAM(s) Oral daily  atorvastatin 40 milliGRAM(s) Oral at bedtime  cefepime   IVPB 2000 milliGRAM(s) IV Intermittent every 8 hours  cefepime   IVPB      clopidogrel Tablet 75 milliGRAM(s) Oral daily  dextrose 5%. 1000 milliLiter(s) (100 mL/Hr) IV Continuous <Continuous>  dextrose 5%. 1000 milliLiter(s) (50 mL/Hr) IV Continuous <Continuous>  dextrose 50% Injectable 25 Gram(s) IV Push once  dextrose 50% Injectable 12.5 Gram(s) IV Push once  dextrose 50% Injectable 25 Gram(s) IV Push once  enoxaparin Injectable 40 milliGRAM(s) SubCutaneous every 24 hours  gabapentin 300 milliGRAM(s) Oral three times a day  glucagon  Injectable 1 milliGRAM(s) IntraMuscular once  insulin glargine Injectable (LANTUS) 15 Unit(s) SubCutaneous every morning  insulin lispro (ADMELOG) corrective regimen sliding scale   SubCutaneous three times a day before meals  insulin lispro (ADMELOG) corrective regimen sliding scale   SubCutaneous at bedtime  insulin lispro Injectable (ADMELOG) 5 Unit(s) SubCutaneous three times a day before meals  lisinopril 40 milliGRAM(s) Oral daily  metoprolol succinate ER 50 milliGRAM(s) Oral daily  senna 2 Tablet(s) Oral at bedtime    MEDICATIONS  (PRN):  acetaminophen   IVPB .. 1000 milliGRAM(s) IV Intermittent every 8 hours PRN Mild Pain (1 - 3)  aluminum hydroxide/magnesium hydroxide/simethicone Suspension 30 milliLiter(s) Oral every 4 hours PRN Dyspepsia  dextrose Oral Gel 15 Gram(s) Oral once PRN Blood Glucose LESS THAN 70 milliGRAM(s)/deciliter  HYDROmorphone  Injectable 0.5 milliGRAM(s) IV Push every 4 hours PRN Moderate Pain (4 - 6)  HYDROmorphone  Injectable 1 milliGRAM(s) IV Push every 4 hours PRN Severe Pain (7 - 10)  melatonin 3 milliGRAM(s) Oral at bedtime PRN Insomnia  ondansetron Injectable 4 milliGRAM(s) IV Push every 8 hours PRN Nausea and/or Vomiting      Allergies    No Known Allergies    Intolerances        Social History:  No  Tobacco: Denies  EtOH: Denies  Recreational drug use: Denies  Lives with: Wife at home   Ambulates: Independently   ADLs: Independent (27 May 2025 13:05)      REVIEW OF SYSTEMS:  CONSTITUTIONAL: No fever, No chills, No fatigue, No myalgia, No Body ache, No Weakness  EYES: No eye pain,  No visual disturbances, No discharge, NO Redness  ENMT:  No ear pain, No nose bleed, No vertigo; No sinus pain, NO throat pain, No Congestion  NECK: No pain, No stiffness  RESPIRATORY: No cough, NO wheezing, No  hemoptysis, NO  shortness of breath  CARDIOVASCULAR: No chest pain, palpitations  GASTROINTESTINAL: No abdominal pain, NO epigastric pain. No nausea, No vomiting; No diarrhea, No constipation. [  ] BM  GENITOURINARY: No dysuria, No frequency, No urgency, No hematuria, NO incontinence  NEUROLOGICAL: No headaches, No dizziness, No numbness, No tingling, No tremors, No weakness  EXT: No Swelling, No Pain, No Edema  SKIN:  [  x] No itching, burning, rashes, or lesions   MUSCULOSKELETAL: No joint pain ,No Jt swelling; No muscle pain, No back pain, No extremity pain  PSYCHIATRIC: No depression,  No anxiety,  No mood swings ,No difficulty sleeping at night  PAIN SCALE: [ x ] None  [  ] Other-  ROS Unable to obtain due to - [  ] Dementia  [  ] Lethargy [  ] Drowsy [  ] Sedated [  ] non verbal  REST OF REVIEW Of SYSTEM - [  ] Normal     Vital Signs Last 24 Hrs  T(C): 37.1 (31 May 2025 04:42), Max: 37.1 (31 May 2025 04:42)  T(F): 98.8 (31 May 2025 04:42), Max: 98.8 (31 May 2025 04:42)  HR: 92 (31 May 2025 04:42) (92 - 96)  BP: 122/65 (31 May 2025 04:42) (96/55 - 122/65)  BP(mean): --  RR: 18 (31 May 2025 04:42) (18 - 18)  SpO2: 96% (31 May 2025 04:42) (96% - 98%)    Parameters below as of 31 May 2025 04:42  Patient On (Oxygen Delivery Method): room air      Finger Stick        05-30 @ 07:01  -  05-31 @ 07:00  --------------------------------------------------------  IN: 0 mL / OUT: 1000 mL / NET: -1000 mL          PHYSICAL EXAM:  GENERAL:  [ x ] NAD , [ x ] well appearing, [  ] Agitated, [  ] Drowsy,  [  ] Lethargy, [  ] confused   HEAD:  [  x] Normal, [  ] Other  EYES:  [ x ] EOMI, [ x ] PERRLA, [x  ] conjunctiva and sclera clear normal, [  ] Other,  [ x ] Pallor,[  ] Discharge  ENMT:  [x  ] Normal, [ x ] Moist mucous membranes, [x  ] Good dentition, [ x ] No Thrush  NECK:  [  x] Supple, [x  ] No JVD, [ x ] Normal thyroid, [  ] Lymphadenopathy [  ] Other  CHEST/LUNG:  [  x] Clear to auscultation bilaterally, [ x ] Breath Sounds equal B/L / Decrease, [x  ] poor effort  [x  ] No rales, [  x] No rhonchi  [ x ]  No wheezing,   HEART:  [ x ] Regular rate and rhythm, [  ] tachycardia, [  ] Bradycardia,  [  ] irregular  [  ] No murmurs, No rubs, No gallops, [  ] PPM in place (Mfr:  )  ABDOMEN:  [ x ] Soft, [ x ] Nontender, [ x ] Nondistended, [ x ] No mass, [ x ] Bowel sounds present, [  ] obese  NERVOUS SYSTEM:  [ x ] Alert & Oriented X3, [  ] Nonfocal  [  ] Confusion  [  ] Encephalopathic [  ] Sedated [  ] Unable to assess, [  ] Dementia [  ] Other-  EXTREMITIES: [x] left foot and ankle in dressing , Rt Foot TMA + P Pulse   LYMPH: No lymphadenopathy noted  SKIN:  [ x ] No rashes or lesions, [  ] Pressure Ulcers, [  ] ecchymosis, [  ] Skin Tears, [  ] Other  DIET: Diet, DASH/TLC:   Sodium & Cholesterol Restricted  Consistent Carbohydrate No Snacks (05-28-25 @ 12:07)      LABS:                        8.4    13.86 )-----------( 270      ( 31 May 2025 05:40 )             25.7     31 May 2025 05:40    138    |  107    |  22     ----------------------------<  185    4.7     |  26     |  1.10     Ca    8.4        31 May 2025 05:40    TPro  6.5    /  Alb  2.5    /  TBili  0.3    /  DBili  x      /  AST  9      /  ALT  16     /  AlkPhos  63     31 May 2025 05:40      Urinalysis Basic - ( 31 May 2025 05:40 )    Color: x / Appearance: x / SG: x / pH: x  Gluc: 185 mg/dL / Ketone: x  / Bili: x / Urobili: x   Blood: x / Protein: x / Nitrite: x   Leuk Esterase: x / RBC: x / WBC x   Sq Epi: x / Non Sq Epi: x / Bacteria: x        Culture Results:   No growth to date. (05-28 @ 10:00)  Culture Results:   No growth to date. (05-28 @ 10:00)  Culture Results:   No growth to date. (05-28 @ 10:00)  Culture Results:   No growth at 72 Hours (05-27 @ 09:10)  Culture Results:   No growth at 72 Hours (05-27 @ 09:00)          CARDIAC MARKERS ( 29 May 2025 09:00 )  x     / x     / x     / x     / 2.4 ng/mL          Culture - Tissue with Gram Stain (collected 28 May 2025 10:00)  Source: Tissue Other, LEFT FIFTH METATARSAL BONE  Gram Stain (28 May 2025 19:10):    No polymorphonuclear cells seen    No organisms seen  Preliminary Report (29 May 2025 12:48):    No growth to date.    Culture - Tissue with Gram Stain (collected 28 May 2025 10:00)  Source: Tissue Other, LEFT CALCANEUS BONE  Gram Stain (28 May 2025 19:11):    No polymorphonuclear cells seen    No organisms seen  Preliminary Report (29 May 2025 12:48):    No growth to date.    Culture - Tissue with Gram Stain (collected 28 May 2025 10:00)  Source: Tissue Other, LEFT HALLUX BONE  Gram Stain (28 May 2025 19:06):    No polymorphonuclear cells seen    No organisms seen  Preliminary Report (29 May 2025 12:50):    No growth to date.    Culture - Blood (collected 27 May 2025 09:10)  Source: Blood Blood-Peripheral  Preliminary Report (30 May 2025 12:00):    No growth at 72 Hours    Culture - Blood (collected 27 May 2025 09:00)  Source: Blood Blood-Peripheral  Preliminary Report (30 May 2025 12:00):    No growth at 72 Hours         Anemia Panel:      Thyroid Panel:                RADIOLOGY & ADDITIONAL TESTS:      HEALTH ISSUES - PROBLEM Dx:  Anemia    PAD (peripheral artery disease)    Type 2 diabetes mellitus    HTN (hypertension)    Encounter for deep vein thrombosis (DVT) prophylaxis    DM foot ulcer            Consultant(s) Notes Reviewed:  [  ] YES     Care Discussed with [X] Consultants  [  ] Patient  [  ] Family [  ] HCP [  ]   [  ] Social Service  [  ] RN, [  ] Physical Therapy,[  ] Palliative care team  DVT PPX: [  ] Lovenox, [  ] S C Heparin, [  ] Coumadin, [  ] Xarelto, [  ] Eliquis, [  ] Pradaxa, [  ] IV Heparin drip, [  ] SCD [  ] Contraindication 2 to GI Bleed,[  ] Ambulation [  ] Contraindicated 2 to  bleed [  ] Contraindicated 2 to Brain Bleed  Advanced directive: [  ] None, [  ] DNR/DNI Patient is a 64y old  Male who presents with a chief complaint of left foot osteomyelitis (31 May 2025 07:34)    HPI:  63 y/o M with PMHx DM2, osteomyelitis, CAD, PAD s/p RLE angioplasty 2020, s/p surgical amputation of R forefoot , s/p L PT angioplasty 12/2/24 for L lat foot DFU on plavix, HTN, s/p recent admission for left foot infection s/p PICC placement for IV abx  sent by Podiatry Dr. Stark for admission for OR intervention tomorrow. Patient denies any complaints today. States after discharge he had outpatient visits with Vascular physician. He had imaging done and was suggested to have LimFlow procedure. Patient requested to have surgery as per Podiatry for his left foot wound prior to LimFlow. Vascular agreed. Patient states he can walk independently without pain and is currently comfortable.   Of note, patient was transitioned from Rocephin which was to be given via PICC line after previous discharge to Ertapenem by ID this Saturday.     ED course:   Vitals: T 97.7 HR 99 /69   Labs: ESR 64 wbc 10.77   Imaging: Cxray- Right PICC with tip in the SVC.  The heart is not accurately assessed in this AP projection.  The lungs are clear.  There is no pneumothorax or pleural effusion.  No acute bony abnormality.  Clear lungs    (27 May 2025 13:05)    INTERVAL HPI:  5/28: Patient seen and examined post op. complain,  of mild pain 3-4/10 IV Tylenol ordered otherwise denies complaints   5/29: Patient seen and examined at bedside. Denies complaints this AM however has bleeding from surgical site. RRT later called this AM (refer to rapid note) for weakness, diaphoresis likely vasovagal secondary to blood loss. Hb downtrended. Plan to hold Lovenox. Resume Asp and Plavix tomorrow. PT eval tomorrow , mild leukocytosis, drop in H/H  5/30: Patient seen and examined at bedside. Denies complaints this AM. Hb dropped to 8.4 this AM, On IV Cefepime -Pathology results available. WBC -improving    OVERNIGHT EVENTS: NONE    Home Medications:  atorvastatin 40 mg oral tablet: 1 tab(s) orally once a day (at bedtime) (27 May 2025 15:49)  Cinnamon: 1 cap(s) orally once a day (27 May 2025 15:51)  clopidogrel 75 mg oral tablet: 1 tab(s) orally once a day (27 May 2025 15:49)  gabapentin 300 mg oral capsule: 1 cap(s) orally 3 times a day (27 May 2025 15:49)  Jardiance 25 mg oral tablet: 1 tab(s) orally once a day (27 May 2025 15:49)  lisinopril 40 mg oral tablet: 1 tab(s) orally once a day (27 May 2025 15:49)  metFORMIN 1000 mg oral tablet: 1 tab(s) orally 2 times a day (27 May 2025 15:49)  metoprolol succinate 50 mg oral tablet, extended release: 1 tab(s) orally once a day (27 May 2025 15:49)  Trulicity Pen 1.5 mg/0.5 mL subcutaneous solution: 1.5 milligram(s) subcutaneously once a week (Sundays) (27 May 2025 15:52)  Tumeric : 1 cap orally once a day (27 May 2025 15:51)      MEDICATIONS  (STANDING):  aspirin enteric coated 81 milliGRAM(s) Oral daily  atorvastatin 40 milliGRAM(s) Oral at bedtime  cefepime   IVPB 2000 milliGRAM(s) IV Intermittent every 8 hours  cefepime   IVPB      clopidogrel Tablet 75 milliGRAM(s) Oral daily  dextrose 5%. 1000 milliLiter(s) (100 mL/Hr) IV Continuous <Continuous>  dextrose 5%. 1000 milliLiter(s) (50 mL/Hr) IV Continuous <Continuous>  dextrose 50% Injectable 25 Gram(s) IV Push once  dextrose 50% Injectable 12.5 Gram(s) IV Push once  dextrose 50% Injectable 25 Gram(s) IV Push once  enoxaparin Injectable 40 milliGRAM(s) SubCutaneous every 24 hours  gabapentin 300 milliGRAM(s) Oral three times a day  glucagon  Injectable 1 milliGRAM(s) IntraMuscular once  insulin glargine Injectable (LANTUS) 15 Unit(s) SubCutaneous every morning  insulin lispro (ADMELOG) corrective regimen sliding scale   SubCutaneous three times a day before meals  insulin lispro (ADMELOG) corrective regimen sliding scale   SubCutaneous at bedtime  insulin lispro Injectable (ADMELOG) 5 Unit(s) SubCutaneous three times a day before meals  lisinopril 40 milliGRAM(s) Oral daily  metoprolol succinate ER 50 milliGRAM(s) Oral daily  senna 2 Tablet(s) Oral at bedtime    MEDICATIONS  (PRN):  acetaminophen   IVPB .. 1000 milliGRAM(s) IV Intermittent every 8 hours PRN Mild Pain (1 - 3)  aluminum hydroxide/magnesium hydroxide/simethicone Suspension 30 milliLiter(s) Oral every 4 hours PRN Dyspepsia  dextrose Oral Gel 15 Gram(s) Oral once PRN Blood Glucose LESS THAN 70 milliGRAM(s)/deciliter  HYDROmorphone  Injectable 0.5 milliGRAM(s) IV Push every 4 hours PRN Moderate Pain (4 - 6)  HYDROmorphone  Injectable 1 milliGRAM(s) IV Push every 4 hours PRN Severe Pain (7 - 10)  melatonin 3 milliGRAM(s) Oral at bedtime PRN Insomnia  ondansetron Injectable 4 milliGRAM(s) IV Push every 8 hours PRN Nausea and/or Vomiting      Allergies    No Known Allergies    Intolerances        Social History:  No  Tobacco: Denies  EtOH: Denies  Recreational drug use: Denies  Lives with: Wife at home   Ambulates: Independently   ADLs: Independent (27 May 2025 13:05)      REVIEW OF SYSTEMS: i am OK  CONSTITUTIONAL: No fever, No chills, No fatigue, No myalgia, No Body ache, No Weakness  EYES: No eye pain,  No visual disturbances, No discharge, NO Redness  ENMT:  No ear pain, No nose bleed, No vertigo; No sinus pain, NO throat pain, No Congestion  NECK: No pain, No stiffness  RESPIRATORY: No cough, NO wheezing, No  hemoptysis, NO  shortness of breath  CARDIOVASCULAR: No chest pain, palpitations  GASTROINTESTINAL: No abdominal pain, NO epigastric pain. No nausea, No vomiting; No diarrhea, No constipation. [  ] BM  GENITOURINARY: No dysuria, No frequency, No urgency, No hematuria, NO incontinence  NEUROLOGICAL: No headaches, No dizziness, No numbness, No tingling, No tremors, No weakness  EXT: No Swelling, No Pain, No Edema  SKIN:  [  x] No itching, burning, rashes, or lesions   MUSCULOSKELETAL: No joint pain ,No Jt swelling; No muscle pain, No back pain, No extremity pain  PSYCHIATRIC: No depression,  No anxiety,  No mood swings ,No difficulty sleeping at night  PAIN SCALE: [ x ] None  [  ] Other-  ROS Unable to obtain due to - [  ] Dementia  [  ] Lethargy [  ] Drowsy [  ] Sedated [  ] non verbal  REST OF REVIEW Of SYSTEM - [x  ] Normal     Vital Signs Last 24 Hrs  T(C): 37.1 (31 May 2025 04:42), Max: 37.1 (31 May 2025 04:42)  T(F): 98.8 (31 May 2025 04:42), Max: 98.8 (31 May 2025 04:42)  HR: 92 (31 May 2025 04:42) (92 - 96)  BP: 122/65 (31 May 2025 04:42) (96/55 - 122/65)  BP(mean): --  RR: 18 (31 May 2025 04:42) (18 - 18)  SpO2: 96% (31 May 2025 04:42) (96% - 98%)    Parameters below as of 31 May 2025 04:42  Patient On (Oxygen Delivery Method): room air      Finger Stick        05-30 @ 07:01  -  05-31 @ 07:00  --------------------------------------------------------  IN: 0 mL / OUT: 1000 mL / NET: -1000 mL          PHYSICAL EXAM:  GENERAL:  [ x ] NAD , [ x ] well appearing, [  ] Agitated, [  ] Drowsy,  [  ] Lethargy, [  ] confused   HEAD:  [  x] Normal, [  ] Other  EYES:  [ x ] EOMI, [ x ] PERRLA, [x  ] conjunctiva and sclera clear normal, [  ] Other,  [ x ] Pallor,[  ] Discharge  ENMT:  [x  ] Normal, [ x ] Moist mucous membranes, [x  ] Good dentition, [ x ] No Thrush  NECK:  [  x] Supple, [x  ] No JVD, [ x ] Normal thyroid, [  ] Lymphadenopathy [  ] Other  CHEST/LUNG:  [  x] Clear to auscultation bilaterally, [ x ] Breath Sounds equal B/L / Decrease, [x  ] poor effort  [x  ] No rales, [  x] No rhonchi  [ x ]  No wheezing,   HEART:  [ x ] Regular rate and rhythm, [  ] tachycardia, [  ] Bradycardia,  [  ] irregular  [ x ] No murmurs, No rubs, No gallops, [  ] PPM in place (Mfr:  )  ABDOMEN:  [ x ] Soft, [ x ] Nontender, [ x ] Nondistended, [ x ] No mass, [ x ] Bowel sounds present, [  ] obese+ Umbilical hernia +  NERVOUS SYSTEM:  [ x ] Alert & Oriented X3, [  ] Nonfocal  [  ] Confusion  [  ] Encephalopathic [  ] Sedated [  ] Unable to assess, [  ] Dementia [  ] Other-  EXTREMITIES: [x] left foot and ankle in dressing , Rt Foot TMA + P Pulse   LYMPH: No lymphadenopathy noted  SKIN:  [ x ] No rashes or lesions, [  ] Pressure Ulcers, [  ] ecchymosis, [  ] Skin Tears, [  ] Other      DIET: Diet, DASH/TLC:   Sodium & Cholesterol Restricted  Consistent Carbohydrate No Snacks (05-28-25 @ 12:07)      LABS:                        8.4    13.86 )-----------( 270      ( 31 May 2025 05:40 )             25.7     31 May 2025 05:40    138    |  107    |  22     ----------------------------<  185    4.7     |  26     |  1.10     Ca    8.4        31 May 2025 05:40    TPro  6.5    /  Alb  2.5    /  TBili  0.3    /  DBili  x      /  AST  9      /  ALT  16     /  AlkPhos  63     31 May 2025 05:40      Urinalysis Basic - ( 31 May 2025 05:40 )    Color: x / Appearance: x / SG: x / pH: x  Gluc: 185 mg/dL / Ketone: x  / Bili: x / Urobili: x   Blood: x / Protein: x / Nitrite: x   Leuk Esterase: x / RBC: x / WBC x   Sq Epi: x / Non Sq Epi: x / Bacteria: x        Culture Results:   No growth to date. (05-28 @ 10:00)  Culture Results:   No growth to date. (05-28 @ 10:00)  Culture Results:   No growth to date. (05-28 @ 10:00)  Culture Results:   No growth at 72 Hours (05-27 @ 09:10)  Culture Results:   No growth at 72 Hours (05-27 @ 09:00)          CARDIAC MARKERS ( 29 May 2025 09:00 )  x     / x     / x     / x     / 2.4 ng/mL          Culture - Tissue with Gram Stain (collected 28 May 2025 10:00)  Source: Tissue Other, LEFT FIFTH METATARSAL BONE  Gram Stain (28 May 2025 19:10):    No polymorphonuclear cells seen    No organisms seen  Preliminary Report (29 May 2025 12:48):    No growth to date.    Culture - Tissue with Gram Stain (collected 28 May 2025 10:00)  Source: Tissue Other, LEFT CALCANEUS BONE  Gram Stain (28 May 2025 19:11):    No polymorphonuclear cells seen    No organisms seen  Preliminary Report (29 May 2025 12:48):    No growth to date.    Culture - Tissue with Gram Stain (collected 28 May 2025 10:00)  Source: Tissue Other, LEFT HALLUX BONE  Gram Stain (28 May 2025 19:06):    No polymorphonuclear cells seen    No organisms seen  Preliminary Report (29 May 2025 12:50):    No growth to date.    Culture - Blood (collected 27 May 2025 09:10)  Source: Blood Blood-Peripheral  Preliminary Report (30 May 2025 12:00):    No growth at 72 Hours    Culture - Blood (collected 27 May 2025 09:00)  Source: Blood Blood-Peripheral  Preliminary Report (30 May 2025 12:00):    No growth at 72 Hours    RADIOLOGY & ADDITIONAL TESTS:      HEALTH ISSUES - PROBLEM Dx:  Anemia    PAD (peripheral artery disease)    Type 2 diabetes mellitus    HTN (hypertension)    Encounter for deep vein thrombosis (DVT) prophylaxis    DM foot ulcer      Consultant(s) Notes Reviewed:  [ x ] YES     Care Discussed with [X] Consultants  [x  ] Patient  [  ] Family [  ] HCP [ x ]   [  ] Social Service  [x  ] RN, [  ] Physical Therapy,[  ] Palliative care team  DVT PPX: [x  ] Lovenox, [  ] S C Heparin, [  ] Coumadin, [  ] Xarelto, [  ] Eliquis, [  ] Pradaxa, [  ] IV Heparin drip, [  ] SCD [  ] Contraindication 2 to GI Bleed,[  ] Ambulation [  ] Contraindicated 2 to  bleed [  ] Contraindicated 2 to Brain Bleed  Advanced directive: [ x ] None, [  ] DNR/DNI Patient is a 64y old  Male who presents with a chief complaint of left foot osteomyelitis (31 May 2025 07:34)    HPI:  65 y/o M with PMHx DM2, osteomyelitis, CAD, PAD s/p RLE angioplasty 2020, s/p surgical amputation of R forefoot , s/p L PT angioplasty 12/2/24 for L lat foot DFU on plavix, HTN, s/p recent admission for left foot infection s/p PICC placement for IV abx  sent by Podiatry Dr. Stark for admission for OR intervention tomorrow. Patient denies any complaints today. States after discharge he had outpatient visits with Vascular physician. He had imaging done and was suggested to have LimFlow procedure. Patient requested to have surgery as per Podiatry for his left foot wound prior to LimFlow. Vascular agreed. Patient states he can walk independently without pain and is currently comfortable.   Of note, patient was transitioned from Rocephin which was to be given via PICC line after previous discharge to Ertapenem by ID this Saturday.     ED course:   Vitals: T 97.7 HR 99 /69   Labs: ESR 64 wbc 10.77   Imaging: Cxray- Right PICC with tip in the SVC.  The heart is not accurately assessed in this AP projection.  The lungs are clear.  There is no pneumothorax or pleural effusion.  No acute bony abnormality.  Clear lungs    (27 May 2025 13:05)    INTERVAL HPI:  5/28: Patient seen and examined post op. complain,  of mild pain 3-4/10 IV Tylenol ordered otherwise denies complaints   5/29: Patient seen and examined at bedside. Denies complaints this AM however has bleeding from surgical site. RRT later called this AM (refer to rapid note) for weakness, diaphoresis likely vasovagal secondary to blood loss. Hb downtrended. Plan to hold Lovenox. Resume Asp and Plavix tomorrow. PT eval tomorrow , mild leukocytosis, drop in H/H  5/30: Patient seen and examined at bedside. Denies complaints this AM. Hb dropped to 8.4 this AM, On IV Cefepime -Pathology results available. WBC -improving    OVERNIGHT EVENTS: NONE    Home Medications:  atorvastatin 40 mg oral tablet: 1 tab(s) orally once a day (at bedtime) (27 May 2025 15:49)  Cinnamon: 1 cap(s) orally once a day (27 May 2025 15:51)  clopidogrel 75 mg oral tablet: 1 tab(s) orally once a day (27 May 2025 15:49)  gabapentin 300 mg oral capsule: 1 cap(s) orally 3 times a day (27 May 2025 15:49)  Jardiance 25 mg oral tablet: 1 tab(s) orally once a day (27 May 2025 15:49)  lisinopril 40 mg oral tablet: 1 tab(s) orally once a day (27 May 2025 15:49)  metFORMIN 1000 mg oral tablet: 1 tab(s) orally 2 times a day (27 May 2025 15:49)  metoprolol succinate 50 mg oral tablet, extended release: 1 tab(s) orally once a day (27 May 2025 15:49)  Trulicity Pen 1.5 mg/0.5 mL subcutaneous solution: 1.5 milligram(s) subcutaneously once a week (Sundays) (27 May 2025 15:52)  Tumeric : 1 cap orally once a day (27 May 2025 15:51)      MEDICATIONS  (STANDING):  aspirin enteric coated 81 milliGRAM(s) Oral daily  atorvastatin 40 milliGRAM(s) Oral at bedtime  cefepime   IVPB 2000 milliGRAM(s) IV Intermittent every 8 hours  cefepime   IVPB      clopidogrel Tablet 75 milliGRAM(s) Oral daily  dextrose 5%. 1000 milliLiter(s) (100 mL/Hr) IV Continuous <Continuous>  dextrose 5%. 1000 milliLiter(s) (50 mL/Hr) IV Continuous <Continuous>  dextrose 50% Injectable 25 Gram(s) IV Push once  dextrose 50% Injectable 12.5 Gram(s) IV Push once  dextrose 50% Injectable 25 Gram(s) IV Push once  enoxaparin Injectable 40 milliGRAM(s) SubCutaneous every 24 hours  gabapentin 300 milliGRAM(s) Oral three times a day  glucagon  Injectable 1 milliGRAM(s) IntraMuscular once  insulin glargine Injectable (LANTUS) 15 Unit(s) SubCutaneous every morning  insulin lispro (ADMELOG) corrective regimen sliding scale   SubCutaneous three times a day before meals  insulin lispro (ADMELOG) corrective regimen sliding scale   SubCutaneous at bedtime  insulin lispro Injectable (ADMELOG) 5 Unit(s) SubCutaneous three times a day before meals  lisinopril 40 milliGRAM(s) Oral daily  metoprolol succinate ER 50 milliGRAM(s) Oral daily  senna 2 Tablet(s) Oral at bedtime    MEDICATIONS  (PRN):  acetaminophen   IVPB .. 1000 milliGRAM(s) IV Intermittent every 8 hours PRN Mild Pain (1 - 3)  aluminum hydroxide/magnesium hydroxide/simethicone Suspension 30 milliLiter(s) Oral every 4 hours PRN Dyspepsia  dextrose Oral Gel 15 Gram(s) Oral once PRN Blood Glucose LESS THAN 70 milliGRAM(s)/deciliter  HYDROmorphone  Injectable 0.5 milliGRAM(s) IV Push every 4 hours PRN Moderate Pain (4 - 6)  HYDROmorphone  Injectable 1 milliGRAM(s) IV Push every 4 hours PRN Severe Pain (7 - 10)  melatonin 3 milliGRAM(s) Oral at bedtime PRN Insomnia  ondansetron Injectable 4 milliGRAM(s) IV Push every 8 hours PRN Nausea and/or Vomiting      Allergies    No Known Allergies    Intolerances        Social History:  No  Tobacco: Denies  EtOH: Denies  Recreational drug use: Denies  Lives with: Wife at home   Ambulates: Independently   ADLs: Independent (27 May 2025 13:05)      REVIEW OF SYSTEMS: i am OK  CONSTITUTIONAL: No fever, No chills, No fatigue, No myalgia, No Body ache, No Weakness  EYES: No eye pain,  No visual disturbances, No discharge, NO Redness  ENMT:  No ear pain, No nose bleed, No vertigo; No sinus pain, NO throat pain, No Congestion  NECK: No pain, No stiffness  RESPIRATORY: No cough, NO wheezing, No  hemoptysis, NO  shortness of breath  CARDIOVASCULAR: No chest pain, palpitations  GASTROINTESTINAL: No abdominal pain, NO epigastric pain. No nausea, No vomiting; No diarrhea, No constipation. [  ] BM  GENITOURINARY: No dysuria, No frequency, No urgency, No hematuria, NO incontinence  NEUROLOGICAL: No headaches, No dizziness, No numbness, No tingling, No tremors, No weakness  EXT: No Swelling, No Pain, No Edema  SKIN:  [  x] No itching, burning, rashes, or lesions   MUSCULOSKELETAL: No joint pain ,No Jt swelling; No muscle pain, No back pain, No extremity pain  PSYCHIATRIC: No depression,  No anxiety,  No mood swings ,No difficulty sleeping at night  PAIN SCALE: [ x ] None  [  ] Other-  ROS Unable to obtain due to - [  ] Dementia  [  ] Lethargy [  ] Drowsy [  ] Sedated [  ] non verbal  REST OF REVIEW Of SYSTEM - [x  ] Normal     Vital Signs Last 24 Hrs  T(C): 37.1 (31 May 2025 04:42), Max: 37.1 (31 May 2025 04:42)  T(F): 98.8 (31 May 2025 04:42), Max: 98.8 (31 May 2025 04:42)  HR: 92 (31 May 2025 04:42) (92 - 96)  BP: 122/65 (31 May 2025 04:42) (96/55 - 122/65)  BP(mean): --  RR: 18 (31 May 2025 04:42) (18 - 18)  SpO2: 96% (31 May 2025 04:42) (96% - 98%)    Parameters below as of 31 May 2025 04:42  Patient On (Oxygen Delivery Method): room air      Finger Stick        05-30 @ 07:01  -  05-31 @ 07:00  --------------------------------------------------------  IN: 0 mL / OUT: 1000 mL / NET: -1000 mL          PHYSICAL EXAM:  GENERAL:  [ x ] NAD , [ x ] well appearing, [  ] Agitated, [  ] Drowsy,  [  ] Lethargy, [  ] confused   HEAD:  [  x] Normal, [  ] Other  EYES:  [ x ] EOMI, [ x ] PERRLA, [x  ] conjunctiva and sclera clear normal, [  ] Other,  [ x ] Pallor,[  ] Discharge  ENMT:  [x  ] Normal, [ x ] Moist mucous membranes, [x  ] Good dentition, [ x ] No Thrush  NECK:  [  x] Supple, [x  ] No JVD, [ x ] Normal thyroid, [  ] Lymphadenopathy [  ] Other  CHEST/LUNG:  [  x] Clear to auscultation bilaterally, [ x ] Breath Sounds equal B/L / Decrease, [x  ] poor effort  [x  ] No rales, [  x] No rhonchi  [ x ]  No wheezing,   HEART:  [ x ] Regular rate and rhythm, [  ] tachycardia, [  ] Bradycardia,  [  ] irregular  [ x ] No murmurs, No rubs, No gallops, [  ] PPM in place (Mfr:  )  ABDOMEN:  [ x ] Soft, [ x ] Nontender, [ x ] Nondistended, [ x ] No mass, [ x ] Bowel sounds present, [  ] obese+ Umbilical hernia +  NERVOUS SYSTEM:  [ x ] Alert & Oriented X3, [  ] Nonfocal  [  ] Confusion  [  ] Encephalopathic [  ] Sedated [  ] Unable to assess, [  ] Dementia [  ] Other-  EXTREMITIES: [x] left foot and ankle in dressing , Rt Foot TMA + P Pulse   LYMPH: No lymphadenopathy noted  SKIN:  [ x ] No rashes or lesions, [  ] Pressure Ulcers, [  ] ecchymosis, [  ] Skin Tears, [  ] Other      DIET: Diet, DASH/TLC:   Sodium & Cholesterol Restricted  Consistent Carbohydrate No Snacks (05-28-25 @ 12:07)      LABS:                        8.4    13.86 )-----------( 270      ( 31 May 2025 05:40 )             25.7     31 May 2025 05:40    138    |  107    |  22     ----------------------------<  185    4.7     |  26     |  1.10     Ca    8.4        31 May 2025 05:40    TPro  6.5    /  Alb  2.5    /  TBili  0.3    /  DBili  x      /  AST  9      /  ALT  16     /  AlkPhos  63     31 May 2025 05:40      Urinalysis Basic - ( 31 May 2025 05:40 )    Color: x / Appearance: x / SG: x / pH: x  Gluc: 185 mg/dL / Ketone: x  / Bili: x / Urobili: x   Blood: x / Protein: x / Nitrite: x   Leuk Esterase: x / RBC: x / WBC x   Sq Epi: x / Non Sq Epi: x / Bacteria: x        Culture Results:   No growth to date. (05-28 @ 10:00)  Culture Results:   No growth to date. (05-28 @ 10:00)  Culture Results:   No growth to date. (05-28 @ 10:00)  Culture Results:   No growth at 72 Hours (05-27 @ 09:10)  Culture Results:   No growth at 72 Hours (05-27 @ 09:00)          CARDIAC MARKERS ( 29 May 2025 09:00 )  x     / x     / x     / x     / 2.4 ng/mL          Culture - Tissue with Gram Stain (collected 28 May 2025 10:00)  Source: Tissue Other, LEFT FIFTH METATARSAL BONE  Gram Stain (28 May 2025 19:10):    No polymorphonuclear cells seen    No organisms seen  Preliminary Report (29 May 2025 12:48):    No growth to date.    Culture - Tissue with Gram Stain (collected 28 May 2025 10:00)  Source: Tissue Other, LEFT CALCANEUS BONE  Gram Stain (28 May 2025 19:11):    No polymorphonuclear cells seen    No organisms seen  Preliminary Report (29 May 2025 12:48):    No growth to date.    Culture - Tissue with Gram Stain (collected 28 May 2025 10:00)  Source: Tissue Other, LEFT HALLUX BONE  Gram Stain (28 May 2025 19:06):    No polymorphonuclear cells seen    No organisms seen  Preliminary Report (29 May 2025 12:50):    No growth to date.    Culture - Blood (collected 27 May 2025 09:10)  Source: Blood Blood-Peripheral  Preliminary Report (30 May 2025 12:00):    No growth at 72 Hours    Culture - Blood (collected 27 May 2025 09:00)  Source: Blood Blood-Peripheral  Preliminary Report (30 May 2025 12:00):    No growth at 72 Hours  Final Diagnosis   1. Left hallux bone:   - Acute and chronic osteomyelitis of bone.   - Ulceration and acute inflammation of skin with viable skin   margin.   2. Left first metatarsal bone:   - Acute and chronic osteomyelitis of bone.   3. Left fifth metatarsal bone:   - Acute and chronic osteomyelitis of bone.   4. Left calcaneus bone:   - Acute and chronic osteomyelitis of bone. - Fat necrosis of bone marrow.   5. Left first metatarsal proximal margin:   - Focal mild chronic osteomyelitis of bone.   6. Left fifth metatarsal distal margin:   - Focal mild chronic osteomyelitis of bone.   RADIOLOGY & ADDITIONAL TESTS:      HEALTH ISSUES - PROBLEM Dx:  Anemia    PAD (peripheral artery disease)    Type 2 diabetes mellitus    HTN (hypertension)    Encounter for deep vein thrombosis (DVT) prophylaxis    DM foot ulcer      Consultant(s) Notes Reviewed:  [ x ] YES     Care Discussed with [X] Consultants  [x  ] Patient  [  ] Family [  ] HCP [ x ]   [  ] Social Service  [x  ] RN, [  ] Physical Therapy,[  ] Palliative care team  DVT PPX: [x  ] Lovenox, [  ] S C Heparin, [  ] Coumadin, [  ] Xarelto, [  ] Eliquis, [  ] Pradaxa, [  ] IV Heparin drip, [  ] SCD [  ] Contraindication 2 to GI Bleed,[  ] Ambulation [  ] Contraindicated 2 to  bleed [  ] Contraindicated 2 to Brain Bleed  Advanced directive: [ x ] None, [  ] DNR/DNI Patient is a 64y old  Male who presents with a chief complaint of left foot osteomyelitis (31 May 2025 07:34)    HPI:  63 y/o M with PMHx DM2, osteomyelitis, CAD, PAD s/p RLE angioplasty 2020, s/p surgical amputation of R forefoot , s/p L PT angioplasty 12/2/24 for L lat foot DFU on plavix, HTN, s/p recent admission for left foot infection s/p PICC placement for IV abx  sent by Podiatry Dr. Stark for admission for OR intervention tomorrow. Patient denies any complaints today. States after discharge he had outpatient visits with Vascular physician. He had imaging done and was suggested to have LimFlow procedure. Patient requested to have surgery as per Podiatry for his left foot wound prior to LimFlow. Vascular agreed. Patient states he can walk independently without pain and is currently comfortable.   Of note, patient was transitioned from Rocephin which was to be given via PICC line after previous discharge to Ertapenem by ID this Saturday.     ED course:   Vitals: T 97.7 HR 99 /69   Labs: ESR 64 wbc 10.77   Imaging: Cxray- Right PICC with tip in the SVC.  The heart is not accurately assessed in this AP projection.  The lungs are clear.  There is no pneumothorax or pleural effusion.  No acute bony abnormality.  Clear lungs    (27 May 2025 13:05)    INTERVAL HPI:  5/28: Patient seen and examined post op. complain,  of mild pain 3-4/10 IV Tylenol ordered otherwise denies complaints   5/29: Patient seen and examined at bedside. Denies complaints this AM however has bleeding from surgical site. RRT later called this AM (refer to rapid note) for weakness, diaphoresis likely vasovagal secondary to blood loss. Hb downtrended. Plan to hold Lovenox. Resume Asp and Plavix tomorrow. PT eval tomorrow , mild leukocytosis, drop in H/H  5/30: Patient seen and examined at bedside. Denies complaints this AM. Hb dropped to 8.4 this AM, On IV Cefepime -Pathology results available. WBC -improving    OVERNIGHT EVENTS: NONE    Home Medications:  atorvastatin 40 mg oral tablet: 1 tab(s) orally once a day (at bedtime) (27 May 2025 15:49)  Cinnamon: 1 cap(s) orally once a day (27 May 2025 15:51)  clopidogrel 75 mg oral tablet: 1 tab(s) orally once a day (27 May 2025 15:49)  gabapentin 300 mg oral capsule: 1 cap(s) orally 3 times a day (27 May 2025 15:49)  Jardiance 25 mg oral tablet: 1 tab(s) orally once a day (27 May 2025 15:49)  lisinopril 40 mg oral tablet: 1 tab(s) orally once a day (27 May 2025 15:49)  metFORMIN 1000 mg oral tablet: 1 tab(s) orally 2 times a day (27 May 2025 15:49)  metoprolol succinate 50 mg oral tablet, extended release: 1 tab(s) orally once a day (27 May 2025 15:49)  Trulicity Pen 1.5 mg/0.5 mL subcutaneous solution: 1.5 milligram(s) subcutaneously once a week (Sundays) (27 May 2025 15:52)  Tumeric : 1 cap orally once a day (27 May 2025 15:51)      MEDICATIONS  (STANDING):  aspirin enteric coated 81 milliGRAM(s) Oral daily  atorvastatin 40 milliGRAM(s) Oral at bedtime  cefepime   IVPB 2000 milliGRAM(s) IV Intermittent every 8 hours  cefepime   IVPB      clopidogrel Tablet 75 milliGRAM(s) Oral daily  dextrose 5%. 1000 milliLiter(s) (100 mL/Hr) IV Continuous <Continuous>  dextrose 5%. 1000 milliLiter(s) (50 mL/Hr) IV Continuous <Continuous>  dextrose 50% Injectable 25 Gram(s) IV Push once  dextrose 50% Injectable 12.5 Gram(s) IV Push once  dextrose 50% Injectable 25 Gram(s) IV Push once  enoxaparin Injectable 40 milliGRAM(s) SubCutaneous every 24 hours  gabapentin 300 milliGRAM(s) Oral three times a day  glucagon  Injectable 1 milliGRAM(s) IntraMuscular once  insulin glargine Injectable (LANTUS) 15 Unit(s) SubCutaneous every morning  insulin lispro (ADMELOG) corrective regimen sliding scale   SubCutaneous three times a day before meals  insulin lispro (ADMELOG) corrective regimen sliding scale   SubCutaneous at bedtime  insulin lispro Injectable (ADMELOG) 5 Unit(s) SubCutaneous three times a day before meals  lisinopril 40 milliGRAM(s) Oral daily  metoprolol succinate ER 50 milliGRAM(s) Oral daily  senna 2 Tablet(s) Oral at bedtime    MEDICATIONS  (PRN):  acetaminophen   IVPB .. 1000 milliGRAM(s) IV Intermittent every 8 hours PRN Mild Pain (1 - 3)  aluminum hydroxide/magnesium hydroxide/simethicone Suspension 30 milliLiter(s) Oral every 4 hours PRN Dyspepsia  dextrose Oral Gel 15 Gram(s) Oral once PRN Blood Glucose LESS THAN 70 milliGRAM(s)/deciliter  HYDROmorphone  Injectable 0.5 milliGRAM(s) IV Push every 4 hours PRN Moderate Pain (4 - 6)  HYDROmorphone  Injectable 1 milliGRAM(s) IV Push every 4 hours PRN Severe Pain (7 - 10)  melatonin 3 milliGRAM(s) Oral at bedtime PRN Insomnia  ondansetron Injectable 4 milliGRAM(s) IV Push every 8 hours PRN Nausea and/or Vomiting      Allergies    No Known Allergies    Intolerances        Social History:  No  Tobacco: Denies  EtOH: Denies  Recreational drug use: Denies  Lives with: Wife at home   Ambulates: Independently   ADLs: Independent (27 May 2025 13:05)      REVIEW OF SYSTEMS: i am OK  CONSTITUTIONAL: No fever, No chills, No fatigue, No myalgia, No Body ache, No Weakness  EYES: No eye pain,  No visual disturbances, No discharge, NO Redness  ENMT:  No ear pain, No nose bleed, No vertigo; No sinus pain, NO throat pain, No Congestion  NECK: No pain, No stiffness  RESPIRATORY: No cough, NO wheezing, No  hemoptysis, NO  shortness of breath  CARDIOVASCULAR: No chest pain, palpitations  GASTROINTESTINAL: No abdominal pain, NO epigastric pain. No nausea, No vomiting; No diarrhea, No constipation. [  ] BM  GENITOURINARY: No dysuria, No frequency, No urgency, No hematuria, NO incontinence  NEUROLOGICAL: No headaches, No dizziness, No numbness, No tingling, No tremors, No weakness  EXT: No Swelling, No Pain, No Edema  SKIN:  [  x] No itching, burning, rashes, or lesions   MUSCULOSKELETAL: No joint pain ,No Jt swelling; No muscle pain, No back pain, No extremity pain  PSYCHIATRIC: No depression,  No anxiety,  No mood swings ,No difficulty sleeping at night  PAIN SCALE: [ x ] None  [  ] Other-  ROS Unable to obtain due to - [  ] Dementia  [  ] Lethargy [  ] Drowsy [  ] Sedated [  ] non verbal  REST OF REVIEW Of SYSTEM - [x  ] Normal     Vital Signs Last 24 Hrs  T(C): 37.1 (31 May 2025 04:42), Max: 37.1 (31 May 2025 04:42)  T(F): 98.8 (31 May 2025 04:42), Max: 98.8 (31 May 2025 04:42)  HR: 92 (31 May 2025 04:42) (92 - 96)  BP: 122/65 (31 May 2025 04:42) (96/55 - 122/65)  BP(mean): --  RR: 18 (31 May 2025 04:42) (18 - 18)  SpO2: 96% (31 May 2025 04:42) (96% - 98%)    Parameters below as of 31 May 2025 04:42  Patient On (Oxygen Delivery Method): room air      Finger Stick        05-30 @ 07:01  -  05-31 @ 07:00  --------------------------------------------------------  IN: 0 mL / OUT: 1000 mL / NET: -1000 mL          PHYSICAL EXAM:  GENERAL:  [ x ] NAD , [ x ] well appearing, [  ] Agitated, [  ] Drowsy,  [  ] Lethargy, [  ] confused   HEAD:  [  x] Normal, [  ] Other  EYES:  [ x ] EOMI, [ x ] PERRLA, [x  ] conjunctiva and sclera clear normal, [  ] Other,  [ x ] Pallor,[  ] Discharge  ENMT:  [x  ] Normal, [ x ] Moist mucous membranes, [x  ] Good dentition, [ x ] No Thrush  NECK:  [  x] Supple, [x  ] No JVD, [ x ] Normal thyroid, [  ] Lymphadenopathy [  ] Other  CHEST/LUNG:  [  x] Clear to auscultation bilaterally, [ x ] Breath Sounds equal B/L / Decrease, [x  ] poor effort  [x  ] No rales, [  x] No rhonchi  [ x ]  No wheezing,   HEART:  [ x ] Regular rate and rhythm, [  ] tachycardia, [  ] Bradycardia,  [  ] irregular  [ x ] No murmurs, No rubs, No gallops, [  ] PPM in place (Mfr:  )  ABDOMEN:  [ x ] Soft, [ x ] Nontender, [ x ] Nondistended, [ x ] No mass, [ x ] Bowel sounds present, [  ] obese+ Umbilical hernia +  NERVOUS SYSTEM:  [ x ] Alert & Oriented X3, [x  ] Nonfocal  [  ] Confusion  [  ] Encephalopathic [  ] Sedated [  ] Unable to assess, [  ] Dementia [  ] Other-  EXTREMITIES: [x] left foot and ankle in dressing , Rt Foot TMA + P Pulse   LYMPH: No lymphadenopathy noted  SKIN:  [ x ] No rashes or lesions, [  ] Pressure Ulcers, [  ] ecchymosis, [  ] Skin Tears, [  ] Other      DIET: Diet, DASH/TLC:   Sodium & Cholesterol Restricted  Consistent Carbohydrate No Snacks (05-28-25 @ 12:07)      LABS:                        8.4    13.86 )-----------( 270      ( 31 May 2025 05:40 )             25.7     31 May 2025 05:40    138    |  107    |  22     ----------------------------<  185    4.7     |  26     |  1.10     Ca    8.4        31 May 2025 05:40    TPro  6.5    /  Alb  2.5    /  TBili  0.3    /  DBili  x      /  AST  9      /  ALT  16     /  AlkPhos  63     31 May 2025 05:40      Urinalysis Basic - ( 31 May 2025 05:40 )    Color: x / Appearance: x / SG: x / pH: x  Gluc: 185 mg/dL / Ketone: x  / Bili: x / Urobili: x   Blood: x / Protein: x / Nitrite: x   Leuk Esterase: x / RBC: x / WBC x   Sq Epi: x / Non Sq Epi: x / Bacteria: x        Culture Results:   No growth to date. (05-28 @ 10:00)  Culture Results:   No growth to date. (05-28 @ 10:00)  Culture Results:   No growth to date. (05-28 @ 10:00)  Culture Results:   No growth at 72 Hours (05-27 @ 09:10)  Culture Results:   No growth at 72 Hours (05-27 @ 09:00)          CARDIAC MARKERS ( 29 May 2025 09:00 )  x     / x     / x     / x     / 2.4 ng/mL          Culture - Tissue with Gram Stain (collected 28 May 2025 10:00)  Source: Tissue Other, LEFT FIFTH METATARSAL BONE  Gram Stain (28 May 2025 19:10):    No polymorphonuclear cells seen    No organisms seen  Preliminary Report (29 May 2025 12:48):    No growth to date.    Culture - Tissue with Gram Stain (collected 28 May 2025 10:00)  Source: Tissue Other, LEFT CALCANEUS BONE  Gram Stain (28 May 2025 19:11):    No polymorphonuclear cells seen    No organisms seen  Preliminary Report (29 May 2025 12:48):    No growth to date.    Culture - Tissue with Gram Stain (collected 28 May 2025 10:00)  Source: Tissue Other, LEFT HALLUX BONE  Gram Stain (28 May 2025 19:06):    No polymorphonuclear cells seen    No organisms seen  Preliminary Report (29 May 2025 12:50):    No growth to date.    Culture - Blood (collected 27 May 2025 09:10)  Source: Blood Blood-Peripheral  Preliminary Report (30 May 2025 12:00):    No growth at 72 Hours    Culture - Blood (collected 27 May 2025 09:00)  Source: Blood Blood-Peripheral  Preliminary Report (30 May 2025 12:00):    No growth at 72 Hours  Final Diagnosis   1. Left hallux bone:   - Acute and chronic osteomyelitis of bone.   - Ulceration and acute inflammation of skin with viable skin   margin.   2. Left first metatarsal bone:   - Acute and chronic osteomyelitis of bone.   3. Left fifth metatarsal bone:   - Acute and chronic osteomyelitis of bone.   4. Left calcaneus bone:   - Acute and chronic osteomyelitis of bone. - Fat necrosis of bone marrow.   5. Left first metatarsal proximal margin:   - Focal mild chronic osteomyelitis of bone.   6. Left fifth metatarsal distal margin:   - Focal mild chronic osteomyelitis of bone.   RADIOLOGY & ADDITIONAL TESTS:      HEALTH ISSUES - PROBLEM Dx:  Anemia    PAD (peripheral artery disease)    Type 2 diabetes mellitus    HTN (hypertension)    Encounter for deep vein thrombosis (DVT) prophylaxis    DM foot ulcer      Consultant(s) Notes Reviewed:  [ x ] YES     Care Discussed with [X] Consultants  [x  ] Patient  [  ] Family [  ] HCP [ x ]   [  ] Social Service  [x  ] RN, [  ] Physical Therapy,[  ] Palliative care team  DVT PPX: [x  ] Lovenox, [  ] S C Heparin, [  ] Coumadin, [  ] Xarelto, [  ] Eliquis, [  ] Pradaxa, [  ] IV Heparin drip, [  ] SCD [  ] Contraindication 2 to GI Bleed,[  ] Ambulation [  ] Contraindicated 2 to  bleed [  ] Contraindicated 2 to Brain Bleed  Advanced directive: [ x ] None, [  ] DNR/DNI Patient is a 64y old  Male who presents with a chief complaint of left foot osteomyelitis (31 May 2025 07:34)    HPI:  65 y/o M with PMHx DM2, osteomyelitis, CAD, PAD s/p RLE angioplasty 2020, s/p surgical amputation of R forefoot , s/p L PT angioplasty 12/2/24 for L lat foot DFU on plavix, HTN, s/p recent admission for left foot infection s/p PICC placement for IV abx  sent by Podiatry Dr. Stark for admission for OR intervention tomorrow. Patient denies any complaints today. States after discharge he had outpatient visits with Vascular physician. He had imaging done and was suggested to have LimFlow procedure. Patient requested to have surgery as per Podiatry for his left foot wound prior to LimFlow. Vascular agreed. Patient states he can walk independently without pain and is currently comfortable.   Of note, patient was transitioned from Rocephin which was to be given via PICC line after previous discharge to Ertapenem by ID this Saturday.     ED course:   Vitals: T 97.7 HR 99 /69   Labs: ESR 64 wbc 10.77   Imaging: Cxray- Right PICC with tip in the SVC.  The heart is not accurately assessed in this AP projection.  The lungs are clear.  There is no pneumothorax or pleural effusion.  No acute bony abnormality.  Clear lungs    (27 May 2025 13:05)    INTERVAL HPI:  5/28: Patient seen and examined post op. complain,  of mild pain 3-4/10 IV Tylenol ordered otherwise denies complaints   5/29: Patient seen and examined at bedside. Denies complaints this AM however has bleeding from surgical site. RRT later called this AM (refer to rapid note) for weakness, diaphoresis likely vasovagal secondary to blood loss. Hb downtrended. Plan to hold Lovenox. Resume Asp and Plavix tomorrow. PT eval tomorrow , mild leukocytosis, drop in H/H  5/30: Patient seen and examined at bedside. Denies complaints this AM- no further bleeding. Aspirin, Plavix and Lovenox resumed. PT eval today recommends home PT, Low H/H   5/31: Patient seen and examined at bedside. No  complaints this AM. Hb dropped to 8.4 this AM, On IV Cefepime -Pathology results available. + AC on chronic OM , WBC -improving    OVERNIGHT EVENTS: NONE    Home Medications:  atorvastatin 40 mg oral tablet: 1 tab(s) orally once a day (at bedtime) (27 May 2025 15:49)  Cinnamon: 1 cap(s) orally once a day (27 May 2025 15:51)  clopidogrel 75 mg oral tablet: 1 tab(s) orally once a day (27 May 2025 15:49)  gabapentin 300 mg oral capsule: 1 cap(s) orally 3 times a day (27 May 2025 15:49)  Jardiance 25 mg oral tablet: 1 tab(s) orally once a day (27 May 2025 15:49)  lisinopril 40 mg oral tablet: 1 tab(s) orally once a day (27 May 2025 15:49)  metFORMIN 1000 mg oral tablet: 1 tab(s) orally 2 times a day (27 May 2025 15:49)  metoprolol succinate 50 mg oral tablet, extended release: 1 tab(s) orally once a day (27 May 2025 15:49)  Trulicity Pen 1.5 mg/0.5 mL subcutaneous solution: 1.5 milligram(s) subcutaneously once a week (Sundays) (27 May 2025 15:52)  Tumeric : 1 cap orally once a day (27 May 2025 15:51)      MEDICATIONS  (STANDING):  aspirin enteric coated 81 milliGRAM(s) Oral daily  atorvastatin 40 milliGRAM(s) Oral at bedtime  cefepime   IVPB 2000 milliGRAM(s) IV Intermittent every 8 hours  cefepime   IVPB      clopidogrel Tablet 75 milliGRAM(s) Oral daily  dextrose 5%. 1000 milliLiter(s) (100 mL/Hr) IV Continuous <Continuous>  dextrose 5%. 1000 milliLiter(s) (50 mL/Hr) IV Continuous <Continuous>  dextrose 50% Injectable 25 Gram(s) IV Push once  dextrose 50% Injectable 12.5 Gram(s) IV Push once  dextrose 50% Injectable 25 Gram(s) IV Push once  enoxaparin Injectable 40 milliGRAM(s) SubCutaneous every 24 hours  gabapentin 300 milliGRAM(s) Oral three times a day  glucagon  Injectable 1 milliGRAM(s) IntraMuscular once  insulin glargine Injectable (LANTUS) 15 Unit(s) SubCutaneous every morning  insulin lispro (ADMELOG) corrective regimen sliding scale   SubCutaneous three times a day before meals  insulin lispro (ADMELOG) corrective regimen sliding scale   SubCutaneous at bedtime  insulin lispro Injectable (ADMELOG) 5 Unit(s) SubCutaneous three times a day before meals  lisinopril 40 milliGRAM(s) Oral daily  metoprolol succinate ER 50 milliGRAM(s) Oral daily  senna 2 Tablet(s) Oral at bedtime    MEDICATIONS  (PRN):  acetaminophen   IVPB .. 1000 milliGRAM(s) IV Intermittent every 8 hours PRN Mild Pain (1 - 3)  aluminum hydroxide/magnesium hydroxide/simethicone Suspension 30 milliLiter(s) Oral every 4 hours PRN Dyspepsia  dextrose Oral Gel 15 Gram(s) Oral once PRN Blood Glucose LESS THAN 70 milliGRAM(s)/deciliter  HYDROmorphone  Injectable 0.5 milliGRAM(s) IV Push every 4 hours PRN Moderate Pain (4 - 6)  HYDROmorphone  Injectable 1 milliGRAM(s) IV Push every 4 hours PRN Severe Pain (7 - 10)  melatonin 3 milliGRAM(s) Oral at bedtime PRN Insomnia  ondansetron Injectable 4 milliGRAM(s) IV Push every 8 hours PRN Nausea and/or Vomiting      Allergies    No Known Allergies    Intolerances        Social History:  No  Tobacco: Denies  EtOH: Denies  Recreational drug use: Denies  Lives with: Wife at home   Ambulates: Independently   ADLs: Independent (27 May 2025 13:05)      REVIEW OF SYSTEMS: i am OK  CONSTITUTIONAL: No fever, No chills, No fatigue, No myalgia, No Body ache, No Weakness  EYES: No eye pain,  No visual disturbances, No discharge, NO Redness  ENMT:  No ear pain, No nose bleed, No vertigo; No sinus pain, NO throat pain, No Congestion  NECK: No pain, No stiffness  RESPIRATORY: No cough, NO wheezing, No  hemoptysis, NO  shortness of breath  CARDIOVASCULAR: No chest pain, palpitations  GASTROINTESTINAL: No abdominal pain, NO epigastric pain. No nausea, No vomiting; No diarrhea, No constipation. [  ] BM  GENITOURINARY: No dysuria, No frequency, No urgency, No hematuria, NO incontinence  NEUROLOGICAL: No headaches, No dizziness, No numbness, No tingling, No tremors, No weakness  EXT: No Swelling, No Pain, No Edema  SKIN:  [  x] No itching, burning, rashes, or lesions   MUSCULOSKELETAL: No joint pain ,No Jt swelling; No muscle pain, No back pain, No extremity pain  PSYCHIATRIC: No depression,  No anxiety,  No mood swings ,No difficulty sleeping at night  PAIN SCALE: [ x ] None  [  ] Other-  ROS Unable to obtain due to - [  ] Dementia  [  ] Lethargy [  ] Drowsy [  ] Sedated [  ] non verbal  REST OF REVIEW Of SYSTEM - [x  ] Normal     Vital Signs Last 24 Hrs  T(C): 37.1 (31 May 2025 04:42), Max: 37.1 (31 May 2025 04:42)  T(F): 98.8 (31 May 2025 04:42), Max: 98.8 (31 May 2025 04:42)  HR: 92 (31 May 2025 04:42) (92 - 96)  BP: 122/65 (31 May 2025 04:42) (96/55 - 122/65)  BP(mean): --  RR: 18 (31 May 2025 04:42) (18 - 18)  SpO2: 96% (31 May 2025 04:42) (96% - 98%)    Parameters below as of 31 May 2025 04:42  Patient On (Oxygen Delivery Method): room air      Finger Stick        05-30 @ 07:01  -  05-31 @ 07:00  --------------------------------------------------------  IN: 0 mL / OUT: 1000 mL / NET: -1000 mL          PHYSICAL EXAM:  GENERAL:  [ x ] NAD , [ x ] well appearing, [  ] Agitated, [  ] Drowsy,  [  ] Lethargy, [  ] confused   HEAD:  [  x] Normal, [  ] Other  EYES:  [ x ] EOMI, [ x ] PERRLA, [x  ] conjunctiva and sclera clear normal, [  ] Other,  [ x ] Pallor,[  ] Discharge  ENMT:  [x  ] Normal, [ x ] Moist mucous membranes, [x  ] Good dentition, [ x ] No Thrush  NECK:  [  x] Supple, [x  ] No JVD, [ x ] Normal thyroid, [  ] Lymphadenopathy [  ] Other  CHEST/LUNG:  [  x] Clear to auscultation bilaterally, [ x ] Breath Sounds equal B/L / Decrease, [x  ] poor effort  [x  ] No rales, [  x] No rhonchi  [ x ]  No wheezing,   HEART:  [ x ] Regular rate and rhythm, [  ] tachycardia, [  ] Bradycardia,  [  ] irregular  [ x ] No murmurs, No rubs, No gallops, [  ] PPM in place (Mfr:  )  ABDOMEN:  [ x ] Soft, [ x ] Nontender, [ x ] Nondistended, [ x ] No mass, [ x ] Bowel sounds present, [  ] obese+ Umbilical hernia +  NERVOUS SYSTEM:  [ x ] Alert & Oriented X3, [x  ] Nonfocal  [  ] Confusion  [  ] Encephalopathic [  ] Sedated [  ] Unable to assess, [  ] Dementia [  ] Other-  EXTREMITIES: [x] left foot and ankle in dressing , Rt Foot TMA + P Pulse   LYMPH: No lymphadenopathy noted  SKIN:  [ x ] No rashes or lesions, [  ] Pressure Ulcers, [  ] ecchymosis, [  ] Skin Tears, [  ] Other      DIET: Diet, DASH/TLC:   Sodium & Cholesterol Restricted  Consistent Carbohydrate No Snacks (05-28-25 @ 12:07)      LABS:                        8.4    13.86 )-----------( 270      ( 31 May 2025 05:40 )             25.7     31 May 2025 05:40    138    |  107    |  22     ----------------------------<  185    4.7     |  26     |  1.10     Ca    8.4        31 May 2025 05:40    TPro  6.5    /  Alb  2.5    /  TBili  0.3    /  DBili  x      /  AST  9      /  ALT  16     /  AlkPhos  63     31 May 2025 05:40      Urinalysis Basic - ( 31 May 2025 05:40 )    Color: x / Appearance: x / SG: x / pH: x  Gluc: 185 mg/dL / Ketone: x  / Bili: x / Urobili: x   Blood: x / Protein: x / Nitrite: x   Leuk Esterase: x / RBC: x / WBC x   Sq Epi: x / Non Sq Epi: x / Bacteria: x        Culture Results:   No growth to date. (05-28 @ 10:00)  Culture Results:   No growth to date. (05-28 @ 10:00)  Culture Results:   No growth to date. (05-28 @ 10:00)  Culture Results:   No growth at 72 Hours (05-27 @ 09:10)  Culture Results:   No growth at 72 Hours (05-27 @ 09:00)          CARDIAC MARKERS ( 29 May 2025 09:00 )  x     / x     / x     / x     / 2.4 ng/mL          Culture - Tissue with Gram Stain (collected 28 May 2025 10:00)  Source: Tissue Other, LEFT FIFTH METATARSAL BONE  Gram Stain (28 May 2025 19:10):    No polymorphonuclear cells seen    No organisms seen  Preliminary Report (29 May 2025 12:48):    No growth to date.    Culture - Tissue with Gram Stain (collected 28 May 2025 10:00)  Source: Tissue Other, LEFT CALCANEUS BONE  Gram Stain (28 May 2025 19:11):    No polymorphonuclear cells seen    No organisms seen  Preliminary Report (29 May 2025 12:48):    No growth to date.    Culture - Tissue with Gram Stain (collected 28 May 2025 10:00)  Source: Tissue Other, LEFT HALLUX BONE  Gram Stain (28 May 2025 19:06):    No polymorphonuclear cells seen    No organisms seen  Preliminary Report (29 May 2025 12:50):    No growth to date.    Culture - Blood (collected 27 May 2025 09:10)  Source: Blood Blood-Peripheral  Preliminary Report (30 May 2025 12:00):    No growth at 72 Hours    Culture - Blood (collected 27 May 2025 09:00)  Source: Blood Blood-Peripheral  Preliminary Report (30 May 2025 12:00):    No growth at 72 Hours  Final Diagnosis   1. Left hallux bone:   - Acute and chronic osteomyelitis of bone.   - Ulceration and acute inflammation of skin with viable skin   margin.   2. Left first metatarsal bone:   - Acute and chronic osteomyelitis of bone.   3. Left fifth metatarsal bone:   - Acute and chronic osteomyelitis of bone.   4. Left calcaneus bone:   - Acute and chronic osteomyelitis of bone. - Fat necrosis of bone marrow.   5. Left first metatarsal proximal margin:   - Focal mild chronic osteomyelitis of bone.   6. Left fifth metatarsal distal margin:   - Focal mild chronic osteomyelitis of bone.   RADIOLOGY & ADDITIONAL TESTS:      HEALTH ISSUES - PROBLEM Dx:  Anemia    PAD (peripheral artery disease)    Type 2 diabetes mellitus    HTN (hypertension)    Encounter for deep vein thrombosis (DVT) prophylaxis    DM foot ulcer      Consultant(s) Notes Reviewed:  [ x ] YES     Care Discussed with [X] Consultants  [x  ] Patient  [  ] Family [  ] HCP [ x ]   [  ] Social Service  [x  ] RN, [  ] Physical Therapy,[  ] Palliative care team  DVT PPX: [x  ] Lovenox, [  ] S C Heparin, [  ] Coumadin, [  ] Xarelto, [  ] Eliquis, [  ] Pradaxa, [  ] IV Heparin drip, [  ] SCD [  ] Contraindication 2 to GI Bleed,[  ] Ambulation [  ] Contraindicated 2 to  bleed [  ] Contraindicated 2 to Brain Bleed  Advanced directive: [ x ] None, [  ] DNR/DNI

## 2025-06-01 LAB
-  CLINDAMYCIN: SIGNIFICANT CHANGE UP
-  ERYTHROMYCIN: SIGNIFICANT CHANGE UP
-  GENTAMICIN: SIGNIFICANT CHANGE UP
-  LEVOFLOXACIN: SIGNIFICANT CHANGE UP
-  MINOCYCLINE: SIGNIFICANT CHANGE UP
-  OXACILLIN: SIGNIFICANT CHANGE UP
-  PENICILLIN: SIGNIFICANT CHANGE UP
-  RIFAMPIN: SIGNIFICANT CHANGE UP
-  TETRACYCLINE: SIGNIFICANT CHANGE UP
-  TRIMETHOPRIM/SULFAMETHOXAZOLE: SIGNIFICANT CHANGE UP
-  TRIMETHOPRIM/SULFAMETHOXAZOLE: SIGNIFICANT CHANGE UP
-  VANCOMYCIN: SIGNIFICANT CHANGE UP
ALBUMIN SERPL ELPH-MCNC: 2.4 G/DL — LOW (ref 3.3–5)
ALP SERPL-CCNC: 59 U/L — SIGNIFICANT CHANGE UP (ref 40–120)
ALT FLD-CCNC: 19 U/L — SIGNIFICANT CHANGE UP (ref 12–78)
ANION GAP SERPL CALC-SCNC: 8 MMOL/L — SIGNIFICANT CHANGE UP (ref 5–17)
AST SERPL-CCNC: 13 U/L — LOW (ref 15–37)
BILIRUB SERPL-MCNC: 0.4 MG/DL — SIGNIFICANT CHANGE UP (ref 0.2–1.2)
BUN SERPL-MCNC: 20 MG/DL — SIGNIFICANT CHANGE UP (ref 7–23)
CALCIUM SERPL-MCNC: 8.3 MG/DL — LOW (ref 8.5–10.1)
CHLORIDE SERPL-SCNC: 103 MMOL/L — SIGNIFICANT CHANGE UP (ref 96–108)
CO2 SERPL-SCNC: 25 MMOL/L — SIGNIFICANT CHANGE UP (ref 22–31)
CREAT SERPL-MCNC: 1 MG/DL — SIGNIFICANT CHANGE UP (ref 0.5–1.3)
CULTURE RESULTS: SIGNIFICANT CHANGE UP
CULTURE RESULTS: SIGNIFICANT CHANGE UP
EGFR: 83 ML/MIN/1.73M2 — SIGNIFICANT CHANGE UP
EGFR: 83 ML/MIN/1.73M2 — SIGNIFICANT CHANGE UP
GLUCOSE BLDC GLUCOMTR-MCNC: 160 MG/DL — HIGH (ref 70–99)
GLUCOSE BLDC GLUCOMTR-MCNC: 165 MG/DL — HIGH (ref 70–99)
GLUCOSE BLDC GLUCOMTR-MCNC: 170 MG/DL — HIGH (ref 70–99)
GLUCOSE BLDC GLUCOMTR-MCNC: 185 MG/DL — HIGH (ref 70–99)
GLUCOSE BLDC GLUCOMTR-MCNC: 209 MG/DL — HIGH (ref 70–99)
GLUCOSE SERPL-MCNC: 147 MG/DL — HIGH (ref 70–99)
HCT VFR BLD CALC: 24.4 % — LOW (ref 39–50)
HGB BLD-MCNC: 7.9 G/DL — LOW (ref 13–17)
MCHC RBC-ENTMCNC: 29.4 PG — SIGNIFICANT CHANGE UP (ref 27–34)
MCHC RBC-ENTMCNC: 32.4 G/DL — SIGNIFICANT CHANGE UP (ref 32–36)
MCV RBC AUTO: 90.7 FL — SIGNIFICANT CHANGE UP (ref 80–100)
METHOD TYPE: SIGNIFICANT CHANGE UP
NRBC BLD AUTO-RTO: 0 /100 WBCS — SIGNIFICANT CHANGE UP (ref 0–0)
PLATELET # BLD AUTO: 272 K/UL — SIGNIFICANT CHANGE UP (ref 150–400)
POTASSIUM SERPL-MCNC: 4.5 MMOL/L — SIGNIFICANT CHANGE UP (ref 3.5–5.3)
POTASSIUM SERPL-SCNC: 4.5 MMOL/L — SIGNIFICANT CHANGE UP (ref 3.5–5.3)
PROT SERPL-MCNC: 6.6 G/DL — SIGNIFICANT CHANGE UP (ref 6–8.3)
RBC # BLD: 2.69 M/UL — LOW (ref 4.2–5.8)
RBC # FLD: 13.2 % — SIGNIFICANT CHANGE UP (ref 10.3–14.5)
SODIUM SERPL-SCNC: 136 MMOL/L — SIGNIFICANT CHANGE UP (ref 135–145)
SPECIMEN SOURCE: SIGNIFICANT CHANGE UP
SPECIMEN SOURCE: SIGNIFICANT CHANGE UP
WBC # BLD: 13.05 K/UL — HIGH (ref 3.8–10.5)
WBC # FLD AUTO: 13.05 K/UL — HIGH (ref 3.8–10.5)

## 2025-06-01 PROCEDURE — 99024 POSTOP FOLLOW-UP VISIT: CPT

## 2025-06-01 PROCEDURE — 99232 SBSQ HOSP IP/OBS MODERATE 35: CPT

## 2025-06-01 RX ORDER — ACETAMINOPHEN 500 MG/5ML
650 LIQUID (ML) ORAL ONCE
Refills: 0 | Status: COMPLETED | OUTPATIENT
Start: 2025-06-01 | End: 2025-06-01

## 2025-06-01 RX ORDER — DIPHENHYDRAMINE HCL 12.5MG/5ML
25 ELIXIR ORAL ONCE
Refills: 0 | Status: COMPLETED | OUTPATIENT
Start: 2025-06-01 | End: 2025-06-01

## 2025-06-01 RX ADMIN — INSULIN LISPRO 2: 100 INJECTION, SOLUTION INTRAVENOUS; SUBCUTANEOUS at 17:00

## 2025-06-01 RX ADMIN — Medication 650 MILLIGRAM(S): at 14:25

## 2025-06-01 RX ADMIN — INSULIN LISPRO 2: 100 INJECTION, SOLUTION INTRAVENOUS; SUBCUTANEOUS at 12:13

## 2025-06-01 RX ADMIN — Medication 25 MILLIGRAM(S): at 14:24

## 2025-06-01 RX ADMIN — TRAMADOL HYDROCHLORIDE 50 MILLIGRAM(S): 50 TABLET, FILM COATED ORAL at 17:47

## 2025-06-01 RX ADMIN — INSULIN LISPRO 5 UNIT(S): 100 INJECTION, SOLUTION INTRAVENOUS; SUBCUTANEOUS at 08:19

## 2025-06-01 RX ADMIN — Medication 81 MILLIGRAM(S): at 12:12

## 2025-06-01 RX ADMIN — CEFEPIME 100 MILLIGRAM(S): 2 INJECTION, POWDER, FOR SOLUTION INTRAVENOUS at 05:35

## 2025-06-01 RX ADMIN — TRAMADOL HYDROCHLORIDE 50 MILLIGRAM(S): 50 TABLET, FILM COATED ORAL at 05:30

## 2025-06-01 RX ADMIN — INSULIN GLARGINE-YFGN 15 UNIT(S): 100 INJECTION, SOLUTION SUBCUTANEOUS at 08:10

## 2025-06-01 RX ADMIN — Medication 650 MILLIGRAM(S): at 14:59

## 2025-06-01 RX ADMIN — ENOXAPARIN SODIUM 40 MILLIGRAM(S): 100 INJECTION SUBCUTANEOUS at 17:01

## 2025-06-01 RX ADMIN — Medication 4 MILLIGRAM(S): at 12:29

## 2025-06-01 RX ADMIN — Medication 3 MILLIGRAM(S): at 21:43

## 2025-06-01 RX ADMIN — CLOPIDOGREL BISULFATE 75 MILLIGRAM(S): 75 TABLET, FILM COATED ORAL at 12:12

## 2025-06-01 RX ADMIN — INSULIN LISPRO 5 UNIT(S): 100 INJECTION, SOLUTION INTRAVENOUS; SUBCUTANEOUS at 12:13

## 2025-06-01 RX ADMIN — CEFEPIME 100 MILLIGRAM(S): 2 INJECTION, POWDER, FOR SOLUTION INTRAVENOUS at 13:10

## 2025-06-01 RX ADMIN — LISINOPRIL 20 MILLIGRAM(S): 5 TABLET ORAL at 05:29

## 2025-06-01 RX ADMIN — INSULIN LISPRO 5 UNIT(S): 100 INJECTION, SOLUTION INTRAVENOUS; SUBCUTANEOUS at 17:01

## 2025-06-01 RX ADMIN — GABAPENTIN 300 MILLIGRAM(S): 400 CAPSULE ORAL at 21:43

## 2025-06-01 RX ADMIN — GABAPENTIN 300 MILLIGRAM(S): 400 CAPSULE ORAL at 05:30

## 2025-06-01 RX ADMIN — Medication 1000 MILLILITER(S): at 12:43

## 2025-06-01 RX ADMIN — CEFEPIME 100 MILLIGRAM(S): 2 INJECTION, POWDER, FOR SOLUTION INTRAVENOUS at 21:42

## 2025-06-01 RX ADMIN — GABAPENTIN 300 MILLIGRAM(S): 400 CAPSULE ORAL at 13:10

## 2025-06-01 RX ADMIN — TRAMADOL HYDROCHLORIDE 50 MILLIGRAM(S): 50 TABLET, FILM COATED ORAL at 17:03

## 2025-06-01 RX ADMIN — METOPROLOL SUCCINATE 50 MILLIGRAM(S): 50 TABLET, EXTENDED RELEASE ORAL at 05:29

## 2025-06-01 RX ADMIN — ATORVASTATIN CALCIUM 40 MILLIGRAM(S): 80 TABLET, FILM COATED ORAL at 21:43

## 2025-06-01 RX ADMIN — INSULIN LISPRO 2: 100 INJECTION, SOLUTION INTRAVENOUS; SUBCUTANEOUS at 08:18

## 2025-06-01 NOTE — CASE MANAGEMENT PROGRESS NOTE - NSCMPROGRESSNOTE_GEN_ALL_CORE
Weekend CM task followup: Patient remains acute on IV cefepime. Discharge pending pathology. Floor CM to continue to follow.

## 2025-06-01 NOTE — PROGRESS NOTE ADULT - SUBJECTIVE AND OBJECTIVE BOX
[facility]  Laz Lyn MD PhD, Lori Burgos MD, Melany Calhoun MD, Efren Frederick MD, Darryl Aguilar MD  and providing coverage with Marcy Austin MD  Providing Infectious Disease Consultations at [facility], [facility], [facility], [facility]'s    Office# [phone] to schedule follow up appointments  Answering Service for urgent calls or New Consults [phone]  Cell# to text for urgent issues Laz Lyn [phone]     infectious diseases progress note:    LOIDA QUEZADA is a 64y y. o. Male patient    Overnight and events of the last 24hrs reviewed    Allergies    No Known Allergies    Intolerances        ANTIBIOTICS/RELEVANT:  antimicrobials  cefepime   IVPB 2000 milliGRAM(s) IV Intermittent every 8 hours  cefepime   IVPB        immunologic:    OTHER:  acetaminophen   IVPB .. 1000 milliGRAM(s) IV Intermittent every 8 hours PRN  aluminum hydroxide/magnesium hydroxide/simethicone Suspension 30 milliLiter(s) Oral every 4 hours PRN  aspirin enteric coated 81 milliGRAM(s) Oral daily  atorvastatin 40 milliGRAM(s) Oral at bedtime  clopidogrel Tablet 75 milliGRAM(s) Oral daily  dextrose 5%. 1000 milliLiter(s) IV Continuous <Continuous>  dextrose 5%. 1000 milliLiter(s) IV Continuous <Continuous>  dextrose 50% Injectable 25 Gram(s) IV Push once  dextrose 50% Injectable 12.5 Gram(s) IV Push once  dextrose 50% Injectable 25 Gram(s) IV Push once  dextrose Oral Gel 15 Gram(s) Oral once PRN  enoxaparin Injectable 40 milliGRAM(s) SubCutaneous every 24 hours  gabapentin 300 milliGRAM(s) Oral three times a day  glucagon  Injectable 1 milliGRAM(s) IntraMuscular once  HYDROmorphone  Injectable 0.5 milliGRAM(s) IV Push four times a day PRN  insulin glargine Injectable (LANTUS) 15 Unit(s) SubCutaneous every morning  insulin lispro (ADMELOG) corrective regimen sliding scale   SubCutaneous three times a day before meals  insulin lispro (ADMELOG) corrective regimen sliding scale   SubCutaneous at bedtime  insulin lispro Injectable (ADMELOG) 5 Unit(s) SubCutaneous three times a day before meals  lisinopril 40 milliGRAM(s) Oral daily  melatonin 3 milliGRAM(s) Oral at bedtime PRN  metoprolol succinate ER 50 milliGRAM(s) Oral daily  ondansetron Injectable 4 milliGRAM(s) IV Push every 8 hours PRN  senna 2 Tablet(s) Oral at bedtime  traMADol 50 milliGRAM(s) Oral every 6 hours PRN      Objective:  Vital Signs Last 24 Hrs  T(C): 36.6 (01 Jun 2025 12:27), Max: 37.3 (31 May 2025 20:04)  T(F): 97.9 (01 Jun 2025 12:27), Max: 99.2 (31 May 2025 20:04)  HR: 88 (01 Jun 2025 12:27) (81 - 95)  BP: 104/58 (01 Jun 2025 12:27) (104/58 - 117/70)  BP(mean): --  RR: 18 (01 Jun 2025 12:27) (18 - 18)  SpO2: 100% (01 Jun 2025 12:27) (95% - 100%)    Parameters below as of 01 Jun 2025 12:27  Patient On (Oxygen Delivery Method): room air        T(C): 36.6 (06-01-25 @ 12:27), Max: 37.4 (05-31-25 @ 12:01)  T(C): 36.6 (06-01-25 @ 12:27), Max: 37.4 (05-31-25 @ 12:01)  T(C): 36.6 (06-01-25 @ 12:27), Max: 37.4 (05-31-25 @ 12:01)    PHYSICAL EXAM:  HEENT: NC atraumatic  Neck: supple  Respiratory: no accessory muscle use, breathing comfortably  Cardiovascular: distant  Gastrointestinal: normal appearing, nondistended  Extremities: no clubbing, no cyanosis,        LABS:                          7.9    13.05 )-----------( 272      ( 01 Jun 2025 06:48 )             24.4       WBC  13.05 06-01 @ 06:48  13.86 05-31 @ 05:40  14.73 05-30 @ 08:45  14.44 05-29 @ 09:00  12.99 05-29 @ 06:03  10.77 05-27 @ 09:10      06-01    136  |  103  |  20  ----------------------------<  147[H]  4.5   |  25  |  1.00    Ca    8.3[L]      01 Jun 2025 06:48    TPro  6.6  /  Alb  2.4[L]  /  TBili  0.4  /  DBili  x   /  AST  13[L]  /  ALT  19  /  AlkPhos  59  06-01      Creatinine: 1.00 mg/dL (06-01-25 @ 06:48)  Creatinine: 1.10 mg/dL (05-31-25 @ 05:40)  Creatinine: 1.20 mg/dL (05-30-25 @ 08:45)  Creatinine: 1.20 mg/dL (05-29-25 @ 09:00)  Creatinine: 0.93 mg/dL (05-29-25 @ 06:03)  Creatinine: 1.20 mg/dL (05-27-25 @ 09:10)        Urinalysis Basic - ( 01 Jun 2025 06:48 )    Color: x / Appearance: x / SG: x / pH: x  Gluc: 147 mg/dL / Ketone: x  / Bili: x / Urobili: x   Blood: x / Protein: x / Nitrite: x   Leuk Esterase: x / RBC: x / WBC x   Sq Epi: x / Non Sq Epi: x / Bacteria: x            INFLAMMATORY MARKERS      MICROBIOLOGY:              RADIOLOGY & ADDITIONAL STUDIES:

## 2025-06-01 NOTE — PROGRESS NOTE ADULT - PROBLEM SELECTOR PLAN 4
Type 2 diabetes mellitus.   ·  Plan: Chronic  -On home Trulicity, Jardiance and Metformin  -Hold home oral meds  -MDISS with FS QAC/QHS   -Lantus 15u qhs and 5u pre meal   -Hypoglycemia protocol  -A1c 7.9  -Pt with neuropathy continue Gabapentin 300mg TID  -Endo Dr. Perlman follow up

## 2025-06-01 NOTE — PROGRESS NOTE ADULT - SUBJECTIVE AND OBJECTIVE BOX
64y year old Male seen at [facility] 1EAS 113 W1 s/p left foot partial 1st and 5th ray resection, detachment and reattachment of peroneus brevis tendon, and debridement of heel with antibiotic cement placement.. Patient relates no overnight events and states that they are doing well at this time.  Denies any fever, chills, nausea, vomiting, chest pain, shortness of breath, or calf pain at this time.    Allergies    No Known Allergies    Intolerances        MEDICATIONS  (STANDING):  aspirin enteric coated 81 milliGRAM(s) Oral daily  atorvastatin 40 milliGRAM(s) Oral at bedtime  cefepime   IVPB 2000 milliGRAM(s) IV Intermittent every 8 hours  cefepime   IVPB      clopidogrel Tablet 75 milliGRAM(s) Oral daily  dextrose 5%. 1000 milliLiter(s) (100 mL/Hr) IV Continuous <Continuous>  dextrose 5%. 1000 milliLiter(s) (50 mL/Hr) IV Continuous <Continuous>  dextrose 50% Injectable 25 Gram(s) IV Push once  dextrose 50% Injectable 12.5 Gram(s) IV Push once  dextrose 50% Injectable 25 Gram(s) IV Push once  enoxaparin Injectable 40 milliGRAM(s) SubCutaneous every 24 hours  gabapentin 300 milliGRAM(s) Oral three times a day  glucagon  Injectable 1 milliGRAM(s) IntraMuscular once  insulin glargine Injectable (LANTUS) 15 Unit(s) SubCutaneous every morning  insulin lispro (ADMELOG) corrective regimen sliding scale   SubCutaneous at bedtime  insulin lispro (ADMELOG) corrective regimen sliding scale   SubCutaneous three times a day before meals  insulin lispro Injectable (ADMELOG) 5 Unit(s) SubCutaneous three times a day before meals  lisinopril 40 milliGRAM(s) Oral daily  metoprolol succinate ER 50 milliGRAM(s) Oral daily  senna 2 Tablet(s) Oral at bedtime    MEDICATIONS  (PRN):  acetaminophen   IVPB .. 1000 milliGRAM(s) IV Intermittent every 8 hours PRN Mild Pain (1 - 3)  aluminum hydroxide/magnesium hydroxide/simethicone Suspension 30 milliLiter(s) Oral every 4 hours PRN Dyspepsia  dextrose Oral Gel 15 Gram(s) Oral once PRN Blood Glucose LESS THAN 70 milliGRAM(s)/deciliter  HYDROmorphone  Injectable 0.5 milliGRAM(s) IV Push four times a day PRN Severe Pain (7 - 10)  melatonin 3 milliGRAM(s) Oral at bedtime PRN Insomnia  ondansetron Injectable 4 milliGRAM(s) IV Push every 8 hours PRN Nausea and/or Vomiting  traMADol 50 milliGRAM(s) Oral every 6 hours PRN Moderate Pain (4 - 6)      Vital Signs Last 24 Hrs  T(C): 36.9 (01 Jun 2025 04:52), Max: 37.4 (31 May 2025 12:01)  T(F): 98.4 (01 Jun 2025 04:52), Max: 99.3 (31 May 2025 12:01)  HR: 81 (01 Jun 2025 04:52) (81 - 95)  BP: 117/70 (01 Jun 2025 04:52) (105/67 - 117/70)  BP(mean): --  RR: 18 (01 Jun 2025 04:52) (18 - 18)  SpO2: 97% (01 Jun 2025 04:52) (95% - 98%)    Parameters below as of 01 Jun 2025 04:52  Patient On (Oxygen Delivery Method): room air        PHYSICAL EXAM:  Vascular: Dorsalis Pedis and Posterior Tibial pulses non palpable.  Capillary re-fill time greater then 3 seconds digits 1-5 left, TMA right    Neuro: Protective sensation diminished to the level of the digits bilateral.  MSK: Muscle strength 5/5 all major muscle groups bilateral. Noted right foot tma  Dermatological: Incision site of the left foot noted with intact sutures, no dehiscence, mild efrain-incision erythema, no proximal streaking, no fluctuance, no malodor, no signs of infection at this time.                          7.9    13.05 )-----------( 272      ( 01 Jun 2025 06:48 )             24.4       06-01    136  |  103  |  20  ----------------------------<  147[H]  4.5   |  25  |  1.00    Ca    8.3[L]      01 Jun 2025 06:48    TPro  6.6  /  Alb  2.4[L]  /  TBili  0.4  /  DBili  x   /  AST  13[L]  /  ALT  19  /  AlkPhos  59  06-01        Surgical pathology report    Final Diagnosis    1.  Left hallux bone:  -   Acute and chronic osteomyelitis of bone.  -   Ulceration and acute inflammation of skin with viable skin  margin.    2.  Left first metatarsal bone:  -   Acute and chronic osteomyelitis of bone.    3.  Left fifth metatarsal bone:  -   Acute and chronic osteomyelitis of bone.    4.  Left calcaneus bone:  -   Acute and chronic osteomyelitis of bone.  -   Fat necrosis of bone marrow.    5.  Left first metatarsal proximal margin:  -   Focal mild chronic osteomyelitis of bone.    6.  Left fifth metatarsal distal margin:  -   Focal mild chronic osteomyelitis of bone.   64y year old Male seen at [facility] 1EAS 113 W1 s/p left foot partial 1st and 5th ray resection, detachment and reattachment of peroneus brevis tendon, and debridement of heel with antibiotic cement placement.. Patient relates no overnight events and states that they are doing well at this time.  Denies any fever, chills, nausea, vomiting, chest pain, shortness of breath, or calf pain at this time.    Allergies    No Known Allergies    Intolerances        MEDICATIONS  (STANDING):  aspirin enteric coated 81 milliGRAM(s) Oral daily  atorvastatin 40 milliGRAM(s) Oral at bedtime  cefepime   IVPB 2000 milliGRAM(s) IV Intermittent every 8 hours  cefepime   IVPB      clopidogrel Tablet 75 milliGRAM(s) Oral daily  dextrose 5%. 1000 milliLiter(s) (100 mL/Hr) IV Continuous <Continuous>  dextrose 5%. 1000 milliLiter(s) (50 mL/Hr) IV Continuous <Continuous>  dextrose 50% Injectable 25 Gram(s) IV Push once  dextrose 50% Injectable 12.5 Gram(s) IV Push once  dextrose 50% Injectable 25 Gram(s) IV Push once  enoxaparin Injectable 40 milliGRAM(s) SubCutaneous every 24 hours  gabapentin 300 milliGRAM(s) Oral three times a day  glucagon  Injectable 1 milliGRAM(s) IntraMuscular once  insulin glargine Injectable (LANTUS) 15 Unit(s) SubCutaneous every morning  insulin lispro (ADMELOG) corrective regimen sliding scale   SubCutaneous at bedtime  insulin lispro (ADMELOG) corrective regimen sliding scale   SubCutaneous three times a day before meals  insulin lispro Injectable (ADMELOG) 5 Unit(s) SubCutaneous three times a day before meals  lisinopril 40 milliGRAM(s) Oral daily  metoprolol succinate ER 50 milliGRAM(s) Oral daily  senna 2 Tablet(s) Oral at bedtime    MEDICATIONS  (PRN):  acetaminophen   IVPB .. 1000 milliGRAM(s) IV Intermittent every 8 hours PRN Mild Pain (1 - 3)  aluminum hydroxide/magnesium hydroxide/simethicone Suspension 30 milliLiter(s) Oral every 4 hours PRN Dyspepsia  dextrose Oral Gel 15 Gram(s) Oral once PRN Blood Glucose LESS THAN 70 milliGRAM(s)/deciliter  HYDROmorphone  Injectable 0.5 milliGRAM(s) IV Push four times a day PRN Severe Pain (7 - 10)  melatonin 3 milliGRAM(s) Oral at bedtime PRN Insomnia  ondansetron Injectable 4 milliGRAM(s) IV Push every 8 hours PRN Nausea and/or Vomiting  traMADol 50 milliGRAM(s) Oral every 6 hours PRN Moderate Pain (4 - 6)      Vital Signs Last 24 Hrs  T(C): 36.9 (01 Jun 2025 04:52), Max: 37.4 (31 May 2025 12:01)  T(F): 98.4 (01 Jun 2025 04:52), Max: 99.3 (31 May 2025 12:01)  HR: 81 (01 Jun 2025 04:52) (81 - 95)  BP: 117/70 (01 Jun 2025 04:52) (105/67 - 117/70)  BP(mean): --  RR: 18 (01 Jun 2025 04:52) (18 - 18)  SpO2: 97% (01 Jun 2025 04:52) (95% - 98%)    Parameters below as of 01 Jun 2025 04:52  Patient On (Oxygen Delivery Method): room air        PHYSICAL EXAM:  Vascular: Dorsalis Pedis and Posterior Tibial pulses non palpable.  Capillary re-fill time greater then 3 seconds of the left, TMA right    Neuro: Protective sensation diminished to the level of the digits bilateral.  MSK: Muscle strength 5/5 all major muscle groups bilateral. Noted right foot tma, s/p hallux amp and first metatarsal head resection   Dermatological: Incision site of the left foot noted with intact sutures, no dehiscence, mild efrain-incision erythema, no proximal streaking, no fluctuance, no malodor, no signs of infection at this time. Left heel with stable eschar at this time                           7.9    13.05 )-----------( 272      ( 01 Jun 2025 06:48 )             24.4       06-01    136  |  103  |  20  ----------------------------<  147[H]  4.5   |  25  |  1.00    Ca    8.3[L]      01 Jun 2025 06:48    TPro  6.6  /  Alb  2.4[L]  /  TBili  0.4  /  DBili  x   /  AST  13[L]  /  ALT  19  /  AlkPhos  59  06-01        Surgical pathology report    Final Diagnosis    1.  Left hallux bone:  -   Acute and chronic osteomyelitis of bone.  -   Ulceration and acute inflammation of skin with viable skin  margin.    2.  Left first metatarsal bone:  -   Acute and chronic osteomyelitis of bone.    3.  Left fifth metatarsal bone:  -   Acute and chronic osteomyelitis of bone.    4.  Left calcaneus bone:  -   Acute and chronic osteomyelitis of bone.  -   Fat necrosis of bone marrow.    5.  Left first metatarsal proximal margin:  -   Focal mild chronic osteomyelitis of bone.    6.  Left fifth metatarsal distal margin:  -   Focal mild chronic osteomyelitis of bone.

## 2025-06-01 NOTE — PROGRESS NOTE ADULT - ASSESSMENT
64M DM2, hx osteomyelitis, CAD, PAD s/p RLE angioplasty 2020, s/p surgical amputation of R forefoot , s/p L PT angioplasty 12/2/24 for L lat foot DFU on plavix, HTN, HLD sent in by wound clinic for left foot infections and multiple wounds. Vascular surgery to evaluate further with LLE angiogram.     - Pt p/w L foot infection with multiple wounds sent in by [facility], podiatry following   - S/p LLE angio 5/2,  vascular following   - S/p debridement on 5/28 w/o cardiac complications  - S/p RRT 5/29 for lethargy ? vasovagal, now asymptomatic    - No evidence of any active ischemia    - Continue ASA, Plavix  - Continue Lipitor   - No evidence of any meaningful volume overload.

## 2025-06-01 NOTE — PROGRESS NOTE ADULT - PROBLEM SELECTOR PLAN 1
Patient presents with left DM foot ulcer/ wound Gangrene Left BIG Toe,  left heel wound   - podiatry Dr. Lincoln lawson [facility]  - s/p OR today with podiatry: partial 1st and 5th ray resection, heel debridement with antibiotic cement placement and reattachment of peroneus brevis tendon into the cuboid   - Surgical path results show acute and chronic osteomyelitis in first and fifth metatarsals on clean margin from OR  - ID Dr. Melany Calhoun IV Invanz ---> IV Cefepime q 8 hrs Via PICC line   -Mild Leukocytosis -likely reactive   Pain meds  IV Tylenol, PRN, IV Dilaudid PRN for Mod & severe pain  - PT eval today - recommends home PT

## 2025-06-01 NOTE — PROGRESS NOTE ADULT - PROBLEM SELECTOR PLAN 1
Patient evaluated and chart reviewed.  POD#4 s/p left foot partial 1st and 5th ray resection, detachment and reattachment of peroneus brevis tendon, and debridement of heel with antibiotic cement placement.  No active bleeding noted at this time.   No signs of post-operative infection, hematoma, or dehiscence at this time.  DSD reapplied with xeroform, Aquacel Aq, gauze, ABD, and bren.  Will continue to monitor for signs of active bleeding.   Surgical pathology report reviewed, positive for OM of all specimens.  Continue IV abx per ID recs.  Podiatry will continue to follow while in-house.  Plan to be discussed with attending.

## 2025-06-01 NOTE — CHART NOTE - NSCHARTNOTEFT_GEN_A_CORE
Rapid response was called at 1217 for fall and hypotension.     Events leading up to Rapid Response:  Patient was seen and examined at the bedside by the rapid response team.  ICU PA at bedside.  Initially not responding to verbal commands, now patient states he feels nauseous and light headed.     Rapid Response Vital Signs:    BP: 68/38  HR: 84  RR: 20  SpO2: 98% on ra  Temp: 98.5  FS: 160      Physical Exam:  Gen: well appearing, NAD  HEENT: NCAT  Cardio: regular rate and rhythm, +s1s2  Pulm: CTA b/l  Abdomen: soft, nontender  Extremities: + TMA left lower extremity, bandage s/p amputation RLE, no edema  Neuro: answers questions and follows commands, AOx3  Skin: warm and dry      Assessment/Plan:   64M DM2, hx osteomyelitis, CAD, PAD s/p RLE angioplasty 2020, s/p surgical amputation of R forefoot , s/p L PT angioplasty 12/2/24 for L lat foot DFU on plavix, HTN, HLD sent in by wound clinic for left foot infections and multiple wounds. Vascular surgery to evaluate further with LLE angiogram.   Rapid response called for fall/ hypotension.     - Fall likely 2/2 orthostatic hypotension  - 1L NS bolus x1   - 1UPRBC ordered  - Give PRN zofran x1  - Dr Calhoun notified  -Family updated by Dr. Madden ([email or web address])  -RN to call if any changes Rapid response was called at 1217 for fall and hypotension.     Events leading up to Rapid Response:  Patient was seen and examined at the bedside by the rapid response team.  ICU PA at bedside.  Initially not responding to verbal commands, now patient states he feels nauseous and light headed.     Rapid Response Vital Signs:    BP: 68/38  HR: 84  RR: 20  SpO2: 98% on ra  Temp: 98.5  FS: 160      Physical Exam:  Gen: well appearing, NAD  HEENT: NCAT  Cardio: regular rate and rhythm, +s1s2  Pulm: CTA b/l  Abdomen: soft, nontender  Extremities: + TMA left lower extremity, bandage s/p amputation RLE, no edema  Neuro: answers questions and follows commands, AOx3  Skin: warm and dry      Assessment/Plan:   64M DM2, hx osteomyelitis, CAD, PAD s/p RLE angioplasty 2020, s/p surgical amputation of R forefoot , s/p L PT angioplasty 12/2/24 for L lat foot DFU on plavix, HTN, HLD sent in by wound clinic for left foot infections and multiple wounds. Vascular surgery to evaluate further with LLE angiogram.   Rapid response called for fall/ hypotension.     - Fall likely 2/2 known orthostatic hypotension  - 1L NS bolus x1   - 1UPRBC ordered  - Give PRN zofran x1  - Dr Calhoun notified  -Family (Wife Erica) updated by Dr. Madden ([email or web address])  -RN to call if any changes Rapid response was called at 1217 for fall and hypotension.     Events leading up to Rapid Response:  Patient was seen and examined at the bedside by the rapid response team.  ICU PA at bedside.  Initially not responding to verbal commands, now patient states he feels nauseous and light headed.     Rapid Response Vital Signs:    BP: 68/38  HR: 84  RR: 20  SpO2: 98% on ra  Temp: 98.5  FS: 160      Physical Exam:  Gen: well appearing, NAD  HEENT: NCAT  Cardio: regular rate and rhythm, +s1s2  Pulm: CTA b/l  Abdomen: soft, nontender  Extremities: + TMA left lower extremity, bandage s/p amputation RLE, no edema  Neuro: answers questions and follows commands, AOx3  Skin: warm and dry      Assessment/Plan:   64M DM2, hx osteomyelitis, CAD, PAD s/p RLE angioplasty 2020, s/p surgical amputation of R forefoot , s/p L PT angioplasty 12/2/24 for L lat foot DFU on plavix, HTN, HLD sent in by wound clinic for left foot infections and multiple wounds. Vascular surgery to evaluate further with LLE angiogram.   Rapid response called for fall/ hypotension.     - Fall likely 2/2 known orthostatic hypotension  - 1L NS bolus x1   - 1UPRBC ordered  - Give PRN zofran x1  - Dr Calhoun notified  -Family (Wife Erica) updated by Dr. Madden ([email or web address])  -RN to call if any changes    ADDENDUM 1521: Patient seen and examined at bedside. Feeling well, no acute complaints. Denies lightheadedness /nausea.   Pt receiving 2nd UPRBC per primary.     ICU Vital Signs Last 24 Hrs  T(C): 36.7 (01 Jun 2025 14:55), Max: 37.3 (31 May 2025 20:04)  T(F): 98 (01 Jun 2025 14:55), Max: 99.2 (31 May 2025 20:04)  HR: 73 (01 Jun 2025 14:55) (73 - 95)  BP: 103/61 (01 Jun 2025 14:55) (103/61 - 117/70)  BP(mean): --  ABP: --  ABP(mean): --  RR: 17 (01 Jun 2025 14:55) (17 - 18)  SpO2: 99% (01 Jun 2025 14:55) (95% - 100%)    O2 Parameters below as of 01 Jun 2025 14:55  Patient On (Oxygen Delivery Method): room air

## 2025-06-01 NOTE — PROGRESS NOTE ADULT - SUBJECTIVE AND OBJECTIVE BOX
[facility]    Vlad Gallardo, Ruthy, Otoniel, Osvaldo, David      [phone]    CHIEF COMPLAINT: Patient is a 64y old  Male who presents with a chief complaint of left foot osteomyelitis (01 Jun 2025 10:47)      Follow Up: pad htn    Interim history: The patient reports no new symptoms.  Denies chest discomfort and shortness of breath.  No abdominal pain.  No new neurologic symptoms.      MEDICATIONS  (STANDING):  aspirin enteric coated 81 milliGRAM(s) Oral daily  atorvastatin 40 milliGRAM(s) Oral at bedtime  cefepime   IVPB 2000 milliGRAM(s) IV Intermittent every 8 hours  cefepime   IVPB      clopidogrel Tablet 75 milliGRAM(s) Oral daily  dextrose 5%. 1000 milliLiter(s) (100 mL/Hr) IV Continuous <Continuous>  dextrose 5%. 1000 milliLiter(s) (50 mL/Hr) IV Continuous <Continuous>  dextrose 50% Injectable 25 Gram(s) IV Push once  dextrose 50% Injectable 12.5 Gram(s) IV Push once  dextrose 50% Injectable 25 Gram(s) IV Push once  enoxaparin Injectable 40 milliGRAM(s) SubCutaneous every 24 hours  gabapentin 300 milliGRAM(s) Oral three times a day  glucagon  Injectable 1 milliGRAM(s) IntraMuscular once  insulin glargine Injectable (LANTUS) 15 Unit(s) SubCutaneous every morning  insulin lispro (ADMELOG) corrective regimen sliding scale   SubCutaneous three times a day before meals  insulin lispro (ADMELOG) corrective regimen sliding scale   SubCutaneous at bedtime  insulin lispro Injectable (ADMELOG) 5 Unit(s) SubCutaneous three times a day before meals  lisinopril 40 milliGRAM(s) Oral daily  metoprolol succinate ER 50 milliGRAM(s) Oral daily  senna 2 Tablet(s) Oral at bedtime    MEDICATIONS  (PRN):  acetaminophen   IVPB .. 1000 milliGRAM(s) IV Intermittent every 8 hours PRN Mild Pain (1 - 3)  aluminum hydroxide/magnesium hydroxide/simethicone Suspension 30 milliLiter(s) Oral every 4 hours PRN Dyspepsia  dextrose Oral Gel 15 Gram(s) Oral once PRN Blood Glucose LESS THAN 70 milliGRAM(s)/deciliter  HYDROmorphone  Injectable 0.5 milliGRAM(s) IV Push four times a day PRN Severe Pain (7 - 10)  melatonin 3 milliGRAM(s) Oral at bedtime PRN Insomnia  ondansetron Injectable 4 milliGRAM(s) IV Push every 8 hours PRN Nausea and/or Vomiting  traMADol 50 milliGRAM(s) Oral every 6 hours PRN Moderate Pain (4 - 6)      REVIEW OF SYSTEMS:  eye, ent, GI, , allergic, dermatologic, musculoskeletal and neurologic are negative except as described above    Vital Signs Last 24 Hrs  T(C): 36.9 (01 Jun 2025 04:52), Max: 37.4 (31 May 2025 12:01)  T(F): 98.4 (01 Jun 2025 04:52), Max: 99.3 (31 May 2025 12:01)  HR: 81 (01 Jun 2025 04:52) (81 - 95)  BP: 117/70 (01 Jun 2025 04:52) (105/67 - 117/70)  BP(mean): --  RR: 18 (01 Jun 2025 04:52) (18 - 18)  SpO2: 97% (01 Jun 2025 04:52) (95% - 98%)    Parameters below as of 01 Jun 2025 04:52  Patient On (Oxygen Delivery Method): room air        I&O's Summary    31 May 2025 07:01  -  01 Jun 2025 07:00  --------------------------------------------------------  IN: 420 mL / OUT: 850 mL / NET: -430 mL        Telemetry past 24h:    PHYSICAL EXAM:    Constitutional: well-nourished, well-developed, NAD   HEENT:  MMM, sclerae anicteric, conjunctivae clear, no oral cyanosis.  Pulmonary: Non-labored, breath sounds are clear bilaterally, No wheezing, rales or rhonchi  Cardiovascular: Regular, S1 and S2.  No murmur.  No rubs, gallops or clicks  Gastrointestinal: Bowel Sounds present, soft, nontender.   Lymph: No peripheral edema. lle dressing cdi  Neurological: Alert, no focal deficits  Skin: No rashes.  Psych:  Mood & affect appropriate    LABS: All Labs Reviewed:                        7.9    13.05 )-----------( 272      ( 01 Jun 2025 06:48 )             24.4                         8.4    13.86 )-----------( 270      ( 31 May 2025 05:40 )             25.7                         9.3    14.73 )-----------( 301      ( 30 May 2025 08:45 )             28.2     01 Jun 2025 06:48    136    |  103    |  20     ----------------------------<  147    4.5     |  25     |  1.00   31 May 2025 05:40    138    |  107    |  22     ----------------------------<  185    4.7     |  26     |  1.10   30 May 2025 08:45    138    |  103    |  22     ----------------------------<  197    4.5     |  27     |  1.20     Ca    8.3        01 Jun 2025 06:48  Ca    8.4        31 May 2025 05:40  Ca    8.7        30 May 2025 08:45    TPro  6.6    /  Alb  2.4    /  TBili  0.4    /  DBili  x      /  AST  13     /  ALT  19     /  AlkPhos  59     01 Jun 2025 06:48  TPro  6.5    /  Alb  2.5    /  TBili  0.3    /  DBili  x      /  AST  9      /  ALT  16     /  AlkPhos  63     31 May 2025 05:40  TPro  7.1    /  Alb  2.9    /  TBili  0.3    /  DBili  x      /  AST  10     /  ALT  18     /  AlkPhos  69     30 May 2025 08:45          Blood Culture: Organism Stenotrophomonas maltophilia  Gram Stain Blood -- Gram Stain   No polymorphonuclear cells seen  No organisms seen  Specimen Source Tissue Other, LEFT HALLUX BONE  Culture-Blood --           RADIOLOGY:    EKG:    Echo:

## 2025-06-01 NOTE — PROGRESS NOTE ADULT - PROBLEM SELECTOR PLAN 2
H/H 7.9 today slowly dropping  S/P RRT symptomatic Anemia with Hypotension -  - RRT 5/29 for vasovagal syncope 2/2 to acute blood loss  - Type and screen ordered  - 2 u pRBC today   - Continue Aspirin, Lovenox and Plavix as per Podiatry

## 2025-06-01 NOTE — PROGRESS NOTE ADULT - ATTENDING COMMENTS
Agreed as above   Will continue to monitor the wounds   Patient is a high risk for sepsis, loss of limb abd loss of life

## 2025-06-01 NOTE — PROGRESS NOTE ADULT - SUBJECTIVE AND OBJECTIVE BOX
Patient is a 64y old  Male who presents with a chief complaint of left foot osteomyelitis (01 Jun 2025 12:59)    HPI:  63 y/o M with PMHx DM2, osteomyelitis, CAD, PAD s/p RLE angioplasty 2020, s/p surgical amputation of R forefoot , s/p L PT angioplasty 12/2/24 for L lat foot DFU on plavix, HTN, s/p recent admission for left foot infection s/p PICC placement for IV abx  sent by Podiatry Dr. Stark for admission for OR intervention tomorrow. Patient denies any complaints today. States after discharge he had outpatient visits with Vascular physician. He had imaging done and was suggested to have LimFlow procedure. Patient requested to have surgery as per Podiatry for his left foot wound prior to LimFlow. Vascular agreed. Patient states he can walk independently without pain and is currently comfortable.   Of note, patient was transitioned from Rocephin which was to be given via PICC line after previous discharge to Ertapenem by ID this Saturday.     ED course:   Vitals: T 97.7 HR 99 /69   Labs: ESR 64 wbc 10.77   Imaging: Cxray- Right PICC with tip in the SVC.  The heart is not accurately assessed in this AP projection.  The lungs are clear.  There is no pneumothorax or pleural effusion.  No acute bony abnormality.  Clear lungs    (27 May 2025 13:05)  INTERVAL HPI:  5/28: Patient seen and examined post op. complain,  of mild pain 3-4/10 IV Tylenol ordered otherwise denies complaints   5/29: Patient seen and examined at bedside. Denies complaints this AM however has bleeding from surgical site. RRT later called this AM (refer to rapid note) for weakness, diaphoresis likely vasovagal secondary to blood loss. Hb downtrended. Plan to hold Lovenox. Resume Asp and Plavix tomorrow. PT eval tomorrow , mild leukocytosis, drop in H/H  5/30: Pt seen, Examined, WBC mildly elevated ,On IV ABx  Low stable H/H   5/31: Patient seen and examined at bedside. Denies complaints this AM. Hb dropped to 8.4 this AM, On IV Cefepime -Pathology results available. WBC -improving  6/1: Pt seen ,Examined, On IV ABx S/P RRT this AM , Hypotension, Low H/H -Plan for pRBC transfusion  Mild Leukocytosis       OVERNIGHT EVENTS:    Home Medications:  atorvastatin 40 mg oral tablet: 1 tab(s) orally once a day (at bedtime) (27 May 2025 15:49)  Cinnamon: 1 cap(s) orally once a day (27 May 2025 15:51)  clopidogrel 75 mg oral tablet: 1 tab(s) orally once a day (27 May 2025 15:49)  gabapentin 300 mg oral capsule: 1 cap(s) orally 3 times a day (27 May 2025 15:49)  Jardiance 25 mg oral tablet: 1 tab(s) orally once a day (27 May 2025 15:49)  lisinopril 40 mg oral tablet: 1 tab(s) orally once a day (27 May 2025 15:49)  metFORMIN 1000 mg oral tablet: 1 tab(s) orally 2 times a day (27 May 2025 15:49)  metoprolol succinate 50 mg oral tablet, extended release: 1 tab(s) orally once a day (27 May 2025 15:49)  Trulicity Pen 1.5 mg/0.5 mL subcutaneous solution: 1.5 milligram(s) subcutaneously once a week (Sundays) (27 May 2025 15:52)  Tumeric : 1 cap orally once a day (27 May 2025 15:51)      MEDICATIONS  (STANDING):  aspirin enteric coated 81 milliGRAM(s) Oral daily  atorvastatin 40 milliGRAM(s) Oral at bedtime  cefepime   IVPB 2000 milliGRAM(s) IV Intermittent every 8 hours  cefepime   IVPB      clopidogrel Tablet 75 milliGRAM(s) Oral daily  dextrose 5%. 1000 milliLiter(s) (100 mL/Hr) IV Continuous <Continuous>  dextrose 5%. 1000 milliLiter(s) (50 mL/Hr) IV Continuous <Continuous>  dextrose 50% Injectable 25 Gram(s) IV Push once  dextrose 50% Injectable 12.5 Gram(s) IV Push once  dextrose 50% Injectable 25 Gram(s) IV Push once  enoxaparin Injectable 40 milliGRAM(s) SubCutaneous every 24 hours  gabapentin 300 milliGRAM(s) Oral three times a day  glucagon  Injectable 1 milliGRAM(s) IntraMuscular once  insulin glargine Injectable (LANTUS) 15 Unit(s) SubCutaneous every morning  insulin lispro (ADMELOG) corrective regimen sliding scale   SubCutaneous three times a day before meals  insulin lispro (ADMELOG) corrective regimen sliding scale   SubCutaneous at bedtime  insulin lispro Injectable (ADMELOG) 5 Unit(s) SubCutaneous three times a day before meals  lisinopril 40 milliGRAM(s) Oral daily  metoprolol succinate ER 50 milliGRAM(s) Oral daily  senna 2 Tablet(s) Oral at bedtime    MEDICATIONS  (PRN):  acetaminophen   IVPB .. 1000 milliGRAM(s) IV Intermittent every 8 hours PRN Mild Pain (1 - 3)  aluminum hydroxide/magnesium hydroxide/simethicone Suspension 30 milliLiter(s) Oral every 4 hours PRN Dyspepsia  dextrose Oral Gel 15 Gram(s) Oral once PRN Blood Glucose LESS THAN 70 milliGRAM(s)/deciliter  HYDROmorphone  Injectable 0.5 milliGRAM(s) IV Push four times a day PRN Severe Pain (7 - 10)  melatonin 3 milliGRAM(s) Oral at bedtime PRN Insomnia  ondansetron Injectable 4 milliGRAM(s) IV Push every 8 hours PRN Nausea and/or Vomiting  traMADol 50 milliGRAM(s) Oral every 6 hours PRN Moderate Pain (4 - 6)      Allergies    No Known Allergies    Intolerances        Social History:  No  Tobacco: Denies  EtOH: Denies  Recreational drug use: Denies  Lives with: Wife at home   Ambulates: Independently   ADLs: Independent (27 May 2025 13:05)      REVIEW OF SYSTEMS: i am OK  CONSTITUTIONAL: No fever, No chills, No fatigue, No myalgia, No Body ache, No Weakness  EYES: No eye pain,  No visual disturbances, No discharge, NO Redness  ENMT:  No ear pain, No nose bleed, No vertigo; No sinus pain, NO throat pain, No Congestion  NECK: No pain, No stiffness  RESPIRATORY: No cough, NO wheezing, No  hemoptysis, NO  shortness of breath  CARDIOVASCULAR: No chest pain, palpitations  GASTROINTESTINAL: No abdominal pain, NO epigastric pain. No nausea, No vomiting; No diarrhea, No constipation. [  ] BM  GENITOURINARY: No dysuria, No frequency, No urgency, No hematuria, NO incontinence  NEUROLOGICAL: No headaches, No dizziness, No numbness, No tingling, No tremors, No weakness  EXT: No Swelling, No Pain, No Edema  SKIN:  [x  ] No itching, burning, rashes, or lesions   MUSCULOSKELETAL: No joint pain ,No Jt swelling; No muscle pain, No back pain, No extremity pain  PSYCHIATRIC: No depression,  No anxiety,  No mood swings ,No difficulty sleeping at night  PAIN SCALE: [ x ] None  [  ] Other-  ROS Unable to obtain due to - [  ] Dementia  [  ] Lethargy [  ] Drowsy [  ] Sedated [  ] non verbal  REST OF REVIEW Of SYSTEM - [x ] Normal     Vital Signs Last 24 Hrs  T(C): 36.7 (01 Jun 2025 14:55), Max: 37.3 (31 May 2025 20:04)  T(F): 98 (01 Jun 2025 14:55), Max: 99.2 (31 May 2025 20:04)  HR: 73 (01 Jun 2025 14:55) (73 - 95)  BP: 103/61 (01 Jun 2025 14:55) (103/61 - 117/70)  BP(mean): --  RR: 17 (01 Jun 2025 14:55) (17 - 18)  SpO2: 99% (01 Jun 2025 14:55) (95% - 100%)    Parameters below as of 01 Jun 2025 14:55  Patient On (Oxygen Delivery Method): room air      Finger Stick        05-31 @ 07:01 - 06-01 @ 07:00  --------------------------------------------------------  IN: 420 mL / OUT: 850 mL / NET: -430 mL    06-01 @ 07:01 - 06-01 @ 15:33  --------------------------------------------------------  IN: 50 mL / OUT: 0 mL / NET: 50 mL          PHYSICAL EXAM: Rt U Ext PICC line   GENERAL:  [ x ] NAD , [ x ] well appearing, [  ] Agitated, [  ] Drowsy,  [  ] Lethargy, [  ] confused   HEAD:  [  x] Normal, [  ] Other  EYES:  [ x ] EOMI, [ x ] PERRLA, [x  ] conjunctiva and sclera clear normal, [  ] Other,  [ x ] Pallor,[  ] Discharge  ENMT:  [x  ] Normal, [ x ] Moist mucous membranes, [x  ] Good dentition, [ x ] No Thrush  NECK:  [  x] Supple, [x  ] No JVD, [ x ] Normal thyroid, [  ] Lymphadenopathy [  ] Other  CHEST/LUNG:  [  x] Clear to auscultation bilaterally, [ x ] Breath Sounds equal B/L  [x  ] poor effort  [x  ] No rales, [  x] No rhonchi  [ x ]  No wheezing,   HEART:  [ x ] Regular rate and rhythm, [  ] tachycardia, [  ] Bradycardia,  [  ] irregular  [ x ] No murmurs, No rubs, No gallops, [  ] PPM in place (Mfr:  )  ABDOMEN:  [ x ] Soft, [ x ] Nontender, [ x ] Nondistended, [ x ] No mass, [ x ] Bowel sounds present, [  ] obese+ Umbilical hernia +  NERVOUS SYSTEM:  [ x ] Alert & Oriented X3, [x  ] Nonfocal  [  ] Confusion  [  ] Encephalopathic [  ] Sedated [  ] Unable to assess, [  ] Dementia [  ] Other-  EXTREMITIES: NO C/C P Pulse + B/L  [x] left foot and ankle in dressing , Rt Foot TMA   LYMPH: No lymphadenopathy noted  SKIN:  [ x ] No rashes or lesions, [  ] Pressure Ulcers, [  ] ecchymosis, [  ] Skin Tears, [  ] Other    DIET: Diet, DASH/TLC:   Sodium & Cholesterol Restricted  Consistent Carbohydrate No Snacks (05-28-25 @ 12:07)      LABS:                        7.9    13.05 )-----------( 272      ( 01 Jun 2025 06:48 )             24.4     01 Jun 2025 06:48    136    |  103    |  20     ----------------------------<  147    4.5     |  25     |  1.00     Ca    8.3        01 Jun 2025 06:48    TPro  6.6    /  Alb  2.4    /  TBili  0.4    /  DBili  x      /  AST  13     /  ALT  19     /  AlkPhos  59     01 Jun 2025 06:48      Urinalysis Basic - ( 01 Jun 2025 06:48 )    Color: x / Appearance: x / SG: x / pH: x  Gluc: 147 mg/dL / Ketone: x  / Bili: x / Urobili: x   Blood: x / Protein: x / Nitrite: x   Leuk Esterase: x / RBC: x / WBC x   Sq Epi: x / Non Sq Epi: x / Bacteria: x        Culture Results:   Rare Stenotrophomonas maltophilia (05-28 @ 10:00)  Culture Results:   No growth to date. (05-28 @ 10:00)  Culture Results:   Rare Staphylococcus pseudintermedius  Rare Staphylococcus saprophyticus (05-28 @ 10:00)  Culture Results:   No growth at 5 days (05-27 @ 09:10)  Culture Results:   No growth at 5 days (05-27 @ 09:00)          CARDIAC MARKERS ( 29 May 2025 09:00 )  x     / x     / x     / x     / 2.4 ng/mL          Culture - Tissue with Gram Stain (collected 28 May 2025 10:00)  Source: Tissue Other, LEFT FIFTH METATARSAL BONE  Gram Stain (28 May 2025 19:10):    No polymorphonuclear cells seen    No organisms seen  Preliminary Report (29 May 2025 12:48):    No growth to date.    Culture - Tissue with Gram Stain (collected 28 May 2025 10:00)  Source: Tissue Other, LEFT CALCANEUS BONE  Gram Stain (31 May 2025 12:05):    No polymorphonuclear cells seen    No organisms seen  Preliminary Report (31 May 2025 12:05):    Rare Staphylococcus pseudintermedius    Rare Staphylococcus saprophyticus  Organism: Staphylococcus saprophyticus (01 Jun 2025 11:15)  Organism: Staphylococcus saprophyticus (01 Jun 2025 11:15)    Culture - Tissue with Gram Stain (collected 28 May 2025 10:00)  Source: Tissue Other, LEFT HALLUX BONE  Gram Stain (31 May 2025 11:59):    No polymorphonuclear cells seen    No organisms seen  Preliminary Report (31 May 2025 11:59):    Rare Stenotrophomonas maltophilia  Organism: Stenotrophomonas maltophilia (01 Jun 2025 10:39)  Organism: Stenotrophomonas maltophilia (01 Jun 2025 10:38)  Organism: Stenotrophomonas maltophilia (01 Jun 2025 10:38)    Culture - Blood (collected 27 May 2025 09:10)  Source: Blood Blood-Peripheral  Final Report (01 Jun 2025 12:00):    No growth at 5 days    Culture - Blood (collected 27 May 2025 09:00)  Source: Blood Blood-Peripheral  Final Report (01 Jun 2025 12:00):    No growth at 5 days         Anemia Panel:    RADIOLOGY & ADDITIONAL TESTS:      HEALTH ISSUES - PROBLEM Dx:  DM foot ulcer    PAD (peripheral artery disease)    Type 2 diabetes mellitus    HTN (hypertension)    Encounter for deep vein thrombosis (DVT) prophylaxis    Anemia      Consultant(s) Notes Reviewed:  [x  ] YES     Care Discussed with [X] Consultants  [ x ] Patient  [  ] Family [  ] HCP [  ]   [  ] Social Service  [x ] RN, [  ] Physical Therapy,[  ] Palliative care team  DVT PPX: [ x ] Lovenox, [  ] S C Heparin, [  ] Coumadin, [  ] Xarelto, [  ] Eliquis, [  ] Pradaxa, [  ] IV Heparin drip, [  ] SCD [  ] Contraindication 2 to GI Bleed,[  ] Ambulation [  ] Contraindicated 2 to  bleed [  ] Contraindicated 2 to Brain Bleed  Advanced directive: [x  ] None, [  ] DNR/DNI

## 2025-06-01 NOTE — PROGRESS NOTE ADULT - ASSESSMENT
s/p left foot partial 1st and 5th ray resection, detachment and reattachment of peroneus brevis tendon, and debridement of heel with antibiotic cement placement.

## 2025-06-01 NOTE — PROGRESS NOTE ADULT - ASSESSMENT
65 y/o M with PMHx DM2, osteomyelitis, CAD, PAD s/p RLE angioplasty 2020, s/p surgical amputation of R forefoot , s/p L PT angioplasty 12/2/24 for L lat foot DFU on plavix, HTN, s/p recent admission for left foot infection s/p PICC placement for IV abx  sent by Podiatry Dr. Stark for admission for OR left foot partial 1st and 5th ray resection, heel debridement with antibiotic.  - MRI of the left foot demonstrates ulceration at the great toe with underlying cortical destruction of the distal phalanx and acute osteomyelitis involving the distal and proximal phalanx of the great toe.  Focal ulceration at the base of the fifth metatarsal with underlying marrow edema within the fifth metatarsal base and intramedullary cavity. Finding is indeterminate and may represent concurrent early osteomyelitis versus reactive changes.  Marrow edema involving the distal phalanx and middle phalanx of the little toe without definitive corresponding ulceration or abnormal T1 weighted signal.   Nonspecific bone marrow edema pattern within the intermediate cuneiform, lateral cuneiform, and navicular bone favored to represent reactive changes from osteoarthrosis.

## 2025-06-01 NOTE — PROGRESS NOTE ADULT - ATTENDING COMMENTS
65 y/o M with PMHx DM2, osteomyelitis, CAD, PAD s/p RLE angioplasty 2020, s/p surgical amputation of R forefoot , s/p L PT angioplasty 12/2/24 for L lat foot DFU on plavix, HTN, s/p recent admission for left foot infection s/p PICC placement for IV abx  sent by Podiatry Dr. Stark for admission for OR intervention tomorrow.  Pt seen, examined case & care plan d/w pt, residents at detail. 2 u pRBC on 6/1   Consult-  ID-DR XENIA Calhoun/ Dr Lyn  -IV abx  VIA PICC line-On  IV Cefepime 2 gm IVPB q 8 hrs, + clean margin  showed AC on chronic OM on Pathology results   Podiatry-DR Joe Stark d/w POST Op -NWB Left foot -bleeding post op -dressing changed ,Abx ,D/W DR French -about Pathology results   PO diet   AM labs   DVT ppx restart as d/w podiatry  Total care time is 60 minutes.

## 2025-06-01 NOTE — PROGRESS NOTE ADULT - ASSESSMENT
65 y/o M with PMHx DM2, osteomyelitis, CAD, PAD s/p RLE angioplasty 2020, s/p surgical amputation of R forefoot , s/p L PT angioplasty 12/2/24 for L lat foot DFU on plavix, HTN, s/p recent admission for left foot infection s/p PICC placement for IV abx  sent by Podiatry Dr. Stark for admission for OR intervention tomorrow.    L foot OM/DM Foot Ulcer  PAD  - sent for OR 5/28 for left foot debridement and partial 1st toe amputation 5/28   - MRI of the left foot demonstrates -mult abns    Review of prior ID notes show that the patient was sent out  on ceftriaxone 2gm q24h x6 week course IV Abx until 6/10/25  In in the interim pt was noted to have worsening leukocytosis, transitioned to ertapenem since 5/24    PROCEDURES:  Detachment, debridement, and reattachment of peroneus brevis tendon 28-May-2025 10:07:14  Ricki Wilson  Detachment at left foot, partial 1st ray, open approach 28-May-2025 10:07:26  Ricki Wilson  Detachment at left foot, partial 5th ray, open approach 28-May-2025 10:07:40  Ricki Wilson  Debridement, soft tissue and bone, heel region of diabetic foot 28-May-2025 10:07:50  Ricki Wilson  Non-viable bone and soft tissue.    OR cx NGTD  PATH-residual disease  Now on cefepime (5/28-)continue for now  Podiatry following, ?further site control versus extended abx  Further recs to follow     Thank you for consulting us and involving us in the management of this most interesting and challenging case.  In addition to reviewing history, imaging, documents, labs, microbiology, took into account antibiotic stewardship, local antibiogram and infection control strategies and potential transmission issues.    We will follow along in the care of this patient. Please contact me by texting me directly on my cell# at [phone] using TEAMS or call our answering service at [phone] with any concerns.    Starting Monday Dr Melany Calhoun will be assuming care of this patient so please contact her with any questions, concerns or new micro data.

## 2025-06-02 ENCOUNTER — APPOINTMENT (OUTPATIENT)
Dept: HYPERBARIC MEDICINE | Facility: HOSPITAL | Age: 65
End: 2025-06-02

## 2025-06-02 LAB
-  CLINDAMYCIN: SIGNIFICANT CHANGE UP
-  CLINDAMYCIN: SIGNIFICANT CHANGE UP
-  ERYTHROMYCIN: SIGNIFICANT CHANGE UP
-  ERYTHROMYCIN: SIGNIFICANT CHANGE UP
-  GENTAMICIN: SIGNIFICANT CHANGE UP
-  GENTAMICIN: SIGNIFICANT CHANGE UP
-  OXACILLIN: SIGNIFICANT CHANGE UP
-  OXACILLIN: SIGNIFICANT CHANGE UP
-  PENICILLIN: SIGNIFICANT CHANGE UP
-  RIFAMPIN: SIGNIFICANT CHANGE UP
-  RIFAMPIN: SIGNIFICANT CHANGE UP
-  TETRACYCLINE: SIGNIFICANT CHANGE UP
-  TETRACYCLINE: SIGNIFICANT CHANGE UP
-  TRIMETHOPRIM/SULFAMETHOXAZOLE: SIGNIFICANT CHANGE UP
-  TRIMETHOPRIM/SULFAMETHOXAZOLE: SIGNIFICANT CHANGE UP
-  VANCOMYCIN: SIGNIFICANT CHANGE UP
-  VANCOMYCIN: SIGNIFICANT CHANGE UP
ALBUMIN SERPL ELPH-MCNC: 2.3 G/DL — LOW (ref 3.3–5)
ALP SERPL-CCNC: 59 U/L — SIGNIFICANT CHANGE UP (ref 40–120)
ALT FLD-CCNC: 20 U/L — SIGNIFICANT CHANGE UP (ref 12–78)
ANION GAP SERPL CALC-SCNC: 3 MMOL/L — LOW (ref 5–17)
AST SERPL-CCNC: 9 U/L — LOW (ref 15–37)
BILIRUB SERPL-MCNC: 0.4 MG/DL — SIGNIFICANT CHANGE UP (ref 0.2–1.2)
BUN SERPL-MCNC: 24 MG/DL — HIGH (ref 7–23)
CALCIUM SERPL-MCNC: 8.4 MG/DL — LOW (ref 8.5–10.1)
CHLORIDE SERPL-SCNC: 106 MMOL/L — SIGNIFICANT CHANGE UP (ref 96–108)
CO2 SERPL-SCNC: 28 MMOL/L — SIGNIFICANT CHANGE UP (ref 22–31)
CREAT SERPL-MCNC: 1 MG/DL — SIGNIFICANT CHANGE UP (ref 0.5–1.3)
CULTURE RESULTS: ABNORMAL
EGFR: 83 ML/MIN/1.73M2 — SIGNIFICANT CHANGE UP
EGFR: 83 ML/MIN/1.73M2 — SIGNIFICANT CHANGE UP
GLUCOSE BLDC GLUCOMTR-MCNC: 126 MG/DL — HIGH (ref 70–99)
GLUCOSE BLDC GLUCOMTR-MCNC: 169 MG/DL — HIGH (ref 70–99)
GLUCOSE BLDC GLUCOMTR-MCNC: 177 MG/DL — HIGH (ref 70–99)
GLUCOSE BLDC GLUCOMTR-MCNC: 217 MG/DL — HIGH (ref 70–99)
GLUCOSE SERPL-MCNC: 172 MG/DL — HIGH (ref 70–99)
GRAM STN FLD: ABNORMAL
HCT VFR BLD CALC: 28.7 % — LOW (ref 39–50)
HGB BLD-MCNC: 9.4 G/DL — LOW (ref 13–17)
MCHC RBC-ENTMCNC: 29.4 PG — SIGNIFICANT CHANGE UP (ref 27–34)
MCHC RBC-ENTMCNC: 32.8 G/DL — SIGNIFICANT CHANGE UP (ref 32–36)
MCV RBC AUTO: 89.7 FL — SIGNIFICANT CHANGE UP (ref 80–100)
METHOD TYPE: SIGNIFICANT CHANGE UP
METHOD TYPE: SIGNIFICANT CHANGE UP
NRBC BLD AUTO-RTO: 0 /100 WBCS — SIGNIFICANT CHANGE UP (ref 0–0)
ORGANISM # SPEC MICROSCOPIC CNT: ABNORMAL
ORGANISM # SPEC MICROSCOPIC CNT: SIGNIFICANT CHANGE UP
PLATELET # BLD AUTO: 270 K/UL — SIGNIFICANT CHANGE UP (ref 150–400)
POTASSIUM SERPL-MCNC: 4.6 MMOL/L — SIGNIFICANT CHANGE UP (ref 3.5–5.3)
POTASSIUM SERPL-SCNC: 4.6 MMOL/L — SIGNIFICANT CHANGE UP (ref 3.5–5.3)
PROT SERPL-MCNC: 6.5 G/DL — SIGNIFICANT CHANGE UP (ref 6–8.3)
RBC # BLD: 3.2 M/UL — LOW (ref 4.2–5.8)
RBC # FLD: 13.7 % — SIGNIFICANT CHANGE UP (ref 10.3–14.5)
SODIUM SERPL-SCNC: 137 MMOL/L — SIGNIFICANT CHANGE UP (ref 135–145)
SPECIMEN SOURCE: SIGNIFICANT CHANGE UP
WBC # BLD: 12.01 K/UL — HIGH (ref 3.8–10.5)
WBC # FLD AUTO: 12.01 K/UL — HIGH (ref 3.8–10.5)

## 2025-06-02 PROCEDURE — 99232 SBSQ HOSP IP/OBS MODERATE 35: CPT

## 2025-06-02 RX ORDER — MINOCYCLINE HCL 50 MG
200 TABLET ORAL EVERY 12 HOURS
Refills: 0 | Status: DISCONTINUED | OUTPATIENT
Start: 2025-06-02 | End: 2025-06-04

## 2025-06-02 RX ADMIN — ATORVASTATIN CALCIUM 40 MILLIGRAM(S): 80 TABLET, FILM COATED ORAL at 21:30

## 2025-06-02 RX ADMIN — INSULIN LISPRO 5 UNIT(S): 100 INJECTION, SOLUTION INTRAVENOUS; SUBCUTANEOUS at 11:59

## 2025-06-02 RX ADMIN — INSULIN LISPRO 5 UNIT(S): 100 INJECTION, SOLUTION INTRAVENOUS; SUBCUTANEOUS at 07:46

## 2025-06-02 RX ADMIN — GABAPENTIN 300 MILLIGRAM(S): 400 CAPSULE ORAL at 05:54

## 2025-06-02 RX ADMIN — Medication 81 MILLIGRAM(S): at 11:57

## 2025-06-02 RX ADMIN — GABAPENTIN 300 MILLIGRAM(S): 400 CAPSULE ORAL at 21:30

## 2025-06-02 RX ADMIN — TRAMADOL HYDROCHLORIDE 50 MILLIGRAM(S): 50 TABLET, FILM COATED ORAL at 02:15

## 2025-06-02 RX ADMIN — CLOPIDOGREL BISULFATE 75 MILLIGRAM(S): 75 TABLET, FILM COATED ORAL at 11:57

## 2025-06-02 RX ADMIN — TRAMADOL HYDROCHLORIDE 50 MILLIGRAM(S): 50 TABLET, FILM COATED ORAL at 01:50

## 2025-06-02 RX ADMIN — INSULIN GLARGINE-YFGN 15 UNIT(S): 100 INJECTION, SOLUTION SUBCUTANEOUS at 07:45

## 2025-06-02 RX ADMIN — TRAMADOL HYDROCHLORIDE 50 MILLIGRAM(S): 50 TABLET, FILM COATED ORAL at 16:29

## 2025-06-02 RX ADMIN — INSULIN LISPRO 2: 100 INJECTION, SOLUTION INTRAVENOUS; SUBCUTANEOUS at 11:59

## 2025-06-02 RX ADMIN — GABAPENTIN 300 MILLIGRAM(S): 400 CAPSULE ORAL at 13:06

## 2025-06-02 RX ADMIN — INSULIN LISPRO 5 UNIT(S): 100 INJECTION, SOLUTION INTRAVENOUS; SUBCUTANEOUS at 17:18

## 2025-06-02 RX ADMIN — ENOXAPARIN SODIUM 40 MILLIGRAM(S): 100 INJECTION SUBCUTANEOUS at 17:18

## 2025-06-02 RX ADMIN — Medication 200 MILLIGRAM(S): at 17:16

## 2025-06-02 RX ADMIN — CEFEPIME 100 MILLIGRAM(S): 2 INJECTION, POWDER, FOR SOLUTION INTRAVENOUS at 05:55

## 2025-06-02 RX ADMIN — METOPROLOL SUCCINATE 50 MILLIGRAM(S): 50 TABLET, EXTENDED RELEASE ORAL at 05:54

## 2025-06-02 RX ADMIN — TRAMADOL HYDROCHLORIDE 50 MILLIGRAM(S): 50 TABLET, FILM COATED ORAL at 15:41

## 2025-06-02 RX ADMIN — INSULIN LISPRO 4: 100 INJECTION, SOLUTION INTRAVENOUS; SUBCUTANEOUS at 17:18

## 2025-06-02 RX ADMIN — LISINOPRIL 40 MILLIGRAM(S): 5 TABLET ORAL at 05:54

## 2025-06-02 RX ADMIN — CEFEPIME 100 MILLIGRAM(S): 2 INJECTION, POWDER, FOR SOLUTION INTRAVENOUS at 21:30

## 2025-06-02 RX ADMIN — CEFEPIME 100 MILLIGRAM(S): 2 INJECTION, POWDER, FOR SOLUTION INTRAVENOUS at 13:06

## 2025-06-02 RX ADMIN — INSULIN LISPRO 2: 100 INJECTION, SOLUTION INTRAVENOUS; SUBCUTANEOUS at 07:45

## 2025-06-02 NOTE — PROGRESS NOTE ADULT - ASSESSMENT
65 y/o M with PMHx DM2, osteomyelitis, CAD, PAD s/p RLE angioplasty 2020, s/p surgical amputation of R forefoot , s/p L PT angioplasty 12/2/24 for L lat foot DFU on plavix, HTN, s/p recent admission for left foot infection s/p PICC placement for IV abx  sent by Podiatry Dr. Stark for admission for OR intervention tomorrow.    L foot OM/DM Foot Ulcer  PAD  - sent for OR 5/28 for left foot debridement and partial 1st toe amputation 5/28   - MRI L foot: ulceration at the great toe with underlying cortical destruction of the distal phalanx and acute osteomyelitis involving the distal and proximal phalanx of the great toe.  Focal ulceration at the base of the fifth metatarsal with underlying marrow edema within the fifth metatarsal base and intramedullary cavity.     Review of prior ID notes show that the patient was sent out  on ceftriaxone 2gm q24h x6 week course IV Abx until 6/10/25  In in the interim pt was noted to have worsening leukocytosis, transitioned to ertapenem since 5/24    PROCEDURES:  Detachment, debridement, and reattachment of peroneus brevis tendon 28-May-2025 10:07:14  Ricki Wilson  Detachment at left foot, partial 1st ray, open approach 28-May-2025 10:07:26  Ricki Wilson  Detachment at left foot, partial 5th ray, open approach 28-May-2025 10:07:40  Ricki Wilson  Debridement, soft tissue and bone, heel region of diabetic foot 28-May-2025 10:07:50  Ricki Wilson  Non-viable bone and soft tissue.    pathology reviewed: evidence of acute and chronic OM on L hallux; L 1st, 5th MT, Calcaneus  Margins w/ mild chronic OM    OR cx reviewed:  Rare Stenotrophomonas maltophilia    Rare Staphylococcus pseudintermedius    Rare Staphylococcus saprophyticus    Recommendations:   Currently on cefepime (5/28-) continue  Will add     ***THIS IS AN INCOMPLETE NOTE***    Patient evaluated with face-to-face time in addition to reviewing history, labs, microbiology, and imaging.   Antibiotic stewardship, local antibiogram, infection control strategies and potential transmission issues taken into consideration at time of treatment decision making process.   Thank you for allowing us to participate in the care of your patient.  Infectious Diseases will follow. Please call with any questions.  Melany Calhoun M.D.  Available on [employer] TEAMS -- *PREFERRED*  [facility] [phone]  For after 5 P.M. and weekends, please call [phone]   63 y/o M with PMHx DM2, osteomyelitis, CAD, PAD s/p RLE angioplasty 2020, s/p surgical amputation of R forefoot , s/p L PT angioplasty 12/2/24 for L lat foot DFU on plavix, HTN, s/p recent admission for left foot infection s/p PICC placement for IV abx  sent by Podiatry Dr. Stark for admission for OR intervention tomorrow.    L foot OM/DM Foot Ulcer  PAD  - sent for OR 5/28 for left foot debridement and partial 1st toe amputation 5/28   - MRI L foot: ulceration at the great toe with underlying cortical destruction of the distal phalanx and acute osteomyelitis involving the distal and proximal phalanx of the great toe.  Focal ulceration at the base of the fifth metatarsal with underlying marrow edema within the fifth metatarsal base and intramedullary cavity.     Review of prior ID notes show that the patient was sent out  on ceftriaxone 2gm q24h x6 week course IV Abx until 6/10/25  In in the interim pt was noted to have worsening leukocytosis, transitioned to ertapenem since 5/24    PROCEDURES:  Detachment, debridement, and reattachment of peroneus brevis tendon 28-May-2025 10:07:14  Ricki Wilson  Detachment at left foot, partial 1st ray, open approach 28-May-2025 10:07:26  Ricki Wilson  Detachment at left foot, partial 5th ray, open approach 28-May-2025 10:07:40  Ricki Wilson  Debridement, soft tissue and bone, heel region of diabetic foot 28-May-2025 10:07:50  Ricki Wilson  Non-viable bone and soft tissue.    pathology reviewed: evidence of acute and chronic OM on L hallux; L 1st, 5th MT, Calcaneus  Margins w/ mild chronic OM    OR cx reviewed:  Rare Stenotrophomonas maltophilia    Rare Staphylococcus pseudintermedius    Rare Staphylococcus saprophyticus    Recommendations:   Currently on cefepime (5/28-) continue  Will add minocycline 200mg BID to cover for stenotrophomonas  Trend temps/WBC  Podiatry following  Final recs to follow    Patient evaluated with face-to-face time in addition to reviewing history, labs, microbiology, and imaging.   Antibiotic stewardship, local antibiogram, infection control strategies and potential transmission issues taken into consideration at time of treatment decision making process.   Thank you for allowing us to participate in the care of your patient.  Infectious Diseases will follow. Please call with any questions.  Melany Calhoun M.D.  Available on [employer] TEAMS -- *PREFERRED*  [facility] [phone]  For after 5 P.M. and weekends, please call [phone]   65 y/o M with PMHx DM2, osteomyelitis, CAD, PAD s/p RLE angioplasty 2020, s/p surgical amputation of R forefoot , s/p L PT angioplasty 12/2/24 for L lat foot DFU on plavix, HTN, s/p recent admission for left foot infection s/p PICC placement for IV abx  sent by Podiatry Dr. Stark for admission for OR intervention tomorrow.    L foot OM/DM Foot Ulcer  PAD  - sent for OR 5/28 for left foot debridement and partial 1st toe amputation 5/28   - MRI L foot: ulceration at the great toe with underlying cortical destruction of the distal phalanx and acute osteomyelitis involving the distal and proximal phalanx of the great toe.  Focal ulceration at the base of the fifth metatarsal with underlying marrow edema within the fifth metatarsal base and intramedullary cavity.     Review of prior ID notes show that the patient was sent out  on ceftriaxone 2gm q24h x6 week course IV Abx until 6/10/25  In in the interim pt was noted to have worsening leukocytosis, transitioned to ertapenem since 5/24    PROCEDURES:  Detachment, debridement, and reattachment of peroneus brevis tendon 28-May-2025 10:07:14  Ricki Wilson  Detachment at left foot, partial 1st ray, open approach 28-May-2025 10:07:26  Ricki Wilson  Detachment at left foot, partial 5th ray, open approach 28-May-2025 10:07:40  Ricki Wilson  Debridement, soft tissue and bone, heel region of diabetic foot 28-May-2025 10:07:50  Ricki Wilson  Non-viable bone and soft tissue.    pathology reviewed: evidence of acute and chronic OM on L hallux; L 1st, 5th MT, Calcaneus  Margins w/ mild chronic OM    OR cx reviewed:  Rare Stenotrophomonas maltophilia    Rare Staphylococcus pseudintermedius    Rare Staphylococcus saprophyticus    Recommendations:   Currently on cefepime (5/28-) continue  Will add minocycline 200mg BID to cover for stenotrophomonas  Trend temps/WBC  Podiatry following  Final recs to follow    Patient evaluated with face-to-face time in addition to reviewing history, labs, microbiology, and imaging.   Antibiotic stewardship, local antibiogram, infection control strategies and potential transmission issues taken into consideration at time of treatment decision making process.   Thank you for allowing us to participate in the care of your patient.  D/w Dr. Calhoun  Infectious Diseases will follow. Please call with any questions.  Melany Calhoun M.D.  Available on [employer] TEAMS -- *[facility]*  [facility] [phone]  For after 5 P.M. and weekends, please call [phone]

## 2025-06-02 NOTE — PROGRESS NOTE ADULT - SUBJECTIVE AND OBJECTIVE BOX
[facility] -- Vlad Gallardo Pannella, Patel, Savella, Goodger, Cohen: Office # [phone]    Follow Up:  PAD, HTN    Subjective/Observations: Patient seen and examined. Patient awake, alert, resting in bed. No complaints of chest pain, dyspnea, palpitations or dizziness. No signs of orthopnea or PND. Tolerating room air.     REVIEW OF SYSTEMS: All other review of systems are negative unless indicated above    PAST MEDICAL & SURGICAL HISTORY:  HTN (hypertension)      Diabetes      Hyperlipidemia      PVD (peripheral vascular disease)      Toe osteomyelitis, right      H/O angioplasty  RLE      Status post amputation of toe          MEDICATIONS  (STANDING):  aspirin enteric coated 81 milliGRAM(s) Oral daily  atorvastatin 40 milliGRAM(s) Oral at bedtime  cefepime   IVPB 2000 milliGRAM(s) IV Intermittent every 8 hours  cefepime   IVPB      clopidogrel Tablet 75 milliGRAM(s) Oral daily  dextrose 5%. 1000 milliLiter(s) (100 mL/Hr) IV Continuous <Continuous>  dextrose 5%. 1000 milliLiter(s) (50 mL/Hr) IV Continuous <Continuous>  dextrose 50% Injectable 25 Gram(s) IV Push once  dextrose 50% Injectable 12.5 Gram(s) IV Push once  dextrose 50% Injectable 25 Gram(s) IV Push once  enoxaparin Injectable 40 milliGRAM(s) SubCutaneous every 24 hours  gabapentin 300 milliGRAM(s) Oral three times a day  glucagon  Injectable 1 milliGRAM(s) IntraMuscular once  insulin glargine Injectable (LANTUS) 15 Unit(s) SubCutaneous every morning  insulin lispro (ADMELOG) corrective regimen sliding scale   SubCutaneous three times a day before meals  insulin lispro (ADMELOG) corrective regimen sliding scale   SubCutaneous at bedtime  insulin lispro Injectable (ADMELOG) 5 Unit(s) SubCutaneous three times a day before meals  lisinopril 40 milliGRAM(s) Oral daily  metoprolol succinate ER 50 milliGRAM(s) Oral daily  senna 2 Tablet(s) Oral at bedtime    MEDICATIONS  (PRN):  acetaminophen   IVPB .. 1000 milliGRAM(s) IV Intermittent every 8 hours PRN Mild Pain (1 - 3)  aluminum hydroxide/magnesium hydroxide/simethicone Suspension 30 milliLiter(s) Oral every 4 hours PRN Dyspepsia  dextrose Oral Gel 15 Gram(s) Oral once PRN Blood Glucose LESS THAN 70 milliGRAM(s)/deciliter  HYDROmorphone  Injectable 0.5 milliGRAM(s) IV Push four times a day PRN Severe Pain (7 - 10)  melatonin 3 milliGRAM(s) Oral at bedtime PRN Insomnia  ondansetron Injectable 4 milliGRAM(s) IV Push every 8 hours PRN Nausea and/or Vomiting  traMADol 50 milliGRAM(s) Oral every 6 hours PRN Moderate Pain (4 - 6)    Allergies    No Known Allergies    Intolerances      Vital Signs Last 24 Hrs  T(C): 36.8 (02 Jun 2025 04:56), Max: 36.9 (01 Jun 2025 18:45)  T(F): 98.3 (02 Jun 2025 04:56), Max: 98.4 (01 Jun 2025 18:45)  HR: 77 (02 Jun 2025 04:56) (73 - 88)  BP: 121/78 (02 Jun 2025 04:56) (103/61 - 144/-)  BP(mean): 77 (01 Jun 2025 21:30) (77 - 77)  RR: 18 (02 Jun 2025 04:56) (17 - 18)  SpO2: 99% (02 Jun 2025 04:56) (97% - 100%)    Parameters below as of 02 Jun 2025 04:56  Patient On (Oxygen Delivery Method): room air      I&O's Summary    01 Jun 2025 07:01  -  02 Jun 2025 07:00  --------------------------------------------------------  IN: 50 mL / OUT: 0 mL / NET: 50 mL          TELE: Not on telemetry   PHYSICAL EXAM:  Constitutional: NAD, awake and alert  HEENT: Moist Mucous Membranes, Anicteric  Pulmonary: Non-labored, breath sounds are clear bilaterally, No wheezing, rales or rhonchi  Cardiovascular: Regular, S1 and S2, No murmurs, No rubs, gallops or clicks  Gastrointestinal:  soft, nontender, nondistended   Lymph: No peripheral edema. No lymphadenopathy.   Skin: No visible rashes Left foot DSD intact.   Psych:  Mood & affect appropriate      LABS: All Labs Reviewed:                        9.4    12.01 )-----------( 270      ( 02 Jun 2025 06:15 )             28.7                         7.9    13.05 )-----------( 272      ( 01 Jun 2025 06:48 )             24.4                         8.4    13.86 )-----------( 270      ( 31 May 2025 05:40 )             25.7     02 Jun 2025 06:15    137    |  106    |  24     ----------------------------<  172    4.6     |  28     |  1.00   01 Jun 2025 06:48    136    |  103    |  20     ----------------------------<  147    4.5     |  25     |  1.00   31 May 2025 05:40    138    |  107    |  22     ----------------------------<  185    4.7     |  26     |  1.10     Ca    8.4        02 Jun 2025 06:15  Ca    8.3        01 Jun 2025 06:48  Ca    8.4        31 May 2025 05:40    TPro  6.5    /  Alb  2.3    /  TBili  0.4    /  DBili  x      /  AST  9      /  ALT  20     /  AlkPhos  59     02 Jun 2025 06:15  TPro  6.6    /  Alb  2.4    /  TBili  0.4    /  DBili  x      /  AST  13     /  ALT  19     /  AlkPhos  59     01 Jun 2025 06:48  TPro  6.5    /  Alb  2.5    /  TBili  0.3    /  DBili  x      /  AST  9      /  ALT  16     /  AlkPhos  63     31 May 2025 05:40   LIVER FUNCTIONS - ( 02 Jun 2025 06:15 )  Alb: 2.3 g/dL / Pro: 6.5 g/dL / ALK PHOS: 59 U/L / ALT: 20 U/L / AST: 9 U/L / GGT: x           Troponin I, High Sensitivity Result: 7.2 ng/L (05-29-25 @ 09:00)    12 Lead ECG:   Ventricular Rate 89 BPM    Atrial Rate 89 BPM    P-R Interval 138 ms    QRS Duration 88 ms    Q-T Interval 408 ms    QTC Calculation(Bazett) 496 ms    P Axis 60 degrees    R Axis 12 degrees    T Axis 38 degrees    Diagnosis Line Normal sinus rhythm  Prolonged QT  Confirmed by CATHERINE REBOLLEDO ([id number]) on 5/29/2025 1:05:54 PM (05-29-25 @ 08:20)     [facility] -- Vlad Gallardo, Otoniel Blsis Savella, Goodger, Cohen: Office # [phone]    Follow Up:  PAD, HTN    Subjective/Observations: Patient seen and examined. Patient awake, alert, resting in bed. No complaints of chest pain, dyspnea, palpitations or dizziness. No signs of orthopnea or PND. Tolerating room air. Left foot DSD intact.     REVIEW OF SYSTEMS: All other review of systems are negative unless indicated above    PAST MEDICAL & SURGICAL HISTORY:  HTN (hypertension)      Diabetes      Hyperlipidemia      PVD (peripheral vascular disease)      Toe osteomyelitis, right      H/O angioplasty  RLE      Status post amputation of toe    MEDICATIONS  (STANDING):  aspirin enteric coated 81 milliGRAM(s) Oral daily  atorvastatin 40 milliGRAM(s) Oral at bedtime  cefepime   IVPB 2000 milliGRAM(s) IV Intermittent every 8 hours  cefepime   IVPB      clopidogrel Tablet 75 milliGRAM(s) Oral daily  dextrose 5%. 1000 milliLiter(s) (100 mL/Hr) IV Continuous <Continuous>  dextrose 5%. 1000 milliLiter(s) (50 mL/Hr) IV Continuous <Continuous>  dextrose 50% Injectable 25 Gram(s) IV Push once  dextrose 50% Injectable 12.5 Gram(s) IV Push once  dextrose 50% Injectable 25 Gram(s) IV Push once  enoxaparin Injectable 40 milliGRAM(s) SubCutaneous every 24 hours  gabapentin 300 milliGRAM(s) Oral three times a day  glucagon  Injectable 1 milliGRAM(s) IntraMuscular once  insulin glargine Injectable (LANTUS) 15 Unit(s) SubCutaneous every morning  insulin lispro (ADMELOG) corrective regimen sliding scale   SubCutaneous three times a day before meals  insulin lispro (ADMELOG) corrective regimen sliding scale   SubCutaneous at bedtime  insulin lispro Injectable (ADMELOG) 5 Unit(s) SubCutaneous three times a day before meals  lisinopril 40 milliGRAM(s) Oral daily  metoprolol succinate ER 50 milliGRAM(s) Oral daily  senna 2 Tablet(s) Oral at bedtime    MEDICATIONS  (PRN):  acetaminophen   IVPB .. 1000 milliGRAM(s) IV Intermittent every 8 hours PRN Mild Pain (1 - 3)  aluminum hydroxide/magnesium hydroxide/simethicone Suspension 30 milliLiter(s) Oral every 4 hours PRN Dyspepsia  dextrose Oral Gel 15 Gram(s) Oral once PRN Blood Glucose LESS THAN 70 milliGRAM(s)/deciliter  HYDROmorphone  Injectable 0.5 milliGRAM(s) IV Push four times a day PRN Severe Pain (7 - 10)  melatonin 3 milliGRAM(s) Oral at bedtime PRN Insomnia  ondansetron Injectable 4 milliGRAM(s) IV Push every 8 hours PRN Nausea and/or Vomiting  traMADol 50 milliGRAM(s) Oral every 6 hours PRN Moderate Pain (4 - 6)    Allergies    No Known Allergies    Intolerances      Vital Signs Last 24 Hrs  T(C): 36.8 (02 Jun 2025 04:56), Max: 36.9 (01 Jun 2025 18:45)  T(F): 98.3 (02 Jun 2025 04:56), Max: 98.4 (01 Jun 2025 18:45)  HR: 77 (02 Jun 2025 04:56) (73 - 88)  BP: 121/78 (02 Jun 2025 04:56) (103/61 - 144/-)  BP(mean): 77 (01 Jun 2025 21:30) (77 - 77)  RR: 18 (02 Jun 2025 04:56) (17 - 18)  SpO2: 99% (02 Jun 2025 04:56) (97% - 100%)    Parameters below as of 02 Jun 2025 04:56  Patient On (Oxygen Delivery Method): room air      I&O's Summary    01 Jun 2025 07:01  -  02 Jun 2025 07:00  --------------------------------------------------------  IN: 50 mL / OUT: 0 mL / NET: 50 mL          TELE: Not on telemetry   PHYSICAL EXAM:  Constitutional: NAD, awake and alert  HEENT: Moist Mucous Membranes, Anicteric  Pulmonary: Non-labored, breath sounds are clear bilaterally, No wheezing, rales or rhonchi  Cardiovascular: Regular, S1 and S2, No murmurs, No rubs, gallops or clicks  Gastrointestinal:  soft, nontender, nondistended   Lymph: No peripheral edema. No lymphadenopathy.   Skin: No visible rashes Left foot DSD intact.   Psych:  Mood & affect appropriate      LABS: All Labs Reviewed:                        9.4    12.01 )-----------( 270      ( 02 Jun 2025 06:15 )             28.7                         7.9    13.05 )-----------( 272      ( 01 Jun 2025 06:48 )             24.4                         8.4    13.86 )-----------( 270      ( 31 May 2025 05:40 )             25.7     02 Jun 2025 06:15    137    |  106    |  24     ----------------------------<  172    4.6     |  28     |  1.00   01 Jun 2025 06:48    136    |  103    |  20     ----------------------------<  147    4.5     |  25     |  1.00   31 May 2025 05:40    138    |  107    |  22     ----------------------------<  185    4.7     |  26     |  1.10     Ca    8.4        02 Jun 2025 06:15  Ca    8.3        01 Jun 2025 06:48  Ca    8.4        31 May 2025 05:40    TPro  6.5    /  Alb  2.3    /  TBili  0.4    /  DBili  x      /  AST  9      /  ALT  20     /  AlkPhos  59     02 Jun 2025 06:15  TPro  6.6    /  Alb  2.4    /  TBili  0.4    /  DBili  x      /  AST  13     /  ALT  19     /  AlkPhos  59     01 Jun 2025 06:48  TPro  6.5    /  Alb  2.5    /  TBili  0.3    /  DBili  x      /  AST  9      /  ALT  16     /  AlkPhos  63     31 May 2025 05:40   LIVER FUNCTIONS - ( 02 Jun 2025 06:15 )  Alb: 2.3 g/dL / Pro: 6.5 g/dL / ALK PHOS: 59 U/L / ALT: 20 U/L / AST: 9 U/L / GGT: x           Troponin I, High Sensitivity Result: 7.2 ng/L (05-29-25 @ 09:00)    12 Lead ECG:   Ventricular Rate 89 BPM    Atrial Rate 89 BPM    P-R Interval 138 ms    QRS Duration 88 ms    Q-T Interval 408 ms    QTC Calculation(Bazett) 496 ms    P Axis 60 degrees    R Axis 12 degrees    T Axis 38 degrees    Diagnosis Line Normal sinus rhythm  Prolonged QT  Confirmed by CATHERINE BLISS ([id number]) on 5/29/2025 1:05:54 PM (05-29-25 @ 08:20)

## 2025-06-02 NOTE — PROGRESS NOTE ADULT - ASSESSMENT
64M DM2, hx osteomyelitis, CAD, PAD s/p RLE angioplasty 2020, s/p surgical amputation of R forefoot , s/p L PT angioplasty 12/2/24 for L lat foot DFU on plavix, HTN, HLD sent in by wound clinic for left foot infections and multiple wounds. Vascular surgery to evaluate further with LLE angiogram.     - Pt p/w L foot infection with multiple wounds sent in by [facility], podiatry following   - S/p LLE angio 5/2,  vascular following   - S/p debridement on 5/28 w/o cardiac complications  - S/p RRT 5/29 for lethargy ? vasovagal, now asymptomatic    - No evidence of any active ischemia    - Continue ASA, Plavix  - Continue Lipitor   - No evidence of any meaningful volume overload.   - BP stable and controlled   - Continue Lisinopril 40 and Toprol 50    - Monitor and replete lytes, keep K>4, Mg>2.  - Will continue to follow.    Oksana Humphrey, MS FNP, AGACNP  Nurse Practitioner- Cardiology   Please call on TEAMS   64M DM2, hx osteomyelitis, CAD, PAD s/p RLE angioplasty 2020, s/p surgical amputation of R forefoot , s/p L PT angioplasty 12/2/24 for L lat foot DFU on plavix, HTN, HLD sent in by wound clinic for left foot infections and multiple wounds. Vascular surgery to evaluate further with LLE angiogram.     - Pt p/w L foot infection with multiple wounds sent in by [facility], podiatry following   - S/p LLE angio 5/2,  vascular following   - S/p debridement on 5/28 w/o cardiac complications  - S/p RRT 5/29 for lethargy ? vasovagal, now asymptomatic    - S/p RRT 56/1 for fall/ hypotension, 1L NS bolus x1, s/p PRBC     - No evidence of any active ischemia    - Continue ASA, Plavix  - Continue Lipitor   - No evidence of any meaningful volume overload.     - BP stable and controlled   - Continue Lisinopril 40 and Toprol 50    - Monitor and replete lytes, keep K>4, Mg>2.  - Will continue to follow.    Oksana Humphrey, MS FNP, AGACNP  Nurse Practitioner- Cardiology   Please call on TEAMS

## 2025-06-02 NOTE — PROGRESS NOTE ADULT - ATTENDING COMMENTS
63 y/o M with PMHx DM2, osteomyelitis, CAD, PAD s/p RLE angioplasty 2020, s/p surgical amputation of R forefoot , s/p L PT angioplasty 12/2/24 for L lat foot DFU on plavix, HTN, s/p recent admission for left foot infection s/p PICC placement for IV abx  sent by Podiatry Dr. Stark for admission for OR intervention tomorrow.  Pt seen, examined case & care plan d/w pt, residents at detail. 2 u pRBC on 6/1   Consult-  ID-DR XENIA Calhoun  -IV abx  VIA PICC line-On  IV Cefepime 2 gm IVPB q 8 hrs, + clean margin  showed AC on chronic OM on Pathology results   Podiatry-DR Joe Stark d/w POST Op -NWB Left foot -bleeding post op -dressing changed ,Abx ,D/W DR French -about Pathology results   PO diet   AM labs   D/W CM at detail D/C Plan on Wednesday after Podiatry follow up  DVT ppx restart as d/w podiatry  Total care time is 60 minutes.

## 2025-06-02 NOTE — CASE MANAGEMENT PROGRESS NOTE - NSCMPROGRESSNOTE_GEN_ALL_CORE
Patient is for is for transition home on Wednesday.  Patient was on home infusion prior to readmission. Patient will ID note H&P, copy of MAR and ID to ca in order. Patient remains on Cefepime for now. CM will continue to follow. Pt will need a RW, referral initiated.

## 2025-06-02 NOTE — PROGRESS NOTE ADULT - PROBLEM SELECTOR PLAN 2
H/H slowly dropping, s/p RRT yesterday for symptomatic Anemia with Hypotension   - RRT 5/29 for vasovagal syncope 2/2 to acute blood loss  - s/p 2u PRBCs yesterday with improvement in Hg to 9.4  - Type and screen active  - Continue Aspirin, Lovenox and Plavix as per Podiatry

## 2025-06-02 NOTE — PROGRESS NOTE ADULT - SUBJECTIVE AND OBJECTIVE BOX
Patient is a 64y old  Male who presents with a chief complaint of left foot osteomyelitis (01 Jun 2025 15:31)    HPI:  65 y/o M with PMHx DM2, osteomyelitis, CAD, PAD s/p RLE angioplasty 2020, s/p surgical amputation of R forefoot , s/p L PT angioplasty 12/2/24 for L lat foot DFU on plavix, HTN, s/p recent admission for left foot infection s/p PICC placement for IV abx  sent by Podiatry Dr. Stark for admission for OR intervention tomorrow. Patient denies any complaints today. States after discharge he had outpatient visits with Vascular physician. He had imaging done and was suggested to have LimFlow procedure. Patient requested to have surgery as per Podiatry for his left foot wound prior to LimFlow. Vascular agreed. Patient states he can walk independently without pain and is currently comfortable.   Of note, patient was transitioned from Rocephin which was to be given via PICC line after previous discharge to Ertapenem by ID this Saturday.     ED course:   Vitals: T 97.7 HR 99 /69   Labs: ESR 64 wbc 10.77   Imaging: Cxray- Right PICC with tip in the SVC.  The heart is not accurately assessed in this AP projection.  The lungs are clear.  There is no pneumothorax or pleural effusion.  No acute bony abnormality.  Clear lungs    (27 May 2025 13:05)    INTERVAL HPI:  5/28: Patient seen and examined post op. complain,  of mild pain 3-4/10 IV Tylenol ordered otherwise denies complaints   5/29: Patient seen and examined at bedside. Denies complaints this AM however has bleeding from surgical site. RRT later called this AM (refer to rapid note) for weakness, diaphoresis likely vasovagal secondary to blood loss. Hb downtrended. Plan to hold Lovenox. Resume Asp and Plavix tomorrow. PT eval tomorrow , mild leukocytosis, drop in H/H  5/30: Pt seen, Examined, WBC mildly elevated ,On IV ABx  Low stable H/H   5/31: Patient seen and examined at bedside. Denies complaints this AM. Hb dropped to 8.4 this AM, On IV Cefepime -Pathology results available. WBC -improving  6/1: Pt seen ,Examined, On IV ABx S/P RRT this AM , Hypotension, Low H/H -Plan for pRBC transfusion  Mild Leukocytosis   6/2: Pt seen and examined at bedside. On IV abx. Pt with mild L heel pain, improved with tramadol. Hg 9.6 this AM s/p 2u PRBC yesterday. Leukocytosis improving.       OVERNIGHT EVENTS:  None    Home Medications:  atorvastatin 40 mg oral tablet: 1 tab(s) orally once a day (at bedtime) (27 May 2025 15:49)  Cinnamon: 1 cap(s) orally once a day (27 May 2025 15:51)  clopidogrel 75 mg oral tablet: 1 tab(s) orally once a day (27 May 2025 15:49)  gabapentin 300 mg oral capsule: 1 cap(s) orally 3 times a day (27 May 2025 15:49)  Jardiance 25 mg oral tablet: 1 tab(s) orally once a day (27 May 2025 15:49)  lisinopril 40 mg oral tablet: 1 tab(s) orally once a day (27 May 2025 15:49)  metFORMIN 1000 mg oral tablet: 1 tab(s) orally 2 times a day (27 May 2025 15:49)  metoprolol succinate 50 mg oral tablet, extended release: 1 tab(s) orally once a day (27 May 2025 15:49)  Trulicity Pen 1.5 mg/0.5 mL subcutaneous solution: 1.5 milligram(s) subcutaneously once a week (Sundays) (27 May 2025 15:52)  Tumeric : 1 cap orally once a day (27 May 2025 15:51)      MEDICATIONS  (STANDING):  aspirin enteric coated 81 milliGRAM(s) Oral daily  atorvastatin 40 milliGRAM(s) Oral at bedtime  cefepime   IVPB 2000 milliGRAM(s) IV Intermittent every 8 hours  cefepime   IVPB      clopidogrel Tablet 75 milliGRAM(s) Oral daily  dextrose 5%. 1000 milliLiter(s) (100 mL/Hr) IV Continuous <Continuous>  dextrose 5%. 1000 milliLiter(s) (50 mL/Hr) IV Continuous <Continuous>  dextrose 50% Injectable 25 Gram(s) IV Push once  dextrose 50% Injectable 12.5 Gram(s) IV Push once  dextrose 50% Injectable 25 Gram(s) IV Push once  enoxaparin Injectable 40 milliGRAM(s) SubCutaneous every 24 hours  gabapentin 300 milliGRAM(s) Oral three times a day  glucagon  Injectable 1 milliGRAM(s) IntraMuscular once  insulin glargine Injectable (LANTUS) 15 Unit(s) SubCutaneous every morning  insulin lispro (ADMELOG) corrective regimen sliding scale   SubCutaneous three times a day before meals  insulin lispro (ADMELOG) corrective regimen sliding scale   SubCutaneous at bedtime  insulin lispro Injectable (ADMELOG) 5 Unit(s) SubCutaneous three times a day before meals  lisinopril 40 milliGRAM(s) Oral daily  metoprolol succinate ER 50 milliGRAM(s) Oral daily  senna 2 Tablet(s) Oral at bedtime    MEDICATIONS  (PRN):  acetaminophen   IVPB .. 1000 milliGRAM(s) IV Intermittent every 8 hours PRN Mild Pain (1 - 3)  aluminum hydroxide/magnesium hydroxide/simethicone Suspension 30 milliLiter(s) Oral every 4 hours PRN Dyspepsia  dextrose Oral Gel 15 Gram(s) Oral once PRN Blood Glucose LESS THAN 70 milliGRAM(s)/deciliter  HYDROmorphone  Injectable 0.5 milliGRAM(s) IV Push four times a day PRN Severe Pain (7 - 10)  melatonin 3 milliGRAM(s) Oral at bedtime PRN Insomnia  ondansetron Injectable 4 milliGRAM(s) IV Push every 8 hours PRN Nausea and/or Vomiting  traMADol 50 milliGRAM(s) Oral every 6 hours PRN Moderate Pain (4 - 6)      Allergies    No Known Allergies    Intolerances        Social History:  No  Tobacco: Denies  EtOH: Denies  Recreational drug use: Denies  Lives with: Wife at home   Ambulates: Independently   ADLs: Independent (27 May 2025 13:05)    REVIEW OF SYSTEMS: i am OK  CONSTITUTIONAL: No fever, No chills, No fatigue, No myalgia, No Body ache, No Weakness  EYES: No eye pain,  No visual disturbances, No discharge, NO Redness  ENMT:  No ear pain, No nose bleed, No vertigo; No sinus pain, NO throat pain, No Congestion  NECK: No pain, No stiffness  RESPIRATORY: No cough, NO wheezing, No  hemoptysis, NO  shortness of breath  CARDIOVASCULAR: No chest pain, palpitations  GASTROINTESTINAL: No abdominal pain, NO epigastric pain. No nausea, No vomiting; No diarrhea, No constipation. [  ] BM  GENITOURINARY: No dysuria, No frequency, No urgency, No hematuria, NO incontinence  NEUROLOGICAL: No headaches, No dizziness, No numbness, No tingling, No tremors, No weakness  EXT: No Swelling, No Pain, No Edema  SKIN:  [x  ] No itching, burning, rashes, or lesions   MUSCULOSKELETAL: No joint pain ,No Jt swelling; No muscle pain, No back pain, No extremity pain  PSYCHIATRIC: No depression,  No anxiety,  No mood swings ,No difficulty sleeping at night  PAIN SCALE: [ x ] None  [  ] Other-  ROS Unable to obtain due to - [  ] Dementia  [  ] Lethargy [  ] Drowsy [  ] Sedated [  ] non verbal  REST OF REVIEW Of SYSTEM - [x ] Normal     Vital Signs Last 24 Hrs  T(C): 36.8 (02 Jun 2025 04:56), Max: 36.9 (01 Jun 2025 18:45)  T(F): 98.3 (02 Jun 2025 04:56), Max: 98.4 (01 Jun 2025 18:45)  HR: 77 (02 Jun 2025 04:56) (73 - 88)  BP: 121/78 (02 Jun 2025 04:56) (103/61 - 144/-)  BP(mean): 77 (01 Jun 2025 21:30) (77 - 77)  RR: 18 (02 Jun 2025 04:56) (17 - 18)  SpO2: 99% (02 Jun 2025 04:56) (97% - 100%)    Parameters below as of 02 Jun 2025 04:56  Patient On (Oxygen Delivery Method): room air      Finger Stick        06-01 @ 07:01  -  06-02 @ 07:00  --------------------------------------------------------  IN: 50 mL / OUT: 0 mL / NET: 50 mL        PHYSICAL EXAM: Rt U Ext PICC line   GENERAL:  [ x ] NAD , [ x ] well appearing, [  ] Agitated, [  ] Drowsy,  [  ] Lethargy, [  ] confused   HEAD:  [  x] Normal, [  ] Other  EYES:  [ x ] EOMI, [ x ] PERRLA, [x  ] conjunctiva and sclera clear normal, [  ] Other,  [ x ] Pallor,[  ] Discharge  ENMT:  [x  ] Normal, [ x ] Moist mucous membranes, [x  ] Good dentition, [ x ] No Thrush  NECK:  [  x] Supple, [x  ] No JVD, [ x ] Normal thyroid, [  ] Lymphadenopathy [  ] Other  CHEST/LUNG:  [  x] Clear to auscultation bilaterally, [ x ] Breath Sounds equal B/L  [x  ] poor effort  [x  ] No rales, [  x] No rhonchi  [ x ]  No wheezing,   HEART:  [ x ] Regular rate and rhythm, [  ] tachycardia, [  ] Bradycardia,  [  ] irregular  [ x ] No murmurs, No rubs, No gallops, [  ] PPM in place (Mfr:  )  ABDOMEN:  [ x ] Soft, [ x ] Nontender, [ x ] Nondistended, [ x ] No mass, [ x ] Bowel sounds present, [  ] obese+ Umbilical hernia +  NERVOUS SYSTEM:  [ x ] Alert & Oriented X3, [x  ] Nonfocal  [  ] Confusion  [  ] Encephalopathic [  ] Sedated [  ] Unable to assess, [  ] Dementia [  ] Other-  EXTREMITIES: NO C/C P Pulse + B/L  [x] left foot and ankle in dressing , Rt Foot TMA   LYMPH: No lymphadenopathy noted  SKIN:  [ x ] No rashes or lesions, [  ] Pressure Ulcers, [  ] ecchymosis, [  ] Skin Tears, [  ] Other      DIET: Diet, DASH/TLC:   Sodium & Cholesterol Restricted  Consistent Carbohydrate No Snacks (05-28-25 @ 12:07)      LABS:                        9.4    12.01 )-----------( 270      ( 02 Jun 2025 06:15 )             28.7     02 Jun 2025 06:15    137    |  106    |  24     ----------------------------<  172    4.6     |  28     |  1.00     Ca    8.4        02 Jun 2025 06:15    TPro  6.5    /  Alb  2.3    /  TBili  0.4    /  DBili  x      /  AST  9      /  ALT  20     /  AlkPhos  59     02 Jun 2025 06:15      Urinalysis Basic - ( 02 Jun 2025 06:15 )    Color: x / Appearance: x / SG: x / pH: x  Gluc: 172 mg/dL / Ketone: x  / Bili: x / Urobili: x   Blood: x / Protein: x / Nitrite: x   Leuk Esterase: x / RBC: x / WBC x   Sq Epi: x / Non Sq Epi: x / Bacteria: x        Culture Results:   Rare Stenotrophomonas maltophilia (05-28 @ 10:00)  Culture Results:   No growth to date. (05-28 @ 10:00)  Culture Results:   Rare Staphylococcus pseudintermedius  Rare Staphylococcus saprophyticus (05-28 @ 10:00)  Culture Results:   No growth at 5 days (05-27 @ 09:10)  Culture Results:   No growth at 5 days (05-27 @ 09:00)          CARDIAC MARKERS ( 29 May 2025 09:00 )  x     / x     / x     / x     / 2.4 ng/mL          Culture - Tissue with Gram Stain (collected 28 May 2025 10:00)  Source: Tissue Other, LEFT FIFTH METATARSAL BONE  Gram Stain (28 May 2025 19:10):    No polymorphonuclear cells seen    No organisms seen  Preliminary Report (29 May 2025 12:48):    No growth to date.    Culture - Tissue with Gram Stain (collected 28 May 2025 10:00)  Source: Tissue Other, LEFT CALCANEUS BONE  Gram Stain (31 May 2025 12:05):    No polymorphonuclear cells seen    No organisms seen  Preliminary Report (31 May 2025 12:05):    Rare Staphylococcus pseudintermedius    Rare Staphylococcus saprophyticus  Organism: Staphylococcus saprophyticus (01 Jun 2025 11:15)  Organism: Staphylococcus saprophyticus (01 Jun 2025 11:15)    Culture - Tissue with Gram Stain (collected 28 May 2025 10:00)  Source: Tissue Other, LEFT HALLUX BONE  Gram Stain (31 May 2025 11:59):    No polymorphonuclear cells seen    No organisms seen  Final Report (02 Jun 2025 07:29):    Rare Stenotrophomonas maltophilia  Organism: Stenotrophomonas maltophilia (02 Jun 2025 07:29)  Organism: Stenotrophomonas maltophilia (02 Jun 2025 07:29)  Organism: Stenotrophomonas maltophilia (02 Jun 2025 07:29)    Culture - Blood (collected 27 May 2025 09:10)  Source: Blood Blood-Peripheral  Final Report (01 Jun 2025 12:00):    No growth at 5 days    Culture - Blood (collected 27 May 2025 09:00)  Source: Blood Blood-Peripheral  Final Report (01 Jun 2025 12:00):    No growth at 5 days         Anemia Panel:      Thyroid Panel:        HEALTH ISSUES - PROBLEM Dx:  DM foot ulcer    PAD (peripheral artery disease)    Type 2 diabetes mellitus    HTN (hypertension)    Encounter for deep vein thrombosis (DVT) prophylaxis    Anemia            Consultant(s) Notes Reviewed:  [x  ] YES     Care Discussed with [X] Consultants  [ x ] Patient  [  ] Family [  ] HCP [  ]   [  ] Social Service  [x ] RN, [  ] Physical Therapy,[  ] Palliative care team  DVT PPX: [ x ] Lovenox, [  ] S C Heparin, [  ] Coumadin, [  ] Xarelto, [  ] Eliquis, [  ] Pradaxa, [  ] IV Heparin drip, [  ] SCD [  ] Contraindication 2 to GI Bleed,[  ] Ambulation [  ] Contraindicated 2 to  bleed [  ] Contraindicated 2 to Brain Bleed  Advanced directive: [x  ] None, [  ] DNR/DNI   Patient is a 64y old  Male who presents with a chief complaint of left foot osteomyelitis (01 Jun 2025 15:31)    HPI:  63 y/o M with PMHx DM2, osteomyelitis, CAD, PAD s/p RLE angioplasty 2020, s/p surgical amputation of R forefoot , s/p L PT angioplasty 12/2/24 for L lat foot DFU on plavix, HTN, s/p recent admission for left foot infection s/p PICC placement for IV abx  sent by Podiatry Dr. Stark for admission for OR intervention tomorrow. Patient denies any complaints today. States after discharge he had outpatient visits with Vascular physician. He had imaging done and was suggested to have LimFlow procedure. Patient requested to have surgery as per Podiatry for his left foot wound prior to LimFlow. Vascular agreed. Patient states he can walk independently without pain and is currently comfortable.   Of note, patient was transitioned from Rocephin which was to be given via PICC line after previous discharge to Ertapenem by ID this Saturday.     ED course:   Vitals: T 97.7 HR 99 /69   Labs: ESR 64 wbc 10.77   Imaging: Cxray- Right PICC with tip in the SVC.  The heart is not accurately assessed in this AP projection.  The lungs are clear.  There is no pneumothorax or pleural effusion.  No acute bony abnormality.  Clear lungs    (27 May 2025 13:05)    INTERVAL HPI:  5/28: Patient seen and examined post op. complain,  of mild pain 3-4/10 IV Tylenol ordered otherwise denies complaints   5/29:  POD # 1Patient seen and examined at bedside. Denies complaints this AM however has bleeding from surgical site. RRT later called this AM (refer to rapid note) for weakness, diaphoresis likely vasovagal secondary to blood loss. Hb downtrended. Plan to hold Lovenox. Resume Asp and Plavix tomorrow. PT eval tomorrow , mild leukocytosis, drop in H/H  5/30: POD # 2 Pt seen, Examined, WBC mildly elevated ,On IV ABx  Low stable H/H   5/31:  POD # 3 Patient seen and examined at bedside. Denies complaints this AM. Hb dropped to 8.4 this AM, On IV Cefepime -Pathology results available. WBC -improving  6/1: POD # 4  Pt seen ,Examined, On IV ABx S/P RRT this AM , Hypotension, Low H/H -Plan for pRBC transfusion  Mild Leukocytosis   6/2:  POD # 5 Pt seen and examined at bedside. On IV abx. Pt with mild L heel pain, improved with tramadol. Hg 9.6 this AM s/p 2u PRBC yesterday. Leukocytosis improving. ID  added  minocycline 200mg BID to cover for stenotrophomonas, H/H stable       OVERNIGHT EVENTS:  None    Home Medications:  atorvastatin 40 mg oral tablet: 1 tab(s) orally once a day (at bedtime) (27 May 2025 15:49)  Cinnamon: 1 cap(s) orally once a day (27 May 2025 15:51)  clopidogrel 75 mg oral tablet: 1 tab(s) orally once a day (27 May 2025 15:49)  gabapentin 300 mg oral capsule: 1 cap(s) orally 3 times a day (27 May 2025 15:49)  Jardiance 25 mg oral tablet: 1 tab(s) orally once a day (27 May 2025 15:49)  lisinopril 40 mg oral tablet: 1 tab(s) orally once a day (27 May 2025 15:49)  metFORMIN 1000 mg oral tablet: 1 tab(s) orally 2 times a day (27 May 2025 15:49)  metoprolol succinate 50 mg oral tablet, extended release: 1 tab(s) orally once a day (27 May 2025 15:49)  Trulicity Pen 1.5 mg/0.5 mL subcutaneous solution: 1.5 milligram(s) subcutaneously once a week (Sundays) (27 May 2025 15:52)  Tumeric : 1 cap orally once a day (27 May 2025 15:51)      MEDICATIONS  (STANDING):  aspirin enteric coated 81 milliGRAM(s) Oral daily  atorvastatin 40 milliGRAM(s) Oral at bedtime  cefepime   IVPB 2000 milliGRAM(s) IV Intermittent every 8 hours  cefepime   IVPB      clopidogrel Tablet 75 milliGRAM(s) Oral daily  dextrose 5%. 1000 milliLiter(s) (100 mL/Hr) IV Continuous <Continuous>  dextrose 5%. 1000 milliLiter(s) (50 mL/Hr) IV Continuous <Continuous>  dextrose 50% Injectable 25 Gram(s) IV Push once  dextrose 50% Injectable 12.5 Gram(s) IV Push once  dextrose 50% Injectable 25 Gram(s) IV Push once  enoxaparin Injectable 40 milliGRAM(s) SubCutaneous every 24 hours  gabapentin 300 milliGRAM(s) Oral three times a day  glucagon  Injectable 1 milliGRAM(s) IntraMuscular once  insulin glargine Injectable (LANTUS) 15 Unit(s) SubCutaneous every morning  insulin lispro (ADMELOG) corrective regimen sliding scale   SubCutaneous three times a day before meals  insulin lispro (ADMELOG) corrective regimen sliding scale   SubCutaneous at bedtime  insulin lispro Injectable (ADMELOG) 5 Unit(s) SubCutaneous three times a day before meals  lisinopril 40 milliGRAM(s) Oral daily  metoprolol succinate ER 50 milliGRAM(s) Oral daily  senna 2 Tablet(s) Oral at bedtime    MEDICATIONS  (PRN):  acetaminophen   IVPB .. 1000 milliGRAM(s) IV Intermittent every 8 hours PRN Mild Pain (1 - 3)  aluminum hydroxide/magnesium hydroxide/simethicone Suspension 30 milliLiter(s) Oral every 4 hours PRN Dyspepsia  dextrose Oral Gel 15 Gram(s) Oral once PRN Blood Glucose LESS THAN 70 milliGRAM(s)/deciliter  HYDROmorphone  Injectable 0.5 milliGRAM(s) IV Push four times a day PRN Severe Pain (7 - 10)  melatonin 3 milliGRAM(s) Oral at bedtime PRN Insomnia  ondansetron Injectable 4 milliGRAM(s) IV Push every 8 hours PRN Nausea and/or Vomiting  traMADol 50 milliGRAM(s) Oral every 6 hours PRN Moderate Pain (4 - 6)      Allergies    No Known Allergies    Intolerances        Social History:  No  Tobacco: Denies  EtOH: Denies  Recreational drug use: Denies  Lives with: Wife at home   Ambulates: Independently   ADLs: Independent (27 May 2025 13:05)    REVIEW OF SYSTEMS: i am OK  CONSTITUTIONAL: No fever, No chills, No fatigue, No myalgia, No Body ache, No Weakness  EYES: No eye pain,  No visual disturbances, No discharge, NO Redness  ENMT:  No ear pain, No nose bleed, No vertigo; No sinus pain, NO throat pain, No Congestion  NECK: No pain, No stiffness  RESPIRATORY: No cough, NO wheezing, No  hemoptysis, NO  shortness of breath  CARDIOVASCULAR: No chest pain, palpitations  GASTROINTESTINAL: No abdominal pain, NO epigastric pain. No nausea, No vomiting; No diarrhea, No constipation. [  ] BM  GENITOURINARY: No dysuria, No frequency, No urgency, No hematuria, NO incontinence  NEUROLOGICAL: No headaches, No dizziness, No numbness, No tingling, No tremors, No weakness  EXT: No Swelling, No Pain, No Edema  SKIN:  [x  ] No itching, burning, rashes, or lesions   MUSCULOSKELETAL: No joint pain ,No Jt swelling; No muscle pain, No back pain, No extremity pain  PSYCHIATRIC: No depression,  No anxiety,  No mood swings ,No difficulty sleeping at night  PAIN SCALE: [ x ] None  [  ] Other-  ROS Unable to obtain due to - [  ] Dementia  [  ] Lethargy [  ] Drowsy [  ] Sedated [  ] non verbal  REST OF REVIEW Of SYSTEM - [x ] Normal     Vital Signs Last 24 Hrs  T(C): 36.8 (02 Jun 2025 04:56), Max: 36.9 (01 Jun 2025 18:45)  T(F): 98.3 (02 Jun 2025 04:56), Max: 98.4 (01 Jun 2025 18:45)  HR: 77 (02 Jun 2025 04:56) (73 - 88)  BP: 121/78 (02 Jun 2025 04:56) (103/61 - 144/-)  BP(mean): 77 (01 Jun 2025 21:30) (77 - 77)  RR: 18 (02 Jun 2025 04:56) (17 - 18)  SpO2: 99% (02 Jun 2025 04:56) (97% - 100%)    Parameters below as of 02 Jun 2025 04:56  Patient On (Oxygen Delivery Method): room air      Finger Stick        06-01 @ 07:01  -  06-02 @ 07:00  --------------------------------------------------------  IN: 50 mL / OUT: 0 mL / NET: 50 mL        PHYSICAL EXAM: Rt U Ext PICC line   GENERAL:  [ x ] NAD , [ x ] well appearing, [  ] Agitated, [  ] Drowsy,  [  ] Lethargy, [  ] confused   HEAD:  [  x] Normal, [  ] Other  EYES:  [ x ] EOMI, [ x ] PERRLA, [x  ] conjunctiva and sclera clear normal, [  ] Other,  [ x ] Pallor,[  ] Discharge  ENMT:  [x  ] Normal, [ x ] Moist mucous membranes, [x  ] Good dentition, [ x ] No Thrush  NECK:  [  x] Supple, [x  ] No JVD, [ x ] Normal thyroid, [  ] Lymphadenopathy [  ] Other  CHEST/LUNG:  [  x] Clear to auscultation bilaterally, [ x ] Breath Sounds equal B/L  [x  ] poor effort  [x  ] No rales, [  x] No rhonchi  [ x ]  No wheezing,   HEART:  [ x ] Regular rate and rhythm, [  ] tachycardia, [  ] Bradycardia,  [  ] irregular  [ x ] No murmurs, No rubs, No gallops, [  ] PPM in place (Mfr:  )  ABDOMEN:  [ x ] Soft, [ x ] Nontender, [ x ] Nondistended, [ x ] No mass, [ x ] Bowel sounds present, [  ] obese+ Umbilical hernia +  NERVOUS SYSTEM:  [ x ] Alert & Oriented X3, [x  ] Nonfocal  [  ] Confusion  [  ] Encephalopathic [  ] Sedated [  ] Unable to assess, [  ] Dementia [  ] Other-  EXTREMITIES: NO C/C P Pulse + B/L  [x] left foot and ankle in dressing , Rt Foot TMA   LYMPH: No lymphadenopathy noted  SKIN:  [ x ] No rashes or lesions, [  ] Pressure Ulcers, [  ] ecchymosis, [  ] Skin Tears, [  ] Other      DIET: Diet, DASH/TLC:   Sodium & Cholesterol Restricted  Consistent Carbohydrate No Snacks (05-28-25 @ 12:07)      LABS:                        9.4    12.01 )-----------( 270      ( 02 Jun 2025 06:15 )             28.7     02 Jun 2025 06:15    137    |  106    |  24     ----------------------------<  172    4.6     |  28     |  1.00     Ca    8.4        02 Jun 2025 06:15    TPro  6.5    /  Alb  2.3    /  TBili  0.4    /  DBili  x      /  AST  9      /  ALT  20     /  AlkPhos  59     02 Jun 2025 06:15      Urinalysis Basic - ( 02 Jun 2025 06:15 )    Color: x / Appearance: x / SG: x / pH: x  Gluc: 172 mg/dL / Ketone: x  / Bili: x / Urobili: x   Blood: x / Protein: x / Nitrite: x   Leuk Esterase: x / RBC: x / WBC x   Sq Epi: x / Non Sq Epi: x / Bacteria: x        Culture Results:   Rare Stenotrophomonas maltophilia (05-28 @ 10:00)  Culture Results:   No growth to date. (05-28 @ 10:00)  Culture Results:   Rare Staphylococcus pseudintermedius  Rare Staphylococcus saprophyticus (05-28 @ 10:00)  Culture Results:   No growth at 5 days (05-27 @ 09:10)  Culture Results:   No growth at 5 days (05-27 @ 09:00)          CARDIAC MARKERS ( 29 May 2025 09:00 )  x     / x     / x     / x     / 2.4 ng/mL          Culture - Tissue with Gram Stain (collected 28 May 2025 10:00)  Source: Tissue Other, LEFT FIFTH METATARSAL BONE  Gram Stain (28 May 2025 19:10):    No polymorphonuclear cells seen    No organisms seen  Preliminary Report (29 May 2025 12:48):    No growth to date.    Culture - Tissue with Gram Stain (collected 28 May 2025 10:00)  Source: Tissue Other, LEFT CALCANEUS BONE  Gram Stain (31 May 2025 12:05):    No polymorphonuclear cells seen    No organisms seen  Preliminary Report (31 May 2025 12:05):    Rare Staphylococcus pseudintermedius    Rare Staphylococcus saprophyticus  Organism: Staphylococcus saprophyticus (01 Jun 2025 11:15)  Organism: Staphylococcus saprophyticus (01 Jun 2025 11:15)    Culture - Tissue with Gram Stain (collected 28 May 2025 10:00)  Source: Tissue Other, LEFT HALLUX BONE  Gram Stain (31 May 2025 11:59):    No polymorphonuclear cells seen    No organisms seen  Final Report (02 Jun 2025 07:29):    Rare Stenotrophomonas maltophilia  Organism: Stenotrophomonas maltophilia (02 Jun 2025 07:29)  Organism: Stenotrophomonas maltophilia (02 Jun 2025 07:29)  Organism: Stenotrophomonas maltophilia (02 Jun 2025 07:29)    Culture - Blood (collected 27 May 2025 09:10)  Source: Blood Blood-Peripheral  Final Report (01 Jun 2025 12:00):    No growth at 5 days    Culture - Blood (collected 27 May 2025 09:00)  Source: Blood Blood-Peripheral  Final Report (01 Jun 2025 12:00):    No growth at 5 days         Anemia Panel:      Thyroid Panel:        HEALTH ISSUES - PROBLEM Dx:  DM foot ulcer    PAD (peripheral artery disease)    Type 2 diabetes mellitus    HTN (hypertension)    Encounter for deep vein thrombosis (DVT) prophylaxis    Anemia            Consultant(s) Notes Reviewed:  [x  ] YES     Care Discussed with [X] Consultants  [ x ] Patient  [  ] Family [  ] HCP [ x ]   [  ] Social Service  [x ] RN, [  ] Physical Therapy,[  ] Palliative care team  DVT PPX: [ x ] Lovenox, [  ] S C Heparin, [  ] Coumadin, [  ] Xarelto, [  ] Eliquis, [  ] Pradaxa, [  ] IV Heparin drip, [  ] SCD [  ] Contraindication 2 to GI Bleed,[  ] Ambulation [  ] Contraindicated 2 to  bleed [  ] Contraindicated 2 to Brain Bleed  Advanced directive: [x  ] None, [  ] DNR/DNI

## 2025-06-02 NOTE — PROGRESS NOTE ADULT - PROBLEM SELECTOR PLAN 1
Patient presents with left DM foot ulcer/ wound Gangrene Left BIG Toe,  left heel wound   - podiatry Dr. Lincoln lawson [facility]  - POD4 with podiatry: partial 1st and 5th ray resection, heel debridement with antibiotic cement placement and reattachment of peroneus brevis tendon into the cuboid   - Surgical path results show acute and chronic osteomyelitis in first and fifth metatarsals on clean margin from OR  - ID Dr. Melany Calhoun IV Invanz ---> IV Cefepime q 8 hrs Via PICC line  - Continue Aspirin, Lovenox and Plavix   -Mild Leukocytosis -likely reactive   Pain meds  IV Tylenol, PRN, tramadol PRN for moderate pain, IV Dilaudid PRN for severe pain  - PT eval today - recommends home PT  - SW consulted for pt with concerns of stairs at home, transportation Patient presents with left DM foot ulcer/ wound Gangrene Left BIG Toe,  left heel wound   - podiatry Dr. Lincoln lawson [facility]  - POD4 with podiatry: partial 1st and 5th ray resection, heel debridement with antibiotic cement placement and reattachment of peroneus brevis tendon into the cuboid   - Surgical path results show acute and chronic osteomyelitis in first and fifth metatarsals on clean margin from OR  - ID Dr. Melany Calhoun IV Invanz ---> IV Cefepime q 8 hrs Via PICC line,  add minocycline 200mg BID to cover for stenotrophomonas d/w ID  - Continue Aspirin, Lovenox and Plavix   -Mild Leukocytosis -likely reactive   Pain meds  IV Tylenol, PRN, tramadol PRN for moderate pain, IV Dilaudid PRN for severe pain  - PT eval today - recommends home PT  - SW consulted for pt with concerns of stairs at home, transportation

## 2025-06-02 NOTE — PROGRESS NOTE ADULT - SUBJECTIVE AND OBJECTIVE BOX
[facility]  OFELIA Harvey Y. Patel, S. Shah, G. [facility]  [phone]    Name: LOIDA QUEZADA  Age: 64y  Gender: Male  MRN: [id number]    Interval History:  No acute overnight events.   Notes reviewed    Antibiotics:  cefepime   IVPB 2000 milliGRAM(s) IV Intermittent every 8 hours  cefepime   IVPB          Medications:  acetaminophen   IVPB .. 1000 milliGRAM(s) IV Intermittent every 8 hours PRN  aluminum hydroxide/magnesium hydroxide/simethicone Suspension 30 milliLiter(s) Oral every 4 hours PRN  aspirin enteric coated 81 milliGRAM(s) Oral daily  atorvastatin 40 milliGRAM(s) Oral at bedtime  cefepime   IVPB 2000 milliGRAM(s) IV Intermittent every 8 hours  cefepime   IVPB      clopidogrel Tablet 75 milliGRAM(s) Oral daily  dextrose 5%. 1000 milliLiter(s) IV Continuous <Continuous>  dextrose 5%. 1000 milliLiter(s) IV Continuous <Continuous>  dextrose 50% Injectable 25 Gram(s) IV Push once  dextrose 50% Injectable 12.5 Gram(s) IV Push once  dextrose 50% Injectable 25 Gram(s) IV Push once  dextrose Oral Gel 15 Gram(s) Oral once PRN  enoxaparin Injectable 40 milliGRAM(s) SubCutaneous every 24 hours  gabapentin 300 milliGRAM(s) Oral three times a day  glucagon  Injectable 1 milliGRAM(s) IntraMuscular once  HYDROmorphone  Injectable 0.5 milliGRAM(s) IV Push four times a day PRN  insulin glargine Injectable (LANTUS) 15 Unit(s) SubCutaneous every morning  insulin lispro (ADMELOG) corrective regimen sliding scale   SubCutaneous three times a day before meals  insulin lispro (ADMELOG) corrective regimen sliding scale   SubCutaneous at bedtime  insulin lispro Injectable (ADMELOG) 5 Unit(s) SubCutaneous three times a day before meals  lisinopril 40 milliGRAM(s) Oral daily  melatonin 3 milliGRAM(s) Oral at bedtime PRN  metoprolol succinate ER 50 milliGRAM(s) Oral daily  ondansetron Injectable 4 milliGRAM(s) IV Push every 8 hours PRN  senna 2 Tablet(s) Oral at bedtime  traMADol 50 milliGRAM(s) Oral every 6 hours PRN      Review of Systems:  A 10-point review of systems was obtained.   Review of systems otherwise negative except as previously noted.    Allergies: No Known Allergies    For details regarding the patient's past medical history, social history, family history, and other miscellaneous elements, please refer the initial infectious diseases consultation and/or the admitting history and physical examination for this admission.    Objective:  Vitals:   T(C): 36.7 (06-02-25 @ 12:28), Max: 36.9 (06-01-25 @ 18:45)  HR: 84 (06-02-25 @ 12:28) (73 - 88)  BP: 131/81 (06-02-25 @ 12:28) (103/61 - 150/73)  RR: 18 (06-02-25 @ 12:28) (17 - 18)  SpO2: 99% (06-02-25 @ 12:28) (97% - 99%)    Physical Examination:  General: no acute distress  HEENT: NC/AT, EOMI  Cardio: RRR  Resp: on NC  Abd: soft, NT, ND  Ext: no edema or cyanosis  Skin: warm, dry, no visible rash      Laboratory Studies:  CBC:                       9.4    12.01 )-----------( 270      ( 02 Jun 2025 06:15 )             28.7     CMP: 06-02    137  |  106  |  24[H]  ----------------------------<  172[H]  4.6   |  28  |  1.00    Ca    8.4[L]      02 Jun 2025 06:15    TPro  6.5  /  Alb  2.3[L]  /  TBili  0.4  /  DBili  x   /  AST  9[L]  /  ALT  20  /  AlkPhos  59  06-02    LIVER FUNCTIONS - ( 02 Jun 2025 06:15 )  Alb: 2.3 g/dL / Pro: 6.5 g/dL / ALK PHOS: 59 U/L / ALT: 20 U/L / AST: 9 U/L / GGT: x           Urinalysis Basic - ( 02 Jun 2025 06:15 )    Color: x / Appearance: x / SG: x / pH: x  Gluc: 172 mg/dL / Ketone: x  / Bili: x / Urobili: x   Blood: x / Protein: x / Nitrite: x   Leuk Esterase: x / RBC: x / WBC x   Sq Epi: x / Non Sq Epi: x / Bacteria: x        Microbiology: reviewed    Culture - Tissue with Gram Stain (collected 05-28-25 @ 10:00)  Source: Tissue Other, LEFT FIFTH METATARSAL BONE  Gram Stain (05-28-25 @ 19:10):    No polymorphonuclear cells seen    No organisms seen  Preliminary Report (05-29-25 @ 12:48):    No growth to date.    Culture - Tissue with Gram Stain (collected 05-28-25 @ 10:00)  Source: Tissue Other, LEFT CALCANEUS BONE  Gram Stain (05-31-25 @ 12:05):    No polymorphonuclear cells seen    No organisms seen  Final Report (06-02-25 @ 08:54):    Rare Staphylococcus pseudintermedius    Rare Staphylococcus saprophyticus  Organism: Staphylococcus pseudintermedius  Staphylococcus saprophyticus (06-02-25 @ 08:54)  Organism: Staphylococcus pseudintermedius (06-02-25 @ 08:54)      -  Clindamycin: R >4      -  Oxacillin: S <=0.25      -  Gentamicin: R >8 Should not be used as monotherapy      -  Vancomycin: S 1      -  Tetracycline: R >8      Method Type: GOLDEN      -  Penicillin: R >2      -  Rifampin: S <=1 Should not be used as monotherapy      -  Erythromycin: R >4      -  Trimethoprim/Sulfamethoxazole: R >2/38  Organism: Staphylococcus saprophyticus (06-02-25 @ 08:54)      -  Clindamycin: R >4      -  Oxacillin: R >2      -  Gentamicin: S <=4 Should not be used as monotherapy      -  Vancomycin: S 0.5      -  Tetracycline: S <=4      Method Type: GOLDEN      -  Penicillin: R >2      -  Rifampin: S <=1 Should not be used as monotherapy      -  Erythromycin: R >4      -  Trimethoprim/Sulfamethoxazole: S 2/38    Culture - Tissue with Gram Stain (collected 05-28-25 @ 10:00)  Source: Tissue Other, LEFT HALLUX BONE  Gram Stain (05-31-25 @ 11:59):    No polymorphonuclear cells seen    No organisms seen  Final Report (06-02-25 @ 07:29):    Rare Stenotrophomonas maltophilia  Organism: Stenotrophomonas maltophilia (06-02-25 @ 07:29)      -  Levofloxacin: S <=0.5      Method Type: GOLDEN      -  Trimethoprim/Sulfamethoxazole: S <=0.5/9.5  Organism: Stenotrophomonas maltophilia (06-02-25 @ 07:29)  Organism: Stenotrophomonas maltophilia (06-02-25 @ 07:29)      -  Minocycline: S      Method Type: KB    Culture - Blood (collected 05-27-25 @ 09:10)  Source: Blood Blood-Peripheral  Final Report (06-01-25 @ 12:00):    No growth at 5 days    Culture - Blood (collected 05-27-25 @ 09:00)  Source: Blood Blood-Peripheral  Final Report (06-01-25 @ 12:00):    No growth at 5 days          Radiology: reviewed    Pathology: reviewed     1. Left hallux bone:   - Acute and chronic osteomyelitis of bone.   - Ulceration and acute inflammation of skin with viable skin   margin.   2. Left first metatarsal bone:   - Acute and chronic osteomyelitis of bone.   3. Left fifth metatarsal bone:   - Acute and chronic osteomyelitis of bone.   4. Left calcaneus bone:   - Acute and chronic osteomyelitis of bone.   - Fat necrosis of bone marrow.   5. Left first metatarsal proximal margin:   - Focal mild chronic osteomyelitis of bone.   6. Left fifth metatarsal distal margin:   - Focal mild chronic osteomyelitis of bone.

## 2025-06-03 ENCOUNTER — APPOINTMENT (OUTPATIENT)
Dept: HYPERBARIC MEDICINE | Facility: HOSPITAL | Age: 65
End: 2025-06-03

## 2025-06-03 LAB
ALBUMIN SERPL ELPH-MCNC: 2.4 G/DL — LOW (ref 3.3–5)
ALP SERPL-CCNC: 65 U/L — SIGNIFICANT CHANGE UP (ref 40–120)
ALT FLD-CCNC: 27 U/L — SIGNIFICANT CHANGE UP (ref 12–78)
ANION GAP SERPL CALC-SCNC: 6 MMOL/L — SIGNIFICANT CHANGE UP (ref 5–17)
AST SERPL-CCNC: 13 U/L — LOW (ref 15–37)
BASOPHILS # BLD AUTO: 0.07 K/UL — SIGNIFICANT CHANGE UP (ref 0–0.2)
BASOPHILS NFR BLD AUTO: 0.6 % — SIGNIFICANT CHANGE UP (ref 0–2)
BILIRUB SERPL-MCNC: 0.4 MG/DL — SIGNIFICANT CHANGE UP (ref 0.2–1.2)
BUN SERPL-MCNC: 16 MG/DL — SIGNIFICANT CHANGE UP (ref 7–23)
CALCIUM SERPL-MCNC: 8.4 MG/DL — LOW (ref 8.5–10.1)
CHLORIDE SERPL-SCNC: 103 MMOL/L — SIGNIFICANT CHANGE UP (ref 96–108)
CO2 SERPL-SCNC: 28 MMOL/L — SIGNIFICANT CHANGE UP (ref 22–31)
CREAT SERPL-MCNC: 0.89 MG/DL — SIGNIFICANT CHANGE UP (ref 0.5–1.3)
EGFR: 95 ML/MIN/1.73M2 — SIGNIFICANT CHANGE UP
EGFR: 95 ML/MIN/1.73M2 — SIGNIFICANT CHANGE UP
EOSINOPHIL # BLD AUTO: 0.41 K/UL — SIGNIFICANT CHANGE UP (ref 0–0.5)
EOSINOPHIL NFR BLD AUTO: 3.4 % — SIGNIFICANT CHANGE UP (ref 0–6)
GLUCOSE BLDC GLUCOMTR-MCNC: 122 MG/DL — HIGH (ref 70–99)
GLUCOSE BLDC GLUCOMTR-MCNC: 157 MG/DL — HIGH (ref 70–99)
GLUCOSE BLDC GLUCOMTR-MCNC: 170 MG/DL — HIGH (ref 70–99)
GLUCOSE BLDC GLUCOMTR-MCNC: 201 MG/DL — HIGH (ref 70–99)
GLUCOSE SERPL-MCNC: 151 MG/DL — HIGH (ref 70–99)
HCT VFR BLD CALC: 29 % — LOW (ref 39–50)
HGB BLD-MCNC: 9.5 G/DL — LOW (ref 13–17)
IMM GRANULOCYTES NFR BLD AUTO: 0.9 % — SIGNIFICANT CHANGE UP (ref 0–0.9)
LYMPHOCYTES # BLD AUTO: 1.85 K/UL — SIGNIFICANT CHANGE UP (ref 1–3.3)
LYMPHOCYTES # BLD AUTO: 15.3 % — SIGNIFICANT CHANGE UP (ref 13–44)
MCHC RBC-ENTMCNC: 29.1 PG — SIGNIFICANT CHANGE UP (ref 27–34)
MCHC RBC-ENTMCNC: 32.8 G/DL — SIGNIFICANT CHANGE UP (ref 32–36)
MCV RBC AUTO: 88.7 FL — SIGNIFICANT CHANGE UP (ref 80–100)
MONOCYTES # BLD AUTO: 0.95 K/UL — HIGH (ref 0–0.9)
MONOCYTES NFR BLD AUTO: 7.9 % — SIGNIFICANT CHANGE UP (ref 2–14)
NEUTROPHILS # BLD AUTO: 8.68 K/UL — HIGH (ref 1.8–7.4)
NEUTROPHILS NFR BLD AUTO: 71.9 % — SIGNIFICANT CHANGE UP (ref 43–77)
NRBC BLD AUTO-RTO: 0 /100 WBCS — SIGNIFICANT CHANGE UP (ref 0–0)
PLATELET # BLD AUTO: 314 K/UL — SIGNIFICANT CHANGE UP (ref 150–400)
POTASSIUM SERPL-MCNC: 4.2 MMOL/L — SIGNIFICANT CHANGE UP (ref 3.5–5.3)
POTASSIUM SERPL-SCNC: 4.2 MMOL/L — SIGNIFICANT CHANGE UP (ref 3.5–5.3)
PROT SERPL-MCNC: 6.8 G/DL — SIGNIFICANT CHANGE UP (ref 6–8.3)
RBC # BLD: 3.27 M/UL — LOW (ref 4.2–5.8)
RBC # FLD: 13.7 % — SIGNIFICANT CHANGE UP (ref 10.3–14.5)
SODIUM SERPL-SCNC: 137 MMOL/L — SIGNIFICANT CHANGE UP (ref 135–145)
WBC # BLD: 12.07 K/UL — HIGH (ref 3.8–10.5)
WBC # FLD AUTO: 12.07 K/UL — HIGH (ref 3.8–10.5)

## 2025-06-03 PROCEDURE — 99232 SBSQ HOSP IP/OBS MODERATE 35: CPT

## 2025-06-03 PROCEDURE — 99024 POSTOP FOLLOW-UP VISIT: CPT

## 2025-06-03 RX ORDER — ERTAPENEM SODIUM 1 G/1
1000 INJECTION, POWDER, LYOPHILIZED, FOR SOLUTION INTRAMUSCULAR; INTRAVENOUS EVERY 24 HOURS
Refills: 0 | Status: DISCONTINUED | OUTPATIENT
Start: 2025-06-03 | End: 2025-06-04

## 2025-06-03 RX ADMIN — Medication 200 MILLIGRAM(S): at 05:30

## 2025-06-03 RX ADMIN — GABAPENTIN 300 MILLIGRAM(S): 400 CAPSULE ORAL at 13:40

## 2025-06-03 RX ADMIN — ATORVASTATIN CALCIUM 40 MILLIGRAM(S): 80 TABLET, FILM COATED ORAL at 21:45

## 2025-06-03 RX ADMIN — CEFEPIME 100 MILLIGRAM(S): 2 INJECTION, POWDER, FOR SOLUTION INTRAVENOUS at 05:26

## 2025-06-03 RX ADMIN — INSULIN LISPRO 5 UNIT(S): 100 INJECTION, SOLUTION INTRAVENOUS; SUBCUTANEOUS at 08:43

## 2025-06-03 RX ADMIN — INSULIN LISPRO 4: 100 INJECTION, SOLUTION INTRAVENOUS; SUBCUTANEOUS at 17:29

## 2025-06-03 RX ADMIN — TRAMADOL HYDROCHLORIDE 50 MILLIGRAM(S): 50 TABLET, FILM COATED ORAL at 02:19

## 2025-06-03 RX ADMIN — TRAMADOL HYDROCHLORIDE 50 MILLIGRAM(S): 50 TABLET, FILM COATED ORAL at 01:19

## 2025-06-03 RX ADMIN — GABAPENTIN 300 MILLIGRAM(S): 400 CAPSULE ORAL at 05:24

## 2025-06-03 RX ADMIN — METOPROLOL SUCCINATE 50 MILLIGRAM(S): 50 TABLET, EXTENDED RELEASE ORAL at 05:24

## 2025-06-03 RX ADMIN — Medication 81 MILLIGRAM(S): at 13:40

## 2025-06-03 RX ADMIN — INSULIN LISPRO 2: 100 INJECTION, SOLUTION INTRAVENOUS; SUBCUTANEOUS at 08:42

## 2025-06-03 RX ADMIN — INSULIN LISPRO 5 UNIT(S): 100 INJECTION, SOLUTION INTRAVENOUS; SUBCUTANEOUS at 17:29

## 2025-06-03 RX ADMIN — CLOPIDOGREL BISULFATE 75 MILLIGRAM(S): 75 TABLET, FILM COATED ORAL at 13:41

## 2025-06-03 RX ADMIN — INSULIN GLARGINE-YFGN 15 UNIT(S): 100 INJECTION, SOLUTION SUBCUTANEOUS at 08:40

## 2025-06-03 RX ADMIN — LISINOPRIL 40 MILLIGRAM(S): 5 TABLET ORAL at 05:24

## 2025-06-03 RX ADMIN — ERTAPENEM SODIUM 100 MILLIGRAM(S): 1 INJECTION, POWDER, LYOPHILIZED, FOR SOLUTION INTRAMUSCULAR; INTRAVENOUS at 13:41

## 2025-06-03 RX ADMIN — GABAPENTIN 300 MILLIGRAM(S): 400 CAPSULE ORAL at 21:45

## 2025-06-03 RX ADMIN — ENOXAPARIN SODIUM 40 MILLIGRAM(S): 100 INJECTION SUBCUTANEOUS at 17:28

## 2025-06-03 RX ADMIN — TRAMADOL HYDROCHLORIDE 50 MILLIGRAM(S): 50 TABLET, FILM COATED ORAL at 18:35

## 2025-06-03 RX ADMIN — TRAMADOL HYDROCHLORIDE 50 MILLIGRAM(S): 50 TABLET, FILM COATED ORAL at 19:30

## 2025-06-03 RX ADMIN — Medication 200 MILLIGRAM(S): at 17:28

## 2025-06-03 NOTE — PROGRESS NOTE ADULT - PROBLEM SELECTOR PLAN 2
H/H slowly dropping, s/p RRT for symptomatic Anemia with Hypotension   - RRT 5/29 for vasovagal syncope 2/2 to acute blood loss  - s/p 2u PRBCs with improvement in Hg  - H/G stable  - Type and screen active  - Continue Aspirin, Lovenox and Plavix as per Podiatry

## 2025-06-03 NOTE — PROGRESS NOTE ADULT - ASSESSMENT
64M DM2, hx osteomyelitis, CAD, PAD s/p RLE angioplasty 2020, s/p surgical amputation of R forefoot , s/p L PT angioplasty 12/2/24 for L lat foot DFU on plavix, HTN, HLD sent in by wound clinic for left foot infections and multiple wounds. Vascular surgery to evaluate further with LLE angiogram.     - Pt p/w L foot infection with multiple wounds sent in by [facility], podiatry following   - S/p LLE angio 5/2,  vascular following   - S/p debridement on 5/28 w/o cardiac complications  - S/p RRT 5/29 for lethargy ? vasovagal, now asymptomatic    - S/p RRT  for fall/ hypotension, 1L NS bolus x1, s/p PRBC     - No evidence of any active ischemia    - Continue ASA, Plavix  - Continue Lipitor   - No evidence of any meaningful volume overload.     - BP stable and controlled   - Continue Lisinopril 40 and Toprol 50    - Monitor and replete lytes, keep K>4, Mg>2.  - Will continue to follow.  follows office Dr Chu

## 2025-06-03 NOTE — CASE MANAGEMENT PROGRESS NOTE - NSCMPROGRESSNOTE_GEN_ALL_CORE
Patient discussed during round. Patient is for possible discharge home tomorrow. Patient started on Invanz today till 7/8/2025. Home infusion sent to [facility] Dr TIM Calhoun is calling in orders.  will continue to follow.

## 2025-06-03 NOTE — DIETITIAN INITIAL EVALUATION ADULT - PROBLEM SELECTOR PLAN 2
Chronic. Pt follows with Vascular outpt. Pt s/p L PT angioplasty 12/2/24 for L lat foot DFU on Plavix  -C/w ASA and Lipitor  - Hold Plavix given OR tomorrow

## 2025-06-03 NOTE — PROGRESS NOTE ADULT - SUBJECTIVE AND OBJECTIVE BOX
64y year old Male seen at [facility] 1EAS 113 W1 s/p left foot partial 1st and 5th ray resection, detachment and reattachment of peroneus brevis tendon, and debridement of heel with antibiotic cement placement. Patient relates no overnight events and states that they are doing well at this time. Denies any fever, chills, nausea, vomiting, chest pain, shortness of breath, or calf pain at this time.    Allergies    No Known Allergies    Intolerances        MEDICATIONS  (STANDING):  aspirin enteric coated 81 milliGRAM(s) Oral daily  atorvastatin 40 milliGRAM(s) Oral at bedtime  cefepime   IVPB 2000 milliGRAM(s) IV Intermittent every 8 hours  cefepime   IVPB      clopidogrel Tablet 75 milliGRAM(s) Oral daily  dextrose 5%. 1000 milliLiter(s) (100 mL/Hr) IV Continuous <Continuous>  dextrose 5%. 1000 milliLiter(s) (50 mL/Hr) IV Continuous <Continuous>  dextrose 50% Injectable 25 Gram(s) IV Push once  dextrose 50% Injectable 12.5 Gram(s) IV Push once  dextrose 50% Injectable 25 Gram(s) IV Push once  enoxaparin Injectable 40 milliGRAM(s) SubCutaneous every 24 hours  gabapentin 300 milliGRAM(s) Oral three times a day  glucagon  Injectable 1 milliGRAM(s) IntraMuscular once  insulin glargine Injectable (LANTUS) 15 Unit(s) SubCutaneous every morning  insulin lispro (ADMELOG) corrective regimen sliding scale   SubCutaneous three times a day before meals  insulin lispro (ADMELOG) corrective regimen sliding scale   SubCutaneous at bedtime  insulin lispro Injectable (ADMELOG) 5 Unit(s) SubCutaneous three times a day before meals  lisinopril 40 milliGRAM(s) Oral daily  metoprolol succinate ER 50 milliGRAM(s) Oral daily  minocycline 200 milliGRAM(s) Oral every 12 hours  senna 2 Tablet(s) Oral at bedtime    MEDICATIONS  (PRN):  acetaminophen   IVPB .. 1000 milliGRAM(s) IV Intermittent every 8 hours PRN Mild Pain (1 - 3)  aluminum hydroxide/magnesium hydroxide/simethicone Suspension 30 milliLiter(s) Oral every 4 hours PRN Dyspepsia  dextrose Oral Gel 15 Gram(s) Oral once PRN Blood Glucose LESS THAN 70 milliGRAM(s)/deciliter  HYDROmorphone  Injectable 0.5 milliGRAM(s) IV Push four times a day PRN Severe Pain (7 - 10)  melatonin 3 milliGRAM(s) Oral at bedtime PRN Insomnia  ondansetron Injectable 4 milliGRAM(s) IV Push every 8 hours PRN Nausea and/or Vomiting  traMADol 50 milliGRAM(s) Oral every 6 hours PRN Moderate Pain (4 - 6)      Vital Signs Last 24 Hrs  T(C): 37.2 (03 Jun 2025 04:54), Max: 37.2 (03 Jun 2025 04:54)  T(F): 98.9 (03 Jun 2025 04:54), Max: 98.9 (03 Jun 2025 04:54)  HR: 82 (03 Jun 2025 04:54) (81 - 87)  BP: 137/74 (03 Jun 2025 04:54) (131/81 - 150/73)  BP(mean): --  RR: 18 (03 Jun 2025 04:54) (18 - 18)  SpO2: 99% (03 Jun 2025 04:54) (96% - 99%)    Parameters below as of 03 Jun 2025 04:54  Patient On (Oxygen Delivery Method): room air        PHYSICAL EXAM:  Vascular: Dorsalis Pedis and Posterior Tibial pulses non palpable.  Capillary re-fill time greater then 3 seconds digits 1-5 left, TMA right    Neuro: Protective sensation diminished to the level of the digits bilateral.  MSK: Muscle strength 5/5 all major muscle groups bilateral. Noted right foot tma  Dermatological: Incision site of the left foot noted with intact sutures, no dehiscence, mild efrain-incision erythema, no proximal streaking, no fluctuance, no malodor, no signs of infection at this time.                          9.5    12.07 )-----------( 314      ( 03 Jun 2025 06:10 )             29.0       06-02    137  |  106  |  24[H]  ----------------------------<  172[H]  4.6   |  28  |  1.00    Ca    8.4[L]      02 Jun 2025 06:15    TPro  6.5  /  Alb  2.3[L]  /  TBili  0.4  /  DBili  x   /  AST  9[L]  /  ALT  20  /  AlkPhos  59  06-02      Surgical pathology report    Final Diagnosis    1.  Left hallux bone:  -   Acute and chronic osteomyelitis of bone.  -   Ulceration and acute inflammation of skin with viable skin  margin.    2.  Left first metatarsal bone:  -   Acute and chronic osteomyelitis of bone.    3.  Left fifth metatarsal bone:  -   Acute and chronic osteomyelitis of bone.    4.  Left calcaneus bone:  -   Acute and chronic osteomyelitis of bone.  -   Fat necrosis of bone marrow.    5.  Left first metatarsal proximal margin:  -   Focal mild chronic osteomyelitis of bone.    6.  Left fifth metatarsal distal margin:  -   Focal mild chronic osteomyelitis of bone.   64y year old Male seen at [facility] 1EAS 113 W1 s/p left foot partial 1st and 5th ray resection, detachment and reattachment of peroneus brevis tendon, and debridement of heel with antibiotic cement placement. Patient relates no overnight events and states that they are doing well at this time. Denies any fever, chills, nausea, vomiting, chest pain, shortness of breath, or calf pain at this time.    Allergies    No Known Allergies    Intolerances        MEDICATIONS  (STANDING):  aspirin enteric coated 81 milliGRAM(s) Oral daily  atorvastatin 40 milliGRAM(s) Oral at bedtime  cefepime   IVPB 2000 milliGRAM(s) IV Intermittent every 8 hours  cefepime   IVPB      clopidogrel Tablet 75 milliGRAM(s) Oral daily  dextrose 5%. 1000 milliLiter(s) (100 mL/Hr) IV Continuous <Continuous>  dextrose 5%. 1000 milliLiter(s) (50 mL/Hr) IV Continuous <Continuous>  dextrose 50% Injectable 25 Gram(s) IV Push once  dextrose 50% Injectable 12.5 Gram(s) IV Push once  dextrose 50% Injectable 25 Gram(s) IV Push once  enoxaparin Injectable 40 milliGRAM(s) SubCutaneous every 24 hours  gabapentin 300 milliGRAM(s) Oral three times a day  glucagon  Injectable 1 milliGRAM(s) IntraMuscular once  insulin glargine Injectable (LANTUS) 15 Unit(s) SubCutaneous every morning  insulin lispro (ADMELOG) corrective regimen sliding scale   SubCutaneous three times a day before meals  insulin lispro (ADMELOG) corrective regimen sliding scale   SubCutaneous at bedtime  insulin lispro Injectable (ADMELOG) 5 Unit(s) SubCutaneous three times a day before meals  lisinopril 40 milliGRAM(s) Oral daily  metoprolol succinate ER 50 milliGRAM(s) Oral daily  minocycline 200 milliGRAM(s) Oral every 12 hours  senna 2 Tablet(s) Oral at bedtime    MEDICATIONS  (PRN):  acetaminophen   IVPB .. 1000 milliGRAM(s) IV Intermittent every 8 hours PRN Mild Pain (1 - 3)  aluminum hydroxide/magnesium hydroxide/simethicone Suspension 30 milliLiter(s) Oral every 4 hours PRN Dyspepsia  dextrose Oral Gel 15 Gram(s) Oral once PRN Blood Glucose LESS THAN 70 milliGRAM(s)/deciliter  HYDROmorphone  Injectable 0.5 milliGRAM(s) IV Push four times a day PRN Severe Pain (7 - 10)  melatonin 3 milliGRAM(s) Oral at bedtime PRN Insomnia  ondansetron Injectable 4 milliGRAM(s) IV Push every 8 hours PRN Nausea and/or Vomiting  traMADol 50 milliGRAM(s) Oral every 6 hours PRN Moderate Pain (4 - 6)      Vital Signs Last 24 Hrs  T(C): 37.2 (03 Jun 2025 04:54), Max: 37.2 (03 Jun 2025 04:54)  T(F): 98.9 (03 Jun 2025 04:54), Max: 98.9 (03 Jun 2025 04:54)  HR: 82 (03 Jun 2025 04:54) (81 - 87)  BP: 137/74 (03 Jun 2025 04:54) (131/81 - 150/73)  BP(mean): --  RR: 18 (03 Jun 2025 04:54) (18 - 18)  SpO2: 99% (03 Jun 2025 04:54) (96% - 99%)    Parameters below as of 03 Jun 2025 04:54  Patient On (Oxygen Delivery Method): room air        PHYSICAL EXAM:  Vascular: Dorsalis Pedis and Posterior Tibial pulses non palpable.  Capillary re-fill time greater then 3 seconds digits left, TMA right    Neuro: Protective sensation diminished to the level of the digits bilateral.  MSK: Muscle strength 5/5 all major muscle groups bilateral. Noted right foot TMA, s/p left hallux and first metatarsal head resection   Dermatological: Incision site of the left foot noted with intact sutures, no dehiscence, mild efrain-incision erythema, no proximal streaking, no fluctuance, no malodor, no signs of infection at this time.                          9.5    12.07 )-----------( 314      ( 03 Jun 2025 06:10 )             29.0       06-02    137  |  106  |  24[H]  ----------------------------<  172[H]  4.6   |  28  |  1.00    Ca    8.4[L]      02 Jun 2025 06:15    TPro  6.5  /  Alb  2.3[L]  /  TBili  0.4  /  DBili  x   /  AST  9[L]  /  ALT  20  /  AlkPhos  59  06-02      Surgical pathology report    Final Diagnosis    1.  Left hallux bone:  -   Acute and chronic osteomyelitis of bone.  -   Ulceration and acute inflammation of skin with viable skin  margin.    2.  Left first metatarsal bone:  -   Acute and chronic osteomyelitis of bone.    3.  Left fifth metatarsal bone:  -   Acute and chronic osteomyelitis of bone.    4.  Left calcaneus bone:  -   Acute and chronic osteomyelitis of bone.  -   Fat necrosis of bone marrow.    5.  Left first metatarsal proximal margin:  -   Focal mild chronic osteomyelitis of bone.    6.  Left fifth metatarsal distal margin:  -   Focal mild chronic osteomyelitis of bone.

## 2025-06-03 NOTE — DIETITIAN INITIAL EVALUATION ADULT - PERTINENT MEDS FT
MEDICATIONS  (STANDING):  aspirin enteric coated 81 milliGRAM(s) Oral daily  atorvastatin 40 milliGRAM(s) Oral at bedtime  cefepime   IVPB 2000 milliGRAM(s) IV Intermittent every 8 hours  cefepime   IVPB      clopidogrel Tablet 75 milliGRAM(s) Oral daily  dextrose 5%. 1000 milliLiter(s) (100 mL/Hr) IV Continuous <Continuous>  dextrose 5%. 1000 milliLiter(s) (50 mL/Hr) IV Continuous <Continuous>  dextrose 50% Injectable 25 Gram(s) IV Push once  dextrose 50% Injectable 12.5 Gram(s) IV Push once  dextrose 50% Injectable 25 Gram(s) IV Push once  enoxaparin Injectable 40 milliGRAM(s) SubCutaneous every 24 hours  gabapentin 300 milliGRAM(s) Oral three times a day  glucagon  Injectable 1 milliGRAM(s) IntraMuscular once  insulin glargine Injectable (LANTUS) 15 Unit(s) SubCutaneous every morning  insulin lispro (ADMELOG) corrective regimen sliding scale   SubCutaneous three times a day before meals  insulin lispro (ADMELOG) corrective regimen sliding scale   SubCutaneous at bedtime  insulin lispro Injectable (ADMELOG) 5 Unit(s) SubCutaneous three times a day before meals  lisinopril 40 milliGRAM(s) Oral daily  metoprolol succinate ER 50 milliGRAM(s) Oral daily  minocycline 200 milliGRAM(s) Oral every 12 hours  senna 2 Tablet(s) Oral at bedtime    MEDICATIONS  (PRN):  acetaminophen   IVPB .. 1000 milliGRAM(s) IV Intermittent every 8 hours PRN Mild Pain (1 - 3)  aluminum hydroxide/magnesium hydroxide/simethicone Suspension 30 milliLiter(s) Oral every 4 hours PRN Dyspepsia  dextrose Oral Gel 15 Gram(s) Oral once PRN Blood Glucose LESS THAN 70 milliGRAM(s)/deciliter  HYDROmorphone  Injectable 0.5 milliGRAM(s) IV Push four times a day PRN Severe Pain (7 - 10)  melatonin 3 milliGRAM(s) Oral at bedtime PRN Insomnia  ondansetron Injectable 4 milliGRAM(s) IV Push every 8 hours PRN Nausea and/or Vomiting  traMADol 50 milliGRAM(s) Oral every 6 hours PRN Moderate Pain (4 - 6)

## 2025-06-03 NOTE — DIETITIAN INITIAL EVALUATION ADULT - ENERGY INTAKE
MRI of the left foot demonstrates ulceration at the great toe with underlying cortical destruction of the distal phalanx and acute osteomyelitis involving the distal and proximal phalanx of the great toe.  Focal ulceration at the base of the fifth metatarsal with underlying marrow edema within the fifth metatarsal base and intramedullary cavity. Finding is indeterminate and may represent concurrent early osteomyelitis versus reactive changes.  Marrow edema involving the distal phalanx and middle phalanx of the little toe without definitive corresponding ulceration or abnormal T1 weighted signal.   Nonspecific bone marrow edema pattern within the intermediate cuneiform, lateral cuneiform, and navicular bone favored to represent reactive changes from osteoarthrosis.       Problem/Plan - 1:  ·  Problem: DM foot ulcer.   ·  Plan: Patient presents with left DM foot ulcer/ wound Gangrene Left BIG Toe,  left heel wound   - podiatry Dr. Lincoln lawson [facility]  - s/p OR today with podiatry: partial 1st and 5th ray resection, heel debridement with antibiotic cement placement and reattachment of peroneus brevis tendon into the cuboid   - Surgical path results show acute and chronic osteomyelitis in first and fifth metatarsals on clean margin from OR  - ID Dr. Melany Calhoun IV Invanz ---> IV Cefepime q 8 hrs Via PICC line   -Mild Leukocytosis -likely reactive   Pain meds  IV Tylenol, PRN, IV Dilaudid PRN for Mod & severe pain  - PT eval today - recommends home PT.     Problem/Plan - 2:  ·  Problem: Anemia.   ·  Plan: H/H 7.9 today slowly dropping  S/P RRT symptomatic Anemia with Hypotension -  - RRT 5/29 for vasovagal syncope 2/2 to acute blood loss  - Type and screen ordered  - 2 u pRBC today   - Continue Aspirin, Lovenox and Plavix as per Podiatry.     Problem/Plan - 3:  ·  Problem: PAD (peripheral artery disease).   ·  Plan: Chronic. Pt follows with Vascular outpt. Pt s/p L PT angioplasty 12/2/24 for L lat foot DFU on Plavix  -C/w Lipitor and Aspirin and Plavix.     Problem/Plan - 4:  ·  Problem: Type 2 diabetes mellitus.   ·  Plan: Type 2 diabetes mellitus.   ·  Plan: Chronic  -On home Trulicity, Jardiance and Metformin  -Hold home oral meds  -MDISS with FS QAC/QHS   -Lantus 15u qhs and 5u pre meal   -Hypoglycemia protocol  -A1c 7.9  -Pt with neuropathy continue Gabapentin 300mg TID  -Endo Dr. Perlman follow up.     Problem/Plan - 5:  ·  Problem: HTN (hypertension).  Adequate (%)

## 2025-06-03 NOTE — DIETITIAN INITIAL EVALUATION ADULT - PERTINENT LABORATORY DATA
06-03    137  |  103  |  16  ----------------------------<  151[H]  4.2   |  28  |  0.89    Ca    8.4[L]      03 Jun 2025 06:10    TPro  6.8  /  Alb  2.4[L]  /  TBili  0.4  /  DBili  x   /  AST  13[L]  /  ALT  27  /  AlkPhos  65  06-03  POCT Blood Glucose.: 170 mg/dL (06-03-25 @ 07:46)  A1C with Estimated Average Glucose Result: 8.1 % (05-03-25 @ 06:31)  A1C with Estimated Average Glucose Result: 7.9 % (04-30-25 @ 07:45)  A1C with Estimated Average Glucose Result: 7.8 % (04-29-25 @ 12:45)   06-03    137  |  103  |  16  ----------------------------<  151[H]  4.2   |  28  |  0.89    Ca    8.4[L]      03 Jun 2025 06:10    TPro  6.8  /  Alb  2.4[L]  /  TBili  0.4  /  DBili  x   /  AST  13[L]  /  ALT  27  /  AlkPhos  65  06-03  POCT Blood Glucose.: 170 mg/dL (06-03-25 @ 07:46)  A1C with Estimated Average Glucose Result: 8.1 % (05-03-25 @ 06:31) improved from 12/1/24 ( A1c 8.3)   A1C with Estimated Average Glucose Result: 7.9 % (04-30-25 @ 07:45)  A1C with Estimated Average Glucose Result: 7.8 % (04-29-25 @ 12:45)

## 2025-06-03 NOTE — PROGRESS NOTE ADULT - ATTENDING COMMENTS
Patient to f/u at the [facility] after d/c   Patient is s/p ICC line and also noted with surgical site and left heel wound stable   Patient to be non weight bearing to the left foot

## 2025-06-03 NOTE — PROGRESS NOTE ADULT - ASSESSMENT
65 y/o M with PMHx DM2, osteomyelitis, CAD, PAD s/p RLE angioplasty 2020, s/p surgical amputation of R forefoot , s/p L PT angioplasty 12/2/24 for L lat foot DFU on plavix, HTN, s/p recent admission for left foot infection s/p PICC placement for IV abx  sent by Podiatry Dr. Stark for admission for OR intervention tomorrow.    L foot OM/DM Foot Ulcer  PAD  - sent for OR 5/28 for left foot debridement and partial 1st toe amputation 5/28   - MRI L foot: ulceration at the great toe with underlying cortical destruction of the distal phalanx and acute osteomyelitis involving the distal and proximal phalanx of the great toe.  Focal ulceration at the base of the fifth metatarsal with underlying marrow edema within the fifth metatarsal base and intramedullary cavity.     Review of prior ID notes show that the patient was sent out  on ceftriaxone 2gm q24h x6 week course IV Abx until 6/10/25  In in the interim pt was noted to have worsening leukocytosis, transitioned to ertapenem since 5/24    PROCEDURES:  Detachment, debridement, and reattachment of peroneus brevis tendon 28-May-2025 10:07:14  Ricki Wilson  Detachment at left foot, partial 1st ray, open approach 28-May-2025 10:07:26  Ricki Wilson  Detachment at left foot, partial 5th ray, open approach 28-May-2025 10:07:40  Ricki Wilson  Debridement, soft tissue and bone, heel region of diabetic foot 28-May-2025 10:07:50  Ricki Wilson  Non-viable bone and soft tissue.    pathology reviewed: evidence of acute and chronic OM on L hallux; L 1st, 5th MT, Calcaneus  Margins w/ mild chronic OM    OR cx reviewed:  Rare Stenotrophomonas maltophilia    Rare Staphylococcus pseudintermedius    Rare Staphylococcus saprophyticus    Recommendations:   cefepime 2gm q8h (5/28-)   minocycline 200mg BID (6/2-)   Trend temps/WBC  Podiatry following  Final recs to follow    Patient evaluated with face-to-face time in addition to reviewing history, labs, microbiology, and imaging.   Antibiotic stewardship, local antibiogram, infection control strategies and potential transmission issues taken into consideration at time of treatment decision making process.   Thank you for allowing us to participate in the care of your patient.  Infectious Diseases will follow. Please call with any questions.  Melany Calhoun M.D.  Available on [employer] TEAMS -- *PREFERRED*  [facility] [phone]  For after 5 P.M. and weekends, please call [phone]   65 y/o M with PMHx DM2, osteomyelitis, CAD, PAD s/p RLE angioplasty 2020, s/p surgical amputation of R forefoot , s/p L PT angioplasty 12/2/24 for L lat foot DFU on plavix, HTN, s/p recent admission for left foot infection s/p PICC placement for IV abx  sent by Podiatry Dr. Stark for admission for OR intervention tomorrow.    L foot OM/DM Foot Ulcer  PAD  - sent for OR 5/28 for left foot debridement and partial 1st toe amputation 5/28   - MRI L foot: ulceration at the great toe with underlying cortical destruction of the distal phalanx and acute osteomyelitis involving the distal and proximal phalanx of the great toe.  Focal ulceration at the base of the fifth metatarsal with underlying marrow edema within the fifth metatarsal base and intramedullary cavity.     Review of prior ID notes show that the patient was sent out  on ceftriaxone 2gm q24h x6 week course IV Abx until 6/10/25  In in the interim pt was noted to have worsening leukocytosis, transitioned to ertapenem since 5/24    PROCEDURES:  Detachment, debridement, and reattachment of peroneus brevis tendon 28-May-2025 10:07:14  Ricki Wilson  Detachment at left foot, partial 1st ray, open approach 28-May-2025 10:07:26  Ricki Wilson  Detachment at left foot, partial 5th ray, open approach 28-May-2025 10:07:40  Ricki Wilson  Debridement, soft tissue and bone, heel region of diabetic foot 28-May-2025 10:07:50  Ricki Wilson  Non-viable bone and soft tissue.    pathology reviewed: evidence of acute and chronic OM on L hallux; L 1st, 5th MT, Calcaneus  Margins w/ mild chronic OM    OR cx reviewed:  Stenotrophomonas maltophilia  Staphylococcus pseudintermedius  Staphylococcus saprophyticus  Staphylococcus simulans    Recommendations:   Stop cefepime, switch to ertapenem 1gm q24h for ease of dosing; Plan x6 weeks until 7/8/25  minocycline 200mg BID PO (6/2-); Plan x6 weeks until 6/13/25  Trend temps/WBC  Podiatry following  Pt to call [phone] for f/u Dr. Brasher following d/c  Additional care per primary team    Patient evaluated with face-to-face time in addition to reviewing history, labs, microbiology, and imaging.   Antibiotic stewardship, local antibiogram, infection control strategies and potential transmission issues taken into consideration at time of treatment decision making process.   Thank you for allowing us to participate in the care of your patient.  Infectious Diseases will follow. Please call with any questions.  Melany Calhoun M.D.  Available on [employer] TEAMS -- *PREFERRED*  [facility] [phone]  For after 5 P.M. and weekends, please call [phone]   65 y/o M with PMHx DM2, osteomyelitis, CAD, PAD s/p RLE angioplasty 2020, s/p surgical amputation of R forefoot , s/p L PT angioplasty 12/2/24 for L lat foot DFU on plavix, HTN, s/p recent admission for left foot infection s/p PICC placement for IV abx  sent by Podiatry Dr. Stark for admission for OR intervention tomorrow.    L foot OM/DM Foot Ulcer  PAD  - sent for OR 5/28 for left foot debridement and partial 1st toe amputation 5/28   - MRI L foot: ulceration at the great toe with underlying cortical destruction of the distal phalanx and acute osteomyelitis involving the distal and proximal phalanx of the great toe.  Focal ulceration at the base of the fifth metatarsal with underlying marrow edema within the fifth metatarsal base and intramedullary cavity.     Review of prior ID notes show that the patient was sent out  on ceftriaxone 2gm q24h x6 week course IV Abx until 6/10/25  In in the interim pt was noted to have worsening leukocytosis, transitioned to ertapenem since 5/24    PROCEDURES:  Detachment, debridement, and reattachment of peroneus brevis tendon 28-May-2025 10:07:14  Ricki Wilson  Detachment at left foot, partial 1st ray, open approach 28-May-2025 10:07:26  Ricki Wilson  Detachment at left foot, partial 5th ray, open approach 28-May-2025 10:07:40  Ricki Wilson  Debridement, soft tissue and bone, heel region of diabetic foot 28-May-2025 10:07:50  Ricki Wilson  Non-viable bone and soft tissue.    pathology reviewed: evidence of acute and chronic OM on L hallux; L 1st, 5th MT, Calcaneus  Margins w/ mild chronic OM    OR cx reviewed:  Stenotrophomonas maltophilia  Staphylococcus pseudintermedius  Staphylococcus saprophyticus  Staphylococcus simulans    Recommendations:   Stop cefepime, switch to ertapenem 1gm q24h for ease of dosing; Plan x6 weeks until 7/8/25  minocycline 200mg BID PO (6/2-); Plan x6 weeks until 7/13/25  Trend temps/WBC  Podiatry following  Pt to call [phone] for f/u Dr. Brasher following d/c  Additional care per primary team    Patient evaluated with face-to-face time in addition to reviewing history, labs, microbiology, and imaging.   Antibiotic stewardship, local antibiogram, infection control strategies and potential transmission issues taken into consideration at time of treatment decision making process.   Thank you for allowing us to participate in the care of your patient.  Infectious Diseases will follow. Please call with any questions.  Melany Calhoun M.D.  Available on [employer] TEAMS -- *PREFERRED*  [facility] [phone]  For after 5 P.M. and weekends, please call [phone]

## 2025-06-03 NOTE — DIETITIAN INITIAL EVALUATION ADULT - OTHER INFO
65 y/o M with PMHx DM2, osteomyelitis, CAD, PAD s/p RLE angioplasty 2020, s/p surgical amputation of R forefoot , s/p L PT angioplasty 12/2/24 for L lat foot DFU on plavix, HTN, s/p recent admission for left foot infection s/p PICC placement for IV abx  sent by Podiatry Dr. Stark for admission for OR left foot partial 1st and 5th ray resection, heel debridement with antibiotic. 65 y/o M with PMHx DM2 ( on at home, Trulicity, Jardiance and Metformin, most recent A1c 7.9) , HTN,  osteomyelitis, CAD, PAD s/p RLE angioplasty 2020, s/p surgical amputation of R forefoot , s/p L PT angioplasty 12/2/24 for L lat foot DFU on plavix, HTN, s/p recent admission for left foot infection s/p PICC placement for IV abx  sent by Podiatry Dr. Stark for admission for OR left foot partial 1st and 5th ray resection, heel debridement with antibiotic. Surgical path results show acute and chronic osteomyelitis in first and fifth metatarsals on clean margin from OR.    At time of RD visit to pts room,nsg alerted RD that pt is in hallway. Noted to be walking with mobility aid . Pt wants to continue walking and is agreeable to talking while he is walking. Pt is 5'8"- 5'9", wt 180-185% with no recent wt change. Pt is amenable to trialing Ronal ( 95 kcals, 2.5 gm protein , contains HMB, L - arginine, L- glutamine, vit C and Znso4 all beneficial for wound healing  63 y/o M with PMHx DM2 ( on at home, Trulicity, Jardiance and Metformin, most recent A1c 7.9) , HTN,  osteomyelitis, CAD, PAD s/p RLE angioplasty 2020, s/p surgical amputation of R forefoot , s/p L PT angioplasty 12/2/24 for L lat foot DFU on plavix, HTN, s/p recent admission for left foot infection s/p PICC placement for IV abx  sent by Podiatry Dr. Stark for admission for OR left foot partial 1st and 5th ray resection, heel debridement with antibiotic. Surgical path results show acute and chronic osteomyelitis in first and fifth metatarsals on clean margin from OR.    At time of RD visit to pts room,nsg alerted RD that pt is in hallway. Noted to be walking with mobility aid . Pt wants to continue walking and is agreeable to talking while he is walking. Pt is 5'8"- 5'9", wt 180-185% with no recent wt change. Pt is amenable to trialing Ronal ( 95 kcals, 2.5 gm protein , contains HMB, L - arginine, L- glutamine, vit C and Znso4 all beneficial for wound healing , understands contents role in wound healing

## 2025-06-03 NOTE — PROGRESS NOTE ADULT - SUBJECTIVE AND OBJECTIVE BOX
[facility]  OFELIA Harvey Y. Patel, S. Shah, G. [facility]  [phone]    Name: LOIDA QUEZADA  Age: 64y  Gender: Male  MRN: [id number]    Interval History:  Patient seen and examined at bedside  No acute overnight events.   Notes reviewed    Antibiotics:  cefepime   IVPB 2000 milliGRAM(s) IV Intermittent every 8 hours  cefepime   IVPB      minocycline 200 milliGRAM(s) Oral every 12 hours      Medications:  acetaminophen   IVPB .. 1000 milliGRAM(s) IV Intermittent every 8 hours PRN  aluminum hydroxide/magnesium hydroxide/simethicone Suspension 30 milliLiter(s) Oral every 4 hours PRN  aspirin enteric coated 81 milliGRAM(s) Oral daily  atorvastatin 40 milliGRAM(s) Oral at bedtime  cefepime   IVPB 2000 milliGRAM(s) IV Intermittent every 8 hours  cefepime   IVPB      clopidogrel Tablet 75 milliGRAM(s) Oral daily  dextrose 5%. 1000 milliLiter(s) IV Continuous <Continuous>  dextrose 5%. 1000 milliLiter(s) IV Continuous <Continuous>  dextrose 50% Injectable 12.5 Gram(s) IV Push once  dextrose 50% Injectable 25 Gram(s) IV Push once  dextrose 50% Injectable 25 Gram(s) IV Push once  dextrose Oral Gel 15 Gram(s) Oral once PRN  enoxaparin Injectable 40 milliGRAM(s) SubCutaneous every 24 hours  gabapentin 300 milliGRAM(s) Oral three times a day  glucagon  Injectable 1 milliGRAM(s) IntraMuscular once  HYDROmorphone  Injectable 0.5 milliGRAM(s) IV Push four times a day PRN  insulin glargine Injectable (LANTUS) 15 Unit(s) SubCutaneous every morning  insulin lispro (ADMELOG) corrective regimen sliding scale   SubCutaneous three times a day before meals  insulin lispro (ADMELOG) corrective regimen sliding scale   SubCutaneous at bedtime  insulin lispro Injectable (ADMELOG) 5 Unit(s) SubCutaneous three times a day before meals  lisinopril 40 milliGRAM(s) Oral daily  melatonin 3 milliGRAM(s) Oral at bedtime PRN  metoprolol succinate ER 50 milliGRAM(s) Oral daily  minocycline 200 milliGRAM(s) Oral every 12 hours  ondansetron Injectable 4 milliGRAM(s) IV Push every 8 hours PRN  senna 2 Tablet(s) Oral at bedtime  traMADol 50 milliGRAM(s) Oral every 6 hours PRN      Review of Systems:  A 10-point review of systems was obtained.     Pertinent positives and negatives--  Constitutional: No fevers. No Chills. No Rigors.   Cardiovascular: No chest pain. No palpitations.  Respiratory: No shortness of breath. No cough.  Gastrointestinal: No nausea or vomiting. No diarrhea or constipation.   Psychiatric: Pleasant. Appropriate affect.    Review of systems otherwise negative except as previously noted.    Allergies: No Known Allergies    For details regarding the patient's past medical history, social history, family history, and other miscellaneous elements, please refer the initial infectious diseases consultation and/or the admitting history and physical examination for this admission.    Objective:  Vitals:   T(C): 37.2 (06-03-25 @ 04:54), Max: 37.2 (06-03-25 @ 04:54)  HR: 82 (06-03-25 @ 04:54) (81 - 87)  BP: 137/74 (06-03-25 @ 04:54) (131/81 - 150/73)  RR: 18 (06-03-25 @ 04:54) (18 - 18)  SpO2: 99% (06-03-25 @ 04:54) (96% - 99%)    Physical Examination:  General: no acute distress  HEENT: NC/AT, EOMI  Cardio: S1, S2 heard, RRR, no murmurs  Resp: breath sounds heard bilaterally  Abd: soft, NT, ND  Ext: no edema or cyanosis  Skin: warm, dry, no visible rash      Laboratory Studies:  CBC:                       9.5    12.07 )-----------( 314      ( 03 Jun 2025 06:10 )             29.0     CMP: 06-03    137  |  103  |  16  ----------------------------<  151[H]  4.2   |  28  |  0.89    Ca    8.4[L]      03 Jun 2025 06:10    TPro  6.8  /  Alb  2.4[L]  /  TBili  0.4  /  DBili  x   /  AST  13[L]  /  ALT  27  /  AlkPhos  65  06-03    LIVER FUNCTIONS - ( 03 Jun 2025 06:10 )  Alb: 2.4 g/dL / Pro: 6.8 g/dL / ALK PHOS: 65 U/L / ALT: 27 U/L / AST: 13 U/L / GGT: x           Urinalysis Basic - ( 03 Jun 2025 06:10 )    Color: x / Appearance: x / SG: x / pH: x  Gluc: 151 mg/dL / Ketone: x  / Bili: x / Urobili: x   Blood: x / Protein: x / Nitrite: x   Leuk Esterase: x / RBC: x / WBC x   Sq Epi: x / Non Sq Epi: x / Bacteria: x        Microbiology: reviewed    Culture - Tissue with Gram Stain (collected 05-28-25 @ 10:00)  Source: Tissue Other, LEFT FIFTH METATARSAL BONE  Gram Stain (06-02-25 @ 13:49):    No polymorphonuclear cells seen    No organisms seen  Final Report (06-02-25 @ 14:22):    Growth in fluid media only Staphylococcus simulans  Organism: Staphylococcus simulans (06-02-25 @ 14:22)  Organism: Staphylococcus simulans (06-02-25 @ 14:22)      -  Clindamycin: R >4      -  Oxacillin: S <=0.25      -  Gentamicin: S <=4 Should not be used as monotherapy      -  Vancomycin: S 0.5      -  Tetracycline: S <=4      Method Type: GOLDEN      -  Rifampin: S <=1 Should not be used as monotherapy      -  Erythromycin: R >4      -  Trimethoprim/Sulfamethoxazole: S <=0.5/9.5    Culture - Tissue with Gram Stain (collected 05-28-25 @ 10:00)  Source: Tissue Other, LEFT CALCANEUS BONE  Gram Stain (05-31-25 @ 12:05):    No polymorphonuclear cells seen    No organisms seen  Final Report (06-02-25 @ 08:54):    Rare Staphylococcus pseudintermedius    Rare Staphylococcus saprophyticus  Organism: Staphylococcus pseudintermedius  Staphylococcus saprophyticus (06-02-25 @ 08:54)  Organism: Staphylococcus pseudintermedius (06-02-25 @ 08:54)      -  Clindamycin: R >4      -  Oxacillin: S <=0.25      -  Gentamicin: R >8 Should not be used as monotherapy      -  Vancomycin: S 1      -  Tetracycline: R >8      Method Type: GOLDEN      -  Penicillin: R >2      -  Rifampin: S <=1 Should not be used as monotherapy      -  Erythromycin: R >4      -  Trimethoprim/Sulfamethoxazole: R >2/38  Organism: Staphylococcus saprophyticus (06-02-25 @ 08:54)      -  Clindamycin: R >4      -  Oxacillin: R >2      -  Gentamicin: S <=4 Should not be used as monotherapy      -  Vancomycin: S 0.5      -  Tetracycline: S <=4      Method Type: GOLDEN      -  Penicillin: R >2      -  Rifampin: S <=1 Should not be used as monotherapy      -  Erythromycin: R >4      -  Trimethoprim/Sulfamethoxazole: S 2/38    Culture - Tissue with Gram Stain (collected 05-28-25 @ 10:00)  Source: Tissue Other, LEFT HALLUX BONE  Gram Stain (05-31-25 @ 11:59):    No polymorphonuclear cells seen    No organisms seen  Final Report (06-02-25 @ 07:29):    Rare Stenotrophomonas maltophilia  Organism: Stenotrophomonas maltophilia (06-02-25 @ 07:29)      -  Minocycline: S      Method Type:   Organism: Stenotrophomonas maltophilia (06-02-25 @ 07:29)  Organism: Stenotrophomonas maltophilia (06-02-25 @ 07:29)      -  Levofloxacin: S <=0.5      Method Type: GOLDEN      -  Trimethoprim/Sulfamethoxazole: S <=0.5/9.5    Culture - Blood (collected 05-27-25 @ 09:10)  Source: Blood Blood-Peripheral  Final Report (06-01-25 @ 12:00):    No growth at 5 days    Culture - Blood (collected 05-27-25 @ 09:00)  Source: Blood Blood-Peripheral  Final Report (06-01-25 @ 12:00):    No growth at 5 days          Radiology: reviewed       [facility]  OFELIA Harvey Y. Patel, S. Shah, G. [facility]  [phone]    Name: LOIDA QUEZADA  Age: 64y  Gender: Male  MRN: [id number]    Interval History:  Patient seen and examined at bedside  No acute overnight events.   Notes reviewed    Antibiotics:  cefepime   IVPB 2000 milliGRAM(s) IV Intermittent every 8 hours  cefepime   IVPB      minocycline 200 milliGRAM(s) Oral every 12 hours      Medications:  acetaminophen   IVPB .. 1000 milliGRAM(s) IV Intermittent every 8 hours PRN  aluminum hydroxide/magnesium hydroxide/simethicone Suspension 30 milliLiter(s) Oral every 4 hours PRN  aspirin enteric coated 81 milliGRAM(s) Oral daily  atorvastatin 40 milliGRAM(s) Oral at bedtime  cefepime   IVPB 2000 milliGRAM(s) IV Intermittent every 8 hours  cefepime   IVPB      clopidogrel Tablet 75 milliGRAM(s) Oral daily  dextrose 5%. 1000 milliLiter(s) IV Continuous <Continuous>  dextrose 5%. 1000 milliLiter(s) IV Continuous <Continuous>  dextrose 50% Injectable 12.5 Gram(s) IV Push once  dextrose 50% Injectable 25 Gram(s) IV Push once  dextrose 50% Injectable 25 Gram(s) IV Push once  dextrose Oral Gel 15 Gram(s) Oral once PRN  enoxaparin Injectable 40 milliGRAM(s) SubCutaneous every 24 hours  gabapentin 300 milliGRAM(s) Oral three times a day  glucagon  Injectable 1 milliGRAM(s) IntraMuscular once  HYDROmorphone  Injectable 0.5 milliGRAM(s) IV Push four times a day PRN  insulin glargine Injectable (LANTUS) 15 Unit(s) SubCutaneous every morning  insulin lispro (ADMELOG) corrective regimen sliding scale   SubCutaneous three times a day before meals  insulin lispro (ADMELOG) corrective regimen sliding scale   SubCutaneous at bedtime  insulin lispro Injectable (ADMELOG) 5 Unit(s) SubCutaneous three times a day before meals  lisinopril 40 milliGRAM(s) Oral daily  melatonin 3 milliGRAM(s) Oral at bedtime PRN  metoprolol succinate ER 50 milliGRAM(s) Oral daily  minocycline 200 milliGRAM(s) Oral every 12 hours  ondansetron Injectable 4 milliGRAM(s) IV Push every 8 hours PRN  senna 2 Tablet(s) Oral at bedtime  traMADol 50 milliGRAM(s) Oral every 6 hours PRN      Review of Systems:  A 10-point review of systems was obtained.     Pertinent positives and negatives--  Constitutional: No fevers. No Chills. No Rigors.   Cardiovascular: No chest pain. No palpitations.  Respiratory: No shortness of breath. No cough.  Gastrointestinal: No nausea or vomiting. No diarrhea or constipation.   Psychiatric: Pleasant. Appropriate affect.    Review of systems otherwise negative except as previously noted.    Allergies: No Known Allergies    For details regarding the patient's past medical history, social history, family history, and other miscellaneous elements, please refer the initial infectious diseases consultation and/or the admitting history and physical examination for this admission.    Objective:  Vitals:   T(C): 37.2 (06-03-25 @ 04:54), Max: 37.2 (06-03-25 @ 04:54)  HR: 82 (06-03-25 @ 04:54) (81 - 87)  BP: 137/74 (06-03-25 @ 04:54) (131/81 - 150/73)  RR: 18 (06-03-25 @ 04:54) (18 - 18)  SpO2: 99% (06-03-25 @ 04:54) (96% - 99%)    Physical Examination:  General: no acute distress  HEENT: NC/AT, EOMI  Cardio: S1, S2 heard, RRR, no murmurs  Resp: breath sounds heard bilaterally  Abd: soft, NT, ND  Ext: foot in dressing  Skin: warm, dry, no visible rash      Laboratory Studies:  CBC:                       9.5    12.07 )-----------( 314      ( 03 Jun 2025 06:10 )             29.0     CMP: 06-03    137  |  103  |  16  ----------------------------<  151[H]  4.2   |  28  |  0.89    Ca    8.4[L]      03 Jun 2025 06:10    TPro  6.8  /  Alb  2.4[L]  /  TBili  0.4  /  DBili  x   /  AST  13[L]  /  ALT  27  /  AlkPhos  65  06-03    LIVER FUNCTIONS - ( 03 Jun 2025 06:10 )  Alb: 2.4 g/dL / Pro: 6.8 g/dL / ALK PHOS: 65 U/L / ALT: 27 U/L / AST: 13 U/L / GGT: x           Urinalysis Basic - ( 03 Jun 2025 06:10 )    Color: x / Appearance: x / SG: x / pH: x  Gluc: 151 mg/dL / Ketone: x  / Bili: x / Urobili: x   Blood: x / Protein: x / Nitrite: x   Leuk Esterase: x / RBC: x / WBC x   Sq Epi: x / Non Sq Epi: x / Bacteria: x        Microbiology: reviewed    Culture - Tissue with Gram Stain (collected 05-28-25 @ 10:00)  Source: Tissue Other, LEFT FIFTH METATARSAL BONE  Gram Stain (06-02-25 @ 13:49):    No polymorphonuclear cells seen    No organisms seen  Final Report (06-02-25 @ 14:22):    Growth in fluid media only Staphylococcus simulans  Organism: Staphylococcus simulans (06-02-25 @ 14:22)  Organism: Staphylococcus simulans (06-02-25 @ 14:22)      -  Clindamycin: R >4      -  Oxacillin: S <=0.25      -  Gentamicin: S <=4 Should not be used as monotherapy      -  Vancomycin: S 0.5      -  Tetracycline: S <=4      Method Type: GOLDEN      -  Rifampin: S <=1 Should not be used as monotherapy      -  Erythromycin: R >4      -  Trimethoprim/Sulfamethoxazole: S <=0.5/9.5    Culture - Tissue with Gram Stain (collected 05-28-25 @ 10:00)  Source: Tissue Other, LEFT CALCANEUS BONE  Gram Stain (05-31-25 @ 12:05):    No polymorphonuclear cells seen    No organisms seen  Final Report (06-02-25 @ 08:54):    Rare Staphylococcus pseudintermedius    Rare Staphylococcus saprophyticus  Organism: Staphylococcus pseudintermedius  Staphylococcus saprophyticus (06-02-25 @ 08:54)  Organism: Staphylococcus pseudintermedius (06-02-25 @ 08:54)      -  Clindamycin: R >4      -  Oxacillin: S <=0.25      -  Gentamicin: R >8 Should not be used as monotherapy      -  Vancomycin: S 1      -  Tetracycline: R >8      Method Type: GOLDEN      -  Penicillin: R >2      -  Rifampin: S <=1 Should not be used as monotherapy      -  Erythromycin: R >4      -  Trimethoprim/Sulfamethoxazole: R >2/38  Organism: Staphylococcus saprophyticus (06-02-25 @ 08:54)      -  Clindamycin: R >4      -  Oxacillin: R >2      -  Gentamicin: S <=4 Should not be used as monotherapy      -  Vancomycin: S 0.5      -  Tetracycline: S <=4      Method Type: GOLDEN      -  Penicillin: R >2      -  Rifampin: S <=1 Should not be used as monotherapy      -  Erythromycin: R >4      -  Trimethoprim/Sulfamethoxazole: S 2/38    Culture - Tissue with Gram Stain (collected 05-28-25 @ 10:00)  Source: Tissue Other, LEFT HALLUX BONE  Gram Stain (05-31-25 @ 11:59):    No polymorphonuclear cells seen    No organisms seen  Final Report (06-02-25 @ 07:29):    Rare Stenotrophomonas maltophilia  Organism: Stenotrophomonas maltophilia (06-02-25 @ 07:29)      -  Minocycline: S      Method Type:   Organism: Stenotrophomonas maltophilia (06-02-25 @ 07:29)  Organism: Stenotrophomonas maltophilia (06-02-25 @ 07:29)      -  Levofloxacin: S <=0.5      Method Type: GOLDEN      -  Trimethoprim/Sulfamethoxazole: S <=0.5/9.5    Culture - Blood (collected 05-27-25 @ 09:10)  Source: Blood Blood-Peripheral  Final Report (06-01-25 @ 12:00):    No growth at 5 days    Culture - Blood (collected 05-27-25 @ 09:00)  Source: Blood Blood-Peripheral  Final Report (06-01-25 @ 12:00):    No growth at 5 days          Radiology: reviewed

## 2025-06-03 NOTE — PROGRESS NOTE ADULT - SUBJECTIVE AND OBJECTIVE BOX
Patient is a 64y old  Male who presents with a chief complaint of left foot osteomyelitis (03 Jun 2025 08:39)    HPI:  63 y/o M with PMHx DM2, osteomyelitis, CAD, PAD s/p RLE angioplasty 2020, s/p surgical amputation of R forefoot , s/p L PT angioplasty 12/2/24 for L lat foot DFU on plavix, HTN, s/p recent admission for left foot infection s/p PICC placement for IV abx  sent by Podiatry Dr. Stark for admission for OR intervention tomorrow. Patient denies any complaints today. States after discharge he had outpatient visits with Vascular physician. He had imaging done and was suggested to have LimFlow procedure. Patient requested to have surgery as per Podiatry for his left foot wound prior to LimFlow. Vascular agreed. Patient states he can walk independently without pain and is currently comfortable.   Of note, patient was transitioned from Rocephin which was to be given via PICC line after previous discharge to Ertapenem by ID this Saturday.     ED course:   Vitals: T 97.7 HR 99 /69   Labs: ESR 64 wbc 10.77   Imaging: Cxray- Right PICC with tip in the SVC.  The heart is not accurately assessed in this AP projection.  The lungs are clear.  There is no pneumothorax or pleural effusion.  No acute bony abnormality.  Clear lungs    (27 May 2025 13:05)    INTERVAL HPI:  5/28: Patient seen and examined post op. complain,  of mild pain 3-4/10 IV Tylenol ordered otherwise denies complaints   5/29:  POD # 1Patient seen and examined at bedside. Denies complaints this AM however has bleeding from surgical site. RRT later called this AM (refer to rapid note) for weakness, diaphoresis likely vasovagal secondary to blood loss. Hb downtrended. Plan to hold Lovenox. Resume Asp and Plavix tomorrow. PT eval tomorrow , mild leukocytosis, drop in H/H  5/30: POD # 2 Pt seen, Examined, WBC mildly elevated ,On IV ABx  Low stable H/H   5/31:  POD # 3 Patient seen and examined at bedside. Denies complaints this AM. Hb dropped to 8.4 this AM, On IV Cefepime -Pathology results available. WBC -improving  6/1: POD # 4  Pt seen ,Examined, On IV ABx S/P RRT this AM , Hypotension, Low H/H -Plan for pRBC transfusion  Mild Leukocytosis   6/2:  POD # 5 Pt seen and examined at bedside. On IV abx. Pt with mild L heel pain, improved with tramadol. Hg 9.6 this AM s/p 2u PRBC yesterday. Leukocytosis improving. ID  added  minocycline 200mg BID to cover for stenotrophomonas, H/H stable   6/3:  POD # 6 Pt seen and examined at bedside. On IV abx and minocycline 200mg BID to cover for stenotrophomonas, H/H stable, pt with epistaxis overnight.        OVERNIGHT EVENTS: Epistaxis, resolved with pressure    Home Medications:  atorvastatin 40 mg oral tablet: 1 tab(s) orally once a day (at bedtime) (27 May 2025 15:49)  Cinnamon: 1 cap(s) orally once a day (27 May 2025 15:51)  clopidogrel 75 mg oral tablet: 1 tab(s) orally once a day (27 May 2025 15:49)  gabapentin 300 mg oral capsule: 1 cap(s) orally 3 times a day (27 May 2025 15:49)  Jardiance 25 mg oral tablet: 1 tab(s) orally once a day (27 May 2025 15:49)  lisinopril 40 mg oral tablet: 1 tab(s) orally once a day (27 May 2025 15:49)  metFORMIN 1000 mg oral tablet: 1 tab(s) orally 2 times a day (27 May 2025 15:49)  metoprolol succinate 50 mg oral tablet, extended release: 1 tab(s) orally once a day (27 May 2025 15:49)  Trulicity Pen 1.5 mg/0.5 mL subcutaneous solution: 1.5 milligram(s) subcutaneously once a week (Sundays) (27 May 2025 15:52)  Tumeric : 1 cap orally once a day (27 May 2025 15:51)      MEDICATIONS  (STANDING):  aspirin enteric coated 81 milliGRAM(s) Oral daily  atorvastatin 40 milliGRAM(s) Oral at bedtime  cefepime   IVPB 2000 milliGRAM(s) IV Intermittent every 8 hours  cefepime   IVPB      clopidogrel Tablet 75 milliGRAM(s) Oral daily  dextrose 5%. 1000 milliLiter(s) (100 mL/Hr) IV Continuous <Continuous>  dextrose 5%. 1000 milliLiter(s) (50 mL/Hr) IV Continuous <Continuous>  dextrose 50% Injectable 25 Gram(s) IV Push once  dextrose 50% Injectable 12.5 Gram(s) IV Push once  dextrose 50% Injectable 25 Gram(s) IV Push once  enoxaparin Injectable 40 milliGRAM(s) SubCutaneous every 24 hours  gabapentin 300 milliGRAM(s) Oral three times a day  glucagon  Injectable 1 milliGRAM(s) IntraMuscular once  insulin glargine Injectable (LANTUS) 15 Unit(s) SubCutaneous every morning  insulin lispro (ADMELOG) corrective regimen sliding scale   SubCutaneous three times a day before meals  insulin lispro (ADMELOG) corrective regimen sliding scale   SubCutaneous at bedtime  insulin lispro Injectable (ADMELOG) 5 Unit(s) SubCutaneous three times a day before meals  lisinopril 40 milliGRAM(s) Oral daily  metoprolol succinate ER 50 milliGRAM(s) Oral daily  minocycline 200 milliGRAM(s) Oral every 12 hours  senna 2 Tablet(s) Oral at bedtime    MEDICATIONS  (PRN):  acetaminophen   IVPB .. 1000 milliGRAM(s) IV Intermittent every 8 hours PRN Mild Pain (1 - 3)  aluminum hydroxide/magnesium hydroxide/simethicone Suspension 30 milliLiter(s) Oral every 4 hours PRN Dyspepsia  dextrose Oral Gel 15 Gram(s) Oral once PRN Blood Glucose LESS THAN 70 milliGRAM(s)/deciliter  HYDROmorphone  Injectable 0.5 milliGRAM(s) IV Push four times a day PRN Severe Pain (7 - 10)  melatonin 3 milliGRAM(s) Oral at bedtime PRN Insomnia  ondansetron Injectable 4 milliGRAM(s) IV Push every 8 hours PRN Nausea and/or Vomiting  traMADol 50 milliGRAM(s) Oral every 6 hours PRN Moderate Pain (4 - 6)      Allergies    No Known Allergies    Intolerances        Social History:  No  Tobacco: Denies  EtOH: Denies  Recreational drug use: Denies  Lives with: Wife at home   Ambulates: Independently   ADLs: Independent (27 May 2025 13:05)      REVIEW OF SYSTEMS: i am OK  CONSTITUTIONAL: No fever, No chills, No fatigue, No myalgia, No Body ache, No Weakness  EYES: No eye pain,  No visual disturbances, No discharge, NO Redness  ENMT:  No ear pain, No nose bleed, No vertigo; No sinus pain, NO throat pain, No Congestion  NECK: No pain, No stiffness  RESPIRATORY: No cough, NO wheezing, No  hemoptysis, NO  shortness of breath  CARDIOVASCULAR: No chest pain, palpitations  GASTROINTESTINAL: No abdominal pain, NO epigastric pain. No nausea, No vomiting; No diarrhea, No constipation. [  ] BM  GENITOURINARY: No dysuria, No frequency, No urgency, No hematuria, NO incontinence  NEUROLOGICAL: No headaches, No dizziness, No numbness, No tingling, No tremors, No weakness  EXT: No Swelling, No Pain, No Edema  SKIN:  [x  ] No itching, burning, rashes, or lesions   MUSCULOSKELETAL: No joint pain ,No Jt swelling; No muscle pain, No back pain, No extremity pain  PSYCHIATRIC: No depression,  No anxiety,  No mood swings ,No difficulty sleeping at night  PAIN SCALE: [  ] None  [ x ] Other- Heel pain  ROS Unable to obtain due to - [  ] Dementia  [  ] Lethargy [  ] Drowsy [  ] Sedated [  ] non verbal  REST OF REVIEW Of SYSTEM - [x ] Normal     Vital Signs Last 24 Hrs  T(C): 37.2 (03 Jun 2025 04:54), Max: 37.2 (03 Jun 2025 04:54)  T(F): 98.9 (03 Jun 2025 04:54), Max: 98.9 (03 Jun 2025 04:54)  HR: 82 (03 Jun 2025 04:54) (81 - 87)  BP: 137/74 (03 Jun 2025 04:54) (131/81 - 150/73)  BP(mean): --  RR: 18 (03 Jun 2025 04:54) (18 - 18)  SpO2: 99% (03 Jun 2025 04:54) (96% - 99%)    Parameters below as of 03 Jun 2025 04:54  Patient On (Oxygen Delivery Method): room air      Finger Stick          PHYSICAL EXAM: Rt U Ext PICC line   GENERAL:  [ x ] NAD , [ x ] well appearing, [  ] Agitated, [  ] Drowsy,  [  ] Lethargy, [  ] confused   HEAD:  [  x] Normal, [  ] Other  EYES:  [ x ] EOMI, [ x ] PERRLA, [x  ] conjunctiva and sclera clear normal, [  ] Other,  [ x ] Pallor,[  ] Discharge  ENMT:  [x  ] Normal, [ x ] Moist mucous membranes, [x  ] Good dentition, [ x ] No Thrush  NECK:  [  x] Supple, [x  ] No JVD, [ x ] Normal thyroid, [  ] Lymphadenopathy [  ] Other  CHEST/LUNG:  [  x] Clear to auscultation bilaterally, [ x ] Breath Sounds equal B/L  [x  ] poor effort  [x  ] No rales, [  x] No rhonchi  [ x ]  No wheezing,   HEART:  [ x ] Regular rate and rhythm, [  ] tachycardia, [  ] Bradycardia,  [  ] irregular  [ x ] No murmurs, No rubs, No gallops, [  ] PPM in place (Mfr:  )  ABDOMEN:  [ x ] Soft, [ x ] Nontender, [ x ] Nondistended, [ x ] No mass, [ x ] Bowel sounds present, [  ] obese+ Umbilical hernia +  NERVOUS SYSTEM:  [ x ] Alert & Oriented X3, [x  ] Nonfocal  [  ] Confusion  [  ] Encephalopathic [  ] Sedated [  ] Unable to assess, [  ] Dementia [  ] Other-  EXTREMITIES: NO C/C P Pulse + B/L  [x] left foot and ankle in dressing , Rt Foot TMA   LYMPH: No lymphadenopathy noted  SKIN:  [ x ] No rashes or lesions, [  ] Pressure Ulcers, [  ] ecchymosis, [  ] Skin Tears, [  ] Other      DIET: Diet, DASH/TLC:   Sodium & Cholesterol Restricted  Consistent Carbohydrate No Snacks (05-28-25 @ 12:07)      LABS:                        9.5    12.07 )-----------( 314      ( 03 Jun 2025 06:10 )             29.0     03 Jun 2025 06:10    137    |  103    |  16     ----------------------------<  151    4.2     |  28     |  0.89     Ca    8.4        03 Jun 2025 06:10    TPro  6.8    /  Alb  2.4    /  TBili  0.4    /  DBili  x      /  AST  13     /  ALT  27     /  AlkPhos  65     03 Jun 2025 06:10      Urinalysis Basic - ( 03 Jun 2025 06:10 )    Color: x / Appearance: x / SG: x / pH: x  Gluc: 151 mg/dL / Ketone: x  / Bili: x / Urobili: x   Blood: x / Protein: x / Nitrite: x   Leuk Esterase: x / RBC: x / WBC x   Sq Epi: x / Non Sq Epi: x / Bacteria: x        Culture Results:   Rare Stenotrophomonas maltophilia (05-28 @ 10:00)  Culture Results:   Growth in fluid media only Staphylococcus simulans (05-28 @ 10:00)  Culture Results:   Rare Staphylococcus pseudintermedius  Rare Staphylococcus saprophyticus (05-28 @ 10:00)          CARDIAC MARKERS ( 29 May 2025 09:00 )  x     / x     / x     / x     / 2.4 ng/mL          Culture - Tissue with Gram Stain (collected 28 May 2025 10:00)  Source: Tissue Other, LEFT FIFTH METATARSAL BONE  Gram Stain (02 Jun 2025 13:49):    No polymorphonuclear cells seen    No organisms seen  Final Report (02 Jun 2025 14:22):    Growth in fluid media only Staphylococcus simulans  Organism: Staphylococcus simulans (02 Jun 2025 14:22)  Organism: Staphylococcus simulans (02 Jun 2025 14:22)    Culture - Tissue with Gram Stain (collected 28 May 2025 10:00)  Source: Tissue Other, LEFT CALCANEUS BONE  Gram Stain (31 May 2025 12:05):    No polymorphonuclear cells seen    No organisms seen  Final Report (02 Jun 2025 08:54):    Rare Staphylococcus pseudintermedius    Rare Staphylococcus saprophyticus  Organism: Staphylococcus pseudintermedius  Staphylococcus saprophyticus (02 Jun 2025 08:54)  Organism: Staphylococcus pseudintermedius (02 Jun 2025 08:54)  Organism: Staphylococcus saprophyticus (02 Jun 2025 08:54)    Culture - Tissue with Gram Stain (collected 28 May 2025 10:00)  Source: Tissue Other, LEFT HALLUX BONE  Gram Stain (31 May 2025 11:59):    No polymorphonuclear cells seen    No organisms seen  Final Report (02 Jun 2025 07:29):    Rare Stenotrophomonas maltophilia  Organism: Stenotrophomonas maltophilia (02 Jun 2025 07:29)  Organism: Stenotrophomonas maltophilia (02 Jun 2025 07:29)  Organism: Stenotrophomonas maltophilia (02 Jun 2025 07:29)         Anemia Panel:      HEALTH ISSUES - PROBLEM Dx:  DM foot ulcer    PAD (peripheral artery disease)    Type 2 diabetes mellitus    HTN (hypertension)    Encounter for deep vein thrombosis (DVT) prophylaxis    Anemia            Consultant(s) Notes Reviewed:  [x  ] YES     Care Discussed with [X] Consultants  [ x ] Patient  [  ] Family [  ] HCP [ x ]   [  ] Social Service  [x ] RN, [  ] Physical Therapy,[  ] Palliative care team  DVT PPX: [ x ] Lovenox, [  ] S C Heparin, [  ] Coumadin, [  ] Xarelto, [  ] Eliquis, [  ] Pradaxa, [  ] IV Heparin drip, [  ] SCD [  ] Contraindication 2 to GI Bleed,[  ] Ambulation [  ] Contraindicated 2 to  bleed [  ] Contraindicated 2 to Brain Bleed  Advanced directive: [x  ] None, [  ] DNR/DNI Patient is a 64y old  Male who presents with a chief complaint of left foot osteomyelitis (03 Jun 2025 08:39)    HPI:  65 y/o M with PMHx DM2, osteomyelitis, CAD, PAD s/p RLE angioplasty 2020, s/p surgical amputation of R forefoot , s/p L PT angioplasty 12/2/24 for L lat foot DFU on plavix, HTN, s/p recent admission for left foot infection s/p PICC placement for IV abx  sent by Podiatry Dr. Stark for admission for OR intervention tomorrow. Patient denies any complaints today. States after discharge he had outpatient visits with Vascular physician. He had imaging done and was suggested to have LimFlow procedure. Patient requested to have surgery as per Podiatry for his left foot wound prior to LimFlow. Vascular agreed. Patient states he can walk independently without pain and is currently comfortable.   Of note, patient was transitioned from Rocephin which was to be given via PICC line after previous discharge to Ertapenem by ID this Saturday.     ED course:   Vitals: T 97.7 HR 99 /69   Labs: ESR 64 wbc 10.77   Imaging: Cxray- Right PICC with tip in the SVC.  The heart is not accurately assessed in this AP projection.  The lungs are clear.  There is no pneumothorax or pleural effusion.  No acute bony abnormality.  Clear lungs    (27 May 2025 13:05)    INTERVAL HPI:  5/28: Patient seen and examined post op. complain,  of mild pain 3-4/10 IV Tylenol ordered otherwise denies complaints   5/29:  POD # 1Patient seen and examined at bedside. Denies complaints this AM however has bleeding from surgical site. RRT later called this AM (refer to rapid note) for weakness, diaphoresis likely vasovagal secondary to blood loss. Hb downtrended. Plan to hold Lovenox. Resume Asp and Plavix tomorrow. PT eval tomorrow , mild leukocytosis, drop in H/H  5/30: POD # 2 Pt seen, Examined, WBC mildly elevated ,On IV ABx  Low stable H/H   5/31:  POD # 3 Patient seen and examined at bedside. Denies complaints this AM. Hb dropped to 8.4 this AM, On IV Cefepime -Pathology results available. WBC -improving  6/1: POD # 4  Pt seen ,Examined, On IV ABx S/P RRT this AM , Hypotension, Low H/H -Plan for pRBC transfusion  Mild Leukocytosis   6/2:  POD # 5 Pt seen and examined at bedside. On IV abx. Pt with mild L heel pain, improved with tramadol. Hg 9.6 this AM s/p 2u PRBC yesterday. Leukocytosis improving. ID  added  minocycline 200mg BID to cover for stenotrophomonas, H/H stable   6/3:  POD # 6 Pt seen and examined at bedside. On IV abx and minocycline 200mg BID to cover for stenotrophomonas, H/H stable, pt with epistaxis overnight.  on IV Invanz daily      OVERNIGHT EVENTS: Epistaxis, resolved with pressure    Home Medications:  atorvastatin 40 mg oral tablet: 1 tab(s) orally once a day (at bedtime) (27 May 2025 15:49)  Cinnamon: 1 cap(s) orally once a day (27 May 2025 15:51)  clopidogrel 75 mg oral tablet: 1 tab(s) orally once a day (27 May 2025 15:49)  gabapentin 300 mg oral capsule: 1 cap(s) orally 3 times a day (27 May 2025 15:49)  Jardiance 25 mg oral tablet: 1 tab(s) orally once a day (27 May 2025 15:49)  lisinopril 40 mg oral tablet: 1 tab(s) orally once a day (27 May 2025 15:49)  metFORMIN 1000 mg oral tablet: 1 tab(s) orally 2 times a day (27 May 2025 15:49)  metoprolol succinate 50 mg oral tablet, extended release: 1 tab(s) orally once a day (27 May 2025 15:49)  Trulicity Pen 1.5 mg/0.5 mL subcutaneous solution: 1.5 milligram(s) subcutaneously once a week (Sundays) (27 May 2025 15:52)  Tumeric : 1 cap orally once a day (27 May 2025 15:51)      MEDICATIONS  (STANDING):  aspirin enteric coated 81 milliGRAM(s) Oral daily  atorvastatin 40 milliGRAM(s) Oral at bedtime  cefepime   IVPB 2000 milliGRAM(s) IV Intermittent every 8 hours  cefepime   IVPB      clopidogrel Tablet 75 milliGRAM(s) Oral daily  dextrose 5%. 1000 milliLiter(s) (100 mL/Hr) IV Continuous <Continuous>  dextrose 5%. 1000 milliLiter(s) (50 mL/Hr) IV Continuous <Continuous>  dextrose 50% Injectable 25 Gram(s) IV Push once  dextrose 50% Injectable 12.5 Gram(s) IV Push once  dextrose 50% Injectable 25 Gram(s) IV Push once  enoxaparin Injectable 40 milliGRAM(s) SubCutaneous every 24 hours  gabapentin 300 milliGRAM(s) Oral three times a day  glucagon  Injectable 1 milliGRAM(s) IntraMuscular once  insulin glargine Injectable (LANTUS) 15 Unit(s) SubCutaneous every morning  insulin lispro (ADMELOG) corrective regimen sliding scale   SubCutaneous three times a day before meals  insulin lispro (ADMELOG) corrective regimen sliding scale   SubCutaneous at bedtime  insulin lispro Injectable (ADMELOG) 5 Unit(s) SubCutaneous three times a day before meals  lisinopril 40 milliGRAM(s) Oral daily  metoprolol succinate ER 50 milliGRAM(s) Oral daily  minocycline 200 milliGRAM(s) Oral every 12 hours  senna 2 Tablet(s) Oral at bedtime    MEDICATIONS  (PRN):  acetaminophen   IVPB .. 1000 milliGRAM(s) IV Intermittent every 8 hours PRN Mild Pain (1 - 3)  aluminum hydroxide/magnesium hydroxide/simethicone Suspension 30 milliLiter(s) Oral every 4 hours PRN Dyspepsia  dextrose Oral Gel 15 Gram(s) Oral once PRN Blood Glucose LESS THAN 70 milliGRAM(s)/deciliter  HYDROmorphone  Injectable 0.5 milliGRAM(s) IV Push four times a day PRN Severe Pain (7 - 10)  melatonin 3 milliGRAM(s) Oral at bedtime PRN Insomnia  ondansetron Injectable 4 milliGRAM(s) IV Push every 8 hours PRN Nausea and/or Vomiting  traMADol 50 milliGRAM(s) Oral every 6 hours PRN Moderate Pain (4 - 6)      Allergies    No Known Allergies    Intolerances        Social History:  No  Tobacco: Denies  EtOH: Denies  Recreational drug use: Denies  Lives with: Wife at home   Ambulates: Independently   ADLs: Independent (27 May 2025 13:05)      REVIEW OF SYSTEMS: i am OK  CONSTITUTIONAL: No fever, No chills, No fatigue, No myalgia, No Body ache, No Weakness  EYES: No eye pain,  No visual disturbances, No discharge, NO Redness  ENMT:  No ear pain, No nose bleed, No vertigo; No sinus pain, NO throat pain, No Congestion  NECK: No pain, No stiffness  RESPIRATORY: No cough, NO wheezing, No  hemoptysis, NO  shortness of breath  CARDIOVASCULAR: No chest pain, palpitations  GASTROINTESTINAL: No abdominal pain, NO epigastric pain. No nausea, No vomiting; No diarrhea, No constipation. [  ] BM  GENITOURINARY: No dysuria, No frequency, No urgency, No hematuria, NO incontinence  NEUROLOGICAL: No headaches, No dizziness, No numbness, No tingling, No tremors, No weakness  EXT: No Swelling, No Pain, No Edema  SKIN:  [x  ] No itching, burning, rashes, or lesions   MUSCULOSKELETAL: No joint pain ,No Jt swelling; No muscle pain, No back pain, No extremity pain  PSYCHIATRIC: No depression,  No anxiety,  No mood swings ,No difficulty sleeping at night  PAIN SCALE: [  ] None  [ x ] Other- Heel pain ok   ROS Unable to obtain due to - [  ] Dementia  [  ] Lethargy [  ] Drowsy [  ] Sedated [  ] non verbal  REST OF REVIEW Of SYSTEM - [x ] Normal     Vital Signs Last 24 Hrs  T(C): 37.2 (03 Jun 2025 04:54), Max: 37.2 (03 Jun 2025 04:54)  T(F): 98.9 (03 Jun 2025 04:54), Max: 98.9 (03 Jun 2025 04:54)  HR: 82 (03 Jun 2025 04:54) (81 - 87)  BP: 137/74 (03 Jun 2025 04:54) (131/81 - 150/73)  BP(mean): --  RR: 18 (03 Jun 2025 04:54) (18 - 18)  SpO2: 99% (03 Jun 2025 04:54) (96% - 99%)    Parameters below as of 03 Jun 2025 04:54  Patient On (Oxygen Delivery Method): room air      Finger Stick          PHYSICAL EXAM: Rt U Ext PICC line   GENERAL:  [ x ] NAD , [ x ] well appearing, [  ] Agitated, [  ] Drowsy,  [  ] Lethargy, [  ] confused   HEAD:  [  x] Normal, [  ] Other  EYES:  [ x ] EOMI, [ x ] PERRLA, [x  ] conjunctiva and sclera clear normal, [  ] Other,  [ x ] Pallor,[  ] Discharge  ENMT:  [x  ] Normal, [ x ] Moist mucous membranes, [x  ] Good dentition, [ x ] No Thrush  NECK:  [  x] Supple, [x  ] No JVD, [ x ] Normal thyroid, [  ] Lymphadenopathy [  ] Other  CHEST/LUNG:  [  x] Clear to auscultation bilaterally, [ x ] Breath Sounds equal B/L  [x  ] poor effort  [x  ] No rales, [  x] No rhonchi  [ x ]  No wheezing,   HEART:  [ x ] Regular rate and rhythm, [  ] tachycardia, [  ] Bradycardia,  [  ] irregular  [ x ] No murmurs, No rubs, No gallops, [  ] PPM in place (Mfr:  )  ABDOMEN:  [ x ] Soft, [ x ] Nontender, [ x ] Nondistended, [ x ] No mass, [ x ] Bowel sounds present, [  ] obese+ Umbilical hernia +  NERVOUS SYSTEM:  [ x ] Alert & Oriented X3, [x  ] Nonfocal  [  ] Confusion  [  ] Encephalopathic [  ] Sedated [  ] Unable to assess, [  ] Dementia [  ] Other-  EXTREMITIES: NO C/C P Pulse + B/L  [x] left foot and ankle in dressing , Rt Foot TMA   LYMPH: No lymphadenopathy noted  SKIN:  [ x ] No rashes or lesions, [  ] Pressure Ulcers, [  ] ecchymosis, [  ] Skin Tears, [  ] Other      DIET: Diet, DASH/TLC:   Sodium & Cholesterol Restricted  Consistent Carbohydrate No Snacks (05-28-25 @ 12:07)      LABS:                        9.5    12.07 )-----------( 314      ( 03 Jun 2025 06:10 )             29.0     03 Jun 2025 06:10    137    |  103    |  16     ----------------------------<  151    4.2     |  28     |  0.89     Ca    8.4        03 Jun 2025 06:10    TPro  6.8    /  Alb  2.4    /  TBili  0.4    /  DBili  x      /  AST  13     /  ALT  27     /  AlkPhos  65     03 Jun 2025 06:10      Urinalysis Basic - ( 03 Jun 2025 06:10 )    Color: x / Appearance: x / SG: x / pH: x  Gluc: 151 mg/dL / Ketone: x  / Bili: x / Urobili: x   Blood: x / Protein: x / Nitrite: x   Leuk Esterase: x / RBC: x / WBC x   Sq Epi: x / Non Sq Epi: x / Bacteria: x        Culture Results:   Rare Stenotrophomonas maltophilia (05-28 @ 10:00)  Culture Results:   Growth in fluid media only Staphylococcus simulans (05-28 @ 10:00)  Culture Results:   Rare Staphylococcus pseudintermedius  Rare Staphylococcus saprophyticus (05-28 @ 10:00)          CARDIAC MARKERS ( 29 May 2025 09:00 )  x     / x     / x     / x     / 2.4 ng/mL          Culture - Tissue with Gram Stain (collected 28 May 2025 10:00)  Source: Tissue Other, LEFT FIFTH METATARSAL BONE  Gram Stain (02 Jun 2025 13:49):    No polymorphonuclear cells seen    No organisms seen  Final Report (02 Jun 2025 14:22):    Growth in fluid media only Staphylococcus simulans  Organism: Staphylococcus simulans (02 Jun 2025 14:22)  Organism: Staphylococcus simulans (02 Jun 2025 14:22)    Culture - Tissue with Gram Stain (collected 28 May 2025 10:00)  Source: Tissue Other, LEFT CALCANEUS BONE  Gram Stain (31 May 2025 12:05):    No polymorphonuclear cells seen    No organisms seen  Final Report (02 Jun 2025 08:54):    Rare Staphylococcus pseudintermedius    Rare Staphylococcus saprophyticus  Organism: Staphylococcus pseudintermedius  Staphylococcus saprophyticus (02 Jun 2025 08:54)  Organism: Staphylococcus pseudintermedius (02 Jun 2025 08:54)  Organism: Staphylococcus saprophyticus (02 Jun 2025 08:54)    Culture - Tissue with Gram Stain (collected 28 May 2025 10:00)  Source: Tissue Other, LEFT HALLUX BONE  Gram Stain (31 May 2025 11:59):    No polymorphonuclear cells seen    No organisms seen  Final Report (02 Jun 2025 07:29):    Rare Stenotrophomonas maltophilia  Organism: Stenotrophomonas maltophilia (02 Jun 2025 07:29)  Organism: Stenotrophomonas maltophilia (02 Jun 2025 07:29)  Organism: Stenotrophomonas maltophilia (02 Jun 2025 07:29)         Anemia Panel:      HEALTH ISSUES - PROBLEM Dx:  DM foot ulcer    PAD (peripheral artery disease)    Type 2 diabetes mellitus    HTN (hypertension)    Encounter for deep vein thrombosis (DVT) prophylaxis    Anemia            Consultant(s) Notes Reviewed:  [x  ] YES     Care Discussed with [X] Consultants  [ x ] Patient  [  ] Family [  ] HCP [ x ]   [  ] Social Service  [x ] RN, [  ] Physical Therapy,[  ] Palliative care team  DVT PPX: [ x ] Lovenox, [  ] S C Heparin, [  ] Coumadin, [  ] Xarelto, [  ] Eliquis, [  ] Pradaxa, [  ] IV Heparin drip, [  ] SCD [  ] Contraindication 2 to GI Bleed,[  ] Ambulation [  ] Contraindicated 2 to  bleed [  ] Contraindicated 2 to Brain Bleed  Advanced directive: [x  ] None, [  ] DNR/DNI

## 2025-06-03 NOTE — PROGRESS NOTE ADULT - PROBLEM SELECTOR PLAN 1
Patient evaluated and chart reviewed.  POD#6 s/p left foot partial 1st and 5th ray resection, detachment and reattachment of peroneus brevis tendon, and debridement of heel with antibiotic cement placement.  No active bleeding noted at this time.   No signs of post-operative infection, hematoma, or dehiscence at this time.  DSD reapplied with xeroform, gauze, ABD, and bren.   Surgical pathology report reviewed, positive for OM of all specimens.  Continue IV abx per ID recs.  Patient is stable for discharge from podiatry perspective, and has been advised to follow-up within 3 days of discharge with Dr. Stark at [facility].  Discharge planning per hospitalist and ID teams.   Podiatry will continue to follow while in-house.  Plan to be discussed with attending. Patient evaluated and chart reviewed.  POD#6 s/p left foot partial 1st and 5th ray resection, detachment and reattachment of peroneus brevis tendon, and debridement of heel with antibiotic cement placement.  No active bleeding noted at this time.   No signs of post-operative infection, hematoma, or dehiscence at this time.  DSD reapplied with xeroform, gauze, ABD, and bren.   Surgical pathology report reviewed, positive for OM of all specimens.  Continue IV abx per ID recs.  Patient is stable for discharge from podiatry perspective, and has been advised to follow-up within 3 days of discharge with Dr. Stark at [facility].  Dressings : Xeroform, 4x4 gauze. Abd pads. bren. Frequency : 3 times per week  Discharge planning per hospitalist and ID teams.   Podiatry will continue to follow while in-house.  Plan to be discussed with attending.

## 2025-06-03 NOTE — PROGRESS NOTE ADULT - SUBJECTIVE AND OBJECTIVE BOX
[facility] -- Vlad Gallardo Pannella, Patel, Savella Goodger, Cohen  Office # [phone]      Follow Up:  PAD HTN     Subjective/Observations:   No events overnight resting comfortably in bed.  No complaints of chest pain, dyspnea, or palpitations reported. No signs of orthopnea or PND.      REVIEW OF SYSTEMS: All other review of systems is negative unless indicated above    PAST MEDICAL & SURGICAL HISTORY:  HTN (hypertension)      Diabetes      Hyperlipidemia      PVD (peripheral vascular disease)      Toe osteomyelitis, right      H/O angioplasty  RLE      Status post amputation of toe          MEDICATIONS  (STANDING):  aspirin enteric coated 81 milliGRAM(s) Oral daily  atorvastatin 40 milliGRAM(s) Oral at bedtime  cefepime   IVPB 2000 milliGRAM(s) IV Intermittent every 8 hours  cefepime   IVPB      clopidogrel Tablet 75 milliGRAM(s) Oral daily  dextrose 5%. 1000 milliLiter(s) (100 mL/Hr) IV Continuous <Continuous>  dextrose 5%. 1000 milliLiter(s) (50 mL/Hr) IV Continuous <Continuous>  dextrose 50% Injectable 25 Gram(s) IV Push once  dextrose 50% Injectable 12.5 Gram(s) IV Push once  dextrose 50% Injectable 25 Gram(s) IV Push once  enoxaparin Injectable 40 milliGRAM(s) SubCutaneous every 24 hours  gabapentin 300 milliGRAM(s) Oral three times a day  glucagon  Injectable 1 milliGRAM(s) IntraMuscular once  insulin glargine Injectable (LANTUS) 15 Unit(s) SubCutaneous every morning  insulin lispro (ADMELOG) corrective regimen sliding scale   SubCutaneous three times a day before meals  insulin lispro (ADMELOG) corrective regimen sliding scale   SubCutaneous at bedtime  insulin lispro Injectable (ADMELOG) 5 Unit(s) SubCutaneous three times a day before meals  lisinopril 40 milliGRAM(s) Oral daily  metoprolol succinate ER 50 milliGRAM(s) Oral daily  minocycline 200 milliGRAM(s) Oral every 12 hours  senna 2 Tablet(s) Oral at bedtime    MEDICATIONS  (PRN):  acetaminophen   IVPB .. 1000 milliGRAM(s) IV Intermittent every 8 hours PRN Mild Pain (1 - 3)  aluminum hydroxide/magnesium hydroxide/simethicone Suspension 30 milliLiter(s) Oral every 4 hours PRN Dyspepsia  dextrose Oral Gel 15 Gram(s) Oral once PRN Blood Glucose LESS THAN 70 milliGRAM(s)/deciliter  HYDROmorphone  Injectable 0.5 milliGRAM(s) IV Push four times a day PRN Severe Pain (7 - 10)  melatonin 3 milliGRAM(s) Oral at bedtime PRN Insomnia  ondansetron Injectable 4 milliGRAM(s) IV Push every 8 hours PRN Nausea and/or Vomiting  traMADol 50 milliGRAM(s) Oral every 6 hours PRN Moderate Pain (4 - 6)      Allergies    No Known Allergies    Intolerances        Vital Signs Last 24 Hrs  T(C): 37.2 (2025 04:54), Max: 37.2 (2025 04:54)  T(F): 98.9 (2025 04:54), Max: 98.9 (2025 04:54)  HR: 82 (2025 04:54) (81 - 87)  BP: 137/74 (2025 04:54) (131/81 - 150/73)  BP(mean): --  RR: 18 (2025 04:54) (18 - 18)  SpO2: 99% (2025 04:54) (96% - 99%)    Parameters below as of 2025 04:54  Patient On (Oxygen Delivery Method): room air        I&O's Summary        PHYSICAL EXAM:  TELE: not on tele   Constitutional: NAD, awake and alert, well-developed  HEENT: Moist Mucous Membranes, Anicteric  Pulmonary: Non-labored, breath sounds are clear bilaterally, No wheezing, crackles or rhonchi  Cardiovascular: Regular, S1 and S2 nl, No murmurs, rubs, gallops or clicks  Gastrointestinal: Bowel Sounds present, soft, nontender.   Lymph: No lymphadenopathy. No peripheral edema.  Skin: No visible rashes or ulcers. left foot dressing   Psych:  Mood & affect appropriate    LABS: All Labs Reviewed:                        9.5    12.07 )-----------( 314      ( 2025 06:10 )             29.0                         9.4    12.01 )-----------( 270      ( 2025 06:15 )             28.7                         7.9    13.05 )-----------( 272      ( 2025 06:48 )             24.4     2025 06:10    137    |  103    |  16     ----------------------------<  151    4.2     |  28     |  0.89   2025 06:15    137    |  106    |  24     ----------------------------<  172    4.6     |  28     |  1.00   2025 06:48    136    |  103    |  20     ----------------------------<  147    4.5     |  25     |  1.00     Ca    8.4        2025 06:10  Ca    8.4        2025 06:15  Ca    8.3        2025 06:48    TPro  6.8    /  Alb  2.4    /  TBili  0.4    /  DBili  x      /  AST  13     /  ALT  27     /  AlkPhos  65     2025 06:10  TPro  6.5    /  Alb  2.3    /  TBili  0.4    /  DBili  x      /  AST  9      /  ALT  20     /  AlkPhos  59     2025 06:15  TPro  6.6    /  Alb  2.4    /  TBili  0.4    /  DBili  x      /  AST  13     /  ALT  19     /  AlkPhos  59     2025 06:48             EC Lead ECG:   Ventricular Rate 89 BPM    Atrial Rate 89 BPM    P-R Interval 138 ms    QRS Duration 88 ms    Q-T Interval 408 ms    QTC Calculation(Bazett) 496 ms    P Axis 60 degrees    R Axis 12 degrees    T Axis 38 degrees    Diagnosis Line Normal sinus rhythm  Prolonged QT  Confirmed by CATHERINE REBOLLEDO ([id number]) on 2025 1:05:54 PM (25 @ 08:20)        Radiology:

## 2025-06-03 NOTE — PROGRESS NOTE ADULT - ATTENDING COMMENTS
No 65 y/o M with PMHx DM2, osteomyelitis, CAD, PAD s/p RLE angioplasty 2020, s/p surgical amputation of R forefoot , s/p L PT angioplasty 12/2/24 for L lat foot DFU on plavix, HTN, s/p recent admission for left foot infection s/p PICC placement for IV abx  sent by Podiatry Dr. Stark for admission for OR intervention tomorrow.  Pt seen, examined case & care plan d/w pt, residents at detail. 2 u pRBC on 6/1   Consult-  ID-DR XENIA Calhoun  -IV abx-  VIA PICC line-On Stop cefepime, switch to ertapenem 1gm q24h for ease of dosing; Plan x6 weeks until 7/8/25  minocycline 200mg BID PO (6/2-); Plan x6 weeks until 6/13/25  Podiatry-DR Joe Stark d/w POST Op -NWB Left foot -bleeding post op -dressing changed ,Abx ,D/W DR French -about Pathology results   PO diet   AM labs   D/W CM at detail D/C Plan on Wednesday after Podiatry follow up  DVT ppx restart as d/w podiatry  Total care time is 60 minutes.

## 2025-06-03 NOTE — PROGRESS NOTE ADULT - PROBLEM SELECTOR PLAN 1
Patient presents with left DM foot ulcer/ wound Gangrene Left BIG Toe,  left heel wound   - podiatry Dr. Lincoln lawson [facility]  - POD4 with podiatry: partial 1st and 5th ray resection, heel debridement with antibiotic cement placement and reattachment of peroneus brevis tendon into the cuboid   - Surgical path results show acute and chronic osteomyelitis in first and fifth metatarsals on clean margin from OR  - ID Dr. Melany Calhoun IV Invanz ---> IV Cefepime q 8 hrs Via PICC line,  add minocycline 200mg BID to cover for stenotrophomonas d/w ID  - Continue Aspirin, Lovenox and Plavix   -Mild Leukocytosis -likely reactive   Pain meds  IV Tylenol, PRN, tramadol PRN for moderate pain, IV Dilaudid PRN for severe pain  - PT eval: home PT  - SW consulted for pt with concerns of stairs at home, transportation Patient presents with left DM foot ulcer/ wound Gangrene Left BIG Toe,  left heel wound   - podiatry Dr. Lincoln lawson [facility]  - POD4 with podiatry: partial 1st and 5th ray resection, heel debridement with antibiotic cement placement and reattachment of peroneus brevis tendon into the cuboid   - Surgical path results show acute and chronic osteomyelitis in first and fifth metatarsals on clean margin from OR  - ID Dr. Melany Calhoun IV Invanz ---> IV Cefepime q 8 hrs Via PICC line,  add minocycline 200mg BID to cover for stenotrophomonas d/w ID  - Continue Aspirin, Lovenox and Plavix   -Mild Leukocytosis -likely reactive   Pain meds  IV Tylenol, PRN, tramadol PRN for moderate pain, IV Dilaudid PRN for severe pain  - PT eval: home PT  - d/w PT- pt with concerns of stairs at home, transportation. PT will see patient tomorrow and will practice stairs. Home PT does not see pts on day of discharge, will see him the day after discharge Patient presents with left DM foot ulcer/ wound Gangrene Left BIG Toe,  left heel wound   - podiatry Dr. Lincoln lawson [facility]  - POD4 with podiatry: partial 1st and 5th ray resection, heel debridement with antibiotic cement placement and reattachment of peroneus brevis tendon into the cuboid   - Surgical path results show acute and chronic osteomyelitis in first and fifth metatarsals on clean margin from OR  - ID Dr. Melany Calhoun IV Invanz --->Stop cefepime, switch to ertapenem 1gm q24h for ease of dosing; Plan x6 weeks until 7/8/25  minocycline 200mg BID PO (6/2-); Plan x6 weeks until 6/13/25  - Continue Aspirin, Lovenox and Plavix   -Mild Leukocytosis -likely reactive   Pain meds  IV Tylenol, PRN, tramadol PRN for moderate pain, IV Dilaudid PRN for severe pain  - PT eval: home PT  - d/w PT- pt with concerns of stairs at home, transportation. PT will see patient tomorrow and will practice stairs. Home PT does not see pts on day of discharge, will see him the day after discharge

## 2025-06-03 NOTE — DIETITIAN INITIAL EVALUATION ADULT - PROBLEM SELECTOR PLAN 1
Patient presents with left DM foot ulcer, sent by podiatry Dr. Stark  Gangrene Left BIG Toe with left heel wound   - Plan for OR tomorrow for left foot debridgement and partial 1st toe amputation 5/28   - MRI of the left foot demonstrates ulceration at the great toe with underlying cortical destruction of the distal phalanx and acute osteomyelitis involving the distal and proximal phalanx of the great toe.  Focal ulceration at the base of the fifth metatarsal with underlying marrow edema within the fifth metatarsal base and intramedullary cavity. Finding is indeterminate and may represent concurrent early osteomyelitis versus reactive changes.  Marrow edema involving the distal phalanx and middle phalanx of the little toe without definitive corresponding ulceration or abnormal T1 weighted signal.   Nonspecific bone marrow edema pattern within the intermediate cuneiform, lateral cuneiform, and navicular bone favored to represent reactive changes from osteoarthrosis.  - cardiac clearance obtained for surgery   - patient is medically optimized for procedure  - NPO after midnight   - On previous admission, patient was discharged with IV Rocephin 2 mg daily via PICC line until 6/10--> switched to Ertapenem by ID   - Continue Ertapenem for now   - F/u cultures   - ID Dr. Melany Calhoun consulted appreciate results

## 2025-06-03 NOTE — DIETITIAN INITIAL EVALUATION ADULT - ORAL NUTRITION SUPPLEMENTS
Ronal ( mixed with 8 oz cool water) BID Ronal ( mixed with 8 oz cool water) BID  Dr. Calhoun contacted via TEAMS with recs

## 2025-06-04 ENCOUNTER — APPOINTMENT (OUTPATIENT)
Dept: HYPERBARIC MEDICINE | Facility: HOSPITAL | Age: 65
End: 2025-06-04

## 2025-06-04 VITALS
RESPIRATION RATE: 18 BRPM | HEART RATE: 79 BPM | DIASTOLIC BLOOD PRESSURE: 62 MMHG | SYSTOLIC BLOOD PRESSURE: 101 MMHG | TEMPERATURE: 98 F | OXYGEN SATURATION: 97 %

## 2025-06-04 DIAGNOSIS — Z95.828 PRESENCE OF OTHER VASCULAR IMPLANTS AND GRAFTS: ICD-10-CM

## 2025-06-04 LAB
ALBUMIN SERPL ELPH-MCNC: 2.5 G/DL — LOW (ref 3.3–5)
ALP SERPL-CCNC: 68 U/L — SIGNIFICANT CHANGE UP (ref 40–120)
ALT FLD-CCNC: 28 U/L — SIGNIFICANT CHANGE UP (ref 12–78)
ANION GAP SERPL CALC-SCNC: 6 MMOL/L — SIGNIFICANT CHANGE UP (ref 5–17)
AST SERPL-CCNC: 14 U/L — LOW (ref 15–37)
BASOPHILS # BLD AUTO: 0.08 K/UL — SIGNIFICANT CHANGE UP (ref 0–0.2)
BASOPHILS NFR BLD AUTO: 0.7 % — SIGNIFICANT CHANGE UP (ref 0–2)
BILIRUB SERPL-MCNC: 0.4 MG/DL — SIGNIFICANT CHANGE UP (ref 0.2–1.2)
BUN SERPL-MCNC: 23 MG/DL — SIGNIFICANT CHANGE UP (ref 7–23)
CALCIUM SERPL-MCNC: 9 MG/DL — SIGNIFICANT CHANGE UP (ref 8.5–10.1)
CHLORIDE SERPL-SCNC: 101 MMOL/L — SIGNIFICANT CHANGE UP (ref 96–108)
CO2 SERPL-SCNC: 29 MMOL/L — SIGNIFICANT CHANGE UP (ref 22–31)
CREAT SERPL-MCNC: 0.95 MG/DL — SIGNIFICANT CHANGE UP (ref 0.5–1.3)
EGFR: 88 ML/MIN/1.73M2 — SIGNIFICANT CHANGE UP
EGFR: 88 ML/MIN/1.73M2 — SIGNIFICANT CHANGE UP
EOSINOPHIL # BLD AUTO: 0.52 K/UL — HIGH (ref 0–0.5)
EOSINOPHIL NFR BLD AUTO: 4.5 % — SIGNIFICANT CHANGE UP (ref 0–6)
GLUCOSE BLDC GLUCOMTR-MCNC: 127 MG/DL — HIGH (ref 70–99)
GLUCOSE BLDC GLUCOMTR-MCNC: 127 MG/DL — HIGH (ref 70–99)
GLUCOSE BLDC GLUCOMTR-MCNC: 167 MG/DL — HIGH (ref 70–99)
GLUCOSE SERPL-MCNC: 151 MG/DL — HIGH (ref 70–99)
HCT VFR BLD CALC: 32.3 % — LOW (ref 39–50)
HGB BLD-MCNC: 10.6 G/DL — LOW (ref 13–17)
IMM GRANULOCYTES NFR BLD AUTO: 1 % — HIGH (ref 0–0.9)
LYMPHOCYTES # BLD AUTO: 17.5 % — SIGNIFICANT CHANGE UP (ref 13–44)
LYMPHOCYTES # BLD AUTO: 2.03 K/UL — SIGNIFICANT CHANGE UP (ref 1–3.3)
MCHC RBC-ENTMCNC: 29.2 PG — SIGNIFICANT CHANGE UP (ref 27–34)
MCHC RBC-ENTMCNC: 32.8 G/DL — SIGNIFICANT CHANGE UP (ref 32–36)
MCV RBC AUTO: 89 FL — SIGNIFICANT CHANGE UP (ref 80–100)
MONOCYTES # BLD AUTO: 1.2 K/UL — HIGH (ref 0–0.9)
MONOCYTES NFR BLD AUTO: 10.3 % — SIGNIFICANT CHANGE UP (ref 2–14)
NEUTROPHILS # BLD AUTO: 7.65 K/UL — HIGH (ref 1.8–7.4)
NEUTROPHILS NFR BLD AUTO: 66 % — SIGNIFICANT CHANGE UP (ref 43–77)
NRBC BLD AUTO-RTO: 0 /100 WBCS — SIGNIFICANT CHANGE UP (ref 0–0)
PLATELET # BLD AUTO: 370 K/UL — SIGNIFICANT CHANGE UP (ref 150–400)
POTASSIUM SERPL-MCNC: 4.3 MMOL/L — SIGNIFICANT CHANGE UP (ref 3.5–5.3)
POTASSIUM SERPL-SCNC: 4.3 MMOL/L — SIGNIFICANT CHANGE UP (ref 3.5–5.3)
PROT SERPL-MCNC: 7.1 G/DL — SIGNIFICANT CHANGE UP (ref 6–8.3)
RBC # BLD: 3.63 M/UL — LOW (ref 4.2–5.8)
RBC # FLD: 13.4 % — SIGNIFICANT CHANGE UP (ref 10.3–14.5)
SODIUM SERPL-SCNC: 136 MMOL/L — SIGNIFICANT CHANGE UP (ref 135–145)
WBC # BLD: 11.6 K/UL — HIGH (ref 3.8–10.5)
WBC # FLD AUTO: 11.6 K/UL — HIGH (ref 3.8–10.5)

## 2025-06-04 PROCEDURE — 96374 THER/PROPH/DIAG INJ IV PUSH: CPT

## 2025-06-04 PROCEDURE — 73721 MRI JNT OF LWR EXTRE W/O DYE: CPT

## 2025-06-04 PROCEDURE — 86901 BLOOD TYPING SEROLOGIC RH(D): CPT

## 2025-06-04 PROCEDURE — 71045 X-RAY EXAM CHEST 1 VIEW: CPT

## 2025-06-04 PROCEDURE — 87075 CULTR BACTERIA EXCEPT BLOOD: CPT

## 2025-06-04 PROCEDURE — C1602: CPT

## 2025-06-04 PROCEDURE — 97162 PT EVAL MOD COMPLEX 30 MIN: CPT

## 2025-06-04 PROCEDURE — 88311 DECALCIFY TISSUE: CPT

## 2025-06-04 PROCEDURE — 86922 COMPATIBILITY TEST ANTIGLOB: CPT

## 2025-06-04 PROCEDURE — 73630 X-RAY EXAM OF FOOT: CPT

## 2025-06-04 PROCEDURE — 88305 TISSUE EXAM BY PATHOLOGIST: CPT

## 2025-06-04 PROCEDURE — 86900 BLOOD TYPING SEROLOGIC ABO: CPT

## 2025-06-04 PROCEDURE — 96372 THER/PROPH/DIAG INJ SC/IM: CPT | Mod: XU

## 2025-06-04 PROCEDURE — P9016: CPT

## 2025-06-04 PROCEDURE — 87040 BLOOD CULTURE FOR BACTERIA: CPT

## 2025-06-04 PROCEDURE — 97116 GAIT TRAINING THERAPY: CPT

## 2025-06-04 PROCEDURE — 85610 PROTHROMBIN TIME: CPT

## 2025-06-04 PROCEDURE — 87070 CULTURE OTHR SPECIMN AEROBIC: CPT

## 2025-06-04 PROCEDURE — 80053 COMPREHEN METABOLIC PANEL: CPT

## 2025-06-04 PROCEDURE — 87184 SC STD DISK METHOD PER PLATE: CPT

## 2025-06-04 PROCEDURE — 85025 COMPLETE CBC W/AUTO DIFF WBC: CPT

## 2025-06-04 PROCEDURE — 36415 COLL VENOUS BLD VENIPUNCTURE: CPT

## 2025-06-04 PROCEDURE — 82550 ASSAY OF CK (CPK): CPT

## 2025-06-04 PROCEDURE — 86850 RBC ANTIBODY SCREEN: CPT

## 2025-06-04 PROCEDURE — 97530 THERAPEUTIC ACTIVITIES: CPT

## 2025-06-04 PROCEDURE — 85730 THROMBOPLASTIN TIME PARTIAL: CPT

## 2025-06-04 PROCEDURE — 81003 URINALYSIS AUTO W/O SCOPE: CPT

## 2025-06-04 PROCEDURE — 87077 CULTURE AEROBIC IDENTIFY: CPT

## 2025-06-04 PROCEDURE — 73718 MRI LOWER EXTREMITY W/O DYE: CPT

## 2025-06-04 PROCEDURE — 99232 SBSQ HOSP IP/OBS MODERATE 35: CPT

## 2025-06-04 PROCEDURE — 82962 GLUCOSE BLOOD TEST: CPT

## 2025-06-04 PROCEDURE — 84484 ASSAY OF TROPONIN QUANT: CPT

## 2025-06-04 PROCEDURE — 87186 SC STD MICRODIL/AGAR DIL: CPT

## 2025-06-04 PROCEDURE — 82803 BLOOD GASES ANY COMBINATION: CPT

## 2025-06-04 PROCEDURE — 85652 RBC SED RATE AUTOMATED: CPT

## 2025-06-04 PROCEDURE — 88304 TISSUE EXAM BY PATHOLOGIST: CPT

## 2025-06-04 PROCEDURE — 86140 C-REACTIVE PROTEIN: CPT

## 2025-06-04 PROCEDURE — 85027 COMPLETE CBC AUTOMATED: CPT

## 2025-06-04 PROCEDURE — 73620 X-RAY EXAM OF FOOT: CPT

## 2025-06-04 PROCEDURE — 93005 ELECTROCARDIOGRAM TRACING: CPT

## 2025-06-04 PROCEDURE — 82553 CREATINE MB FRACTION: CPT

## 2025-06-04 PROCEDURE — 99285 EMERGENCY DEPT VISIT HI MDM: CPT | Mod: 25

## 2025-06-04 PROCEDURE — C1713: CPT

## 2025-06-04 PROCEDURE — 82009 KETONE BODYS QUAL: CPT

## 2025-06-04 PROCEDURE — 36430 TRANSFUSION BLD/BLD COMPNT: CPT

## 2025-06-04 RX ORDER — MINOCYCLINE HCL 50 MG
2 TABLET ORAL
Qty: 157 | Refills: 0
Start: 2025-06-04 | End: 2025-07-12

## 2025-06-04 RX ORDER — TRAMADOL HYDROCHLORIDE 50 MG/1
1 TABLET, FILM COATED ORAL
Qty: 20 | Refills: 0
Start: 2025-06-04 | End: 2025-06-08

## 2025-06-04 RX ORDER — ASPIRIN 325 MG
1 TABLET ORAL
Qty: 0 | Refills: 0 | DISCHARGE
Start: 2025-06-04

## 2025-06-04 RX ORDER — TRAMADOL HYDROCHLORIDE 50 MG/1
1 TABLET, FILM COATED ORAL
Qty: 0 | Refills: 0 | DISCHARGE
Start: 2025-06-04

## 2025-06-04 RX ORDER — METOPROLOL SUCCINATE 50 MG/1
1 TABLET, EXTENDED RELEASE ORAL
Qty: 0 | Refills: 0 | DISCHARGE
Start: 2025-06-04

## 2025-06-04 RX ORDER — MINOCYCLINE HCL 50 MG
2 TABLET ORAL
Qty: 0 | Refills: 0 | DISCHARGE
Start: 2025-06-04

## 2025-06-04 RX ORDER — ACETAMINOPHEN 500 MG/5ML
2 LIQUID (ML) ORAL
Qty: 0 | Refills: 0 | DISCHARGE
Start: 2025-06-04

## 2025-06-04 RX ORDER — ERTAPENEM SODIUM 1 G/1
1 INJECTION, POWDER, LYOPHILIZED, FOR SOLUTION INTRAMUSCULAR; INTRAVENOUS
Qty: 0 | Refills: 0 | DISCHARGE
Start: 2025-06-04

## 2025-06-04 RX ADMIN — TRAMADOL HYDROCHLORIDE 50 MILLIGRAM(S): 50 TABLET, FILM COATED ORAL at 05:52

## 2025-06-04 RX ADMIN — TRAMADOL HYDROCHLORIDE 50 MILLIGRAM(S): 50 TABLET, FILM COATED ORAL at 06:52

## 2025-06-04 RX ADMIN — GABAPENTIN 300 MILLIGRAM(S): 400 CAPSULE ORAL at 14:33

## 2025-06-04 RX ADMIN — Medication 81 MILLIGRAM(S): at 11:01

## 2025-06-04 RX ADMIN — LISINOPRIL 40 MILLIGRAM(S): 5 TABLET ORAL at 05:32

## 2025-06-04 RX ADMIN — GABAPENTIN 300 MILLIGRAM(S): 400 CAPSULE ORAL at 05:32

## 2025-06-04 RX ADMIN — METOPROLOL SUCCINATE 50 MILLIGRAM(S): 50 TABLET, EXTENDED RELEASE ORAL at 05:32

## 2025-06-04 RX ADMIN — Medication 200 MILLIGRAM(S): at 05:32

## 2025-06-04 RX ADMIN — INSULIN LISPRO 5 UNIT(S): 100 INJECTION, SOLUTION INTRAVENOUS; SUBCUTANEOUS at 17:41

## 2025-06-04 RX ADMIN — INSULIN LISPRO 2: 100 INJECTION, SOLUTION INTRAVENOUS; SUBCUTANEOUS at 08:44

## 2025-06-04 RX ADMIN — CLOPIDOGREL BISULFATE 75 MILLIGRAM(S): 75 TABLET, FILM COATED ORAL at 11:01

## 2025-06-04 RX ADMIN — Medication 200 MILLIGRAM(S): at 17:28

## 2025-06-04 RX ADMIN — ENOXAPARIN SODIUM 40 MILLIGRAM(S): 100 INJECTION SUBCUTANEOUS at 17:46

## 2025-06-04 RX ADMIN — TRAMADOL HYDROCHLORIDE 50 MILLIGRAM(S): 50 TABLET, FILM COATED ORAL at 18:15

## 2025-06-04 RX ADMIN — INSULIN LISPRO 5 UNIT(S): 100 INJECTION, SOLUTION INTRAVENOUS; SUBCUTANEOUS at 12:35

## 2025-06-04 RX ADMIN — ERTAPENEM SODIUM 100 MILLIGRAM(S): 1 INJECTION, POWDER, LYOPHILIZED, FOR SOLUTION INTRAMUSCULAR; INTRAVENOUS at 14:34

## 2025-06-04 RX ADMIN — INSULIN LISPRO 5 UNIT(S): 100 INJECTION, SOLUTION INTRAVENOUS; SUBCUTANEOUS at 08:45

## 2025-06-04 RX ADMIN — TRAMADOL HYDROCHLORIDE 50 MILLIGRAM(S): 50 TABLET, FILM COATED ORAL at 17:27

## 2025-06-04 RX ADMIN — INSULIN GLARGINE-YFGN 15 UNIT(S): 100 INJECTION, SOLUTION SUBCUTANEOUS at 08:43

## 2025-06-04 NOTE — PROGRESS NOTE ADULT - ASSESSMENT
63 y/o M with PMHx DM2, osteomyelitis, CAD, PAD s/p RLE angioplasty 2020, s/p surgical amputation of R forefoot , s/p L PT angioplasty 12/2/24 for L lat foot DFU on plavix, HTN, s/p recent admission for left foot infection s/p PICC placement for IV abx  sent by Podiatry Dr. Stark for admission for OR intervention tomorrow.    L foot OM/DM Foot Ulcer  PAD  - sent for OR 5/28 for left foot debridement and partial 1st toe amputation 5/28   - MRI L foot: ulceration at the great toe with underlying cortical destruction of the distal phalanx and acute osteomyelitis involving the distal and proximal phalanx of the great toe.  Focal ulceration at the base of the fifth metatarsal with underlying marrow edema within the fifth metatarsal base and intramedullary cavity.     Review of prior ID notes show that the patient was sent out  on ceftriaxone 2gm q24h x6 week course IV Abx until 6/10/25  In in the interim pt was noted to have worsening leukocytosis, transitioned to ertapenem since 5/24    PROCEDURES:  Detachment, debridement, and reattachment of peroneus brevis tendon 28-May-2025 10:07:14  Ricki Wilson  Detachment at left foot, partial 1st ray, open approach 28-May-2025 10:07:26  Ricki Wilson  Detachment at left foot, partial 5th ray, open approach 28-May-2025 10:07:40  Ricki Wilson  Debridement, soft tissue and bone, heel region of diabetic foot 28-May-2025 10:07:50  Ricki Wilson  Non-viable bone and soft tissue.    pathology reviewed: evidence of acute and chronic OM on L hallux; L 1st, 5th MT, Calcaneus  Margins w/ mild chronic OM    OR cx reviewed:  Stenotrophomonas maltophilia  Staphylococcus pseudintermedius  Staphylococcus saprophyticus  growth in fluid media only Staphylococcus simulans    Recommendations:   C/w ertapenem 1gm q24h for ease of dosing; Plan x6 weeks until 7/8/25  --weekly CMP, CBC, ESR, CRP  --orders called into [facility]  --pt to call [phone] to f/u Dr. Brasher  minocycline 200mg BID PO (6/2-); Plan x6 weeks until 7/13/25  Trend temps/WBC  Podiatry following  Additional care per primary team    Patient evaluated with face-to-face time in addition to reviewing history, labs, microbiology, and imaging.   Antibiotic stewardship, local antibiogram, infection control strategies and potential transmission issues taken into consideration at time of treatment decision making process.   Thank you for allowing us to participate in the care of your patient.  Infectious Diseases will follow. Please call with any questions.  Melany Calhoun M.D.  Available on [employer] TEAMS -- *[facility]*  [place] [facility] [phone]  For after 5 P.M. and weekends, please call [phone]

## 2025-06-04 NOTE — PROGRESS NOTE ADULT - PROVIDER SPECIALTY LIST ADULT
Cardiology
Infectious Disease
Cardiology
Infectious Disease
Podiatry
Infectious Disease
Podiatry
Podiatry
Cardiology
Podiatry
Podiatry
Internal Medicine

## 2025-06-04 NOTE — PROGRESS NOTE ADULT - SUBJECTIVE AND OBJECTIVE BOX
[place] [facility]  OFELIA Harvey Y. Patel, S. Shah, G. [facility]  [phone]    Name: LOIDA QUEZADA  Age: 64y  Gender: Male  MRN: [id number]    Interval History:  No acute overnight events.   Feeling well  Notes reviewed    Antibiotics:  ertapenem  IVPB 1000 milliGRAM(s) IV Intermittent every 24 hours  minocycline 200 milliGRAM(s) Oral every 12 hours      Medications:  acetaminophen   IVPB .. 1000 milliGRAM(s) IV Intermittent every 8 hours PRN  aluminum hydroxide/magnesium hydroxide/simethicone Suspension 30 milliLiter(s) Oral every 4 hours PRN  aspirin enteric coated 81 milliGRAM(s) Oral daily  atorvastatin 40 milliGRAM(s) Oral at bedtime  clopidogrel Tablet 75 milliGRAM(s) Oral daily  dextrose 5%. 1000 milliLiter(s) IV Continuous <Continuous>  dextrose 5%. 1000 milliLiter(s) IV Continuous <Continuous>  dextrose 50% Injectable 25 Gram(s) IV Push once  dextrose 50% Injectable 12.5 Gram(s) IV Push once  dextrose 50% Injectable 25 Gram(s) IV Push once  dextrose Oral Gel 15 Gram(s) Oral once PRN  enoxaparin Injectable 40 milliGRAM(s) SubCutaneous every 24 hours  ertapenem  IVPB 1000 milliGRAM(s) IV Intermittent every 24 hours  gabapentin 300 milliGRAM(s) Oral three times a day  glucagon  Injectable 1 milliGRAM(s) IntraMuscular once  HYDROmorphone  Injectable 0.5 milliGRAM(s) IV Push four times a day PRN  insulin glargine Injectable (LANTUS) 15 Unit(s) SubCutaneous every morning  insulin lispro (ADMELOG) corrective regimen sliding scale   SubCutaneous three times a day before meals  insulin lispro (ADMELOG) corrective regimen sliding scale   SubCutaneous at bedtime  insulin lispro Injectable (ADMELOG) 5 Unit(s) SubCutaneous three times a day before meals  lisinopril 40 milliGRAM(s) Oral daily  melatonin 3 milliGRAM(s) Oral at bedtime PRN  metoprolol succinate ER 50 milliGRAM(s) Oral daily  minocycline 200 milliGRAM(s) Oral every 12 hours  ondansetron Injectable 4 milliGRAM(s) IV Push every 8 hours PRN  senna 2 Tablet(s) Oral at bedtime  traMADol 50 milliGRAM(s) Oral every 6 hours PRN      Review of Systems:  A 10-point review of systems was obtained.   Review of systems otherwise negative except as previously noted.    Allergies: No Known Allergies    For details regarding the patient's past medical history, social history, family history, and other miscellaneous elements, please refer the initial infectious diseases consultation and/or the admitting history and physical examination for this admission.    Objective:  Vitals:   T(C): 36.9 (06-04-25 @ 04:52), Max: 36.9 (06-03-25 @ 13:26)  HR: 79 (06-04-25 @ 04:52) (73 - 79)  BP: 122/63 (06-04-25 @ 10:30) (122/63 - 143/81)  RR: 18 (06-04-25 @ 04:52) (16 - 18)  SpO2: 99% (06-04-25 @ 04:52) (98% - 100%)    Physical Examination:  General: no acute distress  HEENT: NC/AT, EOMI  Cardio: RRR  Resp: breath sounds heard bilaterally  Abd: soft, NT, ND  Ext: no edema or cyanosis  Skin: warm, dry, no visible rash      Laboratory Studies:  CBC:                       10.6   11.60 )-----------( 370      ( 04 Jun 2025 05:50 )             32.3     CMP: 06-04    136  |  101  |  23  ----------------------------<  151[H]  4.3   |  29  |  0.95    Ca    9.0      04 Jun 2025 05:50    TPro  7.1  /  Alb  2.5[L]  /  TBili  0.4  /  DBili  x   /  AST  14[L]  /  ALT  28  /  AlkPhos  68  06-04    LIVER FUNCTIONS - ( 04 Jun 2025 05:50 )  Alb: 2.5 g/dL / Pro: 7.1 g/dL / ALK PHOS: 68 U/L / ALT: 28 U/L / AST: 14 U/L / GGT: x           Urinalysis Basic - ( 04 Jun 2025 05:50 )    Color: x / Appearance: x / SG: x / pH: x  Gluc: 151 mg/dL / Ketone: x  / Bili: x / Urobili: x   Blood: x / Protein: x / Nitrite: x   Leuk Esterase: x / RBC: x / WBC x   Sq Epi: x / Non Sq Epi: x / Bacteria: x        Microbiology: reviewed    Culture - Tissue with Gram Stain (collected 05-28-25 @ 10:00)  Source: Tissue Other, LEFT FIFTH METATARSAL BONE  Gram Stain (06-02-25 @ 13:49):    No polymorphonuclear cells seen    No organisms seen  Final Report (06-02-25 @ 14:22):    Growth in fluid media only Staphylococcus simulans  Organism: Staphylococcus simulans (06-02-25 @ 14:22)  Organism: Staphylococcus simulans (06-02-25 @ 14:22)      -  Clindamycin: R >4      -  Oxacillin: S <=0.25      -  Gentamicin: S <=4 Should not be used as monotherapy      -  Vancomycin: S 0.5      -  Tetracycline: S <=4      Method Type: GOLDEN      -  Rifampin: S <=1 Should not be used as monotherapy      -  Erythromycin: R >4      -  Trimethoprim/Sulfamethoxazole: S <=0.5/9.5    Culture - Tissue with Gram Stain (collected 05-28-25 @ 10:00)  Source: Tissue Other, LEFT CALCANEUS BONE  Gram Stain (05-31-25 @ 12:05):    No polymorphonuclear cells seen    No organisms seen  Final Report (06-02-25 @ 08:54):    Rare Staphylococcus pseudintermedius    Rare Staphylococcus saprophyticus  Organism: Staphylococcus pseudintermedius  Staphylococcus saprophyticus (06-02-25 @ 08:54)  Organism: Staphylococcus pseudintermedius (06-02-25 @ 08:54)      -  Clindamycin: R >4      -  Oxacillin: S <=0.25      -  Gentamicin: R >8 Should not be used as monotherapy      -  Vancomycin: S 1      -  Tetracycline: R >8      Method Type: GOLDEN      -  Penicillin: R >2      -  Rifampin: S <=1 Should not be used as monotherapy      -  Erythromycin: R >4      -  Trimethoprim/Sulfamethoxazole: R >2/38  Organism: Staphylococcus saprophyticus (06-02-25 @ 08:54)      -  Clindamycin: R >4      -  Oxacillin: R >2      -  Gentamicin: S <=4 Should not be used as monotherapy      -  Vancomycin: S 0.5      -  Tetracycline: S <=4      Method Type: GOLDEN      -  Penicillin: R >2      -  Rifampin: S <=1 Should not be used as monotherapy      -  Erythromycin: R >4      -  Trimethoprim/Sulfamethoxazole: S 2/38    Culture - Tissue with Gram Stain (collected 05-28-25 @ 10:00)  Source: Tissue Other, LEFT HALLUX BONE  Gram Stain (05-31-25 @ 11:59):    No polymorphonuclear cells seen    No organisms seen  Final Report (06-02-25 @ 07:29):    Rare Stenotrophomonas maltophilia  Organism: Stenotrophomonas maltophilia (06-02-25 @ 07:29)      -  Minocycline: S      Method Type: KB  Organism: Stenotrophomonas maltophilia (06-02-25 @ 07:29)  Organism: Stenotrophomonas maltophilia (06-02-25 @ 07:29)      -  Levofloxacin: S <=0.5      Method Type: GOLDEN      -  Trimethoprim/Sulfamethoxazole: S <=0.5/9.5    Culture - Blood (collected 05-27-25 @ 09:10)  Source: Blood Blood-Peripheral  Final Report (06-01-25 @ 12:00):    No growth at 5 days    Culture - Blood (collected 05-27-25 @ 09:00)  Source: Blood Blood-Peripheral  Final Report (06-01-25 @ 12:00):    No growth at 5 days          Radiology: reviewed

## 2025-06-04 NOTE — PROGRESS NOTE ADULT - ASSESSMENT
65 y/o M with PMHx DM2, osteomyelitis, CAD, PAD s/p RLE angioplasty 2020, s/p surgical amputation of R forefoot , s/p L PT angioplasty 12/2/24 for L lat foot DFU on plavix, HTN, s/p recent admission for left foot infection s/p PICC placement for IV abx  sent by Podiatry Dr. Stark for admission for OR left foot partial 1st and 5th ray resection, heel debridement with antibiotic.  - MRI of the left foot demonstrates ulceration at the great toe with underlying cortical destruction of the distal phalanx and acute osteomyelitis involving the distal and proximal phalanx of the great toe.  Focal ulceration at the base of the fifth metatarsal with underlying marrow edema within the fifth metatarsal base and intramedullary cavity. Finding is indeterminate and may represent concurrent early osteomyelitis versus reactive changes.  Marrow edema involving the distal phalanx and middle phalanx of the little toe without definitive corresponding ulceration or abnormal T1 weighted signal.   Nonspecific bone marrow edema pattern within the intermediate cuneiform, lateral cuneiform, and navicular bone favored to represent reactive changes from osteoarthrosis. [All Other ROS] : all other reviewed systems are negative

## 2025-06-04 NOTE — PROGRESS NOTE ADULT - ATTENDING COMMENTS
63 y/o M with PMHx DM2, osteomyelitis, CAD, PAD s/p RLE angioplasty 2020, s/p surgical amputation of R forefoot , s/p L PT angioplasty 12/2/24 for L lat foot DFU on plavix, HTN, s/p recent admission for left foot infection s/p PICC placement for IV abx  sent by Podiatry Dr. Stark for admission for OR intervention tomorrow.  Pt seen, examined case & care plan d/w pt, residents at detail. 2 u pRBC on 6/1   Consult-  ID-DR XENIA Calhoun  -IV abx-  VIA PICC line-On Stop cefepime, switch to ertapenem 1gm q24h for ease of dosing; Plan x6 weeks until 7/8/25  minocycline 200mg BID PO (6/2-); Plan x6 weeks until 6/13/25  Podiatry-DR Joe Stark d/w POST Op -NWB Left foot -bleeding post op -dressing changed ,Abx ,D/W DR Stark at detail. -[facility] follow up for Hyperbaric Rx -pt has appointment in AM   PO diet   DVT ppx   D/W PT at detail.  D/W CM at detail D/C Home today with PICC line & PO ABx a sper ID  Total d/c  care time is 60 minutes. 65 y/o M with PMHx DM2, osteomyelitis, CAD, PAD s/p RLE angioplasty 2020, s/p surgical amputation of R forefoot , s/p L PT angioplasty 12/2/24 for L lat foot DFU on plavix, HTN, s/p recent admission for left foot infection s/p PICC placement for IV abx  sent by Podiatry Dr. Stark for admission for OR intervention tomorrow.  Pt seen, examined case & care plan d/w pt, residents at detail. 2 u pRBC on 6/1   Consult-  ID-DR XENIA Calhoun  -IV abx-  VIA PICC line-On Stop cefepime, switch to ertapenem 1gm q24h for ease of dosing; Plan x6 weeks until 7/8/25  minocycline 200mg BID PO (6/2-); Plan x6 weeks until 6/13/25  Podiatry-DR Joe Stark d/w POST Op -NWB Left foot -bleeding post op -dressing changed ,Abx ,D/W DR Stark at detail. -[facility] follow up for Hyperbaric Rx -pt has appointment in AM   PO diet   DVT ppx   D/W PT at detail.  D/W CM at detail D/C Home today with PICC line & PO ABx as per ID,   PMD Notified.   Total d/c  care time is 60 minutes.

## 2025-06-04 NOTE — PROGRESS NOTE ADULT - SUBJECTIVE AND OBJECTIVE BOX
[facility] -- Vlad Gallardo Pannella, Patel, Savella, Goodger: Office # [phone]    Follow Up:  PAD HTN   Subjective/Observations: Patient seen and examined. Patient alert awake, Denies chest pain palpitation dizziness, denies difficulty breathing , no orthopnea or PND noted. Tolerating room air      REVIEW OF SYSTEMS: All other review of systems are negative unless indicated above    PAST MEDICAL & SURGICAL HISTORY:  HTN (hypertension)      Diabetes      Hyperlipidemia      PVD (peripheral vascular disease)      Toe osteomyelitis, right      H/O angioplasty RLE      Status post amputation of toe          MEDICATIONS  (STANDING):  aspirin enteric coated 81 milliGRAM(s) Oral daily  atorvastatin 40 milliGRAM(s) Oral at bedtime  clopidogrel Tablet 75 milliGRAM(s) Oral daily  dextrose 5%. 1000 milliLiter(s) (100 mL/Hr) IV Continuous <Continuous>  dextrose 5%. 1000 milliLiter(s) (50 mL/Hr) IV Continuous <Continuous>  dextrose 50% Injectable 25 Gram(s) IV Push once  dextrose 50% Injectable 12.5 Gram(s) IV Push once  dextrose 50% Injectable 25 Gram(s) IV Push once  enoxaparin Injectable 40 milliGRAM(s) SubCutaneous every 24 hours  ertapenem  IVPB 1000 milliGRAM(s) IV Intermittent every 24 hours  gabapentin 300 milliGRAM(s) Oral three times a day  glucagon  Injectable 1 milliGRAM(s) IntraMuscular once  insulin glargine Injectable (LANTUS) 15 Unit(s) SubCutaneous every morning  insulin lispro (ADMELOG) corrective regimen sliding scale   SubCutaneous three times a day before meals  insulin lispro (ADMELOG) corrective regimen sliding scale   SubCutaneous at bedtime  insulin lispro Injectable (ADMELOG) 5 Unit(s) SubCutaneous three times a day before meals  lisinopril 40 milliGRAM(s) Oral daily  metoprolol succinate ER 50 milliGRAM(s) Oral daily  minocycline 200 milliGRAM(s) Oral every 12 hours  senna 2 Tablet(s) Oral at bedtime    MEDICATIONS  (PRN):  acetaminophen   IVPB .. 1000 milliGRAM(s) IV Intermittent every 8 hours PRN Mild Pain (1 - 3)  aluminum hydroxide/magnesium hydroxide/simethicone Suspension 30 milliLiter(s) Oral every 4 hours PRN Dyspepsia  dextrose Oral Gel 15 Gram(s) Oral once PRN Blood Glucose LESS THAN 70 milliGRAM(s)/deciliter  HYDROmorphone  Injectable 0.5 milliGRAM(s) IV Push four times a day PRN Severe Pain (7 - 10)  melatonin 3 milliGRAM(s) Oral at bedtime PRN Insomnia  ondansetron Injectable 4 milliGRAM(s) IV Push every 8 hours PRN Nausea and/or Vomiting  traMADol 50 milliGRAM(s) Oral every 6 hours PRN Moderate Pain (4 - 6)    Allergies    No Known Allergies        Vital Signs Last 24 Hrs  T(C): 36.9 (04 Jun 2025 04:52), Max: 36.9 (03 Jun 2025 13:26)  T(F): 98.4 (04 Jun 2025 04:52), Max: 98.5 (03 Jun 2025 13:26)  HR: 79 (04 Jun 2025 04:52) (73 - 79)  BP: 123/58 (04 Jun 2025 04:52) (123/58 - 143/81)  BP(mean): --  RR: 18 (04 Jun 2025 04:52) (16 - 18)  SpO2: 99% (04 Jun 2025 04:52) (98% - 100%)    Parameters below as of 04 Jun 2025 04:52  Patient On (Oxygen Delivery Method): room air      I&O's Summary    04 Jun 2025 07:01  -  04 Jun 2025 09:12  --------------------------------------------------------  IN: 0 mL / OUT: 500 mL / NET: -500 mL          TELE: no tele  PHYSICAL EXAM:  Constitutional: NAD, awake and alert,   HEENT: Moist Mucous Membranes, Anicteric  Pulmonary: Non-labored, breath sounds are clear bilaterally, No wheezing, rales or rhonchi  Cardiovascular: Regular, S1 and S2, No murmurs, No rubs, gallops or clicks  Gastrointestinal:  soft, nontender, nondistended   Lymph: No peripheral edema. No lymphadenopathy,  chronic  Left leg calf circumference bigger than right , left wound intact  Skin: No visible rashes or ulcers.  Psych:  Mood & affect appropriate      LABS: All Labs Reviewed:                        10.6   11.60 )-----------( 370      ( 04 Jun 2025 05:50 )             32.3                         9.5    12.07 )-----------( 314      ( 03 Jun 2025 06:10 )             29.0                         9.4    12.01 )-----------( 270      ( 02 Jun 2025 06:15 )             28.7     04 Jun 2025 05:50    136    |  101    |  23     ----------------------------<  151    4.3     |  29     |  0.95   03 Jun 2025 06:10    137    |  103    |  16     ----------------------------<  151    4.2     |  28     |  0.89   02 Jun 2025 06:15    137    |  106    |  24     ----------------------------<  172    4.6     |  28     |  1.00     Ca    9.0        04 Jun 2025 05:50  Ca    8.4        03 Jun 2025 06:10  Ca    8.4        02 Jun 2025 06:15    TPro  7.1    /  Alb  2.5    /  TBili  0.4    /  DBili  x      /  AST  14     /  ALT  28     /  AlkPhos  68     04 Jun 2025 05:50  TPro  6.8    /  Alb  2.4    /  TBili  0.4    /  DBili  x      /  AST  13     /  ALT  27     /  AlkPhos  65     03 Jun 2025 06:10  TPro  6.5    /  Alb  2.3    /  TBili  0.4    /  DBili  x      /  AST  9      /  ALT  20     /  AlkPhos  59     02 Jun 2025 06:15   LIVER FUNCTIONS - ( 04 Jun 2025 05:50 )  Alb: 2.5 g/dL / Pro: 7.1 g/dL / ALK PHOS: 68 U/L / ALT: 28 U/L / AST: 14 U/L / GGT: x           Troponin I, High Sensitivity Result: 7.2 ng/L (05-29-25 @ 09:00)    12 Lead ECG:   Ventricular Rate 89 BPM    Atrial Rate 89 BPM    P-R Interval 138 ms    QRS Duration 88 ms    Q-T Interval 408 ms    QTC Calculation(Bazett) 496 ms    P Axis 60 degrees    R Axis 12 degrees    T Axis 38 degrees    Diagnosis Line Normal sinus rhythm  Prolonged QT  Confirmed by CATHERINE REBOLLEDO ([id number]) on 5/29/2025 1:05:54 PM (05-29-25 @ 08:20)     [facility] -- Vlad Gallardo Pannella, Patel, Savella, Goodger: Office # [phone]    Follow Up:  PAD HTN   Subjective/Observations: Patient seen and examined. Patient alert awake, Denies chest pain palpitation dizziness, denies difficulty breathing , no orthopnea or PND noted. Tolerating room air      REVIEW OF SYSTEMS: All other review of systems are negative unless indicated above    PAST MEDICAL & SURGICAL HISTORY:  HTN (hypertension)      Diabetes      Hyperlipidemia      PVD (peripheral vascular disease)      Toe osteomyelitis, right      H/O angioplasty RLE      Status post amputation of toe          MEDICATIONS  (STANDING):  aspirin enteric coated 81 milliGRAM(s) Oral daily  atorvastatin 40 milliGRAM(s) Oral at bedtime  clopidogrel Tablet 75 milliGRAM(s) Oral daily  dextrose 5%. 1000 milliLiter(s) (100 mL/Hr) IV Continuous <Continuous>  dextrose 5%. 1000 milliLiter(s) (50 mL/Hr) IV Continuous <Continuous>  dextrose 50% Injectable 25 Gram(s) IV Push once  dextrose 50% Injectable 12.5 Gram(s) IV Push once  dextrose 50% Injectable 25 Gram(s) IV Push once  enoxaparin Injectable 40 milliGRAM(s) SubCutaneous every 24 hours  ertapenem  IVPB 1000 milliGRAM(s) IV Intermittent every 24 hours  gabapentin 300 milliGRAM(s) Oral three times a day  glucagon  Injectable 1 milliGRAM(s) IntraMuscular once  insulin glargine Injectable (LANTUS) 15 Unit(s) SubCutaneous every morning  insulin lispro (ADMELOG) corrective regimen sliding scale   SubCutaneous three times a day before meals  insulin lispro (ADMELOG) corrective regimen sliding scale   SubCutaneous at bedtime  insulin lispro Injectable (ADMELOG) 5 Unit(s) SubCutaneous three times a day before meals  lisinopril 40 milliGRAM(s) Oral daily  metoprolol succinate ER 50 milliGRAM(s) Oral daily  minocycline 200 milliGRAM(s) Oral every 12 hours  senna 2 Tablet(s) Oral at bedtime    MEDICATIONS  (PRN):  acetaminophen   IVPB .. 1000 milliGRAM(s) IV Intermittent every 8 hours PRN Mild Pain (1 - 3)  aluminum hydroxide/magnesium hydroxide/simethicone Suspension 30 milliLiter(s) Oral every 4 hours PRN Dyspepsia  dextrose Oral Gel 15 Gram(s) Oral once PRN Blood Glucose LESS THAN 70 milliGRAM(s)/deciliter  HYDROmorphone  Injectable 0.5 milliGRAM(s) IV Push four times a day PRN Severe Pain (7 - 10)  melatonin 3 milliGRAM(s) Oral at bedtime PRN Insomnia  ondansetron Injectable 4 milliGRAM(s) IV Push every 8 hours PRN Nausea and/or Vomiting  traMADol 50 milliGRAM(s) Oral every 6 hours PRN Moderate Pain (4 - 6)    Allergies    No Known Allergies        Vital Signs Last 24 Hrs  T(C): 36.9 (04 Jun 2025 04:52), Max: 36.9 (03 Jun 2025 13:26)  T(F): 98.4 (04 Jun 2025 04:52), Max: 98.5 (03 Jun 2025 13:26)  HR: 79 (04 Jun 2025 04:52) (73 - 79)  BP: 123/58 (04 Jun 2025 04:52) (123/58 - 143/81)  BP(mean): --  RR: 18 (04 Jun 2025 04:52) (16 - 18)  SpO2: 99% (04 Jun 2025 04:52) (98% - 100%)    Parameters below as of 04 Jun 2025 04:52  Patient On (Oxygen Delivery Method): room air      I&O's Summary    04 Jun 2025 07:01  -  04 Jun 2025 09:12  --------------------------------------------------------  IN: 0 mL / OUT: 500 mL / NET: -500 mL          TELE: no tele  PHYSICAL EXAM:  Constitutional: NAD, awake and alert,   HEENT: Moist Mucous Membranes, Anicteric  Pulmonary: Non-labored, breath sounds are clear bilaterally, No wheezing, rales or rhonchi  Cardiovascular: Regular, S1 and S2, No murmurs, No rubs, gallops or clicks  Gastrointestinal:  soft, nontender, nondistended   Lymph: No peripheral edema. No lymphadenopathy, left foot  wound intact  Skin: No visible rashes or ulcers.  Psych:  Mood & affect appropriate      LABS: All Labs Reviewed:                        10.6   11.60 )-----------( 370      ( 04 Jun 2025 05:50 )             32.3                         9.5    12.07 )-----------( 314      ( 03 Jun 2025 06:10 )             29.0                         9.4    12.01 )-----------( 270      ( 02 Jun 2025 06:15 )             28.7     04 Jun 2025 05:50    136    |  101    |  23     ----------------------------<  151    4.3     |  29     |  0.95   03 Jun 2025 06:10    137    |  103    |  16     ----------------------------<  151    4.2     |  28     |  0.89   02 Jun 2025 06:15    137    |  106    |  24     ----------------------------<  172    4.6     |  28     |  1.00     Ca    9.0        04 Jun 2025 05:50  Ca    8.4        03 Jun 2025 06:10  Ca    8.4        02 Jun 2025 06:15    TPro  7.1    /  Alb  2.5    /  TBili  0.4    /  DBili  x      /  AST  14     /  ALT  28     /  AlkPhos  68     04 Jun 2025 05:50  TPro  6.8    /  Alb  2.4    /  TBili  0.4    /  DBili  x      /  AST  13     /  ALT  27     /  AlkPhos  65     03 Jun 2025 06:10  TPro  6.5    /  Alb  2.3    /  TBili  0.4    /  DBili  x      /  AST  9      /  ALT  20     /  AlkPhos  59     02 Jun 2025 06:15   LIVER FUNCTIONS - ( 04 Jun 2025 05:50 )  Alb: 2.5 g/dL / Pro: 7.1 g/dL / ALK PHOS: 68 U/L / ALT: 28 U/L / AST: 14 U/L / GGT: x           Troponin I, High Sensitivity Result: 7.2 ng/L (05-29-25 @ 09:00)    12 Lead ECG:   Ventricular Rate 89 BPM    Atrial Rate 89 BPM    P-R Interval 138 ms    QRS Duration 88 ms    Q-T Interval 408 ms    QTC Calculation(Bazett) 496 ms    P Axis 60 degrees    R Axis 12 degrees    T Axis 38 degrees    Diagnosis Line Normal sinus rhythm  Prolonged QT  Confirmed by CATHERINE REBOLLEDO ([id number]) on 5/29/2025 1:05:54 PM (05-29-25 @ 08:20)     [facility] -- Vlad Gallardo Pannella, Patel, Savella, Goodger: Office # [phone]    Follow Up:  PAD HTN   Subjective/Observations: Patient seen and examined. Patient alert awake, Denies chest pain palpitation dizziness, denies difficulty breathing , no orthopnea or PND noted. Tolerating room air      REVIEW OF SYSTEMS: All other review of systems are negative unless indicated above    PAST MEDICAL & SURGICAL HISTORY:  HTN (hypertension)      Diabetes      Hyperlipidemia      PVD (peripheral vascular disease)      Toe osteomyelitis, right      H/O angioplasty RLE      Status post amputation of toe      MEDICATIONS  (STANDING):  aspirin enteric coated 81 milliGRAM(s) Oral daily  atorvastatin 40 milliGRAM(s) Oral at bedtime  clopidogrel Tablet 75 milliGRAM(s) Oral daily  dextrose 5%. 1000 milliLiter(s) (100 mL/Hr) IV Continuous <Continuous>  dextrose 5%. 1000 milliLiter(s) (50 mL/Hr) IV Continuous <Continuous>  dextrose 50% Injectable 25 Gram(s) IV Push once  dextrose 50% Injectable 12.5 Gram(s) IV Push once  dextrose 50% Injectable 25 Gram(s) IV Push once  enoxaparin Injectable 40 milliGRAM(s) SubCutaneous every 24 hours  ertapenem  IVPB 1000 milliGRAM(s) IV Intermittent every 24 hours  gabapentin 300 milliGRAM(s) Oral three times a day  glucagon  Injectable 1 milliGRAM(s) IntraMuscular once  insulin glargine Injectable (LANTUS) 15 Unit(s) SubCutaneous every morning  insulin lispro (ADMELOG) corrective regimen sliding scale   SubCutaneous three times a day before meals  insulin lispro (ADMELOG) corrective regimen sliding scale   SubCutaneous at bedtime  insulin lispro Injectable (ADMELOG) 5 Unit(s) SubCutaneous three times a day before meals  lisinopril 40 milliGRAM(s) Oral daily  metoprolol succinate ER 50 milliGRAM(s) Oral daily  minocycline 200 milliGRAM(s) Oral every 12 hours  senna 2 Tablet(s) Oral at bedtime    MEDICATIONS  (PRN):  acetaminophen   IVPB .. 1000 milliGRAM(s) IV Intermittent every 8 hours PRN Mild Pain (1 - 3)  aluminum hydroxide/magnesium hydroxide/simethicone Suspension 30 milliLiter(s) Oral every 4 hours PRN Dyspepsia  dextrose Oral Gel 15 Gram(s) Oral once PRN Blood Glucose LESS THAN 70 milliGRAM(s)/deciliter  HYDROmorphone  Injectable 0.5 milliGRAM(s) IV Push four times a day PRN Severe Pain (7 - 10)  melatonin 3 milliGRAM(s) Oral at bedtime PRN Insomnia  ondansetron Injectable 4 milliGRAM(s) IV Push every 8 hours PRN Nausea and/or Vomiting  traMADol 50 milliGRAM(s) Oral every 6 hours PRN Moderate Pain (4 - 6)    Allergies    No Known Allergies        Vital Signs Last 24 Hrs  T(C): 36.9 (04 Jun 2025 04:52), Max: 36.9 (03 Jun 2025 13:26)  T(F): 98.4 (04 Jun 2025 04:52), Max: 98.5 (03 Jun 2025 13:26)  HR: 79 (04 Jun 2025 04:52) (73 - 79)  BP: 123/58 (04 Jun 2025 04:52) (123/58 - 143/81)  BP(mean): --  RR: 18 (04 Jun 2025 04:52) (16 - 18)  SpO2: 99% (04 Jun 2025 04:52) (98% - 100%)    Parameters below as of 04 Jun 2025 04:52  Patient On (Oxygen Delivery Method): room air      I&O's Summary    04 Jun 2025 07:01  -  04 Jun 2025 09:12  --------------------------------------------------------  IN: 0 mL / OUT: 500 mL / NET: -500 mL          TELE: no tele  PHYSICAL EXAM:  Constitutional: NAD, awake and alert,   HEENT: Moist Mucous Membranes, Anicteric  Pulmonary: Non-labored, breath sounds are clear bilaterally, No wheezing, rales or rhonchi  Cardiovascular: Regular, S1 and S2, No murmurs, No rubs, gallops or clicks  Gastrointestinal:  soft, nontender, nondistended   Lymph: No peripheral edema. No lymphadenopathy, left foot  wound intact  Skin: No visible rashes or ulcers.  Psych:  Mood & affect appropriate      LABS: All Labs Reviewed:                        10.6   11.60 )-----------( 370      ( 04 Jun 2025 05:50 )             32.3                         9.5    12.07 )-----------( 314      ( 03 Jun 2025 06:10 )             29.0                         9.4    12.01 )-----------( 270      ( 02 Jun 2025 06:15 )             28.7     04 Jun 2025 05:50    136    |  101    |  23     ----------------------------<  151    4.3     |  29     |  0.95   03 Jun 2025 06:10    137    |  103    |  16     ----------------------------<  151    4.2     |  28     |  0.89   02 Jun 2025 06:15    137    |  106    |  24     ----------------------------<  172    4.6     |  28     |  1.00     Ca    9.0        04 Jun 2025 05:50  Ca    8.4        03 Jun 2025 06:10  Ca    8.4        02 Jun 2025 06:15    TPro  7.1    /  Alb  2.5    /  TBili  0.4    /  DBili  x      /  AST  14     /  ALT  28     /  AlkPhos  68     04 Jun 2025 05:50  TPro  6.8    /  Alb  2.4    /  TBili  0.4    /  DBili  x      /  AST  13     /  ALT  27     /  AlkPhos  65     03 Jun 2025 06:10  TPro  6.5    /  Alb  2.3    /  TBili  0.4    /  DBili  x      /  AST  9      /  ALT  20     /  AlkPhos  59     02 Jun 2025 06:15   LIVER FUNCTIONS - ( 04 Jun 2025 05:50 )  Alb: 2.5 g/dL / Pro: 7.1 g/dL / ALK PHOS: 68 U/L / ALT: 28 U/L / AST: 14 U/L / GGT: x           Troponin I, High Sensitivity Result: 7.2 ng/L (05-29-25 @ 09:00)    12 Lead ECG:   Ventricular Rate 89 BPM    Atrial Rate 89 BPM    P-R Interval 138 ms    QRS Duration 88 ms    Q-T Interval 408 ms    QTC Calculation(Bazett) 496 ms    P Axis 60 degrees    R Axis 12 degrees    T Axis 38 degrees    Diagnosis Line Normal sinus rhythm  Prolonged QT  Confirmed by CATHERINE REBOLLEDO ([id number]) on 5/29/2025 1:05:54 PM (05-29-25 @ 08:20)

## 2025-06-04 NOTE — PROGRESS NOTE ADULT - NS ATTEND AMEND GEN_ALL_CORE FT
64M DM2, hx osteomyelitis, CAD, PAD s/p RLE angioplasty 2020, s/p surgical amputation of R forefoot , s/p L PT angioplasty 12/2/24 for L lat foot DFU on plavix, HTN, HLD sent in by wound clinic for left foot infections and multiple wounds. Vascular surgery to evaluate further with LLE angiogram.     - S/p LLE angio 5/2,  vascular following   - S/p debridement on 5/28 w/o cardiac complications  - S/p RRT 5/29 for lethargy ? vasovagal, now asymptomatic    - S/p RRT  for fall/ hypotension, 1L NS bolus x1, s/p PRBC   - Continue ASA, Plavix  - Continue Lipitor   - Continue Lisinopril 40 and Toprol 50
64M DM2, hx osteomyelitis, CAD, PAD s/p RLE angioplasty 2020, s/p surgical amputation of R forefoot , s/p L PT angioplasty 12/2/24 for L lat foot DFU on plavix, HTN, HLD s/p surgical amputation of R forefoot , s/p L PT angioplasty 12/2/24 for L lat foot DFU on plavix, HTN presenting with wound infection.     s/p Detachment, debridement, and reattachment of peroneus brevis tendon  No signs of significant ischemia or volume overload.   Previous TTE (1/2/24) shows EF 61%, Normal LV mass and diastolic function, Normal RV size and function.  Continue BB and ace inhibitor  cont antiplatelets and statin  vascular and podiatry follow up.
64M DM2, hx osteomyelitis, CAD, PAD s/p RLE angioplasty 2020, s/p surgical amputation of R forefoot , s/p L PT angioplasty 12/2/24 for L lat foot DFU on plavix, HTN, HLD sent in by wound clinic for left foot infections and multiple wounds. Vascular surgery to evaluate further with LLE angiogram.     - S/p LLE angio 5/2,  vascular following   - S/p debridement on 5/28 w/o cardiac complications  - S/p RRT this am for lethargy.  Seems to have had vasovagal episode.  Check for acute blood loss with CBC  - Continue antibiotics per ID  - Holding ASA, Plavix  - Continue Lipitor  - Continue BB and lisinopril
64M DM2, hx osteomyelitis, CAD, PAD s/p RLE angioplasty 2020, s/p surgical amputation of R forefoot , s/p L PT angioplasty 12/2/24 for L lat foot DFU on plavix, HTN, HLD sent in by wound clinic for left foot infections and multiple wounds. Vascular surgery to evaluate further with LLE angiogram.     - Pt p/w L foot infection with multiple wounds sent in by [facility], podiatry following   - S/p LLE angio 5/2,  vascular following   - S/p debridement on 5/28 w/o cardiac complications  - S/p RRT 5/29 for lethargy ? vasovagal, now asymptomatic    - No evidence of any active ischemia    - Continue ASA, Plavix  - Continue Lipitor   - No evidence of any meaningful volume overload
64M DM2, hx osteomyelitis, CAD, PAD s/p RLE angioplasty 2020, s/p surgical amputation of R forefoot , s/p L PT angioplasty 12/2/24 for L lat foot DFU on plavix, HTN, HLD sent in by wound clinic for left foot infections and multiple wounds. Vascular surgery to evaluate further with LLE angiogram.     - S/p LLE angio 5/2, vascular following   - S/p debridement on 5/28 w/o cardiac complications  - S/p RRT 5/29 for lethargy ? vasovagal, now asymptomatic    - S/p RRT  for fall/ hypotension, 1L NS bolus x1, s/p PRBC   - Continue ASA, Plavix  - Continue Lipitor   - Continue Lisinopril 40 and Toprol 50.
64M DM2, hx osteomyelitis, CAD, PAD s/p RLE angioplasty 2020, s/p surgical amputation of R forefoot , s/p L PT angioplasty 12/2/24 for L lat foot DFU on plavix, HTN, HLD sent in by wound clinic for left foot infections and multiple wounds. Vascular surgery to evaluate further with LLE angiogram.     - Pt p/w L foot infection with multiple wounds sent in by [facility], podiatry following   - S/p LLE angio 5/2,  vascular following   - S/p debridement on 5/28 w/o cardiac complications  - S/p RRT 5/29 for lethargy ? vasovagal, now asymptomatic    - S/p RRT 6/1 for fall/ hypotension, 1L NS bolus x1, s/p PRBC   - No evidence of any active ischemia    - Continue ASA, Plavix  - Continue Lipitor   - No evidence of any meaningful volume overload.

## 2025-06-04 NOTE — PROGRESS NOTE ADULT - PROBLEM SELECTOR PLAN 4
Type 2 diabetes mellitus.   ·  Plan: Chronic  -On home Trulicity, Jardiance and Metformin  -Hold home oral meds  -MDISS with FS QAC/QHS   -Lantus 15u qhs and 5u pre meal   -Hypoglycemia protocol  -A1c 7.9%  -Pt with neuropathy continue Gabapentin 300mg TID Type 2 diabetes mellitus.   ·  Plan: Chronic  -On home Trulicity, Jardiance and Metformin  -Hold home oral meds  -MDISS with FS QAC/QHS   -Lantus 15u qhs and 5u pre meal   -Hypoglycemia protocol  -A1c 7.9%  -Pt with neuropathy continue Gabapentin 300mg TID  Resume home meds

## 2025-06-04 NOTE — PROGRESS NOTE ADULT - PROBLEM SELECTOR PROBLEM 1
DM foot ulcer

## 2025-06-04 NOTE — PROGRESS NOTE ADULT - PROBLEM SELECTOR PLAN 3
Chronic. Pt follows with Vascular outpt. Pt s/p L PT angioplasty 12/2/24 for L lat foot DFU on Plavix  -C/w Lipitor and Aspirin and Plavix Chronic. Pt follows with Vascular outpt. Pt s/p L PT angioplasty 12/2/24 for L lat foot DFU on Plavix  -C/w Lipitor and Aspirin, statin and Plavix

## 2025-06-04 NOTE — PROGRESS NOTE ADULT - PROBLEM SELECTOR PLAN 2
H/H slowly dropping, s/p RRT for symptomatic Anemia with Hypotension   - RRT 5/29 for vasovagal syncope 2/2 to acute blood loss  - s/p 2u PRBCs with improvement in Hg  - H/H stable  - Type and screen active  - Continue Aspirin, Lovenox and Plavix as per Podiatry H/H slowly dropping, s/p RRT for symptomatic Anemia with Hypotension   - RRT 5/29 for vasovagal syncope 2/2 to acute blood loss post op  - s/p 2u PRBCs with improvement in Hg  - H/H stable  - Type and screen active  - Continue Aspirin, Lovenox and Plavix as per Podiatry  CBC as out pt

## 2025-06-04 NOTE — PROGRESS NOTE ADULT - REASON FOR ADMISSION
left foot osteomyelitis

## 2025-06-04 NOTE — PROGRESS NOTE ADULT - ASSESSMENT
64M DM2, hx osteomyelitis, CAD, PAD s/p RLE angioplasty 2020, s/p surgical amputation of R forefoot , s/p L PT angioplasty 12/2/24 for L lat foot DFU on plavix, HTN, HLD sent in by wound clinic for left foot infections and multiple wounds. Vascular surgery to evaluate further with LLE angiogram.     - Pt p/w L foot infection with multiple wounds sent in by [facility], podiatry following   - S/p LLE angio 5/2,  vascular following   - S/p debridement on 5/28 w/o cardiac complications  - S/p RRT 5/29 for lethargy ? vasovagal, now asymptomatic    - S/p RRT  for fall/ hypotension, 1L NS bolus x1, s/p PRBC     - No evidence of any active ischemia    - Continue ASA, Plavix  - Continue Lipitor   - No evidence of any meaningful volume overload.     - BP stable and controlled   - Continue Lisinopril 40 and Toprol 50    - Monitor and replete lytes, keep K>4, Mg>2.  - Will continue to follow.  follows office Dr Chu

## 2025-06-04 NOTE — PROGRESS NOTE ADULT - PROBLEM SELECTOR PROBLEM 3
PAD (peripheral artery disease)
Type 2 diabetes mellitus
PAD (peripheral artery disease)

## 2025-06-04 NOTE — CASE MANAGEMENT PROGRESS NOTE - NSCMPROGRESSNOTE_GEN_ALL_CORE
Patient is for discharge home today with home care and 6 weeks of ertapenem (Invanz) and home care. Referral sent [facility] for resumption accepted. Referral for home care sent to [facility]. DME: commode ordered. CM will continue to follow.

## 2025-06-04 NOTE — PROGRESS NOTE ADULT - PROBLEM SELECTOR PROBLEM 2
Anemia
PAD (peripheral artery disease)
Anemia

## 2025-06-04 NOTE — PROGRESS NOTE ADULT - PROBLEM SELECTOR PROBLEM 5
HTN (hypertension)
Encounter for deep vein thrombosis (DVT) prophylaxis
HTN (hypertension)

## 2025-06-04 NOTE — PROGRESS NOTE ADULT - PROBLEM SELECTOR PLAN 5
Chronic-Hypotension on 6/1   -Continue home Lisinopril and Metoprolol   -C/w home meds with hold parameters  -Monitor hemodynamics.
Chronic-Hypotension on 6/1   -Continue home Lisinopril and Metoprolol with hold parameters  -Monitor hemodynamics.
Chronic-Hypotension on 6/1   -Continue home Lisinopril and Metoprolol with hold parameters  -Monitor hemodynamics.
Chronic  -Continue home Lisinopril and Metoprolol   -C/w home meds with hold parameters  -Monitor hemodynamics.
Chronic  -Continue home Lisinopril and Metoprolol   -C/w home meds with hold parameters  -Monitor hemodynamics.
Lovenox starting tomorrow 5/29
Chronic  -Continue home Lisinopril and Metoprolol   -C/w home meds with hold parameters  -Monitor hemodynamics.
Chronic-Hypotension on 6/1   -Continue home Lisinopril and Metoprolol with hold parameters  -Monitor hemodynamics.

## 2025-06-04 NOTE — PROGRESS NOTE ADULT - PROBLEM SELECTOR PROBLEM 4
Type 2 diabetes mellitus
HTN (hypertension)
Type 2 diabetes mellitus

## 2025-06-04 NOTE — PROGRESS NOTE ADULT - PROBLEM SELECTOR PLAN 1
Patient presents with left DM foot ulcer/ wound Gangrene Left BIG Toe,  left heel wound   - podiatry Dr. Stark for [facility]  - POD7 with podiatry: partial 1st and 5th ray resection, heel debridement with antibiotic cement placement and reattachment of peroneus brevis tendon into the cuboid   - Surgical path results show acute and chronic osteomyelitis in first and fifth metatarsals on clean margin from OR  - ID Dr. Melany Calhoun IV Invanz --->Stop cefepime, on ertapenem 1gm q24h for ease of dosing; Plan x6 weeks until 7/8/25  - minocycline 200mg BID PO (6/2-); Plan x6 weeks until 6/13/25  - Continue Aspirin, Lovenox and Plavix   - Mild Leukocytosis -likely reactive   - Pain meds  IV Tylenol, PRN, tramadol PRN for moderate pain, IV Dilaudid PRN for severe pain  - PT eval: home PT  - d/w PT- pt with concerns of stairs at home, transportation. PT will see patient today and will practice stairs. Home PT does not see pts on day of discharge, will see him the day after discharge  - Medically stable for discharge today Patient presents with left DM foot ulcer/ wound Gangrene Left BIG Toe,  left heel wound   - podiatry Dr. Stark for [facility]  - POD7 with podiatry: partial 1st and 5th ray resection, heel debridement with antibiotic cement placement and reattachment of peroneus brevis tendon into the cuboid   - Surgical path results show acute and chronic osteomyelitis in first and fifth metatarsals on clean margin from OR  - ID Dr. Melany Calhoun IV Invanz --->Stop cefepime, on ertapenem 1gm q24h for ease of dosing; Plan x6 weeks until 7/8/25  - minocycline 200mg BID PO (6/2-); Plan x6 weeks until 7/13/25  - Continue Aspirin, Lovenox and Plavix   - Mild Leukocytosis -likely reactive   - Pain meds  IV Tylenol, PRN, tramadol PRN for moderate pain, IV Dilaudid PRN for severe pain  - PT eval: home PT  - d/w PT- pt with concerns of stairs at home, transportation. PT will see patient today and will practice stairs. Home PT does not see pts on day of discharge, will see him the day after discharge  - Medically stable for discharge today Patient presents with left DM foot ulcer/ wound Gangrene Left BIG Toe,  left heel wound   - podiatry Dr. Stark for [facility]  - POD7 with podiatry: partial 1st and 5th ray resection, heel debridement with antibiotic cement placement and reattachment of peroneus brevis tendon into the cuboid   - Surgical path results show acute and chronic osteomyelitis in first and fifth metatarsals on clean margin from OR  - ID Dr. Melany Calhoun IV Invanz --->Stop cefepime, on ertapenem 1gm q24h for ease of dosing; Plan x6 weeks until 7/8/25  - minocycline 200mg BID PO (6/2-); Plan x6 weeks until 7/13/25  - Continue Aspirin, Lovenox and Plavix   - Mild Leukocytosis -likely reactive   - Pain meds  IV Tylenol, PRN, tramadol PRN for moderate pain, IV Dilaudid PRN for severe pain  - PT eval: home PT  - d/w PT- pt with concerns of stairs at home, transportation. PT will see patient today and will practice stairs. Home PT does not see pts on day of discharge, will see him the day after discharge  - Pt to follow up with wound care for hyperbaric treatment, NO dressing changes at home as per podiatry  - Medically stable for discharge today

## 2025-06-04 NOTE — PROGRESS NOTE ADULT - SUBJECTIVE AND OBJECTIVE BOX
Patient is a 64y old  Male who presents with a chief complaint of left foot osteomyelitis (03 Jun 2025 10:58)    HPI:  63 y/o M with PMHx DM2, osteomyelitis, CAD, PAD s/p RLE angioplasty 2020, s/p surgical amputation of R forefoot , s/p L PT angioplasty 12/2/24 for L lat foot DFU on plavix, HTN, s/p recent admission for left foot infection s/p PICC placement for IV abx  sent by Podiatry Dr. Stark for admission for OR intervention tomorrow. Patient denies any complaints today. States after discharge he had outpatient visits with Vascular physician. He had imaging done and was suggested to have LimFlow procedure. Patient requested to have surgery as per Podiatry for his left foot wound prior to LimFlow. Vascular agreed. Patient states he can walk independently without pain and is currently comfortable.   Of note, patient was transitioned from Rocephin which was to be given via PICC line after previous discharge to Ertapenem by ID this Saturday.     ED course:   Vitals: T 97.7 HR 99 /69   Labs: ESR 64 wbc 10.77   Imaging: Cxray- Right PICC with tip in the SVC.  The heart is not accurately assessed in this AP projection.  The lungs are clear.  There is no pneumothorax or pleural effusion.  No acute bony abnormality.  Clear lungs    (27 May 2025 13:05)    INTERVAL HPI:  5/28: Patient seen and examined post op. complain,  of mild pain 3-4/10 IV Tylenol ordered otherwise denies complaints   5/29:  POD # 1Patient seen and examined at bedside. Denies complaints this AM however has bleeding from surgical site. RRT later called this AM (refer to rapid note) for weakness, diaphoresis likely vasovagal secondary to blood loss. Hb downtrended. Plan to hold Lovenox. Resume Asp and Plavix tomorrow. PT eval tomorrow , mild leukocytosis, drop in H/H  5/30: POD # 2 Pt seen, Examined, WBC mildly elevated ,On IV ABx  Low stable H/H   5/31:  POD # 3 Patient seen and examined at bedside. Denies complaints this AM. Hb dropped to 8.4 this AM, On IV Cefepime -Pathology results available. WBC -improving  6/1: POD # 4  Pt seen ,Examined, On IV ABx S/P RRT this AM , Hypotension, Low H/H -Plan for pRBC transfusion  Mild Leukocytosis   6/2:  POD # 5 Pt seen and examined at bedside. On IV abx. Pt with mild L heel pain, improved with tramadol. Hg 9.6 this AM s/p 2u PRBC yesterday. Leukocytosis improving. ID  added  minocycline 200mg BID to cover for stenotrophomonas, H/H stable   6/3:  POD # 6 Pt seen and examined at bedside. On IV abx and minocycline 200mg BID to cover for stenotrophomonas, H/H stable, pt with epistaxis overnight.  on IV Invanz daily  6/4: POD # 7 Medically stable for discharge today      OVERNIGHT EVENTS: None    Home Medications:  aspirin 81 mg oral delayed release tablet: 1 tab(s) orally once a day (04 Jun 2025 07:28)  atorvastatin 40 mg oral tablet: 1 tab(s) orally once a day (at bedtime) (27 May 2025 15:49)  Cinnamon: 1 cap(s) orally once a day (27 May 2025 15:51)  clopidogrel 75 mg oral tablet: 1 tab(s) orally once a day (27 May 2025 15:49)  ertapenem 1 g injection: 1 gram(s) injectable every 24 hours Via PICC line (04 Jun 2025 07:30)  gabapentin 300 mg oral capsule: 1 cap(s) orally 3 times a day (27 May 2025 15:49)  Jardiance 25 mg oral tablet: 1 tab(s) orally once a day (27 May 2025 15:49)  lisinopril 40 mg oral tablet: 1 tab(s) orally once a day (27 May 2025 15:49)  metFORMIN 1000 mg oral tablet: 1 tab(s) orally 2 times a day (27 May 2025 15:49)  metoprolol succinate 50 mg oral tablet, extended release: 1 tab(s) orally once a day (04 Jun 2025 07:28)  minocycline 100 mg oral capsule: 2 cap(s) orally every 12 hours (04 Jun 2025 07:30)  Trulicity Pen 1.5 mg/0.5 mL subcutaneous solution: 1.5 milligram(s) subcutaneously once a week (Sundays) (27 May 2025 15:52)  Tumeric : 1 cap orally once a day (27 May 2025 15:51)      MEDICATIONS  (STANDING):  aspirin enteric coated 81 milliGRAM(s) Oral daily  atorvastatin 40 milliGRAM(s) Oral at bedtime  clopidogrel Tablet 75 milliGRAM(s) Oral daily  dextrose 5%. 1000 milliLiter(s) (100 mL/Hr) IV Continuous <Continuous>  dextrose 5%. 1000 milliLiter(s) (50 mL/Hr) IV Continuous <Continuous>  dextrose 50% Injectable 25 Gram(s) IV Push once  dextrose 50% Injectable 12.5 Gram(s) IV Push once  dextrose 50% Injectable 25 Gram(s) IV Push once  enoxaparin Injectable 40 milliGRAM(s) SubCutaneous every 24 hours  ertapenem  IVPB 1000 milliGRAM(s) IV Intermittent every 24 hours  gabapentin 300 milliGRAM(s) Oral three times a day  glucagon  Injectable 1 milliGRAM(s) IntraMuscular once  insulin glargine Injectable (LANTUS) 15 Unit(s) SubCutaneous every morning  insulin lispro (ADMELOG) corrective regimen sliding scale   SubCutaneous three times a day before meals  insulin lispro (ADMELOG) corrective regimen sliding scale   SubCutaneous at bedtime  insulin lispro Injectable (ADMELOG) 5 Unit(s) SubCutaneous three times a day before meals  lisinopril 40 milliGRAM(s) Oral daily  metoprolol succinate ER 50 milliGRAM(s) Oral daily  minocycline 200 milliGRAM(s) Oral every 12 hours  senna 2 Tablet(s) Oral at bedtime    MEDICATIONS  (PRN):  acetaminophen   IVPB .. 1000 milliGRAM(s) IV Intermittent every 8 hours PRN Mild Pain (1 - 3)  aluminum hydroxide/magnesium hydroxide/simethicone Suspension 30 milliLiter(s) Oral every 4 hours PRN Dyspepsia  dextrose Oral Gel 15 Gram(s) Oral once PRN Blood Glucose LESS THAN 70 milliGRAM(s)/deciliter  HYDROmorphone  Injectable 0.5 milliGRAM(s) IV Push four times a day PRN Severe Pain (7 - 10)  melatonin 3 milliGRAM(s) Oral at bedtime PRN Insomnia  ondansetron Injectable 4 milliGRAM(s) IV Push every 8 hours PRN Nausea and/or Vomiting  traMADol 50 milliGRAM(s) Oral every 6 hours PRN Moderate Pain (4 - 6)      Allergies    No Known Allergies    Intolerances        Social History:  No  Tobacco: Denies  EtOH: Denies  Recreational drug use: Denies  Lives with: Wife at home   Ambulates: Independently   ADLs: Independent (27 May 2025 13:05)      REVIEW OF SYSTEMS:  CONSTITUTIONAL: No fever, No chills, No fatigue, No myalgia, No Body ache, No Weakness  EYES: No eye pain,  No visual disturbances, No discharge, NO Redness  ENMT:  No ear pain, No nose bleed, No vertigo; No sinus pain, NO throat pain, No Congestion  NECK: No pain, No stiffness  RESPIRATORY: No cough, NO wheezing, No  hemoptysis, NO  shortness of breath  CARDIOVASCULAR: No chest pain, palpitations  GASTROINTESTINAL: No abdominal pain, NO epigastric pain. No nausea, No vomiting; No diarrhea, No constipation. [  ] BM  GENITOURINARY: No dysuria, No frequency, No urgency, No hematuria, NO incontinence  NEUROLOGICAL: No headaches, No dizziness, No numbness, No tingling, No tremors, No weakness  EXT: No Swelling, No Pain, No Edema  SKIN:  [  ] No itching, burning, rashes, or lesions   MUSCULOSKELETAL: No joint pain ,No Jt swelling; No muscle pain, No back pain, No extremity pain  PSYCHIATRIC: No depression,  No anxiety,  No mood swings ,No difficulty sleeping at night  PAIN SCALE: [  ] None  [  ] Other-  ROS Unable to obtain due to - [  ] Dementia  [  ] Lethargy [  ] Drowsy [  ] Sedated [  ] non verbal  REST OF REVIEW Of SYSTEM - [ X ] Normal     Vital Signs Last 24 Hrs  T(C): 36.9 (04 Jun 2025 04:52), Max: 36.9 (03 Jun 2025 13:26)  T(F): 98.4 (04 Jun 2025 04:52), Max: 98.5 (03 Jun 2025 13:26)  HR: 79 (04 Jun 2025 04:52) (73 - 79)  BP: 123/58 (04 Jun 2025 04:52) (123/58 - 143/81)  BP(mean): --  RR: 18 (04 Jun 2025 04:52) (16 - 18)  SpO2: 99% (04 Jun 2025 04:52) (98% - 100%)    Parameters below as of 04 Jun 2025 04:52  Patient On (Oxygen Delivery Method): room air      Finger Stick          PHYSICAL EXAM: Rt U Ext PICC line   GENERAL:  [ x ] NAD , [ x ] well appearing, [  ] Agitated, [  ] Drowsy,  [  ] Lethargy, [  ] confused   HEAD:  [  x] Normal, [  ] Other  EYES:  [ x ] EOMI, [ x ] PERRLA, [x  ] conjunctiva and sclera clear normal, [  ] Other,  [ x ] Pallor,[  ] Discharge  ENMT:  [x  ] Normal, [ x ] Moist mucous membranes, [x  ] Good dentition, [ x ] No Thrush  NECK:  [  x] Supple, [x  ] No JVD, [ x ] Normal thyroid, [  ] Lymphadenopathy [  ] Other  CHEST/LUNG:  [  x] Clear to auscultation bilaterally, [ x ] Breath Sounds equal B/L  [x  ] poor effort  [x  ] No rales, [  x] No rhonchi  [ x ]  No wheezing,   HEART:  [ x ] Regular rate and rhythm, [  ] tachycardia, [  ] Bradycardia,  [  ] irregular  [ x ] No murmurs, No rubs, No gallops, [  ] PPM in place (Mfr:  )  ABDOMEN:  [ x ] Soft, [ x ] Nontender, [ x ] Nondistended, [ x ] No mass, [ x ] Bowel sounds present, [  ] obese+ Umbilical hernia +  NERVOUS SYSTEM:  [ x ] Alert & Oriented X3, [x  ] Nonfocal  [  ] Confusion  [  ] Encephalopathic [  ] Sedated [  ] Unable to assess, [  ] Dementia [  ] Other-  EXTREMITIES: NO C/C P Pulse + B/L  [x] left foot and ankle in dressing , Rt Foot TMA   LYMPH: No lymphadenopathy noted  SKIN:  [ x ] No rashes or lesions, [  ] Pressure Ulcers, [  ] ecchymosis, [  ] Skin Tears, [  ] Other    DIET: Diet, DASH/TLC:   Sodium & Cholesterol Restricted  Consistent Carbohydrate No Snacks (05-28-25 @ 12:07)      LABS:                        10.6   11.60 )-----------( 370      ( 04 Jun 2025 05:50 )             32.3     04 Jun 2025 05:50    136    |  101    |  23     ----------------------------<  151    4.3     |  29     |  0.95     Ca    9.0        04 Jun 2025 05:50    TPro  7.1    /  Alb  2.5    /  TBili  0.4    /  DBili  x      /  AST  14     /  ALT  28     /  AlkPhos  68     04 Jun 2025 05:50      Urinalysis Basic - ( 04 Jun 2025 05:50 )    Color: x / Appearance: x / SG: x / pH: x  Gluc: 151 mg/dL / Ketone: x  / Bili: x / Urobili: x   Blood: x / Protein: x / Nitrite: x   Leuk Esterase: x / RBC: x / WBC x   Sq Epi: x / Non Sq Epi: x / Bacteria: x        Culture Results:   Rare Stenotrophomonas maltophilia (05-28 @ 10:00)  Culture Results:   Growth in fluid media only Staphylococcus simulans (05-28 @ 10:00)  Culture Results:   Rare Staphylococcus pseudintermedius  Rare Staphylococcus saprophyticus (05-28 @ 10:00)          CARDIAC MARKERS ( 29 May 2025 09:00 )  x     / x     / x     / x     / 2.4 ng/mL          Culture - Tissue with Gram Stain (collected 28 May 2025 10:00)  Source: Tissue Other, LEFT FIFTH METATARSAL BONE  Gram Stain (02 Jun 2025 13:49):    No polymorphonuclear cells seen    No organisms seen  Final Report (02 Jun 2025 14:22):    Growth in fluid media only Staphylococcus simulans  Organism: Staphylococcus simulans (02 Jun 2025 14:22)  Organism: Staphylococcus simulans (02 Jun 2025 14:22)    Culture - Tissue with Gram Stain (collected 28 May 2025 10:00)  Source: Tissue Other, LEFT CALCANEUS BONE  Gram Stain (31 May 2025 12:05):    No polymorphonuclear cells seen    No organisms seen  Final Report (02 Jun 2025 08:54):    Rare Staphylococcus pseudintermedius    Rare Staphylococcus saprophyticus  Organism: Staphylococcus pseudintermedius  Staphylococcus saprophyticus (02 Jun 2025 08:54)  Organism: Staphylococcus pseudintermedius (02 Jun 2025 08:54)  Organism: Staphylococcus saprophyticus (02 Jun 2025 08:54)    Culture - Tissue with Gram Stain (collected 28 May 2025 10:00)  Source: Tissue Other, LEFT HALLUX BONE  Gram Stain (31 May 2025 11:59):    No polymorphonuclear cells seen    No organisms seen  Final Report (02 Jun 2025 07:29):    Rare Stenotrophomonas maltophilia  Organism: Stenotrophomonas maltophilia (02 Jun 2025 07:29)  Organism: Stenotrophomonas maltophilia (02 Jun 2025 07:29)  Organism: Stenotrophomonas maltophilia (02 Jun 2025 07:29)        HEALTH ISSUES - PROBLEM Dx:  DM foot ulcer    PAD (peripheral artery disease)    HTN (hypertension)    Encounter for deep vein thrombosis (DVT) prophylaxis    Anemia    Type 2 diabetes mellitus          Consultant(s) Notes Reviewed:  [x  ] YES     Care Discussed with [X] Consultants  [ x ] Patient  [  ] Family [  ] HCP [ x ]   [  ] Social Service  [x ] RN, [  ] Physical Therapy,[  ] Palliative care team  DVT PPX: [ x ] Lovenox, [  ] S C Heparin, [  ] Coumadin, [  ] Xarelto, [  ] Eliquis, [  ] Pradaxa, [  ] IV Heparin drip, [  ] SCD [  ] Contraindication 2 to GI Bleed,[  ] Ambulation [  ] Contraindicated 2 to  bleed [  ] Contraindicated 2 to Brain Bleed  Advanced directive: [x  ] None, [  ] DNR/DNI Patient is a 64y old  Male who presents with a chief complaint of left foot osteomyelitis (03 Jun 2025 10:58)    HPI:  65 y/o M with PMHx DM2, osteomyelitis, CAD, PAD s/p RLE angioplasty 2020, s/p surgical amputation of R forefoot , s/p L PT angioplasty 12/2/24 for L lat foot DFU on plavix, HTN, s/p recent admission for left foot infection s/p PICC placement for IV abx  sent by Podiatry Dr. Stark for admission for OR intervention tomorrow. Patient denies any complaints today. States after discharge he had outpatient visits with Vascular physician. He had imaging done and was suggested to have LimFlow procedure. Patient requested to have surgery as per Podiatry for his left foot wound prior to LimFlow. Vascular agreed. Patient states he can walk independently without pain and is currently comfortable.   Of note, patient was transitioned from Rocephin which was to be given via PICC line after previous discharge to Ertapenem by ID this Saturday.     ED course:   Vitals: T 97.7 HR 99 /69   Labs: ESR 64 wbc 10.77   Imaging: Cxray- Right PICC with tip in the SVC.  The heart is not accurately assessed in this AP projection.  The lungs are clear.  There is no pneumothorax or pleural effusion.  No acute bony abnormality.  Clear lungs    (27 May 2025 13:05)    INTERVAL HPI:  5/28: Patient seen and examined post op. complain,  of mild pain 3-4/10 IV Tylenol ordered otherwise denies complaints   5/29:  POD # 1Patient seen and examined at bedside. Denies complaints this AM however has bleeding from surgical site. RRT later called this AM (refer to rapid note) for weakness, diaphoresis likely vasovagal secondary to blood loss. Hb downtrended. Plan to hold Lovenox. Resume Asp and Plavix tomorrow. PT eval tomorrow , mild leukocytosis, drop in H/H  5/30: POD # 2 Pt seen, Examined, WBC mildly elevated ,On IV ABx  Low stable H/H   5/31:  POD # 3 Patient seen and examined at bedside. Denies complaints this AM. Hb dropped to 8.4 this AM, On IV Cefepime -Pathology results available. WBC -improving  6/1: POD # 4  Pt seen ,Examined, On IV ABx S/P RRT this AM , Hypotension, Low H/H -Plan for pRBC transfusion  Mild Leukocytosis   6/2:  POD # 5 Pt seen and examined at bedside. On IV abx. Pt with mild L heel pain, improved with tramadol. Hg 9.6 this AM s/p 2u PRBC yesterday. Leukocytosis improving. ID  added  minocycline 200mg BID to cover for stenotrophomonas, H/H stable   6/3:  POD # 6 Pt seen and examined at bedside. On IV abx and minocycline 200mg BID to cover for stenotrophomonas, H/H stable, pt with epistaxis overnight.  on IV Invanz daily  6/4: POD # 7 Pt seen and examined at bedside. On IV ertapenem via PICC and minocycline 200BID. Labs stable. Pain controlled with tramadol. Awaiting podiatry eval and PT. Medically stable for discharge today      OVERNIGHT EVENTS: None    Home Medications:  aspirin 81 mg oral delayed release tablet: 1 tab(s) orally once a day (04 Jun 2025 07:28)  atorvastatin 40 mg oral tablet: 1 tab(s) orally once a day (at bedtime) (27 May 2025 15:49)  Cinnamon: 1 cap(s) orally once a day (27 May 2025 15:51)  clopidogrel 75 mg oral tablet: 1 tab(s) orally once a day (27 May 2025 15:49)  ertapenem 1 g injection: 1 gram(s) injectable every 24 hours Via PICC line (04 Jun 2025 07:30)  gabapentin 300 mg oral capsule: 1 cap(s) orally 3 times a day (27 May 2025 15:49)  Jardiance 25 mg oral tablet: 1 tab(s) orally once a day (27 May 2025 15:49)  lisinopril 40 mg oral tablet: 1 tab(s) orally once a day (27 May 2025 15:49)  metFORMIN 1000 mg oral tablet: 1 tab(s) orally 2 times a day (27 May 2025 15:49)  metoprolol succinate 50 mg oral tablet, extended release: 1 tab(s) orally once a day (04 Jun 2025 07:28)  minocycline 100 mg oral capsule: 2 cap(s) orally every 12 hours (04 Jun 2025 07:30)  Trulicity Pen 1.5 mg/0.5 mL subcutaneous solution: 1.5 milligram(s) subcutaneously once a week (Sundays) (27 May 2025 15:52)  Tumeric : 1 cap orally once a day (27 May 2025 15:51)      MEDICATIONS  (STANDING):  aspirin enteric coated 81 milliGRAM(s) Oral daily  atorvastatin 40 milliGRAM(s) Oral at bedtime  clopidogrel Tablet 75 milliGRAM(s) Oral daily  dextrose 5%. 1000 milliLiter(s) (100 mL/Hr) IV Continuous <Continuous>  dextrose 5%. 1000 milliLiter(s) (50 mL/Hr) IV Continuous <Continuous>  dextrose 50% Injectable 25 Gram(s) IV Push once  dextrose 50% Injectable 12.5 Gram(s) IV Push once  dextrose 50% Injectable 25 Gram(s) IV Push once  enoxaparin Injectable 40 milliGRAM(s) SubCutaneous every 24 hours  ertapenem  IVPB 1000 milliGRAM(s) IV Intermittent every 24 hours  gabapentin 300 milliGRAM(s) Oral three times a day  glucagon  Injectable 1 milliGRAM(s) IntraMuscular once  insulin glargine Injectable (LANTUS) 15 Unit(s) SubCutaneous every morning  insulin lispro (ADMELOG) corrective regimen sliding scale   SubCutaneous three times a day before meals  insulin lispro (ADMELOG) corrective regimen sliding scale   SubCutaneous at bedtime  insulin lispro Injectable (ADMELOG) 5 Unit(s) SubCutaneous three times a day before meals  lisinopril 40 milliGRAM(s) Oral daily  metoprolol succinate ER 50 milliGRAM(s) Oral daily  minocycline 200 milliGRAM(s) Oral every 12 hours  senna 2 Tablet(s) Oral at bedtime    MEDICATIONS  (PRN):  acetaminophen   IVPB .. 1000 milliGRAM(s) IV Intermittent every 8 hours PRN Mild Pain (1 - 3)  aluminum hydroxide/magnesium hydroxide/simethicone Suspension 30 milliLiter(s) Oral every 4 hours PRN Dyspepsia  dextrose Oral Gel 15 Gram(s) Oral once PRN Blood Glucose LESS THAN 70 milliGRAM(s)/deciliter  HYDROmorphone  Injectable 0.5 milliGRAM(s) IV Push four times a day PRN Severe Pain (7 - 10)  melatonin 3 milliGRAM(s) Oral at bedtime PRN Insomnia  ondansetron Injectable 4 milliGRAM(s) IV Push every 8 hours PRN Nausea and/or Vomiting  traMADol 50 milliGRAM(s) Oral every 6 hours PRN Moderate Pain (4 - 6)      Allergies    No Known Allergies    Intolerances        Social History:  No  Tobacco: Denies  EtOH: Denies  Recreational drug use: Denies  Lives with: Wife at home   Ambulates: Independently   ADLs: Independent (27 May 2025 13:05)      REVIEW OF SYSTEMS:  CONSTITUTIONAL: No fever, No chills, No fatigue, No myalgia, No Body ache, No Weakness  EYES: No eye pain,  No visual disturbances, No discharge, NO Redness  ENMT:  No ear pain, No nose bleed, No vertigo; No sinus pain, NO throat pain, No Congestion  NECK: No pain, No stiffness  RESPIRATORY: No cough, NO wheezing, No  hemoptysis, NO  shortness of breath  CARDIOVASCULAR: No chest pain, palpitations  GASTROINTESTINAL: No abdominal pain, NO epigastric pain. No nausea, No vomiting; No diarrhea, No constipation. [  ] BM  GENITOURINARY: No dysuria, No frequency, No urgency, No hematuria, NO incontinence  NEUROLOGICAL: No headaches, No dizziness, No numbness, No tingling, No tremors, No weakness  EXT: No Swelling, No Pain, No Edema  SKIN:  [  ] No itching, burning, rashes, or lesions   MUSCULOSKELETAL: No joint pain ,No Jt swelling; No muscle pain, No back pain, No extremity pain  PSYCHIATRIC: No depression,  No anxiety,  No mood swings ,No difficulty sleeping at night  PAIN SCALE: [  ] None  [  ] Other-  ROS Unable to obtain due to - [  ] Dementia  [  ] Lethargy [  ] Drowsy [  ] Sedated [  ] non verbal  REST OF REVIEW Of SYSTEM - [ X ] Normal     Vital Signs Last 24 Hrs  T(C): 36.9 (04 Jun 2025 04:52), Max: 36.9 (03 Jun 2025 13:26)  T(F): 98.4 (04 Jun 2025 04:52), Max: 98.5 (03 Jun 2025 13:26)  HR: 79 (04 Jun 2025 04:52) (73 - 79)  BP: 123/58 (04 Jun 2025 04:52) (123/58 - 143/81)  BP(mean): --  RR: 18 (04 Jun 2025 04:52) (16 - 18)  SpO2: 99% (04 Jun 2025 04:52) (98% - 100%)    Parameters below as of 04 Jun 2025 04:52  Patient On (Oxygen Delivery Method): room air      Finger Stick          PHYSICAL EXAM: Rt U Ext PICC line   GENERAL:  [ x ] NAD , [ x ] well appearing, [  ] Agitated, [  ] Drowsy,  [  ] Lethargy, [  ] confused   HEAD:  [  x] Normal, [  ] Other  EYES:  [ x ] EOMI, [ x ] PERRLA, [x  ] conjunctiva and sclera clear normal, [  ] Other,  [ x ] Pallor,[  ] Discharge  ENMT:  [x  ] Normal, [ x ] Moist mucous membranes, [x  ] Good dentition, [ x ] No Thrush  NECK:  [  x] Supple, [x  ] No JVD, [ x ] Normal thyroid, [  ] Lymphadenopathy [  ] Other  CHEST/LUNG:  [  x] Clear to auscultation bilaterally, [ x ] Breath Sounds equal B/L  [x  ] poor effort  [x  ] No rales, [  x] No rhonchi  [ x ]  No wheezing,   HEART:  [ x ] Regular rate and rhythm, [  ] tachycardia, [  ] Bradycardia,  [  ] irregular  [ x ] No murmurs, No rubs, No gallops, [  ] PPM in place (Mfr:  )  ABDOMEN:  [ x ] Soft, [ x ] Nontender, [ x ] Nondistended, [ x ] No mass, [ x ] Bowel sounds present, [  ] obese+ Umbilical hernia +  NERVOUS SYSTEM:  [ x ] Alert & Oriented X3, [x  ] Nonfocal  [  ] Confusion  [  ] Encephalopathic [  ] Sedated [  ] Unable to assess, [  ] Dementia [  ] Other-  EXTREMITIES: NO C/C P Pulse + B/L  [x] left foot and ankle in dressing , Rt Foot TMA   LYMPH: No lymphadenopathy noted  SKIN:  [ x ] No rashes or lesions, [  ] Pressure Ulcers, [  ] ecchymosis, [  ] Skin Tears, [  ] Other    DIET: Diet, DASH/TLC:   Sodium & Cholesterol Restricted  Consistent Carbohydrate No Snacks (05-28-25 @ 12:07)      LABS:                        10.6   11.60 )-----------( 370      ( 04 Jun 2025 05:50 )             32.3     04 Jun 2025 05:50    136    |  101    |  23     ----------------------------<  151    4.3     |  29     |  0.95     Ca    9.0        04 Jun 2025 05:50    TPro  7.1    /  Alb  2.5    /  TBili  0.4    /  DBili  x      /  AST  14     /  ALT  28     /  AlkPhos  68     04 Jun 2025 05:50      Urinalysis Basic - ( 04 Jun 2025 05:50 )    Color: x / Appearance: x / SG: x / pH: x  Gluc: 151 mg/dL / Ketone: x  / Bili: x / Urobili: x   Blood: x / Protein: x / Nitrite: x   Leuk Esterase: x / RBC: x / WBC x   Sq Epi: x / Non Sq Epi: x / Bacteria: x        Culture Results:   Rare Stenotrophomonas maltophilia (05-28 @ 10:00)  Culture Results:   Growth in fluid media only Staphylococcus simulans (05-28 @ 10:00)  Culture Results:   Rare Staphylococcus pseudintermedius  Rare Staphylococcus saprophyticus (05-28 @ 10:00)          CARDIAC MARKERS ( 29 May 2025 09:00 )  x     / x     / x     / x     / 2.4 ng/mL          Culture - Tissue with Gram Stain (collected 28 May 2025 10:00)  Source: Tissue Other, LEFT FIFTH METATARSAL BONE  Gram Stain (02 Jun 2025 13:49):    No polymorphonuclear cells seen    No organisms seen  Final Report (02 Jun 2025 14:22):    Growth in fluid media only Staphylococcus simulans  Organism: Staphylococcus simulans (02 Jun 2025 14:22)  Organism: Staphylococcus simulans (02 Jun 2025 14:22)    Culture - Tissue with Gram Stain (collected 28 May 2025 10:00)  Source: Tissue Other, LEFT CALCANEUS BONE  Gram Stain (31 May 2025 12:05):    No polymorphonuclear cells seen    No organisms seen  Final Report (02 Jun 2025 08:54):    Rare Staphylococcus pseudintermedius    Rare Staphylococcus saprophyticus  Organism: Staphylococcus pseudintermedius  Staphylococcus saprophyticus (02 Jun 2025 08:54)  Organism: Staphylococcus pseudintermedius (02 Jun 2025 08:54)  Organism: Staphylococcus saprophyticus (02 Jun 2025 08:54)    Culture - Tissue with Gram Stain (collected 28 May 2025 10:00)  Source: Tissue Other, LEFT HALLUX BONE  Gram Stain (31 May 2025 11:59):    No polymorphonuclear cells seen    No organisms seen  Final Report (02 Jun 2025 07:29):    Rare Stenotrophomonas maltophilia  Organism: Stenotrophomonas maltophilia (02 Jun 2025 07:29)  Organism: Stenotrophomonas maltophilia (02 Jun 2025 07:29)  Organism: Stenotrophomonas maltophilia (02 Jun 2025 07:29)        HEALTH ISSUES - PROBLEM Dx:  DM foot ulcer    PAD (peripheral artery disease)    HTN (hypertension)    Encounter for deep vein thrombosis (DVT) prophylaxis    Anemia    Type 2 diabetes mellitus          Consultant(s) Notes Reviewed:  [x  ] YES     Care Discussed with [X] Consultants  [ x ] Patient  [  ] Family [  ] HCP [ x ]   [  ] Social Service  [x ] RN, [  ] Physical Therapy,[  ] Palliative care team  DVT PPX: [ x ] Lovenox, [  ] S C Heparin, [  ] Coumadin, [  ] Xarelto, [  ] Eliquis, [  ] Pradaxa, [  ] IV Heparin drip, [  ] SCD [  ] Contraindication 2 to GI Bleed,[  ] Ambulation [  ] Contraindicated 2 to  bleed [  ] Contraindicated 2 to Brain Bleed  Advanced directive: [x  ] None, [  ] DNR/DNI Patient is a 64y old  Male who presents with a chief complaint of left foot osteomyelitis (03 Jun 2025 10:58)    HPI:  63 y/o M with PMHx DM2, osteomyelitis, CAD, PAD s/p RLE angioplasty 2020, s/p surgical amputation of R forefoot , s/p L PT angioplasty 12/2/24 for L lat foot DFU on plavix, HTN, s/p recent admission for left foot infection s/p PICC placement for IV abx  sent by Podiatry Dr. Stark for admission for OR intervention tomorrow. Patient denies any complaints today. States after discharge he had outpatient visits with Vascular physician. He had imaging done and was suggested to have LimFlow procedure. Patient requested to have surgery as per Podiatry for his left foot wound prior to LimFlow. Vascular agreed. Patient states he can walk independently without pain and is currently comfortable.   Of note, patient was transitioned from Rocephin which was to be given via PICC line after previous discharge to Ertapenem by ID this Saturday.     ED course:   Vitals: T 97.7 HR 99 /69   Labs: ESR 64 wbc 10.77   Imaging: Cxray- Right PICC with tip in the SVC.  The heart is not accurately assessed in this AP projection.  The lungs are clear.  There is no pneumothorax or pleural effusion.  No acute bony abnormality.  Clear lungs    (27 May 2025 13:05)    INTERVAL HPI:  5/28: Patient seen and examined post op. complain,  of mild pain 3-4/10 IV Tylenol ordered otherwise denies complaints   5/29:  POD # 1Patient seen and examined at bedside. Denies complaints this AM however has bleeding from surgical site. RRT later called this AM (refer to rapid note) for weakness, diaphoresis likely vasovagal secondary to blood loss. Hb downtrended. Plan to hold Lovenox. Resume Asp and Plavix tomorrow. PT eval tomorrow , mild leukocytosis, drop in H/H  5/30: POD # 2 Pt seen, Examined, WBC mildly elevated ,On IV ABx  Low stable H/H   5/31:  POD # 3 Patient seen and examined at bedside. Denies complaints this AM. Hb dropped to 8.4 this AM, On IV Cefepime -Pathology results available. WBC -improving  6/1: POD # 4  Pt seen ,Examined, On IV ABx S/P RRT this AM , Hypotension, Low H/H -Plan for pRBC transfusion  Mild Leukocytosis   6/2:  POD # 5 Pt seen and examined at bedside. On IV abx. Pt with mild L heel pain, improved with tramadol. Hg 9.6 this AM s/p 2u PRBC yesterday. Leukocytosis improving. ID  added  minocycline 200mg BID to cover for stenotrophomonas, H/H stable   6/3:  POD # 6 Pt seen and examined at bedside. On IV abx and minocycline 200mg BID to cover for stenotrophomonas, H/H stable, pt with epistaxis overnight.  on IV Invanz daily  6/4: POD # 7 Pt seen and examined at bedside. On IV ertapenem via PICC and minocycline 200BID. Labs stable. Pain controlled with tramadol. Awaiting podiatry eval and PT. Medically stable for discharge today, WBC improved       OVERNIGHT EVENTS: None    Home Medications:  aspirin 81 mg oral delayed release tablet: 1 tab(s) orally once a day (04 Jun 2025 07:28)  atorvastatin 40 mg oral tablet: 1 tab(s) orally once a day (at bedtime) (27 May 2025 15:49)  Cinnamon: 1 cap(s) orally once a day (27 May 2025 15:51)  clopidogrel 75 mg oral tablet: 1 tab(s) orally once a day (27 May 2025 15:49)  ertapenem 1 g injection: 1 gram(s) injectable every 24 hours Via PICC line (04 Jun 2025 07:30)  gabapentin 300 mg oral capsule: 1 cap(s) orally 3 times a day (27 May 2025 15:49)  Jardiance 25 mg oral tablet: 1 tab(s) orally once a day (27 May 2025 15:49)  lisinopril 40 mg oral tablet: 1 tab(s) orally once a day (27 May 2025 15:49)  metFORMIN 1000 mg oral tablet: 1 tab(s) orally 2 times a day (27 May 2025 15:49)  metoprolol succinate 50 mg oral tablet, extended release: 1 tab(s) orally once a day (04 Jun 2025 07:28)  minocycline 100 mg oral capsule: 2 cap(s) orally every 12 hours (04 Jun 2025 07:30)  Trulicity Pen 1.5 mg/0.5 mL subcutaneous solution: 1.5 milligram(s) subcutaneously once a week (Sundays) (27 May 2025 15:52)  Tumeric : 1 cap orally once a day (27 May 2025 15:51)      MEDICATIONS  (STANDING):  aspirin enteric coated 81 milliGRAM(s) Oral daily  atorvastatin 40 milliGRAM(s) Oral at bedtime  clopidogrel Tablet 75 milliGRAM(s) Oral daily  dextrose 5%. 1000 milliLiter(s) (100 mL/Hr) IV Continuous <Continuous>  dextrose 5%. 1000 milliLiter(s) (50 mL/Hr) IV Continuous <Continuous>  dextrose 50% Injectable 25 Gram(s) IV Push once  dextrose 50% Injectable 12.5 Gram(s) IV Push once  dextrose 50% Injectable 25 Gram(s) IV Push once  enoxaparin Injectable 40 milliGRAM(s) SubCutaneous every 24 hours  ertapenem  IVPB 1000 milliGRAM(s) IV Intermittent every 24 hours  gabapentin 300 milliGRAM(s) Oral three times a day  glucagon  Injectable 1 milliGRAM(s) IntraMuscular once  insulin glargine Injectable (LANTUS) 15 Unit(s) SubCutaneous every morning  insulin lispro (ADMELOG) corrective regimen sliding scale   SubCutaneous three times a day before meals  insulin lispro (ADMELOG) corrective regimen sliding scale   SubCutaneous at bedtime  insulin lispro Injectable (ADMELOG) 5 Unit(s) SubCutaneous three times a day before meals  lisinopril 40 milliGRAM(s) Oral daily  metoprolol succinate ER 50 milliGRAM(s) Oral daily  minocycline 200 milliGRAM(s) Oral every 12 hours  senna 2 Tablet(s) Oral at bedtime    MEDICATIONS  (PRN):  acetaminophen   IVPB .. 1000 milliGRAM(s) IV Intermittent every 8 hours PRN Mild Pain (1 - 3)  aluminum hydroxide/magnesium hydroxide/simethicone Suspension 30 milliLiter(s) Oral every 4 hours PRN Dyspepsia  dextrose Oral Gel 15 Gram(s) Oral once PRN Blood Glucose LESS THAN 70 milliGRAM(s)/deciliter  HYDROmorphone  Injectable 0.5 milliGRAM(s) IV Push four times a day PRN Severe Pain (7 - 10)  melatonin 3 milliGRAM(s) Oral at bedtime PRN Insomnia  ondansetron Injectable 4 milliGRAM(s) IV Push every 8 hours PRN Nausea and/or Vomiting  traMADol 50 milliGRAM(s) Oral every 6 hours PRN Moderate Pain (4 - 6)      Allergies    No Known Allergies    Intolerances        Social History:  No  Tobacco: Denies  EtOH: Denies  Recreational drug use: Denies  Lives with: Wife at home   Ambulates: Independently   ADLs: Independent (27 May 2025 13:05)      REVIEW OF SYSTEMS: i am OK  CONSTITUTIONAL: No fever, No chills, No fatigue, No myalgia, No Body ache, No Weakness  EYES: No eye pain,  No visual disturbances, No discharge, NO Redness  ENMT:  No ear pain, No nose bleed, No vertigo; No sinus pain, NO throat pain, No Congestion  NECK: No pain, No stiffness  RESPIRATORY: No cough, NO wheezing, No  hemoptysis, NO  shortness of breath  CARDIOVASCULAR: No chest pain, palpitations  GASTROINTESTINAL: No abdominal pain, NO epigastric pain. No nausea, No vomiting; No diarrhea, No constipation. [x  ] BM  GENITOURINARY: No dysuria, No frequency, No urgency, No hematuria, NO incontinence  NEUROLOGICAL: No headaches, No dizziness, No numbness, No tingling, No tremors, No weakness  EXT: No Swelling, No Pain, No Edema  SKIN:  [x  ] No itching, burning, rashes, or lesions   MUSCULOSKELETAL: No joint pain ,No Jt swelling; No muscle pain, No back pain, No extremity pain  PSYCHIATRIC: No depression,  No anxiety,  No mood swings ,No difficulty sleeping at night  PAIN SCALE: [  ] None  [ x ] Other-mild on Heel when moving  ROS Unable to obtain due to - [  ] Dementia  [  ] Lethargy [  ] Drowsy [  ] Sedated [  ] non verbal  REST OF REVIEW Of SYSTEM - [ X ] Normal     Vital Signs Last 24 Hrs  T(C): 36.9 (04 Jun 2025 04:52), Max: 36.9 (03 Jun 2025 13:26)  T(F): 98.4 (04 Jun 2025 04:52), Max: 98.5 (03 Jun 2025 13:26)  HR: 79 (04 Jun 2025 04:52) (73 - 79)  BP: 123/58 (04 Jun 2025 04:52) (123/58 - 143/81)  BP(mean): --  RR: 18 (04 Jun 2025 04:52) (16 - 18)  SpO2: 99% (04 Jun 2025 04:52) (98% - 100%)    Parameters below as of 04 Jun 2025 04:52  Patient On (Oxygen Delivery Method): room air      Finger Stick          PHYSICAL EXAM: Rt U Ext PICC line   GENERAL:  [ x ] NAD , [ x ] well appearing, [  ] Agitated, [  ] Drowsy,  [  ] Lethargy, [  ] confused   HEAD:  [  x] Normal, [  ] Other  EYES:  [ x ] EOMI, [ x ] PERRLA, [x  ] conjunctiva and sclera clear normal, [  ] Other,  [ x ] Pallor,[  ] Discharge  ENMT:  [x  ] Normal, [ x ] Moist mucous membranes, [x  ] Good dentition, [ x ] No Thrush  NECK:  [  x] Supple, [x  ] No JVD, [ x ] Normal thyroid, [  ] Lymphadenopathy [  ] Other  CHEST/LUNG:  [  x] Clear to auscultation bilaterally, [ x ] Breath Sounds equal B/L  [x  ] poor effort  [x  ] No rales, [  x] No rhonchi  [ x ]  No wheezing,   HEART:  [ x ] Regular rate and rhythm, [  ] tachycardia, [  ] Bradycardia,  [  ] irregular  [ x ] No murmurs, No rubs, No gallops, [  ] PPM in place (Mfr:  )  ABDOMEN:  [ x ] Soft, [ x ] Nontender, [ x ] Nondistended, [ x ] No mass, [ x ] Bowel sounds present, [  ] obese+ Umbilical hernia +  NERVOUS SYSTEM:  [ x ] Alert & Oriented X3, [x  ] Nonfocal  [  ] Confusion  [  ] Encephalopathic [  ] Sedated [  ] Unable to assess, [  ] Dementia [  ] Other-  EXTREMITIES: NO C/C P Pulse + B/L  [x] left foot and ankle in dressing , Rt Foot TMA   LYMPH: No lymphadenopathy noted  SKIN:  [ x ] No rashes or lesions, [  ] Pressure Ulcers, [  ] ecchymosis, [  ] Skin Tears, [  ] Other    DIET: Diet, DASH/TLC:   Sodium & Cholesterol Restricted  Consistent Carbohydrate No Snacks (05-28-25 @ 12:07)      LABS:                        10.6   11.60 )-----------( 370      ( 04 Jun 2025 05:50 )             32.3     04 Jun 2025 05:50    136    |  101    |  23     ----------------------------<  151    4.3     |  29     |  0.95     Ca    9.0        04 Jun 2025 05:50    TPro  7.1    /  Alb  2.5    /  TBili  0.4    /  DBili  x      /  AST  14     /  ALT  28     /  AlkPhos  68     04 Jun 2025 05:50      Urinalysis Basic - ( 04 Jun 2025 05:50 )    Color: x / Appearance: x / SG: x / pH: x  Gluc: 151 mg/dL / Ketone: x  / Bili: x / Urobili: x   Blood: x / Protein: x / Nitrite: x   Leuk Esterase: x / RBC: x / WBC x   Sq Epi: x / Non Sq Epi: x / Bacteria: x        Culture Results:   Rare Stenotrophomonas maltophilia (05-28 @ 10:00)  Culture Results:   Growth in fluid media only Staphylococcus simulans (05-28 @ 10:00)  Culture Results:   Rare Staphylococcus pseudintermedius  Rare Staphylococcus saprophyticus (05-28 @ 10:00)          CARDIAC MARKERS ( 29 May 2025 09:00 )  x     / x     / x     / x     / 2.4 ng/mL          Culture - Tissue with Gram Stain (collected 28 May 2025 10:00)  Source: Tissue Other, LEFT FIFTH METATARSAL BONE  Gram Stain (02 Jun 2025 13:49):    No polymorphonuclear cells seen    No organisms seen  Final Report (02 Jun 2025 14:22):    Growth in fluid media only Staphylococcus simulans  Organism: Staphylococcus simulans (02 Jun 2025 14:22)  Organism: Staphylococcus simulans (02 Jun 2025 14:22)    Culture - Tissue with Gram Stain (collected 28 May 2025 10:00)  Source: Tissue Other, LEFT CALCANEUS BONE  Gram Stain (31 May 2025 12:05):    No polymorphonuclear cells seen    No organisms seen  Final Report (02 Jun 2025 08:54):    Rare Staphylococcus pseudintermedius    Rare Staphylococcus saprophyticus  Organism: Staphylococcus pseudintermedius  Staphylococcus saprophyticus (02 Jun 2025 08:54)  Organism: Staphylococcus pseudintermedius (02 Jun 2025 08:54)  Organism: Staphylococcus saprophyticus (02 Jun 2025 08:54)    Culture - Tissue with Gram Stain (collected 28 May 2025 10:00)  Source: Tissue Other, LEFT HALLUX BONE  Gram Stain (31 May 2025 11:59):    No polymorphonuclear cells seen    No organisms seen  Final Report (02 Jun 2025 07:29):    Rare Stenotrophomonas maltophilia  Organism: Stenotrophomonas maltophilia (02 Jun 2025 07:29)  Organism: Stenotrophomonas maltophilia (02 Jun 2025 07:29)  Organism: Stenotrophomonas maltophilia (02 Jun 2025 07:29)        HEALTH ISSUES - PROBLEM Dx:  DM foot ulcer    PAD (peripheral artery disease)    HTN (hypertension)    Encounter for deep vein thrombosis (DVT) prophylaxis    Anemia    Type 2 diabetes mellitus          Consultant(s) Notes Reviewed:  [x  ] YES     Care Discussed with [X] Consultants  [ x ] Patient  [  ] Family [  ] HCP [ x ]   [  ] Social Service  [x ] RN, [  ] Physical Therapy,[  ] Palliative care team  DVT PPX: [ x ] Lovenox, [  ] S C Heparin, [  ] Coumadin, [  ] Xarelto, [  ] Eliquis, [  ] Pradaxa, [  ] IV Heparin drip, [  ] SCD [  ] Contraindication 2 to GI Bleed,[  ] Ambulation [  ] Contraindicated 2 to  bleed [  ] Contraindicated 2 to Brain Bleed  Advanced directive: [x  ] None, [  ] DNR/DNI

## 2025-06-05 ENCOUNTER — OUTPATIENT (OUTPATIENT)
Dept: OUTPATIENT SERVICES | Facility: HOSPITAL | Age: 65
LOS: 1 days | End: 2025-06-05
Payer: COMMERCIAL

## 2025-06-05 ENCOUNTER — APPOINTMENT (OUTPATIENT)
Dept: HYPERBARIC MEDICINE | Facility: HOSPITAL | Age: 65
End: 2025-06-05

## 2025-06-05 ENCOUNTER — NON-APPOINTMENT (OUTPATIENT)
Age: 65
End: 2025-06-05

## 2025-06-05 VITALS
BODY MASS INDEX: 27.13 KG/M2 | OXYGEN SATURATION: 99 % | WEIGHT: 179 LBS | SYSTOLIC BLOOD PRESSURE: 118 MMHG | TEMPERATURE: 98 F | RESPIRATION RATE: 16 BRPM | DIASTOLIC BLOOD PRESSURE: 69 MMHG | HEIGHT: 68 IN | HEART RATE: 94 BPM

## 2025-06-05 DIAGNOSIS — Z89.429 ACQUIRED ABSENCE OF OTHER TOE(S), UNSPECIFIED SIDE: Chronic | ICD-10-CM

## 2025-06-05 DIAGNOSIS — E11.621 TYPE 2 DIABETES MELLITUS WITH FOOT ULCER: ICD-10-CM

## 2025-06-05 PROCEDURE — G0463: CPT

## 2025-06-05 PROCEDURE — 99024 POSTOP FOLLOW-UP VISIT: CPT

## 2025-06-06 ENCOUNTER — OUTPATIENT (OUTPATIENT)
Dept: OUTPATIENT SERVICES | Facility: HOSPITAL | Age: 65
LOS: 1 days | End: 2025-06-06
Payer: COMMERCIAL

## 2025-06-06 ENCOUNTER — APPOINTMENT (OUTPATIENT)
Dept: HYPERBARIC MEDICINE | Facility: HOSPITAL | Age: 65
End: 2025-06-06
Payer: COMMERCIAL

## 2025-06-06 VITALS
OXYGEN SATURATION: 98 % | SYSTOLIC BLOOD PRESSURE: 138 MMHG | TEMPERATURE: 98.2 F | DIASTOLIC BLOOD PRESSURE: 74 MMHG | RESPIRATION RATE: 15 BRPM | HEART RATE: 92 BPM

## 2025-06-06 VITALS
TEMPERATURE: 97.9 F | HEART RATE: 70 BPM | DIASTOLIC BLOOD PRESSURE: 74 MMHG | OXYGEN SATURATION: 100 % | SYSTOLIC BLOOD PRESSURE: 120 MMHG | RESPIRATION RATE: 18 BRPM

## 2025-06-06 DIAGNOSIS — L97.522 NON-PRESSURE CHRONIC ULCER OF OTHER PART OF LEFT FOOT WITH FAT LAYER EXPOSED: ICD-10-CM

## 2025-06-06 DIAGNOSIS — Z89.429 ACQUIRED ABSENCE OF OTHER TOE(S), UNSPECIFIED SIDE: Chronic | ICD-10-CM

## 2025-06-06 DIAGNOSIS — E11.621 TYPE 2 DIABETES MELLITUS WITH FOOT ULCER: ICD-10-CM

## 2025-06-06 DIAGNOSIS — L97.422 NON-PRESSURE CHRONIC ULCER OF LEFT HEEL AND MIDFOOT WITH FAT LAYER EXPOSED: ICD-10-CM

## 2025-06-06 DIAGNOSIS — Z98.62 PERIPHERAL VASCULAR ANGIOPLASTY STATUS: Chronic | ICD-10-CM

## 2025-06-06 PROCEDURE — G0277: CPT

## 2025-06-06 PROCEDURE — 82962 GLUCOSE BLOOD TEST: CPT

## 2025-06-06 PROCEDURE — 99183 HYPERBARIC OXYGEN THERAPY: CPT

## 2025-06-07 ENCOUNTER — OUTPATIENT (OUTPATIENT)
Dept: OUTPATIENT SERVICES | Facility: HOSPITAL | Age: 65
LOS: 1 days | End: 2025-06-07
Payer: COMMERCIAL

## 2025-06-07 ENCOUNTER — APPOINTMENT (OUTPATIENT)
Dept: HYPERBARIC MEDICINE | Facility: HOSPITAL | Age: 65
End: 2025-06-07
Payer: COMMERCIAL

## 2025-06-07 VITALS
DIASTOLIC BLOOD PRESSURE: 76 MMHG | SYSTOLIC BLOOD PRESSURE: 124 MMHG | HEART RATE: 70 BPM | OXYGEN SATURATION: 100 % | RESPIRATION RATE: 16 BRPM | TEMPERATURE: 97.6 F

## 2025-06-07 VITALS
SYSTOLIC BLOOD PRESSURE: 125 MMHG | TEMPERATURE: 98.4 F | DIASTOLIC BLOOD PRESSURE: 75 MMHG | RESPIRATION RATE: 16 BRPM | HEART RATE: 83 BPM | OXYGEN SATURATION: 98 %

## 2025-06-07 DIAGNOSIS — Z89.429 ACQUIRED ABSENCE OF OTHER TOE(S), UNSPECIFIED SIDE: Chronic | ICD-10-CM

## 2025-06-07 DIAGNOSIS — Z98.62 PERIPHERAL VASCULAR ANGIOPLASTY STATUS: Chronic | ICD-10-CM

## 2025-06-07 DIAGNOSIS — E11.621 TYPE 2 DIABETES MELLITUS WITH FOOT ULCER: ICD-10-CM

## 2025-06-07 PROCEDURE — 82962 GLUCOSE BLOOD TEST: CPT

## 2025-06-07 PROCEDURE — 99183 HYPERBARIC OXYGEN THERAPY: CPT

## 2025-06-09 ENCOUNTER — APPOINTMENT (OUTPATIENT)
Dept: HYPERBARIC MEDICINE | Facility: HOSPITAL | Age: 65
End: 2025-06-09
Payer: COMMERCIAL

## 2025-06-09 ENCOUNTER — OUTPATIENT (OUTPATIENT)
Dept: OUTPATIENT SERVICES | Facility: HOSPITAL | Age: 65
LOS: 1 days | End: 2025-06-09
Payer: COMMERCIAL

## 2025-06-09 VITALS
RESPIRATION RATE: 16 BRPM | SYSTOLIC BLOOD PRESSURE: 115 MMHG | HEART RATE: 64 BPM | DIASTOLIC BLOOD PRESSURE: 69 MMHG | TEMPERATURE: 97.9 F | OXYGEN SATURATION: 100 %

## 2025-06-09 VITALS
SYSTOLIC BLOOD PRESSURE: 122 MMHG | TEMPERATURE: 98.3 F | HEART RATE: 76 BPM | DIASTOLIC BLOOD PRESSURE: 71 MMHG | OXYGEN SATURATION: 96 % | RESPIRATION RATE: 15 BRPM

## 2025-06-09 DIAGNOSIS — E11.621 TYPE 2 DIABETES MELLITUS WITH FOOT ULCER: ICD-10-CM

## 2025-06-09 DIAGNOSIS — Z89.429 ACQUIRED ABSENCE OF OTHER TOE(S), UNSPECIFIED SIDE: Chronic | ICD-10-CM

## 2025-06-09 DIAGNOSIS — L97.422 NON-PRESSURE CHRONIC ULCER OF LEFT HEEL AND MIDFOOT WITH FAT LAYER EXPOSED: ICD-10-CM

## 2025-06-09 DIAGNOSIS — Z98.62 PERIPHERAL VASCULAR ANGIOPLASTY STATUS: Chronic | ICD-10-CM

## 2025-06-09 DIAGNOSIS — L97.522 NON-PRESSURE CHRONIC ULCER OF OTHER PART OF LEFT FOOT WITH FAT LAYER EXPOSED: ICD-10-CM

## 2025-06-09 PROCEDURE — 99183 HYPERBARIC OXYGEN THERAPY: CPT

## 2025-06-09 PROCEDURE — 82962 GLUCOSE BLOOD TEST: CPT

## 2025-06-09 PROCEDURE — G0277: CPT

## 2025-06-10 ENCOUNTER — APPOINTMENT (OUTPATIENT)
Dept: HYPERBARIC MEDICINE | Facility: HOSPITAL | Age: 65
End: 2025-06-10
Payer: COMMERCIAL

## 2025-06-10 ENCOUNTER — OUTPATIENT (OUTPATIENT)
Dept: OUTPATIENT SERVICES | Facility: HOSPITAL | Age: 65
LOS: 1 days | End: 2025-06-10
Payer: COMMERCIAL

## 2025-06-10 VITALS
TEMPERATURE: 98.3 F | HEART RATE: 72 BPM | RESPIRATION RATE: 14 BRPM | DIASTOLIC BLOOD PRESSURE: 59 MMHG | SYSTOLIC BLOOD PRESSURE: 110 MMHG | OXYGEN SATURATION: 99 %

## 2025-06-10 VITALS
DIASTOLIC BLOOD PRESSURE: 67 MMHG | SYSTOLIC BLOOD PRESSURE: 135 MMHG | OXYGEN SATURATION: 100 % | TEMPERATURE: 98.5 F | HEART RATE: 86 BPM | RESPIRATION RATE: 16 BRPM

## 2025-06-10 DIAGNOSIS — Z80.3 FAMILY HISTORY OF MALIGNANT NEOPLASM OF BREAST: ICD-10-CM

## 2025-06-10 DIAGNOSIS — Z89.431 ACQUIRED ABSENCE OF RIGHT FOOT: ICD-10-CM

## 2025-06-10 DIAGNOSIS — Z79.82 LONG TERM (CURRENT) USE OF ASPIRIN: ICD-10-CM

## 2025-06-10 DIAGNOSIS — Z89.429 ACQUIRED ABSENCE OF OTHER TOE(S), UNSPECIFIED SIDE: Chronic | ICD-10-CM

## 2025-06-10 DIAGNOSIS — Z79.84 LONG TERM (CURRENT) USE OF ORAL HYPOGLYCEMIC DRUGS: ICD-10-CM

## 2025-06-10 DIAGNOSIS — Z83.3 FAMILY HISTORY OF DIABETES MELLITUS: ICD-10-CM

## 2025-06-10 DIAGNOSIS — L97.422 NON-PRESSURE CHRONIC ULCER OF LEFT HEEL AND MIDFOOT WITH FAT LAYER EXPOSED: ICD-10-CM

## 2025-06-10 DIAGNOSIS — E11.621 TYPE 2 DIABETES MELLITUS WITH FOOT ULCER: ICD-10-CM

## 2025-06-10 DIAGNOSIS — L97.522 NON-PRESSURE CHRONIC ULCER OF OTHER PART OF LEFT FOOT WITH FAT LAYER EXPOSED: ICD-10-CM

## 2025-06-10 DIAGNOSIS — Z86.39 PERSONAL HISTORY OF OTHER ENDOCRINE, NUTRITIONAL AND METABOLIC DISEASE: ICD-10-CM

## 2025-06-10 DIAGNOSIS — Z98.890 OTHER SPECIFIED POSTPROCEDURAL STATES: ICD-10-CM

## 2025-06-10 DIAGNOSIS — I25.10 ATHEROSCLEROTIC HEART DISEASE OF NATIVE CORONARY ARTERY WITHOUT ANGINA PECTORIS: ICD-10-CM

## 2025-06-10 DIAGNOSIS — I10 ESSENTIAL (PRIMARY) HYPERTENSION: ICD-10-CM

## 2025-06-10 DIAGNOSIS — Z79.899 OTHER LONG TERM (CURRENT) DRUG THERAPY: ICD-10-CM

## 2025-06-10 DIAGNOSIS — E78.00 PURE HYPERCHOLESTEROLEMIA, UNSPECIFIED: ICD-10-CM

## 2025-06-10 DIAGNOSIS — E11.40 TYPE 2 DIABETES MELLITUS WITH DIABETIC NEUROPATHY, UNSPECIFIED: ICD-10-CM

## 2025-06-10 DIAGNOSIS — E11.51 TYPE 2 DIABETES MELLITUS WITH DIABETIC PERIPHERAL ANGIOPATHY WITHOUT GANGRENE: ICD-10-CM

## 2025-06-10 PROCEDURE — G0277: CPT

## 2025-06-10 PROCEDURE — 99183 HYPERBARIC OXYGEN THERAPY: CPT

## 2025-06-10 PROCEDURE — 82962 GLUCOSE BLOOD TEST: CPT

## 2025-06-11 ENCOUNTER — OUTPATIENT (OUTPATIENT)
Dept: OUTPATIENT SERVICES | Facility: HOSPITAL | Age: 65
LOS: 1 days | End: 2025-06-11
Payer: COMMERCIAL

## 2025-06-11 ENCOUNTER — NON-APPOINTMENT (OUTPATIENT)
Age: 65
End: 2025-06-11

## 2025-06-11 ENCOUNTER — APPOINTMENT (OUTPATIENT)
Dept: HYPERBARIC MEDICINE | Facility: HOSPITAL | Age: 65
End: 2025-06-11
Payer: COMMERCIAL

## 2025-06-11 VITALS
HEIGHT: 68 IN | RESPIRATION RATE: 16 BRPM | DIASTOLIC BLOOD PRESSURE: 75 MMHG | BODY MASS INDEX: 27.13 KG/M2 | TEMPERATURE: 98.3 F | HEART RATE: 86 BPM | OXYGEN SATURATION: 99 % | WEIGHT: 179 LBS | SYSTOLIC BLOOD PRESSURE: 143 MMHG

## 2025-06-11 DIAGNOSIS — E11.621 TYPE 2 DIABETES MELLITUS WITH FOOT ULCER: ICD-10-CM

## 2025-06-11 DIAGNOSIS — Z89.429 ACQUIRED ABSENCE OF OTHER TOE(S), UNSPECIFIED SIDE: Chronic | ICD-10-CM

## 2025-06-11 PROCEDURE — G0463: CPT

## 2025-06-11 PROCEDURE — 99024 POSTOP FOLLOW-UP VISIT: CPT

## 2025-06-12 ENCOUNTER — OUTPATIENT (OUTPATIENT)
Dept: OUTPATIENT SERVICES | Facility: HOSPITAL | Age: 65
LOS: 1 days | End: 2025-06-12
Payer: COMMERCIAL

## 2025-06-12 ENCOUNTER — APPOINTMENT (OUTPATIENT)
Dept: HYPERBARIC MEDICINE | Facility: HOSPITAL | Age: 65
End: 2025-06-12
Payer: COMMERCIAL

## 2025-06-12 VITALS
RESPIRATION RATE: 18 BRPM | TEMPERATURE: 98 F | DIASTOLIC BLOOD PRESSURE: 76 MMHG | SYSTOLIC BLOOD PRESSURE: 136 MMHG | HEART RATE: 70 BPM | OXYGEN SATURATION: 100 %

## 2025-06-12 VITALS
HEART RATE: 81 BPM | OXYGEN SATURATION: 99 % | SYSTOLIC BLOOD PRESSURE: 123 MMHG | RESPIRATION RATE: 15 BRPM | TEMPERATURE: 98 F | DIASTOLIC BLOOD PRESSURE: 75 MMHG

## 2025-06-12 DIAGNOSIS — L97.422 NON-PRESSURE CHRONIC ULCER OF LEFT HEEL AND MIDFOOT WITH FAT LAYER EXPOSED: ICD-10-CM

## 2025-06-12 DIAGNOSIS — E11.621 TYPE 2 DIABETES MELLITUS WITH FOOT ULCER: ICD-10-CM

## 2025-06-12 DIAGNOSIS — L97.522 NON-PRESSURE CHRONIC ULCER OF OTHER PART OF LEFT FOOT WITH FAT LAYER EXPOSED: ICD-10-CM

## 2025-06-12 DIAGNOSIS — Z89.429 ACQUIRED ABSENCE OF OTHER TOE(S), UNSPECIFIED SIDE: Chronic | ICD-10-CM

## 2025-06-12 PROCEDURE — 99183 HYPERBARIC OXYGEN THERAPY: CPT

## 2025-06-12 PROCEDURE — G0277: CPT

## 2025-06-12 PROCEDURE — 82962 GLUCOSE BLOOD TEST: CPT

## 2025-06-13 ENCOUNTER — APPOINTMENT (OUTPATIENT)
Dept: HYPERBARIC MEDICINE | Facility: HOSPITAL | Age: 65
End: 2025-06-13
Payer: COMMERCIAL

## 2025-06-13 ENCOUNTER — OUTPATIENT (OUTPATIENT)
Dept: OUTPATIENT SERVICES | Facility: HOSPITAL | Age: 65
LOS: 1 days | End: 2025-06-13
Payer: COMMERCIAL

## 2025-06-13 VITALS
DIASTOLIC BLOOD PRESSURE: 76 MMHG | HEART RATE: 70 BPM | SYSTOLIC BLOOD PRESSURE: 123 MMHG | OXYGEN SATURATION: 100 % | TEMPERATURE: 98.1 F | RESPIRATION RATE: 12 BRPM

## 2025-06-13 VITALS
SYSTOLIC BLOOD PRESSURE: 121 MMHG | HEART RATE: 76 BPM | TEMPERATURE: 98.3 F | OXYGEN SATURATION: 99 % | DIASTOLIC BLOOD PRESSURE: 77 MMHG | RESPIRATION RATE: 14 BRPM

## 2025-06-13 DIAGNOSIS — Z89.429 ACQUIRED ABSENCE OF OTHER TOE(S), UNSPECIFIED SIDE: Chronic | ICD-10-CM

## 2025-06-13 DIAGNOSIS — Z98.62 PERIPHERAL VASCULAR ANGIOPLASTY STATUS: Chronic | ICD-10-CM

## 2025-06-13 DIAGNOSIS — L97.422 NON-PRESSURE CHRONIC ULCER OF LEFT HEEL AND MIDFOOT WITH FAT LAYER EXPOSED: ICD-10-CM

## 2025-06-13 DIAGNOSIS — L97.522 NON-PRESSURE CHRONIC ULCER OF OTHER PART OF LEFT FOOT WITH FAT LAYER EXPOSED: ICD-10-CM

## 2025-06-13 DIAGNOSIS — E11.621 TYPE 2 DIABETES MELLITUS WITH FOOT ULCER: ICD-10-CM

## 2025-06-13 LAB
GLUCOSE BLDC GLUCOMTR-MCNC: 113 MG/DL — HIGH (ref 70–99)
GLUCOSE BLDC GLUCOMTR-MCNC: 136 MG/DL — HIGH (ref 70–99)

## 2025-06-13 PROCEDURE — 99183 HYPERBARIC OXYGEN THERAPY: CPT

## 2025-06-13 PROCEDURE — G0277: CPT

## 2025-06-13 PROCEDURE — 82962 GLUCOSE BLOOD TEST: CPT

## 2025-06-14 ENCOUNTER — OUTPATIENT (OUTPATIENT)
Dept: OUTPATIENT SERVICES | Facility: HOSPITAL | Age: 65
LOS: 1 days | End: 2025-06-14
Payer: COMMERCIAL

## 2025-06-14 ENCOUNTER — APPOINTMENT (OUTPATIENT)
Dept: HYPERBARIC MEDICINE | Facility: HOSPITAL | Age: 65
End: 2025-06-14
Payer: COMMERCIAL

## 2025-06-14 VITALS
TEMPERATURE: 97.6 F | HEART RATE: 65 BPM | OXYGEN SATURATION: 98 % | DIASTOLIC BLOOD PRESSURE: 84 MMHG | RESPIRATION RATE: 18 BRPM | SYSTOLIC BLOOD PRESSURE: 162 MMHG

## 2025-06-14 VITALS
DIASTOLIC BLOOD PRESSURE: 65 MMHG | OXYGEN SATURATION: 99 % | HEART RATE: 78 BPM | SYSTOLIC BLOOD PRESSURE: 121 MMHG | RESPIRATION RATE: 14 BRPM | TEMPERATURE: 98.2 F

## 2025-06-14 DIAGNOSIS — L97.522 NON-PRESSURE CHRONIC ULCER OF OTHER PART OF LEFT FOOT WITH FAT LAYER EXPOSED: ICD-10-CM

## 2025-06-14 DIAGNOSIS — E11.621 TYPE 2 DIABETES MELLITUS WITH FOOT ULCER: ICD-10-CM

## 2025-06-14 DIAGNOSIS — L97.422 NON-PRESSURE CHRONIC ULCER OF LEFT HEEL AND MIDFOOT WITH FAT LAYER EXPOSED: ICD-10-CM

## 2025-06-14 DIAGNOSIS — Z98.62 PERIPHERAL VASCULAR ANGIOPLASTY STATUS: Chronic | ICD-10-CM

## 2025-06-14 DIAGNOSIS — Z89.429 ACQUIRED ABSENCE OF OTHER TOE(S), UNSPECIFIED SIDE: Chronic | ICD-10-CM

## 2025-06-14 LAB
GLUCOSE BLDC GLUCOMTR-MCNC: 113 MG/DL — HIGH (ref 70–99)
GLUCOSE BLDC GLUCOMTR-MCNC: 155 MG/DL — HIGH (ref 70–99)

## 2025-06-14 PROCEDURE — G0277: CPT

## 2025-06-14 PROCEDURE — 82962 GLUCOSE BLOOD TEST: CPT

## 2025-06-14 PROCEDURE — 99183 HYPERBARIC OXYGEN THERAPY: CPT

## 2025-06-16 ENCOUNTER — OUTPATIENT (OUTPATIENT)
Dept: OUTPATIENT SERVICES | Facility: HOSPITAL | Age: 65
LOS: 1 days | End: 2025-06-16
Payer: COMMERCIAL

## 2025-06-16 ENCOUNTER — APPOINTMENT (OUTPATIENT)
Dept: HYPERBARIC MEDICINE | Facility: HOSPITAL | Age: 65
End: 2025-06-16
Payer: COMMERCIAL

## 2025-06-16 VITALS
HEART RATE: 70 BPM | SYSTOLIC BLOOD PRESSURE: 134 MMHG | TEMPERATURE: 97.9 F | DIASTOLIC BLOOD PRESSURE: 76 MMHG | OXYGEN SATURATION: 99 % | RESPIRATION RATE: 14 BRPM

## 2025-06-16 VITALS
RESPIRATION RATE: 14 BRPM | HEART RATE: 74 BPM | TEMPERATURE: 98.3 F | SYSTOLIC BLOOD PRESSURE: 119 MMHG | OXYGEN SATURATION: 99 % | DIASTOLIC BLOOD PRESSURE: 73 MMHG

## 2025-06-16 DIAGNOSIS — Z89.429 ACQUIRED ABSENCE OF OTHER TOE(S), UNSPECIFIED SIDE: Chronic | ICD-10-CM

## 2025-06-16 DIAGNOSIS — L97.422 NON-PRESSURE CHRONIC ULCER OF LEFT HEEL AND MIDFOOT WITH FAT LAYER EXPOSED: ICD-10-CM

## 2025-06-16 DIAGNOSIS — L97.522 NON-PRESSURE CHRONIC ULCER OF OTHER PART OF LEFT FOOT WITH FAT LAYER EXPOSED: ICD-10-CM

## 2025-06-16 DIAGNOSIS — E11.621 TYPE 2 DIABETES MELLITUS WITH FOOT ULCER: ICD-10-CM

## 2025-06-16 LAB
GLUCOSE BLDC GLUCOMTR-MCNC: 126 MG/DL — HIGH (ref 70–99)
GLUCOSE BLDC GLUCOMTR-MCNC: 154 MG/DL — HIGH (ref 70–99)

## 2025-06-16 PROCEDURE — 99183 HYPERBARIC OXYGEN THERAPY: CPT

## 2025-06-16 PROCEDURE — G0277: CPT

## 2025-06-16 PROCEDURE — 82962 GLUCOSE BLOOD TEST: CPT

## 2025-06-17 ENCOUNTER — APPOINTMENT (OUTPATIENT)
Dept: HYPERBARIC MEDICINE | Facility: HOSPITAL | Age: 65
End: 2025-06-17
Payer: COMMERCIAL

## 2025-06-17 ENCOUNTER — OUTPATIENT (OUTPATIENT)
Dept: OUTPATIENT SERVICES | Facility: HOSPITAL | Age: 65
LOS: 1 days | End: 2025-06-17
Payer: COMMERCIAL

## 2025-06-17 VITALS
OXYGEN SATURATION: 100 % | HEART RATE: 85 BPM | TEMPERATURE: 98.4 F | SYSTOLIC BLOOD PRESSURE: 130 MMHG | DIASTOLIC BLOOD PRESSURE: 78 MMHG | RESPIRATION RATE: 15 BRPM

## 2025-06-17 VITALS
SYSTOLIC BLOOD PRESSURE: 138 MMHG | DIASTOLIC BLOOD PRESSURE: 82 MMHG | OXYGEN SATURATION: 98 % | HEART RATE: 76 BPM | TEMPERATURE: 98.1 F | RESPIRATION RATE: 12 BRPM

## 2025-06-17 DIAGNOSIS — L97.422 NON-PRESSURE CHRONIC ULCER OF LEFT HEEL AND MIDFOOT WITH FAT LAYER EXPOSED: ICD-10-CM

## 2025-06-17 DIAGNOSIS — Z83.3 FAMILY HISTORY OF DIABETES MELLITUS: ICD-10-CM

## 2025-06-17 DIAGNOSIS — I10 ESSENTIAL (PRIMARY) HYPERTENSION: ICD-10-CM

## 2025-06-17 DIAGNOSIS — E11.621 TYPE 2 DIABETES MELLITUS WITH FOOT ULCER: ICD-10-CM

## 2025-06-17 DIAGNOSIS — L97.522 NON-PRESSURE CHRONIC ULCER OF OTHER PART OF LEFT FOOT WITH FAT LAYER EXPOSED: ICD-10-CM

## 2025-06-17 DIAGNOSIS — Z89.431 ACQUIRED ABSENCE OF RIGHT FOOT: ICD-10-CM

## 2025-06-17 DIAGNOSIS — Z79.84 LONG TERM (CURRENT) USE OF ORAL HYPOGLYCEMIC DRUGS: ICD-10-CM

## 2025-06-17 DIAGNOSIS — E11.40 TYPE 2 DIABETES MELLITUS WITH DIABETIC NEUROPATHY, UNSPECIFIED: ICD-10-CM

## 2025-06-17 DIAGNOSIS — E78.00 PURE HYPERCHOLESTEROLEMIA, UNSPECIFIED: ICD-10-CM

## 2025-06-17 DIAGNOSIS — Z98.890 OTHER SPECIFIED POSTPROCEDURAL STATES: ICD-10-CM

## 2025-06-17 DIAGNOSIS — Z86.39 PERSONAL HISTORY OF OTHER ENDOCRINE, NUTRITIONAL AND METABOLIC DISEASE: ICD-10-CM

## 2025-06-17 DIAGNOSIS — Z80.3 FAMILY HISTORY OF MALIGNANT NEOPLASM OF BREAST: ICD-10-CM

## 2025-06-17 DIAGNOSIS — I25.10 ATHEROSCLEROTIC HEART DISEASE OF NATIVE CORONARY ARTERY WITHOUT ANGINA PECTORIS: ICD-10-CM

## 2025-06-17 DIAGNOSIS — L97.526 NON-PRESSURE CHRONIC ULCER OF OTHER PART OF LEFT FOOT WITH BONE INVOLVEMENT WITHOUT EVIDENCE OF NECROSIS: ICD-10-CM

## 2025-06-17 DIAGNOSIS — L97.426 NON-PRESSURE CHRONIC ULCER OF LEFT HEEL AND MIDFOOT WITH BONE INVOLVEMENT WITHOUT EVIDENCE OF NECROSIS: ICD-10-CM

## 2025-06-17 DIAGNOSIS — E11.51 TYPE 2 DIABETES MELLITUS WITH DIABETIC PERIPHERAL ANGIOPATHY WITHOUT GANGRENE: ICD-10-CM

## 2025-06-17 DIAGNOSIS — Z98.62 PERIPHERAL VASCULAR ANGIOPLASTY STATUS: Chronic | ICD-10-CM

## 2025-06-17 DIAGNOSIS — Z79.899 OTHER LONG TERM (CURRENT) DRUG THERAPY: ICD-10-CM

## 2025-06-17 DIAGNOSIS — Z79.82 LONG TERM (CURRENT) USE OF ASPIRIN: ICD-10-CM

## 2025-06-17 LAB
GLUCOSE BLDC GLUCOMTR-MCNC: 105 MG/DL — HIGH (ref 70–99)
GLUCOSE BLDC GLUCOMTR-MCNC: 151 MG/DL — HIGH (ref 70–99)

## 2025-06-17 PROCEDURE — G0277: CPT

## 2025-06-17 PROCEDURE — 82962 GLUCOSE BLOOD TEST: CPT

## 2025-06-17 PROCEDURE — 99183 HYPERBARIC OXYGEN THERAPY: CPT

## 2025-06-18 ENCOUNTER — APPOINTMENT (OUTPATIENT)
Dept: HYPERBARIC MEDICINE | Facility: HOSPITAL | Age: 65
End: 2025-06-18

## 2025-06-18 ENCOUNTER — OUTPATIENT (OUTPATIENT)
Dept: OUTPATIENT SERVICES | Facility: HOSPITAL | Age: 65
LOS: 1 days | End: 2025-06-18
Payer: COMMERCIAL

## 2025-06-18 VITALS
TEMPERATURE: 98.3 F | SYSTOLIC BLOOD PRESSURE: 121 MMHG | OXYGEN SATURATION: 98 % | RESPIRATION RATE: 15 BRPM | DIASTOLIC BLOOD PRESSURE: 79 MMHG | HEART RATE: 84 BPM

## 2025-06-18 VITALS
SYSTOLIC BLOOD PRESSURE: 130 MMHG | HEART RATE: 83 BPM | TEMPERATURE: 98.3 F | DIASTOLIC BLOOD PRESSURE: 81 MMHG | OXYGEN SATURATION: 100 % | RESPIRATION RATE: 16 BRPM

## 2025-06-18 DIAGNOSIS — Z89.429 ACQUIRED ABSENCE OF OTHER TOE(S), UNSPECIFIED SIDE: Chronic | ICD-10-CM

## 2025-06-18 DIAGNOSIS — E11.621 TYPE 2 DIABETES MELLITUS WITH FOOT ULCER: ICD-10-CM

## 2025-06-18 DIAGNOSIS — Z98.62 PERIPHERAL VASCULAR ANGIOPLASTY STATUS: Chronic | ICD-10-CM

## 2025-06-18 DIAGNOSIS — L97.522 NON-PRESSURE CHRONIC ULCER OF OTHER PART OF LEFT FOOT WITH FAT LAYER EXPOSED: ICD-10-CM

## 2025-06-18 DIAGNOSIS — L97.422 NON-PRESSURE CHRONIC ULCER OF LEFT HEEL AND MIDFOOT WITH FAT LAYER EXPOSED: ICD-10-CM

## 2025-06-18 PROCEDURE — G0277: CPT

## 2025-06-18 PROCEDURE — 99183 HYPERBARIC OXYGEN THERAPY: CPT

## 2025-06-18 PROCEDURE — 82962 GLUCOSE BLOOD TEST: CPT

## 2025-06-19 ENCOUNTER — APPOINTMENT (OUTPATIENT)
Dept: HYPERBARIC MEDICINE | Facility: HOSPITAL | Age: 65
End: 2025-06-19

## 2025-06-19 ENCOUNTER — OUTPATIENT (OUTPATIENT)
Dept: OUTPATIENT SERVICES | Facility: HOSPITAL | Age: 65
LOS: 1 days | End: 2025-06-19
Payer: COMMERCIAL

## 2025-06-19 VITALS
OXYGEN SATURATION: 98 % | TEMPERATURE: 98 F | DIASTOLIC BLOOD PRESSURE: 76 MMHG | SYSTOLIC BLOOD PRESSURE: 136 MMHG | RESPIRATION RATE: 16 BRPM | HEART RATE: 80 BPM

## 2025-06-19 VITALS
DIASTOLIC BLOOD PRESSURE: 67 MMHG | HEART RATE: 98 BPM | TEMPERATURE: 98.2 F | RESPIRATION RATE: 16 BRPM | SYSTOLIC BLOOD PRESSURE: 114 MMHG | OXYGEN SATURATION: 99 %

## 2025-06-19 DIAGNOSIS — E11.621 TYPE 2 DIABETES MELLITUS WITH FOOT ULCER: ICD-10-CM

## 2025-06-19 DIAGNOSIS — Z98.62 PERIPHERAL VASCULAR ANGIOPLASTY STATUS: Chronic | ICD-10-CM

## 2025-06-19 DIAGNOSIS — L97.422 NON-PRESSURE CHRONIC ULCER OF LEFT HEEL AND MIDFOOT WITH FAT LAYER EXPOSED: ICD-10-CM

## 2025-06-19 DIAGNOSIS — L97.522 NON-PRESSURE CHRONIC ULCER OF OTHER PART OF LEFT FOOT WITH FAT LAYER EXPOSED: ICD-10-CM

## 2025-06-19 PROCEDURE — 99183 HYPERBARIC OXYGEN THERAPY: CPT

## 2025-06-19 PROCEDURE — G0277: CPT

## 2025-06-19 PROCEDURE — 82962 GLUCOSE BLOOD TEST: CPT

## 2025-06-20 ENCOUNTER — OUTPATIENT (OUTPATIENT)
Dept: OUTPATIENT SERVICES | Facility: HOSPITAL | Age: 65
LOS: 1 days | End: 2025-06-20
Payer: COMMERCIAL

## 2025-06-20 ENCOUNTER — APPOINTMENT (OUTPATIENT)
Dept: HYPERBARIC MEDICINE | Facility: HOSPITAL | Age: 65
End: 2025-06-20
Payer: COMMERCIAL

## 2025-06-20 VITALS
HEART RATE: 88 BPM | RESPIRATION RATE: 14 BRPM | OXYGEN SATURATION: 96 % | DIASTOLIC BLOOD PRESSURE: 69 MMHG | TEMPERATURE: 98.1 F | SYSTOLIC BLOOD PRESSURE: 118 MMHG

## 2025-06-20 VITALS
OXYGEN SATURATION: 100 % | SYSTOLIC BLOOD PRESSURE: 155 MMHG | HEART RATE: 77 BPM | DIASTOLIC BLOOD PRESSURE: 90 MMHG | RESPIRATION RATE: 14 BRPM | TEMPERATURE: 98 F

## 2025-06-20 DIAGNOSIS — L97.522 NON-PRESSURE CHRONIC ULCER OF OTHER PART OF LEFT FOOT WITH FAT LAYER EXPOSED: ICD-10-CM

## 2025-06-20 DIAGNOSIS — E11.621 TYPE 2 DIABETES MELLITUS WITH FOOT ULCER: ICD-10-CM

## 2025-06-20 DIAGNOSIS — L97.422 NON-PRESSURE CHRONIC ULCER OF LEFT HEEL AND MIDFOOT WITH FAT LAYER EXPOSED: ICD-10-CM

## 2025-06-20 DIAGNOSIS — Z98.62 PERIPHERAL VASCULAR ANGIOPLASTY STATUS: Chronic | ICD-10-CM

## 2025-06-20 PROCEDURE — G0277: CPT

## 2025-06-20 PROCEDURE — 82962 GLUCOSE BLOOD TEST: CPT

## 2025-06-20 PROCEDURE — 99183 HYPERBARIC OXYGEN THERAPY: CPT

## 2025-06-21 ENCOUNTER — APPOINTMENT (OUTPATIENT)
Dept: HYPERBARIC MEDICINE | Facility: HOSPITAL | Age: 65
End: 2025-06-21
Payer: COMMERCIAL

## 2025-06-21 ENCOUNTER — OUTPATIENT (OUTPATIENT)
Dept: OUTPATIENT SERVICES | Facility: HOSPITAL | Age: 65
LOS: 1 days | End: 2025-06-21
Payer: COMMERCIAL

## 2025-06-21 VITALS
SYSTOLIC BLOOD PRESSURE: 119 MMHG | DIASTOLIC BLOOD PRESSURE: 77 MMHG | HEART RATE: 86 BPM | OXYGEN SATURATION: 100 % | TEMPERATURE: 97.8 F | RESPIRATION RATE: 12 BRPM

## 2025-06-21 VITALS
DIASTOLIC BLOOD PRESSURE: 81 MMHG | TEMPERATURE: 98.3 F | SYSTOLIC BLOOD PRESSURE: 146 MMHG | RESPIRATION RATE: 15 BRPM | OXYGEN SATURATION: 99 % | HEART RATE: 85 BPM

## 2025-06-21 DIAGNOSIS — E11.621 TYPE 2 DIABETES MELLITUS WITH FOOT ULCER: ICD-10-CM

## 2025-06-21 DIAGNOSIS — Z98.62 PERIPHERAL VASCULAR ANGIOPLASTY STATUS: Chronic | ICD-10-CM

## 2025-06-21 DIAGNOSIS — Z89.429 ACQUIRED ABSENCE OF OTHER TOE(S), UNSPECIFIED SIDE: Chronic | ICD-10-CM

## 2025-06-21 DIAGNOSIS — L97.422 NON-PRESSURE CHRONIC ULCER OF LEFT HEEL AND MIDFOOT WITH FAT LAYER EXPOSED: ICD-10-CM

## 2025-06-21 DIAGNOSIS — L97.522 NON-PRESSURE CHRONIC ULCER OF OTHER PART OF LEFT FOOT WITH FAT LAYER EXPOSED: ICD-10-CM

## 2025-06-21 PROCEDURE — 82962 GLUCOSE BLOOD TEST: CPT

## 2025-06-21 PROCEDURE — G0277: CPT

## 2025-06-21 PROCEDURE — 99183 HYPERBARIC OXYGEN THERAPY: CPT

## 2025-06-23 ENCOUNTER — OUTPATIENT (OUTPATIENT)
Dept: OUTPATIENT SERVICES | Facility: HOSPITAL | Age: 65
LOS: 1 days | End: 2025-06-23
Payer: COMMERCIAL

## 2025-06-23 ENCOUNTER — APPOINTMENT (OUTPATIENT)
Dept: HYPERBARIC MEDICINE | Facility: HOSPITAL | Age: 65
End: 2025-06-23
Payer: COMMERCIAL

## 2025-06-23 VITALS
TEMPERATURE: 98.1 F | SYSTOLIC BLOOD PRESSURE: 121 MMHG | OXYGEN SATURATION: 100 % | HEART RATE: 81 BPM | RESPIRATION RATE: 15 BRPM | DIASTOLIC BLOOD PRESSURE: 77 MMHG

## 2025-06-23 VITALS
DIASTOLIC BLOOD PRESSURE: 96 MMHG | OXYGEN SATURATION: 99 % | HEART RATE: 71 BPM | TEMPERATURE: 98.3 F | RESPIRATION RATE: 14 BRPM | SYSTOLIC BLOOD PRESSURE: 164 MMHG

## 2025-06-23 DIAGNOSIS — L97.422 NON-PRESSURE CHRONIC ULCER OF LEFT HEEL AND MIDFOOT WITH FAT LAYER EXPOSED: ICD-10-CM

## 2025-06-23 DIAGNOSIS — E11.621 TYPE 2 DIABETES MELLITUS WITH FOOT ULCER: ICD-10-CM

## 2025-06-23 DIAGNOSIS — L97.522 NON-PRESSURE CHRONIC ULCER OF OTHER PART OF LEFT FOOT WITH FAT LAYER EXPOSED: ICD-10-CM

## 2025-06-23 LAB
GLUCOSE BLDC GLUCOMTR-MCNC: 158 MG/DL — HIGH (ref 70–99)
GLUCOSE BLDC GLUCOMTR-MCNC: 180 MG/DL — HIGH (ref 70–99)

## 2025-06-23 PROCEDURE — G0277: CPT

## 2025-06-23 PROCEDURE — 82962 GLUCOSE BLOOD TEST: CPT

## 2025-06-23 PROCEDURE — 99183 HYPERBARIC OXYGEN THERAPY: CPT

## 2025-06-24 ENCOUNTER — OUTPATIENT (OUTPATIENT)
Dept: OUTPATIENT SERVICES | Facility: HOSPITAL | Age: 65
LOS: 1 days | End: 2025-06-24
Payer: COMMERCIAL

## 2025-06-24 ENCOUNTER — APPOINTMENT (OUTPATIENT)
Dept: HYPERBARIC MEDICINE | Facility: HOSPITAL | Age: 65
End: 2025-06-24
Payer: COMMERCIAL

## 2025-06-24 VITALS
OXYGEN SATURATION: 98 % | DIASTOLIC BLOOD PRESSURE: 69 MMHG | RESPIRATION RATE: 12 BRPM | TEMPERATURE: 98.5 F | SYSTOLIC BLOOD PRESSURE: 125 MMHG | HEART RATE: 88 BPM

## 2025-06-24 VITALS
SYSTOLIC BLOOD PRESSURE: 135 MMHG | RESPIRATION RATE: 16 BRPM | OXYGEN SATURATION: 100 % | HEART RATE: 83 BPM | TEMPERATURE: 97.9 F | DIASTOLIC BLOOD PRESSURE: 85 MMHG

## 2025-06-24 DIAGNOSIS — Z98.62 PERIPHERAL VASCULAR ANGIOPLASTY STATUS: Chronic | ICD-10-CM

## 2025-06-24 DIAGNOSIS — L97.422 NON-PRESSURE CHRONIC ULCER OF LEFT HEEL AND MIDFOOT WITH FAT LAYER EXPOSED: ICD-10-CM

## 2025-06-24 DIAGNOSIS — L97.522 NON-PRESSURE CHRONIC ULCER OF OTHER PART OF LEFT FOOT WITH FAT LAYER EXPOSED: ICD-10-CM

## 2025-06-24 DIAGNOSIS — E11.621 TYPE 2 DIABETES MELLITUS WITH FOOT ULCER: ICD-10-CM

## 2025-06-24 LAB — GLUCOSE BLDC GLUCOMTR-MCNC: 209 MG/DL — HIGH (ref 70–99)

## 2025-06-24 PROCEDURE — 99213 OFFICE O/P EST LOW 20 MIN: CPT

## 2025-06-24 PROCEDURE — 82962 GLUCOSE BLOOD TEST: CPT

## 2025-06-24 PROCEDURE — G0277: CPT

## 2025-06-25 ENCOUNTER — APPOINTMENT (OUTPATIENT)
Dept: HYPERBARIC MEDICINE | Facility: HOSPITAL | Age: 65
End: 2025-06-25
Payer: COMMERCIAL

## 2025-06-25 ENCOUNTER — OUTPATIENT (OUTPATIENT)
Dept: OUTPATIENT SERVICES | Facility: HOSPITAL | Age: 65
LOS: 1 days | End: 2025-06-25
Payer: COMMERCIAL

## 2025-06-25 VITALS
DIASTOLIC BLOOD PRESSURE: 67 MMHG | HEIGHT: 68 IN | SYSTOLIC BLOOD PRESSURE: 106 MMHG | BODY MASS INDEX: 27.13 KG/M2 | WEIGHT: 179 LBS | OXYGEN SATURATION: 98 % | RESPIRATION RATE: 16 BRPM | TEMPERATURE: 98.3 F | HEART RATE: 91 BPM

## 2025-06-25 DIAGNOSIS — I25.10 ATHEROSCLEROTIC HEART DISEASE OF NATIVE CORONARY ARTERY WITHOUT ANGINA PECTORIS: ICD-10-CM

## 2025-06-25 DIAGNOSIS — E11.51 TYPE 2 DIABETES MELLITUS WITH DIABETIC PERIPHERAL ANGIOPATHY WITHOUT GANGRENE: ICD-10-CM

## 2025-06-25 DIAGNOSIS — I10 ESSENTIAL (PRIMARY) HYPERTENSION: ICD-10-CM

## 2025-06-25 DIAGNOSIS — E11.621 TYPE 2 DIABETES MELLITUS WITH FOOT ULCER: ICD-10-CM

## 2025-06-25 DIAGNOSIS — Z79.82 LONG TERM (CURRENT) USE OF ASPIRIN: ICD-10-CM

## 2025-06-25 DIAGNOSIS — Z89.431 ACQUIRED ABSENCE OF RIGHT FOOT: ICD-10-CM

## 2025-06-25 DIAGNOSIS — Z83.3 FAMILY HISTORY OF DIABETES MELLITUS: ICD-10-CM

## 2025-06-25 DIAGNOSIS — E78.00 PURE HYPERCHOLESTEROLEMIA, UNSPECIFIED: ICD-10-CM

## 2025-06-25 DIAGNOSIS — Z80.3 FAMILY HISTORY OF MALIGNANT NEOPLASM OF BREAST: ICD-10-CM

## 2025-06-25 DIAGNOSIS — Z86.39 PERSONAL HISTORY OF OTHER ENDOCRINE, NUTRITIONAL AND METABOLIC DISEASE: ICD-10-CM

## 2025-06-25 DIAGNOSIS — L97.422 NON-PRESSURE CHRONIC ULCER OF LEFT HEEL AND MIDFOOT WITH FAT LAYER EXPOSED: ICD-10-CM

## 2025-06-25 DIAGNOSIS — Z98.890 OTHER SPECIFIED POSTPROCEDURAL STATES: ICD-10-CM

## 2025-06-25 DIAGNOSIS — Z79.899 OTHER LONG TERM (CURRENT) DRUG THERAPY: ICD-10-CM

## 2025-06-25 DIAGNOSIS — E11.40 TYPE 2 DIABETES MELLITUS WITH DIABETIC NEUROPATHY, UNSPECIFIED: ICD-10-CM

## 2025-06-25 DIAGNOSIS — L97.522 NON-PRESSURE CHRONIC ULCER OF OTHER PART OF LEFT FOOT WITH FAT LAYER EXPOSED: ICD-10-CM

## 2025-06-25 DIAGNOSIS — Z79.84 LONG TERM (CURRENT) USE OF ORAL HYPOGLYCEMIC DRUGS: ICD-10-CM

## 2025-06-25 PROCEDURE — 99024 POSTOP FOLLOW-UP VISIT: CPT

## 2025-06-25 PROCEDURE — G0463: CPT

## 2025-06-26 ENCOUNTER — APPOINTMENT (OUTPATIENT)
Dept: HYPERBARIC MEDICINE | Facility: HOSPITAL | Age: 65
End: 2025-06-26
Payer: COMMERCIAL

## 2025-06-26 ENCOUNTER — OUTPATIENT (OUTPATIENT)
Dept: OUTPATIENT SERVICES | Facility: HOSPITAL | Age: 65
LOS: 1 days | End: 2025-06-26
Payer: COMMERCIAL

## 2025-06-26 VITALS
HEART RATE: 85 BPM | RESPIRATION RATE: 15 BRPM | OXYGEN SATURATION: 100 % | DIASTOLIC BLOOD PRESSURE: 84 MMHG | TEMPERATURE: 97.8 F | SYSTOLIC BLOOD PRESSURE: 133 MMHG

## 2025-06-26 VITALS
OXYGEN SATURATION: 98 % | SYSTOLIC BLOOD PRESSURE: 123 MMHG | DIASTOLIC BLOOD PRESSURE: 76 MMHG | RESPIRATION RATE: 15 BRPM | TEMPERATURE: 98.2 F | HEART RATE: 90 BPM

## 2025-06-26 DIAGNOSIS — L97.422 NON-PRESSURE CHRONIC ULCER OF LEFT HEEL AND MIDFOOT WITH FAT LAYER EXPOSED: ICD-10-CM

## 2025-06-26 DIAGNOSIS — Z89.429 ACQUIRED ABSENCE OF OTHER TOE(S), UNSPECIFIED SIDE: Chronic | ICD-10-CM

## 2025-06-26 DIAGNOSIS — E11.621 TYPE 2 DIABETES MELLITUS WITH FOOT ULCER: ICD-10-CM

## 2025-06-26 DIAGNOSIS — Z98.62 PERIPHERAL VASCULAR ANGIOPLASTY STATUS: Chronic | ICD-10-CM

## 2025-06-26 DIAGNOSIS — L97.522 NON-PRESSURE CHRONIC ULCER OF OTHER PART OF LEFT FOOT WITH FAT LAYER EXPOSED: ICD-10-CM

## 2025-06-26 PROCEDURE — G0277: CPT

## 2025-06-26 PROCEDURE — 99183 HYPERBARIC OXYGEN THERAPY: CPT

## 2025-06-26 PROCEDURE — 82962 GLUCOSE BLOOD TEST: CPT

## 2025-06-27 ENCOUNTER — OUTPATIENT (OUTPATIENT)
Dept: OUTPATIENT SERVICES | Facility: HOSPITAL | Age: 65
LOS: 1 days | End: 2025-06-27
Payer: COMMERCIAL

## 2025-06-27 ENCOUNTER — APPOINTMENT (OUTPATIENT)
Dept: HYPERBARIC MEDICINE | Facility: HOSPITAL | Age: 65
End: 2025-06-27
Payer: COMMERCIAL

## 2025-06-27 VITALS
HEART RATE: 97 BPM | RESPIRATION RATE: 16 BRPM | SYSTOLIC BLOOD PRESSURE: 123 MMHG | TEMPERATURE: 98.6 F | DIASTOLIC BLOOD PRESSURE: 78 MMHG | OXYGEN SATURATION: 100 %

## 2025-06-27 VITALS
SYSTOLIC BLOOD PRESSURE: 143 MMHG | TEMPERATURE: 97.8 F | DIASTOLIC BLOOD PRESSURE: 85 MMHG | HEART RATE: 84 BPM | RESPIRATION RATE: 15 BRPM | OXYGEN SATURATION: 100 %

## 2025-06-27 DIAGNOSIS — L97.422 NON-PRESSURE CHRONIC ULCER OF LEFT HEEL AND MIDFOOT WITH FAT LAYER EXPOSED: ICD-10-CM

## 2025-06-27 DIAGNOSIS — E11.621 TYPE 2 DIABETES MELLITUS WITH FOOT ULCER: ICD-10-CM

## 2025-06-27 DIAGNOSIS — L97.522 NON-PRESSURE CHRONIC ULCER OF OTHER PART OF LEFT FOOT WITH FAT LAYER EXPOSED: ICD-10-CM

## 2025-06-27 LAB
GLUCOSE BLDC GLUCOMTR-MCNC: 114 MG/DL — HIGH (ref 70–99)
GLUCOSE BLDC GLUCOMTR-MCNC: 182 MG/DL — HIGH (ref 70–99)

## 2025-06-27 PROCEDURE — 99183 HYPERBARIC OXYGEN THERAPY: CPT

## 2025-06-27 PROCEDURE — G0277: CPT

## 2025-06-27 PROCEDURE — 82962 GLUCOSE BLOOD TEST: CPT

## 2025-06-28 ENCOUNTER — APPOINTMENT (OUTPATIENT)
Dept: HYPERBARIC MEDICINE | Facility: HOSPITAL | Age: 65
End: 2025-06-28
Payer: COMMERCIAL

## 2025-06-28 ENCOUNTER — OUTPATIENT (OUTPATIENT)
Dept: OUTPATIENT SERVICES | Facility: HOSPITAL | Age: 65
LOS: 1 days | End: 2025-06-28
Payer: COMMERCIAL

## 2025-06-28 VITALS
HEART RATE: 91 BPM | TEMPERATURE: 98.3 F | SYSTOLIC BLOOD PRESSURE: 156 MMHG | DIASTOLIC BLOOD PRESSURE: 77 MMHG | RESPIRATION RATE: 18 BRPM | OXYGEN SATURATION: 98 %

## 2025-06-28 VITALS
SYSTOLIC BLOOD PRESSURE: 157 MMHG | DIASTOLIC BLOOD PRESSURE: 85 MMHG | TEMPERATURE: 98.2 F | RESPIRATION RATE: 16 BRPM | HEART RATE: 72 BPM | OXYGEN SATURATION: 100 %

## 2025-06-28 DIAGNOSIS — E11.621 TYPE 2 DIABETES MELLITUS WITH FOOT ULCER: ICD-10-CM

## 2025-06-28 DIAGNOSIS — L97.522 NON-PRESSURE CHRONIC ULCER OF OTHER PART OF LEFT FOOT WITH FAT LAYER EXPOSED: ICD-10-CM

## 2025-06-28 DIAGNOSIS — L97.422 NON-PRESSURE CHRONIC ULCER OF LEFT HEEL AND MIDFOOT WITH FAT LAYER EXPOSED: ICD-10-CM

## 2025-06-28 PROCEDURE — 99183 HYPERBARIC OXYGEN THERAPY: CPT

## 2025-06-28 PROCEDURE — 82962 GLUCOSE BLOOD TEST: CPT

## 2025-06-28 PROCEDURE — G0277: CPT

## 2025-06-30 ENCOUNTER — OUTPATIENT (OUTPATIENT)
Dept: OUTPATIENT SERVICES | Facility: HOSPITAL | Age: 65
LOS: 1 days | End: 2025-06-30
Payer: COMMERCIAL

## 2025-06-30 ENCOUNTER — APPOINTMENT (OUTPATIENT)
Dept: HYPERBARIC MEDICINE | Facility: HOSPITAL | Age: 65
End: 2025-06-30
Payer: COMMERCIAL

## 2025-06-30 VITALS
SYSTOLIC BLOOD PRESSURE: 143 MMHG | TEMPERATURE: 97.9 F | DIASTOLIC BLOOD PRESSURE: 83 MMHG | RESPIRATION RATE: 14 BRPM | OXYGEN SATURATION: 100 % | HEART RATE: 80 BPM

## 2025-06-30 VITALS
SYSTOLIC BLOOD PRESSURE: 141 MMHG | RESPIRATION RATE: 15 BRPM | TEMPERATURE: 98 F | DIASTOLIC BLOOD PRESSURE: 74 MMHG | OXYGEN SATURATION: 99 % | HEART RATE: 89 BPM

## 2025-06-30 DIAGNOSIS — Z89.429 ACQUIRED ABSENCE OF OTHER TOE(S), UNSPECIFIED SIDE: Chronic | ICD-10-CM

## 2025-06-30 DIAGNOSIS — E11.621 TYPE 2 DIABETES MELLITUS WITH FOOT ULCER: ICD-10-CM

## 2025-06-30 DIAGNOSIS — L97.522 NON-PRESSURE CHRONIC ULCER OF OTHER PART OF LEFT FOOT WITH FAT LAYER EXPOSED: ICD-10-CM

## 2025-06-30 DIAGNOSIS — L97.422 NON-PRESSURE CHRONIC ULCER OF LEFT HEEL AND MIDFOOT WITH FAT LAYER EXPOSED: ICD-10-CM

## 2025-06-30 PROCEDURE — G0277: CPT

## 2025-06-30 PROCEDURE — 99183 HYPERBARIC OXYGEN THERAPY: CPT

## 2025-06-30 PROCEDURE — 82962 GLUCOSE BLOOD TEST: CPT

## 2025-07-01 ENCOUNTER — APPOINTMENT (OUTPATIENT)
Dept: HYPERBARIC MEDICINE | Facility: HOSPITAL | Age: 65
End: 2025-07-01
Payer: COMMERCIAL

## 2025-07-01 ENCOUNTER — OUTPATIENT (OUTPATIENT)
Dept: OUTPATIENT SERVICES | Facility: HOSPITAL | Age: 65
LOS: 1 days | End: 2025-07-01
Payer: COMMERCIAL

## 2025-07-01 VITALS
HEART RATE: 92 BPM | DIASTOLIC BLOOD PRESSURE: 83 MMHG | SYSTOLIC BLOOD PRESSURE: 153 MMHG | TEMPERATURE: 97.7 F | RESPIRATION RATE: 15 BRPM | OXYGEN SATURATION: 98 %

## 2025-07-01 VITALS
TEMPERATURE: 97.9 F | HEART RATE: 87 BPM | SYSTOLIC BLOOD PRESSURE: 148 MMHG | RESPIRATION RATE: 15 BRPM | DIASTOLIC BLOOD PRESSURE: 87 MMHG | OXYGEN SATURATION: 100 %

## 2025-07-01 DIAGNOSIS — L97.422 NON-PRESSURE CHRONIC ULCER OF LEFT HEEL AND MIDFOOT WITH FAT LAYER EXPOSED: ICD-10-CM

## 2025-07-01 DIAGNOSIS — E11.621 TYPE 2 DIABETES MELLITUS WITH FOOT ULCER: ICD-10-CM

## 2025-07-01 DIAGNOSIS — L97.522 NON-PRESSURE CHRONIC ULCER OF OTHER PART OF LEFT FOOT WITH FAT LAYER EXPOSED: ICD-10-CM

## 2025-07-01 LAB
GLUCOSE BLDC GLUCOMTR-MCNC: 145 MG/DL — HIGH (ref 70–99)
GLUCOSE BLDC GLUCOMTR-MCNC: 182 MG/DL — HIGH (ref 70–99)

## 2025-07-01 PROCEDURE — 82962 GLUCOSE BLOOD TEST: CPT

## 2025-07-01 PROCEDURE — 99183 HYPERBARIC OXYGEN THERAPY: CPT

## 2025-07-01 PROCEDURE — G0277: CPT

## 2025-07-02 ENCOUNTER — APPOINTMENT (OUTPATIENT)
Dept: HYPERBARIC MEDICINE | Facility: HOSPITAL | Age: 65
End: 2025-07-02
Payer: COMMERCIAL

## 2025-07-02 ENCOUNTER — OUTPATIENT (OUTPATIENT)
Dept: OUTPATIENT SERVICES | Facility: HOSPITAL | Age: 65
LOS: 1 days | End: 2025-07-02
Payer: COMMERCIAL

## 2025-07-02 ENCOUNTER — OUTPATIENT (OUTPATIENT)
Dept: OUTPATIENT SERVICES | Facility: HOSPITAL | Age: 65
LOS: 1 days | End: 2025-07-02

## 2025-07-02 ENCOUNTER — APPOINTMENT (OUTPATIENT)
Dept: WOUND CARE | Facility: HOSPITAL | Age: 65
End: 2025-07-02

## 2025-07-02 VITALS
RESPIRATION RATE: 18 BRPM | DIASTOLIC BLOOD PRESSURE: 95 MMHG | OXYGEN SATURATION: 98 % | TEMPERATURE: 97.6 F | SYSTOLIC BLOOD PRESSURE: 170 MMHG | HEART RATE: 87 BPM

## 2025-07-02 VITALS
SYSTOLIC BLOOD PRESSURE: 148 MMHG | TEMPERATURE: 98.2 F | RESPIRATION RATE: 18 BRPM | HEART RATE: 87 BPM | OXYGEN SATURATION: 97 % | DIASTOLIC BLOOD PRESSURE: 84 MMHG

## 2025-07-02 DIAGNOSIS — E11.621 TYPE 2 DIABETES MELLITUS WITH FOOT ULCER: ICD-10-CM

## 2025-07-02 DIAGNOSIS — Z98.62 PERIPHERAL VASCULAR ANGIOPLASTY STATUS: Chronic | ICD-10-CM

## 2025-07-02 DIAGNOSIS — L97.522 NON-PRESSURE CHRONIC ULCER OF OTHER PART OF LEFT FOOT WITH FAT LAYER EXPOSED: ICD-10-CM

## 2025-07-02 DIAGNOSIS — L97.422 NON-PRESSURE CHRONIC ULCER OF LEFT HEEL AND MIDFOOT WITH FAT LAYER EXPOSED: ICD-10-CM

## 2025-07-02 PROCEDURE — 82962 GLUCOSE BLOOD TEST: CPT

## 2025-07-02 PROCEDURE — G0277: CPT

## 2025-07-02 PROCEDURE — 99183 HYPERBARIC OXYGEN THERAPY: CPT

## 2025-07-02 PROCEDURE — 97602 WOUND(S) CARE NON-SELECTIVE: CPT

## 2025-07-03 ENCOUNTER — OUTPATIENT (OUTPATIENT)
Dept: OUTPATIENT SERVICES | Facility: HOSPITAL | Age: 65
LOS: 1 days | End: 2025-07-03
Payer: COMMERCIAL

## 2025-07-03 ENCOUNTER — APPOINTMENT (OUTPATIENT)
Dept: HYPERBARIC MEDICINE | Facility: HOSPITAL | Age: 65
End: 2025-07-03
Payer: COMMERCIAL

## 2025-07-03 VITALS
DIASTOLIC BLOOD PRESSURE: 85 MMHG | RESPIRATION RATE: 15 BRPM | HEART RATE: 86 BPM | SYSTOLIC BLOOD PRESSURE: 126 MMHG | OXYGEN SATURATION: 100 % | TEMPERATURE: 98.3 F

## 2025-07-03 VITALS
OXYGEN SATURATION: 100 % | TEMPERATURE: 97.7 F | HEART RATE: 87 BPM | RESPIRATION RATE: 18 BRPM | DIASTOLIC BLOOD PRESSURE: 92 MMHG | SYSTOLIC BLOOD PRESSURE: 157 MMHG

## 2025-07-03 DIAGNOSIS — Z98.62 PERIPHERAL VASCULAR ANGIOPLASTY STATUS: Chronic | ICD-10-CM

## 2025-07-03 DIAGNOSIS — E11.621 TYPE 2 DIABETES MELLITUS WITH FOOT ULCER: ICD-10-CM

## 2025-07-03 PROCEDURE — 99183 HYPERBARIC OXYGEN THERAPY: CPT

## 2025-07-03 PROCEDURE — 82962 GLUCOSE BLOOD TEST: CPT

## 2025-07-03 PROCEDURE — G0277: CPT

## 2025-07-03 NOTE — ED PROVIDER NOTE - AXIS
[facility]  [phone]    Do not drive, work around machines or use equipment.  Do not drink any alcoholic beverages.  Do not smoke while alone.  Avoid making important decisions.  Plan to spend a quiet, relaxed evening @ home.  Resume normal activities as you begin to feel better.  Eat lightly for your first meal, then gradually increase your diet to what is normal for you.  In case of nausea, avoid food and drink only clear liquids.  Resume food as nausea ceases.  Notify your surgeon if you experience fever, chills, large amount of bleeding, difficulty breathing, persistent nausea and vomiting or any other disturbing problem.  Call for a follow-up appointment with your surgeon.   
Normal

## 2025-07-04 DIAGNOSIS — L97.522 NON-PRESSURE CHRONIC ULCER OF OTHER PART OF LEFT FOOT WITH FAT LAYER EXPOSED: ICD-10-CM

## 2025-07-04 DIAGNOSIS — E11.621 TYPE 2 DIABETES MELLITUS WITH FOOT ULCER: ICD-10-CM

## 2025-07-04 DIAGNOSIS — L97.422 NON-PRESSURE CHRONIC ULCER OF LEFT HEEL AND MIDFOOT WITH FAT LAYER EXPOSED: ICD-10-CM

## 2025-07-07 ENCOUNTER — OUTPATIENT (OUTPATIENT)
Dept: OUTPATIENT SERVICES | Facility: HOSPITAL | Age: 65
LOS: 1 days | End: 2025-07-07
Payer: COMMERCIAL

## 2025-07-07 ENCOUNTER — APPOINTMENT (OUTPATIENT)
Dept: HYPERBARIC MEDICINE | Facility: HOSPITAL | Age: 65
End: 2025-07-07
Payer: COMMERCIAL

## 2025-07-07 VITALS
SYSTOLIC BLOOD PRESSURE: 147 MMHG | HEART RATE: 85 BPM | RESPIRATION RATE: 15 BRPM | OXYGEN SATURATION: 99 % | DIASTOLIC BLOOD PRESSURE: 88 MMHG | TEMPERATURE: 98.2 F

## 2025-07-07 VITALS
HEART RATE: 85 BPM | OXYGEN SATURATION: 99 % | DIASTOLIC BLOOD PRESSURE: 78 MMHG | SYSTOLIC BLOOD PRESSURE: 149 MMHG | RESPIRATION RATE: 14 BRPM | TEMPERATURE: 98 F

## 2025-07-07 DIAGNOSIS — E11.621 TYPE 2 DIABETES MELLITUS WITH FOOT ULCER: ICD-10-CM

## 2025-07-07 DIAGNOSIS — Z98.62 PERIPHERAL VASCULAR ANGIOPLASTY STATUS: Chronic | ICD-10-CM

## 2025-07-07 DIAGNOSIS — L97.422 NON-PRESSURE CHRONIC ULCER OF LEFT HEEL AND MIDFOOT WITH FAT LAYER EXPOSED: ICD-10-CM

## 2025-07-07 DIAGNOSIS — L97.522 NON-PRESSURE CHRONIC ULCER OF OTHER PART OF LEFT FOOT WITH FAT LAYER EXPOSED: ICD-10-CM

## 2025-07-07 PROCEDURE — G0277: CPT

## 2025-07-07 PROCEDURE — 99183 HYPERBARIC OXYGEN THERAPY: CPT

## 2025-07-07 PROCEDURE — 82962 GLUCOSE BLOOD TEST: CPT

## 2025-07-08 ENCOUNTER — APPOINTMENT (OUTPATIENT)
Dept: HYPERBARIC MEDICINE | Facility: HOSPITAL | Age: 65
End: 2025-07-08
Payer: COMMERCIAL

## 2025-07-08 ENCOUNTER — OUTPATIENT (OUTPATIENT)
Dept: OUTPATIENT SERVICES | Facility: HOSPITAL | Age: 65
LOS: 1 days | End: 2025-07-08
Payer: COMMERCIAL

## 2025-07-08 VITALS
TEMPERATURE: 97.6 F | RESPIRATION RATE: 12 BRPM | DIASTOLIC BLOOD PRESSURE: 78 MMHG | HEART RATE: 76 BPM | SYSTOLIC BLOOD PRESSURE: 148 MMHG | OXYGEN SATURATION: 100 %

## 2025-07-08 VITALS
SYSTOLIC BLOOD PRESSURE: 155 MMHG | DIASTOLIC BLOOD PRESSURE: 78 MMHG | TEMPERATURE: 98.5 F | OXYGEN SATURATION: 98 % | RESPIRATION RATE: 14 BRPM | HEART RATE: 92 BPM

## 2025-07-08 DIAGNOSIS — E11.621 TYPE 2 DIABETES MELLITUS WITH FOOT ULCER: ICD-10-CM

## 2025-07-08 DIAGNOSIS — L97.522 NON-PRESSURE CHRONIC ULCER OF OTHER PART OF LEFT FOOT WITH FAT LAYER EXPOSED: ICD-10-CM

## 2025-07-08 DIAGNOSIS — E11.622 TYPE 2 DIABETES MELLITUS WITH OTHER SKIN ULCER: ICD-10-CM

## 2025-07-08 DIAGNOSIS — L97.422 NON-PRESSURE CHRONIC ULCER OF LEFT HEEL AND MIDFOOT WITH FAT LAYER EXPOSED: ICD-10-CM

## 2025-07-08 DIAGNOSIS — Z98.62 PERIPHERAL VASCULAR ANGIOPLASTY STATUS: Chronic | ICD-10-CM

## 2025-07-08 PROCEDURE — 99183 HYPERBARIC OXYGEN THERAPY: CPT

## 2025-07-08 PROCEDURE — 97602 WOUND(S) CARE NON-SELECTIVE: CPT

## 2025-07-08 PROCEDURE — 82962 GLUCOSE BLOOD TEST: CPT

## 2025-07-08 PROCEDURE — G0277: CPT

## 2025-07-09 ENCOUNTER — APPOINTMENT (OUTPATIENT)
Dept: HYPERBARIC MEDICINE | Facility: HOSPITAL | Age: 65
End: 2025-07-09

## 2025-07-09 ENCOUNTER — OUTPATIENT (OUTPATIENT)
Dept: OUTPATIENT SERVICES | Facility: HOSPITAL | Age: 65
LOS: 1 days | End: 2025-07-09
Payer: COMMERCIAL

## 2025-07-09 ENCOUNTER — NON-APPOINTMENT (OUTPATIENT)
Age: 65
End: 2025-07-09

## 2025-07-09 VITALS
BODY MASS INDEX: 27.13 KG/M2 | SYSTOLIC BLOOD PRESSURE: 165 MMHG | OXYGEN SATURATION: 99 % | TEMPERATURE: 98.1 F | HEIGHT: 68 IN | HEART RATE: 88 BPM | DIASTOLIC BLOOD PRESSURE: 82 MMHG | WEIGHT: 179 LBS | RESPIRATION RATE: 16 BRPM

## 2025-07-09 DIAGNOSIS — Z89.429 ACQUIRED ABSENCE OF OTHER TOE(S), UNSPECIFIED SIDE: Chronic | ICD-10-CM

## 2025-07-09 DIAGNOSIS — E11.621 TYPE 2 DIABETES MELLITUS WITH FOOT ULCER: ICD-10-CM

## 2025-07-09 PROCEDURE — 11042 DBRDMT SUBQ TIS 1ST 20SQCM/<: CPT

## 2025-07-09 PROCEDURE — 11045 DBRDMT SUBQ TISS EACH ADDL: CPT

## 2025-07-09 PROCEDURE — 11045 DBRDMT SUBQ TISS EACH ADDL: CPT | Mod: 58

## 2025-07-09 PROCEDURE — 11042 DBRDMT SUBQ TIS 1ST 20SQCM/<: CPT | Mod: 58

## 2025-07-10 ENCOUNTER — APPOINTMENT (OUTPATIENT)
Dept: HYPERBARIC MEDICINE | Facility: HOSPITAL | Age: 65
End: 2025-07-10
Payer: COMMERCIAL

## 2025-07-10 ENCOUNTER — OUTPATIENT (OUTPATIENT)
Dept: OUTPATIENT SERVICES | Facility: HOSPITAL | Age: 65
LOS: 1 days | End: 2025-07-10
Payer: COMMERCIAL

## 2025-07-10 VITALS
OXYGEN SATURATION: 98 % | SYSTOLIC BLOOD PRESSURE: 171 MMHG | RESPIRATION RATE: 15 BRPM | DIASTOLIC BLOOD PRESSURE: 94 MMHG | TEMPERATURE: 98.1 F | HEART RATE: 86 BPM

## 2025-07-10 DIAGNOSIS — Z79.82 LONG TERM (CURRENT) USE OF ASPIRIN: ICD-10-CM

## 2025-07-10 DIAGNOSIS — I25.10 ATHEROSCLEROTIC HEART DISEASE OF NATIVE CORONARY ARTERY WITHOUT ANGINA PECTORIS: ICD-10-CM

## 2025-07-10 DIAGNOSIS — L97.422 NON-PRESSURE CHRONIC ULCER OF LEFT HEEL AND MIDFOOT WITH FAT LAYER EXPOSED: ICD-10-CM

## 2025-07-10 DIAGNOSIS — Z98.890 OTHER SPECIFIED POSTPROCEDURAL STATES: ICD-10-CM

## 2025-07-10 DIAGNOSIS — Z89.431 ACQUIRED ABSENCE OF RIGHT FOOT: ICD-10-CM

## 2025-07-10 DIAGNOSIS — E11.621 TYPE 2 DIABETES MELLITUS WITH FOOT ULCER: ICD-10-CM

## 2025-07-10 DIAGNOSIS — E11.51 TYPE 2 DIABETES MELLITUS WITH DIABETIC PERIPHERAL ANGIOPATHY WITHOUT GANGRENE: ICD-10-CM

## 2025-07-10 DIAGNOSIS — Z79.84 LONG TERM (CURRENT) USE OF ORAL HYPOGLYCEMIC DRUGS: ICD-10-CM

## 2025-07-10 DIAGNOSIS — I10 ESSENTIAL (PRIMARY) HYPERTENSION: ICD-10-CM

## 2025-07-10 DIAGNOSIS — L97.522 NON-PRESSURE CHRONIC ULCER OF OTHER PART OF LEFT FOOT WITH FAT LAYER EXPOSED: ICD-10-CM

## 2025-07-10 DIAGNOSIS — Z86.39 PERSONAL HISTORY OF OTHER ENDOCRINE, NUTRITIONAL AND METABOLIC DISEASE: ICD-10-CM

## 2025-07-10 DIAGNOSIS — E78.00 PURE HYPERCHOLESTEROLEMIA, UNSPECIFIED: ICD-10-CM

## 2025-07-10 DIAGNOSIS — Z79.899 OTHER LONG TERM (CURRENT) DRUG THERAPY: ICD-10-CM

## 2025-07-10 DIAGNOSIS — E11.40 TYPE 2 DIABETES MELLITUS WITH DIABETIC NEUROPATHY, UNSPECIFIED: ICD-10-CM

## 2025-07-10 PROCEDURE — 82962 GLUCOSE BLOOD TEST: CPT

## 2025-07-10 PROCEDURE — 99183 HYPERBARIC OXYGEN THERAPY: CPT

## 2025-07-10 NOTE — DIETITIAN INITIAL EVALUATION ADULT - BODY MASS INDEX
"Loi Larson - Neurosurgery ([facility])  Vascular Neurology  Comprehensive Stroke Center  Discharge Summary     Summary:     Admit Date: 7/6/2025  3:40 PM    Discharge Date and Time: 07/10/2025 3:14 PM    Attending Physician: Yohan Almanzar MD     Discharge Provider: Godwin Campos MD    History of Present Illness: Yobani Walls is a 47 y.o. male with PMH of HTN, HLD,  multiple MI, CHF, CKD, prior stroke (with no residual deficits) that presents as a transfer from [facility] with R intracranial ICA and R M2 occlusion. LKW 9:00pm 07/05/2026. Patient presented to [facility] this morning with left- sided weakness and bizarre behavior. Wife states that patient "seemed drunk" last night, but this morning had difficulty with driving. Patient was evaluated via telestroke. NIH at that time was 3 for left sided drift and L neglect. CTH/CTA revealed occlusion of the distal intracranial ICA with M1 and QUETA being patent and subsequent reocclusion of the R  inferior M2 segment.  as well as established infarcts in the caudate head and foci in the temporal lobe. Patient was OOW for TNK, IR not recommended due to low NIHSS. Decision made to transfer patient to [facility] for MRI and close monitoring for decompensation. On arrival to floor at [facility], stroke team notified. On exam, patient has significant LUE drift, L facial droop, and dysarthria. NIHSS now 6. Patient stood up to go to the bathroom, nurse noted that at this time, patient nearly slumped to the ground due to severe LSW. STAT MRI, ASA load, and HOB Flat ordered. Patient has a defibrillator, device required interrogation prior to MRI.     Hospital Course (synopsis of major diagnoses, care, treatment, and services provided during the course of the hospital stay): 7/7/2025 Stable neurological exam - NIHSS 6 with left facial palsy, LUE/LLE weakness, and mild dysarthria. Plan for diagnostic angiography to evaluate R MCA fusiform pattern seen on vessel imaging.  7/8/2025 Improved " left hemiparesis particularly in the LUE. Slightly improved L facial palsy with mild dysarthria. Plan for diagnostic angio today, otherwise medically appropriate for discharge. Pt prefers home with outpatient therapy.  7/9/2025 NAEON. Neuro exam with L lower facial palsy and mild dysarthria. No hemiparesis. Diagnostic angio delayed to today with subsequent discharge home.  7/10/2025 NAEON. Stable neuro exam. Appropriate for discharge home. Diagnostic angio shows chronic progressive stenosis of R ICA with EC-IC collateral anastomoses. Given the angiography findings, he is unlikely to benefit from stent placement and is at risk for reperfusion hemorrhage so will not proceed with intervention at this point. He has low EF and highest suspicion for cardioembolic etiology at this time. He follows with cardiology and on GDMT. Continue DAPT and follow up with vascular neurology in 3-4 weeks.      Goals of Care Treatment Preferences:  Code Status: Full Code    Stroke Etiology: Ischemic Large Artery Atherosclerosis/Atheroembolic Intracranial    STROKE DOCUMENTATION       NIH Scale:  1a. Level of Consciousness: 0-->Alert, keenly responsive  1b. LOC Questions: 0-->Answers both questions correctly  1c. LOC Commands: 0-->Performs both tasks correctly  2. Best Gaze: 0-->Normal  3. Visual: 0-->No visual loss  4. Facial Palsy: 1-->Minor paralysis (flattened nasolabial fold, asymmetry on smiling)  5a. Motor Arm, Left: 0-->No drift, limb holds 90 (or 45) degrees for full 10 secs  5b. Motor Arm, Right: 0-->No drift, limb holds 90 (or 45) degrees for full 10 secs  6a. Motor Leg, Left: 0-->No drift, leg holds 30 degree position for full 5 secs  6b. Motor Leg, Right: 0-->No drift, leg holds 30 degree position for full 5 secs  7. Limb Ataxia: 0-->Absent  8. Sensory: 0-->Normal, no sensory loss  9. Best Language: 0-->No aphasia, normal  10. Dysarthria: 0-->Normal  11. Extinction and Inattention (formerly Neglect): 0-->No abnormality  Total  (NIH Stroke Scale): 1      Modified Rush Score: 0  Prakash Coma Scale:    ABCD2 Score:    [id number] Score:   HAS -BLED Score:   ICH Score:   Hunt & Hackett Classification:       Assessment/Plan:     Diagnostic Results:    Brain Imaging   MRI Brain w/o  - Acute infarction of the right cerebral hemisphere involving the right caudate head, right amygdala, right subinsular cortex, and portions of the right frontal cortex.  - No evidence of hemorrhagic conversion, mass effect, or midline shift.     CT Head 07/06/2025  Acute/subacute infarct right head of the caudate nucleus.     Vessel Imaging  Diagnostic angiography 7/9/2025  Preliminary interpretation:   Right supra-clinoid ICA occlusion with no EC-IC collateral anastomosis through Ophthalmic.  2. Delayed Right QUETA/MCA is filled via A-comm.      CTA Brain 07/06/2025  1. Occluded distal right ICA.  2. Severe stenosis right and left M2 segments.  3. Subjectively decreased enhancement the right sylvian branches.     CTA Neck 07/06/2025  Tapered narrowing of the mid/distal ICA resulting in complete occlusion.     Cardiac Imaging   Echo     Left Ventricle: The left ventricle is normal in size. Normal wall thickness. There is eccentric hypertrophy. Mild global hypokinesis and regional wall motion abnormalities present. See diagram for wall motion findings. There is moderately reduced systolic function with a visually estimated ejection fraction of 35 - 40%. Ejection fraction is approximately 38%. Grade II diastolic dysfunction.    Right Ventricle: The right ventricle is normal in size measuring 2.8 cm. Wall thickness is normal. Systolic function is normal. Pacemaker lead present in the ventricle.    Left Atrium: The left atrium is moderately dilated    Right Atrium: Lead present in the right atrium.    Aortic Valve: The aortic valve is a trileaflet valve. There is mild aortic valve sclerosis. There is mild aortic regurgitation.    Pulmonary Artery: The estimated pulmonary  "artery systolic pressure is 28 mmHg.    IVC/SVC: Normal venous pressure at 3 mmHg.     Interventions: Diagnostic Angiography    Complications: None    Disposition: Home or Self Care    Final Active Diagnoses:    Diagnosis Date Noted POA    PRINCIPAL PROBLEM:  Embolic stroke involving right carotid artery [I63.131] 07/06/2025 Yes    Hypokalemia [E87.6] 07/07/2025 Yes    CHF (congestive heart failure) [I50.9] 07/06/2025 Yes    Hyperlipidemia [E78.5] 07/06/2025 Yes    History of MI (myocardial infarction) [I25.2] 07/06/2025 Not Applicable    History of muscle spasm [Z87.39] 07/06/2025 Not Applicable    Hypertension [I10] 07/06/2025 Yes    CKD (chronic kidney disease) [N18.9] 07/06/2025 Yes      Problems Resolved During this Admission:    Diagnosis Date Noted Date Resolved POA    CAREY (acute kidney injury) [N17.9] 07/06/2025 07/07/2025 Yes     Neuro  * Embolic stroke involving right carotid artery  Yobani Walls is a 47 y.o. male with PMH of HTN, HLD,  multiple MI, CHF, CKD, prior stroke (with no residual deficits) that presents as a transfer from [facility] with R intracranial ICA and R M2 occlusion. LKW 9:00pm 07/05/2026. Patient presented to [facility] this morning with left- sided weakness and bizarre behavior. Wife states that patient "seemed drunk" last night, but this morning had difficulty with driving. Patient was evaluated via telestroke. NIH at that time was 3 for left sided drift and L neglect. CTH/CTA revealed occlusion of the distal intracranial ICA with M1 and QUETA being patent and subsequent reocclusion of the R  inferior M2 segment.  as well as established infarcts in the caudate head and foci in the temporal lobe. Patient was OOW for TNK, IR not recommended due to low NIHSS. Decision made to transfer patient to [facility] for MRI and close monitoring for decompensation. On arrival to floor at [facility], stroke team notified. On exam, patient has significant LUE drift, L facial droop, and dysarthria. NIHSS now 6. " Patient stood up to go to the bathroom, nurse noted that at this time, patient nearly slumped to the ground due to severe LSW. STAT MRI, ASA load, and HOB Flat ordered. Patient has a defibrillator, device required interrogation prior to MRI.     Discussed worsening exam with VN attending. Will obtain MRI prior to making decision on thrombectomy. Delay in obtaining MRI due to need for device interrogation. Interrogation complete and device switched to MRI mode by cardiology fellow, however, MRI staff requested device information. Device information included in a care update by cardiology fellow, however, MRI staff then stated protocol requires device to be programmed in MRI, not on the floor. This caused further delay in getting patient to MRI. Cardiology fellow and radiology on call notified to expedite MRI process.     Etiology unclear at this time given fluctuating presentation with imaging showing areas of dilatation - will compare with imaging from prior TIA at OSH.     7/10/2025 NAEON. Stable neuro exam. Appropriate for discharge home. Diagnostic angio shows chronic progressive stenosis of R ICA with EC-IC collateral anastomoses. Given the angiography findings, he is unlikely to benefit from stent placement and is at risk for reperfusion hemorrhage so will not proceed with intervention at this point. He has low EF and highest suspicion for cardioembolic etiology. He follows with cardiology and on GDMT. Continue DAPT and follow up with vascular neurology in 3-4 weeks.    Antithrombotics for secondary stroke prevention: Antiplatelets: Aspirin: 81 mg daily  Clopidogrel: 75 mg daily    Statins for secondary stroke prevention and hyperlipidemia, if present:   Statins: Atorvastatin- 40 mg daily    Aggressive risk factor modification: HTN, HLD, Diet, Exercise     Rehab efforts: The patient has been evaluated by a stroke team provider and the therapy needs have been fully considered based off the presenting complaints  and exam findings. The following therapy evaluations are needed: PT evaluate and treat, OT evaluate and treat, SLP evaluate and treat, PM&R evaluate for appropriate placement    Diagnostics ordered/pending: Diagnostic angio pending    VTE prophylaxis: Heparin 5000 units SQ every 8 hours  Mechanical prophylaxis: Place SCDs    BP parameters: Infarct: No intervention, SBP <220        Recommendations:     Post-discharge complication risks: None    Stroke Education given to: patient and family    Follow-up in Stroke Clinic in 4-6 weeks.     Discharge Plan:  Antithrombotics: Aspirin 81mg, Clopidogrel 75mg  Statin: Atorvastatin 40mg    Follow Up:      Patient Instructions:      Ambulatory Referral/Consult to Physical Therapy   Referral Priority: Routine Referral Type: Physical Therapy   Referral Reason: Specialty Services Required   Number of Visits Requested: 1     Ambulatory referral/consult to Vascular Neurology   Standing Status: Future   Referral Priority: Routine Referral Type: Consultation   Referral Reason: Specialty Services Required   Referred to Provider: EMMA VIRK Requested Specialty: Vascular Neurology   Number of Visits Requested: 1     Diet Cardiac   Order Comments: See Stroke Patient Education Guide Booklet for details.     Activity as tolerated       Medications:  Reconciled Home Medications:      Medication List        CONTINUE taking these medications      amLODIPine 10 MG tablet  Commonly known as: NORVASC  Take 10 mg by mouth once daily.     aspirin 81 MG EC tablet  Commonly known as: ECOTRIN  Take 81 mg by mouth once daily.     carvediloL 25 MG tablet  Commonly known as: COREG  Take 25 mg by mouth once daily.     cholecalciferol (vitamin D3) 125 mcg (5,000 unit) Tab  Take 2 tablets by mouth once daily.     clopidogreL 75 mg tablet  Commonly known as: PLAVIX  Take 75 mg by mouth.     dapagliflozin propanediol 10 mg tablet  Commonly known as: Farxiga  Take 10 mg by mouth.     evolocumab 140 mg/mL  Syrg  Commonly known as: REPATHA SYRINGE  Inject 140 mg into the skin every 14 (fourteen) days.     fenofibrate 145 MG tablet  Commonly known as: TRICOR  Take 145 mg by mouth once daily.     furosemide 40 MG tablet  Commonly known as: LASIX  Take 40 mg by mouth once daily.     hydrALAZINE 50 MG tablet  Commonly known as: APRESOLINE  Take 50 mg by mouth.     potassium chloride 10 MEQ Tbsr  Commonly known as: KLOR-CON  Take 10 mEq by mouth every morning.     rosuvastatin 40 MG Tab  Commonly known as: CRESTOR  Take 40 mg by mouth once daily.     sacubitriL-valsartan  mg per tablet  Commonly known as: ENTRESTO  Take 1 tablet by mouth.            STOP taking these medications      HYDROcodone-acetaminophen 5-325 mg per tablet  Commonly known as: CODY Campos MD  Comprehensive Stroke Center  Department of Vascular Neurology   [facility])    27.3

## 2025-07-11 ENCOUNTER — APPOINTMENT (OUTPATIENT)
Dept: HYPERBARIC MEDICINE | Facility: HOSPITAL | Age: 65
End: 2025-07-11
Payer: COMMERCIAL

## 2025-07-11 ENCOUNTER — OUTPATIENT (OUTPATIENT)
Dept: OUTPATIENT SERVICES | Facility: HOSPITAL | Age: 65
LOS: 1 days | End: 2025-07-11
Payer: COMMERCIAL

## 2025-07-11 VITALS
SYSTOLIC BLOOD PRESSURE: 161 MMHG | TEMPERATURE: 98.1 F | DIASTOLIC BLOOD PRESSURE: 94 MMHG | OXYGEN SATURATION: 98 % | RESPIRATION RATE: 14 BRPM | HEART RATE: 92 BPM

## 2025-07-11 VITALS
TEMPERATURE: 98.3 F | OXYGEN SATURATION: 99 % | RESPIRATION RATE: 15 BRPM | SYSTOLIC BLOOD PRESSURE: 149 MMHG | HEART RATE: 86 BPM | DIASTOLIC BLOOD PRESSURE: 87 MMHG

## 2025-07-11 DIAGNOSIS — Z89.429 ACQUIRED ABSENCE OF OTHER TOE(S), UNSPECIFIED SIDE: Chronic | ICD-10-CM

## 2025-07-11 DIAGNOSIS — Z98.62 PERIPHERAL VASCULAR ANGIOPLASTY STATUS: Chronic | ICD-10-CM

## 2025-07-11 DIAGNOSIS — E11.621 TYPE 2 DIABETES MELLITUS WITH FOOT ULCER: ICD-10-CM

## 2025-07-11 PROCEDURE — 99183 HYPERBARIC OXYGEN THERAPY: CPT

## 2025-07-11 PROCEDURE — 82962 GLUCOSE BLOOD TEST: CPT

## 2025-07-11 PROCEDURE — G0277: CPT

## 2025-07-12 ENCOUNTER — APPOINTMENT (OUTPATIENT)
Dept: HYPERBARIC MEDICINE | Facility: HOSPITAL | Age: 65
End: 2025-07-12
Payer: COMMERCIAL

## 2025-07-12 ENCOUNTER — OUTPATIENT (OUTPATIENT)
Dept: OUTPATIENT SERVICES | Facility: HOSPITAL | Age: 65
LOS: 1 days | End: 2025-07-12
Payer: COMMERCIAL

## 2025-07-12 VITALS
TEMPERATURE: 97.9 F | HEART RATE: 80 BPM | DIASTOLIC BLOOD PRESSURE: 87 MMHG | RESPIRATION RATE: 16 BRPM | SYSTOLIC BLOOD PRESSURE: 138 MMHG | OXYGEN SATURATION: 99 %

## 2025-07-12 VITALS
HEART RATE: 96 BPM | TEMPERATURE: 98.4 F | SYSTOLIC BLOOD PRESSURE: 149 MMHG | OXYGEN SATURATION: 100 % | RESPIRATION RATE: 16 BRPM | DIASTOLIC BLOOD PRESSURE: 79 MMHG

## 2025-07-12 DIAGNOSIS — E11.621 TYPE 2 DIABETES MELLITUS WITH FOOT ULCER: ICD-10-CM

## 2025-07-12 DIAGNOSIS — Z89.429 ACQUIRED ABSENCE OF OTHER TOE(S), UNSPECIFIED SIDE: Chronic | ICD-10-CM

## 2025-07-12 DIAGNOSIS — Z98.62 PERIPHERAL VASCULAR ANGIOPLASTY STATUS: Chronic | ICD-10-CM

## 2025-07-12 LAB
GLUCOSE BLDC GLUCOMTR-MCNC: 158 MG/DL — HIGH (ref 70–99)
GLUCOSE BLDC GLUCOMTR-MCNC: 192 MG/DL — HIGH (ref 70–99)

## 2025-07-12 PROCEDURE — G0277: CPT

## 2025-07-12 PROCEDURE — 82962 GLUCOSE BLOOD TEST: CPT

## 2025-07-12 PROCEDURE — 99183 HYPERBARIC OXYGEN THERAPY: CPT

## 2025-07-13 DIAGNOSIS — E11.621 TYPE 2 DIABETES MELLITUS WITH FOOT ULCER: ICD-10-CM

## 2025-07-13 DIAGNOSIS — L97.522 NON-PRESSURE CHRONIC ULCER OF OTHER PART OF LEFT FOOT WITH FAT LAYER EXPOSED: ICD-10-CM

## 2025-07-13 DIAGNOSIS — L97.422 NON-PRESSURE CHRONIC ULCER OF LEFT HEEL AND MIDFOOT WITH FAT LAYER EXPOSED: ICD-10-CM

## 2025-07-14 ENCOUNTER — OUTPATIENT (OUTPATIENT)
Dept: OUTPATIENT SERVICES | Facility: HOSPITAL | Age: 65
LOS: 1 days | End: 2025-07-14
Payer: COMMERCIAL

## 2025-07-14 ENCOUNTER — APPOINTMENT (OUTPATIENT)
Dept: HYPERBARIC MEDICINE | Facility: HOSPITAL | Age: 65
End: 2025-07-14
Payer: COMMERCIAL

## 2025-07-14 VITALS
RESPIRATION RATE: 14 BRPM | OXYGEN SATURATION: 100 % | SYSTOLIC BLOOD PRESSURE: 160 MMHG | TEMPERATURE: 97.9 F | DIASTOLIC BLOOD PRESSURE: 92 MMHG | HEART RATE: 78 BPM

## 2025-07-14 VITALS
RESPIRATION RATE: 15 BRPM | DIASTOLIC BLOOD PRESSURE: 83 MMHG | HEART RATE: 90 BPM | SYSTOLIC BLOOD PRESSURE: 155 MMHG | OXYGEN SATURATION: 99 %

## 2025-07-14 DIAGNOSIS — L97.522 NON-PRESSURE CHRONIC ULCER OF OTHER PART OF LEFT FOOT WITH FAT LAYER EXPOSED: ICD-10-CM

## 2025-07-14 DIAGNOSIS — L97.422 NON-PRESSURE CHRONIC ULCER OF LEFT HEEL AND MIDFOOT WITH FAT LAYER EXPOSED: ICD-10-CM

## 2025-07-14 DIAGNOSIS — E11.621 TYPE 2 DIABETES MELLITUS WITH FOOT ULCER: ICD-10-CM

## 2025-07-14 LAB
GLUCOSE BLDC GLUCOMTR-MCNC: 134 MG/DL — HIGH (ref 70–99)
GLUCOSE BLDC GLUCOMTR-MCNC: 160 MG/DL — HIGH (ref 70–99)

## 2025-07-14 PROCEDURE — 99183 HYPERBARIC OXYGEN THERAPY: CPT

## 2025-07-14 PROCEDURE — G0277: CPT

## 2025-07-14 PROCEDURE — 82962 GLUCOSE BLOOD TEST: CPT

## 2025-07-15 ENCOUNTER — OUTPATIENT (OUTPATIENT)
Dept: OUTPATIENT SERVICES | Facility: HOSPITAL | Age: 65
LOS: 1 days | End: 2025-07-15
Payer: COMMERCIAL

## 2025-07-15 ENCOUNTER — APPOINTMENT (OUTPATIENT)
Dept: HYPERBARIC MEDICINE | Facility: HOSPITAL | Age: 65
End: 2025-07-15
Payer: COMMERCIAL

## 2025-07-15 VITALS
DIASTOLIC BLOOD PRESSURE: 67 MMHG | SYSTOLIC BLOOD PRESSURE: 134 MMHG | HEART RATE: 85 BPM | TEMPERATURE: 98 F | OXYGEN SATURATION: 100 % | RESPIRATION RATE: 15 BRPM

## 2025-07-15 VITALS
RESPIRATION RATE: 12 BRPM | SYSTOLIC BLOOD PRESSURE: 154 MMHG | TEMPERATURE: 97.7 F | OXYGEN SATURATION: 100 % | DIASTOLIC BLOOD PRESSURE: 84 MMHG | HEART RATE: 67 BPM

## 2025-07-15 DIAGNOSIS — E11.621 TYPE 2 DIABETES MELLITUS WITH FOOT ULCER: ICD-10-CM

## 2025-07-15 DIAGNOSIS — L97.522 NON-PRESSURE CHRONIC ULCER OF OTHER PART OF LEFT FOOT WITH FAT LAYER EXPOSED: ICD-10-CM

## 2025-07-15 DIAGNOSIS — L97.422 NON-PRESSURE CHRONIC ULCER OF LEFT HEEL AND MIDFOOT WITH FAT LAYER EXPOSED: ICD-10-CM

## 2025-07-15 LAB
GLUCOSE BLDC GLUCOMTR-MCNC: 152 MG/DL — HIGH (ref 70–99)
GLUCOSE BLDC GLUCOMTR-MCNC: 189 MG/DL — HIGH (ref 70–99)

## 2025-07-15 PROCEDURE — G0277: CPT

## 2025-07-15 PROCEDURE — 97602 WOUND(S) CARE NON-SELECTIVE: CPT

## 2025-07-15 PROCEDURE — 82962 GLUCOSE BLOOD TEST: CPT

## 2025-07-15 PROCEDURE — 99183 HYPERBARIC OXYGEN THERAPY: CPT

## 2025-07-16 ENCOUNTER — OUTPATIENT (OUTPATIENT)
Dept: OUTPATIENT SERVICES | Facility: HOSPITAL | Age: 65
LOS: 1 days | End: 2025-07-16
Payer: COMMERCIAL

## 2025-07-16 ENCOUNTER — APPOINTMENT (OUTPATIENT)
Dept: HYPERBARIC MEDICINE | Facility: HOSPITAL | Age: 65
End: 2025-07-16
Payer: COMMERCIAL

## 2025-07-16 VITALS
OXYGEN SATURATION: 100 % | HEART RATE: 76 BPM | DIASTOLIC BLOOD PRESSURE: 78 MMHG | SYSTOLIC BLOOD PRESSURE: 157 MMHG | TEMPERATURE: 97.6 F | RESPIRATION RATE: 14 BRPM

## 2025-07-16 VITALS
DIASTOLIC BLOOD PRESSURE: 78 MMHG | TEMPERATURE: 98.3 F | SYSTOLIC BLOOD PRESSURE: 164 MMHG | HEART RATE: 92 BPM | RESPIRATION RATE: 15 BRPM | OXYGEN SATURATION: 97 %

## 2025-07-16 DIAGNOSIS — E11.621 TYPE 2 DIABETES MELLITUS WITH FOOT ULCER: ICD-10-CM

## 2025-07-16 DIAGNOSIS — L97.522 NON-PRESSURE CHRONIC ULCER OF OTHER PART OF LEFT FOOT WITH FAT LAYER EXPOSED: ICD-10-CM

## 2025-07-16 DIAGNOSIS — Z89.429 ACQUIRED ABSENCE OF OTHER TOE(S), UNSPECIFIED SIDE: Chronic | ICD-10-CM

## 2025-07-16 DIAGNOSIS — L97.422 NON-PRESSURE CHRONIC ULCER OF LEFT HEEL AND MIDFOOT WITH FAT LAYER EXPOSED: ICD-10-CM

## 2025-07-16 PROCEDURE — 82962 GLUCOSE BLOOD TEST: CPT

## 2025-07-16 PROCEDURE — 99183 HYPERBARIC OXYGEN THERAPY: CPT

## 2025-07-16 PROCEDURE — G0277: CPT

## 2025-07-17 ENCOUNTER — OUTPATIENT (OUTPATIENT)
Dept: OUTPATIENT SERVICES | Facility: HOSPITAL | Age: 65
LOS: 1 days | End: 2025-07-17
Payer: COMMERCIAL

## 2025-07-17 ENCOUNTER — APPOINTMENT (OUTPATIENT)
Dept: HYPERBARIC MEDICINE | Facility: HOSPITAL | Age: 65
End: 2025-07-17
Payer: COMMERCIAL

## 2025-07-17 VITALS
SYSTOLIC BLOOD PRESSURE: 158 MMHG | HEART RATE: 71 BPM | TEMPERATURE: 97.9 F | OXYGEN SATURATION: 100 % | RESPIRATION RATE: 14 BRPM | DIASTOLIC BLOOD PRESSURE: 87 MMHG

## 2025-07-17 VITALS
SYSTOLIC BLOOD PRESSURE: 161 MMHG | RESPIRATION RATE: 14 BRPM | TEMPERATURE: 98.6 F | HEART RATE: 91 BPM | OXYGEN SATURATION: 99 % | DIASTOLIC BLOOD PRESSURE: 77 MMHG

## 2025-07-17 DIAGNOSIS — E11.621 TYPE 2 DIABETES MELLITUS WITH FOOT ULCER: ICD-10-CM

## 2025-07-17 DIAGNOSIS — L97.522 NON-PRESSURE CHRONIC ULCER OF OTHER PART OF LEFT FOOT WITH FAT LAYER EXPOSED: ICD-10-CM

## 2025-07-17 DIAGNOSIS — L97.422 NON-PRESSURE CHRONIC ULCER OF LEFT HEEL AND MIDFOOT WITH FAT LAYER EXPOSED: ICD-10-CM

## 2025-07-17 PROCEDURE — 82962 GLUCOSE BLOOD TEST: CPT

## 2025-07-17 PROCEDURE — 99183 HYPERBARIC OXYGEN THERAPY: CPT

## 2025-07-17 PROCEDURE — G0277: CPT

## 2025-07-18 ENCOUNTER — OUTPATIENT (OUTPATIENT)
Dept: OUTPATIENT SERVICES | Facility: HOSPITAL | Age: 65
LOS: 1 days | End: 2025-07-18
Payer: COMMERCIAL

## 2025-07-18 ENCOUNTER — NON-APPOINTMENT (OUTPATIENT)
Age: 65
End: 2025-07-18

## 2025-07-18 ENCOUNTER — APPOINTMENT (OUTPATIENT)
Dept: HYPERBARIC MEDICINE | Facility: HOSPITAL | Age: 65
End: 2025-07-18

## 2025-07-18 VITALS
BODY MASS INDEX: 27.13 KG/M2 | HEIGHT: 68 IN | TEMPERATURE: 98.2 F | WEIGHT: 179 LBS | RESPIRATION RATE: 16 BRPM | DIASTOLIC BLOOD PRESSURE: 83 MMHG | SYSTOLIC BLOOD PRESSURE: 155 MMHG | OXYGEN SATURATION: 98 % | HEART RATE: 76 BPM

## 2025-07-18 DIAGNOSIS — Z98.62 PERIPHERAL VASCULAR ANGIOPLASTY STATUS: Chronic | ICD-10-CM

## 2025-07-18 DIAGNOSIS — E11.621 TYPE 2 DIABETES MELLITUS WITH FOOT ULCER: ICD-10-CM

## 2025-07-18 PROCEDURE — 11042 DBRDMT SUBQ TIS 1ST 20SQCM/<: CPT

## 2025-07-18 PROCEDURE — 11045 DBRDMT SUBQ TISS EACH ADDL: CPT

## 2025-07-18 PROCEDURE — 11042 DBRDMT SUBQ TIS 1ST 20SQCM/<: CPT | Mod: 58

## 2025-07-19 ENCOUNTER — OUTPATIENT (OUTPATIENT)
Dept: OUTPATIENT SERVICES | Facility: HOSPITAL | Age: 65
LOS: 1 days | End: 2025-07-19
Payer: COMMERCIAL

## 2025-07-19 ENCOUNTER — APPOINTMENT (OUTPATIENT)
Dept: HYPERBARIC MEDICINE | Facility: HOSPITAL | Age: 65
End: 2025-07-19
Payer: COMMERCIAL

## 2025-07-19 VITALS
SYSTOLIC BLOOD PRESSURE: 145 MMHG | RESPIRATION RATE: 13 BRPM | DIASTOLIC BLOOD PRESSURE: 91 MMHG | HEART RATE: 72 BPM | OXYGEN SATURATION: 100 % | TEMPERATURE: 97.6 F

## 2025-07-19 VITALS
HEART RATE: 92 BPM | OXYGEN SATURATION: 99 % | TEMPERATURE: 98.6 F | DIASTOLIC BLOOD PRESSURE: 78 MMHG | SYSTOLIC BLOOD PRESSURE: 152 MMHG | RESPIRATION RATE: 15 BRPM

## 2025-07-19 DIAGNOSIS — E11.621 TYPE 2 DIABETES MELLITUS WITH FOOT ULCER: ICD-10-CM

## 2025-07-19 DIAGNOSIS — I10 ESSENTIAL (PRIMARY) HYPERTENSION: ICD-10-CM

## 2025-07-19 DIAGNOSIS — I25.10 ATHEROSCLEROTIC HEART DISEASE OF NATIVE CORONARY ARTERY WITHOUT ANGINA PECTORIS: ICD-10-CM

## 2025-07-19 DIAGNOSIS — L97.422 NON-PRESSURE CHRONIC ULCER OF LEFT HEEL AND MIDFOOT WITH FAT LAYER EXPOSED: ICD-10-CM

## 2025-07-19 DIAGNOSIS — Z89.431 ACQUIRED ABSENCE OF RIGHT FOOT: ICD-10-CM

## 2025-07-19 DIAGNOSIS — L97.522 NON-PRESSURE CHRONIC ULCER OF OTHER PART OF LEFT FOOT WITH FAT LAYER EXPOSED: ICD-10-CM

## 2025-07-19 DIAGNOSIS — E11.51 TYPE 2 DIABETES MELLITUS WITH DIABETIC PERIPHERAL ANGIOPATHY WITHOUT GANGRENE: ICD-10-CM

## 2025-07-19 DIAGNOSIS — E11.40 TYPE 2 DIABETES MELLITUS WITH DIABETIC NEUROPATHY, UNSPECIFIED: ICD-10-CM

## 2025-07-19 DIAGNOSIS — Z98.890 OTHER SPECIFIED POSTPROCEDURAL STATES: ICD-10-CM

## 2025-07-19 DIAGNOSIS — Z86.39 PERSONAL HISTORY OF OTHER ENDOCRINE, NUTRITIONAL AND METABOLIC DISEASE: ICD-10-CM

## 2025-07-19 DIAGNOSIS — E78.00 PURE HYPERCHOLESTEROLEMIA, UNSPECIFIED: ICD-10-CM

## 2025-07-19 DIAGNOSIS — Z98.62 PERIPHERAL VASCULAR ANGIOPLASTY STATUS: Chronic | ICD-10-CM

## 2025-07-19 DIAGNOSIS — Z79.899 OTHER LONG TERM (CURRENT) DRUG THERAPY: ICD-10-CM

## 2025-07-19 DIAGNOSIS — Z89.429 ACQUIRED ABSENCE OF OTHER TOE(S), UNSPECIFIED SIDE: Chronic | ICD-10-CM

## 2025-07-19 DIAGNOSIS — Z79.82 LONG TERM (CURRENT) USE OF ASPIRIN: ICD-10-CM

## 2025-07-19 PROCEDURE — 97602 WOUND(S) CARE NON-SELECTIVE: CPT

## 2025-07-19 PROCEDURE — 82962 GLUCOSE BLOOD TEST: CPT

## 2025-07-19 PROCEDURE — G0277: CPT

## 2025-07-19 PROCEDURE — 99183 HYPERBARIC OXYGEN THERAPY: CPT

## 2025-07-21 ENCOUNTER — APPOINTMENT (OUTPATIENT)
Dept: HYPERBARIC MEDICINE | Facility: HOSPITAL | Age: 65
End: 2025-07-21
Payer: COMMERCIAL

## 2025-07-21 ENCOUNTER — OUTPATIENT (OUTPATIENT)
Dept: OUTPATIENT SERVICES | Facility: HOSPITAL | Age: 65
LOS: 1 days | End: 2025-07-21
Payer: COMMERCIAL

## 2025-07-21 VITALS
TEMPERATURE: 97.9 F | RESPIRATION RATE: 16 BRPM | SYSTOLIC BLOOD PRESSURE: 166 MMHG | DIASTOLIC BLOOD PRESSURE: 91 MMHG | HEART RATE: 69 BPM | OXYGEN SATURATION: 100 %

## 2025-07-21 VITALS
SYSTOLIC BLOOD PRESSURE: 166 MMHG | HEART RATE: 85 BPM | DIASTOLIC BLOOD PRESSURE: 88 MMHG | TEMPERATURE: 98 F | RESPIRATION RATE: 16 BRPM | OXYGEN SATURATION: 99 %

## 2025-07-21 DIAGNOSIS — L97.422 NON-PRESSURE CHRONIC ULCER OF LEFT HEEL AND MIDFOOT WITH FAT LAYER EXPOSED: ICD-10-CM

## 2025-07-21 DIAGNOSIS — L97.522 NON-PRESSURE CHRONIC ULCER OF OTHER PART OF LEFT FOOT WITH FAT LAYER EXPOSED: ICD-10-CM

## 2025-07-21 DIAGNOSIS — E11.621 TYPE 2 DIABETES MELLITUS WITH FOOT ULCER: ICD-10-CM

## 2025-07-21 LAB
GLUCOSE BLDC GLUCOMTR-MCNC: 216 MG/DL — HIGH (ref 70–99)
GLUCOSE BLDC GLUCOMTR-MCNC: 239 MG/DL — HIGH (ref 70–99)

## 2025-07-21 PROCEDURE — G0277: CPT

## 2025-07-21 PROCEDURE — 99183 HYPERBARIC OXYGEN THERAPY: CPT

## 2025-07-21 PROCEDURE — 82962 GLUCOSE BLOOD TEST: CPT

## 2025-07-22 ENCOUNTER — OUTPATIENT (OUTPATIENT)
Dept: OUTPATIENT SERVICES | Facility: HOSPITAL | Age: 65
LOS: 1 days | End: 2025-07-22
Payer: COMMERCIAL

## 2025-07-22 ENCOUNTER — APPOINTMENT (OUTPATIENT)
Dept: HYPERBARIC MEDICINE | Facility: HOSPITAL | Age: 65
End: 2025-07-22
Payer: COMMERCIAL

## 2025-07-22 VITALS
HEART RATE: 71 BPM | RESPIRATION RATE: 12 BRPM | OXYGEN SATURATION: 100 % | DIASTOLIC BLOOD PRESSURE: 81 MMHG | TEMPERATURE: 98.2 F | SYSTOLIC BLOOD PRESSURE: 163 MMHG

## 2025-07-22 VITALS
DIASTOLIC BLOOD PRESSURE: 82 MMHG | HEART RATE: 86 BPM | TEMPERATURE: 98.1 F | RESPIRATION RATE: 15 BRPM | OXYGEN SATURATION: 99 % | SYSTOLIC BLOOD PRESSURE: 173 MMHG

## 2025-07-22 DIAGNOSIS — L97.522 NON-PRESSURE CHRONIC ULCER OF OTHER PART OF LEFT FOOT WITH FAT LAYER EXPOSED: ICD-10-CM

## 2025-07-22 DIAGNOSIS — E11.621 TYPE 2 DIABETES MELLITUS WITH FOOT ULCER: ICD-10-CM

## 2025-07-22 DIAGNOSIS — L97.422 NON-PRESSURE CHRONIC ULCER OF LEFT HEEL AND MIDFOOT WITH FAT LAYER EXPOSED: ICD-10-CM

## 2025-07-22 LAB
GLUCOSE BLDC GLUCOMTR-MCNC: 197 MG/DL — HIGH (ref 70–99)
GLUCOSE BLDC GLUCOMTR-MCNC: 267 MG/DL — HIGH (ref 70–99)

## 2025-07-22 PROCEDURE — G0277: CPT

## 2025-07-22 PROCEDURE — 99183 HYPERBARIC OXYGEN THERAPY: CPT

## 2025-07-22 PROCEDURE — 97602 WOUND(S) CARE NON-SELECTIVE: CPT

## 2025-07-22 PROCEDURE — 82962 GLUCOSE BLOOD TEST: CPT

## 2025-07-23 ENCOUNTER — NON-APPOINTMENT (OUTPATIENT)
Age: 65
End: 2025-07-23

## 2025-07-23 ENCOUNTER — APPOINTMENT (OUTPATIENT)
Dept: HYPERBARIC MEDICINE | Facility: HOSPITAL | Age: 65
End: 2025-07-23
Payer: COMMERCIAL

## 2025-07-23 ENCOUNTER — OUTPATIENT (OUTPATIENT)
Dept: OUTPATIENT SERVICES | Facility: HOSPITAL | Age: 65
LOS: 1 days | End: 2025-07-23
Payer: COMMERCIAL

## 2025-07-23 VITALS
TEMPERATURE: 98.3 F | DIASTOLIC BLOOD PRESSURE: 88 MMHG | RESPIRATION RATE: 14 BRPM | OXYGEN SATURATION: 100 % | HEART RATE: 96 BPM | SYSTOLIC BLOOD PRESSURE: 156 MMHG

## 2025-07-23 VITALS
RESPIRATION RATE: 13 BRPM | DIASTOLIC BLOOD PRESSURE: 95 MMHG | HEART RATE: 83 BPM | OXYGEN SATURATION: 100 % | SYSTOLIC BLOOD PRESSURE: 165 MMHG | TEMPERATURE: 97.9 F

## 2025-07-23 DIAGNOSIS — E11.621 TYPE 2 DIABETES MELLITUS WITH FOOT ULCER: ICD-10-CM

## 2025-07-23 DIAGNOSIS — L97.522 NON-PRESSURE CHRONIC ULCER OF OTHER PART OF LEFT FOOT WITH FAT LAYER EXPOSED: ICD-10-CM

## 2025-07-23 DIAGNOSIS — L97.422 NON-PRESSURE CHRONIC ULCER OF LEFT HEEL AND MIDFOOT WITH FAT LAYER EXPOSED: ICD-10-CM

## 2025-07-23 DIAGNOSIS — Z98.62 PERIPHERAL VASCULAR ANGIOPLASTY STATUS: Chronic | ICD-10-CM

## 2025-07-23 PROCEDURE — 99183 HYPERBARIC OXYGEN THERAPY: CPT

## 2025-07-23 PROCEDURE — G0277: CPT

## 2025-07-23 PROCEDURE — 82962 GLUCOSE BLOOD TEST: CPT

## 2025-07-24 ENCOUNTER — APPOINTMENT (OUTPATIENT)
Dept: HYPERBARIC MEDICINE | Facility: HOSPITAL | Age: 65
End: 2025-07-24
Payer: COMMERCIAL

## 2025-07-24 ENCOUNTER — OUTPATIENT (OUTPATIENT)
Dept: OUTPATIENT SERVICES | Facility: HOSPITAL | Age: 65
LOS: 1 days | End: 2025-07-24
Payer: COMMERCIAL

## 2025-07-24 VITALS
SYSTOLIC BLOOD PRESSURE: 148 MMHG | RESPIRATION RATE: 16 BRPM | TEMPERATURE: 98.1 F | HEART RATE: 99 BPM | OXYGEN SATURATION: 99 % | DIASTOLIC BLOOD PRESSURE: 75 MMHG

## 2025-07-24 VITALS
DIASTOLIC BLOOD PRESSURE: 93 MMHG | HEART RATE: 92 BPM | TEMPERATURE: 97.9 F | OXYGEN SATURATION: 100 % | SYSTOLIC BLOOD PRESSURE: 171 MMHG | RESPIRATION RATE: 16 BRPM

## 2025-07-24 DIAGNOSIS — E11.621 TYPE 2 DIABETES MELLITUS WITH FOOT ULCER: ICD-10-CM

## 2025-07-24 PROCEDURE — 99183 HYPERBARIC OXYGEN THERAPY: CPT

## 2025-07-24 PROCEDURE — 82962 GLUCOSE BLOOD TEST: CPT

## 2025-07-24 PROCEDURE — G0277: CPT

## 2025-07-25 ENCOUNTER — APPOINTMENT (OUTPATIENT)
Dept: HYPERBARIC MEDICINE | Facility: HOSPITAL | Age: 65
End: 2025-07-25
Payer: COMMERCIAL

## 2025-07-25 ENCOUNTER — OUTPATIENT (OUTPATIENT)
Dept: OUTPATIENT SERVICES | Facility: HOSPITAL | Age: 65
LOS: 1 days | End: 2025-07-25
Payer: COMMERCIAL

## 2025-07-25 VITALS
DIASTOLIC BLOOD PRESSURE: 64 MMHG | RESPIRATION RATE: 18 BRPM | BODY MASS INDEX: 27.13 KG/M2 | SYSTOLIC BLOOD PRESSURE: 154 MMHG | OXYGEN SATURATION: 98 % | HEART RATE: 100 BPM | TEMPERATURE: 98.1 F | WEIGHT: 179 LBS | HEIGHT: 68 IN

## 2025-07-25 DIAGNOSIS — L97.422 NON-PRESSURE CHRONIC ULCER OF LEFT HEEL AND MIDFOOT WITH FAT LAYER EXPOSED: ICD-10-CM

## 2025-07-25 DIAGNOSIS — Z89.429 ACQUIRED ABSENCE OF OTHER TOE(S), UNSPECIFIED SIDE: Chronic | ICD-10-CM

## 2025-07-25 DIAGNOSIS — L97.522 NON-PRESSURE CHRONIC ULCER OF OTHER PART OF LEFT FOOT WITH FAT LAYER EXPOSED: ICD-10-CM

## 2025-07-25 DIAGNOSIS — E11.621 TYPE 2 DIABETES MELLITUS WITH FOOT ULCER: ICD-10-CM

## 2025-07-25 PROCEDURE — 99183 HYPERBARIC OXYGEN THERAPY: CPT

## 2025-07-25 PROCEDURE — 11042 DBRDMT SUBQ TIS 1ST 20SQCM/<: CPT

## 2025-07-25 PROCEDURE — 11045 DBRDMT SUBQ TISS EACH ADDL: CPT

## 2025-07-26 ENCOUNTER — APPOINTMENT (OUTPATIENT)
Dept: HYPERBARIC MEDICINE | Facility: HOSPITAL | Age: 65
End: 2025-07-26
Payer: COMMERCIAL

## 2025-07-26 ENCOUNTER — OUTPATIENT (OUTPATIENT)
Dept: OUTPATIENT SERVICES | Facility: HOSPITAL | Age: 65
LOS: 1 days | End: 2025-07-26
Payer: COMMERCIAL

## 2025-07-26 VITALS
HEART RATE: 99 BPM | DIASTOLIC BLOOD PRESSURE: 79 MMHG | SYSTOLIC BLOOD PRESSURE: 146 MMHG | RESPIRATION RATE: 18 BRPM | OXYGEN SATURATION: 99 % | TEMPERATURE: 99 F

## 2025-07-26 VITALS
OXYGEN SATURATION: 100 % | TEMPERATURE: 98.8 F | SYSTOLIC BLOOD PRESSURE: 159 MMHG | DIASTOLIC BLOOD PRESSURE: 94 MMHG | RESPIRATION RATE: 18 BRPM | HEART RATE: 76 BPM

## 2025-07-26 DIAGNOSIS — L97.422 NON-PRESSURE CHRONIC ULCER OF LEFT HEEL AND MIDFOOT WITH FAT LAYER EXPOSED: ICD-10-CM

## 2025-07-26 DIAGNOSIS — Z79.899 OTHER LONG TERM (CURRENT) DRUG THERAPY: ICD-10-CM

## 2025-07-26 DIAGNOSIS — Z79.82 LONG TERM (CURRENT) USE OF ASPIRIN: ICD-10-CM

## 2025-07-26 DIAGNOSIS — Z79.84 LONG TERM (CURRENT) USE OF ORAL HYPOGLYCEMIC DRUGS: ICD-10-CM

## 2025-07-26 DIAGNOSIS — L97.522 NON-PRESSURE CHRONIC ULCER OF OTHER PART OF LEFT FOOT WITH FAT LAYER EXPOSED: ICD-10-CM

## 2025-07-26 DIAGNOSIS — Z98.62 PERIPHERAL VASCULAR ANGIOPLASTY STATUS: Chronic | ICD-10-CM

## 2025-07-26 DIAGNOSIS — E11.621 TYPE 2 DIABETES MELLITUS WITH FOOT ULCER: ICD-10-CM

## 2025-07-26 DIAGNOSIS — E11.40 TYPE 2 DIABETES MELLITUS WITH DIABETIC NEUROPATHY, UNSPECIFIED: ICD-10-CM

## 2025-07-26 DIAGNOSIS — Z89.431 ACQUIRED ABSENCE OF RIGHT FOOT: ICD-10-CM

## 2025-07-26 DIAGNOSIS — E11.51 TYPE 2 DIABETES MELLITUS WITH DIABETIC PERIPHERAL ANGIOPATHY WITHOUT GANGRENE: ICD-10-CM

## 2025-07-26 DIAGNOSIS — I10 ESSENTIAL (PRIMARY) HYPERTENSION: ICD-10-CM

## 2025-07-26 DIAGNOSIS — Z98.890 OTHER SPECIFIED POSTPROCEDURAL STATES: ICD-10-CM

## 2025-07-26 DIAGNOSIS — Z86.39 PERSONAL HISTORY OF OTHER ENDOCRINE, NUTRITIONAL AND METABOLIC DISEASE: ICD-10-CM

## 2025-07-26 DIAGNOSIS — E78.00 PURE HYPERCHOLESTEROLEMIA, UNSPECIFIED: ICD-10-CM

## 2025-07-26 DIAGNOSIS — I25.10 ATHEROSCLEROTIC HEART DISEASE OF NATIVE CORONARY ARTERY WITHOUT ANGINA PECTORIS: ICD-10-CM

## 2025-07-26 LAB
GLUCOSE BLDC GLUCOMTR-MCNC: 180 MG/DL — HIGH (ref 70–99)
GLUCOSE BLDC GLUCOMTR-MCNC: 229 MG/DL — HIGH (ref 70–99)

## 2025-07-26 PROCEDURE — 99183 HYPERBARIC OXYGEN THERAPY: CPT

## 2025-07-26 PROCEDURE — 82962 GLUCOSE BLOOD TEST: CPT

## 2025-07-28 ENCOUNTER — OUTPATIENT (OUTPATIENT)
Dept: OUTPATIENT SERVICES | Facility: HOSPITAL | Age: 65
LOS: 1 days | End: 2025-07-28
Payer: COMMERCIAL

## 2025-07-28 ENCOUNTER — APPOINTMENT (OUTPATIENT)
Dept: HYPERBARIC MEDICINE | Facility: HOSPITAL | Age: 65
End: 2025-07-28
Payer: COMMERCIAL

## 2025-07-28 VITALS
HEART RATE: 80 BPM | OXYGEN SATURATION: 98 % | DIASTOLIC BLOOD PRESSURE: 84 MMHG | TEMPERATURE: 98.2 F | RESPIRATION RATE: 15 BRPM | SYSTOLIC BLOOD PRESSURE: 168 MMHG

## 2025-07-28 VITALS
SYSTOLIC BLOOD PRESSURE: 164 MMHG | RESPIRATION RATE: 16 BRPM | HEART RATE: 74 BPM | OXYGEN SATURATION: 99 % | TEMPERATURE: 98.1 F | DIASTOLIC BLOOD PRESSURE: 90 MMHG

## 2025-07-28 DIAGNOSIS — E11.621 TYPE 2 DIABETES MELLITUS WITH FOOT ULCER: ICD-10-CM

## 2025-07-28 DIAGNOSIS — Z89.429 ACQUIRED ABSENCE OF OTHER TOE(S), UNSPECIFIED SIDE: Chronic | ICD-10-CM

## 2025-07-28 DIAGNOSIS — L97.422 NON-PRESSURE CHRONIC ULCER OF LEFT HEEL AND MIDFOOT WITH FAT LAYER EXPOSED: ICD-10-CM

## 2025-07-28 DIAGNOSIS — Z98.62 PERIPHERAL VASCULAR ANGIOPLASTY STATUS: Chronic | ICD-10-CM

## 2025-07-28 DIAGNOSIS — L97.522 NON-PRESSURE CHRONIC ULCER OF OTHER PART OF LEFT FOOT WITH FAT LAYER EXPOSED: ICD-10-CM

## 2025-07-28 LAB
GLUCOSE BLDC GLUCOMTR-MCNC: 147 MG/DL — HIGH (ref 70–99)
GLUCOSE BLDC GLUCOMTR-MCNC: 232 MG/DL — HIGH (ref 70–99)

## 2025-07-28 PROCEDURE — 82962 GLUCOSE BLOOD TEST: CPT

## 2025-07-28 PROCEDURE — G0277: CPT

## 2025-07-28 PROCEDURE — 97602 WOUND(S) CARE NON-SELECTIVE: CPT

## 2025-07-28 PROCEDURE — 99183 HYPERBARIC OXYGEN THERAPY: CPT

## 2025-07-29 ENCOUNTER — APPOINTMENT (OUTPATIENT)
Dept: HYPERBARIC MEDICINE | Facility: HOSPITAL | Age: 65
End: 2025-07-29
Payer: COMMERCIAL

## 2025-07-29 ENCOUNTER — OUTPATIENT (OUTPATIENT)
Dept: OUTPATIENT SERVICES | Facility: HOSPITAL | Age: 65
LOS: 1 days | End: 2025-07-29
Payer: COMMERCIAL

## 2025-07-29 VITALS
OXYGEN SATURATION: 99 % | HEART RATE: 80 BPM | TEMPERATURE: 97.8 F | RESPIRATION RATE: 14 BRPM | DIASTOLIC BLOOD PRESSURE: 87 MMHG | SYSTOLIC BLOOD PRESSURE: 167 MMHG

## 2025-07-29 VITALS
RESPIRATION RATE: 15 BRPM | TEMPERATURE: 98.2 F | DIASTOLIC BLOOD PRESSURE: 89 MMHG | SYSTOLIC BLOOD PRESSURE: 164 MMHG | OXYGEN SATURATION: 98 % | HEART RATE: 96 BPM

## 2025-07-29 DIAGNOSIS — E11.621 TYPE 2 DIABETES MELLITUS WITH FOOT ULCER: ICD-10-CM

## 2025-07-29 DIAGNOSIS — L97.522 NON-PRESSURE CHRONIC ULCER OF OTHER PART OF LEFT FOOT WITH FAT LAYER EXPOSED: ICD-10-CM

## 2025-07-29 DIAGNOSIS — L97.422 NON-PRESSURE CHRONIC ULCER OF LEFT HEEL AND MIDFOOT WITH FAT LAYER EXPOSED: ICD-10-CM

## 2025-07-29 LAB
GLUCOSE BLDC GLUCOMTR-MCNC: 171 MG/DL — HIGH (ref 70–99)
GLUCOSE BLDC GLUCOMTR-MCNC: 238 MG/DL — HIGH (ref 70–99)

## 2025-07-29 PROCEDURE — G0277: CPT

## 2025-07-29 PROCEDURE — 99183 HYPERBARIC OXYGEN THERAPY: CPT

## 2025-07-29 PROCEDURE — 97602 WOUND(S) CARE NON-SELECTIVE: CPT

## 2025-07-29 PROCEDURE — 82962 GLUCOSE BLOOD TEST: CPT

## 2025-07-30 ENCOUNTER — OUTPATIENT (OUTPATIENT)
Dept: OUTPATIENT SERVICES | Facility: HOSPITAL | Age: 65
LOS: 1 days | End: 2025-07-30
Payer: COMMERCIAL

## 2025-07-30 ENCOUNTER — APPOINTMENT (OUTPATIENT)
Dept: HYPERBARIC MEDICINE | Facility: HOSPITAL | Age: 65
End: 2025-07-30
Payer: COMMERCIAL

## 2025-07-30 VITALS
RESPIRATION RATE: 15 BRPM | TEMPERATURE: 98.2 F | SYSTOLIC BLOOD PRESSURE: 173 MMHG | OXYGEN SATURATION: 100 % | DIASTOLIC BLOOD PRESSURE: 81 MMHG | HEART RATE: 83 BPM

## 2025-07-30 VITALS
HEART RATE: 72 BPM | SYSTOLIC BLOOD PRESSURE: 172 MMHG | RESPIRATION RATE: 16 BRPM | OXYGEN SATURATION: 96 % | TEMPERATURE: 98.1 F | DIASTOLIC BLOOD PRESSURE: 98 MMHG

## 2025-07-30 DIAGNOSIS — E11.621 TYPE 2 DIABETES MELLITUS WITH FOOT ULCER: ICD-10-CM

## 2025-07-30 DIAGNOSIS — L97.422 NON-PRESSURE CHRONIC ULCER OF LEFT HEEL AND MIDFOOT WITH FAT LAYER EXPOSED: ICD-10-CM

## 2025-07-30 DIAGNOSIS — L97.522 NON-PRESSURE CHRONIC ULCER OF OTHER PART OF LEFT FOOT WITH FAT LAYER EXPOSED: ICD-10-CM

## 2025-07-30 PROCEDURE — 97602 WOUND(S) CARE NON-SELECTIVE: CPT

## 2025-07-30 PROCEDURE — 82962 GLUCOSE BLOOD TEST: CPT

## 2025-07-30 PROCEDURE — G0277: CPT

## 2025-07-30 PROCEDURE — 99183 HYPERBARIC OXYGEN THERAPY: CPT

## 2025-07-31 ENCOUNTER — OUTPATIENT (OUTPATIENT)
Dept: OUTPATIENT SERVICES | Facility: HOSPITAL | Age: 65
LOS: 1 days | End: 2025-07-31
Payer: COMMERCIAL

## 2025-07-31 ENCOUNTER — APPOINTMENT (OUTPATIENT)
Dept: HYPERBARIC MEDICINE | Facility: HOSPITAL | Age: 65
End: 2025-07-31
Payer: COMMERCIAL

## 2025-07-31 VITALS
HEART RATE: 74 BPM | DIASTOLIC BLOOD PRESSURE: 88 MMHG | OXYGEN SATURATION: 100 % | RESPIRATION RATE: 16 BRPM | SYSTOLIC BLOOD PRESSURE: 150 MMHG | TEMPERATURE: 98 F

## 2025-07-31 VITALS
SYSTOLIC BLOOD PRESSURE: 166 MMHG | HEART RATE: 92 BPM | RESPIRATION RATE: 15 BRPM | TEMPERATURE: 98.3 F | DIASTOLIC BLOOD PRESSURE: 81 MMHG | OXYGEN SATURATION: 99 %

## 2025-07-31 DIAGNOSIS — Z89.429 ACQUIRED ABSENCE OF OTHER TOE(S), UNSPECIFIED SIDE: Chronic | ICD-10-CM

## 2025-07-31 DIAGNOSIS — L97.522 NON-PRESSURE CHRONIC ULCER OF OTHER PART OF LEFT FOOT WITH FAT LAYER EXPOSED: ICD-10-CM

## 2025-07-31 DIAGNOSIS — L97.422 NON-PRESSURE CHRONIC ULCER OF LEFT HEEL AND MIDFOOT WITH FAT LAYER EXPOSED: ICD-10-CM

## 2025-07-31 DIAGNOSIS — Z98.62 PERIPHERAL VASCULAR ANGIOPLASTY STATUS: Chronic | ICD-10-CM

## 2025-07-31 DIAGNOSIS — E11.621 TYPE 2 DIABETES MELLITUS WITH FOOT ULCER: ICD-10-CM

## 2025-07-31 PROCEDURE — 97602 WOUND(S) CARE NON-SELECTIVE: CPT

## 2025-07-31 PROCEDURE — G0277: CPT

## 2025-07-31 PROCEDURE — 82962 GLUCOSE BLOOD TEST: CPT

## 2025-07-31 PROCEDURE — 99183 HYPERBARIC OXYGEN THERAPY: CPT

## 2025-08-01 ENCOUNTER — OUTPATIENT (OUTPATIENT)
Dept: OUTPATIENT SERVICES | Facility: HOSPITAL | Age: 65
LOS: 1 days | End: 2025-08-01
Payer: COMMERCIAL

## 2025-08-01 ENCOUNTER — APPOINTMENT (OUTPATIENT)
Dept: HYPERBARIC MEDICINE | Facility: HOSPITAL | Age: 65
End: 2025-08-01

## 2025-08-01 VITALS
SYSTOLIC BLOOD PRESSURE: 156 MMHG | HEART RATE: 70 BPM | HEIGHT: 68 IN | TEMPERATURE: 98.4 F | DIASTOLIC BLOOD PRESSURE: 82 MMHG | OXYGEN SATURATION: 98 % | BODY MASS INDEX: 27.13 KG/M2 | WEIGHT: 179 LBS | RESPIRATION RATE: 18 BRPM

## 2025-08-01 DIAGNOSIS — E11.621 TYPE 2 DIABETES MELLITUS WITH FOOT ULCER: ICD-10-CM

## 2025-08-01 DIAGNOSIS — Z89.429 ACQUIRED ABSENCE OF OTHER TOE(S), UNSPECIFIED SIDE: Chronic | ICD-10-CM

## 2025-08-01 PROCEDURE — 11042 DBRDMT SUBQ TIS 1ST 20SQCM/<: CPT

## 2025-08-01 PROCEDURE — 97605 NEG PRS WND THER DME<=50SQCM: CPT

## 2025-08-01 PROCEDURE — 11045 DBRDMT SUBQ TISS EACH ADDL: CPT

## 2025-08-01 PROCEDURE — 11042 DBRDMT SUBQ TIS 1ST 20SQCM/<: CPT | Mod: 58

## 2025-08-02 DIAGNOSIS — E11.51 TYPE 2 DIABETES MELLITUS WITH DIABETIC PERIPHERAL ANGIOPATHY WITHOUT GANGRENE: ICD-10-CM

## 2025-08-02 DIAGNOSIS — Z79.899 OTHER LONG TERM (CURRENT) DRUG THERAPY: ICD-10-CM

## 2025-08-02 DIAGNOSIS — L97.522 NON-PRESSURE CHRONIC ULCER OF OTHER PART OF LEFT FOOT WITH FAT LAYER EXPOSED: ICD-10-CM

## 2025-08-02 DIAGNOSIS — Z89.431 ACQUIRED ABSENCE OF RIGHT FOOT: ICD-10-CM

## 2025-08-02 DIAGNOSIS — E78.00 PURE HYPERCHOLESTEROLEMIA, UNSPECIFIED: ICD-10-CM

## 2025-08-02 DIAGNOSIS — L97.422 NON-PRESSURE CHRONIC ULCER OF LEFT HEEL AND MIDFOOT WITH FAT LAYER EXPOSED: ICD-10-CM

## 2025-08-02 DIAGNOSIS — I25.10 ATHEROSCLEROTIC HEART DISEASE OF NATIVE CORONARY ARTERY WITHOUT ANGINA PECTORIS: ICD-10-CM

## 2025-08-02 DIAGNOSIS — E11.40 TYPE 2 DIABETES MELLITUS WITH DIABETIC NEUROPATHY, UNSPECIFIED: ICD-10-CM

## 2025-08-02 DIAGNOSIS — I10 ESSENTIAL (PRIMARY) HYPERTENSION: ICD-10-CM

## 2025-08-02 DIAGNOSIS — E11.621 TYPE 2 DIABETES MELLITUS WITH FOOT ULCER: ICD-10-CM

## 2025-08-02 DIAGNOSIS — Z79.84 LONG TERM (CURRENT) USE OF ORAL HYPOGLYCEMIC DRUGS: ICD-10-CM

## 2025-08-02 DIAGNOSIS — Z86.39 PERSONAL HISTORY OF OTHER ENDOCRINE, NUTRITIONAL AND METABOLIC DISEASE: ICD-10-CM

## 2025-08-02 DIAGNOSIS — Z79.82 LONG TERM (CURRENT) USE OF ASPIRIN: ICD-10-CM

## 2025-08-02 DIAGNOSIS — Z98.890 OTHER SPECIFIED POSTPROCEDURAL STATES: ICD-10-CM

## 2025-08-04 ENCOUNTER — APPOINTMENT (OUTPATIENT)
Dept: HYPERBARIC MEDICINE | Facility: HOSPITAL | Age: 65
End: 2025-08-04
Payer: COMMERCIAL

## 2025-08-04 ENCOUNTER — OUTPATIENT (OUTPATIENT)
Dept: OUTPATIENT SERVICES | Facility: HOSPITAL | Age: 65
LOS: 1 days | End: 2025-08-04
Payer: COMMERCIAL

## 2025-08-04 VITALS
OXYGEN SATURATION: 100 % | RESPIRATION RATE: 14 BRPM | TEMPERATURE: 98 F | HEART RATE: 88 BPM | DIASTOLIC BLOOD PRESSURE: 88 MMHG | SYSTOLIC BLOOD PRESSURE: 177 MMHG

## 2025-08-04 VITALS
SYSTOLIC BLOOD PRESSURE: 175 MMHG | TEMPERATURE: 98.5 F | RESPIRATION RATE: 15 BRPM | DIASTOLIC BLOOD PRESSURE: 88 MMHG | OXYGEN SATURATION: 100 % | HEART RATE: 89 BPM

## 2025-08-04 DIAGNOSIS — E11.621 TYPE 2 DIABETES MELLITUS WITH FOOT ULCER: ICD-10-CM

## 2025-08-04 DIAGNOSIS — L97.422 NON-PRESSURE CHRONIC ULCER OF LEFT HEEL AND MIDFOOT WITH FAT LAYER EXPOSED: ICD-10-CM

## 2025-08-04 DIAGNOSIS — Z98.62 PERIPHERAL VASCULAR ANGIOPLASTY STATUS: Chronic | ICD-10-CM

## 2025-08-04 DIAGNOSIS — Z89.429 ACQUIRED ABSENCE OF OTHER TOE(S), UNSPECIFIED SIDE: Chronic | ICD-10-CM

## 2025-08-04 DIAGNOSIS — L97.522 NON-PRESSURE CHRONIC ULCER OF OTHER PART OF LEFT FOOT WITH FAT LAYER EXPOSED: ICD-10-CM

## 2025-08-04 LAB
GLUCOSE BLDC GLUCOMTR-MCNC: 169 MG/DL — HIGH (ref 70–99)
GLUCOSE BLDC GLUCOMTR-MCNC: 221 MG/DL — HIGH (ref 70–99)

## 2025-08-04 PROCEDURE — 99183 HYPERBARIC OXYGEN THERAPY: CPT

## 2025-08-04 PROCEDURE — 97605 NEG PRS WND THER DME<=50SQCM: CPT

## 2025-08-04 PROCEDURE — G0277: CPT

## 2025-08-04 PROCEDURE — 82962 GLUCOSE BLOOD TEST: CPT

## 2025-08-05 ENCOUNTER — OUTPATIENT (OUTPATIENT)
Dept: OUTPATIENT SERVICES | Facility: HOSPITAL | Age: 65
LOS: 1 days | End: 2025-08-05
Payer: COMMERCIAL

## 2025-08-05 ENCOUNTER — APPOINTMENT (OUTPATIENT)
Dept: HYPERBARIC MEDICINE | Facility: HOSPITAL | Age: 65
End: 2025-08-05
Payer: COMMERCIAL

## 2025-08-05 VITALS
SYSTOLIC BLOOD PRESSURE: 169 MMHG | TEMPERATURE: 98.2 F | DIASTOLIC BLOOD PRESSURE: 92 MMHG | RESPIRATION RATE: 15 BRPM | OXYGEN SATURATION: 100 % | HEART RATE: 75 BPM

## 2025-08-05 VITALS
HEART RATE: 93 BPM | SYSTOLIC BLOOD PRESSURE: 175 MMHG | TEMPERATURE: 98.3 F | DIASTOLIC BLOOD PRESSURE: 80 MMHG | OXYGEN SATURATION: 99 % | RESPIRATION RATE: 16 BRPM

## 2025-08-05 DIAGNOSIS — Z98.62 PERIPHERAL VASCULAR ANGIOPLASTY STATUS: Chronic | ICD-10-CM

## 2025-08-05 DIAGNOSIS — Z89.429 ACQUIRED ABSENCE OF OTHER TOE(S), UNSPECIFIED SIDE: Chronic | ICD-10-CM

## 2025-08-05 DIAGNOSIS — E11.621 TYPE 2 DIABETES MELLITUS WITH FOOT ULCER: ICD-10-CM

## 2025-08-05 LAB
GLUCOSE BLDC GLUCOMTR-MCNC: 169 MG/DL — HIGH (ref 70–99)
GLUCOSE BLDC GLUCOMTR-MCNC: 207 MG/DL — HIGH (ref 70–99)

## 2025-08-05 PROCEDURE — G0277: CPT

## 2025-08-05 PROCEDURE — 82962 GLUCOSE BLOOD TEST: CPT

## 2025-08-05 PROCEDURE — 99183 HYPERBARIC OXYGEN THERAPY: CPT

## 2025-08-05 PROCEDURE — 97602 WOUND(S) CARE NON-SELECTIVE: CPT

## 2025-08-06 ENCOUNTER — APPOINTMENT (OUTPATIENT)
Dept: HYPERBARIC MEDICINE | Facility: HOSPITAL | Age: 65
End: 2025-08-06
Payer: COMMERCIAL

## 2025-08-06 ENCOUNTER — OUTPATIENT (OUTPATIENT)
Dept: OUTPATIENT SERVICES | Facility: HOSPITAL | Age: 65
LOS: 1 days | End: 2025-08-06
Payer: COMMERCIAL

## 2025-08-06 VITALS
HEART RATE: 98 BPM | DIASTOLIC BLOOD PRESSURE: 75 MMHG | RESPIRATION RATE: 16 BRPM | SYSTOLIC BLOOD PRESSURE: 145 MMHG | TEMPERATURE: 98 F | OXYGEN SATURATION: 99 %

## 2025-08-06 VITALS
OXYGEN SATURATION: 100 % | TEMPERATURE: 98 F | HEART RATE: 76 BPM | DIASTOLIC BLOOD PRESSURE: 93 MMHG | SYSTOLIC BLOOD PRESSURE: 164 MMHG | RESPIRATION RATE: 16 BRPM

## 2025-08-06 DIAGNOSIS — E11.621 TYPE 2 DIABETES MELLITUS WITH FOOT ULCER: ICD-10-CM

## 2025-08-06 DIAGNOSIS — Z98.62 PERIPHERAL VASCULAR ANGIOPLASTY STATUS: Chronic | ICD-10-CM

## 2025-08-06 DIAGNOSIS — L97.422 NON-PRESSURE CHRONIC ULCER OF LEFT HEEL AND MIDFOOT WITH FAT LAYER EXPOSED: ICD-10-CM

## 2025-08-06 DIAGNOSIS — L97.522 NON-PRESSURE CHRONIC ULCER OF OTHER PART OF LEFT FOOT WITH FAT LAYER EXPOSED: ICD-10-CM

## 2025-08-06 LAB
GLUCOSE BLDC GLUCOMTR-MCNC: 176 MG/DL — HIGH (ref 70–99)
GLUCOSE BLDC GLUCOMTR-MCNC: 229 MG/DL — HIGH (ref 70–99)

## 2025-08-06 PROCEDURE — 97605 NEG PRS WND THER DME<=50SQCM: CPT

## 2025-08-06 PROCEDURE — G0277: CPT

## 2025-08-06 PROCEDURE — 82962 GLUCOSE BLOOD TEST: CPT

## 2025-08-06 PROCEDURE — 99183 HYPERBARIC OXYGEN THERAPY: CPT

## 2025-08-07 ENCOUNTER — OUTPATIENT (OUTPATIENT)
Dept: OUTPATIENT SERVICES | Facility: HOSPITAL | Age: 65
LOS: 1 days | End: 2025-08-07
Payer: COMMERCIAL

## 2025-08-07 ENCOUNTER — APPOINTMENT (OUTPATIENT)
Dept: HYPERBARIC MEDICINE | Facility: HOSPITAL | Age: 65
End: 2025-08-07
Payer: COMMERCIAL

## 2025-08-07 VITALS
TEMPERATURE: 97.7 F | OXYGEN SATURATION: 100 % | DIASTOLIC BLOOD PRESSURE: 103 MMHG | HEART RATE: 73 BPM | RESPIRATION RATE: 15 BRPM | SYSTOLIC BLOOD PRESSURE: 161 MMHG

## 2025-08-07 VITALS
SYSTOLIC BLOOD PRESSURE: 137 MMHG | TEMPERATURE: 97.9 F | DIASTOLIC BLOOD PRESSURE: 83 MMHG | OXYGEN SATURATION: 99 % | RESPIRATION RATE: 16 BRPM | HEART RATE: 85 BPM

## 2025-08-07 DIAGNOSIS — E11.621 TYPE 2 DIABETES MELLITUS WITH FOOT ULCER: ICD-10-CM

## 2025-08-07 DIAGNOSIS — L97.422 NON-PRESSURE CHRONIC ULCER OF LEFT HEEL AND MIDFOOT WITH FAT LAYER EXPOSED: ICD-10-CM

## 2025-08-07 DIAGNOSIS — L97.522 NON-PRESSURE CHRONIC ULCER OF OTHER PART OF LEFT FOOT WITH FAT LAYER EXPOSED: ICD-10-CM

## 2025-08-07 DIAGNOSIS — Z89.429 ACQUIRED ABSENCE OF OTHER TOE(S), UNSPECIFIED SIDE: Chronic | ICD-10-CM

## 2025-08-07 LAB
GLUCOSE BLDC GLUCOMTR-MCNC: 170 MG/DL — HIGH (ref 70–99)
GLUCOSE BLDC GLUCOMTR-MCNC: 240 MG/DL — HIGH (ref 70–99)

## 2025-08-07 PROCEDURE — 97602 WOUND(S) CARE NON-SELECTIVE: CPT

## 2025-08-07 PROCEDURE — G0277: CPT

## 2025-08-07 PROCEDURE — 82962 GLUCOSE BLOOD TEST: CPT

## 2025-08-07 PROCEDURE — 99183 HYPERBARIC OXYGEN THERAPY: CPT

## 2025-08-08 ENCOUNTER — OUTPATIENT (OUTPATIENT)
Dept: OUTPATIENT SERVICES | Facility: HOSPITAL | Age: 65
LOS: 1 days | End: 2025-08-08
Payer: COMMERCIAL

## 2025-08-08 ENCOUNTER — APPOINTMENT (OUTPATIENT)
Dept: HYPERBARIC MEDICINE | Facility: HOSPITAL | Age: 65
End: 2025-08-08

## 2025-08-08 ENCOUNTER — NON-APPOINTMENT (OUTPATIENT)
Age: 65
End: 2025-08-08

## 2025-08-08 VITALS
DIASTOLIC BLOOD PRESSURE: 83 MMHG | RESPIRATION RATE: 18 BRPM | WEIGHT: 179 LBS | BODY MASS INDEX: 27.13 KG/M2 | TEMPERATURE: 98.3 F | SYSTOLIC BLOOD PRESSURE: 106 MMHG | HEIGHT: 68 IN | OXYGEN SATURATION: 98 % | HEART RATE: 91 BPM

## 2025-08-08 DIAGNOSIS — E11.621 TYPE 2 DIABETES MELLITUS WITH FOOT ULCER: ICD-10-CM

## 2025-08-08 DIAGNOSIS — L97.522 NON-PRESSURE CHRONIC ULCER OF OTHER PART OF LEFT FOOT WITH FAT LAYER EXPOSED: ICD-10-CM

## 2025-08-08 DIAGNOSIS — Z89.429 ACQUIRED ABSENCE OF OTHER TOE(S), UNSPECIFIED SIDE: Chronic | ICD-10-CM

## 2025-08-08 DIAGNOSIS — L97.422 NON-PRESSURE CHRONIC ULCER OF LEFT HEEL AND MIDFOOT WITH FAT LAYER EXPOSED: ICD-10-CM

## 2025-08-08 PROCEDURE — 97605 NEG PRS WND THER DME<=50SQCM: CPT

## 2025-08-08 PROCEDURE — 15275 SKIN SUB GRAFT FACE/NK/HF/G: CPT | Mod: 58

## 2025-08-08 PROCEDURE — 15275 SKIN SUB GRAFT FACE/NK/HF/G: CPT

## 2025-08-09 ENCOUNTER — APPOINTMENT (OUTPATIENT)
Dept: HYPERBARIC MEDICINE | Facility: HOSPITAL | Age: 65
End: 2025-08-09
Payer: COMMERCIAL

## 2025-08-09 ENCOUNTER — OUTPATIENT (OUTPATIENT)
Dept: OUTPATIENT SERVICES | Facility: HOSPITAL | Age: 65
LOS: 1 days | End: 2025-08-09
Payer: COMMERCIAL

## 2025-08-09 VITALS
SYSTOLIC BLOOD PRESSURE: 174 MMHG | TEMPERATURE: 97.7 F | HEART RATE: 81 BPM | RESPIRATION RATE: 16 BRPM | DIASTOLIC BLOOD PRESSURE: 98 MMHG | OXYGEN SATURATION: 100 %

## 2025-08-09 VITALS
HEART RATE: 92 BPM | TEMPERATURE: 98.2 F | DIASTOLIC BLOOD PRESSURE: 96 MMHG | SYSTOLIC BLOOD PRESSURE: 162 MMHG | OXYGEN SATURATION: 99 % | RESPIRATION RATE: 18 BRPM

## 2025-08-09 DIAGNOSIS — Z79.82 LONG TERM (CURRENT) USE OF ASPIRIN: ICD-10-CM

## 2025-08-09 DIAGNOSIS — L97.522 NON-PRESSURE CHRONIC ULCER OF OTHER PART OF LEFT FOOT WITH FAT LAYER EXPOSED: ICD-10-CM

## 2025-08-09 DIAGNOSIS — L97.422 NON-PRESSURE CHRONIC ULCER OF LEFT HEEL AND MIDFOOT WITH FAT LAYER EXPOSED: ICD-10-CM

## 2025-08-09 DIAGNOSIS — Z89.431 ACQUIRED ABSENCE OF RIGHT FOOT: ICD-10-CM

## 2025-08-09 DIAGNOSIS — Z98.890 OTHER SPECIFIED POSTPROCEDURAL STATES: ICD-10-CM

## 2025-08-09 DIAGNOSIS — E11.621 TYPE 2 DIABETES MELLITUS WITH FOOT ULCER: ICD-10-CM

## 2025-08-09 DIAGNOSIS — E11.51 TYPE 2 DIABETES MELLITUS WITH DIABETIC PERIPHERAL ANGIOPATHY WITHOUT GANGRENE: ICD-10-CM

## 2025-08-09 DIAGNOSIS — E78.00 PURE HYPERCHOLESTEROLEMIA, UNSPECIFIED: ICD-10-CM

## 2025-08-09 DIAGNOSIS — Z89.429 ACQUIRED ABSENCE OF OTHER TOE(S), UNSPECIFIED SIDE: Chronic | ICD-10-CM

## 2025-08-09 DIAGNOSIS — Z79.84 LONG TERM (CURRENT) USE OF ORAL HYPOGLYCEMIC DRUGS: ICD-10-CM

## 2025-08-09 DIAGNOSIS — I25.10 ATHEROSCLEROTIC HEART DISEASE OF NATIVE CORONARY ARTERY WITHOUT ANGINA PECTORIS: ICD-10-CM

## 2025-08-09 DIAGNOSIS — I10 ESSENTIAL (PRIMARY) HYPERTENSION: ICD-10-CM

## 2025-08-09 DIAGNOSIS — Z79.899 OTHER LONG TERM (CURRENT) DRUG THERAPY: ICD-10-CM

## 2025-08-09 DIAGNOSIS — E11.40 TYPE 2 DIABETES MELLITUS WITH DIABETIC NEUROPATHY, UNSPECIFIED: ICD-10-CM

## 2025-08-09 DIAGNOSIS — Z86.39 PERSONAL HISTORY OF OTHER ENDOCRINE, NUTRITIONAL AND METABOLIC DISEASE: ICD-10-CM

## 2025-08-09 LAB
GLUCOSE BLDC GLUCOMTR-MCNC: 169 MG/DL — HIGH (ref 70–99)
GLUCOSE BLDC GLUCOMTR-MCNC: 218 MG/DL — HIGH (ref 70–99)

## 2025-08-09 PROCEDURE — 99183 HYPERBARIC OXYGEN THERAPY: CPT

## 2025-08-09 PROCEDURE — G0277: CPT

## 2025-08-09 PROCEDURE — 82962 GLUCOSE BLOOD TEST: CPT

## 2025-08-11 ENCOUNTER — OUTPATIENT (OUTPATIENT)
Dept: OUTPATIENT SERVICES | Facility: HOSPITAL | Age: 65
LOS: 1 days | End: 2025-08-11
Payer: COMMERCIAL

## 2025-08-11 ENCOUNTER — APPOINTMENT (OUTPATIENT)
Dept: HYPERBARIC MEDICINE | Facility: HOSPITAL | Age: 65
End: 2025-08-11
Payer: COMMERCIAL

## 2025-08-11 VITALS
SYSTOLIC BLOOD PRESSURE: 171 MMHG | TEMPERATURE: 98.5 F | RESPIRATION RATE: 16 BRPM | HEART RATE: 81 BPM | OXYGEN SATURATION: 98 % | DIASTOLIC BLOOD PRESSURE: 85 MMHG

## 2025-08-11 VITALS
HEART RATE: 90 BPM | SYSTOLIC BLOOD PRESSURE: 140 MMHG | DIASTOLIC BLOOD PRESSURE: 80 MMHG | RESPIRATION RATE: 16 BRPM | OXYGEN SATURATION: 100 % | TEMPERATURE: 98.4 F

## 2025-08-11 DIAGNOSIS — E11.621 TYPE 2 DIABETES MELLITUS WITH FOOT ULCER: ICD-10-CM

## 2025-08-11 DIAGNOSIS — L97.522 NON-PRESSURE CHRONIC ULCER OF OTHER PART OF LEFT FOOT WITH FAT LAYER EXPOSED: ICD-10-CM

## 2025-08-11 DIAGNOSIS — L97.422 NON-PRESSURE CHRONIC ULCER OF LEFT HEEL AND MIDFOOT WITH FAT LAYER EXPOSED: ICD-10-CM

## 2025-08-11 DIAGNOSIS — Z89.429 ACQUIRED ABSENCE OF OTHER TOE(S), UNSPECIFIED SIDE: Chronic | ICD-10-CM

## 2025-08-11 LAB
GLUCOSE BLDC GLUCOMTR-MCNC: 139 MG/DL — HIGH (ref 70–99)
GLUCOSE BLDC GLUCOMTR-MCNC: 241 MG/DL — HIGH (ref 70–99)

## 2025-08-11 PROCEDURE — 97605 NEG PRS WND THER DME<=50SQCM: CPT

## 2025-08-11 PROCEDURE — 99183 HYPERBARIC OXYGEN THERAPY: CPT

## 2025-08-11 PROCEDURE — G0277: CPT

## 2025-08-11 PROCEDURE — 82962 GLUCOSE BLOOD TEST: CPT

## 2025-08-12 ENCOUNTER — APPOINTMENT (OUTPATIENT)
Dept: HYPERBARIC MEDICINE | Facility: HOSPITAL | Age: 65
End: 2025-08-12
Payer: COMMERCIAL

## 2025-08-12 ENCOUNTER — OUTPATIENT (OUTPATIENT)
Dept: OUTPATIENT SERVICES | Facility: HOSPITAL | Age: 65
LOS: 1 days | End: 2025-08-12
Payer: COMMERCIAL

## 2025-08-12 VITALS
RESPIRATION RATE: 17 BRPM | SYSTOLIC BLOOD PRESSURE: 157 MMHG | TEMPERATURE: 98.5 F | OXYGEN SATURATION: 99 % | DIASTOLIC BLOOD PRESSURE: 88 MMHG | HEART RATE: 88 BPM

## 2025-08-12 VITALS
HEART RATE: 82 BPM | RESPIRATION RATE: 17 BRPM | SYSTOLIC BLOOD PRESSURE: 162 MMHG | OXYGEN SATURATION: 98 % | TEMPERATURE: 97.9 F | DIASTOLIC BLOOD PRESSURE: 92 MMHG

## 2025-08-12 DIAGNOSIS — E11.621 TYPE 2 DIABETES MELLITUS WITH FOOT ULCER: ICD-10-CM

## 2025-08-12 DIAGNOSIS — L97.422 NON-PRESSURE CHRONIC ULCER OF LEFT HEEL AND MIDFOOT WITH FAT LAYER EXPOSED: ICD-10-CM

## 2025-08-12 DIAGNOSIS — L97.522 NON-PRESSURE CHRONIC ULCER OF OTHER PART OF LEFT FOOT WITH FAT LAYER EXPOSED: ICD-10-CM

## 2025-08-12 DIAGNOSIS — Z98.62 PERIPHERAL VASCULAR ANGIOPLASTY STATUS: Chronic | ICD-10-CM

## 2025-08-12 LAB
GLUCOSE BLDC GLUCOMTR-MCNC: 160 MG/DL — HIGH (ref 70–99)
GLUCOSE BLDC GLUCOMTR-MCNC: 214 MG/DL — HIGH (ref 70–99)

## 2025-08-12 PROCEDURE — G0277: CPT

## 2025-08-12 PROCEDURE — 99183 HYPERBARIC OXYGEN THERAPY: CPT

## 2025-08-12 PROCEDURE — 82962 GLUCOSE BLOOD TEST: CPT

## 2025-08-13 ENCOUNTER — OUTPATIENT (OUTPATIENT)
Dept: OUTPATIENT SERVICES | Facility: HOSPITAL | Age: 65
LOS: 1 days | End: 2025-08-13
Payer: COMMERCIAL

## 2025-08-13 ENCOUNTER — APPOINTMENT (OUTPATIENT)
Dept: HYPERBARIC MEDICINE | Facility: HOSPITAL | Age: 65
End: 2025-08-13
Payer: COMMERCIAL

## 2025-08-13 VITALS
HEART RATE: 74 BPM | DIASTOLIC BLOOD PRESSURE: 75 MMHG | SYSTOLIC BLOOD PRESSURE: 187 MMHG | OXYGEN SATURATION: 99 % | RESPIRATION RATE: 14 BRPM | TEMPERATURE: 97.7 F

## 2025-08-13 VITALS
OXYGEN SATURATION: 99 % | SYSTOLIC BLOOD PRESSURE: 163 MMHG | TEMPERATURE: 98.2 F | RESPIRATION RATE: 15 BRPM | HEART RATE: 84 BPM | DIASTOLIC BLOOD PRESSURE: 83 MMHG

## 2025-08-13 DIAGNOSIS — E11.621 TYPE 2 DIABETES MELLITUS WITH FOOT ULCER: ICD-10-CM

## 2025-08-13 DIAGNOSIS — L97.422 NON-PRESSURE CHRONIC ULCER OF LEFT HEEL AND MIDFOOT WITH FAT LAYER EXPOSED: ICD-10-CM

## 2025-08-13 DIAGNOSIS — Z89.429 ACQUIRED ABSENCE OF OTHER TOE(S), UNSPECIFIED SIDE: Chronic | ICD-10-CM

## 2025-08-13 DIAGNOSIS — L97.522 NON-PRESSURE CHRONIC ULCER OF OTHER PART OF LEFT FOOT WITH FAT LAYER EXPOSED: ICD-10-CM

## 2025-08-13 LAB
GLUCOSE BLDC GLUCOMTR-MCNC: 195 MG/DL — HIGH (ref 70–99)
GLUCOSE BLDC GLUCOMTR-MCNC: 228 MG/DL — HIGH (ref 70–99)

## 2025-08-13 PROCEDURE — 82962 GLUCOSE BLOOD TEST: CPT

## 2025-08-13 PROCEDURE — G0277: CPT

## 2025-08-13 PROCEDURE — 99183 HYPERBARIC OXYGEN THERAPY: CPT

## 2025-08-13 PROCEDURE — 97605 NEG PRS WND THER DME<=50SQCM: CPT

## 2025-08-14 ENCOUNTER — APPOINTMENT (OUTPATIENT)
Dept: HYPERBARIC MEDICINE | Facility: HOSPITAL | Age: 65
End: 2025-08-14
Payer: COMMERCIAL

## 2025-08-14 ENCOUNTER — OUTPATIENT (OUTPATIENT)
Dept: OUTPATIENT SERVICES | Facility: HOSPITAL | Age: 65
LOS: 1 days | End: 2025-08-14
Payer: COMMERCIAL

## 2025-08-14 VITALS
RESPIRATION RATE: 15 BRPM | SYSTOLIC BLOOD PRESSURE: 165 MMHG | DIASTOLIC BLOOD PRESSURE: 89 MMHG | HEART RATE: 75 BPM | TEMPERATURE: 97.8 F | OXYGEN SATURATION: 100 %

## 2025-08-14 VITALS
TEMPERATURE: 98.6 F | RESPIRATION RATE: 15 BRPM | HEART RATE: 91 BPM | SYSTOLIC BLOOD PRESSURE: 141 MMHG | DIASTOLIC BLOOD PRESSURE: 74 MMHG | OXYGEN SATURATION: 99 %

## 2025-08-14 DIAGNOSIS — E11.621 TYPE 2 DIABETES MELLITUS WITH FOOT ULCER: ICD-10-CM

## 2025-08-14 DIAGNOSIS — L97.522 NON-PRESSURE CHRONIC ULCER OF OTHER PART OF LEFT FOOT WITH FAT LAYER EXPOSED: ICD-10-CM

## 2025-08-14 DIAGNOSIS — Z98.62 PERIPHERAL VASCULAR ANGIOPLASTY STATUS: Chronic | ICD-10-CM

## 2025-08-14 DIAGNOSIS — Z89.429 ACQUIRED ABSENCE OF OTHER TOE(S), UNSPECIFIED SIDE: Chronic | ICD-10-CM

## 2025-08-14 DIAGNOSIS — L97.422 NON-PRESSURE CHRONIC ULCER OF LEFT HEEL AND MIDFOOT WITH FAT LAYER EXPOSED: ICD-10-CM

## 2025-08-14 LAB
GLUCOSE BLDC GLUCOMTR-MCNC: 152 MG/DL — HIGH (ref 70–99)
GLUCOSE BLDC GLUCOMTR-MCNC: 212 MG/DL — HIGH (ref 70–99)

## 2025-08-14 PROCEDURE — 99183 HYPERBARIC OXYGEN THERAPY: CPT

## 2025-08-14 PROCEDURE — G0277: CPT

## 2025-08-14 PROCEDURE — 82962 GLUCOSE BLOOD TEST: CPT

## 2025-08-15 ENCOUNTER — OUTPATIENT (OUTPATIENT)
Dept: OUTPATIENT SERVICES | Facility: HOSPITAL | Age: 65
LOS: 1 days | End: 2025-08-15
Payer: COMMERCIAL

## 2025-08-15 ENCOUNTER — APPOINTMENT (OUTPATIENT)
Dept: HYPERBARIC MEDICINE | Facility: HOSPITAL | Age: 65
End: 2025-08-15

## 2025-08-15 VITALS
SYSTOLIC BLOOD PRESSURE: 165 MMHG | WEIGHT: 179 LBS | DIASTOLIC BLOOD PRESSURE: 76 MMHG | HEIGHT: 68 IN | RESPIRATION RATE: 15 BRPM | BODY MASS INDEX: 27.13 KG/M2 | TEMPERATURE: 98.3 F | OXYGEN SATURATION: 97 % | HEART RATE: 88 BPM

## 2025-08-15 DIAGNOSIS — Z98.62 PERIPHERAL VASCULAR ANGIOPLASTY STATUS: Chronic | ICD-10-CM

## 2025-08-15 DIAGNOSIS — Z89.429 ACQUIRED ABSENCE OF OTHER TOE(S), UNSPECIFIED SIDE: Chronic | ICD-10-CM

## 2025-08-15 DIAGNOSIS — E11.621 TYPE 2 DIABETES MELLITUS WITH FOOT ULCER: ICD-10-CM

## 2025-08-15 PROCEDURE — 15275 SKIN SUB GRAFT FACE/NK/HF/G: CPT

## 2025-08-15 PROCEDURE — 97605 NEG PRS WND THER DME<=50SQCM: CPT

## 2025-08-15 PROCEDURE — 15275 SKIN SUB GRAFT FACE/NK/HF/G: CPT | Mod: 58

## 2025-08-16 ENCOUNTER — APPOINTMENT (OUTPATIENT)
Dept: HYPERBARIC MEDICINE | Facility: HOSPITAL | Age: 65
End: 2025-08-16
Payer: COMMERCIAL

## 2025-08-16 ENCOUNTER — OUTPATIENT (OUTPATIENT)
Dept: OUTPATIENT SERVICES | Facility: HOSPITAL | Age: 65
LOS: 1 days | End: 2025-08-16
Payer: COMMERCIAL

## 2025-08-16 VITALS
TEMPERATURE: 98 F | RESPIRATION RATE: 15 BRPM | OXYGEN SATURATION: 99 % | DIASTOLIC BLOOD PRESSURE: 56 MMHG | SYSTOLIC BLOOD PRESSURE: 129 MMHG | HEART RATE: 80 BPM

## 2025-08-16 VITALS
SYSTOLIC BLOOD PRESSURE: 138 MMHG | RESPIRATION RATE: 14 BRPM | HEART RATE: 94 BPM | TEMPERATURE: 98.3 F | OXYGEN SATURATION: 99 % | DIASTOLIC BLOOD PRESSURE: 78 MMHG

## 2025-08-16 DIAGNOSIS — Z98.62 PERIPHERAL VASCULAR ANGIOPLASTY STATUS: Chronic | ICD-10-CM

## 2025-08-16 DIAGNOSIS — E11.621 TYPE 2 DIABETES MELLITUS WITH FOOT ULCER: ICD-10-CM

## 2025-08-16 LAB
GLUCOSE BLDC GLUCOMTR-MCNC: 181 MG/DL — HIGH (ref 70–99)
GLUCOSE BLDC GLUCOMTR-MCNC: 196 MG/DL — HIGH (ref 70–99)

## 2025-08-16 PROCEDURE — G0277: CPT

## 2025-08-16 PROCEDURE — 82962 GLUCOSE BLOOD TEST: CPT

## 2025-08-16 PROCEDURE — 99183 HYPERBARIC OXYGEN THERAPY: CPT

## 2025-08-17 DIAGNOSIS — Z79.84 LONG TERM (CURRENT) USE OF ORAL HYPOGLYCEMIC DRUGS: ICD-10-CM

## 2025-08-17 DIAGNOSIS — E11.621 TYPE 2 DIABETES MELLITUS WITH FOOT ULCER: ICD-10-CM

## 2025-08-17 DIAGNOSIS — L97.422 NON-PRESSURE CHRONIC ULCER OF LEFT HEEL AND MIDFOOT WITH FAT LAYER EXPOSED: ICD-10-CM

## 2025-08-17 DIAGNOSIS — L97.522 NON-PRESSURE CHRONIC ULCER OF OTHER PART OF LEFT FOOT WITH FAT LAYER EXPOSED: ICD-10-CM

## 2025-08-17 DIAGNOSIS — I25.10 ATHEROSCLEROTIC HEART DISEASE OF NATIVE CORONARY ARTERY WITHOUT ANGINA PECTORIS: ICD-10-CM

## 2025-08-17 DIAGNOSIS — E11.40 TYPE 2 DIABETES MELLITUS WITH DIABETIC NEUROPATHY, UNSPECIFIED: ICD-10-CM

## 2025-08-17 DIAGNOSIS — Z98.890 OTHER SPECIFIED POSTPROCEDURAL STATES: ICD-10-CM

## 2025-08-17 DIAGNOSIS — I10 ESSENTIAL (PRIMARY) HYPERTENSION: ICD-10-CM

## 2025-08-17 DIAGNOSIS — Z79.899 OTHER LONG TERM (CURRENT) DRUG THERAPY: ICD-10-CM

## 2025-08-17 DIAGNOSIS — E11.51 TYPE 2 DIABETES MELLITUS WITH DIABETIC PERIPHERAL ANGIOPATHY WITHOUT GANGRENE: ICD-10-CM

## 2025-08-17 DIAGNOSIS — E78.00 PURE HYPERCHOLESTEROLEMIA, UNSPECIFIED: ICD-10-CM

## 2025-08-17 DIAGNOSIS — Z86.39 PERSONAL HISTORY OF OTHER ENDOCRINE, NUTRITIONAL AND METABOLIC DISEASE: ICD-10-CM

## 2025-08-17 DIAGNOSIS — Z79.82 LONG TERM (CURRENT) USE OF ASPIRIN: ICD-10-CM

## 2025-08-17 DIAGNOSIS — Z89.431 ACQUIRED ABSENCE OF RIGHT FOOT: ICD-10-CM

## 2025-08-18 ENCOUNTER — APPOINTMENT (OUTPATIENT)
Dept: HYPERBARIC MEDICINE | Facility: HOSPITAL | Age: 65
End: 2025-08-18
Payer: COMMERCIAL

## 2025-08-18 ENCOUNTER — OUTPATIENT (OUTPATIENT)
Dept: OUTPATIENT SERVICES | Facility: HOSPITAL | Age: 65
LOS: 1 days | End: 2025-08-18
Payer: COMMERCIAL

## 2025-08-18 VITALS
OXYGEN SATURATION: 100 % | TEMPERATURE: 98.1 F | RESPIRATION RATE: 14 BRPM | HEART RATE: 81 BPM | DIASTOLIC BLOOD PRESSURE: 96 MMHG | SYSTOLIC BLOOD PRESSURE: 188 MMHG

## 2025-08-18 DIAGNOSIS — L97.522 NON-PRESSURE CHRONIC ULCER OF OTHER PART OF LEFT FOOT WITH FAT LAYER EXPOSED: ICD-10-CM

## 2025-08-18 DIAGNOSIS — L97.422 NON-PRESSURE CHRONIC ULCER OF LEFT HEEL AND MIDFOOT WITH FAT LAYER EXPOSED: ICD-10-CM

## 2025-08-18 DIAGNOSIS — Z98.62 PERIPHERAL VASCULAR ANGIOPLASTY STATUS: Chronic | ICD-10-CM

## 2025-08-18 DIAGNOSIS — E11.621 TYPE 2 DIABETES MELLITUS WITH FOOT ULCER: ICD-10-CM

## 2025-08-18 DIAGNOSIS — Z89.429 ACQUIRED ABSENCE OF OTHER TOE(S), UNSPECIFIED SIDE: Chronic | ICD-10-CM

## 2025-08-18 LAB
GLUCOSE BLDC GLUCOMTR-MCNC: 177 MG/DL — HIGH (ref 70–99)
GLUCOSE BLDC GLUCOMTR-MCNC: 258 MG/DL — HIGH (ref 70–99)

## 2025-08-18 PROCEDURE — G0277: CPT

## 2025-08-18 PROCEDURE — 82962 GLUCOSE BLOOD TEST: CPT

## 2025-08-18 PROCEDURE — 99183 HYPERBARIC OXYGEN THERAPY: CPT

## 2025-08-18 PROCEDURE — 97605 NEG PRS WND THER DME<=50SQCM: CPT

## 2025-08-19 ENCOUNTER — OUTPATIENT (OUTPATIENT)
Dept: OUTPATIENT SERVICES | Facility: HOSPITAL | Age: 65
LOS: 1 days | End: 2025-08-19
Payer: COMMERCIAL

## 2025-08-19 ENCOUNTER — APPOINTMENT (OUTPATIENT)
Dept: HYPERBARIC MEDICINE | Facility: HOSPITAL | Age: 65
End: 2025-08-19
Payer: COMMERCIAL

## 2025-08-19 VITALS
OXYGEN SATURATION: 98 % | TEMPERATURE: 98.2 F | SYSTOLIC BLOOD PRESSURE: 164 MMHG | HEART RATE: 87 BPM | RESPIRATION RATE: 15 BRPM | DIASTOLIC BLOOD PRESSURE: 82 MMHG

## 2025-08-19 VITALS
RESPIRATION RATE: 15 BRPM | OXYGEN SATURATION: 100 % | TEMPERATURE: 97.8 F | SYSTOLIC BLOOD PRESSURE: 161 MMHG | DIASTOLIC BLOOD PRESSURE: 83 MMHG | HEART RATE: 74 BPM

## 2025-08-19 DIAGNOSIS — E11.621 TYPE 2 DIABETES MELLITUS WITH FOOT ULCER: ICD-10-CM

## 2025-08-19 DIAGNOSIS — L97.422 NON-PRESSURE CHRONIC ULCER OF LEFT HEEL AND MIDFOOT WITH FAT LAYER EXPOSED: ICD-10-CM

## 2025-08-19 DIAGNOSIS — Z98.62 PERIPHERAL VASCULAR ANGIOPLASTY STATUS: Chronic | ICD-10-CM

## 2025-08-19 DIAGNOSIS — L97.522 NON-PRESSURE CHRONIC ULCER OF OTHER PART OF LEFT FOOT WITH FAT LAYER EXPOSED: ICD-10-CM

## 2025-08-19 LAB
GLUCOSE BLDC GLUCOMTR-MCNC: 162 MG/DL — HIGH (ref 70–99)
GLUCOSE BLDC GLUCOMTR-MCNC: 226 MG/DL — HIGH (ref 70–99)

## 2025-08-19 PROCEDURE — 99183 HYPERBARIC OXYGEN THERAPY: CPT

## 2025-08-19 PROCEDURE — G0277: CPT

## 2025-08-19 PROCEDURE — 82962 GLUCOSE BLOOD TEST: CPT

## 2025-08-20 ENCOUNTER — APPOINTMENT (OUTPATIENT)
Dept: HYPERBARIC MEDICINE | Facility: HOSPITAL | Age: 65
End: 2025-08-20
Payer: COMMERCIAL

## 2025-08-20 ENCOUNTER — OUTPATIENT (OUTPATIENT)
Dept: OUTPATIENT SERVICES | Facility: HOSPITAL | Age: 65
LOS: 1 days | End: 2025-08-20
Payer: COMMERCIAL

## 2025-08-20 VITALS
OXYGEN SATURATION: 98 % | HEART RATE: 91 BPM | SYSTOLIC BLOOD PRESSURE: 170 MMHG | RESPIRATION RATE: 14 BRPM | TEMPERATURE: 98.3 F | DIASTOLIC BLOOD PRESSURE: 88 MMHG

## 2025-08-20 VITALS
SYSTOLIC BLOOD PRESSURE: 155 MMHG | TEMPERATURE: 98.4 F | HEART RATE: 81 BPM | RESPIRATION RATE: 15 BRPM | DIASTOLIC BLOOD PRESSURE: 77 MMHG | OXYGEN SATURATION: 100 %

## 2025-08-20 DIAGNOSIS — E11.621 TYPE 2 DIABETES MELLITUS WITH FOOT ULCER: ICD-10-CM

## 2025-08-20 DIAGNOSIS — L97.422 NON-PRESSURE CHRONIC ULCER OF LEFT HEEL AND MIDFOOT WITH FAT LAYER EXPOSED: ICD-10-CM

## 2025-08-20 DIAGNOSIS — Z98.62 PERIPHERAL VASCULAR ANGIOPLASTY STATUS: Chronic | ICD-10-CM

## 2025-08-20 DIAGNOSIS — L97.522 NON-PRESSURE CHRONIC ULCER OF OTHER PART OF LEFT FOOT WITH FAT LAYER EXPOSED: ICD-10-CM

## 2025-08-20 LAB
GLUCOSE BLDC GLUCOMTR-MCNC: 147 MG/DL — HIGH (ref 70–99)
GLUCOSE BLDC GLUCOMTR-MCNC: 209 MG/DL — HIGH (ref 70–99)

## 2025-08-20 PROCEDURE — 87070 CULTURE OTHR SPECIMN AEROBIC: CPT

## 2025-08-20 PROCEDURE — 82962 GLUCOSE BLOOD TEST: CPT

## 2025-08-20 PROCEDURE — G0277: CPT

## 2025-08-20 PROCEDURE — 97605 NEG PRS WND THER DME<=50SQCM: CPT

## 2025-08-20 PROCEDURE — 99183 HYPERBARIC OXYGEN THERAPY: CPT

## 2025-08-20 RX ORDER — MINOCYCLINE HYDROCHLORIDE 100 MG/1
100 TABLET ORAL TWICE DAILY
Qty: 14 | Refills: 0 | Status: ACTIVE | COMMUNITY
Start: 2025-08-20 | End: 1900-01-01

## 2025-08-21 ENCOUNTER — APPOINTMENT (OUTPATIENT)
Dept: HYPERBARIC MEDICINE | Facility: HOSPITAL | Age: 65
End: 2025-08-21
Payer: COMMERCIAL

## 2025-08-21 ENCOUNTER — OUTPATIENT (OUTPATIENT)
Dept: OUTPATIENT SERVICES | Facility: HOSPITAL | Age: 65
LOS: 1 days | End: 2025-08-21
Payer: COMMERCIAL

## 2025-08-21 VITALS
OXYGEN SATURATION: 98 % | DIASTOLIC BLOOD PRESSURE: 86 MMHG | RESPIRATION RATE: 12 BRPM | HEART RATE: 15 BPM | TEMPERATURE: 98 F | SYSTOLIC BLOOD PRESSURE: 147 MMHG

## 2025-08-21 VITALS
DIASTOLIC BLOOD PRESSURE: 82 MMHG | RESPIRATION RATE: 13 BRPM | OXYGEN SATURATION: 100 % | TEMPERATURE: 98 F | HEART RATE: 92 BPM | SYSTOLIC BLOOD PRESSURE: 138 MMHG

## 2025-08-21 DIAGNOSIS — Z98.62 PERIPHERAL VASCULAR ANGIOPLASTY STATUS: Chronic | ICD-10-CM

## 2025-08-21 DIAGNOSIS — E11.621 TYPE 2 DIABETES MELLITUS WITH FOOT ULCER: ICD-10-CM

## 2025-08-21 DIAGNOSIS — Z89.429 ACQUIRED ABSENCE OF OTHER TOE(S), UNSPECIFIED SIDE: Chronic | ICD-10-CM

## 2025-08-21 LAB
GLUCOSE BLDC GLUCOMTR-MCNC: 154 MG/DL — HIGH (ref 70–99)
GLUCOSE BLDC GLUCOMTR-MCNC: 182 MG/DL — HIGH (ref 70–99)

## 2025-08-21 PROCEDURE — G0277: CPT

## 2025-08-21 PROCEDURE — 99183 HYPERBARIC OXYGEN THERAPY: CPT

## 2025-08-21 PROCEDURE — 82962 GLUCOSE BLOOD TEST: CPT

## 2025-08-22 ENCOUNTER — APPOINTMENT (OUTPATIENT)
Dept: HYPERBARIC MEDICINE | Facility: HOSPITAL | Age: 65
End: 2025-08-22

## 2025-08-22 ENCOUNTER — OUTPATIENT (OUTPATIENT)
Dept: OUTPATIENT SERVICES | Facility: HOSPITAL | Age: 65
LOS: 1 days | End: 2025-08-22
Payer: COMMERCIAL

## 2025-08-22 VITALS
RESPIRATION RATE: 18 BRPM | TEMPERATURE: 98.3 F | OXYGEN SATURATION: 97 % | HEART RATE: 81 BPM | BODY MASS INDEX: 27.13 KG/M2 | SYSTOLIC BLOOD PRESSURE: 147 MMHG | WEIGHT: 179 LBS | HEIGHT: 68 IN | DIASTOLIC BLOOD PRESSURE: 80 MMHG

## 2025-08-22 DIAGNOSIS — E11.621 TYPE 2 DIABETES MELLITUS WITH FOOT ULCER: ICD-10-CM

## 2025-08-22 DIAGNOSIS — L97.422 NON-PRESSURE CHRONIC ULCER OF LEFT HEEL AND MIDFOOT WITH FAT LAYER EXPOSED: ICD-10-CM

## 2025-08-22 DIAGNOSIS — Z98.62 PERIPHERAL VASCULAR ANGIOPLASTY STATUS: Chronic | ICD-10-CM

## 2025-08-22 DIAGNOSIS — L97.522 NON-PRESSURE CHRONIC ULCER OF OTHER PART OF LEFT FOOT WITH FAT LAYER EXPOSED: ICD-10-CM

## 2025-08-22 LAB
CULTURE RESULTS: SIGNIFICANT CHANGE UP
SPECIMEN SOURCE: SIGNIFICANT CHANGE UP

## 2025-08-22 PROCEDURE — 15275 SKIN SUB GRAFT FACE/NK/HF/G: CPT

## 2025-08-22 PROCEDURE — 97605 NEG PRS WND THER DME<=50SQCM: CPT

## 2025-08-22 PROCEDURE — 15275 SKIN SUB GRAFT FACE/NK/HF/G: CPT | Mod: 58

## 2025-08-23 DIAGNOSIS — Z89.431 ACQUIRED ABSENCE OF RIGHT FOOT: ICD-10-CM

## 2025-08-23 DIAGNOSIS — E11.621 TYPE 2 DIABETES MELLITUS WITH FOOT ULCER: ICD-10-CM

## 2025-08-23 DIAGNOSIS — E78.00 PURE HYPERCHOLESTEROLEMIA, UNSPECIFIED: ICD-10-CM

## 2025-08-23 DIAGNOSIS — Z98.890 OTHER SPECIFIED POSTPROCEDURAL STATES: ICD-10-CM

## 2025-08-23 DIAGNOSIS — Z79.899 OTHER LONG TERM (CURRENT) DRUG THERAPY: ICD-10-CM

## 2025-08-23 DIAGNOSIS — I10 ESSENTIAL (PRIMARY) HYPERTENSION: ICD-10-CM

## 2025-08-23 DIAGNOSIS — L97.422 NON-PRESSURE CHRONIC ULCER OF LEFT HEEL AND MIDFOOT WITH FAT LAYER EXPOSED: ICD-10-CM

## 2025-08-23 DIAGNOSIS — Z79.84 LONG TERM (CURRENT) USE OF ORAL HYPOGLYCEMIC DRUGS: ICD-10-CM

## 2025-08-23 DIAGNOSIS — Z86.39 PERSONAL HISTORY OF OTHER ENDOCRINE, NUTRITIONAL AND METABOLIC DISEASE: ICD-10-CM

## 2025-08-23 DIAGNOSIS — Z79.82 LONG TERM (CURRENT) USE OF ASPIRIN: ICD-10-CM

## 2025-08-23 DIAGNOSIS — E11.51 TYPE 2 DIABETES MELLITUS WITH DIABETIC PERIPHERAL ANGIOPATHY WITHOUT GANGRENE: ICD-10-CM

## 2025-08-23 DIAGNOSIS — I25.10 ATHEROSCLEROTIC HEART DISEASE OF NATIVE CORONARY ARTERY WITHOUT ANGINA PECTORIS: ICD-10-CM

## 2025-08-23 DIAGNOSIS — E11.40 TYPE 2 DIABETES MELLITUS WITH DIABETIC NEUROPATHY, UNSPECIFIED: ICD-10-CM

## 2025-08-23 DIAGNOSIS — L97.522 NON-PRESSURE CHRONIC ULCER OF OTHER PART OF LEFT FOOT WITH FAT LAYER EXPOSED: ICD-10-CM

## 2025-08-25 ENCOUNTER — OUTPATIENT (OUTPATIENT)
Dept: OUTPATIENT SERVICES | Facility: HOSPITAL | Age: 65
LOS: 1 days | End: 2025-08-25
Payer: COMMERCIAL

## 2025-08-25 ENCOUNTER — APPOINTMENT (OUTPATIENT)
Dept: HYPERBARIC MEDICINE | Facility: HOSPITAL | Age: 65
End: 2025-08-25
Payer: COMMERCIAL

## 2025-08-25 VITALS
SYSTOLIC BLOOD PRESSURE: 173 MMHG | DIASTOLIC BLOOD PRESSURE: 88 MMHG | RESPIRATION RATE: 15 BRPM | HEART RATE: 95 BPM | OXYGEN SATURATION: 98 % | TEMPERATURE: 98.4 F

## 2025-08-25 VITALS
RESPIRATION RATE: 13 BRPM | SYSTOLIC BLOOD PRESSURE: 178 MMHG | TEMPERATURE: 98.4 F | DIASTOLIC BLOOD PRESSURE: 93 MMHG | HEART RATE: 77 BPM | OXYGEN SATURATION: 100 %

## 2025-08-25 DIAGNOSIS — E11.621 TYPE 2 DIABETES MELLITUS WITH FOOT ULCER: ICD-10-CM

## 2025-08-25 DIAGNOSIS — L97.522 NON-PRESSURE CHRONIC ULCER OF OTHER PART OF LEFT FOOT WITH FAT LAYER EXPOSED: ICD-10-CM

## 2025-08-25 DIAGNOSIS — L97.422 NON-PRESSURE CHRONIC ULCER OF LEFT HEEL AND MIDFOOT WITH FAT LAYER EXPOSED: ICD-10-CM

## 2025-08-25 DIAGNOSIS — Z98.62 PERIPHERAL VASCULAR ANGIOPLASTY STATUS: Chronic | ICD-10-CM

## 2025-08-25 LAB
GLUCOSE BLDC GLUCOMTR-MCNC: 143 MG/DL — HIGH (ref 70–99)
GLUCOSE BLDC GLUCOMTR-MCNC: 197 MG/DL — HIGH (ref 70–99)

## 2025-08-25 PROCEDURE — 82962 GLUCOSE BLOOD TEST: CPT

## 2025-08-25 PROCEDURE — 97605 NEG PRS WND THER DME<=50SQCM: CPT

## 2025-08-25 PROCEDURE — 99183 HYPERBARIC OXYGEN THERAPY: CPT

## 2025-08-25 PROCEDURE — G0277: CPT

## 2025-08-26 ENCOUNTER — APPOINTMENT (OUTPATIENT)
Dept: HYPERBARIC MEDICINE | Facility: HOSPITAL | Age: 65
End: 2025-08-26

## 2025-08-27 ENCOUNTER — APPOINTMENT (OUTPATIENT)
Dept: HYPERBARIC MEDICINE | Facility: HOSPITAL | Age: 65
End: 2025-08-27

## 2025-08-27 ENCOUNTER — APPOINTMENT (OUTPATIENT)
Dept: WOUND CARE | Facility: HOSPITAL | Age: 65
End: 2025-08-27
Payer: COMMERCIAL

## 2025-08-27 ENCOUNTER — OUTPATIENT (OUTPATIENT)
Dept: OUTPATIENT SERVICES | Facility: HOSPITAL | Age: 65
LOS: 1 days | End: 2025-08-27
Payer: COMMERCIAL

## 2025-08-27 VITALS
RESPIRATION RATE: 18 BRPM | WEIGHT: 179 LBS | HEIGHT: 68 IN | TEMPERATURE: 98.2 F | HEART RATE: 61 BPM | DIASTOLIC BLOOD PRESSURE: 63 MMHG | SYSTOLIC BLOOD PRESSURE: 161 MMHG | OXYGEN SATURATION: 95 % | BODY MASS INDEX: 27.13 KG/M2

## 2025-08-27 DIAGNOSIS — L97.422 NON-PRESSURE CHRONIC ULCER OF LEFT HEEL AND MIDFOOT WITH FAT LAYER EXPOSED: ICD-10-CM

## 2025-08-27 DIAGNOSIS — E11.621 TYPE 2 DIABETES MELLITUS WITH FOOT ULCER: ICD-10-CM

## 2025-08-27 DIAGNOSIS — L97.522 NON-PRESSURE CHRONIC ULCER OF OTHER PART OF LEFT FOOT WITH FAT LAYER EXPOSED: ICD-10-CM

## 2025-08-27 PROCEDURE — 97605 NEG PRS WND THER DME<=50SQCM: CPT

## 2025-08-27 PROCEDURE — ZZZZZ: CPT

## 2025-08-28 ENCOUNTER — APPOINTMENT (OUTPATIENT)
Dept: HYPERBARIC MEDICINE | Facility: HOSPITAL | Age: 65
End: 2025-08-28

## 2025-08-29 ENCOUNTER — APPOINTMENT (OUTPATIENT)
Dept: WOUND CARE | Facility: HOSPITAL | Age: 65
End: 2025-08-29

## 2025-08-29 ENCOUNTER — APPOINTMENT (OUTPATIENT)
Dept: HYPERBARIC MEDICINE | Facility: HOSPITAL | Age: 65
End: 2025-08-29

## 2025-08-29 ENCOUNTER — OUTPATIENT (OUTPATIENT)
Dept: OUTPATIENT SERVICES | Facility: HOSPITAL | Age: 65
LOS: 1 days | End: 2025-08-29
Payer: COMMERCIAL

## 2025-08-29 VITALS
DIASTOLIC BLOOD PRESSURE: 91 MMHG | TEMPERATURE: 98.6 F | WEIGHT: 179 LBS | SYSTOLIC BLOOD PRESSURE: 151 MMHG | BODY MASS INDEX: 27.13 KG/M2 | RESPIRATION RATE: 18 BRPM | OXYGEN SATURATION: 99 % | HEART RATE: 98 BPM | HEIGHT: 68 IN

## 2025-08-29 DIAGNOSIS — E11.621 TYPE 2 DIABETES MELLITUS WITH FOOT ULCER: ICD-10-CM

## 2025-08-29 DIAGNOSIS — Z89.429 ACQUIRED ABSENCE OF OTHER TOE(S), UNSPECIFIED SIDE: Chronic | ICD-10-CM

## 2025-08-29 PROCEDURE — 87077 CULTURE AEROBIC IDENTIFY: CPT

## 2025-08-29 PROCEDURE — 99214 OFFICE O/P EST MOD 30 MIN: CPT

## 2025-08-29 PROCEDURE — 87070 CULTURE OTHR SPECIMN AEROBIC: CPT

## 2025-08-29 PROCEDURE — G0463: CPT

## 2025-08-29 RX ORDER — SULFAMETHOXAZOLE AND TRIMETHOPRIM 800; 160 MG/1; MG/1
800-160 TABLET ORAL TWICE DAILY
Qty: 14 | Refills: 0 | Status: ACTIVE | COMMUNITY
Start: 2025-08-29 | End: 1900-01-01

## 2025-09-02 ENCOUNTER — APPOINTMENT (OUTPATIENT)
Dept: WOUND CARE | Facility: HOSPITAL | Age: 65
End: 2025-09-02
Payer: COMMERCIAL

## 2025-09-02 ENCOUNTER — OUTPATIENT (OUTPATIENT)
Dept: OUTPATIENT SERVICES | Facility: HOSPITAL | Age: 65
LOS: 1 days | End: 2025-09-02
Payer: COMMERCIAL

## 2025-09-02 VITALS
HEIGHT: 68 IN | OXYGEN SATURATION: 96 % | HEART RATE: 81 BPM | BODY MASS INDEX: 27.13 KG/M2 | TEMPERATURE: 98.1 F | SYSTOLIC BLOOD PRESSURE: 145 MMHG | DIASTOLIC BLOOD PRESSURE: 75 MMHG | WEIGHT: 179 LBS | RESPIRATION RATE: 18 BRPM

## 2025-09-02 DIAGNOSIS — L97.422 NON-PRESSURE CHRONIC ULCER OF LEFT HEEL AND MIDFOOT WITH FAT LAYER EXPOSED: ICD-10-CM

## 2025-09-02 DIAGNOSIS — E11.621 TYPE 2 DIABETES MELLITUS WITH FOOT ULCER: ICD-10-CM

## 2025-09-02 DIAGNOSIS — L97.522 NON-PRESSURE CHRONIC ULCER OF OTHER PART OF LEFT FOOT WITH FAT LAYER EXPOSED: ICD-10-CM

## 2025-09-02 LAB
CULTURE RESULTS: SIGNIFICANT CHANGE UP
SPECIMEN SOURCE: SIGNIFICANT CHANGE UP

## 2025-09-02 PROCEDURE — ZZZZZ: CPT

## 2025-09-02 PROCEDURE — G0463: CPT

## 2025-09-05 ENCOUNTER — APPOINTMENT (OUTPATIENT)
Dept: WOUND CARE | Facility: HOSPITAL | Age: 65
End: 2025-09-05
Payer: COMMERCIAL

## 2025-09-05 ENCOUNTER — OUTPATIENT (OUTPATIENT)
Dept: OUTPATIENT SERVICES | Facility: HOSPITAL | Age: 65
LOS: 1 days | End: 2025-09-05
Payer: COMMERCIAL

## 2025-09-05 VITALS
HEIGHT: 68 IN | SYSTOLIC BLOOD PRESSURE: 156 MMHG | BODY MASS INDEX: 27.13 KG/M2 | TEMPERATURE: 98.1 F | DIASTOLIC BLOOD PRESSURE: 88 MMHG | OXYGEN SATURATION: 95 % | HEART RATE: 79 BPM | RESPIRATION RATE: 18 BRPM | WEIGHT: 179 LBS

## 2025-09-05 DIAGNOSIS — L97.522 NON-PRESSURE CHRONIC ULCER OF OTHER PART OF LEFT FOOT WITH FAT LAYER EXPOSED: ICD-10-CM

## 2025-09-05 DIAGNOSIS — E11.621 TYPE 2 DIABETES MELLITUS WITH FOOT ULCER: ICD-10-CM

## 2025-09-05 DIAGNOSIS — E11.40 TYPE 2 DIABETES MELLITUS WITH DIABETIC NEUROPATHY, UNSPECIFIED: ICD-10-CM

## 2025-09-05 DIAGNOSIS — L97.422 NON-PRESSURE CHRONIC ULCER OF LEFT HEEL AND MIDFOOT WITH FAT LAYER EXPOSED: ICD-10-CM

## 2025-09-05 DIAGNOSIS — L97.509 TYPE 2 DIABETES MELLITUS WITH FOOT ULCER: ICD-10-CM

## 2025-09-05 DIAGNOSIS — Z89.429 ACQUIRED ABSENCE OF OTHER TOE(S), UNSPECIFIED SIDE: Chronic | ICD-10-CM

## 2025-09-05 PROCEDURE — 15275 SKIN SUB GRAFT FACE/NK/HF/G: CPT

## 2025-09-05 PROCEDURE — 15271 SKIN SUB GRAFT TRNK/ARM/LEG: CPT

## 2025-09-05 RX ORDER — SULFAMETHOXAZOLE AND TRIMETHOPRIM 800; 160 MG/1; MG/1
800-160 TABLET ORAL TWICE DAILY
Qty: 14 | Refills: 0 | Status: ACTIVE | COMMUNITY
Start: 2025-09-05 | End: 1900-01-01

## 2025-09-07 DIAGNOSIS — E11.40 TYPE 2 DIABETES MELLITUS WITH DIABETIC NEUROPATHY, UNSPECIFIED: ICD-10-CM

## 2025-09-07 DIAGNOSIS — L97.422 NON-PRESSURE CHRONIC ULCER OF LEFT HEEL AND MIDFOOT WITH FAT LAYER EXPOSED: ICD-10-CM

## 2025-09-07 DIAGNOSIS — I10 ESSENTIAL (PRIMARY) HYPERTENSION: ICD-10-CM

## 2025-09-07 DIAGNOSIS — L97.522 NON-PRESSURE CHRONIC ULCER OF OTHER PART OF LEFT FOOT WITH FAT LAYER EXPOSED: ICD-10-CM

## 2025-09-07 DIAGNOSIS — Z79.84 LONG TERM (CURRENT) USE OF ORAL HYPOGLYCEMIC DRUGS: ICD-10-CM

## 2025-09-07 DIAGNOSIS — Z80.3 FAMILY HISTORY OF MALIGNANT NEOPLASM OF BREAST: ICD-10-CM

## 2025-09-07 DIAGNOSIS — Z89.431 ACQUIRED ABSENCE OF RIGHT FOOT: ICD-10-CM

## 2025-09-07 DIAGNOSIS — Z86.39 PERSONAL HISTORY OF OTHER ENDOCRINE, NUTRITIONAL AND METABOLIC DISEASE: ICD-10-CM

## 2025-09-07 DIAGNOSIS — Z79.899 OTHER LONG TERM (CURRENT) DRUG THERAPY: ICD-10-CM

## 2025-09-07 DIAGNOSIS — I25.10 ATHEROSCLEROTIC HEART DISEASE OF NATIVE CORONARY ARTERY WITHOUT ANGINA PECTORIS: ICD-10-CM

## 2025-09-07 DIAGNOSIS — E11.51 TYPE 2 DIABETES MELLITUS WITH DIABETIC PERIPHERAL ANGIOPATHY WITHOUT GANGRENE: ICD-10-CM

## 2025-09-07 DIAGNOSIS — E78.00 PURE HYPERCHOLESTEROLEMIA, UNSPECIFIED: ICD-10-CM

## 2025-09-07 DIAGNOSIS — E11.621 TYPE 2 DIABETES MELLITUS WITH FOOT ULCER: ICD-10-CM

## 2025-09-07 DIAGNOSIS — Z98.890 OTHER SPECIFIED POSTPROCEDURAL STATES: ICD-10-CM

## 2025-09-07 DIAGNOSIS — Z79.82 LONG TERM (CURRENT) USE OF ASPIRIN: ICD-10-CM

## 2025-09-07 DIAGNOSIS — Z83.3 FAMILY HISTORY OF DIABETES MELLITUS: ICD-10-CM

## 2025-09-08 ENCOUNTER — APPOINTMENT (OUTPATIENT)
Dept: WOUND CARE | Facility: HOSPITAL | Age: 65
End: 2025-09-08
Payer: COMMERCIAL

## 2025-09-08 VITALS
DIASTOLIC BLOOD PRESSURE: 80 MMHG | HEART RATE: 82 BPM | TEMPERATURE: 98.4 F | WEIGHT: 179 LBS | HEIGHT: 68 IN | SYSTOLIC BLOOD PRESSURE: 157 MMHG | BODY MASS INDEX: 27.13 KG/M2 | OXYGEN SATURATION: 98 % | RESPIRATION RATE: 18 BRPM

## 2025-09-08 PROCEDURE — ZZZZZ: CPT

## 2025-09-10 ENCOUNTER — APPOINTMENT (OUTPATIENT)
Dept: WOUND CARE | Facility: HOSPITAL | Age: 65
End: 2025-09-10
Payer: COMMERCIAL

## 2025-09-10 VITALS
OXYGEN SATURATION: 95 % | WEIGHT: 179 LBS | RESPIRATION RATE: 18 BRPM | DIASTOLIC BLOOD PRESSURE: 81 MMHG | BODY MASS INDEX: 27.13 KG/M2 | TEMPERATURE: 98.4 F | HEART RATE: 90 BPM | SYSTOLIC BLOOD PRESSURE: 156 MMHG | HEIGHT: 68 IN

## 2025-09-10 PROCEDURE — ZZZZZ: CPT

## 2025-09-12 ENCOUNTER — APPOINTMENT (OUTPATIENT)
Dept: WOUND CARE | Facility: HOSPITAL | Age: 65
End: 2025-09-12
Payer: COMMERCIAL

## 2025-09-12 VITALS
HEIGHT: 68 IN | DIASTOLIC BLOOD PRESSURE: 87 MMHG | HEART RATE: 81 BPM | SYSTOLIC BLOOD PRESSURE: 158 MMHG | RESPIRATION RATE: 18 BRPM | WEIGHT: 179 LBS | BODY MASS INDEX: 27.13 KG/M2 | OXYGEN SATURATION: 95 % | TEMPERATURE: 98.1 F

## 2025-09-12 DIAGNOSIS — Z89.421 ACQUIRED ABSENCE OF OTHER RIGHT TOE(S): ICD-10-CM

## 2025-09-12 PROCEDURE — 15275 SKIN SUB GRAFT FACE/NK/HF/G: CPT

## 2025-09-15 ENCOUNTER — APPOINTMENT (OUTPATIENT)
Dept: WOUND CARE | Facility: HOSPITAL | Age: 65
End: 2025-09-15

## 2025-09-15 VITALS
TEMPERATURE: 98.5 F | SYSTOLIC BLOOD PRESSURE: 144 MMHG | DIASTOLIC BLOOD PRESSURE: 71 MMHG | WEIGHT: 179 LBS | HEIGHT: 68 IN | BODY MASS INDEX: 27.13 KG/M2 | HEART RATE: 82 BPM | RESPIRATION RATE: 18 BRPM | OXYGEN SATURATION: 96 %

## 2025-09-17 ENCOUNTER — APPOINTMENT (OUTPATIENT)
Dept: WOUND CARE | Facility: HOSPITAL | Age: 65
End: 2025-09-17
Payer: COMMERCIAL

## 2025-09-17 VITALS
SYSTOLIC BLOOD PRESSURE: 125 MMHG | HEART RATE: 90 BPM | OXYGEN SATURATION: 98 % | HEIGHT: 68 IN | WEIGHT: 179 LBS | DIASTOLIC BLOOD PRESSURE: 65 MMHG | TEMPERATURE: 99.3 F | RESPIRATION RATE: 18 BRPM | BODY MASS INDEX: 27.13 KG/M2

## 2025-09-17 PROCEDURE — ZZZZZ: CPT

## 2025-09-18 ENCOUNTER — RESULT REVIEW (OUTPATIENT)
Age: 65
End: 2025-09-18

## 2025-09-19 ENCOUNTER — APPOINTMENT (OUTPATIENT)
Dept: WOUND CARE | Facility: HOSPITAL | Age: 65
End: 2025-09-19
Payer: COMMERCIAL

## 2025-09-19 VITALS
BODY MASS INDEX: 27.13 KG/M2 | RESPIRATION RATE: 18 BRPM | SYSTOLIC BLOOD PRESSURE: 149 MMHG | DIASTOLIC BLOOD PRESSURE: 90 MMHG | TEMPERATURE: 98.2 F | OXYGEN SATURATION: 97 % | HEIGHT: 68 IN | WEIGHT: 179 LBS | HEART RATE: 84 BPM

## 2025-09-19 VITALS — DIASTOLIC BLOOD PRESSURE: 78 MMHG | SYSTOLIC BLOOD PRESSURE: 127 MMHG

## 2025-09-19 DIAGNOSIS — E11.621 TYPE 2 DIABETES MELLITUS WITH FOOT ULCER: ICD-10-CM

## 2025-09-19 DIAGNOSIS — Z89.412 ACQUIRED ABSENCE OF LEFT GREAT TOE: ICD-10-CM

## 2025-09-19 DIAGNOSIS — L97.522 NON-PRESSURE CHRONIC ULCER OF OTHER PART OF LEFT FOOT WITH FAT LAYER EXPOSED: ICD-10-CM

## 2025-09-19 DIAGNOSIS — L97.509 TYPE 2 DIABETES MELLITUS WITH FOOT ULCER: ICD-10-CM

## 2025-09-19 DIAGNOSIS — L97.422 NON-PRESSURE CHRONIC ULCER OF LEFT HEEL AND MIDFOOT WITH FAT LAYER EXPOSED: ICD-10-CM

## 2025-09-19 PROCEDURE — 15275 SKIN SUB GRAFT FACE/NK/HF/G: CPT

## 2025-09-19 RX ORDER — SULFAMETHOXAZOLE AND TRIMETHOPRIM 800; 160 MG/1; MG/1
800-160 TABLET ORAL TWICE DAILY
Qty: 14 | Refills: 0 | Status: ACTIVE | COMMUNITY
Start: 2025-09-19 | End: 1900-01-01

## (undated) DEVICE — DRAPE C ARM UNIVERSAL

## (undated) DEVICE — DRSG CURITY GAUZE SPONGE 4 X 4" 12-PLY

## (undated) DEVICE — BAG DECANTER IV STERILE

## (undated) DEVICE — BLADE SCALPEL SAFETY #11 WITH PLASTIC GREEN HANDLE

## (undated) DEVICE — SAW BLADE LINVATEC SAGITTAL MIC 9.5X25.5X0.4MM

## (undated) DEVICE — BLADE SCALPEL SAFETYLOCK #10

## (undated) DEVICE — TAPE GLO-N-TELL RADIOPAQUE 20 STRIPS

## (undated) DEVICE — PREP ALCOHOL PAD MED

## (undated) DEVICE — BLADE SCALPEL SAFETYLOCK #15

## (undated) DEVICE — PACK LOWER EXTREMITY NS PLAINVI

## (undated) DEVICE — VENODYNE/SCD SLEEVE CALF MEDIUM

## (undated) DEVICE — DRSG ACE BANDAGE 6"

## (undated) DEVICE — SYR LUER LOK 10CC

## (undated) DEVICE — SUT MONOSOF 2-0 18" C-15

## (undated) DEVICE — GLV 7 PROTEXIS (WHITE)

## (undated) DEVICE — PREP ALCOHOL PAD

## (undated) DEVICE — SYR LUER LOK 3CC

## (undated) DEVICE — MEDICATION LABELS W MARKER

## (undated) DEVICE — TORQUE DEVICE FOR GUIDEWIRE 0.0100.038"

## (undated) DEVICE — DRSG TEGADERM 4 X 4.75"

## (undated) DEVICE — DRSG XEROFORM 1 X 8"

## (undated) DEVICE — DRAIN JACKSON PRATT 7MM FLAT FULL NO TROCAR

## (undated) DEVICE — SUT POLYSORB 4-0 18" P-12 UNDYED

## (undated) DEVICE — SUT MONOSOF 3-0 18" P-14

## (undated) DEVICE — DRSG KERLIX ROLL 4.5"

## (undated) DEVICE — GLV 6 PROTEXIS (WHITE)

## (undated) DEVICE — INFLATOR ENCORE 26

## (undated) DEVICE — VENODYNE/SCD SLEEVE CALF LARGE

## (undated) DEVICE — PREP CHLORAPREP HI-LITE ORANGE 26ML

## (undated) DEVICE — SOL IRR POUR NS 0.9% 1000ML

## (undated) DEVICE — DRSG COMBINE 5X9"

## (undated) DEVICE — PACKING GAUZE IODOFORM 0.5"

## (undated) DEVICE — SPECIMEN CONTAINER 100ML

## (undated) DEVICE — MARKING PEN W RULER

## (undated) DEVICE — DRSG TELFA 3 X 8

## (undated) DEVICE — DRAPE FEMORAL ANGIOGRAPHY W TROUGH

## (undated) DEVICE — DRAPE 3/4 SHEET W REINFORCEMENT 56X77"

## (undated) DEVICE — GLV 7.5 PROTEXIS (WHITE)

## (undated) DEVICE — SYR LUER LOK 20CC

## (undated) DEVICE — VISITEC 4X4

## (undated) DEVICE — PLV-SCD MACHINE: Type: DURABLE MEDICAL EQUIPMENT

## (undated) DEVICE — NDL HYPO SAFE 22G X 1.5" (BLACK)

## (undated) DEVICE — SAW BLADE LINVATEC SAGITTAL 19.5X41.0X..4MM COARSE

## (undated) DEVICE — FLOORMAT SURGISAFE ABSORBANT WHITE 36" X 72"

## (undated) DEVICE — DRSG KLING 4"

## (undated) DEVICE — WARMING BLANKET UPPER ADULT

## (undated) DEVICE — VALVE CONTROL COPILOT BLEEDBACK

## (undated) DEVICE — STAPLER SKIN PROXIMATE

## (undated) DEVICE — DRAPE TOWEL BLUE 17" X 24"

## (undated) DEVICE — DRSG KERLIX ROLL LG 4.5"

## (undated) DEVICE — ULTRASOUND COVER PEELSAFE 5 X 58"

## (undated) DEVICE — PREP BETADINE KIT

## (undated) DEVICE — BUR MICROAIRE CARBIDE OVAL 4MM 8 FLUTE

## (undated) DEVICE — DRAIN RESERVOIR FOR JACKSON PRATT 100CC CARDINAL

## (undated) DEVICE — PACK CARD CATH CUSTOM

## (undated) DEVICE — TUBING HI PRESURE NONBRAID M/F 72"

## (undated) DEVICE — DRSG DERMABOND 0.7ML

## (undated) DEVICE — SPECIMEN CONTAINER 4OZ

## (undated) DEVICE — NDL HYPO SAFE 18G X 1.5" (PINK)

## (undated) DEVICE — DRSG COMBINE 5 X 9"

## (undated) DEVICE — GLV 6.5 PROTEXIS (BLUE)

## (undated) DEVICE — SOL IRR POUR H2O 1000ML

## (undated) DEVICE — PLV/PSP-ESU T7J19648DX: Type: DURABLE MEDICAL EQUIPMENT

## (undated) DEVICE — STOPCOCK HIGH PRESS 3 WAY

## (undated) DEVICE — SUT POLYSORB 2-0 30" GS-22 UNDYED

## (undated) DEVICE — TOURNIQUET CUFF 18" DUAL PORT SINGLE BLADDER LUER LOCK (BLACK)

## (undated) DEVICE — SOL IRR BAG NS 0.9% 3000ML